# Patient Record
Sex: MALE | Race: WHITE | NOT HISPANIC OR LATINO | Employment: OTHER | URBAN - METROPOLITAN AREA
[De-identification: names, ages, dates, MRNs, and addresses within clinical notes are randomized per-mention and may not be internally consistent; named-entity substitution may affect disease eponyms.]

---

## 2017-01-24 ENCOUNTER — AMBULATORY - HEALTHEAST (OUTPATIENT)
Dept: CARDIOLOGY | Facility: CLINIC | Age: 77
End: 2017-01-24

## 2017-01-26 ENCOUNTER — OFFICE VISIT - HEALTHEAST (OUTPATIENT)
Dept: CARDIOLOGY | Facility: CLINIC | Age: 77
End: 2017-01-26

## 2017-01-26 ENCOUNTER — AMBULATORY - HEALTHEAST (OUTPATIENT)
Dept: CARDIOLOGY | Facility: CLINIC | Age: 77
End: 2017-01-26

## 2017-01-26 ENCOUNTER — COMMUNICATION - HEALTHEAST (OUTPATIENT)
Dept: CARDIOLOGY | Facility: CLINIC | Age: 77
End: 2017-01-26

## 2017-01-26 DIAGNOSIS — I48.19 PERSISTENT ATRIAL FIBRILLATION (H): ICD-10-CM

## 2017-01-26 DIAGNOSIS — G47.33 OSA ON CPAP: ICD-10-CM

## 2017-01-26 DIAGNOSIS — I25.10 CORONARY ARTERY DISEASE INVOLVING NATIVE CORONARY ARTERY OF NATIVE HEART WITHOUT ANGINA PECTORIS: ICD-10-CM

## 2017-01-26 DIAGNOSIS — I50.42 CHRONIC COMBINED SYSTOLIC AND DIASTOLIC CONGESTIVE HEART FAILURE (H): ICD-10-CM

## 2017-01-26 DIAGNOSIS — I50.32 CHRONIC DIASTOLIC CHF (CONGESTIVE HEART FAILURE), NYHA CLASS 2 (H): ICD-10-CM

## 2017-01-26 DIAGNOSIS — J43.8 OTHER EMPHYSEMA (H): ICD-10-CM

## 2017-01-26 DIAGNOSIS — I10 ESSENTIAL HYPERTENSION WITH GOAL BLOOD PRESSURE LESS THAN 130/85: ICD-10-CM

## 2017-01-26 RX ORDER — PEN NEEDLE, DIABETIC 32GX 5/32"
NEEDLE, DISPOSABLE MISCELLANEOUS
Refills: 4 | Status: SHIPPED | COMMUNITY
Start: 2017-01-11

## 2017-01-26 RX ORDER — BRIMONIDINE TARTRATE 2 MG/ML
1 SOLUTION/ DROPS OPHTHALMIC 2 TIMES DAILY
Refills: 11 | Status: SHIPPED | COMMUNITY
Start: 2017-01-06 | End: 2021-08-22

## 2017-01-26 ASSESSMENT — MIFFLIN-ST. JEOR
SCORE: 1982.94
SCORE: 1982.94

## 2017-01-27 ENCOUNTER — AMBULATORY - HEALTHEAST (OUTPATIENT)
Dept: CARDIOLOGY | Facility: CLINIC | Age: 77
End: 2017-01-27

## 2017-01-27 ASSESSMENT — MIFFLIN-ST. JEOR: SCORE: 1988.38

## 2017-01-30 ENCOUNTER — COMMUNICATION - HEALTHEAST (OUTPATIENT)
Dept: CARDIOLOGY | Facility: CLINIC | Age: 77
End: 2017-01-30

## 2017-01-30 DIAGNOSIS — I50.32 CHRONIC DIASTOLIC CHF (CONGESTIVE HEART FAILURE), NYHA CLASS 2 (H): ICD-10-CM

## 2017-01-31 ENCOUNTER — COMMUNICATION - HEALTHEAST (OUTPATIENT)
Dept: CARDIOLOGY | Facility: CLINIC | Age: 77
End: 2017-01-31

## 2017-01-31 DIAGNOSIS — I10 ESSENTIAL HYPERTENSION: ICD-10-CM

## 2017-02-02 ENCOUNTER — AMBULATORY - HEALTHEAST (OUTPATIENT)
Dept: CARDIOLOGY | Facility: CLINIC | Age: 77
End: 2017-02-02

## 2017-02-02 DIAGNOSIS — Z00.6 RESEARCH EXAM: ICD-10-CM

## 2017-02-08 ENCOUNTER — AMBULATORY - HEALTHEAST (OUTPATIENT)
Dept: CARDIOLOGY | Facility: CLINIC | Age: 77
End: 2017-02-08

## 2017-02-24 ENCOUNTER — RECORDS - HEALTHEAST (OUTPATIENT)
Dept: ADMINISTRATIVE | Facility: OTHER | Age: 77
End: 2017-02-24

## 2017-02-24 LAB
LAB AP CHARGES (HE HISTORICAL CONVERSION): NORMAL
PATH REPORT.COMMENTS IMP SPEC: NORMAL
PATH REPORT.COMMENTS IMP SPEC: NORMAL
PATH REPORT.FINAL DX SPEC: NORMAL
PATH REPORT.GROSS SPEC: NORMAL
PATH REPORT.MICROSCOPIC SPEC OTHER STN: NORMAL
PATH REPORT.MICROSCOPIC SPEC OTHER STN: NORMAL
PATH REPORT.RELEVANT HX SPEC: NORMAL
RESULT FLAG (HE HISTORICAL CONVERSION): NORMAL

## 2017-02-27 ENCOUNTER — AMBULATORY - HEALTHEAST (OUTPATIENT)
Dept: CARDIOLOGY | Facility: CLINIC | Age: 77
End: 2017-02-27

## 2017-02-27 ENCOUNTER — COMMUNICATION - HEALTHEAST (OUTPATIENT)
Dept: CARDIOLOGY | Facility: CLINIC | Age: 77
End: 2017-02-27

## 2017-02-27 DIAGNOSIS — I10 ESSENTIAL HYPERTENSION: ICD-10-CM

## 2017-03-01 ENCOUNTER — COMMUNICATION - HEALTHEAST (OUTPATIENT)
Dept: CARDIOLOGY | Facility: CLINIC | Age: 77
End: 2017-03-01

## 2017-03-01 DIAGNOSIS — I50.32 CHRONIC DIASTOLIC CHF (CONGESTIVE HEART FAILURE), NYHA CLASS 2 (H): ICD-10-CM

## 2017-03-02 ENCOUNTER — AMBULATORY - HEALTHEAST (OUTPATIENT)
Dept: CARDIOLOGY | Facility: CLINIC | Age: 77
End: 2017-03-02

## 2017-03-09 ENCOUNTER — AMBULATORY - HEALTHEAST (OUTPATIENT)
Dept: CARDIOLOGY | Facility: CLINIC | Age: 77
End: 2017-03-09

## 2017-03-13 ENCOUNTER — AMBULATORY - HEALTHEAST (OUTPATIENT)
Dept: CARE COORDINATION | Facility: CLINIC | Age: 77
End: 2017-03-13

## 2017-03-14 ENCOUNTER — AMBULATORY - HEALTHEAST (OUTPATIENT)
Dept: CARE COORDINATION | Facility: CLINIC | Age: 77
End: 2017-03-14

## 2017-03-28 ENCOUNTER — COMMUNICATION - HEALTHEAST (OUTPATIENT)
Dept: CARDIOLOGY | Facility: CLINIC | Age: 77
End: 2017-03-28

## 2017-04-01 ENCOUNTER — COMMUNICATION - HEALTHEAST (OUTPATIENT)
Dept: CARDIOLOGY | Facility: CLINIC | Age: 77
End: 2017-04-01

## 2017-04-01 DIAGNOSIS — I50.32 CHRONIC DIASTOLIC CHF (CONGESTIVE HEART FAILURE), NYHA CLASS 2 (H): ICD-10-CM

## 2017-04-06 ENCOUNTER — AMBULATORY - HEALTHEAST (OUTPATIENT)
Dept: CARDIOLOGY | Facility: CLINIC | Age: 77
End: 2017-04-06

## 2017-04-06 ASSESSMENT — MIFFLIN-ST. JEOR: SCORE: 1988.38

## 2017-04-18 ENCOUNTER — AMBULATORY - HEALTHEAST (OUTPATIENT)
Dept: CARDIOLOGY | Facility: CLINIC | Age: 77
End: 2017-04-18

## 2017-04-20 ENCOUNTER — AMBULATORY - HEALTHEAST (OUTPATIENT)
Dept: CARDIOLOGY | Facility: CLINIC | Age: 77
End: 2017-04-20

## 2017-04-20 ENCOUNTER — OFFICE VISIT - HEALTHEAST (OUTPATIENT)
Dept: CARDIOLOGY | Facility: CLINIC | Age: 77
End: 2017-04-20

## 2017-04-20 DIAGNOSIS — I50.42 CHRONIC COMBINED SYSTOLIC AND DIASTOLIC CONGESTIVE HEART FAILURE (H): ICD-10-CM

## 2017-04-20 DIAGNOSIS — R06.89 DIFFICULTY BREATHING: ICD-10-CM

## 2017-04-20 DIAGNOSIS — E66.9 OBESITY: ICD-10-CM

## 2017-04-20 DIAGNOSIS — I10 ESSENTIAL HYPERTENSION WITH GOAL BLOOD PRESSURE LESS THAN 130/85: ICD-10-CM

## 2017-04-20 DIAGNOSIS — I25.10 CORONARY ARTERY DISEASE INVOLVING NATIVE CORONARY ARTERY OF NATIVE HEART WITHOUT ANGINA PECTORIS: ICD-10-CM

## 2017-04-20 DIAGNOSIS — E78.00 HYPERCHOLESTEREMIA: ICD-10-CM

## 2017-04-20 DIAGNOSIS — I48.19 PERSISTENT ATRIAL FIBRILLATION (H): ICD-10-CM

## 2017-04-20 DIAGNOSIS — R42 DIZZY SPELLS: ICD-10-CM

## 2017-04-20 DIAGNOSIS — J43.8 OTHER EMPHYSEMA (H): ICD-10-CM

## 2017-04-20 DIAGNOSIS — G47.33 OSA ON CPAP: ICD-10-CM

## 2017-04-20 ASSESSMENT — MIFFLIN-ST. JEOR: SCORE: 1974.77

## 2017-04-24 ENCOUNTER — HOSPITAL ENCOUNTER (OUTPATIENT)
Dept: ULTRASOUND IMAGING | Facility: CLINIC | Age: 77
Discharge: HOME | End: 2017-04-24
Attending: INTERNAL MEDICINE

## 2017-04-24 DIAGNOSIS — R42 DIZZY SPELLS: ICD-10-CM

## 2017-04-24 DIAGNOSIS — I25.10 CORONARY ARTERY DISEASE INVOLVING NATIVE CORONARY ARTERY OF NATIVE HEART WITHOUT ANGINA PECTORIS: ICD-10-CM

## 2017-05-11 ENCOUNTER — AMBULATORY - HEALTHEAST (OUTPATIENT)
Dept: CARDIOLOGY | Facility: CLINIC | Age: 77
End: 2017-05-11

## 2017-05-16 ENCOUNTER — COMMUNICATION - HEALTHEAST (OUTPATIENT)
Dept: CARDIOLOGY | Facility: CLINIC | Age: 77
End: 2017-05-16

## 2017-05-16 DIAGNOSIS — I50.32 CHRONIC DIASTOLIC CHF (CONGESTIVE HEART FAILURE), NYHA CLASS 2 (H): ICD-10-CM

## 2017-06-08 ENCOUNTER — RECORDS - HEALTHEAST (OUTPATIENT)
Dept: LAB | Facility: CLINIC | Age: 77
End: 2017-06-08

## 2017-06-09 LAB
CHOLEST SERPL-MCNC: 126 MG/DL
FASTING STATUS PATIENT QL REPORTED: ABNORMAL
HDLC SERPL-MCNC: 29 MG/DL
LDLC SERPL CALC-MCNC: 58 MG/DL
PSA SERPL-MCNC: 1.7 NG/ML (ref 0–6.5)
TRIGL SERPL-MCNC: 194 MG/DL

## 2017-06-12 ENCOUNTER — COMMUNICATION - HEALTHEAST (OUTPATIENT)
Dept: CARDIOLOGY | Facility: CLINIC | Age: 77
End: 2017-06-12

## 2017-06-12 DIAGNOSIS — E87.6 HYPOKALEMIA: ICD-10-CM

## 2017-07-06 ENCOUNTER — AMBULATORY - HEALTHEAST (OUTPATIENT)
Dept: CARDIOLOGY | Facility: CLINIC | Age: 77
End: 2017-07-06

## 2017-07-06 ASSESSMENT — MIFFLIN-ST. JEOR: SCORE: 1979.31

## 2017-07-24 ENCOUNTER — COMMUNICATION - HEALTHEAST (OUTPATIENT)
Dept: CARDIOLOGY | Facility: CLINIC | Age: 77
End: 2017-07-24

## 2017-07-24 DIAGNOSIS — I50.32 CHRONIC DIASTOLIC CHF (CONGESTIVE HEART FAILURE), NYHA CLASS 2 (H): ICD-10-CM

## 2017-08-24 ENCOUNTER — RECORDS - HEALTHEAST (OUTPATIENT)
Dept: ADMINISTRATIVE | Facility: OTHER | Age: 77
End: 2017-08-24

## 2017-09-13 ENCOUNTER — HOSPITAL ENCOUNTER (OUTPATIENT)
Dept: RESPIRATORY THERAPY | Facility: HOSPITAL | Age: 77
Discharge: HOME | End: 2017-09-13

## 2017-09-13 DIAGNOSIS — R06.09 DOE (DYSPNEA ON EXERTION): ICD-10-CM

## 2017-09-29 ENCOUNTER — COMMUNICATION - HEALTHEAST (OUTPATIENT)
Dept: CARDIOLOGY | Facility: CLINIC | Age: 77
End: 2017-09-29

## 2017-09-29 DIAGNOSIS — I50.32 CHRONIC DIASTOLIC CHF (CONGESTIVE HEART FAILURE), NYHA CLASS 2 (H): ICD-10-CM

## 2017-10-03 ENCOUNTER — COMMUNICATION - HEALTHEAST (OUTPATIENT)
Dept: CARDIOLOGY | Facility: CLINIC | Age: 77
End: 2017-10-03

## 2017-10-03 DIAGNOSIS — I10 ESSENTIAL HYPERTENSION: ICD-10-CM

## 2017-10-03 DIAGNOSIS — I50.32 CHRONIC DIASTOLIC CHF (CONGESTIVE HEART FAILURE), NYHA CLASS 2 (H): ICD-10-CM

## 2017-10-10 ENCOUNTER — AMBULATORY - HEALTHEAST (OUTPATIENT)
Dept: CARDIOLOGY | Facility: CLINIC | Age: 77
End: 2017-10-10

## 2017-10-10 ASSESSMENT — MIFFLIN-ST. JEOR: SCORE: 1979.31

## 2017-12-02 ENCOUNTER — COMMUNICATION - HEALTHEAST (OUTPATIENT)
Dept: CARDIOLOGY | Facility: CLINIC | Age: 77
End: 2017-12-02

## 2017-12-02 DIAGNOSIS — E87.6 HYPOKALEMIA: ICD-10-CM

## 2017-12-19 ENCOUNTER — AMBULATORY - HEALTHEAST (OUTPATIENT)
Dept: CARDIOLOGY | Facility: CLINIC | Age: 77
End: 2017-12-19

## 2017-12-26 ENCOUNTER — COMMUNICATION - HEALTHEAST (OUTPATIENT)
Dept: CARDIOLOGY | Facility: CLINIC | Age: 77
End: 2017-12-26

## 2017-12-26 DIAGNOSIS — I48.19 PERSISTENT ATRIAL FIBRILLATION (H): ICD-10-CM

## 2017-12-26 DIAGNOSIS — I25.10 CORONARY ARTERY DISEASE INVOLVING NATIVE CORONARY ARTERY OF NATIVE HEART WITHOUT ANGINA PECTORIS: ICD-10-CM

## 2018-01-12 ENCOUNTER — COMMUNICATION - HEALTHEAST (OUTPATIENT)
Dept: CARDIOLOGY | Facility: CLINIC | Age: 78
End: 2018-01-12

## 2018-01-12 DIAGNOSIS — I48.19 PERSISTENT ATRIAL FIBRILLATION (H): ICD-10-CM

## 2018-01-19 ENCOUNTER — RECORDS - HEALTHEAST (OUTPATIENT)
Dept: LAB | Facility: CLINIC | Age: 78
End: 2018-01-19

## 2018-01-19 LAB
ANION GAP SERPL CALCULATED.3IONS-SCNC: 16 MMOL/L (ref 5–18)
BUN SERPL-MCNC: 34 MG/DL (ref 8–28)
CALCIUM SERPL-MCNC: 9.5 MG/DL (ref 8.5–10.5)
CHLORIDE BLD-SCNC: 98 MMOL/L (ref 98–107)
CO2 SERPL-SCNC: 26 MMOL/L (ref 22–31)
CREAT SERPL-MCNC: 1.53 MG/DL (ref 0.7–1.3)
GFR SERPL CREATININE-BSD FRML MDRD: 45 ML/MIN/1.73M2
GLUCOSE BLD-MCNC: 157 MG/DL (ref 70–125)
POTASSIUM BLD-SCNC: 3.7 MMOL/L (ref 3.5–5)
SODIUM SERPL-SCNC: 140 MMOL/L (ref 136–145)
URATE SERPL-MCNC: 12.1 MG/DL (ref 3–8)

## 2018-01-26 ENCOUNTER — RECORDS - HEALTHEAST (OUTPATIENT)
Dept: LAB | Facility: CLINIC | Age: 78
End: 2018-01-26

## 2018-01-26 LAB
ANION GAP SERPL CALCULATED.3IONS-SCNC: 17 MMOL/L (ref 5–18)
BUN SERPL-MCNC: 39 MG/DL (ref 8–28)
CALCIUM SERPL-MCNC: 9.6 MG/DL (ref 8.5–10.5)
CHLORIDE BLD-SCNC: 96 MMOL/L (ref 98–107)
CO2 SERPL-SCNC: 23 MMOL/L (ref 22–31)
CREAT SERPL-MCNC: 1.79 MG/DL (ref 0.7–1.3)
GFR SERPL CREATININE-BSD FRML MDRD: 37 ML/MIN/1.73M2
GLUCOSE BLD-MCNC: 214 MG/DL (ref 70–125)
POTASSIUM BLD-SCNC: 4 MMOL/L (ref 3.5–5)
SODIUM SERPL-SCNC: 136 MMOL/L (ref 136–145)

## 2018-03-20 ENCOUNTER — AMBULATORY - HEALTHEAST (OUTPATIENT)
Dept: CARDIOLOGY | Facility: CLINIC | Age: 78
End: 2018-03-20

## 2018-04-03 ENCOUNTER — COMMUNICATION - HEALTHEAST (OUTPATIENT)
Dept: CARDIOLOGY | Facility: CLINIC | Age: 78
End: 2018-04-03

## 2018-04-03 DIAGNOSIS — I10 ESSENTIAL HYPERTENSION: ICD-10-CM

## 2018-04-25 ENCOUNTER — RECORDS - HEALTHEAST (OUTPATIENT)
Dept: LAB | Facility: CLINIC | Age: 78
End: 2018-04-25

## 2018-04-25 LAB
ALBUMIN SERPL-MCNC: 3.8 G/DL (ref 3.5–5)
ALP SERPL-CCNC: 72 U/L (ref 45–120)
ALT SERPL W P-5'-P-CCNC: 60 U/L (ref 0–45)
ANION GAP SERPL CALCULATED.3IONS-SCNC: 18 MMOL/L (ref 5–18)
AST SERPL W P-5'-P-CCNC: 56 U/L (ref 0–40)
BILIRUB SERPL-MCNC: 2.2 MG/DL (ref 0–1)
BUN SERPL-MCNC: 41 MG/DL (ref 8–28)
CALCIUM SERPL-MCNC: 9.6 MG/DL (ref 8.5–10.5)
CHLORIDE BLD-SCNC: 95 MMOL/L (ref 98–107)
CO2 SERPL-SCNC: 24 MMOL/L (ref 22–31)
CREAT SERPL-MCNC: 1.78 MG/DL (ref 0.7–1.3)
GFR SERPL CREATININE-BSD FRML MDRD: 37 ML/MIN/1.73M2
GLUCOSE BLD-MCNC: 205 MG/DL (ref 70–125)
PHOSPHATE SERPL-MCNC: 3.3 MG/DL (ref 2.5–4.5)
POTASSIUM BLD-SCNC: 3.5 MMOL/L (ref 3.5–5)
PROT SERPL-MCNC: 6.8 G/DL (ref 6–8)
SODIUM SERPL-SCNC: 137 MMOL/L (ref 136–145)

## 2018-05-02 ENCOUNTER — AMBULATORY - HEALTHEAST (OUTPATIENT)
Dept: CARDIOLOGY | Facility: CLINIC | Age: 78
End: 2018-05-02

## 2018-05-02 ENCOUNTER — RECORDS - HEALTHEAST (OUTPATIENT)
Dept: ADMINISTRATIVE | Facility: OTHER | Age: 78
End: 2018-05-02

## 2018-05-07 ENCOUNTER — OFFICE VISIT - HEALTHEAST (OUTPATIENT)
Dept: CARDIOLOGY | Facility: CLINIC | Age: 78
End: 2018-05-07

## 2018-05-07 DIAGNOSIS — R06.89 DIFFICULTY BREATHING: ICD-10-CM

## 2018-05-07 DIAGNOSIS — I50.42 CHRONIC COMBINED SYSTOLIC AND DIASTOLIC CONGESTIVE HEART FAILURE (H): ICD-10-CM

## 2018-05-07 DIAGNOSIS — I25.10 CORONARY ARTERY DISEASE INVOLVING NATIVE CORONARY ARTERY OF NATIVE HEART WITHOUT ANGINA PECTORIS: ICD-10-CM

## 2018-05-07 DIAGNOSIS — E66.9 OBESITY: ICD-10-CM

## 2018-05-07 DIAGNOSIS — G47.33 OSA ON CPAP: ICD-10-CM

## 2018-05-07 DIAGNOSIS — I48.19 PERSISTENT ATRIAL FIBRILLATION (H): ICD-10-CM

## 2018-05-07 DIAGNOSIS — I10 ESSENTIAL HYPERTENSION: ICD-10-CM

## 2018-05-07 DIAGNOSIS — E78.00 HYPERCHOLESTEREMIA: ICD-10-CM

## 2018-05-07 ASSESSMENT — MIFFLIN-ST. JEOR: SCORE: 1939.63

## 2018-05-08 ENCOUNTER — RECORDS - HEALTHEAST (OUTPATIENT)
Dept: ADMINISTRATIVE | Facility: OTHER | Age: 78
End: 2018-05-08

## 2018-05-10 ENCOUNTER — RECORDS - HEALTHEAST (OUTPATIENT)
Dept: ADMINISTRATIVE | Facility: OTHER | Age: 78
End: 2018-05-10

## 2018-05-15 ENCOUNTER — COMMUNICATION - HEALTHEAST (OUTPATIENT)
Dept: CARDIOLOGY | Facility: CLINIC | Age: 78
End: 2018-05-15

## 2018-05-15 DIAGNOSIS — R06.02 SOB (SHORTNESS OF BREATH): ICD-10-CM

## 2018-05-15 DIAGNOSIS — I27.20 PULMONARY HYPERTENSION (H): ICD-10-CM

## 2018-05-16 ENCOUNTER — RECORDS - HEALTHEAST (OUTPATIENT)
Dept: LAB | Facility: CLINIC | Age: 78
End: 2018-05-16

## 2018-05-18 LAB — BACTERIA SPEC CULT: NORMAL

## 2018-05-30 ENCOUNTER — RECORDS - HEALTHEAST (OUTPATIENT)
Dept: LAB | Facility: CLINIC | Age: 78
End: 2018-05-30

## 2018-05-30 LAB
ALBUMIN SERPL-MCNC: 4.1 G/DL (ref 3.5–5)
ALP SERPL-CCNC: 72 U/L (ref 45–120)
ALT SERPL W P-5'-P-CCNC: 87 U/L (ref 0–45)
ANION GAP SERPL CALCULATED.3IONS-SCNC: 17 MMOL/L (ref 5–18)
AST SERPL W P-5'-P-CCNC: 71 U/L (ref 0–40)
BILIRUB SERPL-MCNC: 1.6 MG/DL (ref 0–1)
BUN SERPL-MCNC: 43 MG/DL (ref 8–28)
CALCIUM SERPL-MCNC: 9.8 MG/DL (ref 8.5–10.5)
CHLORIDE BLD-SCNC: 101 MMOL/L (ref 98–107)
CHOLEST SERPL-MCNC: 146 MG/DL
CO2 SERPL-SCNC: 24 MMOL/L (ref 22–31)
CREAT SERPL-MCNC: 1.83 MG/DL (ref 0.7–1.3)
FASTING STATUS PATIENT QL REPORTED: ABNORMAL
GFR SERPL CREATININE-BSD FRML MDRD: 36 ML/MIN/1.73M2
GLUCOSE BLD-MCNC: 251 MG/DL (ref 70–125)
HDLC SERPL-MCNC: 30 MG/DL
LDLC SERPL CALC-MCNC: 65 MG/DL
LDLC SERPL CALC-MCNC: ABNORMAL MG/DL
POTASSIUM BLD-SCNC: 3.8 MMOL/L (ref 3.5–5)
PROT SERPL-MCNC: 6.9 G/DL (ref 6–8)
SODIUM SERPL-SCNC: 142 MMOL/L (ref 136–145)
TRIGL SERPL-MCNC: 502 MG/DL

## 2018-06-05 ENCOUNTER — HOSPITAL ENCOUNTER (OUTPATIENT)
Dept: RESPIRATORY THERAPY | Facility: HOSPITAL | Age: 78
Discharge: HOME | End: 2018-06-05
Attending: INTERNAL MEDICINE

## 2018-06-05 DIAGNOSIS — R06.02 SOB (SHORTNESS OF BREATH): ICD-10-CM

## 2018-06-05 DIAGNOSIS — I27.20 PULMONARY HYPERTENSION (H): ICD-10-CM

## 2018-06-05 LAB — HGB BLD-MCNC: 14.7 G/DL (ref 14–18)

## 2018-06-07 ENCOUNTER — AMBULATORY - HEALTHEAST (OUTPATIENT)
Dept: CARDIOLOGY | Facility: CLINIC | Age: 78
End: 2018-06-07

## 2018-06-07 ENCOUNTER — RECORDS - HEALTHEAST (OUTPATIENT)
Dept: ADMINISTRATIVE | Facility: OTHER | Age: 78
End: 2018-06-07

## 2018-06-11 ENCOUNTER — OFFICE VISIT - HEALTHEAST (OUTPATIENT)
Dept: CARDIOLOGY | Facility: CLINIC | Age: 78
End: 2018-06-11

## 2018-06-11 DIAGNOSIS — I27.22 PULMONARY HYPERTENSION DUE TO LEFT VENTRICULAR DIASTOLIC DYSFUNCTION (H): ICD-10-CM

## 2018-06-11 DIAGNOSIS — G47.33 OSA ON CPAP: ICD-10-CM

## 2018-06-11 ASSESSMENT — MIFFLIN-ST. JEOR: SCORE: 1930.55

## 2018-06-19 ENCOUNTER — AMBULATORY - HEALTHEAST (OUTPATIENT)
Dept: CARDIOLOGY | Facility: CLINIC | Age: 78
End: 2018-06-19

## 2018-08-10 ENCOUNTER — RECORDS - HEALTHEAST (OUTPATIENT)
Dept: LAB | Facility: CLINIC | Age: 78
End: 2018-08-10

## 2018-08-10 LAB
ALBUMIN SERPL-MCNC: 3.7 G/DL (ref 3.5–5)
ALP SERPL-CCNC: 73 U/L (ref 45–120)
ALT SERPL W P-5'-P-CCNC: 44 U/L (ref 0–45)
ANION GAP SERPL CALCULATED.3IONS-SCNC: 13 MMOL/L (ref 5–18)
AST SERPL W P-5'-P-CCNC: 46 U/L (ref 0–40)
BILIRUB SERPL-MCNC: 2.6 MG/DL (ref 0–1)
BUN SERPL-MCNC: 24 MG/DL (ref 8–28)
CALCIUM SERPL-MCNC: 9.1 MG/DL (ref 8.5–10.5)
CHLORIDE BLD-SCNC: 108 MMOL/L (ref 98–107)
CO2 SERPL-SCNC: 21 MMOL/L (ref 22–31)
CREAT SERPL-MCNC: 1.46 MG/DL (ref 0.7–1.3)
GFR SERPL CREATININE-BSD FRML MDRD: 47 ML/MIN/1.73M2
GLUCOSE BLD-MCNC: 99 MG/DL (ref 70–125)
POTASSIUM BLD-SCNC: 4.1 MMOL/L (ref 3.5–5)
PROT SERPL-MCNC: 6.1 G/DL (ref 6–8)
SODIUM SERPL-SCNC: 142 MMOL/L (ref 136–145)

## 2018-08-16 ENCOUNTER — RECORDS - HEALTHEAST (OUTPATIENT)
Dept: ADMINISTRATIVE | Facility: OTHER | Age: 78
End: 2018-08-16

## 2018-08-16 ENCOUNTER — AMBULATORY - HEALTHEAST (OUTPATIENT)
Dept: CARDIOLOGY | Facility: CLINIC | Age: 78
End: 2018-08-16

## 2018-09-05 ENCOUNTER — COMMUNICATION - HEALTHEAST (OUTPATIENT)
Dept: CARDIOLOGY | Facility: CLINIC | Age: 78
End: 2018-09-05

## 2018-09-05 DIAGNOSIS — E87.6 HYPOKALEMIA: ICD-10-CM

## 2018-09-07 ENCOUNTER — OFFICE VISIT - HEALTHEAST (OUTPATIENT)
Dept: CARDIOLOGY | Facility: CLINIC | Age: 78
End: 2018-09-07

## 2018-09-07 ENCOUNTER — COMMUNICATION - HEALTHEAST (OUTPATIENT)
Dept: TELEHEALTH | Facility: CLINIC | Age: 78
End: 2018-09-07

## 2018-09-07 DIAGNOSIS — I48.19 PERSISTENT ATRIAL FIBRILLATION (H): ICD-10-CM

## 2018-09-07 DIAGNOSIS — I25.10 CORONARY ARTERY DISEASE INVOLVING NATIVE CORONARY ARTERY OF NATIVE HEART WITHOUT ANGINA PECTORIS: ICD-10-CM

## 2018-09-07 DIAGNOSIS — G47.33 OSA ON CPAP: ICD-10-CM

## 2018-09-07 DIAGNOSIS — E66.9 OBESITY: ICD-10-CM

## 2018-09-07 DIAGNOSIS — I27.22 PULMONARY HYPERTENSION DUE TO LEFT VENTRICULAR DIASTOLIC DYSFUNCTION (H): ICD-10-CM

## 2018-09-07 DIAGNOSIS — E78.00 HYPERCHOLESTEREMIA: ICD-10-CM

## 2018-09-07 DIAGNOSIS — I50.30 (HFPEF) HEART FAILURE WITH PRESERVED EJECTION FRACTION (H): ICD-10-CM

## 2018-09-07 DIAGNOSIS — I10 ESSENTIAL HYPERTENSION: ICD-10-CM

## 2018-09-25 ENCOUNTER — COMMUNICATION - HEALTHEAST (OUTPATIENT)
Dept: CARDIOLOGY | Facility: CLINIC | Age: 78
End: 2018-09-25

## 2018-09-25 ENCOUNTER — RECORDS - HEALTHEAST (OUTPATIENT)
Dept: LAB | Facility: CLINIC | Age: 78
End: 2018-09-25

## 2018-09-25 LAB
CREAT UR-MCNC: 138.2 MG/DL
MICROALBUMIN UR-MCNC: 7.64 MG/DL (ref 0–1.99)
MICROALBUMIN/CREAT UR: 55.3 MG/G

## 2018-10-24 ENCOUNTER — COMMUNICATION - HEALTHEAST (OUTPATIENT)
Dept: CARDIOLOGY | Facility: CLINIC | Age: 78
End: 2018-10-24

## 2019-01-10 ENCOUNTER — AMBULATORY - HEALTHEAST (OUTPATIENT)
Dept: CARDIOLOGY | Facility: CLINIC | Age: 79
End: 2019-01-10

## 2019-01-10 DIAGNOSIS — Z00.6 PATIENT IN CLINICAL RESEARCH STUDY: ICD-10-CM

## 2019-01-10 ASSESSMENT — MIFFLIN-ST. JEOR: SCORE: 1926.47

## 2019-01-24 ENCOUNTER — COMMUNICATION - HEALTHEAST (OUTPATIENT)
Dept: CARDIOLOGY | Facility: CLINIC | Age: 79
End: 2019-01-24

## 2019-01-24 DIAGNOSIS — E87.6 HYPOKALEMIA: ICD-10-CM

## 2019-02-18 ENCOUNTER — RECORDS - HEALTHEAST (OUTPATIENT)
Dept: LAB | Facility: CLINIC | Age: 79
End: 2019-02-18

## 2019-02-18 LAB
ANION GAP SERPL CALCULATED.3IONS-SCNC: 13 MMOL/L (ref 5–18)
BUN SERPL-MCNC: 26 MG/DL (ref 8–28)
CALCIUM SERPL-MCNC: 9.4 MG/DL (ref 8.5–10.5)
CHLORIDE BLD-SCNC: 108 MMOL/L (ref 98–107)
CO2 SERPL-SCNC: 23 MMOL/L (ref 22–31)
CREAT SERPL-MCNC: 1.53 MG/DL (ref 0.7–1.3)
GFR SERPL CREATININE-BSD FRML MDRD: 44 ML/MIN/1.73M2
GLUCOSE BLD-MCNC: 88 MG/DL (ref 70–125)
POTASSIUM BLD-SCNC: 4.4 MMOL/L (ref 3.5–5)
SODIUM SERPL-SCNC: 144 MMOL/L (ref 136–145)

## 2019-03-29 ENCOUNTER — COMMUNICATION - HEALTHEAST (OUTPATIENT)
Dept: CARDIOLOGY | Facility: CLINIC | Age: 79
End: 2019-03-29

## 2019-03-29 DIAGNOSIS — E87.6 HYPOKALEMIA: ICD-10-CM

## 2019-04-17 ENCOUNTER — RECORDS - HEALTHEAST (OUTPATIENT)
Dept: LAB | Facility: CLINIC | Age: 79
End: 2019-04-17

## 2019-04-17 LAB
ANION GAP SERPL CALCULATED.3IONS-SCNC: 12 MMOL/L (ref 5–18)
BUN SERPL-MCNC: 27 MG/DL (ref 8–28)
CALCIUM SERPL-MCNC: 9.4 MG/DL (ref 8.5–10.5)
CHLORIDE BLD-SCNC: 103 MMOL/L (ref 98–107)
CO2 SERPL-SCNC: 24 MMOL/L (ref 22–31)
CREAT SERPL-MCNC: 1.49 MG/DL (ref 0.7–1.3)
GFR SERPL CREATININE-BSD FRML MDRD: 46 ML/MIN/1.73M2
GLUCOSE BLD-MCNC: 299 MG/DL (ref 70–125)
POTASSIUM BLD-SCNC: 4.2 MMOL/L (ref 3.5–5)
SODIUM SERPL-SCNC: 139 MMOL/L (ref 136–145)

## 2019-04-19 ENCOUNTER — COMMUNICATION - HEALTHEAST (OUTPATIENT)
Dept: CARDIOLOGY | Facility: CLINIC | Age: 79
End: 2019-04-19

## 2019-05-01 ENCOUNTER — AMBULATORY - HEALTHEAST (OUTPATIENT)
Dept: CARDIOLOGY | Facility: CLINIC | Age: 79
End: 2019-05-01

## 2019-05-01 ENCOUNTER — RECORDS - HEALTHEAST (OUTPATIENT)
Dept: ADMINISTRATIVE | Facility: OTHER | Age: 79
End: 2019-05-01

## 2019-05-06 ENCOUNTER — OFFICE VISIT - HEALTHEAST (OUTPATIENT)
Dept: CARDIOLOGY | Facility: CLINIC | Age: 79
End: 2019-05-06

## 2019-05-06 DIAGNOSIS — I48.19 PERSISTENT ATRIAL FIBRILLATION (H): ICD-10-CM

## 2019-05-06 DIAGNOSIS — J43.8 OTHER EMPHYSEMA (H): ICD-10-CM

## 2019-05-06 DIAGNOSIS — I50.30 (HFPEF) HEART FAILURE WITH PRESERVED EJECTION FRACTION (H): ICD-10-CM

## 2019-05-06 DIAGNOSIS — G47.33 OSA ON CPAP: ICD-10-CM

## 2019-05-06 DIAGNOSIS — E66.9 OBESITY: ICD-10-CM

## 2019-05-06 DIAGNOSIS — I10 ESSENTIAL HYPERTENSION: ICD-10-CM

## 2019-05-06 DIAGNOSIS — I25.10 CORONARY ARTERY DISEASE INVOLVING NATIVE CORONARY ARTERY OF NATIVE HEART WITHOUT ANGINA PECTORIS: ICD-10-CM

## 2019-05-06 DIAGNOSIS — I27.22 PULMONARY HYPERTENSION DUE TO LEFT VENTRICULAR DIASTOLIC DYSFUNCTION (H): ICD-10-CM

## 2019-05-06 ASSESSMENT — MIFFLIN-ST. JEOR: SCORE: 1885.88

## 2019-05-09 ENCOUNTER — RECORDS - HEALTHEAST (OUTPATIENT)
Dept: LAB | Facility: CLINIC | Age: 79
End: 2019-05-09

## 2019-05-09 LAB
ANION GAP SERPL CALCULATED.3IONS-SCNC: 13 MMOL/L (ref 5–18)
BUN SERPL-MCNC: 29 MG/DL (ref 8–28)
CALCIUM SERPL-MCNC: 9.4 MG/DL (ref 8.5–10.5)
CHLORIDE BLD-SCNC: 107 MMOL/L (ref 98–107)
CO2 SERPL-SCNC: 23 MMOL/L (ref 22–31)
CREAT SERPL-MCNC: 1.42 MG/DL (ref 0.7–1.3)
GFR SERPL CREATININE-BSD FRML MDRD: 48 ML/MIN/1.73M2
GLUCOSE BLD-MCNC: 138 MG/DL (ref 70–125)
POTASSIUM BLD-SCNC: 3.9 MMOL/L (ref 3.5–5)
SODIUM SERPL-SCNC: 143 MMOL/L (ref 136–145)

## 2019-05-14 ENCOUNTER — COMMUNICATION - HEALTHEAST (OUTPATIENT)
Dept: CARDIOLOGY | Facility: CLINIC | Age: 79
End: 2019-05-14

## 2019-05-14 DIAGNOSIS — E87.6 HYPOKALEMIA: ICD-10-CM

## 2019-05-16 ENCOUNTER — COMMUNICATION - HEALTHEAST (OUTPATIENT)
Dept: CARDIOLOGY | Facility: CLINIC | Age: 79
End: 2019-05-16

## 2019-05-16 ENCOUNTER — HOSPITAL ENCOUNTER (OUTPATIENT)
Dept: CARDIOLOGY | Facility: HOSPITAL | Age: 79
Discharge: HOME | End: 2019-05-16
Attending: INTERNAL MEDICINE

## 2019-05-16 ENCOUNTER — HOSPITAL ENCOUNTER (OUTPATIENT)
Dept: NUCLEAR MEDICINE | Facility: HOSPITAL | Age: 79
Discharge: HOME | End: 2019-05-16
Attending: INTERNAL MEDICINE

## 2019-05-16 DIAGNOSIS — I25.10 CORONARY ARTERY DISEASE INVOLVING NATIVE CORONARY ARTERY OF NATIVE HEART WITHOUT ANGINA PECTORIS: ICD-10-CM

## 2019-05-16 DIAGNOSIS — I48.19 PERSISTENT ATRIAL FIBRILLATION (H): ICD-10-CM

## 2019-05-16 LAB
CV STRESS CURRENT BP HE: NORMAL
CV STRESS CURRENT HR HE: 59
CV STRESS CURRENT HR HE: 60
CV STRESS CURRENT HR HE: 62
CV STRESS CURRENT HR HE: 62
CV STRESS CURRENT HR HE: 63
CV STRESS CURRENT HR HE: 63
CV STRESS CURRENT HR HE: 64
CV STRESS CURRENT HR HE: 65
CV STRESS CURRENT HR HE: 65
CV STRESS CURRENT HR HE: 69
CV STRESS CURRENT HR HE: 70
CV STRESS DEVIATION TIME HE: NORMAL
CV STRESS ECHO PERCENT HR HE: NORMAL
CV STRESS EXERCISE STAGE HE: NORMAL
CV STRESS FINAL RESTING BP HE: NORMAL
CV STRESS FINAL RESTING HR HE: 64
CV STRESS MAX HR HE: 69
CV STRESS MAX TREADMILL GRADE HE: 0
CV STRESS MAX TREADMILL SPEED HE: 0
CV STRESS PEAK DIA BP HE: NORMAL
CV STRESS PEAK SYS BP HE: NORMAL
CV STRESS PHASE HE: NORMAL
CV STRESS PROTOCOL HE: NORMAL
CV STRESS RESTING PT POSITION HE: NORMAL
CV STRESS ST DEVIATION AMOUNT HE: NORMAL
CV STRESS ST DEVIATION ELEVATION HE: NORMAL
CV STRESS ST EVELATION AMOUNT HE: NORMAL
CV STRESS TEST TYPE HE: NORMAL
CV STRESS TOTAL STAGE TIME MIN 1 HE: NORMAL
NUC STRESS EJECTION FRACTION: 75 %
STRESS ECHO BASELINE BP: NORMAL
STRESS ECHO BASELINE HR: 64
STRESS ECHO CALCULATED PERCENT HR: 48 %
STRESS ECHO LAST STRESS BP: NORMAL
STRESS ECHO LAST STRESS HR: 64

## 2019-05-31 ENCOUNTER — RECORDS - HEALTHEAST (OUTPATIENT)
Dept: LAB | Facility: CLINIC | Age: 79
End: 2019-05-31

## 2019-05-31 LAB
ALBUMIN SERPL-MCNC: 3.7 G/DL (ref 3.5–5)
ALP SERPL-CCNC: 130 U/L (ref 45–120)
ALT SERPL W P-5'-P-CCNC: 17 U/L (ref 0–45)
ANION GAP SERPL CALCULATED.3IONS-SCNC: 11 MMOL/L (ref 5–18)
AST SERPL W P-5'-P-CCNC: 20 U/L (ref 0–40)
BILIRUB SERPL-MCNC: 2.2 MG/DL (ref 0–1)
BUN SERPL-MCNC: 25 MG/DL (ref 8–28)
CALCIUM SERPL-MCNC: 9.4 MG/DL (ref 8.5–10.5)
CHLORIDE BLD-SCNC: 103 MMOL/L (ref 98–107)
CO2 SERPL-SCNC: 26 MMOL/L (ref 22–31)
CREAT SERPL-MCNC: 1.52 MG/DL (ref 0.7–1.3)
GFR SERPL CREATININE-BSD FRML MDRD: 45 ML/MIN/1.73M2
GLUCOSE BLD-MCNC: 135 MG/DL (ref 70–125)
POTASSIUM BLD-SCNC: 3.9 MMOL/L (ref 3.5–5)
PROT SERPL-MCNC: 6.6 G/DL (ref 6–8)
SODIUM SERPL-SCNC: 140 MMOL/L (ref 136–145)
TSH SERPL DL<=0.005 MIU/L-ACNC: 2.55 UIU/ML (ref 0.3–5)
VIT B12 SERPL-MCNC: 552 PG/ML (ref 213–816)

## 2019-07-04 ENCOUNTER — COMMUNICATION - HEALTHEAST (OUTPATIENT)
Dept: CARDIOLOGY | Facility: CLINIC | Age: 79
End: 2019-07-04

## 2019-07-04 DIAGNOSIS — E87.6 HYPOKALEMIA: ICD-10-CM

## 2019-07-09 ENCOUNTER — RECORDS - HEALTHEAST (OUTPATIENT)
Dept: LAB | Facility: CLINIC | Age: 79
End: 2019-07-09

## 2019-07-09 LAB
CHOLEST SERPL-MCNC: 99 MG/DL
FASTING STATUS PATIENT QL REPORTED: ABNORMAL
HDLC SERPL-MCNC: 25 MG/DL
LDLC SERPL CALC-MCNC: 14 MG/DL
TRIGL SERPL-MCNC: 301 MG/DL

## 2020-01-24 ENCOUNTER — COMMUNICATION - HEALTHEAST (OUTPATIENT)
Dept: CARDIOLOGY | Facility: CLINIC | Age: 80
End: 2020-01-24

## 2020-01-24 DIAGNOSIS — I48.19 PERSISTENT ATRIAL FIBRILLATION (H): ICD-10-CM

## 2020-01-28 ENCOUNTER — AMBULATORY - HEALTHEAST (OUTPATIENT)
Dept: CARDIOLOGY | Facility: CLINIC | Age: 80
End: 2020-01-28

## 2020-01-28 ENCOUNTER — RECORDS - HEALTHEAST (OUTPATIENT)
Dept: ADMINISTRATIVE | Facility: OTHER | Age: 80
End: 2020-01-28

## 2020-01-30 ENCOUNTER — OFFICE VISIT - HEALTHEAST (OUTPATIENT)
Dept: CARDIOLOGY | Facility: CLINIC | Age: 80
End: 2020-01-30

## 2020-01-30 DIAGNOSIS — I50.30 (HFPEF) HEART FAILURE WITH PRESERVED EJECTION FRACTION (H): ICD-10-CM

## 2020-01-30 DIAGNOSIS — J43.8 OTHER EMPHYSEMA (H): ICD-10-CM

## 2020-01-30 DIAGNOSIS — I10 ESSENTIAL HYPERTENSION: ICD-10-CM

## 2020-01-30 DIAGNOSIS — I25.10 CORONARY ARTERY DISEASE INVOLVING NATIVE CORONARY ARTERY OF NATIVE HEART WITHOUT ANGINA PECTORIS: ICD-10-CM

## 2020-01-30 DIAGNOSIS — I27.22 PULMONARY HYPERTENSION DUE TO LEFT VENTRICULAR DIASTOLIC DYSFUNCTION (H): ICD-10-CM

## 2020-01-30 DIAGNOSIS — E78.00 HYPERCHOLESTEREMIA: ICD-10-CM

## 2020-01-30 DIAGNOSIS — G47.33 OSA ON CPAP: ICD-10-CM

## 2020-01-30 DIAGNOSIS — I48.91 ATRIAL FIBRILLATION (H): ICD-10-CM

## 2020-01-30 LAB
ANION GAP SERPL CALCULATED.3IONS-SCNC: 13 MMOL/L (ref 5–18)
BUN SERPL-MCNC: 28 MG/DL (ref 8–28)
CALCIUM SERPL-MCNC: 9.6 MG/DL (ref 8.5–10.5)
CHLORIDE BLD-SCNC: 101 MMOL/L (ref 98–107)
CO2 SERPL-SCNC: 22 MMOL/L (ref 22–31)
CREAT SERPL-MCNC: 1.73 MG/DL (ref 0.7–1.3)
GFR SERPL CREATININE-BSD FRML MDRD: 38 ML/MIN/1.73M2
GLUCOSE BLD-MCNC: 378 MG/DL (ref 70–125)
POTASSIUM BLD-SCNC: 4.5 MMOL/L (ref 3.5–5)
SODIUM SERPL-SCNC: 136 MMOL/L (ref 136–145)

## 2020-02-24 ENCOUNTER — COMMUNICATION - HEALTHEAST (OUTPATIENT)
Dept: CARDIOLOGY | Facility: CLINIC | Age: 80
End: 2020-02-24

## 2020-02-24 DIAGNOSIS — I50.30 (HFPEF) HEART FAILURE WITH PRESERVED EJECTION FRACTION (H): ICD-10-CM

## 2020-02-24 DIAGNOSIS — I10 ESSENTIAL HYPERTENSION: ICD-10-CM

## 2020-02-25 ENCOUNTER — RECORDS - HEALTHEAST (OUTPATIENT)
Dept: ADMINISTRATIVE | Facility: OTHER | Age: 80
End: 2020-02-25

## 2020-02-25 ENCOUNTER — AMBULATORY - HEALTHEAST (OUTPATIENT)
Dept: CARDIOLOGY | Facility: CLINIC | Age: 80
End: 2020-02-25

## 2020-02-27 ENCOUNTER — OFFICE VISIT - HEALTHEAST (OUTPATIENT)
Dept: CARDIOLOGY | Facility: CLINIC | Age: 80
End: 2020-02-27

## 2020-02-27 DIAGNOSIS — I48.20 CHRONIC ATRIAL FIBRILLATION (H): ICD-10-CM

## 2020-02-27 DIAGNOSIS — G47.33 OSA ON CPAP: ICD-10-CM

## 2020-02-27 DIAGNOSIS — I27.22 PULMONARY HYPERTENSION DUE TO LEFT VENTRICULAR DIASTOLIC DYSFUNCTION (H): ICD-10-CM

## 2020-02-27 DIAGNOSIS — I10 ESSENTIAL HYPERTENSION: ICD-10-CM

## 2020-02-27 DIAGNOSIS — I50.30 (HFPEF) HEART FAILURE WITH PRESERVED EJECTION FRACTION (H): ICD-10-CM

## 2020-02-27 ASSESSMENT — MIFFLIN-ST. JEOR: SCORE: 1863.65

## 2020-05-23 ENCOUNTER — RECORDS - HEALTHEAST (OUTPATIENT)
Dept: ADMINISTRATIVE | Facility: OTHER | Age: 80
End: 2020-05-23

## 2020-05-28 ENCOUNTER — RECORDS - HEALTHEAST (OUTPATIENT)
Dept: LAB | Facility: CLINIC | Age: 80
End: 2020-05-28

## 2020-05-28 ENCOUNTER — RECORDS - HEALTHEAST (OUTPATIENT)
Dept: ADMINISTRATIVE | Facility: OTHER | Age: 80
End: 2020-05-28

## 2020-05-28 LAB
ANION GAP SERPL CALCULATED.3IONS-SCNC: 10 MMOL/L (ref 5–18)
BUN SERPL-MCNC: 30 MG/DL (ref 8–28)
CALCIUM SERPL-MCNC: 9.3 MG/DL (ref 8.5–10.5)
CHLORIDE BLD-SCNC: 107 MMOL/L (ref 98–107)
CO2 SERPL-SCNC: 26 MMOL/L (ref 22–31)
CREAT SERPL-MCNC: 1.49 MG/DL (ref 0.7–1.3)
GFR SERPL CREATININE-BSD FRML MDRD: 46 ML/MIN/1.73M2
GLUCOSE BLD-MCNC: 160 MG/DL (ref 70–125)
POTASSIUM BLD-SCNC: 4.5 MMOL/L (ref 3.5–5)
SODIUM SERPL-SCNC: 143 MMOL/L (ref 136–145)

## 2020-06-01 ENCOUNTER — AMBULATORY - HEALTHEAST (OUTPATIENT)
Dept: CARDIOLOGY | Facility: CLINIC | Age: 80
End: 2020-06-01

## 2020-06-01 ENCOUNTER — RECORDS - HEALTHEAST (OUTPATIENT)
Dept: ADMINISTRATIVE | Facility: OTHER | Age: 80
End: 2020-06-01

## 2020-06-01 ENCOUNTER — AMBULATORY - HEALTHEAST (OUTPATIENT)
Dept: PHARMACY | Facility: HOSPITAL | Age: 80
End: 2020-06-01

## 2020-06-01 DIAGNOSIS — D50.9 IRON DEFICIENCY ANEMIA, UNSPECIFIED IRON DEFICIENCY ANEMIA TYPE: ICD-10-CM

## 2020-06-03 ENCOUNTER — RECORDS - HEALTHEAST (OUTPATIENT)
Dept: ADMINISTRATIVE | Facility: OTHER | Age: 80
End: 2020-06-03

## 2020-06-04 ENCOUNTER — OFFICE VISIT - HEALTHEAST (OUTPATIENT)
Dept: CARDIOLOGY | Facility: CLINIC | Age: 80
End: 2020-06-04

## 2020-06-04 DIAGNOSIS — E66.9 OBESITY: ICD-10-CM

## 2020-06-04 DIAGNOSIS — J43.8 OTHER EMPHYSEMA (H): ICD-10-CM

## 2020-06-04 DIAGNOSIS — I25.10 CORONARY ARTERY DISEASE INVOLVING NATIVE CORONARY ARTERY OF NATIVE HEART WITHOUT ANGINA PECTORIS: ICD-10-CM

## 2020-06-04 DIAGNOSIS — I50.31 ACUTE DIASTOLIC CHF (CONGESTIVE HEART FAILURE) (H): ICD-10-CM

## 2020-06-04 DIAGNOSIS — E78.00 HYPERCHOLESTEREMIA: ICD-10-CM

## 2020-06-04 DIAGNOSIS — I10 ESSENTIAL HYPERTENSION: ICD-10-CM

## 2020-06-04 DIAGNOSIS — G47.33 OSA ON CPAP: ICD-10-CM

## 2020-06-04 DIAGNOSIS — I27.22 PULMONARY HYPERTENSION DUE TO LEFT VENTRICULAR DIASTOLIC DYSFUNCTION (H): ICD-10-CM

## 2020-06-04 DIAGNOSIS — I48.19 PERSISTENT ATRIAL FIBRILLATION (H): ICD-10-CM

## 2020-06-04 RX ORDER — METOLAZONE 2.5 MG/1
2.5 TABLET ORAL DAILY
Status: SHIPPED | COMMUNITY
Start: 2020-05-23 | End: 2021-07-11 | Stop reason: SINTOL

## 2020-06-04 RX ORDER — IPRATROPIUM BROMIDE AND ALBUTEROL SULFATE 2.5; .5 MG/3ML; MG/3ML
3 SOLUTION RESPIRATORY (INHALATION) PRN
Status: SHIPPED | COMMUNITY
Start: 2020-06-04 | End: 2021-07-11

## 2020-06-04 RX ORDER — EPLERENONE 25 MG/1
25 TABLET, FILM COATED ORAL DAILY
Status: SHIPPED | COMMUNITY
Start: 2020-05-24 | End: 2021-10-10

## 2020-06-22 ENCOUNTER — INFUSION - HEALTHEAST (OUTPATIENT)
Dept: INFUSION THERAPY | Facility: HOSPITAL | Age: 80
End: 2020-06-22

## 2020-06-22 DIAGNOSIS — D50.9 IRON DEFICIENCY ANEMIA, UNSPECIFIED IRON DEFICIENCY ANEMIA TYPE: ICD-10-CM

## 2020-07-29 ENCOUNTER — COMMUNICATION - HEALTHEAST (OUTPATIENT)
Dept: CARDIOLOGY | Facility: CLINIC | Age: 80
End: 2020-07-29

## 2020-07-29 DIAGNOSIS — I48.19 PERSISTENT ATRIAL FIBRILLATION (H): ICD-10-CM

## 2020-09-02 ENCOUNTER — RECORDS - HEALTHEAST (OUTPATIENT)
Dept: LAB | Facility: CLINIC | Age: 80
End: 2020-09-02

## 2020-09-02 LAB
ALBUMIN SERPL-MCNC: 3.6 G/DL (ref 3.5–5)
ALP SERPL-CCNC: 114 U/L (ref 45–120)
ALT SERPL W P-5'-P-CCNC: 16 U/L (ref 0–45)
ANION GAP SERPL CALCULATED.3IONS-SCNC: 14 MMOL/L (ref 5–18)
AST SERPL W P-5'-P-CCNC: 21 U/L (ref 0–40)
BILIRUB SERPL-MCNC: 1.3 MG/DL (ref 0–1)
BUN SERPL-MCNC: 27 MG/DL (ref 8–28)
CALCIUM SERPL-MCNC: 9.2 MG/DL (ref 8.5–10.5)
CHLORIDE BLD-SCNC: 103 MMOL/L (ref 98–107)
CHOLEST SERPL-MCNC: 136 MG/DL
CO2 SERPL-SCNC: 25 MMOL/L (ref 22–31)
CREAT SERPL-MCNC: 1.67 MG/DL (ref 0.7–1.3)
FASTING STATUS PATIENT QL REPORTED: ABNORMAL
GFR SERPL CREATININE-BSD FRML MDRD: 40 ML/MIN/1.73M2
GLUCOSE BLD-MCNC: 172 MG/DL (ref 70–125)
HDLC SERPL-MCNC: 28 MG/DL
LDLC SERPL CALC-MCNC: 50 MG/DL
LDLC SERPL CALC-MCNC: ABNORMAL MG/DL
POTASSIUM BLD-SCNC: 4.1 MMOL/L (ref 3.5–5)
PROT SERPL-MCNC: 6.6 G/DL (ref 6–8)
SODIUM SERPL-SCNC: 142 MMOL/L (ref 136–145)
TRIGL SERPL-MCNC: 563 MG/DL

## 2020-09-29 ENCOUNTER — RECORDS - HEALTHEAST (OUTPATIENT)
Dept: LAB | Facility: CLINIC | Age: 80
End: 2020-09-29

## 2020-09-29 LAB
ANION GAP SERPL CALCULATED.3IONS-SCNC: 15 MMOL/L (ref 5–18)
BUN SERPL-MCNC: 45 MG/DL (ref 8–28)
CALCIUM SERPL-MCNC: 9.6 MG/DL (ref 8.5–10.5)
CHLORIDE BLD-SCNC: 103 MMOL/L (ref 98–107)
CO2 SERPL-SCNC: 22 MMOL/L (ref 22–31)
CREAT SERPL-MCNC: 1.69 MG/DL (ref 0.7–1.3)
GFR SERPL CREATININE-BSD FRML MDRD: 39 ML/MIN/1.73M2
GLUCOSE BLD-MCNC: 164 MG/DL (ref 70–125)
POTASSIUM BLD-SCNC: 4.1 MMOL/L (ref 3.5–5)
SODIUM SERPL-SCNC: 140 MMOL/L (ref 136–145)

## 2020-11-20 ENCOUNTER — AMBULATORY - HEALTHEAST (OUTPATIENT)
Dept: SURGERY | Facility: CLINIC | Age: 80
End: 2020-11-20

## 2020-11-20 DIAGNOSIS — Z11.59 ENCOUNTER FOR SCREENING FOR OTHER VIRAL DISEASES: ICD-10-CM

## 2020-12-21 ENCOUNTER — RECORDS - HEALTHEAST (OUTPATIENT)
Dept: LAB | Facility: CLINIC | Age: 80
End: 2020-12-21

## 2020-12-21 LAB
ALBUMIN SERPL-MCNC: 3.9 G/DL (ref 3.5–5)
ALP SERPL-CCNC: 103 U/L (ref 45–120)
ALT SERPL W P-5'-P-CCNC: 22 U/L (ref 0–45)
ANION GAP SERPL CALCULATED.3IONS-SCNC: 10 MMOL/L (ref 5–18)
AST SERPL W P-5'-P-CCNC: 17 U/L (ref 0–40)
BILIRUB SERPL-MCNC: 0.5 MG/DL (ref 0–1)
BUN SERPL-MCNC: 13 MG/DL (ref 8–28)
CALCIUM SERPL-MCNC: 8.9 MG/DL (ref 8.5–10.5)
CHLORIDE BLD-SCNC: 103 MMOL/L (ref 98–107)
CO2 SERPL-SCNC: 28 MMOL/L (ref 22–31)
CREAT SERPL-MCNC: 0.75 MG/DL (ref 0.7–1.3)
GFR SERPL CREATININE-BSD FRML MDRD: >60 ML/MIN/1.73M2
GLUCOSE BLD-MCNC: 144 MG/DL (ref 70–125)
POTASSIUM BLD-SCNC: 4.1 MMOL/L (ref 3.5–5)
PROT SERPL-MCNC: 7 G/DL (ref 6–8)
SODIUM SERPL-SCNC: 141 MMOL/L (ref 136–145)

## 2021-01-14 ENCOUNTER — ANESTHESIA - HEALTHEAST (OUTPATIENT)
Dept: SURGERY | Facility: CLINIC | Age: 81
End: 2021-01-14

## 2021-01-14 ENCOUNTER — SURGERY - HEALTHEAST (OUTPATIENT)
Dept: SURGERY | Facility: CLINIC | Age: 81
End: 2021-01-14

## 2021-02-04 ENCOUNTER — COMMUNICATION - HEALTHEAST (OUTPATIENT)
Dept: ADMINISTRATIVE | Facility: CLINIC | Age: 81
End: 2021-02-04

## 2021-02-05 ENCOUNTER — RECORDS - HEALTHEAST (OUTPATIENT)
Dept: ADMINISTRATIVE | Facility: OTHER | Age: 81
End: 2021-02-05

## 2021-02-05 ENCOUNTER — AMBULATORY - HEALTHEAST (OUTPATIENT)
Dept: CARDIOLOGY | Facility: CLINIC | Age: 81
End: 2021-02-05

## 2021-02-05 ENCOUNTER — OFFICE VISIT - HEALTHEAST (OUTPATIENT)
Dept: CARDIOLOGY | Facility: CLINIC | Age: 81
End: 2021-02-05

## 2021-02-05 DIAGNOSIS — E66.9 OBESITY: ICD-10-CM

## 2021-02-05 DIAGNOSIS — I73.9 PERIPHERAL VASCULAR DISEASE (H): ICD-10-CM

## 2021-02-05 DIAGNOSIS — I48.91 ATRIAL FIBRILLATION (H): ICD-10-CM

## 2021-02-05 DIAGNOSIS — I50.33 ACUTE ON CHRONIC DIASTOLIC CONGESTIVE HEART FAILURE (H): ICD-10-CM

## 2021-02-05 DIAGNOSIS — I10 ESSENTIAL HYPERTENSION: ICD-10-CM

## 2021-02-05 DIAGNOSIS — I27.22 PULMONARY HYPERTENSION DUE TO LEFT VENTRICULAR DIASTOLIC DYSFUNCTION (H): ICD-10-CM

## 2021-02-05 DIAGNOSIS — G47.33 OSA ON CPAP: ICD-10-CM

## 2021-02-05 DIAGNOSIS — I25.10 CORONARY ARTERY DISEASE INVOLVING NATIVE CORONARY ARTERY OF NATIVE HEART WITHOUT ANGINA PECTORIS: ICD-10-CM

## 2021-02-05 DIAGNOSIS — E78.00 HYPERCHOLESTEREMIA: ICD-10-CM

## 2021-02-05 RX ORDER — SILDENAFIL CITRATE 20 MG/1
20 TABLET ORAL 3 TIMES DAILY
Status: SHIPPED | COMMUNITY
Start: 2021-02-04 | End: 2021-07-11 | Stop reason: ALTCHOICE

## 2021-02-05 RX ORDER — CEFDINIR 300 MG/1
1 CAPSULE ORAL 2 TIMES DAILY
Status: SHIPPED | COMMUNITY
Start: 2021-02-04 | End: 2021-07-11

## 2021-02-05 RX ORDER — ACETAMINOPHEN 500 MG
1000 TABLET ORAL EVERY 6 HOURS PRN
Status: ON HOLD | COMMUNITY
Start: 2021-02-04 | End: 2022-01-15

## 2021-02-05 RX ORDER — METOPROLOL TARTRATE 50 MG
1 TABLET ORAL 2 TIMES DAILY
Status: SHIPPED | COMMUNITY
Start: 2021-02-04 | End: 2021-07-11 | Stop reason: DRUGHIGH

## 2021-02-05 RX ORDER — BLOOD SUGAR DIAGNOSTIC
STRIP MISCELLANEOUS SEE ADMIN INSTRUCTIONS
Status: SHIPPED | COMMUNITY
Start: 2021-01-20

## 2021-02-05 RX ORDER — PANTOPRAZOLE SODIUM 40 MG/1
40 TABLET, DELAYED RELEASE ORAL
Status: SHIPPED | COMMUNITY
Start: 2021-02-05 | End: 2021-07-11 | Stop reason: ALTCHOICE

## 2021-02-05 RX ORDER — CLOPIDOGREL BISULFATE 75 MG/1
1 TABLET ORAL DAILY
Status: ON HOLD | COMMUNITY
Start: 2021-02-04 | End: 2021-10-26

## 2021-02-05 ASSESSMENT — MIFFLIN-ST. JEOR: SCORE: 1828.49

## 2021-02-06 ENCOUNTER — COMMUNICATION - HEALTHEAST (OUTPATIENT)
Dept: CARDIOLOGY | Facility: CLINIC | Age: 81
End: 2021-02-06

## 2021-02-06 DIAGNOSIS — I48.19 PERSISTENT ATRIAL FIBRILLATION (H): ICD-10-CM

## 2021-02-08 ENCOUNTER — COMMUNICATION - HEALTHEAST (OUTPATIENT)
Dept: CARDIOLOGY | Facility: CLINIC | Age: 81
End: 2021-02-08

## 2021-02-08 ENCOUNTER — AMBULATORY - HEALTHEAST (OUTPATIENT)
Dept: CARDIOLOGY | Facility: CLINIC | Age: 81
End: 2021-02-08

## 2021-02-08 ENCOUNTER — AMBULATORY - HEALTHEAST (OUTPATIENT)
Dept: ADMINISTRATIVE | Facility: REHABILITATION | Age: 81
End: 2021-02-08

## 2021-02-08 DIAGNOSIS — Z98.61 HISTORY OF PERCUTANEOUS CORONARY INTERVENTION: ICD-10-CM

## 2021-02-08 DIAGNOSIS — I21.4 NSTEMI (NON-ST ELEVATED MYOCARDIAL INFARCTION) (H): ICD-10-CM

## 2021-02-10 ENCOUNTER — RECORDS - HEALTHEAST (OUTPATIENT)
Dept: LAB | Facility: CLINIC | Age: 81
End: 2021-02-10

## 2021-02-10 LAB
ALBUMIN SERPL-MCNC: 3.7 G/DL (ref 3.5–5)
ALP SERPL-CCNC: 127 U/L (ref 45–120)
ALT SERPL W P-5'-P-CCNC: 21 U/L (ref 0–45)
ANION GAP SERPL CALCULATED.3IONS-SCNC: 12 MMOL/L (ref 5–18)
AST SERPL W P-5'-P-CCNC: 26 U/L (ref 0–40)
BILIRUB SERPL-MCNC: 1.4 MG/DL (ref 0–1)
BUN SERPL-MCNC: 43 MG/DL (ref 8–28)
CALCIUM SERPL-MCNC: 9.2 MG/DL (ref 8.5–10.5)
CHLORIDE BLD-SCNC: 103 MMOL/L (ref 98–107)
CO2 SERPL-SCNC: 25 MMOL/L (ref 22–31)
CREAT SERPL-MCNC: 1.79 MG/DL (ref 0.7–1.3)
DIGOXIN LEVEL LHE- HISTORICAL: 0.7 NG/ML (ref 0.5–2)
GFR SERPL CREATININE-BSD FRML MDRD: 37 ML/MIN/1.73M2
GLUCOSE BLD-MCNC: 232 MG/DL (ref 70–125)
POTASSIUM BLD-SCNC: 5.1 MMOL/L (ref 3.5–5)
PROT SERPL-MCNC: 6.4 G/DL (ref 6–8)
SODIUM SERPL-SCNC: 140 MMOL/L (ref 136–145)

## 2021-02-19 ENCOUNTER — RECORDS - HEALTHEAST (OUTPATIENT)
Dept: LAB | Facility: CLINIC | Age: 81
End: 2021-02-19

## 2021-02-19 LAB
ALBUMIN SERPL-MCNC: 3.6 G/DL (ref 3.5–5)
ALBUMIN UR-MCNC: ABNORMAL MG/DL
ALP SERPL-CCNC: 113 U/L (ref 45–120)
ALT SERPL W P-5'-P-CCNC: 20 U/L (ref 0–45)
ANION GAP SERPL CALCULATED.3IONS-SCNC: 11 MMOL/L (ref 5–18)
APPEARANCE UR: ABNORMAL
AST SERPL W P-5'-P-CCNC: 20 U/L (ref 0–40)
BACTERIA #/AREA URNS HPF: ABNORMAL HPF
BILIRUB SERPL-MCNC: 1.5 MG/DL (ref 0–1)
BILIRUB UR QL STRIP: NEGATIVE
BUN SERPL-MCNC: 36 MG/DL (ref 8–28)
CALCIUM SERPL-MCNC: 9.1 MG/DL (ref 8.5–10.5)
CHLORIDE BLD-SCNC: 104 MMOL/L (ref 98–107)
CO2 SERPL-SCNC: 25 MMOL/L (ref 22–31)
COLOR UR AUTO: ABNORMAL
CREAT SERPL-MCNC: 1.59 MG/DL (ref 0.7–1.3)
GFR SERPL CREATININE-BSD FRML MDRD: 42 ML/MIN/1.73M2
GLUCOSE BLD-MCNC: 352 MG/DL (ref 70–125)
GLUCOSE UR STRIP-MCNC: ABNORMAL MG/DL
HGB UR QL STRIP: ABNORMAL
KETONES UR STRIP-MCNC: ABNORMAL MG/DL
LEUKOCYTE ESTERASE UR QL STRIP: ABNORMAL
NITRATE UR QL: NEGATIVE
PH UR STRIP: 6.5 [PH] (ref 4.5–8)
POTASSIUM BLD-SCNC: 4.9 MMOL/L (ref 3.5–5)
PROT SERPL-MCNC: 6.3 G/DL (ref 6–8)
RBC #/AREA URNS AUTO: >100 HPF
SODIUM SERPL-SCNC: 140 MMOL/L (ref 136–145)
SP GR UR STRIP: 1.01 (ref 1–1.03)
SQUAMOUS #/AREA URNS AUTO: ABNORMAL LPF
UROBILINOGEN UR STRIP-ACNC: ABNORMAL
WBC #/AREA URNS AUTO: ABNORMAL HPF
WBC CLUMPS #/AREA URNS HPF: PRESENT /[HPF]

## 2021-02-21 LAB — BACTERIA SPEC CULT: ABNORMAL

## 2021-02-23 ENCOUNTER — RECORDS - HEALTHEAST (OUTPATIENT)
Dept: LAB | Facility: CLINIC | Age: 81
End: 2021-02-23

## 2021-02-23 LAB
ALBUMIN SERPL-MCNC: 3.6 G/DL (ref 3.5–5)
ALP SERPL-CCNC: 114 U/L (ref 45–120)
ALT SERPL W P-5'-P-CCNC: 23 U/L (ref 0–45)
ANION GAP SERPL CALCULATED.3IONS-SCNC: 13 MMOL/L (ref 5–18)
AST SERPL W P-5'-P-CCNC: 24 U/L (ref 0–40)
BILIRUB SERPL-MCNC: 1.6 MG/DL (ref 0–1)
BUN SERPL-MCNC: 40 MG/DL (ref 8–28)
CALCIUM SERPL-MCNC: 9 MG/DL (ref 8.5–10.5)
CHLORIDE BLD-SCNC: 101 MMOL/L (ref 98–107)
CO2 SERPL-SCNC: 24 MMOL/L (ref 22–31)
CREAT SERPL-MCNC: 1.92 MG/DL (ref 0.7–1.3)
GFR SERPL CREATININE-BSD FRML MDRD: 34 ML/MIN/1.73M2
GLUCOSE BLD-MCNC: 522 MG/DL (ref 70–125)
POTASSIUM BLD-SCNC: 5 MMOL/L (ref 3.5–5)
PROT SERPL-MCNC: 6.4 G/DL (ref 6–8)
SODIUM SERPL-SCNC: 138 MMOL/L (ref 136–145)

## 2021-03-04 ENCOUNTER — RECORDS - HEALTHEAST (OUTPATIENT)
Dept: LAB | Facility: CLINIC | Age: 81
End: 2021-03-04

## 2021-03-04 LAB
ANION GAP SERPL CALCULATED.3IONS-SCNC: 13 MMOL/L (ref 5–18)
BUN SERPL-MCNC: 39 MG/DL (ref 8–28)
CALCIUM SERPL-MCNC: 8.9 MG/DL (ref 8.5–10.5)
CHLORIDE BLD-SCNC: 100 MMOL/L (ref 98–107)
CO2 SERPL-SCNC: 27 MMOL/L (ref 22–31)
CREAT SERPL-MCNC: 1.67 MG/DL (ref 0.7–1.3)
GFR SERPL CREATININE-BSD FRML MDRD: 40 ML/MIN/1.73M2
GLUCOSE BLD-MCNC: 216 MG/DL (ref 70–125)
POTASSIUM BLD-SCNC: 4 MMOL/L (ref 3.5–5)
SODIUM SERPL-SCNC: 140 MMOL/L (ref 136–145)

## 2021-03-17 ENCOUNTER — AMBULATORY - HEALTHEAST (OUTPATIENT)
Dept: CARDIAC REHAB | Facility: CLINIC | Age: 81
End: 2021-03-17

## 2021-03-17 DIAGNOSIS — I21.4 NSTEMI (NON-ST ELEVATED MYOCARDIAL INFARCTION) (H): ICD-10-CM

## 2021-03-17 DIAGNOSIS — Z98.61 STATUS POST PERCUTANEOUS TRANSLUMINAL CORONARY ANGIOPLASTY: ICD-10-CM

## 2021-03-17 LAB
GLUCOSE BLDC GLUCOMTR-MCNC: 351 MG/DL (ref 70–139)
GLUCOSE BLDC GLUCOMTR-MCNC: 353 MG/DL (ref 70–139)

## 2021-03-19 ENCOUNTER — AMBULATORY - HEALTHEAST (OUTPATIENT)
Dept: CARDIAC REHAB | Facility: CLINIC | Age: 81
End: 2021-03-19

## 2021-03-19 DIAGNOSIS — Z98.61 STATUS POST PERCUTANEOUS TRANSLUMINAL CORONARY ANGIOPLASTY: ICD-10-CM

## 2021-03-19 DIAGNOSIS — I21.4 NSTEMI (NON-ST ELEVATED MYOCARDIAL INFARCTION) (H): ICD-10-CM

## 2021-03-19 LAB
GLUCOSE BLDC GLUCOMTR-MCNC: 225 MG/DL (ref 70–139)
GLUCOSE BLDC GLUCOMTR-MCNC: 331 MG/DL (ref 70–139)

## 2021-03-22 ENCOUNTER — AMBULATORY - HEALTHEAST (OUTPATIENT)
Dept: CARDIAC REHAB | Facility: CLINIC | Age: 81
End: 2021-03-22

## 2021-03-22 DIAGNOSIS — Z98.61 STATUS POST PERCUTANEOUS TRANSLUMINAL CORONARY ANGIOPLASTY: ICD-10-CM

## 2021-03-22 DIAGNOSIS — I21.4 NSTEMI (NON-ST ELEVATED MYOCARDIAL INFARCTION) (H): ICD-10-CM

## 2021-03-22 LAB
GLUCOSE BLDC GLUCOMTR-MCNC: 209 MG/DL (ref 70–139)
GLUCOSE BLDC GLUCOMTR-MCNC: 229 MG/DL (ref 70–139)

## 2021-03-26 ENCOUNTER — AMBULATORY - HEALTHEAST (OUTPATIENT)
Dept: CARDIAC REHAB | Facility: CLINIC | Age: 81
End: 2021-03-26

## 2021-03-26 DIAGNOSIS — I21.4 NSTEMI (NON-ST ELEVATED MYOCARDIAL INFARCTION) (H): ICD-10-CM

## 2021-03-26 DIAGNOSIS — Z98.61 STATUS POST PERCUTANEOUS TRANSLUMINAL CORONARY ANGIOPLASTY: ICD-10-CM

## 2021-03-26 LAB
GLUCOSE BLDC GLUCOMTR-MCNC: 222 MG/DL (ref 70–139)
GLUCOSE BLDC GLUCOMTR-MCNC: 248 MG/DL (ref 70–139)

## 2021-03-29 ENCOUNTER — AMBULATORY - HEALTHEAST (OUTPATIENT)
Dept: CARDIAC REHAB | Facility: CLINIC | Age: 81
End: 2021-03-29

## 2021-03-29 DIAGNOSIS — I21.4 NSTEMI (NON-ST ELEVATED MYOCARDIAL INFARCTION) (H): ICD-10-CM

## 2021-03-29 DIAGNOSIS — Z98.61 STATUS POST PERCUTANEOUS TRANSLUMINAL CORONARY ANGIOPLASTY: ICD-10-CM

## 2021-03-29 LAB
GLUCOSE BLDC GLUCOMTR-MCNC: 189 MG/DL (ref 70–139)
GLUCOSE BLDC GLUCOMTR-MCNC: 197 MG/DL (ref 70–139)

## 2021-04-05 ENCOUNTER — AMBULATORY - HEALTHEAST (OUTPATIENT)
Dept: CARDIAC REHAB | Facility: CLINIC | Age: 81
End: 2021-04-05

## 2021-04-05 DIAGNOSIS — I21.4 NSTEMI (NON-ST ELEVATED MYOCARDIAL INFARCTION) (H): ICD-10-CM

## 2021-04-05 DIAGNOSIS — Z98.61 STATUS POST PERCUTANEOUS TRANSLUMINAL CORONARY ANGIOPLASTY: ICD-10-CM

## 2021-04-05 LAB — GLUCOSE BLDC GLUCOMTR-MCNC: 163 MG/DL (ref 70–139)

## 2021-04-26 ENCOUNTER — AMBULATORY - HEALTHEAST (OUTPATIENT)
Dept: CARDIAC REHAB | Facility: CLINIC | Age: 81
End: 2021-04-26

## 2021-04-26 DIAGNOSIS — I21.4 NSTEMI (NON-ST ELEVATED MYOCARDIAL INFARCTION) (H): ICD-10-CM

## 2021-04-26 DIAGNOSIS — Z98.61 STATUS POST PERCUTANEOUS TRANSLUMINAL CORONARY ANGIOPLASTY: ICD-10-CM

## 2021-04-26 LAB
GLUCOSE BLDC GLUCOMTR-MCNC: 237 MG/DL (ref 70–139)
GLUCOSE BLDC GLUCOMTR-MCNC: 267 MG/DL (ref 70–139)

## 2021-04-30 ENCOUNTER — RECORDS - HEALTHEAST (OUTPATIENT)
Dept: CARDIAC REHAB | Facility: CLINIC | Age: 81
End: 2021-04-30

## 2021-04-30 LAB — GLUCOSE BLDC GLUCOMTR-MCNC: 186 MG/DL (ref 70–139)

## 2021-05-03 ENCOUNTER — RECORDS - HEALTHEAST (OUTPATIENT)
Dept: CARDIAC REHAB | Facility: CLINIC | Age: 81
End: 2021-05-03

## 2021-05-03 LAB — GLUCOSE BLDC GLUCOMTR-MCNC: 294 MG/DL (ref 70–139)

## 2021-05-17 ENCOUNTER — RECORDS - HEALTHEAST (OUTPATIENT)
Dept: CARDIAC REHAB | Facility: CLINIC | Age: 81
End: 2021-05-17

## 2021-05-17 LAB — GLUCOSE BLDC GLUCOMTR-MCNC: 307 MG/DL (ref 70–139)

## 2021-05-21 ENCOUNTER — AMBULATORY - HEALTHEAST (OUTPATIENT)
Dept: CARDIAC REHAB | Facility: CLINIC | Age: 81
End: 2021-05-21

## 2021-05-21 DIAGNOSIS — I21.4 NSTEMI (NON-ST ELEVATED MYOCARDIAL INFARCTION) (H): ICD-10-CM

## 2021-05-21 DIAGNOSIS — Z98.61 STATUS POST PERCUTANEOUS TRANSLUMINAL CORONARY ANGIOPLASTY: ICD-10-CM

## 2021-05-21 LAB
GLUCOSE BLDC GLUCOMTR-MCNC: 167 MG/DL (ref 70–139)
GLUCOSE BLDC GLUCOMTR-MCNC: 173 MG/DL (ref 70–139)

## 2021-05-25 ENCOUNTER — RECORDS - HEALTHEAST (OUTPATIENT)
Dept: ADMINISTRATIVE | Facility: CLINIC | Age: 81
End: 2021-05-25

## 2021-05-25 ENCOUNTER — RECORDS - HEALTHEAST (OUTPATIENT)
Dept: LAB | Facility: CLINIC | Age: 81
End: 2021-05-25

## 2021-05-25 LAB
ANION GAP SERPL CALCULATED.3IONS-SCNC: 15 MMOL/L (ref 5–18)
BUN SERPL-MCNC: 35 MG/DL (ref 8–28)
CALCIUM SERPL-MCNC: 8.7 MG/DL (ref 8.5–10.5)
CHLORIDE BLD-SCNC: 102 MMOL/L (ref 98–107)
CHOLEST SERPL-MCNC: 94 MG/DL
CO2 SERPL-SCNC: 21 MMOL/L (ref 22–31)
CREAT SERPL-MCNC: 1.92 MG/DL (ref 0.7–1.3)
FASTING STATUS PATIENT QL REPORTED: ABNORMAL
GFR SERPL CREATININE-BSD FRML MDRD: 34 ML/MIN/1.73M2
GLUCOSE BLD-MCNC: 335 MG/DL (ref 70–125)
HDLC SERPL-MCNC: 25 MG/DL
LDLC SERPL CALC-MCNC: <5 MG/DL
POTASSIUM BLD-SCNC: 4.5 MMOL/L (ref 3.5–5)
SODIUM SERPL-SCNC: 138 MMOL/L (ref 136–145)
TRIGL SERPL-MCNC: 353 MG/DL

## 2021-05-26 ENCOUNTER — RECORDS - HEALTHEAST (OUTPATIENT)
Dept: ADMINISTRATIVE | Facility: CLINIC | Age: 81
End: 2021-05-26

## 2021-05-27 ENCOUNTER — RECORDS - HEALTHEAST (OUTPATIENT)
Dept: ADMINISTRATIVE | Facility: CLINIC | Age: 81
End: 2021-05-27

## 2021-05-28 ENCOUNTER — RECORDS - HEALTHEAST (OUTPATIENT)
Dept: ADMINISTRATIVE | Facility: CLINIC | Age: 81
End: 2021-05-28

## 2021-05-28 NOTE — TELEPHONE ENCOUNTER
----- Message from Barbie Velasco sent at 4/19/2019 11:47 AM CDT -----  Contact: Patient   General phone call:    Caller: patient   Primary cardiologist: Manolo   Detailed reason for call: Patient is scheduled for Arm surgery on 4/26/19, per patient: surgeon needs to know if patient needs to stop Eloquis prior to his surgery.   Please call pt to help advise.  New or active symptoms? No   Best phone number: [phone]  Best time to contact: anytime  Ok to leave a detailed message? Yes  Device? No     Additional Info:

## 2021-05-28 NOTE — TELEPHONE ENCOUNTER
From: Stephania French MD  Sent: 4/19/2019  12:27 PM  To: Serenity Doty RN    Hold for 3 days, no bridging, restart day after unless issues from orthopedics.  LF      Called and spoke with patient regarding Dr. French's recommendations. Pt verbalized understanding no further questions at this time. -kcl

## 2021-05-28 NOTE — PATIENT INSTRUCTIONS - HE
Mr Red Sutherland,  I enjoyed visiting with you again today.  I am sorry to hear of the shoulder issues.  Per our conversation let us get the stress test.  I will plan on seeing you 6 months or sooner if needed.  Terry French

## 2021-05-28 NOTE — PROGRESS NOTES
[facility] Follow-up Note    Assessment & Plan        1. Coronary artery disease involving native coronary artery of native heart without angina pectoris   -angiography in 2011 showed distal left main 10% lesion, left anterior descending 30% mid lesion, proximal circumflex 50% lesion and the right coronary artery had a proximal 10% lesion. January 2015 stress test showed no major ischemia. Doubt ischemia is causing his shortness of breath since June 2016 stress test showed no ischemia with preserved ejection fraction .  He went down to the [facility] and had an extensive cardiovascular evaluation, I do not have those records but I can see through care everywhere he had a coronary angiogram December 2017 and per the records does not appear to be obstructive coronary artery disease causing his symptoms.  Given that he is going to have significant shoulder surgery will arrange for repeat pharmacological nuclear stress test.   2. Persistent atrial fibrillation (H) -asymptomatic, chronic and not valvular and on chronic Eliquis therapy.   3. Essential hypertension -under good control.   4. NOVA on CPAP -oxygen with his CPAP and stable.   5. (HFpEF) heart failure with preserved ejection fraction (H) -no symptoms or signs on exam currently.   6. Other emphysema (H) -numerous inhalers and defer to pulmonologist.   7. Pulmonary hypertension -due to left ventricular diastolic dysfunction; WHO Group 2. He has been seen by [facility] and has pulmonary systolics in the 80s and is currently on sildenafil.    8. Obesity -continue to work on weight loss.     Plan  1.  Pharmacological nuclear.  Unless significant ischemia proceed with right shoulder surgery.  2.  Follow-up with me in 6 months or sooner if needed.    Subjective  CC: 78-year-old white gentleman being seen in follow-up today.  He is complaining of discomfort involving his right shoulder as well as numbness involving the fourth and fifth digits of  "left hand.  He has his chronic shortness of breath and minimal activity and is using oxygen at nighttime.  There is no chest pain, palpitations, PND, orthopnea or peripheral edema.    Medications  Current Outpatient Medications   Medication Sig Note     albuterol (PROVENTIL HFA;VENTOLIN HFA) 90 mcg/actuation inhaler Inhale 1 puff every 4 (four) hours as needed. 1/26/2017: Taking bid     albuterol (PROVENTIL) 2.5 mg /3 mL (0.083 %) nebulizer solution Take 2.5 mg by nebulization every 4 (four) hours as needed (as needed).             amLODIPine (NORVASC) 10 MG tablet Take 1 tablet (10 mg total) by mouth daily. (Patient taking differently: Take 5 mg by mouth daily.       )      apixaban (ELIQUIS) 5 mg Tab Take 1 tablet by mouth every 12 (twelve) hours.      aspirin 81 MG EC tablet Take 81 mg by mouth daily.      atorvastatin (LIPITOR) 20 MG tablet Take 1 tablet by mouth daily.      azithromycin (ZITHROMAX) 250 MG tablet 4 tablets 1 hour prior to dental work      BD ULTRA-FINE MANISHA PEN NEEDLES 32 gauge x 5/32\" Ndle       brimonidine (ALPHAGAN) 0.2 % ophthalmic solution Administer 1 drop into the left eye 2 (two) times a day.      bumetanide (BUMEX) 1 MG tablet Take 1 tablet (1 mg total) by mouth 2 (two) times a day at 9am and 6pm. (Patient taking differently: Take 3 mg by mouth 2 (two) times a day at 9am and 6pm.       )      calcium-magnesium 300-300 mg Tab Take by mouth daily. CALCIUM MAGNESIUM ZINC 1000-400-15. TAKE TWICE DAILY.       cetirizine (ZYRTEC) 10 MG tablet Take 10 mg by mouth daily as needed.       cloNIDine (CATAPRES-TTS) 0.2 mg/24 hr Place 1 patch on the skin once a week.      cycloSPORINE (RESTASIS) 0.05 % ophthalmic emulsion Administer 1 drop into the left eye every 12 (twelve) hours.       digoxin (LANOXIN) 125 mcg tablet Take 1 tablet (125 mcg total) by mouth daily.      diltiazem (CARDIZEM CD) 240 MG 24 hr capsule Take 240 mg by mouth daily.      esomeprazole (NEXIUM) 40 MG capsule Take 80 mg by " mouth daily.             fluticasone-vilanterol (BREO ELLIPTA) 200-25 mcg/dose DsDv inhaler Inhale 1 puff daily.      HYDROcodone-acetaminophen 5-325 mg per tablet Take 1 tablet by mouth daily.      insulin glargine (LANTUS SOLOSTAR) 100 unit/mL (3 mL) pen Inject 36 Units under the skin daily.       irbesartan (AVAPRO) 300 MG tablet Take 300 mg by mouth daily.      KLOR-CON M20 20 mEq tablet TAKE ONE TABLET BY MOUTH ONE TIME DAILY      lidocaine (LIDODERM) 5 % Place 1 patch on the skin as needed. Remove & Discard patch within 12 hours or as directed by MD      metFORMIN (GLUCOPHAGE) 500 MG tablet Take 500 mg by mouth 2 (two) times a day with meals.       metoprolol succinate (TOPROL-XL) 200 MG 24 hr tablet Take 100 mg by mouth daily. 9/7/2018: Received from: [facility] Received Sig: Take 100 mg by mouth daily.     mometasone-formoterol (DULERA) 100-5 mcg/actuation HFAA inhaler Inhale 2 puffs 2 (two) times a day.      nitroglycerin (NITROSTAT) 0.4 MG SL tablet Place 0.4 mg under the tongue every 5 (five) minutes as needed for chest pain.      OXYGEN-AIR DELIVERY SYSTEMS [facility] Use As Directed.      prednisoLONE acetate (PRED-FORTE) 1 % ophthalmic suspension Administer 1 drop to both eyes 4 (four) times a day.       rOPINIRole (REQUIP) 2 MG tablet Take 2 mg by mouth at bedtime.       sildenafil, antihypertensive, (REVATIO) 20 mg tablet Take 20 mg by mouth 3 (three) times a day.       TRESIBA FLEXTOUCH U-200 200 unit/mL (3 mL) InPn       ZINC ORAL Take 500 mg by mouth daily.      zolpidem (AMBIEN) 10 mg tablet Take 10 mg by mouth bedtime.      ketorolac (ACULAR) 0.5 % ophthalmic solution Administer 1 drop into the left eye 4 (four) times a day.       MINERAL OIL, LIGHT/MINERAL OIL (SOOTHE XP OPHT) Apply to eye 2 (two) times a day as needed.      oxymetazoline (AFRIN) 0.05 % nasal spray 2 sprays into each nostril as needed for congestion.       potassium 99 mg Tab Take 1 tablet by mouth 2 (two) times a day.   "      Objective  /66 (Patient Site: Right Arm, Patient Position: Sitting, Cuff Size: Adult Regular)   Pulse 72   Resp 16   Ht 5' 10.47\" (1.79 m)   Wt (!) 254 lb (115.2 kg)   BMI 35.96 kg/m      General Appearance:    Alert, cooperative, no distress, appears stated age, mildly obese   Head:    Normocephalic, without obvious abnormality, atraumatic   Throat:   Lips, mucosa, and tongue normal; teeth and gums normal   Neck:   Supple, symmetrical, trachea midline, no adenopathy;        thyroid:  No enlargement/tenderness/nodules; no carotid    bruit or JVD   Back:     Symmetric, no curvature, ROM normal, no CVA tenderness   Lungs:     Clear to auscultation bilaterally, respirations unlabored   Chest wall:    No tenderness or deformity   Heart:    Regular rate and rhythm, S1 and S2 normal, no murmur, rub   or gallop   Abdomen:     Soft, non-tender, bowel sounds active all four quadrants,     no masses, no organomegaly   Extremities:   Normal, atraumatic, no cyanosis or edema   Pulses:   2+ and symmetric all extremities   Skin:   Skin color, texture, turgor normal, no rashes or lesions     Results    Lab Results personally reviewed   Lab Results   Component Value Date    CHOL 146 05/30/2018    CHOL 126 06/08/2017     Lab Results   Component Value Date    HDL 30 (L) 05/30/2018    HDL 29 (L) 06/08/2017     Lab Results   Component Value Date    LDLCALC  05/30/2018      Comment:      Invalid, Triglycerides >400    LDLCALC 58 06/08/2017     Lab Results   Component Value Date    TRIG 502 (H) 05/30/2018    TRIG 194 (H) 06/08/2017     Lab Results   Component Value Date    WBC 10.4 04/01/2013    HGB 14.7 06/05/2018    HCT 45.8 04/01/2013     04/01/2013     Lab Results   Component Value Date    CREATININE 1.49 (H) 04/17/2019    BUN 27 04/17/2019     04/17/2019    K 4.2 04/17/2019    CO2 24 04/17/2019     Review of Systems:   General: WNL  Eyes: WNL  Ears/Nose/Throat: Nosebleeds  Lungs: Shortness of " Breath  Heart: Shortness of Breath with activity, Irregular Heartbeat  Stomach: WNL  Bladder: Frequent Urination at Night  Muscle/Joints: Joint Pain  Skin: WNL  Nervous System: WNL  Mental Health: WNL     Blood: Easy Bleeding, Easy Bruising

## 2021-05-28 NOTE — TELEPHONE ENCOUNTER
Dr. French, pt scheduled for arm surgery 4/26 patient requesting your recommendations for stopping Eliquis.

## 2021-05-30 VITALS — HEIGHT: 72 IN | WEIGHT: 273 LBS | BODY MASS INDEX: 36.98 KG/M2

## 2021-05-30 VITALS — WEIGHT: 271.8 LBS | BODY MASS INDEX: 36.82 KG/M2 | HEIGHT: 72 IN

## 2021-05-30 VITALS — WEIGHT: 271.8 LBS | HEIGHT: 72 IN | BODY MASS INDEX: 36.82 KG/M2

## 2021-05-30 VITALS — HEIGHT: 72 IN | BODY MASS INDEX: 36.57 KG/M2 | WEIGHT: 270 LBS

## 2021-05-31 ENCOUNTER — RECORDS - HEALTHEAST (OUTPATIENT)
Dept: ADMINISTRATIVE | Facility: CLINIC | Age: 81
End: 2021-05-31

## 2021-05-31 VITALS — HEIGHT: 72 IN | WEIGHT: 271 LBS | BODY MASS INDEX: 36.7 KG/M2

## 2021-05-31 VITALS — WEIGHT: 271 LBS | BODY MASS INDEX: 36.7 KG/M2 | HEIGHT: 72 IN

## 2021-06-01 ENCOUNTER — RECORDS - HEALTHEAST (OUTPATIENT)
Dept: ADMINISTRATIVE | Facility: CLINIC | Age: 81
End: 2021-06-01

## 2021-06-01 VITALS — WEIGHT: 264 LBS | BODY MASS INDEX: 36.96 KG/M2 | HEIGHT: 71 IN

## 2021-06-01 VITALS — HEIGHT: 71 IN | WEIGHT: 262 LBS | BODY MASS INDEX: 36.68 KG/M2

## 2021-06-01 VITALS — BODY MASS INDEX: 37.38 KG/M2 | WEIGHT: 268 LBS

## 2021-06-02 ENCOUNTER — RECORDS - HEALTHEAST (OUTPATIENT)
Dept: ADMINISTRATIVE | Facility: CLINIC | Age: 81
End: 2021-06-02

## 2021-06-02 VITALS — BODY MASS INDEX: 36.36 KG/M2 | WEIGHT: 254 LBS | HEIGHT: 70 IN

## 2021-06-02 VITALS — WEIGHT: 257.6 LBS | BODY MASS INDEX: 34.89 KG/M2 | HEIGHT: 72 IN

## 2021-06-04 ENCOUNTER — RECORDS - HEALTHEAST (OUTPATIENT)
Dept: CARDIAC REHAB | Facility: CLINIC | Age: 81
End: 2021-06-04

## 2021-06-04 VITALS
DIASTOLIC BLOOD PRESSURE: 64 MMHG | RESPIRATION RATE: 18 BRPM | OXYGEN SATURATION: 91 % | HEART RATE: 66 BPM | WEIGHT: 250.13 LBS | SYSTOLIC BLOOD PRESSURE: 130 MMHG | BODY MASS INDEX: 35.41 KG/M2

## 2021-06-04 VITALS
HEART RATE: 53 BPM | OXYGEN SATURATION: 96 % | TEMPERATURE: 98.1 F | BODY MASS INDEX: 30.65 KG/M2 | DIASTOLIC BLOOD PRESSURE: 59 MMHG | SYSTOLIC BLOOD PRESSURE: 112 MMHG | WEIGHT: 226 LBS | RESPIRATION RATE: 18 BRPM

## 2021-06-04 VITALS
HEART RATE: 72 BPM | BODY MASS INDEX: 30.24 KG/M2 | SYSTOLIC BLOOD PRESSURE: 114 MMHG | DIASTOLIC BLOOD PRESSURE: 76 MMHG | WEIGHT: 223 LBS

## 2021-06-04 VITALS
WEIGHT: 249 LBS | HEIGHT: 71 IN | HEART RATE: 76 BPM | SYSTOLIC BLOOD PRESSURE: 134 MMHG | DIASTOLIC BLOOD PRESSURE: 60 MMHG | BODY MASS INDEX: 34.86 KG/M2 | RESPIRATION RATE: 20 BRPM

## 2021-06-04 LAB — GLUCOSE BLDC GLUCOMTR-MCNC: 320 MG/DL (ref 70–139)

## 2021-06-05 VITALS
HEIGHT: 72 IN | RESPIRATION RATE: 16 BRPM | DIASTOLIC BLOOD PRESSURE: 52 MMHG | OXYGEN SATURATION: 95 % | BODY MASS INDEX: 31.97 KG/M2 | WEIGHT: 236 LBS | HEART RATE: 59 BPM | SYSTOLIC BLOOD PRESSURE: 112 MMHG

## 2021-06-05 VITALS — BODY MASS INDEX: 31.19 KG/M2 | WEIGHT: 230 LBS

## 2021-06-05 NOTE — PATIENT INSTRUCTIONS - HE
Mr Red Sutherland,  I enjoyed visiting with you again today.  I am sorry to hear of the breathing.  Per our conversation call me at [phone] in about 2 weeks and if no improvement or worsening let me know and might change the diuretics at that time.  We will check the kidney function.  I will plan on seeing you 3 months or so.  Terry French

## 2021-06-05 NOTE — TELEPHONE ENCOUNTER
From: Stephania French MD  Sent: 1/24/2020  12:44 PM CST  To: Serenity Doty, RN  I reviewed [facility]'s note on this 79-year-old gentleman with coronary artery disease, chronic shortness of breath and pulmonary hypertension.  Apparently, he continued to do poorly despite numerous efforts of there is.  I am not sure why they will not address it but we can back off on diltiazem CD, from 240 to 120 mg tablets.  If shortness of breath no better after 2 weeks of this, I would then change his Bumex from 1 mg p.o. twice daily to 2 mg p.o. twice daily, will then need renal profile and BNP a week later.  LF      Attempted to call Lefty VENTURA asking him to call Dr. French's office back to discuss new recommendations. -kcl

## 2021-06-05 NOTE — TELEPHONE ENCOUNTER
Noted that Arron saw Dr. French for FU 1/30. Pt to continue 120 mg of Cardizem and was instructed to call back in several weeks for status update. Will close encounter and await return call from pt for status update.

## 2021-06-05 NOTE — TELEPHONE ENCOUNTER
Called and spoke with Arron. He is agreeable to decreasing Cardizem to 120 mg daily. Caller will send in 90-day supply to pt's pharmacy. Pt also wants to update Dr. French that he has been spitting up about a tablespoon amount old blood every day now for the past couple months. He denies smoking or tobacco use. He was advised to update Dr. French on this.         Dr. French, pt agreeable to decreasing Cardizem to 120 mg daily. I will FU with him in 2 weeks for an update on his symptoms. He also wanted you to be aware that he has been coughing up about a tablespoon amount of henok colored blood every morning for about 2 months now. Any new recs?

## 2021-06-05 NOTE — TELEPHONE ENCOUNTER
Received fax from [facility] today. Dr. Miles Zhang states that he recently saw pt and is suggesting some medication changes due to dyspnea and increased JVP. Will have HIS task letter to Dr. French to review. Pt is overdo to be seen. Will send message to schedulers to call pt to arrange next available FU.    Dr. French, I received a letter from Dr. Miles Zhang at [facility] suggesting some medication changes for Bill. I will have this letter scanned in and tasked to you for review. Pt is overdo to see you. I will have our schedulers call and arrange a follow-up.

## 2021-06-06 NOTE — TELEPHONE ENCOUNTER
===View-only below this line===  ----- Message -----  From: Stephania French MD  Sent: 2/24/2020   2:17 PM CST  To: India Cantu RN    Can this gentleman get a renal profile today considering his 79 years old, chronic kidney disease, coronary artery disease, significant lung issues and now significant urination with hematuria and dysuria?  If possible, could he see his primary today or tomorrow to evaluate for urinary issues and may be urinalysis?  If these are not possible then maybe we should get rapid access clinic this week.  LF      Called patient and updated on response. He verbalized understanding and wanted to reiterate that the dysuria did subside with lowering the Bumex dose. He doubted that he could see Dr. Blum this week and is agreeable to [facility] appt. Pt transferred to scheduling to have this arranged. -[facility] [place]

## 2021-06-07 ENCOUNTER — AMBULATORY - HEALTHEAST (OUTPATIENT)
Dept: CARDIAC REHAB | Facility: CLINIC | Age: 81
End: 2021-06-07

## 2021-06-07 DIAGNOSIS — I21.4 NSTEMI (NON-ST ELEVATED MYOCARDIAL INFARCTION) (H): ICD-10-CM

## 2021-06-07 DIAGNOSIS — Z98.61 STATUS POST PERCUTANEOUS TRANSLUMINAL CORONARY ANGIOPLASTY: ICD-10-CM

## 2021-06-07 LAB
GLUCOSE BLDC GLUCOMTR-MCNC: 264 MG/DL (ref 70–139)
GLUCOSE BLDC GLUCOMTR-MCNC: 271 MG/DL (ref 70–139)
GLUCOSE BLDC GLUCOMTR-MCNC: 325 MG/DL (ref 70–139)

## 2021-06-08 NOTE — PROGRESS NOTES
Health system HEART [facility]   Arrhythmia Clinic    Assessment/Plan:  Diagnoses and all orders for this visit:    Persistent atrial fibrillation and on rate control.  With COPD I think zahida heart rate may be too tightly controlled.  With walking 450 feet in the hallway Zahida highest heart rate seen with this was 75.  I decreased Bystolic by 50% at the last visit when I saw him but dyspnea on exertion got no better.  Due to pulmonary status I think he needs a faster heart rate with atrial fib and this may help with PARIKH.  Due to coronary artery disease I want to keep him on a low-dose of Bystolic.  To decrease diltiazem from 240 mg to 180 mg orally every day.  To switch Bystolic to 10 mg a half a tab by mouth daily instead of every other day.  In 2 weeks to decrease Bystolic to 2.5 mg 1 tab by mouth daily.  In 3 weeks to take his blood pressure 2-3 times during the week and call me with 2 weeks of blood pressures.  I anticipate I will likely need to increase amlodipine from 5 to 10 mg for hypertension control.  If he sees Dr. Blum in the interim he could make adjustment in antihypertensive if needed instead.  After visit summary given as Arron tells me that he won't remember med changes unless I have it all written out.  I reviewed those instructions again at the end of the visit in detail.  [id number] score of 4 and will be 5 within the year.-On chronic Elquis.  -     diltiazem (CARDIZEM CD) 180 MG 24 hr capsule; Take 1 capsule (180 mg total) by mouth daily.  Dispense: 90 capsule; Refill: 3  -     nebivolol (BYSTOLIC) 2.5 MG tablet; Take 1 tablet (2.5 mg total) by mouth daily.  Dispense: 90 tablet; Refill: 3    Essential hypertension with goal blood pressure less than 130/85 well-controlled.    NOVA on CPAP and using CPAP plus oxygen at night.    Coronary artery disease involving native coronary artery of native heart without angina pectoris and no angina..  -     nebivolol (BYSTOLIC) 2.5 MG tablet; Take 1 tablet (2.5  mg total) by mouth daily.  Dispense: 90 tablet; Refill: 3    Chronic combined systolic and diastolic congestive heart failure and denies peripheral edema but lower extremities tender and complains of dry mouth.  Unclear to me if dry mouth is a side effect of one of his medications or over diuresed.  Arron has been losing weight.  I would ask for input from Dr. Blum on this.  Arron tells me he's been on a lower dose of Bumex now for more than 3-4 months.  He is taking Bumex 1 mg by mouth twice a day.    Other emphysema and on oxygen at night but they delivered only metal tank so Arron cannot use oxygen with activity.  He walked 450 feet today with me in clinic and oxygen level dropped from 94% at rest to 89%.  He is to call Dr. Muñoz's office to see if he can get an Oximizer so he could use oxygen with activity.    _____________________________________________________________________    Subjective:    I had the opportunity to see Red Sutherland at the [facility]. Red Sutherland is a 76 y.o. male and here for mona study.  Red Sutherland has a known history of persistent atrial fib and failed cardioversion on September 19, 2016 which was unsuccessful on sotalol 120 mg by mouth twice a day. Arron had previously failed cardioversion in April 2016 on low dose sotalol.  Arron also has a history of coronary artery disease, acute on chronic heart failure in the past, type II diabetes and COPD.  I then switched him from rhythm to rate control and put him on diltiazem 180 mg and despite good rate control he went into acute on chronic heart failure. Arron follows with Dr. French in our clinic. Arron tells me his most overwhelming symptom is  shortness of breath with minimal activity.  He is limited to walking about a block.  He denies peripheral edema, PND or weight gain.  He has been trying to lose weight and now has dropped some weight over the last several weeks.  He denies other cardiac symptoms  other than fatigue which has been an ongoing issue.  He tells me his heart rate doesn't go up much with walking and has not seen at above 100 and this concurs with Holter.  He is taking Bystolic 5 mg every other day and not sure why this was switched to every other day.  Discussed this is not a good dosing for beta blocker as should be daily.  See above for details.  ______________________________________________________________________    Problem List:  Patient Active Problem List   Diagnosis     Chronic bronchitis     NOVA on CPAP     Essential hypertension     Persistent Atrial Fibrillation     Edema     Hypercholesteremia     Coronary artery disease involving native coronary artery without angina pectoris     Difficulty breathing during exertion     Chronic combined systolic and diastolic congestive heart failure     Obesity     Patient in clinical research study     Chronic obstructive pulmonary disease     Medical History:  Past Medical History   Diagnosis Date     Arrhythmia      Atrial fibrillation      COPD (chronic obstructive pulmonary disease)      Coronary artery disease      Coronary artery disease involving native coronary artery without angina pectoris            Diabetes mellitus      Hyperlipidemia      Hypertension      RLS (restless legs syndrome)      Sleep apnea      Surgical History:  Past Surgical History   Procedure Laterality Date     Pr cardioversion elective arrhythmia external  09/19/2016     Description: Elective Cardioversion External;  Recorded: 05/13/2013;  Comments: 3/15/13 for afib     Cardiac catheterization       Coronary stent placement       Knee surgery       bilat knee replacement     Hip surgery       left replacement     Melissa       Shoulder surgery       reapir on right shoulder     Wrist surgery       bilat     Cataract extraction       bilat     Back surgery       lower back     Social History:  Social History   Substance Use Topics     Smoking status: Former Smoker      "Packs/day: 1.50     Years: 44.00     Quit date: 4/29/1998     Smokeless tobacco: Not on file     Alcohol use 0.6 oz/week     1 Cans of beer per week      Comment: 1 per month        Review of Systems: Review of Systems:                                              Family History:  Family History   Problem Relation Age of Onset     Hyperlipidemia Mother      Hypertension Mother      Heart disease Mother      Hyperlipidemia Father      Hypertension Father      Coronary artery disease Father      Stroke Brother      Depression Brother          Allergies:  Allergies   Allergen Reactions     Hydrochlorothiazide      Losartan-Hydrochlorothiazide      Metaxalone      Penicillins      Rabeprazole      Ramipril      Shellfish Containing Products      Medications:  Current Outpatient Prescriptions   Medication Sig Dispense Refill     albuterol (PROVENTIL HFA;VENTOLIN HFA) 90 mcg/actuation inhaler Inhale 1 puff every 4 (four) hours as needed.       albuterol (PROVENTIL) 2.5 mg /3 mL (0.083 %) nebulizer solution Take 2.5 mg by nebulization every 4 (four) hours as needed.       amLODIPine (NORVASC) 10 MG tablet Take 10 mg by mouth daily.       apixaban (ELIQUIS) 5 mg Tab Take 1 tablet by mouth every 12 (twelve) hours.       aspirin 81 MG EC tablet Take 81 mg by mouth daily.       atorvastatin (LIPITOR) 20 MG tablet Take 1 tablet by mouth daily.  4     azithromycin (ZITHROMAX) 250 MG tablet 4 tablets 1 hour prior to dental work       BD ULTRA-FINE MANISHA PEN NEEDLES 32 gauge x 5/32\" Ndle   4     brimonidine (ALPHAGAN) 0.2 % ophthalmic solution Administer 1 drop into the left eye 2 (two) times a day.  11     bumetanide (BUMEX) 1 MG tablet Increase bumex 1 mg 2 tab in am and 1 mg in afternoon for 1 week and then call with weight for ongoing med instructions. 60 tablet 6     calcium-magnesium 300-300 mg Tab Take by mouth daily. CALCIUM MAGNESIUM ZINC 1000-400-15. TAKE TWICE DAILY.        cetirizine (ZYRTEC) 10 MG tablet Take 10 mg by " mouth daily as needed.        cloNIDine (CATAPRES-TTS) 0.3 mg/24 hr Place 1 patch on the skin once a week. 6 patch 0     cyanocobalamin 1000 MCG tablet Take 1,000 mcg by mouth daily.       cycloSPORINE (RESTASIS) 0.05 % ophthalmic emulsion Administer 1 drop into the left eye every 12 (twelve) hours.        digoxin (LANOXIN) 125 mcg tablet Take 1 tablet (125 mcg total) by mouth daily. 90 tablet 3     diltiazem (CARDIZEM CD) 180 MG 24 hr capsule Take 1 capsule (180 mg total) by mouth daily. 90 capsule 3     esomeprazole (NEXIUM) 40 MG capsule Take 40 mg by mouth daily.       fluticasone-salmeterol (ADVAIR DISKUS) 500-50 mcg/dose DISKUS Inhale 1 puff 2 (two) times a day.        garlic 1,000 mg cap Take 1,000 mg by mouth daily as needed.        HYDROcodone-acetaminophen 5-325 mg per tablet Take 1 tablet by mouth daily.  0     irbesartan (AVAPRO) 300 MG tablet Take 300 mg by mouth daily.       ketorolac (ACULAR) 0.5 % ophthalmic solution Administer 1 drop into the left eye 4 (four) times a day.   12     lidocaine (LIDODERM) 5 % Place 1 patch on the skin as needed. Remove & Discard patch within 12 hours or as directed by MD       metFORMIN (GLUCOPHAGE) 500 MG tablet Take 1,000 mg by mouth 2 (two) times a day with meals.       MINERAL OIL, LIGHT/MINERAL OIL (SOOTHE XP OPHT) Apply to eye 2 (two) times a day as needed.       mometasone (NASONEX) 50 mcg/actuation nasal spray into each nostril as needed.        mometasone-formoterol (DULERA) 200-5 mcg/actuation HFAA inhaler Inhale 2 puffs 2 (two) times a day. prn       nitroglycerin (NITROSTAT) 0.4 MG SL tablet Place 0.4 mg under the tongue every 5 (five) minutes as needed for chest pain.       OMEGA-3/DHA/EPA/FISH OIL (FISH OIL-OMEGA-3 FATTY ACIDS) 300-1,000 mg capsule Take 2 g by mouth daily.       OXYGEN-AIR DELIVERY SYSTEMS [facility] Use As Directed.       oxymetazoline (AFRIN) 0.05 % nasal spray 2 sprays into each nostril every evening.        potassium 99 mg Tab Take 1 tablet  "by mouth 2 (two) times a day.       potassium chloride SA (K-DUR,KLOR-CON) 20 MEQ tablet Take 1 tablet (20 mEq total) by mouth daily. 90 tablet 1     prednisoLONE acetate (PRED-FORTE) 1 % ophthalmic suspension Administer 1 drop to both eyes 4 (four) times a day.        rOPINIRole (REQUIP) 2 MG tablet Take 4 mg by mouth bedtime.        ZINC ORAL Take 500 mg by mouth daily.       zolpidem (AMBIEN) 10 mg tablet Take 10 mg by mouth bedtime.       nebivolol (BYSTOLIC) 2.5 MG tablet Take 1 tablet (2.5 mg total) by mouth daily. 90 tablet 3     Current Facility-Administered Medications   Medication Dose Route Frequency Provider Last Rate Last Dose     Study Drug Canakinumab 50/150/300 mg/Placebo injection 2 Syringe (POLLY STUDY)  2 Syringe Subcutaneous Q3 Months Stephania French MD   2 Syringe at 11/03/16 1000       Objective:   Vital signs:  Visit Vitals     /58 (Patient Site: Right Arm, Patient Position: Sitting, Cuff Size: Adult Large)     Pulse 64     Ht 5' 11.5\" (1.816 m)     Wt (!) 271 lb 12.8 oz (123.3 kg)     BMI 37.38 kg/m2         Physical Exam:    GENERAL APPEARANCE: Alert, cooperative and in no acute distress.  HEENT: No scleral icterus. No Xanthelasma. Oral mucuos membranes pink and moist.  NECK: JVP flat.   CHEST: clear to auscultation but diminished throughout.  No wheezing heard.  CARDIOVASCULAR: S1, S2 without murmur ,clicks or rubs. Irregular, irregular.  Radial and posterior tibial pulses are intact and symetric. EXTREMITIES: No cyanosis, clubbing .  Mid calf to ankles red and tender.  No edema bilaterally.    Results personally reviewed:  April 2015 pulmonary function tests show:  Spirometry, post bronchodilator FEV1 ratio is 66. FEV1 2.46 L, 77% of predicted.   FVC 3.72 L, 85% of predicted. Total lung capacity 6.49 L, 92% of predicted.   DLCO 17.87 units, 55% of predicted.   CONCLUSION  Mild air flow obstruction with moderately severe reduction of diffusion.   January 2015 nuclear stress is " negative for inducible ischemia or infarct. EF 69%.    Feb 2016 echo shows: EF 55%. Mild aortic stenosis. Left atrium mild to moderately enlarged. Right atrium mildly enlarged. RVSP mildly elevated at 40+ RA pressure.  February 2016 24-hour Holter shows persistent A. fib with average ventricular response of 74. Ventricular response range 46-1 02. One 9 beat run of nonsustained VT. This is on diltiazem 180 mg by mouth daily.  October 2016 24-hour Holter shows A. fib throughout with average ventricular response of 69. Ventricular response range . Lightheaded at times with A. fib with ventricular response 63-75. One 7 beat run of nonsustained VT. Done on Dilt 240 mg plus Bystolic 10 mg daily.        TSH:   Lab Results   Component Value Date    TSH 1.6 03/11/2013     BNP:   Lab Results   Component Value Date     (H) 10/17/2016     BMP:  Lab Results   Component Value Date    CREATININE 1.10 01/03/2017    BUN 19 01/03/2017     01/03/2017    K 4.2 01/03/2017     01/03/2017    CO2 21 (L) 01/03/2017       This note has been dictated using voice recognition software. Any grammatical or context distortions are unintentional and inherent to the software.    LESTER CALLEJAS RN, [facility]  [phone]

## 2021-06-08 NOTE — PATIENT INSTRUCTIONS - HE
Mr. Sutherland,  Thank you for taking my call today.  I did review all the [facility] records, I see where you lost about 14 kg due to diuretics.  I see your pressures in the lungs are higher than they were in the past.  Keep up the good work.  I will plan on seeing you in 6 months or sooner if needed.  Stay safe.  Terry French

## 2021-06-08 NOTE — PROGRESS NOTES
Review of Systems - History obtained from the patient  General ROS: negative  Psychological ROS: negative  Ophthalmic ROS: negative  ENT ROS: negative  Hematological and Lymphatic ROS: positive for - easy bleeding and easy bruising  Respiratory ROS: negative  Cardiovascular ROS: negative  Gastrointestinal ROS: negative  Genito-Urinary ROS: positive for - frequent urination at night   Musculoskeletal ROS: positive for - joint pain, muscle pain and muscular weakness  Neurological ROS: negative  Dermatological ROS: negative

## 2021-06-09 NOTE — PATIENT INSTRUCTIONS - HE
1st floor OP Infusion at [facility] -- [phone], option 2 for a nurse.    Dr. Alin Blum ([facility] [place]) -- [phone]    You received your 2nd dose of feraheme (ferric carboxymaltose) today.     Follow up with Dr. Blum as instructed.

## 2021-06-09 NOTE — PROGRESS NOTES
"Patient arrived ambulatory at 09:45, seated in chair 3. Reviewed plan of care. \"When I was at [facility] I think I received a dose of iron.\" Placed IV and infused injectafer 750 mg over 20 minutes. Patient was monitored post infusion - no ill effects noted. VSS. Post infusion AVS was given and explained to the patient. Left the unit ambulatory in stable condition at 11:15, and was instructed to follow up with Dr. Blum.    Jeimy Cunningham RN  "

## 2021-06-09 NOTE — PROGRESS NOTES
Documentation only encounter to record results of depression screening.  Depression screening completed via [employer] (formerly, [employer]).  See flow sheet for screening.

## 2021-06-09 NOTE — PROGRESS NOTES
Subject seen for consent and quarterly visit POLLY OLE study (trial of quarterly canakinumab in prevention of recurrent CV events.)      Discussed the consent form addendum and answered subject's questions. He agrees to proceed to study extension phase. Consent  Version 01 Nov 2016 [HE [id number]: Approved 11.21.2016] signed and copy to subject/chart.  Medications reviewed (including prohibited medications, anti-diabetics and CV meds) and changes noted below:    Amlodipine increased from 5 mg daily to 10 mg daily on 27 Feb 2017  Diltiazem decreased from 240 mg daily to 180 mg daily on 27 Jan 2017  Bystolic increased from from 5 mg QOD to 5 mg daily on 27 Jan 2017  Bystolic decreased from 5 mg daily to 2.5 mg daily on 9 Feb 2017  Lantus insulin started on 23 Mar 2017. He is adjusting dosages based on blood sugars.    AEs assessed [prompting for infections, malignancies, and CV events] and none noted.    Study drug dispensed as follows: # [id number] in left arm and # [id number] in left arm at 1040.     Will see him again in 3 months.

## 2021-06-10 NOTE — PROGRESS NOTES
[facility] Follow-up Note    Assessment & Plan        1. Coronary artery disease involving native coronary artery of native heart without angina pectoris -angiography in 2011 showed distal left main 10% lesion, left anterior descending 30% mid lesion, proximal circumflex 50% lesion and the right coronary artery had a proximal 10% lesion. January 2015 stress test showed no major ischemia. Doubt ischemia is causing his shortness of breath since June 2016 stress test showed no ischemia with preserved ejection fraction .   2. Essential hypertension with goal blood pressure less than 130/85 blood pressure significantly elevated despite 5 agents, amlodipine Bumex, clonidine, diltiazem, Bystolic, and ear irbesartan.  I will take the liberty of increasing the Bystolic to 5 mg a day.  If he has no side effects I will prescribe 5 mg tablets.     3. Persistent atrial fibrillation -permanent chronic on eliquis therapy with rate control with diltiazem and digoxin. Did not convert with cardioversion and we're leaving him in atrial fibrillation. Defer to electrophysiology but they're leaving him in atrial fibrillation.    4. Other emphysema -he has generalized shortness of breath.  Uncertain etiology.  Normal hemoglobin, being seen by pulmonary for sleep apnea and COPD.     5. Dizzy spells -since his hip replacement he states that he feels like he is off balance.  Strongly recommend a neurological evaluation as per primary.  Given his right carotid bruit we will check carotid ultrasound.     6. Obesity -I applauded him for his 20 pound weight loss.     7. Chronic combined systolic and diastolic congestive heart failure -no signs or symptoms on exam currently.     8. Difficulty breathing during exertion -probably multifactorial but doubt due to cardiomyopathy, heart failure or coronary disease. Could be due to atrial fibrillation which were unable to convert. Could also be due to medications since he is on  "bysystolic which is a beta blocker as well as clonidine. I am going to increase the Bystolic and will see if shortness of breath worsens. The other possibility is could be due to the mona study drug and we held this in his breathing did not get any better.    9. Hypercholesteremia -cholesterol 132 with an LDL of 62 which is excellent from May 2016.  Will need to recheck.     10. NOVA on CPAP -as above.     11.  Medications-patient concern of numerous medications he is on.  Suggested he discontinue zinc and fish oil.    Plan  1.  Carotid ultrasound and address of significant abnormality.  2.  Patient will speak to primary concerning his lightheadedness for possible neurological evaluation.   3.  Follow-up me in 6 months given his numerous comorbidities.    Subjective  CC: 76-year-old white gentleman here for a six-month follow-up today.  He comes in tell me he has noticed since his hip surgery 2 years ago he has been offkilter, feels like he is fuzzyheaded and staggering while walking.  He also tells me he still has his baseline shortness of breath.  He tells me that his legs go purple on him occasionally with nighttime leg cramps.  There is no chest discomfort, palpitations, PND, orthopnea or actual syncope.    Medications  Current Outpatient Prescriptions   Medication Sig Note     amLODIPine (NORVASC) 10 MG tablet Take 1 tablet (10 mg total) by mouth daily.      apixaban (ELIQUIS) 5 mg Tab Take 1 tablet by mouth every 12 (twelve) hours.      aspirin 81 MG EC tablet Take 81 mg by mouth daily.      atorvastatin (LIPITOR) 20 MG tablet Take 1 tablet by mouth daily.      azithromycin (ZITHROMAX) 250 MG tablet 4 tablets 1 hour prior to dental work 1/26/2017: Dental prophylaxis. Last taken this week.     BD ULTRA-FINE MANISHA PEN NEEDLES 32 gauge x 5/32\" Ndle  1/26/2017: Received from: External Pharmacy Received Sig:      brimonidine (ALPHAGAN) 0.2 % ophthalmic solution Administer 1 drop into the left eye 2 (two) times a " day. 1/26/2017: Received from: External Pharmacy Received Sig:      bumetanide (BUMEX) 1 MG tablet INCREASE TO 2 TABLETS BY MOUTH IN THE MORNING AND 1 IN THE AFTERNOON FOR 1 WEEK, THEN CALL WITH WEIGHT FOR ONGOING MED INSTRUCTION      calcium-magnesium 300-300 mg Tab Take by mouth daily. CALCIUM MAGNESIUM ZINC 1000-400-15. TAKE TWICE DAILY.       cetirizine (ZYRTEC) 10 MG tablet Take 10 mg by mouth daily as needed.       cloNIDine (CATAPRES-TTS) 0.3 mg/24 hr Place 1 patch on the skin once a week.      cyanocobalamin 1000 MCG tablet Take 1,000 mcg by mouth daily.      cycloSPORINE (RESTASIS) 0.05 % ophthalmic emulsion Administer 1 drop into the left eye every 12 (twelve) hours.       digoxin (LANOXIN) 125 mcg tablet Take 1 tablet (125 mcg total) by mouth daily.      diltiazem (CARDIZEM CD) 180 MG 24 hr capsule Take 1 capsule (180 mg total) by mouth daily.      esomeprazole (NEXIUM) 40 MG capsule Take 40 mg by mouth daily.      fluticasone-salmeterol (ADVAIR DISKUS) 500-50 mcg/dose DISKUS Inhale 1 puff 2 (two) times a day.       garlic 1,000 mg cap Take 1,000 mg by mouth daily as needed.       HYDROcodone-acetaminophen 5-325 mg per tablet Take 1 tablet by mouth daily.      insulin glargine (LANTUS SOLOSTAR) 100 unit/mL (3 mL) pen Inject 36 Units under the skin daily.       irbesartan (AVAPRO) 300 MG tablet Take 300 mg by mouth daily.      ketorolac (ACULAR) 0.5 % ophthalmic solution Administer 1 drop into the left eye 4 (four) times a day.       lidocaine (LIDODERM) 5 % Place 1 patch on the skin as needed. Remove & Discard patch within 12 hours or as directed by MD      metFORMIN (GLUCOPHAGE) 500 MG tablet Take 1,000 mg by mouth 2 (two) times a day with meals.      MINERAL OIL, LIGHT/MINERAL OIL (SOOTHE XP OPHT) Apply to eye 2 (two) times a day as needed.      mometasone (NASONEX) 50 mcg/actuation nasal spray into each nostril as needed.       mometasone-formoterol (DULERA) 200-5 mcg/actuation HFAA inhaler Inhale 2  "puffs 2 (two) times a day. prn      nebivolol (BYSTOLIC) 2.5 MG tablet Take 1 tablet (2.5 mg total) by mouth daily.      nitroglycerin (NITROSTAT) 0.4 MG SL tablet Place 0.4 mg under the tongue every 5 (five) minutes as needed for chest pain.      OMEGA-3/DHA/EPA/FISH OIL (FISH OIL-OMEGA-3 FATTY ACIDS) 300-1,000 mg capsule Take 2 g by mouth daily.      OXYGEN-AIR DELIVERY SYSTEMS [facility] Use As Directed.      oxymetazoline (AFRIN) 0.05 % nasal spray 2 sprays into each nostril every evening.       potassium 99 mg Tab Take 1 tablet by mouth 2 (two) times a day.      potassium chloride SA (K-DUR,KLOR-CON) 20 MEQ tablet Take 1 tablet (20 mEq total) by mouth daily.      prednisoLONE acetate (PRED-FORTE) 1 % ophthalmic suspension Administer 1 drop to both eyes 4 (four) times a day.       rOPINIRole (REQUIP) 2 MG tablet Take 4 mg by mouth bedtime.       ZINC ORAL Take 500 mg by mouth daily.      zolpidem (AMBIEN) 10 mg tablet Take 10 mg by mouth bedtime.      albuterol (PROVENTIL HFA;VENTOLIN HFA) 90 mcg/actuation inhaler Inhale 1 puff every 4 (four) hours as needed. 1/26/2017: Taking bid     albuterol (PROVENTIL) 2.5 mg /3 mL (0.083 %) nebulizer solution Take 2.5 mg by nebulization every 4 (four) hours as needed.        Objective  /84 (Patient Site: Left Arm, Patient Position: Sitting, Cuff Size: Adult Large)  Pulse 70  Resp 18  Ht 5' 11.5\" (1.816 m)  Wt (!) 270 lb (122.5 kg)  SpO2 95%  BMI 37.13 kg/m2    General Appearance:    Alert, cooperative, no distress, appears stated age, moderately obese    Head:    Normocephalic, without obvious abnormality, atraumatic   Throat:   Lips, mucosa, and tongue normal; teeth and gums normal   Neck:   Supple, symmetrical, trachea midline, no adenopathy;        thyroid:  No enlargement/tenderness/nodules; right carotid    Bruit, no JVD   Back:     Symmetric, no curvature, ROM normal, no CVA tenderness   Lungs:     Clear to auscultation bilaterally, respirations unlabored "   Chest wall:    No tenderness or deformity   Heart:   irregularly irregular, S1 and S2 normal, no murmur, rub   or gallop   Abdomen:     Soft, non-tender, bowel sounds active all four quadrants,     no masses, no organomegaly   Extremities:   Normal, atraumatic, no cyanosis or edema   Pulses:   2+ and symmetric all upper extremities, 1+ lower    Skin:   Skin color, texture, turgor normal, no rashes or lesions     Results    Lab Results personally reviewed   Lab Results   Component Value Date    CHOL 132 05/03/2016    CHOL 171 05/19/2015     Lab Results   Component Value Date    HDL 34 (L) 05/03/2016    HDL 36 (L) 05/19/2015     Lab Results   Component Value Date    LDLCALC 62 05/03/2016    LDLCALC 89 05/19/2015     Lab Results   Component Value Date    TRIG 179 (H) 05/03/2016    TRIG 228 (H) 05/19/2015     Lab Results   Component Value Date    WBC 10.4 04/01/2013    HGB 17.0 04/13/2016    HCT 45.8 04/01/2013     04/01/2013     Lab Results   Component Value Date    CREATININE 1.10 01/03/2017    BUN 19 01/03/2017     01/03/2017    K 4.2 01/03/2017    CO2 21 (L) 01/03/2017     Review of Systems:   General: WNL  Eyes: Visual Distubance  Ears/Nose/Throat: WNL  Lungs: WNL  Heart: Chest Pain, Shortness of Breath with activity, Irregular Heartbeat  Stomach: WNL  Bladder: Frequent Urination at Night  Muscle/Joints: Muscle Weakness  Skin: WNL  Nervous System: Loss of Balance  Mental Health: WNL     Blood: Easy Bruising

## 2021-06-11 ENCOUNTER — AMBULATORY - HEALTHEAST (OUTPATIENT)
Dept: CARDIAC REHAB | Facility: CLINIC | Age: 81
End: 2021-06-11

## 2021-06-11 DIAGNOSIS — I21.4 NSTEMI (NON-ST ELEVATED MYOCARDIAL INFARCTION) (H): ICD-10-CM

## 2021-06-11 DIAGNOSIS — Z98.61 STATUS POST PERCUTANEOUS TRANSLUMINAL CORONARY ANGIOPLASTY: ICD-10-CM

## 2021-06-11 LAB — GLUCOSE BLDC GLUCOMTR-MCNC: 449 MG/DL (ref 70–139)

## 2021-06-11 NOTE — PROGRESS NOTES
Subject seen for quarterly visit POLLY OLE study (trial of quarterly canakinumab in prevention of recurrent CV events.)     Medications reviewed (including prohibited medications, anti-diabetics and CV meds) and changes noted below.    AEs assessed [prompting for infections, malignancies, and CV events] and none noted.    Study drug dispensed as follows: # [id number] in left arm and # [id number] in left arm.     Will see him again in 3 months.      Medication changes:  Bystolic increased to 5 mg daily on 6 Apr 2017

## 2021-06-12 NOTE — PROGRESS NOTES
Pt here for an OP CPFT.  Pt states he had a CPFT a week ago at [facility].  I called [facility] and they confirmed that a CPFT was done.  This was also ordered by Dr Lilly Mantilla.  Test was not done today.  Pt concerned if it would be covered by insurance.

## 2021-06-13 NOTE — PROGRESS NOTES
Subject seen for quarterly visit POLLY OLE study (trial of quarterly canakinumab in prevention of recurrent CV events.)     Medications reviewed (including prohibited medications, anti-diabetics and CV meds) and no changes noted.    AEs assessed [prompting for infections, malignancies, and CV events] and none noted.    Open label canakinumab [150 mg] dispensed subcutaneously as follows: # [id number] in right arm.    Will see him again in 3 months.

## 2021-06-14 NOTE — ANESTHESIA POSTPROCEDURE EVALUATION
Patient: Red Sutherland  Procedure(s):  COLONOSCOPY  Anesthesia type: MAC    Patient location: Phase II Recovery  Last vitals:   Vitals Value Taken Time   /71 01/14/21 0938   Temp 37.1  C (98.8  F) 01/14/21 0914   Pulse 67 01/14/21 0938   Resp 20 01/14/21 0938   SpO2 95 % 01/14/21 0938     Post vital signs: stable  Level of consciousness: awake and responds to simple questions  Post-anesthesia pain: pain controlled  Post-anesthesia nausea and vomiting: no  Pulmonary: unassisted, return to baseline  Cardiovascular: stable and blood pressure at baseline  Hydration: adequate  Anesthetic events: no    QCDR Measures:  ASA# 11 - Gypsy-op Cardiac Arrest: ASA11B - Patient did NOT experience unanticipated cardiac arrest  ASA# 12 - Gypsy-op Mortality Rate: ASA12B - Patient did NOT die  ASA# 13 - PACU Re-Intubation Rate: ASA13B - Patient did NOT require a new airway mgmt  ASA# 10 - Composite Anes Safety: ASA10A - No serious adverse event    Additional Notes:

## 2021-06-14 NOTE — ANESTHESIA CARE TRANSFER NOTE
Last vitals:   Vitals:    01/14/21 0914   BP: 132/58   Pulse: (!) 59   Resp: 24   Temp: 37.1  C (98.8  F)   SpO2: 98%     Patient's level of consciousness is drowsy  Spontaneous respirations: yes  Maintains airway independently: yes  Dentition unchanged: yes  Oropharynx: oropharynx clear of all foreign objects    QCDR Measures:  ASA# 20 - Surgical Safety Checklist: WHO surgical safety checklist completed prior to induction    PQRS# 430 - Adult PONV Prevention: 4558F - Pt received => 2 anti-emetic agents (different classes) preop & intraop  ASA# 8 - Peds PONV Prevention: NA - Not pediatric patient, not GA or 2 or more risk factors NOT present  PQRS# 424 - Gypsy-op Temp Management: 4559F - At least one body temp DOCUMENTED => 35.5C or 95.9F within required timeframe  PQRS# 426 - PACU Transfer Protocol: - Transfer of care checklist used  ASA# 14 - Acute Post-op Pain: ASA14B - Patient did NOT experience pain >= 7 out of 10

## 2021-06-14 NOTE — PROGRESS NOTES
"Subject seen for quarterly visit POLLY OLE study (trial of quarterly canakinumab in prevention of recurrent CV events.)     Medications reviewed (including prohibited medications, anti-diabetics and CV meds) and any changes noted below.    AEs assessed [prompting for infections, malignancies, and CV events] and, if present, noted below.    [Month 12 and 24 only] Vital signs assessed including waist circumference ( 127 cm).   [Month 12 and 24 only]  Questionnaires EQ-5D completed at this visit.    Open label canakinumab [150 mg] dispensed subcutaneously as follows: # [id number] in right arm.    Will see him again in 3 months.    Metolazone ? Mg PO 2x weekly started 15 Dec 2017 - will confirm dosage [2.5 mg 2x weekly confirmed with subject on 20 Dec 2017]    Weight is 268.1 lbs and height is 71\".     "

## 2021-06-14 NOTE — ANESTHESIA PREPROCEDURE EVALUATION
Anesthesia Evaluation      Patient summary reviewed     Airway   Mallampati: II  Neck ROM: full   Pulmonary - normal exam   (+) COPD, sleep apnea,                          Cardiovascular - normal exam  (+) hypertension, CAD, dysrhythmias (afib on AC), CHF (O2 at night), , hypercholesterolemia,        ROS comment: TTE 9/2020  Final Impressions  1. Estimated right ventricular systolic pressure 108 mmHg (systolic blood pressure 133 mmHg),  assuming a right atrial pressure of 20 mmHg. This is in the setting of severe tricuspid valve  regurgitation.  2. Severely enlarged right ventricular chamber size with moderately reduced systolic function.  Increased right ventricular wall thickness.  3. Averaged right ventricular free wall longitudinal peak systolic strain is -24 %.  4. Severe tricuspid valve regurgitation (annular dilatation). Systolic reversals into the  hepatic veins.  5. Mild calcific aortic valve stenosis, mean gradient 17 mmHg. Trivial aortic valve  regurgitation.  6. D-shaped left ventricle, calculated ejection fraction 67%.  7. No regional wall motion abnormalities.  8. Enlarged inferior vena cava size with reduced inspiratory collapse (<50%).  9. No pericardial effusion.     Neuro/Psych - negative ROS     Endo/Other    (+) diabetes mellitus type 2 using insulin,      GI/Hepatic/Renal    (+) GERD,             Dental    (+) poor dentition and lower dentures                       Anesthesia Plan  Planned anesthetic: MAC    COVID neg    Propofol infusion - very low dose, slow titration - avoid hypoxia/hypercarbia and hypotension  ASA 4     Anesthetic plan and risks discussed with: patient    Post-op plan: routine recovery

## 2021-06-15 NOTE — TELEPHONE ENCOUNTER

## 2021-06-15 NOTE — TELEPHONE ENCOUNTER
----- Message from Stephania French MD sent at 2/8/2021  2:39 PM CST -----  Regarding: RE: Dilt short acting  Pleaase defer to patient as he got this from [facility] for chf and pulm HTN. He was just seen and discharged from them 2/3/21 and they might have adjusted meds. I am taking a back seat to his care as t hey are adrdressing. LF  ----- Message -----  From: Elis Thomas RN  Sent: 2/8/2021   1:26 PM CST  To: Stephania French MD  Subject: Dilt short acting                                Refill request for dilt 120 mg long acting. I see that long acting has been stopped but no short acting ordered and when I spoke to pt he is not taking a short acting.   Concerns???

## 2021-06-15 NOTE — PATIENT INSTRUCTIONS - HE
Mr Red Sutherland,  I enjoyed visiting with you again today.  I am glad to hear you are doing well.  Per our conversation go back to [facility] to see about openning the arteries.  I will plan on seeing you thereafter.  Terry French

## 2021-06-16 PROBLEM — I50.33 ACUTE ON CHRONIC DIASTOLIC CONGESTIVE HEART FAILURE (H): Status: ACTIVE | Noted: 2020-04-09

## 2021-06-16 PROBLEM — I73.9 PERIPHERAL VASCULAR DISEASE (H): Status: ACTIVE | Noted: 2021-02-05

## 2021-06-16 PROBLEM — Z20.822 SUSPECTED COVID-19 VIRUS INFECTION: Status: ACTIVE | Noted: 2020-04-09

## 2021-06-16 PROBLEM — I27.22 PULMONARY HYPERTENSION DUE TO LEFT VENTRICULAR DIASTOLIC DYSFUNCTION (H): Chronic | Status: ACTIVE | Noted: 2017-11-28

## 2021-06-16 PROBLEM — D50.9 ANEMIA, IRON DEFICIENCY: Status: ACTIVE | Noted: 2020-06-01

## 2021-06-17 NOTE — PROGRESS NOTES
[facility] Follow-up Note    Assessment & Plan        1. Coronary artery disease involving native coronary artery of native heart without angina pectoris  -angiography in 2011 showed distal left main 10% lesion, left anterior descending 30% mid lesion, proximal circumflex 50% lesion and the right coronary artery had a proximal 10% lesion. January 2015 stress test showed no major ischemia. Doubt ischemia is causing his shortness of breath since June 2016 stress test showed no ischemia with preserved ejection fraction .  He went down to the [facility] and had an extensive cardiovascular evaluation, I do not have those records but I can see through care everywhere he had a coronary angiogram December 2017.  I will have him fax his records to us for further analysis.   2. Chronic combined systolic and diastolic congestive heart failure -records from [place] do show increased BNP and he had his Bumex increased and it did not help his breathing.   3. Difficulty breathing during exertion -chronic problem with patient.  Multifactorial I suspect due to cardiomyopathy, heart failure, fluid retention, coronary disease, COPD, and restrictive lung disease as well as even atrial fibrillation.  He has no improvement in this whatsoever on numerous inhalers and blood pressure pills.  Await [place] records.   4. Essential hypertension -under good control currently.   5. Hypercholesteremia -cholesterol 126 with an LDL of 58 which is excellent.   6. Obesity -he has lost another 20 pounds which is excellent.   7. NOVA on CPAP -he has oxygen with his CPAP at nighttime.   8. Persistent atrial fibrillation -is actually permanent, chronic, on Eliquis therapy with creatinine of 1.78.  Continue to monitor.     Plan  1.  Await [place] records to sort out what is causing his shortness of breath.  2.  Follow-up with me in 3 months or sooner if needed.    Subjective  CC: 77-year-old white gentleman here for six-month follow-up although  "it is more like a year since I seen him.  He states in the interim he has been down to [facility] for thorough evaluation and tells me just as he suspected it is heart causing the shortness of breath not lungs.  He cannot tell me what male was done but yet he still short of breath and minimal activity.  There is no chest discomfort, PND, orthopnea, peripheral edema, syncope or dizziness.    Medications  Current Outpatient Prescriptions   Medication Sig Note     albuterol (PROVENTIL HFA;VENTOLIN HFA) 90 mcg/actuation inhaler Inhale 1 puff every 4 (four) hours as needed. 1/26/2017: Taking bid     albuterol (PROVENTIL) 2.5 mg /3 mL (0.083 %) nebulizer solution Take 2.5 mg by nebulization every 4 (four) hours as needed.      amLODIPine (NORVASC) 10 MG tablet Take 1 tablet (10 mg total) by mouth daily.      apixaban (ELIQUIS) 5 mg Tab Take 1 tablet by mouth every 12 (twelve) hours.      aspirin 81 MG EC tablet Take 81 mg by mouth daily.      atorvastatin (LIPITOR) 20 MG tablet Take 1 tablet by mouth daily.      azithromycin (ZITHROMAX) 250 MG tablet 4 tablets 1 hour prior to dental work      BD ULTRA-FINE MANISHA PEN NEEDLES 32 gauge x 5/32\" Ndle       brimonidine (ALPHAGAN) 0.2 % ophthalmic solution Administer 1 drop into the left eye 2 (two) times a day.      bumetanide (BUMEX) 1 MG tablet Take 1 tablet (1 mg total) by mouth 2 (two) times a day at 9am and 6pm.      calcium-magnesium 300-300 mg Tab Take by mouth daily. CALCIUM MAGNESIUM ZINC 1000-400-15. TAKE TWICE DAILY.       cetirizine (ZYRTEC) 10 MG tablet Take 10 mg by mouth daily as needed.       cloNIDine (CATAPRES-TTS) 0.2 mg/24 hr Place 1 patch on the skin once a week.      cycloSPORINE (RESTASIS) 0.05 % ophthalmic emulsion Administer 1 drop into the left eye every 12 (twelve) hours.       digoxin (LANOXIN) 125 mcg tablet Take 1 tablet (125 mcg total) by mouth daily.      esomeprazole (NEXIUM) 40 MG capsule Take 40 mg by mouth daily.      fluticasone-salmeterol " (ADVAIR DISKUS) 500-50 mcg/dose DISKUS Inhale 1 puff 2 (two) times a day.       fluticasone-vilanterol (BREO ELLIPTA) 200-25 mcg/dose DsDv inhaler Inhale 1 puff daily.      HYDROcodone-acetaminophen 5-325 mg per tablet Take 1 tablet by mouth daily.      insulin glargine (LANTUS SOLOSTAR) 100 unit/mL (3 mL) pen Inject 36 Units under the skin daily.       irbesartan (AVAPRO) 300 MG tablet Take 300 mg by mouth daily.      ketorolac (ACULAR) 0.5 % ophthalmic solution Administer 1 drop into the left eye 4 (four) times a day.       KLOR-CON M20 20 mEq tablet TAKE ONE TABLET BY MOUTH ONE TIME DAILY       lidocaine (LIDODERM) 5 % Place 1 patch on the skin as needed. Remove & Discard patch within 12 hours or as directed by MD      metFORMIN (GLUCOPHAGE) 500 MG tablet Take 500 mg by mouth 2 (two) times a day with meals.       MINERAL OIL, LIGHT/MINERAL OIL (SOOTHE XP OPHT) Apply to eye 2 (two) times a day as needed.      mometasone-formoterol (DULERA) 200-5 mcg/actuation HFAA inhaler Inhale 2 puffs 2 (two) times a day. prn      nebivolol (BYSTOLIC) 10 MG tablet Take 5 mg by mouth daily.      nitroglycerin (NITROSTAT) 0.4 MG SL tablet Place 0.4 mg under the tongue every 5 (five) minutes as needed for chest pain.      OXYGEN-AIR DELIVERY SYSTEMS [facility] Use As Directed.      oxymetazoline (AFRIN) 0.05 % nasal spray 2 sprays into each nostril as needed for congestion.       potassium 99 mg Tab Take 1 tablet by mouth 2 (two) times a day.      prednisoLONE acetate (PRED-FORTE) 1 % ophthalmic suspension Administer 1 drop to both eyes 4 (four) times a day.       rOPINIRole (REQUIP) 2 MG tablet Take 2 mg by mouth at bedtime.       sildenafil, antihypertensive, (REVATIO) 20 mg tablet Take 20 mg by mouth daily.      TRESIBA FLEXTOUCH U-200 200 unit/mL (3 mL) InPn       ZINC ORAL Take 500 mg by mouth daily.      zolpidem (AMBIEN) 10 mg tablet Take 10 mg by mouth bedtime.        Objective  /50 (Patient Site: Right Arm, Patient  "Position: Sitting, Cuff Size: Adult Large)  Pulse 64  Resp 18  Ht 5' 11\" (1.803 m)  Wt (!) 264 lb (119.7 kg)  BMI 36.82 kg/m2    General Appearance:    Alert, cooperative, no distress, appears stated age, moderately obese   Head:    Normocephalic, without obvious abnormality, atraumatic   Throat:   Lips, mucosa, and tongue normal; teeth and gums normal   Neck:   Supple, symmetrical, trachea midline, no adenopathy;        thyroid:  No enlargement/tenderness/nodules; no carotid    bruit or JVD   Back:     Symmetric, no curvature, ROM normal, no CVA tenderness   Lungs:     Clear to auscultation bilaterally, respirations unlabored   Chest wall:    No tenderness or deformity   Heart:    Regular rate and rhythm, S1 and S2 normal, no murmur, rub   or gallop   Abdomen:     Soft, non-tender, bowel sounds active all four quadrants,     no masses, no organomegaly   Extremities:   Normal, atraumatic, no cyanosis or edema   Pulses:   2+ and symmetric all extremities   Skin:   Skin color, texture, turgor normal, no rashes or lesions     Results    Lab Results personally reviewed   Lab Results   Component Value Date    CHOL 126 06/08/2017    CHOL 132 05/03/2016     Lab Results   Component Value Date    HDL 29 (L) 06/08/2017    HDL 34 (L) 05/03/2016     Lab Results   Component Value Date    LDLCALC 58 06/08/2017    LDLCALC 62 05/03/2016     Lab Results   Component Value Date    TRIG 194 (H) 06/08/2017    TRIG 179 (H) 05/03/2016     Lab Results   Component Value Date    WBC 10.4 04/01/2013    HGB 17.0 04/13/2016    HCT 45.8 04/01/2013     04/01/2013     Lab Results   Component Value Date    CREATININE 1.78 (H) 04/25/2018    BUN 41 (H) 04/25/2018     04/25/2018    K 3.5 04/25/2018    CO2 24 04/25/2018     Review of Systems:   General: WNL  Eyes: Visual Distubance  Ears/Nose/Throat: Nosebleeds  Lungs: Shortness of Breath  Heart: Shortness of Breath with activity, Irregular Heartbeat  Stomach: WNL  Bladder: " WNL  Muscle/Joints: WNL  Skin: Rash  Nervous System: WNL  Mental Health: WNL     Blood: Easy Bruising

## 2021-06-18 ENCOUNTER — AMBULATORY - HEALTHEAST (OUTPATIENT)
Dept: CARDIAC REHAB | Facility: CLINIC | Age: 81
End: 2021-06-18

## 2021-06-18 DIAGNOSIS — I21.4 NSTEMI (NON-ST ELEVATED MYOCARDIAL INFARCTION) (H): ICD-10-CM

## 2021-06-18 DIAGNOSIS — Z98.61 STATUS POST PERCUTANEOUS TRANSLUMINAL CORONARY ANGIOPLASTY: ICD-10-CM

## 2021-06-18 LAB — GLUCOSE BLDC GLUCOMTR-MCNC: 175 MG/DL (ref 70–139)

## 2021-06-18 NOTE — PROGRESS NOTES
RESPIRATORY CARE NOTE     Patient Name: Red Sutherland  Today's Date: 6/5/2018     Complete PFT done. Pt performed tests with good effort. Test results meet ATS criteria. Results scanned into epic. Pt left in no distress.       SANDRA HermanT

## 2021-06-18 NOTE — PROGRESS NOTES
Subject seen for quarterly visit POLLY OLE study (trial of quarterly canakinumab in prevention of recurrent CV events.)     Medications reviewed (including prohibited medications, anti-diabetics and CV meds) and any changes noted below.    Open label canakinumab [150 mg] dispensed.    Medication changes:  Sildenafil 20 mg three times a day started 20 Apr 2018    Polly Adverse Event Report:   Diagnosis/event description:  Pulmonary hypertension - diagnosed at [facility]  Start date:  18 Apr 2018  Resolved date:    Severity:   (mild / moderate / severe) moderate  Study medication adjustment: none  Treatment given: sildenafil added  JOY?  no  If yes, seriousness criteria: n/a  Endpoint: no  Major adverse cardiac event?  no    Dr. French: Reasonable possibility that AE is related to study treatment     [] Yes   [x] No    Will see him again in 3 months.

## 2021-06-19 ENCOUNTER — HOSPITAL ENCOUNTER (EMERGENCY)
Dept: EMERGENCY MEDICINE | Facility: CLINIC | Age: 81
Discharge: HOME | End: 2021-06-19
Attending: EMERGENCY MEDICINE
Payer: COMMERCIAL

## 2021-06-19 DIAGNOSIS — K14.8 HEMORRHAGE OF TONGUE: ICD-10-CM

## 2021-06-19 ASSESSMENT — MIFFLIN-ST. JEOR: SCORE: 1794.48

## 2021-06-19 NOTE — LETTER
Letter by Stephania French MD at      Author: Stephania French MD Service: -- Author Type: --    Filed:  Encounter Date: 5/16/2019 Status: (Other)         Red LEACH Koko  [address] [place] [address]     May 16, 2019     Dear Mr. Sutherland,    Below are the results from your recent visit:    Resulted Orders   NM Pharmacologic Stress Test   Result Value Ref Range    Pharmacologic Protocol  Lexiscan     Test Type Pharmacological     Baseline HR 64     Calculated Percent HR 48 %    Left Ventricular EF 75 %    Narrative      The pharmacologic nuclear stress test is negative for inducible   myocardial ischemia or infarction.    The left ventricular ejection fraction is 75%.    When compared to the images of 6/23/2016, there has been no significant   change.        The test results show that your stress test is normal, there are probably no significant blockages causing your shortness of breath.  This is good news.   Please call with questions or contact us using [facility].    Sincerely,        Electronically signed by Stephania French MD

## 2021-06-20 ENCOUNTER — HEALTH MAINTENANCE LETTER (OUTPATIENT)
Age: 81
End: 2021-06-20

## 2021-06-20 NOTE — PROGRESS NOTES
[facility] Follow-up Note    Assessment & Plan        1. Coronary artery disease involving native coronary artery of native heart without angina pectoris  -angiography in 2011 showed distal left main 10% lesion, left anterior descending 30% mid lesion, proximal circumflex 50% lesion and the right coronary artery had a proximal 10% lesion. January 2015 stress test showed no major ischemia. Doubt ischemia is causing his shortness of breath since June 2016 stress test showed no ischemia with preserved ejection fraction .  He went down to the [facility] and had an extensive cardiovascular evaluation, I do not have those records but I can see through care everywhere he had a coronary angiogram December 2017 and per the records does not appear to be obstructive coronary artery disease causing his symptoms.   2. Essential hypertension -under good control currently.   3. Hypercholesteremia -cholesterol 146 with an LDL of 58 from June 2017 which is acceptable.   4. Obesity -work on weight loss.  This could certainly be contributing to shortness of breath.   5. NOVA on CPAP -CPAP with oxygen at nighttime.   6. Persistent atrial fibrillation (H) -asymptomatic chronic and not valvular and on chronic Eliquis therapy.   7. 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   8. (HFpEF) heart failure with preserved ejection fraction (H) -no signs or symptoms currently and his Bumex dose lowered due to chronic kidney disease.   9.  Increased creatinine-felt to be due to diuretic and his diuretic dose was lowered.  10.  Shortness of breath-multifactorial.  It is getting worse.  Oxygen saturation 95% on room air.  I will go ahead and stop study drug and thought that this might be contributing to this.    Plan  1.  Stop study drug.  2.  Continue follow-up with other numerous  "physicians.  3.  Follow-up with me in 6 months or sooner if needed.    Subjective  CC: 77-year-old white gentleman here for 3-month follow-up today.  Since he was seen me he was seen by Dr. Javier Salter who concurs that he has pulmonary hypertension but made no changes.  He has been seen by [facility] which now tells me he has chronic kidney disease.  He is still short of breath at rest and on minimal activity.  There is no PND, orthopnea, chest discomfort, palpitations, syncope, dizziness or peripheral edema.    Medications  Current Outpatient Prescriptions   Medication Sig Note     albuterol (PROVENTIL HFA;VENTOLIN HFA) 90 mcg/actuation inhaler Inhale 1 puff every 4 (four) hours as needed. 1/26/2017: Taking bid     albuterol (PROVENTIL) 2.5 mg /3 mL (0.083 %) nebulizer solution Take 2.5 mg by nebulization every 4 (four) hours as needed.      amLODIPine (NORVASC) 10 MG tablet Take 1 tablet (10 mg total) by mouth daily.      apixaban (ELIQUIS) 5 mg Tab Take 1 tablet by mouth every 12 (twelve) hours.      aspirin 81 MG EC tablet Take 81 mg by mouth daily.      atorvastatin (LIPITOR) 20 MG tablet Take 1 tablet by mouth daily.      azithromycin (ZITHROMAX) 250 MG tablet 4 tablets 1 hour prior to dental work      BD ULTRA-FINE MANISHA PEN NEEDLES 32 gauge x 5/32\" Ndle       brimonidine (ALPHAGAN) 0.2 % ophthalmic solution Administer 1 drop into the left eye 2 (two) times a day.      bumetanide (BUMEX) 1 MG tablet Take 1 tablet (1 mg total) by mouth 2 (two) times a day at 9am and 6pm.      calcium-magnesium 300-300 mg Tab Take by mouth daily. CALCIUM MAGNESIUM ZINC 1000-400-15. TAKE TWICE DAILY.       cetirizine (ZYRTEC) 10 MG tablet Take 10 mg by mouth daily as needed.       cloNIDine (CATAPRES-TTS) 0.2 mg/24 hr Place 1 patch on the skin once a week.      cycloSPORINE (RESTASIS) 0.05 % ophthalmic emulsion Administer 1 drop into the left eye every 12 (twelve) hours.       digoxin (LANOXIN) 125 mcg tablet Take 1 tablet " (125 mcg total) by mouth daily.      diltiazem (CARDIZEM CD) 240 MG 24 hr capsule Take 240 mg by mouth daily.      esomeprazole (NEXIUM) 40 MG capsule Take 40 mg by mouth daily.      fluticasone-vilanterol (BREO ELLIPTA) 200-25 mcg/dose DsDv inhaler Inhale 1 puff daily.      HYDROcodone-acetaminophen 5-325 mg per tablet Take 1 tablet by mouth daily.      irbesartan (AVAPRO) 300 MG tablet Take 300 mg by mouth daily.      ketorolac (ACULAR) 0.5 % ophthalmic solution Administer 1 drop into the left eye 4 (four) times a day.       KLOR-CON M20 20 mEq tablet TAKE ONE TABLET BY MOUTH ONE TIME DAILY      lidocaine (LIDODERM) 5 % Place 1 patch on the skin as needed. Remove & Discard patch within 12 hours or as directed by MD      metFORMIN (GLUCOPHAGE) 500 MG tablet Take 500 mg by mouth 2 (two) times a day with meals.       metoprolol succinate (TOPROL-XL) 200 MG 24 hr tablet Take 100 mg by mouth daily. 9/7/2018: Received from: [facility] Received Sig: Take 100 mg by mouth daily.     nitroglycerin (NITROSTAT) 0.4 MG SL tablet Place 0.4 mg under the tongue every 5 (five) minutes as needed for chest pain.      OXYGEN-AIR DELIVERY SYSTEMS [facility] Use As Directed.      oxymetazoline (AFRIN) 0.05 % nasal spray 2 sprays into each nostril as needed for congestion.       potassium 99 mg Tab Take 1 tablet by mouth 2 (two) times a day.      prednisoLONE acetate (PRED-FORTE) 1 % ophthalmic suspension Administer 1 drop to both eyes 4 (four) times a day.       rOPINIRole (REQUIP) 2 MG tablet Take 2 mg by mouth at bedtime.       sildenafil, antihypertensive, (REVATIO) 20 mg tablet Take 20 mg by mouth 3 (three) times a day.       Study Drug Canakinumab 150 mg/mL (POLLY STUDY) injection Inject 1 Syringe under the skin every 3 (three) months.      TRESIBA FLEXTOUCH U-200 200 unit/mL (3 mL) InPn       zolpidem (AMBIEN) 10 mg tablet Take 10 mg by mouth bedtime.      insulin glargine (LANTUS SOLOSTAR) 100 unit/mL (3 mL) pen Inject 36 Units  under the skin daily.       MINERAL OIL, LIGHT/MINERAL OIL (SOOTHE XP OPHT) Apply to eye 2 (two) times a day as needed.      mometasone-formoterol (DULERA) 100-5 mcg/actuation HFAA inhaler Inhale 2 puffs 2 (two) times a day.      ZINC ORAL Take 500 mg by mouth daily.        Objective  /62 (Patient Site: Left Arm, Patient Position: Sitting, Cuff Size: Adult Large)  Pulse 68  Resp 18  Wt (!) 268 lb (121.6 kg)  BMI 37.38 kg/m2    General Appearance:    Alert, cooperative, no distress, appears stated age, moderately obese   Head:    Normocephalic, without obvious abnormality, atraumatic   Throat:   Lips, mucosa, and tongue normal; teeth and gums normal   Neck:   Supple, symmetrical, trachea midline, no adenopathy;        thyroid:  No enlargement/tenderness/nodules; no carotid    bruit or JVD   Back:     Symmetric, no curvature, ROM normal, no CVA tenderness   Lungs:     Clear to auscultation bilaterally, respirations unlabored   Chest wall:    No tenderness or deformity   Heart:    Regular rate and rhythm, S1 and S2 normal, no murmur, rub   or gallop   Abdomen:     Soft, non-tender, bowel sounds active all four quadrants,     no masses, no organomegaly   Extremities:   Normal, atraumatic, no cyanosis or edema   Pulses:   2+ and symmetric all extremities   Skin:   Skin color, texture, turgor normal, no rashes or lesions     Results    Lab Results personally reviewed   Lab Results   Component Value Date    CHOL 146 05/30/2018    CHOL 126 06/08/2017     Lab Results   Component Value Date    HDL 30 (L) 05/30/2018    HDL 29 (L) 06/08/2017     Lab Results   Component Value Date    LDLCALC  05/30/2018      Comment:      Invalid, Triglycerides >400    LDLCALC 58 06/08/2017     Lab Results   Component Value Date    TRIG 502 (H) 05/30/2018    TRIG 194 (H) 06/08/2017     Lab Results   Component Value Date    WBC 10.4 04/01/2013    HGB 14.7 06/05/2018    HCT 45.8 04/01/2013     04/01/2013     Lab Results    Component Value Date    CREATININE 1.46 (H) 08/10/2018    BUN 24 08/10/2018     08/10/2018    K 4.1 08/10/2018    CO2 21 (L) 08/10/2018     Review of Systems:   General: WNL  Eyes: Visual Distubance  Ears/Nose/Throat: WNL  Lungs: Shortness of Breath  Heart: Shortness of Breath with activity, Chest Pain  Stomach: WNL  Bladder: Frequent Urination at Night  Muscle/Joints: Joint Pain, Muscle Weakness, Muscle Pain  Skin: WNL  Nervous System: WNL  Mental Health: WNL     Blood: Easy Bleeding, Easy Bruising

## 2021-06-21 ENCOUNTER — AMBULATORY - HEALTHEAST (OUTPATIENT)
Dept: CARDIAC REHAB | Facility: CLINIC | Age: 81
End: 2021-06-21

## 2021-06-21 DIAGNOSIS — Z98.61 STATUS POST PERCUTANEOUS TRANSLUMINAL CORONARY ANGIOPLASTY: ICD-10-CM

## 2021-06-21 DIAGNOSIS — I21.4 NSTEMI (NON-ST ELEVATED MYOCARDIAL INFARCTION) (H): ICD-10-CM

## 2021-06-21 LAB
GLUCOSE BLDC GLUCOMTR-MCNC: 137 MG/DL (ref 70–139)
GLUCOSE BLDC GLUCOMTR-MCNC: 147 MG/DL (ref 70–139)

## 2021-06-23 NOTE — PROGRESS NOTES
Subject seen for EOS visit POLLY OLE study (trial of quarterly canakinumab in prevention of recurrent CV events.)     Medications reviewed (including prohibited medications, anti-diabetics and CV meds) and any changes noted below.    AEs assessed [prompting for infections, malignancies, and CV events] and, if present, noted below.    Vital signs assessed including waist circumference ( 125.5 cm).   Questionnaires EQ-5D completed at this visit.    AE increased dyspnea of increased dyspnea resolved 6 Sep 2018. Exacerbated increased dyspnea continues. Cough, bilateral lung opacities, pulmonary hypertension and increasing HgBA1c not resolved.    Cora Parham RN

## 2021-06-25 NOTE — ED TRIAGE NOTES
Patient is here with a tongue bleed after biting it 24 hours ago eating ribs. He stated the bleeding is intermittent and just stopped prior to coming into the ER.

## 2021-06-25 NOTE — PROGRESS NOTES
Progress Notes by Lucy Walters RN at 1/26/2017 10:00 AM     Author: Lucy Walters RN Service: -- Author Type: Registered Nurse    Filed: 2/14/2017  4:10 PM Encounter Date: 1/26/2017 Status: Signed    : Lucy Walters RN (Registered Nurse)           Red LEACH Nancireji seen for POLLY End of Study (trial of quarterly canakinumab in prevention of recurrent CV events.)      waist circumference 128.5 cm    Endpoints reviewed-denies all    Death      MI    Stroke    TIA    Coronary angiography    Coronary revascularization    Stent thrombosis    Unstable angina requiring unplanned revascularization    Hospitalization for heart failure    Critical limb ischemia    Non coronary revascularization    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 Denies any new events.   Contact information confirmed.   Patient education and counseling completed.   Labs not drawn per protocol today-not fasting. Will reschedule ASAP.  EQ-5D Questionnaires completed at this visit.  12 lead EKG done and reviewed by Cruz Heller NP.   Physical exam done by Cruz Heller NP.     Per Dr. French, will re-evalutae condition and consider OLE in next 3 months or so.    Lucy Walters RN, BSN  Clinical Trials Nurse

## 2021-06-26 NOTE — ED PROVIDER NOTES
EMERGENCY DEPARTMENT ENCOUNTER     NAME: Red Sutherland   AGE: 80 y.o. male   YOB: 1940   MRN: [id number]   EVALUATION DATE & TIME: 6/19/2021  6:23 PM   PCP: Shun Zhou MD     Chief Complaint   Patient presents with     Tongue Bleed   :    FINAL IMPRESSION       1. Hemorrhage of tongue           ED COURSE & MEDICAL DECISION MAKING    PPE: N95, surgical mask, eye protection  6:29 PM I met with the patient, obtained history, performed an initial exam, and discussed options and plan for diagnostics and treatment here in the ED.  Pertinent Labs & Imaging studies reviewed. (See chart for details)   80 y.o. male  presents to the Emergency Department for evaluation of tongue bleeding after he bit his tongue yesterday.  He is on Xarelto and states that it would not stop bleeding.  However, by the time he got into an exam room the tongue bleeding had stopped. Initial Vitals Reviewed. Initial exam notable for generally well-appearing male who had a small abrasion on the tip of his tongue consistent with where he bit it, but fortunately there was no bleeding or oozing by the time I evaluated.  Because he is at high risk for recurrence due to his Xarelto, we did observe in the emergency department and after greater than an hour and oral intake he did not have any rebleeding and is comfortable with discharge at this time.  Bleeding precautions given and he was discharged in good condition.           At the conclusion of the encounter I discussed the results of all of the tests and the disposition. The questions were answered. The patient or family acknowledged understanding and was agreeable with the care plan.         MEDICATIONS GIVEN IN THE EMERGENCY:   Medications - No data to display   NEW PRESCRIPTIONS STARTED AT TODAY'S ER VISIT   Current Discharge Medication List      CONTINUE these medications which have NOT CHANGED    Details   ACCU-CHEK JOSE PLUS TEST STRP strips see administration  "instructions.      acetaminophen (TYLENOL) 500 MG tablet Take 1,000 mg by mouth every 6 (six) hours as needed. First line of pain management. Do Not take more than 4,000 mg in 24 hours.      albuterol (PROVENTIL HFA;VENTOLIN HFA) 90 mcg/actuation inhaler Inhale 2 puffs every 4 (four) hours as needed.       albuterol (PROVENTIL) 2.5 mg /3 mL (0.083 %) nebulizer solution Take 2.5 mg by nebulization every 4 (four) hours as needed (as needed).             apixaban (ELIQUIS) 5 mg Tab Take 1 tablet by mouth every 12 (twelve) hours.      aspirin 81 MG EC tablet Take 81 mg by mouth every morning.       atorvastatin (LIPITOR) 40 MG tablet Take 40 mg by mouth daily.   Refills: 4      azithromycin (ZITHROMAX) 250 MG tablet Take 250 mg by mouth see administration instructions. Take 4 tablets (1,000 mg) 1 hour prior to dental work      BD ULTRA-FINE MANISHA PEN NEEDLES 32 gauge x 5/32\" Ndle Refills: 4      brimonidine (ALPHAGAN) 0.2 % ophthalmic solution Administer 1 drop into the left eye 2 (two) times a day.  Refills: 11      bumetanide (BUMEX) 1 MG tablet Take 1 tablet (1 mg total) by mouth 3 (three) times a day. Take 1 tablets three times a day as originally recommended by your cardiologist/PCP.  Qty:  , Refills: 0    Associated Diagnoses: Acute on chronic congestive heart failure, unspecified heart failure type (H)      calcium-magnesium 300-300 mg Tab Take 1 tablet by mouth daily. CALCIUM MAGNESIUM ZINC 1000-400-15. TAKE TWICE DAILY.       canagliflozin (INVOKANA) 100 mg Tab Take 100 mg by mouth.      cefdinir (OMNICEF) 300 MG capsule Take 1 capsule by mouth 2 (two) times a day. Take one capsule every 12 hours.      cetirizine (ZYRTEC) 10 MG tablet Take 10 mg by mouth daily.       cloNIDine (CATAPRES-TTS) 0.2 mg/24 hr Place 1 patch on the skin once a week. Place patch on Monday.      clopidogreL (PLAVIX) 75 mg tablet Take 1 tablet by mouth daily.      digoxin (LANOXIN) 125 mcg tablet Take 1 tablet (125 mcg total) by mouth " daily.  Qty: 90 tablet, Refills: 3    Associated Diagnoses: Persistent atrial fibrillation (H)      eplerenone (INSPRA) 25 MG tablet Take 25 mg by mouth.      esomeprazole (NEXIUM) 40 MG capsule Take 40 mg by mouth daily.       fluticasone-vilanterol (BREO ELLIPTA) 200-25 mcg/dose DsDv inhaler Inhale 1 puff daily.      HYDROcodone-acetaminophen 5-325 mg per tablet Take 1 tablet by mouth every 4 (four) hours as needed.   Refills: 0      insulin detemir U-100 (LEVEMIR) 100 unit/mL injection Inject 43 Units under the skin 2 (two) times a day.      ipratropium-albuteroL (DUO-NEB) 0.5-2.5 mg/3 mL nebulizer Inhale 3 mL as needed.      ketorolac (ACULAR) 0.5 % ophthalmic solution Administer 1 drop into the left eye 2 (two) times a day.   Refills: 12      lidocaine (LIDODERM) 5 % Place 1 patch on the skin as needed. Remove & Discard patch within 12 hours or as directed by MD      metFORMIN (GLUCOPHAGE) 500 MG tablet Take 1,000 mg by mouth 2 (two) times a day with meals.       metOLazone (ZAROXOLYN) 2.5 MG tablet 2.5 mg daily.       metoprolol tartrate (LOPRESSOR) 50 MG tablet Take 1 tablet by mouth 2 (two) times a day.      mometasone (ELOCON) 0.1 % cream Apply 1 application topically daily as needed.      nitroglycerin (NITROSTAT) 0.4 MG SL tablet Place 0.4 mg under the tongue every 5 (five) minutes as needed for chest pain.      omeprazole (PRILOSEC) 20 MG capsule Take 20 mg by mouth daily as needed.      OXYGEN-AIR DELIVERY SYSTEMS [facility] Use As Directed.      pantoprazole (PROTONIX) 40 MG tablet Take 40 mg by mouth daily before breakfast.      potassium chloride (K-DUR,KLOR-CON) 20 MEQ tablet Take 20 mEq by mouth 3 (three) times a day.       prednisoLONE acetate (PRED-FORTE) 1 % ophthalmic suspension Administer 2 drops into the left eye 2 (two) times a day.       rOPINIRole (REQUIP) 2 MG tablet Take 2 mg by mouth at bedtime.       sildenafil (REVATIO) 20 mg tablet Take 20 mg by mouth 3 (three) times a day.      zolpidem  (AMBIEN) 10 mg tablet Take 10 mg by mouth bedtime.            ================================================================   HISTORY OF PRESENT ILLNESS       Patient information was obtained from: patient    Use of : N/A    Red Sutherland is a 80 y.o. male with history of atrial fibrillation, CHF, HTN, COPD, diabetes who presents with tongue injury.    The patient reports that he bite the tip of his tongue 24 hrs ago and it has been bleeding intermittently since. It usually starts back up when he eats or drinks. Bleeding stopped when he arrived. The patient is on Eliquis.   ================================================================    REVIEW OF SYSTEMS       Review of Systems   HENT:        Positive for bleeding tongue injury.    All other systems reviewed and are negative.      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 They put him on sildenafil as nitroprusside lowered systemic BP and with that the mean PA dropped from 54 to 49. Negative VQ at [place] Dec 1, 2017.     RLS (restless legs syndrome)      Sleep apnea       PAST SURGICAL HISTORY:   Past Surgical History:   Procedure Laterality Date     BACK SURGERY      lower back     CARDIAC CATHETERIZATION  12/13/2017    Right and left at [place], mean PA 58, PCW 24 with V wave of 35, LVEDP of 18, with Nipride systemic BP, PVR and mean PA all declined     CARDIOVERSION  03/15/2013    for afib     CATARACT EXTRACTION Bilateral      [place]       CORONARY STENT PLACEMENT       MD  "COLONOSCOPY FLX DX W/COLLJ SPEC WHEN PFRMD N/A 1/14/2021    Procedure: COLONOSCOPY;  Surgeon: Mihai Harris MD;  Location: [facility];  Service: Gastroenterology     SHOULDER SURGERY      reapir on right shoulder     TOTAL HIP ARTHROPLASTY Left      TOTAL KNEE ARTHROPLASTY Bilateral      WRIST SURGERY Bilateral       CURRENT MEDICATIONS:   No current facility-administered medications on file prior to encounter.      Current Outpatient Medications on File Prior to Encounter   Medication Sig     ACCU-CHEK JOSE PLUS TEST STRP strips see administration instructions.     acetaminophen (TYLENOL) 500 MG tablet Take 1,000 mg by mouth every 6 (six) hours as needed. First line of pain management. Do Not take more than 4,000 mg in 24 hours.     albuterol (PROVENTIL HFA;VENTOLIN HFA) 90 mcg/actuation inhaler Inhale 2 puffs every 4 (four) hours as needed.      albuterol (PROVENTIL) 2.5 mg /3 mL (0.083 %) nebulizer solution Take 2.5 mg by nebulization every 4 (four) hours as needed (as needed).            apixaban (ELIQUIS) 5 mg Tab Take 1 tablet by mouth every 12 (twelve) hours.     aspirin 81 MG EC tablet Take 81 mg by mouth every morning.      atorvastatin (LIPITOR) 40 MG tablet Take 40 mg by mouth daily.      azithromycin (ZITHROMAX) 250 MG tablet Take 250 mg by mouth see administration instructions. Take 4 tablets (1,000 mg) 1 hour prior to dental work     BD ULTRA-FINE MANISHA PEN NEEDLES 32 gauge x 5/32\" Ndle      brimonidine (ALPHAGAN) 0.2 % ophthalmic solution Administer 1 drop into the left eye 2 (two) times a day.     bumetanide (BUMEX) 1 MG tablet Take 1 tablet (1 mg total) by mouth 3 (three) times a day. Take 1 tablets three times a day as originally recommended by your cardiologist/PCP. (Patient taking differently: Take 1 mg by mouth 2 (two) times a day at 9am and 6pm. Take 4 tabs)     calcium-magnesium 300-300 mg Tab Take 1 tablet by mouth daily. CALCIUM MAGNESIUM ZINC 1000-400-15. TAKE TWICE DAILY.      " canagliflozin (INVOKANA) 100 mg Tab Take 100 mg by mouth.     cefdinir (OMNICEF) 300 MG capsule Take 1 capsule by mouth 2 (two) times a day. Take one capsule every 12 hours.     cetirizine (ZYRTEC) 10 MG tablet Take 10 mg by mouth daily.      cloNIDine (CATAPRES-TTS) 0.2 mg/24 hr Place 1 patch on the skin once a week. Place patch on Monday.     clopidogreL (PLAVIX) 75 mg tablet Take 1 tablet by mouth daily.     digoxin (LANOXIN) 125 mcg tablet Take 1 tablet (125 mcg total) by mouth daily.     eplerenone (INSPRA) 25 MG tablet Take 25 mg by mouth.     esomeprazole (NEXIUM) 40 MG capsule Take 40 mg by mouth daily.      fluticasone-vilanterol (BREO ELLIPTA) 200-25 mcg/dose DsDv inhaler Inhale 1 puff daily.     HYDROcodone-acetaminophen 5-325 mg per tablet Take 1 tablet by mouth every 4 (four) hours as needed.      insulin detemir U-100 (LEVEMIR) 100 unit/mL injection Inject 43 Units under the skin 2 (two) times a day.     ipratropium-albuteroL (DUO-NEB) 0.5-2.5 mg/3 mL nebulizer Inhale 3 mL as needed.     ketorolac (ACULAR) 0.5 % ophthalmic solution Administer 1 drop into the left eye 2 (two) times a day.      lidocaine (LIDODERM) 5 % Place 1 patch on the skin as needed. Remove & Discard patch within 12 hours or as directed by MD     metFORMIN (GLUCOPHAGE) 500 MG tablet Take 1,000 mg by mouth 2 (two) times a day with meals.      metOLazone (ZAROXOLYN) 2.5 MG tablet 2.5 mg daily.      metoprolol tartrate (LOPRESSOR) 50 MG tablet Take 1 tablet by mouth 2 (two) times a day.     mometasone (ELOCON) 0.1 % cream Apply 1 application topically daily as needed.     nitroglycerin (NITROSTAT) 0.4 MG SL tablet Place 0.4 mg under the tongue every 5 (five) minutes as needed for chest pain.     omeprazole (PRILOSEC) 20 MG capsule Take 20 mg by mouth daily as needed.     OXYGEN-AIR DELIVERY SYSTEMS [facility] [place] [facility] Use As Directed.     pantoprazole (PROTONIX) 40 MG tablet Take 40 mg by mouth daily before breakfast.     potassium chloride  (K-DUR,KLOR-CON) 20 MEQ tablet Take 20 mEq by mouth 3 (three) times a day.      prednisoLONE acetate (PRED-FORTE) 1 % ophthalmic suspension Administer 2 drops into the left eye 2 (two) times a day.      rOPINIRole (REQUIP) 2 MG tablet Take 2 mg by mouth at bedtime.      sildenafil (REVATIO) 20 mg tablet Take 20 mg by mouth 3 (three) times a day.     zolpidem (AMBIEN) 10 mg tablet Take 10 mg by mouth bedtime.      ALLERGIES:   Allergies   Allergen Reactions     Furosemide      Previously tolerated.     Hydrochlorothiazide      Iodinated Contrast Media Nausea Only     Losartan Other (See Comments)     Other reaction(s): Stomatitis  Bloody nose dry mouth and lips     Losartan-Hydrochlorothiazide      Metaxalone      Mometasone Other (See Comments)     Bloody nose     Penicillins      Rabeprazole      Ramipril      Shellfish Containing Products      Other reaction(s): mouth sores  Other reaction(s): mouth sores      FAMILY HISTORY:   Family History   Problem Relation Age of Onset     Hyperlipidemia Mother      Hypertension Mother      Heart disease Mother      Hyperlipidemia Father      Hypertension Father      Coronary artery disease Father      Stroke Brother      Depression Brother      No Medical Problems Sister      Pulmonary Hypertension Neg Hx      Congenital heart disease Neg Hx       SOCIAL HISTORY:   Social History     Socioeconomic History     Marital status:      Spouse name: Kaley     Number of children: 4     Years of education: Not on file     Highest education level: Not on file   Occupational History     Occupation: High voltage electrian     Employer: RETIRED   Social Needs     Financial resource strain: Not on file     Food insecurity     Worry: Not on file     Inability: Not on file     Transportation needs     Medical: Not on file     Non-medical: Not on file   Tobacco Use     Smoking status: Former Smoker     Packs/day: 1.50     Years: 44.00     Pack years: 66.00     Types: Cigarettes      "Quit date: 1996     Years since quittin.1     Smokeless tobacco: Never Used   Substance and Sexual Activity     Alcohol use: Yes     Alcohol/week: 1.0 standard drinks     Types: 1 Cans of beer per week     Comment: 1 per month     Drug use: No     Sexual activity: Not Currently     Partners: Female   Lifestyle     Physical activity     Days per week: Not on file     Minutes per session: Not on file     Stress: Not on file   Relationships     Social connections     Talks on phone: Not on file     Gets together: Not on file     Attends [employer] service: Not on file     Active member of club or organization: Not on file     Attends meetings of clubs or organizations: Not on file     Relationship status: Not on file     Intimate partner violence     Fear of current or ex partner: Not on file     Emotionally abused: Not on file     Physically abused: Not on file     Forced sexual activity: Not on file   Other Topics Concern     Not on file   Social History Narrative     Not on file        VITALS  Patient Vitals for the past 24 hrs:   BP Temp src Pulse Resp SpO2 Height Weight   21 1821 127/69 Oral 86 16 95 % 5' 11\" (1.803 m) (!) 232 lb (105.2 kg)        ================================================================    PHYSICAL EXAM     VITAL SIGNS: /69 (Patient Position: Sitting)   Pulse 86   Resp 16   Ht 5' 11\" (1.803 m)   Wt (!) 232 lb (105.2 kg)   SpO2 95%   BMI 32.36 kg/m     Constitutional:  Awake, no acute distress   HENT:  Atraumatic, oropharynx without exudate or erythema, membranes moist. 0.5 cm nonbleeding abrasion to the tip of the tongue.   Lymph:  No adenopathy  Eyes: EOM intact, PERRL, no injection  Neck: Supple  Respiratory:  Clear to auscultation bilaterally, no wheezes or crackles   Cardiovascular:  Regular rate and rhythm, single S1 and S2   GI:  Soft, nontender, nondistended, no rebound or guarding   Musculoskeletal:  Moves all extremities, no lower extremity edema, no " deformities    Skin:  Warm, dry  Neurologic:  Alert and oriented x3, no focal deficits noted       ================================================================  LAB       All pertinent labs reviewed and interpreted.   No results found for this visit on 06/19/21.     ===============================================================  RADIOLOGY       Reviewed all pertinent imaging. Please see official radiology report.   No results found.       ================================================================  EKG         I have independently reviewed and interpreted the EKG(s) documented above.     ================================================================  PROCEDURES         I, Arabella Olivares, am serving as a scribe to document services personally performed by Dr. Coleman based on my observation and the provider's statements to me. I, Luisa Coleman MD attest that Arabella Olivares is acting in a scribe capacity, has observed my performance of the services and has documented them in accordance with my direction.   Luisa Coleman M.D.   Emergency Medicine   [facility] EMERGENCY ROOM  [address] [address]  Dept: [phone]  Loc: [phone]      Luisa Coleman MD  06/19/21 1932

## 2021-06-26 NOTE — PROGRESS NOTES
Progress Notes by Javier Salter MD at 6/11/2018 10:30 AM     Author: Javier Salter MD Service: -- Author Type: Physician    Filed: 6/11/2018 11:29 AM Encounter Date: 6/11/2018 Status: Signed    : Javier Salter MD (Physician)           Click to link to [facility] Pulmonary Hypertension [facility] Consultation Note    Thank you, Dr. French, for asking Red HANY Sutherland to meet with me in consultation today to evaluate his history of pulmonary hypertension.     Assessment:    1. NOVA on CPAP     2. 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  I recommended to Arron that he discuss his pulmonary hypertension and low oxygen levels with exertion with Dr. Lilly Mantilla, as she is an expert in the management of patients with pulmonary hypertension, sleep apnea and hypoxemia.  Arron is already seeing Dr. Mantilla for his sleep apnea management.  2.  He will follow-up with Dr. Salter only if he wishes to transfer his pulmonary hypertension management from either the [facility] or Dr. Lilly Mantilla.    An After Visit Summary was printed and given to the patient.    Current History:    Arron has had shortness of breath with exertion for some time.  He states after walking 1 or 2 blocks he has to stop and rest.  He does not describe angina pectoris, orthopnea, palpitations, lightheadedness or Raynaud's phenomena.      He does not have a history of the use of prescription weight loss medications.      He underwent an extensive evaluation at the [facility] in both their cardiovascular divisions and nephrology divisions late last year.  I reviewed those records in detail this morning.  To summarize, he was already known to have obstructive sleep apnea and was under the care of Dr. Lilly Mantilla at [facility].  He is wearing oxygen at night as recommended by Dr. Mantilla.     At the [facility] he had a ventilation perfusion study that was  negative for evidence of chronic thromboembolic disease.  He also had left and right heart catheterization performed and the opinion of the [facility] cardiologist was that his pulmonary hypertension was due to WHO group 2.  In essence, he had severely elevated mean pulmonary artery pressure but also had a pulmonary capillary wedge pressure of 24 with a V wave of 35.  His systolic blood pressure was elevated so the use nitroprusside (please note that he did not have a vasodilator study with adenosine or nitric oxide) and his systemic blood pressure dropped as did his wedge pressure.  At that point his left ventricular end-diastolic pressure was elevated at 20 mmHg.  I do not know if his left ventricular end-diastolic pressure was checked prior to nitroprusside.  He had been treated with antihypertensives for many years and they initially tried to diurese him with limited success.  A month later he was started on sildenafil 20 mg 3 times daily and Bill states this is made no difference in his breathlessness and effort intolerance.    He did not have pulmonary function testing or 6 minute walk testing.  I have attached those test performed recently at [facility] below.  He has significant reduction in exercise capacity and had mild hypoxemia with exertion to 87%.  His pulmonary function testing did not show significant obstruction or restriction and he had a mild reduction in diffusing capacity.    Past Medical History:  Past Medical History:   Diagnosis Date   ? Atrial fibrillation    ? COPD (chronic obstructive pulmonary disease)    ? Coronary artery disease    ? Diabetes mellitus    ? Essential hypertension    ? Hyperlipidemia    ? 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 They put him on sildenafil as nitroprusside lowered systemic BP and with that the mean PA dropped from 54 to 49. Negative VQ  at [place] Dec 1, 2017.   ? RLS (restless legs syndrome)    ? Sleep apnea      Past Medical History Pertinent Negatives:   Diagnosis Date Noted   ? Asthma 01/22/2015   ? Cirrhosis 06/11/2018   ? Lupus 06/11/2018   ? Pulmonary embolism 06/11/2018   ? Raynaud phenomenon 06/11/2018   ? Scleroderma 06/11/2018       Past Surgical History:  Past Surgical History:   Procedure Laterality Date   ? BACK SURGERY      lower back   ? CARDIAC CATHETERIZATION  12/13/2017    Right and left at [place], mean PA 58, PCW 24 with V wave of 35, LVEDP of 18, with Nipride systemic BP, PVR and mean PA all declined   ? CARDIOVERSION  03/15/2013    for afib   ? CATARACT EXTRACTION Bilateral    ? mynor     ? CORONARY STENT PLACEMENT     ? SHOULDER SURGERY      reapir on right shoulder   ? TOTAL HIP ARTHROPLASTY Left    ? TOTAL KNEE ARTHROPLASTY Bilateral    ? WRIST SURGERY Bilateral      Past Surgical History Pertinent Negatives:   Procedure Date Noted   ? SPLENECTOMY 06/11/2018       Family History:  Family History   Problem Relation Age of Onset   ? Hyperlipidemia Mother    ? Hypertension Mother    ? Heart disease Mother    ? Hyperlipidemia Father    ? Hypertension Father    ? Coronary artery disease Father    ? Stroke Brother    ? Depression Brother    ? No Medical Problems Sister    ? Pulmonary Hypertension Neg Hx    ? Congenital heart disease Neg Hx        Social History:   reports that he quit smoking about 22 years ago. His smoking use included Cigarettes. He has a 66.00 pack-year smoking history. He has never used smokeless tobacco. He reports that he drinks about 0.6 oz of alcohol per week  He reports that he does not use illicit drugs.    Medications:  Outpatient Encounter Prescriptions as of 6/11/2018   Medication Sig Dispense Refill   ? albuterol (PROVENTIL HFA;VENTOLIN HFA) 90 mcg/actuation inhaler Inhale 1 puff every 4 (four) hours as needed.     ? albuterol (PROVENTIL) 2.5 mg /3 mL (0.083 %) nebulizer solution Take 2.5 mg by  "nebulization every 4 (four) hours as needed.     ? amLODIPine (NORVASC) 10 MG tablet Take 1 tablet (10 mg total) by mouth daily. 90 tablet 3   ? apixaban (ELIQUIS) 5 mg Tab Take 1 tablet by mouth every 12 (twelve) hours.     ? aspirin 81 MG EC tablet Take 81 mg by mouth daily.     ? atorvastatin (LIPITOR) 20 MG tablet Take 1 tablet by mouth daily.  4   ? azithromycin (ZITHROMAX) 250 MG tablet 4 tablets 1 hour prior to dental work     ? BD ULTRA-FINE MANISHA PEN NEEDLES 32 gauge x 5/32\" Ndle   4   ? brimonidine (ALPHAGAN) 0.2 % ophthalmic solution Administer 1 drop into the left eye 2 (two) times a day.  11   ? bumetanide (BUMEX) 1 MG tablet Take 1 tablet (1 mg total) by mouth 2 (two) times a day at 9am and 6pm. 180 tablet 1   ? calcium-magnesium 300-300 mg Tab Take by mouth daily. CALCIUM MAGNESIUM ZINC 1000-400-15. TAKE TWICE DAILY.      ? cetirizine (ZYRTEC) 10 MG tablet Take 10 mg by mouth daily as needed.      ? cloNIDine (CATAPRES-TTS) 0.2 mg/24 hr Place 1 patch on the skin once a week.     ? cycloSPORINE (RESTASIS) 0.05 % ophthalmic emulsion Administer 1 drop into the left eye every 12 (twelve) hours.      ? digoxin (LANOXIN) 125 mcg tablet Take 1 tablet (125 mcg total) by mouth daily. 90 tablet 3   ? diltiazem (CARDIZEM CD) 240 MG 24 hr capsule Take 240 mg by mouth daily.     ? esomeprazole (NEXIUM) 40 MG capsule Take 40 mg by mouth daily.     ? fluticasone-vilanterol (BREO ELLIPTA) 200-25 mcg/dose DsDv inhaler Inhale 1 puff daily.     ? HYDROcodone-acetaminophen 5-325 mg per tablet Take 1 tablet by mouth daily.  0   ? insulin glargine (LANTUS SOLOSTAR) 100 unit/mL (3 mL) pen Inject 36 Units under the skin daily.      ? irbesartan (AVAPRO) 300 MG tablet Take 300 mg by mouth daily.     ? ketorolac (ACULAR) 0.5 % ophthalmic solution Administer 1 drop into the left eye 4 (four) times a day.   12   ? lidocaine (LIDODERM) 5 % Place 1 patch on the skin as needed. Remove & Discard patch within 12 hours or as directed " by MD     ? metFORMIN (GLUCOPHAGE) 500 MG tablet Take 500 mg by mouth 2 (two) times a day with meals.      ? MINERAL OIL, LIGHT/MINERAL OIL (SOOTHE XP OPHT) Apply to eye 2 (two) times a day as needed.     ? mometasone-formoterol (DULERA) 100-5 mcg/actuation HFAA inhaler Inhale 2 puffs 2 (two) times a day.     ? nebivolol (BYSTOLIC) 10 MG tablet Take 10 mg by mouth daily.      ? nitroglycerin (NITROSTAT) 0.4 MG SL tablet Place 0.4 mg under the tongue every 5 (five) minutes as needed for chest pain.     ? OXYGEN-AIR DELIVERY SYSTEMS [facility] Use As Directed.     ? prednisoLONE acetate (PRED-FORTE) 1 % ophthalmic suspension Administer 1 drop to both eyes 4 (four) times a day.      ? rOPINIRole (REQUIP) 2 MG tablet Take 2 mg by mouth at bedtime.      ? TRESIBA FLEXTOUCH U-200 200 unit/mL (3 mL) InPn   11   ? ZINC ORAL Take 500 mg by mouth daily.     ? zolpidem (AMBIEN) 10 mg tablet Take 10 mg by mouth bedtime.     ? KLOR-CON M20 20 mEq tablet TAKE ONE TABLET BY MOUTH ONE TIME DAILY  90 tablet 1   ? oxymetazoline (AFRIN) 0.05 % nasal spray 2 sprays into each nostril as needed for congestion.      ? potassium 99 mg Tab Take 1 tablet by mouth 2 (two) times a day.     ? sildenafil, antihypertensive, (REVATIO) 20 mg tablet Take 20 mg by mouth 3 (three) times a day.   3     Facility-Administered Encounter Medications as of 6/11/2018   Medication Dose Route Frequency Provider Last Rate Last Dose   ? Study Drug Canakinumab 150 mg/mL injection 1 Syringe (POLLY STUDY)  1 Syringe Subcutaneous Q3 Months Stephania French MD   1 Syringe at 03/20/18 1055       Allergies:  Hydrochlorothiazide; Losartan-hydrochlorothiazide; Metaxalone; Penicillins; Rabeprazole; Ramipril; and Shellfish containing products    Review of Systems:     General: WNL  Eyes: Visual Distubance  Ears/Nose/Throat: WNL  Lungs: WNL  Heart: Shortness of Breath with activity  Stomach: WNL  Bladder: WNL  Muscle/Joints: WNL  Skin: WNL  Nervous System: Dizziness  Mental  "Health: WNL     Blood: WNL    Objective:    Wt Readings from Last 5 Encounters:   18 (!) 262 lb (118.8 kg)   18 (!) 264 lb (119.7 kg)   10/10/17 (!) 271 lb (122.9 kg)   17 (!) 271 lb (122.9 kg)   17 (!) 270 lb (122.5 kg)      5' 11\" (1.803 m)  Body mass index is 36.54 kg/(m^2).  /66 (Patient Site: Left Arm, Patient Position: Sitting, Cuff Size: Adult Large)  Pulse 68  Resp 16  Ht 5' 11\" (1.803 m)  Wt (!) 262 lb (118.8 kg)  BMI 36.54 kg/m2     Physical Exam:      General Appearance: Alert and not in distress   HEENT: No scleral icterus; the mucous membranes are pink and moist   Neck: No cervical bruits, adenopathy, or thyromegaly; jugular venous pressure difficult to evaluate due to obesity   Chest: The spine is straight and the chest is symmetric   Lungs: Respirations are unlabored; the lungs are clear to auscultation   Cardiovasular: Auscultation reveals normal first and normal second heart sound with no murmurs, rubs, or gallops; the carotid, radial, and posterior tibial pulses are intact   Abdomen: No organomegaly, masses, bruits, or tenderness; bowel sounds are present   Extremities: No cyanosis, clubbing; there is trace ankle edema   Skin: No xanthelasma but there are chronic venous stasis changes in the legs   Neurologic: Mood and affect are appropriate     Cardiac testin minute walk test distance was 195 m on 2018.  There was desaturation to 87% per Dr. Carmelo Lim.    Echocardiogram:   Results for orders placed during the hospital encounter of 16   Echo Complete [ECH10] 2016    Narrative Transthoracic Echocardiography Report (TTE)     Demographics      Patient Name    MARCY RODRÍGUEZ  Date of Study         2016                   D      MRN             [id number]         Room Number      Account Number  [id number]      Accession       [id number]   Number      Date of Birth   1940        Referring Physician   JOSSUE MCCLAIN MD      Age      "        75 year(s)        Sonographer           [id number]      Gender          Male              Interpreting          MUNIRA LEPE MD                                     Physician             [facility] OUTPATIENT     Procedure    Type of Study      TTE procedure:ECHO COMPLETE.     Procedure Date  Date: 02/26/2016 Start: 08:42 AM    Study Location: [facility]  Technical Quality: Limited visualization due to body habitus.    Patient Status: Routine    Contrast Medium: Definity. Used - 2.5 mland Wasted - 7.5 ml    Height: 71 inches Weight: 275.01 pounds BSA: 2.41 m^2 BMI: 38.35 kg/m^2    BP: 120/80 mmHg    Allergies    - Penicillin.      - Other:(Drug Allergies - Furosimide, Skelaxin, HCTZ, Altace, Aciphex,      Hyzaar).      - Shellfish.    Indications  Congestive heart failure.     Conclusions      Summary   1. Normal left ventricular size and systolic performance. The ejection   fraction is estimated to be 55%.   2. There is mild aortic stenosis.   3. The left atrium is mild-moderately enlarged. The right atrium is mildly   enlarged.   4. Right ventricular systolic pressure relative to right atrial pressure   is mildly elevated. The pulmonary artery pressure is estimated to be 40   mmHg plus right atrial pressure.      When compared to the prior real-time echocardiogram dated 12 March 2013,   there has been little appreciable interval change.      Findings      Left Ventricle   Normal left ventricular size and systolic performance. The ejection   fraction is estimated to be 55%. The regional wall motion appears normal.   Left ventricular wall thickness is normal.      Right Ventricle   Normal right ventricular size and systolic performance.      Left Atrium   The left atrium is mild-moderately enlarged.      Right Atrium   The right atrium is mildly enlarged.      Great Vessels   The aortic root is normal in size.      Aortic Valve   The aortic valve is not well visualized, but suspected to be comprised of   three cusps.  There is mild aortic stenosis. No aortic insufficiency is   detected on this study.      Mitral Valve   The mitral valve is morphologically normal in appearance. There is   probable mild mitral regurgitation.      Tricuspid Valve   The tricuspid valve is grossly normal.     M-Mode/2D Measurements & Calculations      LV Diastolic          LV Systolic          LA Dimension: 5 cmAO Root   Dimension: 5.24 cm    Dimension: 2.96 cm   Dimension: 3.2 cm   LV FS:43.5 %   LV PW Diastolic: 1.12   cm   Septum Diastolic:   1.17 cm                         LVOT: 1.8 cm         LA/Aorta: 1.56                                                 LA volume/Index: 53 ml /22m^2     Doppler Measurements & Calculations       AV Peak Velocity: 233 cm/s  LVOT Peak Velocity: 86.5 cm/s    AV Peak Gradient: 21.72     LVOT Mean Velocity: 60.6 cm/s    mmHg                        LVOT Peak Gradient: 3 mmHgLVOT Mean Gradient:    AV Mean Velocity: 171 cm/s  2 mmHg    AV Mean Gradient: 13 mmHg    AV VTI: 48.1 cm    AV Area (Continuity):0.97    cm^2                        TR Velocity:323 cm/s                                TR Gradient:41.73 mmHg    LVOT VTI: 18.3 cm      Signature      ----------------------------------------------------------------   Electronically signed by MUNIRA LEPE MD(Interpreting   physician) on 02/29/2016 07:59 AM   ----------------------------------------------------------------          Pulmonary:    PFT Complete (Order [id number])   PFT   Date: 6/5/2018 Department: [facility] Respiratory Released By: Gillian Menon Authorizing: Javier Salter MD   Study Result   FEV1/FVC is 67 and is reduced.  FEV1 is 88% predicted and is normal.  FVC is 98% predicted and normal.  There was no improvement in spirometry after a single inhaled dose of bronchodilator.  TLC is 88% predicted and is normal.  RV is 83% predicted and is normal.  DLCO is 75% predicted and is reduced when it   is corrected for hemoglobin.     Impression:  Full  Pulmonary Function Test is abnormal.  PFTs are consistent with minimal obstructive disease.  Spirometry is not consistent with reversibility.  There is no hyperinflation.  There is no air-trapping.  Diffusion capacity when corrected for hemoglobin is mildly reduced.     Anton Lim MD PhD  [facility] Pulmonary Critical Care       Imaging:    Negative VQ scan at the [facility], December, 2017.    Lab Review:    Lab Results   Component Value Date     05/30/2018    K 3.8 05/30/2018     05/30/2018    CO2 24 05/30/2018    BUN 43 (H) 05/30/2018    CREATININE 1.83 (H) 05/30/2018    CALCIUM 9.8 05/30/2018     Lab Results   Component Value Date    WBC 10.4 04/01/2013    HGB 14.7 06/05/2018    HCT 45.8 04/01/2013    MCV 91 04/01/2013     04/01/2013     TSH (uIU/mL)   Date Value   03/11/2013 1.6       Lab Results   Component Value Date    ALT 87 (H) 05/30/2018    AST 71 (H) 05/30/2018    ALKPHOS 72 05/30/2018    BILITOT 1.6 (H) 05/30/2018     BNP (pg/mL)   Date Value   10/17/2016 119 (H)   06/09/2016 155 (H)   04/08/2016 112 (H)           Much or all of the text in this note was generated through the use of the Dragon Dictate voice-to-text software. Errors in spelling or words which seem out of context are unintentional. Sound alike errors, in particular, may have escaped editing.

## 2021-06-28 NOTE — PROGRESS NOTES
Progress Notes by Stephania French MD at 1/30/2020  3:50 PM     Author: Stephania French MD Service: -- Author Type: Physician    Filed: 1/30/2020  4:54 PM Encounter Date: 1/30/2020 Status: Signed    : Stephania French MD (Physician)           [facility] Follow-up Note    Assessment & Plan        1. Coronary artery disease involving native coronary artery of native heart without angina pectoris   -angiography in 2011 showed distal left main 10% lesion, left anterior descending 30% mid lesion, proximal circumflex 50% lesion and the right coronary artery had a proximal 10% lesion. January 2015 stress test showed no major ischemia. Doubt ischemia is causing his shortness of breath since June 2016 stress test showed no ischemia with preserved ejection fraction .  He went down to the [facility] and had an extensive cardiovascular evaluation, I do not have those records but I can see through care everywhere he had a coronary angiogram December 2017 and per the records does not appear to be obstructive coronary artery disease causing his symptoms.     2. Essential hypertension -under good control currently.   3. (HFpEF) heart failure with preserved ejection fraction (H) -no significant signs or symptoms currently but apparently wedge was 32 mmHg done at [facility] and on Bumex 2 mg 3 times a day.  If symptoms do not improve and renal function permits will increase up to a total of 12 mg of Bumex daily.   4. Other emphysema (H) -significant COPD and defer to primary.   5. Hypercholesteremia -LDL 14 which is excellent.   6. NOVA on CPAP -nightly CPAP.   7. Atrial fibrillation (H) -asymptomatic, chronic, not valvular and on chronic anticoagulant therapy.   8. Pulmonary hypertension due to left ventricular diastolic dysfunction; WHO Group 2-mean 50, on levosemenden per research study at [facility] via a right-sided port.  They feel he has right heart dysfunction and we backed off on  "diltiazem from 180-120 mg.     Plan  1.  Continue trial of diltiazem 120 mg tablets.  2.  Call me in about 2 to 3 weeks to let me know how he is doing.  3.  If no better change Bumex to 3 mg 3 times a day.  4.  Follow-up in 3 months given all these issues.  5.  Check renal function today.    Subjective  CC: 79-year-old white gentleman being seen in follow-up today.  He is now in a research study at the [facility].  He still has significant shortness of breath, does have restless legs at night, no PND, orthopnea, chest discomfort, palpitations or any worsening peripheral edema.    Medications  Current Outpatient Medications   Medication Sig Note   ? albuterol (PROVENTIL HFA;VENTOLIN HFA) 90 mcg/actuation inhaler Inhale 1 puff every 4 (four) hours as needed. 1/26/2017: Taking bid   ? albuterol (PROVENTIL) 2.5 mg /3 mL (0.083 %) nebulizer solution Take 2.5 mg by nebulization every 4 (four) hours as needed (as needed).           ? amLODIPine (NORVASC) 10 MG tablet Take 1 tablet (10 mg total) by mouth daily. (Patient taking differently: Take 5 mg by mouth daily.       )    ? apixaban (ELIQUIS) 5 mg Tab Take 1 tablet by mouth every 12 (twelve) hours.    ? aspirin 81 MG EC tablet Take 81 mg by mouth daily.    ? atorvastatin (LIPITOR) 20 MG tablet Take 1 tablet by mouth daily.    ? azithromycin (ZITHROMAX) 250 MG tablet 4 tablets 1 hour prior to dental work    ? BD ULTRA-FINE MANISHA PEN NEEDLES 32 gauge x 5/32\" Ndle     ? brimonidine (ALPHAGAN) 0.2 % ophthalmic solution Administer 1 drop into the left eye 2 (two) times a day.    ? bumetanide (BUMEX) 1 MG tablet Take 1 tablet (1 mg total) by mouth 2 (two) times a day at 9am and 6pm. (Patient taking differently: Take 3 mg by mouth 2 (two) times a day at 9am and 6pm.       )    ? calcium-magnesium 300-300 mg Tab Take by mouth daily. CALCIUM MAGNESIUM ZINC 1000-400-15. TAKE TWICE DAILY.     ? cetirizine (ZYRTEC) 10 MG tablet Take 10 mg by mouth daily as needed.     ? cloNIDine " (CATAPRES-TTS) 0.2 mg/24 hr Place 1 patch on the skin once a week.    ? cycloSPORINE (RESTASIS) 0.05 % ophthalmic emulsion Administer 1 drop into the left eye every 12 (twelve) hours.     ? digoxin (LANOXIN) 125 mcg tablet Take 1 tablet (125 mcg total) by mouth daily.    ? diltiazem (CARDIZEM CD) 120 MG 24 hr capsule Take 1 capsule (120 mg total) by mouth daily.    ? esomeprazole (NEXIUM) 40 MG capsule Take 40 mg by mouth daily.     ? fluticasone-vilanterol (BREO ELLIPTA) 200-25 mcg/dose DsDv inhaler Inhale 1 puff daily.    ? HYDROcodone-acetaminophen 5-325 mg per tablet Take 1 tablet by mouth every 6 (six) hours as needed.     ? insulin glargine (LANTUS SOLOSTAR) 100 unit/mL (3 mL) pen Inject 36 Units under the skin daily.     ? irbesartan (AVAPRO) 300 MG tablet Take 300 mg by mouth daily.    ? ketorolac (ACULAR) 0.5 % ophthalmic solution Administer 1 drop into the left eye 4 (four) times a day.     ? KLOR-CON M20 20 mEq tablet TAKE ONE TABLET BY MOUTH ONE TIME DAILY     ? lidocaine (LIDODERM) 5 % Place 1 patch on the skin as needed. Remove & Discard patch within 12 hours or as directed by MD    ? metFORMIN (GLUCOPHAGE) 500 MG tablet Take 500 mg by mouth 2 (two) times a day with meals.     ? metoprolol succinate (TOPROL-XL) 200 MG 24 hr tablet Take 100 mg by mouth daily. 9/7/2018: Received from: [facility] Received Sig: Take 100 mg by mouth daily.   ? MINERAL OIL, LIGHT/MINERAL OIL (SOOTHE XP OPHT) Apply to eye 2 (two) times a day as needed.    ? mometasone-formoterol (DULERA) 100-5 mcg/actuation HFAA inhaler Inhale 2 puffs 2 (two) times a day.    ? nitroglycerin (NITROSTAT) 0.4 MG SL tablet Place 0.4 mg under the tongue every 5 (five) minutes as needed for chest pain.    ? OXYGEN-AIR DELIVERY SYSTEMS [facility] [place] Directed.    ? oxymetazoline (AFRIN) 0.05 % nasal spray 2 sprays into each nostril as needed for congestion.     ? potassium 99 mg Tab Take 1 tablet by mouth 2 (two) times a day.    ? prednisoLONE acetate  (PRED-FORTE) 1 % ophthalmic suspension Administer 1 drop to both eyes 4 (four) times a day.     ? rOPINIRole (REQUIP) 2 MG tablet Take 2 mg by mouth at bedtime.     ? sildenafil, antihypertensive, (REVATIO) 20 mg tablet Take 20 mg by mouth 3 (three) times a day.     ? TRESIBA FLEXTOUCH U-200 200 unit/mL (3 mL) InPn     ? ZINC ORAL Take 500 mg by mouth daily.    ? zolpidem (AMBIEN) 10 mg tablet Take 10 mg by mouth bedtime.        Objective  /64 (Patient Site: Right Arm, Patient Position: Sitting, Cuff Size: Adult Regular)   Pulse 66   Resp 18   Wt (!) 250 lb 2 oz (113.5 kg)   SpO2 91%   BMI 35.41 kg/m      General Appearance:    Alert, cooperative, no distress, appears stated age, moderately obese   Head:    Normocephalic, without obvious abnormality, atraumatic   Throat:   Lips, mucosa, and tongue normal; teeth and gums normal   Neck:   Supple, symmetrical, trachea midline, no adenopathy;        thyroid:  No enlargement/tenderness/nodules; no carotid    bruit or JVD   Back:     Symmetric, no curvature, ROM normal, no CVA tenderness   Lungs:     Clear to auscultation bilaterally, respirations unlabored   Chest wall:    No tenderness or deformity   Heart:    Regular rate and rhythm, S1 and S2 normal, no murmur, rub   or gallop   Abdomen:     Soft, non-tender, bowel sounds active all four quadrants,     no masses, no organomegaly   Extremities:   Normal, atraumatic, no cyanosis or edema   Pulses:   2+ and symmetric all extremities   Skin:   Skin color, texture, turgor normal, no rashes or lesions     Results    Lab Results personally reviewed   Lab Results   Component Value Date    CHOL 99 07/09/2019    CHOL 146 05/30/2018     Lab Results   Component Value Date    HDL 25 (L) 07/09/2019    HDL 30 (L) 05/30/2018     Lab Results   Component Value Date    LDLCALC 14 07/09/2019    LDLCALC  05/30/2018      Comment:      Invalid, Triglycerides >400     Lab Results   Component Value Date    TRIG 301 (H) 07/09/2019     TRIG 502 (H) 05/30/2018     Lab Results   Component Value Date    WBC 10.4 04/01/2013    HGB 14.7 06/05/2018    HCT 45.8 04/01/2013     04/01/2013     Lab Results   Component Value Date    CREATININE 1.52 (H) 05/31/2019    BUN 25 05/31/2019     05/31/2019    K 3.9 05/31/2019    CO2 26 05/31/2019     Review of Systems:   General: WNL  Eyes: WNL  Ears/Nose/Throat: WNL  Lungs: WNL  Heart: WNL  Stomach: WNL  Bladder: WNL  Muscle/Joints: WNL  Skin: WNL  Nervous System: WNL  Mental Health: WNL     Blood: WNL

## 2021-06-28 NOTE — PROGRESS NOTES
Progress Notes by Jamal Birmingham MD at 2/27/2020  2:50 PM     Author: Jamal Birmingham MD Service: -- Author Type: Physician    Filed: 3/3/2020  1:03 PM Encounter Date: 2/27/2020 Status: Signed    : Jamal Birmingham MD (Physician)       CARDIOLOGY Rapid Access CLINIC CONSULT NOTE     Assessment/Plan:   1. Heart failure with preserved ejection fraction, chronic exertional dyspnea.  Stable on extensive medical regimen.  2. Polyuria, nocturia, suggestive of prostate dysfunction.  This could be contributing to heart failure symptoms.  Advised follow-up with primary care provider for prostate exam  3. Severe pulmonary hypertension, on nocturnal oxygen only with CPAP  4. Essential hypertension on 2 drug regimen with reasonable control today  5. Chronic atrial fibrillation with controlled ventricular response on examination today    Follow up with Dr. French in 3 months     History of Present Illness:     It is my pleasure to see Red Sutherland at the [facility] clinic for evaluation of heart failure with preserved ejection fraction.    Red Sutherland is a 79 y.o. male with a past medical history of chronic atrial fibrillation, obstructive sleep apnea on CPAP, heart failure with preserved ejection fraction, and severe pulmonary hypertension.  He is on nocturnal oxygen only, with his CPAP device.  His diltiazem was recently discontinued and Bumex increased after right heart catheterization at [facility] showed severe pulmonary hypertension with decreased right ventricular function.  He follows with Dr. Mantilla from a pulmonary standpoint, who he states has not recommended continuous oxygen at this point.    With the increase in Bumex, he developed lower abdominal discomfort and painful urination mixed with blood.  This has improved as he move the dose of Bumex back to 2 mg twice daily.  He does report urinating about 10 times a day, and has not had any prostate  examination done for years by his recollection.  He now feels that he is back to baseline.    Continues on a TNX-levosimendan trial through [place], with weekly injections to report from his wife.    Past Medical History:     Patient Active Problem List   Diagnosis   ? Chronic bronchitis (H)   ? NOVA on CPAP   ? Essential hypertension   ? Persistent Atrial Fibrillation   ? Hypercholesteremia   ? Coronary artery disease involving native coronary artery without angina pectoris   ? (HFpEF) heart failure with preserved ejection fraction (H)   ? Obesity   ? Patient in clinical research study   ? Chronic obstructive pulmonary disease (H)   ? 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 BACK SURGERY      lower back   ? CARDIAC CATHETERIZATION  12/13/2017    Right and left at [place], mean PA 58, PCW 24 with V wave of 35, LVEDP of 18, with Nipride systemic BP, PVR and mean PA all declined   ? CARDIOVERSION  03/15/2013    for afib   ? CATARACT EXTRACTION Bilateral    ? mynor     ? CORONARY STENT PLACEMENT     ? SHOULDER SURGERY      reapir on right shoulder   ? TOTAL HIP ARTHROPLASTY Left    ? TOTAL KNEE ARTHROPLASTY Bilateral    ? WRIST SURGERY Bilateral        Family History:     Family History   Problem Relation Age of Onset   ? Hyperlipidemia Mother    ? Hypertension Mother    ? Heart disease Mother    ? Hyperlipidemia Father    ? Hypertension Father    ? Coronary artery disease Father    ? Stroke Brother    ? Depression Brother    ? No Medical Problems Sister    ? Pulmonary Hypertension Neg Hx    ? Congenital heart disease Neg Hx      Family history reviewed and is not pertinent to the chief complaint or presenting problem    Social History:    reports that he quit smoking about 23 years ago. His smoking use included cigarettes. He has a 66.00 pack-year smoking history. He has never used smokeless  "tobacco. He reports current alcohol use of about 1.0 standard drinks of alcohol per week. He reports that he does not use drugs.    Exercise: Dyspneic walking half a block    Sleep: Restorative on CPAP, though nocturia 4 times a night is typical    Meds:     Current Outpatient Medications on File Prior to Visit   Medication Sig Dispense Refill   ? albuterol (PROVENTIL HFA;VENTOLIN HFA) 90 mcg/actuation inhaler Inhale 1 puff every 4 (four) hours as needed.     ? albuterol (PROVENTIL) 2.5 mg /3 mL (0.083 %) nebulizer solution Take 2.5 mg by nebulization every 4 (four) hours as needed (as needed).            ? amLODIPine (NORVASC) 10 MG tablet Take 1 tablet (10 mg total) by mouth daily. (Patient taking differently: Take 5 mg by mouth daily.       ) 90 tablet 3   ? apixaban (ELIQUIS) 5 mg Tab Take 1 tablet by mouth every 12 (twelve) hours.     ? aspirin 81 MG EC tablet Take 81 mg by mouth daily.     ? atorvastatin (LIPITOR) 20 MG tablet Take 1 tablet by mouth daily.  4   ? azithromycin (ZITHROMAX) 250 MG tablet 4 tablets 1 hour prior to dental work     ? BD ULTRA-FINE MANISHA PEN NEEDLES 32 gauge x 5/32\" Ndle   4   ? brimonidine (ALPHAGAN) 0.2 % ophthalmic solution Administer 1 drop into the left eye 2 (two) times a day.  11   ? bumetanide (BUMEX) 1 MG tablet Take 1 tablet (1 mg total) by mouth 2 (two) times a day at 9am and 6pm. (Patient taking differently: Take 3 mg by mouth 2 (two) times a day at 9am and 6pm.       ) 180 tablet 1   ? calcium-magnesium 300-300 mg Tab Take by mouth daily. CALCIUM MAGNESIUM ZINC 1000-400-15. TAKE TWICE DAILY.      ? cetirizine (ZYRTEC) 10 MG tablet Take 10 mg by mouth daily as needed.      ? cloNIDine (CATAPRES-TTS) 0.2 mg/24 hr Place 1 patch on the skin once a week.     ? cycloSPORINE (RESTASIS) 0.05 % ophthalmic emulsion Administer 1 drop into the left eye every 12 (twelve) hours.      ? digoxin (LANOXIN) 125 mcg tablet Take 1 tablet (125 mcg total) by mouth daily. 90 tablet 3   ? " diltiazem (CARDIZEM CD) 120 MG 24 hr capsule Take 1 capsule (120 mg total) by mouth daily. 90 capsule 1   ? esomeprazole (NEXIUM) 40 MG capsule Take 40 mg by mouth daily.      ? fluticasone-vilanterol (BREO ELLIPTA) 200-25 mcg/dose DsDv inhaler Inhale 1 puff daily.     ? HYDROcodone-acetaminophen 5-325 mg per tablet Take 1 tablet by mouth every 6 (six) hours as needed.   0   ? insulin glargine (LANTUS SOLOSTAR) 100 unit/mL (3 mL) pen Inject 36 Units under the skin daily.      ? irbesartan (AVAPRO) 300 MG tablet Take 300 mg by mouth daily.     ? ketorolac (ACULAR) 0.5 % ophthalmic solution Administer 1 drop into the left eye 4 (four) times a day.   12   ? KLOR-CON M20 20 mEq tablet TAKE ONE TABLET BY MOUTH ONE TIME DAILY  90 tablet 1   ? lidocaine (LIDODERM) 5 % Place 1 patch on the skin as needed. Remove & Discard patch within 12 hours or as directed by MD     ? metFORMIN (GLUCOPHAGE) 500 MG tablet Take 500 mg by mouth 2 (two) times a day with meals.      ? metoprolol succinate (TOPROL-XL) 200 MG 24 hr tablet Take 100 mg by mouth daily.     ? MINERAL OIL, LIGHT/MINERAL OIL (SOOTHE XP OPHT) Apply to eye 2 (two) times a day as needed.     ? mometasone-formoterol (DULERA) 100-5 mcg/actuation HFAA inhaler Inhale 2 puffs 2 (two) times a day.     ? nitroglycerin (NITROSTAT) 0.4 MG SL tablet Place 0.4 mg under the tongue every 5 (five) minutes as needed for chest pain.     ? OXYGEN-AIR DELIVERY SYSTEMS [facility] Use As Directed.     ? oxymetazoline (AFRIN) 0.05 % nasal spray 2 sprays into each nostril as needed for congestion.      ? potassium 99 mg Tab Take 1 tablet by mouth 2 (two) times a day.     ? prednisoLONE acetate (PRED-FORTE) 1 % ophthalmic suspension Administer 1 drop to both eyes 4 (four) times a day.      ? rOPINIRole (REQUIP) 2 MG tablet Take 2 mg by mouth at bedtime.      ? sildenafil, antihypertensive, (REVATIO) 20 mg tablet Take 20 mg by mouth 3 (three) times a day.   3   ? TRESIBA FLEXTOUCH U-200 200 unit/mL  "(3 mL) InPn   11   ? ZINC ORAL Take 500 mg by mouth daily.     ? zolpidem (AMBIEN) 10 mg tablet Take 10 mg by mouth bedtime.       No current facility-administered medications on file prior to visit.        Allergies:   Furosemide; Hydrochlorothiazide; Iodinated contrast media; Losartan-hydrochlorothiazide; Metaxalone; Mometasone; Penicillins; Rabeprazole; Ramipril; and Shellfish containing products    Review of Systems:     General: WNL  Eyes: WNL  Ears/Nose/Throat: Nosebleeds  Lungs: Cough, Shortness of Breath  Heart: Shortness of Breath with activity, Leg Swelling  Stomach: WNL  Bladder: Frequent Urination at Night  Muscle/Joints: Joint Pain, Muscle Weakness  Skin: Rash  Nervous System: WNL  Mental Health: WNL     Blood: Easy Bruising        Objective:      Physical Exam  (!) 249 lb (112.9 kg)  5' 10.5\" (1.791 m)  Body mass index is 35.22 kg/m .  /60 (Patient Site: Right Arm, Patient Position: Sitting, Cuff Size: Adult Large)   Pulse 76   Resp 20   Ht 5' 10.5\" (1.791 m)   Wt (!) 249 lb (112.9 kg)   BMI 35.22 kg/m      General Appearance : Awake, Alert, No acute distress.  Dyspneic climbing up on exam table  HEENT: No Scleral icterus; the mucous membranes were pink and moist.  Conjunctivae not injected  Neck:  No cervical bruits, jugular venous distention, or thyromegaly appreciated  Chest: The spine was straight. Chest wall symmetric  Lungs: Respirations unlabored; few crackles at right base, otherwise  the lungs are clear to auscultation.  No wheezing   Cardiovascular: Irregularly irregular.  Right ventricular heave.  Auscultation reveals  first and second heart sounds with no rubs, or gallops.  2/6 systolic murmur right sternal border . carotid, radial, and dorsalis pedal pulses are intact and symmetric.    Abdomen: No organomegaly, masses, bruits, or tenderness. Bowels sounds are present  Extremities: Trace pretibial and thigh edema.  No presacral edema.  Skin: No xanthelasma. Warm, Dry.  " Lichenified  Musculoskeletal: No tenderness.  Neurologic: Alert and oriented ×3. Speech is fluent.          Cardiac Imaging Studies:  Right heart catheterization [facility] 11/2019:  1.  Severe pulmonary hypertension  2.  Heart Failure with preserved Ejection Fraction  HEMODYNAMICS SUMMARY  At baseline, moderately elevated right heart filling pressures (mRAP 15 mmHg) and mildly elevated left heart filling pressures (PCWP 17 mmHg) with severely elevated pulmonary arterial pressures (mPAP 50 mmHg).       With Feet up, right heart filling  pressures were severely elevated (mRAP 22 mmHg) and left heart filling pressures were severely elevated (PCWP 22 mmHg) with severely elevated pulmonary arterial pressures (mPAP 66 mmHg).        At peak exercise (25 W), right heart filling pressures  became severely elevated (mRAP 32 mmHg) and left heart filling pressures became severely elevated (PCWP 32 mmHg). Pulmonary arterial pressures became severely elevated (mPAP 79 mmHg). Cardiac output reserve was markedly abnormal and decreased by 1 L/min  with exercise instead of the predicted 1.4 L/min increase based on increases in metabolic demands with exercise.        There is evidence of both pre and post capillary pulmonary hypertension, which is severe and due to underlying HFpEF.    Pharmacologic nuclear stress test 5/2019:    The pharmacologic nuclear stress test is negative for inducible myocardial ischemia or infarction.    The left ventricular ejection fraction is 75%.    When compared to the images of 6/23/2016, there has been no significant change.    Echocardiogram 2/2016:  1. Normal left ventricular size and systolic performance. The ejection   fraction is estimated to be 55%.   2. There is mild aortic stenosis.   3. The left atrium is mild-moderately enlarged. The right atrium is mildly   enlarged.   4. Right ventricular systolic pressure relative to right atrial pressure   is mildly elevated. The pulmonary artery  pressure is estimated to be 40   mmHg plus right atrial pressure.    Lab Review   Lab Results   Component Value Date     01/30/2020    K 4.5 01/30/2020     01/30/2020    CO2 22 01/30/2020    BUN 28 01/30/2020    CREATININE 1.73 (H) 01/30/2020    CALCIUM 9.6 01/30/2020     Lab Results   Component Value Date    WBC 10.4 04/01/2013    HGB 14.7 06/05/2018    HCT 45.8 04/01/2013    MCV 91 04/01/2013     04/01/2013     Lab Results   Component Value Date    CHOL 99 07/09/2019    TRIG 301 (H) 07/09/2019    HDL 25 (L) 07/09/2019    LDLCALC 14 07/09/2019     Lab Results   Component Value Date    TROPONINI 0.01 04/05/2011     Lab Results   Component Value Date     (H) 10/17/2016     Lab Results   Component Value Date    TSH 2.55 05/31/2019       Jamal Birmingham MD [facility]      His note created using Dragon voice recognition software. Sound alike errors may have escaped editing.

## 2021-06-29 NOTE — PROGRESS NOTES
"Progress Notes by Stephania French MD at 6/4/2020  1:10 PM     Author: Stephania French MD Service: -- Author Type: Physician    Filed: 6/4/2020  2:42 PM Encounter Date: 6/4/2020 Status: Signed    : Stephania French MD (Physician)           The patient has been notified of following:     \"This telephone visit will be conducted via a call between you and your physician/provider. We have found that certain health care needs can be provided without the need for a physical exam.  This service lets us provide the care you need with a phone conversation.  If a prescription is necessary we can send it directly to your pharmacy.  If lab work is needed we can place an order for that and you can then stop by our lab to have the test done at a later time. If during the course of the call the physician/provider feels a telephone visit is not appropriate, you will not be charged for this service.\" Verbal consent has been obtained for this service by care team member:         HEART CARE PHONE ENCOUNTER        The patient has chosen to have the visit conducted as a telephone visit, to reduce risk of exposure given the current status of Coronavirus in our community. This telephone visit is being conducted via a call between the patient and physician/provider. Health care needs are being provided without a physical exam.     Assessment/Recommendations   Assessment:    1. Acute diastolic CHF (congestive heart failure) (H)-no significant signs or symptoms currently but apparently wedge was 9 mmHg done at [facility] following aggressive diuresis losing 12 to 14 kg.  On Bumex 1 mg 3 times a day. If symptoms do not improve and renal function permits will increase up as tolerated.    2. Coronary artery disease involving native coronary artery of native heart without angina pectoris  -angiography in 2011 showed distal left main 10% lesion, left anterior descending 30% mid lesion, proximal circumflex 50% lesion and the right " coronary artery had a proximal 10% lesion. January 2015 stress test showed no major ischemia. Doubt ischemia is causing his shortness of breath since June 2016 stress test showed no ischemia with preserved ejection fraction .  He went down to the [facility] and had an extensive cardiovascular evaluation, I do not have those records but I can see through care everywhere he had a coronary angiogram December 2017 and per the records does not appear to be obstructive coronary artery disease causing his symptoms.     3. Pulmonary hypertension due to left ventricular diastolic dysfunction; WHO Group 2-pressures 82/27 with a mean of 46, and PA saturation of only 57%.  No change of cardiac index with nitric oxide of 1.97, however pulmonic pressures decreased to 61/23 and was placed on tadalafil.  He is on levosemenden per research study at [facility] via a right-sided port.  They feel he has right heart dysfunction and still on diltiazem 240 mg.   4. Persistent atrial fibrillation  -asymptomatic, chronic, not valvular and on chronic anticoagulant therapy   5. NOVA on CPAP -nightly CPAP.   6. Obesity -he lost a fair amount of weight while at [facility] mostly fluid weight.   7. Hypercholesteremia -LDL excellent at 14 with a cholesterol of 99.   8. Essential hypertension -best I can tell under good control.   9. Other emphysema (H) -probable COPD and doing well.       Plan:  1.  Continue current medications as prescribed.    Follow Up Plan: Follow up in 6 months  I have reviewed the note as documented.  This accurately captures the substance of my conversation with the patient.    Total time of call between patient and provider was 11 minutes   Start Time: 1348  Stop Time: 1359       History of Present Illness/Subjective    Red Sutherland is a 79 y.o. male who is being evaluated via a billable telephone visit.    79-year-old gentleman with coronary disease, atrial fibrillation, COPD and pulmonary hypertension for  6-month follow-up.  Since I had seen him he was hospitalized for COPD acute exacerbation at [facility], and subsequently as part of research study went down to [facility].  Pulmonic and pressures were relatively high at 82/27 and he is supposed to start tadalafil.  He was diuresed approximately 14 kg.  He tells me his breathing is maybe 10% better.  He uses oxygen at nighttime.  There is no PND, orthopnea, syncope, dizziness or significant peripheral edema.    I have reviewed and updated the patient's Past Medical History, Social History, Family History and Medication List.     Physical Examination not performed given phone encounter Review of Systems      Review of Systems - History obtained from the patient  General ROS: negative  Psychological ROS: negative  Ophthalmic ROS: negative  ENT ROS: negative  Hematological and Lymphatic ROS: positive for - easy bleeding and easy bruising  Respiratory ROS: negative  Cardiovascular ROS: negative  Gastrointestinal ROS: negative  Genito-Urinary ROS: positive for - frequent urination at night   Musculoskeletal ROS: positive for - joint pain, muscle pain and muscular weakness  Neurological ROS: negative  Dermatological ROS: negative                                         Medical History  Surgical History Family History Social History   Past Medical History:   Diagnosis Date   ? Atrial fibrillation (H)    ? COPD (chronic obstructive pulmonary disease) (H)    ? Coronary artery disease    ? Diabetes mellitus (H)    ? Essential hypertension    ? Hyperlipidemia    ? Pulmonary hypertension due to left ventricular diastolic dysfunction; WHO Group 2 11/28/2017    Multifactorial per [facility] with elevated LVEDP and PCW, COPD and NOVA. They put him on sildenafil as nitroprusside lowered systemic BP and with that the mean PA dropped from 54 to 49. Negative VQ at [facility] Dec 1, 2017.   ? RLS (restless legs syndrome)    ? Sleep apnea     Past Surgical History:   Procedure Laterality Date   ?  BACK SURGERY      lower back   ? CARDIAC CATHETERIZATION  2017    Right and left at [facility], mean PA 58, PCW 24 with V wave of 35, LVEDP of 18, with Nipride systemic BP, PVR and mean PA all declined   ? CARDIOVERSION  03/15/2013    for afib   ? CATARACT EXTRACTION Bilateral    ? mynor     ? CORONARY STENT PLACEMENT     ? SHOULDER SURGERY      reapir on right shoulder   ? TOTAL HIP ARTHROPLASTY Left    ? TOTAL KNEE ARTHROPLASTY Bilateral    ? WRIST SURGERY Bilateral     Family History   Problem Relation Age of Onset   ? Hyperlipidemia Mother    ? Hypertension Mother    ? Heart disease Mother    ? Hyperlipidemia Father    ? Hypertension Father    ? Coronary artery disease Father    ? Stroke Brother    ? Depression Brother    ? No Medical Problems Sister    ? Pulmonary Hypertension Neg Hx    ? Congenital heart disease Neg Hx     Social History     Socioeconomic History   ? Marital status:      Spouse name: Kaley   ? Number of children: 4   ? Years of education: Not on file   ? Highest education level: Not on file   Occupational History   ? Occupation: [employer]     Employer: RETIRED   Social Needs   ? Financial resource strain: Not on file   ? Food insecurity     Worry: Not on file     Inability: Not on file   ? Transportation needs     Medical: Not on file     Non-medical: Not on file   Tobacco Use   ? Smoking status: Former Smoker     Packs/day: 1.50     Years: 44.00     Pack years: 66.00     Types: Cigarettes     Last attempt to quit: 1996     Years since quittin.1   ? Smokeless tobacco: Never Used   Substance and Sexual Activity   ? Alcohol use: Yes     Alcohol/week: 1.0 standard drinks     Types: 1 Cans of beer per week     Comment: 1 per month   ? Drug use: No   ? Sexual activity: Not Currently     Partners: Female   Lifestyle   ? Physical activity     Days per week: Not on file     Minutes per session: Not on file   ? Stress: Not on file   Relationships   ? Social connections      "Talks on phone: Not on file     Gets together: Not on file     Attends Scientologist service: Not on file     Active member of club or organization: Not on file     Attends meetings of clubs or organizations: Not on file     Relationship status: Not on file   ? Intimate partner violence     Fear of current or ex partner: Not on file     Emotionally abused: Not on file     Physically abused: Not on file     Forced sexual activity: Not on file   Other Topics Concern   ? Not on file   Social History Narrative   ? Not on file          Medications  Allergies   Current Outpatient Medications   Medication Sig Dispense Refill   ? albuterol (PROVENTIL HFA;VENTOLIN HFA) 90 mcg/actuation inhaler Inhale 2 puffs every 4 (four) hours as needed.      ? albuterol (PROVENTIL) 2.5 mg /3 mL (0.083 %) nebulizer solution Take 2.5 mg by nebulization every 4 (four) hours as needed (as needed).            ? apixaban (ELIQUIS) 5 mg Tab Take 1 tablet by mouth every 12 (twelve) hours.     ? aspirin 81 MG EC tablet Take 81 mg by mouth every morning.      ? atorvastatin (LIPITOR) 20 MG tablet Take 1 tablet by mouth daily.  4   ? azithromycin (ZITHROMAX) 250 MG tablet Take 250 mg by mouth see administration instructions. Take 4 tablets (1,000 mg) 1 hour prior to dental work     ? BD ULTRA-FINE MANISHA PEN NEEDLES 32 gauge x 5/32\" Ndle   4   ? brimonidine (ALPHAGAN) 0.2 % ophthalmic solution Administer 1 drop into the left eye 2 (two) times a day.  11   ? bumetanide (BUMEX) 1 MG tablet Take 1 tablet (1 mg total) by mouth 3 (three) times a day. Take 1 tablets three times a day as originally recommended by your cardiologist/PCP.  0   ? calcium-magnesium 300-300 mg Tab Take 1 tablet by mouth daily. CALCIUM MAGNESIUM ZINC 1000-400-15. TAKE TWICE DAILY.      ? canagliflozin (INVOKANA) 100 mg Tab Take 100 mg by mouth.     ? cetirizine (ZYRTEC) 10 MG tablet Take 10 mg by mouth daily.      ? cloNIDine (CATAPRES-TTS) 0.2 mg/24 hr Place 1 patch on the skin once a " week. Place patch on Monday.     ? digoxin (LANOXIN) 125 mcg tablet Take 1 tablet (125 mcg total) by mouth daily. 90 tablet 3   ? diltiazem (CARDIZEM CD) 240 MG 24 hr capsule Take 240 mg by mouth daily.     ? eplerenone (INSPRA) 25 MG tablet Take 25 mg by mouth.     ? esomeprazole (NEXIUM) 40 MG capsule Take 40 mg by mouth daily.      ? fluticasone-vilanterol (BREO ELLIPTA) 200-25 mcg/dose DsDv inhaler Inhale 1 puff daily.     ? HYDROcodone-acetaminophen 5-325 mg per tablet Take 1 tablet by mouth every 4 (four) hours as needed.   0   ? insulin detemir U-100 (LEVEMIR) 100 unit/mL injection Inject 43 Units under the skin 2 (two) times a day.     ? ipratropium-albuteroL (DUO-NEB) 0.5-2.5 mg/3 mL nebulizer Inhale 3 mL as needed.     ? irbesartan (AVAPRO) 300 MG tablet Take 300 mg by mouth daily.     ? ketorolac (ACULAR) 0.5 % ophthalmic solution Administer 1 drop into the left eye 2 (two) times a day.   12   ? lidocaine (LIDODERM) 5 % Place 1 patch on the skin as needed. Remove & Discard patch within 12 hours or as directed by MD     ? metFORMIN (GLUCOPHAGE) 500 MG tablet Take 500 mg by mouth 2 (two) times a day with meals.      ? metOLazone (ZAROXOLYN) 2.5 MG tablet as needed.     ? metoprolol succinate (TOPROL-XL) 200 MG 24 hr tablet Take 200 mg by mouth daily.      ? mometasone (ELOCON) 0.1 % cream Apply 1 application topically daily as needed.     ? nitroglycerin (NITROSTAT) 0.4 MG SL tablet Place 0.4 mg under the tongue every 5 (five) minutes as needed for chest pain.     ? omeprazole (PRILOSEC) 20 MG capsule Take 20 mg by mouth daily as needed.     ? OXYGEN-AIR DELIVERY SYSTEMS [facility] Use As Directed.     ? potassium chloride (K-DUR,KLOR-CON) 20 MEQ tablet Take 20 mEq by mouth 2 (two) times a day.     ? prednisoLONE acetate (PRED-FORTE) 1 % ophthalmic suspension Administer 2 drops into the left eye 2 (two) times a day.      ? rOPINIRole (REQUIP) 2 MG tablet Take 2 mg by mouth at bedtime.      ? STUDY DRUG FROM  OUTSIDE FACILITY Infuse into a venous catheter once a week. Research IRB [id number] levosimendan or placebo (Simdax) infusion (indicated for pulmonary HTN, CHF).  24 hour continuous infusion once weekly on Wednesdays.     ? tadalafiL (CIALIS) 10 MG tablet Take 10 mg by mouth.     ? turmeric root extract 500 mg cap Take 1 capsule by mouth daily.     ? zolpidem (AMBIEN) 10 mg tablet Take 10 mg by mouth bedtime.       No current facility-administered medications for this visit.     Allergies   Allergen Reactions   ? Furosemide      Previously tolerated.   ? Hydrochlorothiazide    ? Iodinated Contrast Media Nausea Only   ? Losartan-Hydrochlorothiazide    ? Metaxalone    ? Mometasone Other (See Comments)     Bloody nose   ? Penicillins    ? Rabeprazole    ? Ramipril    ? Shellfish Containing Products          Lab Results    Chemistry/lipid CBC Cardiac Enzymes/BNP/TSH/INR   Lab Results   Component Value Date    CHOL 99 07/09/2019    HDL 25 (L) 07/09/2019    LDLCALC 14 07/09/2019    TRIG 301 (H) 07/09/2019    CREATININE 1.49 (H) 05/28/2020    BUN 30 (H) 05/28/2020    K 4.5 05/28/2020     05/28/2020     05/28/2020    CO2 26 05/28/2020    Lab Results   Component Value Date    WBC 8.6 04/09/2020    HGB 11.3 (L) 04/09/2020    HCT 37.3 (L) 04/09/2020    MCV 75 (L) 04/09/2020     04/09/2020    Lab Results   Component Value Date    TROPONINI <0.01 04/09/2020     (H) 04/09/2020    TSH 2.55 05/31/2019    INR 1.16 (H) 04/01/2020        Stephania French

## 2021-06-30 NOTE — PROGRESS NOTES
Progress Notes by Stephania French MD at 2/5/2021  2:50 PM     Author: Stephania French MD Service: -- Author Type: Physician    Filed: 2/5/2021  4:03 PM Encounter Date: 2/5/2021 Status: Signed    : Stephania French MD (Physician)           [facility] Follow-up Note    Assessment & Plan        1. Coronary artery disease involving native coronary artery of native heart without angina pectoris -recent angiography at [facility] February 2, 2021 secondary non-ST segment elevation MI postoperative femoral endarterectomy showed left main 20% lesion distal, proximal LAD 20% lesion with a mid 40% lesion.  Circumflex with a proximal 30% lesion with a distal 70% lesion that was treated with atherectomy and has a residual 50% lesion.  Right coronary is a mid 50% lesion.  This was felt to main in-stent restenosis and he is planning on going back down there for possibly further revascularization.   2. Acute on chronic diastolic congestive heart failure (H)-no significant signs or symptoms currently, on numerous diuretics to help with this.   3. Essential hypertension-under good control currently.   4. Hypercholesteremia-cholesterol June 2020 was 143 with an LDL of 61 which is excellent.   5. Obesity-work on weight loss.   6. NOVA on CPAP-so noted with oxygen.   7. Atrial fibrillation (H)  -asymptomatic, chronic, not valvular and on chronic anticoagulant therapy with Eliquis 5 mg p.o. twice daily.   8. Pulmonary hypertension due to left ventricular diastolic dysfunction; WHO Group 2-pressures 82/27 with a mean of 46, and PA saturation of only 57%.  No change of cardiac index with nitric oxide of 1.97, however pulmonic pressures decreased to 61/23 and was placed on tadalafil which has been switched over to revatio.  He was on levosemenden per research study at [facility] via a right-sided port which has been discontinued.  They feel he has right heart dysfunction and still on diltiazem but  now short acting as well as the eplerenone.    9. Peripheral vascular disease (H)-due to significant plaque involving the left common femoral artery he underwent femoral endarterectomy, claudication is somewhat improved although he now hassome right lower extremity symptoms and this may be addressed as an outpatient.   10.  Shortness of breath-despite all of the above still extremely winded, really no improvement in his underlying symptoms.    Plan  1 defer medical care to cardiology service for revascularization at [facility].  2.  Defer pulmonary hypertension therapy to pulmonary hypertension service at [facility], WHO class II.  3.. Defer shortness of breath work-up to [facility], numerous interventions by myself did not improve this.  4.  Follow-up with me thereafter.    Subjective  CC: 80-year-old white gentleman being seen in person.  Since have seen him he was down at [facility] for endarterectomy of the left artery.  He apparently had postop non-ST segment elevation MI with troponin elevation.  This is felt to be due to prior restenosis of stent in the circumflex which was intervened upon but not completely.  Due to this apparently he is being sent back down to [place] for further evaluation.  He comes in tell me his shortness of breath is still chronic and really not improved.  He is using oxygen at nighttime.  He tells me that he does have generalized fatigue.  There is no chest discomfort, palpitations, PND, orthopnea or significant peripheral edema.  Tells me his claudication is somewhat improved although he is really not walking much due to all of the above.    Medications  Current Outpatient Medications   Medication Sig   ? acetaminophen (TYLENOL) 500 MG tablet Take 1,000 mg by mouth every 6 (six) hours as needed. First line of pain management. Do Not take more than 4,000 mg in 24 hours.   ? albuterol (PROVENTIL HFA;VENTOLIN HFA) 90 mcg/actuation inhaler Inhale 2 puffs every 4 (four) hours as needed.   "  ? albuterol (PROVENTIL) 2.5 mg /3 mL (0.083 %) nebulizer solution Take 2.5 mg by nebulization every 4 (four) hours as needed (as needed).          ? apixaban (ELIQUIS) 5 mg Tab Take 1 tablet by mouth every 12 (twelve) hours.   ? atorvastatin (LIPITOR) 40 MG tablet Take 40 mg by mouth daily.    ? azithromycin (ZITHROMAX) 250 MG tablet Take 250 mg by mouth see administration instructions. Take 4 tablets (1,000 mg) 1 hour prior to dental work   ? BD ULTRA-FINE MANISHA PEN NEEDLES 32 gauge x 5/32\" Ndle    ? brimonidine (ALPHAGAN) 0.2 % ophthalmic solution Administer 1 drop into the left eye 2 (two) times a day.   ? bumetanide (BUMEX) 1 MG tablet Take 1 tablet (1 mg total) by mouth 3 (three) times a day. Take 1 tablets three times a day as originally recommended by your cardiologist/PCP. (Patient taking differently: Take 1 mg by mouth 2 (two) times a day at 9am and 6pm. Take 4 tabs)   ? calcium-magnesium 300-300 mg Tab Take 1 tablet by mouth daily. CALCIUM MAGNESIUM ZINC 1000-400-15. TAKE TWICE DAILY.    ? canagliflozin (INVOKANA) 100 mg Tab Take 100 mg by mouth.   ? cefdinir (OMNICEF) 300 MG capsule Take 1 capsule by mouth 2 (two) times a day. Take one capsule every 12 hours.   ? cetirizine (ZYRTEC) 10 MG tablet Take 10 mg by mouth daily.    ? cloNIDine (CATAPRES-TTS) 0.2 mg/24 hr Place 1 patch on the skin once a week. Place patch on Monday.   ? digoxin (LANOXIN) 125 mcg tablet Take 1 tablet (125 mcg total) by mouth daily.   ? eplerenone (INSPRA) 25 MG tablet Take 25 mg by mouth.   ? fluticasone-vilanterol (BREO ELLIPTA) 200-25 mcg/dose DsDv inhaler Inhale 1 puff daily.   ? HYDROcodone-acetaminophen 5-325 mg per tablet Take 1 tablet by mouth every 4 (four) hours as needed.    ? ipratropium-albuteroL (DUO-NEB) 0.5-2.5 mg/3 mL nebulizer Inhale 3 mL as needed.   ? ketorolac (ACULAR) 0.5 % ophthalmic solution Administer 1 drop into the left eye 2 (two) times a day.    ? lidocaine (LIDODERM) 5 % Place 1 patch on the skin as " needed. Remove & Discard patch within 12 hours or as directed by MD   ? metOLazone (ZAROXOLYN) 2.5 MG tablet 2.5 mg daily.    ? metoprolol tartrate (LOPRESSOR) 50 MG tablet Take 1 tablet by mouth 2 (two) times a day.   ? mometasone (ELOCON) 0.1 % cream Apply 1 application topically daily as needed.   ? nitroglycerin (NITROSTAT) 0.4 MG SL tablet Place 0.4 mg under the tongue every 5 (five) minutes as needed for chest pain.   ? omeprazole (PRILOSEC) 20 MG capsule Take 20 mg by mouth daily as needed.   ? OXYGEN-AIR DELIVERY SYSTEMS [facility] [place] [place] Use As Directed.   ? pantoprazole (PROTONIX) 40 MG tablet Take 40 mg by mouth daily before breakfast.   ? prednisoLONE acetate (PRED-FORTE) 1 % ophthalmic suspension Administer 2 drops into the left eye 2 (two) times a day.    ? rOPINIRole (REQUIP) 2 MG tablet Take 2 mg by mouth at bedtime.    ? sildenafil (REVATIO) 20 mg tablet Take 20 mg by mouth 3 (three) times a day.   ? zolpidem (AMBIEN) 10 mg tablet Take 10 mg by mouth bedtime.   ? ACCU-CHEK JOSE PLUS TEST STRP strips see administration instructions.   ? aspirin 81 MG EC tablet Take 81 mg by mouth every morning.    ? clopidogreL (PLAVIX) 75 mg tablet Take 1 tablet by mouth daily.   ? esomeprazole (NEXIUM) 40 MG capsule Take 40 mg by mouth daily.    ? insulin detemir U-100 (LEVEMIR) 100 unit/mL injection Inject 43 Units under the skin 2 (two) times a day.   ? metFORMIN (GLUCOPHAGE) 500 MG tablet Take 1,000 mg by mouth 2 (two) times a day with meals.    ? potassium chloride (K-DUR,KLOR-CON) 20 MEQ tablet Take 20 mEq by mouth 3 (three) times a day.        Objective  /52 (Patient Site: Left Arm, Patient Position: Sitting, Cuff Size: Adult Regular)   Pulse (!) 59   Resp 16   Ht 6' (1.829 m)   Wt (!) 236 lb (107 kg) Comment: With shoes.  SpO2 95%   BMI 32.01 kg/m      General Appearance:    Alert, cooperative, no distress, appears stated age, mildly obese   Head:    Normocephalic, without obvious abnormality, atraumatic    Throat:   Lips, mucosa, and tongue normal; teeth and gums normal   Neck:   Supple, symmetrical, trachea midline, no adenopathy;        thyroid:  No enlargement/tenderness/nodules; no carotid    bruit or JVD   Back:     Symmetric, no curvature, ROM normal, no CVA tenderness   Lungs:     Clear to auscultation bilaterally, respirations unlabored   Chest wall:    No tenderness or deformity   Heart:    Regular rate and rhythm, S1 and S2 normal, no murmur, rub   or gallop   Abdomen:     Soft, non-tender, bowel sounds active all four quadrants,     no masses, no organomegaly   Extremities:   Normal, atraumatic, no cyanosis or edema   Pulses:   2+ and symmetric all upper extremities, 1+ left and 0 lower extremities   Skin:   Skin color, texture, turgor normal, no rashes or lesions     Results    Lab Results personally reviewed   Lab Results   Component Value Date    CHOL 136 09/02/2020    CHOL 99 07/09/2019     Lab Results   Component Value Date    HDL 28 (L) 09/02/2020    HDL 25 (L) 07/09/2019     Lab Results   Component Value Date    LDLCALC  09/02/2020      Comment:      Invalid, Triglycerides >400    LDLCALC 14 07/09/2019     Lab Results   Component Value Date    TRIG 563 (H) 09/02/2020    TRIG 301 (H) 07/09/2019     Lab Results   Component Value Date    WBC 8.6 04/09/2020    HGB 11.3 (L) 04/09/2020    HCT 37.3 (L) 04/09/2020     04/09/2020     Lab Results   Component Value Date    CREATININE 0.75 12/21/2020    BUN 13 12/21/2020     12/21/2020    K 4.1 12/21/2020    CO2 28 12/21/2020     Review of Systems:   General: WNL  Eyes: Visual Distubance  Ears/Nose/Throat: WNL  Lungs: WNL  Heart: Irregular Heartbeat  Stomach: WNL  Bladder: WNL  Muscle/Joints: WNL  Skin: WNL  Nervous System: WNL  Mental Health: WNL     Blood: WNL

## 2021-07-03 ENCOUNTER — HOSPITAL ENCOUNTER (EMERGENCY)
Dept: EMERGENCY MEDICINE | Facility: CLINIC | Age: 81
Discharge: HOME | End: 2021-07-04
Attending: EMERGENCY MEDICINE
Payer: COMMERCIAL

## 2021-07-03 DIAGNOSIS — I83.899 BLEEDING FROM VARICOSE VEIN: ICD-10-CM

## 2021-07-04 NOTE — ED TRIAGE NOTES
"ED Triage Notes by Rhina Freire RN at 7/3/2021 10:01 PM     Author: Rhina Freire RN Service: -- Author Type: Registered Nurse    Filed: 7/3/2021 10:02 PM Date of Service: 7/3/2021 10:01 PM Status: Signed    : Rhina Freire RN (Registered Nurse)       Patient was getting out of the shower this evening and a \"blister\" opened up on his right shin and reports it will not stop bleeding. Patient is on eliquis. Leg wrapped with towel and tape in triage. Bleeding controlled at this time.        "

## 2021-07-04 NOTE — ED PROVIDER NOTES
"ED Provider Notes by Sebastián Eastman MD at 7/4/2021 12:12 AM     Author: Sebastián Eastman MD Service: Emergency Medicine Author Type: Physician    Filed: 7/4/2021  3:43 AM Date of Service: 7/4/2021 12:12 AM Status: Signed    : Sebastián Eastman MD (Physician)       ED CONSULTATION  Date/Time:7/4/2021 12:13 AM    I am seeing this patient along with SETH Anthony.  Sebastián LAGUNAS have reviewed the documentation, personally taken the patient's history, performed an exam and agree with the physical finds, diagnosis and management plan.    HPI: Red Sutherland is a 80 y.o. male, history of HTN, HLD, obesity, DM II, NSTEMI, who presents to the Emergency Department for evaluation of right lower extremity bleeding. Patient reports a \"blood vessel popped\" in his right lower leg at 9:00 PM (2 hours ago), but feels no associated pain. He wrapped the area in a towel to apply pressure, however the bleeding has continued. Patient is on Eliquis. Patient reports no new numbness or tingling in his right leg. Patient denies dizziness, lightheadedness, chest pain, shortness of breath.    I, Mel Corral, am serving as a scribe to document services personally performed by Sebastián Eastman MD, based on my observation and the provider's statements to me. ISebastián MD attest that Mel Corral is acting in a scribe capacity, has observed my performance of the services and has documented them in accordance with my direction.      Physical Exam:/60 (Patient Position: Sitting)   Pulse 85   Temp 97.9  F (36.6  C) (Oral)   Resp 22   Wt (!) 237 lb (107.5 kg)   SpO2 97%   BMI 33.05 kg/m    Constitutional:  Well developed, Well nourished  HENT:  Normocephalic, Atraumatic, Bilateral external ears normal, Oropharynx moist, No oral exudates, Nose normal. Neck- Normal range of motion   Eyes: Conjunctiva normal, No discharge.   Respiratory:  Normal breath sounds, No respiratory distress, No " wheezing  Cardiovascular:  Normal heart rate, Normal rhythm.   GI:  Soft, No tenderness, No masses, No flank tenderness.   Musculoskeletal: Multiple varicosities on both legs.  No obvious bleeding noted on my exam.  Full ROM  Integument:  Warm, Dry, No erythema, No rash.    Neurologic:  Alert & oriented.  No focal deficits appreciated  Psychiatric:  Affect normal, Judgment normal, Mood normal.     ED Course:   12:46 AM I introduced myself to patient. We discussed findings and discharge. Patient is agreeable to plan.    MDM:80 y.o. presents with bleeding from what appears to be a varicosity.  Bleeding stopped here.  On Eliquis.  Had some leg pain.  Ultrasound is negative.  Discharged home.         1. Bleeding from varicose vein        No results found for this visit on 07/03/21.  No results found.     New Prescriptions    No medications on file       Final disposition will be per the depending diagnostic studies and patient's clinical trajectory.    This is an accurate record of my words and actions during this visit as documented by the scribe.          Sebastián Eastman MD  07/04/21 0341

## 2021-07-04 NOTE — ED PROVIDER NOTES
ED Provider Notes by Madison Gaming PA-C at 7/3/2021 11:01 PM     Author: Madison Gaming PA-C Service: Emergency Medicine Author Type: Physician Assistant    Filed: 7/4/2021 12:11 AM Date of Service: 7/3/2021 11:01 PM Status: Signed    : Madison Gaming PA-C (Physician Assistant)       EMERGENCY DEPARTMENT ENCOUNTER      NAME: Red Sutherland  AGE: 80 y.o. male  YOB: 1940  MRN: [id number]  EVALUATION DATE & TIME: 7/3/2021 10:48 PM    PCP: Shun Zhou MD    ED PROVIDER: Madison Gaming PA-C      No chief complaint on file.        FINAL IMPRESSION:  1. Bleeding from varicose vein          ED COURSE & MEDICAL DECISION MAKING:    Pertinent Labs & Imaging studies reviewed. (See chart for details)    80 y.o. male presents to the Emergency Department for evaluation of bleeding.    Physical exam is remarkable for a generally well-appearing male who is in no acute distress.  He has multiple varicose veins on his right lower extremity, there is 1 punctate area on the right lateral lower leg that appears to have been the source of the bleeding.  It is not currently bleeding at the time of evaluation.  There is no significant calf swelling, tenderness to palpation, erythema, or ecchymosis.  He has good distal sensation and a strong posterior tibialis pulse.  Heart and lung sounds clear diffusely throughout.  Vital signs are stable and he is afebrile.    Patient did endorse some pain in his right leg so an ultrasound of the leg was obtained.  This was pending at the time of signout.     ED Course   10:57 PM Performed my initial history and physical exam, discussed ED course and treatment. PPE worn including surgical mask and gloves.  11:16 PM I rechecked and updated the patient who states that he is now experiencing right leg pain. Plan for ultrasound.  12:05 AM Care signed out to Dr. Eastman for final disposition pending US of the right leg.       At the conclusion of  "the encounter I discussed the results of all of the tests and the disposition. The questions were answered. The patient or family acknowledged understanding and was agreeable with the care plan.     Voice recognition software was used in the creation of this note. Any grammatical or nonsensical errors are due to inherent errors with the software and are not the intention of the writer.     MEDICATIONS GIVEN IN THE EMERGENCY:  Medications - No data to display    NEW PRESCRIPTIONS STARTED AT TODAY'S ER VISIT  Current Discharge Medication List      CONTINUE these medications which have NOT CHANGED    Details   ACCU-CHEK JOSE PLUS TEST STRP strips see administration instructions.      acetaminophen (TYLENOL) 500 MG tablet Take 1,000 mg by mouth every 6 (six) hours as needed. First line of pain management. Do Not take more than 4,000 mg in 24 hours.      albuterol (PROVENTIL HFA;VENTOLIN HFA) 90 mcg/actuation inhaler Inhale 2 puffs every 4 (four) hours as needed.       albuterol (PROVENTIL) 2.5 mg /3 mL (0.083 %) nebulizer solution Take 2.5 mg by nebulization every 4 (four) hours as needed (as needed).             apixaban (ELIQUIS) 5 mg Tab Take 1 tablet by mouth every 12 (twelve) hours.      aspirin 81 MG EC tablet Take 81 mg by mouth every morning.       atorvastatin (LIPITOR) 40 MG tablet Take 40 mg by mouth daily.   Refills: 4      azithromycin (ZITHROMAX) 250 MG tablet Take 250 mg by mouth see administration instructions. Take 4 tablets (1,000 mg) 1 hour prior to dental work      BD ULTRA-FINE MANISHA PEN NEEDLES 32 gauge x 5/32\" Ndle Refills: 4      brimonidine (ALPHAGAN) 0.2 % ophthalmic solution Administer 1 drop into the left eye 2 (two) times a day.  Refills: 11      bumetanide (BUMEX) 1 MG tablet Take 1 tablet (1 mg total) by mouth 3 (three) times a day. Take 1 tablets three times a day as originally recommended by your cardiologist/PCP.  Qty:  , Refills: 0    Associated Diagnoses: Acute on chronic congestive " heart failure, unspecified heart failure type (H)      calcium-magnesium 300-300 mg Tab Take 1 tablet by mouth daily. CALCIUM MAGNESIUM ZINC 1000-400-15. TAKE TWICE DAILY.       canagliflozin (INVOKANA) 100 mg Tab Take 100 mg by mouth.      cefdinir (OMNICEF) 300 MG capsule Take 1 capsule by mouth 2 (two) times a day. Take one capsule every 12 hours.      cetirizine (ZYRTEC) 10 MG tablet Take 10 mg by mouth daily.       cloNIDine (CATAPRES-TTS) 0.2 mg/24 hr Place 1 patch on the skin once a week. Place patch on Monday.      clopidogreL (PLAVIX) 75 mg tablet Take 1 tablet by mouth daily.      digoxin (LANOXIN) 125 mcg tablet Take 1 tablet (125 mcg total) by mouth daily.  Qty: 90 tablet, Refills: 3    Associated Diagnoses: Persistent atrial fibrillation (H)      eplerenone (INSPRA) 25 MG tablet Take 25 mg by mouth.      esomeprazole (NEXIUM) 40 MG capsule Take 40 mg by mouth daily.       fluticasone-vilanterol (BREO ELLIPTA) 200-25 mcg/dose DsDv inhaler Inhale 1 puff daily.      HYDROcodone-acetaminophen 5-325 mg per tablet Take 1 tablet by mouth every 4 (four) hours as needed.   Refills: 0      insulin detemir U-100 (LEVEMIR) 100 unit/mL injection Inject 43 Units under the skin 2 (two) times a day.      ipratropium-albuteroL (DUO-NEB) 0.5-2.5 mg/3 mL nebulizer Inhale 3 mL as needed.      ketorolac (ACULAR) 0.5 % ophthalmic solution Administer 1 drop into the left eye 2 (two) times a day.   Refills: 12      lidocaine (LIDODERM) 5 % Place 1 patch on the skin as needed. Remove & Discard patch within 12 hours or as directed by MD      metFORMIN (GLUCOPHAGE) 500 MG tablet Take 1,000 mg by mouth 2 (two) times a day with meals.       metOLazone (ZAROXOLYN) 2.5 MG tablet 2.5 mg daily.       metoprolol tartrate (LOPRESSOR) 50 MG tablet Take 1 tablet by mouth 2 (two) times a day.      mometasone (ELOCON) 0.1 % cream Apply 1 application topically daily as needed.      nitroglycerin (NITROSTAT) 0.4 MG SL tablet Place 0.4 mg under  "the tongue every 5 (five) minutes as needed for chest pain.      omeprazole (PRILOSEC) 20 MG capsule Take 20 mg by mouth daily as needed.      OXYGEN[place]IV[place] [facility] [place].      pantoprazole (PROTONIX) 40 MG tablet Take 40 mg by mouth daily before breakfast.      potassium chloride (K-DUR,KLOR-CON) 20 MEQ tablet Take 20 mEq by mouth 3 (three) times a day.       prednisoLONE acetate (PRED-FORTE) 1 % ophthalmic suspension Administer 2 drops into the left eye 2 (two) times a day.       rOPINIRole (REQUIP) 2 MG tablet Take 2 mg by mouth at bedtime.       sildenafil (REVATIO) 20 mg tablet Take 20 mg by mouth 3 (three) times a day.      zolpidem (AMBIEN) 10 mg tablet Take 10 mg by mouth bedtime.                =================================================================    HPI    Patient information was obtained from: patient    Use of Intrepreter: N/A        Red Sutherland is a 80 y.o. male with a pertinent history of hypertension, hypercholesteremia, obesity, anticoagulant therapy, diabetes mellitus type 2, and NSTEMI who presents to this ED by private vehicle with his wife for evaluation of right lower leg bleeding.    Patient reports a \"blood vessel popped\" in his right lower leg at 9:00 PM (2 hours ago), but feels no associated pain. He wrapped the area in a towel to apply pressure, however the bleeding has continued. Patient is on Eliquis. Patient reports no new numbness or tingling in his right leg. Patient denies dizziness, lightheadedness, chest pain, shortness of breath.        REVIEW OF SYSTEMS   Respiratory: No reported SOB  Cardiovascular:  No reported CP  Musculoskeletal:  No reported new muscle/joint pain   Skin: Positive of wound (lower right leg)  Neurologic: Negative for lightheadedness, dizziness    All other systems reviewed and are negative unless noted in HPI.    PAST MEDICAL HISTORY:  Past Medical History:   Diagnosis Date   ? Atrial fibrillation (H)    ? CHF (congestive " heart failure) (H)    ? COPD (chronic obstructive pulmonary disease) (H)    ? Coronary artery disease    ? Diabetes mellitus (H)    ? Essential hypertension    ? Hyperlipidemia    ? Pulmonary hypertension (H)     O2 at night   ? Pulmonary hypertension due to left ventricular diastolic dysfunction; WHO Group 2 11/28/2017    Multifactorial per [facility] with elevated LVEDP and PCW, COPD and NOVA. They put him on sildenafil as nitroprusside lowered systemic BP and with that the mean PA dropped from 54 to 49. Negative VQ at [facility] Dec 1, 2017.   ? RLS (restless legs syndrome)    ? Sleep apnea        PAST SURGICAL HISTORY:  Past Surgical History:   Procedure Laterality Date   ? BACK SURGERY      lower back   ? CARDIAC CATHETERIZATION  12/13/2017    Right and left at [facility], mean PA 58, PCW 24 with V wave of 35, LVEDP of 18, with Nipride systemic BP, PVR and mean PA all declined   ? CARDIOVERSION  03/15/2013    for afib   ? CATARACT EXTRACTION Bilateral    ? mynor     ? CORONARY STENT PLACEMENT     ? GA COLONOSCOPY FLX DX W/COLLJ SPEC WHEN PFRMD N/A 1/14/2021    Procedure: COLONOSCOPY;  Surgeon: Mihai Harris MD;  Location: [facility];  Service: Gastroenterology   ? SHOULDER SURGERY      reapir on right shoulder   ? TOTAL HIP ARTHROPLASTY Left    ? TOTAL KNEE ARTHROPLASTY Bilateral    ? WRIST SURGERY Bilateral        CURRENT MEDICATIONS:    No current facility-administered medications on file prior to encounter.      Current Outpatient Medications on File Prior to Encounter   Medication Sig   ? ACCU-CHEK JOSE PLUS TEST STRP strips see administration instructions.   ? acetaminophen (TYLENOL) 500 MG tablet Take 1,000 mg by mouth every 6 (six) hours as needed. First line of pain management. Do Not take more than 4,000 mg in 24 hours.   ? albuterol (PROVENTIL HFA;VENTOLIN HFA) 90 mcg/actuation inhaler Inhale 2 puffs every 4 (four) hours as needed.    ? albuterol (PROVENTIL) 2.5 mg /3 mL (0.083 %) nebulizer solution Take 2.5 mg  "by nebulization every 4 (four) hours as needed (as needed).          ? apixaban (ELIQUIS) 5 mg Tab Take 1 tablet by mouth every 12 (twelve) hours.   ? aspirin 81 MG EC tablet Take 81 mg by mouth every morning.    ? atorvastatin (LIPITOR) 40 MG tablet Take 40 mg by mouth daily.    ? azithromycin (ZITHROMAX) 250 MG tablet Take 250 mg by mouth see administration instructions. Take 4 tablets (1,000 mg) 1 hour prior to dental work   ? BD ULTRA-FINE MANISHA PEN NEEDLES 32 gauge x 5/32\" Ndle    ? brimonidine (ALPHAGAN) 0.2 % ophthalmic solution Administer 1 drop into the left eye 2 (two) times a day.   ? bumetanide (BUMEX) 1 MG tablet Take 1 tablet (1 mg total) by mouth 3 (three) times a day. Take 1 tablets three times a day as originally recommended by your cardiologist/PCP. (Patient taking differently: Take 1 mg by mouth 2 (two) times a day at 9am and 6pm. Take 4 tabs)   ? calcium-magnesium 300-300 mg Tab Take 1 tablet by mouth daily. CALCIUM MAGNESIUM ZINC 1000-400-15. TAKE TWICE DAILY.    ? canagliflozin (INVOKANA) 100 mg Tab Take 100 mg by mouth.   ? cefdinir (OMNICEF) 300 MG capsule Take 1 capsule by mouth 2 (two) times a day. Take one capsule every 12 hours.   ? cetirizine (ZYRTEC) 10 MG tablet Take 10 mg by mouth daily.    ? cloNIDine (CATAPRES-TTS) 0.2 mg/24 hr Place 1 patch on the skin once a week. Place patch on Monday.   ? clopidogreL (PLAVIX) 75 mg tablet Take 1 tablet by mouth daily.   ? digoxin (LANOXIN) 125 mcg tablet Take 1 tablet (125 mcg total) by mouth daily.   ? eplerenone (INSPRA) 25 MG tablet Take 25 mg by mouth.   ? esomeprazole (NEXIUM) 40 MG capsule Take 40 mg by mouth daily.    ? fluticasone-vilanterol (BREO ELLIPTA) 200-25 mcg/dose DsDv inhaler Inhale 1 puff daily.   ? HYDROcodone-acetaminophen 5-325 mg per tablet Take 1 tablet by mouth every 4 (four) hours as needed.    ? insulin detemir U-100 (LEVEMIR) 100 unit/mL injection Inject 43 Units under the skin 2 (two) times a day.   ? " ipratropium-albuteroL (DUO-NEB) 0.5-2.5 mg/3 mL nebulizer Inhale 3 mL as needed.   ? ketorolac (ACULAR) 0.5 % ophthalmic solution Administer 1 drop into the left eye 2 (two) times a day.    ? lidocaine (LIDODERM) 5 % Place 1 patch on the skin as needed. Remove & Discard patch within 12 hours or as directed by MD   ? metFORMIN (GLUCOPHAGE) 500 MG tablet Take 1,000 mg by mouth 2 (two) times a day with meals.    ? metOLazone (ZAROXOLYN) 2.5 MG tablet 2.5 mg daily.    ? metoprolol tartrate (LOPRESSOR) 50 MG tablet Take 1 tablet by mouth 2 (two) times a day.   ? mometasone (ELOCON) 0.1 % cream Apply 1 application topically daily as needed.   ? nitroglycerin (NITROSTAT) 0.4 MG SL tablet Place 0.4 mg under the tongue every 5 (five) minutes as needed for chest pain.   ? omeprazole (PRILOSEC) 20 MG capsule Take 20 mg by mouth daily as needed.   ? OXYGEN-[place]IVERY [place]S [facility] [place]irected.   ? pantoprazole (PROTONIX) 40 MG tablet Take 40 mg by mouth daily before breakfast.   ? potassium chloride (K-DUR,KLOR-CON) 20 MEQ tablet Take 20 mEq by mouth 3 (three) times a day.    ? prednisoLONE acetate (PRED-FORTE) 1 % ophthalmic suspension Administer 2 drops into the left eye 2 (two) times a day.    ? rOPINIRole (REQUIP) 2 MG tablet Take 2 mg by mouth at bedtime.    ? sildenafil (REVATIO) 20 mg tablet Take 20 mg by mouth 3 (three) times a day.   ? zolpidem (AMBIEN) 10 mg tablet Take 10 mg by mouth bedtime.       ALLERGIES:  Allergies   Allergen Reactions   ? Furosemide      Previously tolerated.   ? Hydrochlorothiazide    ? Iodinated Contrast Media Nausea Only   ? Losartan Other (See Comments)     Other reaction(s): Stomatitis  Bloody nose dry mouth and lips   ? Losartan-Hydrochlorothiazide    ? Metaxalone    ? Mometasone Other (See Comments)     Bloody nose   ? Penicillins    ? Rabeprazole    ? Ramipril    ? Shellfish Containing Products      Other reaction(s): mouth sores  Other reaction(s): mouth sores       FAMILY  HISTORY:  Family History   Problem Relation Age of Onset   ? Hyperlipidemia Mother    ? Hypertension Mother    ? Heart disease Mother    ? Hyperlipidemia Father    ? Hypertension Father    ? Coronary artery disease Father    ? Stroke Brother    ? Depression Brother    ? No Medical Problems Sister    ? Pulmonary Hypertension Neg Hx    ? Congenital heart disease Neg Hx        SOCIAL HISTORY:   Social History     Socioeconomic History   ? Marital status:      Spouse name: Kaley   ? Number of children: 4   ? Years of education: None   ? Highest education level: None   Occupational History   ? Occupation: High voltage electrian     Employer: RETIRED   Social Needs   ? Financial resource strain: None   ? Food insecurity     Worry: None     Inability: None   ? Transportation needs     Medical: None     Non-medical: None   Tobacco Use   ? Smoking status: Former Smoker     Packs/day: 1.50     Years: 44.00     Pack years: 66.00     Types: Cigarettes     Quit date: 1996     Years since quittin.1   ? Smokeless tobacco: Never Used   Substance and Sexual Activity   ? Alcohol use: Yes     Alcohol/week: 1.0 standard drinks     Types: 1 Cans of beer per week     Comment: 1 per month   ? Drug use: No   ? Sexual activity: Not Currently     Partners: Female   Lifestyle   ? Physical activity     Days per week: None     Minutes per session: None   ? Stress: None   Relationships   ? Social connections     Talks on phone: None     Gets together: None     Attends Congregation service: None     Active member of club or organization: None     Attends meetings of clubs or organizations: None     Relationship status: None   ? Intimate partner violence     Fear of current or ex partner: None     Emotionally abused: None     Physically abused: None     Forced sexual activity: None   Other Topics Concern   ? None   Social History Narrative   ? None       VITALS:  Patient Vitals for the past 24 hrs:   BP Temp Temp src Pulse Resp SpO2  Weight   07/03/21 2158 132/60 97.9  F (36.6  C) Oral 85 22 97 % (!) 237 lb (107.5 kg)       PHYSICAL EXAM    VITAL SIGNS: /60 (Patient Position: Sitting)   Pulse 85   Temp 97.9  F (36.6  C) (Oral)   Resp 22   Wt (!) 237 lb (107.5 kg)   SpO2 97%   BMI 33.05 kg/m    General Appearance: Alert, cooperative, normal speech and facial symmetry, appears stated age, the patient does not appear in distress  Head:  Normocephalic, without obvious abnormality, atraumatic  Cardio:  Regular rate and rhythm, S1 and S2 normal, no murmur, rub    or gallop, 2+ pulses symmetric in all extremities  Pulm:  Clear to auscultation bilaterally, respirations unlabored with no accessory muscle use  Extremities: Varicosities noted on right lower extremity, no current bleeding. There is no tenderness to palpation, no cyanosis or edema, full function and range of motion, pulses equal in all extremities, normal cap refill, no joint swelling  Neuro: Patient is awake, alert, and responsive to voice. No gross motor weaknesses or sensory loss; moves all extremities.     LAB:  All pertinent labs reviewed and interpreted.  No results found for this visit on 07/03/21.    RADIOLOGY:  Reviewed all pertinent imaging. Please see official radiology report.  No results found.    I, Miller Diane, am serving as a scribe to document services personally performed by Madison Gaming PA-C based on my observation and the provider's statements to me. IMadison PA-C attest that Miller Diane is acting in a scribe capacity, has observed my performance of the services and has documented them in accordance with my direction.     Madison Gaming PA-C  Emergency Medicine  [facility] EMERGENCY ROOM  [address] [address] [facility] [address]  Dept: [phone]  Loc: [phone]     Madison Gaming PA-C  07/04/21 0011

## 2021-07-06 VITALS — WEIGHT: 232 LBS | BODY MASS INDEX: 32.48 KG/M2 | HEIGHT: 71 IN

## 2021-07-06 VITALS — WEIGHT: 237 LBS | BODY MASS INDEX: 33.05 KG/M2

## 2021-07-11 ENCOUNTER — HOSPITAL ENCOUNTER (EMERGENCY)
Dept: CT IMAGING | Facility: CLINIC | Age: 81
DRG: 194 | End: 2021-07-11
Attending: EMERGENCY MEDICINE
Payer: COMMERCIAL

## 2021-07-11 ENCOUNTER — HOSPITAL ENCOUNTER (INPATIENT)
Facility: CLINIC | Age: 81
LOS: 2 days | Discharge: HOME | DRG: 194 | End: 2021-07-13
Attending: INTERNAL MEDICINE | Admitting: FAMILY MEDICINE
Payer: COMMERCIAL

## 2021-07-11 ENCOUNTER — TRANSFERRED RECORDS (OUTPATIENT)
Dept: HEALTH INFORMATION MANAGEMENT | Facility: CLINIC | Age: 81
End: 2021-07-11

## 2021-07-11 DIAGNOSIS — J18.9 COMMUNITY ACQUIRED PNEUMONIA OF LEFT UPPER LOBE OF LUNG: Primary | ICD-10-CM

## 2021-07-11 DIAGNOSIS — R07.9 CHEST PAIN, UNSPECIFIED TYPE: ICD-10-CM

## 2021-07-11 DIAGNOSIS — I50.9 ACUTE ON CHRONIC CONGESTIVE HEART FAILURE, UNSPECIFIED HEART FAILURE TYPE (H): ICD-10-CM

## 2021-07-11 LAB
ALBUMIN SERPL-MCNC: 4 G/DL (ref 3.5–5)
ALP SERPL-CCNC: 104 U/L (ref 45–120)
ALT SERPL W P-5'-P-CCNC: 19 U/L (ref 0–45)
ANION GAP SERPL CALCULATED.3IONS-SCNC: 13 MMOL/L (ref 5–18)
ANION GAP SERPL CALCULATED.3IONS-SCNC: 15 MMOL/L (ref 5–18)
AST SERPL W P-5'-P-CCNC: 31 U/L (ref 0–40)
BASOPHILS # BLD AUTO: 0 10E3/UL (ref 0–0.2)
BASOPHILS NFR BLD AUTO: 1 %
BILIRUB SERPL-MCNC: 1.7 MG/DL (ref 0–1)
BNP SERPL-MCNC: 187 PG/ML (ref 0–82)
BUN SERPL-MCNC: 45 MG/DL (ref 8–28)
BUN SERPL-MCNC: 47 MG/DL (ref 8–28)
CALCIUM SERPL-MCNC: 9.2 MG/DL (ref 8.5–10.5)
CALCIUM SERPL-MCNC: 9.3 MG/DL (ref 8.5–10.5)
CHLORIDE BLD-SCNC: 100 MMOL/L (ref 98–107)
CHLORIDE BLD-SCNC: 102 MMOL/L (ref 98–107)
CO2 SERPL-SCNC: 24 MMOL/L (ref 22–31)
CO2 SERPL-SCNC: 25 MMOL/L (ref 22–31)
CREAT SERPL-MCNC: 1.91 MG/DL (ref 0.7–1.3)
CREAT SERPL-MCNC: 2.09 MG/DL (ref 0.7–1.3)
EOSINOPHIL # BLD AUTO: 0.4 10E3/UL (ref 0–0.7)
EOSINOPHIL NFR BLD AUTO: 5 %
ERYTHROCYTE [DISTWIDTH] IN BLOOD BY AUTOMATED COUNT: 17.7 % (ref 10–15)
ERYTHROCYTE [DISTWIDTH] IN BLOOD BY AUTOMATED COUNT: 18 % (ref 10–15)
GFR SERPL CREATININE-BSD FRML MDRD: 29 ML/MIN/1.73M2
GFR SERPL CREATININE-BSD FRML MDRD: 33 ML/MIN/1.73M2
GLUCOSE BLD-MCNC: 233 MG/DL (ref 70–125)
GLUCOSE BLD-MCNC: 254 MG/DL (ref 70–125)
HBA1C MFR BLD: 9.3 %
HCT VFR BLD AUTO: 40.9 % (ref 40–53)
HCT VFR BLD AUTO: 41.6 % (ref 40–53)
HGB BLD-MCNC: 12 G/DL (ref 13.3–17.7)
HGB BLD-MCNC: 12.2 G/DL (ref 13.3–17.7)
IMM GRANULOCYTES # BLD: 0.1 10E3/UL
IMM GRANULOCYTES NFR BLD: 1 %
INR PPP: 1.29 (ref 0.85–1.15)
LYMPHOCYTES # BLD AUTO: 1.2 10E3/UL (ref 0.8–5.3)
LYMPHOCYTES NFR BLD AUTO: 14 %
MCH RBC QN AUTO: 21.7 PG (ref 26.5–33)
MCH RBC QN AUTO: 22.3 PG (ref 26.5–33)
MCHC RBC AUTO-ENTMCNC: 28.8 G/DL (ref 31.5–36.5)
MCHC RBC AUTO-ENTMCNC: 29.8 G/DL (ref 31.5–36.5)
MCV RBC AUTO: 75 FL (ref 78–100)
MCV RBC AUTO: 75 FL (ref 78–100)
MONOCYTES # BLD AUTO: 0.7 10E3/UL (ref 0–1.3)
MONOCYTES NFR BLD AUTO: 8 %
NEUTROPHILS # BLD AUTO: 6.4 10E3/UL (ref 1.6–8.3)
NEUTROPHILS NFR BLD AUTO: 72 %
PLATELET # BLD AUTO: 167 10E3/UL (ref 150–450)
PLATELET # BLD AUTO: 188 10E3/UL (ref 150–450)
POTASSIUM BLD-SCNC: 4.2 MMOL/L (ref 3.5–5)
POTASSIUM BLD-SCNC: 4.3 MMOL/L (ref 3.5–5)
PROT SERPL-MCNC: 7.2 G/DL (ref 6–8)
RBC # BLD AUTO: 5.47 10E6/UL (ref 4.4–5.9)
RBC # BLD AUTO: 5.54 10E6/UL (ref 4.4–5.9)
SARS-COV-2 RNA RESP QL NAA+PROBE: NEGATIVE
SODIUM SERPL-SCNC: 139 MMOL/L (ref 136–145)
SODIUM SERPL-SCNC: 140 MMOL/L (ref 136–145)
TROPONIN I SERPL-MCNC: 0.03 NG/ML (ref 0–0.29)
TROPONIN I SERPL-MCNC: 0.04 NG/ML (ref 0–0.29)
TROPONIN I SERPL-MCNC: 0.04 NG/ML (ref 0–0.29)
WBC # BLD AUTO: 10.1 10E3/UL (ref 4–11)
WBC # BLD AUTO: 8.9 10E3/UL (ref 4–11)

## 2021-07-11 PROCEDURE — 999N000157 HC STATISTIC RCP TIME EA 10 MIN

## 2021-07-11 PROCEDURE — 93010 ELECTROCARDIOGRAM REPORT: CPT | Performed by: INTERNAL MEDICINE

## 2021-07-11 PROCEDURE — 250N000009 HC RX 250: Performed by: FAMILY MEDICINE

## 2021-07-11 PROCEDURE — G0378 HOSPITAL OBSERVATION PER HR: HCPCS

## 2021-07-11 PROCEDURE — 99285 EMERGENCY DEPT VISIT HI MDM: CPT | Mod: 25

## 2021-07-11 PROCEDURE — 84484 ASSAY OF TROPONIN QUANT: CPT | Performed by: EMERGENCY MEDICINE

## 2021-07-11 PROCEDURE — 82962 GLUCOSE BLOOD TEST: CPT

## 2021-07-11 PROCEDURE — 93005 ELECTROCARDIOGRAM TRACING: CPT

## 2021-07-11 PROCEDURE — 83880 ASSAY OF NATRIURETIC PEPTIDE: CPT | Performed by: FAMILY MEDICINE

## 2021-07-11 PROCEDURE — 200N000001 HC R&B ICU

## 2021-07-11 PROCEDURE — 84484 ASSAY OF TROPONIN QUANT: CPT | Performed by: FAMILY MEDICINE

## 2021-07-11 PROCEDURE — 85610 PROTHROMBIN TIME: CPT | Performed by: EMERGENCY MEDICINE

## 2021-07-11 PROCEDURE — 83036 HEMOGLOBIN GLYCOSYLATED A1C: CPT | Performed by: FAMILY MEDICINE

## 2021-07-11 PROCEDURE — 80053 COMPREHEN METABOLIC PANEL: CPT | Performed by: EMERGENCY MEDICINE

## 2021-07-11 PROCEDURE — 99223 1ST HOSP IP/OBS HIGH 75: CPT | Performed by: FAMILY MEDICINE

## 2021-07-11 PROCEDURE — 85025 COMPLETE CBC W/AUTO DIFF WBC: CPT | Performed by: EMERGENCY MEDICINE

## 2021-07-11 PROCEDURE — 250N000012 HC RX MED GY IP 250 OP 636 PS 637: Performed by: FAMILY MEDICINE

## 2021-07-11 PROCEDURE — 36415 COLL VENOUS BLD VENIPUNCTURE: CPT | Performed by: FAMILY MEDICINE

## 2021-07-11 PROCEDURE — 250N000011 HC RX IP 250 OP 636: Performed by: FAMILY MEDICINE

## 2021-07-11 PROCEDURE — 93005 ELECTROCARDIOGRAM TRACING: CPT | Performed by: EMERGENCY MEDICINE

## 2021-07-11 PROCEDURE — 85027 COMPLETE CBC AUTOMATED: CPT | Performed by: FAMILY MEDICINE

## 2021-07-11 PROCEDURE — 250N000013 HC RX MED GY IP 250 OP 250 PS 637: Performed by: FAMILY MEDICINE

## 2021-07-11 PROCEDURE — C9803 HOPD COVID-19 SPEC COLLECT: HCPCS

## 2021-07-11 PROCEDURE — 71250 CT THORAX DX C-: CPT

## 2021-07-11 PROCEDURE — 96365 THER/PROPH/DIAG IV INF INIT: CPT

## 2021-07-11 PROCEDURE — 250N000009 HC RX 250: Performed by: STUDENT IN AN ORGANIZED HEALTH CARE EDUCATION/TRAINING PROGRAM

## 2021-07-11 PROCEDURE — 87635 SARS-COV-2 COVID-19 AMP PRB: CPT | Performed by: EMERGENCY MEDICINE

## 2021-07-11 RX ORDER — POTASSIUM CHLORIDE 1500 MG/1
40 TABLET, EXTENDED RELEASE ORAL DAILY
Status: DISCONTINUED | OUTPATIENT
Start: 2021-07-11 | End: 2021-07-13 | Stop reason: HOSPADM

## 2021-07-11 RX ORDER — BUMETANIDE 2 MG/1
2 TABLET ORAL
Status: DISCONTINUED | OUTPATIENT
Start: 2021-07-11 | End: 2021-07-12

## 2021-07-11 RX ORDER — MAGNESIUM HYDROXIDE/ALUMINUM HYDROXICE/SIMETHICONE 120; 1200; 1200 MG/30ML; MG/30ML; MG/30ML
30 SUSPENSION ORAL EVERY 4 HOURS PRN
Status: DISCONTINUED | OUTPATIENT
Start: 2021-07-11 | End: 2021-07-13 | Stop reason: HOSPADM

## 2021-07-11 RX ORDER — ALBUTEROL SULFATE 90 UG/1
2 AEROSOL, METERED RESPIRATORY (INHALATION) EVERY 6 HOURS PRN
Status: DISCONTINUED | OUTPATIENT
Start: 2021-07-11 | End: 2021-07-13 | Stop reason: HOSPADM

## 2021-07-11 RX ORDER — IRBESARTAN 150 MG/1
300 TABLET ORAL AT BEDTIME
Status: DISCONTINUED | OUTPATIENT
Start: 2021-07-11 | End: 2021-07-13 | Stop reason: HOSPADM

## 2021-07-11 RX ORDER — BRIMONIDINE TARTRATE 2 MG/ML
1 SOLUTION/ DROPS OPHTHALMIC 2 TIMES DAILY PRN
Status: DISCONTINUED | OUTPATIENT
Start: 2021-07-11 | End: 2021-07-13 | Stop reason: HOSPADM

## 2021-07-11 RX ORDER — METOPROLOL SUCCINATE 100 MG/1
200 TABLET, EXTENDED RELEASE ORAL DAILY
Status: DISCONTINUED | OUTPATIENT
Start: 2021-07-11 | End: 2021-07-12

## 2021-07-11 RX ORDER — HYDROCODONE BITARTRATE AND ACETAMINOPHEN 5; 325 MG/1; MG/1
1 TABLET ORAL EVERY 6 HOURS PRN
Status: DISCONTINUED | OUTPATIENT
Start: 2021-07-11 | End: 2021-07-13 | Stop reason: HOSPADM

## 2021-07-11 RX ORDER — CLOPIDOGREL BISULFATE 75 MG/1
75 TABLET ORAL DAILY
Status: DISCONTINUED | OUTPATIENT
Start: 2021-07-11 | End: 2021-07-13 | Stop reason: HOSPADM

## 2021-07-11 RX ORDER — METFORMIN HCL 500 MG
1000 TABLET, EXTENDED RELEASE 24 HR ORAL
Status: ON HOLD | COMMUNITY
End: 2021-10-26

## 2021-07-11 RX ORDER — ALBUTEROL SULFATE 0.83 MG/ML
2.5 SOLUTION RESPIRATORY (INHALATION) EVERY 4 HOURS PRN
Status: DISCONTINUED | OUTPATIENT
Start: 2021-07-11 | End: 2021-07-13 | Stop reason: HOSPADM

## 2021-07-11 RX ORDER — TRIAMCINOLONE ACETONIDE 1 MG/G
1 CREAM TOPICAL 2 TIMES DAILY PRN
COMMUNITY
End: 2021-08-22

## 2021-07-11 RX ORDER — METOPROLOL SUCCINATE 200 MG/1
200 TABLET, EXTENDED RELEASE ORAL DAILY
COMMUNITY
End: 2021-08-22

## 2021-07-11 RX ORDER — CEFTRIAXONE 2 G/1
2 INJECTION, POWDER, FOR SOLUTION INTRAMUSCULAR; INTRAVENOUS EVERY 24 HOURS
Status: DISCONTINUED | OUTPATIENT
Start: 2021-07-11 | End: 2021-07-13 | Stop reason: HOSPADM

## 2021-07-11 RX ORDER — NITROGLYCERIN 0.4 MG/1
0.4 TABLET SUBLINGUAL EVERY 5 MIN PRN
Status: DISCONTINUED | OUTPATIENT
Start: 2021-07-11 | End: 2021-07-11

## 2021-07-11 RX ORDER — NALOXONE HYDROCHLORIDE 0.4 MG/ML
0.2 INJECTION, SOLUTION INTRAMUSCULAR; INTRAVENOUS; SUBCUTANEOUS
Status: DISCONTINUED | OUTPATIENT
Start: 2021-07-11 | End: 2021-07-13 | Stop reason: HOSPADM

## 2021-07-11 RX ORDER — PANTOPRAZOLE SODIUM 20 MG/1
40 TABLET, DELAYED RELEASE ORAL EVERY MORNING
Status: DISCONTINUED | OUTPATIENT
Start: 2021-07-11 | End: 2021-07-13 | Stop reason: HOSPADM

## 2021-07-11 RX ORDER — ASPIRIN 81 MG/1
81 TABLET ORAL DAILY
Status: DISCONTINUED | OUTPATIENT
Start: 2021-07-12 | End: 2021-07-13 | Stop reason: HOSPADM

## 2021-07-11 RX ORDER — CETIRIZINE HYDROCHLORIDE 10 MG/1
10 TABLET ORAL WEEKLY
Status: DISCONTINUED | OUTPATIENT
Start: 2021-07-12 | End: 2021-07-13 | Stop reason: HOSPADM

## 2021-07-11 RX ORDER — NITROGLYCERIN 0.4 MG/1
0.4 TABLET SUBLINGUAL EVERY 5 MIN PRN
Status: DISCONTINUED | OUTPATIENT
Start: 2021-07-11 | End: 2021-07-13 | Stop reason: HOSPADM

## 2021-07-11 RX ORDER — ZOLPIDEM TARTRATE 5 MG/1
10 TABLET ORAL AT BEDTIME
Status: DISCONTINUED | OUTPATIENT
Start: 2021-07-11 | End: 2021-07-13 | Stop reason: HOSPADM

## 2021-07-11 RX ORDER — NALOXONE HYDROCHLORIDE 0.4 MG/ML
0.4 INJECTION, SOLUTION INTRAMUSCULAR; INTRAVENOUS; SUBCUTANEOUS
Status: DISCONTINUED | OUTPATIENT
Start: 2021-07-11 | End: 2021-07-13 | Stop reason: HOSPADM

## 2021-07-11 RX ORDER — EPLERENONE 25 MG/1
25 TABLET, FILM COATED ORAL DAILY
Status: DISCONTINUED | OUTPATIENT
Start: 2021-07-11 | End: 2021-07-12

## 2021-07-11 RX ORDER — DOXYCYCLINE 100 MG/10ML
100 INJECTION, POWDER, LYOPHILIZED, FOR SOLUTION INTRAVENOUS EVERY 12 HOURS
Status: DISCONTINUED | OUTPATIENT
Start: 2021-07-11 | End: 2021-07-13 | Stop reason: HOSPADM

## 2021-07-11 RX ORDER — ASPIRIN 81 MG/1
162 TABLET, CHEWABLE ORAL ONCE
Status: COMPLETED | OUTPATIENT
Start: 2021-07-11 | End: 2021-07-11

## 2021-07-11 RX ORDER — IRBESARTAN 300 MG/1
300 TABLET ORAL DAILY
COMMUNITY
End: 2021-12-17

## 2021-07-11 RX ORDER — ASPIRIN 81 MG/1
81 TABLET ORAL EVERY MORNING
Status: DISCONTINUED | OUTPATIENT
Start: 2021-07-11 | End: 2021-07-11

## 2021-07-11 RX ORDER — DEXTROSE MONOHYDRATE 25 G/50ML
25-50 INJECTION, SOLUTION INTRAVENOUS
Status: DISCONTINUED | OUTPATIENT
Start: 2021-07-11 | End: 2021-07-13 | Stop reason: HOSPADM

## 2021-07-11 RX ORDER — DOXYCYCLINE 100 MG/10ML
100 INJECTION, POWDER, LYOPHILIZED, FOR SOLUTION INTRAVENOUS ONCE
Status: COMPLETED | OUTPATIENT
Start: 2021-07-11 | End: 2021-07-11

## 2021-07-11 RX ORDER — ATORVASTATIN CALCIUM 40 MG/1
40 TABLET, FILM COATED ORAL AT BEDTIME
Status: DISCONTINUED | OUTPATIENT
Start: 2021-07-11 | End: 2021-07-13 | Stop reason: HOSPADM

## 2021-07-11 RX ORDER — DIGOXIN 125 MCG
125 TABLET ORAL DAILY
Status: DISCONTINUED | OUTPATIENT
Start: 2021-07-11 | End: 2021-07-13 | Stop reason: HOSPADM

## 2021-07-11 RX ORDER — NICOTINE POLACRILEX 4 MG
15-30 LOZENGE BUCCAL
Status: DISCONTINUED | OUTPATIENT
Start: 2021-07-11 | End: 2021-07-13 | Stop reason: HOSPADM

## 2021-07-11 RX ORDER — ROPINIROLE 1 MG/1
2 TABLET, FILM COATED ORAL AT BEDTIME
Status: DISCONTINUED | OUTPATIENT
Start: 2021-07-11 | End: 2021-07-13 | Stop reason: HOSPADM

## 2021-07-11 RX ORDER — ACETAMINOPHEN 500 MG
1000 TABLET ORAL EVERY 6 HOURS PRN
Status: DISCONTINUED | OUTPATIENT
Start: 2021-07-11 | End: 2021-07-13 | Stop reason: HOSPADM

## 2021-07-11 RX ORDER — TADALAFIL 20 MG/1
20 TABLET ORAL DAILY
Status: ON HOLD | COMMUNITY
End: 2021-10-26

## 2021-07-11 RX ADMIN — INSULIN DETEMIR 50 UNITS: 100 INJECTION, SOLUTION SUBCUTANEOUS at 20:45

## 2021-07-11 RX ADMIN — DOXYCYCLINE 100 MG: 100 INJECTION, POWDER, LYOPHILIZED, FOR SOLUTION INTRAVENOUS at 20:45

## 2021-07-11 RX ADMIN — INSULIN DETEMIR 50 UNITS: 100 INJECTION, SOLUTION SUBCUTANEOUS at 13:31

## 2021-07-11 RX ADMIN — CEFTRIAXONE SODIUM 2 G: 2 INJECTION, POWDER, FOR SOLUTION INTRAMUSCULAR; INTRAVENOUS at 11:21

## 2021-07-11 RX ADMIN — INSULIN ASPART 2 UNITS: 100 INJECTION, SOLUTION INTRAVENOUS; SUBCUTANEOUS at 11:54

## 2021-07-11 RX ADMIN — BUMETANIDE 2 MG: 2 TABLET ORAL at 11:23

## 2021-07-11 RX ADMIN — DIGOXIN 125 MCG: 125 TABLET ORAL at 11:23

## 2021-07-11 RX ADMIN — PANTOPRAZOLE SODIUM 40 MG: 20 TABLET, DELAYED RELEASE ORAL at 11:24

## 2021-07-11 RX ADMIN — FLUTICASONE FUROATE AND VILANTEROL TRIFENATATE 1 PUFF: 200; 25 POWDER RESPIRATORY (INHALATION) at 11:24

## 2021-07-11 RX ADMIN — APIXABAN 5 MG: 5 TABLET, FILM COATED ORAL at 20:44

## 2021-07-11 RX ADMIN — ATORVASTATIN CALCIUM 40 MG: 40 TABLET, FILM COATED ORAL at 20:44

## 2021-07-11 RX ADMIN — INSULIN ASPART 1 UNITS: 100 INJECTION, SOLUTION INTRAVENOUS; SUBCUTANEOUS at 18:49

## 2021-07-11 RX ADMIN — ROPINIROLE HYDROCHLORIDE 2 MG: 1 TABLET, FILM COATED ORAL at 20:44

## 2021-07-11 RX ADMIN — POTASSIUM CHLORIDE 40 MEQ: 1500 TABLET, EXTENDED RELEASE ORAL at 11:21

## 2021-07-11 RX ADMIN — ZOLPIDEM TARTRATE 10 MG: 5 TABLET ORAL at 22:00

## 2021-07-11 RX ADMIN — EPLERENONE 25 MG: 25 TABLET, FILM COATED ORAL at 11:28

## 2021-07-11 RX ADMIN — IRBESARTAN 300 MG: 150 TABLET ORAL at 20:45

## 2021-07-11 RX ADMIN — METOPROLOL SUCCINATE 200 MG: 100 TABLET, FILM COATED, EXTENDED RELEASE ORAL at 11:25

## 2021-07-11 RX ADMIN — DOXYCYCLINE 100 MG: 100 INJECTION, POWDER, LYOPHILIZED, FOR SOLUTION INTRAVENOUS at 08:11

## 2021-07-11 RX ADMIN — CLOPIDOGREL BISULFATE 75 MG: 75 TABLET, FILM COATED ORAL at 11:24

## 2021-07-11 RX ADMIN — APIXABAN 5 MG: 5 TABLET, FILM COATED ORAL at 11:21

## 2021-07-11 RX ADMIN — ASPIRIN 81 MG CHEWABLE TABLET 162 MG: 81 TABLET CHEWABLE at 11:20

## 2021-07-11 RX ADMIN — BUMETANIDE 2 MG: 2 TABLET ORAL at 17:20

## 2021-07-11 ASSESSMENT — ACTIVITIES OF DAILY LIVING (ADL)
PATIENT_/_FAMILY_COMMUNICATION_STYLE: SPOKEN LANGUAGE (ENGLISH OR BILINGUAL)
CONCENTRATING,_REMEMBERING_OR_MAKING_DECISIONS_DIFFICULTY: NO
DIFFICULTY_EATING/SWALLOWING: NO
WEAR_GLASSES_OR_BLIND: NO
HEARING_DIFFICULTY_OR_DEAF: NO
FALL_HISTORY_WITHIN_LAST_SIX_MONTHS: NO
DOING_ERRANDS_INDEPENDENTLY_DIFFICULTY: YES
DIFFICULTY_COMMUNICATING: NO
WALKING_OR_CLIMBING_STAIRS_DIFFICULTY: NO
DRESSING/BATHING_DIFFICULTY: NO
TOILETING_ISSUES: NO

## 2021-07-11 NOTE — CONSULTS
Care Management Initial Consult    General Information  Assessment completed with: Patient,    Type of CM/SW Visit: Initial Assessment    Primary Care Provider verified and updated as needed: Yes   Readmission within the last 30 days: no previous admission in last 30 days      Reason for Consult: discharge planning  Advance Care Planning: Advance Care Planning Reviewed: other (comment) (has HCD at home)          Communication Assessment  Patient's communication style: spoken language (English or Bilingual)    Hearing Difficulty or Deaf: yes- wears hearing aides   Wear Glasses or Blind: yes- wears Rx glasses    Cognitive  Cognitive/Neuro/Behavioral: WDL                      Living Environment:   People in home: spouse-  wife Kaley  Current living Arrangements: other (see comments) ([facility])      Able to return to prior arrangements: yes       Family/Social Support:  Care provided by: self  Provides care for: no one  Marital Status:   Wife, Children  wife Kaley       Description of Support System:  Wife, 6 children total between him and his wife        Current Resources:   Patient receiving home care services: No     Community Resources: None  Equipment currently used at home: glucometer, other (see comments) (uses walker for long trips only)  Supplies currently used at home: Diabetic Supplies, Hearing Aid Batteries    Employment/Financial:  Employment Status: retired     Employment/ Comments:  (was in  but does not get any benefits)  Financial Concerns:   None mentioned          Lifestyle & Psychosocial Needs:  Social Determinants of Health     Tobacco Use: Medium Risk     Smoking Tobacco Use: Former Smoker     Smokeless Tobacco Use: Never Used   Alcohol Use:      Frequency of Alcohol Consumption:      Average Number of Drinks:      Frequency of Binge Drinking:    Financial Resource Strain:      Difficulty of Paying Living Expenses:    Food Insecurity:      Worried About Running Out of Food in  "the Last Year:      Ran Out of Food in the Last Year:    Transportation Needs:      Lack of Transportation (Medical):      Lack of Transportation (Non-Medical):    Physical Activity:      Days of Exercise per Week:      Minutes of Exercise per Session:    Stress:      Feeling of Stress :    Social Connections:      Frequency of Communication with Friends and Family:      Frequency of Social Gatherings with Friends and Family:      Attends [employer] Services:      Active Member of Clubs or Organizations:      Attends Club or Organization Meetings:      Marital Status:    Intimate Partner Violence:      Fear of Current or Ex-Partner:      Emotionally Abused:      Physically Abused:      Sexually Abused:    Depression:      PHQ-2 Score:    Housing Stability:      Unable to Pay for Housing in the Last Year:      Number of Places Lived in the Last Year:      Unstable Housing in the Last Year:        Functional Status:  Prior to admission patient needed assistance: none, he was independent with all cares, including driving              Mental Health Status:      Patient denies history or current concerns.     Chemical Dependency Status:      Patient denies history or current concerns.           Values/Beliefs:  Spiritual, Cultural Beliefs, [employer] Practices, Values that affect care: yes  Description of Beliefs that Will Affect Care: \"I am Confucianist\"            Additional Information:  CM met with patient and completed assessment. Lives with wife in [facility]. Independent with all cares, including driving. Wears Rx glasses, hearing aides and uses diabetic supplies.   No HC services. PCP confirmed.    Does go to [facility] at [facility].    He denies discharge needs. His wife will provide the transportation home.     Amelie Floyd RN        "

## 2021-07-11 NOTE — PLAN OF CARE
Patient c/o mid chest and back pain with cough only, rating it 10/1-. States productive cough, have not seen. LS with expiratory wheezes. Accelerated junctional rhythm on monitor but questionable for Afib.  Trop negative X 2. Continuous pulse ox, no oxygen needed. Rocephin given IV for pna. Up independently. Tired today, but starting to feel better.

## 2021-07-11 NOTE — PROGRESS NOTES
"Pharmacy Note - Admission Medication History    Pertinent Provider Information: Spoke with the patient who reported that he is only taking the metoprolol succ 200 mg, despite recent fill history for metoprolol tartrate on 6/16/21.     The patient also reported that the Levemir dose is 50 units 2 times daily.      The patient stated that he currently does NOT have a clonidine patch on, and his last dose was 2 weeks ago.      ______________________________________________________________________    Prior To Admission (PTA) med list completed and updated in EMR.       Prior to Admission Medications   Prescriptions Last Dose Informant Patient Reported? Taking?   ACCU-CHEK JOSE PLUS TEST STRP strips   Yes No   Sig: [ACCU-CHEK JOSE PLUS TEST STRP STRIPS] see administration instructions.   BD ULTRA-FINE MANISHA PEN NEEDLES 32 gauge x 5/32\" Ndle   Yes No   Sig: [BD ULTRA-FINE MANISHA PEN NEEDLES 32 GAUGE X 5/32\" NDLE]    HYDROcodone-acetaminophen 5-325 mg per tablet 7/7/2021  Yes Yes   Sig: [HYDROCODONE-ACETAMINOPHEN 5-325 MG PER TABLET] Take 1 tablet by mouth every 4 (four) hours as needed.    OXYGEN-AIR DELIVERY SYSTEMS MISC   Yes No   Sig: [OXYGEN-AIR DELIVERY SYSTEMS MISC] Use As Directed.   acetaminophen (TYLENOL) 500 MG tablet Past Month  Yes Yes   Sig: [ACETAMINOPHEN (TYLENOL) 500 MG TABLET] Take 1,000 mg by mouth every 6 (six) hours as needed. First line of pain management. Do Not take more than 4,000 mg in 24 hours.   albuterol (PROVENTIL HFA;VENTOLIN HFA) 90 mcg/actuation inhaler Past Month  Yes Yes   Sig: Inhale 2 puffs into the lungs every 6 hours as needed    albuterol (PROVENTIL) 2.5 mg /3 mL (0.083 %) nebulizer solution   Yes Yes   Sig: [ALBUTEROL (PROVENTIL) 2.5 MG /3 ML (0.083 %) NEBULIZER SOLUTION] Take 2.5 mg by nebulization every 4 (four) hours as needed (as needed).          apixaban (ELIQUIS) 5 mg Tab 7/10/2021  Yes Yes   Sig: [APIXABAN (ELIQUIS) 5 MG TAB] Take 1 tablet by mouth every 12 (twelve) hours. "   aspirin 81 MG EC tablet 7/10/2021  Yes Yes   Sig: [ASPIRIN 81 MG EC TABLET] Take 81 mg by mouth every morning.    atorvastatin (LIPITOR) 40 MG tablet 7/10/2021  Yes Yes   Sig: [ATORVASTATIN (LIPITOR) 40 MG TABLET] Take 40 mg by mouth daily.    azithromycin (ZITHROMAX) 250 MG tablet PRN  Yes Yes   Sig: Take 1,000 mg by mouth daily as needed (Take 1000 mg 1 hour prior to dental work)    brimonidine (ALPHAGAN) 0.2 % ophthalmic solution   Yes Yes   Sig: Place 1 drop Into the left eye 2 times daily as needed    bumetanide (BUMEX) 1 MG tablet 7/10/2021  No Yes   Sig: [BUMETANIDE (BUMEX) 1 MG TABLET] Take 1 tablet (1 mg total) by mouth 3 (three) times a day. Take 1 tablets three times a day as originally recommended by your cardiologist/PCP.   Patient taking differently: Take 4 mg by mouth daily    calcium-magnesium 300-300 mg Tab 7/10/2021  Yes Yes   Sig: Take 1 tablet by mouth 2 times daily    canagliflozin (INVOKANA) 100 mg Tab 7/10/2021  Yes Yes   Sig: Take 100 mg by mouth daily    cetirizine (ZYRTEC) 10 MG tablet 7/5/2021  Yes Yes   Sig: Take 10 mg by mouth once a week    cloNIDine (CATAPRES-TTS) 0.2 mg/24 hr Past Month  Yes Yes   Sig: Place 1 patch onto the skin as needed (Weekly)    clopidogreL (PLAVIX) 75 mg tablet 7/10/2021  Yes Yes   Sig: [CLOPIDOGREL (PLAVIX) 75 MG TABLET] Take 1 tablet by mouth daily.   digoxin (LANOXIN) 125 mcg tablet 7/10/2021  No Yes   Sig: [DIGOXIN (LANOXIN) 125 MCG TABLET] Take 1 tablet (125 mcg total) by mouth daily.   eplerenone (INSPRA) 25 MG tablet 7/10/2021  Yes Yes   Sig: Take 25 mg by mouth daily    esomeprazole (NEXIUM) 40 MG capsule 7/10/2021  Yes Yes   Sig: Take 40 mg by mouth every morning    fluticasone-vilanterol (BREO ELLIPTA) 200-25 mcg/dose DsDv inhaler 7/10/2021  Yes Yes   Sig: Inhale 1 puff into the lungs every morning    irbesartan (AVAPRO) 300 MG tablet 7/10/2021  Yes Yes   Sig: Take 300 mg by mouth At Bedtime   lidocaine (LIDODERM) 5 % Past Month  Yes Yes   Sig:  [LIDOCAINE (LIDODERM) 5 %] Place 1 patch on the skin as needed. Remove & Discard patch within 12 hours or as directed by MD   metFORMIN (GLUCOPHAGE-XR) 500 MG 24 hr tablet 7/10/2021  Yes Yes   Sig: Take 1,000 mg by mouth 2 times daily (with meals)   metoprolol succinate ER (TOPROL-XL) 200 MG 24 hr tablet 7/10/2021  Yes Yes   Sig: Take 200 mg by mouth daily   nitroglycerin (NITROSTAT) 0.4 MG SL tablet PRN  Yes Yes   Sig: [NITROGLYCERIN (NITROSTAT) 0.4 MG SL TABLET] Place 0.4 mg under the tongue every 5 (five) minutes as needed for chest pain.   potassium chloride (K-DUR,KLOR-CON) 20 MEQ tablet 7/10/2021  Yes Yes   Sig: Take 40 mEq by mouth daily    rOPINIRole (REQUIP) 2 MG tablet 7/10/2021  Yes Yes   Sig: [ROPINIROLE (REQUIP) 2 MG TABLET] Take 2 mg by mouth at bedtime.    tadalafil (CIALIS) 20 MG tablet 7/10/2021  Yes Yes   Sig: Take 20 mg by mouth daily   triamcinolone (KENALOG) 0.1 % external cream 7/10/2021  Yes Yes   Sig: Apply 1 g topically 2 times daily as needed   zolpidem (AMBIEN) 10 mg tablet 7/10/2021  Yes Yes   Sig: [ZOLPIDEM (AMBIEN) 10 MG TABLET] Take 10 mg by mouth bedtime.      Facility-Administered Medications: None       Information source(s): [facility]Everywhere[facility]  Method of interview communication: in-person    Summary of Changes to PTA Med List  New: Irbesartan, metoprolol suc. triamcinolone cream, and Tadalafil   Discontinued: Cefdinir, Ipratropium soln., ketorolac eye drops, metoprolol tartrate, mometasone cream, omeprazole, pantoprazole, and prednisolone eye drops.   Changed: Bumex dose to 4 tabs daily, and potassium dose    Patient was asked about OTC/herbal products specifically.  PTA med list reflects this.    In the past week, patient estimated taking medication this percent of the time:  greater than 90%.    Allergies were reviewed, assessed, and updated with the patient.      Patient did not bring any medications to the hospital and can't retrieve from home. No multi-dose  medications are available for use during hospital stay.     The information provided in this note is only as accurate as the sources available at the time of the update(s).    Thank you for the opportunity to participate in the care of this patient.    Jatinder Cha  7/11/2021 11:15 AM

## 2021-07-11 NOTE — ED PROVIDER NOTES
Received patient on sign out.    HPI  Patient presents with back pain that radiates to his chest. Initial troponin and EKG were unremarkable. Follow up on CT chest.      LABORATORY  Labs Ordered and Resulted from Time of ED Arrival Up to the Time of Departure from the ED   SARS-COV2 (COVID-19) VIRUS RT-PCR - Normal    Narrative:     Testing was performed using the kaylan  SARS-CoV-2 & Influenza A/B Assay on the kaylan  [employer]  System.  This test should be ordered for the detection of SARS-COV-2 in individuals who meet SARS-CoV-2 clinical and/or epidemiological criteria. Test performance is unknown in asymptomatic patients.  This test is for in vitro diagnostic use under the FDA EUA for laboratories certified under CLIA to perform moderate and/or high complexity testing. This test has not been FDA cleared or approved.  A negative test does not rule out the presence of PCR inhibitors in the specimen or target RNA in concentration below the limit of detection for the assay. The possibility of a false negative should be considered if the patient's recent exposure or clinical presentation suggests COVID-19.  [facility] are certified under the Clinical Laboratory Improvement Amendments of 1988 (CLIA-88) as qualified to perform moderate and/or high complexity laboratory testing.   COVID-19 VIRUS (CORONAVIRUS) BY PCR    Narrative:     The following orders were created for panel order Asymptomatic COVID-19 Virus (Coronavirus) by PCR Nasopharyngeal.  Procedure                               Abnormality         Status                     ---------                               -----------         ------                     SARS-COV2 (COVID-19) Vir...[[id number]]  Normal              Final result                 Please view results for these tests on the individual orders.         RADIOLOGY  CT Chest Abdomen Pelvis w/o Contrast   Final Result   IMPRESSION:   1.  Acute airspace infiltrate in the anterior basal  segment of the left lower lobe consistent with acute pneumonia or pneumonitis.      2.  Other areas of chronic nodularity in both lungs are stable.      3.  Severe atherosclerotic changes and coronary artery disease.      4.  Evidence of chronic calcific pancreatitis.      5.  Colonic diverticulosis.            ED COURSE AND MEDICAL DECISION MAKING  6:45 AM Patient signed out to me by Efrain Eastman MD. Awaiting CT Chest results.   7:25 AM CT Chest results reviewed.     CT was signed out to me to follow-up.    CT showing possible pneumonia.  Patient given doxycycline.  Updated the hospitalist.            FINAL DIAGNOSIS    Chest pain, pneumonia      IDilia, am serving as a scribe to document services personally performed by Maxwell Leyva DO, based on myobservations and the provider's statements to me.  IMaxwell DO, attest that Dilia Paniagua is acting in a scribe capacity, has observed my performance of the services and has documented them in accordance with my direction.

## 2021-07-11 NOTE — H&P
[facility] MEDICINE ADMISSION HISTORY AND PHYSICAL     Assessment & Plan      Red Sutherland is a 80 year old old male with history of coronary artery disease, history of coronary artery stent, chronic diastolic heart failure, pulmonary hypertension, essential hypertension, dyslipidemia, obesity, obstructive sleep apnea, chronic atrial fibrillation, on chronic anticoagulation treatment, ckd 3, DM 2, came with upper back pain in between shoulders found to have community-acquired pneumonia on chest CT scan.  Started on IV ceftriaxone and doxycycline.  Remains hemodynamically stable.  Has multiple cardiac risk factors.  ACS will be ruled out.    Community-acquired pneumonia  IV ceftriaxone and doxycycline  Blood and sputum culture are pending  Remains hemodynamically stable    Atypical chest pain likely from pneumonia  Coronary artery disease with history of coronary artery stent  Serial cardiac enzyme, EKG, telemetry monitoring  Resume Plavix, statin, beta-blocker    Chronic diastolic heart failure  Bumex 2 mg twice a day  No acute pulmonary congestion    Essential hypertension  Clonidine 0.2 mg per 24-hour patch weekly  Eplerenone 25 mg daily  Metoprolol 200 mg daily    Dyslipidemia  Lipitor 40 mg daily    COPD  Stable and asymptomatic  Albuterol inhaler/nebulizer as needed  Breo inhaler daily    Obesity  Modification of lifestyle for weight loss  Obstructive sleep apnea  Compliant with CPAP use  Nocturnal hypoxia, using o2 at night with CPAP    Chronic atrial fibrillation  Heart rate is stable  On chronic anticoagulation treatment with Eliquis    CKD 3  Creatinine is stable    DM2  Invokana 100 mg daily, on hold  Metformin 1000 mg twice a day, on hold  Diabetic diet and insulin sliding scale  Levemir 50 units twice a day    Code.Full  DVT prophylaxis  On chronic anticoagulation treatment  Inpatient admission  Needs to stay in the hospital at least for 2 days for further evaluation and  treatment    Chief Complaint  substernal and upper back pain in between shoulders     HISTORY     Mr.William HANY Sutherland is a 80 year old old male with  history of  coronary artery disease, coronary artery stent, chronic diastolic heart failure, pulmonary hypertension, essential hypertension, dyslipidemia, obesity, obstructive sleep apnea, chronic atrial fibrillation, on chronic anticoagulation treatment, DM 2, ckd3, came with upper back pain in between shoulders last to 3 days.  He also has substernal chest discomfort raditaing to upper back.  Nonexertional symptoms.  Denies nausea, vomiting, short of breath, potation, heartburn. Has nonproductive cough.  Denies fever.  No Covid exposure.  Found to have community-acquired pneumonia on chest CT scan.  Started on IV ceftriaxone and doxycycline.  Remains hemodynamically stable.  Has multiple cardiac risk factors. ACS will be ruled out.  Denies headache, sided chest pain, palpitation, nausea, vomiting, heartburn, abdominal pain, diarrhea, constipation or urinary symptoms.    Past Medical History     @[facility]@  [facility]  Patient Active Problem List    Diagnosis Date Noted     Chest pain, unspecified type 07/11/2021     Priority: Medium     Peripheral vascular disease (H) 02/05/2021     Priority: Medium     Anemia, iron deficiency 06/01/2020     Priority: Medium     Suspected COVID-19 virus infection 04/09/2020     Priority: Medium     Acute on chronic diastolic congestive heart failure (H) 04/09/2020     Priority: Medium     Essential hypertension      Priority: Medium     Created by Conversion         Persistent Atrial Fibrillation      Priority: Medium     Feb/March 2013 dx  Mar 2013 new sotalol Rx  [id number] score of 4 (age/ HTN/ CHF/ CAD)  Rx Eliquis  9/19/16 failed CV (sotalol 120 BID)         Hypercholesteremia      Priority: Medium     Created by Conversion         Coronary artery disease involving native coronary artery without angina pectoris       Priority: Medium     Chronic bronchitis (H)      Priority: Medium     Created by Conversion         NOVA on CPAP      Priority: Medium     Using nasal CPAP with oxygen at night; he sees Dr. Lilly Mantilla at [facility].         Pulmonary hypertension due to left ventricular diastolic dysfunction; WHO Group 2 11/28/2017     Priority: Medium     Multifactorial per [place] with elevated LVEDP and PCW, COPD and NOVA. They   put him on sildenafil as nitroprusside lowered systemic BP and with that   the mean PA dropped from 54 to 49. Negative VQ at [place] Dec 1, 2017.         Chronic obstructive pulmonary disease (H) 04/27/2016     Priority: Medium     Obesity 04/08/2016     Priority: Medium        Surgical History     He  has a past surgical history that includes IR Miscellaneous Procedure (7/20/2001); IR Abdominal Aortogram (11/16/2012); IR Miscellaneous Procedure (11/16/2012); Cardiac catheterization (12/13/2017); Coronary Stent Placement; other surgical history; shoulder surgery; Wrist surgery (Bilateral); Cataract Extraction (Bilateral); back surgery; Cardioversion (03/15/2013); Total Hip Arthroplasty (Left); Total Knee Arthroplasty (Bilateral); and Colonoscopy w/wo Brush **Performed** (N/A, 1/14/2021).     Past Surgical History:   Procedure Laterality Date     BACK SURGERY      lower back     CARDIAC CATHETERIZATION  12/13/2017    Right and left at [place], mean PA 58, PCW 24 with V wave of 35, LVEDP of 18, with Nipride systemic BP, PVR and mean PA all declined     CARDIOVERSION  03/15/2013    for afib     CATARACT EXTRACTION Bilateral      CORONARY STENT PLACEMENT       IR ABDOMINAL AORTOGRAM  11/16/2012     IR MISCELLANEOUS PROCEDURE  7/20/2001     IR MISCELLANEOUS PROCEDURE  11/16/2012     OTHER SURGICAL HISTORY      [facility]     SHOULDER SURGERY      reapir on right shoulder     TOTAL HIP ARTHROPLASTY Left      TOTAL KNEE ARTHROPLASTY Bilateral      WRIST SURGERY Bilateral      ZZHC COLONOSCOPY W/WO BRUSH/WASH N/A  2021    Procedure: COLONOSCOPY;  Surgeon: Mihai Harris MD;  Location: [facility];  Service: Gastroenterology     Family History      Reviewed.  Positive family history of heart disease to both parents    Social History      Reviewed, and he  reports that he quit smoking about 25 years ago. His smoking use included cigarettes. He has a 66.00 pack-year smoking history. He has never used smokeless tobacco. He reports current alcohol use of about 1.0 standard drinks of alcohol per week. He reports that he does not use drugs.  Social History     Tobacco Use     Smoking status: Former Smoker     Packs/day: 1.50     Years: 44.00     Pack years: 66.00     Types: Cigarettes     Quit date: 1996     Years since quittin.2     Smokeless tobacco: Never Used   Substance Use Topics     Alcohol use: Yes     Alcohol/week: 1.0 standard drinks     Comment: Alcoholic Drinks/day: 1 per month         Allergies     Allergies   Allergen Reactions     Furosemide Unknown     Previously tolerated.     Hydrochlorothiazide Unknown     Iodinated Contrast Media [Diagnostic X-Ray Materials] Nausea     Losartan Other (See Comments)     Other reaction(s): Stomatitis, Bloody nose dry mouth and lips     Losartan-Hydrochlorothiazide [Hyzaar] Unknown     Metaxalone Unknown     Mometasone Other (See Comments)     Bloody nose     Penicillins Unknown     Rabeprazole Unknown     Ramipril Unknown     Shellfish Containing Products [Shellfish-Derived Products] Unknown     Other reaction(s): mouth sores, Other reaction(s): mouth sores       Prior to Admission Medications      Medications Prior to Admission   Medication Sig Dispense Refill Last Dose     acetaminophen (TYLENOL) 500 MG tablet [ACETAMINOPHEN (TYLENOL) 500 MG TABLET] Take 1,000 mg by mouth every 6 (six) hours as needed. First line of pain management. Do Not take more than 4,000 mg in 24 hours.   Past Month     albuterol (PROVENTIL HFA;VENTOLIN HFA) 90 mcg/actuation inhaler  Inhale 2 puffs into the lungs every 6 hours as needed    Past Month     albuterol (PROVENTIL) 2.5 mg /3 mL (0.083 %) nebulizer solution [ALBUTEROL (PROVENTIL) 2.5 MG /3 ML (0.083 %) NEBULIZER SOLUTION] Take 2.5 mg by nebulization every 4 (four) hours as needed (as needed).               apixaban (ELIQUIS) 5 mg Tab [APIXABAN (ELIQUIS) 5 MG TAB] Take 1 tablet by mouth every 12 (twelve) hours.   7/10/2021     aspirin 81 MG EC tablet [ASPIRIN 81 MG EC TABLET] Take 81 mg by mouth every morning.    7/10/2021     atorvastatin (LIPITOR) 40 MG tablet [ATORVASTATIN (LIPITOR) 40 MG TABLET] Take 40 mg by mouth daily.   4 7/10/2021     azithromycin (ZITHROMAX) 250 MG tablet Take 1,000 mg by mouth daily as needed (Take 1000 mg 1 hour prior to dental work)    PRN     brimonidine (ALPHAGAN) 0.2 % ophthalmic solution [BRIMONIDINE (ALPHAGAN) 0.2 % OPHTHALMIC SOLUTION] Administer 1 drop into the left eye 2 (two) times a day.  11      bumetanide (BUMEX) 1 MG tablet [BUMETANIDE (BUMEX) 1 MG TABLET] Take 1 tablet (1 mg total) by mouth 3 (three) times a day. Take 1 tablets three times a day as originally recommended by your cardiologist/PCP. (Patient taking differently: Take 4 mg by mouth daily )  0 7/10/2021     calcium-magnesium 300-300 mg Tab Take 1 tablet by mouth 2 times daily    7/10/2021     canagliflozin (INVOKANA) 100 mg Tab Take 100 mg by mouth daily    7/10/2021     cetirizine (ZYRTEC) 10 MG tablet Take 10 mg by mouth once a week    7/5/2021     cloNIDine (CATAPRES-TTS) 0.2 mg/24 hr Place 1 patch onto the skin as needed (Weekly)    Past Month     clopidogreL (PLAVIX) 75 mg tablet [CLOPIDOGREL (PLAVIX) 75 MG TABLET] Take 1 tablet by mouth daily.   7/10/2021     digoxin (LANOXIN) 125 mcg tablet [DIGOXIN (LANOXIN) 125 MCG TABLET] Take 1 tablet (125 mcg total) by mouth daily. 90 tablet 3 7/10/2021     eplerenone (INSPRA) 25 MG tablet Take 25 mg by mouth daily    7/10/2021     esomeprazole (NEXIUM) 40 MG capsule Take 40 mg by mouth  "every morning    7/10/2021     fluticasone-vilanterol (BREO ELLIPTA) 200-25 mcg/dose DsDv inhaler Inhale 1 puff into the lungs every morning    7/10/2021     HYDROcodone-acetaminophen 5-325 mg per tablet [HYDROCODONE-ACETAMINOPHEN 5-325 MG PER TABLET] Take 1 tablet by mouth every 4 (four) hours as needed.   0 7/7/2021     insulin detemir (LEVEMIR VIAL) 100 UNIT/ML vial Inject 50 Units Subcutaneous 2 times daily   7/10/2021 at Unknown time     irbesartan (AVAPRO) 300 MG tablet Take 300 mg by mouth At Bedtime   7/10/2021     lidocaine (LIDODERM) 5 % [LIDOCAINE (LIDODERM) 5 %] Place 1 patch on the skin as needed. Remove & Discard patch within 12 hours or as directed by MD   Past Month     metFORMIN (GLUCOPHAGE-XR) 500 MG 24 hr tablet Take 1,000 mg by mouth 2 times daily (with meals)   7/10/2021     metoprolol succinate ER (TOPROL-XL) 200 MG 24 hr tablet Take 200 mg by mouth daily   7/10/2021     nitroglycerin (NITROSTAT) 0.4 MG SL tablet [NITROGLYCERIN (NITROSTAT) 0.4 MG SL TABLET] Place 0.4 mg under the tongue every 5 (five) minutes as needed for chest pain.   PRN     potassium chloride (K-DUR,KLOR-CON) 20 MEQ tablet Take 40 mEq by mouth daily    7/10/2021     rOPINIRole (REQUIP) 2 MG tablet [ROPINIROLE (REQUIP) 2 MG TABLET] Take 2 mg by mouth at bedtime.    7/10/2021     tadalafil (CIALIS) 20 MG tablet Take 20 mg by mouth daily   7/10/2021     triamcinolone (KENALOG) 0.1 % external cream Apply 1 g topically 2 times daily as needed   7/10/2021     zolpidem (AMBIEN) 10 mg tablet [ZOLPIDEM (AMBIEN) 10 MG TABLET] Take 10 mg by mouth bedtime.   7/10/2021     ACCU-CHEK JOSE PLUS TEST STRP strips [ACCU-CHEK JOSE PLUS TEST STRP STRIPS] see administration instructions.        BD ULTRA-FINE MANISHA PEN NEEDLES 32 gauge x 5/32\" Ndle [BD ULTRA-FINE MANISHA PEN NEEDLES 32 GAUGE X 5/32\" NDLE]   4      OXYGEN-AIR DELIVERY SYSTEMS MISC [OXYGEN-AIR DELIVERY SYSTEMS MISC] Use As Directed.          Review of Systems     A 12 point " comprehensive review of systems was negative except as noted above in HPI.    PHYSICAL EXAMINATION     Vitals      Temp:  [97.8  F (36.6  C)-98  F (36.7  C)] 97.8  F (36.6  C)  Pulse:  [73-76] 75  Resp:  [16-34] 20  BP: (121-154)/(59-81) 135/81  SpO2:  [94 %-96 %] 95 %    Examination     GENERAL:  Alert, appears comfortable, in no acute distress, appears stated age, obese   HEAD:  Normocephalic, without obvious abnormality, atraumatic   EYES:  PERRL, conjunctiva clear, EOM's intact   NOSE: Nares normal,  mucosa normal, no drainage   THROAT: Lips, mucosa,   gums normal, mouth moist   NECK: Supple, symmetrical, trachea midline   BACK:   Symmetric, no curvature, ROM normal   LUNGS:   Clear to auscultation bilaterally, no rales, rhonchi, or wheezing, symmetric chest rise on inhalation, respirations unlabored   CHEST WALL:  No tenderness or deformity   HEART:  Regular rate and rhythm, S1 and S2 normal, no murmur, rub, or gallop    ABDOMEN:   Soft, non-tender, bowel sounds active , no masses, no organomegaly, no rebound or guarding   EXTREMITIES: no   edema    SKIN: No rashes   NEURO: Alert, oriented x 4, moves all four extremities freely, non-focal   PSYCH: Cooperative, behavior is appropriate        Pertinent Lab     Personally reviewed.  Results for orders placed or performed during the hospital encounter of 07/11/21   CT Chest Abdomen Pelvis w/o Contrast     Status: None    Narrative    EXAM: CT CHEST ABDOMEN PELVIS W/O CONTRAST  LOCATION: [facility]  DATE/TIME: 7/11/2021 6:40 AM    INDICATION: Chest pain.  COMPARISON: Chest radiograph from 04/09/2020. Chest CT from 08/30/2017.  TECHNIQUE: CT scan of the chest, abdomen, and pelvis was performed without IV contrast. Multiplanar reformats were obtained. Dose reduction techniques were used.   CONTRAST: None.    FINDINGS:   LUNGS AND PLEURA: Mild to moderate centrilobular emphysematous changes. Similar nodular thickening/scarring in the right middle lobe,  measuring up to 1.6 cm on image 211 of series 4. Additional nodularity along the minor fissure as seen on images 179-189   is also stable. Nodular opacities in the lingula and left base posteriorly are also stable, though there is acute airspace infiltrate in the anterior basal segment of the left lower lobe. No pleural effusion.    MEDIASTINUM/AXILLAE: Heart size within normal limits. No pericardial effusion. Multivessel coronary artery disease.    CORONARY ARTERY CALCIFICATION: Severe.    HEPATOBILIARY: Gallbladder is absent. Central liver cyst noted previously is more vaguely demonstrated on today's study.    PANCREAS: Diffuse pancreatic parenchymal calcifications.    SPLEEN: Normal.    ADRENAL GLANDS: Normal.    KIDNEYS/BLADDER: Simple left midpole renal cyst measuring 3.9 cm requires no follow-up. No hydronephrosis. Nondistended bladder.    BOWEL: No mechanical bowel obstruction. Normal appendix. Colonic diverticulosis.    LYMPH NODES: Normal.    VASCULATURE: Severe aortoiliac atherosclerotic change. No abdominal aortic aneurysm.    PELVIC ORGANS: Detail obscured by beam hardening artifact from left hip arthroplasty. No free fluid.    MUSCULOSKELETAL: Left hip arthroplasty. Multilevel thoracolumbar spine degenerative disc change. Moderate arthritic change of the right hip. Severe arthritic change of the right shoulder. Changes of presumed prior intervention in the left groin.      Impression    IMPRESSION:  1.  Acute airspace infiltrate in the anterior basal segment of the left lower lobe consistent with acute pneumonia or pneumonitis.    2.  Other areas of chronic nodularity in both lungs are stable.    3.  Severe atherosclerotic changes and coronary artery disease.    4.  Evidence of chronic calcific pancreatitis.    5.  Colonic diverticulosis.   SARS-COV2 (COVID-19) Virus RT-PCR     Status: Normal    Specimen: Nasopharyngeal; Swab   Result Value Ref Range    SARS CoV2 PCR Negative Negative    Narrative     Testing was performed using the kaylan  SARS-CoV-2 & Influenza A/B Assay on the kaylan  [employer]  System.  This test should be ordered for the detection of SARS-COV-2 in individuals who meet SARS-CoV-2 clinical and/or epidemiological criteria. Test performance is unknown in asymptomatic patients.  This test is for in vitro diagnostic use under the FDA EUA for laboratories certified under CLIA to perform moderate and/or high complexity testing. This test has not been FDA cleared or approved.  A negative test does not rule out the presence of PCR inhibitors in the specimen or target RNA in concentration below the limit of detection for the assay. The possibility of a false negative should be considered if the patient's recent exposure or clinical presentation suggests COVID-19.  [facility] are certified under the Clinical Laboratory Improvement Amendments of 1988 (CLIA-88) as qualified to perform moderate and/or high complexity laboratory testing.   Comprehensive metabolic panel     Status: Abnormal   Result Value Ref Range    Sodium 140 136 - 145 mmol/L    Potassium 4.2 3.5 - 5.0 mmol/L    Chloride 100 98 - 107 mmol/L    Carbon Dioxide (CO2) 25 22 - 31 mmol/L    Anion Gap 15 5 - 18 mmol/L    Urea Nitrogen 47 (H) 8 - 28 mg/dL    Creatinine 2.09 (H) 0.70 - 1.30 mg/dL    Calcium 9.3 8.5 - 10.5 mg/dL    Glucose 233 (H) 70 - 125 mg/dL    Alkaline Phosphatase 104 45 - 120 U/L    AST 31 0 - 40 U/L    ALT 19 0 - 45 U/L    Protein Total 7.2 6.0 - 8.0 g/dL    Albumin 4.0 3.5 - 5.0 g/dL    Bilirubin Total 1.7 (H) 0.0 - 1.0 mg/dL    GFR Estimate 29 (L) >60 mL/min/1.73m2   Troponin I     Status: Normal   Result Value Ref Range    Troponin I 0.04 0.00 - 0.29 ng/mL   INR     Status: Abnormal   Result Value Ref Range    INR 1.29 (H) 0.85 - 1.15   CBC with platelets and differential     Status: Abnormal   Result Value Ref Range    WBC Count 8.9 4.0 - 11.0 10e3/uL    RBC Count 5.54 4.40 - 5.90 10e6/uL    Hemoglobin 12.0  (L) 13.3 - 17.7 g/dL    Hematocrit 41.6 40.0 - 53.0 %    MCV 75 (L) 78 - 100 fL    MCH 21.7 (L) 26.5 - 33.0 pg    MCHC 28.8 (L) 31.5 - 36.5 g/dL    RDW 18.0 (H) 10.0 - 15.0 %    Platelet Count 188 150 - 450 10e3/uL    % Neutrophils 72 %    % Lymphocytes 14 %    % Monocytes 8 %    % Eosinophils 5 %    % Basophils 1 %    % Immature Granulocytes 1 %    Absolute Neutrophils 6.4 1.6 - 8.3 10e3/uL    Absolute Lymphocytes 1.2 0.8 - 5.3 10e3/uL    Absolute Monocytes 0.7 0.0 - 1.3 10e3/uL    Absolute Eosinophils 0.4 0.0 - 0.7 10e3/uL    Absolute Basophils 0.0 0.0 - 0.2 10e3/uL    Absolute Immature Granulocytes 0.1 (H) <=0.0 10e3/uL   CBC with platelets     Status: Abnormal   Result Value Ref Range    WBC Count 10.1 4.0 - 11.0 10e3/uL    RBC Count 5.47 4.40 - 5.90 10e6/uL    Hemoglobin 12.2 (L) 13.3 - 17.7 g/dL    Hematocrit 40.9 40.0 - 53.0 %    MCV 75 (L) 78 - 100 fL    MCH 22.3 (L) 26.5 - 33.0 pg    MCHC 29.8 (L) 31.5 - 36.5 g/dL    RDW 17.7 (H) 10.0 - 15.0 %    Platelet Count 167 150 - 450 10e3/uL   Basic metabolic panel     Status: Abnormal   Result Value Ref Range    Sodium 139 136 - 145 mmol/L    Potassium 4.3 3.5 - 5.0 mmol/L    Chloride 102 98 - 107 mmol/L    Carbon Dioxide (CO2) 24 22 - 31 mmol/L    Anion Gap 13 5 - 18 mmol/L    Urea Nitrogen 45 (H) 8 - 28 mg/dL    Creatinine 1.91 (H) 0.70 - 1.30 mg/dL    Calcium 9.2 8.5 - 10.5 mg/dL    Glucose 254 (H) 70 - 125 mg/dL    GFR Estimate 33 (L) >60 mL/min/1.73m2   Troponin I     Status: Normal   Result Value Ref Range    Troponin I 0.03 0.00 - 0.29 ng/mL   Asymptomatic COVID-19 Virus (Coronavirus) by PCR Nasopharyngeal     Status: Normal    Specimen: Nasopharyngeal; Swab    Narrative    The following orders were created for panel order Asymptomatic COVID-19 Virus (Coronavirus) by PCR Nasopharyngeal.  Procedure                               Abnormality         Status                     ---------                               -----------         ------                      SARS-COV2 (COVID-19) Vir...[[id number]]  Normal              Final result                 Please view results for these tests on the individual orders.   CBC with Platelets & Differential     Status: Abnormal    Narrative    The following orders were created for panel order CBC with Platelets & Differential.  Procedure                               Abnormality         Status                     ---------                               -----------         ------                     CBC with platelets and d...[[id number]]  Abnormal            Final result                 Please view results for these tests on the individual orders.         Pertinent Radiology     Radiology Results: CT Chest Abdomen Pelvis w/o Contrast 7/11/2021  1.  Acute airspace infiltrate in the anterior basal segment of the left lower lobe consistent with acute pneumonia or pneumonitis. 2.  Other areas of chronic nodularity in both lungs are stable. 3.  Severe atherosclerotic changes and coronary artery disease. 4.  Evidence of chronic calcific pancreatitis. 5.  Colonic diverticulosis.    US Lower Extremity Venous Duplex Right 7/4/2021  No deep venous thrombosis in the right lower extremity.    Total time spent 70 min    Georgette Tilley MD  [facility]   Phone: #[phone]

## 2021-07-12 LAB — INTERPRETATION ECG - MUSE: NORMAL

## 2021-07-12 PROCEDURE — 250N000012 HC RX MED GY IP 250 OP 636 PS 637: Performed by: FAMILY MEDICINE

## 2021-07-12 PROCEDURE — 250N000011 HC RX IP 250 OP 636: Performed by: FAMILY MEDICINE

## 2021-07-12 PROCEDURE — 200N000001 HC R&B ICU

## 2021-07-12 PROCEDURE — 250N000013 HC RX MED GY IP 250 OP 250 PS 637: Performed by: FAMILY MEDICINE

## 2021-07-12 PROCEDURE — 99233 SBSQ HOSP IP/OBS HIGH 50: CPT | Performed by: FAMILY MEDICINE

## 2021-07-12 PROCEDURE — 82962 GLUCOSE BLOOD TEST: CPT

## 2021-07-12 PROCEDURE — 250N000009 HC RX 250: Performed by: FAMILY MEDICINE

## 2021-07-12 RX ORDER — HYDRALAZINE HYDROCHLORIDE 20 MG/ML
10 INJECTION INTRAMUSCULAR; INTRAVENOUS EVERY 6 HOURS PRN
Status: DISCONTINUED | OUTPATIENT
Start: 2021-07-12 | End: 2021-07-13 | Stop reason: HOSPADM

## 2021-07-12 RX ORDER — BUMETANIDE 2 MG/1
2 TABLET ORAL
Status: DISCONTINUED | OUTPATIENT
Start: 2021-07-12 | End: 2021-07-13 | Stop reason: HOSPADM

## 2021-07-12 RX ORDER — BUMETANIDE 2 MG/1
2 TABLET ORAL
Status: DISCONTINUED | OUTPATIENT
Start: 2021-07-12 | End: 2021-07-12

## 2021-07-12 RX ORDER — ASPIRIN 81 MG/1
81 TABLET ORAL EVERY MORNING
Status: DISCONTINUED | OUTPATIENT
Start: 2021-07-12 | End: 2021-07-12

## 2021-07-12 RX ORDER — METOPROLOL SUCCINATE 100 MG/1
200 TABLET, EXTENDED RELEASE ORAL DAILY
Status: DISCONTINUED | OUTPATIENT
Start: 2021-07-12 | End: 2021-07-12

## 2021-07-12 RX ORDER — METOPROLOL SUCCINATE 100 MG/1
200 TABLET, EXTENDED RELEASE ORAL DAILY
Status: DISCONTINUED | OUTPATIENT
Start: 2021-07-13 | End: 2021-07-13 | Stop reason: HOSPADM

## 2021-07-12 RX ADMIN — ZOLPIDEM TARTRATE 10 MG: 5 TABLET ORAL at 20:55

## 2021-07-12 RX ADMIN — CETIRIZINE HYDROCHLORIDE 10 MG: 10 TABLET, FILM COATED ORAL at 08:25

## 2021-07-12 RX ADMIN — ROPINIROLE HYDROCHLORIDE 2 MG: 1 TABLET, FILM COATED ORAL at 20:55

## 2021-07-12 RX ADMIN — INSULIN ASPART 1 UNITS: 100 INJECTION, SOLUTION INTRAVENOUS; SUBCUTANEOUS at 11:33

## 2021-07-12 RX ADMIN — INSULIN DETEMIR 50 UNITS: 100 INJECTION, SOLUTION SUBCUTANEOUS at 11:17

## 2021-07-12 RX ADMIN — BUMETANIDE 2 MG: 2 TABLET ORAL at 17:38

## 2021-07-12 RX ADMIN — APIXABAN 5 MG: 5 TABLET, FILM COATED ORAL at 20:55

## 2021-07-12 RX ADMIN — IRBESARTAN 300 MG: 150 TABLET ORAL at 20:53

## 2021-07-12 RX ADMIN — CEFTRIAXONE SODIUM 2 G: 2 INJECTION, POWDER, FOR SOLUTION INTRAMUSCULAR; INTRAVENOUS at 11:17

## 2021-07-12 RX ADMIN — BUMETANIDE 2 MG: 2 TABLET ORAL at 08:25

## 2021-07-12 RX ADMIN — DOXYCYCLINE 100 MG: 100 INJECTION, POWDER, LYOPHILIZED, FOR SOLUTION INTRAVENOUS at 21:02

## 2021-07-12 RX ADMIN — INSULIN DETEMIR 50 UNITS: 100 INJECTION, SOLUTION SUBCUTANEOUS at 21:03

## 2021-07-12 RX ADMIN — METOPROLOL SUCCINATE 200 MG: 100 TABLET, FILM COATED, EXTENDED RELEASE ORAL at 08:26

## 2021-07-12 RX ADMIN — DOXYCYCLINE 100 MG: 100 INJECTION, POWDER, LYOPHILIZED, FOR SOLUTION INTRAVENOUS at 08:34

## 2021-07-12 RX ADMIN — APIXABAN 5 MG: 5 TABLET, FILM COATED ORAL at 08:24

## 2021-07-12 RX ADMIN — DIGOXIN 125 MCG: 125 TABLET ORAL at 08:25

## 2021-07-12 RX ADMIN — INSULIN ASPART 2 UNITS: 100 INJECTION, SOLUTION INTRAVENOUS; SUBCUTANEOUS at 06:15

## 2021-07-12 RX ADMIN — ASPIRIN 81 MG: 81 TABLET ORAL at 08:24

## 2021-07-12 RX ADMIN — EPLERENONE 25 MG: 25 TABLET, FILM COATED ORAL at 08:25

## 2021-07-12 RX ADMIN — ATORVASTATIN CALCIUM 40 MG: 40 TABLET, FILM COATED ORAL at 20:55

## 2021-07-12 RX ADMIN — CLOPIDOGREL BISULFATE 75 MG: 75 TABLET, FILM COATED ORAL at 08:25

## 2021-07-12 RX ADMIN — FLUTICASONE FUROATE AND VILANTEROL TRIFENATATE 1 PUFF: 200; 25 POWDER RESPIRATORY (INHALATION) at 06:40

## 2021-07-12 RX ADMIN — POTASSIUM CHLORIDE 40 MEQ: 1500 TABLET, EXTENDED RELEASE ORAL at 08:26

## 2021-07-12 RX ADMIN — PANTOPRAZOLE SODIUM 40 MG: 20 TABLET, DELAYED RELEASE ORAL at 06:11

## 2021-07-12 NOTE — PROGRESS NOTES
[facility] MEDICINE PROGRESS NOTE      Identification/Summary: Red Sutherland is a 80 year old male with a past medical history of coronary artery disease, history of coronary artery stent, chronic diastolic heart failure, pulmonary hypertension, essential hypertension, dyslipidemia, obesity, obstructive sleep apnea, chronic atrial fibrillation, on chronic anticoagulation treatment, ckd 3, DM 2, came with upper back pain in between shoulders found to have community-acquired pneumonia on chest CT scan.  Started on IV ceftriaxone and doxycycline.  Remains hemodynamically stable.  Has multiple cardiac risk factors.  ACS ruled out.    Assessment and Plan:  Community-acquired pneumonia  IV ceftriaxone and doxycycline  Blood and sputum culture are pending  Remains hemodynamically stable    Atypical chest pain and upper back pain likely from pneumonia  Coronary artery disease with previous coronary artery stent  Serial cardiac enzymes, EKG, telemetry monitoring unstable  Resume aspirin, Plavix, statin, beta-blocker    Chronic diastolic heart failure  Resume Bumex 2 mg twice a day  No acute pulmonary congestion    Essential hypertension  Metoprolol 200 mg daily  Eplerenone 25 mg daily  Avapro 300 mg at bedtime  Clonidine 0.2 mg per 24-hour patch weekly    Dyslipidemia  Lipitor 40 mg daily    COPD  Stable and asymptomatic  Albuterol inhaler/nebulizer as needed  Breo inhaler daily    Obesity  Modification of lifestyle for weight loss  Obstructive sleep apnea  Compliant with CPAP use  Nocturnal hypoxia  Using 2 L of O2 at night with CPAP    Chronic atrial fibrillation  Heart rate is stable with metoprolol 200 mg daily, digoxin 125 mcg daily  On chronic anticoagulation treatment with Eliquis    CKD 3  Creatinine is stable at 1.9    DM2  Invokana 100 mg daily, on hold  Metformin 1000 mg twice a day, on hold  Diabetic diet and insulin sliding scale  Levemir 50 unit twice a day    Code full  DVT  prophylaxis  On chronic anticoagulation treatment  Barrier to discharge  Awaiting for more clinical improvement.  Anticipated discharge in 1 day    Interval History/Subjective:  Resting comfortably.  Afebrile.  Still has back pain in between shoulders with coughing.  Denies chest pain.  No other concern.    Review of systems  Rest of the review of system are negative except HPI.  Denies headache, chest pain, breathing difficulty, palpitation, nausea, vomiting, abdominal pain or urinary symptoms.    Physical Exam/Objective:  Temp:  [97.2  F (36.2  C)-98.2  F (36.8  C)] 97.8  F (36.6  C)  Pulse:  [69-86] 72  Resp:  [18-22] 18  BP: (139-175)/(67-82) 157/79  SpO2:  [93 %-95 %] 93 %    Body mass index is 32.61 kg/m .     GENERAL:  Alert, appears comfortable, in no acute distress, appears stated age   HEAD:  Normocephalic, without obvious abnormality, atraumatic   EYES:  PERRL, conjunctiva  clear,  EOM's intact   NOSE: Nares normal,  mucosa normal, no drainage   THROAT: Lips, mucosa,  gums normal, mouth moist   NECK: Supple, symmetrical, trachea midline   BACK:   Symmetric, no curvature, ROM normal   LUNGS:   Clear to auscultation bilaterally, no rales, rhonchi, or wheezing, symmetric chest rise on inhalation, respirations unlabored   CHEST WALL:  No tenderness or deformity   HEART:  Regular rate and rhythm, S1 and S2 normal, no murmur, rub, or gallop    ABDOMEN:   Soft, non-tender, bowel sounds active , no masses, no organomegaly, no rebound or guarding   EXTREMITIES: no  edema    SKIN: No rashes   NEURO: Alert, oriented x3, moves all four extremities freely   PSYCH: Cooperative, behavior is appropriate      Medications:   Personally Reviewed.    apixaban ANTICOAGULANT  5 mg Oral BID     aspirin  81 mg Oral Daily     atorvastatin  40 mg Oral At Bedtime     bumetanide  2 mg Oral BID     cefTRIAXone  2 g Intravenous Q24H     cetirizine  10 mg Oral Weekly     clopidogrel  75 mg Oral Daily     digoxin  125 mcg Oral Daily      doxycycline (VIBRAMYCIN) IV  100 mg Intravenous Q12H     eplerenone  25 mg Oral Daily     fluticasone-vilanterol  1 puff Inhalation QAM     insulin aspart  1-7 Units Subcutaneous TID AC     insulin detemir  50 Units Subcutaneous BID     irbesartan  300 mg Oral At Bedtime     metoprolol succinate ER  200 mg Oral Daily     pantoprazole  40 mg Oral QAM     potassium chloride ER  40 mEq Oral Daily     rOPINIRole  2 mg Oral At Bedtime     zolpidem  10 mg Oral At Bedtime       Data reviewed today: I personally reviewed all new medications, labs, imaging/diagnostics reports over the past 24 hours.      Labs:  Ref Range & Units 1 d ago      Sodium 136 - 145 mmol/L 139     Potassium 3.5 - 5.0 mmol/L 4.3     Chloride 98 - 107 mmol/L 102     Carbon Dioxide (CO2) 22 - 31 mmol/L 24     Anion Gap 5 - 18 mmol/L 13     Urea Nitrogen 8 - 28 mg/dL 45High      Creatinine 0.70 - 1.30 mg/dL 1.91High      Calcium 8.5 - 10.5 mg/dL 9.2     Glucose 70 - 125 mg/dL 254High      GFR Estimate >60 mL/min/1.73m2 33Low            Imaging:  Chest ct  1.  Acute airspace infiltrate in the anterior basal segment of the left lower lobe consistent with acute pneumonia or pneumonitis.  2.  Other areas of chronic nodularity in both lungs are stable.  3.  Severe atherosclerotic changes and coronary artery disease.  4.  Evidence of chronic calcific pancreatitis.  5.  Colonic diverticulosis.    EKG: Personally reviewed.      Georgette Tilley MD  [facility]  Phone: #[phone]

## 2021-07-12 NOTE — PLAN OF CARE
Problem: Adult Inpatient Plan of Care  Goal: Absence of Hospital-Acquired Illness or Injury  Outcome: No Change  Pt remains free of falls. Nonskid socks utilized when out of bed. Utilizing call light appropriately. Will continue to monitor and follow plan of care.    Problem: Risk for Delirium  Goal: Improved Sleep  Outcome: No Change   Pt stated he has had difficulty sleeping since admission. Scheduled ambien administered per MAR. Pt asleep afterwards. Pt also rested between cares throughout the evening.

## 2021-07-12 NOTE — PLAN OF CARE
Problem: Adult Inpatient Plan of Care  Goal: Optimal Comfort and Wellbeing  Outcome: Improving   Pt. Will have pain < or equal to 3.

## 2021-07-12 NOTE — PLAN OF CARE
Problem: Adult Inpatient Plan of Care  Goal: Plan of Care Review  Outcome: Improving  Flowsheets (Taken 7/12/2021 3016)  Plan of Care Reviewed With: patient  Progress: improving     Problem: Risk for Delirium  Goal: Improved Attention and Thought Clarity  Outcome: Improving     Pt A&Ox 4, calm and cooperative with cares.  Denies pain.  Reports feeling much better with less shortness of breath.  VSS on RA, Afib CVR.  Continue with current plan of care.

## 2021-07-13 VITALS
SYSTOLIC BLOOD PRESSURE: 133 MMHG | RESPIRATION RATE: 18 BRPM | TEMPERATURE: 97.1 F | BODY MASS INDEX: 32.5 KG/M2 | HEART RATE: 63 BPM | WEIGHT: 233 LBS | DIASTOLIC BLOOD PRESSURE: 97 MMHG | OXYGEN SATURATION: 98 %

## 2021-07-13 LAB
ANION GAP SERPL CALCULATED.3IONS-SCNC: 10 MMOL/L (ref 5–18)
BUN SERPL-MCNC: 39 MG/DL (ref 8–28)
CALCIUM SERPL-MCNC: 9 MG/DL (ref 8.5–10.5)
CHLORIDE BLD-SCNC: 106 MMOL/L (ref 98–107)
CO2 SERPL-SCNC: 24 MMOL/L (ref 22–31)
CREAT SERPL-MCNC: 1.56 MG/DL (ref 0.7–1.3)
GFR SERPL CREATININE-BSD FRML MDRD: 42 ML/MIN/1.73M2
GLUCOSE BLD-MCNC: 158 MG/DL (ref 70–125)
HOLD SPECIMEN: NORMAL
POTASSIUM BLD-SCNC: 3.6 MMOL/L (ref 3.5–5)
SODIUM SERPL-SCNC: 140 MMOL/L (ref 136–145)

## 2021-07-13 PROCEDURE — 5A09357 ASSISTANCE WITH RESPIRATORY VENTILATION, LESS THAN 24 CONSECUTIVE HOURS, CONTINUOUS POSITIVE AIRWAY PRESSURE: ICD-10-PCS | Performed by: FAMILY MEDICINE

## 2021-07-13 PROCEDURE — 250N000012 HC RX MED GY IP 250 OP 636 PS 637: Performed by: FAMILY MEDICINE

## 2021-07-13 PROCEDURE — 80048 BASIC METABOLIC PNL TOTAL CA: CPT | Performed by: FAMILY MEDICINE

## 2021-07-13 PROCEDURE — 36415 COLL VENOUS BLD VENIPUNCTURE: CPT | Performed by: FAMILY MEDICINE

## 2021-07-13 PROCEDURE — 250N000009 HC RX 250: Performed by: FAMILY MEDICINE

## 2021-07-13 PROCEDURE — 250N000013 HC RX MED GY IP 250 OP 250 PS 637: Performed by: FAMILY MEDICINE

## 2021-07-13 PROCEDURE — 99239 HOSP IP/OBS DSCHRG MGMT >30: CPT | Mod: GC | Performed by: FAMILY MEDICINE

## 2021-07-13 RX ORDER — ACETAMINOPHEN 160 MG
1 TABLET,DISINTEGRATING ORAL 2 TIMES DAILY
Qty: 20 CAPSULE | Refills: 0 | Status: SHIPPED | OUTPATIENT
Start: 2021-07-13 | End: 2021-07-13

## 2021-07-13 RX ORDER — DOXYCYCLINE HYCLATE 100 MG
100 TABLET ORAL 2 TIMES DAILY
Qty: 14 TABLET | Refills: 0 | Status: SHIPPED | OUTPATIENT
Start: 2021-07-13 | End: 2021-07-13

## 2021-07-13 RX ORDER — BUMETANIDE 1 MG/1
3-4 TABLET ORAL 2 TIMES DAILY
Refills: 0 | Status: ON HOLD | COMMUNITY
Start: 2021-07-13 | End: 2021-10-26

## 2021-07-13 RX ORDER — DOXYCYCLINE HYCLATE 100 MG
100 TABLET ORAL 2 TIMES DAILY
Qty: 14 TABLET | Refills: 0 | Status: SHIPPED | OUTPATIENT
Start: 2021-07-13 | End: 2021-08-22

## 2021-07-13 RX ORDER — ACETAMINOPHEN 160 MG
1 TABLET,DISINTEGRATING ORAL 2 TIMES DAILY
Qty: 20 CAPSULE | Refills: 0 | Status: SHIPPED | OUTPATIENT
Start: 2021-07-13 | End: 2021-08-22

## 2021-07-13 RX ADMIN — DIGOXIN 125 MCG: 125 TABLET ORAL at 08:59

## 2021-07-13 RX ADMIN — BUMETANIDE 2 MG: 2 TABLET ORAL at 08:59

## 2021-07-13 RX ADMIN — CLOPIDOGREL BISULFATE 75 MG: 75 TABLET, FILM COATED ORAL at 08:59

## 2021-07-13 RX ADMIN — PANTOPRAZOLE SODIUM 40 MG: 20 TABLET, DELAYED RELEASE ORAL at 06:27

## 2021-07-13 RX ADMIN — METOPROLOL SUCCINATE 200 MG: 100 TABLET, EXTENDED RELEASE ORAL at 08:59

## 2021-07-13 RX ADMIN — ASPIRIN 81 MG: 81 TABLET ORAL at 08:59

## 2021-07-13 RX ADMIN — INSULIN DETEMIR 50 UNITS: 100 INJECTION, SOLUTION SUBCUTANEOUS at 08:59

## 2021-07-13 RX ADMIN — DOXYCYCLINE 100 MG: 100 INJECTION, POWDER, LYOPHILIZED, FOR SOLUTION INTRAVENOUS at 08:58

## 2021-07-13 RX ADMIN — INSULIN ASPART 1 UNITS: 100 INJECTION, SOLUTION INTRAVENOUS; SUBCUTANEOUS at 06:28

## 2021-07-13 RX ADMIN — APIXABAN 5 MG: 5 TABLET, FILM COATED ORAL at 08:59

## 2021-07-13 RX ADMIN — FLUTICASONE FUROATE AND VILANTEROL TRIFENATATE 1 PUFF: 200; 25 POWDER RESPIRATORY (INHALATION) at 06:26

## 2021-07-13 RX ADMIN — POTASSIUM CHLORIDE 40 MEQ: 1500 TABLET, EXTENDED RELEASE ORAL at 08:59

## 2021-07-13 NOTE — PLAN OF CARE
Problem: Adult Inpatient Plan of Care  Goal: Plan of Care Review  Outcome: Adequate for Discharge   Patient is up with a standby assist. Tolerating activity well at this time. Voiding. Reports improvement in his breathing - reports dyspnea with exertion at baseline. Patient is discharging to home with spouse. Going home with new medications - written information given. Follow-up appointment made with patient's pcp as recommended. Also instructed to follow-up with cardiology as recommended. Patient reported a mild bloody nose. Reported to writer that he gets these at home. Instructed to monitor and utilize a saline gel per hospitalist - patient and spouse agreeable with this plan. Discharge teaching performed with patient and spouse. Discharge information given and no further questions at this time. Kinza Sykes RN

## 2021-07-13 NOTE — DISCHARGE INSTRUCTIONS
Meg Roth MD    General - Family Medicine        Phone: [phone]; Fax: [phone]      Notifications: Admissions      Address: [address]  [address] [place] [address]        An appointment has been made for you on Tuesday, July 20th @ 9AM.        Jesús Corona MD  Phone: [phone]; Fax: [phone]      Email: [email or web address]      Address: [address] [place] [address]

## 2021-07-13 NOTE — PLAN OF CARE
VSS.On RA.  Denies any chest pain. CPAP with 4L @nyt.  Voiding, ambulating. Difficulty sleeping this shift.

## 2021-07-13 NOTE — PROGRESS NOTES
Care Management Discharge Note    Discharge Date: 07/13/2021       Discharge Disposition:Home           Additional Information:  Reviewed at discharge as needed. No additional needs at this time for discharge. Pt to return home with family.        Hiram Ortiz

## 2021-07-13 NOTE — DISCHARGE SUMMARY
[facility] MEDICINE  DISCHARGE SUMMARY     Primary Care Physician: Meg Roth  Admission Date: 7/11/2021   Discharge Provider: Georgette Tilley MD Discharge Date: 7/13/2021   Diet: Diabetic, cardiac   Code Status: Full Code   Activity: DCACTIVITY: Activity as tolerated        Condition at Discharge: Stable     REASON FOR PRESENTATION(See Admission Note for Details)   Back pain in between shoulder blades    PRINCIPAL & ACTIVE DISCHARGE DIAGNOSES     Community-acquired pneumonia  Atypical chest pain and upper back pain  Chronic diastolic heart failure  Essential hypertension  Dyslipidemia  COPD  Moderate obesity  Obstructive sleep apnea, compliant with CPAP use  Nocturnal hypoxia, using 4 L of oxygen at night  Chronic atrial fibrillation  CKD 3  DM2    PENDING LABS     Unresulted Labs Ordered in the Past 30 Days of this Admission     No orders found from 6/11/2021 to 7/12/2021.          PROCEDURES ( this hospitalization only)      None    RECOMMENDATIONS TO OUTPATIENT PROVIDER FOR F/U VISIT     Follow-up Appointments     Follow-up and recommended labs and tests       Follow up with pmd in 1 week  Follow up with cardiology in 2 week             DISPOSITION     Home    SUMMARY OF HOSPITAL COURSE:      Mr.William HANY Sutherland is a 80 year old male with  past medical history of coronary artery disease, history of coronary artery stent, chronic diastolic heart failure, pulmonary hypertension, essential hypertension, dyslipidemia, obesity, obstructive sleep apnea, chronic atrial fibrillation, on chronic anticoagulation treatment, ckd 3, DM 2, came with upper back pain in between shoulders found to have community-acquired pneumonia on chest CT scan.  Started on IV ceftriaxone and doxycycline. Remains hemodynamically stable. Has significant improvement.  Discharging home with doxycycline to complete 10-day course.  He has obstructive sleep apnea, compliant with CPAP use and using 4 L  of oxygen at night.  Has multiple cardiac risk factors.  ACS is ruled out.  On aspirin, Plavix, statin, beta-blocker.  Has chronic diastolic heart failure.  Bumex 2 mg twice a day.  No acute pulmonary congestion.  Chronic atrial fibrillation.  Heart rate is stable.  On chronic anticoagulation treatment.  Has CKD 3.  Creatinine stable at 1.9.  Blood pressure is stable with metoprolol 200 mg daily, eplerenone 25 mg daily, Avapro 300 mg daily, clonidine patch 0.2 mg per 24 hours.  Seeing cardiology at [facility].  Will follow up with primary care physician and cardiology closely as outpatient.    Patient has some nosebleeding in the setting of anticoagulation treatment.  Use nasal saline.  Discouraged to blow nose.       Discharge Medications with Med changes:     Current Discharge Medication List      START taking these medications    Details   doxycycline hyclate (VIBRA-TABS) 100 MG tablet Take 1 tablet (100 mg) by mouth 2 times daily for 7 days  Qty: 14 tablet, Refills: 0    Associated Diagnoses: Community acquired pneumonia of left upper lobe of lung      Lactobacillus Extra Strength CAPS Take 1 capful by mouth 2 times daily  Qty: 20 capsule, Refills: 0    Associated Diagnoses: Community acquired pneumonia of left upper lobe of lung         CONTINUE these medications which have CHANGED    Details   bumetanide (BUMEX) 1 MG tablet Take 2 tablets (2 mg) by mouth 2 times daily  Refills: 0    Associated Diagnoses: Acute on chronic congestive heart failure, unspecified heart failure type (H)         CONTINUE these medications which have NOT CHANGED    Details   acetaminophen (TYLENOL) 500 MG tablet [ACETAMINOPHEN (TYLENOL) 500 MG TABLET] Take 1,000 mg by mouth every 6 (six) hours as needed. First line of pain management. Do Not take more than 4,000 mg in 24 hours.      albuterol (PROVENTIL HFA;VENTOLIN HFA) 90 mcg/actuation inhaler Inhale 2 puffs into the lungs every 6 hours as needed       albuterol (PROVENTIL) 2.5 mg  /3 mL (0.083 %) nebulizer solution [ALBUTEROL (PROVENTIL) 2.5 MG /3 ML (0.083 %) NEBULIZER SOLUTION] Take 2.5 mg by nebulization every 4 (four) hours as needed (as needed).             apixaban (ELIQUIS) 5 mg Tab [APIXABAN (ELIQUIS) 5 MG TAB] Take 1 tablet by mouth every 12 (twelve) hours.      aspirin 81 MG EC tablet [ASPIRIN 81 MG EC TABLET] Take 81 mg by mouth every morning.       atorvastatin (LIPITOR) 40 MG tablet [ATORVASTATIN (LIPITOR) 40 MG TABLET] Take 40 mg by mouth daily.   Refills: 4      azithromycin (ZITHROMAX) 250 MG tablet Take 1,000 mg by mouth daily as needed (Take 1000 mg 1 hour prior to dental work)       brimonidine (ALPHAGAN) 0.2 % ophthalmic solution [BRIMONIDINE (ALPHAGAN) 0.2 % OPHTHALMIC SOLUTION] Administer 1 drop into the left eye 2 (two) times a day.  Refills: 11      calcium-magnesium 300-300 mg Tab Take 1 tablet by mouth 2 times daily       canagliflozin (INVOKANA) 100 mg Tab [CANAGLIFLOZIN (INVOKANA) 100 MG TAB] Take 100 mg by mouth.      cetirizine (ZYRTEC) 10 MG tablet Take 10 mg by mouth once a week       cloNIDine (CATAPRES-TTS) 0.2 mg/24 hr [CLONIDINE (CATAPRES-TTS) 0.2 MG/24 HR] Place 1 patch on the skin once a week. Place patch on Monday.      clopidogreL (PLAVIX) 75 mg tablet [CLOPIDOGREL (PLAVIX) 75 MG TABLET] Take 1 tablet by mouth daily.      digoxin (LANOXIN) 125 mcg tablet [DIGOXIN (LANOXIN) 125 MCG TABLET] Take 1 tablet (125 mcg total) by mouth daily.  Qty: 90 tablet, Refills: 3    Associated Diagnoses: Persistent atrial fibrillation (H)      eplerenone (INSPRA) 25 MG tablet [EPLERENONE (INSPRA) 25 MG TABLET] Take 25 mg by mouth.      esomeprazole (NEXIUM) 40 MG capsule Take 40 mg by mouth every morning       fluticasone-vilanterol (BREO ELLIPTA) 200-25 mcg/dose DsDv inhaler [FLUTICASONE-VILANTEROL (BREO ELLIPTA) 200-25 MCG/DOSE DSDV INHALER] Inhale 1 puff daily.      HYDROcodone-acetaminophen 5-325 mg per tablet [HYDROCODONE-ACETAMINOPHEN 5-325 MG PER TABLET] Take 1  "tablet by mouth every 4 (four) hours as needed.   Refills: 0      insulin detemir (LEVEMIR VIAL) 100 UNIT/ML vial Inject 50 Units Subcutaneous 2 times daily      irbesartan (AVAPRO) 300 MG tablet Take 300 mg by mouth At Bedtime      lidocaine (LIDODERM) 5 % [LIDOCAINE (LIDODERM) 5 %] Place 1 patch on the skin as needed. Remove & Discard patch within 12 hours or as directed by MD      metFORMIN (GLUCOPHAGE-XR) 500 MG 24 hr tablet Take 1,000 mg by mouth 2 times daily (with meals)      metoprolol succinate ER (TOPROL-XL) 200 MG 24 hr tablet Take 200 mg by mouth daily      nitroglycerin (NITROSTAT) 0.4 MG SL tablet [NITROGLYCERIN (NITROSTAT) 0.4 MG SL TABLET] Place 0.4 mg under the tongue every 5 (five) minutes as needed for chest pain.      potassium chloride (K-DUR,KLOR-CON) 20 MEQ tablet [POTASSIUM CHLORIDE (K-DUR,KLOR-CON) 20 MEQ TABLET] Take 20 mEq by mouth 3 (three) times a day.       rOPINIRole (REQUIP) 2 MG tablet [ROPINIROLE (REQUIP) 2 MG TABLET] Take 2 mg by mouth at bedtime.       tadalafil (CIALIS) 20 MG tablet Take 20 mg by mouth daily      triamcinolone (KENALOG) 0.1 % external cream Apply 1 g topically 2 times daily as needed      zolpidem (AMBIEN) 10 mg tablet [ZOLPIDEM (AMBIEN) 10 MG TABLET] Take 10 mg by mouth bedtime.      ACCU-CHEK JOSE PLUS TEST STRP strips [ACCU-CHEK JOSE PLUS TEST STRP STRIPS] see administration instructions.      BD ULTRA-FINE MANISHA PEN NEEDLES 32 gauge x 5/32\" Ndle [BD ULTRA-FINE MANISHA PEN NEEDLES 32 GAUGE X 5/32\" NDLE]   Refills: 4      OXYGEN-AIR DELIVERY SYSTEMS MISC [OXYGEN-AIR DELIVERY SYSTEMS MISC] Use As Directed.                   Rationale for medication changes:      Doxycycline         Consults   SOCIAL WORK IP CONSULT    Immunizations given this encounter     Most Recent Immunizations   Administered Date(s) Administered     COVID-19,PF,Bipin 03/06/2021           Anticoagulation Information    Eliquis 5 mg twice a day      SIGNIFICANT IMAGING FINDINGS     Results " for orders placed or performed during the hospital encounter of 07/11/21   CT Chest Abdomen Pelvis w/o Contrast    Impression    IMPRESSION:  1.  Acute airspace infiltrate in the anterior basal segment of the left lower lobe consistent with acute pneumonia or pneumonitis.    2.  Other areas of chronic nodularity in both lungs are stable.    3.  Severe atherosclerotic changes and coronary artery disease.    4.  Evidence of chronic calcific pancreatitis.    5.  Colonic diverticulosis.       SIGNIFICANT LABORATORY FINDINGS     Cr 1.56    Discharge Orders        Reason for your hospital stay    Back pain in between shoulders     Follow-up and recommended labs and tests     Follow up with pmd in 1 week  Follow up with cardiology in 2 week     Activity    Your activity upon discharge: activity as tolerated     Diet    Follow this diet upon discharge: cardiac and diabetic       Examination   Physical Exam   Temp:  [97.1  F (36.2  C)-98.1  F (36.7  C)] 97.1  F (36.2  C)  Pulse:  [60-75] 63  Resp:  [16-20] 18  BP: (131-175)/(63-97) 133/97  SpO2:  [90 %-98 %] 98 %  Wt Readings from Last 1 Encounters:   07/13/21 105.7 kg (233 lb)     GENERAL:  Alert, appears comfortable, in no acute distress, appears stated age   HEAD:  Normocephalic, without obvious abnormality, atraumatic   EYES:  PERRL, conjunctiva  clear,  EOM's intact   NOSE: Nares normal,  mucosa normal, no drainage   THROAT: Lips, mucosa,  gums normal, mouth moist   NECK: Supple, symmetrical, trachea midline   BACK:   Symmetric, no curvature, ROM normal   LUNGS:   Clear to auscultation bilaterally, no rales, rhonchi, or wheezing, symmetric chest rise on inhalation, respirations unlabored   CHEST WALL:  No tenderness or deformity   HEART:  Regular rate and rhythm, S1 and S2 normal, no murmur, rub, or gallop    ABDOMEN:   Soft, non-tender, bowel sounds active , no masses, no organomegaly, no rebound or guarding   EXTREMITIES: trace  edema    SKIN: No rashes   NEURO:  Alert, oriented x3, moves all four extremities freely   PSYCH: Cooperative, behavior is appropriate          Please see EMR for more detailed significant labs, imaging, consultant notes etc.    IGeorgette MD, personally saw the patient today and spent greater than 30 minutes discharging this patient.    Georgette Tilley MD  [facility]    CC:Meg Roth

## 2021-07-14 ENCOUNTER — PATIENT OUTREACH (OUTPATIENT)
Dept: CARE COORDINATION | Facility: CLINIC | Age: 81
End: 2021-07-14

## 2021-07-14 DIAGNOSIS — Z71.89 OTHER SPECIFIED COUNSELING: ICD-10-CM

## 2021-07-14 PROBLEM — I50.31 ACUTE DIASTOLIC CHF (CONGESTIVE HEART FAILURE) (H): Status: RESOLVED | Noted: 2020-04-09 | Resolved: 2021-02-05

## 2021-07-14 NOTE — PROGRESS NOTES
Clinic Care Coordination Contact    Background: Care Coordination referral placed from [facility] discharge report for reason of patient meeting criteria for a TCM outreach call by [facility] team.    Assessment: Upon chart review, [facility] Team member will cancel/close the referral for TCM outreach due to reason below:     Patient has a follow up appointment with an appropriate provider tomorrow for hospital discharge.         Plan: Care Coordination referral for TCM outreach canceled.    Fabby Kenney MA  [facility] [place], [facility]

## 2021-07-16 ENCOUNTER — HOSPITAL ENCOUNTER (OUTPATIENT)
Dept: CARDIAC REHAB | Facility: CLINIC | Age: 81
End: 2021-07-16
Attending: INTERNAL MEDICINE
Payer: COMMERCIAL

## 2021-07-16 PROCEDURE — 93798 PHYS/QHP OP CAR RHAB W/ECG: CPT

## 2021-07-21 ENCOUNTER — HOSPITAL ENCOUNTER (OUTPATIENT)
Dept: CARDIAC REHAB | Facility: CLINIC | Age: 81
End: 2021-07-21
Attending: INTERNAL MEDICINE
Payer: COMMERCIAL

## 2021-07-21 PROCEDURE — 93798 PHYS/QHP OP CAR RHAB W/ECG: CPT

## 2021-07-23 ENCOUNTER — LAB REQUISITION (OUTPATIENT)
Dept: LAB | Facility: CLINIC | Age: 81
End: 2021-07-23
Payer: COMMERCIAL

## 2021-07-23 DIAGNOSIS — I13.0 HYPERTENSIVE HEART AND CHRONIC KIDNEY DISEASE WITH HEART FAILURE AND STAGE 1 THROUGH STAGE 4 CHRONIC KIDNEY DISEASE, OR UNSPECIFIED CHRONIC KIDNEY DISEASE (H): ICD-10-CM

## 2021-07-23 LAB
ANION GAP SERPL CALCULATED.3IONS-SCNC: 15 MMOL/L (ref 5–18)
BUN SERPL-MCNC: 41 MG/DL (ref 8–28)
CALCIUM SERPL-MCNC: 9.3 MG/DL (ref 8.5–10.5)
CHLORIDE BLD-SCNC: 103 MMOL/L (ref 98–107)
CO2 SERPL-SCNC: 24 MMOL/L (ref 22–31)
CREAT SERPL-MCNC: 2.01 MG/DL (ref 0.7–1.3)
GFR SERPL CREATININE-BSD FRML MDRD: 31 ML/MIN/1.73M2
GLUCOSE BLD-MCNC: 208 MG/DL (ref 70–125)
POTASSIUM BLD-SCNC: 4.8 MMOL/L (ref 3.5–5)
SODIUM SERPL-SCNC: 142 MMOL/L (ref 136–145)

## 2021-07-23 PROCEDURE — 80048 BASIC METABOLIC PNL TOTAL CA: CPT | Performed by: FAMILY MEDICINE

## 2021-08-13 ENCOUNTER — TRANSFERRED RECORDS (OUTPATIENT)
Dept: HEALTH INFORMATION MANAGEMENT | Facility: CLINIC | Age: 81
End: 2021-08-13

## 2021-08-13 ENCOUNTER — LAB REQUISITION (OUTPATIENT)
Dept: LAB | Facility: CLINIC | Age: 81
End: 2021-08-13

## 2021-08-13 DIAGNOSIS — I13.0 HYPERTENSIVE HEART AND CHRONIC KIDNEY DISEASE WITH HEART FAILURE AND STAGE 1 THROUGH STAGE 4 CHRONIC KIDNEY DISEASE, OR UNSPECIFIED CHRONIC KIDNEY DISEASE (H): ICD-10-CM

## 2021-08-13 LAB
ANION GAP SERPL CALCULATED.3IONS-SCNC: 13 MMOL/L (ref 5–18)
BUN SERPL-MCNC: 38 MG/DL (ref 8–28)
CALCIUM SERPL-MCNC: 9.2 MG/DL (ref 8.5–10.5)
CHLORIDE BLD-SCNC: 102 MMOL/L (ref 98–107)
CO2 SERPL-SCNC: 26 MMOL/L (ref 22–31)
CREAT SERPL-MCNC: 1.91 MG/DL (ref 0.7–1.3)
GFR SERPL CREATININE-BSD FRML MDRD: 33 ML/MIN/1.73M2
GLUCOSE BLD-MCNC: 279 MG/DL (ref 70–125)
HBA1C MFR BLD: 8.1 % (ref 4.2–6.1)
POTASSIUM BLD-SCNC: 4.5 MMOL/L (ref 3.5–5)
SODIUM SERPL-SCNC: 141 MMOL/L (ref 136–145)

## 2021-08-13 PROCEDURE — 80048 BASIC METABOLIC PNL TOTAL CA: CPT | Performed by: FAMILY MEDICINE

## 2021-08-13 PROCEDURE — 36415 COLL VENOUS BLD VENIPUNCTURE: CPT | Performed by: FAMILY MEDICINE

## 2021-08-18 ENCOUNTER — TRANSFERRED RECORDS (OUTPATIENT)
Dept: HEALTH INFORMATION MANAGEMENT | Facility: CLINIC | Age: 81
End: 2021-08-18

## 2021-08-22 ENCOUNTER — HOSPITAL ENCOUNTER (EMERGENCY)
Facility: CLINIC | Age: 81
Discharge: HOME | End: 2021-08-23
Attending: EMERGENCY MEDICINE | Admitting: INTERNAL MEDICINE
Payer: COMMERCIAL

## 2021-08-22 ENCOUNTER — APPOINTMENT (OUTPATIENT)
Dept: ULTRASOUND IMAGING | Facility: CLINIC | Age: 81
End: 2021-08-22
Attending: EMERGENCY MEDICINE
Payer: COMMERCIAL

## 2021-08-22 DIAGNOSIS — N28.9 RENAL INSUFFICIENCY: ICD-10-CM

## 2021-08-22 DIAGNOSIS — D62 ANEMIA DUE TO BLOOD LOSS, ACUTE: ICD-10-CM

## 2021-08-22 DIAGNOSIS — M79.661 PAIN OF RIGHT LOWER LEG: ICD-10-CM

## 2021-08-22 DIAGNOSIS — E87.20 LACTIC ACID ACIDOSIS: ICD-10-CM

## 2021-08-22 LAB
ABO/RH(D): NORMAL
ANION GAP SERPL CALCULATED.3IONS-SCNC: 15 MMOL/L (ref 5–18)
ANION GAP SERPL CALCULATED.3IONS-SCNC: 16 MMOL/L (ref 5–18)
ANTIBODY SCREEN: NEGATIVE
BLD PROD TYP BPU: NORMAL
BLD PROD TYP BPU: NORMAL
BLOOD COMPONENT TYPE: NORMAL
BLOOD COMPONENT TYPE: NORMAL
BUN SERPL-MCNC: 58 MG/DL (ref 8–28)
BUN SERPL-MCNC: 62 MG/DL (ref 8–28)
C REACTIVE PROTEIN LHE: 1.3 MG/DL (ref 0–0.8)
CALCIUM SERPL-MCNC: 8.4 MG/DL (ref 8.5–10.5)
CALCIUM SERPL-MCNC: 8.5 MG/DL (ref 8.5–10.5)
CHLORIDE BLD-SCNC: 100 MMOL/L (ref 98–107)
CHLORIDE BLD-SCNC: 102 MMOL/L (ref 98–107)
CK SERPL-CCNC: 184 U/L (ref 30–190)
CO2 SERPL-SCNC: 18 MMOL/L (ref 22–31)
CO2 SERPL-SCNC: 18 MMOL/L (ref 22–31)
CODING SYSTEM: NORMAL
CODING SYSTEM: NORMAL
CREAT SERPL-MCNC: 2.56 MG/DL (ref 0.7–1.3)
CREAT SERPL-MCNC: 2.63 MG/DL (ref 0.7–1.3)
CROSSMATCH: NORMAL
CROSSMATCH: NORMAL
ERYTHROCYTE [DISTWIDTH] IN BLOOD BY AUTOMATED COUNT: 18 % (ref 10–15)
GFR SERPL CREATININE-BSD FRML MDRD: 22 ML/MIN/1.73M2
GFR SERPL CREATININE-BSD FRML MDRD: 23 ML/MIN/1.73M2
GLUCOSE BLD-MCNC: 157 MG/DL (ref 70–125)
GLUCOSE BLD-MCNC: 218 MG/DL (ref 70–125)
HCT VFR BLD AUTO: 24.9 % (ref 40–53)
HGB BLD-MCNC: 7 G/DL (ref 13.3–17.7)
HGB BLD-MCNC: 8.4 G/DL (ref 13.3–17.7)
INR PPP: 1.57 (ref 0.85–1.15)
ISSUE DATE AND TIME: NORMAL
ISSUE DATE AND TIME: NORMAL
LACTATE SERPL-SCNC: 3.5 MMOL/L (ref 0.7–2)
LACTATE SERPL-SCNC: 5.7 MMOL/L (ref 0.7–2)
MCH RBC QN AUTO: 20.4 PG (ref 26.5–33)
MCHC RBC AUTO-ENTMCNC: 28.1 G/DL (ref 31.5–36.5)
MCV RBC AUTO: 73 FL (ref 78–100)
PLATELET # BLD AUTO: 248 10E3/UL (ref 150–450)
POTASSIUM BLD-SCNC: 5.3 MMOL/L (ref 3.5–5)
POTASSIUM BLD-SCNC: 5.3 MMOL/L (ref 3.5–5)
RBC # BLD AUTO: 3.43 10E6/UL (ref 4.4–5.9)
SODIUM SERPL-SCNC: 134 MMOL/L (ref 136–145)
SODIUM SERPL-SCNC: 135 MMOL/L (ref 136–145)
SPECIMEN EXPIRATION DATE: NORMAL
UNIT ABO/RH: NORMAL
UNIT ABO/RH: NORMAL
UNIT NUMBER: NORMAL
UNIT NUMBER: NORMAL
UNIT STATUS: NORMAL
UNIT STATUS: NORMAL
UNIT TYPE ISBT: 6200
UNIT TYPE ISBT: 6200
WBC # BLD AUTO: 12.3 10E3/UL (ref 4–11)

## 2021-08-22 PROCEDURE — 86141 C-REACTIVE PROTEIN HS: CPT | Performed by: EMERGENCY MEDICINE

## 2021-08-22 PROCEDURE — 96375 TX/PRO/DX INJ NEW DRUG ADDON: CPT

## 2021-08-22 PROCEDURE — 36415 COLL VENOUS BLD VENIPUNCTURE: CPT | Performed by: EMERGENCY MEDICINE

## 2021-08-22 PROCEDURE — 87040 BLOOD CULTURE FOR BACTERIA: CPT | Performed by: EMERGENCY MEDICINE

## 2021-08-22 PROCEDURE — 99285 EMERGENCY DEPT VISIT HI MDM: CPT | Mod: 25

## 2021-08-22 PROCEDURE — 250N000011 HC RX IP 250 OP 636: Performed by: EMERGENCY MEDICINE

## 2021-08-22 PROCEDURE — P9016 RBC LEUKOCYTES REDUCED: HCPCS | Performed by: EMERGENCY MEDICINE

## 2021-08-22 PROCEDURE — 82550 ASSAY OF CK (CPK): CPT | Performed by: EMERGENCY MEDICINE

## 2021-08-22 PROCEDURE — 96374 THER/PROPH/DIAG INJ IV PUSH: CPT | Mod: 59

## 2021-08-22 PROCEDURE — 86900 BLOOD TYPING SEROLOGIC ABO: CPT | Performed by: EMERGENCY MEDICINE

## 2021-08-22 PROCEDURE — 83605 ASSAY OF LACTIC ACID: CPT | Performed by: EMERGENCY MEDICINE

## 2021-08-22 PROCEDURE — 36415 COLL VENOUS BLD VENIPUNCTURE: CPT | Mod: 59 | Performed by: EMERGENCY MEDICINE

## 2021-08-22 PROCEDURE — 85027 COMPLETE CBC AUTOMATED: CPT | Performed by: EMERGENCY MEDICINE

## 2021-08-22 PROCEDURE — 85018 HEMOGLOBIN: CPT | Performed by: EMERGENCY MEDICINE

## 2021-08-22 PROCEDURE — 93971 EXTREMITY STUDY: CPT | Mod: RT

## 2021-08-22 PROCEDURE — 86923 COMPATIBILITY TEST ELECTRIC: CPT | Performed by: EMERGENCY MEDICINE

## 2021-08-22 PROCEDURE — 85610 PROTHROMBIN TIME: CPT | Performed by: EMERGENCY MEDICINE

## 2021-08-22 PROCEDURE — 36430 TRANSFUSION BLD/BLD COMPNT: CPT

## 2021-08-22 PROCEDURE — 250N000013 HC RX MED GY IP 250 OP 250 PS 637: Performed by: EMERGENCY MEDICINE

## 2021-08-22 PROCEDURE — C9803 HOPD COVID-19 SPEC COLLECT: HCPCS

## 2021-08-22 PROCEDURE — 80048 BASIC METABOLIC PNL TOTAL CA: CPT | Mod: 91 | Performed by: EMERGENCY MEDICINE

## 2021-08-22 RX ORDER — MORPHINE SULFATE 4 MG/ML
4 INJECTION, SOLUTION INTRAMUSCULAR; INTRAVENOUS ONCE
Status: COMPLETED | OUTPATIENT
Start: 2021-08-22 | End: 2021-08-22

## 2021-08-22 RX ORDER — FENTANYL CITRATE 50 UG/ML
25 INJECTION, SOLUTION INTRAMUSCULAR; INTRAVENOUS ONCE
Status: COMPLETED | OUTPATIENT
Start: 2021-08-23 | End: 2021-08-22

## 2021-08-22 RX ORDER — SODIUM CHLORIDE 9 MG/ML
INJECTION, SOLUTION INTRAVENOUS CONTINUOUS
Status: DISCONTINUED | OUTPATIENT
Start: 2021-08-22 | End: 2021-08-23

## 2021-08-22 RX ORDER — METOPROLOL TARTRATE 50 MG
50 TABLET ORAL 2 TIMES DAILY WITH MEALS
Status: ON HOLD | COMMUNITY
End: 2021-11-01

## 2021-08-22 RX ORDER — ZINC GLUCONATE 50 MG
50 TABLET ORAL
COMMUNITY

## 2021-08-22 RX ORDER — MULTIVIT WITH MINERALS/LUTEIN
1000 TABLET ORAL DAILY
Status: ON HOLD | COMMUNITY
End: 2022-01-15

## 2021-08-22 RX ORDER — MULTIPLE VITAMINS W/ MINERALS TAB 9MG-400MCG
1 TAB ORAL DAILY
COMMUNITY

## 2021-08-22 RX ORDER — ACETAMINOPHEN 325 MG/1
650 TABLET ORAL ONCE
Status: COMPLETED | OUTPATIENT
Start: 2021-08-22 | End: 2021-08-22

## 2021-08-22 RX ADMIN — FENTANYL CITRATE 25 MCG: 50 INJECTION, SOLUTION INTRAMUSCULAR; INTRAVENOUS at 23:40

## 2021-08-22 RX ADMIN — MORPHINE SULFATE 4 MG: 4 INJECTION, SOLUTION INTRAMUSCULAR; INTRAVENOUS at 15:34

## 2021-08-22 RX ADMIN — ACETAMINOPHEN 650 MG: 325 TABLET ORAL at 21:51

## 2021-08-22 ASSESSMENT — MIFFLIN-ST. JEOR: SCORE: 1830.76

## 2021-08-22 NOTE — PHARMACY-ADMISSION MEDICATION HISTORY
Pharmacy Note - Admission Medication History    Pertinent Provider Information: Pt's wife had medication list. He reports only taking AM doses today.      ______________________________________________________________________    Prior To Admission (PTA) med list completed and updated in EMR.       PTA Med List   Medication Sig Last Dose     acetaminophen (TYLENOL) 500 MG tablet [ACETAMINOPHEN (TYLENOL) 500 MG TABLET] Take 1,000 mg by mouth every 6 (six) hours as needed. First line of pain management. Do Not take more than 4,000 mg in 24 hours. 8/22/2021 at Unknown time     albuterol (PROVENTIL HFA;VENTOLIN HFA) 90 mcg/actuation inhaler Inhale 2 puffs into the lungs every 6 hours as needed   at PRN     apixaban (ELIQUIS) 5 mg Tab [APIXABAN (ELIQUIS) 5 MG TAB] Take 1 tablet by mouth every 12 (twelve) hours. 8/22/2021 at x1     aspirin 81 MG EC tablet [ASPIRIN 81 MG EC TABLET] Take 81 mg by mouth every morning.  8/22/2021 at Unknown time     atorvastatin (LIPITOR) 40 MG tablet Take 40 mg by mouth At Bedtime  8/21/2021 at Unknown time     bumetanide (BUMEX) 1 MG tablet Take 3 mg by mouth 2 times daily  8/22/2021 at x1     canagliflozin (INVOKANA) 100 mg Tab Take 100 mg by mouth every morning  8/22/2021 at Unknown time     cloNIDine (CATAPRES-TTS) 0.2 mg/24 hr [CLONIDINE (CATAPRES-TTS) 0.2 MG/24 HR] Place 1 patch on the skin once a week. Place patch on Monday. 8/16/2021 at Patch is currently on     clopidogreL (PLAVIX) 75 mg tablet [CLOPIDOGREL (PLAVIX) 75 MG TABLET] Take 1 tablet by mouth daily. 8/22/2021 at Unknown time     digoxin (LANOXIN) 125 mcg tablet [DIGOXIN (LANOXIN) 125 MCG TABLET] Take 1 tablet (125 mcg total) by mouth daily. 8/22/2021 at Unknown time     eplerenone (INSPRA) 25 MG tablet Take 25 mg by mouth daily  8/22/2021 at Unknown time     fluticasone-vilanterol (BREO ELLIPTA) 200-25 mcg/dose DsDv inhaler [FLUTICASONE-VILANTEROL (BREO ELLIPTA) 200-25 MCG/DOSE DSDV INHALER] Inhale 1 puff daily. 8/22/2021  at Can bring in.     Garlic 1000 MG CAPS Take 1 capsule by mouth daily 8/22/2021 at Unknown time     insulin glargine (LANTUS PEN) 100 UNIT/ML pen Inject 54 Units Subcutaneous 2 times daily 8/22/2021 at x1     irbesartan (AVAPRO) 300 MG tablet Take 150 mg by mouth daily  8/22/2021 at Unknown time     lidocaine (LIDODERM) 5 % [LIDOCAINE (LIDODERM) 5 %] Place 1 patch on the skin as needed. Remove & Discard patch within 12 hours or as directed by MD 8/22/2021 at Currently has one on.     metFORMIN (GLUCOPHAGE-XR) 500 MG 24 hr tablet Take 1,000 mg by mouth 2 times daily (with meals) 8/22/2021 at x1     metoprolol tartrate (LOPRESSOR) 50 MG tablet Take 50 mg by mouth 2 times daily (with meals) 8/22/2021 at x1     multivitamin w/minerals (THERA-VIT-M) tablet Take 1 tablet by mouth daily 8/22/2021 at Unknown time     omeprazole (PRILOSEC) 20 MG DR capsule Take 20 mg by mouth daily 8/22/2021 at Unknown time     potassium chloride (K-DUR,KLOR-CON) 20 MEQ tablet Take 40 mEq by mouth daily  8/22/2021 at Unknown time     rOPINIRole (REQUIP) 2 MG tablet [ROPINIROLE (REQUIP) 2 MG TABLET] Take 2 mg by mouth at bedtime.  8/21/2021 at Unknown time     tadalafil (CIALIS) 20 MG tablet Take 20 mg by mouth daily 8/22/2021 at Unknown time     vitamin C (ASCORBIC ACID) 1000 MG TABS Take 1,000 mg by mouth daily 8/22/2021 at Unknown time     zinc gluconate 50 MG tablet Take 50 mg by mouth daily 8/22/2021 at Unknown time     zolpidem (AMBIEN) 10 mg tablet [ZOLPIDEM (AMBIEN) 10 MG TABLET] Take 10 mg by mouth bedtime. 8/21/2021 at Unknown time       Information source(s): Patient, Family member and CareEverywhere/SureScripts  Method of interview communication: in-person    Summary of Changes to PTA Med List  New: Insulin glargine, metoprolol tartrate, omeprazole, MVI, garlic, vitamin C, zinc  Discontinued: albuterol nebulizer, brimonidine eye drops, calcium-magnesium, zyrtec, doxycycline, esomeprazole, norco, insulin detemir, probiotic,  triamcinolone cream  Changed: metoprolol succinate to tartrate, bumex, irbesartan    Patient was asked about OTC/herbal products specifically.  PTA med list reflects this.    In the past week, patient estimated taking medication this percent of the time:  greater than 90%.    Allergies were reviewed, assessed, and updated with the patient.      Medications currently not available for use during hospital stay. Family/Patient representative states they will bring Breo ellipta to [facility].    The information provided in this note is only as accurate as the sources available at the time of the update(s).    Thank you for the opportunity to participate in the care of this patient.    Ignacia Plaza [facility]  8/22/2021 6:16 PM

## 2021-08-22 NOTE — ED PROVIDER NOTES
"EMERGENCY DEPARTMENT ENCOUNTER      NAME: Red Sutherland  AGE: 80 year old male  YOB: 1940  MRN: [id number]  EVALUATION DATE & TIME: No admission date for patient encounter.    PCP: Haresh Corona    ED PROVIDER: Zackery Rose M.D.      Chief Complaint   Patient presents with     Leg Pain         FINAL IMPRESSION:  Right leg pain  Anemia  Hypertension  Acute kidney injury    ED COURSE & MEDICAL DECISION MAKING:    Pertinent Labs & Imaging studies reviewed. (See chart for details)  80 year old male presents to the Emergency Department for evaluation of severe right calf pain/leg pain.  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  This is difficult to control secondary to Eliquis use.  Patient reports a tourniquet had been placed to his calf for \"a long period of time.\"  Records were reviewed.  Arterial ultrasounds had been obtained.  Only minimal arterial disease noted.  No DVTs.  Patient reports onset of discomfort today.  He tried walking it off and it did not seem to make any difference neither making it better nor worse.  He describes it as severe crampy achiness.  Localized to the proximal calf on the right.  States her some slight discomfort in the remainder of the lower extremity.  Patient was examined in triage over concerns of potential arterial occlusion and ischemic limb.  However the right and left feet are warm.  Capillary refill is actually slightly more brisk on the right than the left.  He has marked varicosities of the right leg but no obvious cords or overlying erythema suggest thrombophlebitis.  There is no obvious significant firmness and or fullness to the calf to suggest compartment syndrome.  Could be direct trauma to the muscular belly secondary to the tourniquet effect.  IV access has been established.  Patient is getting ultrasound out of caution for potential deep venous thrombosis given the prolonged tourniquet " effect.  Also getting a CPK to assess for potential injury to muscular bellies.  Patient given intravenous morphine for discomfort.  Baseline laboratory work being performed to assess hemoglobin, INR and metabolic panel.. Patient appears non toxic with stable vitals signs. Overall exam is benign.    2:23 PM I met with the patient  In triage for the initial interview and physical examination. Discussed plan for treatment and workup in the ED.  3:45 PM I rechecked and updated the patient.  Patient resting comfortably after the intravenous morphine x4 mg.  Lactate markedly elevated 5.7.  White cell count normal at 12.3.  Hemoglobin markedly low at 7.0.  INR 1.57.  Awaiting total CK and basic metabolic panel.  However given patient's anemia and prior issues with some vascular insufficiency this could be contributory to his symptoms.  Blood pressure is low normal.  We will proceed with intravenous fluids but on a judicious basis given the need for hospitalization and transfusion.  4:31 PM.  Patient informed of need for hospitalization.  Blood pressure has declined to approximately 80 systolic.  However patient was sleeping and had received morphine for his discomfort.  On awakening blood pressure increased to almost 90 systolic.  Intravenous fluids have been ordered both for his low blood pressure and his lactate.  Lactic acid likely related to low flow state rather than infectious process.  Will obtain blood culture out of caution.  Patient also discussed need for hospitalization and transfusion.  Also give consideration of potential causalgia given the compression of the calf and potential injury to the peroneal nerve.  Patient consented for transfusion.  4:58 PM.  Patient discussed with Dr. Harrison.  He is reluctant to admit the patient here at this time given absence of ICU beds and markedly elevated lactic acid and borderline blood pressure.  Will provide judicious bolus and repeat some of the laboratory evaluation.   Patient remained in the ER until blood pressure improves.   5:54 PM I rechecked and updated the patient. Patient feeling better, blood pressure is still low. Will give additional fluids.  6:39 PM I rechecked and updated the patient.  Patient feels much improved he is asking for a meal.  This will be provided.  Blood pressure is improved approximately 90 systolic.  Blood will be hung shortly.  After transfusion we will proceed with repeat basic metabolic and lactate.  8:39 PM I rechecked the patient, his blood pressure is 89 systolic.  9:12 PM.  Patient is receiving 2 units of blood.  He feels well.  Blood pressure is 110 systolic.  Heart rate is 72.  Will repeat his hemoglobin, lactate and BMP in 30 minutes prior to making decision on disposition.  10:43 PM.  After fluids and blood repeat laboratory evaluation obtained.  The gases improved declining from 5.7-3.5.  Hemoglobin has increased to 8.4 from seven.  Potassium is slightly elevated likely reflective from his transfusion and potassium load from transfusion.  BUN and creatinine essentially unchanged at sixty-two and 2.63.  Patient with acute kidney injury.  Typical creatinine is around two.  Reactive protein only minimally elevated at 1.7.  Will discuss potential admission with the hospitalist..  11 PM.  Patient discussed with the hospitalist.  He will examined the patient prior to making decision on admission.             At the conclusion of the encounter I discussed the results of all of the tests and the disposition. The questions were answered and return precautions provided. The patient or family acknowledged understanding and was agreeable with the care plan.         PPE: Provider wore N95 mask, eye protection.     MEDICATIONS GIVEN IN THE EMERGENCY:  Medications   0.9% sodium chloride BOLUS (1,000 mLs Intravenous Not Given 8/22/21 1953)     Followed by   sodium chloride 0.9% infusion ( Intravenous Not Given 8/22/21 1954)   0.9% sodium chloride BOLUS  (1,000 mLs Intravenous Not Given 8/22/21 1953)     Followed by   sodium chloride 0.9% infusion (0 mLs Intravenous Paused 8/22/21 1930)   morphine (PF) injection 4 mg (4 mg Intravenous Given 8/22/21 1534)       NEW PRESCRIPTIONS STARTED AT TODAY'S ER VISIT  New Prescriptions    No medications on file          =================================================================    HPI    Patient information was obtained from: Patient    Use of Intrepreter: N/A         Red Sutherland is a 80 year old male with a pertient medical history of HTN, hyperlipidemia, CAD, diabetes, CHF, COPD, afib, and s/p total knee arthroplasty, who presents to the ED for evaluation of leg pain.    Patient reports that his right leg pain started 2 days ago, specifying that it is from his right knee down to his toes. Walking does not help. He reports that he had been wearing a tourniquet for a long time, and the pain has been worse since removing it. Patient denies any other current complaints.    REVIEW OF SYSTEMS   Constitutional:  Denies fever, chills  Respiratory:  Denies productive cough or increased work of breathing  Cardiovascular:  Denies chest pain, palpitations  GI:  Denies abdominal pain, nausea, vomiting, or change in bowel or bladder habits   Musculoskeletal:  Denies any new muscle/joint swelling. Positive for leg pain.  Skin:  Denies rash   Neurologic:  Denies focal weakness  All systems negative except as marked.     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 They put him on  sildenafil as nitroprusside lowered systemic BP and with that the mean PA dropped from 54 to 49. Negative VQ at [facility] Dec 1, 2017.     RLS (restless legs syndrome)      Sleep apnea        PAST SURGICAL HISTORY:  Past Surgical History:   Procedure Laterality Date     BACK SURGERY      lower back     CARDIAC CATHETERIZATION  12/13/2017    Right and left at [facility], mean PA 58, PCW 24 with V wave of 35, LVEDP of 18, with Nipride systemic BP, PVR and mean PA all declined     CARDIOVERSION  03/15/2013    for afib     CATARACT EXTRACTION Bilateral      CORONARY STENT PLACEMENT       IR ABDOMINAL AORTOGRAM  11/16/2012     IR MISCELLANEOUS PROCEDURE  7/20/2001     IR MISCELLANEOUS PROCEDURE  11/16/2012     OTHER SURGICAL HISTORY      m[facility]     SHOULDER SURGERY      reapir on right shoulder     TOTAL HIP ARTHROPLASTY Left      TOTAL KNEE ARTHROPLASTY Bilateral      WRIST SURGERY Bilateral      ZZHC COLONOSCOPY W/WO BRUSH/WASH N/A 1/14/2021    Procedure: COLONOSCOPY;  Surgeon: Mihai Harris MD;  Location: [facility];  Service: Gastroenterology         CURRENT MEDICATIONS:      Current Facility-Administered Medications:      0.9% sodium chloride BOLUS, 1,000 mL, Intravenous, Once **FOLLOWED BY** sodium chloride 0.9% infusion, , Intravenous, Continuous, Zackery Rose MD     0.9% sodium chloride BOLUS, 1,000 mL, Intravenous, Once **FOLLOWED BY** sodium chloride 0.9% infusion, , Intravenous, Continuous, Zackery Rose MD, Paused at 08/22/21 1930    Current Outpatient Medications:      acetaminophen (TYLENOL) 500 MG tablet, [ACETAMINOPHEN (TYLENOL) 500 MG TABLET] Take 1,000 mg by mouth every 6 (six) hours as needed. First line of pain management. Do Not take more than 4,000 mg in 24 hours., Disp: , Rfl:      albuterol (PROVENTIL HFA;VENTOLIN HFA) 90 mcg/actuation inhaler, Inhale 2 puffs into the lungs every 6 hours as needed , Disp: , Rfl:      apixaban (ELIQUIS) 5 mg Tab, [APIXABAN (ELIQUIS) 5 MG TAB] Take 1 tablet by  mouth every 12 (twelve) hours., Disp: , Rfl:      aspirin 81 MG EC tablet, [ASPIRIN 81 MG EC TABLET] Take 81 mg by mouth every morning. , Disp: , Rfl:      atorvastatin (LIPITOR) 40 MG tablet, Take 40 mg by mouth At Bedtime , Disp: , Rfl: 4     bumetanide (BUMEX) 1 MG tablet, Take 3 mg by mouth 2 times daily , Disp: , Rfl: 0     canagliflozin (INVOKANA) 100 mg Tab, Take 100 mg by mouth every morning , Disp: , Rfl:      cloNIDine (CATAPRES-TTS) 0.2 mg/24 hr, [CLONIDINE (CATAPRES-TTS) 0.2 MG/24 HR] Place 1 patch on the skin once a week. Place patch on Monday., Disp: , Rfl:      clopidogreL (PLAVIX) 75 mg tablet, [CLOPIDOGREL (PLAVIX) 75 MG TABLET] Take 1 tablet by mouth daily., Disp: , Rfl:      digoxin (LANOXIN) 125 mcg tablet, [DIGOXIN (LANOXIN) 125 MCG TABLET] Take 1 tablet (125 mcg total) by mouth daily., Disp: 90 tablet, Rfl: 3     eplerenone (INSPRA) 25 MG tablet, Take 25 mg by mouth daily , Disp: , Rfl:      fluticasone-vilanterol (BREO ELLIPTA) 200-25 mcg/dose DsDv inhaler, [FLUTICASONE-VILANTEROL (BREO ELLIPTA) 200-25 MCG/DOSE DSDV INHALER] Inhale 1 puff daily., Disp: , Rfl:      Garlic 1000 MG CAPS, Take 1 capsule by mouth daily, Disp: , Rfl:      insulin glargine (LANTUS PEN) 100 UNIT/ML pen, Inject 54 Units Subcutaneous 2 times daily, Disp: , Rfl:      irbesartan (AVAPRO) 300 MG tablet, Take 150 mg by mouth daily , Disp: , Rfl:      lidocaine (LIDODERM) 5 %, [LIDOCAINE (LIDODERM) 5 %] Place 1 patch on the skin as needed. Remove & Discard patch within 12 hours or as directed by MD, Disp: , Rfl:      metFORMIN (GLUCOPHAGE-XR) 500 MG 24 hr tablet, Take 1,000 mg by mouth 2 times daily (with meals), Disp: , Rfl:      metoprolol tartrate (LOPRESSOR) 50 MG tablet, Take 50 mg by mouth 2 times daily (with meals), Disp: , Rfl:      multivitamin w/minerals (THERA-VIT-M) tablet, Take 1 tablet by mouth daily, Disp: , Rfl:      omeprazole (PRILOSEC) 20 MG DR capsule, Take 20 mg by mouth daily, Disp: , Rfl:       "potassium chloride (K-DUR,KLOR-CON) 20 MEQ tablet, Take 40 mEq by mouth daily , Disp: , Rfl:      rOPINIRole (REQUIP) 2 MG tablet, [ROPINIROLE (REQUIP) 2 MG TABLET] Take 2 mg by mouth at bedtime. , Disp: , Rfl:      tadalafil (CIALIS) 20 MG tablet, Take 20 mg by mouth daily, Disp: , Rfl:      vitamin C (ASCORBIC ACID) 1000 MG TABS, Take 1,000 mg by mouth daily, Disp: , Rfl:      zinc gluconate 50 MG tablet, Take 50 mg by mouth daily, Disp: , Rfl:      zolpidem (AMBIEN) 10 mg tablet, [ZOLPIDEM (AMBIEN) 10 MG TABLET] Take 10 mg by mouth bedtime., Disp: , Rfl:      ACCU-CHEK JOSE PLUS TEST STRP strips, [ACCU-CHEK JOSE PLUS TEST STRP STRIPS] see administration instructions., Disp: , Rfl:      azithromycin (ZITHROMAX) 250 MG tablet, Take 1,000 mg by mouth daily as needed (Take 1000 mg 1 hour prior to dental work) , Disp: , Rfl:      BD ULTRA-FINE MANISHA PEN NEEDLES 32 gauge x 5/32\" Ndle, [BD ULTRA-FINE MANISHA PEN NEEDLES 32 GAUGE X 5/32\" NDLE] , Disp: , Rfl: 4     nitroglycerin (NITROSTAT) 0.4 MG SL tablet, [NITROGLYCERIN (NITROSTAT) 0.4 MG SL TABLET] Place 0.4 mg under the tongue every 5 (five) minutes as needed for chest pain., Disp: , Rfl:      OXYGEN-AIR DELIVERY SYSTEMS MISC, [OXYGEN-AIR DELIVERY SYSTEMS MISC] Use As Directed., Disp: , Rfl:     ALLERGIES:  Allergies   Allergen Reactions     Furosemide Unknown     Previously tolerated.     Hydrochlorothiazide Unknown     Iodinated Contrast Media [Diagnostic X-Ray Materials] Nausea     Losartan Other (See Comments)     Other reaction(s): Stomatitis, Bloody nose dry mouth and lips     Losartan-Hydrochlorothiazide [Hyzaar] Unknown     Metaxalone Unknown     Metolazone Other (See Comments)     Muscle cramps     Mometasone Other (See Comments)     Bloody nose     Penicillins Unknown     Rabeprazole Unknown     Ramipril Unknown     Shellfish Containing Products [Shellfish-Derived Products] Unknown     Other reaction(s): mouth sores, Other reaction(s): mouth sores     " Methocarbamol Rash       FAMILY HISTORY:  Family History   Problem Relation Age of Onset     Hyperlipidemia Mother      Hypertension Mother      Heart Disease Mother      Hyperlipidemia Father      Hypertension Father      Coronary Artery Disease Father      Cerebrovascular Disease Brother      Depression Brother      No Known Problems Sister      Pulmonary Hypertension No family hx of      Congenital heart disease No family hx of        SOCIAL HISTORY:   Social History     Socioeconomic History     Marital status:      Spouse name: Not on file     Number of children: 4     Years of education: Not on file     Highest education level: Not on file   Occupational History     Not on file   Tobacco Use     Smoking status: Former Smoker     Packs/day: 1.50     Years: 44.00     Pack years: 66.00     Types: Cigarettes     Quit date: 1996     Years since quittin.3     Smokeless tobacco: Never Used   Substance and Sexual Activity     Alcohol use: Yes     Alcohol/week: 1.0 standard drinks     Comment: Alcoholic Drinks/day: 1 per month     Drug use: No     Sexual activity: Not Currently     Partners: Female   Other Topics Concern     Not on file   Social History Narrative     Not on file     Social Determinants of Health     Financial Resource Strain:      Difficulty of Paying Living Expenses:    Food Insecurity:      Worried About Running Out of Food in the Last Year:      Ran Out of Food in the Last Year:    Transportation Needs:      Lack of Transportation (Medical):      Lack of Transportation (Non-Medical):    Physical Activity:      Days of Exercise per Week:      Minutes of Exercise per Session:    Stress:      Feeling of Stress :    Social Connections:      Frequency of Communication with Friends and Family:      Frequency of Social Gatherings with Friends and Family:      Attends Orthodox Services:      Active Member of Clubs or Organizations:      Attends Club or Organization Meetings:      Marital  "Status:    Intimate Partner Violence:      Fear of Current or Ex-Partner:      Emotionally Abused:      Physically Abused:      Sexually Abused:        VITALS:  Patient Vitals for the past 24 hrs:   BP Temp Temp src Pulse Resp SpO2 Height Weight   08/22/21 2033 -- -- -- 74 18 100 % -- --   08/22/21 2032 104/52 97.5  F (36.4  C) -- 74 18 -- -- --   08/22/21 2028 104/52 -- -- 74 25 91 % -- --   08/22/21 2020 (!) 67/43 -- -- 71 17 94 % -- --   08/22/21 1940 (!) 82/50 98.4  F (36.9  C) Oral 72 23 97 % -- --   08/22/21 1930 (!) 83/50 -- -- 68 -- 98 % -- --   08/22/21 1925 (!) 87/48 -- -- 69 -- 99 % -- --   08/22/21 1920 (!) 89/51 -- -- 69 -- 98 % -- --   08/22/21 1915 (!) 89/50 -- -- 69 -- 98 % -- --   08/22/21 1910 (!) 84/48 -- -- 67 -- 96 % -- --   08/22/21 1905 (!) 79/47 -- -- 65 -- 99 % -- --   08/22/21 1902 (!) 85/53 97.5  F (36.4  C) Oral 64 18 98 % -- --   08/22/21 1900 (!) 85/53 -- -- 63 -- 98 % -- --   08/22/21 1855 -- -- -- 65 -- 96 % -- --   08/22/21 1850 -- -- -- 66 -- 98 % -- --   08/22/21 1845 (!) 79/49 -- -- 67 -- 97 % -- --   08/22/21 1840 -- -- -- 68 -- 97 % -- --   08/22/21 1835 -- -- -- 70 -- 98 % -- --   08/22/21 1830 (!) 88/53 -- -- 71 -- 96 % -- --   08/22/21 1825 -- -- -- 68 -- 96 % -- --   08/22/21 1820 -- -- -- 71 -- 98 % -- --   08/22/21 1815 (!) 79/41 -- -- 65 -- 97 % -- --   08/22/21 1810 -- -- -- 67 -- 95 % -- --   08/22/21 1805 -- -- -- 65 -- 95 % -- --   08/22/21 1800 (!) 75/37 -- -- 67 -- 97 % -- --   08/22/21 1710 -- -- -- 66 -- 94 % -- --   08/22/21 1700 (!) 80/49 -- -- 65 -- 94 % -- --   08/22/21 1650 (!) 76/51 -- -- 68 -- 95 % -- --   08/22/21 1640 (!) 77/51 -- -- 68 -- 94 % -- --   08/22/21 1630 (!) 87/50 -- -- 63 -- 95 % -- --   08/22/21 1413 108/58 97.6  F (36.4  C) Temporal 72 16 98 % 1.803 m (5' 11\") 108.9 kg (240 lb)        PHYSICAL EXAM    Limited  Constitutional:  Awake, alert, moderate to marked distress  HENT:  Normocephalic, Atraumatic. Bilateral external ears normal. " Oropharynx moist. Nose normal. Neck- Normal range of motion with no guarding, No midline cervical tenderness, Supple, No stridor.   Eyes:  PERRL, EOMI with no signs of entrapment, Conjunctiva normal, No discharge.   Respiratory:  Normal breath sounds, No respiratory distress, No wheezing.    Cardiovascular:  Normal heart rate, Normal rhythm, No appreciable rubs or gallops.   GI:  Soft, No tenderness, No distension, No palpable masses  Musculoskeletal:  Intact distal pulses, No edema. Good range of motion in all major joints. Proximal calf tenderness on right. Marked varicosity without cords, warmth or erythema. Capillary refill brisk on right, minimally sluggish on left. Both feet warm. Dressing to outer right foot.  Integument:  Warm, Dry, No erythema, No rash.   Neurologic:  Alert & oriented, Normal motor function, Normal sensory function, No focal deficits noted.   Psychiatric:  Affect normal, Judgment normal, Mood normal.       LAB:  All pertinent labs reviewed and interpreted.  Results for orders placed or performed during the hospital encounter of 08/22/21   US Lower Extremity Venous Duplex Right    Impression    IMPRESSION:  1.  No deep venous thrombosis in the right lower extremity.   Basic metabolic panel   Result Value Ref Range    Sodium 134 (L) 136 - 145 mmol/L    Potassium 5.3 (H) 3.5 - 5.0 mmol/L    Chloride 100 98 - 107 mmol/L    Carbon Dioxide (CO2) 18 (L) 22 - 31 mmol/L    Anion Gap 16 5 - 18 mmol/L    Urea Nitrogen 58 (H) 8 - 28 mg/dL    Creatinine 2.56 (H) 0.70 - 1.30 mg/dL    Calcium 8.5 8.5 - 10.5 mg/dL    Glucose 218 (H) 70 - 125 mg/dL    GFR Estimate 23 (L) >60 mL/min/1.73m2   Lactic acid whole blood   Result Value Ref Range    Lactic Acid 5.7 (HH) 0.7 - 2.0 mmol/L   CRP inflammation   Result Value Ref Range    CRP 1.3 (H) 0.0-<0.8 mg/dL   Result Value Ref Range     30 - 190 U/L   CBC (+ platelets, no diff)   Result Value Ref Range    WBC Count 12.3 (H) 4.0 - 11.0 10e3/uL    RBC Count  3.43 (L) 4.40 - 5.90 10e6/uL    Hemoglobin 7.0 (L) 13.3 - 17.7 g/dL    Hematocrit 24.9 (L) 40.0 - 53.0 %    MCV 73 (L) 78 - 100 fL    MCH 20.4 (L) 26.5 - 33.0 pg    MCHC 28.1 (L) 31.5 - 36.5 g/dL    RDW 18.0 (H) 10.0 - 15.0 %    Platelet Count 248 150 - 450 10e3/uL   Result Value Ref Range    INR 1.57 (H) 0.85 - 1.15   Adult Type and Screen   Result Value Ref Range    ABO/RH(D) A POS     Antibody Screen Negative Negative    SPECIMEN EXPIRATION DATE [id number]    Prepare red blood cells (unit)   Result Value Ref Range    CROSSMATCH Compatible     UNIT ABO/RH A Pos     Unit Number [id number]     UNIT STATUS Issued     Blood Component Type Red Blood Cells     Product Code Q7328O02     CODING SYSTEM [id number]     UNIT TYPE ISBT 6200     ISSUE DATE AND TIME [id number]    Prepare red blood cells (unit)   Result Value Ref Range    CROSSMATCH Compatible     UNIT ABO/RH A Pos     Unit Number [id number]     UNIT STATUS Issued     Blood Component Type Red Blood Cells     Product Code [id number]     CODING SYSTEM [id number]     UNIT TYPE ISBT 6200     ISSUE DATE AND TIME [id number]        RADIOLOGY:  Reviewed all pertinent imaging. Please see official radiology report.  US Lower Extremity Venous Duplex Right   Final Result   IMPRESSION:   1.  No deep venous thrombosis in the right lower extremity.            I, Johanna Sanchez, am serving as a scribe to document services personally performed by Zackery Rose MD, based on my observation and the provider's statements to me. I, Zackery Rose MD attest that Johanna Sanchez is acting in a scribe capacity, has observed my performance of the services and has documented them in accordance with my direction.    Zackery Rose M.D.  Emergency Medicine  [facility] EMERGENCY ROOM     Zackery Rose MD  08/22/21 5252

## 2021-08-22 NOTE — ED TRIAGE NOTES
Patient is here with left lower leg pain. He was at [facility] for two days after a blood blister ruptured. He lost blood at this time. He stated that since he got out the pain is so intense.

## 2021-08-23 ENCOUNTER — APPOINTMENT (OUTPATIENT)
Dept: ULTRASOUND IMAGING | Facility: CLINIC | Age: 81
End: 2021-08-23
Attending: EMERGENCY MEDICINE
Payer: COMMERCIAL

## 2021-08-23 VITALS
TEMPERATURE: 97.7 F | SYSTOLIC BLOOD PRESSURE: 111 MMHG | OXYGEN SATURATION: 96 % | RESPIRATION RATE: 26 BRPM | HEIGHT: 71 IN | BODY MASS INDEX: 33.6 KG/M2 | WEIGHT: 240 LBS | DIASTOLIC BLOOD PRESSURE: 59 MMHG | HEART RATE: 68 BPM

## 2021-08-23 PROBLEM — E87.20 LACTIC ACID ACIDOSIS: Status: ACTIVE | Noted: 2021-08-23

## 2021-08-23 PROBLEM — M79.661 PAIN OF RIGHT LOWER LEG: Status: ACTIVE | Noted: 2021-08-23

## 2021-08-23 PROBLEM — N28.9 RENAL INSUFFICIENCY: Status: ACTIVE | Noted: 2021-08-23

## 2021-08-23 PROBLEM — D62 ANEMIA DUE TO BLOOD LOSS, ACUTE: Status: ACTIVE | Noted: 2021-08-23

## 2021-08-23 LAB
ALBUMIN SERPL-MCNC: 3.3 G/DL (ref 3.5–5)
ALP SERPL-CCNC: 84 U/L (ref 45–120)
ALT SERPL W P-5'-P-CCNC: 25 U/L (ref 0–45)
ANION GAP SERPL CALCULATED.3IONS-SCNC: 10 MMOL/L (ref 5–18)
ANION GAP SERPL CALCULATED.3IONS-SCNC: 14 MMOL/L (ref 5–18)
AST SERPL W P-5'-P-CCNC: 34 U/L (ref 0–40)
BASE EXCESS BLDV CALC-SCNC: -2 MMOL/L
BASOPHILS # BLD MANUAL: 0 10E3/UL (ref 0–0.2)
BASOPHILS NFR BLD MANUAL: 0 %
BILIRUB SERPL-MCNC: 2.6 MG/DL (ref 0–1)
BUN SERPL-MCNC: 62 MG/DL (ref 8–28)
BUN SERPL-MCNC: 62 MG/DL (ref 8–28)
CALCIUM SERPL-MCNC: 8.1 MG/DL (ref 8.5–10.5)
CALCIUM SERPL-MCNC: 8.1 MG/DL (ref 8.5–10.5)
CHLORIDE BLD-SCNC: 102 MMOL/L (ref 98–107)
CHLORIDE BLD-SCNC: 103 MMOL/L (ref 98–107)
CK SERPL-CCNC: 130 U/L (ref 30–190)
CO2 SERPL-SCNC: 18 MMOL/L (ref 22–31)
CO2 SERPL-SCNC: 22 MMOL/L (ref 22–31)
CREAT SERPL-MCNC: 2.55 MG/DL (ref 0.7–1.3)
CREAT SERPL-MCNC: 2.61 MG/DL (ref 0.7–1.3)
DIGOXIN SERPL-MCNC: 0.9 UG/L
ELLIPTOCYTES BLD QL SMEAR: SLIGHT
EOSINOPHIL # BLD MANUAL: 0.2 10E3/UL (ref 0–0.7)
EOSINOPHIL NFR BLD MANUAL: 2 %
ERYTHROCYTE [DISTWIDTH] IN BLOOD BY AUTOMATED COUNT: 19.1 % (ref 10–15)
GFR SERPL CREATININE-BSD FRML MDRD: 23 ML/MIN/1.73M2
GFR SERPL CREATININE-BSD FRML MDRD: 23 ML/MIN/1.73M2
GLUCOSE BLD-MCNC: 118 MG/DL (ref 70–125)
GLUCOSE BLD-MCNC: 119 MG/DL (ref 70–125)
GLUCOSE BLDC GLUCOMTR-MCNC: 100 MG/DL (ref 70–125)
GLUCOSE BLDC GLUCOMTR-MCNC: 105 MG/DL (ref 70–125)
GLUCOSE BLDC GLUCOMTR-MCNC: 194 MG/DL (ref 70–125)
HCO3 BLDV-SCNC: 22 MMOL/L (ref 24–30)
HCT VFR BLD AUTO: 27.6 % (ref 40–53)
HGB BLD-MCNC: 8.2 G/DL (ref 13.3–17.7)
LACTATE SERPL-SCNC: 1.7 MMOL/L (ref 0.7–2)
LYMPHOCYTES # BLD MANUAL: 3.4 10E3/UL (ref 0.8–5.3)
LYMPHOCYTES NFR BLD MANUAL: 35 %
MCH RBC QN AUTO: 22.1 PG (ref 26.5–33)
MCHC RBC AUTO-ENTMCNC: 29.7 G/DL (ref 31.5–36.5)
MCV RBC AUTO: 74 FL (ref 78–100)
MONOCYTES # BLD MANUAL: 0.3 10E3/UL (ref 0–1.3)
MONOCYTES NFR BLD MANUAL: 3 %
NEUTROPHILS # BLD MANUAL: 5.8 10E3/UL (ref 1.6–8.3)
NEUTROPHILS NFR BLD MANUAL: 60 %
NRBC # BLD AUTO: 0.7 10E3/UL
NRBC # BLD AUTO: 1.1 10E3/UL
NRBC BLD AUTO-RTO: 7 /100
NRBC BLD MANUAL-RTO: 11 %
OXYHGB MFR BLDV: 53.3 % (ref 70–75)
PCO2 BLDV: 35 MM HG (ref 35–50)
PH BLDV: 7.42 [PH] (ref 7.35–7.45)
PLAT MORPH BLD: ABNORMAL
PLATELET # BLD AUTO: 196 10E3/UL (ref 150–450)
PO2 BLDV: 30 MM HG (ref 25–47)
POLYCHROMASIA BLD QL SMEAR: SLIGHT
POTASSIUM BLD-SCNC: 4.8 MMOL/L (ref 3.5–5)
POTASSIUM BLD-SCNC: 4.9 MMOL/L (ref 3.5–5)
PROCALCITONIN SERPL-MCNC: 0.29 NG/ML (ref 0–0.49)
PROT SERPL-MCNC: 5.9 G/DL (ref 6–8)
RBC # BLD AUTO: 3.71 10E6/UL (ref 4.4–5.9)
RBC MORPH BLD: ABNORMAL
SAO2 % BLDV: 54.8 % (ref 70–75)
SARS-COV-2 RNA RESP QL NAA+PROBE: NEGATIVE
SODIUM SERPL-SCNC: 134 MMOL/L (ref 136–145)
SODIUM SERPL-SCNC: 135 MMOL/L (ref 136–145)
WBC # BLD AUTO: 9.6 10E3/UL (ref 4–11)

## 2021-08-23 PROCEDURE — 36415 COLL VENOUS BLD VENIPUNCTURE: CPT | Performed by: EMERGENCY MEDICINE

## 2021-08-23 PROCEDURE — 93926 LOWER EXTREMITY STUDY: CPT | Mod: RT

## 2021-08-23 PROCEDURE — 36415 COLL VENOUS BLD VENIPUNCTURE: CPT | Performed by: INTERNAL MEDICINE

## 2021-08-23 PROCEDURE — 250N000013 HC RX MED GY IP 250 OP 250 PS 637: Performed by: INTERNAL MEDICINE

## 2021-08-23 PROCEDURE — 82805 BLOOD GASES W/O2 SATURATION: CPT | Performed by: EMERGENCY MEDICINE

## 2021-08-23 PROCEDURE — 99221 1ST HOSP IP/OBS SF/LOW 40: CPT | Performed by: NURSE PRACTITIONER

## 2021-08-23 PROCEDURE — 258N000003 HC RX IP 258 OP 636: Performed by: INTERNAL MEDICINE

## 2021-08-23 PROCEDURE — 85027 COMPLETE CBC AUTOMATED: CPT | Performed by: INTERNAL MEDICINE

## 2021-08-23 PROCEDURE — 83605 ASSAY OF LACTIC ACID: CPT | Performed by: INTERNAL MEDICINE

## 2021-08-23 PROCEDURE — 87635 SARS-COV-2 COVID-19 AMP PRB: CPT | Performed by: INTERNAL MEDICINE

## 2021-08-23 PROCEDURE — C9803 HOPD COVID-19 SPEC COLLECT: HCPCS

## 2021-08-23 PROCEDURE — 258N000003 HC RX IP 258 OP 636: Performed by: EMERGENCY MEDICINE

## 2021-08-23 PROCEDURE — 80048 BASIC METABOLIC PNL TOTAL CA: CPT | Performed by: EMERGENCY MEDICINE

## 2021-08-23 PROCEDURE — 84145 PROCALCITONIN (PCT): CPT | Performed by: INTERNAL MEDICINE

## 2021-08-23 PROCEDURE — 82550 ASSAY OF CK (CPK): CPT | Performed by: INTERNAL MEDICINE

## 2021-08-23 PROCEDURE — 80162 ASSAY OF DIGOXIN TOTAL: CPT | Performed by: INTERNAL MEDICINE

## 2021-08-23 RX ORDER — ATORVASTATIN CALCIUM 40 MG/1
40 TABLET, FILM COATED ORAL AT BEDTIME
Status: DISCONTINUED | OUTPATIENT
Start: 2021-08-23 | End: 2021-08-23 | Stop reason: HOSPADM

## 2021-08-23 RX ORDER — DEXTROSE MONOHYDRATE 25 G/50ML
25-50 INJECTION, SOLUTION INTRAVENOUS
Status: DISCONTINUED | OUTPATIENT
Start: 2021-08-23 | End: 2021-08-23 | Stop reason: HOSPADM

## 2021-08-23 RX ORDER — ZOLPIDEM TARTRATE 10 MG/1
10 TABLET ORAL AT BEDTIME
Status: CANCELLED | OUTPATIENT
Start: 2021-08-23

## 2021-08-23 RX ORDER — ROPINIROLE 1 MG/1
2 TABLET, FILM COATED ORAL AT BEDTIME
Status: DISCONTINUED | OUTPATIENT
Start: 2021-08-23 | End: 2021-08-23 | Stop reason: HOSPADM

## 2021-08-23 RX ORDER — METFORMIN HCL 500 MG
1000 TABLET, EXTENDED RELEASE 24 HR ORAL 2 TIMES DAILY WITH MEALS
Status: CANCELLED | OUTPATIENT
Start: 2021-08-23

## 2021-08-23 RX ORDER — LIDOCAINE 40 MG/G
CREAM TOPICAL
Status: DISCONTINUED | OUTPATIENT
Start: 2021-08-23 | End: 2021-08-23 | Stop reason: HOSPADM

## 2021-08-23 RX ORDER — HYDROMORPHONE HCL IN WATER/PF 6 MG/30 ML
0.2 PATIENT CONTROLLED ANALGESIA SYRINGE INTRAVENOUS EVERY 4 HOURS PRN
Status: DISCONTINUED | OUTPATIENT
Start: 2021-08-23 | End: 2021-08-23 | Stop reason: HOSPADM

## 2021-08-23 RX ORDER — METOPROLOL TARTRATE 25 MG/1
50 TABLET, FILM COATED ORAL 2 TIMES DAILY WITH MEALS
Status: DISCONTINUED | OUTPATIENT
Start: 2021-08-23 | End: 2021-08-23 | Stop reason: HOSPADM

## 2021-08-23 RX ORDER — IRBESARTAN 150 MG/1
150 TABLET ORAL DAILY
Status: CANCELLED | OUTPATIENT
Start: 2021-08-23

## 2021-08-23 RX ORDER — ALBUTEROL SULFATE 90 UG/1
2 AEROSOL, METERED RESPIRATORY (INHALATION) EVERY 6 HOURS PRN
Status: DISCONTINUED | OUTPATIENT
Start: 2021-08-23 | End: 2021-08-23 | Stop reason: HOSPADM

## 2021-08-23 RX ORDER — EPLERENONE 25 MG/1
25 TABLET, FILM COATED ORAL DAILY
Status: CANCELLED | OUTPATIENT
Start: 2021-08-23

## 2021-08-23 RX ORDER — NICOTINE POLACRILEX 4 MG
15-30 LOZENGE BUCCAL
Status: DISCONTINUED | OUTPATIENT
Start: 2021-08-23 | End: 2021-08-23 | Stop reason: HOSPADM

## 2021-08-23 RX ORDER — ACETAMINOPHEN 500 MG
1000 TABLET ORAL EVERY 6 HOURS PRN
Status: DISCONTINUED | OUTPATIENT
Start: 2021-08-23 | End: 2021-08-23 | Stop reason: HOSPADM

## 2021-08-23 RX ORDER — CLOPIDOGREL BISULFATE 75 MG/1
75 TABLET ORAL DAILY
Status: DISCONTINUED | OUTPATIENT
Start: 2021-08-23 | End: 2021-08-23 | Stop reason: HOSPADM

## 2021-08-23 RX ORDER — DIGOXIN 125 MCG
125 TABLET ORAL DAILY
Status: CANCELLED | OUTPATIENT
Start: 2021-08-23

## 2021-08-23 RX ORDER — SODIUM CHLORIDE 9 MG/ML
INJECTION, SOLUTION INTRAVENOUS CONTINUOUS
Status: DISCONTINUED | OUTPATIENT
Start: 2021-08-23 | End: 2021-08-23

## 2021-08-23 RX ORDER — SODIUM CHLORIDE 9 MG/ML
INJECTION, SOLUTION INTRAVENOUS CONTINUOUS
Status: DISCONTINUED | OUTPATIENT
Start: 2021-08-23 | End: 2021-08-23 | Stop reason: HOSPADM

## 2021-08-23 RX ORDER — OMEPRAZOLE 10 MG/1
20 CAPSULE, DELAYED RELEASE ORAL
Status: DISCONTINUED | OUTPATIENT
Start: 2021-08-23 | End: 2021-08-23 | Stop reason: HOSPADM

## 2021-08-23 RX ORDER — ASPIRIN 81 MG/1
81 TABLET ORAL EVERY MORNING
Status: DISCONTINUED | OUTPATIENT
Start: 2021-08-23 | End: 2021-08-23 | Stop reason: HOSPADM

## 2021-08-23 RX ADMIN — METOPROLOL TARTRATE 50 MG: 25 TABLET, FILM COATED ORAL at 08:10

## 2021-08-23 RX ADMIN — SODIUM CHLORIDE: 9 INJECTION, SOLUTION INTRAVENOUS at 03:36

## 2021-08-23 RX ADMIN — ASPIRIN 81 MG: 81 TABLET, COATED ORAL at 08:24

## 2021-08-23 RX ADMIN — ROPINIROLE HYDROCHLORIDE 2 MG: 1 TABLET, FILM COATED ORAL at 03:35

## 2021-08-23 RX ADMIN — SODIUM CHLORIDE: 9 INJECTION, SOLUTION INTRAVENOUS at 01:29

## 2021-08-23 RX ADMIN — ATORVASTATIN CALCIUM 40 MG: 40 TABLET, FILM COATED ORAL at 03:42

## 2021-08-23 RX ADMIN — OMEPRAZOLE 20 MG: 10 CAPSULE, DELAYED RELEASE ORAL at 08:29

## 2021-08-23 RX ADMIN — Medication 1 MG: at 03:43

## 2021-08-23 RX ADMIN — APIXABAN 5 MG: 5 TABLET, FILM COATED ORAL at 03:35

## 2021-08-23 RX ADMIN — ACETAMINOPHEN 1000 MG: 500 TABLET, FILM COATED ORAL at 08:11

## 2021-08-23 NOTE — ED NOTES
Writer called and spoke with Melba in operations regarding placement of pt at [facility] or [facility] per Vascular request. Melba stated neither hospital has beds writer asked to have pt placed on list as we do not have vascular services here at this hospital.

## 2021-08-23 NOTE — H&P
[facility] MEDICINE ADMISSION HISTORY AND PHYSICAL       Assessment & Plan      1. Right leg pain - Has history of arteriosclerosis obliterans. Sees [facility], Last visit April 2021. Had left endarterectomy last Jan 2021, and complicated by NSTEMI. Last note from [facility], he will need right femoral endarterectomy, but needs cardiac optimization given NSTEMI - on plavix/ASA    - He has lactic acidosis with lactic acid of 5, improved to 3. Given pain and concerns for PVD -- I recommended to do arterial doppler of the LE. And consider transfer to hospital with vascular surgery service. Most recent situation we have no bed available.   - CMS checks  - vascular consult       2. Recent bleeding right foot varicose vein  - admitted at [facility] and required transfusion. No bleeding episode but Hgb was 7. He was given PRBC in ED. He is on DAPT and eliquis.  - repeat CBC    3. Acute (2.6 crea) on chronic renal failure (1.8) with mild hyperkalemia  - unclear etiology - check CK, check UA. He is on diuretics. Might have to back off tonight and re-eval in ED. On bumex 3 mgs BID based on most recent notes  - need to hold diuretics for now and inspra incl K supp - given renal failure and elev K  - check BMP     4. History of CAD, chronic diastolic CHF, chronic atrial fibrillation on apixaban --    5. History of DM2 on metformin  6. History of COPD, and NOVA    330A - Seen patient on follow up while in ED, he denies right leg pain. He can move his leg. He was sitting on side of bed when I saw himHe said, his pain was off and on since last admission. He is on metformin could elevate lactic acid. No fevers. No infections we can see.     Did talk to Vascular service including Duplex reports, and indicate same findings from April US report. He said, he needed angiogram on E at some point, however, concern was his kidney function. We did discuss the lactic acid elev, and he said, could be due  to recent torniquet application and could hav cause some muscle damage. Patient dangles both LE while seated in bed and denies pain. He can move his legs OK. Foot/Leg warm to touch.     He wants to transfer the patient at [facility] or [facility] if [facility] not available. Discussed with ED staff    He is on clonidine patch. Pls take note of next patch change. Hold lantus as sugar is low 100s       VTE prophylaxis: On eliquis  IV fluids: Per order set   Diet:  DM diet   GI prophylaxis: Not indicated at this point, re-assess if needed.   Pain, sleep, and vomiting medications: Per order set  Code Status: Full   COVID test result:  negative   COVID vaccination: unknown  Barriers to discharge: admitting clinical condition  Discharge Disposition and goals:  Unable to determine at this point, pending clinical progress and response to treatment. Patient may need transfer to SNF or [facility] if unsafe to go home and needed treatment inappropriate at home setting OR may need home health care evaluation if care can be delivered at home settings. Consider referral to care manager/      Care plan was created based on available information provided, including patient's condition at the time of encounter.   This plan was discussed with patient and/or family members using layman's terms and have agreed to proceed.   At the end of night shift (9PM - 730A), this case will be presented to the AM Hospitalist.    It is recommended to revise care plan if there is change in condition and/or new clinical information that are not available during my encounter.     All or some of home medication/s were not resumed on admission due to safety reasons or contraindications. Dosing and frequency may also have been modified. Please resume/review them during your visit.     70 minutes of total visit duration and greater than 50% was spent in direct evaluation of patient and coordination of care including discussion of diagnostic test  results and recommended treatment. .      Nilson Catherine MD, MPH, FACP, [facility]  Internal Medicine - Hospitalist        Chief Complaint Right leg pain       HISTORY     - Met him in the ED. He was awake and alert. I also met her wife.   - He came in because of worsening right leg pain. He did try pain meds and did not work. Difficult for him to walk.   - He was just discharged from [facility] 8/18-20/2021. He was admitted there for bleeding varicose veins on right foot. This was complicated by hypotension and required hemostasis with compression bandage. He required blood transfusion that time.  - While he was there at [facility], he already was having right leg pain. He just felt its getting worse until today.   - He sees Vascular surgery service at [place] - arteriosclerosis obliterans  - His last arterial was done 4/20/201 and showed     Right: Doppler Waveforms: Abnormal signals starting at or above the superficial femoral level. Resting Index: BASIA (PT)- 0.26 BASIA (DP)- 0.38 TBI- 0.12 TcPO2: Values as noted. Left: Doppler Waveforms: Abnormal signals starting at or above the popliteal level. Resting Index: BASIA (PT)- 0.57 BASIA (DP)- 0.47  TBI- 0.21 TcPO2: Values as noted.  Conclusions: Right lower extremity; severe peripheral arterial disease starting at the SFA level. Transcutaneous oximetry at the below knee level is normal, proximal foot is normal, distal foot is moderate to severely reduced. Left lower extremity; moderate peripheral arterial disease at the popliteal level. Transcutaneous oximetry at the below knee level is normal, proximal and distal foot is moderately reduced. Compared to the previous study October 5, 2020 right lower extremity show slight worsening in ankle brachial index but some improvement in TC PO2. Left lower extremity is unchanged      - He doesn't have CP or SOB. He still takes eliquis, and antiplatelet agents. He has afib and history of CAD/CHF  - In the ED, lactic acid was 5+ and repeat  down to 7. He has acute (2.6 crea) on chronic renal failure (1.8 crea). Hgb was 7. He was given PRBC    - Given concerns for PVD - on RLE. I recommended transfer to higher level of care including need for vascular surgery. So far, per ED, no bed available.    -  ROS --- No headache. No dizziness. No weakness. No CP or SOB. No palpitations. No abdominal pain. No nausea or vomiting. No urinary symptoms. No bleeding symptoms. No weight loss. Rest of 12 point ROS was reviewed and negative.       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 They put him on sildenafil as nitroprusside lowered systemic BP and with that the mean PA dropped from 54 to 49. Negative VQ at [place] Dec 1, 2017.     RLS (restless legs syndrome)      Sleep apnea          Surgical History     Past Surgical History:   Procedure Laterality Date     BACK SURGERY      lower back     CARDIAC CATHETERIZATION  12/13/2017    Right and left at [facility], mean PA 58, PCW 24 with V wave of 35, LVEDP of 18, with Nipride systemic BP, PVR and mean PA all declined     CARDIOVERSION  03/15/2013    for afib     CATARACT EXTRACTION Bilateral      CORONARY STENT PLACEMENT       IR ABDOMINAL AORTOGRAM  11/16/2012     IR MISCELLANEOUS PROCEDURE  7/20/2001     IR MISCELLANEOUS PROCEDURE  11/16/2012     OTHER SURGICAL HISTORY      [place]     SHOULDER SURGERY      reapir on right shoulder     TOTAL HIP ARTHROPLASTY Left      TOTAL KNEE ARTHROPLASTY Bilateral      WRIST SURGERY Bilateral      ZZHC COLONOSCOPY W/WO BRUSH/WASH N/A 1/14/2021     Procedure: COLONOSCOPY;  Surgeon: Mihai Harris MD;  Location: [facility] Main OR;  Service: Gastroenterology        Family History      Family History   Problem Relation Age of Onset     Hyperlipidemia Mother      Hypertension Mother      Heart Disease Mother      Hyperlipidemia Father      Hypertension Father      Coronary Artery Disease Father      Cerebrovascular Disease Brother      Depression Brother      No Known Problems Sister      Pulmonary Hypertension No family hx of      Congenital heart disease No family hx of          Social History      .  Social History     Socioeconomic History     Marital status:      Spouse name: Not on file     Number of children: 4     Years of education: Not on file     Highest education level: Not on file   Occupational History     Not on file   Tobacco Use     Smoking status: Former Smoker     Packs/day: 1.50     Years: 44.00     Pack years: 66.00     Types: Cigarettes     Quit date: 1996     Years since quittin.3     Smokeless tobacco: Never Used   Substance and Sexual Activity     Alcohol use: Yes     Alcohol/week: 1.0 standard drinks     Comment: Alcoholic Drinks/day: 1 per month     Drug use: No     Sexual activity: Not Currently     Partners: Female   Other Topics Concern     Not on file   Social History Narrative     Not on file     Social Determinants of Health     Financial Resource Strain:      Difficulty of Paying Living Expenses:    Food Insecurity:      Worried About Running Out of Food in the Last Year:      Ran Out of Food in the Last Year:    Transportation Needs:      Lack of Transportation (Medical):      Lack of Transportation (Non-Medical):    Physical Activity:      Days of Exercise per Week:      Minutes of Exercise per Session:    Stress:      Feeling of Stress :    Social Connections:      Frequency of Communication with Friends and Family:      Frequency of Social Gatherings with Friends and Family:      Attends [employer] Services:       Active Member of Clubs or Organizations:      Attends Club or Organization Meetings:      Marital Status:    Intimate Partner Violence:      Fear of Current or Ex-Partner:      Emotionally Abused:      Physically Abused:      Sexually Abused:           Allergies        Allergies   Allergen Reactions     Furosemide Unknown     Previously tolerated.     Hydrochlorothiazide Unknown     Iodinated Contrast Media [Diagnostic X-Ray Materials] Nausea     Losartan Other (See Comments)     Other reaction(s): Stomatitis, Bloody nose dry mouth and lips     Losartan-Hydrochlorothiazide [Hyzaar] Unknown     Metaxalone Unknown     Metolazone Other (See Comments)     Muscle cramps     Mometasone Other (See Comments)     Bloody nose     Penicillins Unknown     Rabeprazole Unknown     Ramipril Unknown     Shellfish Containing Products [Shellfish-Derived Products] Unknown     Other reaction(s): mouth sores, Other reaction(s): mouth sores     Methocarbamol Rash         Prior to Admission Medications      No current facility-administered medications on file prior to encounter.  acetaminophen (TYLENOL) 500 MG tablet, [ACETAMINOPHEN (TYLENOL) 500 MG TABLET] Take 1,000 mg by mouth every 6 (six) hours as needed. First line of pain management. Do Not take more than 4,000 mg in 24 hours.  albuterol (PROVENTIL HFA;VENTOLIN HFA) 90 mcg/actuation inhaler, Inhale 2 puffs into the lungs every 6 hours as needed   apixaban (ELIQUIS) 5 mg Tab, [APIXABAN (ELIQUIS) 5 MG TAB] Take 1 tablet by mouth every 12 (twelve) hours.  aspirin 81 MG EC tablet, [ASPIRIN 81 MG EC TABLET] Take 81 mg by mouth every morning.   atorvastatin (LIPITOR) 40 MG tablet, Take 40 mg by mouth At Bedtime   bumetanide (BUMEX) 1 MG tablet, Take 3 mg by mouth 2 times daily   canagliflozin (INVOKANA) 100 mg Tab, Take 100 mg by mouth every morning   cloNIDine (CATAPRES-TTS) 0.2 mg/24 hr, [CLONIDINE (CATAPRES-TTS) 0.2 MG/24 HR] Place 1 patch on the skin once a week. Place patch on  "Monday.  clopidogreL (PLAVIX) 75 mg tablet, [CLOPIDOGREL (PLAVIX) 75 MG TABLET] Take 1 tablet by mouth daily.  digoxin (LANOXIN) 125 mcg tablet, [DIGOXIN (LANOXIN) 125 MCG TABLET] Take 1 tablet (125 mcg total) by mouth daily.  eplerenone (INSPRA) 25 MG tablet, Take 25 mg by mouth daily   fluticasone-vilanterol (BREO ELLIPTA) 200-25 mcg/dose DsDv inhaler, [FLUTICASONE-VILANTEROL (BREO ELLIPTA) 200-25 MCG/DOSE DSDV INHALER] Inhale 1 puff daily.  Garlic 1000 MG CAPS, Take 1 capsule by mouth daily  insulin glargine (LANTUS PEN) 100 UNIT/ML pen, Inject 54 Units Subcutaneous 2 times daily  irbesartan (AVAPRO) 300 MG tablet, Take 150 mg by mouth daily   lidocaine (LIDODERM) 5 %, [LIDOCAINE (LIDODERM) 5 %] Place 1 patch on the skin as needed. Remove & Discard patch within 12 hours or as directed by MD  metFORMIN (GLUCOPHAGE-XR) 500 MG 24 hr tablet, Take 1,000 mg by mouth 2 times daily (with meals)  metoprolol tartrate (LOPRESSOR) 50 MG tablet, Take 50 mg by mouth 2 times daily (with meals)  multivitamin w/minerals (THERA-VIT-M) tablet, Take 1 tablet by mouth daily  omeprazole (PRILOSEC) 20 MG DR capsule, Take 20 mg by mouth daily  potassium chloride (K-DUR,KLOR-CON) 20 MEQ tablet, Take 40 mEq by mouth daily   rOPINIRole (REQUIP) 2 MG tablet, [ROPINIROLE (REQUIP) 2 MG TABLET] Take 2 mg by mouth at bedtime.   tadalafil (CIALIS) 20 MG tablet, Take 20 mg by mouth daily  vitamin C (ASCORBIC ACID) 1000 MG TABS, Take 1,000 mg by mouth daily  zinc gluconate 50 MG tablet, Take 50 mg by mouth daily  zolpidem (AMBIEN) 10 mg tablet, [ZOLPIDEM (AMBIEN) 10 MG TABLET] Take 10 mg by mouth bedtime.  ACCU-CHEK JOSE PLUS TEST STRP strips, [ACCU-CHEK JOSE PLUS TEST STRP STRIPS] see administration instructions.  azithromycin (ZITHROMAX) 250 MG tablet, Take 1,000 mg by mouth daily as needed (Take 1000 mg 1 hour prior to dental work)   BD ULTRA-FINE MANISHA PEN NEEDLES 32 gauge x 5/32\" Ndle, [BD ULTRA-FINE MANISHA PEN NEEDLES 32 GAUGE X 5/32\" NDLE] " "  nitroglycerin (NITROSTAT) 0.4 MG SL tablet, [NITROGLYCERIN (NITROSTAT) 0.4 MG SL TABLET] Place 0.4 mg under the tongue every 5 (five) minutes as needed for chest pain.  OXYGEN-AIR DELIVERY SYSTEMS MISC, [OXYGEN-AIR DELIVERY SYSTEMS MISC] Use As Directed.  [DISCONTINUED] metOLazone (ZAROXOLYN) 2.5 MG tablet, [METOLAZONE (ZAROXOLYN) 2.5 MG TABLET] 2.5 mg daily.             Review of Systems     A 12 point comprehensive review of systems was negative except as noted above in HPI.    PHYSICAL EXAMINATION       Vitals      Vitals: BP 95/50   Pulse 74   Temp 97.7  F (36.5  C)   Resp 20   Ht 1.803 m (5' 11\")   Wt 108.9 kg (240 lb)   SpO2 95%   BMI 33.47 kg/m    BMI= Body mass index is 33.47 kg/m .      Examination     General Appearance:  Alert, cooperative, no distress  Head:    Normocephalic, without obvious abnormality, atraumatic  EENT:  PERRL, conjunctiva/corneas clear, EOM's intact.   Neck:   Supple, symmetrical, trachea midline, no adenopathy; no NVE  Back:  Symmetric, no curvature, no CVA tenderness  Chest/Lungs: Clear to auscultation bilaterally, respirations unlabored, No tenderness or deformity. No abdominal breathing or use of accessory muscles.   Heart:    Regular rate and rhythm, S1 and S2 normal, no murmur, rub   or gallop  Abdomen: Soft, non-tender, bowel sounds active all four quadrants, not peritoneal on palpation. Not distended  Extremities:  Extremities normal, atraumatic, no swelling   Skin:  Right LE - has varicose veins, difficult to palpate DP/PT  Neurologic:  Awake and alert, No lateralizing or localizing signs      Pertinent Lab     Results for orders placed or performed during the hospital encounter of 08/22/21   US Lower Extremity Venous Duplex Right    Impression    IMPRESSION:  1.  No deep venous thrombosis in the right lower extremity.   Basic metabolic panel   Result Value Ref Range    Sodium 134 (L) 136 - 145 mmol/L    Potassium 5.3 (H) 3.5 - 5.0 mmol/L    Chloride 100 98 - 107 " mmol/L    Carbon Dioxide (CO2) 18 (L) 22 - 31 mmol/L    Anion Gap 16 5 - 18 mmol/L    Urea Nitrogen 58 (H) 8 - 28 mg/dL    Creatinine 2.56 (H) 0.70 - 1.30 mg/dL    Calcium 8.5 8.5 - 10.5 mg/dL    Glucose 218 (H) 70 - 125 mg/dL    GFR Estimate 23 (L) >60 mL/min/1.73m2   Lactic acid whole blood   Result Value Ref Range    Lactic Acid 5.7 (HH) 0.7 - 2.0 mmol/L   CRP inflammation   Result Value Ref Range    CRP 1.3 (H) 0.0-<0.8 mg/dL   Result Value Ref Range     30 - 190 U/L   CBC (+ platelets, no diff)   Result Value Ref Range    WBC Count 12.3 (H) 4.0 - 11.0 10e3/uL    RBC Count 3.43 (L) 4.40 - 5.90 10e6/uL    Hemoglobin 7.0 (L) 13.3 - 17.7 g/dL    Hematocrit 24.9 (L) 40.0 - 53.0 %    MCV 73 (L) 78 - 100 fL    MCH 20.4 (L) 26.5 - 33.0 pg    MCHC 28.1 (L) 31.5 - 36.5 g/dL    RDW 18.0 (H) 10.0 - 15.0 %    Platelet Count 248 150 - 450 10e3/uL   Result Value Ref Range    INR 1.57 (H) 0.85 - 1.15   Result Value Ref Range    Hemoglobin 8.4 (L) 13.3 - 17.7 g/dL   Basic metabolic panel   Result Value Ref Range    Sodium 135 (L) 136 - 145 mmol/L    Potassium 5.3 (H) 3.5 - 5.0 mmol/L    Chloride 102 98 - 107 mmol/L    Carbon Dioxide (CO2) 18 (L) 22 - 31 mmol/L    Anion Gap 15 5 - 18 mmol/L    Urea Nitrogen 62 (H) 8 - 28 mg/dL    Creatinine 2.63 (H) 0.70 - 1.30 mg/dL    Calcium 8.4 (L) 8.5 - 10.5 mg/dL    Glucose 157 (H) 70 - 125 mg/dL    GFR Estimate 22 (L) >60 mL/min/1.73m2   Lactic acid whole blood   Result Value Ref Range    Lactic Acid 3.5 (H) 0.7 - 2.0 mmol/L   Adult Type and Screen   Result Value Ref Range    ABO/RH(D) A POS     Antibody Screen Negative Negative    SPECIMEN EXPIRATION DATE [id number]    Prepare red blood cells (unit)   Result Value Ref Range    CROSSMATCH Compatible     UNIT ABO/RH A Pos     Unit Number [id number]     UNIT STATUS Issued     Blood Component Type Red Blood Cells     Product Code A7223T85     CODING SYSTEM [id number]     UNIT TYPE ISBT 6200     ISSUE DATE AND TIME [id number]     Prepare red blood cells (unit)   Result Value Ref Range    CROSSMATCH Compatible     UNIT ABO/RH A Pos     Unit Number [id number]     UNIT STATUS Issued     Blood Component Type Red Blood Cells     Product Code [id number]     CODING SYSTEM [id number]     UNIT TYPE ISBT 6200     ISSUE DATE AND TIME [id number]            Pertinent Radiology

## 2021-08-23 NOTE — CONSULTS
[facility] PATIENT CONSULTATION NOTE  Consulted by: Dr. Catherine  Reason for consultation: PAD with     HPI:  Red Sutherland is a 80 year old year old male who has a PMH significant for HTN, hyperlipidemia, CAD, PVD s/p left fem endart - subsequent post op MI,  Diabetes recent A1c 7.1, CHF, COPD, afib, and s/p total knee arthroplasty, who presents to the ED for evaluation of leg pain.  Patient is s/p left fem endart for severe lifestyle limiting claudication, this was done by Dr. Khan at the [facility] on 1/29/2021.  Post op course was complicated by NSTEMI and underwent atherectomy and PTCA.  There were plans for right femoral endarterectomy however given the complications noted above this is on hold as he recovers from a cardiac standpoint.  The patient presented to the ED for varicose vein bleeding from the right lateral ankle, this is the 4th time the patient has had varicosity bleeding from the right leg since January. He initially went to [facility] where they placed a tourniquet around his calf and applied direct pressure and the bleeding varicose vein did stop, he was transfused 1 unit PRBC and he was discharged home.  At home he developed severe intermittent calf cramping so presented again to the ER for further evaluation.  Arterial duplex of the right leg reveals chronic SFA occlusion with distal reconstitution, similar to previous imaging.  On exam the patient denies rest pain, motor and sensory function remain intact.  Has mild intermittent calf cramping.  At baseline he can only ambulate 1/2 block before he develops bilateral calf cramping, mobility also limited by his cardiac status and dyspnea with exertion.      He is currently on aspirin, plavix, and Eliquis for afibb.     History obtained from patient as well as chart review.     Review Of Systems:  General: Denies F/C  Respiratory: dyspnea with exertion  Cardio: Denies CP  Gastrointestinal: Denies N/V  Genitourinary: Denies  recent change in urination  Musculoskeletal: See HPI  Neurologic: Denies HA  Psychiatric: Denies confusion  Hematology/immunology: no unexpected bruising    PAST MEDICAL HISTORY:  Past Medical History:   Diagnosis Date     Atrial fibrillation (H)      CHF (congestive heart failure) (H)      COPD (chronic obstructive pulmonary disease) (H)      Coronary artery disease      Diabetes mellitus (H)      Essential hypertension      Hyperlipidemia      Pulmonary hypertension (H)     O2 at night     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 They put him on sildenafil as nitroprusside lowered systemic BP and with that the mean PA dropped from 54 to 49. Negative VQ at [facility] Dec 1, 2017.     RLS (restless legs syndrome)      Sleep apnea        PAST SURGICAL HISTORY:  Past Surgical History:   Procedure Laterality Date     BACK SURGERY      lower back     CARDIAC CATHETERIZATION  12/13/2017    Right and left at [facility], mean PA 58, PCW 24 with V wave of 35, LVEDP of 18, with Nipride systemic BP, PVR and mean PA all declined     CARDIOVERSION  03/15/2013    for afib     CATARACT EXTRACTION Bilateral      CORONARY STENT PLACEMENT       IR ABDOMINAL AORTOGRAM  11/16/2012     IR MISCELLANEOUS PROCEDURE  7/20/2001     IR MISCELLANEOUS PROCEDURE  11/16/2012     OTHER SURGICAL HISTORY      [place]     SHOULDER SURGERY      reapir on right shoulder     TOTAL HIP ARTHROPLASTY Left      TOTAL KNEE ARTHROPLASTY Bilateral      WRIST SURGERY Bilateral      ZZHC COLONOSCOPY W/WO BRUSH/WASH N/A 1/14/2021    Procedure: COLONOSCOPY;  Surgeon: Mihai Harris MD;  Location: [facility] OR;  Service: Gastroenterology       FAMILY HISTORY:  Family History   Problem Relation Age of Onset     Hyperlipidemia Mother      Hypertension Mother      Heart Disease Mother      Hyperlipidemia Father      Hypertension Father      Coronary Artery Disease  Father      Cerebrovascular Disease Brother      Depression Brother      No Known Problems Sister      Pulmonary Hypertension No family hx of      Congenital heart disease No family hx of        SOCIAL HISTORY:   Social History     Tobacco Use     Smoking status: Former Smoker     Packs/day: 1.50     Years: 44.00     Pack years: 66.00     Types: Cigarettes     Quit date: 1996     Years since quittin.3     Smokeless tobacco: Never Used   Substance Use Topics     Alcohol use: Yes     Alcohol/week: 1.0 standard drinks     Comment: Alcoholic Drinks/day: 1 per month         HOME MEDICATIONS:  Prior to Admission medications    Medication Sig Start Date End Date Taking? Authorizing Provider   acetaminophen (TYLENOL) 500 MG tablet [ACETAMINOPHEN (TYLENOL) 500 MG TABLET] Take 1,000 mg by mouth every 6 (six) hours as needed. First line of pain management. Do Not take more than 4,000 mg in 24 hours. 21  Yes Provider, Historical   albuterol (PROVENTIL HFA;VENTOLIN HFA) 90 mcg/actuation inhaler Inhale 2 puffs into the lungs every 6 hours as needed  1/11/15  Yes Provider, Historical   apixaban (ELIQUIS) 5 mg Tab [APIXABAN (ELIQUIS) 5 MG TAB] Take 1 tablet by mouth every 12 (twelve) hours. 1/11/15  Yes Buffy Heller APRN CNP   aspirin 81 MG EC tablet [ASPIRIN 81 MG EC TABLET] Take 81 mg by mouth every morning.  16  Yes Provider, Historical   atorvastatin (LIPITOR) 40 MG tablet Take 40 mg by mouth At Bedtime  12/25/15  Yes Provider, Historical   bumetanide (BUMEX) 1 MG tablet Take 3 mg by mouth 2 times daily  21  Yes Georgette Tilley MD   canagliflozin (INVOKANA) 100 mg Tab Take 100 mg by mouth every morning  20  Yes Provider, Historical   cloNIDine (CATAPRES-TTS) 0.2 mg/24 hr [CLONIDINE (CATAPRES-TTS) 0.2 MG/24 HR] Place 1 patch on the skin once a week. Place patch on Monday. 1/23/15  Yes Provider, Historical   clopidogreL (PLAVIX) 75 mg tablet [CLOPIDOGREL (PLAVIX) 75 MG TABLET] Take 1 tablet by  mouth daily. 2/4/21  Yes Provider, Historical   digoxin (LANOXIN) 125 mcg tablet [DIGOXIN (LANOXIN) 125 MCG TABLET] Take 1 tablet (125 mcg total) by mouth daily. 11/7/16  Yes Rayray Franz MD   eplerenone (INSPRA) 25 MG tablet Take 25 mg by mouth daily  5/24/20  Yes Provider, Historical   fluticasone-vilanterol (BREO ELLIPTA) 200-25 mcg/dose DsDv inhaler [FLUTICASONE-VILANTEROL (BREO ELLIPTA) 200-25 MCG/DOSE DSDV INHALER] Inhale 1 puff daily. 2/20/18  Yes Provider, Historical   Garlic 1000 MG CAPS Take 1 capsule by mouth daily   Yes Unknown, Entered By History   insulin glargine (LANTUS PEN) 100 UNIT/ML pen Inject 54 Units Subcutaneous 2 times daily   Yes Unknown, Entered By History   irbesartan (AVAPRO) 300 MG tablet Take 150 mg by mouth daily    Yes Unknown, Entered By History   lidocaine (LIDODERM) 5 % [LIDOCAINE (LIDODERM) 5 %] Place 1 patch on the skin as needed. Remove & Discard patch within 12 hours or as directed by MD 2/9/16  Yes Provider, Historical   metFORMIN (GLUCOPHAGE-XR) 500 MG 24 hr tablet Take 1,000 mg by mouth 2 times daily (with meals)   Yes Unknown, Entered By History   metoprolol tartrate (LOPRESSOR) 50 MG tablet Take 50 mg by mouth 2 times daily (with meals)   Yes Unknown, Entered By History   multivitamin w/minerals (THERA-VIT-M) tablet Take 1 tablet by mouth daily   Yes Unknown, Entered By History   omeprazole (PRILOSEC) 20 MG DR capsule Take 20 mg by mouth daily   Yes Unknown, Entered By History   potassium chloride (K-DUR,KLOR-CON) 20 MEQ tablet Take 40 mEq by mouth daily  4/9/20  Yes Provider, Historical   rOPINIRole (REQUIP) 2 MG tablet [ROPINIROLE (REQUIP) 2 MG TABLET] Take 2 mg by mouth at bedtime.  1/11/15  Yes Provider, Historical   tadalafil (CIALIS) 20 MG tablet Take 20 mg by mouth daily   Yes Unknown, Entered By History   vitamin C (ASCORBIC ACID) 1000 MG TABS Take 1,000 mg by mouth daily   Yes Unknown, Entered By History   zinc gluconate 50 MG tablet Take 50 mg by mouth daily    "Yes Unknown, Entered By History   zolpidem (AMBIEN) 10 mg tablet [ZOLPIDEM (AMBIEN) 10 MG TABLET] Take 10 mg by mouth bedtime. 1/11/15  Yes Provider, Historical   ACCU-CHEK JOSE PLUS TEST STRP strips [ACCU-CHEK JOSE PLUS TEST STRP STRIPS] see administration instructions. 1/20/21   Provider, Historical   azithromycin (ZITHROMAX) 250 MG tablet Take 1,000 mg by mouth daily as needed (Take 1000 mg 1 hour prior to dental work)  1/11/15   Provider, Historical   BD ULTRA-FINE MANISHA PEN NEEDLES 32 gauge x 5/32\" Ndle [BD ULTRA-FINE MANISHA PEN NEEDLES 32 GAUGE X 5/32\" NDLE]  1/11/17   Provider, Historical   nitroglycerin (NITROSTAT) 0.4 MG SL tablet [NITROGLYCERIN (NITROSTAT) 0.4 MG SL TABLET] Place 0.4 mg under the tongue every 5 (five) minutes as needed for chest pain. 1/11/15   Provider, Historical   OXYGEN-AIR DELIVERY SYSTEMS MISC [OXYGEN-AIR DELIVERY SYSTEMS MISC] Use As Directed. 11/17/16   Provider, Historical   metOLazone (ZAROXOLYN) 2.5 MG tablet [METOLAZONE (ZAROXOLYN) 2.5 MG TABLET] 2.5 mg daily.  5/23/20 7/11/21  Provider, Historical       VITAL SIGNS:  BP (!) 77/54   Pulse 68   Temp 97.7  F (36.5  C)   Resp 23   Ht 1.803 m (5' 11\")   Wt 108.9 kg (240 lb)   SpO2 96%   BMI 33.47 kg/m      Intake/Output Summary (Last 24 hours) at 8/23/2021 1203  Last data filed at 8/22/2021 2055  Gross per 24 hour   Intake 700 ml   Output --   Net 700 ml       Labs:  ROUTINE IP LABS (Last four results)  BMP  Recent Labs   Lab 08/23/21  0738 08/23/21  0345 08/23/21  0339 08/23/21  0144 08/22/21  2154 08/22/21  1533   NA  --   --  134* 135* 135* 134*   POTASSIUM  --   --  4.8 4.9 5.3* 5.3*   CHLORIDE  --   --  102 103 102 100   ANNMARIE  --   --  8.1* 8.1* 8.4* 8.5   CO2  --   --  22 18* 18* 18*   BUN  --   --  62* 62* 62* 58*   CR  --   --  2.55* 2.61* 2.63* 2.56*    105 118 119 157* 218*     CBC  Recent Labs   Lab 08/23/21  0339 08/22/21  2154 08/22/21  1533   WBC 9.6  --  12.3*   RBC 3.71*  --  3.43*   HGB 8.2* 8.4* 7.0* "   HCT 27.6*  --  24.9*   MCV 74*  --  73*   MCH 22.1*  --  20.4*   MCHC 29.7*  --  28.1*   RDW 19.1*  --  18.0*     --  248     INR  Recent Labs   Lab 08/22/21  1532   INR 1.57*       PHYSICAL EXAM:  Constitutional: healthy, alert, no acute distress and cooperative   Cardiovascular: RRR  Respiratory: CTAB anteriorly, breathing unlabored without secondary muscle use  Psychiatric: mentation appears normal and affect normal/bright  Neck: no asymmetry  GI/Abdomen: +BS, abdomen soft, non-tender. No masses, no CVAT  MSK: able to move all extremities without weakness or ataxia  Extremities: no open lesions, venous stasis changes noted BLE, superficial varicosities, bandaid to right lateral ankle where bleeding was previously - no active bleeding noted.    Pulses: palpable bilateral femoral pulses, well healed scar on the left groin, unable to palpate DP/PT pulses, doppler signals audible bilateral DP/PT, motor and sensory function intact  Hematology: no bruising on visible skin    IMAGING:  EXAM: US LOWER EXTREMITY ARTERIAL DUPLEX RIGHT  LOCATION: [facility]  DATE/TIME: 8/23/2021 1:02 AM     INDICATION: Leg pain; concern for arterial occlusion.  COMPARISON: None.  TECHNIQUE: Arterial Duplex ultrasound of the right lower extremity with grayscale imaging, spectral wave analysis and color-flow imaging.  FINDINGS:     LOWER EXTREMITY ARTERIAL DUPLEX EXAM WITH WAVEFORMS  RIGHT (cm/s)  EIA: 123/13 M  CFA: 80/16 M  PFA: 21/9 M  SFA-Proximal: 5/3 M  SFA-Mid: Occluded  SFA-Distal: Occluded  Popliteal: 20/11 M  PTA: 7/5 M  SHANTEL: 12/7 M  DPA: 13/9 M  (M=monophasic, B=biphasic, T=triphasic)                                                                      IMPRESSION:  1.  Poststenotic or postobstructive waveform within profunda femoris artery.  2.  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 Reconstituted popliteal artery with  patent dorsalis pedis and posterior tibial arteries at the ankle.    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  Pain developed after a tourniquet was used on the RLE to control a bleeding varicosity which is now stabilized. On exam patient does not have acute limb ischemia or threatened limb.  Motor and sensory function intact.  Arterial duplex unchanged from prior with known right SFA occlusion with distal reconstitution.  Increased pain likely secondary to tourniquet use and hypovolemia/acute blood loss.  Patient with significant hypotension last evening 60's/40's.  Hemoglobin stabilized at 8.2.    PLAN:  -No acute or emergent intervention from vascular standpoint.  -Continue aspirin and Eliquis for afibb, would hold plavix and recommend close follow up with vascular surgery team at [place] as well as PCP to discuss risks/beneftis of ongoing anticoagualtion  -Wound not recommend any venous ablation for venous insufficiency  -Avoid hypotension    Plan of care discussed with vascular team, attending vascular surgeon Dr. Hayden who is in agreement with the above.      Torsten Hand CNP    [phone] cell  Vascular Surgery  Please call with any questions/concerns.

## 2021-08-23 NOTE — PROGRESS NOTES
Patient was evaluated by vascular surgery. Spoke with ED physician/ Dr. Dawn and Dr. Harrell.  Patient is not coming as inpatient.  Patient is discharging from ED to home. Will Follow up at vascular at [facility]. I do not need to see the patient as patient is discharging by ED physician.    Georgette Tilley MD  [facility]

## 2021-08-23 NOTE — ED PROVIDER NOTES
"EMERGENCY DEPARTMENT SIGN OUT NOTE        ED COURSE AND MEDICAL DECISION MAKING  6:27AM Patient was signed out to me by Dr. Margot Bobo.  11:02AM PA from vascular surgery came to evaluate the patient. Does not believe that patient requires an angiogram within the next 24 hours, but she will speak with the attending and update me.   11:22AM I discussed the case with Dr. Staples, Hospitalist at [facility].   11:32 AM I reassessed the patient and updated him on the plan.   12:08 PM I discussed the case with Cruz, nurse of Dr. Khan, who is patient's vascular surgeon at [facility]. He is in a case but should be out by 2PM or so. They will run it by him and call us back when he is out of the OR.    In brief, Red Sutherland is a 80 year old male who initially presented for severe crampy right calf pain onset yesterday morning/early afternoon. 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     At time of sign out, disposition was pending inpatient bed placement and discussion with vascular surgery here    Patient remains stable during my care, little to no medical needs.  PA from our vascular surgery team came down to see patient, was going to discuss with vascular attending Dr. Hayden get back to us, no time of signout have not heard a final recommendation.  They agree that his claudication appears chronic and stable.  Did also reach out to patient's operating vascular surgeon at [facility] Dr. Khan, did not speak with him directly though did talk with his nurse, she informed me he was in a case and would try to call back after his case was completed.  We did push the ultrasound images to them.  Discussed the elevated lactic " acid level that has now normalized.  Possible discharge home as it does not appear any intervention will be done immediately and his symptoms have largely resolved.  Would await final vascular recommendations before making a definitive decision.  No beds immediately available still.      FINAL IMPRESSION    1. Pain of right lower leg    2. Anemia due to blood loss, acute    3. Renal insufficiency    4. Lactic acid acidosis        ED MEDS  Medications   acetaminophen (TYLENOL) tablet 1,000 mg (1,000 mg Oral Given 8/23/21 0811)   apixaban ANTICOAGULANT (ELIQUIS) tablet 5 mg (5 mg Oral Given 8/23/21 0335)   aspirin EC tablet 81 mg (81 mg Oral Given 8/23/21 0824)   atorvastatin (LIPITOR) tablet 40 mg (40 mg Oral Given 8/23/21 0342)   clopidogrel (PLAVIX) tablet 75 mg (75 mg Oral Not Given 8/23/21 0815)   fluticasone-vilanterol (BREO ELLIPTA) 200-25 MCG/INH inhaler 1 puff (1 puff Inhalation Not Given 8/23/21 0833)   insulin glargine (LANTUS PEN) injection 54 Units (54 Units Subcutaneous Not Given 8/23/21 0814)   metoprolol tartrate (LOPRESSOR) tablet 50 mg (50 mg Oral Given 8/23/21 0810)   rOPINIRole (REQUIP) tablet 2 mg (2 mg Oral Given 8/23/21 0335)   HYDROmorphone (DILAUDID) injection 0.2 mg (has no administration in time range)   albuterol (PROAIR HFA/PROVENTIL HFA/VENTOLIN HFA) 108 (90 Base) MCG/ACT inhaler 2 puff (has no administration in time range)   lidocaine 1 % 0.1-1 mL (has no administration in time range)   lidocaine (LMX4) cream (has no administration in time range)   sodium chloride (PF) 0.9% PF flush 3 mL (has no administration in time range)   sodium chloride (PF) 0.9% PF flush 3 mL (has no administration in time range)   melatonin tablet 1 mg (1 mg Oral Given 8/23/21 0343)   Patient is already receiving anticoagulation with heparin, enoxaparin (LOVENOX), warfarin (COUMADIN)  or other anticoagulant medication (has no administration in time range)   sodium chloride 0.9% infusion ( Intravenous Restarted  8/23/21 1109)   glucose gel 15-30 g (has no administration in time range)     Or   dextrose 50 % injection 25-50 mL (has no administration in time range)     Or   glucagon injection 1 mg (has no administration in time range)   insulin aspart (NovoLOG) injection (RAPID ACTING) (1 Units Subcutaneous Not Given 8/23/21 0746)   insulin aspart (NovoLOG) injection (RAPID ACTING) (1 Units Subcutaneous Not Given 8/23/21 0349)   omeprazole (priLOSEC) CR capsule 20 mg (20 mg Oral Given 8/23/21 0829)   morphine (PF) injection 4 mg (4 mg Intravenous Given 8/22/21 1534)   acetaminophen (TYLENOL) tablet 650 mg (650 mg Oral Given 8/22/21 2151)   fentaNYL (PF) (SUBLIMAZE) injection 25 mcg (25 mcg Intravenous Given 8/22/21 2340)       LAB  Labs Ordered and Resulted from Time of ED Arrival Up to the Time of Departure from the ED   BASIC METABOLIC PANEL - Abnormal; Notable for the following components:       Result Value    Sodium 134 (*)     Potassium 5.3 (*)     Carbon Dioxide (CO2) 18 (*)     Urea Nitrogen 58 (*)     Creatinine 2.56 (*)     Glucose 218 (*)     GFR Estimate 23 (*)     All other components within normal limits   LACTIC ACID WHOLE BLOOD - Abnormal; Notable for the following components:    Lactic Acid 5.7 (*)     All other components within normal limits   CRP INFLAMMATION - Abnormal; Notable for the following components:    CRP 1.3 (*)     All other components within normal limits   CBC WITH PLATELETS - Abnormal; Notable for the following components:    WBC Count 12.3 (*)     RBC Count 3.43 (*)     Hemoglobin 7.0 (*)     Hematocrit 24.9 (*)     MCV 73 (*)     MCH 20.4 (*)     MCHC 28.1 (*)     RDW 18.0 (*)     All other components within normal limits   INR - Abnormal; Notable for the following components:    INR 1.57 (*)     All other components within normal limits   HEMOGLOBIN - Abnormal; Notable for the following components:    Hemoglobin 8.4 (*)     All other components within normal limits   BASIC METABOLIC PANEL  - Abnormal; Notable for the following components:    Sodium 135 (*)     Potassium 5.3 (*)     Carbon Dioxide (CO2) 18 (*)     Urea Nitrogen 62 (*)     Creatinine 2.63 (*)     Calcium 8.4 (*)     Glucose 157 (*)     GFR Estimate 22 (*)     All other components within normal limits   LACTIC ACID WHOLE BLOOD - Abnormal; Notable for the following components:    Lactic Acid 3.5 (*)     All other components within normal limits   BLOOD GAS VENOUS - Abnormal; Notable for the following components:    Bicarbonate Venous 22 (*)     Oxyhemoglobin Venous 53.3 (*)     O2 Sat, Venous 54.8 (*)     All other components within normal limits   BASIC METABOLIC PANEL - Abnormal; Notable for the following components:    Sodium 135 (*)     Carbon Dioxide (CO2) 18 (*)     Urea Nitrogen 62 (*)     Creatinine 2.61 (*)     Calcium 8.1 (*)     GFR Estimate 23 (*)     All other components within normal limits   COMPREHENSIVE METABOLIC PANEL - Abnormal; Notable for the following components:    Sodium 134 (*)     Urea Nitrogen 62 (*)     Creatinine 2.55 (*)     Calcium 8.1 (*)     Protein Total 5.9 (*)     Albumin 3.3 (*)     Bilirubin Total 2.6 (*)     GFR Estimate 23 (*)     All other components within normal limits   CBC WITH PLATELETS AND DIFFERENTIAL - Abnormal; Notable for the following components:    RBC Count 3.71 (*)     Hemoglobin 8.2 (*)     Hematocrit 27.6 (*)     MCV 74 (*)     MCH 22.1 (*)     MCHC 29.7 (*)     RDW 19.1 (*)     NRBCs per 100 WBC 7 (*)     All other components within normal limits   DIFFERENTIAL - Abnormal; Notable for the following components:    NRBCs per 100 WBC 11 (*)     Absolute NRBCs 1.1 (*)     Elliptocytes Slight (*)     Polychromasia Slight (*)     All other components within normal limits   CK TOTAL - Normal   DIGOXIN LEVEL - Normal   PROCALCITONIN - Normal   LACTIC ACID WHOLE BLOOD - Normal   COVID-19 VIRUS (CORONAVIRUS) BY PCR - Normal    Narrative:     Testing was performed using the kaylan  SARS-CoV-2  & Influenza A/B Assay on the kaylan  [employer]  System.  This test should be ordered for the detection of SARS-COV-2 in individuals who meet SARS-CoV-2 clinical and/or epidemiological criteria. Test performance is unknown in asymptomatic patients.  This test is for in vitro diagnostic use under the FDA EUA for laboratories certified under CLIA to perform moderate and/or high complexity testing. This test has not been FDA cleared or approved.  A negative test does not rule out the presence of PCR inhibitors in the specimen or target RNA in concentration below the limit of detection for the assay. The possibility of a false negative should be considered if the patient's recent exposure or clinical presentation suggests COVID-19.  [facility] are certified under the Clinical Laboratory Improvement Amendments of 1988 (CLIA-88) as qualified to perform moderate and/or high complexity laboratory testing.   CK TOTAL - Normal   GLUCOSE BY METER - Normal   GLUCOSE BY METER - Normal   BLOOD CULTURE - Normal   CBC WITH PLATELETS & DIFFERENTIAL    Narrative:     The following orders were created for panel order CBC with platelets differential.  Procedure                               Abnormality         Status                     ---------                               -----------         ------                     CBC with platelets and d...[[id number]]  Abnormal            Final result               Manual Differential[[id number]]          Abnormal            Final result                 Please view results for these tests on the individual orders.   GLUCOSE MONITOR NURSING POCT   GLUCOSE MONITOR NURSING POCT   GLUCOSE MONITOR NURSING POCT   GLUCOSE MONITOR NURSING POCT   MAY SALINE LOCK IV   ASSIGN ATTENDING PROVIDER   CMS   CALL   CALL   CALL   DISTAL PULSES, CMS, NEURO CHECKS   PERIPHERAL IV CATHETER   VITAL SIGNS   PULSE OXIMETRY NURSING   NO VTE PROPHYLAXIS   IP CARBOHYDRATE COUNTING BY NURSING   PATIENT  EDUCATION   ASSESS FOR HYPOGLYCEMIA SYMPTOMS   NOTIFY PHYSICIAN   TYPE AND SCREEN, ADULT   PREPARE RED BLOOD CELLS (UNIT)   PREPARE RED BLOOD CELLS (UNIT)   PREPARE RED BLOOD CELLS (UNIT)   TRANSFUSE RED BLOOD CELLS (UNIT)   TRANSFUSE RED BLOOD CELLS (UNIT)   ABO/RH TYPE AND SCREEN    Narrative:     The following orders were created for panel order ABO/Rh type and screen.  Procedure                               Abnormality         Status                     ---------                               -----------         ------                     Adult Type and Screen[[id number]]                            Final result                 Please view results for these tests on the individual orders.     RADIOLOGY    US Lower Extremity Arterial Duplex Right   Final Result   IMPRESSION:   1.  Poststenotic or postobstructive waveform within profunda femoris artery.   2.  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 Reconstituted popliteal artery with patent dorsalis pedis and posterior tibial arteries at the ankle.      Results called to Dr. Bobo at 1:35 AM      US Lower Extremity Venous Duplex Right   Final Result   IMPRESSION:   1.  No deep venous thrombosis in the right lower extremity.          DISCHARGE MEDS  New Prescriptions    No medications on file       The creation of this record is based on the scribe s observations of the work being performed by Rufino Strong MD and the provider s statements to them. It was created on their behalf by Bhakti Meredith, a trained medical scribe. This document has been checked and approved by the attending provider.       Rufino Strong MD  08/23/21 7764

## 2021-08-23 NOTE — ED NOTES
Operations called for med/surg tele bed was told there are no beds in the system. Writer called [facility] and [facility] who both stated they do not have any beds. MD updated

## 2021-08-23 NOTE — ED PROVIDER NOTES
"eMERGENCY dEPARTMENT PROGRESS NOTE        ED COURSE AND MEDICAL DECISION MAKING  Red Sutherland is a 80 year old male who presents for severe crampy right calf and leg pain which began earlier today. 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 States his pain is localized to the proximal right calf with discomfort extending down his right lower extremity. Reports he had a tourniquet on his leg for a \"long time\" and states his pain has worsened since removing it today.    Patient was signed out to me by Dr. Rose at 10:00  PM.  11:24 PM I rechecked the patient. Fentanyl 25 mcg IV was administered for pain  12:13 AM I discussed the patient with Dr. Catherine from the hospitalist service who agrees to admit the patient as a boarder in the ED since there are no hospital beds in the [place] region.  He would like to obtain an arterial ultrasound of his right leg, which has been ordered. Pain is improved after fentanyl  12:38 AM I rechecked the patient and updated him with the plan for admission.  0135-arterial ultrasound reveals partial arterial occlusion.  Dr. Julio Fall discussed with vascular surgery with [facility].  They recommend transfer to an institution with arteriogram capabilities.  Patient will continue to board in the emergency department until a bed is available at an institution with this capability.  0630-signed out at change of shift with transfer to another institution with angiogram capabilities pending.          LAB  Pertinent labs results reviewed   Results for orders placed or performed during the hospital encounter of 08/22/21   US Lower Extremity Venous Duplex Right    Impression    IMPRESSION:  1.  No deep venous thrombosis in the right lower extremity.   US Lower Extremity Arterial Duplex Right    Impression    IMPRESSION:  1.  Poststenotic or postobstructive waveform within profunda femoris " artery.  2.  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 Reconstituted popliteal artery with patent dorsalis pedis and posterior tibial arteries at the ankle.    Results called to Dr. Bobo at 1:35 AM   Basic metabolic panel   Result Value Ref Range    Sodium 134 (L) 136 - 145 mmol/L    Potassium 5.3 (H) 3.5 - 5.0 mmol/L    Chloride 100 98 - 107 mmol/L    Carbon Dioxide (CO2) 18 (L) 22 - 31 mmol/L    Anion Gap 16 5 - 18 mmol/L    Urea Nitrogen 58 (H) 8 - 28 mg/dL    Creatinine 2.56 (H) 0.70 - 1.30 mg/dL    Calcium 8.5 8.5 - 10.5 mg/dL    Glucose 218 (H) 70 - 125 mg/dL    GFR Estimate 23 (L) >60 mL/min/1.73m2   Lactic acid whole blood   Result Value Ref Range    Lactic Acid 5.7 (HH) 0.7 - 2.0 mmol/L   CRP inflammation   Result Value Ref Range    CRP 1.3 (H) 0.0-<0.8 mg/dL   Result Value Ref Range     30 - 190 U/L   CBC (+ platelets, no diff)   Result Value Ref Range    WBC Count 12.3 (H) 4.0 - 11.0 10e3/uL    RBC Count 3.43 (L) 4.40 - 5.90 10e6/uL    Hemoglobin 7.0 (L) 13.3 - 17.7 g/dL    Hematocrit 24.9 (L) 40.0 - 53.0 %    MCV 73 (L) 78 - 100 fL    MCH 20.4 (L) 26.5 - 33.0 pg    MCHC 28.1 (L) 31.5 - 36.5 g/dL    RDW 18.0 (H) 10.0 - 15.0 %    Platelet Count 248 150 - 450 10e3/uL   Result Value Ref Range    INR 1.57 (H) 0.85 - 1.15   Result Value Ref Range    Hemoglobin 8.4 (L) 13.3 - 17.7 g/dL   Basic metabolic panel   Result Value Ref Range    Sodium 135 (L) 136 - 145 mmol/L    Potassium 5.3 (H) 3.5 - 5.0 mmol/L    Chloride 102 98 - 107 mmol/L    Carbon Dioxide (CO2) 18 (L) 22 - 31 mmol/L    Anion Gap 15 5 - 18 mmol/L    Urea Nitrogen 62 (H) 8 - 28 mg/dL    Creatinine 2.63 (H) 0.70 - 1.30 mg/dL    Calcium 8.4 (L) 8.5 - 10.5 mg/dL    Glucose 157 (H) 70 - 125 mg/dL    GFR Estimate 22 (L) >60 mL/min/1.73m2   Lactic acid whole blood   Result Value Ref Range    Lactic Acid 3.5 (H) 0.7 - 2.0 mmol/L   Blood gas venous   Result Value Ref  Range    pH Venous 7.42 7.35 - 7.45    pCO2 Venous 35 35 - 50 mm Hg    pO2 Venous 30 25 - 47 mm Hg    Bicarbonate Venous 22 (L) 24 - 30 mmol/L    Base Excess/Deficit (+/-) -2.0   mmol/L    Oxyhemoglobin Venous 53.3 (L) 70.0 - 75.0 %    O2 Sat, Venous 54.8 (L) 70.0 - 75.0 %   Basic metabolic panel   Result Value Ref Range    Sodium 135 (L) 136 - 145 mmol/L    Potassium 4.9 3.5 - 5.0 mmol/L    Chloride 103 98 - 107 mmol/L    Carbon Dioxide (CO2) 18 (L) 22 - 31 mmol/L    Anion Gap 14 5 - 18 mmol/L    Urea Nitrogen 62 (H) 8 - 28 mg/dL    Creatinine 2.61 (H) 0.70 - 1.30 mg/dL    Calcium 8.1 (L) 8.5 - 10.5 mg/dL    Glucose 119 70 - 125 mg/dL    GFR Estimate 23 (L) >60 mL/min/1.73m2   Result Value Ref Range    Digoxin 0.9   ug/L   Comprehensive metabolic panel   Result Value Ref Range    Sodium 134 (L) 136 - 145 mmol/L    Potassium 4.8 3.5 - 5.0 mmol/L    Chloride 102 98 - 107 mmol/L    Carbon Dioxide (CO2) 22 22 - 31 mmol/L    Anion Gap 10 5 - 18 mmol/L    Urea Nitrogen 62 (H) 8 - 28 mg/dL    Creatinine 2.55 (H) 0.70 - 1.30 mg/dL    Calcium 8.1 (L) 8.5 - 10.5 mg/dL    Glucose 118 70 - 125 mg/dL    Alkaline Phosphatase 84 45 - 120 U/L    AST 34 0 - 40 U/L    ALT 25 0 - 45 U/L    Protein Total 5.9 (L) 6.0 - 8.0 g/dL    Albumin 3.3 (L) 3.5 - 5.0 g/dL    Bilirubin Total 2.6 (H) 0.0 - 1.0 mg/dL    GFR Estimate 23 (L) >60 mL/min/1.73m2   Result Value Ref Range    Procalcitonin 0.29 0.00 - 0.49 ng/mL   CBC with platelets and differential   Result Value Ref Range    WBC Count 9.6 4.0 - 11.0 10e3/uL    RBC Count 3.71 (L) 4.40 - 5.90 10e6/uL    Hemoglobin 8.2 (L) 13.3 - 17.7 g/dL    Hematocrit 27.6 (L) 40.0 - 53.0 %    MCV 74 (L) 78 - 100 fL    MCH 22.1 (L) 26.5 - 33.0 pg    MCHC 29.7 (L) 31.5 - 36.5 g/dL    RDW 19.1 (H) 10.0 - 15.0 %    Platelet Count 196 150 - 450 10e3/uL    NRBCs per 100 WBC 7 (H) <1 /100    Absolute NRBCs 0.7 10e3/uL   Lactic acid whole blood   Result Value Ref Range    Lactic Acid 1.7 0.7 - 2.0 mmol/L    Asymptomatic COVID-19 Virus (Coronavirus) by PCR Nasopharyngeal    Specimen: Nasopharyngeal; Swab   Result Value Ref Range    SARS CoV2 PCR Negative Negative   Result Value Ref Range     30 - 190 U/L   Glucose by meter   Result Value Ref Range    GLUCOSE BY METER POCT 105 70 - 125 mg/dL   Manual Differential   Result Value Ref Range    % Neutrophils 60 %    % Lymphocytes 35 %    % Monocytes 3 %    % Eosinophils 2 %    % Basophils 0 %    NRBCs per 100 WBC 11 (H) <=0 %    Absolute Neutrophils 5.8 1.6 - 8.3 10e3/uL    Absolute Lymphocytes 3.4 0.8 - 5.3 10e3/uL    Absolute Monocytes 0.3 0.0 - 1.3 10e3/uL    Absolute Eosinophils 0.2 0.0 - 0.7 10e3/uL    Absolute Basophils 0.0 0.0 - 0.2 10e3/uL    Absolute NRBCs 1.1 (H) <=0.0 10e3/uL    RBC Morphology Confirmed RBC Indices     Platelet Assessment  Automated Count Confirmed. Platelet morphology is normal.     Automated Count Confirmed. Platelet morphology is normal.    Elliptocytes Slight (A) None Seen    Polychromasia Slight (A) None Seen   Adult Type and Screen   Result Value Ref Range    ABO/RH(D) A POS     Antibody Screen Negative Negative    SPECIMEN EXPIRATION DATE [id number]    Prepare red blood cells (unit)   Result Value Ref Range    CROSSMATCH Compatible     UNIT ABO/RH A Pos     Unit Number [id number]     UNIT STATUS Issued     Blood Component Type Red Blood Cells     Product Code P3011V06     CODING SYSTEM [id number]     UNIT TYPE ISBT 6200     ISSUE DATE AND TIME [id number]    Prepare red blood cells (unit)   Result Value Ref Range    CROSSMATCH Compatible     UNIT ABO/RH A Pos     Unit Number [id number]     UNIT STATUS Issued     Blood Component Type Red Blood Cells     Product Code P0777V85     CODING SYSTEM [id number]     UNIT TYPE ISBT 6200     ISSUE DATE AND TIME [id number]          RADIOLOGY    Pertinent imaging reviewed   Please see official radiology report.  US Lower Extremity Arterial Duplex Right   Final Result   IMPRESSION:   1.   Poststenotic or postobstructive waveform within profunda femoris artery.   2.  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 Reconstituted popliteal artery with patent dorsalis pedis and posterior tibial arteries at the ankle.      Results called to Dr. Bobo at 1:35 AM      US Lower Extremity Venous Duplex Right   Final Result   IMPRESSION:   1.  No deep venous thrombosis in the right lower extremity.            FINAL IMPRESSION    1. Pain of right lower leg    2. Anemia due to blood loss, acute    3. Renal insufficiency    4. Lactic acid acidosis          I, Tc Kingsley, am serving as a scribe to document services personally performed by Dr. Bobo based on my observation and the provider's statements to me. I, Margot Bobo MD attest that Tc Kingsley is acting in a scribe capacity, has observed my performance of the services and has documented them in accordance with my direction.        Margot Bobo MD  08/23/21 0716

## 2021-08-23 NOTE — ED NOTES
Pts blood pressure is low at this time, MD notified. MD wants blood products to be pressured bag in and both units hung

## 2021-08-23 NOTE — DISCHARGE INSTRUCTIONS
1.  Call Dr. Grimaldo clinic tomorrow to arrange follow-up in 1 week    2.  Call your primary care office tomorrow to arrange a recheck of your kidney function and hemoglobin this week    3.  If you experience chest pain, dizziness or lightheadedness, or any new or concerning symptoms, return to the emergency department

## 2021-08-23 NOTE — ED NOTES
Operations called back and stated there are no ICU beds in the system and will need to look outside of system for bed placement.

## 2021-08-23 NOTE — ED PROVIDER NOTES
"TRANSFER OF CARE NOTE ED SIGNOUT      HPI    Patient seen by Dr. Rufino Pendleton for right leg pain and signed out care at 4:05 PM.           PHYSICAL EXAM      VITAL SIGNS: /65   Pulse 70   Temp 97.7  F (36.5  C)   Resp 16   Ht 1.803 m (5' 11\")   Wt 108.9 kg (240 lb)   SpO2 97%   BMI 33.47 kg/m    Repeat exam reveals intact DP and PT pulses      LABS  Pertinent lab results reviewed in chart.  Results for orders placed or performed during the hospital encounter of 08/22/21   US Lower Extremity Venous Duplex Right    Impression    IMPRESSION:  1.  No deep venous thrombosis in the right lower extremity.   US Lower Extremity Arterial Duplex Right    Impression    IMPRESSION:  1.  Poststenotic or postobstructive waveform within profunda femoris artery.  2.  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 Reconstituted popliteal artery with patent dorsalis pedis and posterior tibial arteries at the ankle.    Results called to Dr. Bobo at 1:35 AM   Basic metabolic panel   Result Value Ref Range    Sodium 134 (L) 136 - 145 mmol/L    Potassium 5.3 (H) 3.5 - 5.0 mmol/L    Chloride 100 98 - 107 mmol/L    Carbon Dioxide (CO2) 18 (L) 22 - 31 mmol/L    Anion Gap 16 5 - 18 mmol/L    Urea Nitrogen 58 (H) 8 - 28 mg/dL    Creatinine 2.56 (H) 0.70 - 1.30 mg/dL    Calcium 8.5 8.5 - 10.5 mg/dL    Glucose 218 (H) 70 - 125 mg/dL    GFR Estimate 23 (L) >60 mL/min/1.73m2   Lactic acid whole blood   Result Value Ref Range    Lactic Acid 5.7 (HH) 0.7 - 2.0 mmol/L   CRP inflammation   Result Value Ref Range    CRP 1.3 (H) 0.0-<0.8 mg/dL   Result Value Ref Range     30 - 190 U/L   CBC (+ platelets, no diff)   Result Value Ref Range    WBC Count 12.3 (H) 4.0 - 11.0 10e3/uL    RBC Count 3.43 (L) 4.40 - 5.90 10e6/uL    Hemoglobin 7.0 (L) 13.3 - 17.7 g/dL    Hematocrit 24.9 (L) 40.0 - 53.0 %    MCV 73 (L) 78 - 100 fL    MCH 20.4 (L) 26.5 - 33.0 pg    " MCHC 28.1 (L) 31.5 - 36.5 g/dL    RDW 18.0 (H) 10.0 - 15.0 %    Platelet Count 248 150 - 450 10e3/uL   Result Value Ref Range    INR 1.57 (H) 0.85 - 1.15   Result Value Ref Range    Hemoglobin 8.4 (L) 13.3 - 17.7 g/dL   Basic metabolic panel   Result Value Ref Range    Sodium 135 (L) 136 - 145 mmol/L    Potassium 5.3 (H) 3.5 - 5.0 mmol/L    Chloride 102 98 - 107 mmol/L    Carbon Dioxide (CO2) 18 (L) 22 - 31 mmol/L    Anion Gap 15 5 - 18 mmol/L    Urea Nitrogen 62 (H) 8 - 28 mg/dL    Creatinine 2.63 (H) 0.70 - 1.30 mg/dL    Calcium 8.4 (L) 8.5 - 10.5 mg/dL    Glucose 157 (H) 70 - 125 mg/dL    GFR Estimate 22 (L) >60 mL/min/1.73m2   Lactic acid whole blood   Result Value Ref Range    Lactic Acid 3.5 (H) 0.7 - 2.0 mmol/L   Blood gas venous   Result Value Ref Range    pH Venous 7.42 7.35 - 7.45    pCO2 Venous 35 35 - 50 mm Hg    pO2 Venous 30 25 - 47 mm Hg    Bicarbonate Venous 22 (L) 24 - 30 mmol/L    Base Excess/Deficit (+/-) -2.0   mmol/L    Oxyhemoglobin Venous 53.3 (L) 70.0 - 75.0 %    O2 Sat, Venous 54.8 (L) 70.0 - 75.0 %   Basic metabolic panel   Result Value Ref Range    Sodium 135 (L) 136 - 145 mmol/L    Potassium 4.9 3.5 - 5.0 mmol/L    Chloride 103 98 - 107 mmol/L    Carbon Dioxide (CO2) 18 (L) 22 - 31 mmol/L    Anion Gap 14 5 - 18 mmol/L    Urea Nitrogen 62 (H) 8 - 28 mg/dL    Creatinine 2.61 (H) 0.70 - 1.30 mg/dL    Calcium 8.1 (L) 8.5 - 10.5 mg/dL    Glucose 119 70 - 125 mg/dL    GFR Estimate 23 (L) >60 mL/min/1.73m2   Result Value Ref Range    Digoxin 0.9   ug/L   Comprehensive metabolic panel   Result Value Ref Range    Sodium 134 (L) 136 - 145 mmol/L    Potassium 4.8 3.5 - 5.0 mmol/L    Chloride 102 98 - 107 mmol/L    Carbon Dioxide (CO2) 22 22 - 31 mmol/L    Anion Gap 10 5 - 18 mmol/L    Urea Nitrogen 62 (H) 8 - 28 mg/dL    Creatinine 2.55 (H) 0.70 - 1.30 mg/dL    Calcium 8.1 (L) 8.5 - 10.5 mg/dL    Glucose 118 70 - 125 mg/dL    Alkaline Phosphatase 84 45 - 120 U/L    AST 34 0 - 40 U/L    ALT 25 0 - 45  U/L    Protein Total 5.9 (L) 6.0 - 8.0 g/dL    Albumin 3.3 (L) 3.5 - 5.0 g/dL    Bilirubin Total 2.6 (H) 0.0 - 1.0 mg/dL    GFR Estimate 23 (L) >60 mL/min/1.73m2   Result Value Ref Range    Procalcitonin 0.29 0.00 - 0.49 ng/mL   CBC with platelets and differential   Result Value Ref Range    WBC Count 9.6 4.0 - 11.0 10e3/uL    RBC Count 3.71 (L) 4.40 - 5.90 10e6/uL    Hemoglobin 8.2 (L) 13.3 - 17.7 g/dL    Hematocrit 27.6 (L) 40.0 - 53.0 %    MCV 74 (L) 78 - 100 fL    MCH 22.1 (L) 26.5 - 33.0 pg    MCHC 29.7 (L) 31.5 - 36.5 g/dL    RDW 19.1 (H) 10.0 - 15.0 %    Platelet Count 196 150 - 450 10e3/uL    NRBCs per 100 WBC 7 (H) <1 /100    Absolute NRBCs 0.7 10e3/uL   Lactic acid whole blood   Result Value Ref Range    Lactic Acid 1.7 0.7 - 2.0 mmol/L   Asymptomatic COVID-19 Virus (Coronavirus) by PCR Nasopharyngeal    Specimen: Nasopharyngeal; Swab   Result Value Ref Range    SARS CoV2 PCR Negative Negative   Result Value Ref Range     30 - 190 U/L   Glucose by meter   Result Value Ref Range    GLUCOSE BY METER POCT 105 70 - 125 mg/dL   Manual Differential   Result Value Ref Range    % Neutrophils 60 %    % Lymphocytes 35 %    % Monocytes 3 %    % Eosinophils 2 %    % Basophils 0 %    NRBCs per 100 WBC 11 (H) <=0 %    Absolute Neutrophils 5.8 1.6 - 8.3 10e3/uL    Absolute Lymphocytes 3.4 0.8 - 5.3 10e3/uL    Absolute Monocytes 0.3 0.0 - 1.3 10e3/uL    Absolute Eosinophils 0.2 0.0 - 0.7 10e3/uL    Absolute Basophils 0.0 0.0 - 0.2 10e3/uL    Absolute NRBCs 1.1 (H) <=0.0 10e3/uL    RBC Morphology Confirmed RBC Indices     Platelet Assessment  Automated Count Confirmed. Platelet morphology is normal.     Automated Count Confirmed. Platelet morphology is normal.    Elliptocytes Slight (A) None Seen    Polychromasia Slight (A) None Seen   Glucose by meter   Result Value Ref Range    GLUCOSE BY METER POCT 100 70 - 125 mg/dL   Glucose by meter   Result Value Ref Range    GLUCOSE BY METER POCT 194 (H) 70 - 125 mg/dL    Adult Type and Screen   Result Value Ref Range    ABO/RH(D) A POS     Antibody Screen Negative Negative    SPECIMEN EXPIRATION DATE [id number]    Prepare red blood cells (unit)   Result Value Ref Range    CROSSMATCH Compatible     UNIT ABO/RH A Pos     Unit Number [id number]     UNIT STATUS Issued     Blood Component Type Red Blood Cells     Product Code T3517Y64     CODING SYSTEM [id number]     UNIT TYPE ISBT 6200     ISSUE DATE AND TIME [id number]    Prepare red blood cells (unit)   Result Value Ref Range    CROSSMATCH Compatible     UNIT ABO/RH A Pos     Unit Number [id number]     UNIT STATUS Issued     Blood Component Type Red Blood Cells     Product Code A9768O13     CODING SYSTEM [id number]     UNIT TYPE ISBT 6200     ISSUE DATE AND TIME [id number]    Blood Culture Peripheral Blood    Specimen: Peripheral Blood   Result Value Ref Range    Culture No growth after 12 hours          RADIOLOGY  [unfilled]      ED COURSE & MEDICAL DECISION MAKING    4:02 PM Spoke to Vascular Surgery.    4:31 PM I introduced myself to the patient and updated with results.     4:46 PM Spoke to Dr. Dwyer.    ED Course as of Aug 23 1646   Mon Aug 23, 2021   1511 I discussed the patient with vascular surgery on-call at this facility and at [facility].  The patient's lactic acidosis has resolved.  His hemoglobin has improved with transfusion and stayed stable.  I counseled the patient on the importance of calling Dr. Grimaldo's clinic tomorrow to arrange follow-up within 1 week.  Additionally, I counseled him on the signs and symptoms of angina or claudication secondary to his low hemoglobin and to follow-up if he experiences anything like angina or its equivalent or if his claudication worsens.  I gave in shared decision-making with the patient.  Given his prolonged stay in the emergency department with resolution of his acidosis and treatment of his symptomatic anemia, he feels comfortable discharging I believe this is  reasonable.      1551 Discussed with vascular surgery on-call for [place].  Patient does not have any indication for acute intervention, he does have an indication for convenience intervention given his complaint of claudication.  Lab assay trend has been reassuring regarding lactic acidosis and hemoglobin/hematocrit.          At the conclusion of the encounter I discussed  the results of all of the tests and the disposition.   All questions were answered.  The patient acknowledged understanding and was agreeable with the care plan.      IMPRESSION          FINAL DIAGNOSIS:   1. Pain of right lower leg    2. Anemia due to blood loss, acute    3. Renal insufficiency    4. Lactic acid acidosis          I, Dr. Morales Sessions, personally performed the services described in this documentation.     Sessions, MD Andrew  09/03/21 1004

## 2021-08-24 ENCOUNTER — ANCILLARY PROCEDURE (OUTPATIENT)
Dept: ULTRASOUND IMAGING | Facility: CLINIC | Age: 81
End: 2021-08-24
Attending: EMERGENCY MEDICINE
Payer: COMMERCIAL

## 2021-08-24 ENCOUNTER — PATIENT OUTREACH (OUTPATIENT)
Dept: CARE COORDINATION | Facility: CLINIC | Age: 81
End: 2021-08-24

## 2021-08-24 ENCOUNTER — APPOINTMENT (OUTPATIENT)
Dept: CT IMAGING | Facility: CLINIC | Age: 81
End: 2021-08-24
Attending: EMERGENCY MEDICINE
Payer: COMMERCIAL

## 2021-08-24 ENCOUNTER — HOSPITAL ENCOUNTER (EMERGENCY)
Facility: CLINIC | Age: 81
Discharge: ANOTHER HOSPITAL | End: 2021-08-25
Attending: EMERGENCY MEDICINE | Admitting: EMERGENCY MEDICINE
Payer: COMMERCIAL

## 2021-08-24 DIAGNOSIS — I50.9 ACUTE ON CHRONIC CONGESTIVE HEART FAILURE, UNSPECIFIED HEART FAILURE TYPE (H): ICD-10-CM

## 2021-08-24 DIAGNOSIS — N18.9 CHRONIC KIDNEY DISEASE, UNSPECIFIED CKD STAGE: ICD-10-CM

## 2021-08-24 DIAGNOSIS — I21.4 NSTEMI (NON-ST ELEVATED MYOCARDIAL INFARCTION) (H): ICD-10-CM

## 2021-08-24 DIAGNOSIS — D64.9 ANEMIA, UNSPECIFIED TYPE: ICD-10-CM

## 2021-08-24 DIAGNOSIS — I27.20 PULMONARY HYPERTENSION (H): ICD-10-CM

## 2021-08-24 DIAGNOSIS — Z71.89 OTHER SPECIFIED COUNSELING: ICD-10-CM

## 2021-08-24 LAB
ABO/RH(D): NORMAL
ANION GAP SERPL CALCULATED.3IONS-SCNC: 12 MMOL/L (ref 5–18)
ANTIBODY SCREEN: NEGATIVE
APTT PPP: 38 SECONDS (ref 22–38)
APTT PPP: 89 SECONDS (ref 22–38)
BNP SERPL-MCNC: 393 PG/ML (ref 0–82)
BUN SERPL-MCNC: 58 MG/DL (ref 8–28)
CALCIUM SERPL-MCNC: 8.6 MG/DL (ref 8.5–10.5)
CHLORIDE BLD-SCNC: 103 MMOL/L (ref 98–107)
CO2 SERPL-SCNC: 19 MMOL/L (ref 22–31)
CREAT SERPL-MCNC: 2.3 MG/DL (ref 0.7–1.3)
D DIMER PPP FEU-MCNC: 3.83 UG/ML FEU (ref 0–0.5)
DIGOXIN SERPL-MCNC: 0.6 UG/L
ERYTHROCYTE [DISTWIDTH] IN BLOOD BY AUTOMATED COUNT: 19.6 % (ref 10–15)
ERYTHROCYTE [DISTWIDTH] IN BLOOD BY AUTOMATED COUNT: 19.8 % (ref 10–15)
GFR SERPL CREATININE-BSD FRML MDRD: 26 ML/MIN/1.73M2
GLUCOSE BLD-MCNC: 197 MG/DL (ref 70–125)
GLUCOSE BLDC GLUCOMTR-MCNC: 108 MG/DL (ref 70–125)
GLUCOSE BLDC GLUCOMTR-MCNC: 152 MG/DL (ref 70–125)
HCT VFR BLD AUTO: 28.1 % (ref 40–53)
HCT VFR BLD AUTO: 28.4 % (ref 40–53)
HGB BLD-MCNC: 8.2 G/DL (ref 13.3–17.7)
HGB BLD-MCNC: 8.3 G/DL (ref 13.3–17.7)
HOLD SPECIMEN: NORMAL
INR PPP: 1.51 (ref 0.85–1.15)
MCH RBC QN AUTO: 21.6 PG (ref 26.5–33)
MCH RBC QN AUTO: 22.1 PG (ref 26.5–33)
MCHC RBC AUTO-ENTMCNC: 29.2 G/DL (ref 31.5–36.5)
MCHC RBC AUTO-ENTMCNC: 29.2 G/DL (ref 31.5–36.5)
MCV RBC AUTO: 74 FL (ref 78–100)
MCV RBC AUTO: 76 FL (ref 78–100)
PLATELET # BLD AUTO: 243 10E3/UL (ref 150–450)
PLATELET # BLD AUTO: 262 10E3/UL (ref 150–450)
POTASSIUM BLD-SCNC: 4.7 MMOL/L (ref 3.5–5)
RBC # BLD AUTO: 3.71 10E6/UL (ref 4.4–5.9)
RBC # BLD AUTO: 3.85 10E6/UL (ref 4.4–5.9)
SARS-COV-2 RNA RESP QL NAA+PROBE: NEGATIVE
SODIUM SERPL-SCNC: 134 MMOL/L (ref 136–145)
SPECIMEN EXPIRATION DATE: NORMAL
TROPONIN I SERPL-MCNC: 0.57 NG/ML (ref 0–0.29)
TROPONIN I SERPL-MCNC: 0.63 NG/ML (ref 0–0.29)
WBC # BLD AUTO: 10.5 10E3/UL (ref 4–11)
WBC # BLD AUTO: 12.2 10E3/UL (ref 4–11)

## 2021-08-24 PROCEDURE — 85027 COMPLETE CBC AUTOMATED: CPT | Performed by: EMERGENCY MEDICINE

## 2021-08-24 PROCEDURE — 93005 ELECTROCARDIOGRAM TRACING: CPT | Performed by: EMERGENCY MEDICINE

## 2021-08-24 PROCEDURE — 85730 THROMBOPLASTIN TIME PARTIAL: CPT | Performed by: PHYSICIAN ASSISTANT

## 2021-08-24 PROCEDURE — 93308 TTE F-UP OR LMTD: CPT

## 2021-08-24 PROCEDURE — 96376 TX/PRO/DX INJ SAME DRUG ADON: CPT

## 2021-08-24 PROCEDURE — 120N000001 HC R&B MED SURG/OB

## 2021-08-24 PROCEDURE — 85379 FIBRIN DEGRADATION QUANT: CPT | Performed by: PHYSICIAN ASSISTANT

## 2021-08-24 PROCEDURE — 250N000013 HC RX MED GY IP 250 OP 250 PS 637: Performed by: EMERGENCY MEDICINE

## 2021-08-24 PROCEDURE — C9803 HOPD COVID-19 SPEC COLLECT: HCPCS

## 2021-08-24 PROCEDURE — 71275 CT ANGIOGRAPHY CHEST: CPT

## 2021-08-24 PROCEDURE — 86901 BLOOD TYPING SEROLOGIC RH(D): CPT | Performed by: PHYSICIAN ASSISTANT

## 2021-08-24 PROCEDURE — 87635 SARS-COV-2 COVID-19 AMP PRB: CPT | Performed by: PHYSICIAN ASSISTANT

## 2021-08-24 PROCEDURE — 85027 COMPLETE CBC AUTOMATED: CPT | Performed by: PHYSICIAN ASSISTANT

## 2021-08-24 PROCEDURE — 258N000003 HC RX IP 258 OP 636: Performed by: EMERGENCY MEDICINE

## 2021-08-24 PROCEDURE — 96366 THER/PROPH/DIAG IV INF ADDON: CPT

## 2021-08-24 PROCEDURE — 84484 ASSAY OF TROPONIN QUANT: CPT | Performed by: EMERGENCY MEDICINE

## 2021-08-24 PROCEDURE — 99222 1ST HOSP IP/OBS MODERATE 55: CPT | Performed by: INTERNAL MEDICINE

## 2021-08-24 PROCEDURE — 250N000011 HC RX IP 250 OP 636: Performed by: EMERGENCY MEDICINE

## 2021-08-24 PROCEDURE — 96372 THER/PROPH/DIAG INJ SC/IM: CPT | Mod: 59 | Performed by: EMERGENCY MEDICINE

## 2021-08-24 PROCEDURE — 36415 COLL VENOUS BLD VENIPUNCTURE: CPT | Mod: 59 | Performed by: PHYSICIAN ASSISTANT

## 2021-08-24 PROCEDURE — 85610 PROTHROMBIN TIME: CPT | Performed by: PHYSICIAN ASSISTANT

## 2021-08-24 PROCEDURE — 250N000011 HC RX IP 250 OP 636: Performed by: PHYSICIAN ASSISTANT

## 2021-08-24 PROCEDURE — 99285 EMERGENCY DEPT VISIT HI MDM: CPT | Mod: 25

## 2021-08-24 PROCEDURE — 83880 ASSAY OF NATRIURETIC PEPTIDE: CPT | Performed by: EMERGENCY MEDICINE

## 2021-08-24 PROCEDURE — 96375 TX/PRO/DX INJ NEW DRUG ADDON: CPT | Mod: 59

## 2021-08-24 PROCEDURE — 80162 ASSAY OF DIGOXIN TOTAL: CPT | Performed by: INTERNAL MEDICINE

## 2021-08-24 PROCEDURE — 36415 COLL VENOUS BLD VENIPUNCTURE: CPT | Performed by: EMERGENCY MEDICINE

## 2021-08-24 PROCEDURE — 80048 BASIC METABOLIC PNL TOTAL CA: CPT | Performed by: EMERGENCY MEDICINE

## 2021-08-24 PROCEDURE — 96365 THER/PROPH/DIAG IV INF INIT: CPT | Mod: 59

## 2021-08-24 PROCEDURE — 250N000012 HC RX MED GY IP 250 OP 636 PS 637: Performed by: EMERGENCY MEDICINE

## 2021-08-24 RX ORDER — ROPINIROLE 1 MG/1
2 TABLET, FILM COATED ORAL ONCE
Status: COMPLETED | OUTPATIENT
Start: 2021-08-24 | End: 2021-08-24

## 2021-08-24 RX ORDER — CLOPIDOGREL BISULFATE 75 MG/1
75 TABLET ORAL DAILY
Status: DISCONTINUED | OUTPATIENT
Start: 2021-08-25 | End: 2021-08-25 | Stop reason: HOSPADM

## 2021-08-24 RX ORDER — IOPAMIDOL 755 MG/ML
50 INJECTION, SOLUTION INTRAVASCULAR ONCE
Status: COMPLETED | OUTPATIENT
Start: 2021-08-24 | End: 2021-08-24

## 2021-08-24 RX ORDER — HEPARIN SODIUM 10000 [USP'U]/100ML
0-5000 INJECTION, SOLUTION INTRAVENOUS CONTINUOUS
Status: DISCONTINUED | OUTPATIENT
Start: 2021-08-24 | End: 2021-08-24

## 2021-08-24 RX ORDER — DOXYCYCLINE 100 MG/1
100 CAPSULE ORAL SEE ADMIN INSTRUCTIONS
COMMUNITY
End: 2021-10-05

## 2021-08-24 RX ORDER — HYDROCODONE BITARTRATE AND ACETAMINOPHEN 5; 325 MG/1; MG/1
1 TABLET ORAL EVERY 6 HOURS PRN
Status: ON HOLD | COMMUNITY
End: 2021-10-26

## 2021-08-24 RX ORDER — METOPROLOL TARTRATE 25 MG/1
50 TABLET, FILM COATED ORAL ONCE
Status: COMPLETED | OUTPATIENT
Start: 2021-08-24 | End: 2021-08-24

## 2021-08-24 RX ORDER — BUMETANIDE 0.25 MG/ML
2 INJECTION INTRAMUSCULAR; INTRAVENOUS ONCE
Status: COMPLETED | OUTPATIENT
Start: 2021-08-24 | End: 2021-08-24

## 2021-08-24 RX ORDER — IPRATROPIUM BROMIDE AND ALBUTEROL SULFATE 2.5; .5 MG/3ML; MG/3ML
1 SOLUTION RESPIRATORY (INHALATION) 4 TIMES DAILY PRN
Status: ON HOLD | COMMUNITY
End: 2022-01-15

## 2021-08-24 RX ORDER — ONDANSETRON 2 MG/ML
4 INJECTION INTRAMUSCULAR; INTRAVENOUS ONCE
Status: COMPLETED | OUTPATIENT
Start: 2021-08-24 | End: 2021-08-24

## 2021-08-24 RX ORDER — CLONIDINE 0.2 MG/24H
1 PATCH, EXTENDED RELEASE TRANSDERMAL WEEKLY
Status: DISCONTINUED | OUTPATIENT
Start: 2021-08-24 | End: 2021-08-25 | Stop reason: HOSPADM

## 2021-08-24 RX ORDER — MORPHINE SULFATE 2 MG/ML
2 INJECTION, SOLUTION INTRAMUSCULAR; INTRAVENOUS ONCE
Status: COMPLETED | OUTPATIENT
Start: 2021-08-24 | End: 2021-08-24

## 2021-08-24 RX ORDER — HEPARIN SODIUM 10000 [USP'U]/100ML
12 INJECTION, SOLUTION INTRAVENOUS CONTINUOUS
Status: DISCONTINUED | OUTPATIENT
Start: 2021-08-24 | End: 2021-08-25

## 2021-08-24 RX ORDER — METFORMIN HCL 500 MG
1000 TABLET, EXTENDED RELEASE 24 HR ORAL ONCE
Status: DISCONTINUED | OUTPATIENT
Start: 2021-08-24 | End: 2021-08-24 | Stop reason: ALTCHOICE

## 2021-08-24 RX ORDER — DIPHENHYDRAMINE HYDROCHLORIDE 50 MG/ML
50 INJECTION INTRAMUSCULAR; INTRAVENOUS ONCE
Status: COMPLETED | OUTPATIENT
Start: 2021-08-24 | End: 2021-08-24

## 2021-08-24 RX ADMIN — BUMETANIDE 2 MG: 0.25 INJECTION, SOLUTION INTRAMUSCULAR; INTRAVENOUS at 17:47

## 2021-08-24 RX ADMIN — MORPHINE SULFATE 2 MG: 2 INJECTION, SOLUTION INTRAMUSCULAR; INTRAVENOUS at 19:28

## 2021-08-24 RX ADMIN — DIPHENHYDRAMINE HYDROCHLORIDE 50 MG: 50 INJECTION INTRAMUSCULAR; INTRAVENOUS at 16:00

## 2021-08-24 RX ADMIN — INSULIN GLARGINE 52 UNITS: 100 INJECTION, SOLUTION SUBCUTANEOUS at 20:13

## 2021-08-24 RX ADMIN — ROPINIROLE HYDROCHLORIDE 2 MG: 1 TABLET, FILM COATED ORAL at 19:34

## 2021-08-24 RX ADMIN — METOPROLOL TARTRATE 50 MG: 25 TABLET, FILM COATED ORAL at 19:34

## 2021-08-24 RX ADMIN — IOPAMIDOL 50 ML: 755 INJECTION, SOLUTION INTRAVENOUS at 16:40

## 2021-08-24 RX ADMIN — HEPARIN SODIUM 12 UNITS/KG/HR: 10000 INJECTION, SOLUTION INTRAVENOUS at 17:03

## 2021-08-24 RX ADMIN — ONDANSETRON 4 MG: 2 INJECTION INTRAMUSCULAR; INTRAVENOUS at 15:59

## 2021-08-24 RX ADMIN — SODIUM CHLORIDE 500 ML: 9 INJECTION, SOLUTION INTRAVENOUS at 21:19

## 2021-08-24 RX ADMIN — CLONIDINE 1 PATCH: 0.2 PATCH, EXTENDED RELEASE TRANSDERMAL at 19:32

## 2021-08-24 ASSESSMENT — ACTIVITIES OF DAILY LIVING (ADL): DEPENDENT_IADLS:: CLEANING;LAUNDRY;COOKING;SHOPPING;TRANSPORTATION

## 2021-08-24 ASSESSMENT — ENCOUNTER SYMPTOMS
COUGH: 0
SHORTNESS OF BREATH: 1
CHILLS: 0
FEVER: 0
UNEXPECTED WEIGHT CHANGE: 1

## 2021-08-24 NOTE — PHARMACY-ADMISSION MEDICATION HISTORY
Pharmacy Note - Admission Medication History    Pertinent Provider Information:  ______________________________________________________________________    Prior To Admission (PTA) med list completed and updated in EMR.       PTA Med List   Medication Sig Last Dose     acetaminophen (TYLENOL) 500 MG tablet [ACETAMINOPHEN (TYLENOL) 500 MG TABLET] Take 1,000 mg by mouth every 6 (six) hours as needed. First line of pain management. Do Not take more than 4,000 mg in 24 hours. prn     albuterol (PROVENTIL HFA;VENTOLIN HFA) 90 mcg/actuation inhaler Inhale 2 puffs into the lungs every 6 hours as needed  prn     apixaban (ELIQUIS) 5 mg Tab [APIXABAN (ELIQUIS) 5 MG TAB] Take 1 tablet by mouth every 12 (twelve) hours. 8/24/2021 at x1     aspirin 81 MG EC tablet [ASPIRIN 81 MG EC TABLET] Take 81 mg by mouth every morning.  8/24/2021     atorvastatin (LIPITOR) 40 MG tablet Take 40 mg by mouth every morning  8/24/2021     bumetanide (BUMEX) 1 MG tablet Take 3 mg by mouth 2 times daily  8/24/2021 at x1     canagliflozin (INVOKANA) 100 mg Tab Take 100 mg by mouth every morning  8/24/2021     cloNIDine (CATAPRES-TTS) 0.2 mg/24 hr [CLONIDINE (CATAPRES-TTS) 0.2 MG/24 HR] Place 1 patch on the skin once a week. Place patch on Monday. 8/16/2021     clopidogreL (PLAVIX) 75 mg tablet [CLOPIDOGREL (PLAVIX) 75 MG TABLET] Take 1 tablet by mouth daily. 8/24/2021     digoxin (LANOXIN) 125 mcg tablet [DIGOXIN (LANOXIN) 125 MCG TABLET] Take 1 tablet (125 mcg total) by mouth daily. 8/24/2021     doxycycline monohydrate (MONODOX) 100 MG capsule Take 100 mg by mouth See Admin Instructions Before dental appointments      eplerenone (INSPRA) 25 MG tablet Take 25 mg by mouth daily  8/24/2021     fluticasone-vilanterol (BREO ELLIPTA) 200-25 mcg/dose DsDv inhaler [FLUTICASONE-VILANTEROL (BREO ELLIPTA) 200-25 MCG/DOSE DSDV INHALER] Inhale 1 puff daily. 8/24/2021     Garlic 1000 MG CAPS Take 1 capsule by mouth daily 8/24/2021     HYDROcodone-acetaminophen  (NORCO) 5-325 MG tablet Take 1 tablet by mouth every 6 hours as needed for severe pain prn     insulin glargine (LANTUS PEN) 100 UNIT/ML pen Inject 52 Units Subcutaneous 2 times daily  8/24/2021 at x1     ipratropium - albuterol 0.5 mg/2.5 mg/3 mL (DUONEB) 0.5-2.5 (3) MG/3ML neb solution Take 1 vial by nebulization 4 times daily 8/24/2021 at Unknown time     irbesartan (AVAPRO) 300 MG tablet Take 150 mg by mouth daily  8/24/2021     lidocaine (LIDODERM) 5 % [LIDOCAINE (LIDODERM) 5 %] Place 1 patch on the skin as needed. Remove & Discard patch within 12 hours or as directed by MD prn     metFORMIN (GLUCOPHAGE-XR) 500 MG 24 hr tablet Take 1,000 mg by mouth 2 times daily (with meals) 8/24/2021 at x1     metoprolol tartrate (LOPRESSOR) 50 MG tablet Take 50 mg by mouth 2 times daily (with meals) 8/24/2021 at x1     multivitamin w/minerals (THERA-VIT-M) tablet Take 1 tablet by mouth daily 8/24/2021     nitroglycerin (NITROSTAT) 0.4 MG SL tablet [NITROGLYCERIN (NITROSTAT) 0.4 MG SL TABLET] Place 0.4 mg under the tongue every 5 (five) minutes as needed for chest pain. prn     omeprazole (PRILOSEC) 20 MG DR capsule Take 20 mg by mouth daily 8/24/2021     potassium chloride (K-DUR,KLOR-CON) 20 MEQ tablet Take 40 mEq by mouth daily  8/24/2021     rOPINIRole (REQUIP) 2 MG tablet [ROPINIROLE (REQUIP) 2 MG TABLET] Take 2 mg by mouth at bedtime.  8/23/2021     tadalafil (CIALIS) 20 MG tablet Take 20 mg by mouth daily 8/24/2021     vitamin C (ASCORBIC ACID) 1000 MG TABS Take 1,000 mg by mouth daily 8/24/2021     zinc gluconate 50 MG tablet Take 50 mg by mouth daily 8/24/2021     zolpidem (AMBIEN) 10 mg tablet [ZOLPIDEM (AMBIEN) 10 MG TABLET] Take 10 mg by mouth bedtime. 8/23/2021       Information source(s): Patient    Method of interview communication: in-person    Patient was asked about OTC/herbal products specifically.  PTA med list reflects this.    Based on the pharmacist's assessment, the PTA med list information appears  reliable    Allergies were reviewed, assessed, and updated with the patient.      Patient did not bring any medications to the hospital and can't retrieve from home. No multi-dose medications are available for use during hospital stay.      Thank you for the opportunity to participate in the care of this patient.      Jeimy Tracy [facility]     8/24/2021     4:43 PM

## 2021-08-24 NOTE — ED PROVIDER NOTES
ED SIGNOUT  Date/Time:8/24/2021 6:26 PM    Patient signed out to me by my colleague, Dr. Teague.  Please see their note for complete history and physical. Plan to follow up on Troponin and monitor blood pressures     The creation of this record is based on the scribe s observations of the work being performed by Dr. Horton and the provider s statements to them. It was created on their behalf by Mirna pan trained medical scribe. This document has been checked and approved by the attending provider.      REMAINING ED WORKUP:    Vitals:  /77   Pulse 72   Temp 97.8  F (36.6  C)   Resp 19   Wt 108.9 kg (240 lb 1.3 oz)   SpO2 97%   BMI 33.48 kg/m        Pertinent labs results reviewed   Results for orders placed or performed during the hospital encounter of 08/24/21   CT Chest Pulmonary Embolism w Contrast    Impression    IMPRESSION:  1.  Negative for pulmonary emboli.    2.  Severe coronary artery calcifications.    3.  Stable lung nodules should be considered benign.    4.   Moderate emphysema.    5.  Slight increase in a small right and tiny left pleural effusion.    6.  Scant abdominal ascites.    7.  Reflux of contrast material into the hepatic veins often seen with right heart failure.   Extra Blue Top Tube   Result Value Ref Range    Hold Specimen JIC    Extra Green Top (Lithium Heparin) Tube   Result Value Ref Range    Hold Specimen JIC    Extra Purple Top Tube   Result Value Ref Range    Hold Specimen JIC    CBC (+ platelets, no diff)   Result Value Ref Range    WBC Count 10.5 4.0 - 11.0 10e3/uL    RBC Count 3.71 (L) 4.40 - 5.90 10e6/uL    Hemoglobin 8.2 (L) 13.3 - 17.7 g/dL    Hematocrit 28.1 (L) 40.0 - 53.0 %    MCV 76 (L) 78 - 100 fL    MCH 22.1 (L) 26.5 - 33.0 pg    MCHC 29.2 (L) 31.5 - 36.5 g/dL    RDW 19.6 (H) 10.0 - 15.0 %    Platelet Count 243 150 - 450 10e3/uL   Basic metabolic panel   Result Value Ref Range    Sodium 134 (L) 136 - 145 mmol/L    Potassium 4.7 3.5 - 5.0 mmol/L    Chloride  103 98 - 107 mmol/L    Carbon Dioxide (CO2) 19 (L) 22 - 31 mmol/L    Anion Gap 12 5 - 18 mmol/L    Urea Nitrogen 58 (H) 8 - 28 mg/dL    Creatinine 2.30 (H) 0.70 - 1.30 mg/dL    Calcium 8.6 8.5 - 10.5 mg/dL    Glucose 197 (H) 70 - 125 mg/dL    GFR Estimate 26 (L) >60 mL/min/1.73m2   Troponin I (now)   Result Value Ref Range    Troponin I 0.57 (HH) 0.00 - 0.29 ng/mL   B-Type Natriuretic Peptide ([facility] Only)   Result Value Ref Range     (H) 0 - 82 pg/mL   Result Value Ref Range    INR 1.51 (H) 0.85 - 1.15   Result Value Ref Range    aPTT 38 22 - 38 Seconds   D dimer quantitative   Result Value Ref Range    D-Dimer Quantitative 3.83 (H) 0.00 - 0.50 ug/mL FEU   Asymptomatic COVID-19 Virus (Coronavirus) by PCR Nasopharyngeal    Specimen: Nasopharyngeal; Swab   Result Value Ref Range    SARS CoV2 PCR Negative Negative   CBC with platelets   Result Value Ref Range    WBC Count 12.2 (H) 4.0 - 11.0 10e3/uL    RBC Count 3.85 (L) 4.40 - 5.90 10e6/uL    Hemoglobin 8.3 (L) 13.3 - 17.7 g/dL    Hematocrit 28.4 (L) 40.0 - 53.0 %    MCV 74 (L) 78 - 100 fL    MCH 21.6 (L) 26.5 - 33.0 pg    MCHC 29.2 (L) 31.5 - 36.5 g/dL    RDW 19.8 (H) 10.0 - 15.0 %    Platelet Count 262 150 - 450 10e3/uL   Partial thromboplastin time   Result Value Ref Range    aPTT 89 (H) 22 - 38 Seconds   Result Value Ref Range    Digoxin 0.6   ug/L   Troponin I (now)   Result Value Ref Range    Troponin I 0.63 (HH) 0.00 - 0.29 ng/mL   Troponin I (now)   Result Value Ref Range    Troponin I 0.51 (HH) 0.00 - 0.29 ng/mL   Glucose by meter   Result Value Ref Range    GLUCOSE BY METER POCT 108 70 - 125 mg/dL   Glucose by meter   Result Value Ref Range    GLUCOSE BY METER POCT 152 (H) 70 - 125 mg/dL   Adult Type and Screen   Result Value Ref Range    ABO/RH(D) A POS     Antibody Screen Negative Negative    SPECIMEN EXPIRATION DATE [id number]        Pertinent imaging reviewed   Please see official radiology report.  CT Chest Pulmonary Embolism w Contrast    Final Result   IMPRESSION:   1.  Negative for pulmonary emboli.      2.  Severe coronary artery calcifications.      3.  Stable lung nodules should be considered benign.      4.   Moderate emphysema.      5.  Slight increase in a small right and tiny left pleural effusion.      6.  Scant abdominal ascites.      7.  Reflux of contrast material into the hepatic veins often seen with right heart failure.      POC US ECHO LIMITED    (Results Pending)        Interventions  Medications   heparin 25,000 units in 0.45% NaCl 250 mL ANTICOAGULANT infusion (10 Units/kg/hr × 108.9 kg Intravenous Restarted 8/24/21 2327)   cloNIDine (CATAPRES-TTS) Patch in Place ( Transdermal Automatically Held 8/30/21 1830)   cloNIDine (CATAPRES-TTS2) 0.2 MG/24HR WK patch 1 patch ( Transdermal Automatically Held 9/7/21 1900)   clopidogrel (PLAVIX) tablet 75 mg (has no administration in time range)   diphenhydrAMINE (BENADRYL) injection 50 mg (50 mg Intravenous Given 8/24/21 1600)   ondansetron (ZOFRAN) injection 4 mg (4 mg Intravenous Given 8/24/21 1559)   iopamidol (ISOVUE-370) solution 50 mL (50 mLs Intravenous Given 8/24/21 1640)   bumetanide (BUMEX) injection 2 mg (2 mg Intravenous Given 8/24/21 1747)   rOPINIRole (REQUIP) tablet 2 mg (2 mg Oral Given 8/24/21 1934)   insulin glargine (LANTUS PEN) injection 52 Units (52 Units Subcutaneous Given 8/24/21 2013)   metoprolol tartrate (LOPRESSOR) tablet 50 mg (50 mg Oral Given 8/24/21 1934)   morphine (PF) injection 2 mg (2 mg Intravenous Given 8/24/21 1928)   0.9% sodium chloride BOLUS (0 mLs Intravenous Stopped 8/24/21 2204)        ED Course/MDM:  6:26 PM Signout accepted from Dr. Teague.  Prior records were reviewed.  Diagnostics from this visit are reviewed.  8:16 PM I spoke with Dr. Corona, Cardiology. Dr. Corona is not concerned that the patient's troponin levels have gone from 0.5 to 0.63, and recommended to continue as previously planned.   9:16 PM The patient's nurse reported that  the patient's blood pressure dropped right after they put a clonidinepatch on. I decided we should take the clonidine patch was taken off.   9:33 PM I checked and updated the patient.   10:17 PM I spoke with Dr. Wills, who was just checking in.   12:39 AM I spoke with Radiology who reported that the patient is down turning now.     80-year-old male boarding in the [facility] emergency department awaiting cardiac bed in hospital with Cath Lab availability.  Patient's troponin x3 did not show any evidence of a significant change.  The patient did have a significant drop in his blood pressure, however it was discovered this is after placing his clonidine patch on, and unfortunately patient had not changed his previous clonidine patch and over a day later than it should have intake.  I do believe this to be potentially related to the patient's hypotension, and after removal of the patch his blood pressure slowly came up.  He did receive gentle fluids as well.  I did discuss the patient with Dr. Wills and Dr. Corona, and they believe correct actions would be to continue with attempt to transfer.  Patient is agreeable with this plan.  ED Course as of Aug 25 0152   Tue Aug 24, 2021   1520 Baseline anemia   Hemoglobin(!): 8.2   1553 +trop   Troponin I(!!): 0.57           1. NSTEMI (non-ST elevated myocardial infarction) (H)    2. Acute on chronic congestive heart failure, unspecified heart failure type (H)    3. Pulmonary hypertension (H)    4. Anemia, unspecified type    5. Chronic kidney disease, unspecified CKD stage          Parish Horton MD  [facility] Emergency Department          Parish Horton,   08/25/21 0156

## 2021-08-24 NOTE — CONSULTS
HEART CARE CONSULTATON NOTE            Reason for consult: 80-year-old male with a history of hypertension, coronary artery disease, hyperlipidemia who follows with Dr. French resented to the emergency room with chest discomfort.   Assessment:   80-year-old male with a complex cardiovascular history including coronary artery disease and significant pulmonary hypertension followed in the pulmonary hypertension clinic at [place].  He had a non-ST segment myocardial infarction post vascular surgery January 2021 and underwent atherectomy and PTCA of a distal circumflex vessel.  He now is endorsing some chest discomfort with mild elevation in initial troponin.  He has some chronic EKG changes that do not appear significantly different from previous a.  He is chronically on digoxin which is likely contributing to the ST-T segment changes.  Cannot exclude acute coronary syndrome.  Suspect significant volume overload.  Patient reports that he has gained approximately 10 or 11 pounds in the last few weeks.  He is uncertain as to whether he received his dose of Bumex when he was at an outside hospital for a few days.  He endorses breathlessness with very minimal activity.  He endorses some shortness of breath while lying in bed last evening.  He is sitting upright in the chair today and appears mildly breathless.  Reports that currently the chest discomfort has resolved.  Did undergo CT pulmonary angiogram per the ER physician's recommendation.  The report indicates no pulmonary embolism, moderate emphysema, slight increasein small pleural effusions, scant abdominal ascites review reflux of contrast material into the hepatic veins.    1.Heart failure with preserved ejection fraction in the setting of significant pulmonary hypertension and coronary artery disease.  During the hospitalization at [facility] his diuretics were transiently held.  It appears that the admission was Aug 18th through the 20th.  Reported baseline weight  of 230 to 235 pounds.  Reports class IV symptoms and is homebound based on most recent notes from his physician at the [facility].  He has a history of salt indiscretion.  His physician at the [facility] thought his baseline weight was closer to 2 20-2 25 based on the note from a few months ago.    2.Chronic atrial fibrillation.  On Eliquis.  He was admitted to the hospital approximately 1 month ago for bleeding varicose vein on the right foot with associated lower blood pressure.  They resumed at that time apixaban and dual antiplatelet agents. Patient utilizing a compression bandage at that time and has had no additional bleeding.    3.  Coronary artery disease.  Consider holding aspirin and continuing Eliquis and Plavix.  Recent bleeding from a varicose vein.  Monitor serial enzymes.  Further decisions regarding need for intervention pending additional observation.  His cardiologist note indicates Plavix and Eliquis for 1 year and then aspirin and Eliquis but triple therapy was not commented upon and needs to be clarified.    4.  Anemia.  Hemoglobin 8.2 this appears stable over the past few days.  Over his hemoglobin was 12.2 July 11, 2021.  Noted recent bleed from a varicose vein site.     Plan:   1.  Serial cardiac enzymes  2.  Strict I's and O's.  3.  Diurese as able with monitoring blood pressure trying to avoid hypotension in the setting of pulmonary hypertension.  Need to monitor renal function closely given the administration of contrast.  4.  Would suggest that he be admitted to [facility] and would even consider ICU admission given his tenuous history and state..  If cardiac enzymes are climbing will need to consider coronary angiography or if recurrent chest discomfort.  5.  We talked about cautious diuresis in the ER physician will administer 2 mg of IV Bumex as tolerated.Monitor creat with contrast recently administered  6.  Check digoxin level  7  Consider discontinuation of aspirin given  that he is currently on triple therapy aced on his home medication list coronary stent is now approximately 6 to 7 months old.  8.  Recommend contacting his pulmonary hypertension physicians at the [facility] Dr. Bishop.  May benefit from further right heart catheterization it is noted that after diuresis in 2020 of 12 to 14 kg his pulmonary capillary wedge pressure decreased to 9.  PA pressure went from 82/27 with a mean of 46 with nitric oxide fell to 61/23 with a mean of 36.  Secondary to this response to tadalafil was initiated.      #1 Pulmonary hypertension thought to be largely group 2 driven with possible group 3 and possible precapillary component  #2 History of coronary artery disease status post PCI February 2021  #3 History of COPD  #4 History of sleep apnea    As noted by Dr. Alcala, we will go ahead and augment his diuresis today. I will plan on seeing him back in around 3 months, and we will review the situation at that time. I have asked him to try and monitor his fluid restriction. The question of whether additive therapy in addition to the tadalafil is something to be considered. If he is largely group 2, this is unlikely to help and may, in fact, worsen the situation, but his responsiveness to nitric oxide during the previous study suggests that there may be some improvement with the tadalafil. Remainder is as noted.    Kamran Grullon M.D.  DD: 03/31/2021 15:49:43 CT  DT: 03/31/2021 16:20:02 CT     History:      HPI: 80-year-old male who presented with chest pain and shortness of breath since approximately 8 AM this morning.  He was recently admitted to this hospital to be evaluated for leg pain.  He is status post left femoral endarterectomy for claudication January 29, 2021 at the [facility].  Postop course reportedly was complicated by non-STEMI and subsequently underwent atherectomy and PTCA per chart review.  Cath report from February 2021 to [facility] reported severe coronary  atherosclerosis, severe pulmonary hypertension, coronary rotational atherectomy of the distal left circumflex and partial successful PTCA of the distal left circumflex.  Elevated right heart filling pressures and pulmonary capillary wedge pressure of 18.  PA pressures were said to be 93/31 with a mean PA of 52.    Most recent note in the chart record from [facility] pulmonary hypertension clinic is from March 2021.  He was admitted to the hospital today and in January and underwent thromboendarterectomy and left femoral endarterectomy.  Developed chest discomfort and subsequent coronary angiogram with results described below.  Right heart catheterization also described below.  He had previously been admitted to the hospital in 2020 and was diuresed 12 to 14 kg.  Capillary wedge pressure reportedly decreased to 9 right atrial pressure of 8.  He was noted to still be fairly limited based on the recent examination in the [facility].  6-minute walk test from May of last year only 600 feet.  The note indicates that the echocardiogram showed severe enlargement of the right ventricle with moderate reduction in function.  This is been felt to be largely group 2 and possibly group 3 pulmonary hypertension.   .  The note also indicates that tadalafil was felt to be unlikely to be clearly helpful.  It appears however that he is currently on tadalafil.    Patient endorses increasing shortness of breath which has more prominent.  Last week he was walking distances to the mailbox but now can walk only a few steps without feeling breathless and chest heaviness.  He does endorse some orthopnea.  He also endorses mid substernal chest discomfort which is since improved but recurs with minimal activity.  Endorses right lower extremity discomfort.    Current combination of medications are reviewed that include aspirin, clopidogrel, Eliquis.  Invokana, irbesartan, clonidine patch, Bumex 3 mg twice daily.  Atorvastatin 40 mg daily.   Eplerenone 25 mg daily.  Metoprolol 50 mg need to clarify dose, as needed nitroglycerin, tadalafil 2 mg daily.  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 They put him on sildenafil as nitroprusside lowered systemic BP and with that the mean PA dropped from 54 to 49. Negative VQ at [facility] Dec 1, 2017.     RLS (restless legs syndrome)      Sleep apnea       Past Surgical History:   Procedure Laterality Date     BACK SURGERY      lower back     CARDIAC CATHETERIZATION  12/13/2017    Right and left at [facility], mean PA 58, PCW 24 with V wave of 35, LVEDP of 18, with Nipride systemic BP, PVR and mean PA all declined     CARDIOVERSION  03/15/2013    for afib     CATARACT EXTRACTION Bilateral      CORONARY STENT PLACEMENT       IR ABDOMINAL AORTOGRAM  11/16/2012     IR MISCELLANEOUS PROCEDURE  7/20/2001     IR MISCELLANEOUS PROCEDURE  11/16/2012     OTHER SURGICAL HISTORY      mynor     SHOULDER SURGERY      reapir on right shoulder     TOTAL HIP ARTHROPLASTY Left      TOTAL KNEE ARTHROPLASTY Bilateral      WRIST SURGERY Bilateral      ZZHC COLONOSCOPY W/WO BRUSH/WASH N/A 1/14/2021    Procedure: COLONOSCOPY;  Surgeon: Mihai Harris MD;  Location: [facility];  Service: Gastroenterology      Social History     Socioeconomic History     Marital status:      Spouse name: Not on file     Number of children: 4     Years of education: Not on file     Highest education level: Not on file   Occupational History     Not on file   Tobacco Use     Smoking status: Former Smoker     Packs/day: 1.50      Years: 44.00     Pack years: 66.00     Types: Cigarettes     Quit date: 1996     Years since quittin.3     Smokeless tobacco: Never Used   Substance and Sexual Activity     Alcohol use: Yes     Alcohol/week: 1.0 standard drinks     Comment: Alcoholic Drinks/day: 1 per month     Drug use: No     Sexual activity: Not Currently     Partners: Female   Other Topics Concern     Not on file   Social History Narrative     Not on file     Social Determinants of Health     Financial Resource Strain:      Difficulty of Paying Living Expenses:    Food Insecurity:      Worried About Running Out of Food in the Last Year:      Ran Out of Food in the Last Year:    Transportation Needs:      Lack of Transportation (Medical):      Lack of Transportation (Non-Medical):    Physical Activity:      Days of Exercise per Week:      Minutes of Exercise per Session:    Stress:      Feeling of Stress :    Social Connections:      Frequency of Communication with Friends and Family:      Frequency of Social Gatherings with Friends and Family:      Attends [employer] Services:      Active Member of Clubs or Organizations:      Attends Club or Organization Meetings:      Marital Status:    Intimate Partner Violence:      Fear of Current or Ex-Partner:      Emotionally Abused:      Physically Abused:      Sexually Abused:      Family History   Problem Relation Age of Onset     Hyperlipidemia Mother      Hypertension Mother      Heart Disease Mother      Hyperlipidemia Father      Hypertension Father      Coronary Artery Disease Father      Cerebrovascular Disease Brother      Depression Brother      No Known Problems Sister      Pulmonary Hypertension No family hx of      Congenital heart disease No family hx of      Allergies   Allergen Reactions     Furosemide Unknown     Previously tolerated.     Hydrochlorothiazide Unknown     Iodinated Contrast Media [Diagnostic X-Ray Materials] Nausea     Losartan Other (See Comments)     Other  "reaction(s): Stomatitis, Bloody nose dry mouth and lips     Losartan-Hydrochlorothiazide [Hyzaar] Unknown     Metaxalone Unknown     Metolazone Other (See Comments)     Muscle cramps     Mometasone Other (See Comments)     Bloody nose     Penicillins Unknown     Rabeprazole Unknown     Ramipril Unknown     Shellfish Containing Products [Shellfish-Derived Products] Unknown     Other reaction(s): mouth sores, Other reaction(s): mouth sores     Methocarbamol Rash     Current Outpatient Medications   Medication Sig Dispense Refill     ACCU-CHEK JOSE PLUS TEST STRP strips [ACCU-CHEK JOSE PLUS TEST STRP STRIPS] see administration instructions.       acetaminophen (TYLENOL) 500 MG tablet [ACETAMINOPHEN (TYLENOL) 500 MG TABLET] Take 1,000 mg by mouth every 6 (six) hours as needed. First line of pain management. Do Not take more than 4,000 mg in 24 hours.       albuterol (PROVENTIL HFA;VENTOLIN HFA) 90 mcg/actuation inhaler Inhale 2 puffs into the lungs every 6 hours as needed        apixaban (ELIQUIS) 5 mg Tab [APIXABAN (ELIQUIS) 5 MG TAB] Take 1 tablet by mouth every 12 (twelve) hours.       aspirin 81 MG EC tablet [ASPIRIN 81 MG EC TABLET] Take 81 mg by mouth every morning.        atorvastatin (LIPITOR) 40 MG tablet Take 40 mg by mouth At Bedtime   4     BD ULTRA-FINE MANISHA PEN NEEDLES 32 gauge x 5/32\" Ndle [BD ULTRA-FINE MANISHA PEN NEEDLES 32 GAUGE X 5/32\" NDLE]   4     bumetanide (BUMEX) 1 MG tablet Take 3 mg by mouth 2 times daily   0     canagliflozin (INVOKANA) 100 mg Tab Take 100 mg by mouth every morning        cloNIDine (CATAPRES-TTS) 0.2 mg/24 hr [CLONIDINE (CATAPRES-TTS) 0.2 MG/24 HR] Place 1 patch on the skin once a week. Place patch on Monday.       clopidogreL (PLAVIX) 75 mg tablet [CLOPIDOGREL (PLAVIX) 75 MG TABLET] Take 1 tablet by mouth daily.       digoxin (LANOXIN) 125 mcg tablet [DIGOXIN (LANOXIN) 125 MCG TABLET] Take 1 tablet (125 mcg total) by mouth daily. 90 tablet 3     eplerenone (INSPRA) 25 MG " tablet Take 25 mg by mouth daily        fluticasone-vilanterol (BREO ELLIPTA) 200-25 mcg/dose DsDv inhaler [FLUTICASONE-VILANTEROL (BREO ELLIPTA) 200-25 MCG/DOSE DSDV INHALER] Inhale 1 puff daily.       Garlic 1000 MG CAPS Take 1 capsule by mouth daily       insulin glargine (LANTUS PEN) 100 UNIT/ML pen Inject 54 Units Subcutaneous 2 times daily       irbesartan (AVAPRO) 300 MG tablet Take 150 mg by mouth daily        lidocaine (LIDODERM) 5 % [LIDOCAINE (LIDODERM) 5 %] Place 1 patch on the skin as needed. Remove & Discard patch within 12 hours or as directed by MD       metFORMIN (GLUCOPHAGE-XR) 500 MG 24 hr tablet Take 1,000 mg by mouth 2 times daily (with meals)       metoprolol tartrate (LOPRESSOR) 50 MG tablet Take 50 mg by mouth 2 times daily (with meals)       multivitamin w/minerals (THERA-VIT-M) tablet Take 1 tablet by mouth daily       nitroglycerin (NITROSTAT) 0.4 MG SL tablet [NITROGLYCERIN (NITROSTAT) 0.4 MG SL TABLET] Place 0.4 mg under the tongue every 5 (five) minutes as needed for chest pain.       omeprazole (PRILOSEC) 20 MG DR capsule Take 20 mg by mouth daily       OXYGEN-AIR DELIVERY SYSTEMS MISC [OXYGEN-AIR DELIVERY SYSTEMS MISC] Use As Directed.       potassium chloride (K-DUR,KLOR-CON) 20 MEQ tablet Take 40 mEq by mouth daily        rOPINIRole (REQUIP) 2 MG tablet [ROPINIROLE (REQUIP) 2 MG TABLET] Take 2 mg by mouth at bedtime.        tadalafil (CIALIS) 20 MG tablet Take 20 mg by mouth daily       vitamin C (ASCORBIC ACID) 1000 MG TABS Take 1,000 mg by mouth daily       zinc gluconate 50 MG tablet Take 50 mg by mouth daily       zolpidem (AMBIEN) 10 mg tablet [ZOLPIDEM (AMBIEN) 10 MG TABLET] Take 10 mg by mouth bedtime.           Review of Systems  All pertinent positives and negatives reviewed in History.  All other 10 point review of systems reviewed and negative.      Objective:    Physical Exam  VITALS: /59   Pulse 77   Temp 97.8  F (36.6  C)   Resp 24   Wt 108.9 kg (240 lb 1.3  oz)   SpO2 95%   BMI 33.48 kg/m    BMI: Body mass index is 33.48 kg/m .  Wt Readings from Last 3 Encounters:   08/24/21 108.9 kg (240 lb 1.3 oz)   08/22/21 108.9 kg (240 lb)   07/13/21 105.7 kg (233 lb)     No intake or output data in the 24 hours ending 08/24/21 1636  General Appearance:   He is alert, he appears mildly breathless while speaking in full sentences.   HEENT:  Normocephalic, without obvious abnormality   Neck: Supple, symmetrical, trachea midline, no adenopathy, thyroid: not enlarged, symmetric, jugular venous pressure appears elevated while sitting in the chair.  I subsequently came back in the room when he was lying down and his jugular venous pressure significantly elevated to the angle of the jaw.   Back:   Symmetric, no curvature, ROM normal, no CVA tenderness   Lungs:    Decreased breath sounds bilaterally.   Chest Wall:  No tenderness or deformity   Heart:   Irregular, distant heart tones, soft systolic murmur   Abdomen:   Soft, non-tender, bowel sounds active, mildly distended   Extremities: Extremities normal, atraumatic, no cyanosis 1-2+ edema of the lower extremities.  Tender to palpation of his right lower extremity.   Skin:  Chronic venous stasis changes of the lower extremities.   Neurologic: Alert and oriented X 3, Moves all 4 extremities     Cardiographics  ECG: Atrial  fibrillation, nonspecific ST-T changes inferior lateral leads.  Similar to prior EKG in the chart record from 5/2021.  Echocardiogram:  Echocardiogram performed per follow-up pulmonary hypertension protocol.  Last full   echocardiogram performed 05/18/2020.  LEFT VENTRICLE:  Normal left ventricular chamber size.   Flattening of the ventricular septum.  Calculated 2-D linear left ventricular ejection fraction   65 %.  Left ventricular cardiac index 2.77 l/min/m^2.  No regional wall motion abnormalities.   Indeterminate left ventricular diastolic function.  RIGHT VENTRICLE:  Severely enlarged right   ventricular chamber  size.  Moderately reduced right ventricular systolic function.  Increased   right ventricular wall thickness.  Estimated right ventricular systolic pressure 112 mmHg   (systolic blood pressure 133 mmHg).  Averaged right ventricular free wall longitudinal peak   systolic strain is -19 %.  ATRIA:  Severe bi-atrial enlargement.  CARDIAC VALVES:  Mild   calcific aortic valve stenosis (limited assessment).  Aortic valve systolic mean Doppler   gradient 17 mmHg.  Aortic valve area by Doppler 1.36 cm^2.  Trivial aortic valve regurgitation.    Mildly thickened mitral valve.  Mild mitral valve regurgitation.  Normal pulmonary valve.   Increased pulmonary valve systolic velocities.  Mild pulmonary valve regurgitation.  Tricuspid   annulus dilatation.  Moderate-severe tricuspid valve regurgitation.  OTHER ECHO FINDINGS:   Enlarged inferior vena cava size with no inspiratory collapse.  Doppler evidence of systolic   reversal in the hepatic veins.  No intracardiac mass or thrombus, but the left atrial appendage   cannot be visualized adequately with transthoracic echo to exclude thrombus in this location.   No pericardial effusion.     Imaging  Chest x-ray:     PROCEDURE TYPES   1.  HEART CATHETERIZATION - RIGHT   2.  CORONARY ANGIOGRAPHY   3.  PERCUTANEOUS CORONARY ANGIOPLASTY   4.  CORONARY ATHERECTOMY   FINAL DIAGNOSIS   1.  Severe pulmonary hypertension   2.  Severe coronary artery atherosclerosis   3.  Coronary calcification   4.  Coronary rotational atherectomy (1.5 bur) of the distal left circumflex   5.  Partially successful PTCA of distal left circumflex   PRE-PROCEDURE DIAGNOSIS   1.  Non-ST Elevation Myocardial Infarction (HCC)   HEMODYNAMICS SUMMARY   Right heart filling pressures (mRAP 17 mmHg) were severely elevated and left heart filling pressures (PCWP 18 mmHg) were mildly elevated with severely elevated pulmonary arterial pressures (PA 93/31, mean PA 52 mmHg) and elevated pulmonary vascular   resistance (PVR 4.9  MILAN). Cardiac output/index was normal (Gabrielle's CO/CI 6.9/3.1). Overall, these findings are suggestive of mostly pre-capillary but some component of post capillary pulmonary hypertension as well.   CORONARY DIAGNOSTIC SUMMARY   Coronary artery dominance is right.   The left main coronary artery is 20% obstructed by a tubular lesion. The distal segment is normal size, diffuse diseased.   The proximal left anterior descending artery is 20% obstructed by a discrete lesion.   The middle left anterior descending artery is 40% obstructed by a discrete lesion. The distal segment is normal size, diseased.   The proximal circumflex artery is 30% obstructed by a discrete lesion.   The distal circumflex artery is 70% obstructed by a tubular lesion and 60% obstructed by a discrete lesion. The distal segment is normal size, diseased.   The first obtuse marginal distal segment is small size, diffuse diseased.   The middle right coronary artery is 50% obstructed by a discrete lesion. The distal segment is normal size, diseased.   Mild in-stent restenosis of proximal left anterior descending stent   CORONARY INTERVENTION SUMMARY   Successful intervention of the Distal Circumflex Artery. The preintervention stenosis was 70%. The post intervention stenosis was 50%. Devices used include Atherectomy and PTCA. Perforation present      Study Result    Narrative & Impression   EXAM: CT CHEST PULMONARY EMBOLISM W CONTRAST  LOCATION: [facility]  DATE/TIME: 8/24/2021 4:40 PM     INDICATION: Dyspnea. Chest pain. PE suspected, low/intermediate prob, positive D-dimer  COMPARISON: None.  TECHNIQUE: CT chest pulmonary angiogram during arterial phase injection of IV contrast. Multiplanar reformats and MIP reconstructions were performed. Dose reduction techniques were used.   CONTRAST: ISOVUE 370 50mL     FINDINGS:  ANGIOGRAM CHEST: Negative for pulmonary emboli. Thoracic aorta is negative for dissection. Generalized cardiac  enlargement. Some reflux of contrast material into the hepatic veins often seen with right heart failure.     LUNGS AND PLEURA: Moderate emphysema. Stable 1.6 cm nodule right middle lobe and tiny stable nodule right lower lobe and inferior lingula of no significance.     Slight increase in small right and tiny left pleural effusions.     MEDIASTINUM/AXILLAE: Normal.     CORONARY ARTERY CALCIFICATION: Severe.     UPPER ABDOMEN: Tiny amount of ascites.     MUSCULOSKELETAL: Normal.                                                                      IMPRESSION:  1.  Negative for pulmonary emboli.     2.  Severe coronary artery calcifications.     3.  Stable lung nodules should be considered benign.     4.   Moderate emphysema.     5.  Slight increase in a small right and tiny left pleural effusion.     6.  Scant abdominal ascites.     7.  Reflux of contrast material into the hepatic veins often seen with right heart failure.          Lab Results    Chemistry/lipid CBC Cardiac Enzymes/BNP/TSH/INR   Recent Labs   Lab Test 05/25/21  1359   CHOL 94   HDL 25*   LDL <5   TRIG 353*     Recent Labs   Lab Test 05/25/21  1359 09/02/20  1307 07/09/19  0900   LDL <5 50 14     Recent Labs   Lab Test 08/24/21  1503   *   POTASSIUM 4.7   CHLORIDE 103   CO2 19*   *   BUN 58*   CR 2.30*   GFRESTIMATED 26*   ANNMARIE 8.6     Recent Labs   Lab Test 08/24/21  1503 08/23/21  0339 08/23/21  0144   CR 2.30* 2.55* 2.61*     Recent Labs   Lab Test 07/11/21  1031 04/10/20  0534   A1C 9.3* 10.8*          Recent Labs   Lab Test 08/24/21  1503   WBC 10.5   HGB 8.2*   HCT 28.1*   MCV 76*        Recent Labs   Lab Test 08/24/21  1503 08/23/21  0339 08/22/21  2154   HGB 8.2* 8.2* 8.4*    Recent Labs   Lab Test 08/24/21  1503 07/11/21  1747 07/11/21  1031   TROPONINI 0.57* 0.04 0.03     Recent Labs   Lab Test 08/24/21  1503 07/11/21  1747 04/09/20  1645   * 187* 117*     Recent Labs   Lab Test 05/31/19  1507   TSH 2.55     Recent  Labs   Lab Test 08/24/21  1455 08/22/21  1532 07/11/21  0400   INR 1.51* 1.57* 1.29*

## 2021-08-24 NOTE — ED TRIAGE NOTES
Pt here with chest pressure, tachypnea, leg swelling. Symptoms started at 0800 today. In a fib. States it chronic. EKG done in triage.

## 2021-08-24 NOTE — ED PROVIDER NOTES
Emergency Department Encounter   NAME: Red Sutherland ; AGE: 80 year old male ; YOB: 1940 ; MRN: [id number] ; PCP: Haresh Corona   ED PROVIDER: Amanda Pineda PA-C    Evaluation Date & Time:   No admission date for patient encounter.    CHIEF COMPLAINT:  Chest Pain and Shortness of Breath      Impression and Plan   MDM: Red Sutherland is a 80 year old male with a pertinent history of hypertension, hyperlipidemia, CAD, CHF, atrial fibrillation, COPD, and T2DM who presents to the ED by walk in accompanied by his wife for evaluation of shortness of breath and chest pressure.  The patient has a complicated cardiac past medical history, and was seen in our emergency department 2 days ago and found to be anemic with a hemoglobin of 7 and was transfused at that time.  Was found to be hypotensive and admission was considered, however patient ultimately discharged home yesterday after blood pressure stabilized.  Had recently been admitted on 8/18 for a bleeding varicose vein while on Eliquis.  This was held while in the hospital and restarted at discharge.  Patient reports that he woke up this morning with shortness of breath, exacerbated with exertion, and chest pressure, prompting his visit here to the ED.  On my initial exam, he is afebrile and vitally stable.  He is tachypneic though speaking in full sentences and is not in respiratory distress.  Lungs are clear to auscultation and there is no tightness or wheezing concerning for reactive airway disease or COPD.  No audible crackles and no significant lower extremity pitting edema concerning for CHF, although patient does report a recent 11 pound weight gain within the last week.  Discussed plan at this time to place him on the cardiac monitor, obtain EKG, and labs.    EKG shows rate controlled atrial fibrillation with HR of 78 BPM and  incomplete right bundle branch block.  He does have ST depressions in the lateral leads, V4 through V6  as well as lead I and lead II, however when compared to his ECG of July 11, 2021, these depressions are less prominent. Troponin came back elevated at 0.57. Given his chest pressure and dyspnea, concerning for NSTEMI. Heparin ordered (without bolus given his chronic anticoagulation on apixaban).  Patient has chronic anemia and had a recent hemoglobin less than 7 after a bleeding varicose vein while on Eliquis.  Underwent blood transfusion 2 days ago.  Hemoglobin stable today at 8.2.  No active bleeding, no recent rectal bleeding or melena, or any signs of active bleeding today.  He has had issues with chronic hypotension and was initially in the 80s, however blood pressure improved to the 120s without intervention.  D-dimer obtained and elevated.  CT PE study ordered and pending.  BNP is also mildly elevated today in the 300s.  Will assess pulmonary congestion on CT.  He did miss yesterday's doses of his Bumex as he was here in the ED and this was not ordered for him and has had an increased weight gain of 11 pounds over the past 2 weeks.  BMP shows a stable creatinine consistent with his chronic kidney disease at 2.30.  Patient was evaluated by vascular while in the emergency department yesterday for right lower extremity pain and has a known chronic arterial calcification.  Acute limb ischemia was ruled out and he is not having any change in symptoms though continues to have this mild discomfort.    CT PE study pending at this time. BP's initially hypotensive in the 80's, however have now been in the 120's. Will continue to monitor. Will need transfer to cardiac tele for his NSTEMI to a facility that has cath lab and vascular. Cardiology is following. [facility] is currently looking for bed placement. I have staffed the patient with Dr. Teague, ED MD, who has evaluated the patient and agrees with all aspects of today's care.     ED COURSE:  3:01 PM I met and introduced myself to the patient. I gathered initial history  and performed my physical exam. We discussed plan for initial workup. PPE: Provider wore surgical mask and gloves.  3:15 PM I staffed the patient with Dr. Teague, who will evaluate the patient. Dr. Teague was updated on the patient throughout ED course.     3:55 PM Dr. Teague performed a bedside cardiac ultrasound.   4:10 PM Heparin infusion ordered. Paged cardiology.   4:12 PM Dr. Teague is speaking with hospitalist that discharged patient yesterday.   4:35 PM Dr. Teague spoke with Dr. Wills, cardiology.   4:49 PM Spoke with charge RN - looking for bed at [facility].   5:17 PM Patient's care signed out to Dr. Teague at the end of my shift.     At the conclusion of the encounter I discussed the results of all the tests and the disposition. The questions were answered. The patient or family acknowledged understanding and was agreeable with the care plan.    FINAL IMPRESSION:    ICD-10-CM    1. NSTEMI (non-ST elevated myocardial infarction) (H)  I21.4          MEDICATIONS GIVEN IN THE EMERGENCY DEPARTMENT:  Medications   diphenhydrAMINE (BENADRYL) injection 50 mg (has no administration in time range)   ondansetron (ZOFRAN) injection 4 mg (has no administration in time range)         NEW PRESCRIPTIONS STARTED AT TODAY'S ED VISIT:  New Prescriptions    No medications on file         HPI   Patient information was obtained from: Patient    Use of Intrepreter: N/A     Red Sutherland is a 80 year old male with a pertinent history of hypertension, hyperlipidemia, CAD, CHF, atrial fibrillation, COPD, and T2DM who presents to the ED by walk in accompanied by his wife for evaluation of shortness of breath and chest pressure.     Per chart review, patient was seen at [facility] on 8/18/2021 with bleeding from right lower extremity varicose vein. Tourniquet placed by EMS. After bleeding stopped, small amount of Dermabond applied over the top. Labs revealed a hemoglobin of 9.0, a 3 point drop since last checked. Blood pressures were  low and patient was tachycardic; patient was admitted for continued monitoring and management. Meds/diuretics, aside from metoprolol, were held given hypotension. Discharged on 8/20/2021. Patient was then seen in this ED on 8/22/2021 for right calf pain. Hemoglobin low at 7.0. Recieved 2 units of blood. Repeat hemoglobin 8.4. Ultrasound with no DVT in the right lower extremity. 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 Case discussed with vascular surgery. With resolution of lactic acidosis and treatment of his symptomatic anemia during a prolonged stay in the emergency department, patient was comfortable discharging to home.     Patient reports that he developed increased shortness of breath with associated central chest pressure around 8 AM today. He states that he has chronic breathing problems, but his breathing worsened today and he is now unable to walk half a block without feeling winded. His shortness of breath is worsened with exertion, but he feels somewhat improved at rest. Patient adds that he has gained approximately 11 lbs in the past couple of days. He attributes this weight gain to not having his Bumex when he was in the hospital recently. He also reports increased right leg pain. He denies fever, chills, cough, increased leg swelling, or additional symptoms at this time.     Patient has a history of atrial fibrillation with prior ablation. He is currently on blood thinners. He had prior coronary stent placement several years ago.       REVIEW OF SYSTEMS:  Review of Systems   Constitutional: Positive for unexpected weight change. Negative for chills and fever.   Respiratory: Positive for shortness of breath. Negative for cough.    Cardiovascular: Positive for chest pain (chest pressure). Negative for leg  swelling.   Musculoskeletal:        +Right leg pain   All other systems reviewed and are negative.      Medical History     Past Medical History:   Diagnosis Date     Atrial fibrillation (H)      CHF (congestive heart failure) (H)      COPD (chronic obstructive pulmonary disease) (H)      Coronary artery disease      Diabetes mellitus (H)      Essential hypertension      Hyperlipidemia      Pulmonary hypertension (H)      Pulmonary hypertension due to left ventricular diastolic dysfunction (H) 11/28/2017     RLS (restless legs syndrome)      Sleep apnea        Past Surgical History:   Procedure Laterality Date     BACK SURGERY      lower back     CARDIAC CATHETERIZATION  12/13/2017    Right and left at [place], mean PA 58, PCW 24 with V wave of 35, LVEDP of 18, with Nipride systemic BP, PVR and mean PA all declined     CARDIOVERSION  03/15/2013    for afib     CATARACT EXTRACTION Bilateral      CORONARY STENT PLACEMENT       IR ABDOMINAL AORTOGRAM  11/16/2012     IR MISCELLANEOUS PROCEDURE  7/20/2001     IR MISCELLANEOUS PROCEDURE  11/16/2012     OTHER SURGICAL HISTORY      mynor     SHOULDER SURGERY      reapir on right shoulder     TOTAL HIP ARTHROPLASTY Left      TOTAL KNEE ARTHROPLASTY Bilateral      WRIST SURGERY Bilateral      ZZHC COLONOSCOPY W/WO BRUSH/WASH N/A 1/14/2021    Procedure: COLONOSCOPY;  Surgeon: Mihai Harris MD;  Location: [facility];  Service: Gastroenterology       Family History   Problem Relation Age of Onset     Hyperlipidemia Mother      Hypertension Mother      Heart Disease Mother      Hyperlipidemia Father      Hypertension Father      Coronary Artery Disease Father      Cerebrovascular Disease Brother      Depression Brother      No Known Problems Sister      Pulmonary Hypertension No family hx of      Congenital heart disease No family hx of        Social History     Tobacco Use     Smoking status: Former Smoker     Packs/day: 1.50     Years: 44.00     Pack years: 66.00      "Types: Cigarettes     Quit date: 1996     Years since quittin.3     Smokeless tobacco: Never Used   Substance Use Topics     Alcohol use: Yes     Alcohol/week: 1.0 standard drinks     Comment: Alcoholic Drinks/day: 1 per month     Drug use: No       ACCU-CHEK JOSE PLUS TEST STRP strips  acetaminophen (TYLENOL) 500 MG tablet  albuterol (PROVENTIL HFA;VENTOLIN HFA) 90 mcg/actuation inhaler  apixaban (ELIQUIS) 5 mg Tab  aspirin 81 MG EC tablet  atorvastatin (LIPITOR) 40 MG tablet  azithromycin (ZITHROMAX) 250 MG tablet  BD ULTRA-FINE MANISHA PEN NEEDLES 32 gauge x \" Ndle  bumetanide (BUMEX) 1 MG tablet  canagliflozin (INVOKANA) 100 mg Tab  cloNIDine (CATAPRES-TTS) 0.2 mg/24 hr  clopidogreL (PLAVIX) 75 mg tablet  digoxin (LANOXIN) 125 mcg tablet  eplerenone (INSPRA) 25 MG tablet  fluticasone-vilanterol (BREO ELLIPTA) 200-25 mcg/dose DsDv inhaler  Garlic 1000 MG CAPS  insulin glargine (LANTUS PEN) 100 UNIT/ML pen  irbesartan (AVAPRO) 300 MG tablet  lidocaine (LIDODERM) 5 %  metFORMIN (GLUCOPHAGE-XR) 500 MG 24 hr tablet  metoprolol tartrate (LOPRESSOR) 50 MG tablet  multivitamin w/minerals (THERA-VIT-M) tablet  nitroglycerin (NITROSTAT) 0.4 MG SL tablet  omeprazole (PRILOSEC) 20 MG DR capsule  OXYGEN-AIR DELIVERY SYSTEMS MISC  potassium chloride (K-DUR,KLOR-CON) 20 MEQ tablet  rOPINIRole (REQUIP) 2 MG tablet  tadalafil (CIALIS) 20 MG tablet  vitamin C (ASCORBIC ACID) 1000 MG TABS  zinc gluconate 50 MG tablet  zolpidem (AMBIEN) 10 mg tablet          Physical Exam     First Vitals:  Patient Vitals for the past 24 hrs:   BP Temp Pulse Resp SpO2 Weight   21 1501 -- -- -- -- 95 % --   21 1451 108/59 97.8  F (36.6  C) 77 -- -- 108.9 kg (240 lb 1.3 oz)   21 1448 -- -- 73 24 -- --         PHYSICAL EXAM:   Physical Exam  Vitals and nursing note reviewed.   Constitutional:       General: He is not in acute distress.     Appearance: He is not toxic-appearing.   Eyes:      Pupils: Pupils are equal, " round, and reactive to light.   Cardiovascular:      Rate and Rhythm: Normal rate. Rhythm irregular.      Pulses:           Radial pulses are 2+ on the right side and 2+ on the left side.      Heart sounds: Normal heart sounds. Heart sounds not distant. No murmur heard.   No systolic murmur is present.   No friction rub. No gallop.    Pulmonary:      Effort: Tachypnea present. No accessory muscle usage or respiratory distress.      Breath sounds: No stridor. No decreased breath sounds, wheezing, rhonchi or rales.      Comments: Speaking in full sentences.  Chest:      Chest wall: No tenderness.   Abdominal:      General: Bowel sounds are normal.      Palpations: Abdomen is soft.      Tenderness: There is no abdominal tenderness.   Musculoskeletal:      Cervical back: Normal range of motion and neck supple.      Comments: Mild edema bilateral lower extremities.  Chronic appearing venous stasis changes.   Skin:     General: Skin is warm and dry.      Capillary Refill: Capillary refill takes less than 2 seconds.   Neurological:      Mental Status: He is alert.         Results     LAB:  All pertinent labs reviewed and interpreted  Labs Ordered and Resulted from Time of ED Arrival Up to the Time of Departure from the ED   CBC WITH PLATELETS - Abnormal; Notable for the following components:       Result Value    RBC Count 3.71 (*)     Hemoglobin 8.2 (*)     Hematocrit 28.1 (*)     MCV 76 (*)     MCH 22.1 (*)     MCHC 29.2 (*)     RDW 19.6 (*)     All other components within normal limits   BASIC METABOLIC PANEL - Abnormal; Notable for the following components:    Sodium 134 (*)     Carbon Dioxide (CO2) 19 (*)     Urea Nitrogen 58 (*)     Creatinine 2.30 (*)     Glucose 197 (*)     GFR Estimate 26 (*)     All other components within normal limits   TROPONIN I - Abnormal; Notable for the following components:    Troponin I 0.57 (*)     All other components within normal limits   B-TYPE NATRIURETIC PEPTIDE ([facility] ONLY) -  Abnormal; Notable for the following components:     (*)     All other components within normal limits   INR - Abnormal; Notable for the following components:    INR 1.51 (*)     All other components within normal limits   D DIMER QUANTITATIVE - Abnormal; Notable for the following components:    D-Dimer Quantitative 3.83 (*)     All other components within normal limits    Narrative:     This D-dimer assay is intended for use in conjunction with a clinical pretest probability assessment model to exclude pulmonary embolism (PE) and deep venous thrombosis (DVT) in outpatients suspected of PE or DVT. The cut-off value is 0.50 ug/mL FEU.   PARTIAL THROMBOPLASTIN TIME - Normal   EXTRA BLUE TOP TUBE   EXTRA GREEN TOP (LITHIUM HEPARIN) TUBE   EXTRA PURPLE TOP TUBE   PERIPHERAL IV CATHETER   CARDIAC CONTINUOUS MONITORING   PULSE OXIMETRY NURSING   CALL   TYPE AND SCREEN, ADULT   EXTRA TUBE    Narrative:     The following orders were created for panel order [facility] Draw.  Procedure                               Abnormality         Status                     ---------                               -----------         ------                     Extra Blue Top Tube[[id number]]                              In process                 Extra Green Top (Lithium...[[id number]]                      In process                 Extra Purple Top Tube[[id number]]                            In process                   Please view results for these tests on the individual orders.   ABO/RH TYPE AND SCREEN    Narrative:     The following orders were created for panel order ABO/Rh type and screen.  Procedure                               Abnormality         Status                     ---------                               -----------         ------                     Adult Type and Screen[[id number]]                            In process                   Please view results for these tests on the individual orders.       RADIOLOGY:  CT  Chest Pulmonary Embolism w Contrast    (Results Pending)   POC US ECHO LIMITED    (Results Pending)         ECG:    Performed at: 14:49    Impression: Atrial fibrillation. Incomplete right bundle branch block. ST depression laterally, though improved from previous ECG.      Rate: 78  Rhythm: atrial fibrillation  Axis: 102  AK Interval: *  QRS Interval: 92  QTc Interval: 442  ST Changes: ST depression laterally, though improved from previous ECG   Comparison: When compared with ECG of 11-JUL-2021 13:08 - premature ventricular complexes are no longer present, criteria for septal infarct are no longer present    EKG results reviewed and interpreted by Dr. Teague, ED MD.     IAnnamaria, am serving as a scribe to document services personally performed by Amanda Pineda PA-C, based on my observation and the provider's statements to me. IAmanda PA-C attest that Annamaria Amos is acting in a scribe capacity, has observed my performance of the services and has documented them in accordance with my direction.       Amanda Pineda PA-C   Emergency Medicine   [facility] EMERGENCY ROOM     Amanda Pineda PA-C  08/24/21 4411

## 2021-08-24 NOTE — PROGRESS NOTES
Clinic Care Coordination Contact  [facility]/Voicemail       Clinical Data: Care Coordinator Outreach  Outreach attempted x 1.  Left message on patient's voicemail with call back information and requested return call.  Plan: Care Coordinator will try to reach patient again in 1-2 business days.    JOANNA Cristina  [phone]  [facility]

## 2021-08-24 NOTE — ED PROVIDER NOTES
I, Snehal Teague have reviewed the documentation, personally taken the patient's history, performed an exam and agree with the physical finds, diagnosis and management plan.    HPI:  Hx of afib on eliquis, chf, cad w previous pci.   In ED 2d ago for increased rlq pain. Old arterial occlusion seen.  Vascular surgery didn't think anything needed to be done.  This am work 0800 with cp, sob worse with ambulation/movement.  While in ED he missed bumex dose. Has had 11lb wt gain in last week.     Physical Exam: Chronically ill-appearing, moderate dyspnea at baseline, decreased breath sounds throughout, regular rate, irregularly irregular rhythm, obese abdomen, one plus pitting edema to bilateral LE    MDM: CHF exacerbation, ACS, atypical chest pain, pulmonary embolism as patient has been off of his Eliquis for several days over the course the last month    PROCEDURE:    Emergency Department Limited Bedside Screening Cardiac Ultrasound   INDICATIONS: chest pain   PROCEDURE PROVIDER: Snehal Teague    WINDOW AND FINDINGS: Chest   PARASTERNAL    :  Cardiac activity: Normal  Pericardial effusion: No clinically significant pericardial effusion  Signs of Tamponade physiology: Absent     IMAGES SAVED AND STORED FOR ARCHIVE AND REVIEW: Yes          ED Course/workup:   3:15 PM Patient staffed with me.  4:37 PM I spoke with Dr. Wills from [facility].    Patient placed on monitor, IV established and blood obtained.  Twelve-lead EKG shows some chronic appearing changes but may be related to his digoxin.  Today's EKG looks improved from previous.  Laboratory work-up notable for elevated BNP, elevated D-dimer, elevated troponin.  He has chronic renal insufficiency but did proceed with CTA PE study nonetheless.  This thankfully does not show any evidence of PE.  Patient was placed on heparin for non-STEMI, given dose of Bumex IV for CHF exacerbation.  Hemoglobin is stable and recent bleeding was from a varicose vein.  Some of his dyspnea  might be from his pulmonary hypertension as well.  Case discussed with cardiology, Dr. Wills who came to the ED to evaluate patient as well.  Patient will need transfer to cardiac telemetry bed.  Ideally would transfer to location with Cath Lab if his troponin continues to increase may need angiogram.    At time of this dictation looking for cardiac telemetry bed placement.  Patient signed out to Dr. Horton awaiting same.      EKG:  EKG individually read and interpreted by myself:  14:49 on 8/24/21  78 BPM   A-fib  Incomplete RBBB  T wave abnormality laterally  QRS: 92 ms  QTc: 388/442 ms  When compared with previous from 11-Jul-2021, T wave abnormality laterally has improved.     Final Diagnosis:     ICD-10-CM    1. NSTEMI (non-ST elevated myocardial infarction) (H)  I21.4    2. Acute on chronic congestive heart failure, unspecified heart failure type (H)  I50.9    3. Pulmonary hypertension (H)  I27.20    4. Anemia, unspecified type  D64.9    5. Chronic kidney disease, unspecified CKD stage  N18.9            Snehal Teague MD    Critical Care  Performed by: Snehal Teague MD  Authorized by: Snehal Teague MD     Total critical care time: 68 minutes  Criticalcare time was exclusive of separately billable procedures and treating other patients.  Critical care was necessary to treat or prevent imminent or life-threatening deterioration of the following conditions: Cardiopulmonary decompensation, death, disability  Critical care was time spent personally by me on the following activities: development of treatment plan with patient or surrogate, discussions with consultants, examination of patient, evaluation of patient's response totreatment, obtaining history from patient or surrogate, ordering and performing treatments and interventions, ordering and review of laboratory studies, ordering and review of radiographic studies and re-evaluation ofpatient's condition, this excludes any separately billable procedures.          Snehal Teague MD  08/24/21 1809

## 2021-08-24 NOTE — ED TRIAGE NOTES
Pt here with chest pressure and short of breath since 0800 today. Pt states he was sleeping when it started. Reports recent discharge from hospital. Rates it 4/10.

## 2021-08-25 VITALS
TEMPERATURE: 97.8 F | RESPIRATION RATE: 18 BRPM | SYSTOLIC BLOOD PRESSURE: 136 MMHG | BODY MASS INDEX: 35.43 KG/M2 | DIASTOLIC BLOOD PRESSURE: 78 MMHG | HEART RATE: 74 BPM | OXYGEN SATURATION: 99 % | WEIGHT: 254 LBS

## 2021-08-25 LAB
ANION GAP SERPL CALCULATED.3IONS-SCNC: 9 MMOL/L (ref 5–18)
APTT PPP: 51 SECONDS (ref 22–38)
APTT PPP: 59 SECONDS (ref 22–38)
ATRIAL RATE - MUSE: 83 BPM
BUN SERPL-MCNC: 58 MG/DL (ref 8–28)
CALCIUM SERPL-MCNC: 8.8 MG/DL (ref 8.5–10.5)
CHLORIDE BLD-SCNC: 103 MMOL/L (ref 98–107)
CO2 SERPL-SCNC: 23 MMOL/L (ref 22–31)
CREAT SERPL-MCNC: 2.49 MG/DL (ref 0.7–1.3)
DIASTOLIC BLOOD PRESSURE - MUSE: NORMAL MMHG
ERYTHROCYTE [DISTWIDTH] IN BLOOD BY AUTOMATED COUNT: 20.1 % (ref 10–15)
GFR SERPL CREATININE-BSD FRML MDRD: 24 ML/MIN/1.73M2
GLUCOSE BLD-MCNC: 105 MG/DL (ref 70–125)
GLUCOSE BLDC GLUCOMTR-MCNC: 97 MG/DL (ref 70–125)
HCT VFR BLD AUTO: 29.6 % (ref 40–53)
HGB BLD-MCNC: 8.6 G/DL (ref 13.3–17.7)
INTERPRETATION ECG - MUSE: NORMAL
MCH RBC QN AUTO: 21.6 PG (ref 26.5–33)
MCHC RBC AUTO-ENTMCNC: 29.1 G/DL (ref 31.5–36.5)
MCV RBC AUTO: 74 FL (ref 78–100)
P AXIS - MUSE: NORMAL DEGREES
PLATELET # BLD AUTO: 239 10E3/UL (ref 150–450)
POTASSIUM BLD-SCNC: 4.7 MMOL/L (ref 3.5–5)
PR INTERVAL - MUSE: NORMAL MS
QRS DURATION - MUSE: 92 MS
QT - MUSE: 388 MS
QTC - MUSE: 442 MS
R AXIS - MUSE: 102 DEGREES
RBC # BLD AUTO: 3.99 10E6/UL (ref 4.4–5.9)
SODIUM SERPL-SCNC: 135 MMOL/L (ref 136–145)
SYSTOLIC BLOOD PRESSURE - MUSE: NORMAL MMHG
T AXIS - MUSE: -67 DEGREES
TROPONIN I SERPL-MCNC: 0.51 NG/ML (ref 0–0.29)
VENTRICULAR RATE- MUSE: 78 BPM
WBC # BLD AUTO: 10.4 10E3/UL (ref 4–11)

## 2021-08-25 PROCEDURE — 250N000013 HC RX MED GY IP 250 OP 250 PS 637: Performed by: EMERGENCY MEDICINE

## 2021-08-25 PROCEDURE — 85730 THROMBOPLASTIN TIME PARTIAL: CPT | Mod: 91 | Performed by: EMERGENCY MEDICINE

## 2021-08-25 PROCEDURE — 84484 ASSAY OF TROPONIN QUANT: CPT | Performed by: EMERGENCY MEDICINE

## 2021-08-25 PROCEDURE — 99239 HOSP IP/OBS DSCHRG MGMT >30: CPT | Performed by: FAMILY MEDICINE

## 2021-08-25 PROCEDURE — 36415 COLL VENOUS BLD VENIPUNCTURE: CPT | Performed by: EMERGENCY MEDICINE

## 2021-08-25 PROCEDURE — 36415 COLL VENOUS BLD VENIPUNCTURE: CPT | Performed by: INTERNAL MEDICINE

## 2021-08-25 PROCEDURE — 99233 SBSQ HOSP IP/OBS HIGH 50: CPT | Performed by: INTERNAL MEDICINE

## 2021-08-25 PROCEDURE — 85730 THROMBOPLASTIN TIME PARTIAL: CPT | Performed by: EMERGENCY MEDICINE

## 2021-08-25 PROCEDURE — 85027 COMPLETE CBC AUTOMATED: CPT | Performed by: INTERNAL MEDICINE

## 2021-08-25 PROCEDURE — 250N000013 HC RX MED GY IP 250 OP 250 PS 637: Performed by: FAMILY MEDICINE

## 2021-08-25 PROCEDURE — 80048 BASIC METABOLIC PNL TOTAL CA: CPT | Performed by: INTERNAL MEDICINE

## 2021-08-25 RX ORDER — HEPARIN SODIUM 10000 [USP'U]/100ML
0-5000 INJECTION, SOLUTION INTRAVENOUS CONTINUOUS
Status: DISCONTINUED | OUTPATIENT
Start: 2021-08-25 | End: 2021-08-25 | Stop reason: HOSPADM

## 2021-08-25 RX ORDER — LIDOCAINE HYDROCHLORIDE 20 MG/ML
1 JELLY TOPICAL ONCE
Status: DISCONTINUED | OUTPATIENT
Start: 2021-08-25 | End: 2021-08-25

## 2021-08-25 RX ORDER — METOPROLOL TARTRATE 25 MG/1
25 TABLET, FILM COATED ORAL 2 TIMES DAILY
Status: DISCONTINUED | OUTPATIENT
Start: 2021-08-25 | End: 2021-08-25 | Stop reason: HOSPADM

## 2021-08-25 RX ADMIN — CLOPIDOGREL BISULFATE 75 MG: 75 TABLET, FILM COATED ORAL at 08:16

## 2021-08-25 RX ADMIN — METOPROLOL TARTRATE 25 MG: 25 TABLET, FILM COATED ORAL at 12:16

## 2021-08-25 RX ADMIN — BUMETANIDE 3 MG: 2 TABLET ORAL at 12:16

## 2021-08-25 NOTE — ED NOTES
BP dropped to 73/49. MD notified and ordered to remove clonidine patch to be removed. Patch removed at 2105

## 2021-08-25 NOTE — PROGRESS NOTES
Clinic Care Coordination Contact    Background: Care Coordination referral placed from [facility] discharge report for reason of patient meeting criteria for a TCM outreach call by [facility] team.    Assessment: Upon chart review, [facility] Team member will cancel/close the referral for TCM outreach due to reason below:     Patient has presented to Emergency Department or has been readmitted to hospital.    Plan: Care Coordination referral for TCM outreach canceled.    Jonnie Luis MA  [facility] [place], [facility]

## 2021-08-25 NOTE — DISCHARGE SUMMARY
[facility] MEDICINE  DISCHARGE SUMMARY     Primary Care Physician: Haresh Corona  Admission Date: 8/24/2021   Discharge Provider: Ethan Dwyer MD Discharge Date: 8/25/2021   Diet:   Active Diet and Nourishment Order   Procedures     Low Saturated Fat Na <2400 mg       Code Status: Prior   Activity: Light activity with assistance in his room        Condition at Discharge: Stable     REASON FOR PRESENTATION(See Admission Note for Details)     Non-STEMI    PRINCIPAL & ACTIVE DISCHARGE DIAGNOSES     Non-STEMI, patient admitted with acute chest pain and dyspnea on 8/24 at 0 800.  He had mild persistent troponin bump.  Was followed by cardiology.  Given complex cardiology history and severe pulmonary hypertension cardiology was recommending higher level of care.  Patient has required supplemental oxygen at 2 L and currently is stable.  He has no chest pain.  He feels comfortable at rest.  Is on IV heparin.  Plan: Transfer to [facility].    History of coronary artery disease with previous PCI.  Metoprolol held this morning for low blood pressure but given a lower dose now at 25 mg per cardiology recommendation    Diabetes mellitus type 2 on insulin and oral agents.  Glucoses here have been reasonable with basal insulin and sliding scale insulin    Severe pulmonary hypertension followed at [facility]    History of recent right leg pain with history of arterial sclerosis obliterans.  At [facility] they had discussed right femoral endarterectomy.  Has been on aspirin and clopidogrel.  No current leg pain.    History of endarterectomy at [facility] in January 2021    Recent hospitalization at [facility] for bleeding from right foot varicose vein.  He was admitted to [facility] and required transfusions with hemoglobin as low as 7.    Acute on chronic renal failure with baseline creatinine 1.7 and creatinine here stable at 2.5.      History of COPD and  obstructive sleep apnea    History of heart failure with preserved ejection fraction.  Did receive some IV bumetanide but switch back to his home dose today per cardiology.    Chronic atrial fibrillation for which she had previously been on apixaban.  Plan: Apixaban on hold now with potential procedure and on IV heparin    CODE Covid STATUS: He is immunized.  Negative Covid test 8/24    Disposition: Transferred to [facility] today    PENDING LABS     Unresulted Labs Ordered in the Past 30 Days of this Admission     Date and Time Order Name Status Description    8/22/2021  4:33 PM Blood Culture Peripheral Blood Preliminary             PROCEDURES ( this hospitalization only)      None    [facility]    SUMMARY OF HOSPITAL COURSE:      Patient was admitted to the hospital 3 days ago for vascular issues but was determined after seeing vascular surgery that this could be managed as an outpatient.  He was then readmitted on 8/24 at 0 800 with a non-STEMI.  He has been on IV heparin.  His troponins have been mildly elevated x3.  Followed by cardiology.  He currently is feeling much better.  Requiring 2 L of nasal cannula oxygen.  He denies any current chest pain or shortness of breath.  Denies nausea.  States he is hungry.  He is agreeable to transfer to [facility].    Discharge Medications with Med changes:     Current Discharge Medication List      CONTINUE these medications which have NOT CHANGED    Details   acetaminophen (TYLENOL) 500 MG tablet [ACETAMINOPHEN (TYLENOL) 500 MG TABLET] Take 1,000 mg by mouth every 6 (six) hours as needed. First line of pain management. Do Not take more than 4,000 mg in 24 hours.      albuterol (PROVENTIL HFA;VENTOLIN HFA) 90 mcg/actuation inhaler Inhale 2 puffs into the lungs every 6 hours as needed       apixaban (ELIQUIS) 5 mg Tab [APIXABAN (ELIQUIS) 5 MG TAB] Take 1 tablet by mouth every 12 (twelve) hours.      aspirin 81 MG EC  tablet [ASPIRIN 81 MG EC TABLET] Take 81 mg by mouth every morning.       atorvastatin (LIPITOR) 40 MG tablet Take 40 mg by mouth every morning   Refills: 4      bumetanide (BUMEX) 1 MG tablet Take 3 mg by mouth 2 times daily   Refills: 0    Associated Diagnoses: Acute on chronic congestive heart failure, unspecified heart failure type (H)      canagliflozin (INVOKANA) 100 mg Tab Take 100 mg by mouth every morning       cloNIDine (CATAPRES-TTS) 0.2 mg/24 hr [CLONIDINE (CATAPRES-TTS) 0.2 MG/24 HR] Place 1 patch on the skin once a week. Place patch on Monday.      clopidogreL (PLAVIX) 75 mg tablet [CLOPIDOGREL (PLAVIX) 75 MG TABLET] Take 1 tablet by mouth daily.      digoxin (LANOXIN) 125 mcg tablet [DIGOXIN (LANOXIN) 125 MCG TABLET] Take 1 tablet (125 mcg total) by mouth daily.  Qty: 90 tablet, Refills: 3    Associated Diagnoses: Persistent atrial fibrillation (H)      doxycycline monohydrate (MONODOX) 100 MG capsule Take 100 mg by mouth See Admin Instructions Before dental appointments      eplerenone (INSPRA) 25 MG tablet Take 25 mg by mouth daily       fluticasone-vilanterol (BREO ELLIPTA) 200-25 mcg/dose DsDv inhaler [FLUTICASONE-VILANTEROL (BREO ELLIPTA) 200-25 MCG/DOSE DSDV INHALER] Inhale 1 puff daily.      Garlic 1000 MG CAPS Take 1 capsule by mouth daily      HYDROcodone-acetaminophen (NORCO) 5-325 MG tablet Take 1 tablet by mouth every 6 hours as needed for severe pain      !! insulin glargine (LANTUS PEN) 100 UNIT/ML pen Inject 52 Units Subcutaneous At Bedtime      !! insulin glargine (LANTUS PEN) 100 UNIT/ML pen Inject 54 Units Subcutaneous every morning       ipratropium - albuterol 0.5 mg/2.5 mg/3 mL (DUONEB) 0.5-2.5 (3) MG/3ML neb solution Take 1 vial by nebulization 4 times daily      irbesartan (AVAPRO) 300 MG tablet Take 150 mg by mouth daily       lidocaine (LIDODERM) 5 % [LIDOCAINE (LIDODERM) 5 %] Place 1 patch on the skin as needed. Remove & Discard patch within 12 hours or as directed by MD     "  metFORMIN (GLUCOPHAGE-XR) 500 MG 24 hr tablet Take 1,000 mg by mouth 2 times daily (with meals)      metoprolol tartrate (LOPRESSOR) 50 MG tablet Take 50 mg by mouth 2 times daily (with meals)      multivitamin w/minerals (THERA-VIT-M) tablet Take 1 tablet by mouth daily      nitroglycerin (NITROSTAT) 0.4 MG SL tablet [NITROGLYCERIN (NITROSTAT) 0.4 MG SL TABLET] Place 0.4 mg under the tongue every 5 (five) minutes as needed for chest pain.      omeprazole (PRILOSEC) 20 MG DR capsule Take 20 mg by mouth daily      potassium chloride (K-DUR,KLOR-CON) 20 MEQ tablet Take 40 mEq by mouth daily       rOPINIRole (REQUIP) 2 MG tablet [ROPINIROLE (REQUIP) 2 MG TABLET] Take 2 mg by mouth at bedtime.       tadalafil (CIALIS) 20 MG tablet Take 20 mg by mouth daily      vitamin C (ASCORBIC ACID) 1000 MG TABS Take 1,000 mg by mouth daily      zinc gluconate 50 MG tablet Take 50 mg by mouth daily      zolpidem (AMBIEN) 10 mg tablet [ZOLPIDEM (AMBIEN) 10 MG TABLET] Take 10 mg by mouth bedtime.      ACCU-CHEK JOSE PLUS TEST STRP strips [ACCU-CHEK JOSE PLUS TEST STRP STRIPS] see administration instructions.      BD ULTRA-FINE MANISHA PEN NEEDLES 32 gauge x 5/32\" Ndle [BD ULTRA-FINE MANISHA PEN NEEDLES 32 GAUGE X 5/32\" NDLE]   Refills: 4      OXYGEN-AIR DELIVERY SYSTEMS MISC [OXYGEN-AIR DELIVERY SYSTEMS MISC] Use As Directed.       !! - Potential duplicate medications found. Please discuss with provider.                Rationale for medication changes:      Current medication list noted above.        Consults       PHARMACY IP CONSULT  PHARMACY IP CONSULT  SOCIAL WORK IP CONSULT  SOCIAL WORK IP CONSULT    Immunizations given this encounter     Most Recent Immunizations   Administered Date(s) Administered     COVID-19,PF,Bipin 03/06/2021               SIGNIFICANT IMAGING FINDINGS     Results for orders placed or performed during the hospital encounter of 08/24/21   CT Chest Pulmonary Embolism w Contrast    Impression    IMPRESSION:  1. "  Negative for pulmonary emboli.    2.  Severe coronary artery calcifications.    3.  Stable lung nodules should be considered benign.    4.   Moderate emphysema.    5.  Slight increase in a small right and tiny left pleural effusion.    6.  Scant abdominal ascites.    7.  Reflux of contrast material into the hepatic veins often seen with right heart failure.       SIGNIFICANT LABORATORY FINDINGS     Most Recent 3 CBC's:Recent Labs   Lab Test 08/25/21  0801 08/24/21  1803 08/24/21  1503   WBC 10.4 12.2* 10.5   HGB 8.6* 8.3* 8.2*   MCV 74* 74* 76*    262 243     Most Recent 3 BMP's:Recent Labs   Lab Test 08/25/21  0801 08/25/21  0736 08/24/21  2228 08/24/21  1503 08/23/21  0339   *  --   --  134* 134*   POTASSIUM 4.7  --   --  4.7 4.8   CHLORIDE 103  --   --  103 102   CO2 23  --   --  19* 22   BUN 58*  --   --  58* 62*   CR 2.49*  --   --  2.30* 2.55*   ANIONGAP 9  --   --  12 10   ANNMARIE 8.8  --   --  8.6 8.1*    97 152* 197* 118     Most Recent 3 Troponin's:No lab results found.      Discharge Orders     No discharge procedures on file.    Examination   Physical Exam   Temp:  [97.8  F (36.6  C)] 97.8  F (36.6  C)  Pulse:  [64-92] 83  Resp:  [10-34] 18  BP: ()/(47-95) 134/65  SpO2:  [81 %-99 %] 98 %  Wt Readings from Last 1 Encounters:   08/25/21 115.2 kg (254 lb)       General Appearance: Appears to be in no apparent distress sitting upright in bed with 2 L of nasal cannula oxygen  Respiratory: Mild to moderately decreased breath sounds throughout with mild rales throughout and no increased respiratory effort  Cardiovascular: Regular rate and rhythm with grade 2/6 systolic murmur heard loudest at the left upper sternal margin  GI: Abdomen is soft nontender  Skin: No lesions in skin exposed areas  Other: Lower extremities show trace edema to the knees  Neurologic: Alert, appears cognitively intact, cranial nerves II through XII intact but has some mild ptosis of the left eye which he states is  related to prior eye surgery.  Moves all 4 extremities      Please see EMR for more detailed significant labs, imaging, consultant notes etc.    I, Ethan Dwyer MD, personally saw the patient today and spent greater than 30 minutes discharging this patient.    Ethan Dwyer MD  [facility]    CC:Haresh Corona

## 2021-08-25 NOTE — ED NOTES
PTT at 59. Per the heparin low intensity adjustment protocol there will be no bolus and no rate change. Redraw PTT in 6 hours.

## 2021-08-25 NOTE — PROGRESS NOTES
Cardiology Progress Note    Assessment:  80-year-old male with a complex cardiovascular history including coronary artery disease and significant pulmonary hypertension followed in the pulmonary hypertension clinic at [place].  He had a non-ST segment myocardial infarction post vascular surgery January 2021 and underwent atherectomy and PTCA of a distal circumflex vessel.  He now is endorsing some chest discomfort with mild elevation in initial troponin.  He has some chronic EKG changes that do not appear significantly different from previous .  He is chronically on digoxin which is likely contributing to the ST-T segment changes.  Cannot exclude acute coronary syndrome.  Suspect significant volume overload.  Patient reports that he has gained approximately 10 or 11 pounds in the last few weeks.  He is uncertain as to whether he received his dose of Bumex when he was at an outside hospital for a few days.  He endorses breathlessness with very minimal activity.  He endorses some shortness of breath while lying in bed last evening.  Did undergo CT pulmonary angiogram per the ER physician's recommendation yesterday.  The report indicates no pulmonary embolism, moderate emphysema, slight increase in small pleural effusions, scant abdominal ascites review reflux of contrast material into the hepatic veins.    1.  Elevated troponin.  Troponin rise is flat.  This may be related to stress from increased heart failure symptoms.  Patient with a history of coronary artery disease with coronary stenting as outlined at the [facility] January 2021.  May benefit from repeat coronary angiography and right heart catheterization.  Awaiting lab results today.  Patient received contrast for CT scan per the ER physician yesterday in the setting of creatinine of 2.3.  Would not proceed to acute coronary angiography today unless change in symptoms.  May benefit from repeat right and left heart cath.    2.  Increased shortness of breath.   Patient reports a 10 to 11 pound weight gain in the recent past.  He had been evaluated at an outside hospital for a bleeding varicose vein and the diuretics were held in the setting of lower blood pressure.  I have reviewed notes from his [facility] pulmonary hypertension physician who felt that his baseline weight was closer to 2 20-2 25.  Limited diuresis last evening secondary to low blood pressure and administration of contrast.  He however is feeling significantly improved this morning.    3.  Severe pulmonary hypertension.  He is followed at the [facility].  Per review of their notes he has class IV symptoms with some history of salt indiscretion.    4.  Chronic atrial fibrillation on Eliquis.  He was admitted to the hospital in the recent past bleeding varicose vein.  Currently he is on heparin.    5.  Coronary artery disease with coronary intervention as outlined.  Patient appears to have been on Eliquis, Plavix and aspirin.  Plavix reinitiated and on heparin.     6.  Anemia.  Hemoglobin has been stable at 8.2 over the past few days.  Noted that his hemoglobin was 12.2 July 11, 2021.  Likely contributing to the decompensated heart failure state.  Active Problems:    NSTEMI (non-ST elevated myocardial infarction) (H)      Plan:  1.  Await laboratory studies from this morning.  2.  Await disposition.  I think he would be better served in a hospital that has availability of heart catheterization and expertise in pulmonary hypertension.  As noted patient is a long-term patient of the [facility] where he has followed for severe pulmonary hypertension felt to be likely who class II and III based on review of the chart record.  Will review with the hospital team.  3.  Resume home medications with blood pressure parameters.  Clonidine patch had to be held yesterday.  4.  Continue with heparin, hold Eliquis pending additional decisions regarding need for coronary angiography.  5.  For now would resume his home  dose of Bumex.    Subjective:   Red Sutherland is seen in follow-up.  He reports no additional chest discomfort.  He reports that symptoms of shortness of breath are improved overnight.  He looks more comfortable.  He reports that his right leg is less tender today.    Objective:   Vital signs in last 24 hours:  VITALS: /64   Pulse 76   Temp 97.8  F (36.6  C)   Resp 30   Wt 108.9 kg (240 lb 1.3 oz)   SpO2 99%   BMI 33.48 kg/m    BMI: Body mass index is 33.48 kg/m .  Wt Readings from Last 3 Encounters:   08/24/21 108.9 kg (240 lb 1.3 oz)   08/22/21 108.9 kg (240 lb)   07/13/21 105.7 kg (233 lb)       Intake/Output Summary (Last 24 hours) at 8/25/2021 0630  Last data filed at 8/25/2021 0543  Gross per 24 hour   Intake 1000 ml   Output 975 ml   Net 25 ml       Physical Exam: Sitting comfortably in bed in no acute distress.   Neck: Elevated jugular venous distention to the angle of the jaw.   Lungs: Creased breath sounds at the bases but no significant rales detected on today's examination.   COR: Irregular rhythm, 2/6 systolic murmur   Abd: nondistended, BS present, nontender   Extrem: 1+ edema bilaterally.  Neuro: Alert and oriented no focal deficits.    Cardiographics:    ECG: Atrial  fibrillation, nonspecific ST-T changes inferior lateral leads.  Similar to prior EKG in the chart record from 5/2021.  Echocardiogram:  Echocardiogram performed per follow-up pulmonary hypertension protocol.  Last full   echocardiogram performed 05/18/2020.  LEFT VENTRICLE:  Normal left ventricular chamber size.   Flattening of the ventricular septum.  Calculated 2-D linear left ventricular ejection fraction   65 %.  Left ventricular cardiac index 2.77 l/min/m^2.  No regional wall motion abnormalities.   Indeterminate left ventricular diastolic function.  RIGHT VENTRICLE:  Severely enlarged right   ventricular chamber size.  Moderately reduced right ventricular systolic function.  Increased   right ventricular wall  thickness.  Estimated right ventricular systolic pressure 112 mmHg   (systolic blood pressure 133 mmHg).  Averaged right ventricular free wall longitudinal peak   systolic strain is -19 %.  ATRIA:  Severe bi-atrial enlargement.  CARDIAC VALVES:  Mild   calcific aortic valve stenosis (limited assessment).  Aortic valve systolic mean Doppler   gradient 17 mmHg.  Aortic valve area by Doppler 1.36 cm^2.  Trivial aortic valve regurgitation.    Mildly thickened mitral valve.  Mild mitral valve regurgitation.  Normal pulmonary valve.   Increased pulmonary valve systolic velocities.  Mild pulmonary valve regurgitation.  Tricuspid   annulus dilatation.  Moderate-severe tricuspid valve regurgitation.  OTHER ECHO FINDINGS:   Enlarged inferior vena cava size with no inspiratory collapse.  Doppler evidence of systolic   reversal in the hepatic veins.  No intracardiac mass or thrombus, but the left atrial appendage   cannot be visualized adequately with transthoracic echo to exclude thrombus in this location.   No pericardial effusion.      Imaging  Chest x-ray:      PROCEDURE TYPES   1.  HEART CATHETERIZATION - RIGHT   2.  CORONARY ANGIOGRAPHY   3.  PERCUTANEOUS CORONARY ANGIOPLASTY   4.  CORONARY ATHERECTOMY   FINAL DIAGNOSIS   1.  Severe pulmonary hypertension   2.  Severe coronary artery atherosclerosis   3.  Coronary calcification   4.  Coronary rotational atherectomy (1.5 bur) of the distal left circumflex   5.  Partially successful PTCA of distal left circumflex   PRE-PROCEDURE DIAGNOSIS   1.  Non-ST Elevation Myocardial Infarction (HCC)   HEMODYNAMICS SUMMARY   Right heart filling pressures (mRAP 17 mmHg) were severely elevated and left heart filling pressures (PCWP 18 mmHg) were mildly elevated with severely elevated pulmonary arterial pressures (PA 93/31, mean PA 52 mmHg) and elevated pulmonary vascular   resistance (PVR 4.9 MILAN). Cardiac output/index was normal (Gabrielle's CO/CI 6.9/3.1). Overall, these findings are  suggestive of mostly pre-capillary but some component of post capillary pulmonary hypertension as well.   CORONARY DIAGNOSTIC SUMMARY   Coronary artery dominance is right.   The left main coronary artery is 20% obstructed by a tubular lesion. The distal segment is normal size, diffuse diseased.   The proximal left anterior descending artery is 20% obstructed by a discrete lesion.   The middle left anterior descending artery is 40% obstructed by a discrete lesion. The distal segment is normal size, diseased.   The proximal circumflex artery is 30% obstructed by a discrete lesion.   The distal circumflex artery is 70% obstructed by a tubular lesion and 60% obstructed by a discrete lesion. The distal segment is normal size, diseased.   The first obtuse marginal distal segment is small size, diffuse diseased.   The middle right coronary artery is 50% obstructed by a discrete lesion. The distal segment is normal size, diseased.   Mild in-stent restenosis of proximal left anterior descending stent   CORONARY INTERVENTION SUMMARY   Successful intervention of the Distal Circumflex Artery. The preintervention stenosis was 70%. The post intervention stenosis was 50%. Devices used include Atherectomy and PTCA. Perforation present  Telemetry: Atrial fibrillation.    Imaging:   INDICATION: Dyspnea. Chest pain. PE suspected, low/intermediate prob, positive D-dimer  COMPARISON: None.  TECHNIQUE: CT chest pulmonary angiogram during arterial phase injection of IV contrast. Multiplanar reformats and MIP reconstructions were performed. Dose reduction techniques were used.   CONTRAST: ISOVUE 370 50mL     FINDINGS:  ANGIOGRAM CHEST: Negative for pulmonary emboli. Thoracic aorta is negative for dissection. Generalized cardiac enlargement. Some reflux of contrast material into the hepatic veins often seen with right heart failure.     LUNGS AND PLEURA: Moderate emphysema. Stable 1.6 cm nodule right middle lobe and tiny stable nodule right  lower lobe and inferior lingula of no significance.     Slight increase in small right and tiny left pleural effusions.     MEDIASTINUM/AXILLAE: Normal.     CORONARY ARTERY CALCIFICATION: Severe.     UPPER ABDOMEN: Tiny amount of ascites.     MUSCULOSKELETAL: Normal.                                                                      IMPRESSION:  1.  Negative for pulmonary emboli.     2.  Severe coronary artery calcifications.     3.  Stable lung nodules should be considered benign.     4.   Moderate emphysema.     5.  Slight increase in a small right and tiny left pleural effusion.     6.  Scant abdominal ascites.     7.  Reflux of contrast material into the hepatic veins often seen with right heart failure.     Lab Results    Chemistry/lipid CBC Cardiac Enzymes/BNP/TSH/INR   Recent Labs   Lab Test 05/25/21  1359   CHOL 94   HDL 25*   LDL <5   TRIG 353*     Recent Labs   Lab Test 05/25/21  1359 09/02/20  1307 07/09/19  0900   LDL <5 50 14     Recent Labs   Lab Test 08/24/21  2228 08/24/21  1503   NA  --  134*   POTASSIUM  --  4.7   CHLORIDE  --  103   CO2  --  19*   * 197*   BUN  --  58*   CR  --  2.30*   GFRESTIMATED  --  26*   ANNMARIE  --  8.6     Recent Labs   Lab Test 08/24/21  1503 08/23/21  0339 08/23/21  0144   CR 2.30* 2.55* 2.61*     Recent Labs   Lab Test 07/11/21  1031 04/10/20  0534   A1C 9.3* 10.8*          Recent Labs   Lab Test 08/24/21  1803   WBC 12.2*   HGB 8.3*   HCT 28.4*   MCV 74*        Recent Labs   Lab Test 08/24/21  1803 08/24/21  1503 08/23/21  0339   HGB 8.3* 8.2* 8.2*    Recent Labs   Lab Test 08/25/21  0003 08/24/21  1931 08/24/21  1503   TROPONINI 0.51* 0.63* 0.57*     Recent Labs   Lab Test 08/24/21  1503 07/11/21  1747 04/09/20  1645   * 187* 117*     Recent Labs   Lab Test 05/31/19  1507   TSH 2.55     Recent Labs   Lab Test 08/24/21  1455 08/22/21  1532 07/11/21  0400   INR 1.51* 1.57* 1.29*

## 2021-08-25 NOTE — CONSULTS
Care Management Initial Consult    General Information  Assessment completed with: Patient, Spouse or significant other, Patient and wife Kaley  Type of CM/SW Visit: Initial Assessment    Primary Care Provider verified and updated as needed:     Readmission within the last 30 days:        Reason for Consult: discharge planning  Advance Care Planning: Advance Care Planning Reviewed: other (comment) (Declined Honoring Choices information)          Communication Assessment  Patient's communication style: spoken language (English or Bilingual)             Cognitive  Cognitive/Neuro/Behavioral: WDL                      Living Environment:   People in home: spouse     Current living Arrangements: condominium      Able to return to prior arrangements: other (see comments)  Living Arrangement Comments: Pending clinical progress    Family/Social Support:  Care provided by: self, spouse/significant other  Provides care for: no one  Marital Status:   Wife  Wife Kaley       Description of Support System: Supportive, Involved    Support Assessment: Adequate family and caregiver support, Inadequate interpersonal communication skills    Current Resources:   Patient receiving home care services: No     Community Resources: Acute Rehab  Equipment currently used at home: walker, standard; CPAP and Oxygen  Supplies currently used at home:      Employment/Financial:  Employment Status: retired        Financial Concerns: No concerns identified           Lifestyle & Psychosocial Needs:  Social Determinants of Health     Tobacco Use: Medium Risk     Smoking Tobacco Use: Former Smoker     Smokeless Tobacco Use: Never Used   Alcohol Use:      Frequency of Alcohol Consumption:      Average Number of Drinks:      Frequency of Binge Drinking:    Financial Resource Strain:      Difficulty of Paying Living Expenses:    Food Insecurity:      Worried About Running Out of Food in the Last Year:      Ran Out of Food in the Last Year:     Transportation Needs:      Lack of Transportation (Medical):      Lack of Transportation (Non-Medical):    Physical Activity:      Days of Exercise per Week:      Minutes of Exercise per Session:    Stress:      Feeling of Stress :    Social Connections:      Frequency of Communication with Friends and Family:      Frequency of Social Gatherings with Friends and Family:      Attends Orthodox Services:      Active Member of Clubs or Organizations:      Attends Club or Organization Meetings:      Marital Status:    Intimate Partner Violence:      Fear of Current or Ex-Partner:      Emotionally Abused:      Physically Abused:      Sexually Abused:    Depression:      PHQ-2 Score:    Housing Stability:      Unable to Pay for Housing in the Last Year:      Number of Places Lived in the Last Year:      Unstable Housing in the Last Year:        Functional Status:  Prior to admission patient needed assistance:   Dependent ADLs:: Ambulation-walker, Bathing  Dependent IADLs:: Cleaning, Laundry, Cooking, Shopping, Transportation  Assesssment of Functional Status: Not at baseline with ADL Functioning    Mental Health Status:          Chemical Dependency Status:                Values/Beliefs:  Spiritual, Cultural Beliefs, Orthodox Practices, Values that affect care:                 Additional Information:  Alert and oriented patient lives with wife Kaley in a town home. Uses a walker; No home care services; does drivel. Uses a CPAP with O2 at 4 liters for sleep. Also uses oxygen as needed. Per wife when patient s breathing is under control, he is independent with all ADLs. Declined Honoring Choices information. Plans to return home with wife Kaley transporting patient.    LIDYA ISLAS RN

## 2021-08-25 NOTE — ED PROVIDER NOTES
EMERGENCY DEPARTMENT PROGRESS NOTE         ED COURSE AND MEDICAL DECISION MAKING  Patient was signed out to me by Dr. Serrano at 2:35 PM pending transfer.    Red Sutherland is a 80 year old male who presents for evaluation of shortness of breath and chest pressure. Patient has been seen by cardiology. Patient is in stable condition and awaiting transfer to [facility].    Medications   cloNIDine (CATAPRES-TTS) Patch in Place ( Transdermal Automatically Held 8/30/21 1830)   cloNIDine (CATAPRES-TTS2) 0.2 MG/24HR WK patch 1 patch ( Transdermal Automatically Held 9/7/21 1900)   clopidogrel (PLAVIX) tablet 75 mg (75 mg Oral Given 8/25/21 0816)   heparin 25,000 units in 0.45% NaCl 250 mL ANTICOAGULANT infusion (1,150 Units/hr Intravenous Rate/Dose Change 8/25/21 1328)   metoprolol tartrate (LOPRESSOR) tablet 25 mg (25 mg Oral Given 8/25/21 1216)   bumetanide (BUMEX) tablet 3 mg (3 mg Oral Given 8/25/21 1216)   diphenhydrAMINE (BENADRYL) injection 50 mg (50 mg Intravenous Given 8/24/21 1600)   ondansetron (ZOFRAN) injection 4 mg (4 mg Intravenous Given 8/24/21 1559)   iopamidol (ISOVUE-370) solution 50 mL (50 mLs Intravenous Given 8/24/21 1640)   bumetanide (BUMEX) injection 2 mg (2 mg Intravenous Given 8/24/21 1747)   rOPINIRole (REQUIP) tablet 2 mg (2 mg Oral Given 8/24/21 1934)   insulin glargine (LANTUS PEN) injection 52 Units (52 Units Subcutaneous Given 8/24/21 2013)   metoprolol tartrate (LOPRESSOR) tablet 50 mg (50 mg Oral Given 8/24/21 1934)   morphine (PF) injection 2 mg (2 mg Intravenous Given 8/24/21 1928)   0.9% sodium chloride BOLUS (0 mLs Intravenous Stopped 8/24/21 2204)   heparin - BOLUS DOSE from infusion (3,000 Units Intravenous Given 8/25/21 1326)     Discharge Medication List as of 8/25/2021  3:14 PM          LAB  Pertinent labs results reviewed   [unfilled]      RADIOLOGY    Pertinent imaging reviewed   Please see official radiology report.  CT Chest Pulmonary Embolism w Contrast   Final  Result   IMPRESSION:   1.  Negative for pulmonary emboli.      2.  Severe coronary artery calcifications.      3.  Stable lung nodules should be considered benign.      4.   Moderate emphysema.      5.  Slight increase in a small right and tiny left pleural effusion.      6.  Scant abdominal ascites.      7.  Reflux of contrast material into the hepatic veins often seen with right heart failure.      POC US ECHO LIMITED    (Results Pending)       FINAL IMPRESSION    1. NSTEMI (non-ST elevated myocardial infarction) (H)    2. Acute on chronic congestive heart failure, unspecified heart failure type (H)    3. Pulmonary hypertension (H)    4. Anemia, unspecified type    5. Chronic kidney disease, unspecified CKD stage

## 2021-08-25 NOTE — ED NOTES
"BP still continuing to stay low. Pt is feeling \"crummy\". MD updated and 500 ml NS bolus ordered and started.  "

## 2021-08-25 NOTE — ED NOTES
Heparin dose verified with pharmacist Violette, no change in order at this time. Needed to change order to make it more flexible with coag results. Will check PTT at 1200.

## 2021-08-25 NOTE — ED PROVIDER NOTES
TRANSFER OF CARE NOTE ED SIGNOUT      HPI    Patient seen by Dr. Bobo for shortness of breath and chest pressure and signed out care at 6:15 AM.     Pending transfer.        Red Sutherland is a 80 year old male with a pertinent history of hypertension, hyperlipidemia, CAD, CHF, atrial fibrillation, COPD, and T2DM who presents to the ED by walk in accompanied by his wife for evaluation of shortness of breath and chest pressure. Patient reports that he developed increased shortness of breath with associated central chest pressure around 8 AM yesterday (8/24). He states that he has chronic breathing problems, but his breathing worsened today and he is now unable to walk half a block without feeling winded. His shortness of breath is worsened with exertion, but he feels somewhat improved at rest. Patient adds that he has gained approximately 11 lbs in the past couple of days. He attributes this weight gain to not having his Bumex when he was in the hospital recently. He also reports increased right leg pain. He denies fever, chills, cough, increased leg swelling, or additional symptoms at this time.      Patient has a history of atrial fibrillation with prior ablation. He is currently on blood thinners. He had prior coronary stent placement several years ago.         PHYSICAL EXAM      VITAL SIGNS: /62   Pulse 82   Temp 97.8  F (36.6  C)   Resp 18   Wt 115.2 kg (254 lb)   SpO2 97%   BMI 35.43 kg/m    Repeat exam reveals   elderly male laying in bed cooperative pleasant with examination denies any complaints.      LABS  Pertinent lab results reviewed in chart.  Results for orders placed or performed during the hospital encounter of 08/24/21   CT Chest Pulmonary Embolism w Contrast    Impression    IMPRESSION:  1.  Negative for pulmonary emboli.    2.  Severe coronary artery calcifications.    3.  Stable lung nodules should be considered benign.    4.   Moderate emphysema.    5.  Slight increase in  a small right and tiny left pleural effusion.    6.  Scant abdominal ascites.    7.  Reflux of contrast material into the hepatic veins often seen with right heart failure.   Extra Blue Top Tube   Result Value Ref Range    Hold Specimen JIC    Extra Green Top (Lithium Heparin) Tube   Result Value Ref Range    Hold Specimen JIC    Extra Purple Top Tube   Result Value Ref Range    Hold Specimen JIC    CBC (+ platelets, no diff)   Result Value Ref Range    WBC Count 10.5 4.0 - 11.0 10e3/uL    RBC Count 3.71 (L) 4.40 - 5.90 10e6/uL    Hemoglobin 8.2 (L) 13.3 - 17.7 g/dL    Hematocrit 28.1 (L) 40.0 - 53.0 %    MCV 76 (L) 78 - 100 fL    MCH 22.1 (L) 26.5 - 33.0 pg    MCHC 29.2 (L) 31.5 - 36.5 g/dL    RDW 19.6 (H) 10.0 - 15.0 %    Platelet Count 243 150 - 450 10e3/uL   Basic metabolic panel   Result Value Ref Range    Sodium 134 (L) 136 - 145 mmol/L    Potassium 4.7 3.5 - 5.0 mmol/L    Chloride 103 98 - 107 mmol/L    Carbon Dioxide (CO2) 19 (L) 22 - 31 mmol/L    Anion Gap 12 5 - 18 mmol/L    Urea Nitrogen 58 (H) 8 - 28 mg/dL    Creatinine 2.30 (H) 0.70 - 1.30 mg/dL    Calcium 8.6 8.5 - 10.5 mg/dL    Glucose 197 (H) 70 - 125 mg/dL    GFR Estimate 26 (L) >60 mL/min/1.73m2   Troponin I (now)   Result Value Ref Range    Troponin I 0.57 (HH) 0.00 - 0.29 ng/mL   B-Type Natriuretic Peptide ([facility] Only)   Result Value Ref Range     (H) 0 - 82 pg/mL   Result Value Ref Range    INR 1.51 (H) 0.85 - 1.15   Result Value Ref Range    aPTT 38 22 - 38 Seconds   D dimer quantitative   Result Value Ref Range    D-Dimer Quantitative 3.83 (H) 0.00 - 0.50 ug/mL FEU   Asymptomatic COVID-19 Virus (Coronavirus) by PCR Nasopharyngeal    Specimen: Nasopharyngeal; Swab   Result Value Ref Range    SARS CoV2 PCR Negative Negative   CBC with platelets   Result Value Ref Range    WBC Count 12.2 (H) 4.0 - 11.0 10e3/uL    RBC Count 3.85 (L) 4.40 - 5.90 10e6/uL    Hemoglobin 8.3 (L) 13.3 - 17.7 g/dL    Hematocrit 28.4 (L) 40.0 - 53.0 %    MCV 74  (L) 78 - 100 fL    MCH 21.6 (L) 26.5 - 33.0 pg    MCHC 29.2 (L) 31.5 - 36.5 g/dL    RDW 19.8 (H) 10.0 - 15.0 %    Platelet Count 262 150 - 450 10e3/uL   Partial thromboplastin time   Result Value Ref Range    aPTT 89 (H) 22 - 38 Seconds   Result Value Ref Range    Digoxin 0.6   ug/L   Troponin I (now)   Result Value Ref Range    Troponin I 0.63 (HH) 0.00 - 0.29 ng/mL   Troponin I (now)   Result Value Ref Range    Troponin I 0.51 (HH) 0.00 - 0.29 ng/mL   Glucose by meter   Result Value Ref Range    GLUCOSE BY METER POCT 108 70 - 125 mg/dL   Glucose by meter   Result Value Ref Range    GLUCOSE BY METER POCT 152 (H) 70 - 125 mg/dL   Result Value Ref Range    aPTT 59 (H) 22 - 38 Seconds   Result Value Ref Range    aPTT 51 (H) 22 - 38 Seconds   Basic metabolic panel   Result Value Ref Range    Sodium 135 (L) 136 - 145 mmol/L    Potassium 4.7 3.5 - 5.0 mmol/L    Chloride 103 98 - 107 mmol/L    Carbon Dioxide (CO2) 23 22 - 31 mmol/L    Anion Gap 9 5 - 18 mmol/L    Urea Nitrogen 58 (H) 8 - 28 mg/dL    Creatinine 2.49 (H) 0.70 - 1.30 mg/dL    Calcium 8.8 8.5 - 10.5 mg/dL    Glucose 105 70 - 125 mg/dL    GFR Estimate 24 (L) >60 mL/min/1.73m2   CBC with platelets   Result Value Ref Range    WBC Count 10.4 4.0 - 11.0 10e3/uL    RBC Count 3.99 (L) 4.40 - 5.90 10e6/uL    Hemoglobin 8.6 (L) 13.3 - 17.7 g/dL    Hematocrit 29.6 (L) 40.0 - 53.0 %    MCV 74 (L) 78 - 100 fL    MCH 21.6 (L) 26.5 - 33.0 pg    MCHC 29.1 (L) 31.5 - 36.5 g/dL    RDW 20.1 (H) 10.0 - 15.0 %    Platelet Count 239 150 - 450 10e3/uL   Glucose by meter   Result Value Ref Range    GLUCOSE BY METER POCT 97 70 - 125 mg/dL   ECG 12-LEAD WITH MUSE (LHE)   Result Value Ref Range    Systolic Blood Pressure  mmHg    Diastolic Blood Pressure  mmHg    Ventricular Rate 78 BPM    Atrial Rate 83 BPM    KY Interval  ms    QRS Duration 92 ms     ms    QTc 442 ms    P Axis  degrees    R AXIS 102 degrees    T Axis -67 degrees    Interpretation ECG       Atrial  fibrillation  Incomplete right bundle branch block  Possible Right ventricular hypertrophy  Nonspecific ST and T wave abnormality  Abnormal ECG  When compared with ECG of 11-JUL-2021 13:08,  Premature ventricular complexes are no longer Present  Criteria for Septal infarct are no longer Present  T wave inversion more evident in Lateral leads  Confirmed by SEE ED PROVIDER NOTE FOR, ECG INTERPRETATION ([id number]),  ANAFERNIE MARTINEZ ([id number]) on 8/25/2021 7:45:54 AM     Adult Type and Screen   Result Value Ref Range    ABO/RH(D) A POS     Antibody Screen Negative Negative    SPECIMEN EXPIRATION DATE [id number]          RADIOLOGY  CT Chest Pulmonary Embolism w Contrast   Final Result   IMPRESSION:   1.  Negative for pulmonary emboli.      2.  Severe coronary artery calcifications.      3.  Stable lung nodules should be considered benign.      4.   Moderate emphysema.      5.  Slight increase in a small right and tiny left pleural effusion.      6.  Scant abdominal ascites.      7.  Reflux of contrast material into the hepatic veins often seen with right heart failure.      POC US ECHO LIMITED    (Results Pending)        ED COURSE & MEDICAL DECISION MAKING    6:15 AM Sign out accepted from my colleague, Dr. Bobo.   6:29 AM I introduced myself to the patient. No chest pain. He is in agreement with transfer to [facility].   6:35 AM I discussed the case with [facility] hospitalist, Dr. Bowens, who accepts the patient for admission.   7:38 AM Dr. Wills, cardiology, here in the ED to see patient.   8:22 AM Dr. Wills evaluated patient. He requests that a hospitalist here sees the patient. Paged out to hospitalist.  8:32 AM Spoke with hospitalist, Dr. Tilley.   8:51 AM Spoke with hospitalist, Dr. Dwyer. He will see the patient.   1:08 PM patient sitting on the edge of the bed.  He is aware that ambulance he is on his way.  Denies any complaints.    At the conclusion of the encounter I discussed  the results of all of the  tests and the disposition.   All questions were answered.  The patient and wife acknowledged understanding and was agreeable with the care plan.      IMPRESSION    80-year-old male presents complaining of shortness of breath or chest pressure.  Patient seen by cardiologist Dr. Jasso requested Dr. Dwyer evaluate patient.  Both at bedside.  Patient being transferred to [facility] in stable condition.      FINAL DIAGNOSIS:   1. NSTEMI (non-ST elevated myocardial infarction) (H)    2. Acute on chronic congestive heart failure, unspecified heart failure type (H)    3. Pulmonary hypertension (H)    4. Anemia, unspecified type    5. Chronic kidney disease, unspecified CKD stage          I, Annamaria Amos, am serving as a scribe to document services personally performed by Berna Serrano MD based on my observations and the provider's statements to me.  I, Berna Serrano MD, attest that Annamaria Amos is acting in a scribe capacity, has observed my performance of the services and has documented them in accordance with my direction.             eBrna Serrano MD  08/25/21 1281

## 2021-08-25 NOTE — ED PROVIDER NOTES
ED SIGNOUT  Date/Time:8/25/2021 2:12 AM    Patient signed out to me by my colleague, Dr. Horton.  Please see their note for complete history and physical. Plan to follow up on transfer to hospital with coronary angiogram capabilities.    The creation of this record is based on the scribe s observations of the work being performed by Dr. Bobo and the provider s statements to them. It was created on their behalf by Ty Erazo a trained medical scribe. This document has been checked and approved by the attending provider.          Vitals:  /77   Pulse 72   Temp 97.8  F (36.6  C)   Resp 19   Wt 108.9 kg (240 lb 1.3 oz)   SpO2 97%   BMI 33.48 kg/m        Pertinent labs results reviewed   Results for orders placed or performed during the hospital encounter of 08/24/21   CT Chest Pulmonary Embolism w Contrast    Impression    IMPRESSION:  1.  Negative for pulmonary emboli.    2.  Severe coronary artery calcifications.    3.  Stable lung nodules should be considered benign.    4.   Moderate emphysema.    5.  Slight increase in a small right and tiny left pleural effusion.    6.  Scant abdominal ascites.    7.  Reflux of contrast material into the hepatic veins often seen with right heart failure.   Extra Blue Top Tube   Result Value Ref Range    Hold Specimen JIC    Extra Green Top (Lithium Heparin) Tube   Result Value Ref Range    Hold Specimen JIC    Extra Purple Top Tube   Result Value Ref Range    Hold Specimen JIC    CBC (+ platelets, no diff)   Result Value Ref Range    WBC Count 10.5 4.0 - 11.0 10e3/uL    RBC Count 3.71 (L) 4.40 - 5.90 10e6/uL    Hemoglobin 8.2 (L) 13.3 - 17.7 g/dL    Hematocrit 28.1 (L) 40.0 - 53.0 %    MCV 76 (L) 78 - 100 fL    MCH 22.1 (L) 26.5 - 33.0 pg    MCHC 29.2 (L) 31.5 - 36.5 g/dL    RDW 19.6 (H) 10.0 - 15.0 %    Platelet Count 243 150 - 450 10e3/uL   Basic metabolic panel   Result Value Ref Range    Sodium 134 (L) 136 - 145 mmol/L    Potassium 4.7 3.5 - 5.0 mmol/L     Chloride 103 98 - 107 mmol/L    Carbon Dioxide (CO2) 19 (L) 22 - 31 mmol/L    Anion Gap 12 5 - 18 mmol/L    Urea Nitrogen 58 (H) 8 - 28 mg/dL    Creatinine 2.30 (H) 0.70 - 1.30 mg/dL    Calcium 8.6 8.5 - 10.5 mg/dL    Glucose 197 (H) 70 - 125 mg/dL    GFR Estimate 26 (L) >60 mL/min/1.73m2   Troponin I (now)   Result Value Ref Range    Troponin I 0.57 (HH) 0.00 - 0.29 ng/mL   B-Type Natriuretic Peptide ([facility] Only)   Result Value Ref Range     (H) 0 - 82 pg/mL   Result Value Ref Range    INR 1.51 (H) 0.85 - 1.15   Result Value Ref Range    aPTT 38 22 - 38 Seconds   D dimer quantitative   Result Value Ref Range    D-Dimer Quantitative 3.83 (H) 0.00 - 0.50 ug/mL FEU   Asymptomatic COVID-19 Virus (Coronavirus) by PCR Nasopharyngeal    Specimen: Nasopharyngeal; Swab   Result Value Ref Range    SARS CoV2 PCR Negative Negative   CBC with platelets   Result Value Ref Range    WBC Count 12.2 (H) 4.0 - 11.0 10e3/uL    RBC Count 3.85 (L) 4.40 - 5.90 10e6/uL    Hemoglobin 8.3 (L) 13.3 - 17.7 g/dL    Hematocrit 28.4 (L) 40.0 - 53.0 %    MCV 74 (L) 78 - 100 fL    MCH 21.6 (L) 26.5 - 33.0 pg    MCHC 29.2 (L) 31.5 - 36.5 g/dL    RDW 19.8 (H) 10.0 - 15.0 %    Platelet Count 262 150 - 450 10e3/uL   Partial thromboplastin time   Result Value Ref Range    aPTT 89 (H) 22 - 38 Seconds   Result Value Ref Range    Digoxin 0.6   ug/L   Troponin I (now)   Result Value Ref Range    Troponin I 0.63 (HH) 0.00 - 0.29 ng/mL   Troponin I (now)   Result Value Ref Range    Troponin I 0.51 (HH) 0.00 - 0.29 ng/mL   Glucose by meter   Result Value Ref Range    GLUCOSE BY METER POCT 108 70 - 125 mg/dL   Glucose by meter   Result Value Ref Range    GLUCOSE BY METER POCT 152 (H) 70 - 125 mg/dL   Adult Type and Screen   Result Value Ref Range    ABO/RH(D) A POS     Antibody Screen Negative Negative    SPECIMEN EXPIRATION DATE [id number]        Pertinent imaging reviewed   Please see official radiology report.  CT Chest Pulmonary Embolism w  Contrast   Final Result   IMPRESSION:   1.  Negative for pulmonary emboli.      2.  Severe coronary artery calcifications.      3.  Stable lung nodules should be considered benign.      4.   Moderate emphysema.      5.  Slight increase in a small right and tiny left pleural effusion.      6.  Scant abdominal ascites.      7.  Reflux of contrast material into the hepatic veins often seen with right heart failure.      POC US ECHO LIMITED    (Results Pending)        Interventions  Medications   heparin 25,000 units in 0.45% NaCl 250 mL ANTICOAGULANT infusion (10 Units/kg/hr × 108.9 kg Intravenous Restarted 8/24/21 2327)   cloNIDine (CATAPRES-TTS) Patch in Place ( Transdermal Automatically Held 8/30/21 1830)   cloNIDine (CATAPRES-TTS2) 0.2 MG/24HR WK patch 1 patch ( Transdermal Automatically Held 9/7/21 1900)   clopidogrel (PLAVIX) tablet 75 mg (has no administration in time range)   diphenhydrAMINE (BENADRYL) injection 50 mg (50 mg Intravenous Given 8/24/21 1600)   ondansetron (ZOFRAN) injection 4 mg (4 mg Intravenous Given 8/24/21 1559)   iopamidol (ISOVUE-370) solution 50 mL (50 mLs Intravenous Given 8/24/21 1640)   bumetanide (BUMEX) injection 2 mg (2 mg Intravenous Given 8/24/21 1747)   rOPINIRole (REQUIP) tablet 2 mg (2 mg Oral Given 8/24/21 1934)   insulin glargine (LANTUS PEN) injection 52 Units (52 Units Subcutaneous Given 8/24/21 2013)   metoprolol tartrate (LOPRESSOR) tablet 50 mg (50 mg Oral Given 8/24/21 1934)   morphine (PF) injection 2 mg (2 mg Intravenous Given 8/24/21 1928)   0.9% sodium chloride BOLUS (0 mLs Intravenous Stopped 8/24/21 2204)        ED Course/MDM:  2:13 AM Signout accepted from Dr. Horton.  Prior records were reviewed.  Diagnostics from this visit are reviewed.  0630-signed out at change of shift with transfer to another institution with coronary angiogram capabilities pending  ED Course as of Aug 25 0212   Tue Aug 24, 2021   1520 Baseline anemia   Hemoglobin(!): 8.2   1553 +trop    Troponin I(!!): 0.57       DX    1. NSTEMI (non-ST elevated myocardial infarction) (H)    2. Acute on chronic congestive heart failure, unspecified heart failure type (H)    3. Pulmonary hypertension (H)    4. Anemia, unspecified type    5. Chronic kidney disease, unspecified CKD stage          Dr. Bobo  [facility] Emergency Department   08/25/2021       Margot Bobo MD  08/25/21 0600

## 2021-08-27 LAB — BACTERIA BLD CULT: NO GROWTH

## 2021-08-30 ENCOUNTER — HOSPITAL ENCOUNTER (EMERGENCY)
Facility: CLINIC | Age: 81
Discharge: HOME | End: 2021-08-30
Attending: EMERGENCY MEDICINE | Admitting: EMERGENCY MEDICINE
Payer: COMMERCIAL

## 2021-08-30 VITALS
RESPIRATION RATE: 18 BRPM | HEART RATE: 87 BPM | BODY MASS INDEX: 35.43 KG/M2 | OXYGEN SATURATION: 96 % | TEMPERATURE: 97.8 F | SYSTOLIC BLOOD PRESSURE: 147 MMHG | DIASTOLIC BLOOD PRESSURE: 66 MMHG | WEIGHT: 254 LBS

## 2021-08-30 DIAGNOSIS — I83.899 BLEEDING FROM VARICOSE VEIN: ICD-10-CM

## 2021-08-30 PROCEDURE — 99283 EMERGENCY DEPT VISIT LOW MDM: CPT

## 2021-08-30 PROCEDURE — 12001 RPR S/N/AX/GEN/TRNK 2.5CM/<: CPT

## 2021-08-31 NOTE — ED PROVIDER NOTES
EMERGENCY DEPARTMENT ENCOUNTER      NAME: Red Sutherland  AGE: 80 year old male  YOB: 1940  MRN: [id number]  EVALUATION DATE & TIME: 8/30/2021  8:15 PM    PCP: Haresh Corona    ED PROVIDER: Snehal Teague MD    Chief Complaint   Patient presents with     Leg Injury         FINAL IMPRESSION:  1. Bleeding from varicose vein          ED COURSE & MEDICAL DECISION MAKING:    Pertinent Labs & Imaging studies reviewed. (See chart for details)  80 year old male with history of CAD just discharged from [facility] today after MI on Eliquis who presents to the Emergency Department for evaluation of reading from varicose vein on the right leg after he rubbed it on something at home while attempting to take a shower.  On exam this is actively bleeding.  Using a combination of direct pressure with pursestring sutures, horizontal mattress and simple interrupted sutures overlying Surgicel patient is now hemostatic and will be discharged home.         8:18 PM  I evaluated the patient to gather history and perform initial exam. ED course and treatment plan was discussed.  PPE worn: eye protection, surgical mask.      At the conclusion of the encounter I discussed the results of all of the tests and the disposition. The questions were answered. The patient or family acknowledged understanding and was agreeable with the care plan.      MEDICATIONS GIVEN IN THE EMERGENCY:  Medications   oxidized cellulose (SURGICEL) pad (has no administration in time range)       NEW PRESCRIPTIONS STARTED AT TODAY'S ER VISIT  New Prescriptions    No medications on file          =================================================================    HPI    Patient information was obtained from: patient     Use of Intrepreter: N/A        Red Sutherland is a 80 year old male with pertinent medical history of hypertension, atrial fibrillation, CAD, obesity, NSTEMI who presents right leg varicose vein bleeding.     Patient reports  "around \"2 hours ago\", he had a patch on it previously which they took off and the right foot varicose vein started bleeding again. He says this has occurred multiple times in the same varicose vein. Bleeding was controlled with gauze and bandaged prior to arrival. Has not had any stiches placed previously. He is taking Eliquis regularly. Last Tdap was in 2014. Has been fully vaccinated against Covid. No other concerns or complaints at this time.       REVIEW OF SYSTEMS  Constitutional:  Denies fever, chills, weight loss or weakness  Musculoskeletal:  Denies any new muscle/joint pain, swelling or loss of function.  Skin:  Denies rash, pallor. Positive for right foot varicose vein bleeding.   Neurologic:  Denies headache, focal weakness or sensory changes  All other systems negative unless noted in HPI.      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 They put him on sildenafil as nitroprusside lowered systemic BP and with that the mean PA dropped from 54 to 49. Negative VQ at [facility] Dec 1, 2017.     RLS (restless legs syndrome)      Sleep apnea        PAST SURGICAL HISTORY:  Past Surgical History:   Procedure Laterality Date     BACK SURGERY      lower back     CARDIAC CATHETERIZATION  12/13/2017    Right and left at [facility], mean PA 58, PCW 24 with V wave of 35, LVEDP of 18, with Nipride systemic BP, PVR and mean PA all declined     CARDIOVERSION  03/15/2013    for afib     CATARACT EXTRACTION Bilateral      CORONARY STENT PLACEMENT       IR ABDOMINAL AORTOGRAM  11/16/2012     IR " "MISCELLANEOUS PROCEDURE  7/20/2001     IR MISCELLANEOUS PROCEDURE  11/16/2012     OTHER SURGICAL HISTORY      mynor     SHOULDER SURGERY      reapir on right shoulder     TOTAL HIP ARTHROPLASTY Left      TOTAL KNEE ARTHROPLASTY Bilateral      WRIST SURGERY Bilateral      ZZHC COLONOSCOPY W/WO BRUSH/WASH N/A 1/14/2021    Procedure: COLONOSCOPY;  Surgeon: Mihai Harris MD;  Location: [facility] OR;  Service: Gastroenterology       CURRENT MEDICATIONS:    Prior to Admission Medications   Prescriptions Last Dose Informant Patient Reported? Taking?   ACCU-CHEK JOSE PLUS TEST STRP strips   Yes No   Sig: [ACCU-CHEK JOSE PLUS TEST STRP STRIPS] see administration instructions.   BD ULTRA-FINE MANISHA PEN NEEDLES 32 gauge x 5/32\" Ndle   Yes No   Sig: [BD ULTRA-FINE MANIHSA PEN NEEDLES 32 GAUGE X 5/32\" NDLE]    Garlic 1000 MG CAPS   Yes No   Sig: Take 1 capsule by mouth daily   HYDROcodone-acetaminophen (NORCO) 5-325 MG tablet   Yes No   Sig: Take 1 tablet by mouth every 6 hours as needed for severe pain   OXYGEN-AIR DELIVERY SYSTEMS MISC   Yes No   Sig: [OXYGEN-AIR DELIVERY SYSTEMS MISC] Use As Directed.   acetaminophen (TYLENOL) 500 MG tablet   Yes No   Sig: [ACETAMINOPHEN (TYLENOL) 500 MG TABLET] Take 1,000 mg by mouth every 6 (six) hours as needed. First line of pain management. Do Not take more than 4,000 mg in 24 hours.   albuterol (PROVENTIL HFA;VENTOLIN HFA) 90 mcg/actuation inhaler   Yes No   Sig: Inhale 2 puffs into the lungs every 6 hours as needed    apixaban (ELIQUIS) 5 mg Tab   Yes No   Sig: [APIXABAN (ELIQUIS) 5 MG TAB] Take 1 tablet by mouth every 12 (twelve) hours.   aspirin 81 MG EC tablet   Yes No   Sig: [ASPIRIN 81 MG EC TABLET] Take 81 mg by mouth every morning.    atorvastatin (LIPITOR) 40 MG tablet   Yes No   Sig: Take 40 mg by mouth every morning    bumetanide (BUMEX) 1 MG tablet   Yes No   Sig: Take 3 mg by mouth 2 times daily    canagliflozin (INVOKANA) 100 mg Tab   Yes No   Sig: Take 100 mg by mouth " every morning    cloNIDine (CATAPRES-TTS) 0.2 mg/24 hr   Yes No   Sig: [CLONIDINE (CATAPRES-TTS) 0.2 MG/24 HR] Place 1 patch on the skin once a week. Place patch on Monday.   clopidogreL (PLAVIX) 75 mg tablet   Yes No   Sig: [CLOPIDOGREL (PLAVIX) 75 MG TABLET] Take 1 tablet by mouth daily.   digoxin (LANOXIN) 125 mcg tablet   No No   Sig: [DIGOXIN (LANOXIN) 125 MCG TABLET] Take 1 tablet (125 mcg total) by mouth daily.   doxycycline monohydrate (MONODOX) 100 MG capsule   Yes No   Sig: Take 100 mg by mouth See Admin Instructions Before dental appointments   eplerenone (INSPRA) 25 MG tablet   Yes No   Sig: Take 25 mg by mouth daily    fluticasone-vilanterol (BREO ELLIPTA) 200-25 mcg/dose DsDv inhaler   Yes No   Sig: [FLUTICASONE-VILANTEROL (BREO ELLIPTA) 200-25 MCG/DOSE DSDV INHALER] Inhale 1 puff daily.   insulin glargine (LANTUS PEN) 100 UNIT/ML pen   Yes No   Sig: Inject 54 Units Subcutaneous every morning    insulin glargine (LANTUS PEN) 100 UNIT/ML pen   Yes No   Sig: Inject 52 Units Subcutaneous At Bedtime   ipratropium - albuterol 0.5 mg/2.5 mg/3 mL (DUONEB) 0.5-2.5 (3) MG/3ML neb solution   Yes No   Sig: Take 1 vial by nebulization 4 times daily   irbesartan (AVAPRO) 300 MG tablet   Yes No   Sig: Take 150 mg by mouth daily    lidocaine (LIDODERM) 5 %   Yes No   Sig: [LIDOCAINE (LIDODERM) 5 %] Place 1 patch on the skin as needed. Remove & Discard patch within 12 hours or as directed by MD   metFORMIN (GLUCOPHAGE-XR) 500 MG 24 hr tablet   Yes No   Sig: Take 1,000 mg by mouth 2 times daily (with meals)   metoprolol tartrate (LOPRESSOR) 50 MG tablet   Yes No   Sig: Take 50 mg by mouth 2 times daily (with meals)   multivitamin w/minerals (THERA-VIT-M) tablet   Yes No   Sig: Take 1 tablet by mouth daily   nitroglycerin (NITROSTAT) 0.4 MG SL tablet   Yes No   Sig: [NITROGLYCERIN (NITROSTAT) 0.4 MG SL TABLET] Place 0.4 mg under the tongue every 5 (five) minutes as needed for chest pain.   omeprazole (PRILOSEC) 20 MG DR  capsule   Yes No   Sig: Take 20 mg by mouth daily   potassium chloride (K-DUR,KLOR-CON) 20 MEQ tablet   Yes No   Sig: Take 40 mEq by mouth daily    rOPINIRole (REQUIP) 2 MG tablet   Yes No   Sig: [ROPINIROLE (REQUIP) 2 MG TABLET] Take 2 mg by mouth at bedtime.    tadalafil (CIALIS) 20 MG tablet   Yes No   Sig: Take 20 mg by mouth daily   vitamin C (ASCORBIC ACID) 1000 MG TABS   Yes No   Sig: Take 1,000 mg by mouth daily   zinc gluconate 50 MG tablet   Yes No   Sig: Take 50 mg by mouth daily   zolpidem (AMBIEN) 10 mg tablet   Yes No   Sig: [ZOLPIDEM (AMBIEN) 10 MG TABLET] Take 10 mg by mouth bedtime.      Facility-Administered Medications: None       ALLERGIES:  Allergies   Allergen Reactions     Furosemide Muscle Pain (Myalgia)     Previously tolerated.  Muscle cramps     Hydrochlorothiazide Unknown     Iodinated Contrast Media [Diagnostic X-Ray Materials] Nausea     Losartan Other (See Comments)     Other reaction(s): Stomatitis, Bloody nose dry mouth and lips     Losartan-Hydrochlorothiazide [Hyzaar]      Mouth sores     Metaxalone Nausea     Metolazone Other (See Comments)     Muscle cramps     Mometasone Other (See Comments)     Bloody nose     Penicillins Other (See Comments)     Immune-does not work for him     Rabeprazole Other (See Comments)     Mouth sores     Ramipril Other (See Comments)     Mouth sores     Shellfish Containing Products [Shellfish-Derived Products] Unknown     Other reaction(s): mouth sores, Other reaction(s): mouth sores     Methocarbamol Rash       FAMILY HISTORY:  Family History   Problem Relation Age of Onset     Hyperlipidemia Mother      Hypertension Mother      Heart Disease Mother      Hyperlipidemia Father      Hypertension Father      Coronary Artery Disease Father      Cerebrovascular Disease Brother      Depression Brother      No Known Problems Sister      Pulmonary Hypertension No family hx of      Congenital heart disease No family hx of        SOCIAL HISTORY:  Social  History     Tobacco Use     Smoking status: Former Smoker     Packs/day: 1.50     Years: 44.00     Pack years: 66.00     Types: Cigarettes     Quit date: 1996     Years since quittin.3     Smokeless tobacco: Never Used   Substance Use Topics     Alcohol use: Yes     Alcohol/week: 1.0 standard drinks     Comment: Alcoholic Drinks/day: 1 per month     Drug use: No        VITALS:  Patient Vitals for the past 24 hrs:   BP Temp Temp src Pulse Resp SpO2 Weight   21 (!) 167/81 -- -- 87 -- 96 % --   21 137/65 -- -- 79 -- 96 % --   21 (!) 162/71 -- -- 85 -- 97 % --   21 (!) 180/82 -- -- 89 -- 96 % --   21 (!) 177/95 97.8  F (36.6  C) Oral 88 18 98 % 115.2 kg (254 lb)       PHYSICAL EXAM    General Appearance: Elderly chronically ill-appearing male  Head:  Normocephalic  Eyes:   conjunctiva/corneas clear  ENT:  membranes are moist without pallor  Cardio:  Regular rate and rhythm  Pulm:  No respiratory distress  Abdomen: Obese  Extremities: Moves all extremities normally, normal gait.  Restless leg  Skin:  Skin warm, dry, no rashes. Right lateral ankle with small varicose vein with active bleeding.   Neuro:  Alert and oriented ×3, moving all extremities, no gross sensory defects       PROCEDURES:  PROCEDURE:  Varicose vein hemostasis   INDICATIONS:  Bleeding   PROCEDURE PROVIDER: Snehal Teague    SITE: Right ankle   TYPE/SIZE:  Bleeding varicose vein of the right lateral ankle   FUNCTIONAL ASSESSMENT: Distal sensation, circulation and motor intact   MEDICATION: 10 mLs of 1% Lidocaine with epinephrine   PREPARATION:  Cleaned with with ChloraPrep   DEBRIDEMENT:  None   CLOSURE:  2 per string sutures, 1 horizontal mattress suture, 1 simple and interrupted suture with 3-0 Ethilon.  Placed around the bleeding varicose vein to obtain hemostasis.    Total number of sutures/staples placed: 4          The creation of this record is based on the scribe s observations of  the work being performed by Snehal Teague MD and the provider s statements to them. It was created on his behalf by Love Licea, a trained medical scribe. This document has been checked and approved by the attending provider.    Snehal Teague MD  Emergency Medicine  [facility] EMERGENCY ROOM  [address] [address]  [phone]  Dept: [phone]     Snehal Teague MD  08/30/21 2430

## 2021-08-31 NOTE — ED TRIAGE NOTES
"Pt presents to the ED with c/o varicose vein bleeding in right foot. Pt on eliquis. Pt leg wrapped with coban in triage. Pt states its been \"bleeding for 2 hours\".   "

## 2021-09-10 ENCOUNTER — TRANSCRIBE ORDERS (OUTPATIENT)
Dept: OTHER | Age: 81
End: 2021-09-10

## 2021-09-10 DIAGNOSIS — I21.4 NSTEMI (NON-ST ELEVATED MYOCARDIAL INFARCTION) (H): Primary | ICD-10-CM

## 2021-09-13 ENCOUNTER — LAB REQUISITION (OUTPATIENT)
Dept: LAB | Facility: CLINIC | Age: 81
End: 2021-09-13
Payer: COMMERCIAL

## 2021-09-13 DIAGNOSIS — D64.9 ANEMIA, UNSPECIFIED: ICD-10-CM

## 2021-09-13 DIAGNOSIS — I50.33 ACUTE ON CHRONIC DIASTOLIC (CONGESTIVE) HEART FAILURE (H): ICD-10-CM

## 2021-09-13 LAB
ANION GAP SERPL CALCULATED.3IONS-SCNC: 15 MMOL/L (ref 5–18)
BASOPHILS # BLD AUTO: 0 10E3/UL (ref 0–0.2)
BASOPHILS NFR BLD AUTO: 0 %
BUN SERPL-MCNC: 44 MG/DL (ref 8–28)
CALCIUM SERPL-MCNC: 8.2 MG/DL (ref 8.5–10.5)
CHLORIDE BLD-SCNC: 103 MMOL/L (ref 98–107)
CO2 SERPL-SCNC: 22 MMOL/L (ref 22–31)
CREAT SERPL-MCNC: 1.91 MG/DL (ref 0.7–1.3)
EOSINOPHIL # BLD AUTO: 0 10E3/UL (ref 0–0.7)
EOSINOPHIL NFR BLD AUTO: 1 %
ERYTHROCYTE [DISTWIDTH] IN BLOOD BY AUTOMATED COUNT: 23.5 % (ref 10–15)
GFR SERPL CREATININE-BSD FRML MDRD: 33 ML/MIN/1.73M2
GLUCOSE BLD-MCNC: 114 MG/DL (ref 70–125)
HCT VFR BLD AUTO: 36.4 % (ref 40–53)
HGB BLD-MCNC: 10.7 G/DL (ref 13.3–17.7)
IMM GRANULOCYTES # BLD: 0 10E3/UL
IMM GRANULOCYTES NFR BLD: 1 %
LYMPHOCYTES # BLD AUTO: 0.6 10E3/UL (ref 0.8–5.3)
LYMPHOCYTES NFR BLD AUTO: 10 %
MCH RBC QN AUTO: 23.1 PG (ref 26.5–33)
MCHC RBC AUTO-ENTMCNC: 29.4 G/DL (ref 31.5–36.5)
MCV RBC AUTO: 78 FL (ref 78–100)
MONOCYTES # BLD AUTO: 0.4 10E3/UL (ref 0–1.3)
MONOCYTES NFR BLD AUTO: 7 %
NEUTROPHILS # BLD AUTO: 4.7 10E3/UL (ref 1.6–8.3)
NEUTROPHILS NFR BLD AUTO: 81 %
NRBC # BLD AUTO: 0 10E3/UL
NRBC BLD AUTO-RTO: 0 /100
PLATELET # BLD AUTO: 134 10E3/UL (ref 150–450)
POTASSIUM BLD-SCNC: 4 MMOL/L (ref 3.5–5)
RBC # BLD AUTO: 4.64 10E6/UL (ref 4.4–5.9)
SODIUM SERPL-SCNC: 140 MMOL/L (ref 136–145)
WBC # BLD AUTO: 5.7 10E3/UL (ref 4–11)

## 2021-09-13 PROCEDURE — 85025 COMPLETE CBC W/AUTO DIFF WBC: CPT | Mod: ORL | Performed by: FAMILY MEDICINE

## 2021-09-13 PROCEDURE — 80048 BASIC METABOLIC PNL TOTAL CA: CPT | Mod: ORL | Performed by: FAMILY MEDICINE

## 2021-09-13 PROCEDURE — 36415 COLL VENOUS BLD VENIPUNCTURE: CPT | Mod: ORL | Performed by: FAMILY MEDICINE

## 2021-09-26 ENCOUNTER — NURSE TRIAGE (OUTPATIENT)
Dept: NURSING | Facility: CLINIC | Age: 81
End: 2021-09-26

## 2021-09-27 NOTE — TELEPHONE ENCOUNTER
Kaley calling reporting patient was recently diagnosed with COVID19. States patient has a fever and frequent cough. Reports patient is currently sleeping. States she attempted to wake him up several times and he was not happy about it. States he refused to go to the emergency department. Informed Kaley unable to triage patient if he is sleeping. Advised to call back if patient wakes up and will like to speak with a nurse. Informed we have 24 hour Nurse Advisor availability.     Rd Arzate RN  [facility] Nurse Advisors

## 2021-09-30 ENCOUNTER — MEDICAL CORRESPONDENCE (OUTPATIENT)
Dept: HEALTH INFORMATION MANAGEMENT | Facility: CLINIC | Age: 81
End: 2021-09-30

## 2021-09-30 ENCOUNTER — TRANSFERRED RECORDS (OUTPATIENT)
Dept: HEALTH INFORMATION MANAGEMENT | Facility: CLINIC | Age: 81
End: 2021-09-30

## 2021-10-05 ENCOUNTER — VIRTUAL VISIT (OUTPATIENT)
Dept: PHARMACY | Facility: PHYSICIAN GROUP | Age: 81
End: 2021-10-05

## 2021-10-05 DIAGNOSIS — I48.91 ATRIAL FIBRILLATION, UNSPECIFIED TYPE (H): ICD-10-CM

## 2021-10-05 DIAGNOSIS — H40.50X0 GLAUCOMA ASSOCIATED WITH ANOMALIES OF IRIS: ICD-10-CM

## 2021-10-05 DIAGNOSIS — Q13.2 GLAUCOMA ASSOCIATED WITH ANOMALIES OF IRIS: ICD-10-CM

## 2021-10-05 DIAGNOSIS — G25.81 RESTLESS LEGS SYNDROME (RLS): ICD-10-CM

## 2021-10-05 DIAGNOSIS — E11.29 TYPE 2 DIABETES MELLITUS WITH OTHER DIABETIC KIDNEY COMPLICATION, WITH LONG-TERM CURRENT USE OF INSULIN (H): ICD-10-CM

## 2021-10-05 DIAGNOSIS — Z78.9 TAKES DIETARY SUPPLEMENTS: ICD-10-CM

## 2021-10-05 DIAGNOSIS — E78.00 HYPERCHOLESTEREMIA: ICD-10-CM

## 2021-10-05 DIAGNOSIS — Z79.4 TYPE 2 DIABETES MELLITUS WITH OTHER DIABETIC KIDNEY COMPLICATION, WITH LONG-TERM CURRENT USE OF INSULIN (H): ICD-10-CM

## 2021-10-05 DIAGNOSIS — J30.2 SEASONAL ALLERGIC RHINITIS, UNSPECIFIED TRIGGER: ICD-10-CM

## 2021-10-05 DIAGNOSIS — R52 PAIN: ICD-10-CM

## 2021-10-05 DIAGNOSIS — Z20.822 SUSPECTED COVID-19 VIRUS INFECTION: Primary | ICD-10-CM

## 2021-10-05 DIAGNOSIS — R06.02 SHORTNESS OF BREATH: ICD-10-CM

## 2021-10-05 DIAGNOSIS — R60.9 EDEMA, UNSPECIFIED TYPE: ICD-10-CM

## 2021-10-05 DIAGNOSIS — I10 ESSENTIAL HYPERTENSION: ICD-10-CM

## 2021-10-05 DIAGNOSIS — K21.00 GASTROESOPHAGEAL REFLUX DISEASE WITH ESOPHAGITIS, UNSPECIFIED WHETHER HEMORRHAGE: ICD-10-CM

## 2021-10-05 DIAGNOSIS — G47.00 INSOMNIA, UNSPECIFIED TYPE: ICD-10-CM

## 2021-10-05 DIAGNOSIS — I27.22 PULMONARY HYPERTENSION DUE TO LEFT VENTRICULAR DIASTOLIC DYSFUNCTION (H): ICD-10-CM

## 2021-10-05 PROCEDURE — 99605 MTMS BY PHARM NP 15 MIN: CPT | Performed by: PHARMACIST

## 2021-10-05 PROCEDURE — 99607 MTMS BY PHARM ADDL 15 MIN: CPT | Performed by: PHARMACIST

## 2021-10-05 NOTE — PROGRESS NOTES
Medication Therapy Management (MTM) Encounter    ASSESSMENT:                            Medication Adherence/Access: See below for considerations    Shortness of Breath/COVID-19:  Patient would benefit from starting pulmonary rehab or COVID-19 rehab we will refill his duo nebsclass.      Afib/Hypertension/Edema: Is on appropriate anticoagulation.  Is having some issues with nosebleeds.  Recommend he discuss this with his cardiologist.  Is on PPI therapy is beneficial to reduce risk of GI bleeds.  Patient is patient had an MI meeting blood pressure goal of < 140/90mmHg.      Pulmonary Hypertension:  Stable, follows with specialist.    Type 2 Diabetes: Patient is not meeting A1c goal of < 8%. Self monitoring of blood glucose is at goal of fasting  mg/dL.  Would be beneficial for him to check 2 hours postprandial blood sugars to ensure below 180.  Patient may benefit from restarting Ozempic.  Given patient's renal function has been close to 30 the last several months and likely would be beneficial for patient to discontinue or reduce dose of Metformin to at least 1000 mg daily.  He has been taking this although this was DC'd from his medication list previously he was supposed to discontinue this medication.  If additional medication needed for blood sugar goal, could consider increasing the Invokana dose to 300 mg.  And lower renal function it may be less effective for reducing A1c.  May be a better alternative to discuss restarting Ozempic if needed and A1c is still elevated at next appointment    Hyperlipidemia: Stable.  Patient is on high intensity statin which is indicated based on 2019 ACC/AHA guidelines for lipid management.      GERD: Current treatment is effective. Chronic PPI use places patient at an increased risk of C. Diff, hypomagnesemia and lower bone mineral density, these risks were reviewed with the patient.  Omeprazole decreases conversion of clopidogrel to the active metabolite and increases  risk of heart attack.  Would be beneficial to switch to an alternative PPI that is less likely to interact such as pantoprazole or omeprazole.    RLS: Stable.    Allergic Rhinitis: Stable.    Pain:  Stable.    Insomnia: Stable.    Glaucoma:  Stable.    Supplements/OTCs: Stable.    PLAN:                            1.  Decrease Metformin to 1000 mg daily  2.  I will see if there are pulmonary rehab classes available for after Covid infection    Follow-up: as needed      SUBJECTIVE/OBJECTIVE:                          Red Sutherland is a 81 year old male called for a transitions of care visit. He was discharged from [facility] on 9/16-9/19 for acute respiratory failure with hypoxia due to COVID 19. Patient was accompanied by his wife.     Reason for visit: Initial medication review after hospitalization for COVID-19.    Allergies/ADRs: Reviewed in chart  Past Medical History: Reviewed in chart  Tobacco: He reports that he quit smoking about 25 years ago. His smoking use included cigarettes. He has a 66.00 pack-year smoking history. He has never used smokeless tobacco.  Alcohol: not currently using      Medication Adherence/Access: Patient uses pill box(es).  His wife Kaley helps set them up.  Had appointment with cardiologist on 10/25, and pulmonologist 10/28    HPI:  Patient went to urgent care with shortness of breath and was diagnosed with COVID19 pneumonia.  He was treated with remsesivir and dexamethasone.  He had mild fluid overload.       Shortness of Breath/COVID-19: Current medications: ICS/LABA- Breo 200-25 mcg  puff(s) once daily  Short-Acting Bronchodilator: Albuterol MDI and pt reports using a few  times per day since being home with COVID., and Ipratropium/Albuterol Nebs (out of this). Patient rinses their mouth after using steroid inhaler. Triggers include: physical activity.  Patient reports the following symptoms: increased need of albuterol and increased shortness of breath at rest.  Recent  COVID infection.  He is wondering if there is a pulmonary rehab that he can go to.  Has apt with pulmonologist later this month.   Is getting a little bit better each day.  Still having a lot of shortness of breath and coughing.     Afib/Hypertension/Edema: Patient is currently taking Eliquis 5mg twice daily, Clopidogrel 75 mg daily, and aspirin 81 mg for anticoagulation. Patient reports issues with nose bleeds, had one yesterday that was short lived.  Reports having more frequent nosebleeds.  Patient does not have a history of GI bleed.  In 2021  Patient is also taking bumetanide 3 mg twice daily, digoxin 125 MCG daily, irbesartan 300 mg daily, metoprolol tartrate 50 mg twice daily.  Also taking Klor-Con 40 M EQ twice daily. has a prescription for nitroglycerin but has not used this for several years.  Discussed the interaction between tadalafil and nitroglycerin. Patient reports no current medication side effects.   Patient does not self-monitor blood pressure.    Pulmonary Hypertension:  Taking Tadalafil 20 mg daily.  Denies issues.  Follows with Dr. Grullon at [facility].  Last saw this provider on 21    Type 2 Diabetes:  Currently taking Lantus 30 units twice daily, Metformin ER 1000 mg twice daily, Invokana 100 mg daily. Patient is not experiencing side effects.  Patient is taking Metformin Invokana, these medications were not on the medication list with [facility].  Discussed Ozempic as this was on his medication list, he does not remember ever taking a once weekly injection.  Unsure if he ever took this.    Blood sugar monitorin time(s) daily. Ranges (patient reported): Fasting- 99, 103, 86, 121, 139, 78,  Bedtime- didn't recall recent values  Symptoms of low blood sugar? shaky, dizzy, sweaty, Frequency of lows- none  Symptoms of high blood sugar? none  Eye exam: Due  Foot exam: up to date  Diet/Exercise: Not discussed in detail due to time  Aspirin: Taking 81mg daily  Statin: Yes   ACEi/ARB:  Yes.   Urine Albumin: Due for recheck  Last A1c 8.1% on 8/13/2021  Lab Results   Component Value Date    A1C 9.3 07/11/2021    A1C 10.8 04/10/2020    A1C 6.8 04/01/2013    A1C 7.2 04/04/2011     Hyperlipidemia: Current therapy includes atorvastatin 40 mg daily.  Patient reports no significant myalgias or other side effects.  Recent Labs   Lab Test 05/25/21  1359 09/02/20  1307   CHOL 94 136   HDL 25* 28*   LDL <5 50   TRIG 353* 563*      GERD: Current medications include: Prilosec (omeprazole) 20 mg once daily. Pt reports no current symptoms.   The patient does not have a history of GI bleed.  The patient does notice symptoms if they miss a dose.  Patient has not tried a trial off of therapy and is not interested in doing so. Patient feels that current regimen is effective.    RLS: Taking Ropinirole 2 mg daily.  Denies nausea lightheadedness.  Is working well for restless legs and not having issues with sleeping.    Allergic Rhinitis: Current medications include cetirizine 10 mg once daily as needed. Primary symptoms are nasal congestion, post-nasal drip and sneezing. Patient feels that current therapy is effective.     Pain:  Has hydrocodone for severe pain, usually takes only a couple times per year.  Patient also takes acetaminophen 1000 mg 3 times daily as needed for pain.    Insomnia: Current medications include: zolpidem 10 mg daily, reports this works very well.  Patient denies feeling dizzy lightheaded or groggy the next day, denies balance issues or recent falls.     Glaucoma:  Takes brimonidine eyedrops twice daily.    Supplements/OTCs: Taking ferrous gluconate every other day, garlic 1000 mg daily, multivitamin once daily, vitamin C 500 mg daily, zinc 50 mg daily.  Denies issues      Today's Vitals: There were no vitals taken for this visit.  ----------------  Post Discharge Medication Reconciliation Status: discharge medications reconciled and changed, per note/orders.    I spent 60 minutes with this  patient today. All changes were made via collaborative practice agreement with Dr. Roth. A copy of the visit note was provided to the patient's primary care provider.    The patient was mailed a summary of these recommendations.     Ricki GarciaD  Medication Therapy Management Pharmacist  Pager: [phone]      Telemedicine Visit Details  Type of service:  Telephone visit  Start Time: 1:00 PM  End Time: 2:00 PM  Originating Location (patient location): [place]  Distant Location (provider location):  [facility]     Medication Therapy Recommendations  Type 2 diabetes mellitus with other diabetic kidney complication, with long-term current use of insulin (H)    Current Medication: metFORMIN (GLUCOPHAGE-XR) 500 MG 24 hr tablet (Discontinued)   Rationale: Dose too high - Dosage too high - Safety   Recommendation: Decrease Dose   Status: Accepted per CPA              Addendum:  Updated type and refreshed so the MTP would show up in note

## 2021-10-07 ENCOUNTER — HOSPITAL ENCOUNTER (EMERGENCY)
Facility: CLINIC | Age: 81
Discharge: HOME | End: 2021-10-08
Attending: EMERGENCY MEDICINE | Admitting: EMERGENCY MEDICINE
Payer: COMMERCIAL

## 2021-10-07 VITALS
BODY MASS INDEX: 47.99 KG/M2 | RESPIRATION RATE: 20 BRPM | HEIGHT: 61 IN | DIASTOLIC BLOOD PRESSURE: 64 MMHG | OXYGEN SATURATION: 90 % | HEART RATE: 79 BPM | SYSTOLIC BLOOD PRESSURE: 113 MMHG | TEMPERATURE: 97.8 F

## 2021-10-07 DIAGNOSIS — R04.0 ANTERIOR EPISTAXIS: ICD-10-CM

## 2021-10-07 PROCEDURE — 99282 EMERGENCY DEPT VISIT SF MDM: CPT

## 2021-10-08 DIAGNOSIS — I27.20 PULMONARY HYPERTENSION (H): Primary | ICD-10-CM

## 2021-10-08 NOTE — ED PROVIDER NOTES
EMERGENCY DEPARTMENT ENCOUNTER      NAME: Red Sutherland  AGE: 81 year old male  YOB: 1940  MRN: [id number]  EVALUATION DATE & TIME: 10/7/2021 11:45 PM    PCP: Haresh Corona    ED PROVIDER: Snehal Teague MD    Chief Complaint   Patient presents with     Epistaxis         FINAL IMPRESSION:  1. Anterior epistaxis          ED COURSE & MEDICAL DECISION MAKING:    Pertinent Labs & Imaging studies reviewed. (See chart for details)  81 year old male with history of HTN, HLD, CAD, CHF, COPD on home O2, A. fib on Eliquis who presents to the Emergency Department for evaluation of spontaneous left-sided epistaxis that started 45 minutes prior to arrival and has spontaneously stopped upon his triage presentation.  On exam he has clot in the left nare.  He currently hemostatic.  Symptoms are consistent with anterior epistaxis.  No doubt made worse by his anticoagulant use and nasal cannula oxygen is not humidified during the day.  Blood pressure well controlled.  There is no evidence of arterial bleeding, posterior bleeding.    Patient was observed in the emergency department, no recurrent epistaxis.  Discharged home.            11:48 PM I evaluated the patient to gather history and perform initial exam. ED course and treatment plan was discussed. PPE worn: eye protection, surgical mask, gloves.   12:21 AM I re-evaluated the patient. His epistaxis seems to be improved.  I discussed plans for discharge with extensive anticipatory guidance and given return precautions. Patient was agreeable with the plan.      At the conclusion of the encounter I discussed the results of all of the tests and the disposition. The questions were answered. The patient or family acknowledged understanding and was agreeable with the care plan.    MEDICATIONS GIVEN IN THE EMERGENCY:  Medications - No data to display    NEW PRESCRIPTIONS STARTED AT TODAY'S ER VISIT  New Prescriptions    No medications on file           =================================================================    HPI    Patient information was obtained from: patient     Use of Intrepreter: N/A        Red Sutherland is a 81 year old male with pertinent medical history of hypertension, atrial fibrillation, GERD, DM2 controlled with insulin, CKD, CHF, COPD with home oxygen who presents left epistaxis.    Patient reports developing sudden onset of left nostril epistaxis approximately around 10:15 PM. Ever since then, it has been constant but at present it has resolved with nasal packing in place.    He does have a nasal canula on at the moment and has gone through 4L of oxygen. He had placed it in the right nostril instead of the left. He does have humidifier along with the cpap during night but not during the day. He denies any other associated symptoms at this time.      REVIEW OF SYSTEMS  Constitutional:  Denies fever, chills, weight loss or weakness  HENT:  Denies sore throat, ear pain, congestion. Positive for left nostril epistaxis.  Respiratory: No SOB, wheeze or cough  Cardiovascular:  No CP, palpitations  All other systems negative unless noted in HPI.      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 They put him on sildenafil as nitroprusside lowered systemic BP and with that the mean PA dropped from 54 to 49. Negative VQ at [facility] Dec 1, 2017.     RLS (restless legs syndrome)      Sleep apnea        PAST SURGICAL HISTORY:  Past Surgical History:   Procedure Laterality Date     BACK SURGERY      lower  "back     CARDIAC CATHETERIZATION  12/13/2017    Right and left at [facility], mean PA 58, PCW 24 with V wave of 35, LVEDP of 18, with Nipride systemic BP, PVR and mean PA all declined     CARDIOVERSION  03/15/2013    for afib     CATARACT EXTRACTION Bilateral      CORONARY STENT PLACEMENT       IR ABDOMINAL AORTOGRAM  11/16/2012     IR MISCELLANEOUS PROCEDURE  7/20/2001     IR MISCELLANEOUS PROCEDURE  11/16/2012     OTHER SURGICAL HISTORY      mynor     SHOULDER SURGERY      reapir on right shoulder     TOTAL HIP ARTHROPLASTY Left      TOTAL KNEE ARTHROPLASTY Bilateral      WRIST SURGERY Bilateral      ZZHC COLONOSCOPY W/WO BRUSH/WASH N/A 1/14/2021    Procedure: COLONOSCOPY;  Surgeon: Mihai Harris MD;  Location: [facility];  Service: Gastroenterology       CURRENT MEDICATIONS:    Prior to Admission Medications   Prescriptions Last Dose Informant Patient Reported? Taking?   ACCU-CHEK JOSE PLUS TEST STRP strips   Yes No   Sig: [ACCU-CHEK JOSE PLUS TEST STRP STRIPS] see administration instructions.   BD ULTRA-FINE MANISHA PEN NEEDLES 32 gauge x 5/32\" Ndle   Yes No   Sig: [BD ULTRA-FINE MANISHA PEN NEEDLES 32 GAUGE X 5/32\" NDLE]    Garlic 1000 MG CAPS   Yes No   Sig: Take 1 capsule by mouth daily   HYDROcodone-acetaminophen (NORCO) 5-325 MG tablet   Yes No   Sig: Take 1 tablet by mouth every 6 hours as needed for severe pain   OXYGEN-AIR DELIVERY SYSTEMS MISC   Yes No   Sig: [OXYGEN-AIR DELIVERY SYSTEMS MISC] Use As Directed.   acetaminophen (TYLENOL) 500 MG tablet   Yes No   Sig: [ACETAMINOPHEN (TYLENOL) 500 MG TABLET] Take 1,000 mg by mouth every 6 (six) hours as needed. First line of pain management. Do Not take more than 4,000 mg in 24 hours.   albuterol (PROVENTIL HFA;VENTOLIN HFA) 90 mcg/actuation inhaler   Yes No   Sig: Inhale 2 puffs into the lungs every 6 hours as needed    apixaban (ELIQUIS) 5 mg Tab   Yes No   Sig: [APIXABAN (ELIQUIS) 5 MG TAB] Take 1 tablet by mouth every 12 (twelve) hours.   aspirin 81 MG EC " tablet   Yes No   Sig: [ASPIRIN 81 MG EC TABLET] Take 81 mg by mouth every morning.    atorvastatin (LIPITOR) 40 MG tablet   Yes No   Sig: Take 40 mg by mouth every morning    bumetanide (BUMEX) 1 MG tablet   Yes No   Sig: Take 3 mg by mouth 2 times daily    canagliflozin (INVOKANA) 100 mg Tab   Yes No   Sig: Take 100 mg by mouth every morning    clopidogreL (PLAVIX) 75 mg tablet   Yes No   Sig: [CLOPIDOGREL (PLAVIX) 75 MG TABLET] Take 1 tablet by mouth daily.   digoxin (LANOXIN) 125 mcg tablet   No No   Sig: [DIGOXIN (LANOXIN) 125 MCG TABLET] Take 1 tablet (125 mcg total) by mouth daily.   eplerenone (INSPRA) 25 MG tablet   Yes No   Sig: Take 25 mg by mouth daily    fluticasone-vilanterol (BREO ELLIPTA) 200-25 mcg/dose DsDv inhaler   Yes No   Sig: [FLUTICASONE-VILANTEROL (BREO ELLIPTA) 200-25 MCG/DOSE DSDV INHALER] Inhale 1 puff daily.   insulin glargine (LANTUS PEN) 100 UNIT/ML pen   Yes No   Sig: Inject 30 Units Subcutaneous 2 times daily    ipratropium - albuterol 0.5 mg/2.5 mg/3 mL (DUONEB) 0.5-2.5 (3) MG/3ML neb solution   Yes No   Sig: Take 1 vial by nebulization 4 times daily   irbesartan (AVAPRO) 300 MG tablet   Yes No   Sig: Take 300 mg by mouth daily    lidocaine (LIDODERM) 5 %   Yes No   Sig: [LIDOCAINE (LIDODERM) 5 %] Place 1 patch on the skin as needed. Remove & Discard patch within 12 hours or as directed by MD   metFORMIN (GLUCOPHAGE-XR) 500 MG 24 hr tablet   Yes No   Sig: Take 1,000 mg by mouth 2 times daily (with meals)   metoprolol tartrate (LOPRESSOR) 50 MG tablet   Yes No   Sig: Take 50 mg by mouth 2 times daily (with meals)   multivitamin w/minerals (THERA-VIT-M) tablet   Yes No   Sig: Take 1 tablet by mouth daily   nitroglycerin (NITROSTAT) 0.4 MG SL tablet   Yes No   Sig: [NITROGLYCERIN (NITROSTAT) 0.4 MG SL TABLET] Place 0.4 mg under the tongue every 5 (five) minutes as needed for chest pain.   omeprazole (PRILOSEC) 20 MG DR capsule   Yes No   Sig: Take 20 mg by mouth daily   potassium  chloride (K-DUR,KLOR-CON) 20 MEQ tablet   Yes No   Sig: Take 40 mEq by mouth daily    rOPINIRole (REQUIP) 2 MG tablet   Yes No   Sig: [ROPINIROLE (REQUIP) 2 MG TABLET] Take 2 mg by mouth at bedtime.    tadalafil (CIALIS) 20 MG tablet   Yes No   Sig: Take 20 mg by mouth daily   vitamin C (ASCORBIC ACID) 1000 MG TABS   Yes No   Sig: Take 1,000 mg by mouth daily   zinc gluconate 50 MG tablet   Yes No   Sig: Take 50 mg by mouth daily   zolpidem (AMBIEN) 10 mg tablet   Yes No   Sig: [ZOLPIDEM (AMBIEN) 10 MG TABLET] Take 10 mg by mouth bedtime.      Facility-Administered Medications: None       ALLERGIES:  Allergies   Allergen Reactions     Furosemide Muscle Pain (Myalgia)     Previously tolerated.  Muscle cramps     Hydrochlorothiazide Unknown     Iodinated Contrast Media [Diagnostic X-Ray Materials] Nausea     Losartan Other (See Comments)     Other reaction(s): Stomatitis, Bloody nose dry mouth and lips     Losartan-Hydrochlorothiazide [Hyzaar]      Mouth sores     Metaxalone Nausea     Metolazone Other (See Comments)     Muscle cramps     Mometasone Other (See Comments)     Bloody nose     Penicillins Other (See Comments)     Immune-does not work for him     Rabeprazole Other (See Comments)     Mouth sores     Ramipril Other (See Comments)     Mouth sores     Shellfish Containing Products [Shellfish-Derived Products] Unknown     Other reaction(s): mouth sores, Other reaction(s): mouth sores     Methocarbamol Rash       FAMILY HISTORY:  Family History   Problem Relation Age of Onset     Hyperlipidemia Mother      Hypertension Mother      Heart Disease Mother      Hyperlipidemia Father      Hypertension Father      Coronary Artery Disease Father      Cerebrovascular Disease Brother      Depression Brother      No Known Problems Sister      Pulmonary Hypertension No family hx of      Congenital heart disease No family hx of        SOCIAL HISTORY:  Social History     Tobacco Use     Smoking status: Former Smoker      "Packs/day: 1.50     Years: 44.00     Pack years: 66.00     Types: Cigarettes     Quit date: 1996     Years since quittin.4     Smokeless tobacco: Never Used   Substance Use Topics     Alcohol use: Yes     Alcohol/week: 1.0 standard drinks     Comment: Alcoholic Drinks/day: 1 per month     Drug use: No        VITALS:  Patient Vitals for the past 24 hrs:   BP Temp Temp src Pulse Resp SpO2 Height   10/07/21 2339 113/64 97.8  F (36.6  C) Oral 79 20 90 % 1.549 m (5' 1\")       PHYSICAL EXAM    General Appearance: Well-appearing, well-nourished, no acute distress   Head:  Normocephalic  Eyes:   conjunctiva/corneas clear  ENT:  Lips, mucosa, and tongue normal; membranes are moist without pallor. No active bleeding. There is a clot obscuring his left nare. He has nasal canula in place.  Neck:  Supple  Cardio:  Regular rate   Pulm:  No respiratory distress  Abdomen: Obese  Extremities: Moves all extremities normally, normal gait  Skin:  Skin warm, dry, no rashes  Neuro:  Alert and oriented ×3, moving all extremities, no gross sensory defects     RADIOLOGY/LABS:  Reviewed all pertinent imaging. Please see official radiology report. All pertinent labs reviewed and interpreted.           PROCEDURES:  None    The creation of this record is based on the scribe s observations of the work being performed by Snehal Teague MD and the provider s statements to them. It was created on his behalf by Love Licea, a trained medical scribe. This document has been checked and approved by the attending provider.    Snehal Teague MD  Emergency Medicine  [facility] EMERGENCY ROOM  [address] [id number] [phone]  Dept: [phone]     Snehal Teague MD  10/08/21 0023    "

## 2021-10-08 NOTE — ED TRIAGE NOTES
Left nare bleeding for 45 minutes at home, stopped when he arrived, pt is on elequis for his heart.

## 2021-10-10 NOTE — PATIENT INSTRUCTIONS
Recommendations from today's MTM visit:                                                    MTM (medication therapy management) is a service provided by a clinical pharmacist designed to help you get the most of out of your medicines.   Today we reviewed what your medicines are for, how to know if they are working, that your medicines are safe and how to make your medicine regimen as easy as possible.      1.  Decrease Metformin ER to 1000 mg once daily (2 tablets of 500 mg).  The dose of Metformin that you are currently taking is too high based on your kidney function.  Our kidneys breakdown Metformin and clear it out of our system.  When your kidneys are not working well we need lower doses this medication.  2.  I spoke to Dr. Roth about pulmonary rehab classes for after COVID, and this is something you will want to discuss with     Follow-up: as needed    It was great to speak with you today.  I value your experience and would be very thankful for your time with providing feedback on our clinic survey. You may receive a survey via email or text message in the next few days.     To schedule another MTM appointment, please call the clinic directly or you may call the MTM scheduling line at [phone] or toll-free at [phone].     My Clinical Pharmacist's contact information:                                                      Please feel free to contact me with any questions or concerns you have.      Jackie Ewing PharmD  Medication Therapy Management Pharmacist  Pager: [phone]

## 2021-10-11 ENCOUNTER — HEALTH MAINTENANCE LETTER (OUTPATIENT)
Age: 81
End: 2021-10-11

## 2021-10-12 ENCOUNTER — HOSPITAL ENCOUNTER (EMERGENCY)
Facility: HOSPITAL | Age: 81
Discharge: HOME | End: 2021-10-12
Admitting: EMERGENCY MEDICINE
Payer: COMMERCIAL

## 2021-10-12 ENCOUNTER — APPOINTMENT (OUTPATIENT)
Dept: CT IMAGING | Facility: HOSPITAL | Age: 81
End: 2021-10-12
Payer: COMMERCIAL

## 2021-10-12 VITALS
SYSTOLIC BLOOD PRESSURE: 112 MMHG | WEIGHT: 230 LBS | TEMPERATURE: 98.4 F | BODY MASS INDEX: 43.46 KG/M2 | RESPIRATION RATE: 24 BRPM | OXYGEN SATURATION: 97 % | DIASTOLIC BLOOD PRESSURE: 68 MMHG | HEART RATE: 70 BPM

## 2021-10-12 DIAGNOSIS — J44.1 COPD EXACERBATION (H): ICD-10-CM

## 2021-10-12 LAB
ALBUMIN SERPL-MCNC: 2.5 G/DL (ref 3.5–5)
ALP SERPL-CCNC: 163 U/L (ref 45–120)
ALT SERPL W P-5'-P-CCNC: 11 U/L (ref 0–45)
ANION GAP SERPL CALCULATED.3IONS-SCNC: 9 MMOL/L (ref 5–18)
AST SERPL W P-5'-P-CCNC: 23 U/L (ref 0–40)
ATRIAL RATE - MUSE: 73 BPM
BASOPHILS # BLD AUTO: 0 10E3/UL (ref 0–0.2)
BASOPHILS NFR BLD AUTO: 0 %
BILIRUB SERPL-MCNC: 1.2 MG/DL (ref 0–1)
BNP SERPL-MCNC: 381 PG/ML (ref 0–84)
BUN SERPL-MCNC: 27 MG/DL (ref 8–28)
CALCIUM SERPL-MCNC: 9 MG/DL (ref 8.5–10.5)
CHLORIDE BLD-SCNC: 106 MMOL/L (ref 98–107)
CO2 SERPL-SCNC: 23 MMOL/L (ref 22–31)
CREAT SERPL-MCNC: 1.39 MG/DL (ref 0.7–1.3)
DIASTOLIC BLOOD PRESSURE - MUSE: NORMAL MMHG
EOSINOPHIL # BLD AUTO: 0.2 10E3/UL (ref 0–0.7)
EOSINOPHIL NFR BLD AUTO: 2 %
ERYTHROCYTE [DISTWIDTH] IN BLOOD BY AUTOMATED COUNT: 24.2 % (ref 10–15)
GFR SERPL CREATININE-BSD FRML MDRD: 48 ML/MIN/1.73M2
GLUCOSE BLD-MCNC: 149 MG/DL (ref 70–125)
HCT VFR BLD AUTO: 29.5 % (ref 40–53)
HGB BLD-MCNC: 8.8 G/DL (ref 13.3–17.7)
IMM GRANULOCYTES # BLD: 0.1 10E3/UL
IMM GRANULOCYTES NFR BLD: 1 %
INR PPP: 1.63 (ref 0.9–1.15)
INTERPRETATION ECG - MUSE: NORMAL
LYMPHOCYTES # BLD AUTO: 0.9 10E3/UL (ref 0.8–5.3)
LYMPHOCYTES NFR BLD AUTO: 10 %
MCH RBC QN AUTO: 22.9 PG (ref 26.5–33)
MCHC RBC AUTO-ENTMCNC: 29.8 G/DL (ref 31.5–36.5)
MCV RBC AUTO: 77 FL (ref 78–100)
MONOCYTES # BLD AUTO: 0.7 10E3/UL (ref 0–1.3)
MONOCYTES NFR BLD AUTO: 8 %
NEUTROPHILS # BLD AUTO: 6.9 10E3/UL (ref 1.6–8.3)
NEUTROPHILS NFR BLD AUTO: 79 %
NRBC # BLD AUTO: 0 10E3/UL
NRBC BLD AUTO-RTO: 0 /100
P AXIS - MUSE: NORMAL DEGREES
PLATELET # BLD AUTO: 340 10E3/UL (ref 150–450)
POTASSIUM BLD-SCNC: 4.2 MMOL/L (ref 3.5–5)
PR INTERVAL - MUSE: NORMAL MS
PROT SERPL-MCNC: 6.2 G/DL (ref 6–8)
QRS DURATION - MUSE: 88 MS
QT - MUSE: 388 MS
QTC - MUSE: 442 MS
R AXIS - MUSE: 103 DEGREES
RBC # BLD AUTO: 3.84 10E6/UL (ref 4.4–5.9)
SODIUM SERPL-SCNC: 138 MMOL/L (ref 136–145)
SYSTOLIC BLOOD PRESSURE - MUSE: NORMAL MMHG
T AXIS - MUSE: 57 DEGREES
TROPONIN I SERPL-MCNC: 0.02 NG/ML (ref 0–0.29)
TROPONIN I SERPL-MCNC: 0.02 NG/ML (ref 0–0.29)
VENTRICULAR RATE- MUSE: 78 BPM
WBC # BLD AUTO: 8.6 10E3/UL (ref 4–11)

## 2021-10-12 PROCEDURE — 36415 COLL VENOUS BLD VENIPUNCTURE: CPT | Performed by: EMERGENCY MEDICINE

## 2021-10-12 PROCEDURE — 250N000011 HC RX IP 250 OP 636: Performed by: EMERGENCY MEDICINE

## 2021-10-12 PROCEDURE — 80053 COMPREHEN METABOLIC PANEL: CPT | Performed by: EMERGENCY MEDICINE

## 2021-10-12 PROCEDURE — 84484 ASSAY OF TROPONIN QUANT: CPT | Mod: 91 | Performed by: EMERGENCY MEDICINE

## 2021-10-12 PROCEDURE — 83880 ASSAY OF NATRIURETIC PEPTIDE: CPT | Performed by: EMERGENCY MEDICINE

## 2021-10-12 PROCEDURE — 85610 PROTHROMBIN TIME: CPT | Performed by: EMERGENCY MEDICINE

## 2021-10-12 PROCEDURE — 96374 THER/PROPH/DIAG INJ IV PUSH: CPT | Mod: 59

## 2021-10-12 PROCEDURE — 84484 ASSAY OF TROPONIN QUANT: CPT | Performed by: EMERGENCY MEDICINE

## 2021-10-12 PROCEDURE — 99285 EMERGENCY DEPT VISIT HI MDM: CPT | Mod: 25

## 2021-10-12 PROCEDURE — 85025 COMPLETE CBC W/AUTO DIFF WBC: CPT | Performed by: EMERGENCY MEDICINE

## 2021-10-12 PROCEDURE — 93005 ELECTROCARDIOGRAM TRACING: CPT | Performed by: EMERGENCY MEDICINE

## 2021-10-12 PROCEDURE — 71275 CT ANGIOGRAPHY CHEST: CPT

## 2021-10-12 RX ORDER — PREDNISONE 20 MG/1
TABLET ORAL
Qty: 10 TABLET | Refills: 0 | Status: SHIPPED | OUTPATIENT
Start: 2021-10-12 | End: 2021-10-23

## 2021-10-12 RX ORDER — CIPROFLOXACIN 750 MG/1
750 TABLET, FILM COATED ORAL 2 TIMES DAILY
Qty: 14 TABLET | Refills: 0 | Status: SHIPPED | OUTPATIENT
Start: 2021-10-12 | End: 2021-10-19

## 2021-10-12 RX ORDER — ONDANSETRON 2 MG/ML
4 INJECTION INTRAMUSCULAR; INTRAVENOUS ONCE
Status: COMPLETED | OUTPATIENT
Start: 2021-10-12 | End: 2021-10-12

## 2021-10-12 RX ORDER — IOPAMIDOL 755 MG/ML
75 INJECTION, SOLUTION INTRAVASCULAR ONCE
Status: COMPLETED | OUTPATIENT
Start: 2021-10-12 | End: 2021-10-12

## 2021-10-12 RX ADMIN — IOPAMIDOL 75 ML: 755 INJECTION, SOLUTION INTRAVENOUS at 18:45

## 2021-10-12 RX ADMIN — ONDANSETRON 4 MG: 2 INJECTION INTRAMUSCULAR; INTRAVENOUS at 17:29

## 2021-10-12 ASSESSMENT — HEART SCORE
TROPONIN: LESS THAN OR EQUAL TO NORMAL LIMIT
RISK FACTORS: >2 RISK FACTORS OR HX OF ATHEROSCLEROTIC DISEASE
AGE: 65+
ECG: NON-SPECIFIC REPOLARIZATION DISTURBANCE
HEART SCORE: 5
AGE: 65+
HISTORY: SLIGHTLY SUSPICIOUS

## 2021-10-12 ASSESSMENT — ENCOUNTER SYMPTOMS
FEVER: 0
COUGH: 1
DIARRHEA: 0
NAUSEA: 0
SHORTNESS OF BREATH: 1
ABDOMINAL PAIN: 0
VOMITING: 0
CHILLS: 0

## 2021-10-12 NOTE — ED PROVIDER NOTES
EMERGENCY DEPARTMENT ENCOUNTER      NAME: Red Sutherland  AGE: 81 year old male  YOB: 1940  MRN: [id number]  EVALUATION DATE & TIME: 10/12/2021  5:02 PM    PCP: Haresh Corona    ED PROVIDER: Madison Gaming PA-C      Chief Complaint   Patient presents with     Shortness of Breath         FINAL IMPRESSION:  1. COPD exacerbation (H)          ED COURSE & MEDICAL DECISION MAKING:    Pertinent Labs & Imaging studies reviewed. (See chart for details)    81 year old male presents to the Emergency Department for evaluation of shortness of breath.    Physical exam is remarkable for a generally well-appearing male who is in no acute distress.  He is on oxygen by nasal cannula.  He has coarse breath sounds diffusely throughout, no respiratory distress is noted.  He has a systolic murmur which is most prominent on the left sternal border.  Abdomen is soft and nontender.  Vital signs are stable and he is afebrile.    CBC is remarkable for anemia which appears chronic and stable, no leukocytosis.  CMP is remarkable for poor but stable kidney function with creatinine of 1.39 and GFR of 48.  INR is 1.63.  Troponin is negative, EKG without acute ischemic changes. Three hour delta troponin negative.  BNP is stable at 381.  CT PE study was obtained without evidence of pulmonary embolism, there are significant infiltrates in the bilateral lungs consistent with recent COVID-19 pneumonia but there are no new effusions or infiltrates concerning for superimposed bacterial pneumonia.    The patient's oxygen status was at baseline while here in the emergency department, he was satting in the 90s on 2 L by nasal cannula which is actually improved from what he is normally on at home.  I do not think any further emergent labs or imaging are indicated at this time.  There is no significant respiratory distress and his lab work is overall reassuring.  Patient does have a heart score of 5 but I have very low suspicion  for cardiac etiology of his symptoms, patient does not currently have chest pain in the emergency department and had 2 troponins that were -3 hours apart.  Suspect possible COPD exacerbation, patient will be prescribed steroids and ciprofloxacin for treatment since he has been recently hospitalized.  Advised him to follow-up with his primary care provider within 48 hours.  Recommend return to the ER if he develops any new or worsening symptoms like severe pain, fever, persistent vomiting, difficulty breathing, hemoptysis, syncope, or any other concerning symptoms.  The patient is amenable with this treatment plan and verbalized his understanding.    ED Course   5:18 PM Performed my initial history and physical exam. Discussed workup in the emergency department, management of symptoms, and likely disposition.   7:43 PM Updated patient with test results. I discussed the plan for discharge with the patient, and patient is agreeable. We discussed supportive cares at home and reasons for return to the ER including new or worsening symptoms - all questions and concerns addressed. Patient to be discharged by RN.    At the conclusion of the encounter I discussed the results of all of the tests and the disposition. The questions were answered. The patient or family acknowledged understanding and was agreeable with the care plan.     Voice recognition software was used in the creation of this note. Any grammatical or nonsensical errors are due to inherent errors with the software and are not the intention of the writer.     MEDICATIONS GIVEN IN THE EMERGENCY:  Medications   ondansetron (ZOFRAN) injection 4 mg (4 mg Intravenous Given 10/12/21 1729)   iopamidol (ISOVUE-370) solution 75 mL (75 mLs Intravenous Given 10/12/21 1845)       NEW PRESCRIPTIONS STARTED AT TODAY'S ER VISIT  Discharge Medication List as of 10/12/2021  7:54 PM      START taking these medications    Details   ciprofloxacin (CIPRO) 750 MG tablet Take 1  tablet (750 mg) by mouth 2 times daily for 7 days, Disp-14 tablet, R-0, E-Prescribe      predniSONE (DELTASONE) 20 MG tablet Take two tablets (= 40mg) each day for 5 (five) days, Disp-10 tablet, R-0, E-Prescribe                  =================================================================    HPI    Patient information was obtained from: Patient    Use of Intrepreter: N/A        Red Sutherland is a 81 year old male with PMH of chronic bronchitis, HTN, A fib on Eliquis, CAD, CHF, COPD who presents to the ED via walk in for evaluation of shortness of breath.    The patient reports that for the last 3 days, he has been short of breath.  He normally wears 5 L of oxygen by nasal cannula, he had to increase to 6 L yesterday due to oxygen saturations in the low 90s.  He has noted a slight increase in cough which is productive of white sputum.  He also has noted intermittent left-sided chest pain, he is unable to identify any aggravating or alleviating factors.  He denies any fevers, chills, nausea, vomiting, hemoptysis, or purulent sputum.  He denies any history of DVT or PE but does note that he was hospitalized in September 2021 for COVID-19 pneumonia.      REVIEW OF SYSTEMS   Review of Systems   Constitutional: Negative for chills and fever.   HENT: Negative for congestion.    Respiratory: Positive for cough and shortness of breath.    Cardiovascular: Positive for chest pain.   Gastrointestinal: Negative for abdominal pain, diarrhea, nausea and vomiting.       All other systems reviewed and are negative unless noted in HPI.    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 They put him on sildenafil as nitroprusside lowered systemic BP and with that the mean PA dropped from 54 to 49. Negative VQ at [facility] Dec 1, 2017.     RLS (restless legs syndrome)      Sleep apnea        PAST SURGICAL HISTORY:  Past Surgical History:   Procedure Laterality Date     BACK SURGERY      lower back     CARDIAC CATHETERIZATION  12/13/2017    Right and left at [facility], mean PA 58, PCW 24 with V wave of 35, LVEDP of 18, with Nipride systemic BP, PVR and mean PA all declined     CARDIOVERSION  03/15/2013    for afib     CATARACT EXTRACTION Bilateral      CORONARY STENT PLACEMENT       IR ABDOMINAL AORTOGRAM  11/16/2012     IR MISCELLANEOUS PROCEDURE  7/20/2001     IR MISCELLANEOUS PROCEDURE  11/16/2012     OTHER SURGICAL HISTORY      mynor     SHOULDER SURGERY      reapir on right shoulder     TOTAL HIP ARTHROPLASTY Left      TOTAL KNEE ARTHROPLASTY Bilateral      WRIST SURGERY Bilateral      ZZHC COLONOSCOPY W/WO BRUSH/WASH N/A 1/14/2021    Procedure: COLONOSCOPY;  Surgeon: Mihai Harris MD;  Location: [facility];  Service: Gastroenterology       CURRENT MEDICATIONS:    ciprofloxacin (CIPRO) 750 MG tablet  predniSONE (DELTASONE) 20 MG tablet  ACCU-CHEK JOSE PLUS TEST STRP strips  acetaminophen (TYLENOL) 500 MG tablet  albuterol (PROVENTIL HFA;VENTOLIN HFA) 90 mcg/actuation inhaler  apixaban (ELIQUIS) 5 mg Tab  aspirin 81 MG EC tablet  atorvastatin (LIPITOR) 40 MG tablet  BD ULTRA-FINE MANISHA PEN NEEDLES 32 gauge x 5/32\" Ndle  bumetanide (BUMEX) 1 MG tablet  canagliflozin (INVOKANA) 100 mg Tab  clopidogreL (PLAVIX) 75 mg tablet  digoxin (LANOXIN) 125 mcg tablet  fluticasone-vilanterol (BREO ELLIPTA) 200-25 mcg/dose DsDv inhaler  Garlic 1000 MG CAPS  HYDROcodone-acetaminophen (NORCO) 5-325 MG tablet  insulin glargine (LANTUS PEN) 100 UNIT/ML pen  ipratropium - albuterol 0.5 mg/2.5 mg/3 mL (DUONEB) " 0.5-2.5 (3) MG/3ML neb solution  irbesartan (AVAPRO) 300 MG tablet  lidocaine (LIDODERM) 5 %  metFORMIN (GLUCOPHAGE-XR) 500 MG 24 hr tablet  metoprolol tartrate (LOPRESSOR) 50 MG tablet  multivitamin w/minerals (THERA-VIT-M) tablet  nitroglycerin (NITROSTAT) 0.4 MG SL tablet  omeprazole (PRILOSEC) 20 MG DR capsule  OXYGEN-AIR DELIVERY SYSTEMS MISC  potassium chloride (K-DUR,KLOR-CON) 20 MEQ tablet  rOPINIRole (REQUIP) 2 MG tablet  tadalafil (CIALIS) 20 MG tablet  vitamin C (ASCORBIC ACID) 1000 MG TABS  zinc gluconate 50 MG tablet  zolpidem (AMBIEN) 10 mg tablet        ALLERGIES:  Allergies   Allergen Reactions     Furosemide Muscle Pain (Myalgia)     Previously tolerated.  Muscle cramps     Hydrochlorothiazide Unknown     Iodinated Contrast Media [Diagnostic X-Ray Materials] Nausea     Losartan Other (See Comments)     Other reaction(s): Stomatitis, Bloody nose dry mouth and lips     Losartan-Hydrochlorothiazide [Hyzaar]      Mouth sores     Metaxalone Nausea     Metolazone Other (See Comments)     Muscle cramps     Mometasone Other (See Comments)     Bloody nose     Penicillins Other (See Comments)     Immune-does not work for him     Rabeprazole Other (See Comments)     Mouth sores     Ramipril Other (See Comments)     Mouth sores     Shellfish Containing Products [Shellfish-Derived Products] Unknown     Other reaction(s): mouth sores, Other reaction(s): mouth sores     Methocarbamol Rash       FAMILY HISTORY:  Family History   Problem Relation Age of Onset     Hyperlipidemia Mother      Hypertension Mother      Heart Disease Mother      Hyperlipidemia Father      Hypertension Father      Coronary Artery Disease Father      Cerebrovascular Disease Brother      Depression Brother      No Known Problems Sister      Pulmonary Hypertension No family hx of      Congenital heart disease No family hx of        SOCIAL HISTORY:   Social History     Socioeconomic History     Marital status:      Spouse name: Not on  file     Number of children: 4     Years of education: Not on file     Highest education level: Not on file   Occupational History     Not on file   Tobacco Use     Smoking status: Former Smoker     Packs/day: 1.50     Years: 44.00     Pack years: 66.00     Types: Cigarettes     Quit date: 1996     Years since quittin.4     Smokeless tobacco: Never Used   Substance and Sexual Activity     Alcohol use: Yes     Alcohol/week: 1.0 standard drinks     Comment: Alcoholic Drinks/day: 1 per month     Drug use: No     Sexual activity: Not Currently     Partners: Female   Other Topics Concern     Not on file   Social History Narrative     Not on file     Social Determinants of Health     Financial Resource Strain:      Difficulty of Paying Living Expenses:    Food Insecurity:      Worried About Running Out of Food in the Last Year:      Ran Out of Food in the Last Year:    Transportation Needs:      Lack of Transportation (Medical):      Lack of Transportation (Non-Medical):    Physical Activity:      Days of Exercise per Week:      Minutes of Exercise per Session:    Stress:      Feeling of Stress :    Social Connections:      Frequency of Communication with Friends and Family:      Frequency of Social Gatherings with Friends and Family:      Attends Alevism Services:      Active Member of Clubs or Organizations:      Attends Club or Organization Meetings:      Marital Status:    Intimate Partner Violence:      Fear of Current or Ex-Partner:      Emotionally Abused:      Physically Abused:      Sexually Abused:        VITALS:  Patient Vitals for the past 24 hrs:   BP Temp Temp src Pulse Resp SpO2 Weight   10/12/21 1830 112/68 -- -- 70 -- 97 % --   10/12/21 1815 108/67 -- -- 73 -- 98 % --   10/12/21 1800 124/60 -- -- 76 -- 95 % --   10/12/21 1745 122/71 -- -- 74 -- 99 % --   10/12/21 1730 128/68 -- -- 74 -- 100 % --   10/12/21 1715 136/68 -- -- 73 -- 98 % --   10/12/21 1406 136/62 98.4  F (36.9  C) Oral 74 24 94 %  104.3 kg (230 lb)       PHYSICAL EXAM    VITAL SIGNS: /68   Pulse 70   Temp 98.4  F (36.9  C) (Oral)   Resp 24   Wt 104.3 kg (230 lb)   SpO2 97%   BMI 43.46 kg/m    General Appearance: Alert, cooperative, normal speech and facial symmetry, appears stated age, the patient does not appear in distress  Head:  Normocephalic, without obvious abnormality, atraumatic  Eyes: Conjunctiva/corneas clear, EOM's intact, no nystagmus, PERRL  ENT:  Lips, mucosa, and tongue normal; teeth and gums normal, no pharyngeal inflammation, no dysphonia or difficulty swallowing, membranes are moist without pallor  Cardio: Systolic murmur; Regular rate and rhythm, S1 and S2 normal,no rub or gallop, 2+ pulses symmetric in all extremities  Pulm: Coarse breath sounds throughout; respirations unlabored with no accessory muscle use  Abdomen:  Abdomen is soft, non-distended with no tenderness to palpation, rebound tenderness, or guarding.   Extremities:  Extremities normal, there is no tenderness to palpation , atraumatic, no cyanosis or edema, full function and range of motion, pulses equal in all extremities, normal cap refill, no joint swelling  Neuro: Patient is awake, alert, and responsive to voice. No gross motor weaknesses or sensory loss; moves all extremities.     LAB:  All pertinent labs reviewed and interpreted.  Labs Ordered and Resulted from Time of ED Arrival Up to the Time of Departure from the ED   COMPREHENSIVE METABOLIC PANEL - Abnormal; Notable for the following components:       Result Value    Creatinine 1.39 (*)     Glucose 149 (*)     Alkaline Phosphatase 163 (*)     Albumin 2.5 (*)     Bilirubin Total 1.2 (*)     GFR Estimate 48 (*)     All other components within normal limits   INR - Abnormal; Notable for the following components:    INR 1.63 (*)     All other components within normal limits   B-TYPE NATRIURETIC PEPTIDE ( EAST ONLY) - Abnormal; Notable for the following components:     (*)     All  other components within normal limits   CBC WITH PLATELETS AND DIFFERENTIAL - Abnormal; Notable for the following components:    RBC Count 3.84 (*)     Hemoglobin 8.8 (*)     Hematocrit 29.5 (*)     MCV 77 (*)     MCH 22.9 (*)     MCHC 29.8 (*)     RDW 24.2 (*)     Absolute Immature Granulocytes 0.1 (*)     All other components within normal limits   TROPONIN I - Normal   TROPONIN I - Normal   CBC WITH PLATELETS & DIFFERENTIAL    Narrative:     The following orders were created for panel order CBC with platelets differential.  Procedure                               Abnormality         Status                     ---------                               -----------         ------                     CBC with platelets and d...[[id number]]  Abnormal            Final result                 Please view results for these tests on the individual orders.       RADIOLOGY:  Reviewed all pertinent imaging. Please see official radiology report.  CT Chest Pulmonary Embolism w Contrast   Final Result   IMPRESSION:   1.  No PE.   2.  Increased pulmonary opacities are consistent with history of COVID 19 pneumonia. Other processes could have a similar appearance. Pleural effusions and lymphadenopathy are likely related.   3.  Mildly enlarged heart. Mild enlargement of the main pulmonary artery can be seen with pulmonary hypertension. Severe coronary artery disease.   4.  CT appearance of hepatic cirrhosis and portal hypertension.    5.  Findings consistent with chronic pancreatitis.           EKG:    Performed at: 14:33    I have independently reviewed and interpreted the EKG, along with the final read. EKG also reviewed by Dr. Rose.    Ventricular rate 78 bpm  NH interval * ms  QRS duration 88 ms  QT/QTc 388/442  P-R-T axes * 103 57    Impression: A fib; unchanged from previous      Madison Gaming PA-C  Emergency Medicine  [facility] EMERGENCY DEPARTMENT  [address]  [place] [address]  [phone]  Dept: [phone]       Madison Gaming PA-C  10/12/21 2104

## 2021-10-12 NOTE — ED TRIAGE NOTES
Pt states he has been SOB for the past 3 days.  Pt has been treated for COVID 19 on Sept. 16 and was hospitalilzed.  Pt has history of COPD and wears home 02 6L at home.  Pt states he has been having 02 sats low 90% while on his 02 at home.  Pt also endorses some intermittent chest pain but denies any current chest pain.

## 2021-10-13 NOTE — DISCHARGE INSTRUCTIONS
You were seen in the emergency department today for evaluation of shortness of breath.  Your lab work today is baseline without evidence of worsening heart failure, heart attack, or electrolyte problems.  Your CT scan shows ongoing bilateral infiltrates consistent with known COVID-19 pneumonia, no new changes or evidence of blood clot.    I will prescribe you antibiotics and prednisone to treat possible COPD exacerbation.    Please follow-up with your primary care provider within 48 hours.  Return to the ER if you develop any new or worsening symptoms like worsening difficulty breathing, persistent vomiting, coughing up blood, passing out, worsening chest pain or shortness of breath, or any other symptoms that concern you.

## 2021-10-21 ENCOUNTER — OFFICE VISIT (OUTPATIENT)
Dept: PALLIATIVE CARE | Facility: CLINIC | Age: 81
End: 2021-10-21
Attending: INTERNAL MEDICINE
Payer: COMMERCIAL

## 2021-10-21 VITALS
HEART RATE: 61 BPM | OXYGEN SATURATION: 99 % | RESPIRATION RATE: 18 BRPM | DIASTOLIC BLOOD PRESSURE: 61 MMHG | WEIGHT: 237.1 LBS | TEMPERATURE: 97.8 F | SYSTOLIC BLOOD PRESSURE: 105 MMHG | BODY MASS INDEX: 44.8 KG/M2

## 2021-10-21 DIAGNOSIS — J42 CHRONIC BRONCHITIS, UNSPECIFIED CHRONIC BRONCHITIS TYPE (H): Primary | ICD-10-CM

## 2021-10-21 PROCEDURE — G0463 HOSPITAL OUTPT CLINIC VISIT: HCPCS

## 2021-10-21 PROCEDURE — 99205 OFFICE O/P NEW HI 60 MIN: CPT | Mod: GC | Performed by: STUDENT IN AN ORGANIZED HEALTH CARE EDUCATION/TRAINING PROGRAM

## 2021-10-21 ASSESSMENT — PAIN SCALES - GENERAL: PAINLEVEL: NO PAIN (0)

## 2021-10-21 NOTE — LETTER
10/21/2021       RE: Red Sutherland  [address]  [facility] [address]     Dear Colleague,    Thank you for referring your patient, Red Sutherland, [facility]. Please see a copy of my visit note below.    Palliative Care Outpatient Clinic Consultation Note    Patient:  Red Sutherland    Chief Complaint:   Red Sutherland 81 year old male who is presenting to the palliative medicine clinic today as a self-referral for a palliative care consultation secondary to recent worsening of chronic lung disease in the context of covid 19 illness.   The patient's primary care provider is: Meg Roth of [facility].    History of Present Illness:  Red Sutherland is a 81 year old with history of IDDM, COPD, CHF, and recent covid 19 infection.  The patient has a history of COPD x 10 years, using oxygen at night for the last 3 or 4 with his CPAP.  He has a history of CHF with most recent EF in our chart 57% (4/2020).  He contracted Covid 19 in mid-September and was treated with what sounds like a monoclonal antibody treatments in hospital, though he is not absolutely clear what this treatment was.  Since discharge he has required oxygen by nasal cannula 24/7 and has noted significant dyspnea on exertion.  The patient states that prior to his most recent illness he was able to walk around the grocery using a shopping cart as a walker, but that since this most recent illness he has only been able to walk 20 to 30 feet before becoming very short of breath.  He has bought a 3 wheeled scooter and has been using that.  He remains mostly independent of his ADLs, and has been able to continue IADLs with use of scooter and other assistive devices.  The patient also reports some chest pressure, which he he mostly notices this when he becomes short of breath with exertion.  He notes that this resolves when he  "rests.  He does have a history of atrial fibrillation but does not feel palpitations or racing heart related to this issue.  The patient is also had multiple nosebleeds and multiple episodes of bleeding varicosities in his legs, which have required several trips to the emergency department.    Patient's Disease Understanding: The patient understands that he has chronic lung disease and that Covid 19 may have significantly worsened his pulmonary function. He states \"my doctor has seen one patient who got better after this and I'm hoping to be the second one.\"    Coping: The patient denies any significant anxiety or depression related to his illness.  He does note some episodes of frustration but neither he nor his wife thinks these are unusual or excessive.    Social History  Living Situation: Recently downsized to a [facility] where he lives with his wife  Children: Has a total of 6 children, all of whom live \"within about 35 miles\"  Actual/Potential Caregiver(s): Wife and children  Support System: Family.  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  Enjoys watching television series and history documentaries.  Substance Use/History of misuse: None  Financial Concerns: Not discussed  Spiritual Background: [facility]  Spiritual Concerns/Needs: None currently  Social History     Tobacco Use     Smoking status: Former Smoker     Packs/day: 1.50     Years: 44.00     Pack years: 66.00     Types: Cigarettes     Quit date: 1996     Years since quittin.4     Smokeless tobacco: Never Used   Substance Use Topics     Alcohol use: Yes     Alcohol/week: 1.0 standard drinks     Comment: Alcoholic Drinks/day: 1 per month     Drug use: No       Family History  Family History   Problem Relation Age of Onset     Hyperlipidemia Mother      Hypertension Mother      Heart Disease Mother      " Hyperlipidemia Father      Hypertension Father      Coronary Artery Disease Father      Cerebrovascular Disease Brother      Depression Brother      No Known Problems Sister      Pulmonary Hypertension No family hx of      Congenital heart disease No family hx of        Advance Care Planning:  Advance Directive:    Currently completed and on file at [facility]  Health Care Agent Contact Information: Wife Kaley is primary, with 2 of his daughters named as alternates.  POLST:   None    Allergies   Allergen Reactions     Furosemide Muscle Pain (Myalgia)     Previously tolerated.  Muscle cramps     Hydrochlorothiazide Unknown     Iodinated Contrast Media [Diagnostic X-Ray Materials] Nausea     Losartan Other (See Comments)     Other reaction(s): Stomatitis, Bloody nose dry mouth and lips     Losartan-Hydrochlorothiazide [Hyzaar]      Mouth sores     Metaxalone Nausea     Metolazone Other (See Comments)     Muscle cramps     Mometasone Other (See Comments)     Bloody nose     Penicillins Other (See Comments)     Immune-does not work for him     Rabeprazole Other (See Comments)     Mouth sores     Ramipril Other (See Comments)     Mouth sores     Shellfish Containing Products [Shellfish-Derived Products] Unknown     Other reaction(s): mouth sores, Other reaction(s): mouth sores     Methocarbamol Rash     Current Outpatient Medications   Medication Sig Dispense Refill     ACCU-CHEK JOSE PLUS TEST STRP strips [ACCU-CHEK JOSE PLUS TEST STRP STRIPS] see administration instructions.       acetaminophen (TYLENOL) 500 MG tablet [ACETAMINOPHEN (TYLENOL) 500 MG TABLET] Take 1,000 mg by mouth every 6 (six) hours as needed. First line of pain management. Do Not take more than 4,000 mg in 24 hours.       albuterol (PROVENTIL HFA;VENTOLIN HFA) 90 mcg/actuation inhaler Inhale 2 puffs into the lungs every 6 hours as needed        apixaban (ELIQUIS) 5 mg Tab [APIXABAN (ELIQUIS) 5 MG TAB] Take 1 tablet by mouth every 12  "(twelve) hours.       aspirin 81 MG EC tablet [ASPIRIN 81 MG EC TABLET] Take 81 mg by mouth every morning.        atorvastatin (LIPITOR) 40 MG tablet Take 40 mg by mouth every morning   4     BD ULTRA-FINE MANISHA PEN NEEDLES 32 gauge x 5/32\" Ndle [BD ULTRA-FINE MANISHA PEN NEEDLES 32 GAUGE X 5/32\" NDLE]   4     bumetanide (BUMEX) 1 MG tablet Take 3 mg by mouth 2 times daily   0     canagliflozin (INVOKANA) 100 mg Tab Take 100 mg by mouth every morning        clopidogreL (PLAVIX) 75 mg tablet [CLOPIDOGREL (PLAVIX) 75 MG TABLET] Take 1 tablet by mouth daily.       digoxin (LANOXIN) 125 mcg tablet [DIGOXIN (LANOXIN) 125 MCG TABLET] Take 1 tablet (125 mcg total) by mouth daily. 90 tablet 3     fluticasone-vilanterol (BREO ELLIPTA) 200-25 mcg/dose DsDv inhaler [FLUTICASONE-VILANTEROL (BREO ELLIPTA) 200-25 MCG/DOSE DSDV INHALER] Inhale 1 puff daily.       Garlic 1000 MG CAPS Take 1 capsule by mouth daily       HYDROcodone-acetaminophen (NORCO) 5-325 MG tablet Take 1 tablet by mouth every 6 hours as needed for severe pain       insulin glargine (LANTUS PEN) 100 UNIT/ML pen Inject 30 Units Subcutaneous 2 times daily        ipratropium - albuterol 0.5 mg/2.5 mg/3 mL (DUONEB) 0.5-2.5 (3) MG/3ML neb solution Take 1 vial by nebulization 4 times daily       irbesartan (AVAPRO) 300 MG tablet Take 300 mg by mouth daily        lidocaine (LIDODERM) 5 % [LIDOCAINE (LIDODERM) 5 %] Place 1 patch on the skin as needed. Remove & Discard patch within 12 hours or as directed by MD       metFORMIN (GLUCOPHAGE-XR) 500 MG 24 hr tablet Take 1,000 mg by mouth daily (with dinner)        metoprolol tartrate (LOPRESSOR) 50 MG tablet Take 50 mg by mouth 2 times daily (with meals)       multivitamin w/minerals (THERA-VIT-M) tablet Take 1 tablet by mouth daily       nitroglycerin (NITROSTAT) 0.4 MG SL tablet [NITROGLYCERIN (NITROSTAT) 0.4 MG SL TABLET] Place 0.4 mg under the tongue every 5 (five) minutes as needed for chest pain.       omeprazole " (PRILOSEC) 20 MG DR capsule Take 20 mg by mouth daily       OXYGEN-AIR DELIVERY SYSTEMS MISC [OXYGEN-AIR DELIVERY SYSTEMS MISC] Use As Directed.       potassium chloride (K-DUR,KLOR-CON) 20 MEQ tablet Take 40 mEq by mouth daily        predniSONE (DELTASONE) 20 MG tablet Take two tablets (= 40mg) each day for 5 (five) days 10 tablet 0     rOPINIRole (REQUIP) 2 MG tablet [ROPINIROLE (REQUIP) 2 MG TABLET] Take 2 mg by mouth at bedtime.        tadalafil (CIALIS) 20 MG tablet Take 20 mg by mouth daily       vitamin C (ASCORBIC ACID) 1000 MG TABS Take 1,000 mg by mouth daily       zinc gluconate 50 MG tablet Take 50 mg by mouth daily       zolpidem (AMBIEN) 10 mg tablet [ZOLPIDEM (AMBIEN) 10 MG TABLET] Take 10 mg by mouth bedtime.       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 They put him on sildenafil as nitroprusside lowered systemic BP and with that the mean PA dropped from 54 to 49. Negative VQ at [place] Dec 1, 2017.     RLS (restless legs syndrome)      Sleep apnea      Past Surgical History:   Procedure Laterality Date     BACK SURGERY      lower back     CARDIAC CATHETERIZATION  12/13/2017    Right and left at [place], mean PA 58, PCW 24 with V wave of 35, LVEDP of 18, with Nipride systemic BP, PVR and mean PA all declined     CARDIOVERSION  03/15/2013    for afib     CATARACT EXTRACTION Bilateral      CORONARY STENT PLACEMENT       IR ABDOMINAL AORTOGRAM  11/16/2012     IR MISCELLANEOUS PROCEDURE  7/20/2001     IR MISCELLANEOUS PROCEDURE  11/16/2012     OTHER SURGICAL HISTORY      mynor      SHOULDER SURGERY      reapir on right shoulder     TOTAL HIP ARTHROPLASTY Left      TOTAL KNEE ARTHROPLASTY Bilateral      WRIST SURGERY Bilateral      ZZHC COLONOSCOPY W/WO BRUSH/WASH N/A 1/14/2021    Procedure: COLONOSCOPY;  Surgeon: Mihai Harris MD;  Location: [facility];  Service: Gastroenterology       Palliative Symptom Review (0=no symptom/no concern, 1=mild, 2=moderate, 3=severe):      Pain: Chest pressure with exertion      Fatigue: Yes, related to dyspnea with exertion      Shortness of Breath: Dyspnea on exertion, only able to walk 20 to 30 feet without dyspnea.  Requires oxygen 24/7.     Denies any other current symptoms    /61   Pulse 61   Temp 97.8  F (36.6  C)   Resp 18   Wt 107.5 kg (237 lb 1.6 oz)   SpO2 99%   BMI 44.80 kg/m      GENERAL: Comfortable-appearing, alert and no distress  EYES: Eyes grossly normal to inspection, conjunctivae and sclerae normal  Hearing intact.   RESP: Currently on 3 L of oxygen by nasal cannula.  Able to speak in full sentences without difficulty.  No evidence of respiratory distress.  SKIN: no suspicious lesions or rashes on exposed skin  NEURO: Cranial nerves grossly intact, mentation intact and speech normal.  No tremor.   PSYCH: mentation appears normal, affect normal/bright and mood-congruent, judgement and insight intact, normal speech and appearance     Wt Readings from Last 10 Encounters:   10/12/21 104.3 kg (230 lb)   08/30/21 115.2 kg (254 lb)   08/25/21 115.2 kg (254 lb)   08/22/21 108.9 kg (240 lb)   07/13/21 105.7 kg (233 lb)   02/05/21 107 kg (236 lb)   01/14/21 104.3 kg (230 lb)   06/22/20 102.5 kg (226 lb)   06/04/20 101.2 kg (223 lb)   02/27/20 112.9 kg (249 lb)       Data Reviewed:  LABS:   Recent Labs   Lab Test 10/12/21  1429 09/13/21  1627    140   POTASSIUM 4.2 4.0   CHLORIDE 106 103   CO2 23 22   ANIONGAP 9 15   * 114   BUN 27 44*   CR 1.39* 1.91*   ANNMARIE 9.0 8.2*       Impressions, Recommendations &  Counseling:  Palliative Performance Score: 60 to 70%    Decision Making Capacity: Full    Red Sutherland is a 81 year old with history of COPD, CHF, and recent COVID-19 infection which has significantly worsened his respiratory status and resulted in significant dyspnea on exertion.  The patient is quite clear at this time that his goals of care are restorative.  He would want resuscitative attempts as well as intubation.    He is having symptoms of dyspnea on exertion and easy bleeding from varicosities and from his naris.  I discussed the patient's dyspnea on exertion with him and explained that the appropriate treatment for this was of the oxygen which she is already using.  I explained that if he had significant progression of his disease and began having dyspnea at rest or significantly reduced function, we could consider use of opioids for air hunger. At this time he would likely not benefit considering the intermittent nature and moderate severity of his symptoms.  I discussed the bleeding episodes the patient is having and acknowledge that they are quite frustrating, but also explained to him that due to his current use of a blood thinner there was no good alternative to the measures that he is taking at home and going to the emergency department if these are ineffective.    The patient, his wife and I had a discussion about advanced care planning.  The patient has completed healthcare directive and named his wife as primary decision maker, with 2 of his daughters as alternates.  I encouraged them to have discussions as a family about what he would or would not want for medical treatment, and explained that this may help family members who have to make decisions for him if he is not able to make them for himself.    At this time this patient does not appear to have any unmet palliative care needs.  I discussed instances where we could be of assistance to him, and asked him to either call our clinic or to  follow-up with the [facility] palliative care service if any of these came up.  Otherwise, I think that he can continue to see his primary care provider, pulmonologist, and cardiologist for further management of his health conditions.    1.  Recommend patient and family discuss advance care planning and desires for treatment in the event of serious illness.    2.  Patient may follow-up with our clinic if he has any unmet palliative care needs.  He may also follow-up with [facility] palliative care service if that is more convenient for him.    Jacinto Peterson DO   Hospice and Palliative Medicine Fellow      Patient seen and evaluated with Dr. Peterson.    Agree with assessment and recommendations.     Kori Mora MD  Palliative Medicine  Pager [phone]            Again, thank you for allowing me to participate in the care of your patient.      Sincerely,    Jacinto Peterson DO

## 2021-10-21 NOTE — PROGRESS NOTES
Palliative Care Outpatient Clinic Consultation Note    Patient:  Red Sutherland    Chief Complaint:   Red Sutherland 81 year old male who is presenting to the palliative medicine clinic today as a self-referral for a palliative care consultation secondary to recent worsening of chronic lung disease in the context of covid 19 illness.   The patient's primary care provider is: Meg Roth of [facility].    History of Present Illness:  Red Sutherland is a 81 year old with history of IDDM, COPD, CHF, and recent covid 19 infection.  The patient has a history of COPD x 10 years, using oxygen at night for the last 3 or 4 with his CPAP.  He has a history of CHF with most recent EF in our chart 57% (4/2020).  He contracted Covid 19 in mid-September and was treated with what sounds like a monoclonal antibody treatments in hospital, though he is not absolutely clear what this treatment was.  Since discharge he has required oxygen by nasal cannula 24/7 and has noted significant dyspnea on exertion.  The patient states that prior to his most recent illness he was able to walk around the grocery using a shopping cart as a walker, but that since this most recent illness he has only been able to walk 20 to 30 feet before becoming very short of breath.  He has bought a 3 wheeled scooter and has been using that.  He remains mostly independent of his ADLs, and has been able to continue IADLs with use of scooter and other assistive devices.  The patient also reports some chest pressure, which he he mostly notices this when he becomes short of breath with exertion.  He notes that this resolves when he rests.  He does have a history of atrial fibrillation but does not feel palpitations or racing heart related to this issue.  The patient is also had multiple nosebleeds and multiple episodes of bleeding varicosities in his legs, which have required several trips to the emergency department.    Patient's Disease  "Understanding: The patient understands that he has chronic lung disease and that Covid 19 may have significantly worsened his pulmonary function. He states \"my doctor has seen one patient who got better after this and I'm hoping to be the second one.\"    Coping: The patient denies any significant anxiety or depression related to his illness.  He does note some episodes of frustration but neither he nor his wife thinks these are unusual or excessive.    Social History  Living Situation: Recently downsized to a [facility] where he lives with his wife  Children: Has a total of 6 children, all of whom live \"within about 35 miles\"  Actual/Potential Caregiver(s): Wife and children  Support System: Family.  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  Enjoys watching television series and history documentaries.  Substance Use/History of misuse: None  Financial Concerns: Not discussed  Spiritual Background: Holiness  Spiritual Concerns/Needs: None currently  Social History     Tobacco Use     Smoking status: Former Smoker     Packs/day: 1.50     Years: 44.00     Pack years: 66.00     Types: Cigarettes     Quit date: 1996     Years since quittin.4     Smokeless tobacco: Never Used   Substance Use Topics     Alcohol use: Yes     Alcohol/week: 1.0 standard drinks     Comment: Alcoholic Drinks/day: 1 per month     Drug use: No       Family History  Family History   Problem Relation Age of Onset     Hyperlipidemia Mother      Hypertension Mother      Heart Disease Mother      Hyperlipidemia Father      Hypertension Father      Coronary Artery Disease Father      Cerebrovascular Disease Brother      Depression Brother      No Known Problems Sister      Pulmonary Hypertension No family hx of      Congenital heart disease No family hx of        Advance Care Planning:  Advance Directive:    " "Currently completed and on file at [facility]  Health Care Agent Contact Information: Wife Kaley is primary, with 2 of his daughters named as alternates.  POLST:   None    Allergies   Allergen Reactions     Furosemide Muscle Pain (Myalgia)     Previously tolerated.  Muscle cramps     Hydrochlorothiazide Unknown     Iodinated Contrast Media [Diagnostic X-Ray Materials] Nausea     Losartan Other (See Comments)     Other reaction(s): Stomatitis, Bloody nose dry mouth and lips     Losartan-Hydrochlorothiazide [Hyzaar]      Mouth sores     Metaxalone Nausea     Metolazone Other (See Comments)     Muscle cramps     Mometasone Other (See Comments)     Bloody nose     Penicillins Other (See Comments)     Immune-does not work for him     Rabeprazole Other (See Comments)     Mouth sores     Ramipril Other (See Comments)     Mouth sores     Shellfish Containing Products [Shellfish-Derived Products] Unknown     Other reaction(s): mouth sores, Other reaction(s): mouth sores     Methocarbamol Rash     Current Outpatient Medications   Medication Sig Dispense Refill     ACCU-CHEK JOSE PLUS TEST STRP strips [ACCU-CHEK JOSE PLUS TEST STRP STRIPS] see administration instructions.       acetaminophen (TYLENOL) 500 MG tablet [ACETAMINOPHEN (TYLENOL) 500 MG TABLET] Take 1,000 mg by mouth every 6 (six) hours as needed. First line of pain management. Do Not take more than 4,000 mg in 24 hours.       albuterol (PROVENTIL HFA;VENTOLIN HFA) 90 mcg/actuation inhaler Inhale 2 puffs into the lungs every 6 hours as needed        apixaban (ELIQUIS) 5 mg Tab [APIXABAN (ELIQUIS) 5 MG TAB] Take 1 tablet by mouth every 12 (twelve) hours.       aspirin 81 MG EC tablet [ASPIRIN 81 MG EC TABLET] Take 81 mg by mouth every morning.        atorvastatin (LIPITOR) 40 MG tablet Take 40 mg by mouth every morning   4     BD ULTRA-FINE MANISHA PEN NEEDLES 32 gauge x 5/32\" Ndle [BD ULTRA-FINE MANISHA PEN NEEDLES 32 GAUGE X 5/32\" NDLE]   4     bumetanide " (BUMEX) 1 MG tablet Take 3 mg by mouth 2 times daily   0     canagliflozin (INVOKANA) 100 mg Tab Take 100 mg by mouth every morning        clopidogreL (PLAVIX) 75 mg tablet [CLOPIDOGREL (PLAVIX) 75 MG TABLET] Take 1 tablet by mouth daily.       digoxin (LANOXIN) 125 mcg tablet [DIGOXIN (LANOXIN) 125 MCG TABLET] Take 1 tablet (125 mcg total) by mouth daily. 90 tablet 3     fluticasone-vilanterol (BREO ELLIPTA) 200-25 mcg/dose DsDv inhaler [FLUTICASONE-VILANTEROL (BREO ELLIPTA) 200-25 MCG/DOSE DSDV INHALER] Inhale 1 puff daily.       Garlic 1000 MG CAPS Take 1 capsule by mouth daily       HYDROcodone-acetaminophen (NORCO) 5-325 MG tablet Take 1 tablet by mouth every 6 hours as needed for severe pain       insulin glargine (LANTUS PEN) 100 UNIT/ML pen Inject 30 Units Subcutaneous 2 times daily        ipratropium - albuterol 0.5 mg/2.5 mg/3 mL (DUONEB) 0.5-2.5 (3) MG/3ML neb solution Take 1 vial by nebulization 4 times daily       irbesartan (AVAPRO) 300 MG tablet Take 300 mg by mouth daily        lidocaine (LIDODERM) 5 % [LIDOCAINE (LIDODERM) 5 %] Place 1 patch on the skin as needed. Remove & Discard patch within 12 hours or as directed by MD       metFORMIN (GLUCOPHAGE-XR) 500 MG 24 hr tablet Take 1,000 mg by mouth daily (with dinner)        metoprolol tartrate (LOPRESSOR) 50 MG tablet Take 50 mg by mouth 2 times daily (with meals)       multivitamin w/minerals (THERA-VIT-M) tablet Take 1 tablet by mouth daily       nitroglycerin (NITROSTAT) 0.4 MG SL tablet [NITROGLYCERIN (NITROSTAT) 0.4 MG SL TABLET] Place 0.4 mg under the tongue every 5 (five) minutes as needed for chest pain.       omeprazole (PRILOSEC) 20 MG DR capsule Take 20 mg by mouth daily       OXYGEN-AIR DELIVERY SYSTEMS MISC [OXYGEN-AIR DELIVERY SYSTEMS MISC] Use As Directed.       potassium chloride (K-DUR,KLOR-CON) 20 MEQ tablet Take 40 mEq by mouth daily        predniSONE (DELTASONE) 20 MG tablet Take two tablets (= 40mg) each day for 5 (five) days 10  tablet 0     rOPINIRole (REQUIP) 2 MG tablet [ROPINIROLE (REQUIP) 2 MG TABLET] Take 2 mg by mouth at bedtime.        tadalafil (CIALIS) 20 MG tablet Take 20 mg by mouth daily       vitamin C (ASCORBIC ACID) 1000 MG TABS Take 1,000 mg by mouth daily       zinc gluconate 50 MG tablet Take 50 mg by mouth daily       zolpidem (AMBIEN) 10 mg tablet [ZOLPIDEM (AMBIEN) 10 MG TABLET] Take 10 mg by mouth bedtime.       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 They put him on sildenafil as nitroprusside lowered systemic BP and with that the mean PA dropped from 54 to 49. Negative VQ at [facility] Dec 1, 2017.     RLS (restless legs syndrome)      Sleep apnea      Past Surgical History:   Procedure Laterality Date     BACK SURGERY      lower back     CARDIAC CATHETERIZATION  12/13/2017    Right and left at [facility], mean PA 58, PCW 24 with V wave of 35, LVEDP of 18, with Nipride systemic BP, PVR and mean PA all declined     CARDIOVERSION  03/15/2013    for afib     CATARACT EXTRACTION Bilateral      CORONARY STENT PLACEMENT       IR ABDOMINAL AORTOGRAM  11/16/2012     IR MISCELLANEOUS PROCEDURE  7/20/2001     IR MISCELLANEOUS PROCEDURE  11/16/2012     OTHER SURGICAL HISTORY      mynor     SHOULDER SURGERY      reapir on right shoulder     TOTAL HIP ARTHROPLASTY Left      TOTAL KNEE ARTHROPLASTY Bilateral      WRIST SURGERY Bilateral      ZZHC COLONOSCOPY W/WO BRUSH/WASH N/A 1/14/2021    Procedure: COLONOSCOPY;  Surgeon: Mihai Harris MD;  Location: [facility];  Service: Gastroenterology       Palliative  Symptom Review (0=no symptom/no concern, 1=mild, 2=moderate, 3=severe):      Pain: Chest pressure with exertion      Fatigue: Yes, related to dyspnea with exertion      Shortness of Breath: Dyspnea on exertion, only able to walk 20 to 30 feet without dyspnea.  Requires oxygen 24/7.     Denies any other current symptoms    /61   Pulse 61   Temp 97.8  F (36.6  C)   Resp 18   Wt 107.5 kg (237 lb 1.6 oz)   SpO2 99%   BMI 44.80 kg/m      GENERAL: Comfortable-appearing, alert and no distress  EYES: Eyes grossly normal to inspection, conjunctivae and sclerae normal  Hearing intact.   RESP: Currently on 3 L of oxygen by nasal cannula.  Able to speak in full sentences without difficulty.  No evidence of respiratory distress.  SKIN: no suspicious lesions or rashes on exposed skin  NEURO: Cranial nerves grossly intact, mentation intact and speech normal.  No tremor.   PSYCH: mentation appears normal, affect normal/bright and mood-congruent, judgement and insight intact, normal speech and appearance     Wt Readings from Last 10 Encounters:   10/12/21 104.3 kg (230 lb)   08/30/21 115.2 kg (254 lb)   08/25/21 115.2 kg (254 lb)   08/22/21 108.9 kg (240 lb)   07/13/21 105.7 kg (233 lb)   02/05/21 107 kg (236 lb)   01/14/21 104.3 kg (230 lb)   06/22/20 102.5 kg (226 lb)   06/04/20 101.2 kg (223 lb)   02/27/20 112.9 kg (249 lb)       Data Reviewed:  LABS:   Recent Labs   Lab Test 10/12/21  1429 09/13/21  1627    140   POTASSIUM 4.2 4.0   CHLORIDE 106 103   CO2 23 22   ANIONGAP 9 15   * 114   BUN 27 44*   CR 1.39* 1.91*   ANNMARIE 9.0 8.2*       Impressions, Recommendations & Counseling:  Palliative Performance Score: 60 to 70%    Decision Making Capacity: Full    Red Sutherland is a 81 year old with history of COPD, CHF, and recent COVID-19 infection which has significantly worsened his respiratory status and resulted in significant dyspnea on exertion.  The patient is quite clear at this time that his goals  of care are restorative.  He would want resuscitative attempts as well as intubation.    He is having symptoms of dyspnea on exertion and easy bleeding from varicosities and from his naris.  I discussed the patient's dyspnea on exertion with him and explained that the appropriate treatment for this was of the oxygen which she is already using.  I explained that if he had significant progression of his disease and began having dyspnea at rest or significantly reduced function, we could consider use of opioids for air hunger. At this time he would likely not benefit considering the intermittent nature and moderate severity of his symptoms.  I discussed the bleeding episodes the patient is having and acknowledge that they are quite frustrating, but also explained to him that due to his current use of a blood thinner there was no good alternative to the measures that he is taking at home and going to the emergency department if these are ineffective.    The patient, his wife and I had a discussion about advanced care planning.  The patient has completed healthcare directive and named his wife as primary decision maker, with 2 of his daughters as alternates.  I encouraged them to have discussions as a family about what he would or would not want for medical treatment, and explained that this may help family members who have to make decisions for him if he is not able to make them for himself.    At this time this patient does not appear to have any unmet palliative care needs.  I discussed instances where we could be of assistance to him, and asked him to either call our clinic or to follow-up with the [facility] palliative care service if any of these came up.  Otherwise, I think that he can continue to see his primary care provider, pulmonologist, and cardiologist for further management of his health conditions.    1.  Recommend patient and family discuss advance care planning and desires for treatment in the  event of serious illness.    2.  Patient may follow-up with our clinic if he has any unmet palliative care needs.  He may also follow-up with [facility] palliative care service if that is more convenient for him.    Jacinto Peterson,    Hospice and Palliative Medicine Fellow      Patient seen and evaluated with Dr. Peterson.    Agree with assessment and recommendations.     Kori Mora MD  Palliative Medicine  Pager [phone]

## 2021-10-21 NOTE — PATIENT INSTRUCTIONS
Thank you for seeing the Palliative Care Clinic today.      1.  As we discussed, we recommend having a conversation with your wife and the rest of your family about your wishes for life-sustaining treatment if you are unable to speak for yourself.  You can use the worksheet that we gave you as a starting point for that discussion.  2.  You can follow-up with our clinic as needed for any uncontrolled symptoms or if you are having difficulty making medical decisions.  You can also follow-up with the [facility] palliative care service if that is more convenient for you.    Return to clinic as needed for a follow-up.      You can reach the Palliative Care Team during business hours at the following numbers:   -For the [facility], call [phone]  -To reach the palliative RN for questions or refills, call [phone]       To reach the Palliative Care Provider on-call After-hours or on holidays and weekends, call: [phone].  Please note that we are not able to provide pain medication refills on evenings or weekends.

## 2021-10-21 NOTE — NURSING NOTE
"Oncology Rooming Note    October 21, 2021 12:54 PM   Red Sutherland is a 81 year old male who presents for:    Chief Complaint   Patient presents with     Oncology Clinic Visit     [facility] NEW - SEVERE PULMONARY HYPERTENSION , DYSPNEA ON EXERTION     Initial Vitals: /61   Pulse 61   Temp 97.8  F (36.6  C)   Resp 18   Wt 107.5 kg (237 lb 1.6 oz)   SpO2 99%   BMI 44.80 kg/m   Estimated body mass index is 44.8 kg/m  as calculated from the following:    Height as of 10/7/21: 1.549 m (5' 1\").    Weight as of this encounter: 107.5 kg (237 lb 1.6 oz). Body surface area is 2.15 meters squared.  No Pain (0) Comment: Data Unavailable   No LMP for male patient.  Allergies reviewed: Yes  Medications reviewed: Yes    Medications: Medication refills not needed today.  Pharmacy name entered into [facility]:    [id number] [place], [place] - [address] [id number] [place], [place] [facility] [address] [place] [address]    Clinical concerns: No new concerns. Nicholas was notified.      Sajan Thomas, TY            "

## 2021-10-23 ENCOUNTER — APPOINTMENT (OUTPATIENT)
Dept: RADIOLOGY | Facility: HOSPITAL | Age: 81
DRG: 247 | End: 2021-10-23
Attending: EMERGENCY MEDICINE
Payer: COMMERCIAL

## 2021-10-23 ENCOUNTER — HOSPITAL ENCOUNTER (INPATIENT)
Facility: HOSPITAL | Age: 81
LOS: 2 days | Discharge: HOME | DRG: 247 | End: 2021-10-26
Attending: EMERGENCY MEDICINE | Admitting: FAMILY MEDICINE
Payer: COMMERCIAL

## 2021-10-23 DIAGNOSIS — I50.33 ACUTE ON CHRONIC DIASTOLIC CONGESTIVE HEART FAILURE (H): ICD-10-CM

## 2021-10-23 DIAGNOSIS — R07.9 CHEST PAIN, UNSPECIFIED TYPE: ICD-10-CM

## 2021-10-23 DIAGNOSIS — Z79.4 TYPE 2 DIABETES MELLITUS WITH OTHER SPECIFIED COMPLICATION, WITH LONG-TERM CURRENT USE OF INSULIN (H): ICD-10-CM

## 2021-10-23 DIAGNOSIS — I21.4 NSTEMI (NON-ST ELEVATED MYOCARDIAL INFARCTION) (H): Primary | ICD-10-CM

## 2021-10-23 DIAGNOSIS — E11.69 TYPE 2 DIABETES MELLITUS WITH OTHER SPECIFIED COMPLICATION, WITH LONG-TERM CURRENT USE OF INSULIN (H): ICD-10-CM

## 2021-10-23 LAB
ALBUMIN SERPL-MCNC: 3.1 G/DL (ref 3.5–5)
ALP SERPL-CCNC: 116 U/L (ref 45–120)
ALT SERPL W P-5'-P-CCNC: 15 U/L (ref 0–45)
ANION GAP SERPL CALCULATED.3IONS-SCNC: 8 MMOL/L (ref 5–18)
AST SERPL W P-5'-P-CCNC: 26 U/L (ref 0–40)
BASOPHILS # BLD AUTO: 0 10E3/UL (ref 0–0.2)
BASOPHILS NFR BLD AUTO: 0 %
BILIRUB SERPL-MCNC: 1.9 MG/DL (ref 0–1)
BNP SERPL-MCNC: 445 PG/ML (ref 0–84)
BUN SERPL-MCNC: 32 MG/DL (ref 8–28)
CALCIUM SERPL-MCNC: 8.7 MG/DL (ref 8.5–10.5)
CHLORIDE BLD-SCNC: 107 MMOL/L (ref 98–107)
CO2 SERPL-SCNC: 26 MMOL/L (ref 22–31)
CREAT SERPL-MCNC: 1.35 MG/DL (ref 0.7–1.3)
EOSINOPHIL # BLD AUTO: 0.2 10E3/UL (ref 0–0.7)
EOSINOPHIL NFR BLD AUTO: 2 %
ERYTHROCYTE [DISTWIDTH] IN BLOOD BY AUTOMATED COUNT: 22.7 % (ref 10–15)
GFR SERPL CREATININE-BSD FRML MDRD: 50 ML/MIN/1.73M2
GLUCOSE BLD-MCNC: 94 MG/DL (ref 70–125)
HBA1C MFR BLD: 6.3 %
HCT VFR BLD AUTO: 31.5 % (ref 40–53)
HGB BLD-MCNC: 9.2 G/DL (ref 13.3–17.7)
IMM GRANULOCYTES # BLD: 0.1 10E3/UL
IMM GRANULOCYTES NFR BLD: 1 %
LYMPHOCYTES # BLD AUTO: 1 10E3/UL (ref 0.8–5.3)
LYMPHOCYTES NFR BLD AUTO: 10 %
MAGNESIUM SERPL-MCNC: 2.2 MG/DL (ref 1.8–2.6)
MCH RBC QN AUTO: 22.7 PG (ref 26.5–33)
MCHC RBC AUTO-ENTMCNC: 29.2 G/DL (ref 31.5–36.5)
MCV RBC AUTO: 78 FL (ref 78–100)
MONOCYTES # BLD AUTO: 0.7 10E3/UL (ref 0–1.3)
MONOCYTES NFR BLD AUTO: 7 %
NEUTROPHILS # BLD AUTO: 7.6 10E3/UL (ref 1.6–8.3)
NEUTROPHILS NFR BLD AUTO: 80 %
NRBC # BLD AUTO: 0 10E3/UL
NRBC BLD AUTO-RTO: 0 /100
PLATELET # BLD AUTO: 246 10E3/UL (ref 150–450)
POTASSIUM BLD-SCNC: 3.8 MMOL/L (ref 3.5–5)
PROT SERPL-MCNC: 5.9 G/DL (ref 6–8)
RBC # BLD AUTO: 4.06 10E6/UL (ref 4.4–5.9)
SARS-COV-2 RNA RESP QL NAA+PROBE: NEGATIVE
SODIUM SERPL-SCNC: 141 MMOL/L (ref 136–145)
TROPONIN I SERPL-MCNC: 0.03 NG/ML (ref 0–0.29)
WBC # BLD AUTO: 9.5 10E3/UL (ref 4–11)

## 2021-10-23 PROCEDURE — 93005 ELECTROCARDIOGRAM TRACING: CPT | Performed by: EMERGENCY MEDICINE

## 2021-10-23 PROCEDURE — 99220 PR INITIAL OBSERVATION CARE,LEVEL III: CPT | Performed by: FAMILY MEDICINE

## 2021-10-23 PROCEDURE — G0378 HOSPITAL OBSERVATION PER HR: HCPCS

## 2021-10-23 PROCEDURE — 36415 COLL VENOUS BLD VENIPUNCTURE: CPT | Performed by: EMERGENCY MEDICINE

## 2021-10-23 PROCEDURE — 83735 ASSAY OF MAGNESIUM: CPT | Performed by: EMERGENCY MEDICINE

## 2021-10-23 PROCEDURE — 83036 HEMOGLOBIN GLYCOSYLATED A1C: CPT | Performed by: FAMILY MEDICINE

## 2021-10-23 PROCEDURE — 80053 COMPREHEN METABOLIC PANEL: CPT | Performed by: EMERGENCY MEDICINE

## 2021-10-23 PROCEDURE — 84484 ASSAY OF TROPONIN QUANT: CPT | Performed by: EMERGENCY MEDICINE

## 2021-10-23 PROCEDURE — 96375 TX/PRO/DX INJ NEW DRUG ADDON: CPT

## 2021-10-23 PROCEDURE — 96365 THER/PROPH/DIAG IV INF INIT: CPT

## 2021-10-23 PROCEDURE — C9803 HOPD COVID-19 SPEC COLLECT: HCPCS

## 2021-10-23 PROCEDURE — 71045 X-RAY EXAM CHEST 1 VIEW: CPT

## 2021-10-23 PROCEDURE — 87635 SARS-COV-2 COVID-19 AMP PRB: CPT | Performed by: EMERGENCY MEDICINE

## 2021-10-23 PROCEDURE — 85025 COMPLETE CBC W/AUTO DIFF WBC: CPT | Performed by: EMERGENCY MEDICINE

## 2021-10-23 PROCEDURE — 96366 THER/PROPH/DIAG IV INF ADDON: CPT

## 2021-10-23 PROCEDURE — 250N000011 HC RX IP 250 OP 636: Performed by: EMERGENCY MEDICINE

## 2021-10-23 PROCEDURE — 99285 EMERGENCY DEPT VISIT HI MDM: CPT | Mod: 25

## 2021-10-23 PROCEDURE — 83880 ASSAY OF NATRIURETIC PEPTIDE: CPT | Performed by: EMERGENCY MEDICINE

## 2021-10-23 RX ORDER — BUMETANIDE 0.25 MG/ML
0.5 INJECTION INTRAMUSCULAR; INTRAVENOUS ONCE
Status: COMPLETED | OUTPATIENT
Start: 2021-10-23 | End: 2021-10-23

## 2021-10-23 RX ORDER — MULTIVIT WITH MINERALS/LUTEIN
1000 TABLET ORAL DAILY
Status: DISCONTINUED | OUTPATIENT
Start: 2021-10-24 | End: 2021-10-26 | Stop reason: HOSPADM

## 2021-10-23 RX ORDER — POTASSIUM CHLORIDE 1500 MG/1
40 TABLET, EXTENDED RELEASE ORAL DAILY
Status: DISCONTINUED | OUTPATIENT
Start: 2021-10-24 | End: 2021-10-26 | Stop reason: HOSPADM

## 2021-10-23 RX ORDER — BUMETANIDE 0.25 MG/ML
0.5 INJECTION INTRAMUSCULAR; INTRAVENOUS ONCE
Status: DISCONTINUED | OUTPATIENT
Start: 2021-10-23 | End: 2021-10-23

## 2021-10-23 RX ORDER — BUMETANIDE 1 MG/1
3-4 TABLET ORAL 2 TIMES DAILY
Status: DISCONTINUED | OUTPATIENT
Start: 2021-10-23 | End: 2021-10-23

## 2021-10-23 RX ORDER — PROCHLORPERAZINE 25 MG
12.5 SUPPOSITORY, RECTAL RECTAL EVERY 12 HOURS PRN
Status: DISCONTINUED | OUTPATIENT
Start: 2021-10-23 | End: 2021-10-26 | Stop reason: HOSPADM

## 2021-10-23 RX ORDER — AMLODIPINE BESYLATE 5 MG/1
10 TABLET ORAL DAILY
Status: DISCONTINUED | OUTPATIENT
Start: 2021-10-24 | End: 2021-10-26 | Stop reason: HOSPADM

## 2021-10-23 RX ORDER — ALBUTEROL SULFATE 90 UG/1
2 AEROSOL, METERED RESPIRATORY (INHALATION) EVERY 6 HOURS PRN
Status: DISCONTINUED | OUTPATIENT
Start: 2021-10-23 | End: 2021-10-26 | Stop reason: HOSPADM

## 2021-10-23 RX ORDER — IRBESARTAN 300 MG/1
300 TABLET ORAL DAILY
Status: DISCONTINUED | OUTPATIENT
Start: 2021-10-24 | End: 2021-10-26 | Stop reason: HOSPADM

## 2021-10-23 RX ORDER — FUROSEMIDE 10 MG/ML
40 INJECTION INTRAMUSCULAR; INTRAVENOUS ONCE
Status: DISCONTINUED | OUTPATIENT
Start: 2021-10-23 | End: 2021-10-23

## 2021-10-23 RX ORDER — ROPINIROLE 1 MG/1
2 TABLET, FILM COATED ORAL AT BEDTIME
Status: DISCONTINUED | OUTPATIENT
Start: 2021-10-23 | End: 2021-10-26 | Stop reason: HOSPADM

## 2021-10-23 RX ORDER — ZOLPIDEM TARTRATE 5 MG/1
10 TABLET ORAL AT BEDTIME
Status: DISCONTINUED | OUTPATIENT
Start: 2021-10-23 | End: 2021-10-26 | Stop reason: HOSPADM

## 2021-10-23 RX ORDER — NICOTINE POLACRILEX 4 MG
15-30 LOZENGE BUCCAL
Status: DISCONTINUED | OUTPATIENT
Start: 2021-10-23 | End: 2021-10-26 | Stop reason: HOSPADM

## 2021-10-23 RX ORDER — BUMETANIDE 1 MG/1
3 TABLET ORAL DAILY
Status: DISCONTINUED | OUTPATIENT
Start: 2021-10-24 | End: 2021-10-26 | Stop reason: HOSPADM

## 2021-10-23 RX ORDER — DIGOXIN 125 MCG
125 TABLET ORAL DAILY
Status: DISCONTINUED | OUTPATIENT
Start: 2021-10-24 | End: 2021-10-26 | Stop reason: HOSPADM

## 2021-10-23 RX ORDER — PROCHLORPERAZINE MALEATE 5 MG
5 TABLET ORAL EVERY 6 HOURS PRN
Status: DISCONTINUED | OUTPATIENT
Start: 2021-10-23 | End: 2021-10-26 | Stop reason: HOSPADM

## 2021-10-23 RX ORDER — ACETAMINOPHEN 325 MG/1
975 TABLET ORAL EVERY 8 HOURS PRN
Status: DISCONTINUED | OUTPATIENT
Start: 2021-10-23 | End: 2021-10-25

## 2021-10-23 RX ORDER — METFORMIN HCL 500 MG
1000 TABLET, EXTENDED RELEASE 24 HR ORAL
Status: DISCONTINUED | OUTPATIENT
Start: 2021-10-24 | End: 2021-10-26 | Stop reason: HOSPADM

## 2021-10-23 RX ORDER — BUMETANIDE 1 MG/1
4 TABLET ORAL DAILY
Status: DISCONTINUED | OUTPATIENT
Start: 2021-10-24 | End: 2021-10-26 | Stop reason: HOSPADM

## 2021-10-23 RX ORDER — METOPROLOL TARTRATE 25 MG/1
50 TABLET, FILM COATED ORAL 2 TIMES DAILY WITH MEALS
Status: DISCONTINUED | OUTPATIENT
Start: 2021-10-24 | End: 2021-10-26 | Stop reason: HOSPADM

## 2021-10-23 RX ORDER — ATORVASTATIN CALCIUM 40 MG/1
40 TABLET, FILM COATED ORAL EVERY MORNING
Status: DISCONTINUED | OUTPATIENT
Start: 2021-10-24 | End: 2021-10-25

## 2021-10-23 RX ORDER — MULTIPLE VITAMINS W/ MINERALS TAB 9MG-400MCG
1 TAB ORAL DAILY
Status: DISCONTINUED | OUTPATIENT
Start: 2021-10-24 | End: 2021-10-26 | Stop reason: HOSPADM

## 2021-10-23 RX ORDER — IPRATROPIUM BROMIDE AND ALBUTEROL SULFATE 2.5; .5 MG/3ML; MG/3ML
1 SOLUTION RESPIRATORY (INHALATION) 4 TIMES DAILY
Status: DISCONTINUED | OUTPATIENT
Start: 2021-10-24 | End: 2021-10-24

## 2021-10-23 RX ORDER — HYDROCODONE BITARTRATE AND ACETAMINOPHEN 5; 325 MG/1; MG/1
1 TABLET ORAL EVERY 6 HOURS PRN
Status: DISCONTINUED | OUTPATIENT
Start: 2021-10-23 | End: 2021-10-25 | Stop reason: ALTCHOICE

## 2021-10-23 RX ORDER — DEXTROSE MONOHYDRATE 25 G/50ML
25-50 INJECTION, SOLUTION INTRAVENOUS
Status: DISCONTINUED | OUTPATIENT
Start: 2021-10-23 | End: 2021-10-26 | Stop reason: HOSPADM

## 2021-10-23 RX ORDER — ZINC GLUCONATE 50 MG
50 TABLET ORAL DAILY
Status: DISCONTINUED | OUTPATIENT
Start: 2021-10-24 | End: 2021-10-26 | Stop reason: HOSPADM

## 2021-10-23 RX ORDER — AMLODIPINE BESYLATE 10 MG/1
10 TABLET ORAL DAILY
COMMUNITY
End: 2021-11-30

## 2021-10-23 RX ADMIN — BUMETANIDE 0.5 MG: 0.25 INJECTION INTRAMUSCULAR; INTRAVENOUS at 20:46

## 2021-10-23 ASSESSMENT — ENCOUNTER SYMPTOMS
ABDOMINAL PAIN: 0
CHEST TIGHTNESS: 0
BACK PAIN: 0
DYSURIA: 0
EYE PAIN: 0
WHEEZING: 0
NAUSEA: 0
COUGH: 0
SEIZURES: 0
LIGHT-HEADEDNESS: 0
COLOR CHANGE: 0
PALPITATIONS: 0
VOMITING: 0
ARTHRALGIAS: 0
FEVER: 0
SORE THROAT: 0
HEMATURIA: 0
ANAL BLEEDING: 0
DIARRHEA: 0
BLOOD IN STOOL: 0
CONSTIPATION: 0
CHILLS: 0
SHORTNESS OF BREATH: 0

## 2021-10-23 ASSESSMENT — MIFFLIN-ST. JEOR: SCORE: 1771.33

## 2021-10-23 NOTE — ED TRIAGE NOTES
Patient presents via EMS with chest pain that started earlier today. Patient went outside to get mail and after coming inside started to have chest pain 9/10 that radiated to back and into hips.Patient self administered one nitroglycerin; pain lowered to 3/10. Currently patient denies chest pain. Patient has a heart history; previous MI. Patient denies N/V and shortness of breath. EMS did administer fluids and 324 mg ASA.

## 2021-10-23 NOTE — ED NOTES
Bed: JNEDH-02  Expected date: 10/23/21  Expected time: 4:55 PM  Means of arrival: Ambulance  Comments:  Yen MADSEN CP, self administered  nitroglycerin pain resolved, EKG WNL.

## 2021-10-23 NOTE — Clinical Note
Single balloon inflation. The first balloon was inserted into the circumflex and proximal circumflex.Max pressure = 12 salazar. Total duration = 24 seconds.     Max pressure = 20 salazar. Total duration = 30 seconds.    Balloon reinflated a second time: Max pressure = 20 salazar. Total duration = 30 seconds.  Balloon reinflated a third time: Max pressure = 20 salazar. Total duration = 22 seconds.

## 2021-10-23 NOTE — ED PROVIDER NOTES
EMERGENCY DEPARTMENT ENCOUNTER      NAME: Red Sutherland  AGE: 81 year old male  YOB: 1940  MRN: [id number]  EVALUATION DATE & TIME: 10/23/2021  5:28 PM    PCP: Haresh Corona    ED PROVIDER: Zackery Rose M.D.      Chief Complaint   Patient presents with     Chest Pain         FINAL IMPRESSION:  Acute chest pain  Pulmonary edema    ED COURSE & MEDICAL DECISION MAKING:    Pertinent Labs & Imaging studies reviewed. (See chart for details)  81 year old male presents to the Emergency Department for evaluation of chest pain.  Patient with underlying COPD and coronary artery disease with previous MI and stenting.  Patient had discontinued his oxygen to go out and get the mail.  He has returned to the home patient had severe chest discomfort.  Reports it started between his shoulder blades and radiated down his back.  Patient took nitroglycerin at home with some improvement called paramedics.  Paramedics gave him 4 baby aspirin but no additional nitroglycerin.  On arrival he reports his symptoms have subsided.  Total episode lasted more than 30 minutes.  Patient reports having a similar episode earlier in the week under similar circumstances.  On exam he is a elderly male in mild distress.  Patient on supplemental oxygen.  Vital signs normal.  Lungs slightly diminished with mild coarse cough.  Cardiac exam with 2/6 murmur.  Trace lower extremity edema which patient reports is unremarkable.  Primary concern is of ACS given his symptomatology and improvement with nitro.  Initial EKG is reassuring.  Patient with accelerated junctional rhythm.  However this essentially unchanged from October 12, 2021.  Will obtain baseline laboratory evaluation.  Patient will require hospitalization for serial cardiac enzymes.  5:44PM I met with the patient for the initial interview and physical examination. Discussed plan for treatment and workup in the ED.    7:22 PM.  Laboratory evaluation remarkable for moderate  "elevation of BNP at 445.  Patient does have a history of congestive heart failure.  Diuresis initiated with intravenous Bumex x0.5 mg.  Troponin is normal.  Magnesium is normal.  Patient with mild anemia with hemoglobin of 9.2.  This is actually improved compared to results of 11 days ago.  Patient remains pain-free but given symptoms we will proceed with admission.  Call placed to admitting physician.  7:38 PM I spoke with Dr. Chanel, hospitalist who agrees to admit the patient.   At the conclusion of the encounter I discussed the results of all of the tests and the disposition. The questions were answered and return precautions provided. The patient or family acknowledged understanding and was agreeable with the care plan.       PPE: Provider wore gloves, N95 mask, eye protection, surgical cap.     MEDICATIONS GIVEN IN THE EMERGENCY:  Medications - No data to display    NEW PRESCRIPTIONS STARTED AT TODAY'S ER VISIT  New Prescriptions    No medications on file          =================================================================    HPI    Patient information was obtained from: patient, wife, triage note    Use of Intrepreter: N/A         Red Sutherland is a 81 year old male with a pertient medical history of CAD s/p stenting, previous MI, CHF, hypertension, atrial fibrillation, COPD, chronic bronchitis, NOVA, pulmonary hypertension, hyperlipidemia, DM2, and CKD3 who presents to the ED via EMS accompanied by wife for evaluation of chest pain.     Patient took his O2 off temporarily, rode his ATV to get the mail, and developed chest pain around 1630. Per triage note, pain was rated 9/10, patient took 1 NTG, and pain improved to 3/10. Pain was mostly located in the middle of his chest, but radiated through to his \"spine\" and down to bilateral hips. Unable to describe the quality of pain, responding with \"it hurt.\" Estimates that pain lasted a total of 20-30 minutes. Denies associated nausea, vomiting, " "shortness of breath, or diaphoresis. Has a history of MI with most recent one reportedly in September, but unsure if pain today is similar because \"I was asleep when I had that one.\" Prior to today, he has not used his NTG since 1998. No stenting with most recent MI, although believes he had stents after MI in ~1998. Also adds that he had a similar episode of chest pain 2-3 days ago after he took off his supplemental O2, but it resolved after he laid down. Has been on O2 since mid September 2021 for COPD. Medics gave 324mg ASA en route. Has chronic leg swelling (L>R) but no recent changes. Patient does not have any other associated complaints or concerns at this time.       REVIEW OF SYSTEMS   Constitutional:  Denies fever, chills, diaphoresis  Respiratory:  Denies productive cough or increased work of breathing  Cardiovascular:  Denies palpitations. Reports chest pain (radiating to back and hips; since resolved) and leg swelling (chronic; unchanged).  GI:  Denies abdominal pain, nausea, vomiting, or change in bowel or bladder habits   Musculoskeletal:  Denies any new muscle/joint swelling  Skin:  Denies rash   Neurologic:  Denies focal weakness  All systems negative except as marked.     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 They put him on sildenafil as nitroprusside lowered systemic BP and with that the mean PA dropped from 54 to 49. Negative VQ at [place] Dec 1, 2017.     RLS (restless legs syndrome)      Sleep apnea        PAST SURGICAL HISTORY:  Past Surgical " History:   Procedure Laterality Date     BACK SURGERY      lower back     CARDIAC CATHETERIZATION  12/13/2017    Right and left at [place], mean PA 58, PCW 24 with V wave of 35, LVEDP of 18, with Nipride systemic BP, PVR and mean PA all declined     CARDIOVERSION  03/15/2013    for afib     CATARACT EXTRACTION Bilateral      CORONARY STENT PLACEMENT       IR ABDOMINAL AORTOGRAM  11/16/2012     IR MISCELLANEOUS PROCEDURE  7/20/2001     IR MISCELLANEOUS PROCEDURE  11/16/2012     OTHER SURGICAL HISTORY      [place]     SHOULDER SURGERY      reapir on right shoulder     TOTAL HIP ARTHROPLASTY Left      TOTAL KNEE ARTHROPLASTY Bilateral      WRIST SURGERY Bilateral      ZZHC COLONOSCOPY W/WO BRUSH/WASH N/A 1/14/2021    Procedure: COLONOSCOPY;  Surgeon: Mihai Harris MD;  Location: [facility];  Service: Gastroenterology         CURRENT MEDICATIONS:    No current facility-administered medications for this encounter.    Current Outpatient Medications:      acetaminophen (TYLENOL) 500 MG tablet, [ACETAMINOPHEN (TYLENOL) 500 MG TABLET] Take 1,000 mg by mouth every 6 (six) hours as needed. First line of pain management. Do Not take more than 4,000 mg in 24 hours., Disp: , Rfl:      albuterol (PROVENTIL HFA;VENTOLIN HFA) 90 mcg/actuation inhaler, Inhale 2 puffs into the lungs every 6 hours as needed , Disp: , Rfl:      amLODIPine (NORVASC) 10 MG tablet, Take 10 mg by mouth daily, Disp: , Rfl:      apixaban (ELIQUIS) 5 mg Tab, [APIXABAN (ELIQUIS) 5 MG TAB] Take 1 tablet by mouth every 12 (twelve) hours., Disp: , Rfl:      atorvastatin (LIPITOR) 40 MG tablet, Take 40 mg by mouth every morning , Disp: , Rfl: 4     bumetanide (BUMEX) 1 MG tablet, Take 3-4 mg by mouth 2 times daily 4 mg in am and 3 mg in afternoon per pt, Disp: , Rfl: 0     canagliflozin (INVOKANA) 100 mg Tab, Take 100 mg by mouth every morning , Disp: , Rfl:      clopidogreL (PLAVIX) 75 mg tablet, [CLOPIDOGREL (PLAVIX) 75 MG TABLET] Take 1 tablet by mouth  daily., Disp: , Rfl:      digoxin (LANOXIN) 125 mcg tablet, [DIGOXIN (LANOXIN) 125 MCG TABLET] Take 1 tablet (125 mcg total) by mouth daily., Disp: 90 tablet, Rfl: 3     fluticasone-vilanterol (BREO ELLIPTA) 200-25 mcg/dose DsDv inhaler, Inhale 1 puff into the lungs daily as needed , Disp: , Rfl:      Garlic 1000 MG CAPS, Take 1 capsule by mouth daily, Disp: , Rfl:      HYDROcodone-acetaminophen (NORCO) 5-325 MG tablet, Take 1 tablet by mouth every 6 hours as needed for severe pain, Disp: , Rfl:      insulin glargine (LANTUS PEN) 100 UNIT/ML pen, Inject 30 Units Subcutaneous 2 times daily , Disp: , Rfl:      ipratropium - albuterol 0.5 mg/2.5 mg/3 mL (DUONEB) 0.5-2.5 (3) MG/3ML neb solution, Take 1 vial by nebulization 4 times daily, Disp: , Rfl:      irbesartan (AVAPRO) 300 MG tablet, Take 300 mg by mouth daily , Disp: , Rfl:      lidocaine (LIDODERM) 5 %, [LIDOCAINE (LIDODERM) 5 %] Place 1 patch on the skin as needed. Remove & Discard patch within 12 hours or as directed by MD, Disp: , Rfl:      metFORMIN (GLUCOPHAGE-XR) 500 MG 24 hr tablet, Take 1,000 mg by mouth daily (with dinner) , Disp: , Rfl:      metoprolol tartrate (LOPRESSOR) 50 MG tablet, Take 50 mg by mouth 2 times daily (with meals), Disp: , Rfl:      multivitamin w/minerals (THERA-VIT-M) tablet, Take 1 tablet by mouth daily, Disp: , Rfl:      nitroglycerin (NITROSTAT) 0.4 MG SL tablet, [NITROGLYCERIN (NITROSTAT) 0.4 MG SL TABLET] Place 0.4 mg under the tongue every 5 (five) minutes as needed for chest pain., Disp: , Rfl:      omeprazole (PRILOSEC) 20 MG DR capsule, Take 20 mg by mouth daily, Disp: , Rfl:      potassium chloride (K-DUR,KLOR-CON) 20 MEQ tablet, Take 40 mEq by mouth daily , Disp: , Rfl:      rOPINIRole (REQUIP) 2 MG tablet, [ROPINIROLE (REQUIP) 2 MG TABLET] Take 2 mg by mouth at bedtime. , Disp: , Rfl:      tadalafil (CIALIS) 20 MG tablet, Take 20 mg by mouth daily, Disp: , Rfl:      vitamin C (ASCORBIC ACID) 1000 MG TABS, Take 1,000 mg  "by mouth daily, Disp: , Rfl:      zinc gluconate 50 MG tablet, Take 50 mg by mouth daily, Disp: , Rfl:      zolpidem (AMBIEN) 10 mg tablet, [ZOLPIDEM (AMBIEN) 10 MG TABLET] Take 10 mg by mouth bedtime., Disp: , Rfl:      ACCU-CHEK JOSE PLUS TEST STRP strips, [ACCU-CHEK JOSE PLUS TEST STRP STRIPS] see administration instructions., Disp: , Rfl:      BD ULTRA-FINE MANISHA PEN NEEDLES 32 gauge x 5/32\" Ndle, [BD ULTRA-FINE MANISHA PEN NEEDLES 32 GAUGE X 5/32\" NDLE] , Disp: , Rfl: 4     OXYGEN-AIR DELIVERY SYSTEMS MISC, [OXYGEN-AIR DELIVERY SYSTEMS MISC] Use As Directed., Disp: , Rfl:     ALLERGIES:  Allergies   Allergen Reactions     Furosemide Muscle Pain (Myalgia)     Previously tolerated.  Muscle cramps     Hydrochlorothiazide Unknown     Iodinated Contrast Media [Diagnostic X-Ray Materials] Nausea     Losartan Other (See Comments)     Other reaction(s): Stomatitis, Bloody nose dry mouth and lips     Losartan-Hydrochlorothiazide [Hyzaar]      Mouth sores     Metaxalone Nausea     Metolazone Other (See Comments)     Muscle cramps     Mometasone Other (See Comments)     Bloody nose     Penicillins Other (See Comments)     Immune-does not work for him     Rabeprazole Other (See Comments)     Mouth sores     Ramipril Other (See Comments)     Mouth sores     Shellfish Containing Products [Shellfish-Derived Products] Unknown     Other reaction(s): mouth sores, Other reaction(s): mouth sores     Methocarbamol Rash       FAMILY HISTORY:  Family History   Problem Relation Age of Onset     Hyperlipidemia Mother      Hypertension Mother      Heart Disease Mother      Hyperlipidemia Father      Hypertension Father      Coronary Artery Disease Father      Cerebrovascular Disease Brother      Depression Brother      No Known Problems Sister      Pulmonary Hypertension No family hx of      Congenital heart disease No family hx of        SOCIAL HISTORY:   Social History     Socioeconomic History     Marital status:      Spouse " "name: None     Number of children: 4     Years of education: None     Highest education level: None   Occupational History     None   Tobacco Use     Smoking status: Former Smoker     Packs/day: 1.50     Years: 44.00     Pack years: 66.00     Types: Cigarettes     Quit date: 1996     Years since quittin.5     Smokeless tobacco: Never Used   Substance and Sexual Activity     Alcohol use: Yes     Alcohol/week: 1.0 standard drinks     Comment: Alcoholic Drinks/day: 1 per month     Drug use: No     Sexual activity: Not Currently     Partners: Female   Other Topics Concern     None   Social History Narrative     None     Social Determinants of Health     Financial Resource Strain:      Difficulty of Paying Living Expenses:    Food Insecurity:      Worried About Running Out of Food in the Last Year:      Ran Out of Food in the Last Year:    Transportation Needs:      Lack of Transportation (Medical):      Lack of Transportation (Non-Medical):    Physical Activity:      Days of Exercise per Week:      Minutes of Exercise per Session:    Stress:      Feeling of Stress :    Social Connections:      Frequency of Communication with Friends and Family:      Frequency of Social Gatherings with Friends and Family:      Attends [employer] Services:      Active Member of Clubs or Organizations:      Attends Club or Organization Meetings:      Marital Status:    Intimate Partner Violence:      Fear of Current or Ex-Partner:      Emotionally Abused:      Physically Abused:      Sexually Abused:        VITALS:  Patient Vitals for the past 24 hrs:   BP Temp Temp src Pulse Resp SpO2 Height Weight   10/23/21 1927 -- -- -- 72 19 98 % -- --   10/23/21 1915 (!) 144/68 -- -- 69 16 99 % -- --   10/23/21 1900 (!) 147/72 -- -- 71 19 99 % -- --   10/23/21 1845 138/69 -- -- 68 27 99 % -- --   10/23/21 1742 120/62 98.2  F (36.8  C) Oral 70 18 95 % 1.803 m (5' 11\") 103.4 kg (228 lb)        PHYSICAL EXAM    Constitutional:  Awake, alert, in " mild distress  HENT:  Normocephalic, Atraumatic. Bilateral external ears normal. Oropharynx moist. Nose normal. Neck- Normal range of motion with no guarding, No midline cervical tenderness, Supple, No stridor.   Eyes:  PERRL, EOMI with no signs of entrapment, Conjunctiva normal, No discharge.   Respiratory:  Normal breath sounds, No respiratory distress, No wheezing.    Cardiovascular:  Normal heart rate, Normal rhythm. 2/6 systolic ejection murmur.  GI:  Soft, No tenderness, No distension, No palpable masses  Musculoskeletal:  Intact distal pulses, mild lower extremity edema. Good range of motion in all major joints. No tenderness to palpation or major deformities noted.  Integument:  Warm, Dry, No erythema, No rash.   Neurologic:  Alert & oriented, Normal motor function, Normal sensory function, No focal deficits noted.   Psychiatric:  Affect normal, Judgment normal, Mood normal.     LAB:  All pertinent labs reviewed and interpreted.  Results for orders placed or performed during the hospital encounter of 10/23/21   XR Chest Port 1 View     Status: None    Narrative    EXAM: XR CHEST PORT 1 VIEW  LOCATION: [facility]  DATE/TIME: 10/23/2021 6:50 PM    INDICATION: chest pain  COMPARISON: CT pulmonary angiogram 10/12/2021      Impression    IMPRESSION:     Unchanged enlargement of the cardiac silhouette. No new mediastinal border abnormality.    Extensive patchy bilateral airspace opacities are present which have angular, geographic borders. The opacities do not have clear apical or basal zonal predominance.    Probable small residual right pleural effusion. No visible left pleural fluid. No pneumothorax.    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 Degenerative thoracic spine osteophytes. No displaced rib fractures are detected.   Troponin I     Status: Normal   Result Value Ref Range    Troponin I 0.03 0.00 - 0.29 ng/mL   Magnesium     Status: Normal    Result Value Ref Range    Magnesium 2.2 1.8 - 2.6 mg/dL   Comprehensive metabolic panel     Status: Abnormal   Result Value Ref Range    Sodium 141 136 - 145 mmol/L    Potassium 3.8 3.5 - 5.0 mmol/L    Chloride 107 98 - 107 mmol/L    Carbon Dioxide (CO2) 26 22 - 31 mmol/L    Anion Gap 8 5 - 18 mmol/L    Urea Nitrogen 32 (H) 8 - 28 mg/dL    Creatinine 1.35 (H) 0.70 - 1.30 mg/dL    Calcium 8.7 8.5 - 10.5 mg/dL    Glucose 94 70 - 125 mg/dL    Alkaline Phosphatase 116 45 - 120 U/L    AST 26 0 - 40 U/L    ALT 15 0 - 45 U/L    Protein Total 5.9 (L) 6.0 - 8.0 g/dL    Albumin 3.1 (L) 3.5 - 5.0 g/dL    Bilirubin Total 1.9 (H) 0.0 - 1.0 mg/dL    GFR Estimate 50 (L) >60 mL/min/1.73m2   B-Type Natriuretic Peptide ([facility] Only)     Status: Abnormal   Result Value Ref Range     (H) 0 - 84 pg/mL   CBC with platelets and differential     Status: Abnormal   Result Value Ref Range    WBC Count 9.5 4.0 - 11.0 10e3/uL    RBC Count 4.06 (L) 4.40 - 5.90 10e6/uL    Hemoglobin 9.2 (L) 13.3 - 17.7 g/dL    Hematocrit 31.5 (L) 40.0 - 53.0 %    MCV 78 78 - 100 fL    MCH 22.7 (L) 26.5 - 33.0 pg    MCHC 29.2 (L) 31.5 - 36.5 g/dL    RDW 22.7 (H) 10.0 - 15.0 %    Platelet Count 246 150 - 450 10e3/uL    % Neutrophils 80 %    % Lymphocytes 10 %    % Monocytes 7 %    % Eosinophils 2 %    % Basophils 0 %    % Immature Granulocytes 1 %    NRBCs per 100 WBC 0 <1 /100    Absolute Neutrophils 7.6 1.6 - 8.3 10e3/uL    Absolute Lymphocytes 1.0 0.8 - 5.3 10e3/uL    Absolute Monocytes 0.7 0.0 - 1.3 10e3/uL    Absolute Eosinophils 0.2 0.0 - 0.7 10e3/uL    Absolute Basophils 0.0 0.0 - 0.2 10e3/uL    Absolute Immature Granulocytes 0.1 (H) <=0.0 10e3/uL    Absolute NRBCs 0.0 10e3/uL   CBC with Platelets & Differential     Status: Abnormal    Narrative    The following orders were created for panel order CBC with Platelets & Differential.  Procedure                               Abnormality         Status                     ---------                                -----------         ------                     CBC with platelets and d...[[id number]]  Abnormal            Final result                 Please view results for these tests on the individual orders.     RADIOLOGY:  Reviewed all pertinent imaging. Please see official radiology report.  XR Chest Port 1 View   Final Result   IMPRESSION:       Unchanged enlargement of the cardiac silhouette. No new mediastinal border abnormality.      Extensive patchy bilateral airspace opacities are present which have angular, geographic borders. The opacities do not have clear apical or basal zonal predominance.      Probable small residual right pleural effusion. No visible left pleural fluid. No pneumothorax.      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 Degenerative thoracic spine osteophytes. No displaced rib fractures are detected.          EKG: Accelerated junctional rhythm at 66.  Incomplete right bundle branch block morphology.  ST segment scooping laterally.  Unchanged compared to October 12, 2021  I have independently reviewed and interpreted the EKG(s) documented above.           I, Bhakti Meredith, am serving as a scribe to document services personally performed by Zackery Rose MD, based on my observation and the provider's statements to me. I, Zackery Rose MD attest that Bhakti Meredith is acting in a scribe capacity, has observed my performance of the services and has documented them in accordance with my direction.    Zackery Rose M.D.  Emergency Medicine  [facility] EMERGENCY DEPARTMENT     Zackery Rose MD  10/23/21 [id number]

## 2021-10-23 NOTE — Clinical Note
Single balloon inflation. The first balloon was inserted into the circumflex and proximal circumflex.Max pressure = 12 salazar. Total duration = 15 seconds.     Max pressure = 12 salazar. Total duration = 12 seconds.    Balloon reinflated a second time: Max pressure = 12 salazar. Total duration = 12 seconds.

## 2021-10-23 NOTE — Clinical Note
Single balloon inflation. The first balloon was inserted into the circumflex and proximal circumflex.Max pressure = 12 salazar. Total duration = 10 seconds.     Max pressure = 12 salazar. Total duration = 17 seconds.    Balloon reinflated a second time: Max pressure = 12 salazar. Total duration = 17 seconds.

## 2021-10-23 NOTE — Clinical Note
Single balloon inflation. The first balloon was inserted into the circumflex and proximal circumflex.Max pressure = 8 salazar. Total duration = 7 seconds.     Max pressure = 12 salazar. Total duration = 15 seconds.    Balloon reinflated a second time: Max pressure = 12 salazar. Total duration = 15 seconds.  Balloon reinflated a third time: Max pressure = 12 salazar. Total duration = 17 seconds.

## 2021-10-23 NOTE — PHARMACY-ADMISSION MEDICATION HISTORY
Pharmacy Note - Admission Medication History    Pertinent Provider Information:   -Patient unsure if he should be taking amlodipine. Please review with him. No fill history for it, but mentioned in a MTM visit note briefly. States he is taking this. ______________________________________________________________________    Prior To Admission (PTA) med list completed and updated in EMR.       PTA Med List   Medication Sig Last Dose     acetaminophen (TYLENOL) 500 MG tablet [ACETAMINOPHEN (TYLENOL) 500 MG TABLET] Take 1,000 mg by mouth every 6 (six) hours as needed. First line of pain management. Do Not take more than 4,000 mg in 24 hours.      albuterol (PROVENTIL HFA;VENTOLIN HFA) 90 mcg/actuation inhaler Inhale 2 puffs into the lungs every 6 hours as needed       amLODIPine (NORVASC) 10 MG tablet Take 10 mg by mouth daily 10/23/2021     apixaban (ELIQUIS) 5 mg Tab [APIXABAN (ELIQUIS) 5 MG TAB] Take 1 tablet by mouth every 12 (twelve) hours. 10/23/2021 at am     atorvastatin (LIPITOR) 40 MG tablet Take 40 mg by mouth every morning  10/23/2021     bumetanide (BUMEX) 1 MG tablet Take 3-4 mg by mouth 2 times daily 4 mg in am and 3 mg in afternoon per pt 10/23/2021 at x2     canagliflozin (INVOKANA) 100 mg Tab Take 100 mg by mouth every morning  10/23/2021     clopidogreL (PLAVIX) 75 mg tablet [CLOPIDOGREL (PLAVIX) 75 MG TABLET] Take 1 tablet by mouth daily. 10/23/2021     digoxin (LANOXIN) 125 mcg tablet [DIGOXIN (LANOXIN) 125 MCG TABLET] Take 1 tablet (125 mcg total) by mouth daily. 10/23/2021     fluticasone-vilanterol (BREO ELLIPTA) 200-25 mcg/dose DsDv inhaler Inhale 1 puff into the lungs daily as needed  Past Week     Garlic 1000 MG CAPS Take 1 capsule by mouth daily 10/23/2021     HYDROcodone-acetaminophen (NORCO) 5-325 MG tablet Take 1 tablet by mouth every 6 hours as needed for severe pain 10/22/2021     insulin glargine (LANTUS PEN) 100 UNIT/ML pen Inject 30 Units Subcutaneous 2 times daily  10/23/2021 at x1      ipratropium - albuterol 0.5 mg/2.5 mg/3 mL (DUONEB) 0.5-2.5 (3) MG/3ML neb solution Take 1 vial by nebulization 4 times daily Past Week     irbesartan (AVAPRO) 300 MG tablet Take 300 mg by mouth daily  10/23/2021     lidocaine (LIDODERM) 5 % [LIDOCAINE (LIDODERM) 5 %] Place 1 patch on the skin as needed. Remove & Discard patch within 12 hours or as directed by MD      metFORMIN (GLUCOPHAGE-XR) 500 MG 24 hr tablet Take 1,000 mg by mouth daily (with dinner)  10/23/2021     metoprolol tartrate (LOPRESSOR) 50 MG tablet Take 50 mg by mouth 2 times daily (with meals) 10/23/2021 at am     multivitamin w/minerals (THERA-VIT-M) tablet Take 1 tablet by mouth daily 10/23/2021     nitroglycerin (NITROSTAT) 0.4 MG SL tablet [NITROGLYCERIN (NITROSTAT) 0.4 MG SL TABLET] Place 0.4 mg under the tongue every 5 (five) minutes as needed for chest pain.      omeprazole (PRILOSEC) 20 MG DR capsule Take 20 mg by mouth daily 10/23/2021     potassium chloride (K-DUR,KLOR-CON) 20 MEQ tablet Take 40 mEq by mouth daily  10/23/2021     rOPINIRole (REQUIP) 2 MG tablet [ROPINIROLE (REQUIP) 2 MG TABLET] Take 2 mg by mouth at bedtime.  10/22/2021     tadalafil (CIALIS) 20 MG tablet Take 20 mg by mouth daily 10/23/2021     vitamin C (ASCORBIC ACID) 1000 MG TABS Take 1,000 mg by mouth daily 10/23/2021     zinc gluconate 50 MG tablet Take 50 mg by mouth daily 10/23/2021     zolpidem (AMBIEN) 10 mg tablet [ZOLPIDEM (AMBIEN) 10 MG TABLET] Take 10 mg by mouth bedtime. 10/22/2021       Information source(s): Patient, Family member, Clinic records and CareEverywhere/[facility]  Method of interview communication: in-person    Summary of Changes to PTA Med List  New: -  Discontinued: asa  Changed: -    Patient was asked about OTC/herbal products specifically.  PTA med list reflects this.    In the past week, patient estimated taking medication this percent of the time:  50-90% due to other.    Allergies were reviewed, assessed, and updated with the  patient.      Patient does not anticipate needing any multi-use medications during admission.    The information provided in this note is only as accurate as the sources available at the time of the update(s).    Thank you for the opportunity to participate in the care of this patient.    Dilia Burgess, PharmD, BCPS 10/23/21 6:27 PM

## 2021-10-24 ENCOUNTER — APPOINTMENT (OUTPATIENT)
Dept: CARDIOLOGY | Facility: HOSPITAL | Age: 81
DRG: 247 | End: 2021-10-24
Attending: FAMILY MEDICINE
Payer: COMMERCIAL

## 2021-10-24 ENCOUNTER — APPOINTMENT (OUTPATIENT)
Dept: ULTRASOUND IMAGING | Facility: HOSPITAL | Age: 81
DRG: 247 | End: 2021-10-24
Attending: FAMILY MEDICINE
Payer: COMMERCIAL

## 2021-10-24 PROBLEM — E11.9 DIABETES MELLITUS (H): Status: ACTIVE | Noted: 2021-10-24

## 2021-10-24 LAB
ALBUMIN SERPL-MCNC: 3.3 G/DL (ref 3.5–5)
ALP SERPL-CCNC: 121 U/L (ref 45–120)
ALT SERPL W P-5'-P-CCNC: 16 U/L (ref 0–45)
ANION GAP SERPL CALCULATED.3IONS-SCNC: 9 MMOL/L (ref 5–18)
APTT PPP: 46 SECONDS (ref 22–38)
AST SERPL W P-5'-P-CCNC: 27 U/L (ref 0–40)
BILIRUB SERPL-MCNC: 2.1 MG/DL (ref 0–1)
BUN SERPL-MCNC: 29 MG/DL (ref 8–28)
CALCIUM SERPL-MCNC: 8.9 MG/DL (ref 8.5–10.5)
CHLORIDE BLD-SCNC: 106 MMOL/L (ref 98–107)
CO2 SERPL-SCNC: 26 MMOL/L (ref 22–31)
CREAT SERPL-MCNC: 1.34 MG/DL (ref 0.7–1.3)
ERYTHROCYTE [DISTWIDTH] IN BLOOD BY AUTOMATED COUNT: 22.7 % (ref 10–15)
ERYTHROCYTE [DISTWIDTH] IN BLOOD BY AUTOMATED COUNT: 22.8 % (ref 10–15)
GFR SERPL CREATININE-BSD FRML MDRD: 50 ML/MIN/1.73M2
GLUCOSE BLD-MCNC: 129 MG/DL (ref 70–125)
GLUCOSE BLDC GLUCOMTR-MCNC: 105 MG/DL (ref 70–99)
GLUCOSE BLDC GLUCOMTR-MCNC: 121 MG/DL (ref 70–99)
GLUCOSE BLDC GLUCOMTR-MCNC: 130 MG/DL (ref 70–99)
GLUCOSE BLDC GLUCOMTR-MCNC: 144 MG/DL (ref 70–99)
GLUCOSE BLDC GLUCOMTR-MCNC: 173 MG/DL (ref 70–99)
GLUCOSE BLDC GLUCOMTR-MCNC: 97 MG/DL (ref 70–99)
HCT VFR BLD AUTO: 31.6 % (ref 40–53)
HCT VFR BLD AUTO: 32.8 % (ref 40–53)
HGB BLD-MCNC: 9.2 G/DL (ref 13.3–17.7)
HGB BLD-MCNC: 9.7 G/DL (ref 13.3–17.7)
LVEF ECHO: NORMAL
MCH RBC QN AUTO: 22.6 PG (ref 26.5–33)
MCH RBC QN AUTO: 22.8 PG (ref 26.5–33)
MCHC RBC AUTO-ENTMCNC: 29.1 G/DL (ref 31.5–36.5)
MCHC RBC AUTO-ENTMCNC: 29.6 G/DL (ref 31.5–36.5)
MCV RBC AUTO: 77 FL (ref 78–100)
MCV RBC AUTO: 78 FL (ref 78–100)
PLATELET # BLD AUTO: 227 10E3/UL (ref 150–450)
PLATELET # BLD AUTO: 239 10E3/UL (ref 150–450)
POTASSIUM BLD-SCNC: 3.8 MMOL/L (ref 3.5–5)
PROT SERPL-MCNC: 6.2 G/DL (ref 6–8)
RBC # BLD AUTO: 4.07 10E6/UL (ref 4.4–5.9)
RBC # BLD AUTO: 4.26 10E6/UL (ref 4.4–5.9)
SODIUM SERPL-SCNC: 141 MMOL/L (ref 136–145)
TROPONIN I SERPL-MCNC: 0.03 NG/ML (ref 0–0.29)
TROPONIN I SERPL-MCNC: 0.03 NG/ML (ref 0–0.29)
WBC # BLD AUTO: 7.8 10E3/UL (ref 4–11)
WBC # BLD AUTO: 8.5 10E3/UL (ref 4–11)

## 2021-10-24 PROCEDURE — 250N000013 HC RX MED GY IP 250 OP 250 PS 637: Performed by: FAMILY MEDICINE

## 2021-10-24 PROCEDURE — 94640 AIRWAY INHALATION TREATMENT: CPT

## 2021-10-24 PROCEDURE — 36415 COLL VENOUS BLD VENIPUNCTURE: CPT | Performed by: FAMILY MEDICINE

## 2021-10-24 PROCEDURE — G0378 HOSPITAL OBSERVATION PER HR: HCPCS

## 2021-10-24 PROCEDURE — 96372 THER/PROPH/DIAG INJ SC/IM: CPT | Performed by: FAMILY MEDICINE

## 2021-10-24 PROCEDURE — 99233 SBSQ HOSP IP/OBS HIGH 50: CPT | Performed by: FAMILY MEDICINE

## 2021-10-24 PROCEDURE — 93306 TTE W/DOPPLER COMPLETE: CPT

## 2021-10-24 PROCEDURE — 84484 ASSAY OF TROPONIN QUANT: CPT | Performed by: FAMILY MEDICINE

## 2021-10-24 PROCEDURE — 85027 COMPLETE CBC AUTOMATED: CPT | Performed by: INTERNAL MEDICINE

## 2021-10-24 PROCEDURE — 250N000012 HC RX MED GY IP 250 OP 636 PS 637: Performed by: FAMILY MEDICINE

## 2021-10-24 PROCEDURE — 76705 ECHO EXAM OF ABDOMEN: CPT

## 2021-10-24 PROCEDURE — 85027 COMPLETE CBC AUTOMATED: CPT | Performed by: FAMILY MEDICINE

## 2021-10-24 PROCEDURE — 82962 GLUCOSE BLOOD TEST: CPT

## 2021-10-24 PROCEDURE — 210N000001 HC R&B IMCU HEART CARE

## 2021-10-24 PROCEDURE — 99222 1ST HOSP IP/OBS MODERATE 55: CPT | Performed by: INTERNAL MEDICINE

## 2021-10-24 PROCEDURE — 85730 THROMBOPLASTIN TIME PARTIAL: CPT | Performed by: INTERNAL MEDICINE

## 2021-10-24 PROCEDURE — 999N000157 HC STATISTIC RCP TIME EA 10 MIN

## 2021-10-24 PROCEDURE — 250N000012 HC RX MED GY IP 250 OP 636 PS 637: Performed by: INTERNAL MEDICINE

## 2021-10-24 PROCEDURE — 250N000011 HC RX IP 250 OP 636: Performed by: INTERNAL MEDICINE

## 2021-10-24 PROCEDURE — 250N000009 HC RX 250: Performed by: FAMILY MEDICINE

## 2021-10-24 PROCEDURE — 36415 COLL VENOUS BLD VENIPUNCTURE: CPT | Performed by: INTERNAL MEDICINE

## 2021-10-24 PROCEDURE — 93306 TTE W/DOPPLER COMPLETE: CPT | Mod: 26 | Performed by: INTERNAL MEDICINE

## 2021-10-24 PROCEDURE — 80053 COMPREHEN METABOLIC PANEL: CPT | Performed by: FAMILY MEDICINE

## 2021-10-24 RX ORDER — PANTOPRAZOLE SODIUM 20 MG/1
40 TABLET, DELAYED RELEASE ORAL
Status: DISCONTINUED | OUTPATIENT
Start: 2021-10-25 | End: 2021-10-26 | Stop reason: HOSPADM

## 2021-10-24 RX ORDER — HEPARIN SODIUM 10000 [USP'U]/100ML
0-5000 INJECTION, SOLUTION INTRAVENOUS CONTINUOUS
Status: DISCONTINUED | OUTPATIENT
Start: 2021-10-24 | End: 2021-10-24 | Stop reason: ALTCHOICE

## 2021-10-24 RX ORDER — ALBUTEROL SULFATE 0.83 MG/ML
2.5 SOLUTION RESPIRATORY (INHALATION) EVERY 6 HOURS PRN
Status: DISCONTINUED | OUTPATIENT
Start: 2021-10-24 | End: 2021-10-26 | Stop reason: HOSPADM

## 2021-10-24 RX ORDER — ASPIRIN 81 MG/1
81 TABLET, CHEWABLE ORAL DAILY
Status: DISCONTINUED | OUTPATIENT
Start: 2021-10-24 | End: 2021-10-25

## 2021-10-24 RX ORDER — HEPARIN SODIUM 10000 [USP'U]/100ML
0-5000 INJECTION, SOLUTION INTRAVENOUS CONTINUOUS
Status: DISCONTINUED | OUTPATIENT
Start: 2021-10-24 | End: 2021-10-25

## 2021-10-24 RX ORDER — DIPHENHYDRAMINE HYDROCHLORIDE 50 MG/ML
25 INJECTION INTRAMUSCULAR; INTRAVENOUS
Status: COMPLETED | OUTPATIENT
Start: 2021-10-25 | End: 2021-10-25

## 2021-10-24 RX ORDER — CLOPIDOGREL BISULFATE 75 MG/1
75 TABLET ORAL DAILY
Status: DISCONTINUED | OUTPATIENT
Start: 2021-10-24 | End: 2021-10-25

## 2021-10-24 RX ADMIN — ZOLPIDEM TARTRATE 10 MG: 5 TABLET ORAL at 21:04

## 2021-10-24 RX ADMIN — BUMETANIDE 3 MG: 2 TABLET ORAL at 14:24

## 2021-10-24 RX ADMIN — INSULIN GLARGINE 20 UNITS: 100 INJECTION, SOLUTION SUBCUTANEOUS at 00:37

## 2021-10-24 RX ADMIN — INSULIN GLARGINE 20 UNITS: 100 INJECTION, SOLUTION SUBCUTANEOUS at 07:42

## 2021-10-24 RX ADMIN — OMEPRAZOLE 20 MG: 20 CAPSULE, DELAYED RELEASE ORAL at 07:48

## 2021-10-24 RX ADMIN — ATORVASTATIN CALCIUM 40 MG: 40 TABLET, FILM COATED ORAL at 07:47

## 2021-10-24 RX ADMIN — ROPINIROLE HYDROCHLORIDE 2 MG: 1 TABLET, FILM COATED ORAL at 00:42

## 2021-10-24 RX ADMIN — CLOPIDOGREL BISULFATE 75 MG: 75 TABLET ORAL at 09:47

## 2021-10-24 RX ADMIN — ROPINIROLE HYDROCHLORIDE 2 MG: 1 TABLET, FILM COATED ORAL at 21:00

## 2021-10-24 RX ADMIN — METFORMIN HYDROCHLORIDE 1000 MG: 500 TABLET, EXTENDED RELEASE ORAL at 18:54

## 2021-10-24 RX ADMIN — POTASSIUM CHLORIDE 40 MEQ: 20 TABLET, EXTENDED RELEASE ORAL at 07:47

## 2021-10-24 RX ADMIN — MULTIPLE VITAMINS W/ MINERALS TAB 1 TABLET: TAB at 07:47

## 2021-10-24 RX ADMIN — METOPROLOL TARTRATE 50 MG: 25 TABLET, FILM COATED ORAL at 18:03

## 2021-10-24 RX ADMIN — BUMETANIDE 4 MG: 2 TABLET ORAL at 07:47

## 2021-10-24 RX ADMIN — INSULIN GLARGINE 20 UNITS: 100 INJECTION, SOLUTION SUBCUTANEOUS at 21:00

## 2021-10-24 RX ADMIN — ASPIRIN 81 MG CHEWABLE TABLET 81 MG: 81 TABLET CHEWABLE at 09:47

## 2021-10-24 RX ADMIN — IRBESARTAN 300 MG: 300 TABLET ORAL at 07:47

## 2021-10-24 RX ADMIN — Medication 1 MG: at 21:00

## 2021-10-24 RX ADMIN — METOPROLOL TARTRATE 50 MG: 25 TABLET, FILM COATED ORAL at 07:47

## 2021-10-24 RX ADMIN — ZOLPIDEM TARTRATE 10 MG: 5 TABLET ORAL at 00:40

## 2021-10-24 RX ADMIN — DIGOXIN 125 MCG: 0.12 TABLET ORAL at 07:47

## 2021-10-24 RX ADMIN — Medication 50 MG: at 07:47

## 2021-10-24 RX ADMIN — HEPARIN SODIUM 1200 UNITS/HR: 10000 INJECTION, SOLUTION INTRAVENOUS at 14:29

## 2021-10-24 RX ADMIN — PREDNISONE 25 MG: 5 TABLET ORAL at 21:49

## 2021-10-24 RX ADMIN — IPRATROPIUM BROMIDE AND ALBUTEROL SULFATE 3 ML: 2.5; .5 SOLUTION RESPIRATORY (INHALATION) at 07:43

## 2021-10-24 RX ADMIN — AMLODIPINE BESYLATE 10 MG: 5 TABLET ORAL at 07:47

## 2021-10-24 RX ADMIN — Medication 1000 MG: at 07:46

## 2021-10-24 ASSESSMENT — ACTIVITIES OF DAILY LIVING (ADL)
WEAR_GLASSES_OR_BLIND: NO
DIFFICULTY_COMMUNICATING: NO
ADLS_ACUITY_SCORE: 5
ADLS_ACUITY_SCORE: 5
FALL_HISTORY_WITHIN_LAST_SIX_MONTHS: NO
ADLS_ACUITY_SCORE: 5
ADLS_ACUITY_SCORE: 5
CONCENTRATING,_REMEMBERING_OR_MAKING_DECISIONS_DIFFICULTY: NO
TOILETING_ISSUES: NO
DIFFICULTY_EATING/SWALLOWING: NO
ADLS_ACUITY_SCORE: 5
DRESSING/BATHING_DIFFICULTY: NO
DOING_ERRANDS_INDEPENDENTLY_DIFFICULTY: NO
ADLS_ACUITY_SCORE: 5
ADLS_ACUITY_SCORE: 5
ADLS_ACUITY_SCORE: 3
WALKING_OR_CLIMBING_STAIRS_DIFFICULTY: NO

## 2021-10-24 NOTE — PROGRESS NOTES
[facility]    Medicine Progress Note - Hospitalist Service       Date of Admission:  10/23/2021    Assessment & Plan           Patient is a 81-year-old male with a history of atrial fibrillation, congestive heart failure, COPD, CAD, CKD 3, chronic pancreatitis, hepatic cirrhosis with portal hypertension, COVID-19 infection 9/24/2021, DM on insulin, hypertension, hyperlipidemia, pulmonary hypertension, restless leg, sleep apnea that presents with chest pain.    Chest pain  -Rule out acute coronary syndrome, less likely PE given patient on Eliquis.  Does have known right heart dysfunction with preserved LVEF.  -History of MI and HFpEF 65%  -Troponin negative x 2.  BNP elevated to 445.  At his baseline of 4 L of supplemental oxygen.  -Chest x-ray without pulmonary edema  -Received 1 dose of IV Bumex 0.5 mg in ED  -Continue home metoprolol  -Continue home Bumex  -Start aspirin and restart Plavix  -hold eliquis   -TTE completed 10/24 LVEF 60 to 65% with severely dilated right atrium with elevated pulmonary pressure and dilated right ventricle.  -Discussed case with cardiology Dr. Moon. Will proceed with coronary angiogram.    Elevated Bilirubin  -T bili 2.1. AST ALT normal with a mild elevation of alk phos  -in the setting of chest pain with radiation to the back  -will check RUQ US for gallbladder disease    Recent COVID-19 infection  Chronic respiratory failure on oxygen  -Receives J&J vaccine 3 to 4 months PTA  -Diagnosed with Covid on 9/24/2021 was admitted to [facility].  Received remdesivir and dexamethasone.  Was able to wean back to his home oxygen level.  -CT chest on 10/12/2021 showing increased pulmonary opacities consistent with history of COVID-19 pneumonia    CKD   -Baseline serum creatinine 1.3, on admission 1.35    Anemia of chronic disease  -Baseline hemoglobin 9-10, hemoglobin on admission 9.2. MCV 78    COPD  -Not in acute exacerbation  -Continue home  albuterol and Breo    Hypertension  -Normotensive  -Continue Norvasc  -Continue irbesartan    Atrial fibrillation  -Rate controlled  -Continue home digoxin  -Continue home metoprolol  -Eliquis on hold until cardiology evaluation    T2DM  -A1c 9.3 on 7/11/2021.   -A1c 6.3 10/23/21  -Hold home Invokana  -Continue home Metformin for now unless needs angio  -Lantus 20 units twice daily  -Medium sliding scale insulin    Restless leg syndrome-Continue home Requip  Insomnia-continue as needed home Ambien     Diet: Combination Diet Consistent Carb 60 Grams CHO per Meal Diet; 2 gm NA Diet; Low Fat Diet  NPO per Anesthesia Guidelines for Procedure/Surgery Except for: Meds    DVT Prophylaxis: Pneumatic Compression Devices  Dixon Catheter: Not present  Central Lines: None  Code Status: Full Code      Disposition Plan   Expected discharge: 10/26/2021 changing to inpatient due to medical complexity with high risk for morbidity mortality if coronary angiogram is not performed here inpatient. After tonight he will be greater than 2 midnight.     The patient's care was discussed with the Patient and Cardiology Consultant.    Sid Keller MD  Hospitalist Service  [facility]  Securely message with the Vocera Web Console (learn more here)  Text page via [employer] Paging/Directory    This note was written with the Dragon audio recording device, any spelling or grammatical errors are unintentional.      ______________________________________________________________________    Interval History   Patient seen today in the ED boarding room. Denies any current chest pain but says he had a sharp pain that lasted 20 to 30 minutes that resolved prior to arrival to the hospital. Says he was minimally active physically prior to the pain. Took 2 doses of nitroglycerin that seemed to have helped it. He reports some lower extremity edema in the evenings. Denies any shortness of breath. reports compliance with  medications. Reports his chest pain started in the middle of the chest with radiation to the back and some radiation down the back to the buttocks.    The 10 point Review of Systems is negative other than noted in the HPI or here.     Data reviewed today: I reviewed all medications, new labs and imaging results over the last 24 hours.     Physical Exam   Vital Signs: Temp: 98.6  F (37  C) Temp src: Oral BP: (!) 140/65 Pulse: 74   Resp: 28 SpO2: 99 % O2 Device: Nasal cannula Oxygen Delivery: 3 LPM  Weight: 228 lbs 0 oz    Physical Examination:   General appearance - alert, well appearing, and in no distress on 3 L nasal cannula  Mental status - alert, oriented to person, place, and time, normal mood, behavior, speech, dress, motor activity, and thought processes  HEENT - sclera anicteric, normocephalic atraumatic  Respiratory - clear to auscultation, no wheezes, rales or rhonchi,   Cardiac - normal rate, irregularly irregular rhythm, normal S1, S2, no murmurs,  Abdomen - soft, nontender, nondistended,  Neurological - alert, oriented, normal speech, no focal findings or movement disorder noted  Musculoskeletal - no joint tenderness, deformity or swelling, full range of motion without pain  Extremities - peripheral pulses normal, no pedal edema,  Skin - normal coloration and turgor, no rashes, no suspicious skin lesions noted      Data   Recent Labs   Lab 10/24/21  1148 10/24/21  0659 10/24/21  0619 10/24/21  0002 10/23/21  1824   WBC  --   --  7.8  --  9.5   HGB  --   --  9.2*  --  9.2*   MCV  --   --  78  --  78   PLT  --   --  227  --  246   NA  --   --  141  --  141   POTASSIUM  --   --  3.8  --  3.8   CHLORIDE  --   --  106  --  107   CO2  --   --  26  --  26   BUN  --   --  29*  --  32*   CR  --   --  1.34*  --  1.35*   ANIONGAP  --   --  9  --  8   ANNMARIE  --   --  8.9  --  8.7   * 105* 129*   < > 94   ALBUMIN  --   --  3.3*  --  3.1*   PROTTOTAL  --   --  6.2  --  5.9*   BILITOTAL  --   --  2.1*  --  1.9*    ALKPHOS  --   --  121*  --  116   ALT  --   --  16  --  15   AST  --   --  27  --  26    < > = values in this interval not displayed.         Recent Results (from the past 24 hour(s))   XR Chest Port 1 View    Narrative    EXAM: XR CHEST PORT 1 VIEW  LOCATION: [facility]  DATE/TIME: 10/23/2021 6:50 PM    INDICATION: chest pain  COMPARISON: CT pulmonary angiogram 10/12/2021      Impression    IMPRESSION:     Unchanged enlargement of the cardiac silhouette. No new mediastinal border abnormality.    Extensive patchy bilateral airspace opacities are present which have angular, geographic borders. The opacities do not have clear apical or basal zonal predominance.    Probable small residual right pleural effusion. No visible left pleural fluid. No pneumothorax.    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 Degenerative thoracic spine osteophytes. No displaced rib fractures are detected.   Echocardiogram Complete   Result Value    LVEF  60-65%    Narrative    [id number]  [id number]  [id number]  [id number]^ALEX^ANALY^[place] [address]     Name: MARCOS VIDAL  MRN: [id number]  : 1940  Study Date: 10/24/2021 10:56 AM  Age: 81 yrs  Gender: Male  Patient [facility]  Reason For Study: Chest Pain  Ordering Physician: ANALY JOHNSON  Referring Physician: ANALY JOHNSON  Performed By:      BSA: 2.2 m2  Height: 71 in  Weight: 228 lb  HR: 65  ______________________________________________________________________________  ______________________________________________________________________________  Interpretation Summary     Left ventricular size, wall motion and function are normal. The ejection  fraction is 60-65%.  Diastolic Doppler findings (E/E' ratio and/or other parameters) suggest left  ventricular filling pressures are increased.  Flattened septum is consistent with RV pressure/volume  overload.  The right ventricle is moderate to severely dilated.  Moderately decreased right ventricular systolic function  The left atrium is moderately dilated.  The right atrium is moderate to severely dilated.  Mild valvular aortic stenosis.  The estimated pulmonary artery systolic pressure is 75 mmHg.     No previous study for comparison.     ______________________________________________________________________________  I      WMSI = 1.00     % Normal = 100     X - Cannot   0 -                      (2) - Mildly 2 -          Segments  Size  Interpret    Hyperkinetic 1 - Normal  Hypokinetic  Hypokinetic  1-2     small                                                     7 -          3-5      moderate  3 - Akinetic 4 -          5 -         6 - Akinetic Dyskinetic   6-14    large               Dyskinetic   Aneurysmal  w/scar       w/scar       15-16   diffuse     Left Ventricle  Left ventricular size, wall motion and function are normal. The ejection  fraction is 60-65%. There is normal left ventricular wall thickness. Diastolic  Doppler findings (E/E' ratio and/or other parameters) suggest left ventricular  filling pressures are increased. Flattened septum is consistent with RV  pressure/volume overload.     Right Ventricle  The right ventricle is moderate to severely dilated. Moderately decreased  right ventricular systolic function.     Atria  The left atrium is moderately dilated. The right atrium is moderate to  severely dilated. There is no atrial shunt seen.     Mitral Valve  The mitral valve leaflets appear thickened, but open well. There is mild (1+)  mitral regurgitation. There is no mitral valve stenosis.     Tricuspid Valve  Tricuspid leaflets are thickened. There is moderate (2+) tricuspid  regurgitation. The right ventricular systolic pressure is approximated at  75mmHg plus the right atrial pressure. There is no tricuspid stenosis.     Aortic Valve  Mild aortic valve calcification is present. No  aortic regurgitation is  present. Mild valvular aortic stenosis.     Pulmonic Valve  The pulmonic valve is not well seen, but is grossly normal.     Vessels  The aorta root is normal. IVC diameter and respiratory changes fall into an  intermediate range suggesting an RA pressure of 8 mmHg.     Pericardium  There is no pericardial effusion.     ______________________________________________________________________________  MMode/2D Measurements & Calculations  IVSd: 1.0 cm  LVIDd: 5.2 cm  LVIDs: 2.7 cm  LVPWd: 1.2 cm  FS: 47.4 %  LV mass(C)d: 219.5 grams  LV mass(C)dI: 98.5 grams/m2  Ao root diam: 3.3 cm  LA dimension: 5.4 cm  asc Aorta Diam: 3.5 cm     LA/Ao: 1.6  LVOT diam: 2.0 cm  LVOT area: 3.1 cm2  LA Volume Indexed (AL/bp): 43.8 ml/m2  RWT: 0.45     Time Measurements  MM HR: 69.0 BPM     Doppler Measurements & Calculations  MV E max kian: 127.0 cm/sec  MV max P.5 mmHg  MV mean P.7 mmHg  MV V2 VTI: 37.2 cm  MVA(VTI): 2.2 cm2     MV dec slope: 582.6 cm/sec2  MV dec time: 0.22 sec  Ao V2 max: 278.9 cm/sec  Ao max P.0 mmHg  Ao V2 mean: 195.8 cm/sec  Ao mean P.1 mmHg  Ao V2 VTI: 64.8 cm  ARTHUR(I,D): 1.2 cm2  ARTHUR(V,D): 1.3 cm2  LV V1 max P.6 mmHg  LV V1 max: 118.4 cm/sec  LV V1 VTI: 25.7 cm  SV(LVOT): 80.7 ml  SI(LVOT): 36.2 ml/m2  PA acc time: 0.13 sec  TR max kian: 414.3 cm/sec  TR max P.7 mmHg  AV Kian Ratio (DI): 0.42  ARTHUR Index (cm2/m2): 0.56  E/E' av.6  Lateral E/e': 9.9  Medial E/e': 13.3     ______________________________________________________________________________  Report approved by: Dean Borrego 10/24/2021 01:10 PM             Medications     heparin 1,200 Units/hr (10/24/21 6027)     - MEDICATION INSTRUCTIONS -       - MEDICATION INSTRUCTIONS -         amLODIPine  10 mg Oral Daily     aspirin  81 mg Oral Daily     atorvastatin  40 mg Oral QAM     bumetanide  3 mg Oral Daily     bumetanide  4 mg Oral Daily     clopidogrel  75 mg Oral Daily     digoxin  125 mcg Oral Daily      insulin aspart  1-7 Units Subcutaneous TID AC     insulin aspart  1-5 Units Subcutaneous At Bedtime     insulin glargine  20 Units Subcutaneous BID     ipratropium - albuterol 0.5 mg/2.5 mg/3 mL  1 vial Nebulization 4x Daily     irbesartan  300 mg Oral Daily     metFORMIN  1,000 mg Oral Daily with supper     metoprolol tartrate  50 mg Oral BID w/meals     multivitamin w/minerals  1 tablet Oral Daily     [START ON 10/25/2021] pantoprazole  40 mg Oral QAM AC     potassium chloride ER  40 mEq Oral Daily     predniSONE  25 mg Oral BID     rOPINIRole  2 mg Oral At Bedtime     vitamin C  1,000 mg Oral Daily     zinc gluconate  50 mg Oral Daily     zolpidem  10 mg Oral At Bedtime

## 2021-10-24 NOTE — ED NOTES
Pt a bit restless having difficulty falling asleep and writer tucked back into bed with multiple blankets and dimmed lights more. Turned off tv.  Pt comfortable should be able to sleep now. Yawning a lot  Side rails up x2 and call light at bedside.

## 2021-10-24 NOTE — ED NOTES
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 They put him on sildenafil as nitroprusside lowered systemic BP and with that the mean PA dropped from 54 to 49. Negative VQ at [place] [facility] Dec 1, 2017.     RLS (restless legs syndrome)      Sleep apnea         Code Status:  Full Code     Falls Risk: No Band: Not applicable up ad sheree. Pt did state he could roll out of bed so while sleeping put up side rails    Current Living Situation/Residence: lives with a significant other     Elimination Status: Continent: Yes     Activity Level: Independent    Patients Preferred Language:  English     Needed: No    Vital Signs:  /64   Pulse 76   Temp 98.2  F (36.8  C) (Oral)   Resp 15   Ht 1.803 m (5' 11\")   Wt 103.4 kg (228 lb)   SpO2 98%   BMI 31.80 kg/m       Cardiac Rhythm: NSR    Pain Score: 0/10    Is the Patient Confused:  No some fogginess noted in middle of night but justifiable due to circumstances (could not sleep in hard bed here, long day, also has hx of restless legs)    Last Food or Drink: 10/24/21 at 0630    Focused Assessment:  Pt comes in with chest pain but was relieved with pt's dose of home nitroglycerin.Pt has COPD. Pt is on 4L of oxygen at home. Pt endorsed provider ordered this and titration was offered and performed as pt consistently stayed at % oxygen sats. At this time, (0647), pt satting at 95-96% on 2L of oxygen " while standing and remains comfortable. Sitting was at 99% This is appropriate while pt is sitting/standing around in room  But if pt was to go for a walk he can not breathe well so writer assumes need to up the oxygen delivery to 4L to be sufficient. Pt has been on 4L of oxygen for over 1 month now. He is very pleasant and alert and oriented. wife was here yesterday evening.   Tests Performed: Done: Labs and Imaging    Treatments Provided: See MAR    Family Dynamics/Concerns: No    Family Updated On Visitor Policy: Yes    Plan of Care Communicated to Family: Yes    Who Was Updated about Plan of Care: wife    Belongings Checklist Done and Signed by Patient: Yes    Covid: asymptomatic , negative    Additional Information:     RN: Stephany MADSEN 10/24/2021 6:42 AM

## 2021-10-24 NOTE — ED NOTES
Pt given warm blankets and water. Was offered eggcrate bed pad but denied it at this time. Stated they had no other needs and ensured call light within reach.

## 2021-10-24 NOTE — CONSULTS
[facility]  Cardiac Electrophysiology  [address]  [place] [address]   Office: [phone]  Fax: [phone]     Cardiology Consultation    Patient: Red Sutherland   : 1940     Referring Provider: No ref. provider found    CHIEF COMPLAINT/REASON FOR CONSULTATION  Chest pain    Assessment/Recommendations   Red Sutherland is a 81 year old male with CAD with prior MI, pulmonary hypertension with right heart failure, atrial fibrillation, HTN, T2DM, COPD on home oxygen, CKD, anemia, NOVA, COVID infection (2021) presenting with chest pain.    Chest pain - pain is somewhat atypical, presentation is consistent with though not definitive for unstable angina/ACS.  Given the recent clustering of chest pain episodes, relief with NTG, history of CAD and MI, coronary angiography is reasonable.  Low suspicion for dissection  - follow-up TTE  - coronary angiography and possible PCI - NPO after midnight, COVID negative 10/23/2021, contrast allergy pre-medication with prednisone and benadryl ordered  - continue aspirin  - continue clopidogrel  - start heparin ACS nomogram  - continue atorvastatin 40mg daily  - continue metoprolol 50mg twice daily    Atrial fibrillation  [id number]  - hold apixaban 5mg twice daily for now pending coronary angiography  - continue digoxin 125mcg daily  - continue metoprolol    Pulmonary hypertension with right heart failure   - continue bumetanide    Hypertension  - continue amlodipine  - continue irbesartan       History of Present Illness   Red Sutherland is a 81 year old male with CAD with prior MI, pulmonary hypertension with right heart failure, atrial fibrillation, HTN, T2DM, COPD on home oxygen, CKD, anemia, NOVA, COVID infection (2021) presenting with chest pain.    Mr. Sutherland notes an episode of sharp chest pain radiating to the back and upper abdomen on 10/21/2021 lasting for a few minutes and resolving after taking SL  "NTG.  He had recurrent similar symptoms on 10/23/2021 which again improved with SL NTG and he presented to the ER for further evaluation.  ECG showed SR vs JR 66bpm without ST elevation.  Serial TnI have been negative.    He reports that he was admitted to [facility] in 9/2021 (?related to COVID infection), though he is not sure why.  He reports he was abruptly woken up one night and told that he had a heart attack.  He reports undergoing a catheterization via his jugular vein.  He denies any radial artery or groin access procedures.    He denies syncope, dyspnea, nausea, diaphoresis.    He is maintained on apixaban for atrial fibrillation.       Physical Examination  Review of Systems   VITALS: /64   Pulse 77   Temp 98.2  F (36.8  C) (Oral)   Resp 23   Ht 1.803 m (5' 11\")   Wt 103.4 kg (228 lb)   SpO2 97%   BMI 31.80 kg/m    Wt Readings from Last 3 Encounters:   10/23/21 103.4 kg (228 lb)   10/21/21 107.5 kg (237 lb 1.6 oz)   10/12/21 104.3 kg (230 lb)       Intake/Output Summary (Last 24 hours) at 10/24/2021 0834  Last data filed at 10/24/2021 0625  Gross per 24 hour   Intake 850 ml   Output 1300 ml   Net -450 ml     CONSTITUTIONAL: well nourished, comfortable, no distress  EYES:  Conjunctivae pink, sclerae clear.    E/N/T:  Oral mucosa pink, moist mucous membranes, dentition normal.   RESPIRATORY:  Respiratory effort is normal  CARDIOVASCULAR:  normal S1 and S2.  Equal pulses bilateral upper extremities.  1-2+ bilateral lower extremity.   GASTROINTESTINAL:  Abdomen without masses or tenderness  EXTREMITIES:  No clubbing or cyanosis.    MUSCULOSKELETAL:  Overall grossly normal muscle strength  SKIN:  Overall, skin warm and dry, no lesions.  NEURO/PSYCH:  Oriented x 3 with normal affect.   Constitutional:  No weight loss or loss of appetite    Eyes:  No difficulty with vision, no double vision, no dry eyes  ENT:  No sore throat, difficulty swallowing; changes in hearing or " tinnitus  Cardiovascular: As detailed above  Respiratory:  No cough  Musculoskeletal  No joint pain, muscle aches  Neurologic:  No syncope, lightheadedness, fainting spells   Hematologic: No easy bruising, excessive bleeding tendency   Gastrointestinal:  No jaundice, abdominal pain or abdominal bloating  Genitourinary: No changes in urinary habits, no trouble urinating    Psychiatric: No anxiety or depression      Medical History  Surgical History   Past Medical History:   Diagnosis Date     Atrial fibrillation (H)      CHF (congestive heart failure) (H)      COPD (chronic obstructive pulmonary disease) (H)      Coronary artery disease      Diabetes mellitus (H)      Essential hypertension      Hyperlipidemia      Pulmonary hypertension (H)     O2 at night     Pulmonary hypertension due to left ventricular diastolic dysfunction (H) 11/28/2017    Multifactorial per [facility] with elevated LVEDP and PCW, COPD and NOVA. They put him on sildenafil as nitroprusside lowered systemic BP and with that the mean PA dropped from 54 to 49. Negative VQ at [facility] Dec 1, 2017.     RLS (restless legs syndrome)      Sleep apnea     Past Surgical History:   Procedure Laterality Date     BACK SURGERY      lower back     CARDIAC CATHETERIZATION  12/13/2017    Right and left at [facility], mean PA 58, PCW 24 with V wave of 35, LVEDP of 18, with Nipride systemic BP, PVR and mean PA all declined     CARDIOVERSION  03/15/2013    for afib     CATARACT EXTRACTION Bilateral      CORONARY STENT PLACEMENT       IR ABDOMINAL AORTOGRAM  11/16/2012     IR MISCELLANEOUS PROCEDURE  7/20/2001     IR MISCELLANEOUS PROCEDURE  11/16/2012     OTHER SURGICAL HISTORY      mynor     SHOULDER SURGERY      reapir on right shoulder     TOTAL HIP ARTHROPLASTY Left      TOTAL KNEE ARTHROPLASTY Bilateral      WRIST SURGERY Bilateral      ZZHC COLONOSCOPY W/WO BRUSH/WASH N/A 1/14/2021    Procedure: COLONOSCOPY;  Surgeon: Mihai Harris MD;  Location: [facility];   Service: Gastroenterology         Family History Social History   Family History   Problem Relation Age of Onset     Hyperlipidemia Mother      Hypertension Mother      Heart Disease Mother      Hyperlipidemia Father      Hypertension Father      Coronary Artery Disease Father      Cerebrovascular Disease Brother      Depression Brother      No Known Problems Sister      Pulmonary Hypertension No family hx of      Congenital heart disease No family hx of         Social History     Tobacco Use     Smoking status: Former Smoker     Packs/day: 1.50     Years: 44.00     Pack years: 66.00     Types: Cigarettes     Quit date: 1996     Years since quittin.5     Smokeless tobacco: Never Used   Substance Use Topics     Alcohol use: Yes     Alcohol/week: 1.0 standard drinks     Comment: Alcoholic Drinks/day: 1 per month     Drug use: No         Medications  Allergies     Current Facility-Administered Medications:      acetaminophen (TYLENOL) tablet 975 mg, 975 mg, Oral, Q8H PRN, Venita Chanel MD     albuterol (PROAIR HFA/PROVENTIL HFA/VENTOLIN HFA) 108 (90 Base) MCG/ACT inhaler 2 puff, 2 puff, Inhalation, Q6H PRN, Venita Chanel MD     amLODIPine (NORVASC) tablet 10 mg, 10 mg, Oral, Daily, Venita Chanel MD, 10 mg at 10/24/21 0747     atorvastatin (LIPITOR) tablet 40 mg, 40 mg, Oral, QA, Venita Chanel MD, 40 mg at 10/24/21 47     bumetanide (BUMEX) tablet 3 mg, 3 mg, Oral, Daily, Venita Chanel MD     bumetanide (BUMEX) tablet 4 mg, 4 mg, Oral, Daily, Venita Chanel MD, 4 mg at 10/24/21 0747     glucose gel 15-30 g, 15-30 g, Oral, Q15 Min PRN **OR** dextrose 50 % injection 25-50 mL, 25-50 mL, Intravenous, Q15 Min PRN **OR** glucagon injection 1 mg, 1 mg, Subcutaneous, Q15 Min PRN, Venita Chanel MD     digoxin (LANOXIN) tablet 125 mcg, 125 mcg, Oral, Daily, Venita Chanel MD, 125 mcg at 10/24/21 0747     fluticasone-vilanterol (BREO ELLIPTA) 200-25 MCG/INH inhaler 1 puff,  1 puff, Inhalation, Daily PRN, Venita Chanel MD     HYDROcodone-acetaminophen (NORCO) 5-325 MG per tablet 1 tablet, 1 tablet, Oral, Q6H PRN, Venita Chanel MD     insulin aspart (NovoLOG) injection (RAPID ACTING), 1-7 Units, Subcutaneous, TID AC, Venita Chanel MD     insulin aspart (NovoLOG) injection (RAPID ACTING), 1-5 Units, Subcutaneous, At Bedtime, Venita Chanel MD     insulin glargine (LANTUS PEN) injection 20 Units, 20 Units, Subcutaneous, BID, Venita Chanel MD, 20 Units at 10/24/21 0742     ipratropium - albuterol 0.5 mg/2.5 mg/3 mL (DUONEB) neb solution 3 mL, 1 vial, Nebulization, 4x Daily, Venita Chanel MD, 3 mL at 10/24/21 0743     irbesartan (AVAPRO) tablet 300 mg, 300 mg, Oral, Daily, Venita Chanel MD, 300 mg at 10/24/21 0747     melatonin tablet 1 mg, 1 mg, Oral, At Bedtime PRN, Venita Chanel MD     metFORMIN (GLUCOPHAGE-XR) 24 hr tablet 1,000 mg, 1,000 mg, Oral, Daily with supper, Venita Chanel MD     metoprolol tartrate (LOPRESSOR) tablet 50 mg, 50 mg, Oral, BID w/meals, Venita Chanel MD, 50 mg at 10/24/21 0747     multivitamin w/minerals (THERA-VIT-M) tablet 1 tablet, 1 tablet, Oral, Daily, Venita Chanel MD, 1 tablet at 10/24/21 0747     omeprazole (priLOSEC) CR capsule 20 mg, 20 mg, Oral, Daily, Venita Chanel MD, 20 mg at 10/24/21 0748     potassium chloride ER (KLOR-CON M) CR tablet 40 mEq, 40 mEq, Oral, Daily, Venita Chanel MD, 40 mEq at 10/24/21 0747     prochlorperazine (COMPAZINE) injection 5 mg, 5 mg, Intravenous, Q6H PRN **OR** prochlorperazine (COMPAZINE) tablet 5 mg, 5 mg, Oral, Q6H PRN **OR** prochlorperazine (COMPAZINE) suppository 12.5 mg, 12.5 mg, Rectal, Q12H PRN, Venita Chanel MD     rOPINIRole (REQUIP) tablet 2 mg, 2 mg, Oral, At Bedtime, Venita Chanel MD, 2 mg at 10/24/21 0042     vitamin C (ASCORBIC ACID) tablet 1,000 mg, 1,000 mg, Oral, Daily, Venita Chanel MD, 1,000 mg at 10/24/21  0746     zinc gluconate tablet 50 mg, 50 mg, Oral, Daily, Venita Chanel MD, 50 mg at 10/24/21 0747     zolpidem (AMBIEN) tablet 10 mg, 10 mg, Oral, At Bedtime, Venita Chanel MD, 10 mg at 10/24/21 0040    Current Outpatient Medications:      acetaminophen (TYLENOL) 500 MG tablet, [ACETAMINOPHEN (TYLENOL) 500 MG TABLET] Take 1,000 mg by mouth every 6 (six) hours as needed. First line of pain management. Do Not take more than 4,000 mg in 24 hours., Disp: , Rfl:      albuterol (PROVENTIL HFA;VENTOLIN HFA) 90 mcg/actuation inhaler, Inhale 2 puffs into the lungs every 6 hours as needed , Disp: , Rfl:      amLODIPine (NORVASC) 10 MG tablet, Take 10 mg by mouth daily, Disp: , Rfl:      apixaban (ELIQUIS) 5 mg Tab, [APIXABAN (ELIQUIS) 5 MG TAB] Take 1 tablet by mouth every 12 (twelve) hours., Disp: , Rfl:      atorvastatin (LIPITOR) 40 MG tablet, Take 40 mg by mouth every morning , Disp: , Rfl: 4     bumetanide (BUMEX) 1 MG tablet, Take 3-4 mg by mouth 2 times daily 4 mg in am and 3 mg in afternoon per pt, Disp: , Rfl: 0     canagliflozin (INVOKANA) 100 mg Tab, Take 100 mg by mouth every morning , Disp: , Rfl:      clopidogreL (PLAVIX) 75 mg tablet, [CLOPIDOGREL (PLAVIX) 75 MG TABLET] Take 1 tablet by mouth daily., Disp: , Rfl:      digoxin (LANOXIN) 125 mcg tablet, [DIGOXIN (LANOXIN) 125 MCG TABLET] Take 1 tablet (125 mcg total) by mouth daily., Disp: 90 tablet, Rfl: 3     fluticasone-vilanterol (BREO ELLIPTA) 200-25 mcg/dose DsDv inhaler, Inhale 1 puff into the lungs daily as needed , Disp: , Rfl:      Garlic 1000 MG CAPS, Take 1 capsule by mouth daily, Disp: , Rfl:      HYDROcodone-acetaminophen (NORCO) 5-325 MG tablet, Take 1 tablet by mouth every 6 hours as needed for severe pain, Disp: , Rfl:      insulin glargine (LANTUS PEN) 100 UNIT/ML pen, Inject 30 Units Subcutaneous 2 times daily , Disp: , Rfl:      ipratropium - albuterol 0.5 mg/2.5 mg/3 mL (DUONEB) 0.5-2.5 (3) MG/3ML neb solution, Take 1 vial by  "nebulization 4 times daily, Disp: , Rfl:      irbesartan (AVAPRO) 300 MG tablet, Take 300 mg by mouth daily , Disp: , Rfl:      lidocaine (LIDODERM) 5 %, [LIDOCAINE (LIDODERM) 5 %] Place 1 patch on the skin as needed. Remove & Discard patch within 12 hours or as directed by MD, Disp: , Rfl:      metFORMIN (GLUCOPHAGE-XR) 500 MG 24 hr tablet, Take 1,000 mg by mouth daily (with dinner) , Disp: , Rfl:      metoprolol tartrate (LOPRESSOR) 50 MG tablet, Take 50 mg by mouth 2 times daily (with meals), Disp: , Rfl:      multivitamin w/minerals (THERA-VIT-M) tablet, Take 1 tablet by mouth daily, Disp: , Rfl:      nitroglycerin (NITROSTAT) 0.4 MG SL tablet, [NITROGLYCERIN (NITROSTAT) 0.4 MG SL TABLET] Place 0.4 mg under the tongue every 5 (five) minutes as needed for chest pain., Disp: , Rfl:      omeprazole (PRILOSEC) 20 MG DR capsule, Take 20 mg by mouth daily, Disp: , Rfl:      potassium chloride (K-DUR,KLOR-CON) 20 MEQ tablet, Take 40 mEq by mouth daily , Disp: , Rfl:      rOPINIRole (REQUIP) 2 MG tablet, [ROPINIROLE (REQUIP) 2 MG TABLET] Take 2 mg by mouth at bedtime. , Disp: , Rfl:      tadalafil (CIALIS) 20 MG tablet, Take 20 mg by mouth daily, Disp: , Rfl:      vitamin C (ASCORBIC ACID) 1000 MG TABS, Take 1,000 mg by mouth daily, Disp: , Rfl:      zinc gluconate 50 MG tablet, Take 50 mg by mouth daily, Disp: , Rfl:      zolpidem (AMBIEN) 10 mg tablet, [ZOLPIDEM (AMBIEN) 10 MG TABLET] Take 10 mg by mouth bedtime., Disp: , Rfl:      ACCU-CHEK JOSE PLUS TEST STRP strips, [ACCU-CHEK JOSE PLUS TEST STRP STRIPS] see administration instructions., Disp: , Rfl:      BD ULTRA-FINE MANISHA PEN NEEDLES 32 gauge x 5/32\" Ndle, [BD ULTRA-FINE MANISHA PEN NEEDLES 32 GAUGE X 5/32\" NDLE] , Disp: , Rfl: 4     OXYGEN-AIR DELIVERY SYSTEMS MISC, [OXYGEN-AIR DELIVERY SYSTEMS MISC] Use As Directed., Disp: , Rfl:      Allergies   Allergen Reactions     Furosemide Muscle Pain (Myalgia)     Previously tolerated.  Muscle cramps     " Hydrochlorothiazide Unknown     Iodinated Contrast Media [Diagnostic X-Ray Materials] Nausea     Losartan Other (See Comments)     Other reaction(s): Stomatitis, Bloody nose dry mouth and lips     Losartan-Hydrochlorothiazide [Hyzaar]      Mouth sores     Metaxalone Nausea     Metolazone Other (See Comments)     Muscle cramps     Mometasone Other (See Comments)     Bloody nose     Penicillins Other (See Comments)     Immune-does not work for him     Rabeprazole Other (See Comments)     Mouth sores     Ramipril Other (See Comments)     Mouth sores     Shellfish Containing Products [Shellfish-Derived Products] Unknown     Other reaction(s): mouth sores, Other reaction(s): mouth sores     Methocarbamol Rash          Lab Results    Chemistry CBC Cardiac Enzymes/BNP/TSH/INR   Recent Labs   Lab Test 10/24/21  0659 10/24/21  0619 10/24/21  0033   NA  --  141  --    POTASSIUM  --  3.8  --    CHLORIDE  --  106  --    CO2  --  26  --    * 129*   < >   BUN  --  29*  --    CR  --  1.34*  --    GFRESTIMATED  --  50*  --    ANNMARIE  --  8.9  --     < > = values in this interval not displayed.     Recent Labs   Lab Test 10/24/21  0619 10/23/21  1824 10/12/21  1429   CR 1.34* 1.35* 1.39*          Recent Labs   Lab Test 10/24/21  0619   WBC 7.8   HGB 9.2*   HCT 31.6*   MCV 78        Recent Labs   Lab Test 10/24/21  0619 10/23/21  1824 10/12/21  1429   HGB 9.2* 9.2* 8.8*    Recent Labs   Lab Test 10/24/21  0619 10/24/21  0200 10/23/21  1824   TROPONINI 0.03 0.03 0.03     Recent Labs   Lab Test 10/23/21  1824 10/12/21  1429 08/24/21  1503   * 381* 393*     Recent Labs   Lab Test 05/31/19  1507   TSH 2.55     Recent Labs   Lab Test 10/12/21  1429 08/24/21  1455 08/22/21  1532   INR 1.63* 1.51* 1.57*         Data Review    ECGs (all tracings independently reviewed)  10/23/2021 - SR vs JR 66bpm, right axis deviation, incomplete RBBB, mild ST depression and coved ST segment - no significant change cf  10/12/2021    10/24/2021 TTE  Pending    4/9/2021 TTE    Left ventricle ejection fraction is normal. The estimated left ventricular ejection fraction is 65%.    The right ventricle is severely dilated. The systolic function is moderately reduced.    Severe biatrial enlargement    Mild aortic stenosis.    Mild tricuspid valve regurgitation. No pulmonary hypertension present.    Severely elevated central venous pressure    When compared to the previous study dated 2/26/2016, right ventricular enlargement and dysfunction have progressed.         Irlanda Moon MD  10/24/2021  8:34 AM

## 2021-10-24 NOTE — ED NOTES
Pt states that his home  told him to be on 4L of oxygen at home after further investigating pt open to titration down oxygen. At this time pt on 2L of oxygen and sats maintain at 99%

## 2021-10-24 NOTE — ED NOTES
Nursing assessment--    Introduced self to pt this am. He was up standing at the bedside.  Said he had a restless night, could not get comfortable.  Brought in pt a recliner to try.  Gave pt his morning insulin and neb.  Then ate his breakfast and had his medications.  Now in the recliner with is feet elevated.  Light dimmed to allow for rest. Pt says his breathing feels okay.  Has COPD and is on oxygen chronically. Currently at 3 liters (wears 4 at home).  Lungs diminished.  Has bilateral pedal and ankle swelling.  Has not had any chest pain during the night nor this am.

## 2021-10-24 NOTE — TREATMENT PLAN
RCAT Treatment Plan    Patient Score: 5    Patient Acuity: 5    Clinical Indication for Therapy: COPD, Home neb, MDI's and oxygen.    Therapy Ordered: Q6 prn alb neb, Q6 prn Alb MDI    Assessment Summary: Pt on 3 lpm NC. SPO2 99%.  BS clear.  Pt wears home oxygen at 4 lpm NC.  RCAT  re-evaluation if needed.
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
## 2021-10-25 ENCOUNTER — SURGERY (OUTPATIENT)
Age: 81
End: 2021-10-25
Payer: COMMERCIAL

## 2021-10-25 LAB
ACT BLD: 259 SECONDS (ref 74–150)
ACT BLD: 312 SECONDS (ref 74–150)
ALBUMIN SERPL-MCNC: 3.1 G/DL (ref 3.5–5)
ALP SERPL-CCNC: 117 U/L (ref 45–120)
ALT SERPL W P-5'-P-CCNC: 16 U/L (ref 0–45)
ANION GAP SERPL CALCULATED.3IONS-SCNC: 9 MMOL/L (ref 5–18)
APTT PPP: 54 SECONDS (ref 22–38)
APTT PPP: 64 SECONDS (ref 22–38)
AST SERPL W P-5'-P-CCNC: 24 U/L (ref 0–40)
ATRIAL RATE - MUSE: 51 BPM
BILIRUB SERPL-MCNC: 2.4 MG/DL (ref 0–1)
BUN SERPL-MCNC: 25 MG/DL (ref 8–28)
CALCIUM SERPL-MCNC: 9 MG/DL (ref 8.5–10.5)
CHLORIDE BLD-SCNC: 104 MMOL/L (ref 98–107)
CHOLEST SERPL-MCNC: 98 MG/DL
CO2 SERPL-SCNC: 29 MMOL/L (ref 22–31)
CREAT SERPL-MCNC: 1.18 MG/DL (ref 0.7–1.3)
DIASTOLIC BLOOD PRESSURE - MUSE: NORMAL MMHG
ERYTHROCYTE [DISTWIDTH] IN BLOOD BY AUTOMATED COUNT: 22.6 % (ref 10–15)
GFR SERPL CREATININE-BSD FRML MDRD: 58 ML/MIN/1.73M2
GLUCOSE BLD-MCNC: 77 MG/DL (ref 70–125)
GLUCOSE BLDC GLUCOMTR-MCNC: 100 MG/DL (ref 70–99)
GLUCOSE BLDC GLUCOMTR-MCNC: 124 MG/DL (ref 70–99)
GLUCOSE BLDC GLUCOMTR-MCNC: 223 MG/DL (ref 70–99)
GLUCOSE BLDC GLUCOMTR-MCNC: 76 MG/DL (ref 70–99)
GLUCOSE BLDC GLUCOMTR-MCNC: 90 MG/DL (ref 70–99)
HCT VFR BLD AUTO: 30.5 % (ref 40–53)
HDLC SERPL-MCNC: 35 MG/DL
HGB BLD-MCNC: 8.8 G/DL (ref 13.3–17.7)
INTERPRETATION ECG - MUSE: NORMAL
LDLC SERPL CALC-MCNC: 53 MG/DL
MCH RBC QN AUTO: 22.4 PG (ref 26.5–33)
MCHC RBC AUTO-ENTMCNC: 28.9 G/DL (ref 31.5–36.5)
MCV RBC AUTO: 78 FL (ref 78–100)
P AXIS - MUSE: NORMAL DEGREES
PLATELET # BLD AUTO: 211 10E3/UL (ref 150–450)
POTASSIUM BLD-SCNC: 3.6 MMOL/L (ref 3.5–5)
PR INTERVAL - MUSE: NORMAL MS
PROT SERPL-MCNC: 5.9 G/DL (ref 6–8)
QRS DURATION - MUSE: 96 MS
QT - MUSE: 422 MS
QTC - MUSE: 442 MS
R AXIS - MUSE: 99 DEGREES
RBC # BLD AUTO: 3.93 10E6/UL (ref 4.4–5.9)
SODIUM SERPL-SCNC: 142 MMOL/L (ref 136–145)
SYSTOLIC BLOOD PRESSURE - MUSE: NORMAL MMHG
T AXIS - MUSE: 27 DEGREES
TRIGL SERPL-MCNC: 49 MG/DL
VENTRICULAR RATE- MUSE: 66 BPM
WBC # BLD AUTO: 7.2 10E3/UL (ref 4–11)

## 2021-10-25 PROCEDURE — 272N000001 HC OR GENERAL SUPPLY STERILE: Performed by: INTERNAL MEDICINE

## 2021-10-25 PROCEDURE — 258N000003 HC RX IP 258 OP 636: Performed by: INTERNAL MEDICINE

## 2021-10-25 PROCEDURE — 02F03ZZ FRAGMENTATION IN CORONARY ARTERY, ONE ARTERY, PERCUTANEOUS APPROACH: ICD-10-PCS | Performed by: INTERNAL MEDICINE

## 2021-10-25 PROCEDURE — 250N000013 HC RX MED GY IP 250 OP 250 PS 637: Performed by: NURSE PRACTITIONER

## 2021-10-25 PROCEDURE — 36415 COLL VENOUS BLD VENIPUNCTURE: CPT | Performed by: FAMILY MEDICINE

## 2021-10-25 PROCEDURE — C1887 CATHETER, GUIDING: HCPCS | Performed by: INTERNAL MEDICINE

## 2021-10-25 PROCEDURE — 99152 MOD SED SAME PHYS/QHP 5/>YRS: CPT | Performed by: INTERNAL MEDICINE

## 2021-10-25 PROCEDURE — 93005 ELECTROCARDIOGRAM TRACING: CPT

## 2021-10-25 PROCEDURE — C9600 PERC DRUG-EL COR STENT SING: HCPCS | Performed by: INTERNAL MEDICINE

## 2021-10-25 PROCEDURE — 99153 MOD SED SAME PHYS/QHP EA: CPT | Performed by: INTERNAL MEDICINE

## 2021-10-25 PROCEDURE — 250N000009 HC RX 250: Performed by: INTERNAL MEDICINE

## 2021-10-25 PROCEDURE — 250N000013 HC RX MED GY IP 250 OP 250 PS 637

## 2021-10-25 PROCEDURE — 250N000013 HC RX MED GY IP 250 OP 250 PS 637: Performed by: STUDENT IN AN ORGANIZED HEALTH CARE EDUCATION/TRAINING PROGRAM

## 2021-10-25 PROCEDURE — 255N000002 HC RX 255 OP 636: Performed by: INTERNAL MEDICINE

## 2021-10-25 PROCEDURE — C1725 CATH, TRANSLUMIN NON-LASER: HCPCS | Performed by: INTERNAL MEDICINE

## 2021-10-25 PROCEDURE — B2111ZZ FLUOROSCOPY OF MULTIPLE CORONARY ARTERIES USING LOW OSMOLAR CONTRAST: ICD-10-PCS | Performed by: INTERNAL MEDICINE

## 2021-10-25 PROCEDURE — 027034Z DILATION OF CORONARY ARTERY, ONE ARTERY WITH DRUG-ELUTING INTRALUMINAL DEVICE, PERCUTANEOUS APPROACH: ICD-10-PCS | Performed by: INTERNAL MEDICINE

## 2021-10-25 PROCEDURE — 85730 THROMBOPLASTIN TIME PARTIAL: CPT | Performed by: FAMILY MEDICINE

## 2021-10-25 PROCEDURE — 250N000011 HC RX IP 250 OP 636: Performed by: INTERNAL MEDICINE

## 2021-10-25 PROCEDURE — 4A023N7 MEASUREMENT OF CARDIAC SAMPLING AND PRESSURE, LEFT HEART, PERCUTANEOUS APPROACH: ICD-10-PCS | Performed by: INTERNAL MEDICINE

## 2021-10-25 PROCEDURE — 250N000012 HC RX MED GY IP 250 OP 636 PS 637: Performed by: INTERNAL MEDICINE

## 2021-10-25 PROCEDURE — 99233 SBSQ HOSP IP/OBS HIGH 50: CPT | Performed by: FAMILY MEDICINE

## 2021-10-25 PROCEDURE — C1894 INTRO/SHEATH, NON-LASER: HCPCS | Performed by: INTERNAL MEDICINE

## 2021-10-25 PROCEDURE — 250N000013 HC RX MED GY IP 250 OP 250 PS 637: Performed by: INTERNAL MEDICINE

## 2021-10-25 PROCEDURE — C1874 STENT, COATED/COV W/DEL SYS: HCPCS | Performed by: INTERNAL MEDICINE

## 2021-10-25 PROCEDURE — 250N000012 HC RX MED GY IP 250 OP 636 PS 637: Performed by: FAMILY MEDICINE

## 2021-10-25 PROCEDURE — C9602 PERC D-E COR STENT ATHER S: HCPCS | Performed by: INTERNAL MEDICINE

## 2021-10-25 PROCEDURE — C1761 HC OR CATH, TRANS INTRA LITHO/CORONARY: HCPCS | Performed by: INTERNAL MEDICINE

## 2021-10-25 PROCEDURE — 92928 PRQ TCAT PLMT NTRAC ST 1 LES: CPT | Mod: LC | Performed by: INTERNAL MEDICINE

## 2021-10-25 PROCEDURE — 250N000013 HC RX MED GY IP 250 OP 250 PS 637: Performed by: FAMILY MEDICINE

## 2021-10-25 PROCEDURE — 93458 L HRT ARTERY/VENTRICLE ANGIO: CPT | Mod: 26 | Performed by: INTERNAL MEDICINE

## 2021-10-25 PROCEDURE — 93010 ELECTROCARDIOGRAM REPORT: CPT | Performed by: INTERNAL MEDICINE

## 2021-10-25 PROCEDURE — 82040 ASSAY OF SERUM ALBUMIN: CPT | Performed by: FAMILY MEDICINE

## 2021-10-25 PROCEDURE — 85347 COAGULATION TIME ACTIVATED: CPT

## 2021-10-25 PROCEDURE — 210N000001 HC R&B IMCU HEART CARE

## 2021-10-25 PROCEDURE — C1769 GUIDE WIRE: HCPCS | Performed by: INTERNAL MEDICINE

## 2021-10-25 PROCEDURE — 36415 COLL VENOUS BLD VENIPUNCTURE: CPT | Performed by: INTERNAL MEDICINE

## 2021-10-25 PROCEDURE — 82465 ASSAY BLD/SERUM CHOLESTEROL: CPT | Performed by: INTERNAL MEDICINE

## 2021-10-25 PROCEDURE — 85027 COMPLETE CBC AUTOMATED: CPT | Performed by: FAMILY MEDICINE

## 2021-10-25 PROCEDURE — 93458 L HRT ARTERY/VENTRICLE ANGIO: CPT | Performed by: INTERNAL MEDICINE

## 2021-10-25 DEVICE — STENT CORONARY DES SYNERGY XD MR US 3.50X24MM H7493941824350: Type: IMPLANTABLE DEVICE | Site: CORONARY | Status: FUNCTIONAL

## 2021-10-25 RX ORDER — SODIUM CHLORIDE 9 MG/ML
INJECTION, SOLUTION INTRAVENOUS CONTINUOUS
Status: DISCONTINUED | OUTPATIENT
Start: 2021-10-25 | End: 2021-10-25 | Stop reason: HOSPADM

## 2021-10-25 RX ORDER — ACETAMINOPHEN 325 MG/1
650 TABLET ORAL EVERY 4 HOURS PRN
Status: DISCONTINUED | OUTPATIENT
Start: 2021-10-25 | End: 2021-10-26 | Stop reason: HOSPADM

## 2021-10-25 RX ORDER — ASPIRIN 81 MG/1
81 TABLET, CHEWABLE ORAL ONCE
Status: DISCONTINUED | OUTPATIENT
Start: 2021-10-25 | End: 2021-10-25

## 2021-10-25 RX ORDER — OXYCODONE HYDROCHLORIDE 5 MG/1
5 TABLET ORAL EVERY 4 HOURS PRN
Status: DISCONTINUED | OUTPATIENT
Start: 2021-10-25 | End: 2021-10-26 | Stop reason: HOSPADM

## 2021-10-25 RX ORDER — ATROPINE SULFATE 0.1 MG/ML
0.5 INJECTION INTRAVENOUS
Status: DISCONTINUED | OUTPATIENT
Start: 2021-10-25 | End: 2021-10-25

## 2021-10-25 RX ORDER — NALOXONE HYDROCHLORIDE 0.4 MG/ML
0.2 INJECTION, SOLUTION INTRAMUSCULAR; INTRAVENOUS; SUBCUTANEOUS
Status: ACTIVE | OUTPATIENT
Start: 2021-10-25 | End: 2021-10-25

## 2021-10-25 RX ORDER — ASPIRIN 81 MG/1
243 TABLET, CHEWABLE ORAL ONCE
Status: COMPLETED | OUTPATIENT
Start: 2021-10-25 | End: 2021-10-25

## 2021-10-25 RX ORDER — ATORVASTATIN CALCIUM 40 MG/1
80 TABLET, FILM COATED ORAL DAILY
Status: DISCONTINUED | OUTPATIENT
Start: 2021-10-25 | End: 2021-10-25

## 2021-10-25 RX ORDER — LIDOCAINE 40 MG/G
CREAM TOPICAL
Status: DISCONTINUED | OUTPATIENT
Start: 2021-10-25 | End: 2021-10-25 | Stop reason: HOSPADM

## 2021-10-25 RX ORDER — IODIXANOL 320 MG/ML
INJECTION, SOLUTION INTRAVASCULAR
Status: DISCONTINUED | OUTPATIENT
Start: 2021-10-25 | End: 2021-10-25 | Stop reason: HOSPADM

## 2021-10-25 RX ORDER — FLUMAZENIL 0.1 MG/ML
0.2 INJECTION, SOLUTION INTRAVENOUS
Status: ACTIVE | OUTPATIENT
Start: 2021-10-25 | End: 2021-10-25

## 2021-10-25 RX ORDER — NITROGLYCERIN 0.4 MG/1
0.4 TABLET SUBLINGUAL EVERY 5 MIN PRN
Status: DISCONTINUED | OUTPATIENT
Start: 2021-10-25 | End: 2021-10-26 | Stop reason: HOSPADM

## 2021-10-25 RX ORDER — DIAZEPAM 5 MG
5 TABLET ORAL ONCE
Status: COMPLETED | OUTPATIENT
Start: 2021-10-25 | End: 2021-10-25

## 2021-10-25 RX ORDER — NALOXONE HYDROCHLORIDE 0.4 MG/ML
0.4 INJECTION, SOLUTION INTRAMUSCULAR; INTRAVENOUS; SUBCUTANEOUS
Status: ACTIVE | OUTPATIENT
Start: 2021-10-25 | End: 2021-10-25

## 2021-10-25 RX ORDER — PRASUGREL 10 MG/1
10 TABLET, FILM COATED ORAL DAILY
Status: DISCONTINUED | OUTPATIENT
Start: 2021-10-26 | End: 2021-10-26 | Stop reason: HOSPADM

## 2021-10-25 RX ORDER — FENTANYL CITRATE 50 UG/ML
25 INJECTION, SOLUTION INTRAMUSCULAR; INTRAVENOUS
Status: DISCONTINUED | OUTPATIENT
Start: 2021-10-25 | End: 2021-10-25

## 2021-10-25 RX ORDER — FENTANYL CITRATE 50 UG/ML
INJECTION, SOLUTION INTRAMUSCULAR; INTRAVENOUS
Status: DISCONTINUED | OUTPATIENT
Start: 2021-10-25 | End: 2021-10-25 | Stop reason: HOSPADM

## 2021-10-25 RX ORDER — OXYCODONE HYDROCHLORIDE 5 MG/1
10 TABLET ORAL EVERY 4 HOURS PRN
Status: DISCONTINUED | OUTPATIENT
Start: 2021-10-25 | End: 2021-10-26 | Stop reason: HOSPADM

## 2021-10-25 RX ORDER — ATORVASTATIN CALCIUM 40 MG/1
80 TABLET, FILM COATED ORAL EVERY MORNING
Status: DISCONTINUED | OUTPATIENT
Start: 2021-10-26 | End: 2021-10-26 | Stop reason: HOSPADM

## 2021-10-25 RX ORDER — HEPARIN SODIUM 1000 [USP'U]/ML
INJECTION, SOLUTION INTRAVENOUS; SUBCUTANEOUS
Status: DISCONTINUED | OUTPATIENT
Start: 2021-10-25 | End: 2021-10-25 | Stop reason: HOSPADM

## 2021-10-25 RX ORDER — ONDANSETRON 4 MG/1
4 TABLET, ORALLY DISINTEGRATING ORAL EVERY 6 HOURS PRN
Status: DISCONTINUED | OUTPATIENT
Start: 2021-10-25 | End: 2021-10-26 | Stop reason: HOSPADM

## 2021-10-25 RX ORDER — SODIUM CHLORIDE 9 MG/ML
INJECTION, SOLUTION INTRAVENOUS CONTINUOUS
Status: ACTIVE | OUTPATIENT
Start: 2021-10-25 | End: 2021-10-25

## 2021-10-25 RX ORDER — PRASUGREL 5 MG/1
TABLET, FILM COATED ORAL
Status: DISCONTINUED | OUTPATIENT
Start: 2021-10-25 | End: 2021-10-25 | Stop reason: HOSPADM

## 2021-10-25 RX ORDER — ONDANSETRON 2 MG/ML
4 INJECTION INTRAMUSCULAR; INTRAVENOUS EVERY 6 HOURS PRN
Status: DISCONTINUED | OUTPATIENT
Start: 2021-10-25 | End: 2021-10-26 | Stop reason: HOSPADM

## 2021-10-25 RX ORDER — METOPROLOL TARTRATE 1 MG/ML
5 INJECTION, SOLUTION INTRAVENOUS
Status: DISCONTINUED | OUTPATIENT
Start: 2021-10-25 | End: 2021-10-26 | Stop reason: HOSPADM

## 2021-10-25 RX ORDER — HYDRALAZINE HYDROCHLORIDE 20 MG/ML
10 INJECTION INTRAMUSCULAR; INTRAVENOUS EVERY 4 HOURS PRN
Status: DISCONTINUED | OUTPATIENT
Start: 2021-10-25 | End: 2021-10-26 | Stop reason: HOSPADM

## 2021-10-25 RX ORDER — ASPIRIN 81 MG/1
81 TABLET ORAL DAILY
Status: DISCONTINUED | OUTPATIENT
Start: 2021-10-26 | End: 2021-10-26 | Stop reason: HOSPADM

## 2021-10-25 RX ADMIN — METOPROLOL TARTRATE 50 MG: 25 TABLET, FILM COATED ORAL at 08:48

## 2021-10-25 RX ADMIN — HEPARIN SODIUM 5 ML: 1000 INJECTION INTRAVENOUS; SUBCUTANEOUS at 13:14

## 2021-10-25 RX ADMIN — ASPIRIN 81 MG CHEWABLE TABLET 81 MG: 81 TABLET CHEWABLE at 08:48

## 2021-10-25 RX ADMIN — HEPARIN SODIUM 9000 UNITS: 1000 INJECTION INTRAVENOUS; SUBCUTANEOUS at 13:36

## 2021-10-25 RX ADMIN — INSULIN ASPART 1 UNITS: 100 INJECTION, SOLUTION INTRAVENOUS; SUBCUTANEOUS at 21:41

## 2021-10-25 RX ADMIN — DIGOXIN 125 MCG: 0.12 TABLET ORAL at 08:47

## 2021-10-25 RX ADMIN — ATORVASTATIN CALCIUM 40 MG: 40 TABLET, FILM COATED ORAL at 08:46

## 2021-10-25 RX ADMIN — DIAZEPAM 5 MG: 5 TABLET ORAL at 12:34

## 2021-10-25 RX ADMIN — IRBESARTAN 300 MG: 300 TABLET ORAL at 10:19

## 2021-10-25 RX ADMIN — ZOLPIDEM TARTRATE 10 MG: 5 TABLET ORAL at 21:35

## 2021-10-25 RX ADMIN — MIDAZOLAM HYDROCHLORIDE 1 MG: 1 INJECTION, SOLUTION INTRAMUSCULAR; INTRAVENOUS at 13:13

## 2021-10-25 RX ADMIN — HEPARIN SODIUM 1500 UNITS/HR: 10000 INJECTION, SOLUTION INTRAVENOUS at 09:08

## 2021-10-25 RX ADMIN — Medication 1000 MG: at 08:46

## 2021-10-25 RX ADMIN — MULTIPLE VITAMINS W/ MINERALS TAB 1 TABLET: TAB at 10:21

## 2021-10-25 RX ADMIN — INSULIN GLARGINE 20 UNITS: 100 INJECTION, SOLUTION SUBCUTANEOUS at 21:35

## 2021-10-25 RX ADMIN — MIDAZOLAM HYDROCHLORIDE 0.5 MG: 1 INJECTION, SOLUTION INTRAMUSCULAR; INTRAVENOUS at 13:03

## 2021-10-25 RX ADMIN — SODIUM CHLORIDE: 9 INJECTION, SOLUTION INTRAVENOUS at 16:08

## 2021-10-25 RX ADMIN — POTASSIUM CHLORIDE 40 MEQ: 20 TABLET, EXTENDED RELEASE ORAL at 08:47

## 2021-10-25 RX ADMIN — METOPROLOL TARTRATE 50 MG: 25 TABLET, FILM COATED ORAL at 17:18

## 2021-10-25 RX ADMIN — IODIXANOL 229 ML: 320 INJECTION, SOLUTION INTRAVASCULAR at 14:17

## 2021-10-25 RX ADMIN — AMLODIPINE BESYLATE 10 MG: 5 TABLET ORAL at 08:49

## 2021-10-25 RX ADMIN — ROPINIROLE HYDROCHLORIDE 2 MG: 1 TABLET, FILM COATED ORAL at 21:35

## 2021-10-25 RX ADMIN — PANTOPRAZOLE SODIUM 40 MG: 20 TABLET, DELAYED RELEASE ORAL at 08:47

## 2021-10-25 RX ADMIN — LIDOCAINE HYDROCHLORIDE 3 ML: 10 INJECTION, SOLUTION EPIDURAL; INFILTRATION; INTRACAUDAL; PERINEURAL at 13:13

## 2021-10-25 RX ADMIN — CLOPIDOGREL BISULFATE 75 MG: 75 TABLET ORAL at 08:48

## 2021-10-25 RX ADMIN — PRASUGREL 60 MG: 5 TABLET, FILM COATED ORAL at 14:19

## 2021-10-25 RX ADMIN — DIPHENHYDRAMINE HYDROCHLORIDE 25 MG: 50 INJECTION, SOLUTION INTRAMUSCULAR; INTRAVENOUS at 12:35

## 2021-10-25 RX ADMIN — FENTANYL CITRATE 50 MCG: 50 INJECTION, SOLUTION INTRAMUSCULAR; INTRAVENOUS at 13:13

## 2021-10-25 RX ADMIN — FENTANYL CITRATE 25 MCG: 50 INJECTION, SOLUTION INTRAMUSCULAR; INTRAVENOUS at 13:02

## 2021-10-25 RX ADMIN — ASPIRIN 81 MG CHEWABLE TABLET 243 MG: 81 TABLET CHEWABLE at 12:33

## 2021-10-25 RX ADMIN — PREDNISONE 25 MG: 5 TABLET ORAL at 08:50

## 2021-10-25 RX ADMIN — INSULIN GLARGINE 20 UNITS: 100 INJECTION, SOLUTION SUBCUTANEOUS at 10:20

## 2021-10-25 RX ADMIN — BUMETANIDE 3 MG: 2 TABLET ORAL at 16:04

## 2021-10-25 ASSESSMENT — ACTIVITIES OF DAILY LIVING (ADL)
ADLS_ACUITY_SCORE: 5
DEPENDENT_IADLS:: INDEPENDENT
ADLS_ACUITY_SCORE: 5

## 2021-10-25 ASSESSMENT — MIFFLIN-ST. JEOR: SCORE: 1745.02

## 2021-10-25 NOTE — PROGRESS NOTES
"[facility] PROGRESS NOTE      ADMIT DATE: 10/23/2021     FACILITY: [facility]    PCP: Haresh Corona, [phone]    ASSESSMENT AND PLAN:     Patient is a 81-year-old male with a history of atrial fibrillation, congestive heart failure, COPD, CAD, CKD 3, chronic pancreatitis, hepatic cirrhosis with portal hypertension, COVID-19 infection 9/24/2021, DM on insulin, hypertension, hyperlipidemia, pulmonary hypertension, restless leg, sleep apnea that presents with chest pain.     Active Problems:    Chest pain, unspecified type    Diabetes mellitus (H)          Chest pain  -Rule out acute coronary syndrome, less likely PE given patient on Eliquis.  Does have known right heart dysfunction with preserved LVEF.  -History of MI and HFpEF 65%  -Troponin negative x 2.  BNP elevated to 445.  At his baseline of 4 L of supplemental oxygen.  -Chest x-ray without pulmonary edema  -Received 1 dose of IV Bumex 0.5 mg in ED  -TTE completed 10/24 LVEF 60 to 65% with severely dilated right atrium with elevated pulmonary pressure and dilated right ventricle.  -cardio consulted and cardiac cath performed on 10/25. Cardiac cath showed: \"severe narrowing in Lcx stent, s/p intracoronary lithotripsy and DESx1; high-normal L-sided filling pressures.\"   -c/w aspirin 81 mg indefinitely. Stop plavix and start prasugrel with 60 mg loading dose and then 10mg daily ideally indefinitely but at least for 12 mos. Increase lipitor to 80 mg PO QD  -Continue home metoprolol  -Continue home Bumex  -hold eliquis for now-->restart tomorrow 10/26       Elevated Bilirubin  -T bili 2.1. AST ALT normal with a mild elevation of alk phos  -in the setting of chest pain with radiation to the back  -checked RUQ US for gallbladder disease-->US unremarkable       Recent COVID-19 infection  -Receives J&J vaccine 3 to 4 months PTA  -Diagnosed with Covid on 9/24/2021 was admitted to [facility].  Received remdesivir and " dexamethasone.  Was able to wean back to his home oxygen level.  -CT chest on 10/12/2021 showing increased pulmonary opacities consistent with history of COVID-19 pneumonia     CKD   -Baseline serum creatinine 1.3, on admission 1.35. creatinine stable      Anemia of chronic disease  -Baseline hemoglobin 9-10, hemoglobin on admission 9.2. MCV 78  -Hgb trending down  -monitor Hgb daily for now       COPD  -Not in acute exacerbation  -on chronic home oxygen of 3 LPM  -Continue home albuterol and Breo     Hypertension  -Normotensive  -Continue Norvasc  -Continue irbesartan     Atrial fibrillation  -Rate controlled  -Continue home digoxin  -Continue home metoprolol  -hold eliquis for now-->restart tomorrow 10/26       T2DM  -A1c 9.3 on 7/11/2021.   -A1c 6.3 10/23/21  -Hold home Invokana  -hold home Metformin for now after coronary angio  -Lantus 20 units twice daily  -Medium sliding scale insulin     Restless leg syndrome-Continue home Requip    Insomnia-continue as needed home Ambien      FEN/GI:  Combination Diet Consistent Carb 60 Grams CHO per Meal Diet; 2 gm NA Diet; Low Fat Diet  DVT proph: SCDs  Code status: Full Code      Discharge barriers:  -coronary angiogram today, cardio following  -ADOD: 1-2 days       SUBJECTIVE:    Patient denies any complaints right now including any chest pain and shortness of breath.     ROS:  12 Points review of systems reviewed and is negative except for what has already been mentioned above    OBJECTIVE:  Patient Vitals for the past 24 hrs:   BP Temp Temp src Pulse Resp SpO2 Weight   10/25/21 0709 (!) 154/74 97.9  F (36.6  C) Oral 73 20 98 % --   10/25/21 0615 (!) 141/73 -- -- -- -- -- --   10/25/21 0614 139/73 -- -- -- -- -- --   10/25/21 0353 102/55 98.5  F (36.9  C) Oral 64 18 97 % 100.8 kg (222 lb 3.2 oz)   10/24/21 2349 116/64 97.4  F (36.3  C) Oral 64 18 92 % --   10/24/21 1958 (!) 155/68 98.4  F (36.9  C) Oral 63 18 98 % --   10/24/21 1643 (!) 155/68 -- Oral 68 17 100  % --   10/24/21 1500 -- -- -- 74 28 99 % --   10/24/21 1445 -- -- -- 74 (!) 36 98 % --   10/24/21 1430 -- -- -- 73 29 99 % --   10/24/21 1415 -- -- -- 71 22 98 % --   10/24/21 1400 -- -- -- 71 20 99 % --   10/24/21 1345 -- -- -- 82 29 (!) 86 % --   10/24/21 1330 -- -- -- 73 (!) 32 100 % --   10/24/21 1315 -- -- -- 80 28 97 % --   10/24/21 1300 -- -- -- 72 (!) 31 97 % --   10/24/21 1245 -- -- -- 71 29 98 % --   10/24/21 1230 -- -- -- 73 23 94 % --   10/24/21 1215 (!) 140/65 -- -- 67 (!) 37 98 % --   10/24/21 1200 -- -- -- 70 (!) 34 96 % --   10/24/21 1145 -- -- -- 70 26 97 % --   10/24/21 1130 -- -- -- 66 27 98 % --   10/24/21 1115 -- -- -- 67 20 98 % --   10/24/21 1100 -- -- -- 64 (!) 34 97 % --   10/24/21 1030 -- -- -- 68 (!) 32 97 % --   10/24/21 1000 -- -- -- 70 24 98 % --   10/24/21 0945 -- -- -- 73 25 97 % --   10/24/21 0930 -- -- -- 74 26 98 % --   10/24/21 0900 -- -- -- 76 24 95 % --   10/24/21 0845 -- -- -- 77 28 95 % --   10/24/21 0830 -- -- -- 82 19 95 % --   10/24/21 0800 -- -- -- 76 23 94 % --          Intake/Output Summary (Last 24 hours) at 10/25/2021 0753  Last data filed at 10/25/2021 0700  Gross per 24 hour   Intake 960 ml   Output 3650 ml   Net -2690 ml       GENRL: Not in acute distress. Satting at 97% on 3 LPM.   HEENT: NC/AT                 Neck- supple                 Sclera- anicteric                 Mucous membrane- moist and pink  CHEST: very mild crackles at bases   HEART: S1S2 regular. No murmurs, rubs or gallops  ABDMN: Soft. Non-tender. No guarding or rigidity. Bowel sounds- active  EXTRM: mild 1+ pitting edema of BLEs.   NEURO:  No involuntary movements  INTGM: please see nursing assessment for full skin assessment  PSYCH: normal affect, normal speech      DIAGNOSTIC DATA:          Recent Results (from the past 24 hour(s))   Echocardiogram Complete    Collection Time: 10/24/21 11:13 AM   Result Value Ref Range    LVEF  60-65%    Glucose by meter    Collection Time: 10/24/21 11:48 AM    Result Value Ref Range    GLUCOSE BY METER POCT 121 (H) 70 - 99 mg/dL   Glucose by meter    Collection Time: 10/24/21  5:04 PM   Result Value Ref Range    GLUCOSE BY METER POCT 97 70 - 99 mg/dL   CBC with platelets    Collection Time: 10/24/21  5:26 PM   Result Value Ref Range    WBC Count 8.5 4.0 - 11.0 10e3/uL    RBC Count 4.26 (L) 4.40 - 5.90 10e6/uL    Hemoglobin 9.7 (L) 13.3 - 17.7 g/dL    Hematocrit 32.8 (L) 40.0 - 53.0 %    MCV 77 (L) 78 - 100 fL    MCH 22.8 (L) 26.5 - 33.0 pg    MCHC 29.6 (L) 31.5 - 36.5 g/dL    RDW 22.8 (H) 10.0 - 15.0 %    Platelet Count 239 150 - 450 10e3/uL   Partial thromboplastin time    Collection Time: 10/24/21  7:20 PM   Result Value Ref Range    aPTT 46 (H) 22 - 38 Seconds   Glucose by meter    Collection Time: 10/24/21  8:34 PM   Result Value Ref Range    GLUCOSE BY METER POCT 130 (H) 70 - 99 mg/dL   Partial thromboplastin time    Collection Time: 10/25/21  4:55 AM   Result Value Ref Range    aPTT 54 (H) 22 - 38 Seconds   Comprehensive metabolic panel    Collection Time: 10/25/21  4:55 AM   Result Value Ref Range    Sodium 142 136 - 145 mmol/L    Potassium 3.6 3.5 - 5.0 mmol/L    Chloride 104 98 - 107 mmol/L    Carbon Dioxide (CO2) 29 22 - 31 mmol/L    Anion Gap 9 5 - 18 mmol/L    Urea Nitrogen 25 8 - 28 mg/dL    Creatinine 1.18 0.70 - 1.30 mg/dL    Calcium 9.0 8.5 - 10.5 mg/dL    Glucose 77 70 - 125 mg/dL    Alkaline Phosphatase 117 45 - 120 U/L    AST 24 0 - 40 U/L    ALT 16 0 - 45 U/L    Protein Total 5.9 (L) 6.0 - 8.0 g/dL    Albumin 3.1 (L) 3.5 - 5.0 g/dL    Bilirubin Total 2.4 (H) 0.0 - 1.0 mg/dL    GFR Estimate 58 (L) >60 mL/min/1.73m2   CBC with platelets    Collection Time: 10/25/21  4:55 AM   Result Value Ref Range    WBC Count 7.2 4.0 - 11.0 10e3/uL    RBC Count 3.93 (L) 4.40 - 5.90 10e6/uL    Hemoglobin 8.8 (L) 13.3 - 17.7 g/dL    Hematocrit 30.5 (L) 40.0 - 53.0 %    MCV 78 78 - 100 fL    MCH 22.4 (L) 26.5 - 33.0 pg    MCHC 28.9 (L) 31.5 - 36.5 g/dL    RDW 22.6 (H)  10.0 - 15.0 %    Platelet Count 211 150 - 450 10e3/uL   Glucose by meter    Collection Time: 10/25/21  7:18 AM   Result Value Ref Range    GLUCOSE BY METER POCT 76 70 - 99 mg/dL        Results for orders placed or performed during the hospital encounter of 10/23/21   XR Chest Port 1 View    Impression    IMPRESSION:     Unchanged enlargement of the cardiac silhouette. No new mediastinal border abnormality.    Extensive patchy bilateral airspace opacities are present which have angular, geographic borders. The opacities do not have clear apical or basal zonal predominance.    Probable small residual right pleural effusion. No visible left pleural fluid. No pneumothorax.    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 Degenerative thoracic spine osteophytes. No displaced rib fractures are detected.   US Abdomen Limited    Impression    IMPRESSION:  1.  No significant findings on the ultrasound to explain the patient's pain.    2.  Pancreas is poorly seen overall. Prior CT showed changes of diffuse chronic pancreatitis.    3.  Trace ascites.    4.  Small right pleural effusion.       Echocardiogram Complete   Result Value Ref Range    LVEF  60-65%           All recent labs reviewed personally  Radiology report reviewed.   Radiology Results: imaging impressions reviewed      The total time spent in preparing this progress note is about 35 minutes, >50% time spent in care co-ordination that includes reviewing labs, images, discussing the plan of care with patient/family, consultants, and .      Venita Chanel MD.   [facility]   [phone]   Pager [phone]

## 2021-10-25 NOTE — PLAN OF CARE
Problem: Adult Inpatient Plan of Care  Goal: Plan of Care Review  Outcome: Improving  Goal: Patient-Specific Goal (Individualized)  Outcome: Improving  Goal: Absence of Hospital-Acquired Illness or Injury  Outcome: Improving  Intervention: Identify and Manage Fall Risk  Recent Flowsheet Documentation  Taken 10/25/2021 1452 by Karo Moses RN  Safety Promotion/Fall Prevention:   activity supervised   assistive device/personal items within reach  Taken 10/25/2021 1200 by Karo Moses RN  Safety Promotion/Fall Prevention:   activity supervised   assistive device/personal items within reach  Taken 10/25/2021 0800 by Karo Moses, RN  Safety Promotion/Fall Prevention:   activity supervised   assistive device/personal items within reach  Goal: Optimal Comfort and Wellbeing  Outcome: Improving  Goal: Readiness for Transition of Care  Outcome: Improving     Problem: Risk for Delirium  Goal: Optimal Coping  Outcome: Improving  Goal: Improved Behavioral Control  Outcome: Improving  Goal: Improved Attention and Thought Clarity  Outcome: Improving  Goal: Improved Sleep  TR band was completely off at 1715  no bleeding or hematoma noted so far V/S taken and recorded wife at the bedside and was updated by cardiology as claimed  encouraged to increase fluid intake  .  Reverse Barbeau  test Normal .

## 2021-10-25 NOTE — UTILIZATION REVIEW
Admission Status; Secondary Review Determination   Under the authority of the Utilization Management Committee, the utilization review process indicated a secondary review on [place]. The review outcome is based on review of the medical records, discussions with staff, and applying clinical experience noted on the date of the review.   (x) Inpatient Status Appropriate - This patient's medical care is consistent with medical management for inpatient care and reasonable inpatient medical practice.     RATIONALE FOR DETERMINATION   81 yr old male with CHF, afib, HTN, DM2, COPD oxygen dependent and recent COVID19 infection presented with chest pain on 10/23/21.  Initially admitted observation however cardiology concerns with ACS and recommended initiating heparin infusion and planned angio with PCI.  Has required pre-medication due to contrast allergy.  Did receive 1 IV bumex treatment for volume status/pulmonary HTN with CHF  Crossed 2 MN (one as obs).  At this time is in cath lab.      At the time of admission with the information available to the attending physician more than 2 nights Hospital complex care was anticipated, based on patient risk of adverse outcome if treated as outpatient and complex care required. Inpatient admission is appropriate based on the Medicare guidelines.   The information on this document is developed by the utilization review team in order for the business office to ensure compliance. This only denotes the appropriateness of proper admission status and does not reflect the quality of care rendered.   The definitions of Inpatient Status and Observation Status used in making the determination above are those provided in the CMS Coverage Manual, Chapter 1 and Chapter 6, section 70.4.   Sincerely,   Erica Tucker MD  Utilization Review  Physician Advisor  [facility]

## 2021-10-25 NOTE — PRE-PROCEDURE
GENERAL PRE-PROCEDURE:   Procedure:  Coronary angiogram with possible PCI  Date/Time:  10/25/2021 12:03 PM    Written consent obtained?: Yes    Risks and benefits: Risks, benefits and alternatives were discussed    Consent given by:  Patient  Patient states understanding of procedure being performed: Yes    Patient's understanding of procedure matches consent: Yes    Procedure consent matches procedure scheduled: Yes    Expected level of sedation:  Moderate  Appropriately NPO:  Yes  ASA Class:  3 (chest pain, HTN, Atrial Fibrillation, Class I obesity; BMI 30.99kg/m2, COPD, pHTN, CKD, Anemia, DM Type II, Hx of COVID-19)  Mallampati  :  Grade 1- soft palate, uvula, tonsillar pillars, and posterior pharyngeal wall visible  Lungs:  Lungs clear with good breath sounds bilaterally  Heart:  Normal heart sounds and rate  History & Physical reviewed:  History and physical reviewed and no updates needed  Statement of review:  I have reviewed the lab findings, diagnostic data, medications, and the plan for sedation

## 2021-10-25 NOTE — PLAN OF CARE
Problem: Adult Inpatient Plan of Care  Goal: Plan of Care Review  Outcome: Improving     Problem: Risk for Delirium  Goal: Optimal Coping  Outcome: Improving     Problem: Adult Inpatient Plan of Care  Goal: Absence of Hospital-Acquired Illness or Injury  Intervention: Identify and Manage Fall Risk  Recent Flowsheet Documentation  Taken 10/24/2021 2100 by Tala Ron RN  Safety Promotion/Fall Prevention:    activity supervised    fall prevention program maintained    nonskid shoes/slippers when out of bed    safety round/check completed    toileting scheduled  Intervention: Prevent Skin Injury  Recent Flowsheet Documentation  Taken 10/24/2021 2100 by Tala Ron RN  Body Position: position changed independently   Pt is alert and oriented x 4. No complain of chest pain. standby assist.  Pt has been NPO for A possible Angiogram today. Pt clipped, showered. Pt is on 3 litters of oxygen. VSS. NSR on tele. Barbeau test completed and it is ok. Continue to monitor pt.

## 2021-10-26 ENCOUNTER — APPOINTMENT (OUTPATIENT)
Dept: OCCUPATIONAL THERAPY | Facility: HOSPITAL | Age: 81
DRG: 247 | End: 2021-10-26
Attending: INTERNAL MEDICINE
Payer: COMMERCIAL

## 2021-10-26 ENCOUNTER — TELEPHONE (OUTPATIENT)
Dept: CARDIOLOGY | Facility: CLINIC | Age: 81
End: 2021-10-26

## 2021-10-26 VITALS
OXYGEN SATURATION: 95 % | HEART RATE: 72 BPM | BODY MASS INDEX: 31.86 KG/M2 | HEIGHT: 71 IN | TEMPERATURE: 97.9 F | DIASTOLIC BLOOD PRESSURE: 76 MMHG | SYSTOLIC BLOOD PRESSURE: 149 MMHG | RESPIRATION RATE: 18 BRPM | WEIGHT: 227.6 LBS

## 2021-10-26 DIAGNOSIS — I25.10 CORONARY ARTERY DISEASE INVOLVING NATIVE CORONARY ARTERY WITHOUT ANGINA PECTORIS: Primary | ICD-10-CM

## 2021-10-26 DIAGNOSIS — Z95.5 S/P CORONARY ARTERY STENT PLACEMENT: ICD-10-CM

## 2021-10-26 LAB
ALBUMIN SERPL-MCNC: 3.3 G/DL (ref 3.5–5)
ALP SERPL-CCNC: 126 U/L (ref 45–120)
ALT SERPL W P-5'-P-CCNC: 14 U/L (ref 0–45)
ANION GAP SERPL CALCULATED.3IONS-SCNC: 7 MMOL/L (ref 5–18)
AST SERPL W P-5'-P-CCNC: 27 U/L (ref 0–40)
BILIRUB SERPL-MCNC: 2.6 MG/DL (ref 0–1)
BUN SERPL-MCNC: 33 MG/DL (ref 8–28)
CALCIUM SERPL-MCNC: 9.5 MG/DL (ref 8.5–10.5)
CHLORIDE BLD-SCNC: 105 MMOL/L (ref 98–107)
CO2 SERPL-SCNC: 29 MMOL/L (ref 22–31)
CREAT SERPL-MCNC: 1.37 MG/DL (ref 0.7–1.3)
ERYTHROCYTE [DISTWIDTH] IN BLOOD BY AUTOMATED COUNT: 22.6 % (ref 10–15)
GFR SERPL CREATININE-BSD FRML MDRD: 49 ML/MIN/1.73M2
GLUCOSE BLD-MCNC: 110 MG/DL (ref 70–125)
GLUCOSE BLDC GLUCOMTR-MCNC: 126 MG/DL (ref 70–99)
HCT VFR BLD AUTO: 32.4 % (ref 40–53)
HGB BLD-MCNC: 9.2 G/DL (ref 13.3–17.7)
MCH RBC QN AUTO: 22.1 PG (ref 26.5–33)
MCHC RBC AUTO-ENTMCNC: 28.4 G/DL (ref 31.5–36.5)
MCV RBC AUTO: 78 FL (ref 78–100)
PLATELET # BLD AUTO: 217 10E3/UL (ref 150–450)
POTASSIUM BLD-SCNC: 4.1 MMOL/L (ref 3.5–5)
PROT SERPL-MCNC: 6.3 G/DL (ref 6–8)
RBC # BLD AUTO: 4.16 10E6/UL (ref 4.4–5.9)
SODIUM SERPL-SCNC: 141 MMOL/L (ref 136–145)
WBC # BLD AUTO: 7.6 10E3/UL (ref 4–11)

## 2021-10-26 PROCEDURE — 250N000013 HC RX MED GY IP 250 OP 250 PS 637: Performed by: INTERNAL MEDICINE

## 2021-10-26 PROCEDURE — 97166 OT EVAL MOD COMPLEX 45 MIN: CPT | Mod: GO

## 2021-10-26 PROCEDURE — 82040 ASSAY OF SERUM ALBUMIN: CPT | Performed by: INTERNAL MEDICINE

## 2021-10-26 PROCEDURE — 97535 SELF CARE MNGMENT TRAINING: CPT | Mod: GO

## 2021-10-26 PROCEDURE — 93005 ELECTROCARDIOGRAM TRACING: CPT

## 2021-10-26 PROCEDURE — 36415 COLL VENOUS BLD VENIPUNCTURE: CPT | Performed by: INTERNAL MEDICINE

## 2021-10-26 PROCEDURE — 85027 COMPLETE CBC AUTOMATED: CPT | Performed by: INTERNAL MEDICINE

## 2021-10-26 PROCEDURE — 93010 ELECTROCARDIOGRAM REPORT: CPT | Performed by: INTERNAL MEDICINE

## 2021-10-26 PROCEDURE — 99238 HOSP IP/OBS DSCHRG MGMT 30/<: CPT | Performed by: INTERNAL MEDICINE

## 2021-10-26 PROCEDURE — 250N000012 HC RX MED GY IP 250 OP 636 PS 637: Performed by: INTERNAL MEDICINE

## 2021-10-26 RX ORDER — ATORVASTATIN CALCIUM 80 MG/1
80 TABLET, FILM COATED ORAL EVERY MORNING
Qty: 30 TABLET | Refills: 0 | Status: SHIPPED | OUTPATIENT
Start: 2021-10-27 | End: 2021-11-30

## 2021-10-26 RX ORDER — BUMETANIDE 2 MG/1
4 TABLET ORAL DAILY
Qty: 60 TABLET | Refills: 0 | Status: SHIPPED | OUTPATIENT
Start: 2021-10-27 | End: 2021-10-26

## 2021-10-26 RX ORDER — ASPIRIN 81 MG/1
81 TABLET, CHEWABLE ORAL DAILY
Qty: 30 TABLET | Refills: 3 | Status: ON HOLD | OUTPATIENT
Start: 2021-10-26 | End: 2021-12-01

## 2021-10-26 RX ORDER — METFORMIN HCL 500 MG
1000 TABLET, EXTENDED RELEASE 24 HR ORAL
Start: 2021-10-27 | End: 2021-12-10

## 2021-10-26 RX ORDER — BUMETANIDE 2 MG/1
TABLET ORAL
Qty: 60 TABLET | Refills: 0 | Status: ON HOLD | OUTPATIENT
Start: 2021-10-26 | End: 2021-12-06

## 2021-10-26 RX ORDER — PRASUGREL 10 MG/1
10 TABLET, FILM COATED ORAL DAILY
Qty: 90 TABLET | Refills: 3 | Status: SHIPPED | OUTPATIENT
Start: 2021-10-26 | End: 2021-11-30

## 2021-10-26 RX ADMIN — IRBESARTAN 300 MG: 300 TABLET ORAL at 08:12

## 2021-10-26 RX ADMIN — Medication 50 MG: at 08:12

## 2021-10-26 RX ADMIN — POTASSIUM CHLORIDE 40 MEQ: 20 TABLET, EXTENDED RELEASE ORAL at 08:13

## 2021-10-26 RX ADMIN — INSULIN GLARGINE 20 UNITS: 100 INJECTION, SOLUTION SUBCUTANEOUS at 08:10

## 2021-10-26 RX ADMIN — AMLODIPINE BESYLATE 10 MG: 5 TABLET ORAL at 08:13

## 2021-10-26 RX ADMIN — PANTOPRAZOLE SODIUM 40 MG: 20 TABLET, DELAYED RELEASE ORAL at 08:12

## 2021-10-26 RX ADMIN — DIGOXIN 125 MCG: 0.12 TABLET ORAL at 08:12

## 2021-10-26 RX ADMIN — METOPROLOL TARTRATE 50 MG: 25 TABLET, FILM COATED ORAL at 08:11

## 2021-10-26 RX ADMIN — ASPIRIN 81 MG: 81 TABLET, COATED ORAL at 08:12

## 2021-10-26 RX ADMIN — BUMETANIDE 4 MG: 2 TABLET ORAL at 08:11

## 2021-10-26 RX ADMIN — PRASUGREL 10 MG: 10 TABLET, FILM COATED ORAL at 08:12

## 2021-10-26 RX ADMIN — MULTIPLE VITAMINS W/ MINERALS TAB 1 TABLET: TAB at 08:12

## 2021-10-26 RX ADMIN — ATORVASTATIN CALCIUM 80 MG: 40 TABLET, FILM COATED ORAL at 08:12

## 2021-10-26 ASSESSMENT — ACTIVITIES OF DAILY LIVING (ADL)
ADLS_ACUITY_SCORE: 5

## 2021-10-26 ASSESSMENT — MIFFLIN-ST. JEOR: SCORE: 1769.52

## 2021-10-26 NOTE — DISCHARGE INSTRUCTIONS
..Interventional Cardiology  Coronary Angiogram/Angioplasty/Stent/Atherectomy Discharge  Instructions -   Radial (wrist) Approach     The instructions below are to help you understand how to take care of yourself. There is also information about when to call the doctor or emergency services.    **Do not stop your aspirin or platelet inhibitor unless directed by your Cardiologist.  These medications help to prevent platelets in your blood from sticking together and forming a clot.   Examples of these medications are: Ticagrelor (Brilinta), Clopidogrel (Plavix), Prasugrel (Effient)    For 24 hours after procedure:    Do not subject hand/arm to any forceful movements for 24 hours, such as supporting weight when rising from a chair or bed.    Do not drive a car for 24 hours.    The dressing on the puncture site may be removed after 24 hours and left open to air. If minor oozing, you may apply a Band-Aid and remove after 12 hours.     You may shower on the day after your procedure. Do not take a tub bath or wash dishes (no soaking wrist) with the puncture site in water for 3 days after the procedure.    For 48 hours following the procedure:    Do not operate a lawnmower, motorcycle, chainsaw or all-terrain vehicle.    Do not lift anything heavier than 5-10 pounds with affected arm for 5 days.    Avoid excessive bending (flexion/extension) wrist movement.    Do not engage in vigorous exercise (i.e. tennis, golf) using the affected arm for 5 days after discharge.    You may return to work in 72 hours if no complications and no heavy lifting.    If bleeding should occur following discharge:    Sit down and apply firm pressure with your thumb against the puncture site and fingers against back of wrist for 10 minutes.    If the bleeding stops, continue to rest, keeping your wrist still for 2 hours. Notify your doctor as soon as possible.    If bleeding does not stop after 10 minutes or if there is a large amount of bleeding  or spurting, call 911 immediately. Do not drive yourself to the hospital.           Contact the Heart Clinic at [phone] if you develop:    Fever over 100.4, that lasts more than one day    Redness, heat, or pus at the puncture site    Change in color or temperature of the hand or arm    Expect mild tingling of hand and tenderness at the puncture site for up to 3 days. You may take Tylenol or a pain medicine recommended by your doctor.                       Our Cardiac Rehab staff may visit briefly with you while your in the hospital.   If they miss you, someone will contact you after you are home.  You are encouraged to enroll in an Outpatient Cardiac Rehab Program     No elective dental work for 6 weeks after having a stent    Your Procedural Physician was: Dr. Otf Knowles  the phone number is: [phone]    [facility]:  [phone]  If you are calling after hours, please listen to the entire voicemail, a live  will answer at the end of the message

## 2021-10-26 NOTE — PROGRESS NOTES
10/26/21 0915   Quick Adds   Type of Visit Initial Occupational Therapy Evaluation   Living Environment   People in home spouse   Current Living Arrangements house   Self-Care   Usual Activity Tolerance fair   Current Activity Tolerance poor   Regular Exercise No  (due to health careprovider telling him to take it easy)   Equipment Currently Used at Home   (power scouter)   Disability/Function   Hearing Difficulty or Deaf no   Describe hearing loss bilateral hearing loss   Wear Glasses or Blind no   Walking or Climbing Stairs Difficulty no   Dressing/Bathing Difficulty no   Toileting issues no   Fall history within last six months no   General Information   Onset of Illness/Injury or Date of Surgery 10/23/21   Patient/Family Therapy Goal Statement (OT) Go home and do cardiac rehab but insurance does not cover   Heart Disease Risk Factors Lack of physical activity   Cognitive Status Examination   Orientation Status   (further assessment recommended unsafe with O2 needs/nsg. not)   Affect/Mental Status (Cognitive) WNL   Transfers   Transfers No deficits identified   Balance   Balance Assessment no deficits were identified   Clinical Impression   Criteria for Skilled Therapeutic Interventions Met (OT) yes;meets criteria;skilled treatment is necessary   OT Diagnosis decreased activity/ADL tolerance due to PCI   OT Problem List-Impairments impacting ADL activity tolerance impaired;mobility;strength;post-surgical precautions;cognition   Assessment of Occupational Performance 3-5 Performance Deficits   Identified Performance Deficits dressing, amb. tolerance, stairs   Planned Therapy Interventions (OT) ADL retraining;cognition;progressive activity/exercise;home program guidelines;strengthening   Clinical Decision Making Complexity (OT) moderate complexity   Therapy Frequency (OT) 2x/day   Predicted Duration of Therapy 3 days   Risk & Benefits of therapy have been explained evaluation/treatment results reviewed;patient    OT Discharge Planning    OT Discharge Recommendation (DC Rec) home with outpatient cardiac rehab   Total Evaluation Time (Minutes)   Total Evaluation Time (Minutes) 15

## 2021-10-26 NOTE — DISCHARGE SUMMARY
[facility] MEDICINE  DISCHARGE SUMMARY     Primary Care Physician: Haresh Corona  Admission Date: 10/23/2021   Discharge Provider: Fabiola Baez MD Discharge Date: 10/26/2021   Diet:   Active Diet and Nourishment Order   Procedures     Low Saturated Fat Na <2400 mg     Diet       Code Status: Full Code   Activity: DCACTIVITY: Activity as tolerated        Condition at Discharge: Stable     REASON FOR PRESENTATION(See Admission Note for Details)   Patient is a 81-year-old male with a history of atrial fibrillation, congestive heart failure, COPD, CAD, CKD 3, chronic pancreatitis, hepatic cirrhosis with portal hypertension, COVID-19 infection 9/24/2021, DM on insulin, hypertension, hyperlipidemia, pulmonary hypertension, restless leg, sleep apnea that presents with chest pain    PRINCIPAL & ACTIVE DISCHARGE DIAGNOSES     Active Problems:    Chest pain, unspecified type    Diabetes mellitus (H)      PENDING LABS     Unresulted Labs Ordered in the Past 30 Days of this Admission     No orders found from 9/23/2021 to 10/24/2021.            PROCEDURES ( this hospitalization only)      Procedure(s):  Coronary Angiogram  Percutaneous Coronary Intervention  CV Coronary Lithotripsy PCI  Left Heart Cath    RECOMMENDATIONS TO OUTPATIENT PROVIDER FOR F/U VISIT     Follow-up Appointments     Follow-up and recommended labs and tests       Follow up with primary care provider, Haresh Corona, within   3-5days, to evaluate medication change and for hospital follow- up.   Cardiology to arrange further follow-up                 DISPOSITION     Home    SUMMARY OF HOSPITAL COURSE:      Patient is a 81-year-old male with a history of atrial fibrillation, congestive heart failure, COPD, CAD, CKD 3, chronic pancreatitis, hepatic cirrhosis with portal hypertension, COVID-19 infection 9/24/2021, DM on insulin, hypertension, hyperlipidemia, pulmonary hypertension, restless leg, sleep  apnea that presents with chest pain.     Chest pain  -Rule out acute coronary syndrome, less likely PE given patient on Eliquis.  Does have known right heart dysfunction with preserved LVEF.  -History of MI and HFpEF 65%  -Troponin negative x 2.  BNP elevated to 445.  At his baseline of 4 L of supplemental oxygen.  -Chest x-ray without pulmonary edema  -Received 1 dose of IV Bumex 0.5 mg in ED  -Continue home metoprolol, bumex  - resume eliquis   S/p Angiogram severe narrowing in Lcx stent, s/p intracoronary lithotripsy and DESx1; high-normal L-sided filling pressures  - ASA 81mg daily indefinitely, switch P2Y12 inhibitor to prasugrel given relatively early stent re-narrowing (within first year) after 60mg re-load, then 10mg daily ideally indefinitely but at least for 12 mos   - 60-70% bifurcation disease beyond the stent - medical management as the first step, PCI if has recalcitrant symptoms  - increase atorva to 80  - continue aggressive risk factor modification     Elevated Bilirubin  -T bili 2.1. AST ALT normal with a mild elevation of alk phos  -in the setting of chest pain with radiation to the back  - US no significant findings      Recent COVID-19 infection  Chronic respiratory failure on oxygen  -Receives J&J vaccine 3 to 4 months PTA  -Diagnosed with Covid on 9/24/2021 was admitted to [facility].  Received remdesivir and dexamethasone.  Was able to wean back to his home oxygen level.  -CT chest on 10/12/2021 showing increased pulmonary opacities consistent with history of COVID-19 pneumonia  - no change in O 2     CKD   -Baseline serum creatinine 1.3, stable      Anemia of chronic disease  -Baseline hemoglobin 9-10, hemoglobin on admission 9.2. MCV 78     COPD  -Not in acute exacerbation  -Continue home albuterol and Breo     Hypertension  -Normotensive  -Continue Norvasc  -Continue irbesartan     Atrial fibrillation  -Rate controlled  -Continue home digoxin  -Continue home  metoprolol  -Eliquis on hold until cardiology evaluation     T2DM  -A1c 9.3 on 7/11/2021.   -A1c 6.3 10/23/21  - resume home regiment other then holding metformin for 48 hrs from angiogram      Restless leg syndrome-Continue home Requip  Insomnia-continue as needed home Ambien    Discharge Medications with Med changes:     Current Discharge Medication List      START taking these medications    Details   aspirin (ASA) 81 MG chewable tablet Take 1 tablet (81 mg) by mouth daily Starting tomorrow.  Qty: 30 tablet, Refills: 3    Associated Diagnoses: NSTEMI (non-ST elevated myocardial infarction) (H)      prasugrel (EFFIENT) 10 MG TABS tablet Take 1 tablet (10 mg) by mouth daily Do not crush or break tablet. Dose to start tomorrow.  Qty: 90 tablet, Refills: 3    Associated Diagnoses: NSTEMI (non-ST elevated myocardial infarction) (H)         CONTINUE these medications which have CHANGED    Details   atorvastatin (LIPITOR) 80 MG tablet Take 1 tablet (80 mg) by mouth every morning  Qty: 30 tablet, Refills: 0    Associated Diagnoses: NSTEMI (non-ST elevated myocardial infarction) (H)      bumetanide (BUMEX) 2 MG tablet 4 mg in am, 3 mg in afternoon  Qty: 60 tablet, Refills: 0    Associated Diagnoses: NSTEMI (non-ST elevated myocardial infarction) (H); Acute on chronic diastolic congestive heart failure (H)      insulin glargine (LANTUS PEN) 100 UNIT/ML pen Inject 20 Units Subcutaneous 2 times daily  Qty: 3 mL, Refills: 3    Comments: If Lantus is not covered by insurance, may substitute Basaglar at same dose and frequency.    Associated Diagnoses: Type 2 diabetes mellitus with other specified complication, with long-term current use of insulin (H)      metFORMIN (GLUCOPHAGE-XR) 500 MG 24 hr tablet Take 2 tablets (1,000 mg) by mouth daily (with dinner)    Associated Diagnoses: Type 2 diabetes mellitus with other specified complication, with long-term current use of insulin (H)         CONTINUE these medications which  have NOT CHANGED    Details   acetaminophen (TYLENOL) 500 MG tablet [ACETAMINOPHEN (TYLENOL) 500 MG TABLET] Take 1,000 mg by mouth every 6 (six) hours as needed. First line of pain management. Do Not take more than 4,000 mg in 24 hours.      albuterol (PROVENTIL HFA;VENTOLIN HFA) 90 mcg/actuation inhaler Inhale 2 puffs into the lungs every 6 hours as needed       amLODIPine (NORVASC) 10 MG tablet Take 10 mg by mouth daily      apixaban (ELIQUIS) 5 mg Tab [APIXABAN (ELIQUIS) 5 MG TAB] Take 1 tablet by mouth every 12 (twelve) hours.      canagliflozin (INVOKANA) 100 mg Tab Take 100 mg by mouth every morning       digoxin (LANOXIN) 125 mcg tablet [DIGOXIN (LANOXIN) 125 MCG TABLET] Take 1 tablet (125 mcg total) by mouth daily.  Qty: 90 tablet, Refills: 3    Associated Diagnoses: Persistent atrial fibrillation (H)      fluticasone-vilanterol (BREO ELLIPTA) 200-25 mcg/dose DsDv inhaler Inhale 1 puff into the lungs daily as needed       Garlic 1000 MG CAPS Take 1 capsule by mouth daily      ipratropium - albuterol 0.5 mg/2.5 mg/3 mL (DUONEB) 0.5-2.5 (3) MG/3ML neb solution Take 1 vial by nebulization 4 times daily      irbesartan (AVAPRO) 300 MG tablet Take 300 mg by mouth daily       lidocaine (LIDODERM) 5 % [LIDOCAINE (LIDODERM) 5 %] Place 1 patch on the skin as needed. Remove & Discard patch within 12 hours or as directed by MD      metoprolol tartrate (LOPRESSOR) 50 MG tablet Take 50 mg by mouth 2 times daily (with meals)      multivitamin w/minerals (THERA-VIT-M) tablet Take 1 tablet by mouth daily      nitroglycerin (NITROSTAT) 0.4 MG SL tablet [NITROGLYCERIN (NITROSTAT) 0.4 MG SL TABLET] Place 0.4 mg under the tongue every 5 (five) minutes as needed for chest pain.      omeprazole (PRILOSEC) 20 MG DR capsule Take 20 mg by mouth daily      potassium chloride (K-DUR,KLOR-CON) 20 MEQ tablet Take 40 mEq by mouth daily       rOPINIRole (REQUIP) 2 MG tablet [ROPINIROLE (REQUIP) 2 MG TABLET] Take 2 mg by mouth at bedtime.   "     vitamin C (ASCORBIC ACID) 1000 MG TABS Take 1,000 mg by mouth daily      zinc gluconate 50 MG tablet Take 50 mg by mouth daily      zolpidem (AMBIEN) 10 mg tablet [ZOLPIDEM (AMBIEN) 10 MG TABLET] Take 10 mg by mouth bedtime.      ACCU-CHEK JOSE PLUS TEST STRP strips [ACCU-CHEK JOSE PLUS TEST STRP STRIPS] see administration instructions.      BD ULTRA-FINE MANISHA PEN NEEDLES 32 gauge x 5/32\" Ndle [BD ULTRA-FINE MANISHA PEN NEEDLES 32 GAUGE X 5/32\" NDLE]   Refills: 4      OXYGEN-AIR DELIVERY SYSTEMS MISC [OXYGEN-AIR DELIVERY SYSTEMS MISC] Use As Directed.         STOP taking these medications       clopidogreL (PLAVIX) 75 mg tablet Comments:   Reason for Stopping:         HYDROcodone-acetaminophen (NORCO) 5-325 MG tablet Comments:   Reason for Stopping:         tadalafil (CIALIS) 20 MG tablet Comments:   Reason for Stopping:                     Rationale for medication changes:      See above        Consults       CARDIOLOGY IP CONSULT  PHARMACY IP CONSULT  PHARMACY IP CONSULT  PHARMACY IP CONSULT  PHARMACY IP CONSULT  NUTRITION SERVICES ADULT IP CONSULT  CARDIAC REHAB IP CONSULT  PHARMACY IP CONSULT  PHARMACY IP CONSULT  SOCIAL WORK IP CONSULT  SMOKING CESSATION PROGRAM IP CONSULT    Immunizations given this encounter     Most Recent Immunizations   Administered Date(s) Administered     COVID-19,PF,Bipin 03/06/2021     FLU 6-35 months 10/26/2012     Flu 65+ Years 09/29/2020     Flu, Unspecified 10/12/2017     HepA-Adult 06/20/2014     HepA-ped 2 Dose 06/20/2014     Influenza (H1N1) 12/11/2009     Influenza (High Dose) 3 valent vaccine 10/12/2017     Influenza (IIV3) PF 10/07/2014     Influenza Vaccine IM > 6 months Valent IIV4 (Alfuria,Fluzone) 10/26/2012     Influenza, Quad, High Dose, Pf, 65yr+ (Fluzone HD) 10/22/2021     Pneumo Conj 13-V (2010&after) 09/18/2015     Pneumococcal 23 valent 09/16/2009     TD (ADULT, 7+) 06/20/2014     Tdap (Adacel,Boostrix) 06/06/2007           Anticoagulation Information  "     Recent INR results: No results for input(s): INR in the last 168 hours.  Warfarin doses (if applicable) or name of other anticoagulant: eliquis can resume at discharge per cardiology       SIGNIFICANT IMAGING FINDINGS     Results for orders placed or performed during the hospital encounter of 10/23/21   XR Chest Port 1 View    Impression    IMPRESSION:     Unchanged enlargement of the cardiac silhouette. No new mediastinal border abnormality.    Extensive patchy bilateral airspace opacities are present which have angular, geographic borders. The opacities do not have clear apical or basal zonal predominance.    Probable small residual right pleural effusion. No visible left pleural fluid. No pneumothorax.    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 Degenerative thoracic spine osteophytes. No displaced rib fractures are detected.   US Abdomen Limited    Impression    IMPRESSION:  1.  No significant findings on the ultrasound to explain the patient's pain.    2.  Pancreas is poorly seen overall. Prior CT showed changes of diffuse chronic pancreatitis.    3.  Trace ascites.    4.  Small right pleural effusion.       Echocardiogram Complete   Result Value Ref Range    LVEF  60-65%        SIGNIFICANT LABORATORY FINDINGS     Most Recent 3 CBC's:Recent Labs   Lab Test 10/26/21  0426 10/25/21  0455 10/24/21  1726   WBC 7.6 7.2 8.5   HGB 9.2* 8.8* 9.7*   MCV 78 78 77*    211 239     Most Recent 3 BMP's:Recent Labs   Lab Test 10/26/21  0735 10/26/21  0426 10/25/21  2139 10/25/21  0718 10/25/21  0455 10/24/21  0659 10/24/21  0619   NA  --  141  --   --  142  --  141   POTASSIUM  --  4.1  --   --  3.6  --  3.8   CHLORIDE  --  105  --   --  104  --  106   CO2  --  29  --   --  29  --  26   BUN  --  33*  --   --  25  --  29*   CR  --  1.37*  --   --  1.18  --  1.34*   ANIONGAP  --  7  --   --  9  --  9   ANNMARIE  --  9.5  --   --  9.0  --  8.9   * 110 223*   < > 77   < > 129*    <  > = values in this interval not displayed.     Most Recent 2 LFT's:Recent Labs   Lab Test 10/26/21  0426 10/25/21  0455   AST 27 24   ALT 14 16   ALKPHOS 126* 117   BILITOTAL 2.6* 2.4*           Discharge Orders        Reason for your hospital stay    CAD with chest pain     Follow-up and recommended labs and tests     Follow up with primary care provider, Haresh Corona, within 3-5days, to evaluate medication change and for hospital follow- up.   Cardiology to arrange further follow-up     Activity    Your activity upon discharge: activity as tolerated     Discharge Instructions    Hold metformin as directed by cardiology     Diet    Follow this diet upon discharge: Orders Placed This Encounter      Low Saturated Fat Na <2400 mg       Examination   Physical Exam   Temp:  [97.6  F (36.4  C)-98.2  F (36.8  C)] 97.9  F (36.6  C)  Pulse:  [67-81] 72  Resp:  [16-20] 18  BP: (106-159)/(55-83) 149/76  SpO2:  [93 %-99 %] 95 %  Wt Readings from Last 1 Encounters:   10/26/21 103.2 kg (227 lb 9.6 oz)     Physical Exam  Constitutional:       Appearance: Normal appearance.   HENT:      Head: Normocephalic and atraumatic.   Cardiovascular:      Rate and Rhythm: Normal rate. Rhythm irregular.      Pulses: Normal pulses.      Heart sounds: Normal heart sounds.   Pulmonary:      Effort: Pulmonary effort is normal.      Breath sounds: Normal breath sounds.   Abdominal:      General: Bowel sounds are normal.      Palpations: Abdomen is soft.   Musculoskeletal:         General: Normal range of motion.      Right lower leg: No edema.      Left lower leg: No edema.   Skin:     General: Skin is warm.   Neurological:      General: No focal deficit present.      Mental Status: He is alert and oriented to person, place, and time. Mental status is at baseline.             Please see EMR for more detailed significant labs, imaging, consultant notes etc.    IFabiola MD, personally saw the patient today and spent less than  or equal to 30 minutes discharging this patient.    Fabiola Baez MD  [facility]    CC:Haresh Corona

## 2021-10-26 NOTE — PLAN OF CARE
Problem: Adult Inpatient Plan of Care  Goal: Plan of Care Review  Outcome: Adequate for Discharge  Goal: Patient-Specific Goal (Individualized)  Outcome: Adequate for Discharge  Goal: Absence of Hospital-Acquired Illness or Injury  Outcome: Adequate for Discharge  Intervention: Identify and Manage Fall Risk  Recent Flowsheet Documentation  Taken 10/26/2021 3330 by Karo Moses RN  Safety Promotion/Fall Prevention:   activity supervised   nonskid shoes/slippers when out of bed  Goal: Optimal Comfort and Wellbeing  Outcome: Adequate for Discharge  Goal: Readiness for Transition of Care  Outcome: Adequate for Discharge     Problem: Risk for Delirium  Goal: Optimal Coping  Outcome: Adequate for Discharge  Goal: Improved Behavioral Control  Outcome: Adequate for Discharge  Goal: Improved Attention and Thought Clarity  Outcome: Adequate for Discharge  Goal: Improved Sleep  Outcome: Adequate for Discharge   To home and wife here to   IV and telemetry removed   Angiogram discharge guidelines discussed  medications changes and purposes and schedules discussed and verbalized understanding  Patient and wife says they will make their own PMD appointment and they will go  medications  [facility]  Angiogram site C/D/I  .

## 2021-10-26 NOTE — TELEPHONE ENCOUNTER
----- Message from Mandi Jhaveri MD sent at 10/26/2021  1:22 PM CDT -----  This patient underwent PCI of circumflex.  Needs follow-up with Dr. French in 6 to 8 weeks.  Follow-up in our post intervention clinic in 1 to 2 weeks.

## 2021-10-26 NOTE — PLAN OF CARE
Cardiac Rehab Discharge Summary    Reason for therapy discharge:    Discharged to home with outpatient therapy.    Progress towards therapy goal(s). See goals on Care Plan in Epic electronic health record for goal details.  Goals partially met.  Barriers to achieving goals:   discharge from facility.    Therapy recommendation(s):    Continued therapy is recommended.  Rationale/Recommendations:  Outpt. cardiac rehab.

## 2021-10-26 NOTE — PLAN OF CARE
Pt. Alert and oriented. Using call light for all needs. Up in room ambulating with steady gait. Denies any chest pain. On telemetry, A-Fib. Tele monitor reported 7 beats of V-Tach at 0123. Pt. Asymptomatic. Vitals stable. MD updated. R wrist angio site with a bandaid. Bandaid was changed X1. It had some serosanginous drainage. Has been clean, dry intact since then. Slightly bruised but no hematoma. Anticipating pt. Will go home today sometime. Continue to monitor.    Stevo De Luna, RN    Problem: Adult Inpatient Plan of Care  Goal: Absence of Hospital-Acquired Illness or Injury  Outcome: No Change  Intervention: Identify and Manage Fall Risk  Recent Flowsheet Documentation  Taken 10/26/2021 0030 by Stevo De Luna, RN  Safety Promotion/Fall Prevention: activity supervised  Goal: Optimal Comfort and Wellbeing  Outcome: No Change     Problem: Risk for Delirium  Goal: Optimal Coping  Outcome: No Change

## 2021-10-26 NOTE — CONSULTS
Care Management Initial Consult    General Information  Assessment completed with: Patient, patient  Type of CM/SW Visit: Initial Assessment    Primary Care Provider verified and updated as needed:     Readmission within the last 30 days:           Advance Care Planning:     Pt reports he has a healthcare directive       Communication Assessment  Patient's communication style: spoken language (English or Bilingual)    Hearing Difficulty or Deaf: no   Wear Glasses or Blind: no    Cognitive  Cognitive/Neuro/Behavioral: WDL                      Living Environment:   People in home: spouse     Current living Arrangements: house      Able to return to prior arrangements: yes       Family/Social Support:  Care provided by:  No one   Provides care for: no one  Marital Status:   Wife          Description of Support System:     Involved and supportive        Current Resources:   Patient receiving home care services: No     Community Resources: None  Equipment currently used at home: walker, standard  Supplies currently used at home: None    Employment/Financial:  Employment Status: retired        Financial Concerns:   No financial concerns identified          Lifestyle & Psychosocial Needs:  Social Determinants of Health     Tobacco Use: Medium Risk     Smoking Tobacco Use: Former Smoker     Smokeless Tobacco Use: Never Used   Alcohol Use:      Frequency of Alcohol Consumption:      Average Number of Drinks:      Frequency of Binge Drinking:    Financial Resource Strain:      Difficulty of Paying Living Expenses:    Food Insecurity:      Worried About Running Out of Food in the Last Year:      Ran Out of Food in the Last Year:    Transportation Needs:      Lack of Transportation (Medical):      Lack of Transportation (Non-Medical):    Physical Activity:      Days of Exercise per Week:      Minutes of Exercise per Session:    Stress:      Feeling of Stress :    Social Connections:      Frequency of Communication with  Friends and Family:      Frequency of Social Gatherings with Friends and Family:      Attends Jainism Services:      Active Member of Clubs or Organizations:      Attends Club or Organization Meetings:      Marital Status:    Intimate Partner Violence:      Fear of Current or Ex-Partner:      Emotionally Abused:      Physically Abused:      Sexually Abused:    Depression:      PHQ-2 Score:    Housing Stability:      Unable to Pay for Housing in the Last Year:      Number of Places Lived in the Last Year:      Unstable Housing in the Last Year:        Functional Status:  Prior to admission patient needed assistance:   Dependent ADLs:: Independent  Dependent IADLs:: Independent       Mental Health Status:no concerns           Chemical Dependency Status: no current concerns                Values/Beliefs:  Spiritual, Cultural Beliefs, Jainism Practices, Values that affect care:                 Additional Information:  SW completed chart review, met with patient in his room. Patient reports he is independent at baseline, lives with wife and drives. Patient reports he has adult children who are involved and supportive. Pt denies care management needs at this time, will remain available as needed.     Natacha Heaton, GINOSW

## 2021-10-26 NOTE — CONSULTS
NUTRITION EDUCATION      REASON FOR ASSESSMENT:  Consult to educate pt on heart healthy eating    NUTRITION HISTORY:  Information obtained from pt    Pt states he follows a low sodium low sugar diet at home. He has not had education on the diabetic diet.    CURRENT DIET:  Low saturated fat < 2400 mg Na    NUTRITION DIAGNOSIS:  Food- and nutrition-related knowledge deficit R/t CAD/ CHF as evidenced by need for therapeutic diet    INTERVENTIONS:    Nutrition Prescription:  Heart healthy, Diabetic    Implementation:      *  Nutrition Education (Content):   A)  Provided handout placemat (food groups and serving sizes), heart healthy eating nutrition therapy, food choice guidelines for heart healthy eating with diabetes   B)  Discussed low sodium, CHO containing foods and portion sizes, different types of fats to avoid/include in diet, high fiber      *  Nutrition Education (Application):   A)  Discussed current eating habits and recommended alternative food choices      *  Anticipate good compliance      *  Diet Education - refer to Education Flowsheet    Goals:      *  Patient will verbalize understanding of diet by stating 3 high sodium foods to avoid, give an example of a food high in omega 3 fatty acids      *  All of the above goals met during the education session    Follow Up/Monitoring:      *  Provided RD contact information for future questions      *  Recommended Out-Patient Nutrition Referral, if further diet instructions are needed

## 2021-10-27 ENCOUNTER — NURSE TRIAGE (OUTPATIENT)
Dept: NURSING | Facility: CLINIC | Age: 81
End: 2021-10-27

## 2021-10-27 ENCOUNTER — PATIENT OUTREACH (OUTPATIENT)
Dept: CARE COORDINATION | Facility: CLINIC | Age: 81
End: 2021-10-27

## 2021-10-27 DIAGNOSIS — Z71.89 OTHER SPECIFIED COUNSELING: ICD-10-CM

## 2021-10-27 LAB
ATRIAL RATE - MUSE: 73 BPM
ATRIAL RATE - MUSE: 78 BPM
DIASTOLIC BLOOD PRESSURE - MUSE: NORMAL MMHG
DIASTOLIC BLOOD PRESSURE - MUSE: NORMAL MMHG
INTERPRETATION ECG - MUSE: NORMAL
INTERPRETATION ECG - MUSE: NORMAL
P AXIS - MUSE: NORMAL DEGREES
P AXIS - MUSE: NORMAL DEGREES
PR INTERVAL - MUSE: NORMAL MS
PR INTERVAL - MUSE: NORMAL MS
QRS DURATION - MUSE: 82 MS
QRS DURATION - MUSE: 82 MS
QT - MUSE: 386 MS
QT - MUSE: 404 MS
QTC - MUSE: 436 MS
QTC - MUSE: 439 MS
R AXIS - MUSE: 100 DEGREES
R AXIS - MUSE: 96 DEGREES
SYSTOLIC BLOOD PRESSURE - MUSE: NORMAL MMHG
SYSTOLIC BLOOD PRESSURE - MUSE: NORMAL MMHG
T AXIS - MUSE: -8 DEGREES
T AXIS - MUSE: 59 DEGREES
VENTRICULAR RATE- MUSE: 71 BPM
VENTRICULAR RATE- MUSE: 77 BPM

## 2021-10-27 NOTE — TELEPHONE ENCOUNTER
Called back patient to address concerns. He was discharged home with Effient 10 mg daily and then baby aspirin DAPT after recent HUNTER and intervention. He is also on Eliquis 5 mg BID. He was not given instructions on how long he should be on triple therapy. Will route to [facility] for review. -[facility] [place]      Dr. French,  See above- pt that you last saw in Feb last year. Recent intervention and discharge on effient, asa, and eliquis. Should he remain on all 3 blood of these blood thinners at this point?   Thanks,  Mal

## 2021-10-27 NOTE — TELEPHONE ENCOUNTER
RN triage   Call from pt   Pt states he recently discharged from hospital after stent replacement and now is on new med = prasugrel  -- is also still on eliquis and baby ASA  Pt wants to know why prasugrel was added ?    Please advise     Bing Uriarte RN  BAN  Triage Nurse Advisor    COVID 19 Nurse Triage Plan/Patient Instructions    Please be aware that novel coronavirus (COVID-19) may be circulating in the community. If you develop symptoms such as fever, cough, or SOB or if you have concerns about the presence of another infection including coronavirus (COVID-19), please contact your health care provider or visit [email or web address].     Disposition/Instructions    Home care recommended. Follow home care protocol based instructions.    Thank you for taking steps to prevent the spread of this virus.  o Limit your contact with others.  o Wear a simple mask to cover your cough.  o Wash your hands well and often.    Resources    [facility]: About COVID-19: [email or web address]/    CDC: What to Do If You're Sick: www.cdc.gov/coronavirus/2019-ncov/about/steps-when-sick.html    CDC: Ending Home Isolation: www.cdc.gov/coronavirus/2019-ncov/hcp/disposition-in-home-patients.html     CDC: Caring for Someone: www.cdc.gov/coronavirus/2019-ncov/if-you-are-sick/care-for-someone.html     [facility]: Interim Guidance for Hospital Discharge to Home: www.health.[facility].[place]/diseases/coronavirus/hcp/hospdischarge.pdf    [facility] clinical trials (COVID-19 research studies): [email or web address].[facility]/n-clinical-trials     Below are the COVID-19 hotlines at the [place] Department of Health ([facility]). Interpreters are available.   o For health questions: Call 409-113-9239 or 1-906.497.8749 (7 a.m. to 7 p.m.)  o For questions about schools and childcare: Call 297-538-9766 or 1-332.198.1814 (7 a.m. to 7 p.m.)                     Additional Information    Nursing judgment    Protocols used: INFORMATION ONLY  CALL - NO TRIAGE-A-OH

## 2021-10-27 NOTE — PROGRESS NOTES
Clinic Care Coordination Contact  Community Health Worker Initial Outreach    CHW Initial Information Gathering:  Referral Source: IP Report  Preferred Hospital: [facility]  [phone]  Current living arrangement:: I live in a private home with spouse  Type of residence:: Town home  Community Resources: None  Supplies used at home:: Oxygen Tubing/Supplies  Equipment Currently Used at Home: other (see comments) (CPAP)  Informal Support system:: Significant other  No PCP office visit in Past Year: Yes  Transportation means:: Regular car, Family  CHW Additional Questions  If ED/Hospital discharge, follow-up appointment scheduled as recommended?: Yes  Medication changes made following ED/Hospital discharge?: Yes, patient to be scheduled with CC RN/SW within approx 1 business day  MyChart active?: Yes    Patient accepts CC: Yes. Patient stated he is an [facility] patient ([facility]). Brigettera to manage care coordination.    Patient noted desire to discuss cardiac rehab services. CHW let him know that his referral for cardiology is pending. Patient is still interested in CCC, may benefit from support for ongoing medical needs.    [facility]: Post-Discharge Note  SITUATION                                                      Admission:    Admission Date: 10/23/21   Reason for Admission: Chest pain, unspecified type; diabetes mellitus  Discharge:   Discharge Date: 10/26/21  Discharge Diagnosis: NSTEMI, acute on chronic diastolic congestive heart failure, chest pain    BACKGROUND                                                      Red Sutherland is a 81-year-old male with a history of atrial fibrillation, congestive heart failure, COPD, CAD, CKD 3, chronic pancreatitis, hepatic cirrhosis with portal hypertension, COVID-19 infection 9/24/2021, DM on insulin, hypertension, hyperlipidemia, pulmonary hypertension, restless leg, sleep apnea that presents with chest pain.    ASSESSMENT       Enrollment  Primary Care Care Coordination Status: Not a Candidate (Patient is interested in CCC but he is an [facility] patient. Virgie to manage care coordination.)    Discharge Assessment  How are you doing now that you are home?: I'm feeling pretty good  How are your symptoms? (Red Flag symptoms escalate to triage hotline per guidelines): Improved  Do you feel your condition is stable enough to be safe at home until your provider visit?: Yes  Does the patient have their discharge instructions? : Yes  Does the patient have questions regarding their discharge instructions? : No  Were you started on any new medications or were there changes to any of your previous medications? : Yes  Does the patient have all of their medications?: Yes  Do you have questions regarding any of your medications? : Yes (see comment) (Connected patient to nurse triage)  Do you have all of your needed medical supplies or equipment (DME)?  (i.e. oxygen tank, CPAP, cane, etc.): Yes  Discharge follow-up appointment scheduled within 14 calendar days? : No  Is patient agreeable to assistance with scheduling? : No    Post-op (CHW CTA Only)  If the patient had a surgery or procedure, do they have any questions for a nurse?: Yes (see comment) (Patient has questions about medication, CHW connected with nurse triage)      PLAN                                                      Outpatient Plan:      Follow up with primary care provider, Haresh Corona, within 3-5days, to evaluate medication change and for hospital follow- up.     Cardiology to arrange further follow-up.    [facility] phone number: [phone].    Future Appointments   Date Time Provider Department Center   10/29/2021  2:30 PM Jackie Ewing RPH [facility]         For any urgent concerns, please contact our 24 hour nurse triage line: [phone] ([phone])       Violette Mitchell  Community Health Worker  [facility]  [facility], [facility]  Ph: [phone]

## 2021-10-28 ENCOUNTER — TELEPHONE (OUTPATIENT)
Dept: CARDIOLOGY | Facility: CLINIC | Age: 81
End: 2021-10-28

## 2021-10-28 NOTE — TELEPHONE ENCOUNTER
----- Message from Terry French MD sent at 10/28/2021 11:59 AM CDT -----  Regarding: RE: nurse triage message  Did not get the message yesterday.Looks like this just was recently intervened upon a few days ago.Given that, aspirin for 30 days, then would continue Eliquis and Effient.LF  ----- Message -----  From: India Cantu RN  Sent: 10/28/2021  11:23 AM CDT  To: Terry French MD  Subject: nurse triage message                             Hi,  Did you get that nurse triage message I routed to you yesterday? I don't see it in your basket anymore but maybe it didn't get to you as it came from care connection. I will include the message below- this patient recently had a stent placed and is on triple anticoag- eliquis, effient and asa. Should he remain on all 3 and if so- how long?  Thanks,  Mal         You routed this conversation to Terry French MD       [facility]    10/27/21 5:05 PM  Note       Called back patient to address concerns. He was discharged home with Effient 10 mg daily and then baby aspirin DAPT after recent HUNTER and intervention. He is also on Eliquis 5 mg BID. He was not given instructions on how long he should be on triple therapy. Will route to [facility] for review. -[facility] [place] [facility]        Dr. French,  See above- pt that you last saw in Feb last year. Recent intervention and discharge on effient, asa, and eliquis. Should he remain on all 3 blood of these blood thinners at this point?   Thanks,  Cisco                  10/27/21 5:01 PM  You contacted Red Sutherland JD    10/27/21 3:37 PM  Bing Uriarte, RN routed this conversation to  [facility] Support Pool - Bing Blue RN JD    10/27/21 3:37 PM  Note       RN triage   Call from pt   Pt states he recently discharged from hospital after stent replacement and now is on new med = prasugrel  -- is also still on eliquis and baby ASA  Pt wants to know why prasugrel was added ?     Please advise      Bing Uriarte RN  BAN   Triage Nurse Advisor

## 2021-10-28 NOTE — TELEPHONE ENCOUNTER
Noted resent to [facility]- nurse triage note did not get to him. Red updated and verbalized understanding. He will continue all 3 medications x30 days and then go to just Eliquis and Effient. Of note, it looks like he has an appointment with [facility] on 11/8 as he has [employer] insurance. Defer further recommendations to them due to coverage. -[facility]

## 2021-10-29 ENCOUNTER — VIRTUAL VISIT (OUTPATIENT)
Dept: PHARMACY | Facility: PHYSICIAN GROUP | Age: 81
End: 2021-10-29
Payer: COMMERCIAL

## 2021-10-29 ENCOUNTER — PATIENT OUTREACH (OUTPATIENT)
Dept: CARE COORDINATION | Facility: CLINIC | Age: 81
End: 2021-10-29

## 2021-10-29 ENCOUNTER — HOSPITAL ENCOUNTER (INPATIENT)
Facility: CLINIC | Age: 81
LOS: 3 days | Discharge: HOME | DRG: 378 | End: 2021-11-01
Attending: EMERGENCY MEDICINE | Admitting: INTERNAL MEDICINE
Payer: COMMERCIAL

## 2021-10-29 DIAGNOSIS — I10 ESSENTIAL HYPERTENSION: ICD-10-CM

## 2021-10-29 DIAGNOSIS — I48.21 PERMANENT ATRIAL FIBRILLATION (H): ICD-10-CM

## 2021-10-29 DIAGNOSIS — K92.1 GASTROINTESTINAL HEMORRHAGE WITH MELENA: Primary | ICD-10-CM

## 2021-10-29 DIAGNOSIS — I27.22 PULMONARY HYPERTENSION DUE TO LEFT VENTRICULAR DIASTOLIC DYSFUNCTION (H): ICD-10-CM

## 2021-10-29 DIAGNOSIS — K92.2 GI BLEED: ICD-10-CM

## 2021-10-29 DIAGNOSIS — E11.29 TYPE 2 DIABETES MELLITUS WITH OTHER DIABETIC KIDNEY COMPLICATION, WITH LONG-TERM CURRENT USE OF INSULIN (H): ICD-10-CM

## 2021-10-29 DIAGNOSIS — I21.4 NSTEMI (NON-ST ELEVATED MYOCARDIAL INFARCTION) (H): Primary | ICD-10-CM

## 2021-10-29 DIAGNOSIS — R60.9 EDEMA, UNSPECIFIED TYPE: ICD-10-CM

## 2021-10-29 DIAGNOSIS — Z79.4 TYPE 2 DIABETES MELLITUS WITH OTHER DIABETIC KIDNEY COMPLICATION, WITH LONG-TERM CURRENT USE OF INSULIN (H): ICD-10-CM

## 2021-10-29 DIAGNOSIS — E78.00 HYPERCHOLESTEREMIA: ICD-10-CM

## 2021-10-29 DIAGNOSIS — I48.91 ATRIAL FIBRILLATION, UNSPECIFIED TYPE (H): ICD-10-CM

## 2021-10-29 PROBLEM — E87.6 HYPOKALEMIA: Status: ACTIVE | Noted: 2021-09-08

## 2021-10-29 PROBLEM — Z53.09 CONTRAINDICATION TO ANTIPLATELET THERAPY: Status: ACTIVE | Noted: 2021-10-29

## 2021-10-29 PROBLEM — Z98.890 S/P CORONARY ANGIOGRAM: Status: ACTIVE | Noted: 2021-10-29

## 2021-10-29 PROBLEM — Z79.02 LONG TERM CURRENT USE OF ANTITHROMBOTICS/ANTIPLATELETS: Chronic | Status: ACTIVE | Noted: 2021-10-29

## 2021-10-29 PROBLEM — Z98.890 S/P CORONARY ANGIOGRAM: Chronic | Status: ACTIVE | Noted: 2021-10-29

## 2021-10-29 PROBLEM — N18.31 STAGE 3A CHRONIC KIDNEY DISEASE (H): Status: ACTIVE | Noted: 2021-09-08

## 2021-10-29 PROBLEM — Z79.02 LONG TERM CURRENT USE OF ANTITHROMBOTICS/ANTIPLATELETS: Status: ACTIVE | Noted: 2021-10-29

## 2021-10-29 PROBLEM — F17.200 NICOTINE DEPENDENCE: Status: ACTIVE | Noted: 2021-09-08

## 2021-10-29 LAB
ABO/RH(D): NORMAL
ALBUMIN SERPL-MCNC: 3.5 G/DL (ref 3.5–5)
ALP SERPL-CCNC: 135 U/L (ref 45–120)
ALT SERPL W P-5'-P-CCNC: 14 U/L (ref 0–45)
ANION GAP SERPL CALCULATED.3IONS-SCNC: 13 MMOL/L (ref 5–18)
ANTIBODY SCREEN: NEGATIVE
APTT PPP: 40 SECONDS (ref 22–38)
AST SERPL W P-5'-P-CCNC: 24 U/L (ref 0–40)
ATRIAL RATE - MUSE: 87 BPM
BASOPHILS # BLD AUTO: 0 10E3/UL (ref 0–0.2)
BASOPHILS NFR BLD AUTO: 0 %
BILIRUB SERPL-MCNC: 2.2 MG/DL (ref 0–1)
BUN SERPL-MCNC: 34 MG/DL (ref 8–28)
CALCIUM SERPL-MCNC: 9.4 MG/DL (ref 8.5–10.5)
CHLORIDE BLD-SCNC: 102 MMOL/L (ref 98–107)
CO2 SERPL-SCNC: 25 MMOL/L (ref 22–31)
CREAT SERPL-MCNC: 1.41 MG/DL (ref 0.7–1.3)
DIASTOLIC BLOOD PRESSURE - MUSE: NORMAL MMHG
EOSINOPHIL # BLD AUTO: 0.3 10E3/UL (ref 0–0.7)
EOSINOPHIL NFR BLD AUTO: 3 %
ERYTHROCYTE [DISTWIDTH] IN BLOOD BY AUTOMATED COUNT: 22.1 % (ref 10–15)
GFR SERPL CREATININE-BSD FRML MDRD: 47 ML/MIN/1.73M2
GLUCOSE BLD-MCNC: 99 MG/DL (ref 70–125)
GLUCOSE BLDC GLUCOMTR-MCNC: 96 MG/DL (ref 70–99)
HCT VFR BLD AUTO: 30.2 % (ref 40–53)
HGB BLD-MCNC: 8.6 G/DL (ref 13.3–17.7)
HGB BLD-MCNC: 8.8 G/DL (ref 13.3–17.7)
HOLD SPECIMEN: NORMAL
IMM GRANULOCYTES # BLD: 0 10E3/UL
IMM GRANULOCYTES NFR BLD: 1 %
INR PPP: 1.39 (ref 0.85–1.15)
INTERPRETATION ECG - MUSE: NORMAL
LYMPHOCYTES # BLD AUTO: 1 10E3/UL (ref 0.8–5.3)
LYMPHOCYTES NFR BLD AUTO: 11 %
MCH RBC QN AUTO: 22.6 PG (ref 26.5–33)
MCHC RBC AUTO-ENTMCNC: 29.1 G/DL (ref 31.5–36.5)
MCV RBC AUTO: 77 FL (ref 78–100)
MONOCYTES # BLD AUTO: 0.6 10E3/UL (ref 0–1.3)
MONOCYTES NFR BLD AUTO: 7 %
NEUTROPHILS # BLD AUTO: 6.5 10E3/UL (ref 1.6–8.3)
NEUTROPHILS NFR BLD AUTO: 78 %
NRBC # BLD AUTO: 0 10E3/UL
NRBC BLD AUTO-RTO: 0 /100
P AXIS - MUSE: NORMAL DEGREES
PLATELET # BLD AUTO: 152 10E3/UL (ref 150–450)
POTASSIUM BLD-SCNC: 4 MMOL/L (ref 3.5–5)
PR INTERVAL - MUSE: NORMAL MS
PROT SERPL-MCNC: 6.7 G/DL (ref 6–8)
QRS DURATION - MUSE: 84 MS
QT - MUSE: 392 MS
QTC - MUSE: 429 MS
R AXIS - MUSE: 92 DEGREES
RBC # BLD AUTO: 3.9 10E6/UL (ref 4.4–5.9)
SARS-COV-2 RNA RESP QL NAA+PROBE: NEGATIVE
SODIUM SERPL-SCNC: 140 MMOL/L (ref 136–145)
SPECIMEN EXPIRATION DATE: NORMAL
SYSTOLIC BLOOD PRESSURE - MUSE: NORMAL MMHG
T AXIS - MUSE: 40 DEGREES
VENTRICULAR RATE- MUSE: 72 BPM
WBC # BLD AUTO: 8.3 10E3/UL (ref 4–11)

## 2021-10-29 PROCEDURE — C9113 INJ PANTOPRAZOLE SODIUM, VIA: HCPCS | Performed by: NURSE PRACTITIONER

## 2021-10-29 PROCEDURE — 85730 THROMBOPLASTIN TIME PARTIAL: CPT | Performed by: NURSE PRACTITIONER

## 2021-10-29 PROCEDURE — 87635 SARS-COV-2 COVID-19 AMP PRB: CPT | Performed by: NURSE PRACTITIONER

## 2021-10-29 PROCEDURE — 250N000013 HC RX MED GY IP 250 OP 250 PS 637: Performed by: INTERNAL MEDICINE

## 2021-10-29 PROCEDURE — 85610 PROTHROMBIN TIME: CPT | Performed by: NURSE PRACTITIONER

## 2021-10-29 PROCEDURE — 99222 1ST HOSP IP/OBS MODERATE 55: CPT | Performed by: INTERNAL MEDICINE

## 2021-10-29 PROCEDURE — 99285 EMERGENCY DEPT VISIT HI MDM: CPT | Mod: 25

## 2021-10-29 PROCEDURE — C9803 HOPD COVID-19 SPEC COLLECT: HCPCS

## 2021-10-29 PROCEDURE — 86901 BLOOD TYPING SEROLOGIC RH(D): CPT | Performed by: NURSE PRACTITIONER

## 2021-10-29 PROCEDURE — 36415 COLL VENOUS BLD VENIPUNCTURE: CPT | Performed by: NURSE PRACTITIONER

## 2021-10-29 PROCEDURE — 85004 AUTOMATED DIFF WBC COUNT: CPT | Performed by: NURSE PRACTITIONER

## 2021-10-29 PROCEDURE — 93005 ELECTROCARDIOGRAM TRACING: CPT | Performed by: EMERGENCY MEDICINE

## 2021-10-29 PROCEDURE — 36415 COLL VENOUS BLD VENIPUNCTURE: CPT | Performed by: INTERNAL MEDICINE

## 2021-10-29 PROCEDURE — 99606 MTMS BY PHARM EST 15 MIN: CPT | Performed by: PHARMACIST

## 2021-10-29 PROCEDURE — 85018 HEMOGLOBIN: CPT | Performed by: INTERNAL MEDICINE

## 2021-10-29 PROCEDURE — 250N000012 HC RX MED GY IP 250 OP 636 PS 637: Performed by: INTERNAL MEDICINE

## 2021-10-29 PROCEDURE — 258N000003 HC RX IP 258 OP 636: Performed by: INTERNAL MEDICINE

## 2021-10-29 PROCEDURE — 250N000011 HC RX IP 250 OP 636: Performed by: NURSE PRACTITIONER

## 2021-10-29 PROCEDURE — 99607 MTMS BY PHARM ADDL 15 MIN: CPT | Performed by: PHARMACIST

## 2021-10-29 PROCEDURE — 96374 THER/PROPH/DIAG INJ IV PUSH: CPT

## 2021-10-29 PROCEDURE — 80053 COMPREHEN METABOLIC PANEL: CPT | Performed by: NURSE PRACTITIONER

## 2021-10-29 PROCEDURE — 200N000001 HC R&B ICU

## 2021-10-29 RX ORDER — AMLODIPINE BESYLATE 10 MG/1
10 TABLET ORAL DAILY
Status: DISCONTINUED | OUTPATIENT
Start: 2021-10-30 | End: 2021-11-01 | Stop reason: HOSPADM

## 2021-10-29 RX ORDER — SODIUM CHLORIDE 9 MG/ML
INJECTION, SOLUTION INTRAVENOUS CONTINUOUS
Status: DISCONTINUED | OUTPATIENT
Start: 2021-10-29 | End: 2021-10-31

## 2021-10-29 RX ORDER — BUMETANIDE 2 MG/1
4 TABLET ORAL
Status: DISCONTINUED | OUTPATIENT
Start: 2021-10-30 | End: 2021-11-01 | Stop reason: HOSPADM

## 2021-10-29 RX ORDER — NITROGLYCERIN 0.4 MG/1
0.4 TABLET SUBLINGUAL EVERY 5 MIN PRN
Status: DISCONTINUED | OUTPATIENT
Start: 2021-10-29 | End: 2021-11-01 | Stop reason: HOSPADM

## 2021-10-29 RX ORDER — MULTIPLE VITAMINS W/ MINERALS TAB 9MG-400MCG
1 TAB ORAL DAILY
Status: DISCONTINUED | OUTPATIENT
Start: 2021-10-30 | End: 2021-11-01 | Stop reason: HOSPADM

## 2021-10-29 RX ORDER — ZINC GLUCONATE 50 MG
50 TABLET ORAL DAILY
Status: DISCONTINUED | OUTPATIENT
Start: 2021-10-30 | End: 2021-11-01 | Stop reason: HOSPADM

## 2021-10-29 RX ORDER — ASCORBIC ACID 500 MG
1000 TABLET ORAL DAILY
Status: DISCONTINUED | OUTPATIENT
Start: 2021-10-30 | End: 2021-11-01 | Stop reason: HOSPADM

## 2021-10-29 RX ORDER — ALBUTEROL SULFATE 90 UG/1
2 AEROSOL, METERED RESPIRATORY (INHALATION) EVERY 6 HOURS PRN
Status: DISCONTINUED | OUTPATIENT
Start: 2021-10-29 | End: 2021-11-01 | Stop reason: HOSPADM

## 2021-10-29 RX ORDER — DIGOXIN 125 MCG
125 TABLET ORAL DAILY
Status: DISCONTINUED | OUTPATIENT
Start: 2021-10-30 | End: 2021-11-01 | Stop reason: HOSPADM

## 2021-10-29 RX ORDER — LIDOCAINE 50 MG/G
1 PATCH TOPICAL DAILY PRN
Status: DISCONTINUED | OUTPATIENT
Start: 2021-10-29 | End: 2021-11-01 | Stop reason: HOSPADM

## 2021-10-29 RX ORDER — NICOTINE POLACRILEX 4 MG
15-30 LOZENGE BUCCAL
Status: DISCONTINUED | OUTPATIENT
Start: 2021-10-29 | End: 2021-11-01 | Stop reason: HOSPADM

## 2021-10-29 RX ORDER — IPRATROPIUM BROMIDE AND ALBUTEROL SULFATE 2.5; .5 MG/3ML; MG/3ML
1 SOLUTION RESPIRATORY (INHALATION) 4 TIMES DAILY PRN
Status: DISCONTINUED | OUTPATIENT
Start: 2021-10-29 | End: 2021-11-01 | Stop reason: HOSPADM

## 2021-10-29 RX ORDER — ATORVASTATIN CALCIUM 40 MG/1
80 TABLET, FILM COATED ORAL EVERY MORNING
Status: DISCONTINUED | OUTPATIENT
Start: 2021-10-30 | End: 2021-11-01 | Stop reason: HOSPADM

## 2021-10-29 RX ORDER — ROPINIROLE 1 MG/1
2 TABLET, FILM COATED ORAL AT BEDTIME
Status: DISCONTINUED | OUTPATIENT
Start: 2021-10-29 | End: 2021-11-01 | Stop reason: HOSPADM

## 2021-10-29 RX ORDER — ZOLPIDEM TARTRATE 10 MG/1
10 TABLET ORAL AT BEDTIME
Status: DISCONTINUED | OUTPATIENT
Start: 2021-10-29 | End: 2021-11-01 | Stop reason: HOSPADM

## 2021-10-29 RX ORDER — DEXTROSE MONOHYDRATE 25 G/50ML
25-50 INJECTION, SOLUTION INTRAVENOUS
Status: DISCONTINUED | OUTPATIENT
Start: 2021-10-29 | End: 2021-11-01 | Stop reason: HOSPADM

## 2021-10-29 RX ORDER — POTASSIUM CHLORIDE 1500 MG/1
40 TABLET, EXTENDED RELEASE ORAL DAILY
Status: DISCONTINUED | OUTPATIENT
Start: 2021-10-30 | End: 2021-11-01 | Stop reason: HOSPADM

## 2021-10-29 RX ORDER — ACETAMINOPHEN 500 MG
1000 TABLET ORAL EVERY 6 HOURS PRN
Status: DISCONTINUED | OUTPATIENT
Start: 2021-10-29 | End: 2021-11-01 | Stop reason: HOSPADM

## 2021-10-29 RX ORDER — FERROUS GLUCONATE 324(37.5)
1 TABLET ORAL EVERY OTHER DAY
COMMUNITY

## 2021-10-29 RX ORDER — METOPROLOL TARTRATE 50 MG
50 TABLET ORAL 2 TIMES DAILY WITH MEALS
Status: DISCONTINUED | OUTPATIENT
Start: 2021-10-30 | End: 2021-10-31

## 2021-10-29 RX ORDER — METFORMIN HCL 500 MG
1000 TABLET, EXTENDED RELEASE 24 HR ORAL
Status: DISCONTINUED | OUTPATIENT
Start: 2021-10-29 | End: 2021-10-30

## 2021-10-29 RX ORDER — IRBESARTAN 150 MG/1
300 TABLET ORAL DAILY
Status: DISCONTINUED | OUTPATIENT
Start: 2021-10-30 | End: 2021-11-01 | Stop reason: HOSPADM

## 2021-10-29 RX ADMIN — SODIUM CHLORIDE: 9 INJECTION, SOLUTION INTRAVENOUS at 22:19

## 2021-10-29 RX ADMIN — PANTOPRAZOLE SODIUM 80 MG: 40 INJECTION, POWDER, FOR SOLUTION INTRAVENOUS at 18:37

## 2021-10-29 RX ADMIN — INSULIN GLARGINE 20 UNITS: 100 INJECTION, SOLUTION SUBCUTANEOUS at 22:50

## 2021-10-29 RX ADMIN — ACETAMINOPHEN 1000 MG: 500 TABLET, FILM COATED ORAL at 23:53

## 2021-10-29 RX ADMIN — ROPINIROLE 2 MG: 1 TABLET, FILM COATED ORAL at 22:18

## 2021-10-29 RX ADMIN — ZOLPIDEM TARTRATE 10 MG: 10 TABLET, FILM COATED ORAL at 22:19

## 2021-10-29 ASSESSMENT — ENCOUNTER SYMPTOMS
DIZZINESS: 0
LIGHT-HEADEDNESS: 0
SHORTNESS OF BREATH: 0
DIARRHEA: 1
ROS GI COMMENTS: POSITIVE FOR DARK STOOL
ABDOMINAL PAIN: 0

## 2021-10-29 ASSESSMENT — ACTIVITIES OF DAILY LIVING (ADL)
ADLS_ACUITY_SCORE: 7
ADLS_ACUITY_SCORE: 7
ADLS_ACUITY_SCORE: 5

## 2021-10-29 NOTE — ED PROVIDER NOTES
Patient coming from entire clinic with 5-6 episodes of black stool.  Currently on blood thinners.     Ohl, Maxwell Cheng,   10/29/21 1525

## 2021-10-29 NOTE — PHARMACY-ADMISSION MEDICATION HISTORY
Pharmacy Note - Admission Medication History    Pertinent Provider Information: Still some question about whether patient is taking amlodipine at home. Patient and wife are fairly certain even though there is no record in fill history or [facility] records.      ______________________________________________________________________    Prior To Admission (PTA) med list completed and updated in EMR.       PTA Med List   Medication Sig Last Dose     acetaminophen (TYLENOL) 500 MG tablet [ACETAMINOPHEN (TYLENOL) 500 MG TABLET] Take 1,000 mg by mouth every 6 (six) hours as needed. First line of pain management. Do Not take more than 4,000 mg in 24 hours. Unknown at Unknown time     albuterol (PROVENTIL HFA;VENTOLIN HFA) 90 mcg/actuation inhaler Inhale 2 puffs into the lungs every 6 hours as needed  Unknown at not needed     amLODIPine (NORVASC) 10 MG tablet Take 10 mg by mouth daily 10/29/2021 at AM     apixaban (ELIQUIS) 5 mg Tab [APIXABAN (ELIQUIS) 5 MG TAB] Take 1 tablet by mouth every 12 (twelve) hours. 10/29/2021 at AM     aspirin (ASA) 81 MG chewable tablet Take 1 tablet (81 mg) by mouth daily Starting tomorrow. 10/29/2021 at AM     atorvastatin (LIPITOR) 80 MG tablet Take 1 tablet (80 mg) by mouth every morning 10/29/2021 at AM     bumetanide (BUMEX) 2 MG tablet 4 mg in am, 3 mg in afternoon 10/29/2021 at x2     canagliflozin (INVOKANA) 100 mg Tab Take 100 mg by mouth every morning  10/29/2021 at AM     digoxin (LANOXIN) 125 mcg tablet [DIGOXIN (LANOXIN) 125 MCG TABLET] Take 1 tablet (125 mcg total) by mouth daily. 10/29/2021 at AM     Ferrous Gluconate 324 (37.5 Fe) MG TABS Take 1 tablet by mouth every other day 10/29/2021 at AM     fluticasone-vilanterol (BREO ELLIPTA) 200-25 mcg/dose DsDv inhaler Inhale 1 puff into the lungs daily  10/29/2021 at AM, will bring     Garlic 1000 MG CAPS Take 1 capsule by mouth daily 10/29/2021 at AM     insulin glargine (LANTUS PEN) 100 UNIT/ML pen Inject 20 Units Subcutaneous 2 times  daily 10/29/2021 at AM     ipratropium - albuterol 0.5 mg/2.5 mg/3 mL (DUONEB) 0.5-2.5 (3) MG/3ML neb solution Take 1 vial by nebulization 4 times daily as needed for shortness of breath / dyspnea  Unknown at Unknown time     irbesartan (AVAPRO) 300 MG tablet Take 300 mg by mouth daily  10/29/2021 at AM     lidocaine (LIDODERM) 5 % Place 1 patch onto the skin daily as needed  Unknown at Unknown time     metFORMIN (GLUCOPHAGE-XR) 500 MG 24 hr tablet Take 2 tablets (1,000 mg) by mouth daily (with dinner) 10/28/2021 at PM     metoprolol tartrate (LOPRESSOR) 50 MG tablet Take 50 mg by mouth 2 times daily (with meals) 10/29/2021 at AM     multivitamin w/minerals (THERA-VIT-M) tablet Take 1 tablet by mouth daily 10/29/2021 at AM     nitroglycerin (NITROSTAT) 0.4 MG SL tablet [NITROGLYCERIN (NITROSTAT) 0.4 MG SL TABLET] Place 0.4 mg under the tongue every 5 (five) minutes as needed for chest pain. Unknown at Unknown time     omeprazole (PRILOSEC) 20 MG DR capsule Take 20 mg by mouth daily 10/29/2021 at AM     potassium chloride (K-DUR,KLOR-CON) 20 MEQ tablet Take 40 mEq by mouth daily  10/29/2021 at AM     prasugrel (EFFIENT) 10 MG TABS tablet Take 1 tablet (10 mg) by mouth daily Do not crush or break tablet. Dose to start tomorrow. 10/29/2021 at AM     rOPINIRole (REQUIP) 2 MG tablet [ROPINIROLE (REQUIP) 2 MG TABLET] Take 2 mg by mouth at bedtime.  10/28/2021 at PM     vitamin C (ASCORBIC ACID) 1000 MG TABS Take 1,000 mg by mouth daily 10/29/2021 at AM     zinc gluconate 50 MG tablet Take 50 mg by mouth daily 10/29/2021 at AM     zolpidem (AMBIEN) 10 mg tablet [ZOLPIDEM (AMBIEN) 10 MG TABLET] Take 10 mg by mouth bedtime. 10/28/2021 at PM       Information source(s): Patient, Hospital records and [facility]  Method of interview communication: in-person    Summary of Changes to PTA Med List  New: N/A  Discontinued: N/A  Changed: Breo to daily, Duoneb to prn    Patient was asked about OTC/herbal products  specifically.  PTA med list reflects this.    In the past week, patient estimated taking medication this percent of the time:  greater than 90%.    Allergies were reviewed, assessed, and updated with the patient.      Medications currently not available for use during hospital stay. Family/Patient representative states they will bring Breo Ellipta to [facility].    The information provided in this note is only as accurate as the sources available at the time of the update(s).    Thank you for the opportunity to participate in the care of this patient.    Hebert Baez formerly [facility]  10/29/2021 5:39 PM

## 2021-10-29 NOTE — ED PROVIDER NOTES
Patient:   SHANDA MEJISA            MRN: [id number]            FIN: [id number]              Emergency Department Staff Physician Note     I had a face to face encounter with this patient seen by the Advanced Practice Provider (GEORGETTE).  I have seen, examined, and discussed the patient with the GEORGETTE and agree with their assessment and plan of management.    Relevant HPI:     Red Sutherland is a 81 year old male who presents to the Emergency Department for evaluation of dark stools. Patient reports that this morning he had several episodes of diarrhea with black stool. These episodes have since stopped. The patient is on Eliquis and baby aspirin. He denies any history of a GI bleed. He denies abdominal pain, dizziness, light headedness, shortness of breath, chest pain, and any other symptoms or complaints at this time.        I, Annamaria Amos, am serving as a scribe to document services personally performed by Dr. Rose, based on my observations and the provider's statements to me.   I, Zackery Rose MD, attest that Annamaria Amos was acting in a scribe capacity, has observed my performance of the services and has documented them in accordance with my direction.    ED Course:  5:00 PM I received the patient report from the GEORGETTE, Andrew Viveros CNP. I agree with their assessment and plan of management, and I will see the patient.  6:06 PM I met with the patient to introduce myself, gather additional history, perform my initial exam, and discuss the plan. PPE: Provider wore eye protection, N95 mask.     Brief Physical Exam:  /66   Pulse 75   Temp 97.5  F (36.4  C) (Temporal)   Resp 16   Wt 100.7 kg (222 lb)   SpO2 100%   BMI 30.96 kg/m       Constitutional:  Well developed, well nourished, no acute distress  EYES: Conjunctivae clear  HENT:  Atraumatic, normocephalic  Respiratory:  No respiratory distress, normal breath sounds  Cardiovascular:  Normal rate, normal rhythm, no murmurs, capillary refill normal.   GI:  Soft, nondistended, nontender, no palpable masses, no rebound, no guarding    Musculoskeletal:  No edema.  No cyanosis.  Range of motion major extremities intact.    Integument: Warm, Dry, No erythema, No rash.   Neurologic:  Alert & oriented, no focal deficits noted, ambulatory  Psych: Affect normal, Mood normal.     Impression / ED Plan:  Red Sutherland is a 81 year old male presents to the ED for evaluation of dark stools.  Patient recently hospitalized for chest pain and ended up having an NSTEMI.  Patient started on additional anticoagulants as he had had recent stent failure.  Presents today with dark stools concerning for rectal bleeding.  Examination is reassuring however patient's hemoglobin has dropped approximately half a gram.  Patient starting at 9.2 and now to 8.8.  Given onset after starting new anticoagulation will admit for serial hemoglobins.  Abdomen is soft and nontender with minimal bleeding no indications for imaging.  Results for orders placed or performed during the hospital encounter of 10/29/21   Comprehensive metabolic panel     Status: Abnormal   Result Value Ref Range    Sodium 140 136 - 145 mmol/L    Potassium 4.0 3.5 - 5.0 mmol/L    Chloride 102 98 - 107 mmol/L    Carbon Dioxide (CO2) 25 22 - 31 mmol/L    Anion Gap 13 5 - 18 mmol/L    Urea Nitrogen 34 (H) 8 - 28 mg/dL    Creatinine 1.41 (H) 0.70 - 1.30 mg/dL    Calcium 9.4 8.5 - 10.5 mg/dL    Glucose 99 70 - 125 mg/dL    Alkaline Phosphatase 135 (H) 45 - 120 U/L    AST 24 0 - 40 U/L    ALT 14 0 - 45 U/L    Protein Total 6.7 6.0 - 8.0 g/dL    Albumin 3.5 3.5 - 5.0 g/dL    Bilirubin Total 2.2 (H) 0.0 - 1.0 mg/dL    GFR Estimate 47 (L) >60 mL/min/1.73m2   INR     Status: Abnormal   Result Value Ref Range    INR 1.39 (H) 0.85 - 1.15   Partial thromboplastin time     Status: Abnormal   Result Value Ref Range    aPTT 40 (H) 22 - 38 Seconds   CBC with platelets and differential     Status: Abnormal   Result Value Ref Range    WBC Count 8.3 4.0 - 11.0 10e3/uL    RBC Count 3.90 (L) 4.40 - 5.90 10e6/uL    Hemoglobin    8.8 (L) 13.3 - 17.7 g/dL    Hematocrit 30.2 (L) 40.0 - 53.0 %    MCV 77 (L) 78 - 100 fL    MCH 22.6 (L) 26.5 - 33.0 pg    MCHC 29.1 (L) 31.5 - 36.5 g/dL    RDW 22.1 (H) 10.0 - 15.0 %    Platelet Count 152 150 - 450 10e3/uL    % Neutrophils 78 %    % Lymphocytes 11 %    % Monocytes 7 %    % Eosinophils 3 %    % Basophils 0 %    % Immature Granulocytes 1 %    NRBCs per 100 WBC 0 <1 /100    Absolute Neutrophils 6.5 1.6 - 8.3 10e3/uL    Absolute Lymphocytes 1.0 0.8 - 5.3 10e3/uL    Absolute Monocytes 0.6 0.0 - 1.3 10e3/uL    Absolute Eosinophils 0.3 0.0 - 0.7 10e3/uL    Absolute Basophils 0.0 0.0 - 0.2 10e3/uL    Absolute Immature Granulocytes 0.0 <=0.0 10e3/uL    Absolute NRBCs 0.0 10e3/uL   Asymptomatic COVID-19 Virus (Coronavirus) by PCR Nasopharyngeal     Status: Normal    Specimen: Nasopharyngeal; Swab   Result Value Ref Range    SARS CoV2 PCR Negative Negative    Narrative    Testing was performed using the kaylan  SARS-CoV-2 & Influenza A/B Assay on the kaylan  [employer]  System.  This test should be ordered for the detection of SARS-COV-2 in individuals who meet SARS-CoV-2 clinical and/or epidemiological criteria. Test performance is unknown in asymptomatic patients.  This test is for in vitro diagnostic use under the FDA EUA for laboratories certified under CLIA to perform moderate and/or high complexity testing. This test has not been FDA cleared or approved.  A negative test does not rule out the presence of PCR inhibitors in the specimen or target RNA in concentration below the limit of detection for the assay. The possibility of a false negative should be considered if the patient's recent exposure or clinical presentation suggests COVID-19.  [facility] are certified under the Clinical Laboratory Improvement Amendments of 1988 (CLIA-88) as qualified to perform moderate and/or high complexity laboratory testing.   Adult Type and Screen     Status: None   Result Value Ref Range    ABO/RH(D) A POS  Antibody Screen Negative Negative    SPECIMEN EXPIRATION DATE [id number]    CBC with platelets differential     Status: Abnormal    Narrative    The following orders were created for panel order CBC with platelets differential.  Procedure                               Abnormality         Status                     ---------                               -----------         ------                     CBC with platelets and d...[[id number]]  Abnormal            Final result                 Please view results for these tests on the individual orders.   ABO/Rh type and screen     Status: None    Narrative    The following orders were created for panel order ABO/Rh type and screen.  Procedure                               Abnormality         Status                     ---------                               -----------         ------                     Adult Type and Screen[[id number]]                            Final result                 Please view results for these tests on the individual orders.   [place] Draw     Status: None (In process)    Narrative    The following orders were created for panel order [place] Draw.  Procedure                               Abnormality         Status                     ---------                               -----------         ------                     Extra Red Top Tube[[id number]]                               In process                   Please view results for these tests on the individual orders.   EKG: Independent review and interpretation: Atrial fibrillation.  Rate of 72.  Right axis deviation.  Essentially unchanged from October 26, 2021.  Slight exaggeration of ST scooping in the anterior leads but not concerning      Please refer to the Advanced Practice Provider's note for further details and ED course. Agree with history, plan and disposition.     MIGUEL Rose M.D.   [facility] EMERGENCY ROOM            Zackery Rose MD  10/29/21 1818       Zackery Rose MD  10/29/21 1848

## 2021-10-29 NOTE — PROGRESS NOTES
Clinic Care Coordination Contact  Care Team Conversations    Patient identified for care management outreach, however patient is not on a value based contract so cannot complete outreach. Will escalate to clinic staff if specific needs or resources are indicated.  CC SW noted the clinic CC is working with the pt and following up with the pt for post hospital.     DERIK Barber  Clinic Care Coordinator  [facility]   [phone]

## 2021-10-29 NOTE — ED TRIAGE NOTES
Patient is here with dark stools. He is on for a fib, cardiac stent last Monday. He is on Plavix and Prasugrel. He was sent here to r/o a GI bleed.

## 2021-10-29 NOTE — ED PROVIDER NOTES
EMERGENCY DEPARTMENT ENCOUNTER      NAME: Red Sutherland  AGE: 81 year old male  YOB: 1940  MRN: [id number]  EVALUATION DATE & TIME: 10/29/2021  4:06 PM    PCP: Haresh Corona    ED PROVIDER: JOSE ANTONIO Lora CNP      No chief complaint on file.        FINAL IMPRESSION:  No diagnosis found.      ED COURSE & MEDICAL DECISION MAKIN:43 PM I met with the patient, obtained history, performed an initial exam, and discussed options and plan for treatment here in the ED.  5:03 PM I staffed the patient with my colleague, Dr. Rose, who will evaluate the patient. Dr. Rose was updated on the patient throughout ED course.    6:09 PM I discussed the case with hospitalist, Dr Rojas, who accepts the patient   6:27 PM I spoke with Dr. Roberts, GI. Aware of consult.     Pertinent Labs & Imaging studies reviewed. (See chart for details)  81 year old male presents to the Emergency Department for evaluation of black stools. Recently hospitalized for NSTEMI. Had early in stent restenosis.  Switch to Effient.  Also on Eliquis for A. fib and baby aspirin.  Not had any further black stools this afternoon.  Hemoglobin 8.8 which is not far from his baseline of 9.2.  Has stable vital signs and really no other complaints.  Abdomen soft and nontender.  Held on any abdominal imaging at this time.  Was given 80 mg IV Protonix and will observe overnight for monitoring of his hemoglobin and tomorrow GI/cardiology consult.     At the conclusion of the encounter I discussed the results of all of the tests and the disposition. The questions were answered. The patient or family acknowledged understanding and was agreeable with the care plan.       PPE: Provider wore gloves, N95 mask, eye protection, surgical cap, and paper mask.     MEDICATIONS GIVEN IN THE EMERGENCY:  Medications   pantoprazole (PROTONIX) IV push injection 80 mg (80 mg Intravenous Given 10/29/21 1837)       NEW PRESCRIPTIONS STARTED AT  TODAY'S ER VISIT  New Prescriptions    No medications on file            =================================================================    HPI    Patient information was obtained from: the patient    Use of Intrepreter: N/A         Red Sutherland is a 81 year old male with a history of hyperlipidemia, CAD, hypertension, diabetes mellitus, congestive heart failure, COPD, atrial fibrillation, coronary stent placements, left heart cath (10/25/2021),COVID-19, NSTEMI, benign neoplasm of ascending colon, colic polyps, diverticular disease of colon, stage 3 kidney disease, hypokalemia and nicotine dependence in remission who presents to the ED via walk in for evaluation of dark stools.     Per Chart Review:  10/23/2021-10/26/2021 the patient was admitted to [facility] for evaluation of CAD exacerbation. The patient had stents placed and was discharged home with instructions to follow up with cardiology.     The patient reports that this morning he had several episodes of diarrhea with black stool. These episodes have since stopped. The patient is on eliquis and baby aspirin. He denies any history of a GI bleed. The patient denies abdominal pain, dizziness, light headedness, shortness of breath, chest pain, and any other symptoms or complaints at this time.       REVIEW OF SYSTEMS   Review of Systems   Respiratory: Negative for shortness of breath.    Cardiovascular: Negative for chest pain.   Gastrointestinal: Positive for diarrhea. Negative for abdominal pain.        Positive for dark stool   Neurological: Negative for dizziness and light-headedness.   All other systems reviewed and are negative.       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 They put him on sildenafil as nitroprusside lowered systemic BP and with that the mean PA dropped from 54 to 49. Negative VQ at [facility] Dec 1, 2017.     RLS (restless legs syndrome)      Sleep apnea        PAST SURGICAL HISTORY:  Past Surgical History:   Procedure Laterality Date     BACK SURGERY      lower back     CARDIAC CATHETERIZATION  12/13/2017    Right and left at [facility], mean PA 58, PCW 24 with V wave of 35, LVEDP of 18, with Nipride systemic BP, PVR and mean PA all declined     CARDIOVERSION  03/15/2013    for afib     CATARACT EXTRACTION Bilateral      CORONARY STENT PLACEMENT       CV CORONARY ANGIOGRAM N/A 10/25/2021    Procedure: Coronary Angiogram;  Surgeon: Otf Knowles MD;  Location: [facility] LAB CV     CV CORONARY LITHOTRIPSY PCI N/A 10/25/2021    Procedure: CV Coronary Lithotripsy PCI;  Surgeon: Otf Knowles MD;  Location: [facility] LAB CV     CV LEFT HEART CATH N/A 10/25/2021    Procedure: Left Heart Cath;  Surgeon: Otf Knowles MD;  Location: [facility] CATH LAB CV     CV PCI N/A 10/25/2021    Procedure: Percutaneous Coronary Intervention;  Surgeon: Otf Knowles MD;  Location: [facility] LAB CV     IR ABDOMINAL AORTOGRAM  11/16/2012     IR MISCELLANEOUS PROCEDURE  7/20/2001     IR MISCELLANEOUS PROCEDURE  11/16/2012     OTHER SURGICAL HISTORY      mynor     SHOULDER SURGERY      reapir on right shoulder     TOTAL HIP ARTHROPLASTY Left      TOTAL KNEE ARTHROPLASTY Bilateral      WRIST SURGERY Bilateral      ZZHC COLONOSCOPY W/WO BRUSH/WASH N/A 1/14/2021    Procedure: COLONOSCOPY;  Surgeon: Mihai Harris MD;  Location: [facility];  Service: Gastroenterology           CURRENT MEDICATIONS:    Prior to Admission Medications   Prescriptions Last Dose Informant Patient Reported?  "Taking?   ACCU-CHEK JOSE PLUS TEST STRP strips   Yes No   Sig: [ACCU-CHEK JOSE PLUS TEST STRP STRIPS] see administration instructions.   BD ULTRA-FINE MANISHA PEN NEEDLES 32 gauge x \" Ndle   Yes No   Sig: [BD ULTRA-FINE MANISHA PEN NEEDLES 32 GAUGE X \" NDLE]    Ferrous Gluconate 324 (37.5 Fe) MG TABS 10/29/2021 at AM  Yes Yes   Sig: Take 1 tablet by mouth every other day   Garlic 1000 MG CAPS 10/29/2021 at AM  Yes Yes   Sig: Take 1 capsule by mouth daily   OXYGEN-AIR DELIVERY SYSTEMS MISC   Yes No   Sig: [OXYGEN-AIR DELIVERY SYSTEMS MISC] Use As Directed.   acetaminophen (TYLENOL) 500 MG tablet Unknown at Unknown time  Yes Yes   Sig: [ACETAMINOPHEN (TYLENOL) 500 MG TABLET] Take 1,000 mg by mouth every 6 (six) hours as needed. First line of pain management. Do Not take more than 4,000 mg in 24 hours.   albuterol (PROVENTIL HFA;VENTOLIN HFA) 90 mcg/actuation inhaler Unknown at not needed  Yes Yes   Sig: Inhale 2 puffs into the lungs every 6 hours as needed    amLODIPine (NORVASC) 10 MG tablet 10/29/2021 at AM  Yes Yes   Sig: Take 10 mg by mouth daily   apixaban (ELIQUIS) 5 mg Tab 10/29/2021 at AM  Yes Yes   Sig: [APIXABAN (ELIQUIS) 5 MG TAB] Take 1 tablet by mouth every 12 (twelve) hours.   aspirin (ASA) 81 MG chewable tablet 10/29/2021 at AM  No Yes   Sig: Take 1 tablet (81 mg) by mouth daily Starting tomorrow.   atorvastatin (LIPITOR) 80 MG tablet 10/29/2021 at AM  No Yes   Sig: Take 1 tablet (80 mg) by mouth every morning   bumetanide (BUMEX) 2 MG tablet 10/29/2021 at x2  No Yes   Si mg in am, 3 mg in afternoon   canagliflozin (INVOKANA) 100 mg Tab 10/29/2021 at AM  Yes Yes   Sig: Take 100 mg by mouth every morning    digoxin (LANOXIN) 125 mcg tablet 10/29/2021 at AM  No Yes   Sig: [DIGOXIN (LANOXIN) 125 MCG TABLET] Take 1 tablet (125 mcg total) by mouth daily.   fluticasone-vilanterol (BREO ELLIPTA) 200-25 mcg/dose DsDv inhaler 10/29/2021 at AM, will bring  Yes Yes   Sig: Inhale 1 puff into the lungs daily "    insulin glargine (LANTUS PEN) 100 UNIT/ML pen 10/29/2021 at AM  No Yes   Sig: Inject 20 Units Subcutaneous 2 times daily   ipratropium - albuterol 0.5 mg/2.5 mg/3 mL (DUONEB) 0.5-2.5 (3) MG/3ML neb solution Unknown at Unknown time  Yes Yes   Sig: Take 1 vial by nebulization 4 times daily as needed for shortness of breath / dyspnea    irbesartan (AVAPRO) 300 MG tablet 10/29/2021 at AM  Yes Yes   Sig: Take 300 mg by mouth daily    lidocaine (LIDODERM) 5 % Unknown at Unknown time  Yes Yes   Sig: Place 1 patch onto the skin daily as needed    metFORMIN (GLUCOPHAGE-XR) 500 MG 24 hr tablet 10/28/2021 at PM  No Yes   Sig: Take 2 tablets (1,000 mg) by mouth daily (with dinner)   metoprolol tartrate (LOPRESSOR) 50 MG tablet 10/29/2021 at AM  Yes Yes   Sig: Take 50 mg by mouth 2 times daily (with meals)   multivitamin w/minerals (THERA-VIT-M) tablet 10/29/2021 at AM  Yes Yes   Sig: Take 1 tablet by mouth daily   nitroglycerin (NITROSTAT) 0.4 MG SL tablet Unknown at Unknown time  Yes Yes   Sig: [NITROGLYCERIN (NITROSTAT) 0.4 MG SL TABLET] Place 0.4 mg under the tongue every 5 (five) minutes as needed for chest pain.   omeprazole (PRILOSEC) 20 MG DR capsule 10/29/2021 at AM  Yes Yes   Sig: Take 20 mg by mouth daily   potassium chloride (K-DUR,KLOR-CON) 20 MEQ tablet 10/29/2021 at AM  Yes Yes   Sig: Take 40 mEq by mouth daily    prasugrel (EFFIENT) 10 MG TABS tablet 10/29/2021 at AM  No Yes   Sig: Take 1 tablet (10 mg) by mouth daily Do not crush or break tablet. Dose to start tomorrow.   rOPINIRole (REQUIP) 2 MG tablet 10/28/2021 at PM  Yes Yes   Sig: [ROPINIROLE (REQUIP) 2 MG TABLET] Take 2 mg by mouth at bedtime.    vitamin C (ASCORBIC ACID) 1000 MG TABS 10/29/2021 at AM  Yes Yes   Sig: Take 1,000 mg by mouth daily   zinc gluconate 50 MG tablet 10/29/2021 at AM  Yes Yes   Sig: Take 50 mg by mouth daily   zolpidem (AMBIEN) 10 mg tablet 10/28/2021 at PM  Yes Yes   Sig: [ZOLPIDEM (AMBIEN) 10 MG TABLET] Take 10 mg by mouth  bedtime.      Facility-Administered Medications: None           ALLERGIES:  Allergies   Allergen Reactions     Furosemide Muscle Pain (Myalgia)     Previously tolerated.  Muscle cramps     Hydrochlorothiazide Unknown     Iodinated Contrast Media [Diagnostic X-Ray Materials] Nausea     Losartan Other (See Comments)     Other reaction(s): Stomatitis, Bloody nose dry mouth and lips     Losartan-Hydrochlorothiazide [Hyzaar]      Mouth sores     Metaxalone Nausea     Metolazone Other (See Comments)     Muscle cramps     Mometasone Other (See Comments)     Bloody nose     Penicillins Other (See Comments)     Immune-does not work for him     Rabeprazole Other (See Comments)     Mouth sores     Ramipril Other (See Comments)     Mouth sores     Shellfish Containing Products [Shellfish-Derived Products] Unknown     Other reaction(s): mouth sores, Other reaction(s): mouth sores     Methocarbamol Rash       FAMILY HISTORY:  Family History   Problem Relation Age of Onset     Hyperlipidemia Mother      Hypertension Mother      Heart Disease Mother      Hyperlipidemia Father      Hypertension Father      Coronary Artery Disease Father      Cerebrovascular Disease Brother      Depression Brother      No Known Problems Sister      Pulmonary Hypertension No family hx of      Congenital heart disease No family hx of        SOCIAL HISTORY:   Social History     Socioeconomic History     Marital status:      Spouse name: None     Number of children: 4     Years of education: None     Highest education level: None   Occupational History     None   Tobacco Use     Smoking status: Former Smoker     Packs/day: 1.50     Years: 44.00     Pack years: 66.00     Types: Cigarettes     Quit date: 1996     Years since quittin.5     Smokeless tobacco: Never Used   Substance and Sexual Activity     Alcohol use: Yes     Alcohol/week: 1.0 standard drinks     Comment: Alcoholic Drinks/day: 1 per month     Drug use: No     Sexual  activity: Not Currently     Partners: Female   Other Topics Concern     None   Social History Narrative     None     Social Determinants of Health     Financial Resource Strain:      Difficulty of Paying Living Expenses:    Food Insecurity:      Worried About Running Out of Food in the Last Year:      Ran Out of Food in the Last Year:    Transportation Needs:      Lack of Transportation (Medical):      Lack of Transportation (Non-Medical):    Physical Activity:      Days of Exercise per Week:      Minutes of Exercise per Session:    Stress:      Feeling of Stress :    Social Connections:      Frequency of Communication with Friends and Family:      Frequency of Social Gatherings with Friends and Family:      Attends Confucianism Services:      Active Member of Clubs or Organizations:      Attends Club or Organization Meetings:      Marital Status:    Intimate Partner Violence:      Fear of Current or Ex-Partner:      Emotionally Abused:      Physically Abused:      Sexually Abused:          VITALS:  Patient Vitals for the past 24 hrs:   BP Temp Temp src Pulse Resp SpO2 Weight   10/29/21 1615 125/66 -- -- 75 -- 100 % --   10/29/21 1603 133/73 97.5  F (36.4  C) Temporal 72 16 93 % 100.7 kg (222 lb)       PHYSICAL EXAM    Constitutional:  Alert, no distress  EYES: Conjunctivae clear  HENT:  Atraumatic, normocephalic  Respiratory:  No respiratory distress, normal breath sounds  Cardiovascular:  Normal rate, irregularly irregular rhythm.  GI:  Soft, nondistended, nontender  Integument: Warm, Dry, No erythema, No rash.   Neurologic:  Alert & oriented x 3     LAB:  All pertinent labs reviewed and interpreted.  Results for orders placed or performed during the hospital encounter of 10/29/21   Comprehensive metabolic panel   Result Value Ref Range    Sodium 140 136 - 145 mmol/L    Potassium 4.0 3.5 - 5.0 mmol/L    Chloride 102 98 - 107 mmol/L    Carbon Dioxide (CO2) 25 22 - 31 mmol/L    Anion Gap 13 5 - 18 mmol/L    Urea  Nitrogen 34 (H) 8 - 28 mg/dL    Creatinine 1.41 (H) 0.70 - 1.30 mg/dL    Calcium 9.4 8.5 - 10.5 mg/dL    Glucose 99 70 - 125 mg/dL    Alkaline Phosphatase 135 (H) 45 - 120 U/L    AST 24 0 - 40 U/L    ALT 14 0 - 45 U/L    Protein Total 6.7 6.0 - 8.0 g/dL    Albumin 3.5 3.5 - 5.0 g/dL    Bilirubin Total 2.2 (H) 0.0 - 1.0 mg/dL    GFR Estimate 47 (L) >60 mL/min/1.73m2   Result Value Ref Range    INR 1.39 (H) 0.85 - 1.15   Partial thromboplastin time   Result Value Ref Range    aPTT 40 (H) 22 - 38 Seconds   CBC with platelets and differential   Result Value Ref Range    WBC Count 8.3 4.0 - 11.0 10e3/uL    RBC Count 3.90 (L) 4.40 - 5.90 10e6/uL    Hemoglobin 8.8 (L) 13.3 - 17.7 g/dL    Hematocrit 30.2 (L) 40.0 - 53.0 %    MCV 77 (L) 78 - 100 fL    MCH 22.6 (L) 26.5 - 33.0 pg    MCHC 29.1 (L) 31.5 - 36.5 g/dL    RDW 22.1 (H) 10.0 - 15.0 %    Platelet Count 152 150 - 450 10e3/uL    % Neutrophils 78 %    % Lymphocytes 11 %    % Monocytes 7 %    % Eosinophils 3 %    % Basophils 0 %    % Immature Granulocytes 1 %    NRBCs per 100 WBC 0 <1 /100    Absolute Neutrophils 6.5 1.6 - 8.3 10e3/uL    Absolute Lymphocytes 1.0 0.8 - 5.3 10e3/uL    Absolute Monocytes 0.6 0.0 - 1.3 10e3/uL    Absolute Eosinophils 0.3 0.0 - 0.7 10e3/uL    Absolute Basophils 0.0 0.0 - 0.2 10e3/uL    Absolute Immature Granulocytes 0.0 <=0.0 10e3/uL    Absolute NRBCs 0.0 10e3/uL   Extra Red Top Tube   Result Value Ref Range    Hold Specimen JI    Asymptomatic COVID-19 Virus (Coronavirus) by PCR Nasopharyngeal    Specimen: Nasopharyngeal; Swab   Result Value Ref Range    SARS CoV2 PCR Negative Negative   Adult Type and Screen   Result Value Ref Range    ABO/RH(D) A POS     Antibody Screen Negative Negative    SPECIMEN EXPIRATION DATE [id number]        RADIOLOGY:  Reviewed all pertinent imaging. Please see official radiology report.  No orders to display     EKG Interpretation  10/29/2021 at 4:12 PM    Rhythm: Atrial fibrillation  Rate: Ventricular rate of  72 bpm  Axis: normal  Ectopy: none  Conduction: normal  ST Segments: Subtle ST depression in lead II, V2, V3 and V4.  T Waves: no acute change  Q Waves: none    Clinical Impression: A. fib with controlled ventricular rate.  ST changes as noted above.  These appear similar to prior EKG which was performed on 10/26/2021.    I have independentlyreviewed and interpreted the patient's EKG with comments made as listed above.  Please see scanned image for full interpretation      PROCEDURES:   None      I, Mirna Union, am serving as a scribe to document services personally performed by Andrew Viveros CNP. based on my observation and the provider's statements to me. I, Andrew Viveros CNP attest that Mirna Chaves is acting in a scribe capacity, has observed my performance of the services and has documented them in accordance with my direction.    JOSE ANTONIO Lora, CNP  Emergency Medicine  [facility] EMERGENCY ROOM  [address] [facility] [address]  [phone]  Dept: [phone]          Andrew Viveros APRN CNP  10/29/21 1837       Andrew Viveros APRN CNP  10/29/21 1920

## 2021-10-29 NOTE — PROGRESS NOTES
Medication Therapy Management (MTM) Encounter    ASSESSMENT:                            Medication Adherence/Access: No issues identified    NSTEMI/Afib/Hypertension/Edema: Patient should be seen by MD today given new symptoms of black stools.  Spoke to Dr. Roth and she agreed.  Recommend patient go to ED, and not wait until his PCP apt this Monday.  Next week will clarify if patient has been taking amlodipine, and if it's appropriate to continue.      Type 2 Diabetes: Patient is meeting A1c goal of < 8%. Self monitoring of blood glucose is at goal of fasting  mg/dL.  Doesn't appear to be having hypoglycemia, but had a significant drop in his A1c in the hospital.  He does have chronic anemia, but likely contributing to falsley low A1c while in hospital.    Hyperlipidemia: Stable.  Patient is on high intensity statin which is indicated based on 2019 ACC/AHA guidelines for lipid management.      Pulmonary Hypertension:  Will call [facility], Dr. Kamran Grullon to clarify if the Tadalafil is being used for pulmonary hypertension or ED.  The 9/2 visit note states for ED, but patient reports he has taken it daily for pulm HTN.    PLAN:                            1.  Continue current medications.    Advised patient to go to the ED for assessment of black stools, he plans to go to [facility].  Called [facility] ED to notify them patient would be coming in.  Patient agreed and said his wife will take him in.    Follow-up: 1 week, will call patient to check in.  - Need to clarify if patient should still be taking Tadalafil 20 mg daily   - Need to clarify if he has been taking amlodipine 10 mg chronically,       SUBJECTIVE/OBJECTIVE:                          Red Sutherland is a 81 year old male called for a transitions of care visit. He was discharged from [facility] on 10/23-10/26 for NSTEMI.    Reason for visit: Follow up after hospitalization for NSTEMI.    Allergies/ADRs: Reviewed in chart  Past Medical  "History: Reviewed in chart  Tobacco: He reports that he quit smoking about 25 years ago. His smoking use included cigarettes. He has a 66.00 pack-year smoking history. He has never used smokeless tobacco.  Alcohol: not currently using    Medication Adherence/Access: no issues reported    HPI  Patient presented to the hospital on 10/28 with chest pains.  Admitted [facility] 10/23/21 for NSTEMI, cardiac cath performed on 10/25. Cardiac cath showed: \"severe narrowing in Lcx stent, s/p intracoronary lithotripsy and DESx1; high-normal L-sided filling pressures.\" Continue aspirin 81 mg indefinitely. Stop plavix and start prasugrel with 60 mg loading dose and then 10mg daily ideally indefinitely but at least for 12 mos, Eliquis resumed 10/26/21. They increased his atorvastatin to 80 mg daily, and decreased Lantus due to hypoglycemia.  They discontinued hydrocodone and Tadalafil.  Not clear why tadalafil was stopped.    Today patient is reporting new onset dark black stools starting earlier today.  Said he had 5-6 bowel movements with black coloration.  He is on ferrous sulfate every other day, but has been on this dosing for several weeks.  Denies abdominal pain, GI upset, fatigue, dizziness, weakness, nausea, emesis.  Hid hemoglobin at discharge was 9/2  Of note - during his 10/23-10/26 hospitalization for NSTEMI, his clopidogrel was stopped and changed to Effient e/ loading dose.  Aspirin 81 mg and Eliquis 5 mg twice daily were continued.      NSTEMI/Afib/Hypertension/Edema: Patient is currently taking Eliquis 5mg twice daily, Effient 10 mg daily (started within last week), and aspirin 81 mg for anticoagulation. Patient reporting new onset black stools.  See above HPI.  Is on PPI (omeprazole 20 mg), no hx of GI bleed.  Patient is also taking bumetanide 4 mg AM/ 3 mg PM, digoxin 125 MCG daily, irbesartan 300 mg daily, metoprolol tartrate 50 mg twice daily.  This [facility] med rec note from 10/23 mentioned that patient is " taking amlodipine, reviewed med history for this in [facility] ([facility]).  From our records this was discontinued 2020 ED visit.  Will review with his outpatient cardiologist if he should be taking this.  Also taking Klor-Con 40 M EQ twice daily. has a prescription for nitroglycerin but has not used this for several years.  Discussed the interaction between tadalafil and nitroglycerin. Patient reports no current medication side effects.   Patient does not self-monitor blood pressure.  BP Readings from Last 3 Encounters:   10/29/21 133/73   10/26/21 (!) 149/76   10/21/21 105/61     Type 2 Diabetes:  Currently taking Lantus 20 units twice daily, Metformin ER 1000 mg twice daily, Invokana 100 mg daily. Patient is not experiencing side effects.    Patient is taking Metformin Invokana, these medications were not on the medication list with [facility].  Discussed Ozempic as this was on his medication list, he does not remember ever taking a once weekly injection.  Unsure if he ever took this.    Blood sugar monitorin time(s) daily. Ranges (patient reported): Fasting- 120-130's  Symptoms of low blood sugar? shaky, dizzy, sweaty, Frequency of lows- none  Symptoms of high blood sugar? none  Eye exam: Due  Foot exam: up to date  Diet/Exercise: Not discussed in detail due to time  Aspirin: Taking 81mg daily  Statin: Yes   ACEi/ARB: Yes.   Urine Albumin: Due for recheck  Hemoglobin A1C   Date Value Ref Range Status   10/23/2021 6.3 (H) <=5.6 % Final     Comment:       Prediabetes: 5.7 to 6.4%        Diabetes:  >=6.5%     Patients with Hgb F >5%, total bilirubin >10.0 mg/dL, abnormal red cell turnover, severe renal or hepatic disease or malignancy should not have this A1C method used to diagnose or monitor diabetes.    2021 9.3 (H) <=5.6 % Final     Comment:     If no result is listed, Hemoglobin A1c has not bee    Prediabetes: 5.7 to 6.4%        Diabetes:  >=6.5%     Patients with Hgb F >5%, total bilirubin >10.0 mg/dL,  abnormal red cell turnover, severe renal or hepatic disease or malignancy should not have this A1C method used to diagnose or monitor diabetes.    04/10/2020 10.8 (H) <=5.6 % Final     Comment:     Prediabetes:   HBA1c       5.7 to 6.4%        Diabetes:        HBA1c        >=6.5%   Patients with Hgb F >5%, total bilirubin >10.0 mg/dL, abnormal red cell turnover, severe renal or hepatic disease or malignancy should not have this A1C method used to diagnose or monitor diabetes.           Hyperlipidemia: Current therapy includes Atorvastatin 80 mg daily. This was increased to 80 mg from 40 mg at his recent hospitalization. Patient reports no significant myalgias or other side effects.    Recent Labs   Lab Test 10/25/21  1529 05/25/21  1359   CHOL 98 94   HDL 35* 25*   LDL 53 <5   TRIG 49 353*     Pulmonary Hypertension:  Patient was taking Tadalafil 20 mg once daily but this was stopped at his most recently hospitalization.  Unclear why, not able to find reason in hospital notes.  Possibly due to contraindication between Tadalafil and nitrcglycerin.    He was on WHO group II study drug (IV levosimendan), DC'd as of 2020.  Follows with Dr. Grullon at [facility].  Last saw this provider on 9/2/21.  Is considering switching to [facility] to be closer to home.  He saw pulmonologist yesterday at [facility], but not for [place].    ----------------  Post Discharge Medication Reconciliation Status: discharge medications reconciled and changed, per note/orders.    I spent 35 minutes with this patient today. All changes were made via collaborative practice agreement with Dr. Roth. A copy of the visit note was provided to the patient's primary care provider.    The patient declined a summary of these recommendations.     Jackie Ewing, PharmD  Medication Therapy Management Pharmacist  Pager: [phone]    Telemedicine Visit Details  Type of service:  Telephone visit  Start Time: 2:40 PM  End Time: 3:15  PM  Originating Location (patient location): Home  Distant Location (provider location):  [facility] [place]     Medication Therapy Recommendations  No medication therapy recommendations to display

## 2021-10-30 ENCOUNTER — ANESTHESIA EVENT (OUTPATIENT)
Dept: SURGERY | Facility: CLINIC | Age: 81
DRG: 378 | End: 2021-10-30
Payer: COMMERCIAL

## 2021-10-30 ENCOUNTER — ANESTHESIA (OUTPATIENT)
Dept: SURGERY | Facility: CLINIC | Age: 81
DRG: 378 | End: 2021-10-30
Payer: COMMERCIAL

## 2021-10-30 LAB
ANION GAP SERPL CALCULATED.3IONS-SCNC: 10 MMOL/L (ref 5–18)
BUN SERPL-MCNC: 31 MG/DL (ref 8–28)
CALCIUM SERPL-MCNC: 8.5 MG/DL (ref 8.5–10.5)
CHLORIDE BLD-SCNC: 104 MMOL/L (ref 98–107)
CO2 SERPL-SCNC: 28 MMOL/L (ref 22–31)
CREAT SERPL-MCNC: 1.3 MG/DL (ref 0.7–1.3)
DIGOXIN SERPL-MCNC: 0.5 UG/L
ERYTHROCYTE [DISTWIDTH] IN BLOOD BY AUTOMATED COUNT: 21.7 % (ref 10–15)
GFR SERPL CREATININE-BSD FRML MDRD: 52 ML/MIN/1.73M2
GLUCOSE BLD-MCNC: 80 MG/DL (ref 70–125)
GLUCOSE BLDC GLUCOMTR-MCNC: 79 MG/DL (ref 70–99)
GLUCOSE BLDC GLUCOMTR-MCNC: 81 MG/DL (ref 70–99)
GLUCOSE BLDC GLUCOMTR-MCNC: 96 MG/DL (ref 70–99)
HCT VFR BLD AUTO: 27.4 % (ref 40–53)
HGB BLD-MCNC: 8 G/DL (ref 13.3–17.7)
HGB BLD-MCNC: 8 G/DL (ref 13.3–17.7)
HGB BLD-MCNC: 8.4 G/DL (ref 13.3–17.7)
HGB BLD-MCNC: 8.8 G/DL (ref 13.3–17.7)
HGB BLD-MCNC: 9 G/DL (ref 13.3–17.7)
MCH RBC QN AUTO: 22.9 PG (ref 26.5–33)
MCHC RBC AUTO-ENTMCNC: 29.2 G/DL (ref 31.5–36.5)
MCV RBC AUTO: 78 FL (ref 78–100)
PLATELET # BLD AUTO: 127 10E3/UL (ref 150–450)
POTASSIUM BLD-SCNC: 3.7 MMOL/L (ref 3.5–5)
RBC # BLD AUTO: 3.5 10E6/UL (ref 4.4–5.9)
SODIUM SERPL-SCNC: 142 MMOL/L (ref 136–145)
UPPER GI ENDOSCOPY: NORMAL
WBC # BLD AUTO: 6.9 10E3/UL (ref 4–11)

## 2021-10-30 PROCEDURE — 360N000075 HC SURGERY LEVEL 2, PER MIN: Performed by: INTERNAL MEDICINE

## 2021-10-30 PROCEDURE — 250N000009 HC RX 250: Performed by: NURSE ANESTHETIST, CERTIFIED REGISTERED

## 2021-10-30 PROCEDURE — 36415 COLL VENOUS BLD VENIPUNCTURE: CPT | Performed by: INTERNAL MEDICINE

## 2021-10-30 PROCEDURE — 85018 HEMOGLOBIN: CPT | Performed by: INTERNAL MEDICINE

## 2021-10-30 PROCEDURE — 0DJ08ZZ INSPECTION OF UPPER INTESTINAL TRACT, VIA NATURAL OR ARTIFICIAL OPENING ENDOSCOPIC: ICD-10-PCS | Performed by: INTERNAL MEDICINE

## 2021-10-30 PROCEDURE — 258N000003 HC RX IP 258 OP 636: Performed by: NURSE ANESTHETIST, CERTIFIED REGISTERED

## 2021-10-30 PROCEDURE — 272N000001 HC OR GENERAL SUPPLY STERILE: Performed by: INTERNAL MEDICINE

## 2021-10-30 PROCEDURE — 210N000002 HC R&B HEART CARE

## 2021-10-30 PROCEDURE — 250N000011 HC RX IP 250 OP 636: Performed by: NURSE ANESTHETIST, CERTIFIED REGISTERED

## 2021-10-30 PROCEDURE — 85027 COMPLETE CBC AUTOMATED: CPT | Performed by: INTERNAL MEDICINE

## 2021-10-30 PROCEDURE — 94660 CPAP INITIATION&MGMT: CPT

## 2021-10-30 PROCEDURE — 5A09357 ASSISTANCE WITH RESPIRATORY VENTILATION, LESS THAN 24 CONSECUTIVE HOURS, CONTINUOUS POSITIVE AIRWAY PRESSURE: ICD-10-PCS | Performed by: FAMILY MEDICINE

## 2021-10-30 PROCEDURE — 999N000157 HC STATISTIC RCP TIME EA 10 MIN

## 2021-10-30 PROCEDURE — C9113 INJ PANTOPRAZOLE SODIUM, VIA: HCPCS | Performed by: INTERNAL MEDICINE

## 2021-10-30 PROCEDURE — 80048 BASIC METABOLIC PNL TOTAL CA: CPT | Performed by: INTERNAL MEDICINE

## 2021-10-30 PROCEDURE — 250N000011 HC RX IP 250 OP 636: Performed by: INTERNAL MEDICINE

## 2021-10-30 PROCEDURE — 99233 SBSQ HOSP IP/OBS HIGH 50: CPT | Performed by: INTERNAL MEDICINE

## 2021-10-30 PROCEDURE — 80162 ASSAY OF DIGOXIN TOTAL: CPT | Performed by: INTERNAL MEDICINE

## 2021-10-30 PROCEDURE — 258N000003 HC RX IP 258 OP 636: Performed by: INTERNAL MEDICINE

## 2021-10-30 PROCEDURE — 370N000017 HC ANESTHESIA TECHNICAL FEE, PER MIN: Performed by: INTERNAL MEDICINE

## 2021-10-30 PROCEDURE — 250N000013 HC RX MED GY IP 250 OP 250 PS 637: Performed by: INTERNAL MEDICINE

## 2021-10-30 PROCEDURE — 250N000013 HC RX MED GY IP 250 OP 250 PS 637: Performed by: PHYSICIAN ASSISTANT

## 2021-10-30 PROCEDURE — 99223 1ST HOSP IP/OBS HIGH 75: CPT | Performed by: INTERNAL MEDICINE

## 2021-10-30 RX ORDER — SODIUM CHLORIDE, SODIUM LACTATE, POTASSIUM CHLORIDE, CALCIUM CHLORIDE 600; 310; 30; 20 MG/100ML; MG/100ML; MG/100ML; MG/100ML
INJECTION, SOLUTION INTRAVENOUS CONTINUOUS
Status: CANCELLED | OUTPATIENT
Start: 2021-10-30

## 2021-10-30 RX ORDER — HYDROMORPHONE HCL IN WATER/PF 6 MG/30 ML
0.2 PATIENT CONTROLLED ANALGESIA SYRINGE INTRAVENOUS EVERY 5 MIN PRN
Status: CANCELLED | OUTPATIENT
Start: 2021-10-30

## 2021-10-30 RX ORDER — FENTANYL CITRATE 50 UG/ML
25 INJECTION, SOLUTION INTRAMUSCULAR; INTRAVENOUS EVERY 5 MIN PRN
Status: CANCELLED | OUTPATIENT
Start: 2021-10-30

## 2021-10-30 RX ORDER — PRASUGREL 10 MG/1
10 TABLET, FILM COATED ORAL DAILY
Status: DISCONTINUED | OUTPATIENT
Start: 2021-10-30 | End: 2021-10-30

## 2021-10-30 RX ORDER — LIDOCAINE HYDROCHLORIDE 10 MG/ML
INJECTION, SOLUTION INFILTRATION; PERINEURAL PRN
Status: DISCONTINUED | OUTPATIENT
Start: 2021-10-30 | End: 2021-10-30

## 2021-10-30 RX ORDER — PRASUGREL 10 MG/1
10 TABLET, FILM COATED ORAL DAILY
Status: DISCONTINUED | OUTPATIENT
Start: 2021-10-30 | End: 2021-11-01 | Stop reason: HOSPADM

## 2021-10-30 RX ORDER — LIDOCAINE 40 MG/G
CREAM TOPICAL
Status: CANCELLED | OUTPATIENT
Start: 2021-10-30

## 2021-10-30 RX ORDER — ONDANSETRON 2 MG/ML
4 INJECTION INTRAMUSCULAR; INTRAVENOUS
Status: CANCELLED | OUTPATIENT
Start: 2021-10-30

## 2021-10-30 RX ORDER — MAGNESIUM CARB/ALUMINUM HYDROX 105-160MG
296 TABLET,CHEWABLE ORAL ONCE
Status: COMPLETED | OUTPATIENT
Start: 2021-10-31 | End: 2021-10-31

## 2021-10-30 RX ORDER — PROPOFOL 10 MG/ML
INJECTION, EMULSION INTRAVENOUS PRN
Status: DISCONTINUED | OUTPATIENT
Start: 2021-10-30 | End: 2021-10-30

## 2021-10-30 RX ORDER — ONDANSETRON 4 MG/1
4 TABLET, ORALLY DISINTEGRATING ORAL EVERY 30 MIN PRN
Status: CANCELLED | OUTPATIENT
Start: 2021-10-30

## 2021-10-30 RX ORDER — ONDANSETRON 2 MG/ML
4 INJECTION INTRAMUSCULAR; INTRAVENOUS EVERY 30 MIN PRN
Status: CANCELLED | OUTPATIENT
Start: 2021-10-30

## 2021-10-30 RX ORDER — OXYCODONE HYDROCHLORIDE 5 MG/1
5 TABLET ORAL EVERY 4 HOURS PRN
Status: CANCELLED | OUTPATIENT
Start: 2021-10-30

## 2021-10-30 RX ORDER — BISACODYL 5 MG
10 TABLET, DELAYED RELEASE (ENTERIC COATED) ORAL ONCE
Status: COMPLETED | OUTPATIENT
Start: 2021-10-30 | End: 2021-10-30

## 2021-10-30 RX ORDER — PROPOFOL 10 MG/ML
INJECTION, EMULSION INTRAVENOUS CONTINUOUS PRN
Status: DISCONTINUED | OUTPATIENT
Start: 2021-10-30 | End: 2021-10-30

## 2021-10-30 RX ORDER — POLYETHYLENE GLYCOL 3350 17 G/17G
238 POWDER, FOR SOLUTION ORAL ONCE
Status: COMPLETED | OUTPATIENT
Start: 2021-10-30 | End: 2021-10-30

## 2021-10-30 RX ADMIN — ROPINIROLE 2 MG: 1 TABLET, FILM COATED ORAL at 21:16

## 2021-10-30 RX ADMIN — POLYETHYLENE GLYCOL 3350 238 G: 17 POWDER, FOR SOLUTION ORAL at 18:17

## 2021-10-30 RX ADMIN — PHENYLEPHRINE HYDROCHLORIDE 150 MCG: 10 INJECTION INTRAVENOUS at 11:38

## 2021-10-30 RX ADMIN — LIDOCAINE HYDROCHLORIDE 50 MG: 10 INJECTION, SOLUTION INFILTRATION; PERINEURAL at 11:28

## 2021-10-30 RX ADMIN — PRASUGREL 10 MG: 10 TABLET, FILM COATED ORAL at 17:31

## 2021-10-30 RX ADMIN — IRBESARTAN 300 MG: 150 TABLET ORAL at 09:01

## 2021-10-30 RX ADMIN — OXYCODONE HYDROCHLORIDE AND ACETAMINOPHEN 1000 MG: 500 TABLET ORAL at 09:01

## 2021-10-30 RX ADMIN — METOPROLOL TARTRATE 50 MG: 50 TABLET, FILM COATED ORAL at 17:31

## 2021-10-30 RX ADMIN — PROPOFOL 150 MCG/KG/MIN: 10 INJECTION, EMULSION INTRAVENOUS at 11:28

## 2021-10-30 RX ADMIN — Medication 50 MG: at 09:02

## 2021-10-30 RX ADMIN — MULTIPLE VITAMINS W/ MINERALS TAB 1 TABLET: TAB at 09:01

## 2021-10-30 RX ADMIN — PANTOPRAZOLE SODIUM 40 MG: 40 INJECTION, POWDER, FOR SOLUTION INTRAVENOUS at 09:03

## 2021-10-30 RX ADMIN — AMLODIPINE BESYLATE 10 MG: 10 TABLET ORAL at 09:01

## 2021-10-30 RX ADMIN — DIGOXIN 125 MCG: 0.12 TABLET ORAL at 09:01

## 2021-10-30 RX ADMIN — METOPROLOL TARTRATE 50 MG: 50 TABLET, FILM COATED ORAL at 09:02

## 2021-10-30 RX ADMIN — FLUTICASONE FUROATE AND VILANTEROL TRIFENATATE 1 PUFF: 200; 25 POWDER RESPIRATORY (INHALATION) at 15:16

## 2021-10-30 RX ADMIN — BUMETANIDE 4 MG: 2 TABLET ORAL at 07:00

## 2021-10-30 RX ADMIN — BISACODYL 10 MG: 5 TABLET, COATED ORAL at 15:16

## 2021-10-30 RX ADMIN — PROPOFOL 30 MG: 10 INJECTION, EMULSION INTRAVENOUS at 11:32

## 2021-10-30 RX ADMIN — SODIUM CHLORIDE: 9 INJECTION, SOLUTION INTRAVENOUS at 21:16

## 2021-10-30 RX ADMIN — ATORVASTATIN CALCIUM 80 MG: 40 TABLET, FILM COATED ORAL at 07:00

## 2021-10-30 RX ADMIN — PHENYLEPHRINE HYDROCHLORIDE 100 MCG: 10 INJECTION INTRAVENOUS at 11:44

## 2021-10-30 RX ADMIN — PHENYLEPHRINE HYDROCHLORIDE 150 MCG: 10 INJECTION INTRAVENOUS at 11:35

## 2021-10-30 RX ADMIN — POTASSIUM CHLORIDE 40 MEQ: 1500 TABLET, EXTENDED RELEASE ORAL at 09:01

## 2021-10-30 RX ADMIN — BUMETANIDE 3 MG: 2 TABLET ORAL at 15:16

## 2021-10-30 ASSESSMENT — COPD QUESTIONNAIRES
COPD: 1
CAT_SEVERITY: SEVERE

## 2021-10-30 ASSESSMENT — ACTIVITIES OF DAILY LIVING (ADL)
ADLS_ACUITY_SCORE: 5

## 2021-10-30 NOTE — ANESTHESIA POSTPROCEDURE EVALUATION
Patient: Red Sutherland    Procedure: Procedure(s):  ESOPHAGOGASTRODUODENOSCOPY (EGD)       Diagnosis:Gastrointestinal hemorrhage with melena [K92.1]  Diagnosis Additional Information: No value filed.    Anesthesia Type:  MAC    Note:  Disposition: Inpatient   Postop Pain Control: Uneventful            Sign Out: Well controlled pain   PONV: No   Neuro/Psych: Uneventful            Sign Out: Acceptable/Baseline neuro status   Airway/Respiratory: Uneventful            Sign Out: Acceptable/Baseline resp. status   CV/Hemodynamics: Uneventful            Sign Out: Acceptable CV status; No obvious hypovolemia; No obvious fluid overload   Other NRE: NONE   DID A NON-ROUTINE EVENT OCCUR? No           Last vitals:  Vitals Value Taken Time   /71 10/30/21 1230   Temp 36.6  C (97.8  F) 10/30/21 1156   Pulse 64 10/30/21 1311   Resp 16 10/30/21 1215   SpO2 97 % 10/30/21 1215   Vitals shown include unvalidated device data.    Electronically Signed By: Steve Wang MD  October 30, 2021  1:11 PM

## 2021-10-30 NOTE — H&P (VIEW-ONLY)
[facility] Consultation     Red Sutherland  [address] [facility] [address]  81 year old male     Admission Date/Time: 10/29/2021  Primary Care Provider: Haresh Corona     We were asked to see the patient in consultation by Dr. Rojas for evaluation of melena.    ASSESSMENT:    Red Sutherland is a 81 year old male with PMH of CAD, left heart cath (10/25/21), drug-eluting stent placement on 10/25 - on Eliquis, aspirin, and prasugrel, recent NSTEMI in 08/2021, HTN, CHF, atrial fibrillation s/p ablation, , CKD 3, DM type 2, varicose veins, recent COVID19 pneumonia 9/2021 (COVID negative on 10/23), who was admitted on 10/29/21 for melena.    1. Melena  - Started on 10/29. He is on aspirin, eliquis, and prasugrel which puts him at a higher risk of significant bleeding. Differential includes upper GI bleed from PUD vs angioectasias. He also has a frequent epistaxis which could also cause the melena.   - No recent EGD    2. Cardiac disease  - Recent Coronary angiogram with severe narrowing in LCX stent, s/p intracoronary lithotripsy and drug-eluting stent x1. Recommending aspirin 81mg indefinitely, prasugrel for at least 12 months (ideally indefinitely), and Eliquis.       RECOMMENDATIONS:  - Plan for EGD today  - NPO  - Agree with PPI therapy  - More recommendations to follow after EGD   Case discussed with Dr. Sagastume, please review MD addendum below.    Ricardo Licea PA-C  [facility]  [phone]  ________________________________________________________________________        CC: Melena/dark stools     HPI:  Red Sutherland is a 81 year old male with PMH of CAD, left heart cath (10/25/21), drug-eluting stent placement on 10/25 - on Eliquis, aspirin, and prasugrel, recent NSTEMI in 08/2021, HTN, CHF, atrial fibrillation s/p ablation, , CKD 3, DM type 2, varicose veins, recent COVID19 pneumonia 9/2021 (COVID negative on 10/23), who was admitted on 10/29/21 for  melena.    Patient reports developing several bouts of black stools that began yesterday morning. Melena has seemed to stop. Last episode was 2pm yesterday evening. He has no associated fevers, chills, nausea, vomiting, abdominal pains, diarrhea, constipation, bloody stools, syncopal episodes, SOB, or dizziness. He has no history of GI bleeding. He has had frequent bouts of epistaxis, most recently 3-4 days ago. These are often self limited. He was previously seen in the ER on 10/7 for epistaxis as well. He is not on NSAIDs. He has not had recent EGD's. Last colonoscopy was in 01/2021 noting colon polyps and diverticulosis.     In the ED: Vitals were stable. Hgb was 8.8 (hgb has been around the 8-9 range for the past 2 months), MCV 77. WBC normal. INR 1.39. BUN 34. Cr 1.41.      Per chart review, patient has been in and out of the hospital for a variety of reasons:  - 8/18-8/20: [facility] - bleeding varicose veins  - 8/24-8/25:[facility] - acute chest pain with elevated troponin, transferred to [facility]  - 8/25-8/30: [facility]  - acute on chronic CHF, bumetanide drip  - 8/30: ED visit at  - Bleeding from varicose vein (right foot)  - 9/2-9/9: [facility] - Acute on chronic HFpEF, severe RV dysfunction, severe pulmonary HTN  - 9/16: [facility] - Tested positive for COVID 19, acute hypoxic respiratory failure due to COVID 19 pneumonia  - 10/07:  ED - anterior epistaxis thought to be due to anticoagulation and nasal cannula oxygen  - 10/12:  ED - COPD exacerbation given steroids and cipro. Troponin negative, EKG without acute ischemic changes, CT PE negative  - 10/23: [facility] - chest pains responded to nitroglycerin, EKG unchanged, troponin negative. Coronary angiogram with severe narrowing in LCX stent, s/p intracoronary lithotripsy and drug-eluting stent x1. Recommending aspirin 81mg indefinitely, prasugrel for at least 12 months (ideally indefinitely), and resume Eliquis.     ROS: A comprehensive ten point review  of systems was negative aside from those in mentioned in the HPI.      PAST MEDICAL HISTORY:  Patient Active Problem List    Diagnosis Date Noted     Long term current use of antithrombotics/antiplatelets 10/29/2021     Priority: Medium     S/P coronary angiogram 10/29/2021     Priority: Medium     Gastrointestinal hemorrhage with melena 10/29/2021     Priority: Medium     Type 2 diabetes mellitus (H) 10/24/2021     Priority: Medium     Hypokalemia 09/08/2021     Priority: Medium     Nicotine dependence 09/08/2021     Priority: Medium     Stage 3a chronic kidney disease (H) 09/08/2021     Priority: Medium     NSTEMI (non-ST elevated myocardial infarction) (H) 08/24/2021     Priority: Medium     Lactic acid acidosis 08/23/2021     Priority: Medium     Renal insufficiency 08/23/2021     Priority: Medium     Anemia due to blood loss, acute 08/23/2021     Priority: Medium     Pain of right lower leg 08/23/2021     Priority: Medium     Chest pain, unspecified type 07/11/2021     Priority: Medium     Peripheral vascular disease (H) 02/05/2021     Priority: Medium     Anemia, iron deficiency 06/01/2020     Priority: Medium     Suspected COVID-19 virus infection 04/09/2020     Priority: Medium     Acute on chronic diastolic congestive heart failure (H) 04/09/2020     Priority: Medium     Essential hypertension      Priority: Medium     Created by Conversion         Persistent Atrial Fibrillation      Priority: Medium     Feb/March 2013 dx  Mar 2013 new sotalol Rx  [id number] score of 4 (age/ HTN/ CHF/ CAD)  Rx Eliquis  9/19/16 failed CV (sotalol 120 BID)         Hypercholesteremia      Priority: Medium     Created by Conversion         Coronary artery disease involving native coronary artery without angina pectoris      Priority: Medium     Chronic bronchitis (H)      Priority: Medium     Created by Conversion         NOVA on CPAP      Priority: Medium     Using nasal CPAP with oxygen at night; he sees Dr. Lilly Mantilla at  [facility].         Pulmonary hypertension due to left ventricular diastolic dysfunction; WHO Group 2 2017     Priority: Medium     Multifactorial per [facility] with elevated LVEDP and PCW, COPD and NOVA. They   put him on sildenafil as nitroprusside lowered systemic BP and with that   the mean PA dropped from 54 to 49. Negative VQ at [facility] Dec 1, 2017.         Chronic obstructive pulmonary disease (H) 2016     Priority: Medium     Obesity 2016     Priority: Medium     Benign neoplasm of ascending colon 10/21/2015     Priority: Medium     History of colonic polyps 10/21/2015     Priority: Medium     Diverticular disease of colon 2010     Priority: Medium     SOCIAL HISTORY:  Social History     Tobacco Use     Smoking status: Former Smoker     Packs/day: 1.50     Years: 44.00     Pack years: 66.00     Types: Cigarettes     Quit date: 1996     Years since quittin.5     Smokeless tobacco: Never Used   Substance Use Topics     Alcohol use: Yes     Alcohol/week: 1.0 standard drinks     Comment: Alcoholic Drinks/day: 1 per month     Drug use: No     FAMILY HISTORY:  Family History   Problem Relation Age of Onset     Hyperlipidemia Mother      Hypertension Mother      Heart Disease Mother      Hyperlipidemia Father      Hypertension Father      Coronary Artery Disease Father      Cerebrovascular Disease Brother      Depression Brother      No Known Problems Sister      Pulmonary Hypertension No family hx of      Congenital heart disease No family hx of      ALLERGIES:   Allergies   Allergen Reactions     Furosemide Muscle Pain (Myalgia)     Previously tolerated.  Muscle cramps     Hydrochlorothiazide Unknown     Iodinated Contrast Media [Diagnostic X-Ray Materials] Nausea     Losartan Other (See Comments)     Other reaction(s): Stomatitis, Bloody nose dry mouth and lips     Losartan-Hydrochlorothiazide [Hyzaar]      Mouth sores     Metaxalone Nausea     Metolazone Other (See  Comments)     Muscle cramps     Mometasone Other (See Comments)     Bloody nose     Penicillins Other (See Comments)     Immune-does not work for him     Rabeprazole Other (See Comments)     Mouth sores     Ramipril Other (See Comments)     Mouth sores     Shellfish Containing Products [Shellfish-Derived Products] Unknown     Other reaction(s): mouth sores, Other reaction(s): mouth sores     Methocarbamol Rash     MEDICATIONS:   Current Facility-Administered Medications   Medication     acetaminophen (TYLENOL) tablet 1,000 mg     albuterol (PROAIR HFA/PROVENTIL HFA/VENTOLIN HFA) 108 (90 Base) MCG/ACT inhaler 2 puff     amLODIPine (NORVASC) tablet 10 mg     atorvastatin (LIPITOR) tablet 80 mg     bumetanide (BUMEX) tablet 3 mg     bumetanide (BUMEX) tablet 4 mg     canagliflozin (INVOKANA) tablet 100 mg     glucose gel 15-30 g    Or     dextrose 50 % injection 25-50 mL    Or     glucagon injection 1 mg     digoxin (LANOXIN) tablet 125 mcg     fluticasone-vilanterol (BREO ELLIPTA) 200-25 MCG/INH inhaler 1 puff     insulin aspart (NovoLOG) injection (RAPID ACTING)     insulin aspart (NovoLOG) injection (RAPID ACTING)     insulin glargine (LANTUS PEN) injection 20 Units     ipratropium - albuterol 0.5 mg/2.5 mg/3 mL (DUONEB) neb solution 3 mL     irbesartan (AVAPRO) tablet 300 mg     lidocaine (LIDODERM) 5 % Patch 1 patch     metFORMIN (GLUCOPHAGE-XR) 24 hr tablet 1,000 mg     metoprolol tartrate (LOPRESSOR) tablet 50 mg     multivitamin w/minerals (THERA-VIT-M) tablet 1 tablet     nitroGLYcerin (NITROSTAT) sublingual tablet 0.4 mg     pantoprazole (PROTONIX) IV push injection 40 mg     potassium chloride ER (KLOR-CON M) CR tablet 40 mEq     rOPINIRole (REQUIP) tablet 2 mg     sodium chloride 0.9% infusion     vitamin C (ASCORBIC ACID) tablet 1,000 mg     zinc gluconate tablet 50 mg     zolpidem (AMBIEN) tablet 10 mg       PHYSICAL EXAM:   BP (!) 153/70 (BP Location: Left arm)   Pulse 72   Temp 97.7  F (36.5  C)  "(Oral)   Resp 20   Ht 1.803 m (5' 11\")   Wt 100.7 kg (222 lb)   SpO2 98%   BMI 30.96 kg/m     GEN: Alert, oriented x3, communicative and in NAD.  KEVIN: AT, anicteric, OP without erythema, exudate, or ulcers.    NECK: Supple.    LYMPH: No LAD noted.  HRT: RRR, + Murmur  LUNGS: CTA  ABD:  ND, +BS, no guarding or pain to palpation, no rebound, no HSM.  SKIN: No rash, jaundice or spider angiomata  MSKL: LE free of edema, strength 5/5 all 4 extrems  NEURO: CN grossly intact, sensation intact to light touch, toes downgoing.     ADDITIONAL DATA:   I reviewed the patient's new clinical lab test results.   Recent Labs   Lab Test 10/30/21  0515 10/30/21  0147 10/29/21  2216 10/29/21  1712 10/29/21  1712 10/26/21  0426 10/26/21  0426 10/23/21  1824 10/12/21  1429 08/24/21  1503 08/24/21  1455   WBC 6.9  --   --   --  8.3  --  7.6   < > 8.6   < >  --    HGB 8.0*  8.0* 8.4* 8.6*   < > 8.8*   < > 9.2*   < > 8.8*   < >  --    MCV 78  --   --   --  77*  --  78   < > 77*   < >  --    *  --   --   --  152  --  217   < > 340   < >  --    INR  --   --   --   --  1.39*  --   --   --  1.63*  --  1.51*    < > = values in this interval not displayed.     Recent Labs   Lab Test 10/30/21  0515 10/29/21  1712 10/26/21  0426   POTASSIUM 3.7 4.0 4.1   CHLORIDE 104 102 105   CO2 28 25 29   BUN 31* 34* 33*   ANIONGAP 10 13 7     Recent Labs   Lab Test 10/29/21  1712 10/26/21  0426 10/25/21  0455 02/23/21  1115 02/19/21  1202   ALBUMIN 3.5 3.3* 3.1*   < > 3.6   BILITOTAL 2.2* 2.6* 2.4*   < > 1.5*   ALT 14 14 16   < > 20   AST 24 27 24   < > 20   PROTEIN  --   --   --   --  100 mg/dL*    < > = values in this interval not displayed.        IMAGING:  I reviewed the patient's new imaging results.           Agree with above note and examination by Ricardo Licea PA-C  81 M with CAD s/p stent placemetn on 10/25 on effient, afib on eliqius, CKD stage III, DM, recent COVID 09/21 admitted with melena    Patient notes black stools since recent " discharge.   No NSAID use, takes asa 81 mg,   Recent colonoscopy with polypectomy 01/21.  Physical exam reveals 81 M A and Ox3, NAD, chest- irregular, Pulm/ABD exam normal.   A/P   Melena- suspect upper GI source, especially given recent colonoscopy. At risk for bleeding given dual anticoagulation   Will plan EGD today  Keep NPO  Continue IV PPI     Sheng Sagastume M.D.  [facility]  [phone]- business phone number (for scheduling)

## 2021-10-30 NOTE — PROGRESS NOTES
[facility] PROGRESS NOTE      Identification/Summary:   81 year old male with a history of CAD, stent placements 10/25/2021 on aspirin, prasugrel, apixaban hyperlipidemia, , hypertension, diabetes mellitus, congestive heart failure, COPD, atrial fibrillation NSTEMI, benign neoplasm of ascending colon, colic polyps, diverticular disease of colon, stage 3 kidney disease, presented to the emergency room with complaints of dark stools, blood in stools found to have anemia with hemoglobin of 8 dropped from 10 on 9/21. Seen by gastroenterology and had normal EGD today. Plan for colonoscopy tomorrow. D/w GI, ok to start prasugrel today. Continue to hold aspirin, eliquis.     Assessment, plan :  Melena  Coagulopathy Eliquis aspirin, prasugrel with recent coronary stents  Hemoglobin stable at 8  No further stools since he is in the hospital  S/p EGD 10/30 appeared normal  Plan for colonoscopy tomorrow  Appreciate GI, cardiology input  Anticoagulation on hold  History of colonoscopy, polypectomy on 1/21    Acute blood loss anemia on chronic anemia  Monitor hemoglobin q8h     CAD status post recent stents on 10/25/2021  Asymptomatic.  Cardiology following    Persistent atrial fibrillation  Eliquis on hold  Continue metoprolol, digoxin    Diabetes mellitus type 2  Holding off on the basal insulin as his blood sugars are on the lower side  Insulin sliding scale as needed  Hold oral hypoglycemic agents    Hypertension  Continue home medications    History of COPD  Pulmonary hypertension  No sign of exacerbation  Continue home inhalers    History of diastolic CHF  Well compensated.      Obstructive sleep apnea  On CPAP      Diet: Clear Liquid Diet  DVT Prophylaxis: SCDs given GI bleeding  Code Status: Full Code    Anticipated possible discharge in 1-2 days once planned colonoscopy milestones are met.    Interval History/Subjective:  Denies any further stools since yesterday afternoon.  Denies any nausea vomiting  "abdominal pain, shortness of breath, chest pain.  He had colonoscopy in January 2021.    Physical Exam/Objective:  Vitals I/O   /68 (BP Location: Left arm)   Pulse 63   Temp 97.8  F (36.6  C) (Oral)   Resp 16   Ht 1.803 m (5' 11\")   Wt 100.7 kg (222 lb)   SpO2 97%   BMI 30.96 kg/m     I/O last 3 completed shifts:  In: 450 [I.V.:450]  Out: 400 [Urine:400]     Body mass index is 30.96 kg/m .    General Appearance:  Alert, cooperative, no distress   Head:  Normocephalic, atraumatic   Eyes:  PERRL    Throat:  mucosa; moist   Neck: No JVD, thyromegaly   Lungs:   Clear to auscultation bilaterally, respirations unlabored   Chest Wall:  No tenderness or deformity   Heart:  irregular rate and rhythm, S1, S2 normal, systolic murmur   Abdomen:   Soft, non tender, non distended, bowel sounds present, no guarding or rigidity   Extremities: No edema, no joint swelling   Skin: Skin color, texture, turgor normal, no rashes or lesions   Neurologic: Alert and oriented X 3, Moves all 4 extremities       Medications:   Personally Reviewed.    amLODIPine  10 mg Oral Daily     atorvastatin  80 mg Oral QAM     bisacodyl  10 mg Oral Once    Followed by     polyethylene glycol  238 g Oral Once    Followed by     [START ON 10/31/2021] magnesium citrate  296 mL Oral Once     bumetanide  3 mg Oral Daily at 4 pm     bumetanide  4 mg Oral QAM AC     canagliflozin  100 mg Oral QAM     digoxin  125 mcg Oral Daily     fluticasone-vilanterol  1 puff Inhalation Daily     insulin aspart  1-7 Units Subcutaneous TID AC     insulin aspart  1-5 Units Subcutaneous At Bedtime     insulin glargine  20 Units Subcutaneous BID     irbesartan  300 mg Oral Daily     metFORMIN  1,000 mg Oral Daily with supper     metoprolol tartrate  50 mg Oral BID w/meals     multivitamin w/minerals  1 tablet Oral Daily     pantoprazole (PROTONIX) IV  40 mg Intravenous Daily with breakfast     potassium chloride ER  40 mEq Oral Daily     rOPINIRole  2 mg Oral At " Bedtime     vitamin C  1,000 mg Oral Daily     zinc gluconate  50 mg Oral Daily     zolpidem  10 mg Oral At Bedtime       Data reviewed today: I personally reviewed all new medications, labs, imaging/diagnostics reports over the past 24 hours. Pertinent findings include    Labs:  Most Recent 3 CBC's:Recent Labs   Lab Test 10/30/21  1009 10/30/21  0515 10/30/21  0147 10/29/21  2216 10/29/21  1712 10/26/21  0426 10/26/21  0426   WBC  --  6.9  --   --  8.3  --  7.6   HGB 8.8* 8.0*  8.0* 8.4*   < > 8.8*   < > 9.2*   MCV  --  78  --   --  77*  --  78   PLT  --  127*  --   --  152  --  217    < > = values in this interval not displayed.     Most Recent 3 BMP's:Recent Labs   Lab Test 10/30/21  1212 10/30/21  0515 10/29/21  2213 10/29/21  1712 10/26/21  0735 10/26/21  0426   NA  --  142  --  140  --  141   POTASSIUM  --  3.7  --  4.0  --  4.1   CHLORIDE  --  104  --  102  --  105   CO2  --  28  --  25  --  29   BUN  --  31*  --  34*  --  33*   CR  --  1.30  --  1.41*  --  1.37*   ANIONGAP  --  10  --  13  --  7   ANNMARIE  --  8.5  --  9.4  --  9.5   GLC 81 80 96 99   < > 110    < > = values in this interval not displayed.     Most Recent 2 LFT's:Recent Labs   Lab Test 10/29/21  1712 10/26/21  0426   AST 24 27   ALT 14 14   ALKPHOS 135* 126*   BILITOTAL 2.2* 2.6*     Most Recent 3 INR's:Recent Labs   Lab Test 10/29/21  1712 10/12/21  1429 08/24/21  1455   INR 1.39* 1.63* 1.51*     Most Recent 3 Troponin's:No lab results found.    Imaging:   No results found for this or any previous visit (from the past 24 hour(s)).      Paty Rosales MD  Hospitalist  [facility]

## 2021-10-30 NOTE — CONSULTS
HEART CARE CONSULTATON NOTE            Reason for consult: 81-year-old male with a history of hypertension, coronary artery disease, congestive heart failure, atrial fibrillation with recent coronary angiogram and stent placement October 25, 2021 presents with dark stools.    Primary cardiologist Dr. French     Assessment:   1.  Coronary artery disease.  Recent coronary intervention with in-stent restenosis.  Recommendations by the interventionalist was for indefinite aspirin and prasugrel.  He was discharged on this combination in addition to Eliquis and now presents with lower GI bleed.  Given recent coronary stenting will be important to reinitiate therapy for recent stent placement as soon as possible.  Await input from GI this morning, hospitalist and discussion with my interventional colleague.   is on call.  Patient underwent prior [facility] for every [facility] to [facility] with the angiogram results outlined below.  He underwent coronary rotational atherectomy at that time.  He returned recently for additional intervention to the circumflex vessel.    2.  Persistent atrial fibrillation.  Chads vascular score of 5 on Eliquis in addition to the other anticoagulation agents as described.  Again plan to discuss further with rounding colleagues in interventional's.  Controlled with metoprolol digoxin.Dig level 0.5    3.  History of pulmonary hypertension.  Follows at the [facility].  Limited notes available.  Recent saw his pulmonologist with outline of prior evaluation for pulmonary hypertension.  WHO group 2 status post study drug infusion levosimendan.  Currently being treated medically.  His recent echocardiogram from October 24 reported normal left ventricular systolic function, flattening of the left ventricular septum, mild aortic stenosis, estimate of PA pressure of 75 mmHg plus the right atrial pressure.  Right heart filling pressures based on the note from [facility]  February 2021 demonstrated severe elevation in right heart filling pressures and mild elevation in left heart filling pressures.  The AF 93/31 with a mean PA of 52.  Review of prior notes indicates that the patient was to be discharged on sildenafil 2 mg 3 times daily.  With most recent note from February 2021 recommending Tadalafil.    4.  History of COPD, obstructive sleep apnea, elevated RV systolic pressure reported greater than 100.  He is on oxygen and CPAP.  Reportedly he needs oxygen only at night based on the most recent pulmonary notes.  Resting oxygen saturations 96% dropping to 92% with ambulation.    Home cardiovascular medications include amlodipine 10 mg daily, Eliquis 5 mg twice daily, aspirin 81 mg daily, atorvastatin 80 mg daily, Bumex 4 mg in the a.m. and 3 mg in the afternoon, Invokana 100 mg daily, digoxin 0.125 mg daily, prasugrel     Plan:   1.  Await GI input and further discussion regarding anticoagulation will contact my interventional colleague.  Given recent stent it would be important to resume antiplatelet agents.  Will await additional discussion this morning.  2.  Chronic atrial fibrillation.  On Eliquis with a chads vascular score of 5.  Currently off Eliquis.  3.  Discussed with my interventional colleague  he would like to resume effient as soon as possible without aspirin given recent coronary stenting.  In addition would like to resume apixaban as soon as possible.  Most pressing would be to resume Effient and Eliquis in the near future perhaps in the next 1 to 2 days monitoring closely.  He is at elevated risk of stroke.  Await further input from GI and primary care regarding question of anticoagulation.  4.  Further clarification of recommendations for pulmonary hypertensive medications.  Reviewed notes from pulmonary hypertension clinic at [place] March 2021 felt to be largely group 2 possible group 3.  At that time he was listed to be on tadalafil.  Reportedly prior  responsiveness to nitric oxide.  Need to clarify if he is taking tadalafil.    Addendum: Discussed with Dr. Sagastume to resume Effient.  Will hold Eliquis pending additional follow-up tomorrow.   History:      HPI: 81-year-old male who presents to the emergency room yesterday with complaints of dark stools.  The morning of admission he reported several episodes of diarrhea with black stools.  Patient has been on medication of anticoagulation agents.  Underwent recent cardiac catheterization with Dr. Garcia completed October 25, 2021.  He had severe narrowing of the left circumflex stent with intracoronary lithotripsy and drug-eluting stent recommendations based on the chart review included aspirin indefinitely, prasugrel and also is on Eliquis for atrial fibrillation prophylaxis.  Angiographic report also reported 60 to 70% bifurcation disease beyond the stent with plans medical management.  The report is outlined below.  He did have a drop in hemoglobin with hemoglobin this morning of 8.0, hemoglobin yesterday 8.8 hemoglobin 10/26/2021 of 9.2.    He reports no additional stools since 2 PM yesterday.  He denies chest pain, shortness of breath, dizziness or lightheadedness.  He has some limited recall of the details of past admissions.  I was able to review a follow-up note from fibber 2021 at the [facility].    Patient admitted with chest discomfort October 24, 2021 with a subsequent coronary angiogram as outlined.  He has a history of atrial fibrillation with a chads vascular score of 5 on apixaban.  At discharge as noted he was discharged on apixaban, aspirin and prasugrel.  His other cardiovascular medications are reviewed.  He has a history of anemia baseline hemoglobin of 9-10.  Last admission he describes sharp chest pain radiating to his back and upper abdomen.    He was recently treated at acute hospital September 2021 with possible Covid related infection.  He follows with pulmonary at [facility].  He follows  for chronic dyspnea on exertion, COPD and obstructive sleep apnea.  He also has been seen at the [facility] with reported extensive work-up for pulmonary hypertension that apparently was felt to be related to heart failure with preserved ejection fraction with possible precapillary component.  Per report progressive elevation in RV systolic pressure.  He is reportedly on oxygen therapy at night with CPAP.  Reported WHO group 2 with study drug.  Prior recent hospitalizations outlined below change from a recent discharge summary September 2021.    He was admitted to hospital in 2020 with the [facility].  History of coronary artery disease with prior PCI to a mid left anterior descending in 2004 with non-STEMI and rotational atherectomy to the circumflex for every 1st 2021.  History of left femoral endarterectomy January 2021, atrial fibrillation, 2 pulmonary hypertension.    Reports no current complaints of chest pain, shortness of breath, dizziness or lightheadedness.        Recent hospitalizations include:  ? 8/18 - 8/20 at [facility]: hemorrhage from bleeding varicose vein in right lower extremity, required transfusion therapy  ? 8/24 - 8/25 at [facility]: Acute chest pain and dyspnea, troponin elevation; transferred to [facility]  ? 8/25 - 8/30 at [facility]: Hospitalized for acute on chronic CHF, treated with bumetanide drip  ? 8/30 ED visit at [facility]: More bleeding from varicosity on right foot; staples placed  ? 9/2 - 9/9 at [place]: Acute on chronic HFpEF, severe RV dysfunction, severe pulmonary hypertension    Past Medical History:   Diagnosis Date     Atrial fibrillation (H)      CHF (congestive heart failure) (H)      COPD (chronic obstructive pulmonary disease) (H)      Coronary artery disease      Diabetes mellitus (H)      Essential hypertension      Hyperlipidemia      Pulmonary hypertension (H)     O2 at night     Pulmonary hypertension due to left ventricular diastolic dysfunction (H)  11/28/2017    Multifactorial per [facility] with elevated LVEDP and PCW, COPD and NOVA. They put him on sildenafil as nitroprusside lowered systemic BP and with that the mean PA dropped from 54 to 49. Negative VQ at [facility] Dec 1, 2017.     RLS (restless legs syndrome)      Sleep apnea    Left femoral endarterectomy January 2021  Chronic kidney disease  Past Surgical History:   Procedure Laterality Date     BACK SURGERY      lower back     CARDIAC CATHETERIZATION  12/13/2017    Right and left at [facility], mean PA 58, PCW 24 with V wave of 35, LVEDP of 18, with Nipride systemic BP, PVR and mean PA all declined     CARDIOVERSION  03/15/2013    for afib     CATARACT EXTRACTION Bilateral      CORONARY STENT PLACEMENT       CV CORONARY ANGIOGRAM N/A 10/25/2021    Procedure: Coronary Angiogram;  Surgeon: Otf Knowles MD;  Location: [facility] LAB CV     CV CORONARY LITHOTRIPSY PCI N/A 10/25/2021    Procedure: CV Coronary Lithotripsy PCI;  Surgeon: Otf Knowles MD;  Location: [facility] LAB CV     CV LEFT HEART CATH N/A 10/25/2021    Procedure: Left Heart Cath;  Surgeon: Otf Knowles MD;  Location: [facility] CATH LAB CV     CV PCI N/A 10/25/2021    Procedure: Percutaneous Coronary Intervention;  Surgeon: Otf Knowles MD;  Location: [facility] CATH LAB CV     IR ABDOMINAL AORTOGRAM  11/16/2012     IR MISCELLANEOUS PROCEDURE  7/20/2001     IR MISCELLANEOUS PROCEDURE  11/16/2012     OTHER SURGICAL HISTORY      m[place]     SHOULDER SURGERY      reapir on right shoulder     TOTAL HIP ARTHROPLASTY Left      TOTAL KNEE ARTHROPLASTY Bilateral      WRIST SURGERY Bilateral      ZZHC COLONOSCOPY W/WO BRUSH/WASH N/A 1/14/2021    Procedure: COLONOSCOPY;  Surgeon: Mihai Harris MD;  Location: [facility] OR;  Service: Gastroenterology      Social History     Socioeconomic History     Marital status:      Spouse name: Not on file     Number of children: 4     Years of education: Not on file     Highest education  level: Not on file   Occupational History     Not on file   Tobacco Use     Smoking status: Former Smoker     Packs/day: 1.50     Years: 44.00     Pack years: 66.00     Types: Cigarettes     Quit date: 1996     Years since quittin.5     Smokeless tobacco: Never Used   Substance and Sexual Activity     Alcohol use: Yes     Alcohol/week: 1.0 standard drinks     Comment: Alcoholic Drinks/day: 1 per month     Drug use: No     Sexual activity: Not Currently     Partners: Female   Other Topics Concern     Not on file   Social History Narrative     Not on file     Social Determinants of Health     Financial Resource Strain:      Difficulty of Paying Living Expenses:    Food Insecurity:      Worried About Running Out of Food in the Last Year:      Ran Out of Food in the Last Year:    Transportation Needs:      Lack of Transportation (Medical):      Lack of Transportation (Non-Medical):    Physical Activity:      Days of Exercise per Week:      Minutes of Exercise per Session:    Stress:      Feeling of Stress :    Social Connections:      Frequency of Communication with Friends and Family:      Frequency of Social Gatherings with Friends and Family:      Attends [employer] Services:      Active Member of Clubs or Organizations:      Attends Club or Organization Meetings:      Marital Status:    Intimate Partner Violence:      Fear of Current or Ex-Partner:      Emotionally Abused:      Physically Abused:      Sexually Abused:      Family History   Problem Relation Age of Onset     Hyperlipidemia Mother      Hypertension Mother      Heart Disease Mother      Hyperlipidemia Father      Hypertension Father      Coronary Artery Disease Father      Cerebrovascular Disease Brother      Depression Brother      No Known Problems Sister      Pulmonary Hypertension No family hx of      Congenital heart disease No family hx of      Allergies   Allergen Reactions     Furosemide Muscle Pain (Myalgia)     Previously  "tolerated.  Muscle cramps     Hydrochlorothiazide Unknown     Iodinated Contrast Media [Diagnostic X-Ray Materials] Nausea     Losartan Other (See Comments)     Other reaction(s): Stomatitis, Bloody nose dry mouth and lips     Losartan-Hydrochlorothiazide [Hyzaar]      Mouth sores     Metaxalone Nausea     Metolazone Other (See Comments)     Muscle cramps     Mometasone Other (See Comments)     Bloody nose     Penicillins Other (See Comments)     Immune-does not work for him     Rabeprazole Other (See Comments)     Mouth sores     Ramipril Other (See Comments)     Mouth sores     Shellfish Containing Products [Shellfish-Derived Products] Unknown     Other reaction(s): mouth sores, Other reaction(s): mouth sores     Methocarbamol Rash     No current outpatient medications on file.         Review of Systems  All pertinent positives and negatives reviewed in History.  All other 10 point review of systems reviewed and negative.      Objective:    Physical Exam  VITALS: BP (!) 153/70 (BP Location: Left arm)   Pulse 72   Temp 97.7  F (36.5  C) (Oral)   Resp 20   Ht 1.803 m (5' 11\")   Wt 100.7 kg (222 lb)   SpO2 98%   BMI 30.96 kg/m    BMI: Body mass index is 30.96 kg/m .  Wt Readings from Last 3 Encounters:   10/29/21 100.7 kg (222 lb)   10/26/21 103.2 kg (227 lb 9.6 oz)   10/21/21 107.5 kg (237 lb 1.6 oz)       Intake/Output Summary (Last 24 hours) at 10/30/2021 0603  Last data filed at 10/30/2021 0452  Gross per 24 hour   Intake 450 ml   Output 400 ml   Net 50 ml     General Appearance:  Alert, cooperative, no distress, appears stated age   HEENT:  Normocephalic, without obvious abnormality   Neck: Supple, symmetrical, trachea midline, no adenopathy, thyroid: not enlarged, symmetric, no carotid bruit, mild increase in jugular venous pressure.   Back:   Symmetric, no curvature, ROM normal, no CVA tenderness   Lungs:    Mildly diminished at the bases, respirations unlabored   Chest Wall:  No tenderness or deformity "   Heart:   Irregular, 2/6 to 3/6 systolic murmur   Abdomen:   Soft, non-tender, bowel sounds active all four quadrants,  no masses, no organomegaly   Extremities: Extremities normal, atraumatic, no cyanosis, 1+ edema with chronic venous stasis changes.   Skin: Skin color, texture, turgor normal, no rashes or lesions   Neurologic: Alert and oriented X 3, Moves all 4 extremities     Cardiographics  ECG: 10/29/2021 atrial fibrillation, ST-T changes in the inferior leads V2 through V6 possibly related to digoxin.  Similar findings on EKG from 10/26/2021 10/25/2021  Echocardiogram:    MRN: [id number]  : 1940  Study Date: 10/24/2021 10:56 AM  Age: 81 yrs  Gender: Male  Patient Location: [facility]  Reason For Study: Chest Pain  Ordering Physician: ANALY JOHNSON  Referring Physician: ANALY JOHNSON  Performed By:      BSA: 2.2 m2  Height: 71 in  Weight: 228 lb  HR: 65  ______________________________________________________________________________  ______________________________________________________________________________  Interpretation Summary     Left ventricular size, wall motion and function are normal. The ejection  fraction is 60-65%.  Diastolic Doppler findings (E/E' ratio and/or other parameters) suggest left  ventricular filling pressures are increased.  Flattened septum is consistent with RV pressure/volume overload.  The right ventricle is moderate to severely dilated.  Moderately decreased right ventricular systolic function  The left atrium is moderately dilated.  The right atrium is moderate to severely dilated.  Mild valvular aortic stenosis.  The estimated pulmonary artery systolic pressure is 75 mmHg.     No previous study for comparison.  Red D Charlsen  Cardiac Catheterization  Order# [id number]  Reading physician: Otf Knowles MD Ordering physician: Irlanda Moon MD Study date: 10/25/21       Patient Information    Name MRN Description   Red Sutherland  [id number] 81 year old male     Physicians    Panel Physicians Referring Physician Case Authorizing Physician   Otf Knowles MD (Primary) Irlanda Moon MD William, Amila Dilusha, MD     Procedures    Coronary Angiogram   Percutaneous Coronary Intervention   CV Coronary Lithotripsy PCI   Left Heart Cath     Indications    Chest pain, unspecified type [R07.9 (ICD-10-CM)]         Conclusion    Red Sutherland is a 81 year old old male with CAD s/p prirp PCI's, most recent to mLcx w/ roto/HUNTER 2/21, severe pHTN w/ PA pressures in  range, AF, PAD  who is here for work up of crescendo angina.     - severe narrowing in Lcx stent, s/p intracoronary lithotripsy and DESx1; high-normal L-sided filling pressures  - ASA 81mg daily indefinitely, switch P2Y12 inhibitor to prasugrel given relatively early stent re-narrowing (within first year) after 60mg re-load, then 10mg daily ideally indefinitely but at least for 12 mos   - 60-70% bifurcation disease beyond the stent - medical management as the first step, PCI if has recalcitrant symptoms  - increase atorva to 80  - continue aggressive risk factor modification              Findings:  LM:no obstruction  LAD:possible mid-vessel stent vs. Ca2+, no obstruction - similar to '11 angio  Lcx:95-99% ISR in prox stent, 50% downstream disease at bifurcation  RCA:dominant, anterior, no obstruction     LVEDP:18     Access:  R Radial artery     PCI:  LMT was engaged w/ a 6F EBU 3.5 Guide catheter and the lesion in Lcx was wired w/ a Forte wire, ballooned w/ a 2.5x12mm NC Emerge at 12-20 salazar but w/ severe residual narrowing, concerning for under-expansion. In view of his labile respiratory status/inability to lay flat, we decided to forego IVUS and proceeded w/ coronary lithotripsy w/ a 3.5x12mm Shockwave balloon for 10 cycles at 4 salazar, this time achieving good expansion. Due to disease distal to the stent/haziness, we placed a 3.5x24mm Synergy EES at 11 salazar,  post-dilating proximally w/ a 4.0x12mm NC Emerge at 12 salazar inflations. Final angiography showed no dissection or perforation and a BASIL 3 flow.     Closure:   Vasc Band     This is a complex modifier 22 procedure due to severe Ca2+, old stent under-expansion, need for coronary lithotripsy           Narrative      For the complete report, see the Order-Level Documents.     PROCEDURE TYPES   1.  HEART CATHETERIZATION - RIGHT   2.  CORONARY ANGIOGRAPHY   3.  PERCUTANEOUS CORONARY ANGIOPLASTY   4.  CORONARY ATHERECTOMY   FINAL DIAGNOSIS   1.  Severe pulmonary hypertension   2.  Severe coronary artery atherosclerosis   3.  Coronary calcification   4.  Coronary rotational atherectomy (1.5 bur) of the distal left circumflex   5.  Partially successful PTCA of distal left circumflex   PRE-PROCEDURE DIAGNOSIS   1.  Non-ST Elevation Myocardial Infarction (HCC)   HEMODYNAMICS SUMMARY   Right heart filling pressures (mRAP 17 mmHg) were severely elevated and left heart filling pressures (PCWP 18 mmHg) were mildly elevated with severely elevated pulmonary arterial pressures (PA 93/31, mean PA 52 mmHg) and elevated pulmonary vascular   resistance (PVR 4.9 MILAN). Cardiac output/index was normal (Gabrielle's CO/CI 6.9/3.1). Overall, these findings are suggestive of mostly pre-capillary but some component of post capillary pulmonary hypertension as well.   CORONARY DIAGNOSTIC SUMMARY   Coronary artery dominance is right.   The left main coronary artery is 20% obstructed by a tubular lesion. The distal segment is normal size, diffuse diseased.   The proximal left anterior descending artery is 20% obstructed by a discrete lesion.   The middle left anterior descending artery is 40% obstructed by a discrete lesion. The distal segment is normal size, diseased.   The proximal circumflex artery is 30% obstructed by a discrete lesion.   The distal circumflex artery is 70% obstructed by a tubular lesion and 60% obstructed by a discrete lesion. The  distal segment is normal size, diseased.   The first obtuse marginal distal segment is small size, diffuse diseased.   The middle right coronary artery is 50% obstructed by a discrete lesion. The distal segment is normal size, diseased.   Mild in-stent restenosis of proximal left anterior descending stent   CORONARY INTERVENTION SUMMARY   Successful intervention of the Distal Circumflex Artery. The preintervention stenosis was 70%. The post intervention stenosis was 50%. Devices used include Atherectomy and PTCA. Perforation present.   RADIATION DOSE DATA   Procedure cumulative skin dose (mGy): 1225.14   Procedure cumulative dose area product (Gy-cm**2): 87.05   Fluoro Time (Min): 40.31   CONTRAST DOSE DATA   IOHEXOL 350 MG IODINE/ML INTRAVENOUS SOLUTION: 220mL            Coronary Findings      Diagnostic  Dominance: Right    Left Main   The vessel was visualized by selective angiography. There was 5% vessel disease.   Left Anterior Descending   The vessel was visualized by selective angiography. There was 10% vessel disease.   Left Circumflex   Prox Cx lesion is 95% stenosed.   Right Coronary Artery   The vessel was visualized by selective angiography. There was 10% vessel disease.         Imaging  Chest x-ray:        Lab Results    Chemistry/lipid CBC Cardiac Enzymes/BNP/TSH/INR   Recent Labs   Lab Test 10/25/21  1529   CHOL 98   HDL 35*   LDL 53   TRIG 49     Recent Labs   Lab Test 10/25/21  1529 05/25/21  1359 09/02/20  1307   LDL 53 <5 50     Recent Labs   Lab Test 10/30/21  0515      POTASSIUM 3.7   CHLORIDE 104   CO2 28   GLC 80   BUN 31*   CR 1.30   GFRESTIMATED 52*   ANNMARIE 8.5     Recent Labs   Lab Test 10/30/21  0515 10/29/21  1712 10/26/21  0426   CR 1.30 1.41* 1.37*     Recent Labs   Lab Test 10/23/21  1824 07/11/21  1031 04/10/20  0534   A1C 6.3* 9.3* 10.8*          Recent Labs   Lab Test 10/30/21  0515   WBC 6.9   HGB 8.0*  8.0*   HCT 27.4*   MCV 78   *     Recent Labs   Lab Test  10/30/21  0515 10/30/21  0147 10/29/21  2216   HGB 8.0*  8.0* 8.4* 8.6*    Recent Labs   Lab Test 10/24/21  0619 10/24/21  0200 10/23/21  1824   TROPONINI 0.03 0.03 0.03     Recent Labs   Lab Test 10/23/21  1824 10/12/21  1429 08/24/21  1503   * 381* 393*     Recent Labs   Lab Test 05/31/19  1507   TSH 2.55     Recent Labs   Lab Test 10/29/21  1712 10/12/21  1429 08/24/21  1455   INR 1.39* 1.63* 1.51*

## 2021-10-30 NOTE — ANESTHESIA CARE TRANSFER NOTE
Patient: Red Sutherland    Procedure: Procedure(s):  ESOPHAGOGASTRODUODENOSCOPY (EGD)       Diagnosis: Gastrointestinal hemorrhage with melena [K92.1]  Diagnosis Additional Information: No value filed.    Anesthesia Type:   No value filed.     Note:    Oropharynx: oropharynx clear of all foreign objects  Level of Consciousness: awake  Oxygen Supplementation: nasal cannula  Level of Supplemental Oxygen (L/min / FiO2): 4  Independent Airway: airway patency satisfactory and stable    Vital Signs Stable: post-procedure vital signs reviewed and stable  Report to RN Given: handoff report given  Patient transferred to: Telemetry/Step Down Unit    Handoff Report: Identifed the Patient, Identified the Reponsible Provider, Reviewed the pertinent medical history, Discussed the surgical course, Reviewed Intra-OP anesthesia mangement and issues during anesthesia, Set expectations for post-procedure period and Allowed opportunity for questions and acknowledgement of understanding      Vitals:  Vitals Value Taken Time   BP 90/56 10/30/21 1156   Temp 36.6  C (97.8  F) 10/30/21 1156   Pulse 68    Resp 16 10/30/21 1156   SpO2 98 % 10/30/21 1156       Electronically Signed By: JOSE ANTONIO TATUM CRNA  October 30, 2021  12:00 PM

## 2021-10-30 NOTE — UTILIZATION REVIEW
Inpatient appropriate    Admission Status; Secondary Review Determination       Under the authority of the Utilization Management Committee, the utilization review process indicated a secondary review on the above patient. The review outcome is based on review of the medical records, discussions with staff, and applying clinical experience noted on the date of the review.     (x) Inpatient Status Appropriate - This patient's medical care is consistent with medical management for inpatient care and reasonable inpatient medical practice.     RATIONALE FOR DETERMINATION   81-year-old male with a history of hypertension, coronary artery disease, congestive heart failure, atrial fibrillation with recent coronary angiogram and stent placement October 25, 2021 presents with dark stools.  Patient is on aspirin and prasugrel for stent restenosis.  In addition he was also on Eliquis.  Now patient presented with GI bleed.  Patient will undergo EGD today.  He will continue with IV PPI.  Patient has complicated coronary artery disease history with stent restenosis and atrial fibrillation which required him to be on multiple anticoagulation that make him at high risk for bleeding.  Anticipate patient will remain hospitalized beyond 48-hours.  He will need to resume antiplatelet as soon as possible but also need to be close monitor for bleeding.     At the time of admission with the information available to the attending physician more than 2 nights Hospital complex care was anticipated, based on patient risk of adverse outcome if treated as outpatient and complex care required. Inpatient admission is appropriate based on the Medicare guidelines.     The information on this document is developed by the utilization review team in order for the business office to ensure compliance. This only denotes the appropriateness of proper admission status and does not reflect the quality of care rendered.   The definitions of Inpatient Status  and Observation Status used in making the determination above are those provided in the CMS Coverage Manual, Chapter 1 and Chapter 6, section 70.4.   Sincerely,   Eliezer Salomon MD  Utilization Review  Physician Advisor  [facility].

## 2021-10-30 NOTE — CONSULTS
[facility] Consultation     Red Sutherland  [address] [facility] [address]  81 year old male     Admission Date/Time: 10/29/2021  Primary Care Provider: Haresh Corona     We were asked to see the patient in consultation by Dr. Rojas for evaluation of melena.    ASSESSMENT:    Red Sutherland is a 81 year old male with PMH of CAD, left heart cath (10/25/21), drug-eluting stent placement on 10/25 - on Eliquis, aspirin, and prasugrel, recent NSTEMI in 08/2021, HTN, CHF, atrial fibrillation s/p ablation, , CKD 3, DM type 2, varicose veins, recent COVID19 pneumonia 9/2021 (COVID negative on 10/23), who was admitted on 10/29/21 for melena.    1. Melena  - Started on 10/29. He is on aspirin, eliquis, and prasugrel which puts him at a higher risk of significant bleeding. Differential includes upper GI bleed from PUD vs angioectasias. He also has a frequent epistaxis which could also cause the melena.   - No recent EGD    2. Cardiac disease  - Recent Coronary angiogram with severe narrowing in LCX stent, s/p intracoronary lithotripsy and drug-eluting stent x1. Recommending aspirin 81mg indefinitely, prasugrel for at least 12 months (ideally indefinitely), and Eliquis.       RECOMMENDATIONS:  - Plan for EGD today  - NPO  - Agree with PPI therapy  - More recommendations to follow after EGD   Case discussed with Dr. Sagastume, please review MD addendum below.    Ricardo Licea PA-C  [facility]  [phone]  ________________________________________________________________________        CC: Melena/dark stools     HPI:  Red Sutherland is a 81 year old male with PMH of CAD, left heart cath (10/25/21), drug-eluting stent placement on 10/25 - on Eliquis, aspirin, and prasugrel, recent NSTEMI in 08/2021, HTN, CHF, atrial fibrillation s/p ablation, , CKD 3, DM type 2, varicose veins, recent COVID19 pneumonia 9/2021 (COVID negative on 10/23), who was admitted on 10/29/21 for  melena.    Patient reports developing several bouts of black stools that began yesterday morning. Melena has seemed to stop. Last episode was 2pm yesterday evening. He has no associated fevers, chills, nausea, vomiting, abdominal pains, diarrhea, constipation, bloody stools, syncopal episodes, SOB, or dizziness. He has no history of GI bleeding. He has had frequent bouts of epistaxis, most recently 3-4 days ago. These are often self limited. He was previously seen in the ER on 10/7 for epistaxis as well. He is not on NSAIDs. He has not had recent EGD's. Last colonoscopy was in 01/2021 noting colon polyps and diverticulosis.     In the ED: Vitals were stable. Hgb was 8.8 (hgb has been around the 8-9 range for the past 2 months), MCV 77. WBC normal. INR 1.39. BUN 34. Cr 1.41.      Per chart review, patient has been in and out of the hospital for a variety of reasons:  - 8/18-8/20: [facility] - bleeding varicose veins  - 8/24-8/25:[facility] - acute chest pain with elevated troponin, transferred to [facility]  - 8/25-8/30: [facility]  - acute on chronic CHF, bumetanide drip  - 8/30: ED visit at  - Bleeding from varicose vein (right foot)  - 9/2-9/9: [place] - Acute on chronic HFpEF, severe RV dysfunction, severe pulmonary HTN  - 9/16: [facility] - Tested positive for COVID 19, acute hypoxic respiratory failure due to COVID 19 pneumonia  - 10/07:  ED - anterior epistaxis thought to be due to anticoagulation and nasal cannula oxygen  - 10/12:  ED - COPD exacerbation given steroids and cipro. Troponin negative, EKG without acute ischemic changes, CT PE negative  - 10/23: [facility] - chest pains responded to nitroglycerin, EKG unchanged, troponin negative. Coronary angiogram with severe narrowing in LCX stent, s/p intracoronary lithotripsy and drug-eluting stent x1. Recommending aspirin 81mg indefinitely, prasugrel for at least 12 months (ideally indefinitely), and resume Eliquis.     ROS: A comprehensive ten point review  of systems was negative aside from those in mentioned in the HPI.      PAST MEDICAL HISTORY:  Patient Active Problem List    Diagnosis Date Noted     Long term current use of antithrombotics/antiplatelets 10/29/2021     Priority: Medium     S/P coronary angiogram 10/29/2021     Priority: Medium     Gastrointestinal hemorrhage with melena 10/29/2021     Priority: Medium     Type 2 diabetes mellitus (H) 10/24/2021     Priority: Medium     Hypokalemia 09/08/2021     Priority: Medium     Nicotine dependence 09/08/2021     Priority: Medium     Stage 3a chronic kidney disease (H) 09/08/2021     Priority: Medium     NSTEMI (non-ST elevated myocardial infarction) (H) 08/24/2021     Priority: Medium     Lactic acid acidosis 08/23/2021     Priority: Medium     Renal insufficiency 08/23/2021     Priority: Medium     Anemia due to blood loss, acute 08/23/2021     Priority: Medium     Pain of right lower leg 08/23/2021     Priority: Medium     Chest pain, unspecified type 07/11/2021     Priority: Medium     Peripheral vascular disease (H) 02/05/2021     Priority: Medium     Anemia, iron deficiency 06/01/2020     Priority: Medium     Suspected COVID-19 virus infection 04/09/2020     Priority: Medium     Acute on chronic diastolic congestive heart failure (H) 04/09/2020     Priority: Medium     Essential hypertension      Priority: Medium     Created by Conversion         Persistent Atrial Fibrillation      Priority: Medium     Feb/March 2013 dx  Mar 2013 new sotalol Rx  [id number] score of 4 (age/ HTN/ CHF/ CAD)  Rx Eliquis  9/19/16 failed CV (sotalol 120 BID)         Hypercholesteremia      Priority: Medium     Created by Conversion         Coronary artery disease involving native coronary artery without angina pectoris      Priority: Medium     Chronic bronchitis (H)      Priority: Medium     Created by Conversion         NOVA on CPAP      Priority: Medium     Using nasal CPAP with oxygen at night; he sees Dr. Lilly Mantilla at  [facility].         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 They   put him on sildenafil as nitroprusside lowered systemic BP and with that   the mean PA dropped from 54 to 49. Negative VQ at [place] Dec 1, 2017.         Chronic obstructive pulmonary disease (H) 2016     Priority: Medium     Obesity 2016     Priority: Medium     Benign neoplasm of ascending colon 10/21/2015     Priority: Medium     History of colonic polyps 10/21/2015     Priority: Medium     Diverticular disease of colon 2010     Priority: Medium     SOCIAL HISTORY:  Social History     Tobacco Use     Smoking status: Former Smoker     Packs/day: 1.50     Years: 44.00     Pack years: 66.00     Types: Cigarettes     Quit date: 1996     Years since quittin.5     Smokeless tobacco: Never Used   Substance Use Topics     Alcohol use: Yes     Alcohol/week: 1.0 standard drinks     Comment: Alcoholic Drinks/day: 1 per month     Drug use: No     FAMILY HISTORY:  Family History   Problem Relation Age of Onset     Hyperlipidemia Mother      Hypertension Mother      Heart Disease Mother      Hyperlipidemia Father      Hypertension Father      Coronary Artery Disease Father      Cerebrovascular Disease Brother      Depression Brother      No Known Problems Sister      Pulmonary Hypertension No family hx of      Congenital heart disease No family hx of      ALLERGIES:   Allergies   Allergen Reactions     Furosemide Muscle Pain (Myalgia)     Previously tolerated.  Muscle cramps     Hydrochlorothiazide Unknown     Iodinated Contrast Media [Diagnostic X-Ray Materials] Nausea     Losartan Other (See Comments)     Other reaction(s): Stomatitis, Bloody nose dry mouth and lips     Losartan-Hydrochlorothiazide [Hyzaar]      Mouth sores     Metaxalone Nausea     Metolazone Other (See  Comments)     Muscle cramps     Mometasone Other (See Comments)     Bloody nose     Penicillins Other (See Comments)     Immune-does not work for him     Rabeprazole Other (See Comments)     Mouth sores     Ramipril Other (See Comments)     Mouth sores     Shellfish Containing Products [Shellfish-Derived Products] Unknown     Other reaction(s): mouth sores, Other reaction(s): mouth sores     Methocarbamol Rash     MEDICATIONS:   Current Facility-Administered Medications   Medication     acetaminophen (TYLENOL) tablet 1,000 mg     albuterol (PROAIR HFA/PROVENTIL HFA/VENTOLIN HFA) 108 (90 Base) MCG/ACT inhaler 2 puff     amLODIPine (NORVASC) tablet 10 mg     atorvastatin (LIPITOR) tablet 80 mg     bumetanide (BUMEX) tablet 3 mg     bumetanide (BUMEX) tablet 4 mg     canagliflozin (INVOKANA) tablet 100 mg     glucose gel 15-30 g    Or     dextrose 50 % injection 25-50 mL    Or     glucagon injection 1 mg     digoxin (LANOXIN) tablet 125 mcg     fluticasone-vilanterol (BREO ELLIPTA) 200-25 MCG/INH inhaler 1 puff     insulin aspart (NovoLOG) injection (RAPID ACTING)     insulin aspart (NovoLOG) injection (RAPID ACTING)     insulin glargine (LANTUS PEN) injection 20 Units     ipratropium - albuterol 0.5 mg/2.5 mg/3 mL (DUONEB) neb solution 3 mL     irbesartan (AVAPRO) tablet 300 mg     lidocaine (LIDODERM) 5 % Patch 1 patch     metFORMIN (GLUCOPHAGE-XR) 24 hr tablet 1,000 mg     metoprolol tartrate (LOPRESSOR) tablet 50 mg     multivitamin w/minerals (THERA-VIT-M) tablet 1 tablet     nitroGLYcerin (NITROSTAT) sublingual tablet 0.4 mg     pantoprazole (PROTONIX) IV push injection 40 mg     potassium chloride ER (KLOR-CON M) CR tablet 40 mEq     rOPINIRole (REQUIP) tablet 2 mg     sodium chloride 0.9% infusion     vitamin C (ASCORBIC ACID) tablet 1,000 mg     zinc gluconate tablet 50 mg     zolpidem (AMBIEN) tablet 10 mg       PHYSICAL EXAM:   BP (!) 153/70 (BP Location: Left arm)   Pulse 72   Temp 97.7  F (36.5  C)  "(Oral)   Resp 20   Ht 1.803 m (5' 11\")   Wt 100.7 kg (222 lb)   SpO2 98%   BMI 30.96 kg/m     GEN: Alert, oriented x3, communicative and in NAD.  KEVIN: AT, anicteric, OP without erythema, exudate, or ulcers.    NECK: Supple.    LYMPH: No LAD noted.  HRT: RRR, + Murmur  LUNGS: CTA  ABD:  ND, +BS, no guarding or pain to palpation, no rebound, no HSM.  SKIN: No rash, jaundice or spider angiomata  MSKL: LE free of edema, strength 5/5 all 4 extrems  NEURO: CN grossly intact, sensation intact to light touch, toes downgoing.     ADDITIONAL DATA:   I reviewed the patient's new clinical lab test results.   Recent Labs   Lab Test 10/30/21  0515 10/30/21  0147 10/29/21  2216 10/29/21  1712 10/29/21  1712 10/26/21  0426 10/26/21  0426 10/23/21  1824 10/12/21  1429 08/24/21  1503 08/24/21  1455   WBC 6.9  --   --   --  8.3  --  7.6   < > 8.6   < >  --    HGB 8.0*  8.0* 8.4* 8.6*   < > 8.8*   < > 9.2*   < > 8.8*   < >  --    MCV 78  --   --   --  77*  --  78   < > 77*   < >  --    *  --   --   --  152  --  217   < > 340   < >  --    INR  --   --   --   --  1.39*  --   --   --  1.63*  --  1.51*    < > = values in this interval not displayed.     Recent Labs   Lab Test 10/30/21  0515 10/29/21  1712 10/26/21  0426   POTASSIUM 3.7 4.0 4.1   CHLORIDE 104 102 105   CO2 28 25 29   BUN 31* 34* 33*   ANIONGAP 10 13 7     Recent Labs   Lab Test 10/29/21  1712 10/26/21  0426 10/25/21  0455 02/23/21  1115 02/19/21  1202   ALBUMIN 3.5 3.3* 3.1*   < > 3.6   BILITOTAL 2.2* 2.6* 2.4*   < > 1.5*   ALT 14 14 16   < > 20   AST 24 27 24   < > 20   PROTEIN  --   --   --   --  100 mg/dL*    < > = values in this interval not displayed.        IMAGING:  I reviewed the patient's new imaging results.           Agree with above note and examination by Ricardo Licea PA-C  81 M with CAD s/p stent placemetn on 10/25 on effient, afib on eliqius, CKD stage III, DM, recent COVID 09/21 admitted with melena    Patient notes black stools since recent " discharge.   No NSAID use, takes asa 81 mg,   Recent colonoscopy with polypectomy 01/21.  Physical exam reveals 81 M A and Ox3, NAD, chest- irregular, Pulm/ABD exam normal.   A/P   Melena- suspect upper GI source, especially given recent colonoscopy. At risk for bleeding given dual anticoagulation   Will plan EGD today  Keep NPO  Continue IV PPI     Sheng Sagastume M.D.  [facility]  [phone]- business phone number (for scheduling)

## 2021-10-30 NOTE — PRE-PROCEDURE
GENERAL PRE-PROCEDURE:   Procedure:  Esophagogastroduodenoscopy   Date/Time:  10/30/2021 11:17 AM    Verbal consent obtained?: Yes    Written consent obtained?: Yes    Risks and benefits: Risks, benefits and alternatives were discussed    Consent given by:  Patient  Patient states understanding of procedure being performed: Yes    Patient's understanding of procedure matches consent: Yes    Procedure consent matches procedure scheduled: Yes    Expected level of sedation:  Moderate  Appropriately NPO:  Yes  Mallampati  :  Grade 3- soft palate visible, posterior pharyngeal wall not visible  Lungs:  Lungs clear with good breath sounds bilaterally  Heart:  Normal heart sounds and rate and a-fib  History & Physical reviewed:  History and physical reviewed and no updates needed  Statement of review:  I have reviewed the lab findings, diagnostic data, medications, and the plan for sedation

## 2021-10-30 NOTE — H&P
[facility]    History and Physical - Hospitalist Service       Date of Admission:  10/29/2021    Assessment & Plan        Red Sutherland is a 81 year old male with a history of hyperlipidemia, CAD, hypertension, diabetes mellitus, congestive heart failure, COPD, atrial fibrillation, coronary stent placements, left heart cath (10/25/2021),COVID-19, NSTEMI, benign neoplasm of ascending colon, colic polyps, diverticular disease of colon, stage 3 kidney disease, hypokalemia and nicotine dependence in remission who presents to the ED via walk in for evaluation of dark stools  Current anticoagulant antiplatelet regimen according to the patient's wife's list include baby aspirin Plavix daily Eliquis daily prasugrel daily and this was started earlier this week after his coronary angiograms see reports  He has never had a history of gastrointestinal hemorrhage no bleeding ulcers etc. the blackish stools and sometimes reddish stool started rather abruptly yesterday known nausea no orthostasis blood pressure and vital signs have been stable    Plan at this time is to hold the anticoagulants and antiplatelet regimen; need to review with cardiology  Gastroenterology has been consulted will monitor hemoglobin whether he needs upper and/or lower endoscopy remains to be seen  Other medications will be continued cardiovascular status currently seems to be stable         Diet: Clear Liquid Diet    DVT Prophylaxis: Low Risk/Ambulatory with no VTE prophylaxis indicated  Dixon Catheter: Not present  Central Lines: None  Code Status: Full Code      Clinically Significant Risk Factors Present on Admission             # Coagulation Defect: home medication list includes an anticoagulant medication  # Platelet Defect: home medication list includes an antiplatelet medication      Disposition Plan   Expected discharge: 3 days recommended to  once hemoglobin stable.     The patient's care was discussed with the  Bedside Nurse, Patient, Patient's Family and Emergency physician Consultant.    Tao Rojas MD  [facility]  Securely message with the [employer] Web Console (learn more here)  Text page via [employer] Paging/Directory        ______________________________________________________________________    Chief Complaint   Blood in stool darkish blackish    History is obtained from the patient    History of Present Illness   Red Sutherland is a 81 year old male with a history of hyperlipidemia, CAD, hypertension, diabetes mellitus, congestive heart failure, COPD, atrial fibrillation, coronary stent placements, left heart cath (10/25/2021),COVID-19, NSTEMI, benign neoplasm of ascending colon, colic polyps, diverticular disease of colon, stage 3 kidney disease, hypokalemia and nicotine dependence in remission who presents to the ED via walk in for evaluation of dark stools  Current anticoagulant antiplatelet regimen according to the patient's wife's list include baby aspirin Plavix daily Eliquis daily prasugrel daily and this was started earlier this week after his coronary angiograms see reports  He has never had a history of gastrointestinal hemorrhage no bleeding ulcers etc. the blackish stools and sometimes reddish stool started rather abruptly yesterday known nausea no orthostasis blood pressure and vital signs have been stable    Review of Systems    The 10 point Review of Systems is negative other than noted in the HPI or here.     Past Medical History    I have reviewed this patient's medical history and updated it with pertinent information if needed.   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 They put him on sildenafil as nitroprusside lowered systemic BP and with that the mean PA dropped from 54 to 49. Negative VQ at [place] Dec 1, 2017.     RLS (restless legs syndrome)      Sleep apnea        Past Surgical History   I have reviewed this patient's surgical history and updated it with pertinent information if needed.  Past Surgical History:   Procedure Laterality Date     BACK SURGERY      lower back     CARDIAC CATHETERIZATION  12/13/2017    Right and left at [place], mean PA 58, PCW 24 with V wave of 35, LVEDP of 18, with Nipride systemic BP, PVR and mean PA all declined     CARDIOVERSION  03/15/2013    for afib     CATARACT EXTRACTION Bilateral      CORONARY STENT PLACEMENT       CV CORONARY ANGIOGRAM N/A 10/25/2021    Procedure: Coronary Angiogram;  Surgeon: Otf Knowles MD;  Location: [facility] CATH LAB CV     CV CORONARY LITHOTRIPSY PCI N/A 10/25/2021    Procedure: CV Coronary Lithotripsy PCI;  Surgeon: Otf Knowles MD;  Location: [facility] CATH LAB CV     CV LEFT HEART CATH N/A 10/25/2021    Procedure: Left Heart Cath;  Surgeon: Otf Knowles MD;  Location: [facility] CATH LAB CV     CV PCI N/A 10/25/2021    Procedure: Percutaneous Coronary Intervention;  Surgeon: Otf Knowles MD;  Location: [facility] CATH LAB CV     IR ABDOMINAL AORTOGRAM  11/16/2012     IR MISCELLANEOUS PROCEDURE  7/20/2001     IR MISCELLANEOUS PROCEDURE  11/16/2012     OTHER SURGICAL HISTORY      [place]     SHOULDER SURGERY      reapir on right shoulder     TOTAL HIP ARTHROPLASTY Left      TOTAL KNEE ARTHROPLASTY Bilateral      WRIST SURGERY Bilateral      ZZHC COLONOSCOPY W/WO BRUSH/WASH N/A 1/14/2021    Procedure: COLONOSCOPY;  Surgeon: Mihai Harris MD;  Location: [facility];  Service: Gastroenterology       Social History   I have reviewed this patient's social history and updated  "it with pertinent information if needed.  Social History     Tobacco Use     Smoking status: Former Smoker     Packs/day: 1.50     Years: 44.00     Pack years: 66.00     Types: Cigarettes     Quit date: 1996     Years since quittin.5     Smokeless tobacco: Never Used   Substance Use Topics     Alcohol use: Yes     Alcohol/week: 1.0 standard drinks     Comment: Alcoholic Drinks/day: 1 per month     Drug use: No       Family History   I have reviewed this patient's family history and updated it with pertinent information if needed.  Family History   Problem Relation Age of Onset     Hyperlipidemia Mother      Hypertension Mother      Heart Disease Mother      Hyperlipidemia Father      Hypertension Father      Coronary Artery Disease Father      Cerebrovascular Disease Brother      Depression Brother      No Known Problems Sister      Pulmonary Hypertension No family hx of      Congenital heart disease No family hx of        Prior to Admission Medications   Prior to Admission Medications   Prescriptions Last Dose Informant Patient Reported? Taking?   ACCU-CHEK JOSE PLUS TEST STRP strips   Yes No   Sig: [ACCU-CHEK JOSE PLUS TEST STRP STRIPS] see administration instructions.   BD ULTRA-FINE MANISHA PEN NEEDLES 32 gauge x 5/32\" Ndle   Yes No   Sig: [BD ULTRA-FINE MANISHA PEN NEEDLES 32 GAUGE X 5/32\" NDLE]    Ferrous Gluconate 324 (37.5 Fe) MG TABS 10/29/2021 at AM  Yes Yes   Sig: Take 1 tablet by mouth every other day   Garlic 1000 MG CAPS 10/29/2021 at AM  Yes Yes   Sig: Take 1 capsule by mouth daily   OXYGEN-AIR DELIVERY SYSTEMS MISC   Yes No   Sig: [OXYGEN-AIR DELIVERY SYSTEMS MISC] Use As Directed.   acetaminophen (TYLENOL) 500 MG tablet Unknown at Unknown time  Yes Yes   Sig: [ACETAMINOPHEN (TYLENOL) 500 MG TABLET] Take 1,000 mg by mouth every 6 (six) hours as needed. First line of pain management. Do Not take more than 4,000 mg in 24 hours.   albuterol (PROVENTIL HFA;VENTOLIN HFA) 90 mcg/actuation inhaler " Unknown at not needed  Yes Yes   Sig: Inhale 2 puffs into the lungs every 6 hours as needed    amLODIPine (NORVASC) 10 MG tablet 10/29/2021 at AM  Yes Yes   Sig: Take 10 mg by mouth daily   apixaban (ELIQUIS) 5 mg Tab 10/29/2021 at AM  Yes Yes   Sig: [APIXABAN (ELIQUIS) 5 MG TAB] Take 1 tablet by mouth every 12 (twelve) hours.   aspirin (ASA) 81 MG chewable tablet 10/29/2021 at AM  No Yes   Sig: Take 1 tablet (81 mg) by mouth daily Starting tomorrow.   atorvastatin (LIPITOR) 80 MG tablet 10/29/2021 at AM  No Yes   Sig: Take 1 tablet (80 mg) by mouth every morning   bumetanide (BUMEX) 2 MG tablet 10/29/2021 at x2  No Yes   Si mg in am, 3 mg in afternoon   canagliflozin (INVOKANA) 100 mg Tab 10/29/2021 at AM  Yes Yes   Sig: Take 100 mg by mouth every morning    digoxin (LANOXIN) 125 mcg tablet 10/29/2021 at AM  No Yes   Sig: [DIGOXIN (LANOXIN) 125 MCG TABLET] Take 1 tablet (125 mcg total) by mouth daily.   fluticasone-vilanterol (BREO ELLIPTA) 200-25 mcg/dose DsDv inhaler 10/29/2021 at AM, will bring  Yes Yes   Sig: Inhale 1 puff into the lungs daily    insulin glargine (LANTUS PEN) 100 UNIT/ML pen 10/29/2021 at AM  No Yes   Sig: Inject 20 Units Subcutaneous 2 times daily   ipratropium - albuterol 0.5 mg/2.5 mg/3 mL (DUONEB) 0.5-2.5 (3) MG/3ML neb solution Unknown at Unknown time  Yes Yes   Sig: Take 1 vial by nebulization 4 times daily as needed for shortness of breath / dyspnea    irbesartan (AVAPRO) 300 MG tablet 10/29/2021 at AM  Yes Yes   Sig: Take 300 mg by mouth daily    lidocaine (LIDODERM) 5 % Unknown at Unknown time  Yes Yes   Sig: Place 1 patch onto the skin daily as needed    metFORMIN (GLUCOPHAGE-XR) 500 MG 24 hr tablet 10/28/2021 at PM  No Yes   Sig: Take 2 tablets (1,000 mg) by mouth daily (with dinner)   metoprolol tartrate (LOPRESSOR) 50 MG tablet 10/29/2021 at AM  Yes Yes   Sig: Take 50 mg by mouth 2 times daily (with meals)   multivitamin w/minerals (THERA-VIT-M) tablet 10/29/2021 at AM  Yes Yes    Sig: Take 1 tablet by mouth daily   nitroglycerin (NITROSTAT) 0.4 MG SL tablet Unknown at Unknown time  Yes Yes   Sig: [NITROGLYCERIN (NITROSTAT) 0.4 MG SL TABLET] Place 0.4 mg under the tongue every 5 (five) minutes as needed for chest pain.   omeprazole (PRILOSEC) 20 MG DR capsule 10/29/2021 at AM  Yes Yes   Sig: Take 20 mg by mouth daily   potassium chloride (K-DUR,KLOR-CON) 20 MEQ tablet 10/29/2021 at AM  Yes Yes   Sig: Take 40 mEq by mouth daily    prasugrel (EFFIENT) 10 MG TABS tablet 10/29/2021 at AM  No Yes   Sig: Take 1 tablet (10 mg) by mouth daily Do not crush or break tablet. Dose to start tomorrow.   rOPINIRole (REQUIP) 2 MG tablet 10/28/2021 at PM  Yes Yes   Sig: [ROPINIROLE (REQUIP) 2 MG TABLET] Take 2 mg by mouth at bedtime.    vitamin C (ASCORBIC ACID) 1000 MG TABS 10/29/2021 at AM  Yes Yes   Sig: Take 1,000 mg by mouth daily   zinc gluconate 50 MG tablet 10/29/2021 at AM  Yes Yes   Sig: Take 50 mg by mouth daily   zolpidem (AMBIEN) 10 mg tablet 10/28/2021 at PM  Yes Yes   Sig: [ZOLPIDEM (AMBIEN) 10 MG TABLET] Take 10 mg by mouth bedtime.      Facility-Administered Medications: None     Allergies   Allergies   Allergen Reactions     Furosemide Muscle Pain (Myalgia)     Previously tolerated.  Muscle cramps     Hydrochlorothiazide Unknown     Iodinated Contrast Media [Diagnostic X-Ray Materials] Nausea     Losartan Other (See Comments)     Other reaction(s): Stomatitis, Bloody nose dry mouth and lips     Losartan-Hydrochlorothiazide [Hyzaar]      Mouth sores     Metaxalone Nausea     Metolazone Other (See Comments)     Muscle cramps     Mometasone Other (See Comments)     Bloody nose     Penicillins Other (See Comments)     Immune-does not work for him     Rabeprazole Other (See Comments)     Mouth sores     Ramipril Other (See Comments)     Mouth sores     Shellfish Containing Products [Shellfish-Derived Products] Unknown     Other reaction(s): mouth sores, Other reaction(s): mouth sores      Methocarbamol Rash       Physical Exam   Vital Signs: Temp: 98.6  F (37  C) Temp src: Oral BP: 136/69 Pulse: 78   Resp: 26 SpO2: 100 %      Weight: 222 lbs 0 oz  Very pleasant man seen in the company of his wife he looks somewhat chronically ill  Lungs are clear  Heart regular rhythm  Abdomen obese somewhat distended chronically so nontender  Extremities negative  Neurologic negative    Data   Data reviewed today: I reviewed all medications, new labs and imaging results over the last 24 hours. I personally reviewed  Recent Labs   Lab 10/29/21  1712 10/26/21  0735 10/26/21  0426 10/25/21  0718 10/25/21  0455   WBC 8.3  --  7.6  --  7.2   HGB 8.8*  --  9.2*  --  8.8*   MCV 77*  --  78  --  78     --  217  --  211   INR 1.39*  --   --   --   --      --  141  --  142   POTASSIUM 4.0  --  4.1  --  3.6   CHLORIDE 102  --  105  --  104   CO2 25  --  29  --  29   BUN 34*  --  33*  --  25   CR 1.41*  --  1.37*  --  1.18   ANIONGAP 13  --  7  --  9   ANNMARIE 9.4  --  9.5  --  9.0   GLC 99 126* 110   < > 77   ALBUMIN 3.5  --  3.3*   < > 3.1*   PROTTOTAL 6.7  --  6.3   < > 5.9*   BILITOTAL 2.2*  --  2.6*   < > 2.4*   ALKPHOS 135*  --  126*   < > 117   ALT 14  --  14   < > 16   AST 24  --  27   < > 24    < > = values in this interval not displayed.     No results found for this or any previous visit (from the past 24 hour(s)).

## 2021-10-30 NOTE — ANESTHESIA PREPROCEDURE EVALUATION
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 They put him on sildenafil as nitroprusside lowered systemic BP and with that the mean PA dropped from 54 to 49. Negative VQ at [place] Dec 1, 2017.     RLS (restless legs syndrome)      Sleep apnea       Past Surgical History:   Procedure Laterality Date     BACK SURGERY      lower back     CARDIAC CATHETERIZATION  2017    Right and left at [place], mean PA 58, PCW 24 with V wave of 35, LVEDP of 18, with Nipride systemic BP, PVR and mean PA all declined     CARDIOVERSION  03/15/2013    for afib     CATARACT EXTRACTION Bilateral      CORONARY STENT PLACEMENT       CV CORONARY ANGIOGRAM N/A 10/25/2021    Procedure: Coronary Angiogram;  Surgeon: Otf Knowles MD;  Location: [facility] CV     CV CORONARY LITHOTRIPSY PCI N/A 10/25/2021    Procedure: CV Coronary Lithotripsy PCI;  Surgeon: Otf Knowles MD;  Location: [facility] LAB CV     CV LEFT HEART CATH N/A 10/25/2021    Procedure: Left Heart Cath;  Surgeon: Otf Knowles MD;  Location: [facility] LAB CV     CV PCI N/A 10/25/2021    Procedure: Percutaneous Coronary Intervention;  Surgeon: Otf Knowles MD;  Location: [facility] LAB CV     IR ABDOMINAL AORTOGRAM  2012     IR MISCELLANEOUS PROCEDURE  2001     IR  MISCELLANEOUS PROCEDURE  2012     OTHER SURGICAL HISTORY      [facility]     SHOULDER SURGERY      reapir on right shoulder     TOTAL HIP ARTHROPLASTY Left      TOTAL KNEE ARTHROPLASTY Bilateral      WRIST SURGERY Bilateral      ZZHC COLONOSCOPY W/WO BRUSH/WASH N/A 2021    Procedure: COLONOSCOPY;  Surgeon: Mihai Harris MD;  Location: [facility];  Service: Gastroenterology      Allergies   Allergen Reactions     Furosemide Muscle Pain (Myalgia)     Previously tolerated.  Muscle cramps     Hydrochlorothiazide Unknown     Iodinated Contrast Media [Diagnostic X-Ray Materials] Nausea     Losartan Other (See Comments)     Other reaction(s): Stomatitis, Bloody nose dry mouth and lips     Losartan-Hydrochlorothiazide [Hyzaar]      Mouth sores     Metaxalone Nausea     Metolazone Other (See Comments)     Muscle cramps     Mometasone Other (See Comments)     Bloody nose     Penicillins Other (See Comments)     Immune-does not work for him     Rabeprazole Other (See Comments)     Mouth sores     Ramipril Other (See Comments)     Mouth sores     Shellfish Containing Products [Shellfish-Derived Products] Unknown     Other reaction(s): mouth sores, Other reaction(s): mouth sores     Methocarbamol Rash      Social History     Tobacco Use     Smoking status: Former Smoker     Packs/day: 1.50     Years: 44.00     Pack years: 66.00     Types: Cigarettes     Quit date: 1996     Years since quittin.5     Smokeless tobacco: Never Used   Substance Use Topics     Alcohol use: Yes     Alcohol/week: 1.0 standard drinks     Comment: Alcoholic Drinks/day: 1 per month      Wt Readings from Last 1 Encounters:   10/29/21 100.7 kg (222 lb)        Anesthesia Evaluation   Pt has had prior anesthetic.         ROS/MED HX  ENT/Pulmonary:     (+) sleep apnea, uses CPAP, severe,  COPD, O2 dependent, during Both,     Neurologic:  - neg neurologic ROS     Cardiovascular:     (+) hypertension--CAD ---CHF pulmonary hypertension,      METS/Exercise Tolerance:     Hematologic:  - neg hematologic  ROS     Musculoskeletal:  - neg musculoskeletal ROS     GI/Hepatic:  - neg GI/hepatic ROS     Renal/Genitourinary:     (+) renal disease,     Endo:     (+) type II DM, Obesity,     Psychiatric/Substance Use:  - neg psychiatric ROS     Infectious Disease:  - neg infectious disease ROS     Malignancy:  - neg malignancy ROS     Other:            Physical Exam    Airway        Mallampati: II   TM distance: > 3 FB   Neck ROM: full   Mouth opening: > 3 cm    Respiratory Devices and Support         Dental  no notable dental history       B=Bridge, C=Chipped, L=Loose, M=Missing    Cardiovascular   cardiovascular exam normal          Pulmonary           (+) wheezes           OUTSIDE LABS:  CBC:   Lab Results   Component Value Date    WBC 6.9 10/30/2021    WBC 8.3 10/29/2021    HGB 8.8 (L) 10/30/2021    HGB 8.0 (L) 10/30/2021    HGB 8.0 (L) 10/30/2021    HCT 27.4 (L) 10/30/2021    HCT 30.2 (L) 10/29/2021     (L) 10/30/2021     10/29/2021     BMP:   Lab Results   Component Value Date     10/30/2021     10/29/2021    POTASSIUM 3.7 10/30/2021    POTASSIUM 4.0 10/29/2021    CHLORIDE 104 10/30/2021    CHLORIDE 102 10/29/2021    CO2 28 10/30/2021    CO2 25 10/29/2021    BUN 31 (H) 10/30/2021    BUN 34 (H) 10/29/2021    CR 1.30 10/30/2021    CR 1.41 (H) 10/29/2021    GLC 80 10/30/2021    GLC 96 10/29/2021     COAGS:   Lab Results   Component Value Date    PTT 40 (H) 10/29/2021    INR 1.39 (H) 10/29/2021     POC: No results found for: BGM, HCG, HCGS  HEPATIC:   Lab Results   Component Value Date    ALBUMIN 3.5 10/29/2021    PROTTOTAL 6.7 10/29/2021    ALT 14 10/29/2021    AST 24 10/29/2021    ALKPHOS 135 (H) 10/29/2021    BILITOTAL 2.2 (H) 10/29/2021     OTHER:   Lab Results   Component Value Date    LACT 1.7 08/23/2021    A1C 6.3 (H) 10/23/2021    ANNMARIE 8.5 10/30/2021    PHOS 3.3 04/25/2018    MAG 2.2 10/23/2021    TSH 2.55 05/31/2019    CRP 1.3  (H) 08/22/2021       Anesthesia Plan    ASA Status:  4, emergent       Anesthesia Type: MAC.   Induction: Intravenous.   Maintenance: TIVA.        Consents            Postoperative Care       PONV prophylaxis: Ondansetron (or other 5HT-3)     Comments:                Steve Wang MD

## 2021-10-31 ENCOUNTER — ANESTHESIA (OUTPATIENT)
Dept: SURGERY | Facility: CLINIC | Age: 81
DRG: 378 | End: 2021-10-31
Payer: COMMERCIAL

## 2021-10-31 ENCOUNTER — ANESTHESIA EVENT (OUTPATIENT)
Dept: SURGERY | Facility: CLINIC | Age: 81
DRG: 378 | End: 2021-10-31
Payer: COMMERCIAL

## 2021-10-31 LAB
ANION GAP SERPL CALCULATED.3IONS-SCNC: 13 MMOL/L (ref 5–18)
BUN SERPL-MCNC: 23 MG/DL (ref 8–28)
CALCIUM SERPL-MCNC: 9.4 MG/DL (ref 8.5–10.5)
CHLORIDE BLD-SCNC: 102 MMOL/L (ref 98–107)
CO2 SERPL-SCNC: 24 MMOL/L (ref 22–31)
COLONOSCOPY: NORMAL
CREAT SERPL-MCNC: 1.2 MG/DL (ref 0.7–1.3)
GFR SERPL CREATININE-BSD FRML MDRD: 57 ML/MIN/1.73M2
GLUCOSE BLD-MCNC: 87 MG/DL (ref 70–125)
GLUCOSE BLDC GLUCOMTR-MCNC: 131 MG/DL (ref 70–99)
GLUCOSE BLDC GLUCOMTR-MCNC: 195 MG/DL (ref 70–99)
GLUCOSE BLDC GLUCOMTR-MCNC: 78 MG/DL (ref 70–99)
GLUCOSE BLDC GLUCOMTR-MCNC: 79 MG/DL (ref 70–99)
GLUCOSE BLDC GLUCOMTR-MCNC: 89 MG/DL (ref 70–99)
HGB BLD-MCNC: 8.7 G/DL (ref 13.3–17.7)
HGB BLD-MCNC: 8.8 G/DL (ref 13.3–17.7)
HGB BLD-MCNC: 9 G/DL (ref 13.3–17.7)
MAGNESIUM SERPL-MCNC: 2.6 MG/DL (ref 1.8–2.6)
POTASSIUM BLD-SCNC: 3.7 MMOL/L (ref 3.5–5)
POTASSIUM BLD-SCNC: 3.8 MMOL/L (ref 3.5–5)
SODIUM SERPL-SCNC: 139 MMOL/L (ref 136–145)

## 2021-10-31 PROCEDURE — 250N000013 HC RX MED GY IP 250 OP 250 PS 637: Performed by: INTERNAL MEDICINE

## 2021-10-31 PROCEDURE — 250N000009 HC RX 250: Performed by: NURSE ANESTHETIST, CERTIFIED REGISTERED

## 2021-10-31 PROCEDURE — 210N000002 HC R&B HEART CARE

## 2021-10-31 PROCEDURE — 250N000011 HC RX IP 250 OP 636: Performed by: INTERNAL MEDICINE

## 2021-10-31 PROCEDURE — 0DBP8ZX EXCISION OF RECTUM, VIA NATURAL OR ARTIFICIAL OPENING ENDOSCOPIC, DIAGNOSTIC: ICD-10-PCS | Performed by: INTERNAL MEDICINE

## 2021-10-31 PROCEDURE — 84132 ASSAY OF SERUM POTASSIUM: CPT | Performed by: HOSPITALIST

## 2021-10-31 PROCEDURE — 36415 COLL VENOUS BLD VENIPUNCTURE: CPT | Performed by: INTERNAL MEDICINE

## 2021-10-31 PROCEDURE — 99233 SBSQ HOSP IP/OBS HIGH 50: CPT | Performed by: INTERNAL MEDICINE

## 2021-10-31 PROCEDURE — 272N000001 HC OR GENERAL SUPPLY STERILE: Performed by: INTERNAL MEDICINE

## 2021-10-31 PROCEDURE — 85018 HEMOGLOBIN: CPT | Performed by: INTERNAL MEDICINE

## 2021-10-31 PROCEDURE — 250N000013 HC RX MED GY IP 250 OP 250 PS 637: Performed by: PHYSICIAN ASSISTANT

## 2021-10-31 PROCEDURE — 83735 ASSAY OF MAGNESIUM: CPT | Performed by: HOSPITALIST

## 2021-10-31 PROCEDURE — 0DBL8ZX EXCISION OF TRANSVERSE COLON, VIA NATURAL OR ARTIFICIAL OPENING ENDOSCOPIC, DIAGNOSTIC: ICD-10-PCS | Performed by: INTERNAL MEDICINE

## 2021-10-31 PROCEDURE — 0DBK8ZX EXCISION OF ASCENDING COLON, VIA NATURAL OR ARTIFICIAL OPENING ENDOSCOPIC, DIAGNOSTIC: ICD-10-PCS | Performed by: INTERNAL MEDICINE

## 2021-10-31 PROCEDURE — 80048 BASIC METABOLIC PNL TOTAL CA: CPT | Performed by: INTERNAL MEDICINE

## 2021-10-31 PROCEDURE — 360N000075 HC SURGERY LEVEL 2, PER MIN: Performed by: INTERNAL MEDICINE

## 2021-10-31 PROCEDURE — C9113 INJ PANTOPRAZOLE SODIUM, VIA: HCPCS | Performed by: INTERNAL MEDICINE

## 2021-10-31 PROCEDURE — 370N000017 HC ANESTHESIA TECHNICAL FEE, PER MIN: Performed by: INTERNAL MEDICINE

## 2021-10-31 PROCEDURE — 88305 TISSUE EXAM BY PATHOLOGIST: CPT | Mod: TC | Performed by: INTERNAL MEDICINE

## 2021-10-31 PROCEDURE — 250N000012 HC RX MED GY IP 250 OP 636 PS 637: Performed by: INTERNAL MEDICINE

## 2021-10-31 PROCEDURE — 250N000011 HC RX IP 250 OP 636: Performed by: NURSE ANESTHETIST, CERTIFIED REGISTERED

## 2021-10-31 PROCEDURE — 36415 COLL VENOUS BLD VENIPUNCTURE: CPT | Performed by: HOSPITALIST

## 2021-10-31 PROCEDURE — 0D5N8ZZ DESTRUCTION OF SIGMOID COLON, VIA NATURAL OR ARTIFICIAL OPENING ENDOSCOPIC: ICD-10-PCS | Performed by: INTERNAL MEDICINE

## 2021-10-31 RX ORDER — SODIUM CHLORIDE, SODIUM LACTATE, POTASSIUM CHLORIDE, CALCIUM CHLORIDE 600; 310; 30; 20 MG/100ML; MG/100ML; MG/100ML; MG/100ML
INJECTION, SOLUTION INTRAVENOUS CONTINUOUS
Status: CANCELLED | OUTPATIENT
Start: 2021-10-31

## 2021-10-31 RX ORDER — FLUMAZENIL 0.1 MG/ML
0.2 INJECTION, SOLUTION INTRAVENOUS
Status: ACTIVE | OUTPATIENT
Start: 2021-10-31 | End: 2021-10-31

## 2021-10-31 RX ORDER — NALOXONE HYDROCHLORIDE 0.4 MG/ML
0.4 INJECTION, SOLUTION INTRAMUSCULAR; INTRAVENOUS; SUBCUTANEOUS
Status: DISCONTINUED | OUTPATIENT
Start: 2021-10-31 | End: 2021-11-01 | Stop reason: HOSPADM

## 2021-10-31 RX ORDER — PROPOFOL 10 MG/ML
INJECTION, EMULSION INTRAVENOUS CONTINUOUS PRN
Status: DISCONTINUED | OUTPATIENT
Start: 2021-10-31 | End: 2021-10-31

## 2021-10-31 RX ORDER — ONDANSETRON 2 MG/ML
4 INJECTION INTRAMUSCULAR; INTRAVENOUS EVERY 30 MIN PRN
Status: CANCELLED | OUTPATIENT
Start: 2021-10-31

## 2021-10-31 RX ORDER — LIDOCAINE 40 MG/G
CREAM TOPICAL
Status: CANCELLED | OUTPATIENT
Start: 2021-10-31

## 2021-10-31 RX ORDER — METOPROLOL TARTRATE 25 MG/1
25 TABLET, FILM COATED ORAL 2 TIMES DAILY WITH MEALS
Status: DISCONTINUED | OUTPATIENT
Start: 2021-10-31 | End: 2021-11-01 | Stop reason: HOSPADM

## 2021-10-31 RX ORDER — PROPOFOL 10 MG/ML
INJECTION, EMULSION INTRAVENOUS PRN
Status: DISCONTINUED | OUTPATIENT
Start: 2021-10-31 | End: 2021-10-31

## 2021-10-31 RX ORDER — NALOXONE HYDROCHLORIDE 0.4 MG/ML
0.2 INJECTION, SOLUTION INTRAMUSCULAR; INTRAVENOUS; SUBCUTANEOUS
Status: DISCONTINUED | OUTPATIENT
Start: 2021-10-31 | End: 2021-11-01 | Stop reason: HOSPADM

## 2021-10-31 RX ORDER — GLYCOPYRROLATE 0.2 MG/ML
INJECTION, SOLUTION INTRAMUSCULAR; INTRAVENOUS PRN
Status: DISCONTINUED | OUTPATIENT
Start: 2021-10-31 | End: 2021-10-31

## 2021-10-31 RX ORDER — ONDANSETRON 4 MG/1
4 TABLET, ORALLY DISINTEGRATING ORAL EVERY 30 MIN PRN
Status: CANCELLED | OUTPATIENT
Start: 2021-10-31

## 2021-10-31 RX ADMIN — ZOLPIDEM TARTRATE 10 MG: 10 TABLET, FILM COATED ORAL at 21:42

## 2021-10-31 RX ADMIN — BUMETANIDE 3 MG: 2 TABLET ORAL at 16:04

## 2021-10-31 RX ADMIN — PROPOFOL 150 MCG/KG/MIN: 10 INJECTION, EMULSION INTRAVENOUS at 08:54

## 2021-10-31 RX ADMIN — METOPROLOL TARTRATE 25 MG: 25 TABLET, FILM COATED ORAL at 16:04

## 2021-10-31 RX ADMIN — METOPROLOL TARTRATE 50 MG: 50 TABLET, FILM COATED ORAL at 10:36

## 2021-10-31 RX ADMIN — BUMETANIDE 4 MG: 2 TABLET ORAL at 06:12

## 2021-10-31 RX ADMIN — PANTOPRAZOLE SODIUM 40 MG: 40 INJECTION, POWDER, FOR SOLUTION INTRAVENOUS at 10:36

## 2021-10-31 RX ADMIN — PROPOFOL 50 MG: 10 INJECTION, EMULSION INTRAVENOUS at 08:54

## 2021-10-31 RX ADMIN — ROPINIROLE 2 MG: 1 TABLET, FILM COATED ORAL at 19:52

## 2021-10-31 RX ADMIN — ATORVASTATIN CALCIUM 80 MG: 40 TABLET, FILM COATED ORAL at 10:36

## 2021-10-31 RX ADMIN — GLYCOPYRROLATE 0.2 MG: 0.2 INJECTION, SOLUTION INTRAMUSCULAR; INTRAVENOUS at 09:15

## 2021-10-31 RX ADMIN — INSULIN GLARGINE 20 UNITS: 100 INJECTION, SOLUTION SUBCUTANEOUS at 19:56

## 2021-10-31 RX ADMIN — MAGNESIUM CITRATE 296 ML: 1.75 LIQUID ORAL at 04:00

## 2021-10-31 ASSESSMENT — ACTIVITIES OF DAILY LIVING (ADL)
ADLS_ACUITY_SCORE: 5
DEPENDENT_IADLS:: INDEPENDENT
ADLS_ACUITY_SCORE: 5

## 2021-10-31 ASSESSMENT — COPD QUESTIONNAIRES
CAT_SEVERITY: SEVERE
COPD: 1

## 2021-10-31 ASSESSMENT — MIFFLIN-ST. JEOR: SCORE: 1689.23

## 2021-10-31 NOTE — PLAN OF CARE
Problem: Adult Inpatient Plan of Care  Goal: Optimal Comfort and Wellbeing  Outcome: Improving     Problem: Adult Inpatient Plan of Care  Goal: Readiness for Transition of Care  Outcome: Improving     Patient is alert and oriented. Pt has no complaints of pain or SOB. Pt had a colonoscopy at 9 AM. Pt had some rectal bleeding when he arrived back on the unit. Advanced to regular diet. Pt is on 4 L nasal cannula, O2 saturations from 95%-100%. Pt expressed readiness to go home, however due to the amount of bleeding during procedure and his anticoagulant medications treatment wanted to monitor him for another night. Pt is A fib on tele. Will continue to monitor.    Bhakti Skinner RN

## 2021-10-31 NOTE — PROGRESS NOTES
Care Management Initial Consult    General Information  Assessment completed with: Patient,    Type of CM/SW Visit: Initial Assessment    Primary Care Provider verified and updated as needed: Yes   Readmission within the last 30 days: current reason for admission unrelated to previous admission   Return Category: New Diagnosis  Reason for Consult: discharge planning  Advance Care Planning: Advance Care Planning Reviewed: other (comment) (Pt report that HCD has been privided with primary clinic)          Communication Assessment  Patient's communication style: spoken language (English or Bilingual)    Hearing Difficulty or Deaf: yes   Wear Glasses or Blind: yes    Cognitive  Cognitive/Neuro/Behavioral: WDL                      Living Environment:   People in home: spouse     Current living Arrangements: house      Able to return to prior arrangements: yes  Living Arrangement Comments:  (Lives in a Town house with great family support)    Family/Social Support:  Care provided by: self, spouse/significant other, child(marichuy)  Provides care for: no one  Marital Status:   Wife, Children          Description of Support System: Supportive    Support Assessment: Adequate family and caregiver support    Current Resources:   Patient receiving home care services: No     Community Resources: DME (Home o2)  Equipment currently used at home: cane, straight, shower chair, walker, rolling, wheelchair, power  Supplies currently used at home: Oxygen Tubing/Supplies      Lifestyle & Psychosocial Needs:  Social Determinants of Health     Tobacco Use: Medium Risk     Smoking Tobacco Use: Former Smoker     Smokeless Tobacco Use: Never Used   Alcohol Use:      Frequency of Alcohol Consumption:      Average Number of Drinks:      Frequency of Binge Drinking:    Financial Resource Strain:      Difficulty of Paying Living Expenses:    Food Insecurity:      Worried About Running Out of Food in the Last Year:      Ran Out of Food in the  Last Year:    Transportation Needs:      Lack of Transportation (Medical):      Lack of Transportation (Non-Medical):    Physical Activity:      Days of Exercise per Week:      Minutes of Exercise per Session:    Stress:      Feeling of Stress :    Social Connections:      Frequency of Communication with Friends and Family:      Frequency of Social Gatherings with Friends and Family:      Attends [employer] Services:      Active Member of Clubs or Organizations:      Attends Club or Organization Meetings:      Marital Status:    Intimate Partner Violence:      Fear of Current or Ex-Partner:      Emotionally Abused:      Physically Abused:      Sexually Abused:    Depression:      PHQ-2 Score:    Housing Stability:      Unable to Pay for Housing in the Last Year:      Number of Places Lived in the Last Year:      Unstable Housing in the Last Year:        Functional Status:  Prior to admission patient needed assistance:   Dependent ADLs:: Independent  Dependent IADLs:: Independent  Assesssment of Functional Status: At functional baseline    Values/Beliefs:  Spiritual, Cultural Beliefs, Moravian Practices, Values that affect care: yes       Cultural/Moravian Practices Patient Routinely Participates In: prayer       Additional Information:  Assessed. Pt report independence at baseline. Readmitted within 30days - report that the previous admission was cardiac related and a heart stent procedure was performed. Report that he has a cane, walker and scooter at home but only use as PRN. Has a shower chair in use at all times. Report great family support. No discharge need identified at this time. Family to transport.     HAN Rehman  10/31/21

## 2021-10-31 NOTE — PROGRESS NOTES
Cardiology Progress Note    Assessment:  Primary cardiologist Dr. French  1.  Coronary artery disease.  Recent coronary intervention with in-stent restenosis.  Recommendations by the interventionalist was for indefinite aspirin and prasugrel.  He was discharged on this combination in addition to Eliquis and now presents with  GI bleed.Effient resumed yesterday after discussion with Dr Sagastume.  Cardiac medications include Effient.  Atorvastatin 80 mg daily, and amlodipine 10 mg daily Bumex 3 mg in the p.m. and 4 mg in the a.m.  Canagliflozin, digoxin 0.125 mg daily Avapro 800 mg daily metoprolol 50 mg twice daily      2.  Persistent atrial fibrillation.  Chads vascular score of 5 on Eliquis.Timing of resuming Eliquis pending.  Patient underwent colonoscopy today.  On metoprolol with some mild bradycardic response will decrease 25 mg twice daily.  Digoxin level 0.5 this admission.    3.  History of pulmonary hypertension.  Follows at the [facility].    Recent saw his pulmonologist with outline of prior evaluation for pulmonary hypertension.  WHO group 2 status post study drug infusion levosimendan.  Currently being treated medically.  His recent echocardiogram from October 24 reported normal left ventricular systolic function, flattening of the left ventricular septum, mild aortic stenosis, estimate of PA pressure of 75 mmHg plus the right atrial pressure.  Right heart filling pressures based on the note from [facility] February 2021 demonstrated severe elevation in right heart filling pressures and mild elevation in left heart filling pressures  Review of prior notes indicates that the patient was to be discharged on sildenafil 2 mg 3 times daily.  Pulmonary hypertension note from September 2, 2021 lists tadalafil as one of his medications.  He was subsequently admitted for acute on chronic heart failure with severe RV dysfunction and severe tricuspid insufficiency.  Note indicates summation has tried multiple  medications for pulmonary hypertension with minimal relief or side effect.  He indicated that he currently is receiving tadalafil.    4.  History of COPD, obstructive sleep apnea, elevated RV systolic pressure reported greater than 100.  He is on oxygen and CPAP.  Reportedly he needs oxygen only at night based on the most recent pulmonary notes.  Resting oxygen saturations 96% dropping to 92% with ambulation.    5.  GI bleed.  Appreciate input from GI.  Resumed Effient yesterday.  PALAK globin today 8.7 appears stable.  Platelet count yesterday was lower of 127.       Active Problems:    NOVA on CPAP    Essential hypertension    Persistent Atrial Fibrillation    Coronary artery disease involving native coronary artery without angina pectoris    Chronic obstructive pulmonary disease (H)    NSTEMI (non-ST elevated myocardial infarction) (H)    Type 2 diabetes mellitus (H)    Long term current use of antithrombotics/antiplatelets    S/P coronary angiogram    Gastrointestinal hemorrhage with melena      Plan:  1.  Await further input from GI regarding resumption of Eliquis.  2.  It appears the tadalafil was inadvertently discontinued October 23.  On discharge from [facility] September 2 it was still listed at 20 mg daily.  Patient believes he was still taking it when he was discharged from [facility] .  However he also notes that he took a nitroglycerin before being admitted to [facility]. He does have nitroglycerin that he is wearing around his neck.  I told him that the use of nitroglycerin with tadalafil is a potential concern because of hypotension.  May be best to try to reach his pulmonary  hypertension physician at the [facility] Dr Grullon tomorrow to further discuss.  We will hold today given combination of medicines given to him for sedation and reassess.  3.  Continue with Praugrel.  Await input regarding Eliquis.  Hold aspirin.  Monitor platelet count which was mildly low yesterday.  4.  Decrease  "metoprolol to 25 mg p.o. twice daily.  Some bradycardic tendency.  Subjective:   Red Sutherland seen in follow-up.  Chart notes reviewed.  I did review pharmacy notes from yesterday where tadalafil was not active medicine until  when it was discontinued.  Need to further investigate possibly with contacting the [facility] physician tomorrow.  He just returned from colonoscopy.  No specific complaints.    Objective:   Vital signs in last 24 hours:  VITALS: /60 (BP Location: Left arm, Patient Position: Supine)   Pulse 66   Temp 97.6  F (36.4  C) (Oral)   Resp 20   Ht 1.803 m (5' 11\")   Wt 95.2 kg (209 lb 14.4 oz)   SpO2 96%   BMI 29.28 kg/m    BMI: Body mass index is 29.28 kg/m .  Wt Readings from Last 3 Encounters:   10/31/21 95.2 kg (209 lb 14.4 oz)   10/26/21 103.2 kg (227 lb 9.6 oz)   10/21/21 107.5 kg (237 lb 1.6 oz)       Intake/Output Summary (Last 24 hours) at 10/31/2021 0604  Last data filed at 10/31/2021 0218  Gross per 24 hour   Intake 1545 ml   Output 4850 ml   Net -3305 ml       Physical Exam: Sitting comfortably in the bed in no acute distress.   Neck: No JVD noted in the upright seated position.   Lungs: clear to auscultation   COR: Irregular rhythm, 2/6 to 3/6 systolic murmur   Abd: nondistended, BS present   Extrem: Chronic venous stasis changes with 1+ edema.  Neuro: Just returned from colonoscopy but alert, no focal neurologic findings.    Cardiographics:    ECG: 10/29/2021 atrial fibrillation, ST-T changes in the inferior leads V2 through V6 possibly related to digoxin.  Similar findings on EKG from 10/26/2021 10/25/2021  Echocardiogram:     MRN: [id number]  : 1940  Study Date: 10/24/2021 10:56 AM  Age: 81 yrs  Gender: Male  Patient Location: [facility]  Reason For Study: Chest Pain  Ordering Physician: ANALY JOHNSON  Referring Physician: ANALY JOHNSON  Performed By: LH     BSA: 2.2 m2  Height: 71 in  Weight: 228 lb  HR: " 65  ______________________________________________________________________________  ______________________________________________________________________________  Interpretation Summary     Left ventricular size, wall motion and function are normal. The ejection  fraction is 60-65%.  Diastolic Doppler findings (E/E' ratio and/or other parameters) suggest left  ventricular filling pressures are increased.  Flattened septum is consistent with RV pressure/volume overload.  The right ventricle is moderate to severely dilated.  Moderately decreased right ventricular systolic function  The left atrium is moderately dilated.  The right atrium is moderate to severely dilated.  Mild valvular aortic stenosis.  The estimated pulmonary artery systolic pressure is 75 mmHg.     No previous study for comparison.  Red Sutherland  Cardiac Catheterization  Order# [id number]  Reading physician: Otf Knowles MD Ordering physician: Irlanda Moon MD Study date: 10/25/21         Patient Information     Name MRN Description   Red Sutherland [id number] 81 year old male      Physicians     Panel Physicians Referring Physician Case Authorizing Physician   Otf Knowles MD (Primary) Irlanda Moon MD William, Amila Dilusha, MD      Procedures     Coronary Angiogram   Percutaneous Coronary Intervention   CV Coronary Lithotripsy PCI   Left Heart Cath      Indications          Chest pain, unspecified type [R07.9 (ICD-10-CM)]             Conclusion          Red Sutherland is a 81 year old old male with CAD s/p prirp PCI's, most recent to mLcx w/ roto/HUNTER 2/21, severe pHTN w/ PA pressures in  range, AF, PAD  who is here for work up of crescendo angina.     - severe narrowing in Lcx stent, s/p intracoronary lithotripsy and DESx1; high-normal L-sided filling pressures  - ASA 81mg daily indefinitely, switch P2Y12 inhibitor to prasugrel given relatively early stent re-narrowing (within first year) after  60mg re-load, then 10mg daily ideally indefinitely but at least for 12 mos   - 60-70% bifurcation disease beyond the stent - medical management as the first step, PCI if has recalcitrant symptoms  - increase atorva to 80  - continue aggressive risk factor modification              Findings:  LM:no obstruction  LAD:possible mid-vessel stent vs. Ca2+, no obstruction - similar to '11 angio  Lcx:95-99% ISR in prox stent, 50% downstream disease at bifurcation  RCA:dominant, anterior, no obstruction     LVEDP:18     Access:  R Radial artery     PCI:  LMT was engaged w/ a 6F EBU 3.5 Guide catheter and the lesion in Lcx was wired w/ a Forte wire, ballooned w/ a 2.5x12mm NC Emerge at 12-20 salazar but w/ severe residual narrowing, concerning for under-expansion. In view of his labile respiratory status/inability to lay flat, we decided to forego IVUS and proceeded w/ coronary lithotripsy w/ a 3.5x12mm Shockwave balloon for 10 cycles at 4 salazar, this time achieving good expansion. Due to disease distal to the stent/haziness, we placed a 3.5x24mm Synergy EES at 11 salazar, post-dilating proximally w/ a 4.0x12mm NC Emerge at 12 salazar inflations. Final angiography showed no dissection or perforation and a BASIL 3 flow.     Closure:   Vasc Band     This is a complex modifier 22 procedure due to severe Ca2+, old stent under-expansion, need for coronary lithotripsy           Narrative     Eustace 2/2021  For the complete report, see the Order-Level Documents.     PROCEDURE TYPES   1.  HEART CATHETERIZATION - RIGHT   2.  CORONARY ANGIOGRAPHY   3.  PERCUTANEOUS CORONARY ANGIOPLASTY   4.  CORONARY ATHERECTOMY   FINAL DIAGNOSIS   1.  Severe pulmonary hypertension   2.  Severe coronary artery atherosclerosis   3.  Coronary calcification   4.  Coronary rotational atherectomy (1.5 bur) of the distal left circumflex   5.  Partially successful PTCA of distal left circumflex   PRE-PROCEDURE DIAGNOSIS   1.  Non-ST Elevation Myocardial Infarction (HCC)    HEMODYNAMICS SUMMARY   Right heart filling pressures (mRAP 17 mmHg) were severely elevated and left heart filling pressures (PCWP 18 mmHg) were mildly elevated with severely elevated pulmonary arterial pressures (PA 93/31, mean PA 52 mmHg) and elevated pulmonary vascular   resistance (PVR 4.9 MILAN). Cardiac output/index was normal (Gabrielle's CO/CI 6.9/3.1). Overall, these findings are suggestive of mostly pre-capillary but some component of post capillary pulmonary hypertension as well.   CORONARY DIAGNOSTIC SUMMARY   Coronary artery dominance is right.   The left main coronary artery is 20% obstructed by a tubular lesion. The distal segment is normal size, diffuse diseased.   The proximal left anterior descending artery is 20% obstructed by a discrete lesion.   The middle left anterior descending artery is 40% obstructed by a discrete lesion. The distal segment is normal size, diseased.   The proximal circumflex artery is 30% obstructed by a discrete lesion.   The distal circumflex artery is 70% obstructed by a tubular lesion and 60% obstructed by a discrete lesion. The distal segment is normal size, diseased.   The first obtuse marginal distal segment is small size, diffuse diseased.   The middle right coronary artery is 50% obstructed by a discrete lesion. The distal segment is normal size, diseased.   Mild in-stent restenosis of proximal left anterior descending stent   CORONARY INTERVENTION SUMMARY   Successful intervention of the Distal Circumflex Artery. The preintervention stenosis was 70%. The post intervention stenosis was 50%. Devices used include Atherectomy and PTCA. Perforation present.   RADIATION DOSE DATA   Procedure cumulative skin dose (mGy): 1225.14   Procedure cumulative dose area product (Gy-cm**2): 87.05   Fluoro Time (Min): 40.31   CONTRAST DOSE DATA   IOHEXOL 350 MG IODINE/ML INTRAVENOUS SOLUTION: 220mL                 Coronary Findings        Diagnostic  Dominance: Right     Left Main   The  vessel was visualized by selective angiography. There was 5% vessel disease.   Left Anterior Descending   The vessel was visualized by selective angiography. There was 10% vessel disease.   Left Circumflex   Prox Cx lesion is 95% stenosed.   Right Coronary Artery   The vessel was visualized by selective angiography. There was 10% vessel disease.        Study Result    Narrative & Impression   EXAM: CT CHEST PULMONARY EMBOLISM W CONTRAST  LOCATION: [facility]  DATE/TIME: 10/12/2021 6:34 PM     INDICATION: Shortness of breath. Recent COVID.   COMPARISON: 08/24/2021 and 07/12/2015  TECHNIQUE: CT chest pulmonary angiogram during arterial phase injection of IV contrast. Multiplanar reformats and MIP reconstructions were performed. Dose reduction techniques were used.   CONTRAST: isovue 370 75ml     FINDINGS:  ANGIOGRAM CHEST: The main pulmonary artery is enlarged at 37 mm. No pulmonary emboli. Thoracic aorta is negative for dissection.     LUNGS AND PLEURA: There are small right greater than left pleural effusions which have not changed in size. There are increased groundglass, nodular, and more confluent pulmonary opacities throughout the lungs. Emphysema is present.      MEDIASTINUM/AXILLAE: Mediastinal and hilar lymphadenopathy. The heart is mildly enlarged.      CORONARY ARTERY CALCIFICATION: Severe.     UPPER ABDOMEN: Splenomegaly. There is an ill-defined hypoattenuating lesion in the medial left hepatic lobe. These have the appearance of a simple cyst on the study from 2015. There is a subtle nodular liver contour.  Pancreas calcifications are   consistent with history of chronic pancreatitis.     MUSCULOSKELETAL: Severe right greater than left shoulder degenerative change. Sclerosis at T4-T5 with endplate changes.                                                                       IMPRESSION:  1.  No PE.  2.  Increased pulmonary opacities are consistent with history of COVID 19  pneumonia. Other processes could have a similar appearance. Pleural effusions and lymphadenopathy are likely related.  3.  Mildly enlarged heart. Mild enlargement of the main pulmonary artery can be seen with pulmonary hypertension. Severe coronary artery disease.  4.  CT appearance of hepatic cirrhosis and portal hypertension.   5.  Findings consistent with chronic pancreatitis.          Lab Results    Chemistry/lipid CBC Cardiac Enzymes/BNP/TSH/INR   Recent Labs   Lab Test 10/25/21  1529   CHOL 98   HDL 35*   LDL 53   TRIG 49     Recent Labs   Lab Test 10/25/21  1529 05/25/21  1359 09/02/20  1307   LDL 53 <5 50     Recent Labs   Lab Test 10/31/21  0352 10/30/21  1212 10/30/21  0515   NA  --   --  142   POTASSIUM  --   --  3.7   CHLORIDE  --   --  104   CO2  --   --  28   GLC 78   < > 80   BUN  --   --  31*   CR  --   --  1.30   GFRESTIMATED  --   --  52*   ANNMARIE  --   --  8.5    < > = values in this interval not displayed.     Recent Labs   Lab Test 10/30/21  0515 10/29/21  1712 10/26/21  0426   CR 1.30 1.41* 1.37*     Recent Labs   Lab Test 10/23/21  1824 07/11/21  1031 04/10/20  0534   A1C 6.3* 9.3* 10.8*          Recent Labs   Lab Test 10/30/21  2231 10/30/21  1009 10/30/21  0515   WBC  --   --  6.9   HGB 9.0*   < > 8.0*  8.0*   HCT  --   --  27.4*   MCV  --   --  78   PLT  --   --  127*    < > = values in this interval not displayed.     Recent Labs   Lab Test 10/30/21  2231 10/30/21  1009 10/30/21  0515   HGB 9.0* 8.8* 8.0*  8.0*    Recent Labs   Lab Test 10/24/21  0619 10/24/21  0200 10/23/21  1824   TROPONINI 0.03 0.03 0.03     Recent Labs   Lab Test 10/23/21  1824 10/12/21  1429 08/24/21  1503   * 381* 393*     Recent Labs   Lab Test 05/31/19  1507   TSH 2.55     Recent Labs   Lab Test 10/29/21  1712 10/12/21  1429 08/24/21  1455   INR 1.39* 1.63* 1.51*

## 2021-10-31 NOTE — PLAN OF CARE
Problem: Adult Inpatient Plan of Care  Goal: Optimal Comfort and Wellbeing  Outcome: No Change     Problem: Risk for Delirium  Goal: Improved Behavioral Control  Outcome: No Change   Pt finished Miralax prep on evening shift. Stools clearing. Mag Citrate prep at 0400.  Continue to monitor stools.  Pt with plans to have colonoscopy at 0900.  Pt denies pain.

## 2021-10-31 NOTE — ANESTHESIA PREPROCEDURE EVALUATION
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 They put him on sildenafil as nitroprusside lowered systemic BP and with that the mean PA dropped from 54 to 49. Negative VQ at [place] Dec 1, 2017.     RLS (restless legs syndrome)      Sleep apnea       Past Surgical History:   Procedure Laterality Date     BACK SURGERY      lower back     CARDIAC CATHETERIZATION  2017    Right and left at [place], mean PA 58, PCW 24 with V wave of 35, LVEDP of 18, with Nipride systemic BP, PVR and mean PA all declined     CARDIOVERSION  03/15/2013    for afib     CATARACT EXTRACTION Bilateral      CORONARY STENT PLACEMENT       CV CORONARY ANGIOGRAM N/A 10/25/2021    Procedure: Coronary Angiogram;  Surgeon: Otf Knowles MD;  Location: [facility] CV     CV CORONARY LITHOTRIPSY PCI N/A 10/25/2021    Procedure: CV Coronary Lithotripsy PCI;  Surgeon: Otf Knowles MD;  Location: [facility] LAB CV     CV LEFT HEART CATH N/A 10/25/2021    Procedure: Left Heart Cath;  Surgeon: Otf Knowles MD;  Location: [facility] LAB CV     CV PCI N/A 10/25/2021    Procedure: Percutaneous Coronary Intervention;  Surgeon: Otf Knowles MD;  Location: [facility] LAB CV     IR ABDOMINAL AORTOGRAM  2012     IR MISCELLANEOUS PROCEDURE  2001     IR  MISCELLANEOUS PROCEDURE  2012     OTHER SURGICAL HISTORY      mynor     SHOULDER SURGERY      reapir on right shoulder     TOTAL HIP ARTHROPLASTY Left      TOTAL KNEE ARTHROPLASTY Bilateral      WRIST SURGERY Bilateral      ZZHC COLONOSCOPY W/WO BRUSH/WASH N/A 2021    Procedure: COLONOSCOPY;  Surgeon: Mihai Harris MD;  Location: [facility];  Service: Gastroenterology      Allergies   Allergen Reactions     Furosemide Muscle Pain (Myalgia)     Previously tolerated.  Muscle cramps     Hydrochlorothiazide Unknown     Iodinated Contrast Media [Diagnostic X-Ray Materials] Nausea     Losartan Other (See Comments)     Other reaction(s): Stomatitis, Bloody nose dry mouth and lips     Losartan-Hydrochlorothiazide [Hyzaar]      Mouth sores     Metaxalone Nausea     Metolazone Other (See Comments)     Muscle cramps     Mometasone Other (See Comments)     Bloody nose     Penicillins Other (See Comments)     Immune-does not work for him     Rabeprazole Other (See Comments)     Mouth sores     Ramipril Other (See Comments)     Mouth sores     Shellfish Containing Products [Shellfish-Derived Products] Unknown     Other reaction(s): mouth sores, Other reaction(s): mouth sores     Methocarbamol Rash      Social History     Tobacco Use     Smoking status: Former Smoker     Packs/day: 1.50     Years: 44.00     Pack years: 66.00     Types: Cigarettes     Quit date: 1996     Years since quittin.5     Smokeless tobacco: Never Used   Substance Use Topics     Alcohol use: Yes     Alcohol/week: 1.0 standard drinks     Comment: Alcoholic Drinks/day: 1 per month      Wt Readings from Last 1 Encounters:   10/31/21 95.2 kg (209 lb 14.4 oz)        Anesthesia Evaluation   Pt has had prior anesthetic. Type: MAC.    No history of anesthetic complications       ROS/MED HX  ENT/Pulmonary:     (+) sleep apnea, uses CPAP, severe,  COPD, O2 dependent, during Both,     Neurologic:  - neg neurologic ROS     Cardiovascular:      (+) hypertension--CAD ---CHF pulmonary hypertension,     METS/Exercise Tolerance:     Hematologic:  - neg hematologic  ROS     Musculoskeletal:  - neg musculoskeletal ROS     GI/Hepatic:  - neg GI/hepatic ROS     Renal/Genitourinary:     (+) renal disease,     Endo:     (+) type II DM, Obesity,     Psychiatric/Substance Use:  - neg psychiatric ROS     Infectious Disease:  - neg infectious disease ROS     Malignancy:  - neg malignancy ROS     Other:            Physical Exam    Airway        Mallampati: II   TM distance: > 3 FB   Neck ROM: full   Mouth opening: > 3 cm    Respiratory Devices and Support         Dental  no notable dental history       B=Bridge, C=Chipped, L=Loose, M=Missing    Cardiovascular   cardiovascular exam normal          Pulmonary           (+) wheezes           OUTSIDE LABS:  CBC:   Lab Results   Component Value Date    WBC 6.9 10/30/2021    WBC 8.3 10/29/2021    HGB 9.0 (L) 10/30/2021    HGB 8.8 (L) 10/30/2021    HCT 27.4 (L) 10/30/2021    HCT 30.2 (L) 10/29/2021     (L) 10/30/2021     10/29/2021     BMP:   Lab Results   Component Value Date     10/30/2021     10/29/2021    POTASSIUM 3.7 10/30/2021    POTASSIUM 4.0 10/29/2021    CHLORIDE 104 10/30/2021    CHLORIDE 102 10/29/2021    CO2 28 10/30/2021    CO2 25 10/29/2021    BUN 31 (H) 10/30/2021    BUN 34 (H) 10/29/2021    CR 1.30 10/30/2021    CR 1.41 (H) 10/29/2021    GLC 79 10/31/2021    GLC 78 10/31/2021     COAGS:   Lab Results   Component Value Date    PTT 40 (H) 10/29/2021    INR 1.39 (H) 10/29/2021     POC: No results found for: BGM, HCG, HCGS  HEPATIC:   Lab Results   Component Value Date    ALBUMIN 3.5 10/29/2021    PROTTOTAL 6.7 10/29/2021    ALT 14 10/29/2021    AST 24 10/29/2021    ALKPHOS 135 (H) 10/29/2021    BILITOTAL 2.2 (H) 10/29/2021     OTHER:   Lab Results   Component Value Date    LACT 1.7 08/23/2021    A1C 6.3 (H) 10/23/2021    ANNMARIE 8.5 10/30/2021    PHOS 3.3 04/25/2018    MAG 2.2 10/23/2021     TSH 2.55 05/31/2019    CRP 1.3 (H) 08/22/2021       Anesthesia Plan    ASA Status:  4, emergent       Anesthesia Type: MAC.   Induction: Intravenous.   Maintenance: TIVA.        Consents            Postoperative Care       PONV prophylaxis: Ondansetron (or other 5HT-3)     Comments:                    Steve Wang MD

## 2021-10-31 NOTE — PRE-PROCEDURE
GENERAL PRE-PROCEDURE:   Procedure:  Colonoscopy   Date/Time:  10/31/2021 7:24 AM    Verbal consent obtained?: Yes    Written consent obtained?: Yes    Risks and benefits: Risks, benefits and alternatives were discussed    Consent given by:  Patient  Patient states understanding of procedure being performed: Yes    Patient's understanding of procedure matches consent: Yes    Procedure consent matches procedure scheduled: Yes    Expected level of sedation:  Moderate  Appropriately NPO:  Yes  ASA Class:  3  Mallampati  :  Grade 3- soft palate visible, posterior pharyngeal wall not visible  Lungs:  Lungs clear with good breath sounds bilaterally  Heart:  Normal heart sounds and rate and a-fib  History & Physical reviewed:  History and physical reviewed and no updates needed  Statement of review:  I have reviewed the lab findings, diagnostic data, medications, and the plan for sedation

## 2021-10-31 NOTE — PROGRESS NOTES
Pt finished bowel prep around 2100. Pt having frequent bowel movements and they are beginning to turn clear. Pt alert and oriented and able to verbalize needs. A-fib on tele with HR 60-70's.

## 2021-10-31 NOTE — ANESTHESIA POSTPROCEDURE EVALUATION
Patient: Red Sutherland    Procedure: Procedure(s):  COLONOSCOPY WITH POLYPECTOMY       Diagnosis:Gastrointestinal hemorrhage with melena [K92.1]  Diagnosis Additional Information: No value filed.    Anesthesia Type:  MAC    Note:  Disposition: Inpatient   Postop Pain Control: Uneventful            Sign Out: Well controlled pain   PONV: No   Neuro/Psych: Uneventful            Sign Out: Acceptable/Baseline neuro status   Airway/Respiratory: Uneventful            Sign Out: Acceptable/Baseline resp. status   CV/Hemodynamics: Uneventful            Sign Out: Acceptable CV status; No obvious hypovolemia; No obvious fluid overload   Other NRE: NONE   DID A NON-ROUTINE EVENT OCCUR? No           Last vitals:  Vitals Value Taken Time   /73 10/31/21 1127   Temp 36.7  C (98  F) 10/31/21 1127   Pulse 66 10/31/21 1400   Resp 16 10/31/21 1127   SpO2 99 % 10/31/21 1127   Vitals shown include unvalidated device data.    Electronically Signed By: Steve Wang MD  October 31, 2021  2:00 PM

## 2021-10-31 NOTE — PROGRESS NOTES
Cardiology follow-up.  Discussed with GI.  Concern regarding resuming Eliquis.  Question of whether we can utilize aspirin and Effient instead.  Patient does have underlying atrial fibrillation and therefore increased risk of stroke without either warfarin or newer anticoagulant agents.  Discussed that recent bleeding with polypectomy sites Eliquis will be held over the next day and reassess.  Patient may be a good candidate for watchman device.  As noted in the GI note it was noted that he had significant bleeding with a small polypectomy sites [place] related to the triple anticoagulation therapy.  We will continue Effient for now and continue to reassess.

## 2021-10-31 NOTE — PROGRESS NOTES
[facility] Medicine PROGRESS NOTE      Identification/Summary:   81 year old male with a history of CAD, stent placements 10/25/2021 on aspirin, prasugrel, apixaban hyperlipidemia, , hypertension, diabetes mellitus, congestive heart failure, COPD, atrial fibrillation NSTEMI, benign neoplasm of ascending colon, colic polyps, diverticular disease of colon, stage 3 kidney disease, presented to the emergency room with complaints of dark stools, blood in stools found to have anemia with hemoglobin of 8 dropped from 10 on 9/21. Seen by gastroenterology and had normal EGD 10/30. resumed prasugrel 10/30, had colonoscopy, showed polyps removed with the polypectomy site bleeding, 1 AVM treated with APC, internal hemorrhoids. continue to hold aspirin, eliquis.  Cardiology to discuss more about anticoagulation tomorrow with the patient.  Discharge in 2 days.    Assessment, plan :  Melena  Coagulopathy Eliquis aspirin, prasugrel with recent coronary stents  Hemoglobin stable at 8  S/p EGD 10/30 appeared normal  Status post colonoscopy 10/31 colon polyps removed the subsequent polypectomy site bleeding, single AVM treated with APC  Resumed Effient 10/30  Hold Eliquis, aspirin  Appreciate GI, cardiology input  History of colonoscopy, polypectomy on 1/21    Acute blood loss anemia on chronic anemia  Monitor hemoglobin q8h     CAD status post recent stents on 10/25/2021  Asymptomatic.  Cardiology following    Persistent atrial fibrillation  Eliquis on hold  Continue metoprolol, digoxin    Diabetes mellitus type 2  Holding off on the basal insulin as his blood sugars are on the lower side  Insulin sliding scale as needed  Hold oral hypoglycemic agents    Hypertension  Continue home medications    History of COPD  Pulmonary hypertension  No sign of exacerbation  Continue home inhalers    History of diastolic CHF  Well compensated.      Obstructive sleep apnea  On CPAP    History of varicose vein bleeding with recent  "suturing  Follow-up with vascular surgery as outpatient    Diet: Regular Diet Adult  DVT Prophylaxis: SCDs given GI bleeding  Code Status: Full Code    Anticipated possible discharge in 2 days once planned colonoscopy milestones are met.    Interval History/Subjective:  Tolerated colonoscopy well.  No further blood in the stools.  Denies any nausea vomiting abdominal pain, shortness of breath, chest pain    Physical Exam/Objective:  Vitals I/O   BP (!) 152/73 (BP Location: Left arm)   Pulse 73   Temp 98  F (36.7  C) (Oral)   Resp 16   Ht 1.803 m (5' 11\")   Wt 95.2 kg (209 lb 14.4 oz)   SpO2 99%   BMI 29.28 kg/m     I/O last 3 completed shifts:  In: 1615 [P.O.:960; I.V.:655]  Out: 2050 [Urine:2050]     Body mass index is 29.28 kg/m .    General Appearance:  Alert, cooperative, no distress   Head:  Normocephalic, atraumatic   Eyes:  PERRL    Throat:  mucosa; moist   Neck: No JVD, thyromegaly   Lungs:   Clear to auscultation bilaterally, respirations unlabored   Chest Wall:  No tenderness or deformity   Heart:  irregular rate and rhythm, S1, S2 normal, systolic murmur   Abdomen:   Soft, non tender, non distended, bowel sounds present, no guarding or rigidity   Extremities: No edema, no joint swelling   Skin: Skin color, texture, turgor normal, no rashes or lesions   Neurologic: Alert and oriented X 3, Moves all 4 extremities       Medications:   Personally Reviewed.    amLODIPine  10 mg Oral Daily     atorvastatin  80 mg Oral QAM     bumetanide  3 mg Oral Daily at 4 pm     bumetanide  4 mg Oral QAM AC     digoxin  125 mcg Oral Daily     fluticasone-vilanterol  1 puff Inhalation Daily     insulin aspart  1-7 Units Subcutaneous TID AC     insulin aspart  1-5 Units Subcutaneous At Bedtime     insulin glargine  20 Units Subcutaneous BID     irbesartan  300 mg Oral Daily     metoprolol tartrate  25 mg Oral BID w/meals     multivitamin w/minerals  1 tablet Oral Daily     pantoprazole (PROTONIX) IV  40 mg Intravenous " Daily with breakfast     potassium chloride ER  40 mEq Oral Daily     prasugrel  10 mg Oral Daily     rOPINIRole  2 mg Oral At Bedtime     vitamin C  1,000 mg Oral Daily     zinc gluconate  50 mg Oral Daily     zolpidem  10 mg Oral At Bedtime       Data reviewed today: I personally reviewed all new medications, labs, imaging/diagnostics reports over the past 24 hours. Pertinent findings include    Labs:  Most Recent 3 CBC's:  Recent Labs   Lab Test 10/31/21  1416 10/31/21  0713 10/30/21  2231 10/30/21  1009 10/30/21  0515 10/29/21  2216 10/29/21  1712 10/26/21  0426 10/26/21  0426   WBC  --   --   --   --  6.9  --  8.3  --  7.6   HGB 9.0* 8.7* 9.0*   < > 8.0*  8.0*   < > 8.8*   < > 9.2*   MCV  --   --   --   --  78  --  77*  --  78   PLT  --   --   --   --  127*  --  152  --  217    < > = values in this interval not displayed.     Most Recent 3 BMP's:  Recent Labs   Lab Test 10/31/21  1141 10/31/21  0713 10/31/21  0601 10/30/21  1212 10/30/21  0515 10/29/21  2213 10/29/21  1712   NA  --  139  --   --  142  --  140   POTASSIUM  --  3.7  --   --  3.7  --  4.0   CHLORIDE  --  102  --   --  104  --  102   CO2  --  24  --   --  28  --  25   BUN  --  23  --   --  31*  --  34*   CR  --  1.20  --   --  1.30  --  1.41*   ANIONGAP  --  13  --   --  10  --  13   ANNMARIE  --  9.4  --   --  8.5  --  9.4   GLC 89 87 79   < > 80   < > 99    < > = values in this interval not displayed.     Most Recent 2 LFT's:  Recent Labs   Lab Test 10/29/21  1712 10/26/21  0426   AST 24 27   ALT 14 14   ALKPHOS 135* 126*   BILITOTAL 2.2* 2.6*     Most Recent 3 INR's:  Recent Labs   Lab Test 10/29/21  1712 10/12/21  1429 08/24/21  1455   INR 1.39* 1.63* 1.51*     Most Recent 3 Troponin's:No lab results found.    Imaging:   No results found for this or any previous visit (from the past 24 hour(s)).      Paty Rosales MD  Hospitalist  [facility]

## 2021-10-31 NOTE — ANESTHESIA CARE TRANSFER NOTE
Patient: Red Sutherland    Procedure: Procedure(s):  COLONOSCOPY WITH POLYPECTOMY       Diagnosis: Gastrointestinal hemorrhage with melena [K92.1]  Diagnosis Additional Information: No value filed.    Anesthesia Type:   MAC     Note:    Oropharynx: oropharynx clear of all foreign objects  Level of Consciousness: awake  Oxygen Supplementation: nasal cannula  Level of Supplemental Oxygen (L/min / FiO2): 2  Independent Airway: airway patency satisfactory and stable  Dentition: dentition unchanged  Vital Signs Stable: post-procedure vital signs reviewed and stable  Report to RN Given: handoff report given  Patient transferred to: Telemetry/Step Down Unit    Handoff Report: Identifed the Patient, Identified the Reponsible Provider, Reviewed the pertinent medical history, Discussed the surgical course, Reviewed Intra-OP anesthesia mangement and issues during anesthesia, Set expectations for post-procedure period and Allowed opportunity for questions and acknowledgement of understanding      Vitals:  Vitals Value Taken Time   /64 10/31/21 1006   Temp 36.5  C (97.7  F) 10/31/21 1006   Pulse 64 10/31/21 1006   Resp 16 10/31/21 1006   SpO2 100 % 10/31/21 1006       Electronically Signed By: JOSE ANTONIO Arriaga CRNA  October 31, 2021  10:07 AM

## 2021-10-31 NOTE — PROGRESS NOTES
Pt does not want to use his cpap at this time because he is getting up to go to the bathroom frequently

## 2021-10-31 NOTE — PROGRESS NOTES
Brief GI Note:    Patient underwent colonoscopy today, 1 AVM seen s/p APC. 3 Polyps seen and removed, it was noted that he had significant bleeding with the small polypectomy sites likely related to being on triple anticoag therapy.     I discussed briefly with Dr. Wills (cardiology). Patient needs to be on aspirin and effient for cardiac stent. Pt likely will need to resume Eliquis for his atrial fibrillation. This is somewhat complicated with concerns for GI bleed and epistaxis. Risks/benefits of anticoagulation will likely need to be an ongoing conversation.    For now, given the recent bleeding with polypectomy sites, we will hold Eliquis for at least another day and reassess how the patient does. If patient has recurrent GI bleeds, Cardiology can discuss if patient is a candidate for Watchman device.     GI will continue to follow.     Ricardo Licea PA-C  [facility]  [phone]

## 2021-10-31 NOTE — PLAN OF CARE
Problem: Adult Inpatient Plan of Care  Goal: Readiness for Transition of Care  Outcome: Improving     Patient A/O x4. Pt has no complaints of pain or SOB. Pt remains of 4L nasal cannula and O2 sats are 96-99%. Pt had an upper endoscopy this shift and will have a colonoscopy tomorrow morning. Pt remains on clear liquid diet. Pt began drinking colon cleanse prep at 1815. Pt is A fib on tele. Will continue to monitor.     Bhakti Skinner RN

## 2021-11-01 VITALS
HEART RATE: 63 BPM | OXYGEN SATURATION: 98 % | WEIGHT: 212.4 LBS | RESPIRATION RATE: 18 BRPM | SYSTOLIC BLOOD PRESSURE: 104 MMHG | TEMPERATURE: 97.1 F | DIASTOLIC BLOOD PRESSURE: 57 MMHG | HEIGHT: 71 IN | BODY MASS INDEX: 29.73 KG/M2

## 2021-11-01 LAB
GLUCOSE BLDC GLUCOMTR-MCNC: 100 MG/DL (ref 70–99)
GLUCOSE BLDC GLUCOMTR-MCNC: 202 MG/DL (ref 70–99)
HGB BLD-MCNC: 8.1 G/DL (ref 13.3–17.7)
HGB BLD-MCNC: 8.6 G/DL (ref 13.3–17.7)

## 2021-11-01 PROCEDURE — 250N000013 HC RX MED GY IP 250 OP 250 PS 637: Performed by: INTERNAL MEDICINE

## 2021-11-01 PROCEDURE — 250N000012 HC RX MED GY IP 250 OP 636 PS 637: Performed by: INTERNAL MEDICINE

## 2021-11-01 PROCEDURE — 99232 SBSQ HOSP IP/OBS MODERATE 35: CPT | Performed by: INTERNAL MEDICINE

## 2021-11-01 PROCEDURE — 85018 HEMOGLOBIN: CPT | Performed by: INTERNAL MEDICINE

## 2021-11-01 PROCEDURE — C9113 INJ PANTOPRAZOLE SODIUM, VIA: HCPCS | Performed by: INTERNAL MEDICINE

## 2021-11-01 PROCEDURE — 36415 COLL VENOUS BLD VENIPUNCTURE: CPT | Performed by: INTERNAL MEDICINE

## 2021-11-01 PROCEDURE — 99239 HOSP IP/OBS DSCHRG MGMT >30: CPT | Performed by: FAMILY MEDICINE

## 2021-11-01 PROCEDURE — 250N000011 HC RX IP 250 OP 636: Performed by: INTERNAL MEDICINE

## 2021-11-01 RX ORDER — METOPROLOL TARTRATE 25 MG/1
25 TABLET, FILM COATED ORAL 2 TIMES DAILY WITH MEALS
Qty: 60 TABLET | Refills: 3 | Status: SHIPPED | OUTPATIENT
Start: 2021-11-01

## 2021-11-01 RX ADMIN — OXYCODONE HYDROCHLORIDE AND ACETAMINOPHEN 1000 MG: 500 TABLET ORAL at 08:44

## 2021-11-01 RX ADMIN — POTASSIUM CHLORIDE 40 MEQ: 1500 TABLET, EXTENDED RELEASE ORAL at 08:43

## 2021-11-01 RX ADMIN — PANTOPRAZOLE SODIUM 40 MG: 40 INJECTION, POWDER, FOR SOLUTION INTRAVENOUS at 08:43

## 2021-11-01 RX ADMIN — DIGOXIN 125 MCG: 0.12 TABLET ORAL at 08:45

## 2021-11-01 RX ADMIN — Medication 50 MG: at 08:45

## 2021-11-01 RX ADMIN — ATORVASTATIN CALCIUM 80 MG: 40 TABLET, FILM COATED ORAL at 06:34

## 2021-11-01 RX ADMIN — IRBESARTAN 300 MG: 150 TABLET ORAL at 08:45

## 2021-11-01 RX ADMIN — BUMETANIDE 4 MG: 2 TABLET ORAL at 06:34

## 2021-11-01 RX ADMIN — MULTIPLE VITAMINS W/ MINERALS TAB 1 TABLET: TAB at 08:45

## 2021-11-01 RX ADMIN — INSULIN ASPART 2 UNITS: 100 INJECTION, SOLUTION INTRAVENOUS; SUBCUTANEOUS at 12:46

## 2021-11-01 RX ADMIN — INSULIN GLARGINE 20 UNITS: 100 INJECTION, SOLUTION SUBCUTANEOUS at 08:43

## 2021-11-01 RX ADMIN — AMLODIPINE BESYLATE 10 MG: 10 TABLET ORAL at 08:44

## 2021-11-01 RX ADMIN — PRASUGREL 10 MG: 10 TABLET, FILM COATED ORAL at 08:44

## 2021-11-01 RX ADMIN — FLUTICASONE FUROATE AND VILANTEROL TRIFENATATE 1 PUFF: 200; 25 POWDER RESPIRATORY (INHALATION) at 11:48

## 2021-11-01 RX ADMIN — METOPROLOL TARTRATE 25 MG: 25 TABLET, FILM COATED ORAL at 08:44

## 2021-11-01 ASSESSMENT — ACTIVITIES OF DAILY LIVING (ADL)
ADLS_ACUITY_SCORE: 5

## 2021-11-01 ASSESSMENT — MIFFLIN-ST. JEOR: SCORE: 1700.59

## 2021-11-01 NOTE — PLAN OF CARE
Problem: Heart Failure Comorbidity  Goal: Maintenance of Heart Failure Symptom Control  Outcome: Improving     Problem: Hypertension Comorbidity  Goal: Blood Pressure in Desired Range  Outcome: Improving     Problem: Obstructive Sleep Apnea Risk or Actual (Comorbidity Management)  Goal: Unobstructed Breathing During Sleep  Outcome: Improving   CPAP at HS

## 2021-11-01 NOTE — PROGRESS NOTES
Cardiology Progress Note    Assessment/Plan:  1. Coronary artery disease, status post recent percutaneous coronary intervention for in-stent restenosis. Patient discharged on combination of Effient and aspirin in addition to Eliquis for his atrial fibrillation. Developed lower GI bleed prompting this admission. Was initially off all antiplatelet and anticoagulant therapy and now on Effient alone with stable hemoglobin. Awaiting GI recommendations as to when we can restart baby aspirin and Eliquis.  2. Persistent atrial fibrillation, rate controlled with combination of digoxin and metoprolol. Again patient had been on Eliquis anticoagulation to minimize risks of thromboembolic events. He would be a good candidate for a Watchman device implant. He is interested in learning more about it. Recommend follow-up in our A. fib clinic post discharge.  3.  Lower GI bleed, possibly due to polyps.  He is now status post polypectomy.  4.  COPD/pulmonary hypertension, Who group 2.  Patient followed at [facility].    Primary cardiologist: Terry Frenhc MD    Subjective:  Patient reports no complaints.  Has not had a bowel movement and thus no obvious lower GI bleeding.  Anxious to go home.      amLODIPine  10 mg Oral Daily     atorvastatin  80 mg Oral QAM     bumetanide  3 mg Oral Daily at 4 pm     bumetanide  4 mg Oral QAM AC     digoxin  125 mcg Oral Daily     fluticasone-vilanterol  1 puff Inhalation Daily     insulin aspart  1-7 Units Subcutaneous TID AC     insulin aspart  1-5 Units Subcutaneous At Bedtime     insulin glargine  20 Units Subcutaneous BID     irbesartan  300 mg Oral Daily     metoprolol tartrate  25 mg Oral BID w/meals     multivitamin w/minerals  1 tablet Oral Daily     pantoprazole (PROTONIX) IV  40 mg Intravenous Daily with breakfast     potassium chloride ER  40 mEq Oral Daily     prasugrel  10 mg Oral Daily     rOPINIRole  2 mg Oral At Bedtime     vitamin C  1,000 mg Oral Daily     zinc gluconate  50  mg Oral Daily     zolpidem  10 mg Oral At Bedtime         Objective:   Vital signs in last 24 hours:  Temp:  [97.1  F (36.2  C)-98.3  F (36.8  C)] 97.1  F (36.2  C)  Pulse:  [63-80] 63  Resp:  [16-20] 18  BP: (104-167)/(57-80) 104/57  SpO2:  [97 %-99 %] 98 %  Weight:        Review of Systems:  Negative    Physical Exam:  General appearance: alert, cooperative, no distress   Head: Normocephalic, without obvious abnormality, atraumatic  Neck: no JVD   Lungs: clear to auscultation bilaterally   Heart: Irregularly irregular rhythm.  S1, S2 normal.  No murmur or gallop  Extremities: No peripheral edema bilaterally    Cardiographics (personally reviewed):   Telemetry demonstrates atrial fibrillation.  Single 9 beat run of ventricular tachycardia noted.    Imaging (personally reviewed):   No new cardiac imaging    Lab Results (personally reviewed):     Recent Labs   Lab Test 10/25/21  1529   CHOL 98   HDL 35*   LDL 53   TRIG 49     Recent Labs   Lab Test 10/25/21  1529 05/25/21  1359 09/02/20  1307   LDL 53 <5 50     Recent Labs   Lab Test 11/01/21  1146 11/01/21  0616 10/31/21  2014 10/31/21  1141 10/31/21  0713   NA  --   --   --   --  139   POTASSIUM  --   --  3.8   < > 3.7   CHLORIDE  --   --   --   --  102   CO2  --   --   --   --  24   *   < >  --    < > 87   BUN  --   --   --   --  23   CR  --   --   --   --  1.20   GFRESTIMATED  --   --   --   --  57*   ANNMARIE  --   --   --   --  9.4    < > = values in this interval not displayed.     Recent Labs   Lab Test 10/31/21  0713 10/30/21  0515 10/29/21  1712   CR 1.20 1.30 1.41*     Recent Labs   Lab Test 10/23/21  1824 07/11/21  1031 04/10/20  0534   A1C 6.3* 9.3* 10.8*          Recent Labs   Lab Test 11/01/21  0552 10/30/21  1009 10/30/21  0515   WBC  --   --  6.9   HGB 8.6*   < > 8.0*  8.0*   HCT  --   --  27.4*   MCV  --   --  78   PLT  --   --  127*    < > = values in this interval not displayed.     Recent Labs   Lab Test 11/01/21  0552 10/31/21  6784  10/31/21  1416   HGB 8.6* 8.8* 9.0*    Recent Labs   Lab Test 10/24/21  0619 10/24/21  0200 10/23/21  1824   TROPONINI 0.03 0.03 0.03     Recent Labs   Lab Test 10/23/21  1824 10/12/21  1429 08/24/21  1503   * 381* 393*     Recent Labs   Lab Test 05/31/19  1507   TSH 2.55     Recent Labs   Lab Test 10/29/21  1712 10/12/21  1429 08/24/21  1455   INR 1.39* 1.63* 1.51*          Yadi Ruano MD

## 2021-11-01 NOTE — PROGRESS NOTES
"  GASTROENTEROLOGY PROGRESS NOTE     SUBJECTIVE   No bm since colonoscopy 10/31/21. Hemoglobin is stable.     Currently on Effient. Previously on Eliquis, ASA, and Prasugrel.      OBJECTIVE     Vitals Blood pressure 104/57, pulse 63, temperature 97.1  F (36.2  C), temperature source Oral, resp. rate 18, height 1.803 m (5' 11\"), weight 96.3 kg (212 lb 6.5 oz), SpO2 98 %.          Physical Exam   General: awake, alert, responds appropriately    Cardiovascular: S1S2, no edema    Chest: lungs are clear     Abdomen: +bs, soft, not tender    Neurologic: grossly intact        LABORATORY    ELECTROLYTE PANEL   Recent Labs   Lab 11/01/21  1146 11/01/21  0616 10/31/21  2014 10/31/21  1951 10/31/21  1141 10/31/21  0713 10/30/21  1212 10/30/21  0515 10/29/21  2213 10/29/21  1712   NA  --   --   --   --   --  139  --  142  --  140   POTASSIUM  --   --  3.8  --   --  3.7  --  3.7  --  4.0   CHLORIDE  --   --   --   --   --  102  --  104  --  102   CO2  --   --   --   --   --  24  --  28  --  25   * 100*  --  195*   < > 87   < > 80   < > 99   CR  --   --   --   --   --  1.20  --  1.30  --  1.41*   BUN  --   --   --   --   --  23  --  31*  --  34*    < > = values in this interval not displayed.      HEMATOLOGY PANEL   Recent Labs   Lab 11/01/21  0552 10/31/21  2152 10/31/21  1416 10/30/21  1009 10/30/21  0515 10/29/21  2216 10/29/21  1712 10/26/21  0426 10/26/21  0426   HGB 8.6* 8.8* 9.0*   < > 8.0*  8.0*   < > 8.8*   < > 9.2*   MCV  --   --   --   --  78  --  77*  --  78   WBC  --   --   --   --  6.9  --  8.3  --  7.6   PLT  --   --   --   --  127*  --  152  --  217   INR  --   --   --   --   --   --  1.39*  --   --     < > = values in this interval not displayed.      LIVER AND PANCREAS PANEL   Recent Labs   Lab 10/29/21  1712 10/26/21  0426   AST 24 27   ALT 14 14   ALKPHOS 135* 126*   BILITOTAL 2.2* 2.6*     EGD 10/30/21  Findings:        The examined esophagus was normal.        The entire examined stomach was normal. "        The examined duodenum was normal.   Impression:          - Normal esophagus.                        - Normal stomach.                        - Normal examined duodenum.                        - No specimens collected.   Recommendation:      - Return patient to hospital [facility] for ongoing care.                        - Perform a colonoscopy tomorrow.   Sheng Sagastume MD   Colonoscopy 10/31/21  Findings:        Two sessile polyps were found in the transverse colon and ascending        colon. The polyps were 10 to 13 mm in size. These polyps were removed        with a cold snare. Resection and retrieval were complete. To prevent        bleeding after the polypectomy, three hemostatic clips were successfully        placed. There was no bleeding at the end of the procedure.        A 5 mm polyp was found in the rectum. The polyp was sessile. The polyp        was removed with a cold snare. Resection and retrieval were complete.        Despite the small polypectomy site, this polyp had significant bleeding,        two hemostatic clips were successfully placed. There was no bleeding at        the end of the procedure.        A single small angioectasia with minimal bleeding was found in the        sigmoid colon. Coagulation for hemostasis using argon beam at 0.5        liters/minute and 60 jimenez was successful.        Many small-mouthed diverticula were found in the sigmoid colon.        Internal hemorrhoids were found during retroflexion. The hemorrhoids        were moderate.   Impression:          One small AVM, treated with APC                        Patient had significant bleeding with small polypectomy                        sites. Even small lesions may bleed given triple                        anticoagulation (aspirin, effient, eliquis).                        - Two 10 to 13 mm polyps in the transverse colon and in                        the ascending colon, removed with a cold snare. Resected                         and retrieved. Clips were placed.                        - One 5 mm polyp in the rectum, removed with a cold                        snare. Resected and retrieved. Clips were placed.                        - A single non-bleeding colonic angioectasia. Treated                        with argon beam coagulation.                        - Diverticulosis in the sigmoid colon.                        - Internal hemorrhoids.   Recommendation:      - Return patient to hospital [facility] for ongoing care.                        - Resume regular diet.                                                                                       Sheng Sagastume MD       IMAGING STUDIES        I have reviewed the current diagnostic and laboratory tests.              IMPRESSION   GI Bleed with acute on chronic anemia-- This 80 yo male with history of CAD, HTN, Atrial fibrillation, CKD-3, type 2 diabetes and Covid-19 9/2021 presented with melena in the setting of taking Aspirin, Eliquis, and Prasugrel. Upper endoscopy 10/30/21 was unremarkable. Colonoscopy 10/31/21 revealed diverticulosis, one small AVM treated with APC, internal hemorrhoids, and three polyps removed. There was significant bleeding from polypectomy sites (clips placed), likely related to being on triple anticoagulation.   At this point, hemoglobin is stable and no further signs of gi bleeding.     Atrial fibrillation and CAD-- NSTEMI 8/2021 and subsequent HUNTER placement 10/25/21 (in-stent restenosis), on Aspirin, Prasugrel, and Eliquis at admission. Currently on Effient alone.  History of pulmonary htn  History of COPD         PLAN   No current signs of gi bleeding.   Given risk of in-stent restenosis, would not object to resuming Aspirin/Eliquis in addition to Effient.   If possible, would recommend first resuming Eliquis and then Aspirin in one week if Hgb remains stable. On the other hand, if deemed too great of risk for stroke/in-stent restenosis, cardiology may resume triple  drug regimen (Eliquis, Effient, and Aspirin), but recommend closely monitoring for gi bleed/follow Hgb.          Sharyn Rose PA-C  Thank you for the opportunity to participate in the care of this patient.   Please feel free to call me with any questions or concerns.  Phone number [phone].

## 2021-11-01 NOTE — DISCHARGE SUMMARY
[facility] MEDICINE  DISCHARGE SUMMARY     Primary Care Physician: Haresh Corona  Admission Date: 10/29/2021   Discharge Provider: Eliezer Salomon MD Discharge Date: 11/1/2021   Diet:   Active Diet and Nourishment Order   Procedures     Regular Diet Adult     Diet       Code Status: Full Code   Activity: DCACTIVITY: Activity as tolerated        Condition at Discharge: Stable     REASON FOR PRESENTATION(See Admission Note for Details)   Patient was admitted for blood in stool    PRINCIPAL & ACTIVE DISCHARGE DIAGNOSES        Gastrointestinal hemorrhage with melena due to Small AVM and Internal Hemorroids and current use of Antithrombotic/Antiplatelets    NOVA on CPAP    Essential hypertension    Persistent Atrial Fibrillation    Coronary artery disease involving native coronary artery without angina pectoris    Chronic obstructive pulmonary disease (H)    NSTEMI (non-ST elevated myocardial infarction) (H)    Type 2 diabetes mellitus (H)    Long term current use of antithrombotics/antiplatelets    S/P coronary angiogram         PENDING LABS     Unresulted Labs Ordered in the Past 30 Days of this Admission     Date and Time Order Name Status Description    10/31/2021  8:57 AM Surgical Pathology Exam In process             PROCEDURES ( this hospitalization only)      Procedure(s):  COLONOSCOPY WITH POLYPECTOMY    RECOMMENDATIONS TO OUTPATIENT PROVIDER FOR F/U VISIT     Follow-up Appointments     Follow-up and recommended labs and tests       Follow-up with primary provider in 3 days recheck of hemoglobin.  Follow-up with cardiology atrial fibrillation clinic             Additional follow-up instructions/to-do's for PCP: If no evidence of bleeding and hemoglobin stable, patient can resume Eliquis.    DISPOSITION     Home    SUMMARY OF HOSPITAL COURSE:    81 year old male with a history of CAD, stent placements 10/25/2021 on aspirin, prasugrel, apixaban, hyperlipidemia, ,  hypertension, diabetes mellitus, congestive heart failure, COPD, atrial fibrillation, NSTEMI, benign neoplasm of ascending colon, colic polyps, diverticular disease of colon, stage 3 kidney disease, presented to the emergency room with complaints of dark stools, blood in stools found to have anemia with hemoglobin of 8 dropped from 10 on 9/21. Seen by gastroenterology and had normal EGD 10/30. resumed prasugrel 10/30, had colonoscopy, showed polyps removed with the polypectomy site bleeding, 1 AVM treated with APC, internal hemorrhoids.   On the day of discharge I discussed with cardiology after GI recommend patient can go back on aspirin and Eliquis with close monitor for bleeding.  At this time patient has no sign of bleeding, therefore patient is to resume aspirin.  However he will need to have hemoglobin rechecked in the clinic in 3 days.  If no sign of bleeding and hemoglobin stable patient can resume Eliquis.  Patient is to follow-up with A. fib clinic to discuss about watchman device implant.      Discharge Medications with Med changes:     Current Discharge Medication List      CONTINUE these medications which have CHANGED    Details   metoprolol tartrate (LOPRESSOR) 25 MG tablet Take 1 tablet (25 mg) by mouth 2 times daily (with meals)  Qty: 60 tablet, Refills: 3    Associated Diagnoses: Permanent atrial fibrillation (H)         CONTINUE these medications which have NOT CHANGED    Details   acetaminophen (TYLENOL) 500 MG tablet [ACETAMINOPHEN (TYLENOL) 500 MG TABLET] Take 1,000 mg by mouth every 6 (six) hours as needed. First line of pain management. Do Not take more than 4,000 mg in 24 hours.      albuterol (PROVENTIL HFA;VENTOLIN HFA) 90 mcg/actuation inhaler Inhale 2 puffs into the lungs every 6 hours as needed       amLODIPine (NORVASC) 10 MG tablet Take 10 mg by mouth daily      aspirin (ASA) 81 MG chewable tablet Take 1 tablet (81 mg) by mouth daily Starting tomorrow.  Qty: 30 tablet, Refills: 3     Associated Diagnoses: NSTEMI (non-ST elevated myocardial infarction) (H)      atorvastatin (LIPITOR) 80 MG tablet Take 1 tablet (80 mg) by mouth every morning  Qty: 30 tablet, Refills: 0    Associated Diagnoses: NSTEMI (non-ST elevated myocardial infarction) (H)      bumetanide (BUMEX) 2 MG tablet 4 mg in am, 3 mg in afternoon  Qty: 60 tablet, Refills: 0    Associated Diagnoses: NSTEMI (non-ST elevated myocardial infarction) (H); Acute on chronic diastolic congestive heart failure (H)      canagliflozin (INVOKANA) 100 mg Tab Take 100 mg by mouth every morning       digoxin (LANOXIN) 125 mcg tablet [DIGOXIN (LANOXIN) 125 MCG TABLET] Take 1 tablet (125 mcg total) by mouth daily.  Qty: 90 tablet, Refills: 3    Associated Diagnoses: Persistent atrial fibrillation (H)      Ferrous Gluconate 324 (37.5 Fe) MG TABS Take 1 tablet by mouth every other day      fluticasone-vilanterol (BREO ELLIPTA) 200-25 mcg/dose DsDv inhaler Inhale 1 puff into the lungs daily       Garlic 1000 MG CAPS Take 1 capsule by mouth daily      insulin glargine (LANTUS PEN) 100 UNIT/ML pen Inject 20 Units Subcutaneous 2 times daily  Qty: 3 mL, Refills: 3    Comments: If Lantus is not covered by insurance, may substitute Basaglar at same dose and frequency.    Associated Diagnoses: Type 2 diabetes mellitus with other specified complication, with long-term current use of insulin (H)      ipratropium - albuterol 0.5 mg/2.5 mg/3 mL (DUONEB) 0.5-2.5 (3) MG/3ML neb solution Take 1 vial by nebulization 4 times daily as needed for shortness of breath / dyspnea       irbesartan (AVAPRO) 300 MG tablet Take 300 mg by mouth daily       lidocaine (LIDODERM) 5 % Place 1 patch onto the skin daily as needed       metFORMIN (GLUCOPHAGE-XR) 500 MG 24 hr tablet Take 2 tablets (1,000 mg) by mouth daily (with dinner)    Associated Diagnoses: Type 2 diabetes mellitus with other specified complication, with long-term current use of insulin (H)      multivitamin w/minerals  "(THERA-VIT-M) tablet Take 1 tablet by mouth daily      nitroglycerin (NITROSTAT) 0.4 MG SL tablet [NITROGLYCERIN (NITROSTAT) 0.4 MG SL TABLET] Place 0.4 mg under the tongue every 5 (five) minutes as needed for chest pain.      omeprazole (PRILOSEC) 20 MG DR capsule Take 20 mg by mouth daily      potassium chloride (K-DUR,KLOR-CON) 20 MEQ tablet Take 40 mEq by mouth daily       prasugrel (EFFIENT) 10 MG TABS tablet Take 1 tablet (10 mg) by mouth daily Do not crush or break tablet. Dose to start tomorrow.  Qty: 90 tablet, Refills: 3    Associated Diagnoses: NSTEMI (non-ST elevated myocardial infarction) (H)      rOPINIRole (REQUIP) 2 MG tablet [ROPINIROLE (REQUIP) 2 MG TABLET] Take 2 mg by mouth at bedtime.       vitamin C (ASCORBIC ACID) 1000 MG TABS Take 1,000 mg by mouth daily      zinc gluconate 50 MG tablet Take 50 mg by mouth daily      zolpidem (AMBIEN) 10 mg tablet [ZOLPIDEM (AMBIEN) 10 MG TABLET] Take 10 mg by mouth bedtime.      ACCU-CHEK JOSE PLUS TEST STRP strips [ACCU-CHEK JOSE PLUS TEST STRP STRIPS] see administration instructions.      BD ULTRA-FINE MANISHA PEN NEEDLES 32 gauge x 5/32\" Ndle [BD ULTRA-FINE MANISHA PEN NEEDLES 32 GAUGE X 5/32\" NDLE]   Refills: 4      OXYGEN-AIR DELIVERY SYSTEMS MISC [OXYGEN-AIR DELIVERY SYSTEMS MISC] Use As Directed.         STOP taking these medications       apixaban (ELIQUIS) 5 mg Tab Comments:   Reason for Stopping:                     Rationale for medication changes:      Holding Eliquis due to concern of GI bleed  Reduced metoprolol due to mild cardiac, per cardiology recommendation        Consults       GASTROENTEROLOGY IP CONSULT  CARDIOLOGY IP CONSULT  SOCIAL WORK IP CONSULT  PHARMACY IP CONSULT    Immunizations given this encounter     Most Recent Immunizations   Administered Date(s) Administered     COVID-19,PF,Bipin 03/06/2021     FLU 6-35 months 10/26/2012     Flu 65+ Years 09/29/2020     Flu, Unspecified 10/12/2017     HepA-Adult 06/20/2014     HepA-ped 2 " Dose 06/20/2014     Influenza (H1N1) 12/11/2009     Influenza (High Dose) 3 valent vaccine 10/12/2017     Influenza (IIV3) PF 10/07/2014     Influenza Vaccine IM > 6 months Valent IIV4 (Alfuria,Fluzone) 10/26/2012     Influenza, Quad, High Dose, Pf, 65yr+ (Fluzone HD) 10/22/2021     Pneumo Conj 13-V (2010&after) 09/18/2015     Pneumococcal 23 valent 09/16/2009     TD (ADULT, 7+) 06/20/2014     Tdap (Adacel,Boostrix) 06/06/2007           Anticoagulation Information      Recent INR results:   Recent Labs   Lab 10/29/21  1712   INR 1.39*     Warfarin doses (if applicable) or name of other anticoagulant:       SIGNIFICANT IMAGING FINDINGS     No results found for this visit on 10/29/21.    SIGNIFICANT LABORATORY FINDINGS     Most Recent 3 CBC's:Recent Labs   Lab Test 11/01/21  1356 11/01/21  0552 10/31/21  2152 10/30/21  1009 10/30/21  0515 10/29/21  2216 10/29/21  1712 10/26/21  0426 10/26/21  0426   WBC  --   --   --   --  6.9  --  8.3  --  7.6   HGB 8.1* 8.6* 8.8*   < > 8.0*  8.0*   < > 8.8*   < > 9.2*   MCV  --   --   --   --  78  --  77*  --  78   PLT  --   --   --   --  127*  --  152  --  217    < > = values in this interval not displayed.           Discharge Orders        Follow-up and recommended labs and tests     Follow-up with primary provider in 3 days recheck of hemoglobin.  Follow-up with cardiology atrial fibrillation clinic     Activity    Your activity upon discharge: activity as tolerated     Diet    Follow this diet upon discharge: Orders Placed This Encounter      Regular Diet Adult       Examination   Physical Exam   Temp:  [97.1  F (36.2  C)-98.3  F (36.8  C)] 97.1  F (36.2  C)  Pulse:  [63-80] 63  Resp:  [16-20] 18  BP: (104-167)/(57-80) 104/57  SpO2:  [97 %-99 %] 98 %  Wt Readings from Last 1 Encounters:   11/01/21 96.3 kg (212 lb 6.5 oz)     Patient sitting up in bed.  Appears comfortable.  Denies any chest pain or shortness of breath.  Wife was at bedside.      Please see EMR for more detailed  significant labs, imaging, consultant notes etc.       Prior to discharge, discussed with patient and his wife about treatments and follow-up plans.  All questions and concerns were addressed.  TT more 30 min including face to face and coordination of care    Eliezer Salomon MD  [facility]    CC:Haresh Corona

## 2021-11-01 NOTE — CONSULTS
Care Management Discharge Note    Discharge Date: 11/01/2021    Discharge Disposition:  Home    Discharge Services:  None    Discharge DME: None     Discharge Transportation: family or friend will provide    Education Provided on the Discharge Plan:  No CM needs. AVS per bedside RN.       Additional Information:  Chart reviewed. No CM needs. Family (wife) to transport.         Amelie Floyd RN

## 2021-11-02 ENCOUNTER — TELEPHONE (OUTPATIENT)
Dept: PHARMACY | Facility: PHYSICIAN GROUP | Age: 81
End: 2021-11-02
Payer: COMMERCIAL

## 2021-11-02 DIAGNOSIS — Z71.89 OTHER SPECIFIED COUNSELING: ICD-10-CM

## 2021-11-02 DIAGNOSIS — I27.22 PULMONARY HYPERTENSION DUE TO LEFT VENTRICULAR DIASTOLIC DYSFUNCTION (H): Primary | ICD-10-CM

## 2021-11-02 NOTE — TELEPHONE ENCOUNTER
Calling Dr. Kamran Grullon (pulmonary hypertension provider) at [place] (phone [phone]).  Spoke with clinic staff and they will send him a message to ask if Bill should be taking tadalafil 20 mg daily for pulmonary hypertension or if this is for ED (don't see ED in his medical chart).  Pt has been taking it daily chronically and said it's for PHTN and not ED.  The last visit note from  has tadalafil listed as a PRN for ED. This was DC'd during his 10/23 hospitalization for NSTEMI but it was unclear why.  Possibly due to contraindication between nitroglycerin and Tadalafil.  Alternative for tadalafil may be needed (a non PDE5 inhibitor)    Waiting for call back from Dr. Grullon or staff to confirm if tadalfil is for pulmonary hypertension of ED.    11/30:  Spoke with [facility], the RN there said he was seen on 9/9 and in the provider note this is for pulmonary hypertension and should still be taking it.  It does interact with the nitroglycerin, and can cause significant hypotension if taken together.  Was prescribed by Dr. Kamran Grullon at [facility].  Due to need for him to take nitroglycerin/recent NSTEMI, he may need an alternative to Tadalafil.  He has tried several meds in the past without significant benefit from the previous notes.  We could try to maximize meds to reduce chest pain (I.e. metoprolol, or consider ranexa due to little effect on BP).

## 2021-11-04 ENCOUNTER — PATIENT OUTREACH (OUTPATIENT)
Dept: CARE COORDINATION | Facility: CLINIC | Age: 81
End: 2021-11-04

## 2021-11-04 ENCOUNTER — LAB REQUISITION (OUTPATIENT)
Dept: LAB | Facility: CLINIC | Age: 81
End: 2021-11-04
Payer: COMMERCIAL

## 2021-11-04 DIAGNOSIS — K92.1 MELENA: ICD-10-CM

## 2021-11-04 DIAGNOSIS — I48.91 UNSPECIFIED ATRIAL FIBRILLATION (H): ICD-10-CM

## 2021-11-04 LAB
ANION GAP SERPL CALCULATED.3IONS-SCNC: 8 MMOL/L (ref 5–18)
BASOPHILS # BLD AUTO: 0.1 10E3/UL (ref 0–0.2)
BASOPHILS NFR BLD AUTO: 1 %
BUN SERPL-MCNC: 28 MG/DL (ref 8–28)
CALCIUM SERPL-MCNC: 9.2 MG/DL (ref 8.5–10.5)
CHLORIDE BLD-SCNC: 105 MMOL/L (ref 98–107)
CO2 SERPL-SCNC: 27 MMOL/L (ref 22–31)
CREAT SERPL-MCNC: 1.45 MG/DL (ref 0.7–1.3)
EOSINOPHIL # BLD AUTO: 0.4 10E3/UL (ref 0–0.7)
EOSINOPHIL NFR BLD AUTO: 6 %
ERYTHROCYTE [DISTWIDTH] IN BLOOD BY AUTOMATED COUNT: 20.7 % (ref 10–15)
GFR SERPL CREATININE-BSD FRML MDRD: 45 ML/MIN/1.73M2
GLUCOSE BLD-MCNC: 224 MG/DL (ref 70–125)
HCT VFR BLD AUTO: 29.6 % (ref 40–53)
HGB BLD-MCNC: 8.4 G/DL (ref 13.3–17.7)
IMM GRANULOCYTES # BLD: 0 10E3/UL
IMM GRANULOCYTES NFR BLD: 0 %
LYMPHOCYTES # BLD AUTO: 0.8 10E3/UL (ref 0.8–5.3)
LYMPHOCYTES NFR BLD AUTO: 14 %
MCH RBC QN AUTO: 22.2 PG (ref 26.5–33)
MCHC RBC AUTO-ENTMCNC: 28.4 G/DL (ref 31.5–36.5)
MCV RBC AUTO: 78 FL (ref 78–100)
MONOCYTES # BLD AUTO: 0.6 10E3/UL (ref 0–1.3)
MONOCYTES NFR BLD AUTO: 10 %
NEUTROPHILS # BLD AUTO: 4 10E3/UL (ref 1.6–8.3)
NEUTROPHILS NFR BLD AUTO: 69 %
NRBC # BLD AUTO: 0 10E3/UL
NRBC BLD AUTO-RTO: 0 /100
PLATELET # BLD AUTO: 150 10E3/UL (ref 150–450)
POTASSIUM BLD-SCNC: 4.8 MMOL/L (ref 3.5–5)
RBC # BLD AUTO: 3.78 10E6/UL (ref 4.4–5.9)
SODIUM SERPL-SCNC: 140 MMOL/L (ref 136–145)
WBC # BLD AUTO: 5.7 10E3/UL (ref 4–11)

## 2021-11-04 PROCEDURE — 85025 COMPLETE CBC W/AUTO DIFF WBC: CPT | Mod: ORL | Performed by: FAMILY MEDICINE

## 2021-11-04 PROCEDURE — 80048 BASIC METABOLIC PNL TOTAL CA: CPT | Mod: ORL | Performed by: FAMILY MEDICINE

## 2021-11-04 NOTE — PROGRESS NOTES
Clinic Care Coordination Contact  Care Team Conversations    Patient identified for care management outreach, however patient is not on a value based contract so cannot complete outreach. Will escalate to clinic staff if specific needs or resources are indicated.    Rebeca Ware [facility]  Clinic Care Coordinator  [facility]   [phone]

## 2021-11-08 LAB
PATH REPORT.ADDENDUM SPEC: NORMAL
PATH REPORT.COMMENTS IMP SPEC: NORMAL
PATH REPORT.FINAL DX SPEC: NORMAL
PATH REPORT.GROSS SPEC: NORMAL
PATH REPORT.MICROSCOPIC SPEC OTHER STN: NORMAL
PATH REPORT.RELEVANT HX SPEC: NORMAL
PHOTO IMAGE: NORMAL
SPECIMEN STATUS: NORMAL
SPECIMEN STATUS: NORMAL

## 2021-11-08 PROCEDURE — 88305 TISSUE EXAM BY PATHOLOGIST: CPT | Mod: 26 | Performed by: PATHOLOGY

## 2021-11-08 PROCEDURE — 88342 IMHCHEM/IMCYTCHM 1ST ANTB: CPT | Mod: 26 | Performed by: PATHOLOGY

## 2021-11-15 ENCOUNTER — LAB REQUISITION (OUTPATIENT)
Dept: LAB | Facility: CLINIC | Age: 81
End: 2021-11-15
Payer: COMMERCIAL

## 2021-11-15 DIAGNOSIS — D50.0 IRON DEFICIENCY ANEMIA SECONDARY TO BLOOD LOSS (CHRONIC): ICD-10-CM

## 2021-11-15 LAB
IRON SATN MFR SERPL: 7 % (ref 20–50)
IRON SERPL-MCNC: 30 UG/DL (ref 42–175)
TIBC SERPL-MCNC: 419 UG/DL (ref 313–563)
TRANSFERRIN SERPL-MCNC: 335 MG/DL (ref 212–360)

## 2021-11-15 PROCEDURE — 83540 ASSAY OF IRON: CPT | Mod: ORL | Performed by: FAMILY MEDICINE

## 2021-11-26 ENCOUNTER — LAB REQUISITION (OUTPATIENT)
Dept: LAB | Facility: CLINIC | Age: 81
End: 2021-11-26
Payer: COMMERCIAL

## 2021-11-26 DIAGNOSIS — I50.42 CHRONIC COMBINED SYSTOLIC (CONGESTIVE) AND DIASTOLIC (CONGESTIVE) HEART FAILURE (H): ICD-10-CM

## 2021-11-26 PROCEDURE — 80048 BASIC METABOLIC PNL TOTAL CA: CPT | Mod: ORL | Performed by: FAMILY MEDICINE

## 2021-11-26 PROCEDURE — 85025 COMPLETE CBC W/AUTO DIFF WBC: CPT | Mod: ORL | Performed by: FAMILY MEDICINE

## 2021-11-27 LAB
ANION GAP SERPL CALCULATED.3IONS-SCNC: 14 MMOL/L (ref 5–18)
BASOPHILS # BLD AUTO: 0.1 10E3/UL (ref 0–0.2)
BASOPHILS NFR BLD AUTO: 1 %
BUN SERPL-MCNC: 37 MG/DL (ref 8–28)
CALCIUM SERPL-MCNC: 8.4 MG/DL (ref 8.5–10.5)
CHLORIDE BLD-SCNC: 110 MMOL/L (ref 98–107)
CO2 SERPL-SCNC: 18 MMOL/L (ref 22–31)
CREAT SERPL-MCNC: 1.49 MG/DL (ref 0.7–1.3)
EOSINOPHIL # BLD AUTO: 0.2 10E3/UL (ref 0–0.7)
EOSINOPHIL NFR BLD AUTO: 3 %
ERYTHROCYTE [DISTWIDTH] IN BLOOD BY AUTOMATED COUNT: 17.5 % (ref 10–15)
GFR SERPL CREATININE-BSD FRML MDRD: 44 ML/MIN/1.73M2
GLUCOSE BLD-MCNC: 153 MG/DL (ref 70–125)
HCT VFR BLD AUTO: 28.8 % (ref 40–53)
HGB BLD-MCNC: 8.1 G/DL (ref 13.3–17.7)
IMM GRANULOCYTES # BLD: 0 10E3/UL
IMM GRANULOCYTES NFR BLD: 0 %
LYMPHOCYTES # BLD AUTO: 0.9 10E3/UL (ref 0.8–5.3)
LYMPHOCYTES NFR BLD AUTO: 12 %
MCH RBC QN AUTO: 20.9 PG (ref 26.5–33)
MCHC RBC AUTO-ENTMCNC: 28.1 G/DL (ref 31.5–36.5)
MCV RBC AUTO: 74 FL (ref 78–100)
MONOCYTES # BLD AUTO: 0.7 10E3/UL (ref 0–1.3)
MONOCYTES NFR BLD AUTO: 10 %
NEUTROPHILS # BLD AUTO: 5.3 10E3/UL (ref 1.6–8.3)
NEUTROPHILS NFR BLD AUTO: 74 %
NRBC # BLD AUTO: 0 10E3/UL
NRBC BLD AUTO-RTO: 0 /100
PLATELET # BLD AUTO: 186 10E3/UL (ref 150–450)
POTASSIUM BLD-SCNC: 4 MMOL/L (ref 3.5–5)
RBC # BLD AUTO: 3.87 10E6/UL (ref 4.4–5.9)
SODIUM SERPL-SCNC: 142 MMOL/L (ref 136–145)
WBC # BLD AUTO: 7.2 10E3/UL (ref 4–11)

## 2021-11-30 ENCOUNTER — APPOINTMENT (OUTPATIENT)
Dept: CT IMAGING | Facility: CLINIC | Age: 81
DRG: 291 | End: 2021-11-30
Attending: EMERGENCY MEDICINE
Payer: COMMERCIAL

## 2021-11-30 ENCOUNTER — HOSPITAL ENCOUNTER (INPATIENT)
Facility: CLINIC | Age: 81
LOS: 6 days | Discharge: HOME | DRG: 291 | End: 2021-12-06
Attending: EMERGENCY MEDICINE | Admitting: FAMILY MEDICINE
Payer: COMMERCIAL

## 2021-11-30 ENCOUNTER — APPOINTMENT (OUTPATIENT)
Dept: RADIOLOGY | Facility: CLINIC | Age: 81
DRG: 291 | End: 2021-11-30
Payer: COMMERCIAL

## 2021-11-30 DIAGNOSIS — E78.00 HYPERCHOLESTEREMIA: Chronic | ICD-10-CM

## 2021-11-30 DIAGNOSIS — R60.1 ANASARCA: ICD-10-CM

## 2021-11-30 DIAGNOSIS — I50.9 ACUTE ON CHRONIC CONGESTIVE HEART FAILURE, UNSPECIFIED HEART FAILURE TYPE (H): Primary | ICD-10-CM

## 2021-11-30 DIAGNOSIS — I50.33 ACUTE ON CHRONIC DIASTOLIC CONGESTIVE HEART FAILURE (H): ICD-10-CM

## 2021-11-30 LAB
ALBUMIN SERPL-MCNC: 3.3 G/DL (ref 3.5–5)
ALP SERPL-CCNC: 117 U/L (ref 45–120)
ALT SERPL W P-5'-P-CCNC: 13 U/L (ref 0–45)
ANION GAP SERPL CALCULATED.3IONS-SCNC: 9 MMOL/L (ref 5–18)
APTT PPP: 37 SECONDS (ref 22–38)
AST SERPL W P-5'-P-CCNC: 18 U/L (ref 0–40)
ATRIAL RATE - MUSE: 61 BPM
BILIRUB SERPL-MCNC: 1.9 MG/DL (ref 0–1)
BNP SERPL-MCNC: 369 PG/ML (ref 0–84)
BUN SERPL-MCNC: 39 MG/DL (ref 8–28)
CALCIUM SERPL-MCNC: 8.9 MG/DL (ref 8.5–10.5)
CHLORIDE BLD-SCNC: 108 MMOL/L (ref 98–107)
CO2 SERPL-SCNC: 23 MMOL/L (ref 22–31)
CREAT SERPL-MCNC: 1.62 MG/DL (ref 0.7–1.3)
DIASTOLIC BLOOD PRESSURE - MUSE: NORMAL MMHG
ERYTHROCYTE [DISTWIDTH] IN BLOOD BY AUTOMATED COUNT: 17.6 % (ref 10–15)
GFR SERPL CREATININE-BSD FRML MDRD: 40 ML/MIN/1.73M2
GLUCOSE BLD-MCNC: 199 MG/DL (ref 70–125)
HCT VFR BLD AUTO: 28.5 % (ref 40–53)
HGB BLD-MCNC: 7.9 G/DL (ref 13.3–17.7)
INR PPP: 1.3 (ref 0.85–1.15)
INTERPRETATION ECG - MUSE: NORMAL
MAGNESIUM SERPL-MCNC: 2.3 MG/DL (ref 1.8–2.6)
MCH RBC QN AUTO: 20.4 PG (ref 26.5–33)
MCHC RBC AUTO-ENTMCNC: 27.7 G/DL (ref 31.5–36.5)
MCV RBC AUTO: 74 FL (ref 78–100)
P AXIS - MUSE: NORMAL DEGREES
PLATELET # BLD AUTO: 211 10E3/UL (ref 150–450)
POTASSIUM BLD-SCNC: 4.4 MMOL/L (ref 3.5–5)
PR INTERVAL - MUSE: NORMAL MS
PROT SERPL-MCNC: 6.2 G/DL (ref 6–8)
QRS DURATION - MUSE: 78 MS
QT - MUSE: 370 MS
QTC - MUSE: 418 MS
R AXIS - MUSE: 107 DEGREES
RBC # BLD AUTO: 3.87 10E6/UL (ref 4.4–5.9)
SARS-COV-2 RNA RESP QL NAA+PROBE: NEGATIVE
SODIUM SERPL-SCNC: 140 MMOL/L (ref 136–145)
SYSTOLIC BLOOD PRESSURE - MUSE: NORMAL MMHG
T AXIS - MUSE: 67 DEGREES
TROPONIN I SERPL-MCNC: 0.03 NG/ML (ref 0–0.29)
VENTRICULAR RATE- MUSE: 77 BPM
WBC # BLD AUTO: 7.7 10E3/UL (ref 4–11)

## 2021-11-30 PROCEDURE — 85027 COMPLETE CBC AUTOMATED: CPT | Performed by: PHYSICIAN ASSISTANT

## 2021-11-30 PROCEDURE — 87635 SARS-COV-2 COVID-19 AMP PRB: CPT | Performed by: PHYSICIAN ASSISTANT

## 2021-11-30 PROCEDURE — 36415 COLL VENOUS BLD VENIPUNCTURE: CPT | Performed by: PHYSICIAN ASSISTANT

## 2021-11-30 PROCEDURE — 83880 ASSAY OF NATRIURETIC PEPTIDE: CPT | Performed by: PHYSICIAN ASSISTANT

## 2021-11-30 PROCEDURE — 85730 THROMBOPLASTIN TIME PARTIAL: CPT | Performed by: PHYSICIAN ASSISTANT

## 2021-11-30 PROCEDURE — 85610 PROTHROMBIN TIME: CPT | Performed by: PHYSICIAN ASSISTANT

## 2021-11-30 PROCEDURE — 120N000001 HC R&B MED SURG/OB

## 2021-11-30 PROCEDURE — 83735 ASSAY OF MAGNESIUM: CPT | Performed by: STUDENT IN AN ORGANIZED HEALTH CARE EDUCATION/TRAINING PROGRAM

## 2021-11-30 PROCEDURE — 93005 ELECTROCARDIOGRAM TRACING: CPT | Performed by: EMERGENCY MEDICINE

## 2021-11-30 PROCEDURE — 71045 X-RAY EXAM CHEST 1 VIEW: CPT

## 2021-11-30 PROCEDURE — 74176 CT ABD & PELVIS W/O CONTRAST: CPT

## 2021-11-30 PROCEDURE — 96374 THER/PROPH/DIAG INJ IV PUSH: CPT

## 2021-11-30 PROCEDURE — 82040 ASSAY OF SERUM ALBUMIN: CPT | Performed by: PHYSICIAN ASSISTANT

## 2021-11-30 PROCEDURE — 250N000011 HC RX IP 250 OP 636: Performed by: PHYSICIAN ASSISTANT

## 2021-11-30 PROCEDURE — 99285 EMERGENCY DEPT VISIT HI MDM: CPT | Mod: 25

## 2021-11-30 PROCEDURE — C9803 HOPD COVID-19 SPEC COLLECT: HCPCS

## 2021-11-30 PROCEDURE — 84484 ASSAY OF TROPONIN QUANT: CPT | Performed by: PHYSICIAN ASSISTANT

## 2021-11-30 RX ORDER — ATORVASTATIN CALCIUM 80 MG/1
80 TABLET, FILM COATED ORAL DAILY
Status: ON HOLD | COMMUNITY
End: 2021-12-06

## 2021-11-30 RX ORDER — BUMETANIDE 0.25 MG/ML
2 INJECTION INTRAMUSCULAR; INTRAVENOUS ONCE
Status: COMPLETED | OUTPATIENT
Start: 2021-11-30 | End: 2021-11-30

## 2021-11-30 RX ORDER — ONDANSETRON 2 MG/ML
4 INJECTION INTRAMUSCULAR; INTRAVENOUS EVERY 6 HOURS PRN
Status: DISCONTINUED | OUTPATIENT
Start: 2021-11-30 | End: 2021-12-06 | Stop reason: HOSPADM

## 2021-11-30 RX ORDER — ATORVASTATIN CALCIUM 40 MG/1
40 TABLET, FILM COATED ORAL DAILY
COMMUNITY
Start: 2021-11-11 | End: 2021-11-30

## 2021-11-30 RX ORDER — AMOXICILLIN 250 MG
1 CAPSULE ORAL 2 TIMES DAILY PRN
Status: DISCONTINUED | OUTPATIENT
Start: 2021-11-30 | End: 2021-12-06 | Stop reason: HOSPADM

## 2021-11-30 RX ORDER — SPIRONOLACTONE 25 MG/1
12.5 TABLET ORAL 2 TIMES DAILY
COMMUNITY
Start: 2021-11-26 | End: 2021-12-17

## 2021-11-30 RX ORDER — LIDOCAINE 40 MG/G
CREAM TOPICAL
Status: DISCONTINUED | OUTPATIENT
Start: 2021-11-30 | End: 2021-12-06 | Stop reason: HOSPADM

## 2021-11-30 RX ORDER — POLYETHYLENE GLYCOL 3350 17 G/17G
17 POWDER, FOR SOLUTION ORAL DAILY PRN
Status: DISCONTINUED | OUTPATIENT
Start: 2021-11-30 | End: 2021-12-06 | Stop reason: HOSPADM

## 2021-11-30 RX ORDER — CLOPIDOGREL BISULFATE 75 MG/1
75 TABLET ORAL DAILY
Status: ON HOLD | COMMUNITY
Start: 2021-11-19 | End: 2022-01-15

## 2021-11-30 RX ORDER — TADALAFIL 20 MG/1
20 TABLET ORAL DAILY
Status: ON HOLD | COMMUNITY
Start: 2021-11-30 | End: 2022-01-15

## 2021-11-30 RX ORDER — ONDANSETRON 4 MG/1
4 TABLET, ORALLY DISINTEGRATING ORAL EVERY 6 HOURS PRN
Status: DISCONTINUED | OUTPATIENT
Start: 2021-11-30 | End: 2021-12-06 | Stop reason: HOSPADM

## 2021-11-30 RX ORDER — AMOXICILLIN 250 MG
2 CAPSULE ORAL 2 TIMES DAILY PRN
Status: DISCONTINUED | OUTPATIENT
Start: 2021-11-30 | End: 2021-12-06 | Stop reason: HOSPADM

## 2021-11-30 RX ORDER — PROCHLORPERAZINE MALEATE 5 MG
5 TABLET ORAL EVERY 6 HOURS PRN
Status: DISCONTINUED | OUTPATIENT
Start: 2021-11-30 | End: 2021-12-06 | Stop reason: HOSPADM

## 2021-11-30 RX ORDER — PROCHLORPERAZINE 25 MG
12.5 SUPPOSITORY, RECTAL RECTAL EVERY 12 HOURS PRN
Status: DISCONTINUED | OUTPATIENT
Start: 2021-11-30 | End: 2021-12-06 | Stop reason: HOSPADM

## 2021-11-30 RX ADMIN — BUMETANIDE 2 MG: 0.25 INJECTION, SOLUTION INTRAMUSCULAR; INTRAVENOUS at 16:28

## 2021-11-30 ASSESSMENT — ACTIVITIES OF DAILY LIVING (ADL)
ADLS_ACUITY_SCORE: 9
DEPENDENT_IADLS:: INDEPENDENT

## 2021-11-30 NOTE — ED PROVIDER NOTES
ED PROVIDER NOTE    EMERGENCY DEPARTMENT ENCOUNTER      NAME: Red Sutherland  AGE: 81 year old male  YOB: 1940  MRN: [id number]  EVALUATION DATE & TIME: 11/30/2021  3:28 PM    PCP: Haresh Corona    ED PROVIDER: Madison Terry PA-C      Chief Complaint   Patient presents with     Shortness of Breath     Groin Swelling         FINAL IMPRESSION:  Shortness of breath  Weight gain  Concern for CHF exacerbation    MEDICAL DECISION MAKING:    Pertinent Labs & Imaging studies reviewed. (See chart for details)    Red Sutherland is a 81 year old male with a history of NSTEMI, type 2 diabetes, CAD, atrial fibrillation  (previously on apixaban, prasugrel, ASA) who presents with shortness of breath.  Symptoms started at least 5 days ago.  Have slowly been progressing.  4 days ago was seen at [facility] and started on a different water pill although they do not recall the name.  Symptoms just continue to worsen.  He has noted increased swelling in his legs and abdomen.  No chest pain.  No abdominal pain.  No black or bloody stools.    Here he is tachypneic appears in mild respiratory distress, sitting on the edge of the bed.  He is on his normal 4 L of nasal cannula. Crackles at lung bases.  Weight is up ~30 lbs since Nov 1.     Presentation most concerning for CHF.  Also considered pneumonia, COVID-19, ACS, metabolic imbalance, NATHALY.  Initiated work-up with labs, EKG, chest x-ray.  Belly quite distended but nontender.  Bladder scan with only 117cc so obtained CT of the abdomen pelvis.  Started on IV Bumex.  Blood pressure on the lower end so did not give nitro paste initially.    Patient care signed out to Dr. Rose pending further workup.  Anticipate admission.       At the conclusion of the encounter I discussed the results of all of the tests and the disposition. The questions were answered. The patient or family acknowledged understanding and was agreeable with the care plan.       ED  COURSE  3:50 PM  Met and evaluated patient. Discussed ED plan. PPE: Provider wore eye protection, N95 mask and gloves.   4:38 PM Patient care signed out to Dr. Rose      MEDICATIONS GIVEN IN THE EMERGENCY:  Medications   bumetanide (BUMEX) injection 2 mg (2 mg Intravenous Given 11/30/21 1628)       NEW PRESCRIPTIONS STARTED AT TODAY'S ER VISIT  New Prescriptions    No medications on file          =================================================================    HPI    Patient information was obtained from: Patient    Use of Intrepreter: N/A         Red Sutherland is a 81 year old male with a history of NSTEMI, type 2 diabetes, CAD, atrial fibrillation  (previously on apixaban, prasugrel, ASA) who presents with shortness of breath.    Shortness of breath has progressively worsened over the past several days.  He states it has been going on at least 5 days.  4 days ago was seen at [facility] and started on a different water pill although they do not recall the name.  He is still taking his Bumex, 4 mg in the morning and 3.5 mg in the afternoon. Symptoms just continue to worsen.  He has noted increased swelling in his legs and abdomen.  No chest pain.  No abdominal pain.  No black or bloody stools.    Patient was admitted from 10/29-11/1 for GI bleed with melena due to small AVM and internal hemorrhoids.  Presented with dark stools and found to have a hemoglobin of 8 -dropped from 10.  Seen by GI and had a normal EGD on 10/30.  Had colonoscopy which revealed AVM which was treated with APC.  Discussed with cardiology given his recent stent placement.  Resumed antiplatelet, ASA. Discussed restarting Eliquis after following hgb as outpatient a few days later.    Patient admitted from 10/23-10/26 with chest pain.  Angiogram revealed severe narrowing the left circumflex stent -underwent intracoronary lithotripsy and HUNTER x1      REVIEW OF SYSTEMS   Constitutional: Negative for fevers, chills.  Vision: Negative  for vision changes  HENT:  Negative for congestion, sore throat  Pulmonary: + shortness of breath  Cardiac: Negative for chest pain  GI: + nausea and abdominal distension - no pain. 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 They put him on sildenafil as nitroprusside lowered systemic BP and with that the mean PA dropped from 54 to 49. Negative VQ at [facility] Dec 1, 2017.     RLS (restless legs syndrome)      Sleep apnea        PAST SURGICAL HISTORY:  Past Surgical History:   Procedure Laterality Date     BACK SURGERY      lower back     CARDIAC CATHETERIZATION  12/13/2017    Right and left at [facility], mean PA 58, PCW 24 with V wave of 35, LVEDP of 18, with Nipride systemic BP, PVR and mean PA all declined     CARDIOVERSION  03/15/2013    for afib     CATARACT EXTRACTION Bilateral      COLONOSCOPY N/A 10/31/2021    Procedure: COLONOSCOPY WITH POLYPECTOMY;  Surgeon: Sheng Sagastume MD;  Location: [facility] OR     CORONARY STENT PLACEMENT       CV CORONARY ANGIOGRAM N/A 10/25/2021    Procedure: Coronary Angiogram;  Surgeon: Otf Knowles MD;  Location: [facility] CATH LAB CV     CV CORONARY LITHOTRIPSY PCI N/A 10/25/2021    Procedure: CV Coronary Lithotripsy PCI;  Surgeon: Otf Knowles MD;  Location: [facility] CATH LAB CV     CV LEFT HEART CATH N/A 10/25/2021  "   Procedure: Left Heart Cath;  Surgeon: Otf Knowles MD;  Location: [facility] CV     CV PCI N/A 10/25/2021    Procedure: Percutaneous Coronary Intervention;  Surgeon: Otf Knowles MD;  Location: [facility] LAB CV     ESOPHAGOSCOPY, GASTROSCOPY, DUODENOSCOPY (EGD), COMBINED N/A 10/30/2021    Procedure: ESOPHAGOGASTRODUODENOSCOPY (EGD);  Surgeon: Sheng Sagastume MD;  Location: [facility] OR     IR ABDOMINAL AORTOGRAM  11/16/2012     IR MISCELLANEOUS PROCEDURE  7/20/2001     IR MISCELLANEOUS PROCEDURE  11/16/2012     OTHER SURGICAL HISTORY      mynor     SHOULDER SURGERY      reapir on right shoulder     TOTAL HIP ARTHROPLASTY Left      TOTAL KNEE ARTHROPLASTY Bilateral      WRIST SURGERY Bilateral      ZZHC COLONOSCOPY W/WO BRUSH/WASH N/A 1/14/2021    Procedure: COLONOSCOPY;  Surgeon: Mihai Harris MD;  Location: [facility];  Service: Gastroenterology           CURRENT MEDICATIONS:    No current facility-administered medications for this encounter.    Current Outpatient Medications:      ACCU-CHEK JOSE PLUS TEST STRP strips, [ACCU-CHEK JOSE PLUS TEST STRP STRIPS] see administration instructions., Disp: , Rfl:      acetaminophen (TYLENOL) 500 MG tablet, [ACETAMINOPHEN (TYLENOL) 500 MG TABLET] Take 1,000 mg by mouth every 6 (six) hours as needed. First line of pain management. Do Not take more than 4,000 mg in 24 hours., Disp: , Rfl:      albuterol (PROVENTIL HFA;VENTOLIN HFA) 90 mcg/actuation inhaler, Inhale 2 puffs into the lungs every 6 hours as needed , Disp: , Rfl:      amLODIPine (NORVASC) 10 MG tablet, Take 10 mg by mouth daily, Disp: , Rfl:      aspirin (ASA) 81 MG chewable tablet, Take 1 tablet (81 mg) by mouth daily Starting tomorrow., Disp: 30 tablet, Rfl: 3     atorvastatin (LIPITOR) 80 MG tablet, Take 1 tablet (80 mg) by mouth every morning, Disp: 30 tablet, Rfl: 0     BD ULTRA-FINE MANISHA PEN NEEDLES 32 gauge x 5/32\" Ndle, [BD ULTRA-FINE MANISHA PEN NEEDLES 32 GAUGE X " "5/32\" NDLE] , Disp: , Rfl: 4     bumetanide (BUMEX) 2 MG tablet, 4 mg in am, 3 mg in afternoon, Disp: 60 tablet, Rfl: 0     canagliflozin (INVOKANA) 100 mg Tab, Take 100 mg by mouth every morning , Disp: , Rfl:      digoxin (LANOXIN) 125 mcg tablet, [DIGOXIN (LANOXIN) 125 MCG TABLET] Take 1 tablet (125 mcg total) by mouth daily., Disp: 90 tablet, Rfl: 3     Ferrous Gluconate 324 (37.5 Fe) MG TABS, Take 1 tablet by mouth every other day, Disp: , Rfl:      fluticasone-vilanterol (BREO ELLIPTA) 200-25 mcg/dose DsDv inhaler, Inhale 1 puff into the lungs daily , Disp: , Rfl:      Garlic 1000 MG CAPS, Take 1 capsule by mouth daily, Disp: , Rfl:      insulin glargine (LANTUS PEN) 100 UNIT/ML pen, Inject 20 Units Subcutaneous 2 times daily, Disp: 3 mL, Rfl: 3     ipratropium - albuterol 0.5 mg/2.5 mg/3 mL (DUONEB) 0.5-2.5 (3) MG/3ML neb solution, Take 1 vial by nebulization 4 times daily as needed for shortness of breath / dyspnea , Disp: , Rfl:      irbesartan (AVAPRO) 300 MG tablet, Take 300 mg by mouth daily , Disp: , Rfl:      lidocaine (LIDODERM) 5 %, Place 1 patch onto the skin daily as needed , Disp: , Rfl:      metFORMIN (GLUCOPHAGE-XR) 500 MG 24 hr tablet, Take 2 tablets (1,000 mg) by mouth daily (with dinner), Disp: , Rfl:      metoprolol tartrate (LOPRESSOR) 25 MG tablet, Take 1 tablet (25 mg) by mouth 2 times daily (with meals), Disp: 60 tablet, Rfl: 3     multivitamin w/minerals (THERA-VIT-M) tablet, Take 1 tablet by mouth daily, Disp: , Rfl:      nitroglycerin (NITROSTAT) 0.4 MG SL tablet, [NITROGLYCERIN (NITROSTAT) 0.4 MG SL TABLET] Place 0.4 mg under the tongue every 5 (five) minutes as needed for chest pain., Disp: , Rfl:      omeprazole (PRILOSEC) 20 MG DR capsule, Take 20 mg by mouth daily, Disp: , Rfl:      OXYGEN-AIR DELIVERY SYSTEMS MISC, [OXYGEN-AIR DELIVERY SYSTEMS MISC] Use As Directed., Disp: , Rfl:      potassium chloride (K-DUR,KLOR-CON) 20 MEQ tablet, Take 40 mEq by mouth daily , Disp: , Rfl: "      prasugrel (EFFIENT) 10 MG TABS tablet, Take 1 tablet (10 mg) by mouth daily Do not crush or break tablet. Dose to start tomorrow., Disp: 90 tablet, Rfl: 3     rOPINIRole (REQUIP) 2 MG tablet, [ROPINIROLE (REQUIP) 2 MG TABLET] Take 2 mg by mouth at bedtime. , Disp: , Rfl:      tadalafil (CIALIS) 20 MG tablet, Take 1 tablet (20 mg) by mouth daily, Disp: , Rfl:      vitamin C (ASCORBIC ACID) 1000 MG TABS, Take 1,000 mg by mouth daily, Disp: , Rfl:      zinc gluconate 50 MG tablet, Take 50 mg by mouth daily, Disp: , Rfl:      zolpidem (AMBIEN) 10 mg tablet, [ZOLPIDEM (AMBIEN) 10 MG TABLET] Take 10 mg by mouth bedtime., Disp: , Rfl:     ALLERGIES:  Allergies   Allergen Reactions     Furosemide Muscle Pain (Myalgia)     Previously tolerated.  Muscle cramps     Hydrochlorothiazide Unknown     Iodinated Contrast Media [Diagnostic X-Ray Materials] Nausea     Losartan Other (See Comments)     Other reaction(s): Stomatitis, Bloody nose dry mouth and lips     Losartan-Hydrochlorothiazide [Hyzaar]      Mouth sores     Metaxalone Nausea     Metolazone Other (See Comments)     Muscle cramps     Mometasone Other (See Comments)     Bloody nose     Penicillins Other (See Comments)     Immune-does not work for him     Rabeprazole Other (See Comments)     Mouth sores     Ramipril Other (See Comments)     Mouth sores     Shellfish Containing Products [Shellfish-Derived Products] Unknown     Other reaction(s): mouth sores, Other reaction(s): mouth sores     Methocarbamol Rash       FAMILY HISTORY:  Family History   Problem Relation Age of Onset     Hyperlipidemia Mother      Hypertension Mother      Heart Disease Mother      Hyperlipidemia Father      Hypertension Father      Coronary Artery Disease Father      Cerebrovascular Disease Brother      Depression Brother      No Known Problems Sister      Pulmonary Hypertension No family hx of      Congenital heart disease No family hx of        SOCIAL HISTORY:   Smoking:  Nonsmoker  Alcohol: Denies    VITALS:  Vitals:    11/30/21 1534 11/30/21 1536   BP: 115/65    Pulse: 107    Resp: 24    Temp: 97  F (36.1  C)    SpO2: 96%    Weight:  109.8 kg (242 lb)       PHYSICAL EXAM    General Appearance:  Alert, cooperative, mild distress, appears stated age  HENT: Normocephalic without obvious deformity, atraumatic. Mucous membranes moist   Eyes: Conjunctiva clear, Lids normal. No discharge.   Respiratory: On 4L NC (baseline). Crackles at bases.  Tachypneic.    Cardiovascular: Irregular rhythm. Normal cap refill. + peripheral edema  GI: Abdomen firm and distended but nontender  : No CVA tenderness  Musculoskeletal: Moving all extremities. Significant swelling of bilateral lower extremities.    Integument: Warm, dry  Neurologic: Alert and orientated x3. No focal deficits.  Psych: Normal mood and affect        LAB:  Labs Ordered and Resulted from Time of ED Arrival to Time of ED Departure   B-TYPE NATRIURETIC PEPTIDE ([facility] ONLY) - Abnormal       Result Value     (*)    CBC WITH PLATELETS - Abnormal    WBC Count 7.7      RBC Count 3.87 (*)     Hemoglobin 7.9 (*)     Hematocrit 28.5 (*)     MCV 74 (*)     MCH 20.4 (*)     MCHC 27.7 (*)     RDW 17.6 (*)     Platelet Count 211     COMPREHENSIVE METABOLIC PANEL - Abnormal    Sodium 140      Potassium 4.4      Chloride 108 (*)     Carbon Dioxide (CO2) 23      Anion Gap 9      Urea Nitrogen 39 (*)     Creatinine 1.62 (*)     Calcium 8.9      Glucose 199 (*)     Alkaline Phosphatase 117      AST 18      ALT 13      Protein Total 6.2      Albumin 3.3 (*)     Bilirubin Total 1.9 (*)     GFR Estimate 40 (*)    INR - Abnormal    INR 1.30 (*)    PARTIAL THROMBOPLASTIN TIME - Normal    aPTT 37     TROPONIN I - Normal    Troponin I 0.03     COVID-19 VIRUS (CORONAVIRUS) BY PCR       RADIOLOGY:  No orders to display       EKG:    Performed at: 1612  Impression: Accelerated junctional rhythm.  Rate of seventy-seven.  Possible right ventricular  hypertrophy.  Septal infarct, age undetermined.  Junctional rhythm has replaced atrial fibrillation from prior EKG on October 29, 2021.  Dr. Rose and I have independently reviewed and interpreted the EKG(s) documented above.        Madison Terry PA-C   Emergency Medicine       Madison Terry PA-C  11/30/21 5293

## 2021-11-30 NOTE — ED PROVIDER NOTES
Emergency Department Staff Physician Note     I had a face to face encounter with this patient seen by the Advanced Practice Provider (GEORGETTE).  I have seen, examined, and discussed the patient with the GEORGETTE and agree with their assessment and plan of management.    Relevant HPI:     Red Sutherland is a 81 year old male who presents to the Emergency Department for evaluation of shortness of breath. Shortness of breath has progressively worsened over the past several days.  He states it has been going on at least 5 days.  4 days ago was seen at [facility] and started on a different water pill although they do not recall the name.  He is still taking his Bumex, 4 mg in the morning and 3.5 mg in the afternoon. Symptoms just continue to worsen.  He has noted increased swelling in his legs and abdomen.  No chest pain.  No abdominal pain.  No black or bloody stools.    I, Chirag Watson, am serving as a scribe to document services personally performed by Dr. Zackery Rose MD, based on my observations and the provider's statements to me.   I, Dr. Zackery Rose MD, attest that Chirag Watson was acting in a scribe capacity, has observed my performance of the services and has documented them in accordance with my direction.    ED Course:  4:04 PM I received the patient report from the GEORGETTE, SETH Tanner. I agree with their assessment and plan of management, and I will see the patient.  4:16 PM I met with the patient to introduce myself, gather additional history, perform my initial exam, and discuss the plan.   5:19 PM.  BNP is moderately elevated but consistent with recent values.  Hemoglobin slightly reduced from recent values.  BUN and creatinine moderately increased as both were normal at 23 and 1.2 on October 31.  Troponin is normal.  Preliminary review of CT reveals large perihepatic fluid collection.  Awaiting for definitive read  5:50 PM.  CT imaging with evidence cirrhosis and new ascites.  However studies does  not seem to account for his significant abdominal distention.  Patient with a 30 pound weight gain since his discharge from the hospital on November 1.  Findings consistent with anasarca.  Patient has been on increased diuretics at home seemingly without improvement.  Plan will be for hospitalization and diuresis given his increasing oxygen needs and dyspnea.  6:10 PM.  Patient discussed with the resident.  Patient informed of need for hospitalization.   Brief Physical Exam:  /56   Pulse 74   Temp 97  F (36.1  C)   Resp 29   Wt 109.8 kg (242 lb)   SpO2 100%   BMI 33.75 kg/m       Constitutional:   Elderly obese male in moderate distress  EYES: Conjunctivae clear  HENT:  Atraumatic, normocephalic  Respiratory:  Mild respiratory distress, diminished breath sounds  Cardiovascular:  Normal rate, normal rhythm, no murmurs, capillary refill normal.   GI:  Soft, markedly distended, nontender, no palpable masses, no rebound, no guarding   Musculoskeletal:  Marked lower extremity edema.  No cyanosis.  Range of motion major extremities intact.  Integument: Warm, Dry, No erythema, No rash.   Neurologic:  Alert & oriented, no focal deficits noted, ambulatory  Psych: Affect normal, Mood normal.     Impression / ED Plan:  Red Sutherland is a 81 year old male presents to the ED for evaluation of abdominal distention, weight gain and decreased urination.  Patient recently hospitalized for GI bleed.  Complicated by anticoagulation.  This had been stabilized patient was dismissed.  Since then he has been taking high-dose diuretics but reports decreased urination recently and abdominal distention.  On initial exam primary concern was of obstructive nephropathy.  However bladder scan revealed only 17 cc post void.  Given the marked changes we will proceed with CT imaging without contrast.  Baseline blood work also being obtained.    Please refer to the Advanced Practice Provider's note for further details and ED course.  Agree with history, plan and disposition.   Results for orders placed or performed during the hospital encounter of 11/30/21   XR Chest Port 1 View     Status: None    Narrative    EXAM: XR CHEST PORT 1 VIEW  LOCATION: [facility]  DATE/TIME: 11/30/2021 4:28 PM    INDICATION: shortness of breath, concern for chf exacerbation  COMPARISON: 10/23/2021 and older studies.      Impression    IMPRESSION: Large body habitus. There is cardiomegaly, pulmonary vascular congestion and mild interstitial edema. Previously noted airspace opacities distributed throughout both lungs have resolved consistent with a resolved pneumonia.   B-Type Natriuretic Peptide ([facility] Only)     Status: Abnormal   Result Value Ref Range     (H) 0 - 84 pg/mL   CBC with platelets     Status: Abnormal   Result Value Ref Range    WBC Count 7.7 4.0 - 11.0 10e3/uL    RBC Count 3.87 (L) 4.40 - 5.90 10e6/uL    Hemoglobin 7.9 (L) 13.3 - 17.7 g/dL    Hematocrit 28.5 (L) 40.0 - 53.0 %    MCV 74 (L) 78 - 100 fL    MCH 20.4 (L) 26.5 - 33.0 pg    MCHC 27.7 (L) 31.5 - 36.5 g/dL    RDW 17.6 (H) 10.0 - 15.0 %    Platelet Count 211 150 - 450 10e3/uL   Comprehensive metabolic panel     Status: Abnormal   Result Value Ref Range    Sodium 140 136 - 145 mmol/L    Potassium 4.4 3.5 - 5.0 mmol/L    Chloride 108 (H) 98 - 107 mmol/L    Carbon Dioxide (CO2) 23 22 - 31 mmol/L    Anion Gap 9 5 - 18 mmol/L    Urea Nitrogen 39 (H) 8 - 28 mg/dL    Creatinine 1.62 (H) 0.70 - 1.30 mg/dL    Calcium 8.9 8.5 - 10.5 mg/dL    Glucose 199 (H) 70 - 125 mg/dL    Alkaline Phosphatase 117 45 - 120 U/L    AST 18 0 - 40 U/L    ALT 13 0 - 45 U/L    Protein Total 6.2 6.0 - 8.0 g/dL    Albumin 3.3 (L) 3.5 - 5.0 g/dL    Bilirubin Total 1.9 (H) 0.0 - 1.0 mg/dL    GFR Estimate 40 (L) >60 mL/min/1.73m2   INR     Status: Abnormal   Result Value Ref Range    INR 1.30 (H) 0.85 - 1.15   Partial thromboplastin time     Status: Normal   Result Value Ref Range    aPTT 37 22  - 38 Seconds   Troponin I     Status: Normal   Result Value Ref Range    Troponin I 0.03 0.00 - 0.29 ng/mL   Asymptomatic COVID-19 Virus (Coronavirus) by PCR Nasopharyngeal     Status: Normal    Specimen: Nasopharyngeal; Swab   Result Value Ref Range    SARS CoV2 PCR Negative Negative    Narrative    Testing was performed using the kaylan  SARS-CoV-2 & Influenza A/B Assay on the kaylan  [employer]  System.  This test should be ordered for the detection of SARS-COV-2 in individuals who meet SARS-CoV-2 clinical and/or epidemiological criteria. Test performance is unknown in asymptomatic patients.  This test is for in vitro diagnostic use under the FDA EUA for laboratories certified under CLIA to perform moderate and/or high complexity testing. This test has not been FDA cleared or approved.  A negative test does not rule out the presence of PCR inhibitors in the specimen or target RNA in concentration below the limit of detection for the assay. The possibility of a false negative should be considered if the patient's recent exposure or clinical presentation suggests COVID-19.  [facility] are certified under the Clinical Laboratory Improvement Amendments of 1988 (CLIA-88) as qualified to perform moderate and/or high complexity laboratory testing.     Nico Rose MD  Staff Physician  [facility] Emergency Department     Zackery Rose MD  11/30/21 1818       Zackery Rose MD  11/30/21 [id number]

## 2021-11-30 NOTE — ED TRIAGE NOTES
Pt here with increasing shortness of breath and new swelling in his groin area. Was seen several days ago at [facility] and was given a diuretic but he is feeling worse. Pt on 4 liters of oxygen by NC at this time. Is on the same flow rate of oxygen at home. To room 4, to do EKG.

## 2021-12-01 ENCOUNTER — APPOINTMENT (OUTPATIENT)
Dept: ULTRASOUND IMAGING | Facility: CLINIC | Age: 81
DRG: 291 | End: 2021-12-01
Payer: COMMERCIAL

## 2021-12-01 PROBLEM — K74.69 OTHER CIRRHOSIS OF LIVER (H): Status: ACTIVE | Noted: 2021-12-01

## 2021-12-01 LAB
AMYLASE FLD-CCNC: 33.7 U/L
ANION GAP SERPL CALCULATED.3IONS-SCNC: 9 MMOL/L (ref 5–18)
BUN SERPL-MCNC: 36 MG/DL (ref 8–28)
CALCIUM SERPL-MCNC: 9.2 MG/DL (ref 8.5–10.5)
CHLORIDE BLD-SCNC: 105 MMOL/L (ref 98–107)
CO2 SERPL-SCNC: 24 MMOL/L (ref 22–31)
CREAT SERPL-MCNC: 1.5 MG/DL (ref 0.7–1.3)
FERRITIN SERPL-MCNC: 52 NG/ML (ref 27–300)
GFR SERPL CREATININE-BSD FRML MDRD: 44 ML/MIN/1.73M2
GLUCOSE BLD-MCNC: 193 MG/DL (ref 70–125)
GLUCOSE BLDC GLUCOMTR-MCNC: 122 MG/DL (ref 70–99)
GLUCOSE BLDC GLUCOMTR-MCNC: 158 MG/DL (ref 70–99)
GLUCOSE BLDC GLUCOMTR-MCNC: 166 MG/DL (ref 70–99)
GLUCOSE BLDC GLUCOMTR-MCNC: 180 MG/DL (ref 70–99)
GLUCOSE BLDC GLUCOMTR-MCNC: 183 MG/DL (ref 70–99)
GLUCOSE FLD-MCNC: 174 MG/DL
GRAM STAIN RESULT: NORMAL
GRAM STAIN RESULT: NORMAL
HAV IGM SERPL QL IA: NEGATIVE
HBV CORE IGM SERPL QL IA: NEGATIVE
HBV SURFACE AG SERPL QL IA: NONREACTIVE
HCV AB SERPL QL IA: NEGATIVE
LDH FLD L TO P-CCNC: 132 U/L
LDH SERPL L TO P-CCNC: 289 U/L (ref 125–220)
POTASSIUM BLD-SCNC: 4.1 MMOL/L (ref 3.5–5)
PROT FLD-MCNC: 3.3 G/DL
SODIUM SERPL-SCNC: 138 MMOL/L (ref 136–145)

## 2021-12-01 PROCEDURE — 87075 CULTR BACTERIA EXCEPT BLOOD: CPT | Performed by: FAMILY MEDICINE

## 2021-12-01 PROCEDURE — 250N000011 HC RX IP 250 OP 636: Performed by: INTERNAL MEDICINE

## 2021-12-01 PROCEDURE — 49083 ABD PARACENTESIS W/IMAGING: CPT

## 2021-12-01 PROCEDURE — 120N000001 HC R&B MED SURG/OB

## 2021-12-01 PROCEDURE — 82945 GLUCOSE OTHER FLUID: CPT | Performed by: FAMILY MEDICINE

## 2021-12-01 PROCEDURE — 250N000011 HC RX IP 250 OP 636: Performed by: STUDENT IN AN ORGANIZED HEALTH CARE EDUCATION/TRAINING PROGRAM

## 2021-12-01 PROCEDURE — 36415 COLL VENOUS BLD VENIPUNCTURE: CPT | Performed by: STUDENT IN AN ORGANIZED HEALTH CARE EDUCATION/TRAINING PROGRAM

## 2021-12-01 PROCEDURE — 250N000013 HC RX MED GY IP 250 OP 250 PS 637: Performed by: STUDENT IN AN ORGANIZED HEALTH CARE EDUCATION/TRAINING PROGRAM

## 2021-12-01 PROCEDURE — 89050 BODY FLUID CELL COUNT: CPT | Performed by: STUDENT IN AN ORGANIZED HEALTH CARE EDUCATION/TRAINING PROGRAM

## 2021-12-01 PROCEDURE — 87205 SMEAR GRAM STAIN: CPT | Performed by: FAMILY MEDICINE

## 2021-12-01 PROCEDURE — 250N000012 HC RX MED GY IP 250 OP 636 PS 637: Performed by: STUDENT IN AN ORGANIZED HEALTH CARE EDUCATION/TRAINING PROGRAM

## 2021-12-01 PROCEDURE — 82042 OTHER SOURCE ALBUMIN QUAN EA: CPT | Performed by: STUDENT IN AN ORGANIZED HEALTH CARE EDUCATION/TRAINING PROGRAM

## 2021-12-01 PROCEDURE — 82150 ASSAY OF AMYLASE: CPT | Performed by: FAMILY MEDICINE

## 2021-12-01 PROCEDURE — 99222 1ST HOSP IP/OBS MODERATE 55: CPT | Performed by: INTERNAL MEDICINE

## 2021-12-01 PROCEDURE — 250N000013 HC RX MED GY IP 250 OP 250 PS 637: Performed by: FAMILY MEDICINE

## 2021-12-01 PROCEDURE — 83615 LACTATE (LD) (LDH) ENZYME: CPT

## 2021-12-01 PROCEDURE — 80048 BASIC METABOLIC PNL TOTAL CA: CPT | Performed by: STUDENT IN AN ORGANIZED HEALTH CARE EDUCATION/TRAINING PROGRAM

## 2021-12-01 PROCEDURE — 83615 LACTATE (LD) (LDH) ENZYME: CPT | Performed by: FAMILY MEDICINE

## 2021-12-01 PROCEDURE — 87070 CULTURE OTHR SPECIMN AEROBIC: CPT | Performed by: FAMILY MEDICINE

## 2021-12-01 PROCEDURE — 36415 COLL VENOUS BLD VENIPUNCTURE: CPT

## 2021-12-01 PROCEDURE — 89051 BODY FLUID CELL COUNT: CPT | Performed by: STUDENT IN AN ORGANIZED HEALTH CARE EDUCATION/TRAINING PROGRAM

## 2021-12-01 PROCEDURE — 84157 ASSAY OF PROTEIN OTHER: CPT | Performed by: FAMILY MEDICINE

## 2021-12-01 PROCEDURE — 93970 EXTREMITY STUDY: CPT

## 2021-12-01 PROCEDURE — 82728 ASSAY OF FERRITIN: CPT

## 2021-12-01 PROCEDURE — 86705 HEP B CORE ANTIBODY IGM: CPT | Performed by: STUDENT IN AN ORGANIZED HEALTH CARE EDUCATION/TRAINING PROGRAM

## 2021-12-01 PROCEDURE — 99223 1ST HOSP IP/OBS HIGH 75: CPT | Mod: AI | Performed by: STUDENT IN AN ORGANIZED HEALTH CARE EDUCATION/TRAINING PROGRAM

## 2021-12-01 RX ORDER — BUMETANIDE 0.25 MG/ML
2 INJECTION INTRAMUSCULAR; INTRAVENOUS EVERY 8 HOURS
Status: DISCONTINUED | OUTPATIENT
Start: 2021-12-01 | End: 2021-12-04

## 2021-12-01 RX ORDER — CLOPIDOGREL BISULFATE 75 MG/1
75 TABLET ORAL DAILY
Status: DISCONTINUED | OUTPATIENT
Start: 2021-12-01 | End: 2021-12-06 | Stop reason: HOSPADM

## 2021-12-01 RX ORDER — SPIRONOLACTONE 25 MG
12.5 TABLET ORAL 2 TIMES DAILY
Status: DISCONTINUED | OUTPATIENT
Start: 2021-12-01 | End: 2021-12-06 | Stop reason: HOSPADM

## 2021-12-01 RX ORDER — TADALAFIL 10 MG/1
20 TABLET ORAL DAILY
Status: DISCONTINUED | OUTPATIENT
Start: 2021-12-01 | End: 2021-12-06 | Stop reason: HOSPADM

## 2021-12-01 RX ORDER — FERROUS GLUCONATE 324(38)MG
324 TABLET ORAL EVERY OTHER DAY
Status: DISCONTINUED | OUTPATIENT
Start: 2021-12-01 | End: 2021-12-06 | Stop reason: HOSPADM

## 2021-12-01 RX ORDER — BUMETANIDE 0.25 MG/ML
2 INJECTION INTRAMUSCULAR; INTRAVENOUS ONCE
Status: COMPLETED | OUTPATIENT
Start: 2021-12-01 | End: 2021-12-01

## 2021-12-01 RX ORDER — METOPROLOL TARTRATE 25 MG/1
25 TABLET, FILM COATED ORAL 2 TIMES DAILY WITH MEALS
Status: DISCONTINUED | OUTPATIENT
Start: 2021-12-01 | End: 2021-12-06 | Stop reason: HOSPADM

## 2021-12-01 RX ORDER — NITROGLYCERIN 0.4 MG/1
0.4 TABLET SUBLINGUAL EVERY 5 MIN PRN
Status: DISCONTINUED | OUTPATIENT
Start: 2021-12-01 | End: 2021-12-01

## 2021-12-01 RX ORDER — DEXTROSE MONOHYDRATE 25 G/50ML
25-50 INJECTION, SOLUTION INTRAVENOUS
Status: DISCONTINUED | OUTPATIENT
Start: 2021-12-01 | End: 2021-12-06 | Stop reason: HOSPADM

## 2021-12-01 RX ORDER — ROPINIROLE 1 MG/1
2 TABLET, FILM COATED ORAL AT BEDTIME
Status: DISCONTINUED | OUTPATIENT
Start: 2021-12-01 | End: 2021-12-06 | Stop reason: HOSPADM

## 2021-12-01 RX ORDER — BUMETANIDE 0.25 MG/ML
4 INJECTION INTRAMUSCULAR; INTRAVENOUS EVERY 12 HOURS
Status: DISCONTINUED | OUTPATIENT
Start: 2021-12-01 | End: 2021-12-01

## 2021-12-01 RX ORDER — CLOTRIMAZOLE 1 %
CREAM (GRAM) TOPICAL 2 TIMES DAILY
Status: DISCONTINUED | OUTPATIENT
Start: 2021-12-01 | End: 2021-12-06 | Stop reason: HOSPADM

## 2021-12-01 RX ORDER — ALBUTEROL SULFATE 90 UG/1
2 AEROSOL, METERED RESPIRATORY (INHALATION) EVERY 6 HOURS PRN
Status: DISCONTINUED | OUTPATIENT
Start: 2021-12-01 | End: 2021-12-06 | Stop reason: HOSPADM

## 2021-12-01 RX ORDER — NICOTINE POLACRILEX 4 MG
15-30 LOZENGE BUCCAL
Status: DISCONTINUED | OUTPATIENT
Start: 2021-12-01 | End: 2021-12-06 | Stop reason: HOSPADM

## 2021-12-01 RX ORDER — ATORVASTATIN CALCIUM 40 MG/1
80 TABLET, FILM COATED ORAL DAILY
Status: DISCONTINUED | OUTPATIENT
Start: 2021-12-01 | End: 2021-12-06 | Stop reason: HOSPADM

## 2021-12-01 RX ORDER — IRBESARTAN 150 MG/1
300 TABLET ORAL DAILY
Status: DISCONTINUED | OUTPATIENT
Start: 2021-12-01 | End: 2021-12-06 | Stop reason: HOSPADM

## 2021-12-01 RX ORDER — PANTOPRAZOLE SODIUM 20 MG/1
40 TABLET, DELAYED RELEASE ORAL
Status: DISCONTINUED | OUTPATIENT
Start: 2021-12-02 | End: 2021-12-06 | Stop reason: HOSPADM

## 2021-12-01 RX ORDER — DIGOXIN 125 MCG
125 TABLET ORAL DAILY
Status: DISCONTINUED | OUTPATIENT
Start: 2021-12-01 | End: 2021-12-06 | Stop reason: HOSPADM

## 2021-12-01 RX ADMIN — SPIRONOLACTONE 12.5 MG: 25 TABLET ORAL at 21:00

## 2021-12-01 RX ADMIN — INSULIN GLARGINE 10 UNITS: 100 INJECTION, SOLUTION SUBCUTANEOUS at 21:02

## 2021-12-01 RX ADMIN — METOPROLOL TARTRATE 25 MG: 25 TABLET, FILM COATED ORAL at 18:38

## 2021-12-01 RX ADMIN — EMPAGLIFLOZIN 10 MG: 10 TABLET, FILM COATED ORAL at 10:41

## 2021-12-01 RX ADMIN — BUMETANIDE 2 MG: 0.25 INJECTION, SOLUTION INTRAMUSCULAR; INTRAVENOUS at 01:39

## 2021-12-01 RX ADMIN — FERROUS GLUCONATE 324 MG: 324 TABLET ORAL at 10:40

## 2021-12-01 RX ADMIN — BUMETANIDE 2 MG: 0.25 INJECTION, SOLUTION INTRAMUSCULAR; INTRAVENOUS at 10:40

## 2021-12-01 RX ADMIN — FLUTICASONE FUROATE AND VILANTEROL TRIFENATATE 1 PUFF: 200; 25 POWDER RESPIRATORY (INHALATION) at 10:39

## 2021-12-01 RX ADMIN — ROPINIROLE 2 MG: 1 TABLET, FILM COATED ORAL at 20:59

## 2021-12-01 RX ADMIN — CLOTRIMAZOLE: 1 CREAM TOPICAL at 01:41

## 2021-12-01 RX ADMIN — CLOTRIMAZOLE: 1 CREAM TOPICAL at 21:00

## 2021-12-01 RX ADMIN — BUMETANIDE 2 MG: 0.25 INJECTION, SOLUTION INTRAMUSCULAR; INTRAVENOUS at 18:38

## 2021-12-01 RX ADMIN — INSULIN GLARGINE 10 UNITS: 100 INJECTION, SOLUTION SUBCUTANEOUS at 10:43

## 2021-12-01 RX ADMIN — SPIRONOLACTONE 12.5 MG: 25 TABLET ORAL at 10:41

## 2021-12-01 RX ADMIN — ZOLPIDEM TARTRATE 2.5 MG: 5 TABLET ORAL at 21:00

## 2021-12-01 RX ADMIN — ATORVASTATIN CALCIUM 80 MG: 40 TABLET, FILM COATED ORAL at 10:40

## 2021-12-01 RX ADMIN — METOPROLOL TARTRATE 25 MG: 25 TABLET, FILM COATED ORAL at 10:40

## 2021-12-01 RX ADMIN — SPIRONOLACTONE 12.5 MG: 25 TABLET ORAL at 01:40

## 2021-12-01 RX ADMIN — ROPINIROLE 2 MG: 1 TABLET, FILM COATED ORAL at 01:40

## 2021-12-01 RX ADMIN — INSULIN ASPART 1 UNITS: 100 INJECTION, SOLUTION INTRAVENOUS; SUBCUTANEOUS at 13:40

## 2021-12-01 RX ADMIN — CLOTRIMAZOLE: 1 CREAM TOPICAL at 10:41

## 2021-12-01 RX ADMIN — OMEPRAZOLE 20 MG: 20 CAPSULE, DELAYED RELEASE ORAL at 10:41

## 2021-12-01 RX ADMIN — DIGOXIN 125 MCG: 0.12 TABLET ORAL at 18:38

## 2021-12-01 RX ADMIN — TADALAFIL 20 MG: 10 TABLET, FILM COATED ORAL at 10:41

## 2021-12-01 ASSESSMENT — ACTIVITIES OF DAILY LIVING (ADL)
DRESSING/BATHING_DIFFICULTY: NO
ADLS_ACUITY_SCORE: 14
ADLS_ACUITY_SCORE: 6
ADLS_ACUITY_SCORE: 6
ADLS_ACUITY_SCORE: 12
ADLS_ACUITY_SCORE: 12
WERE_AUXILIARY_AIDS_OFFERED?: NO
ADLS_ACUITY_SCORE: 8
DIFFICULTY_COMMUNICATING: NO
CONCENTRATING,_REMEMBERING_OR_MAKING_DECISIONS_DIFFICULTY: NO
WALKING_OR_CLIMBING_STAIRS_DIFFICULTY: NO
NUMBER_OF_TIMES_PATIENT_HAS_FALLEN_WITHIN_LAST_SIX_MONTHS: 6
ADLS_ACUITY_SCORE: 12
FALL_HISTORY_WITHIN_LAST_SIX_MONTHS: YES
WEAR_GLASSES_OR_BLIND: YES
ADLS_ACUITY_SCORE: 8
ADLS_ACUITY_SCORE: 8
WHICH_OF_THE_ABOVE_FUNCTIONAL_RISKS_HAD_A_RECENT_ONSET_OR_CHANGE?: FALL HISTORY
ADLS_ACUITY_SCORE: 14
ADLS_ACUITY_SCORE: 6
ADLS_ACUITY_SCORE: 8
ADLS_ACUITY_SCORE: 9
TOILETING_ISSUES: NO
ADLS_ACUITY_SCORE: 12
PATIENT'S_PREFERRED_MEANS_OF_COMMUNICATION: VERBAL
ADLS_ACUITY_SCORE: 9
ADLS_ACUITY_SCORE: 12
ADLS_ACUITY_SCORE: 12
DIFFICULTY_EATING/SWALLOWING: NO
ADLS_ACUITY_SCORE: 12
HEARING_DIFFICULTY_OR_DEAF: YES
ADLS_ACUITY_SCORE: 8
ADLS_ACUITY_SCORE: 8
DOING_ERRANDS_INDEPENDENTLY_DIFFICULTY: YES
ADLS_ACUITY_SCORE: 8
ADLS_ACUITY_SCORE: 6
ADLS_ACUITY_SCORE: 14
ADLS_ACUITY_SCORE: 12
PATIENT_/_FAMILY_COMMUNICATION_STYLE: SPOKEN LANGUAGE (ENGLISH OR BILINGUAL)

## 2021-12-01 ASSESSMENT — MIFFLIN-ST. JEOR: SCORE: 1836.65

## 2021-12-01 NOTE — PHARMACY-ADMISSION MEDICATION HISTORY
Pharmacy Note - Admission Medication History    Pertinent Provider Information:     DRUG-DRUG INTERACTION  SHOULD NOT USE THIS COMBINATION TOGETHER- please see pharmacist MTM note 21.   1. Tadalafil 20 mg once daily taken for PAH   2. Nitroglycerin SL prn chest pain    Several discrepancies:     21 Cardiology   1. STOP Plavix (clopidogrel)  2. CONTINUE Effient and Aspirin daily for anticoagulation.    21 Cardiology  1. Start Eliquis 5 mg Twice Daily tomorrow 21 (21)  2. Start Plavix 75 mg Once Daily tomorrow 21 (21)  3. Stop Aspirin and Effient    Patient currently taking/not takin. Taking aspirin  2. Not taking Eliquis  3. Not taking Effient  4. Likely not taking Plavix (per wife)    Changes made to list:   I removed Effient as both patient and more recent cardiologist are in agreement.  I removed amlodipine (discussed earlier at MTM visit) - likely discontinued in 2021  I added Plavix back to list to reflect cardiology recommendations  I added Eliquis back to list to reflect cardiology recommendations  I added Atorvastatin 80 mg back to list ( 21) - still has some of this strength at home. Needs refills!   I updated Zolpidem per recent recommendations to lower dose made by his provider      ______________________________________________________________________    Prior To Admission (PTA) med list completed and updated in EMR.       PTA Med List   Medication Sig Note Last Dose     ACCU-CHEK JOSE PLUS TEST STRP strips [ACCU-CHEK JOSE PLUS TEST STRP STRIPS] see administration instructions.  Unknown at Unknown time     acetaminophen (TYLENOL) 500 MG tablet [ACETAMINOPHEN (TYLENOL) 500 MG TABLET] Take 1,000 mg by mouth every 6 (six) hours as needed. First line of pain management. Do Not take more than 4,000 mg in 24 hours.  Unknown at Unknown time     albuterol (PROVENTIL HFA;VENTOLIN HFA) 90 mcg/actuation inhaler Inhale 2 puffs into the lungs every 6  "hours as needed   Unknown at Unknown time     apixaban ANTICOAGULANT (ELIQUIS) 5 MG tablet Take 5 mg by mouth 2 times daily 11/30/2021: Patient not taking due to dark stools, said \"why would I take something that is going to kill me?\"    Per Cardiology  1. Start Eliquis 5 mg Twice Daily tomorrow 11/17/21   2. Start Plavix 75 mg Once Daily tomorrow 11/17/21  3. Stop Aspirin and Effient Past Month at Unknown time     aspirin (ASA) 81 MG chewable tablet Take 1 tablet (81 mg) by mouth daily Starting tomorrow. 11/30/2021: Patient has continued to take aspirin.       Per Cardiology:  1. Start Eliquis 5 mg Twice Daily tomorrow 11/17/21   2. Start Plavix 75 mg Once Daily tomorrow 11/17/21  3. Stop Aspirin and Effient 11/30/2021 at Unknown time     atorvastatin (LIPITOR) 80 MG tablet Take 80 mg by mouth daily 11/30/2021: Need refill of 80 mg tabs, only prescribed 30 day supply 10/26/21. Dose increased after NSTEMI, otherwise has remaining prescription of 40 mg.   11/30/2021 at Unknown time     BD ULTRA-FINE MANISHA PEN NEEDLES 32 gauge x 5/32\" Ndle [BD ULTRA-FINE MANISHA PEN NEEDLES 32 GAUGE X 5/32\" NDLE]   Unknown at Unknown time     bumetanide (BUMEX) 2 MG tablet 4 mg in am, 3 mg in afternoon 11/30/2021: 2 tabs AM + 1.5 tabs afternoon  11/30/2021 at Unknown time     canagliflozin (INVOKANA) 100 mg Tab Take 100 mg by mouth every morning   11/30/2021 at Unknown time     clopidogrel (PLAVIX) 75 MG tablet Take 75 mg by mouth daily 11/30/2021: Patient is not taking Plavix currently.     Cardiology:   1. Start Eliquis 5 mg Twice Daily tomorrow 11/17/21   2. Start Plavix 75 mg Once Daily tomorrow 11/17/21  3. Stop Aspirin and Effient Unknown at Unknown time     digoxin (LANOXIN) 125 mcg tablet [DIGOXIN (LANOXIN) 125 MCG TABLET] Take 1 tablet (125 mcg total) by mouth daily.  11/30/2021 at Unknown time     Ferrous Gluconate 324 (37.5 Fe) MG TABS Take 1 tablet by mouth every other day  11/29/2021 at Unknown time     fluticasone-vilanterol " (BREO ELLIPTA) 200-25 mcg/dose DsDv inhaler Inhale 1 puff into the lungs daily   11/30/2021 at not with     Garlic 1000 MG CAPS Take 1 capsule by mouth daily  11/30/2021 at Unknown time     insulin glargine (LANTUS PEN) 100 UNIT/ML pen Inject 20 Units Subcutaneous 2 times daily (Patient taking differently: Inject 18 Units Subcutaneous 2 times daily )  11/30/2021 at am     ipratropium - albuterol 0.5 mg/2.5 mg/3 mL (DUONEB) 0.5-2.5 (3) MG/3ML neb solution Take 1 vial by nebulization 4 times daily as needed for shortness of breath / dyspnea   Unknown at Unknown time     irbesartan (AVAPRO) 300 MG tablet Take 300 mg by mouth daily   11/30/2021 at Unknown time     lidocaine (LIDODERM) 5 % Place 1 patch onto the skin daily as needed   Unknown at Unknown time     metFORMIN (GLUCOPHAGE-XR) 500 MG 24 hr tablet Take 2 tablets (1,000 mg) by mouth daily (with dinner)  11/29/2021 at Unknown time     metoprolol tartrate (LOPRESSOR) 25 MG tablet Take 1 tablet (25 mg) by mouth 2 times daily (with meals)  11/30/2021 at am     multivitamin w/minerals (THERA-VIT-M) tablet Take 1 tablet by mouth daily  11/30/2021 at Unknown time     nitroglycerin (NITROSTAT) 0.4 MG SL tablet [NITROGLYCERIN (NITROSTAT) 0.4 MG SL TABLET] Place 0.4 mg under the tongue every 5 (five) minutes as needed for chest pain. 11/30/2021: WARNING- patient is on tadalafil daily - this combination is contraindicated both regular and intermittent nitrate use, due to enhanced vasodilation. Please discuss with patient and find alternative to tadalafil or discontinue nitroglycerin.  Unknown at Unknown time     omeprazole (PRILOSEC) 20 MG DR capsule Take 20 mg by mouth daily  11/30/2021 at Unknown time     OXYGEN-AIR DELIVERY SYSTEMS MISC [OXYGEN-AIR DELIVERY SYSTEMS MISC] Use As Directed.  Unknown at Unknown time     potassium chloride (K-DUR,KLOR-CON) 20 MEQ tablet Take 40 mEq by mouth daily   11/30/2021 at Unknown time     rOPINIRole (REQUIP) 2 MG tablet [ROPINIROLE  (REQUIP) 2 MG TABLET] Take 2 mg by mouth at bedtime.   11/29/2021 at Unknown time     spironolactone (ALDACTONE) 25 MG tablet Take 12.5 mg by mouth 2 times daily  11/30/2021 at am     tadalafil (CIALIS) 20 MG tablet Take 20 mg by mouth daily  11/30/2021: Please see MTM pharmacist note from 11/9/21 for more information regarding interaction between tadalafil and nitroglycerin. Requires action.  11/30/2021 at Unknown time     vitamin C (ASCORBIC ACID) 1000 MG TABS Take 1,000 mg by mouth daily  11/30/2021 at Unknown time     zinc gluconate 50 MG tablet Take 50 mg by mouth daily  11/30/2021 at Unknown time     zolpidem (AMBIEN) 10 mg tablet Take 5 mg by mouth nightly as needed for sleep  11/30/2021: Instructed to decrease dose to 1/2 tab or 5 mg.  Past Week at Unknown time       Information source(s): Patient, Family member, Clinic records, Hospital records and [facility]  Method of interview communication: in-person and phone    Summary of Changes to PTA Med List  New: spironolactone 11/26, Eliquis, Plavix   Discontinued: amlodipine, Effient, confirmed eplerenone stopped   Changed: Lantus 18 units bid not 20 units bid, atorvastatin 80 mg vs 40 mg (need refills of 80 mg), zolpidem dose reduced    Patient was asked about OTC/herbal products specifically.  PTA med list reflects this.    In the past week, patient estimated taking medication this percent of the time:  50-90% due to polypharmacy, several changes, confusion..    Allergies were reviewed, assessed, and updated with the patient.      Medications currently not available for use during hospital stay. Family/Patient representative states they will bring Armand Cristina, tomorrow by his wife to [facility].    The information provided in this note is only as accurate as the sources available at the time of the update(s).    Thank you for the opportunity to participate in the care of this patient.    Rhina Mckenzie [facility]  11/30/2021 10:42 PM

## 2021-12-01 NOTE — CONSULTS
HEART CARE CONSULTATON NOTE        Assessment/Recommendations   Assessment:  1.  Acute heart failure with preserved ejection fraction and right heart failure: Weight gain with increased shortness of breath and edema as well as anasarca  2.  Ascites and cirrhosis of the liver: Primary team scheduling for paracentesis today  3.  Severe COPD on 4 L of home O2  4.  Who group 2 pulmonary hypertension  5.  NOVA on CPAP  6.  Atrial fibrillation: Chronic rate controlled on digoxin and metoprolol  7.  Lower GI bleed in the setting of being on Plavix, Effient, aspirin as well as Eliquis last month    Plan:  1.  Continue spironolactone  2.  Start Bumex 2 mg IV every 8.  Consider metolazone if need increased diuresis.  3.  Daily weights, strict I's and O's, monitor creatinine  4.  Will need close follow-up in our heart failure clinic.  Recommend following up upon discharge and her heart failure clinic within a week.  Follow-up with Dr. French his primary cardiologist in 4 to 6 weeks.  Can continue to pursue watchman device as outpatient  5.  Recommend Plavix and Eliquis for anticoagulation.  Patient is reluctant to be on Eliquis at this time despite understanding the risks.       History of Present Illness/Subjective    HPI: Red Sutherland is a 81 year old male with history of heart failure with preserved ejection fraction/right heart failure, coronary artery disease status post multiple HUNTER status post HUNTER to circumflex in February 2021 status post HUNTER to circumflex stent in-stent restenosis 10/25/2021, cirrhosis, chronic kidney disease, COPD on 4 L home O2, group 2 pulmonary hypertension, NOVA on CPAP, diabetes mellitus type 2 admitted with acute on chronic heart failure with preserved ejection fraction.  He has gained about 30 pounds in over a month and is noted worsening shortness of breath, increased edema.  No complaints of chest pain.    Echocardiogram 10/24/2021  Interpretation Summary     Left ventricular size,  wall motion and function are normal. The ejection  fraction is 60-65%.  Diastolic Doppler findings (E/E' ratio and/or other parameters) suggest left  ventricular filling pressures are increased.  Flattened septum is consistent with RV pressure/volume overload.  The right ventricle is moderate to severely dilated.  Moderately decreased right ventricular systolic function  The left atrium is moderately dilated.  The right atrium is moderate to severely dilated.  Mild valvular aortic stenosis.  The estimated pulmonary artery systolic pressure is 75 mmHg.     No previous study for comparison.    Coronary angiogram 10/25/2021  Red Sutherland is a 81 year old old male with CAD s/p prirp PCI's, most recent to mLcx w/ roto/HUNTER 2/21, severe pHTN w/ PA pressures in  range, AF, PAD  who is here for work up of crescendo angina.     - severe narrowing in Lcx stent, s/p intracoronary lithotripsy and DESx1; high-normal L-sided filling pressures  - ASA 81mg daily indefinitely, switch P2Y12 inhibitor to prasugrel given relatively early stent re-narrowing (within first year) after 60mg re-load, then 10mg daily ideally indefinitely but at least for 12 mos   - 60-70% bifurcation disease beyond the stent - medical management as the first step, PCI if has recalcitrant symptoms  - increase atorva to 80  - continue aggressive risk factor modification              Findings:  LM:no obstruction  LAD:possible mid-vessel stent vs. Ca2+, no obstruction - similar to '11 angio  Lcx:95-99% ISR in prox stent, 50% downstream disease at bifurcation  RCA:dominant, anterior, no obstruction     LVEDP:18     Access:  R Radial artery     PCI:  LMT was engaged w/ a 6F EBU 3.5 Guide catheter and the lesion in Lcx was wired w/ a Forte wire, ballooned w/ a 2.5x12mm NC Emerge at 12-20 salazar but w/ severe residual narrowing, concerning for under-expansion. In view of his labile respiratory status/inability to lay flat, we decided to forego IVUS and  "proceeded w/ coronary lithotripsy w/ a 3.5x12mm Shockwave balloon for 10 cycles at 4 salazar, this time achieving good expansion. Due to disease distal to the stent/haziness, we placed a 3.5x24mm Synergy EES at 11 salazar, post-dilating proximally w/ a 4.0x12mm NC Emerge at 12 salazar inflations. Final angiography showed no dissection or perforation and a BASIL 3 flow.     Closure:   Vasc Band     This is a complex modifier 22 procedure due to severe Ca2+, old stent under-expansion, need for coronary lithotripsy                 Physical Examination  Review of Systems   VITALS: /57 (BP Location: Left arm)   Pulse 65   Temp 98.4  F (36.9  C) (Oral)   Resp 18   Ht 1.803 m (5' 11\")   Wt 110 kg (242 lb 6.4 oz)   SpO2 100%   BMI 33.81 kg/m    BMI: Body mass index is 33.81 kg/m .  Wt Readings from Last 3 Encounters:   12/01/21 110 kg (242 lb 6.4 oz)   11/01/21 96.3 kg (212 lb 6.5 oz)   10/26/21 103.2 kg (227 lb 9.6 oz)       Intake/Output Summary (Last 24 hours) at 12/1/2021 1308  Last data filed at 12/1/2021 1251  Gross per 24 hour   Intake 1560 ml   Output 4150 ml   Net -2590 ml     General Appearance:   no distress, normal body habitus   ENT/Mouth: membranes moist, no oral lesions or bleeding gums.      EYES:  no scleral icterus, normal conjunctivae   Neck: no carotid bruits or thyromegaly   Chest/Lungs:    Decreased breath sounds at the bases   Cardiovascular:   irregular. Normal first and second heart sounds with systolic murmur Jugular venous pressure elevated to earlobe, ++edema bilaterally    Abdomen:  no organomegaly, masses, bruits, or tenderness; bowel sounds are present   Extremities: no cyanosis or clubbing   Skin: no xanthelasma, warm.    Neurologic: normal  bilateral, no tremors     Psychiatric: alert and oriented x3, calm     Review Of Systems  Skin: negative  Eyes: negative  Ears/Nose/Throat: negative  Respiratory: Shortness of breath  Cardiovascular: negative  Gastrointestinal: " negative  Genitourinary: negative  Musculoskeletal: negative  Neurologic: negative  Psychiatric: negative  Hematologic/Lymphatic/Immunologic: negative  Endocrine: negative          Lab Results    Chemistry/lipid CBC Cardiac Enzymes/BNP/TSH/INR   Recent Labs   Lab Test 10/25/21  1529   CHOL 98   HDL 35*   LDL 53   TRIG 49     Recent Labs   Lab Test 10/25/21  1529 05/25/21  1359 09/02/20  1307   LDL 53 <5 50     Recent Labs   Lab Test 12/01/21  0617 12/01/21  0556   NA  --  138   POTASSIUM  --  4.1   CHLORIDE  --  105   CO2  --  24   * 193*   BUN  --  36*   CR  --  1.50*   GFRESTIMATED  --  44*   ANNMARIE  --  9.2     Recent Labs   Lab Test 12/01/21  0556 11/30/21  1609 11/26/21  1918   CR 1.50* 1.62* 1.49*     Recent Labs   Lab Test 10/23/21  1824 07/11/21  1031 04/10/20  0534   A1C 6.3* 9.3* 10.8*          Recent Labs   Lab Test 11/30/21  1609   WBC 7.7   HGB 7.9*   HCT 28.5*   MCV 74*        Recent Labs   Lab Test 11/30/21  1609 11/26/21  1918 11/04/21  0847   HGB 7.9* 8.1* 8.4*    Recent Labs   Lab Test 11/30/21  1609 10/24/21  0619 10/24/21  0200   TROPONINI 0.03 0.03 0.03     Recent Labs   Lab Test 11/30/21  1609 10/23/21  1824 10/12/21  1429   * 445* 381*     Recent Labs   Lab Test 05/31/19  1507   TSH 2.55     Recent Labs   Lab Test 11/30/21  1609 10/29/21  1712 10/12/21  1429   INR 1.30* 1.39* 1.63*        Medical History  Surgical History Family History Social History   Past Medical History:   Diagnosis Date     Atrial fibrillation (H)      CHF (congestive heart failure) (H)      COPD (chronic obstructive pulmonary disease) (H)      Coronary artery disease      Diabetes mellitus (H)      Essential hypertension      Hyperlipidemia      Pulmonary hypertension (H)     O2 at night     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 They put him on sildenafil as nitroprusside lowered systemic  BP and with that the mean PA dropped from 54 to 49. Negative VQ at [facility] Dec 1, 2017.     RLS (restless legs syndrome)      Sleep apnea      Past Surgical History:   Procedure Laterality Date     BACK SURGERY      lower back     CARDIAC CATHETERIZATION  12/13/2017    Right and left at [facility], mean PA 58, PCW 24 with V wave of 35, LVEDP of 18, with Nipride systemic BP, PVR and mean PA all declined     CARDIOVERSION  03/15/2013    for afib     CATARACT EXTRACTION Bilateral      COLONOSCOPY N/A 10/31/2021    Procedure: COLONOSCOPY WITH POLYPECTOMY;  Surgeon: Sheng Sagastume MD;  Location: [facility] Main OR     CORONARY STENT PLACEMENT       CV CORONARY ANGIOGRAM N/A 10/25/2021    Procedure: Coronary Angiogram;  Surgeon: Otf Knolwes MD;  Location: [facility] LAB CV     CV CORONARY LITHOTRIPSY PCI N/A 10/25/2021    Procedure: CV Coronary Lithotripsy PCI;  Surgeon: Otf Knowles MD;  Location: [facility] LAB CV     CV LEFT HEART CATH N/A 10/25/2021    Procedure: Left Heart Cath;  Surgeon: Otf Knowles MD;  Location: [facility] LAB CV     CV PCI N/A 10/25/2021    Procedure: Percutaneous Coronary Intervention;  Surgeon: Otf Knowles MD;  Location: [facility] LAB CV     ESOPHAGOSCOPY, GASTROSCOPY, DUODENOSCOPY (EGD), COMBINED N/A 10/30/2021    Procedure: ESOPHAGOGASTRODUODENOSCOPY (EGD);  Surgeon: Sheng Sagastume MD;  Location: [facility] Main OR     IR ABDOMINAL AORTOGRAM  11/16/2012     IR MISCELLANEOUS PROCEDURE  7/20/2001     IR MISCELLANEOUS PROCEDURE  11/16/2012     OTHER SURGICAL HISTORY      mynor     SHOULDER SURGERY      reapir on right shoulder     TOTAL HIP ARTHROPLASTY Left      TOTAL KNEE ARTHROPLASTY Bilateral      WRIST SURGERY Bilateral      ZZHC COLONOSCOPY W/WO BRUSH/WASH N/A 1/14/2021    Procedure: COLONOSCOPY;  Surgeon: Mihai Harris MD;  Location: [facility] Main OR;  Service: Gastroenterology     Family History   Problem Relation Age of Onset     Hyperlipidemia  Mother      Hypertension Mother      Heart Disease Mother      Hyperlipidemia Father      Hypertension Father      Coronary Artery Disease Father      Cerebrovascular Disease Brother      Depression Brother      No Known Problems Sister      Pulmonary Hypertension No family hx of      Congenital heart disease No family hx of         Social History     Socioeconomic History     Marital status:      Spouse name: Not on file     Number of children: 4     Years of education: Not on file     Highest education level: Not on file   Occupational History     Not on file   Tobacco Use     Smoking status: Former Smoker     Packs/day: 1.50     Years: 44.00     Pack years: 66.00     Types: Cigarettes     Quit date: 1996     Years since quittin.6     Smokeless tobacco: Never Used   Substance and Sexual Activity     Alcohol use: Yes     Alcohol/week: 1.0 standard drink     Comment: Alcoholic Drinks/day: 1 per month     Drug use: No     Sexual activity: Not Currently     Partners: Female   Other Topics Concern     Not on file   Social History Narrative     Not on file     Social Determinants of Health     Financial Resource Strain: Not on file   Food Insecurity: Not on file   Transportation Needs: Not on file   Physical Activity: Not on file   Stress: Not on file   Social Connections: Not on file   Intimate Partner Violence: Not on file   Housing Stability: Not on file         Medications  Allergies   No current outpatient medications on file.        Allergies   Allergen Reactions     Furosemide Muscle Pain (Myalgia)     Previously tolerated.  Muscle cramps     Hydrochlorothiazide Unknown     Iodinated Contrast Media [Diagnostic X-Ray Materials] Nausea     Losartan Other (See Comments)     Other reaction(s): Stomatitis, Bloody nose dry mouth and lips     Losartan-Hydrochlorothiazide [Hyzaar]      Mouth sores     Metaxalone Nausea     Metolazone Other (See Comments)     Muscle cramps     Mometasone Other (See  Comments)     Bloody nose     Penicillins Other (See Comments)     Immune-does not work for him     Rabeprazole Other (See Comments)     Mouth sores     Ramipril Other (See Comments)     Mouth sores     Shellfish Containing Products [Shellfish-Derived Products] Unknown     Other reaction(s): mouth sores, Other reaction(s): mouth sores     Sildenafil Muscle Pain (Myalgia)     Methocarbamol Rash         Mandi Jhaveri MD

## 2021-12-01 NOTE — CONSULTS
Care Management Initial Consult    General Information  Assessment completed with: Patient,    Type of CM/SW Visit: Initial Assessment    Primary Care Provider verified and updated as needed: Yes   Readmission within the last 30 days: no previous admission in last 30 days      Reason for Consult: discharge planning  Advance Care Planning:            Communication Assessment  Patient's communication style: spoken language (English or Bilingual)    Hearing Difficulty or Deaf: yes   Wear Glasses or Blind: no    Cognitive  Cognitive/Neuro/Behavioral: WDL                      Living Environment:   People in home: spouse     Current living Arrangements: house      Able to return to prior arrangements: yes       Family/Social Support:  Care provided by: self  Provides care for: no one  Marital Status:   Wife,Children          Description of Support System: Involved,Supportive    Support Assessment: Adequate social supports    Current Resources:   Patient receiving home care services: No     Community Resources: None  Equipment currently used at home: walker, standard  Supplies currently used at home: None    Employment/Financial:  Employment Status: retired        Financial Concerns: No concerns identified   Referral to Financial Counselor: No       Lifestyle & Psychosocial Needs:  Social Determinants of Health     Tobacco Use: Medium Risk     Smoking Tobacco Use: Former Smoker     Smokeless Tobacco Use: Never Used   Alcohol Use: Not on file   Financial Resource Strain: Not on file   Food Insecurity: Not on file   Transportation Needs: Not on file   Physical Activity: Not on file   Stress: Not on file   Social Connections: Not on file   Intimate Partner Violence: Not on file   Depression: Not on file   Housing Stability: Not on file       Functional Status:  Prior to admission patient needed assistance:   Dependent ADLs:: Independent  Dependent IADLs:: Independent  Assesssment of Functional Status: At functional  baseline    Mental Health Status:  Mental Health Status: No Current Concerns       Chemical Dependency Status:  Chemical Dependency Status: No Current Concerns             Values/Beliefs:  Spiritual, Cultural Beliefs, Jainism Practices, Values that affect care: no               Additional Information:  IRENE assessed. Pt resides at home with his wife and reports independence with ADLs/IADLs at baseline. He uses a standard walker with ambulation. Discharge goal to return home with family transport. No anticipated CM needs, pending medical progression.    Teresa Chou LGSW

## 2021-12-01 NOTE — PLAN OF CARE
Problem: Adult Inpatient Plan of Care  Goal: Plan of Care Review  Outcome: Improving   Denies pain. Afebrile. Currently on 4L o2 via NC which is baseline for patient. Bumex administered for diuresis.

## 2021-12-01 NOTE — H&P
[facility]    History and Physical - BFP Service       Date of Admission:  11/30/2021    Assessment & Plan      Red Sutherland is a 81 year old male w/ PMH of HFpEF, ischemic cardiomyopathy, Afib, CAD s/p HUNTER, NSTEMI, COPD on home O2 4L, NOVA on CPAP, pulmonary HTN, CKD3, cirrhosis, T2DM on insulin admitted on 11/30/2021 for dyspnea with presentation most consistent with CHF exacerbation.      Acute CHF exacerbation  HFpEF in setting of ischemic cardiomyopathy  Anasarca  Hydrocele  Presents with dyspnea, peripheral edema, cough, fatigue weight gain, anasarca, and hydrocele. Dry weight of 212 lb; weight upon admission of 242 lb. Exam significant for hypoxia, tachypnea, work of breathing, respiratory distress, crackles on lung ausculation and peripheral edema. Cardiac history includes CAD s/p PCI, HFpEF, pulmonary HTN, NOVA. EKG upon admission revealed junctional rhythm. Troponin 0.03.  (baseline 187-445). CXR revealed pulmonary vascular congestion and mild interstitial edema. Most recent echocardiogram in 10/23/2021 revealed LVEF 60-65%, RV pressure/volume overload w/ mod-severe dilation, L and R atrium mod dilated, mild valvular aortic stenosis. Etiology of exacerbation uncler, but could be to medication or dietary non-adherence, atrial fibrillation, uncontrolled hypertension or NOVA. No fever, leukocytosis, or image findings to suggest pneumonia. COVID negative. Low risk for DVT as on anticoagulation.  - Admit to inpatient  - Cardiology consult, recs and cares appreciated  - Cardiac telemetry  - Continuous pulse oximetry  - Oxygen support as needed, SpO2 goal >90%  - Diuresis with bumetanide IV 4mg BID   - Hold PTA bumex  - BMP in AM  - Strict I/Os, daily weights  - Hold PTA irbesartan in setting of NATHALY  - Continue PTA metoprolol tartrate  - Continue PTA spironolactone   - Continue PTA Digoxin  - Hold PTA KCl  - Potassium replacement protocol  - Mg    Atrial fibrillation,  permanent  Rate controlled & on anticoagulation.  Currently in junctional rhythm. Was refered for a watchman device due to recent GI bleed.  - Hold PTA apixaban as patient declined due to concern for GI bleed; day team to discuss benefits/risk with patient    CAD s/p PCI w/ HUNTER of left circumflex  NSTEMI  HTN  HLD  PVD  Was discontinued off aspirin & Effient on 11/18/21. Patient is scheduled for Ct Cardiac Morphology on 12/1/21.  - Continue PTA atorvastatin  - Continue PTA plavix (patient might have been taking aspirin instead which should be discontinued per 11/18 cardiology note)    CPAP & home O2 of 4L  NOVA  Severe pulmonary hypertension   Secondary to underlying lung disease and left heart disease  - Continue PTA tadalafil 20mg PO daily  - Discontinue PTA nitroglycerin SL PRN due to potential severe hypotension with tadalafil  - Continue PTA albuterol PRN  - Continue PTA Breo Ellipta  - CPAP overnight  - Supplemental O2    NATHALY  CKD 3  Cr 1.62, eGFR 40, BUN 39; baseline Cr 1.2-1.4. BUN/Cr ratio of ~25, so likely prerenal in setting of CHF exacerbation. Increase also likely secondary to increased diuretic. Incidental 3.5 cm left renal cyst noted on CT.  - Avoid nephrotoxic as able    Cirrhosis  LFTs normal; albumen mildly low at 3.3; INR 1.3 (on DOAC). Abdominal CT noted Cirrhosis with interval development of ascites right and left upper quadrants and in the pelvis, and subcutaneous edema. Reports no history of alcohol use disorder, IV drug use, viral hepatitis, or family history of liver disease.  - Viral hepatitis panel  - If workup negative, recommend outpatient GI referral    T2DM  Chronic pancreatitis  Obesity, BMI 33  Admission glucose of 199.  - Continue PTA invokana  - Continue PTA insulin glaragine at half of home dose  - Medium liding scale insulin  - Hold PTA metformin    Hx GI Bleed  Anemia, microcytic - stable  Iron deficiency anemia  Patient was admitted from 10/29-11/1 for GI bleed with melena due  to small AVM and internal hemorrhoids. Presented with dark stools and found to have a hemoglobin of 8 -dropped from 10.  Seen by GI and had a normal EGD on 10/30.  Had colonoscopy which revealed AVM which was treated with APC. Hgb 7.9 on admission; was 8.1 when discharged following GI bleed.  - Continue PTA ferrous glaconate    Scrotal fungal rash  - Clotrimazole    Restless leg  - Continue PTA ropinirole    Insomnia  - Continue PTA zolpidem    GERD  - Continue PTA omeprazole     Diet: Cardiac Diet  DVT Prophylaxis: DOAC and Pneumatic Compression Devices  Dixon Catheter: Not present  Central Lines: None  Code Status:   DNR/DNI    Disposition Plan   Expected discharge:  2 days recommended to prior living arrangement once improvement of O2 to baseline.     The patient's care was discussed with the Attending Physician, Dr. Hillman; patient will be seen by Dr. Plata in the morning.    Ben Brown MD PGY2  [facility]  ______________________________________________________________________    Chief Complaint   Dyspnea    History is obtained from the patient    History of Present Illness   Red Sutherland is a 81 year old male w/ PMH of NSTEMI, type 2 diabetes, CAD, atrial fibrillation  (previously on apixaban, prasugrel, ASA) who presents with shortness of breath.    Shortness of breath has progressively worsened over the past 6 days. He was seen at [facility] clinic on 11/26 and started on a higher dose of his Bumex. He reported being increased to Bumex, 4 mg in the morning and 3.5 mg in the afternoon to the ED provider; his typical dose is reportedly 2mg morning and 1.5mg in the afternoon. Symptom continued to worsen despite medication change. He has noted increased swelling in his legs, abdomen, and scrotum (with some redness & tenderness). Also notes 3 days of mildly productive clear-yellow sputum. Oxygen needs have not increased from baseline of 4L when at rest. No fever, chills, body  aches, chest pain, palpitation (though doesn't feel anything different when he is in Afib), orthopnea, abdominal pain, nausea, vomiting, constipation, dysuria, nor ongoing melena or hemotochezia.       Review of Systems    The 10 point Review of Systems is negative other than noted in the HPI or here.    Past Medical History    I have reviewed this patient's medical history and updated it with pertinent information if needed.   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 They put him on sildenafil as nitroprusside lowered systemic BP and with that the mean PA dropped from 54 to 49. Negative VQ at [facility] Dec 1, 2017.     RLS (restless legs syndrome)      Sleep apnea        Past Surgical History   I have reviewed this patient's surgical history and updated it with pertinent information if needed.  Past Surgical History:   Procedure Laterality Date     BACK SURGERY      lower back     CARDIAC CATHETERIZATION  12/13/2017    Right and left at [facility], mean PA 58, PCW 24 with V wave of 35, LVEDP of 18, with Nipride systemic BP, PVR and mean PA all declined     CARDIOVERSION  03/15/2013    for afib     CATARACT EXTRACTION Bilateral      COLONOSCOPY N/A 10/31/2021    Procedure: COLONOSCOPY WITH POLYPECTOMY;  Surgeon: Sheng Sagastume MD;  Location: [facility] Main OR     CORONARY STENT PLACEMENT       CV CORONARY ANGIOGRAM N/A 10/25/2021    Procedure: Coronary Angiogram;  Surgeon: Otf Knowles MD;  Location: [facility] CATH LAB CV     CV CORONARY LITHOTRIPSY PCI N/A 10/25/2021    Procedure: CV Coronary  Lithotripsy PCI;  Surgeon: Otf Knowles MD;  Location: [facility] LAB CV     CV LEFT HEART CATH N/A 10/25/2021    Procedure: Left Heart Cath;  Surgeon: Otf Knowles MD;  Location: [facility] LAB CV     CV PCI N/A 10/25/2021    Procedure: Percutaneous Coronary Intervention;  Surgeon: Otf Knowles MD;  Location: [facility] CV     ESOPHAGOSCOPY, GASTROSCOPY, DUODENOSCOPY (EGD), COMBINED N/A 10/30/2021    Procedure: ESOPHAGOGASTRODUODENOSCOPY (EGD);  Surgeon: Sheng Sagastume MD;  Location: [facility] OR     IR ABDOMINAL AORTOGRAM  2012     IR MISCELLANEOUS PROCEDURE  2001     IR MISCELLANEOUS PROCEDURE  2012     OTHER SURGICAL HISTORY      mynor     SHOULDER SURGERY      [facility] on right shoulder     TOTAL HIP ARTHROPLASTY Left      TOTAL KNEE ARTHROPLASTY Bilateral      WRIST SURGERY Bilateral      ZZHC COLONOSCOPY W/WO BRUSH/WASH N/A 2021    Procedure: COLONOSCOPY;  Surgeon: Mihai Harris MD;  Location: [facility];  Service: Gastroenterology       Social History   I have reviewed this patient's social history and updated it with pertinent information if needed.  Social History     Tobacco Use     Smoking status: Former Smoker     Packs/day: 1.50     Years: 44.00     Pack years: 66.00     Types: Cigarettes     Quit date: 1996     Years since quittin.6     Smokeless tobacco: Never Used   Substance Use Topics     Alcohol use: Yes     Alcohol/week: 1.0 standard drink     Comment: Alcoholic Drinks/day: 1 per month     Drug use: No       Family History   I have reviewed this patient's family history and updated it with pertinent information if needed.  Family History   Problem Relation Age of Onset     Hyperlipidemia Mother      Hypertension Mother      Heart Disease Mother      Hyperlipidemia Father      Hypertension Father      Coronary Artery Disease Father      Cerebrovascular Disease Brother      Depression Brother      No Known Problems  "Sister      Pulmonary Hypertension No family hx of      Congenital heart disease No family hx of        Prior to Admission Medications   Prior to Admission Medications   Prescriptions Last Dose Informant Patient Reported? Taking?   ACCU-CHEK JOSE PLUS TEST STRP strips   Yes No   Sig: [ACCU-CHEK JOSE PLUS TEST STRP STRIPS] see administration instructions.   BD ULTRA-FINE MANISHA PEN NEEDLES 32 gauge x 5\" Ndle   Yes No   Sig: [BD ULTRA-FINE MANISHA PEN NEEDLES 32 GAUGE X 5/32\" NDLE]    Ferrous Gluconate 324 (37.5 Fe) MG TABS   Yes No   Sig: Take 1 tablet by mouth every other day   Garlic 1000 MG CAPS   Yes No   Sig: Take 1 capsule by mouth daily   OXYGEN-AIR DELIVERY SYSTEMS MISC   Yes No   Sig: [OXYGEN-AIR DELIVERY SYSTEMS MISC] Use As Directed.   acetaminophen (TYLENOL) 500 MG tablet   Yes No   Sig: [ACETAMINOPHEN (TYLENOL) 500 MG TABLET] Take 1,000 mg by mouth every 6 (six) hours as needed. First line of pain management. Do Not take more than 4,000 mg in 24 hours.   albuterol (PROVENTIL HFA;VENTOLIN HFA) 90 mcg/actuation inhaler   Yes No   Sig: Inhale 2 puffs into the lungs every 6 hours as needed    amLODIPine (NORVASC) 10 MG tablet   Yes No   Sig: Take 10 mg by mouth daily   aspirin (ASA) 81 MG chewable tablet   No No   Sig: Take 1 tablet (81 mg) by mouth daily Starting tomorrow.   atorvastatin (LIPITOR) 80 MG tablet   No No   Sig: Take 1 tablet (80 mg) by mouth every morning   bumetanide (BUMEX) 2 MG tablet   No No   Si mg in am, 3 mg in afternoon   canagliflozin (INVOKANA) 100 mg Tab   Yes No   Sig: Take 100 mg by mouth every morning    digoxin (LANOXIN) 125 mcg tablet   No No   Sig: [DIGOXIN (LANOXIN) 125 MCG TABLET] Take 1 tablet (125 mcg total) by mouth daily.   fluticasone-vilanterol (BREO ELLIPTA) 200-25 mcg/dose DsDv inhaler   Yes No   Sig: Inhale 1 puff into the lungs daily    insulin glargine (LANTUS PEN) 100 UNIT/ML pen   No No   Sig: Inject 20 Units Subcutaneous 2 times daily   ipratropium - " albuterol 0.5 mg/2.5 mg/3 mL (DUONEB) 0.5-2.5 (3) MG/3ML neb solution   Yes No   Sig: Take 1 vial by nebulization 4 times daily as needed for shortness of breath / dyspnea    irbesartan (AVAPRO) 300 MG tablet   Yes No   Sig: Take 300 mg by mouth daily    lidocaine (LIDODERM) 5 %   Yes No   Sig: Place 1 patch onto the skin daily as needed    metFORMIN (GLUCOPHAGE-XR) 500 MG 24 hr tablet   No No   Sig: Take 2 tablets (1,000 mg) by mouth daily (with dinner)   metoprolol tartrate (LOPRESSOR) 25 MG tablet   No No   Sig: Take 1 tablet (25 mg) by mouth 2 times daily (with meals)   multivitamin w/minerals (THERA-VIT-M) tablet   Yes No   Sig: Take 1 tablet by mouth daily   nitroglycerin (NITROSTAT) 0.4 MG SL tablet   Yes No   Sig: [NITROGLYCERIN (NITROSTAT) 0.4 MG SL TABLET] Place 0.4 mg under the tongue every 5 (five) minutes as needed for chest pain.   omeprazole (PRILOSEC) 20 MG DR capsule   Yes No   Sig: Take 20 mg by mouth daily   potassium chloride (K-DUR,KLOR-CON) 20 MEQ tablet   Yes No   Sig: Take 40 mEq by mouth daily    prasugrel (EFFIENT) 10 MG TABS tablet   No No   Sig: Take 1 tablet (10 mg) by mouth daily Do not crush or break tablet. Dose to start tomorrow.   rOPINIRole (REQUIP) 2 MG tablet   Yes No   Sig: [ROPINIROLE (REQUIP) 2 MG TABLET] Take 2 mg by mouth at bedtime.    tadalafil (CIALIS) 20 MG tablet   Yes No   Sig: Take 1 tablet (20 mg) by mouth daily   vitamin C (ASCORBIC ACID) 1000 MG TABS   Yes No   Sig: Take 1,000 mg by mouth daily   zinc gluconate 50 MG tablet   Yes No   Sig: Take 50 mg by mouth daily   zolpidem (AMBIEN) 10 mg tablet   Yes No   Sig: [ZOLPIDEM (AMBIEN) 10 MG TABLET] Take 10 mg by mouth bedtime.      Facility-Administered Medications: None     Allergies   Allergies   Allergen Reactions     Furosemide Muscle Pain (Myalgia)     Previously tolerated.  Muscle cramps     Hydrochlorothiazide Unknown     Iodinated Contrast Media [Diagnostic X-Ray Materials] Nausea     Losartan Other (See  Comments)     Other reaction(s): Stomatitis, Bloody nose dry mouth and lips     Losartan-Hydrochlorothiazide [Hyzaar]      Mouth sores     Metaxalone Nausea     Metolazone Other (See Comments)     Muscle cramps     Mometasone Other (See Comments)     Bloody nose     Penicillins Other (See Comments)     Immune-does not work for him     Rabeprazole Other (See Comments)     Mouth sores     Ramipril Other (See Comments)     Mouth sores     Shellfish Containing Products [Shellfish-Derived Products] Unknown     Other reaction(s): mouth sores, Other reaction(s): mouth sores     Sildenafil Muscle Pain (Myalgia)     Methocarbamol Rash       Physical Exam   Vital Signs: Temp: 97  F (36.1  C)   BP: 132/62 Pulse: 78   Resp: 28 SpO2: 96 % O2 Device: Nasal cannula Oxygen Delivery: 4 LPM  Weight: 242 lbs 0 oz    Constitutional: awake, alert, cooperative, and appears stated age, hearing impairment  Eyes: R pupil 2mm, L pupil 5mm (post-operative complication from cataract surgery). Lids and lashes normal, extra ocular muscles intact, sclera clear, conjunctiva normal.  ENT: Normocephalic, without obvious abnormality, atraumatic, sinuses nontender on palpation, external ears without lesions, oral pharynx with moist mucous membranes, tonsils without erythema or exudates, gums normal and good dentition.  Hematologic / Lymphatic: no cervical lymphadenopathy  Respiratory: Mild respiratory distress, mildly dimminished air exchange and crackles bibasilar crackles  Cardiovascular: Normal apical impulse, regular rate and rhythm, normal S1 and S2, no S3 or S4; systolic murmur; 3+ bilateral lower extremity edema  GI: Obese and abdominal distention. No scars, normal bowel sounds, soft, non-tender  Genitounirinary: Large hydrocele present; anterior aspect of scrotum appears beefy red, hypertrophic  Skin: Scattered ecchymoses on arms; scrotal erythema described above; bilateral lower leg hyperpigmentation  Musculoskeletal: There is no redness,  warmth, or swelling of the joints.  Full range of motion noted.  Motor strength is 5 out of 5 all extremities bilaterally.  Tone is normal.  Neurologic: Awake, alert, oriented to name, place and time.  Cranial nerves II-XII are grossly intact.  Motor is 5 out of 5 bilaterally.  Cerebellar finger to nose, heel to shin intact.  Sensory is intact.  Babinski down going, Romberg negative, and gait is normal.  Neuropsychiatric: General: normal, calm and normal eye contact  Affect: normal  Memory and insight: normal, memory for past and recent events intact and thought process normal    Data   Data reviewed today: I reviewed all medications, new labs and imaging results over the last 24 hours. I personally reviewed the EKG tracing showing accelerated junctional rhythm, HR 77, no acute ischemic changes.    Recent Labs   Lab 11/30/21  1609 11/26/21  1918   WBC 7.7 7.2   HGB 7.9* 8.1*   MCV 74* 74*    186   INR 1.30*  --     142   POTASSIUM 4.4 4.0   CHLORIDE 108* 110*   CO2 23 18*   BUN 39* 37*   CR 1.62* 1.49*   ANIONGAP 9 14   ANNMARIE 8.9 8.4*   * 153*   ALBUMIN 3.3*  --    PROTTOTAL 6.2  --    BILITOTAL 1.9*  --    ALKPHOS 117  --    ALT 13  --    AST 18  --      Recent Results (from the past 24 hour(s))   XR Chest Port 1 View    Narrative    EXAM: XR CHEST PORT 1 VIEW  LOCATION: [facility]  DATE/TIME: 11/30/2021 4:28 PM    INDICATION: shortness of breath, concern for chf exacerbation  COMPARISON: 10/23/2021 and older studies.      Impression    IMPRESSION: Large body habitus. There is cardiomegaly, pulmonary vascular congestion and mild interstitial edema. Previously noted airspace opacities distributed throughout both lungs have resolved consistent with a resolved pneumonia.   CT Abdomen Pelvis w/o Contrast    Narrative    EXAM: CT ABDOMEN PELVIS W/O CONTRAST  LOCATION: [facility]  DATE/TIME: 11/30/2021 4:54 PM    INDICATION: Abdominal  distension  COMPARISON: CT chest, abdomen and pelvis 07/11/2021 and CT chest 10/12/2021  TECHNIQUE: CT scan of the abdomen and pelvis was performed without IV contrast. Multiplanar reformats were obtained. Dose reduction techniques were used.  CONTRAST: None.    FINDINGS:   LOWER CHEST: Moderate right pleural effusion has slightly increased in size since 10/12/2021. Small left pleural effusion with minimal change. Bilateral pulmonary infiltrates and septal thickening have improved. Scattered nodules both lungs as seen   previously including a 1.3 cm nodule right middle lobe, incompletely imaged. Underlying emphysema. Cardiomegaly.    HEPATOBILIARY: Cirrhotic changes of the liver. Small amount of ascites along the liver margin has increased. Cholecystectomy.    PANCREAS: Diffuse calcifications consistent with chronic pancreatitis, unchanged.    SPLEEN: Small amount of ascites left upper quadrant adjacent to the spleen.    ADRENAL GLANDS: Normal.    KIDNEYS/BLADDER: No renal calculi or hydronephrosis. 3.5 cm left renal cyst.     BOWEL: Diverticulosis sigmoid colon, without evidence for diverticulitis or bowel obstruction. Diffuse gastric wall thickening, incompletely distended.    LYMPH NODES: Normal.    VASCULATURE: Severe atherosclerotic disease abdominal aorta and iliac arteries.    PELVIC ORGANS: Small amount of pelvic ascites.    MUSCULOSKELETAL: Total left hip arthroplasty. Advanced degenerative disc disease lower thoracic and lumbar spine. Edematous changes subcutaneous tissues is new.      Impression    IMPRESSION:   1.  No evidence for bowel obstruction.  2.  Gastric wall thickening could be related to incomplete distention. Gastritis could have a similar appearance.  3.  Cirrhosis with interval development of ascites right and left upper quadrants and in the pelvis, and subcutaneous edema.  4.  Chronic pancreatitis.  5.  Advanced atherosclerotic disease.  6.  Infiltrates both lower lung fields have improved.  Moderate size right pleural effusion is slightly larger.

## 2021-12-02 LAB
% LINING CELLS, BODY FLUID: 3 %
ALBUMIN FLD-MCNC: 2.2 G/DL
ALBUMIN SERPL-MCNC: 3.4 G/DL (ref 3.5–5)
ANION GAP SERPL CALCULATED.3IONS-SCNC: 11 MMOL/L (ref 5–18)
ANION GAP SERPL CALCULATED.3IONS-SCNC: 9 MMOL/L (ref 5–18)
APPEARANCE FLD: ABNORMAL
BUN SERPL-MCNC: 39 MG/DL (ref 8–28)
BUN SERPL-MCNC: 40 MG/DL (ref 8–28)
CALCIUM SERPL-MCNC: 9.2 MG/DL (ref 8.5–10.5)
CALCIUM SERPL-MCNC: 9.4 MG/DL (ref 8.5–10.5)
CHLORIDE BLD-SCNC: 103 MMOL/L (ref 98–107)
CHLORIDE BLD-SCNC: 104 MMOL/L (ref 98–107)
CO2 SERPL-SCNC: 25 MMOL/L (ref 22–31)
CO2 SERPL-SCNC: 26 MMOL/L (ref 22–31)
COLOR FLD: ABNORMAL
CREAT SERPL-MCNC: 1.53 MG/DL (ref 0.7–1.3)
CREAT SERPL-MCNC: 1.55 MG/DL (ref 0.7–1.3)
ERYTHROCYTE [DISTWIDTH] IN BLOOD BY AUTOMATED COUNT: 17.3 % (ref 10–15)
GFR SERPL CREATININE-BSD FRML MDRD: 42 ML/MIN/1.73M2
GFR SERPL CREATININE-BSD FRML MDRD: 43 ML/MIN/1.73M2
GLUCOSE BLD-MCNC: 120 MG/DL (ref 70–125)
GLUCOSE BLD-MCNC: 126 MG/DL (ref 70–125)
GLUCOSE BLDC GLUCOMTR-MCNC: 107 MG/DL (ref 70–99)
GLUCOSE BLDC GLUCOMTR-MCNC: 112 MG/DL (ref 70–99)
GLUCOSE BLDC GLUCOMTR-MCNC: 117 MG/DL (ref 70–99)
GLUCOSE BLDC GLUCOMTR-MCNC: 156 MG/DL (ref 70–99)
HCT VFR BLD AUTO: 27.7 % (ref 40–53)
HGB BLD-MCNC: 8.1 G/DL (ref 13.3–17.7)
LYMPHOCYTES NFR FLD MANUAL: 17 %
MCH RBC QN AUTO: 20.9 PG (ref 26.5–33)
MCHC RBC AUTO-ENTMCNC: 29.2 G/DL (ref 31.5–36.5)
MCV RBC AUTO: 72 FL (ref 78–100)
MONOS+MACROS NFR FLD MANUAL: 68 %
NEUTS BAND NFR FLD MANUAL: 12 %
PHOSPHATE SERPL-MCNC: 4.4 MG/DL (ref 2.5–4.5)
PLATELET # BLD AUTO: 207 10E3/UL (ref 150–450)
POTASSIUM BLD-SCNC: 3.9 MMOL/L (ref 3.5–5)
POTASSIUM BLD-SCNC: 3.9 MMOL/L (ref 3.5–5)
POTASSIUM BLD-SCNC: 4 MMOL/L (ref 3.5–5)
RBC # BLD AUTO: 3.87 10E6/UL (ref 4.4–5.9)
RBC # FLD: ABNORMAL /UL
SODIUM SERPL-SCNC: 138 MMOL/L (ref 136–145)
SODIUM SERPL-SCNC: 140 MMOL/L (ref 136–145)
WBC # BLD AUTO: 7.8 10E3/UL (ref 4–11)
WBC # FLD AUTO: 838 /UL

## 2021-12-02 PROCEDURE — 250N000013 HC RX MED GY IP 250 OP 250 PS 637: Performed by: FAMILY MEDICINE

## 2021-12-02 PROCEDURE — 82040 ASSAY OF SERUM ALBUMIN: CPT | Performed by: STUDENT IN AN ORGANIZED HEALTH CARE EDUCATION/TRAINING PROGRAM

## 2021-12-02 PROCEDURE — 80069 RENAL FUNCTION PANEL: CPT | Performed by: FAMILY MEDICINE

## 2021-12-02 PROCEDURE — 250N000013 HC RX MED GY IP 250 OP 250 PS 637: Performed by: STUDENT IN AN ORGANIZED HEALTH CARE EDUCATION/TRAINING PROGRAM

## 2021-12-02 PROCEDURE — 84132 ASSAY OF SERUM POTASSIUM: CPT

## 2021-12-02 PROCEDURE — 99233 SBSQ HOSP IP/OBS HIGH 50: CPT | Performed by: INTERNAL MEDICINE

## 2021-12-02 PROCEDURE — 85027 COMPLETE CBC AUTOMATED: CPT

## 2021-12-02 PROCEDURE — 250N000011 HC RX IP 250 OP 636: Performed by: INTERNAL MEDICINE

## 2021-12-02 PROCEDURE — 36415 COLL VENOUS BLD VENIPUNCTURE: CPT

## 2021-12-02 PROCEDURE — 36415 COLL VENOUS BLD VENIPUNCTURE: CPT | Performed by: FAMILY MEDICINE

## 2021-12-02 PROCEDURE — 120N000001 HC R&B MED SURG/OB

## 2021-12-02 PROCEDURE — 99233 SBSQ HOSP IP/OBS HIGH 50: CPT | Mod: GC

## 2021-12-02 RX ADMIN — CLOTRIMAZOLE: 1 CREAM TOPICAL at 09:27

## 2021-12-02 RX ADMIN — CLOTRIMAZOLE: 1 CREAM TOPICAL at 21:04

## 2021-12-02 RX ADMIN — INSULIN GLARGINE 10 UNITS: 100 INJECTION, SOLUTION SUBCUTANEOUS at 21:11

## 2021-12-02 RX ADMIN — ROPINIROLE 2 MG: 1 TABLET, FILM COATED ORAL at 21:04

## 2021-12-02 RX ADMIN — PANTOPRAZOLE SODIUM 40 MG: 20 TABLET, DELAYED RELEASE ORAL at 06:45

## 2021-12-02 RX ADMIN — Medication 1 MG: at 22:47

## 2021-12-02 RX ADMIN — METOPROLOL TARTRATE 25 MG: 25 TABLET, FILM COATED ORAL at 09:25

## 2021-12-02 RX ADMIN — INSULIN GLARGINE 10 UNITS: 100 INJECTION, SOLUTION SUBCUTANEOUS at 09:27

## 2021-12-02 RX ADMIN — DIGOXIN 125 MCG: 0.12 TABLET ORAL at 17:32

## 2021-12-02 RX ADMIN — SPIRONOLACTONE 12.5 MG: 25 TABLET ORAL at 21:04

## 2021-12-02 RX ADMIN — EMPAGLIFLOZIN 10 MG: 10 TABLET, FILM COATED ORAL at 09:26

## 2021-12-02 RX ADMIN — BUMETANIDE 2 MG: 0.25 INJECTION, SOLUTION INTRAMUSCULAR; INTRAVENOUS at 01:18

## 2021-12-02 RX ADMIN — SPIRONOLACTONE 12.5 MG: 25 TABLET ORAL at 09:27

## 2021-12-02 RX ADMIN — ATORVASTATIN CALCIUM 80 MG: 40 TABLET, FILM COATED ORAL at 09:25

## 2021-12-02 RX ADMIN — METOPROLOL TARTRATE 25 MG: 25 TABLET, FILM COATED ORAL at 17:32

## 2021-12-02 RX ADMIN — BUMETANIDE 2 MG: 0.25 INJECTION, SOLUTION INTRAMUSCULAR; INTRAVENOUS at 17:32

## 2021-12-02 RX ADMIN — ZOLPIDEM TARTRATE 2.5 MG: 5 TABLET ORAL at 22:47

## 2021-12-02 RX ADMIN — FLUTICASONE FUROATE AND VILANTEROL TRIFENATATE 1 PUFF: 200; 25 POWDER RESPIRATORY (INHALATION) at 09:27

## 2021-12-02 RX ADMIN — TADALAFIL 20 MG: 10 TABLET, FILM COATED ORAL at 09:27

## 2021-12-02 RX ADMIN — BUMETANIDE 2 MG: 0.25 INJECTION, SOLUTION INTRAMUSCULAR; INTRAVENOUS at 09:27

## 2021-12-02 RX ADMIN — CLOPIDOGREL BISULFATE 75 MG: 75 TABLET, FILM COATED ORAL at 09:26

## 2021-12-02 ASSESSMENT — MIFFLIN-ST. JEOR: SCORE: 1806.71

## 2021-12-02 ASSESSMENT — ACTIVITIES OF DAILY LIVING (ADL)
ADLS_ACUITY_SCORE: 14

## 2021-12-02 NOTE — PROGRESS NOTES
[facility]    Progress Note       Date of Admission:  11/30/2021    Assessment & Plan             Red Sutherland is a 81 year old male w/ PMH of HFpEF, ischemic cardiomyopathy, Afib, CAD s/p HUNTER, NSTEMI, COPD on home O2 4L, NOVA on CPAP, pulmonary HTN, CKD3, cirrhosis, T2DM on insulin admitted on 11/30/2021 for dyspnea with presentation most consistent with CHF exacerbation.       Acute CHF exacerbation  HFpEF in setting of ischemic cardiomyopathy  Anasarca  Hydrocele  Overall clinical improvement, weight has decreased from admission. Dry weight of 212 lb; weight upon admission of 242 lb. No significant change to BUN and Cr. Cardiology following. Bilateral LE US showing no DVT. Potassium has been normal.   - Continuous pulse oximetry  - Oxygen support as needed, SpO2 goal >90%  - Diuresis with bumetanide IV 2mg q8hrs per cardiology  - Hold PTA bumex  - BMP to trend  - Strict I/Os, daily weights  - Hold PTA irbesartan in setting of NATHALY  - Continue PTA metoprolol tartrate  - Continue PTA spironolactone   - Continue PTA Digoxin  - Hold PTA KCl  - Potassium replacement protocol  - Mg  -Cardiology consulted, appreciate recommendations (See note):   -Start Bumex 2 mg IV every 8.  Consider metolazone if need increased diuresis.   -following up upon discharge to heart failure clinic    -Recommend Plavix and Eliquis for anticoagulation.    Cirrhosis  Ascites, s/p paracentesis   LFTs normal; albumen mildly low at 3.3; INR 1.3. Viral panel is negative. Ferritin is normal. Paracentesis removing 0.5L of yellow fluid.   -Paracentesis cell count and albumin: pending   -Recommend outpatient GI referral     Atrial fibrillation, permanent  Rate controlled & on anticoagulation. Was refered for a watchman device due to recent GI bleed.  - Hold PTA apixaban as patient declined due to concern for GI bleed; recommended by both medicine team and cardiology, though patient declined     CAD s/p PCI w/ HUNTER of  left circumflex  NSTEMI  HTN  HLD  PVD  Was discontinued off aspirin & Effient on 11/18/21. Patient is scheduled for Ct Cardiac Morphology on 12/1/21.  - Continue PTA atorvastatin  - Continue PTA Plavix post procedure      CPAP & home O2 of 4L  NOVA  Severe pulmonary hypertension   Secondary to underlying lung disease and left heart disease  - Continue PTA tadalafil 20mg PO daily  - Discontinue PTA nitroglycerin SL PRN due to potential severe hypotension with tadalafil  - Continue PTA albuterol PRN  - Continue PTA Breo Ellipta  - CPAP overnight  - Supplemental O2     NATHALY  CKD 3  Cr 1.62, eGFR 40, BUN 39 on admission, has been stable; baseline Cr 1.2-1.4. BUN/Cr ratio of ~25, so likely prerenal in setting of CHF exacerbation. Increase also likely secondary to increased diuretic.  - Avoid nephrotoxic as able    T2DM on insulin   Chronic pancreatitis  Obesity, BMI 33  Glucose has been only mildly elevated.   - Continue PTA invokana  - Continue PTA insulin glaragine at half of home dose  - Medium liding scale insulin  - Hold PTA metformin     Hx GI Bleed  Anemia, microcytic - stable  Iron deficiency anemia  Hgb 7.9 on admission; was 8.1 when discharged following GI bleed. Hgb has been stable, no signs of active bleeding.   - Continue PTA ferrous glaconate     Scrotal fungal rash  - Clotrimazole     Restless leg  - Continue PTA ropinirole     Insomnia  - Continue PTA zolpidem     GERD  - Continue PTA omeprazole      Diet: Combination Diet Low Saturated Fat Na <2400mg Diet, No Caffeine Diet    DVT Prophylaxis: DOAC and Pneumatic Compression Devices  Dixon Catheter: Not present  Fluids: PO  Central Lines: None  Code Status: No CPR- Do NOT Intubate      Disposition Plan   Expected discharge: 12/04/2021   recommended to prior living arrangement once CHF exacerbation improved.     The patient's care was discussed with the Attending Physician, Dr. Plata.    Deirdre Valdovinos MD  [facility]      ______________________________________________________________________    Interval History   Afebrile, decreased to 2.5L from 4L of oxygen supplementation, and VS. Per patient: Mild improvement to SOB, mild improvement in hydrocele. No chest pain. No abdominal pain. No change to lower extremity edema. Discussed results and overall course.     Data reviewed today: I reviewed all medications, new labs and imaging results over the last 24 hours. I personally reviewed no images or EKG's today.    Physical Exam   Vital Signs: Temp: 97.5  F (36.4  C) Temp src: Oral BP: 134/60 Pulse: 79   Resp: 20 SpO2: 94 % O2 Device: Nasal cannula Oxygen Delivery: 2.5 LPM  Weight: 242 lbs 6.4 oz  Physical Exam  Constitutional:       General: He is not in acute distress.     Appearance: Normal appearance. He is not ill-appearing or diaphoretic.   Eyes:      Extraocular Movements: Extraocular movements intact.      Conjunctiva/sclera: Conjunctivae normal.   Cardiovascular:      Rate and Rhythm: Normal rate and regular rhythm.      Comments: 3/6 decrescendo systolic murmur  Pulmonary:      Effort: Pulmonary effort is normal.      Comments: Clear to ascultation bilaterally, no crackles, good air movement. No wheezes  Abdominal:      General: There is distension.      Tenderness: There is no abdominal tenderness. There is no guarding or rebound.      Comments: Distension mildly improved   Musculoskeletal:      Cervical back: Normal range of motion.      Comments: 3+ pitting edema up to knee on L side, improved. 3+ to knee on R. L>R. Tenderness to palpation bilaterally, R>L   Skin:     General: Skin is warm and dry.   Neurological:      General: No focal deficit present.      Mental Status: He is alert and oriented to person, place, and time.   Psychiatric:         Mood and Affect: Mood normal.         Behavior: Behavior normal.         Data   Recent Labs   Lab 12/02/21  0632 12/02/21  0625 12/01/21  2103 12/01/21  1833  12/01/21  0617 12/01/21  0556 12/01/21  0148 11/30/21  1609 11/26/21  1918   WBC  --   --   --   --   --   --   --  7.7 7.2   HGB  --   --   --   --   --   --   --  7.9* 8.1*   MCV  --   --   --   --   --   --   --  74* 74*   PLT  --   --   --   --   --   --   --  211 186   INR  --   --   --   --   --   --   --  1.30*  --    NA  --   --   --   --   --  138  --  140 142   POTASSIUM  --  3.9  --   --   --  4.1  --  4.4 4.0   CHLORIDE  --   --   --   --   --  105  --  108* 110*   CO2  --   --   --   --   --  24  --  23 18*   BUN  --   --   --   --   --  36*  --  39* 37*   CR  --   --   --   --   --  1.50*  --  1.62* 1.49*   ANIONGAP  --   --   --   --   --  9  --  9 14   ANNMARIE  --   --   --   --   --  9.2  --  8.9 8.4*   *  --  180* 122*   < > 193*   < > 199* 153*   ALBUMIN  --   --   --   --   --   --   --  3.3*  --    PROTTOTAL  --   --   --   --   --   --   --  6.2  --    BILITOTAL  --   --   --   --   --   --   --  1.9*  --    ALKPHOS  --   --   --   --   --   --   --  117  --    ALT  --   --   --   --   --   --   --  13  --    AST  --   --   --   --   --   --   --  18  --     < > = values in this interval not displayed.     Recent Results (from the past 24 hour(s))   US Lower Extremity Venous Duplex Bilateral    Narrative    EXAM: US LOWER EXTREMITY VENOUS DUPLEX BILATERAL  LOCATION: [facility]  DATE/TIME: 12/1/2021 2:43 PM    INDICATION: Lower extremity edema and tenderness.  COMPARISON: None.  TECHNIQUE: Venous Duplex ultrasound of bilateral lower extremities with and without compression, augmentation and duplex. Color flow and spectral Doppler with waveform analysis performed.    FINDINGS: Exam includes the common femoral, femoral, popliteal veins as well as segmentally visualized deep calf veins and greater saphenous vein.     RIGHT: No deep vein thrombosis. No superficial thrombophlebitis. No popliteal cyst.    LEFT: No deep vein thrombosis. No superficial thrombophlebitis. No  popliteal cyst.      Impression    IMPRESSION:    1.  No deep venous thrombosis in the bilateral lower extremities.    2.  Subcutaneous edema.   US Paracentesis    Narrative    EXAM:  1. PARACENTESIS  2. ULTRASOUND GUIDANCE  LOCATION: [facility]  DATE/TIME: 12/1/2021 2:42 PM    INDICATION: Ascites.    PROCEDURE: Informed consent obtained. Time out performed. The abdomen was prepped and draped in a sterile fashion. 10 mL of 1% lidocaine was infused into local soft tissues. A 5 Slovenian catheter system was introduced into the abdominal ascites under   ultrasound guidance.    0.5 liters of yellow fluid were removed and sent to lab if requested.    Patient tolerated procedure well.    Ultrasound imaging was obtained and placed in the patient's permanent medical record.      Impression    IMPRESSION:  1.  Status post ultrasound-guided paracentesis.    Reference CPT Code: 19104

## 2021-12-02 NOTE — PLAN OF CARE
VSS. No pain. Pt was weaned to 2L O2 on previous shift, RN not able to try weaning further this shift, O2 sats 90 and above. Bilateral LE edema 2+. SOB with exertion. Breath sounds clear. A&O x4. Pt receiving bumex for fluid excess.    Problem: Gas Exchange Impaired  Goal: Optimal Gas Exchange  Outcome: Improving     Problem: Fluid Volume Excess  Goal: Fluid Balance  Outcome: Improving

## 2021-12-02 NOTE — PLAN OF CARE
Problem: Fluid Volume Excess  Goal: Fluid Balance  Outcome: Improving    Admitted 11/30 with acute on chronic diastolic congestive heart failure. A/O, VSS- 2L NC (baseline). Dyspnea on exertion. Diuresis with IV bumex- voiding frequently, clear output. BLE edema 2+, reddened on shins.     Plan: Continue to Diuresis, Monitor Labs    Discharge: TBD- pending. Home with wife once medically stable.

## 2021-12-02 NOTE — PLAN OF CARE
Problem: Gas Exchange Impaired  Goal: Optimal Gas Exchange  Outcome: Improving     Pt was on 4L O2 NC, but was able to wean down to 2LO2. Some cough noted. Paracentesis done today, 0.5L removed. BLE ultrasound done (negative for dvt). Scrotum is swollen, cream placed. Continues to urinate, recording intake/output. Call light within reach.

## 2021-12-02 NOTE — PROGRESS NOTES
"  HEART CARE CONSULTATON NOTE        Assessment/Recommendations   Assessment:  1.  Acute heart failure with preserved ejection fraction and right heart failure: Weight gain with increased shortness of breath and edema as well as anasarca  2.  Ascites and cirrhosis of the liver: Primary team scheduling for paracentesis today  3.  Severe COPD on 4 L of home O2  4.  Who group 2 pulmonary hypertension  5.  NOVA on CPAP  6.  Atrial fibrillation: Chronic rate controlled on digoxin and metoprolol  7.  Lower GI bleed in the setting of being on Plavix, Effient, aspirin as well as Eliquis last month     Plan:  1.  Continue spironolactone  2.  Continue bumex 2 mg IV every 8.  Consider metolazone if need increased diuresis.  3.  Daily weights, strict I's and O's, monitor creatinine  4.  Will need close follow-up in our heart failure clinic.  Recommend following up upon discharge and her heart failure clinic within a week.  Follow-up with Dr Lowery in 4 to 6 weeks.  Can continue to pursue watchman device as outpatient  5.  Recommend Plavix and Eliquis for anticoagulation.  Patient is reluctant to be on Eliquis at this time despite understanding the risks.       History of Present Illness/Subjective    Still very volume overloaded. Breathing comfortably.  No chest pain       Physical Examination  Review of Systems   VITALS: /56 (BP Location: Right arm)   Pulse 65   Temp 97.2  F (36.2  C) (Oral)   Resp 22   Ht 1.803 m (5' 11\")   Wt 107 kg (235 lb 12.8 oz)   SpO2 95%   BMI 32.89 kg/m    BMI: Body mass index is 32.89 kg/m .  Wt Readings from Last 3 Encounters:   12/02/21 107 kg (235 lb 12.8 oz)   11/01/21 96.3 kg (212 lb 6.5 oz)   10/26/21 103.2 kg (227 lb 9.6 oz)       Intake/Output Summary (Last 24 hours) at 12/2/2021 1203  Last data filed at 12/2/2021 1130  Gross per 24 hour   Intake 1950 ml   Output 4580 ml   Net -2630 ml     General Appearance:   no distress, normal body habitus   ENT/Mouth: membranes moist, no " oral lesions or bleeding gums.      EYES:  no scleral icterus, normal conjunctivae   Neck: no carotid bruits or thyromegaly   Chest/Lungs:   lungs are clear to auscultation   Cardiovascular:   irregular. Normal first and second heart sounds with systolic murmur +++ edema bilaterally    Abdomen:  no organomegaly, masses, bruits, or tenderness; bowel sounds are present   Extremities: no cyanosis or clubbing   Skin: no xanthelasma, warm.    Neurologic: normal  bilateral, no tremors     Psychiatric: alert and oriented x3, calm     Review Of Systems  Skin: negative  Eyes: negative  Ears/Nose/Throat: negative  Respiratory: No shortness of breath, dyspnea on exertion, cough, or hemoptysis  Cardiovascular: negative  Gastrointestinal: negative  Genitourinary: negative  Musculoskeletal: negative  Neurologic: negative  Psychiatric: negative  Hematologic/Lymphatic/Immunologic: negative  Endocrine: negative          Lab Results    Chemistry/lipid CBC Cardiac Enzymes/BNP/TSH/INR   Recent Labs   Lab Test 10/25/21  1529   CHOL 98   HDL 35*   LDL 53   TRIG 49     Recent Labs   Lab Test 10/25/21  1529 05/25/21  1359 09/02/20  1307   LDL 53 <5 50     Recent Labs   Lab Test 12/02/21  1123      POTASSIUM 4.0   CHLORIDE 103   CO2 26   *   BUN 40*   CR 1.55*   GFRESTIMATED 42*   ANNMARIE 9.4     Recent Labs   Lab Test 12/02/21  1123 12/02/21  0625 12/01/21  0556   CR 1.55* 1.53* 1.50*     Recent Labs   Lab Test 10/23/21  1824 07/11/21  1031 04/10/20  0534   A1C 6.3* 9.3* 10.8*          Recent Labs   Lab Test 12/02/21  1123   WBC 7.8   HGB 8.1*   HCT 27.7*   MCV 72*        Recent Labs   Lab Test 12/02/21  1123 11/30/21  1609 11/26/21  1918   HGB 8.1* 7.9* 8.1*    Recent Labs   Lab Test 11/30/21  1609 10/24/21  0619 10/24/21  0200   TROPONINI 0.03 0.03 0.03     Recent Labs   Lab Test 11/30/21  1609 10/23/21  1824 10/12/21  1429   * 445* 381*     Recent Labs   Lab Test 05/31/19  1507   TSH 2.55     Recent Labs   Lab  Test 11/30/21  1609 10/29/21  1712 10/12/21  1429   INR 1.30* 1.39* 1.63*        Medical History  Surgical History Family History Social History   Past Medical History:   Diagnosis Date     Atrial fibrillation (H)      CHF (congestive heart failure) (H)      COPD (chronic obstructive pulmonary disease) (H)      Coronary artery disease      Diabetes mellitus (H)      Essential hypertension      Hyperlipidemia      Pulmonary hypertension (H)     O2 at night     Pulmonary hypertension due to left ventricular diastolic dysfunction (H) 11/28/2017    Multifactorial per [facility] with elevated LVEDP and PCW, COPD and NOVA. They put him on sildenafil as nitroprusside lowered systemic BP and with that the mean PA dropped from 54 to 49. Negative VQ at [facility] Dec 1, 2017.     RLS (restless legs syndrome)      Sleep apnea      Past Surgical History:   Procedure Laterality Date     BACK SURGERY      lower back     CARDIAC CATHETERIZATION  12/13/2017    Right and left at [facility], mean PA 58, PCW 24 with V wave of 35, LVEDP of 18, with Nipride systemic BP, PVR and mean PA all declined     CARDIOVERSION  03/15/2013    for afib     CATARACT EXTRACTION Bilateral      COLONOSCOPY N/A 10/31/2021    Procedure: COLONOSCOPY WITH POLYPECTOMY;  Surgeon: Sheng Sagastume MD;  Location: [facility] Main OR     CORONARY STENT PLACEMENT       CV CORONARY ANGIOGRAM N/A 10/25/2021    Procedure: Coronary Angiogram;  Surgeon: Otf Knowles MD;  Location: [facility] CV     CV CORONARY LITHOTRIPSY PCI N/A 10/25/2021    Procedure: CV Coronary Lithotripsy PCI;  Surgeon: Otf Knowles MD;  Location: [facility] LAB CV     CV LEFT HEART CATH N/A 10/25/2021    Procedure: Left Heart Cath;  Surgeon: Otf Knowles MD;  Location: [facility] LAB CV     CV PCI N/A 10/25/2021    Procedure: Percutaneous Coronary Intervention;  Surgeon: Otf Knowles MD;  Location: [facility] CV     ESOPHAGOSCOPY, GASTROSCOPY, DUODENOSCOPY (EGD),  COMBINED N/A 10/30/2021    Procedure: ESOPHAGOGASTRODUODENOSCOPY (EGD);  Surgeon: Sheng Sagastume MD;  Location: [facility] Main OR     IR ABDOMINAL AORTOGRAM  2012     IR MISCELLANEOUS PROCEDURE  2001     IR MISCELLANEOUS PROCEDURE  2012     OTHER SURGICAL HISTORY      mynor     SHOULDER SURGERY      reapir on right shoulder     TOTAL HIP ARTHROPLASTY Left      TOTAL KNEE ARTHROPLASTY Bilateral      WRIST SURGERY Bilateral      ZZHC COLONOSCOPY W/WO BRUSH/WASH N/A 2021    Procedure: COLONOSCOPY;  Surgeon: Mihai Harris MD;  Location: [facility] Main OR;  Service: Gastroenterology     Family History   Problem Relation Age of Onset     Hyperlipidemia Mother      Hypertension Mother      Heart Disease Mother      Hyperlipidemia Father      Hypertension Father      Coronary Artery Disease Father      Cerebrovascular Disease Brother      Depression Brother      No Known Problems Sister      Pulmonary Hypertension No family hx of      Congenital heart disease No family hx of         Social History     Socioeconomic History     Marital status:      Spouse name: Not on file     Number of children: 4     Years of education: Not on file     Highest education level: Not on file   Occupational History     Not on file   Tobacco Use     Smoking status: Former Smoker     Packs/day: 1.50     Years: 44.00     Pack years: 66.00     Types: Cigarettes     Quit date: 1996     Years since quittin.6     Smokeless tobacco: Never Used   Substance and Sexual Activity     Alcohol use: Yes     Alcohol/week: 1.0 standard drink     Comment: Alcoholic Drinks/day: 1 per month     Drug use: No     Sexual activity: Not Currently     Partners: Female   Other Topics Concern     Not on file   Social History Narrative     Not on file     Social Determinants of Health     Financial Resource Strain: Not on file   Food Insecurity: Not on file   Transportation Needs: Not on file   Physical Activity: Not on file    Stress: Not on file   Social Connections: Not on file   Intimate Partner Violence: Not on file   Housing Stability: Not on file         Medications  Allergies   No current outpatient medications on file.        Allergies   Allergen Reactions     Furosemide Muscle Pain (Myalgia)     Previously tolerated.  Muscle cramps     Hydrochlorothiazide Unknown     Iodinated Contrast Media [Diagnostic X-Ray Materials] Nausea     Losartan Other (See Comments)     Other reaction(s): Stomatitis, Bloody nose dry mouth and lips     Losartan-Hydrochlorothiazide [Hyzaar]      Mouth sores     Metaxalone Nausea     Metolazone Other (See Comments)     Muscle cramps     Mometasone Other (See Comments)     Bloody nose     Penicillins Other (See Comments)     Immune-does not work for him     Rabeprazole Other (See Comments)     Mouth sores     Ramipril Other (See Comments)     Mouth sores     Shellfish Containing Products [Shellfish-Derived Products] Unknown     Other reaction(s): mouth sores, Other reaction(s): mouth sores     Sildenafil Muscle Pain (Myalgia)     Methocarbamol Rash         Mandi Jhaveri MD

## 2021-12-03 LAB
ANION GAP SERPL CALCULATED.3IONS-SCNC: 13 MMOL/L (ref 5–18)
BUN SERPL-MCNC: 42 MG/DL (ref 8–28)
CALCIUM SERPL-MCNC: 8.9 MG/DL (ref 8.5–10.5)
CHLORIDE BLD-SCNC: 103 MMOL/L (ref 98–107)
CO2 SERPL-SCNC: 25 MMOL/L (ref 22–31)
CREAT SERPL-MCNC: 1.47 MG/DL (ref 0.7–1.3)
ERYTHROCYTE [DISTWIDTH] IN BLOOD BY AUTOMATED COUNT: 17.4 % (ref 10–15)
GFR SERPL CREATININE-BSD FRML MDRD: 45 ML/MIN/1.73M2
GLUCOSE BLD-MCNC: 121 MG/DL (ref 70–125)
GLUCOSE BLDC GLUCOMTR-MCNC: 104 MG/DL (ref 70–99)
GLUCOSE BLDC GLUCOMTR-MCNC: 125 MG/DL (ref 70–99)
GLUCOSE BLDC GLUCOMTR-MCNC: 134 MG/DL (ref 70–99)
GLUCOSE BLDC GLUCOMTR-MCNC: 143 MG/DL (ref 70–99)
HCT VFR BLD AUTO: 26.6 % (ref 40–53)
HGB BLD-MCNC: 7.6 G/DL (ref 13.3–17.7)
MCH RBC QN AUTO: 20.5 PG (ref 26.5–33)
MCHC RBC AUTO-ENTMCNC: 28.6 G/DL (ref 31.5–36.5)
MCV RBC AUTO: 72 FL (ref 78–100)
PLATELET # BLD AUTO: 161 10E3/UL (ref 150–450)
POTASSIUM BLD-SCNC: 3.7 MMOL/L (ref 3.5–5)
RBC # BLD AUTO: 3.71 10E6/UL (ref 4.4–5.9)
SODIUM SERPL-SCNC: 141 MMOL/L (ref 136–145)
WBC # BLD AUTO: 6.3 10E3/UL (ref 4–11)

## 2021-12-03 PROCEDURE — 82374 ASSAY BLOOD CARBON DIOXIDE: CPT

## 2021-12-03 PROCEDURE — 99233 SBSQ HOSP IP/OBS HIGH 50: CPT | Performed by: INTERNAL MEDICINE

## 2021-12-03 PROCEDURE — 99232 SBSQ HOSP IP/OBS MODERATE 35: CPT | Mod: GC

## 2021-12-03 PROCEDURE — 120N000001 HC R&B MED SURG/OB

## 2021-12-03 PROCEDURE — 36415 COLL VENOUS BLD VENIPUNCTURE: CPT

## 2021-12-03 PROCEDURE — 250N000011 HC RX IP 250 OP 636: Performed by: INTERNAL MEDICINE

## 2021-12-03 PROCEDURE — 250N000013 HC RX MED GY IP 250 OP 250 PS 637: Performed by: FAMILY MEDICINE

## 2021-12-03 PROCEDURE — 250N000013 HC RX MED GY IP 250 OP 250 PS 637: Performed by: STUDENT IN AN ORGANIZED HEALTH CARE EDUCATION/TRAINING PROGRAM

## 2021-12-03 PROCEDURE — 85027 COMPLETE CBC AUTOMATED: CPT

## 2021-12-03 RX ORDER — POTASSIUM CHLORIDE 1500 MG/1
20 TABLET, EXTENDED RELEASE ORAL ONCE
Status: COMPLETED | OUTPATIENT
Start: 2021-12-03 | End: 2021-12-03

## 2021-12-03 RX ADMIN — ALBUTEROL SULFATE 2 PUFF: 90 AEROSOL, METERED RESPIRATORY (INHALATION) at 23:56

## 2021-12-03 RX ADMIN — SPIRONOLACTONE 12.5 MG: 25 TABLET ORAL at 20:56

## 2021-12-03 RX ADMIN — ATORVASTATIN CALCIUM 80 MG: 40 TABLET, FILM COATED ORAL at 08:22

## 2021-12-03 RX ADMIN — METOPROLOL TARTRATE 25 MG: 25 TABLET, FILM COATED ORAL at 17:02

## 2021-12-03 RX ADMIN — CLOTRIMAZOLE: 1 CREAM TOPICAL at 20:55

## 2021-12-03 RX ADMIN — POTASSIUM CHLORIDE 20 MEQ: 1500 TABLET, EXTENDED RELEASE ORAL at 10:04

## 2021-12-03 RX ADMIN — BUMETANIDE 2 MG: 0.25 INJECTION, SOLUTION INTRAMUSCULAR; INTRAVENOUS at 18:05

## 2021-12-03 RX ADMIN — METOPROLOL TARTRATE 25 MG: 25 TABLET, FILM COATED ORAL at 08:23

## 2021-12-03 RX ADMIN — EMPAGLIFLOZIN 10 MG: 10 TABLET, FILM COATED ORAL at 08:23

## 2021-12-03 RX ADMIN — DIGOXIN 125 MCG: 0.12 TABLET ORAL at 17:02

## 2021-12-03 RX ADMIN — PANTOPRAZOLE SODIUM 40 MG: 20 TABLET, DELAYED RELEASE ORAL at 06:38

## 2021-12-03 RX ADMIN — INSULIN GLARGINE 10 UNITS: 100 INJECTION, SOLUTION SUBCUTANEOUS at 20:54

## 2021-12-03 RX ADMIN — FLUTICASONE FUROATE AND VILANTEROL TRIFENATATE 1 PUFF: 200; 25 POWDER RESPIRATORY (INHALATION) at 08:23

## 2021-12-03 RX ADMIN — SPIRONOLACTONE 12.5 MG: 25 TABLET ORAL at 08:23

## 2021-12-03 RX ADMIN — ZOLPIDEM TARTRATE 2.5 MG: 5 TABLET ORAL at 20:57

## 2021-12-03 RX ADMIN — BUMETANIDE 2 MG: 0.25 INJECTION, SOLUTION INTRAMUSCULAR; INTRAVENOUS at 10:04

## 2021-12-03 RX ADMIN — CLOPIDOGREL BISULFATE 75 MG: 75 TABLET, FILM COATED ORAL at 08:23

## 2021-12-03 RX ADMIN — BUMETANIDE 2 MG: 0.25 INJECTION, SOLUTION INTRAMUSCULAR; INTRAVENOUS at 02:45

## 2021-12-03 RX ADMIN — FERROUS GLUCONATE 324 MG: 324 TABLET ORAL at 08:23

## 2021-12-03 RX ADMIN — TADALAFIL 20 MG: 10 TABLET, FILM COATED ORAL at 08:23

## 2021-12-03 RX ADMIN — INSULIN GLARGINE 10 UNITS: 100 INJECTION, SOLUTION SUBCUTANEOUS at 08:23

## 2021-12-03 RX ADMIN — ROPINIROLE 2 MG: 1 TABLET, FILM COATED ORAL at 20:55

## 2021-12-03 ASSESSMENT — ACTIVITIES OF DAILY LIVING (ADL)
ADLS_ACUITY_SCORE: 14
ADLS_ACUITY_SCORE: 10
ADLS_ACUITY_SCORE: 14
ADLS_ACUITY_SCORE: 12
ADLS_ACUITY_SCORE: 14
ADLS_ACUITY_SCORE: 14
ADLS_ACUITY_SCORE: 12
ADLS_ACUITY_SCORE: 14
ADLS_ACUITY_SCORE: 10
ADLS_ACUITY_SCORE: 10
ADLS_ACUITY_SCORE: 14
ADLS_ACUITY_SCORE: 12
ADLS_ACUITY_SCORE: 14
ADLS_ACUITY_SCORE: 12
ADLS_ACUITY_SCORE: 12
ADLS_ACUITY_SCORE: 10
ADLS_ACUITY_SCORE: 14

## 2021-12-03 ASSESSMENT — MIFFLIN-ST. JEOR: SCORE: 1792.2

## 2021-12-03 NOTE — PROGRESS NOTES
"  HEART CARE CONSULTATON NOTE        Assessment/Recommendations   Assessment:  1.  Acute heart failure with preserved ejection fraction and right heart failure: Weight gain with increased shortness of breath and anasarca. Responding well to diuresis with a 10 pound weight loss. Had gained 30 pounds prior to admission  2.  Ascites and cirrhosis of the liver  3.  Severe COPD on 4 L of home O2  4.  Who group 2 pulmonary hypertension  5.  NOVA on CPAP  6.  Atrial fibrillation: Chronic rate controlled on digoxin and metoprolol  7.  Lower GI bleed in the setting of being on Plavix, Effient, aspirin and Eliquis last month     Plan:  1.  Continue spironolactone  2.  Continue bumex 2 mg IV every 8  3.  Daily weights, strict I's and O's, monitor creatinine  4.  Will need close follow-up in our heart failure clinic.  Recommend following up upon discharge and her heart failure clinic within a week.  Follow-up with Dr Lowery in 4 to 6 weeks.  Can continue to pursue watchman device as outpatient  5.  Recommend Plavix and Eliquis for anticoagulation.  Patient is reluctant to be on Eliquis at this time despite understanding the risks.     History of Present Illness/Subjective    Good diuresis and patient feeling better today although still significant lower extremity edema up to his thighs       Physical Examination  Review of Systems   VITALS: /79 (BP Location: Left arm)   Pulse 78   Temp 98.4  F (36.9  C) (Oral)   Resp 18   Ht 1.803 m (5' 11\")   Wt 105.5 kg (232 lb 9.6 oz)   SpO2 98%   BMI 32.44 kg/m    BMI: Body mass index is 32.44 kg/m .  Wt Readings from Last 3 Encounters:   12/03/21 105.5 kg (232 lb 9.6 oz)   11/01/21 96.3 kg (212 lb 6.5 oz)   10/26/21 103.2 kg (227 lb 9.6 oz)       Intake/Output Summary (Last 24 hours) at 12/3/2021 1110  Last data filed at 12/3/2021 0825  Gross per 24 hour   Intake --   Output 3600 ml   Net -3600 ml     General Appearance:   no distress, normal body habitus   ENT/Mouth: " membranes moist, no oral lesions or bleeding gums.      EYES:  no scleral icterus, normal conjunctivae   Neck: no carotid bruits or thyromegaly   Chest/Lungs:   lungs are clear to auscultation   Cardiovascular:   Regular. Normal first and second heart sounds with systolic murmur ++ edema bilaterally    Abdomen:  no organomegaly, masses, bruits, or tenderness; bowel sounds are present   Extremities: no cyanosis or clubbing   Skin: no xanthelasma, warm.    Neurologic: normal  bilateral, no tremors     Psychiatric: alert and oriented x3, calm     Review Of Systems  Skin: negative  Eyes: negative  Ears/Nose/Throat: negative  Respiratory: No shortness of breath, dyspnea on exertion, cough, or hemoptysis  Cardiovascular: negative  Gastrointestinal: negative  Genitourinary: negative  Musculoskeletal: negative  Neurologic: negative  Psychiatric: negative  Hematologic/Lymphatic/Immunologic: negative  Endocrine: negative          Lab Results    Chemistry/lipid CBC Cardiac Enzymes/BNP/TSH/INR   Recent Labs   Lab Test 10/25/21  1529   CHOL 98   HDL 35*   LDL 53   TRIG 49     Recent Labs   Lab Test 10/25/21  1529 05/25/21  1359 09/02/20  1307   LDL 53 <5 50     Recent Labs   Lab Test 12/03/21  0625 12/03/21  0615   NA  --  141   POTASSIUM  --  3.7   CHLORIDE  --  103   CO2  --  25   * 121   BUN  --  42*   CR  --  1.47*   GFRESTIMATED  --  45*   ANNMARIE  --  8.9     Recent Labs   Lab Test 12/03/21  0615 12/02/21  1123 12/02/21  0625   CR 1.47* 1.55* 1.53*     Recent Labs   Lab Test 10/23/21  1824 07/11/21  1031 04/10/20  0534   A1C 6.3* 9.3* 10.8*          Recent Labs   Lab Test 12/03/21  0615   WBC 6.3   HGB 7.6*   HCT 26.6*   MCV 72*        Recent Labs   Lab Test 12/03/21  0615 12/02/21  1123 11/30/21  1609   HGB 7.6* 8.1* 7.9*    Recent Labs   Lab Test 11/30/21  1609 10/24/21  0619 10/24/21  0200   TROPONINI 0.03 0.03 0.03     Recent Labs   Lab Test 11/30/21  1609 10/23/21  1824 10/12/21  1429   * 445*  381*     Recent Labs   Lab Test 05/31/19  1507   TSH 2.55     Recent Labs   Lab Test 11/30/21  1609 10/29/21  1712 10/12/21  1429   INR 1.30* 1.39* 1.63*        Medical History  Surgical History Family History Social History   Past Medical History:   Diagnosis Date     Atrial fibrillation (H)      CHF (congestive heart failure) (H)      COPD (chronic obstructive pulmonary disease) (H)      Coronary artery disease      Diabetes mellitus (H)      Essential hypertension      Hyperlipidemia      Pulmonary hypertension (H)     O2 at night     Pulmonary hypertension due to left ventricular diastolic dysfunction (H) 11/28/2017    Multifactorial per [facility] with elevated LVEDP and PCW, COPD and NOVA. They put him on sildenafil as nitroprusside lowered systemic BP and with that the mean PA dropped from 54 to 49. Negative VQ at [facility] Dec 1, 2017.     RLS (restless legs syndrome)      Sleep apnea      Past Surgical History:   Procedure Laterality Date     BACK SURGERY      lower back     CARDIAC CATHETERIZATION  12/13/2017    Right and left at [facility], mean PA 58, PCW 24 with V wave of 35, LVEDP of 18, with Nipride systemic BP, PVR and mean PA all declined     CARDIOVERSION  03/15/2013    for afib     CATARACT EXTRACTION Bilateral      COLONOSCOPY N/A 10/31/2021    Procedure: COLONOSCOPY WITH POLYPECTOMY;  Surgeon: Sheng Sagastume MD;  Location: [facility] OR     CORONARY STENT PLACEMENT       CV CORONARY ANGIOGRAM N/A 10/25/2021    Procedure: Coronary Angiogram;  Surgeon: Otf Knowles MD;  Location: [facility] LAB CV     CV CORONARY LITHOTRIPSY PCI N/A 10/25/2021    Procedure: CV Coronary Lithotripsy PCI;  Surgeon: Otf Knowles MD;  Location: [facility] LAB CV     CV LEFT HEART CATH N/A 10/25/2021    Procedure: Left Heart Cath;  Surgeon: Otf Knowles MD;  Location: [facility] LAB CV     CV PCI N/A 10/25/2021    Procedure: Percutaneous Coronary Intervention;  Surgeon: Otf Knowles MD;  Location:  [facility] CATH LAB CV     ESOPHAGOSCOPY, GASTROSCOPY, DUODENOSCOPY (EGD), COMBINED N/A 10/30/2021    Procedure: ESOPHAGOGASTRODUODENOSCOPY (EGD);  Surgeon: Sheng Sagastume MD;  Location: [facility] OR     IR ABDOMINAL AORTOGRAM  2012     IR MISCELLANEOUS PROCEDURE  2001     IR MISCELLANEOUS PROCEDURE  2012     OTHER SURGICAL HISTORY      mynor     SHOULDER SURGERY      reapir on right shoulder     TOTAL HIP ARTHROPLASTY Left      TOTAL KNEE ARTHROPLASTY Bilateral      WRIST SURGERY Bilateral      ZZHC COLONOSCOPY W/WO BRUSH/WASH N/A 2021    Procedure: COLONOSCOPY;  Surgeon: Mihai Harris MD;  Location: [facility] OR;  Service: Gastroenterology     Family History   Problem Relation Age of Onset     Hyperlipidemia Mother      Hypertension Mother      Heart Disease Mother      Hyperlipidemia Father      Hypertension Father      Coronary Artery Disease Father      Cerebrovascular Disease Brother      Depression Brother      No Known Problems Sister      Pulmonary Hypertension No family hx of      Congenital heart disease No family hx of         Social History     Socioeconomic History     Marital status:      Spouse name: Not on file     Number of children: 4     Years of education: Not on file     Highest education level: Not on file   Occupational History     Not on file   Tobacco Use     Smoking status: Former Smoker     Packs/day: 1.50     Years: 44.00     Pack years: 66.00     Types: Cigarettes     Quit date: 1996     Years since quittin.6     Smokeless tobacco: Never Used   Substance and Sexual Activity     Alcohol use: Yes     Alcohol/week: 1.0 standard drink     Comment: Alcoholic Drinks/day: 1 per month     Drug use: No     Sexual activity: Not Currently     Partners: Female   Other Topics Concern     Not on file   Social History Narrative     Not on file     Social Determinants of Health     Financial Resource Strain: Not on file   Food Insecurity: Not on file    Transportation Needs: Not on file   Physical Activity: Not on file   Stress: Not on file   Social Connections: Not on file   Intimate Partner Violence: Not on file   Housing Stability: Not on file         Medications  Allergies   No current outpatient medications on file.        Allergies   Allergen Reactions     Furosemide Muscle Pain (Myalgia)     Previously tolerated.  Muscle cramps     Hydrochlorothiazide Unknown     Iodinated Contrast Media [Diagnostic X-Ray Materials] Nausea     Losartan Other (See Comments)     Other reaction(s): Stomatitis, Bloody nose dry mouth and lips     Losartan-Hydrochlorothiazide [Hyzaar]      Mouth sores     Metaxalone Nausea     Metolazone Other (See Comments)     Muscle cramps     Mometasone Other (See Comments)     Bloody nose     Penicillins Other (See Comments)     Immune-does not work for him     Rabeprazole Other (See Comments)     Mouth sores     Ramipril Other (See Comments)     Mouth sores     Shellfish Containing Products [Shellfish-Derived Products] Unknown     Other reaction(s): mouth sores, Other reaction(s): mouth sores     Sildenafil Muscle Pain (Myalgia)     Methocarbamol Rash         Mandi Jhaveri MD

## 2021-12-03 NOTE — PLAN OF CARE
Problem: Fluid Volume Excess  Goal: Fluid Balance  Outcome: Improving        Admitted 11/30 with acute on chronic diastolic congestive heart failure. A/O, VSS- 2L NC (baseline). Dyspnea on exertion. Diuresis with IV bumex- voiding frequently, clear output. Scrotum edema, BLE edema, reddened on shins.      Plan: Continue to Diuresis, Monitor Labs     Discharge: TBD- pending. Home with wife once medically stable.

## 2021-12-03 NOTE — PLAN OF CARE
Note from [id number]. Pt denied pain during shift thus far. Skilled monitoring in all medical conditions. No new skin issues noted. Full sensation per pt. SBA for transferring. Saline locked. Voiding adequately. Education on medication administration and use of call-light to reduce risk for falls and injury. Alarms in place. No further issues noted. VSS. 2L O2 utilized per pt's baseline. Will continue to monitor.    Taylor R Schoenecker, RN

## 2021-12-03 NOTE — PLAN OF CARE
Problem: Adult Inpatient Plan of Care  Goal: Plan of Care Review  Outcome: Improving     Problem: Adult Inpatient Plan of Care  Goal: Optimal Comfort and Wellbeing  Outcome: Improving     Problem: Fluid Volume Excess  Goal: Fluid Balance  Outcome: Improving     AVSS on 2LPM NC. Denies pain. Tolerating reg diet. IV bumex administered. Up with SBA.     Plan: Continue to monitor and support POC. Para cultures pending.

## 2021-12-03 NOTE — PROGRESS NOTES
Care Management Follow Up    Length of Stay (days): 3        Patient plan of care discussed at interdisciplinary rounds: Yes     Expected Discharge Date:   12/4/21       Concerns to be Addressed / Barriers to Discharge: medical management of heart failkure, IV Bumex for diuresis     Anticipated Discharge Disposition: home    Anticipated Discharge DME:  Preexisting home O2      Additional Information:  Per [facility]  MD, plan to continue diuresis, possible discharge tomorrow on home oxygen.

## 2021-12-04 PROBLEM — R18.8 OTHER ASCITES: Status: ACTIVE | Noted: 2021-12-04

## 2021-12-04 LAB
ANION GAP SERPL CALCULATED.3IONS-SCNC: 15 MMOL/L (ref 5–18)
BUN SERPL-MCNC: 40 MG/DL (ref 8–28)
CALCIUM SERPL-MCNC: 9.6 MG/DL (ref 8.5–10.5)
CHLORIDE BLD-SCNC: 101 MMOL/L (ref 98–107)
CO2 SERPL-SCNC: 23 MMOL/L (ref 22–31)
CREAT SERPL-MCNC: 1.63 MG/DL (ref 0.7–1.3)
GFR SERPL CREATININE-BSD FRML MDRD: 39 ML/MIN/1.73M2
GLUCOSE BLD-MCNC: 211 MG/DL (ref 70–125)
GLUCOSE BLDC GLUCOMTR-MCNC: 107 MG/DL (ref 70–99)
GLUCOSE BLDC GLUCOMTR-MCNC: 116 MG/DL (ref 70–99)
GLUCOSE BLDC GLUCOMTR-MCNC: 137 MG/DL (ref 70–99)
GLUCOSE BLDC GLUCOMTR-MCNC: 99 MG/DL (ref 70–99)
HOLD SPECIMEN: NORMAL
POTASSIUM BLD-SCNC: 4 MMOL/L (ref 3.5–5)
SODIUM SERPL-SCNC: 139 MMOL/L (ref 136–145)

## 2021-12-04 PROCEDURE — 250N000013 HC RX MED GY IP 250 OP 250 PS 637: Performed by: FAMILY MEDICINE

## 2021-12-04 PROCEDURE — 258N000003 HC RX IP 258 OP 636: Performed by: INTERNAL MEDICINE

## 2021-12-04 PROCEDURE — 250N000011 HC RX IP 250 OP 636: Performed by: INTERNAL MEDICINE

## 2021-12-04 PROCEDURE — 250N000013 HC RX MED GY IP 250 OP 250 PS 637: Performed by: INTERNAL MEDICINE

## 2021-12-04 PROCEDURE — 250N000013 HC RX MED GY IP 250 OP 250 PS 637: Performed by: STUDENT IN AN ORGANIZED HEALTH CARE EDUCATION/TRAINING PROGRAM

## 2021-12-04 PROCEDURE — 99232 SBSQ HOSP IP/OBS MODERATE 35: CPT | Mod: GC

## 2021-12-04 PROCEDURE — 120N000001 HC R&B MED SURG/OB

## 2021-12-04 PROCEDURE — 80048 BASIC METABOLIC PNL TOTAL CA: CPT

## 2021-12-04 PROCEDURE — 36415 COLL VENOUS BLD VENIPUNCTURE: CPT

## 2021-12-04 PROCEDURE — 99233 SBSQ HOSP IP/OBS HIGH 50: CPT | Performed by: INTERNAL MEDICINE

## 2021-12-04 RX ORDER — METOLAZONE 2.5 MG/1
2.5 TABLET ORAL DAILY
Status: DISCONTINUED | OUTPATIENT
Start: 2021-12-04 | End: 2021-12-05

## 2021-12-04 RX ADMIN — METOPROLOL TARTRATE 25 MG: 25 TABLET, FILM COATED ORAL at 16:35

## 2021-12-04 RX ADMIN — INSULIN GLARGINE 10 UNITS: 100 INJECTION, SOLUTION SUBCUTANEOUS at 08:37

## 2021-12-04 RX ADMIN — METOLAZONE 2.5 MG: 2.5 TABLET ORAL at 10:59

## 2021-12-04 RX ADMIN — FLUTICASONE FUROATE AND VILANTEROL TRIFENATATE 1 PUFF: 200; 25 POWDER RESPIRATORY (INHALATION) at 08:37

## 2021-12-04 RX ADMIN — CLOTRIMAZOLE: 1 CREAM TOPICAL at 08:40

## 2021-12-04 RX ADMIN — CLOPIDOGREL BISULFATE 75 MG: 75 TABLET, FILM COATED ORAL at 08:36

## 2021-12-04 RX ADMIN — SPIRONOLACTONE 12.5 MG: 25 TABLET ORAL at 08:37

## 2021-12-04 RX ADMIN — ZOLPIDEM TARTRATE 2.5 MG: 5 TABLET ORAL at 21:21

## 2021-12-04 RX ADMIN — EMPAGLIFLOZIN 10 MG: 10 TABLET, FILM COATED ORAL at 08:36

## 2021-12-04 RX ADMIN — PANTOPRAZOLE SODIUM 40 MG: 20 TABLET, DELAYED RELEASE ORAL at 06:54

## 2021-12-04 RX ADMIN — INSULIN GLARGINE 10 UNITS: 100 INJECTION, SOLUTION SUBCUTANEOUS at 21:10

## 2021-12-04 RX ADMIN — TADALAFIL 20 MG: 10 TABLET, FILM COATED ORAL at 08:37

## 2021-12-04 RX ADMIN — CLOTRIMAZOLE: 1 CREAM TOPICAL at 21:07

## 2021-12-04 RX ADMIN — SPIRONOLACTONE 12.5 MG: 25 TABLET ORAL at 21:08

## 2021-12-04 RX ADMIN — METOPROLOL TARTRATE 25 MG: 25 TABLET, FILM COATED ORAL at 08:36

## 2021-12-04 RX ADMIN — FUROSEMIDE 10 MG/HR: 10 INJECTION INTRAMUSCULAR; INTRAVENOUS at 21:53

## 2021-12-04 RX ADMIN — DIGOXIN 125 MCG: 0.12 TABLET ORAL at 16:35

## 2021-12-04 RX ADMIN — BUMETANIDE 2 MG: 0.25 INJECTION, SOLUTION INTRAMUSCULAR; INTRAVENOUS at 01:39

## 2021-12-04 RX ADMIN — ROPINIROLE 2 MG: 1 TABLET, FILM COATED ORAL at 21:08

## 2021-12-04 RX ADMIN — FUROSEMIDE 10 MG/HR: 10 INJECTION INTRAMUSCULAR; INTRAVENOUS at 10:59

## 2021-12-04 RX ADMIN — ATORVASTATIN CALCIUM 80 MG: 40 TABLET, FILM COATED ORAL at 08:36

## 2021-12-04 ASSESSMENT — ACTIVITIES OF DAILY LIVING (ADL)
ADLS_ACUITY_SCORE: 10

## 2021-12-04 ASSESSMENT — MIFFLIN-ST. JEOR: SCORE: 1790.84

## 2021-12-04 NOTE — PLAN OF CARE
Note from [id number]. Pt denied pain during shift thus far. Skilled monitoring in all medical conditions. Pt's edema to his BLE appeared to be worse today, MD aware. Scrotal edema appears to be improving. Lasix drip and addition of oral medication initiated to help diurese pt. Full sensation per pt. SBA for transferring. IV patent. Voiding adequately with the urinal at the bedside. Education on medication administration and use of call-light to reduce risk for falls and injury. Alarms in place. VSS, weaned back to 2L O2. Pt states he still has some dyspnea with exertion. No further issues noted. Family stated they will bring portable, home O2 with when pt discharges. Will continue to monitor.    Taylor R Schoenecker, RN

## 2021-12-04 NOTE — PROGRESS NOTES
[facility]    Progress Note       Date of Admission:  11/30/2021    Assessment & Plan             Red Sutherland is a 81 year old male w/ PMH of HFpEF, ischemic cardiomyopathy, Afib, CAD s/p HUNTER, NSTEMI, COPD on home O2 4L, NOVA on CPAP, pulmonary HTN, CKD3, cirrhosis, T2DM on insulin admitted on 11/30/2021 for dyspnea with presentation most consistent with CHF exacerbation.    Patient responding appropriately to IV Bumex.  CT equivocal findings concerning for cirrhosis with ascites.  Paracentesis supportive of cardiac etiology of ascites, no evidence of SBP.     Acute CHF exacerbation, improved  HFpEF in setting of ischemic cardiomyopathy  Anasarca, improved  Hydrocele, improved  Overall clinical improvement, weight has decreased from admission though stable compared to yesterday, will change diuretic since patient is still hypervolemic. Dry weight of 212 lb; weight upon admission of 242 lb. No significant change to BUN and Cr. Cardiology following. Potassium has been normal.   - Continuous pulse oximetry  - Oxygen support as needed, SpO2 goal >90%  - Hold PTA bumex  - BMP to trend  - Strict I/Os, daily weights  - Hold PTA irbesartan in setting of NATHALY  - Continue PTA metoprolol tartrate  - Continue PTA spironolactone   - Continue PTA Digoxin  - Hold PTA KCl  - Potassium replacement protocol  - Mg  -Cardiology consulted, appreciate recommendations (See note)  -Change Bumex to Lasix drip   -Added metolazone    Cirrhosis  Ascites, s/p paracentesis   CT equivocal findings concerning for cirrhosis.  LFTs normal; albumen mildly low at 3.3; INR 1.3. Viral panel is negative. Ferritin is normal. Paracentesis removing 0.5L of yellow fluid.  SAAG score of 1.2, suggesting cardiac etiology, no evidence of SBP.  Overall unclear picture of cirrhosis.  -Recommend outpatient GI referral     Atrial fibrillation, permanent  Rate controlled & on anticoagulation. Was refered for a watchman device due to  recent GI bleed.  - Hold PTA apixaban as patient declined due to concern for GI bleed; recommended by both medicine team and cardiology, though patient declined     CAD s/p PCI w/ HUNTER of left circumflex  NSTEMI  HTN  HLD  PVD  Was discontinued off aspirin & Effient on 11/18/21. Patient is scheduled for Ct Cardiac Morphology on 12/1/21.  - Continue PTA atorvastatin  - Continue PTA Plavix post procedure      Chronic respiratory failure secondary to COPD requiring home O2 of 4 L  NOVA using CPAP  Severe pulmonary hypertension   Secondary to COPD and left heart disease  - Continue PTA tadalafil 20mg PO daily  - Discontinue PTA nitroglycerin SL PRN due to potential severe hypotension with tadalafil  - Continue PTA albuterol PRN  - Continue PTA Breo Ellipta  - CPAP overnight  - Supplemental O2     NATHALY  CKD 3  Cr 1.62, eGFR 40, BUN 39 on admission, has been stable; baseline Cr 1.2-1.4. BUN/Cr ratio of ~25, so likely prerenal in setting of CHF exacerbation. Increase also likely secondary to increased diuretic.  - Avoid nephrotoxic as able    T2DM on insulin   Chronic pancreatitis  Obesity, BMI 33  Glucose has been only mildly elevated.   - Continue PTA invokana  - Continue PTA insulin glaragine at half of home dose  - Medium liding scale insulin  - Hold PTA metformin     Hx GI Bleed  Anemia, microcytic - stable  Iron deficiency anemia  Hgb 7.9 on admission; was 8.1 when discharged following GI bleed. Hgb has been stable, no signs of active bleeding.   - Continue PTA ferrous glaconate     Scrotal fungal rash  - Clotrimazole     Restless leg  - Continue PTA ropinirole     Insomnia  - Continue PTA zolpidem     GERD  - Continue PTA omeprazole      Diet: Combination Diet Low Saturated Fat Na <2400mg Diet, No Caffeine Diet    DVT Prophylaxis: DOAC and Pneumatic Compression Devices  Dixon Catheter: Not present  Fluids: PO  Central Lines: None  Code Status: No CPR- Do NOT Intubate      Disposition Plan   Expected discharge:  12/06/2021   recommended to prior living arrangement once CHF exacerbation improved.     The patient's care was discussed with the Attending Physician, Dr. Plata.    Deirdre Valdovinos MD  [facility]      ______________________________________________________________________    Interval History   Afebrile, 2 L of oxygen supplementation, and VS. Per patient: No improvement in shortness of breath compared to yesterday, lower extremity edema initially seemed mildly worse in the morning though improved since then.  Patient reports feeling better overall since admission though was somewhat frustrated with no significant changes today compared to yesterday.  No chest pain. No abdominal pain.    Data reviewed today: I reviewed all medications, new labs and imaging results over the last 24 hours. I personally reviewed no images or EKG's today.    Physical Exam   Vital Signs: Temp: 97.7  F (36.5  C) Temp src: Oral BP: 123/57 Pulse: 77   Resp: 18 SpO2: 93 % O2 Device: Nasal cannula Oxygen Delivery: 2 LPM  Weight: 232 lbs 4.8 oz   Physical Exam  Constitutional:       General: He is not in acute distress.     Appearance: Normal appearance. He is not ill-appearing or diaphoretic.   Eyes:      Extraocular Movements: Extraocular movements intact.      Conjunctiva/sclera: Conjunctivae normal.   Cardiovascular:      Rate and Rhythm: Normal rate and regular rhythm.      Comments: 3/6 decrescendo systolic murmur  Pulmonary:      Effort: Pulmonary effort is normal.      Comments: Clear to ascultation bilaterally, no crackles, good air movement. No wheezes  Abdominal:      General: There is distension.      Tenderness: There is no abdominal tenderness. There is no guarding or rebound.   Musculoskeletal:      Cervical back: Normal range of motion.      Comments: 3+ pitting edema up to knee on L side, unchanged. 3+ to knee on R. L>R. Tenderness to palpation bilaterally, R>L   Skin:     General: Skin is warm and dry.    Neurological:      General: No focal deficit present.      Mental Status: He is alert and oriented to person, place, and time.   Psychiatric:         Mood and Affect: Mood normal.         Behavior: Behavior normal.         Data   Recent Labs   Lab 12/04/21  1150 12/04/21  0835 12/04/21  0652 12/03/21  0625 12/03/21  0615 12/02/21  1159 12/02/21  1123 12/02/21  0632 12/02/21  0625 12/01/21  0148 11/30/21  1609   WBC  --   --   --   --  6.3  --  7.8  --   --   --  7.7   HGB  --   --   --   --  7.6*  --  8.1*  --   --   --  7.9*   MCV  --   --   --   --  72*  --  72*  --   --   --  74*   PLT  --   --   --   --  161  --  207  --   --   --  211   INR  --   --   --   --   --   --   --   --   --   --  1.30*   NA  --  139  --   --  141  --  140  --  138   < > 140   POTASSIUM  --  4.0  --   --  3.7  --  4.0  --  3.9  3.9   < > 4.4   CHLORIDE  --  101  --   --  103  --  103  --  104   < > 108*   CO2  --  23  --   --  25  --  26  --  25   < > 23   BUN  --  40*  --   --  42*  --  40*  --  39*   < > 39*   CR  --  1.63*  --   --  1.47*  --  1.55*  --  1.53*   < > 1.62*   ANIONGAP  --  15  --   --  13  --  11  --  9   < > 9   ANNMARIE  --  9.6  --   --  8.9  --  9.4  --  9.2   < > 8.9   * 211* 107*   < > 121   < > 126*   < > 120   < > 199*   ALBUMIN  --   --   --   --   --   --   --   --  3.4*  --  3.3*   PROTTOTAL  --   --   --   --   --   --   --   --   --   --  6.2   BILITOTAL  --   --   --   --   --   --   --   --   --   --  1.9*   ALKPHOS  --   --   --   --   --   --   --   --   --   --  117   ALT  --   --   --   --   --   --   --   --   --   --  13   AST  --   --   --   --   --   --   --   --   --   --  18    < > = values in this interval not displayed.     No results found for this or any previous visit (from the past 24 hour(s)).

## 2021-12-04 NOTE — PROGRESS NOTES
"  HEART CARE CONSULTATON NOTE        Assessment/Recommendations   Assessment:  1.  Acute heart failure with preserved ejection fraction and right heart failure: Weight gain with increased shortness of breath and anasarca. Responding well to diuresis with a 10 pound weight loss initially however since yesterday weight has remained stable.  2.  Ascites and cirrhosis of the liver  3.  Severe COPD on 4 L of home O2  4.  Who group 2 pulmonary hypertension  5.  NOVA on CPAP  6.  Atrial fibrillation: Chronic rate controlled on digoxin and metoprolol  7.  Lower GI bleed in the setting of being on Plavix, Effient, aspirin and Eliquis last month     Plan:  1.  Continue spironolactone  2.  Change Bumex to a Lasix drip and add metolazone to help with urine output  3.  Daily weights, strict I's and O's, monitor creatinine  4.  Will need close follow-up in our heart failure clinic.  Recommend following up upon discharge and her heart failure clinic within a week.  Follow-up with Dr Lowery in 4 to 6 weeks.  Can continue to pursue watchman device as outpatient  5.  Recommend Plavix and Eliquis for anticoagulation.  Patient is reluctant to be on Eliquis at this time despite understanding the risks.  Currently being worked up as outpatient for a watchman device.       History of Present Illness/Subjective    Has improved with weight loss however since yesterday weight has stabilized and he has not had much urine output.  We will increase diuretics as he is still visibly volume overloaded     Physical Examination  Review of Systems   VITALS: /57 (BP Location: Right arm)   Pulse 77   Temp 97.7  F (36.5  C) (Oral)   Resp 18   Ht 1.803 m (5' 11\")   Wt 105.4 kg (232 lb 4.8 oz)   SpO2 93%   BMI 32.40 kg/m    BMI: Body mass index is 32.4 kg/m .  Wt Readings from Last 3 Encounters:   12/04/21 105.4 kg (232 lb 4.8 oz)   11/01/21 96.3 kg (212 lb 6.5 oz)   10/26/21 103.2 kg (227 lb 9.6 oz)       Intake/Output Summary (Last 24 " hours) at 12/4/2021 1216  Last data filed at 12/4/2021 0830  Gross per 24 hour   Intake 1920 ml   Output 3075 ml   Net -1155 ml     General Appearance:   no distress, normal body habitus   ENT/Mouth: membranes moist, no oral lesions or bleeding gums.      EYES:  no scleral icterus, normal conjunctivae   Neck: no carotid bruits or thyromegaly   Chest/Lungs:   lungs are clear to auscultation   Cardiovascular:   irregular. Normal first and second heart sounds with no murmurs , ++ edema bilaterally    Abdomen:  no organomegaly, masses, bruits, or tenderness; bowel sounds are present   Extremities: no cyanosis or clubbing   Skin: no xanthelasma, warm.    Neurologic: normal  bilateral, no tremors     Psychiatric: alert and oriented x3, calm     Review Of Systems  Skin: negative  Eyes: negative  Ears/Nose/Throat: negative  Respiratory: No shortness of breath, dyspnea on exertion, cough, or hemoptysis  Cardiovascular: negative  Gastrointestinal: negative  Genitourinary: negative  Musculoskeletal: negative  Neurologic: negative  Psychiatric: negative  Hematologic/Lymphatic/Immunologic: negative  Endocrine: negative          Lab Results    Chemistry/lipid CBC Cardiac Enzymes/BNP/TSH/INR   Recent Labs   Lab Test 10/25/21  1529   CHOL 98   HDL 35*   LDL 53   TRIG 49     Recent Labs   Lab Test 10/25/21  1529 05/25/21  1359 09/02/20  1307   LDL 53 <5 50     Recent Labs   Lab Test 12/04/21  0835      POTASSIUM 4.0   CHLORIDE 101   CO2 23   *   BUN 40*   CR 1.63*   GFRESTIMATED 39*   ANNMARIE 9.6     Recent Labs   Lab Test 12/04/21  0835 12/03/21  0615 12/02/21  1123   CR 1.63* 1.47* 1.55*     Recent Labs   Lab Test 10/23/21  1824 07/11/21  1031 04/10/20  0534   A1C 6.3* 9.3* 10.8*          Recent Labs   Lab Test 12/03/21  0615   WBC 6.3   HGB 7.6*   HCT 26.6*   MCV 72*        Recent Labs   Lab Test 12/03/21  0615 12/02/21  1123 11/30/21  1609   HGB 7.6* 8.1* 7.9*    Recent Labs   Lab Test 11/30/21  1609  10/24/21  0619 10/24/21  0200   TROPONINI 0.03 0.03 0.03     Recent Labs   Lab Test 11/30/21  1609 10/23/21  1824 10/12/21  1429   * 445* 381*     Recent Labs   Lab Test 05/31/19  1507   TSH 2.55     Recent Labs   Lab Test 11/30/21  1609 10/29/21  1712 10/12/21  1429   INR 1.30* 1.39* 1.63*        Medical History  Surgical History Family History Social History   Past Medical History:   Diagnosis Date     Atrial fibrillation (H)      CHF (congestive heart failure) (H)      COPD (chronic obstructive pulmonary disease) (H)      Coronary artery disease      Diabetes mellitus (H)      Essential hypertension      Hyperlipidemia      Pulmonary hypertension (H)     O2 at night     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 They put him on sildenafil as nitroprusside lowered systemic BP and with that the mean PA dropped from 54 to 49. Negative VQ at [facility] Dec 1, 2017.     RLS (restless legs syndrome)      Sleep apnea      Past Surgical History:   Procedure Laterality Date     BACK SURGERY      lower back     CARDIAC CATHETERIZATION  12/13/2017    Right and left at [facility], mean PA 58, PCW 24 with V wave of 35, LVEDP of 18, with Nipride systemic BP, PVR and mean PA all declined     CARDIOVERSION  03/15/2013    for afib     CATARACT EXTRACTION Bilateral      COLONOSCOPY N/A 10/31/2021    Procedure: COLONOSCOPY WITH POLYPECTOMY;  Surgeon: Sheng Sagastume MD;  Location: [facility] Main OR     CORONARY STENT PLACEMENT       CV CORONARY ANGIOGRAM N/A 10/25/2021    Procedure: Coronary Angiogram;  Surgeon: Otf Knowles MD;  Location: [facility] CATH LAB CV     CV CORONARY LITHOTRIPSY PCI N/A 10/25/2021    Procedure: CV Coronary Lithotripsy PCI;  Surgeon: Otf Knowles MD;  Location: [facility] CATH LAB CV     CV LEFT HEART CATH N/A 10/25/2021    Procedure: Left Heart Cath;  Surgeon: Otf Knowles MD;   Location: [facility] LAB CV     CV PCI N/A 10/25/2021    Procedure: Percutaneous Coronary Intervention;  Surgeon: Otf Knowles MD;  Location: [facility] CV     ESOPHAGOSCOPY, GASTROSCOPY, DUODENOSCOPY (EGD), COMBINED N/A 10/30/2021    Procedure: ESOPHAGOGASTRODUODENOSCOPY (EGD);  Surgeon: Sheng Sagastume MD;  Location: [facility] Main OR     IR ABDOMINAL AORTOGRAM  2012     IR MISCELLANEOUS PROCEDURE  2001     IR MISCELLANEOUS PROCEDURE  2012     OTHER SURGICAL HISTORY      mynor     SHOULDER SURGERY      reapir on right shoulder     TOTAL HIP ARTHROPLASTY Left      TOTAL KNEE ARTHROPLASTY Bilateral      WRIST SURGERY Bilateral      ZZHC COLONOSCOPY W/WO BRUSH/WASH N/A 2021    Procedure: COLONOSCOPY;  Surgeon: Mihai Harris MD;  Location: [facility] Main OR;  Service: Gastroenterology     Family History   Problem Relation Age of Onset     Hyperlipidemia Mother      Hypertension Mother      Heart Disease Mother      Hyperlipidemia Father      Hypertension Father      Coronary Artery Disease Father      Cerebrovascular Disease Brother      Depression Brother      No Known Problems Sister      Pulmonary Hypertension No family hx of      Congenital heart disease No family hx of         Social History     Socioeconomic History     Marital status:      Spouse name: Not on file     Number of children: 4     Years of education: Not on file     Highest education level: Not on file   Occupational History     Not on file   Tobacco Use     Smoking status: Former Smoker     Packs/day: 1.50     Years: 44.00     Pack years: 66.00     Types: Cigarettes     Quit date: 1996     Years since quittin.6     Smokeless tobacco: Never Used   Substance and Sexual Activity     Alcohol use: Yes     Alcohol/week: 1.0 standard drink     Comment: Alcoholic Drinks/day: 1 per month     Drug use: No     Sexual activity: Not Currently     Partners: Female   Other Topics Concern     Not on file    Social History Narrative     Not on file     Social Determinants of Health     Financial Resource Strain: Not on file   Food Insecurity: Not on file   Transportation Needs: Not on file   Physical Activity: Not on file   Stress: Not on file   Social Connections: Not on file   Intimate Partner Violence: Not on file   Housing Stability: Not on file         Medications  Allergies   No current outpatient medications on file.        Allergies   Allergen Reactions     Furosemide Muscle Pain (Myalgia)     Previously tolerated.  Muscle cramps     Hydrochlorothiazide Unknown     Iodinated Contrast Media [Diagnostic X-Ray Materials] Nausea     Losartan Other (See Comments)     Other reaction(s): Stomatitis, Bloody nose dry mouth and lips     Losartan-Hydrochlorothiazide [Hyzaar]      Mouth sores     Metaxalone Nausea     Metolazone Other (See Comments)     Muscle cramps     Mometasone Other (See Comments)     Bloody nose     Penicillins Other (See Comments)     Immune-does not work for him     Rabeprazole Other (See Comments)     Mouth sores     Ramipril Other (See Comments)     Mouth sores     Shellfish Containing Products [Shellfish-Derived Products] Unknown     Other reaction(s): mouth sores, Other reaction(s): mouth sores     Sildenafil Muscle Pain (Myalgia)     Methocarbamol Rash         Mandi Jhaveri MD

## 2021-12-05 LAB
ANION GAP SERPL CALCULATED.3IONS-SCNC: 14 MMOL/L (ref 5–18)
BUN SERPL-MCNC: 44 MG/DL (ref 8–28)
CALCIUM SERPL-MCNC: 9.6 MG/DL (ref 8.5–10.5)
CHLORIDE BLD-SCNC: 95 MMOL/L (ref 98–107)
CO2 SERPL-SCNC: 28 MMOL/L (ref 22–31)
CREAT SERPL-MCNC: 1.67 MG/DL (ref 0.7–1.3)
GFR SERPL CREATININE-BSD FRML MDRD: 38 ML/MIN/1.73M2
GLUCOSE BLD-MCNC: 247 MG/DL (ref 70–125)
GLUCOSE BLDC GLUCOMTR-MCNC: 120 MG/DL (ref 70–99)
GLUCOSE BLDC GLUCOMTR-MCNC: 144 MG/DL (ref 70–99)
GLUCOSE BLDC GLUCOMTR-MCNC: 166 MG/DL (ref 70–99)
GLUCOSE BLDC GLUCOMTR-MCNC: 167 MG/DL (ref 70–99)
HOLD SPECIMEN: NORMAL
MAGNESIUM SERPL-MCNC: 2.4 MG/DL (ref 1.8–2.6)
POTASSIUM BLD-SCNC: 3.1 MMOL/L (ref 3.5–5)
POTASSIUM BLD-SCNC: 3.5 MMOL/L (ref 3.5–5)
SODIUM SERPL-SCNC: 137 MMOL/L (ref 136–145)

## 2021-12-05 PROCEDURE — 250N000011 HC RX IP 250 OP 636: Performed by: INTERNAL MEDICINE

## 2021-12-05 PROCEDURE — 250N000013 HC RX MED GY IP 250 OP 250 PS 637: Performed by: STUDENT IN AN ORGANIZED HEALTH CARE EDUCATION/TRAINING PROGRAM

## 2021-12-05 PROCEDURE — 258N000003 HC RX IP 258 OP 636: Performed by: INTERNAL MEDICINE

## 2021-12-05 PROCEDURE — 99232 SBSQ HOSP IP/OBS MODERATE 35: CPT | Mod: GC | Performed by: STUDENT IN AN ORGANIZED HEALTH CARE EDUCATION/TRAINING PROGRAM

## 2021-12-05 PROCEDURE — 36415 COLL VENOUS BLD VENIPUNCTURE: CPT

## 2021-12-05 PROCEDURE — 36415 COLL VENOUS BLD VENIPUNCTURE: CPT | Performed by: FAMILY MEDICINE

## 2021-12-05 PROCEDURE — 250N000013 HC RX MED GY IP 250 OP 250 PS 637: Performed by: INTERNAL MEDICINE

## 2021-12-05 PROCEDURE — 99233 SBSQ HOSP IP/OBS HIGH 50: CPT | Performed by: INTERNAL MEDICINE

## 2021-12-05 PROCEDURE — 120N000001 HC R&B MED SURG/OB

## 2021-12-05 PROCEDURE — 80048 BASIC METABOLIC PNL TOTAL CA: CPT

## 2021-12-05 PROCEDURE — 36415 COLL VENOUS BLD VENIPUNCTURE: CPT | Performed by: INTERNAL MEDICINE

## 2021-12-05 PROCEDURE — 83735 ASSAY OF MAGNESIUM: CPT | Performed by: INTERNAL MEDICINE

## 2021-12-05 PROCEDURE — 84132 ASSAY OF SERUM POTASSIUM: CPT | Performed by: FAMILY MEDICINE

## 2021-12-05 PROCEDURE — 250N000013 HC RX MED GY IP 250 OP 250 PS 637: Performed by: FAMILY MEDICINE

## 2021-12-05 RX ORDER — BUMETANIDE 2 MG/1
4 TABLET ORAL
Status: DISCONTINUED | OUTPATIENT
Start: 2021-12-05 | End: 2021-12-06 | Stop reason: HOSPADM

## 2021-12-05 RX ORDER — POTASSIUM CHLORIDE 1500 MG/1
40 TABLET, EXTENDED RELEASE ORAL ONCE
Status: COMPLETED | OUTPATIENT
Start: 2021-12-05 | End: 2021-12-05

## 2021-12-05 RX ORDER — POTASSIUM CHLORIDE 1500 MG/1
40 TABLET, EXTENDED RELEASE ORAL ONCE
Status: DISCONTINUED | OUTPATIENT
Start: 2021-12-05 | End: 2021-12-05

## 2021-12-05 RX ORDER — METOLAZONE 2.5 MG/1
2.5 TABLET ORAL
Status: DISCONTINUED | OUTPATIENT
Start: 2021-12-06 | End: 2021-12-06 | Stop reason: HOSPADM

## 2021-12-05 RX ORDER — POTASSIUM CHLORIDE 1500 MG/1
20 TABLET, EXTENDED RELEASE ORAL ONCE
Status: COMPLETED | OUTPATIENT
Start: 2021-12-05 | End: 2021-12-05

## 2021-12-05 RX ADMIN — INSULIN GLARGINE 10 UNITS: 100 INJECTION, SOLUTION SUBCUTANEOUS at 08:26

## 2021-12-05 RX ADMIN — CLOTRIMAZOLE: 1 CREAM TOPICAL at 08:26

## 2021-12-05 RX ADMIN — FLUTICASONE FUROATE AND VILANTEROL TRIFENATATE 1 PUFF: 200; 25 POWDER RESPIRATORY (INHALATION) at 08:26

## 2021-12-05 RX ADMIN — SPIRONOLACTONE 12.5 MG: 25 TABLET ORAL at 08:26

## 2021-12-05 RX ADMIN — DIGOXIN 125 MCG: 0.12 TABLET ORAL at 16:52

## 2021-12-05 RX ADMIN — POTASSIUM CHLORIDE 20 MEQ: 1500 TABLET, EXTENDED RELEASE ORAL at 16:52

## 2021-12-05 RX ADMIN — ROPINIROLE 2 MG: 1 TABLET, FILM COATED ORAL at 21:11

## 2021-12-05 RX ADMIN — FERROUS GLUCONATE 324 MG: 324 TABLET ORAL at 08:25

## 2021-12-05 RX ADMIN — SPIRONOLACTONE 12.5 MG: 25 TABLET ORAL at 21:12

## 2021-12-05 RX ADMIN — INSULIN ASPART 1 UNITS: 100 INJECTION, SOLUTION INTRAVENOUS; SUBCUTANEOUS at 11:37

## 2021-12-05 RX ADMIN — INSULIN GLARGINE 10 UNITS: 100 INJECTION, SOLUTION SUBCUTANEOUS at 21:13

## 2021-12-05 RX ADMIN — CLOTRIMAZOLE: 1 CREAM TOPICAL at 21:12

## 2021-12-05 RX ADMIN — FUROSEMIDE 10 MG/HR: 10 INJECTION INTRAMUSCULAR; INTRAVENOUS at 08:33

## 2021-12-05 RX ADMIN — BUMETANIDE 4 MG: 2 TABLET ORAL at 16:51

## 2021-12-05 RX ADMIN — EMPAGLIFLOZIN 10 MG: 10 TABLET, FILM COATED ORAL at 08:25

## 2021-12-05 RX ADMIN — METOPROLOL TARTRATE 25 MG: 25 TABLET, FILM COATED ORAL at 16:52

## 2021-12-05 RX ADMIN — POTASSIUM CHLORIDE 40 MEQ: 1500 TABLET, EXTENDED RELEASE ORAL at 10:51

## 2021-12-05 RX ADMIN — ATORVASTATIN CALCIUM 80 MG: 40 TABLET, FILM COATED ORAL at 08:25

## 2021-12-05 RX ADMIN — CLOPIDOGREL BISULFATE 75 MG: 75 TABLET, FILM COATED ORAL at 08:25

## 2021-12-05 RX ADMIN — PANTOPRAZOLE SODIUM 40 MG: 20 TABLET, DELAYED RELEASE ORAL at 06:34

## 2021-12-05 RX ADMIN — METOPROLOL TARTRATE 25 MG: 25 TABLET, FILM COATED ORAL at 08:25

## 2021-12-05 RX ADMIN — ZOLPIDEM TARTRATE 2.5 MG: 5 TABLET ORAL at 21:11

## 2021-12-05 RX ADMIN — TADALAFIL 20 MG: 10 TABLET, FILM COATED ORAL at 08:25

## 2021-12-05 RX ADMIN — METOLAZONE 2.5 MG: 2.5 TABLET ORAL at 08:25

## 2021-12-05 ASSESSMENT — ACTIVITIES OF DAILY LIVING (ADL)
ADLS_ACUITY_SCORE: 10
ADLS_ACUITY_SCORE: 12
ADLS_ACUITY_SCORE: 10
ADLS_ACUITY_SCORE: 12
ADLS_ACUITY_SCORE: 10
ADLS_ACUITY_SCORE: 12

## 2021-12-05 ASSESSMENT — MIFFLIN-ST. JEOR: SCORE: 1739.58

## 2021-12-05 NOTE — PROGRESS NOTES
"  HEART CARE CONSULTATON NOTE        Assessment/Recommendations   Assessment:  1.  Acute heart failure with preserved ejection fraction and right heart failure: Weight gain with increased shortness of breath and anasarca. Responding well to diuresis with now a 20 pound weight loss.  Significant improvement in diuresis since he was started on Lasix drip and given a dose of metolazone yesterday  2.  Ascites and cirrhosis of the liver  3.  Severe COPD on 4 L of home O2  4.  Who group 2 pulmonary hypertension  5.  NOVA on CPAP  6.  Atrial fibrillation: Chronic rate controlled on digoxin and metoprolol  7.  Lower GI bleed in the setting of being on Plavix, Effient, aspirin and Eliquis last month  8.  Chronic kidney disease has remained stable     Plan:  1.  Continue spironolactone  2.  Start on Bumex 4 mg twice a day and will give metolazone 2.5 mg Monday Wednesday Friday  3.  Daily weights, strict I's and O's, monitor creatinine  4.  Will need close follow-up in our heart failure clinic.  Recommend following up upon discharge and her heart failure clinic within a week.  Follow-up with Dr Lowery in 4 to 6 weeks.  Can continue to pursue watchman device as outpatient  5.  Recommend Plavix and Eliquis for anticoagulation.  Patient is reluctant to be on Eliquis at this time despite understanding the risks.  Currently being worked up as outpatient for a watchman device.       History of Present Illness/Subjective    Improvement in edema.  Scrotal edema significantly improved and swelling now down to the knees.  Denies shortness of breath.  No chest pain.     Physical Examination  Review of Systems   VITALS: /62 (BP Location: Right arm)   Pulse 73   Temp 97.8  F (36.6  C) (Oral)   Resp 20   Ht 1.803 m (5' 11\")   Wt 100.2 kg (221 lb)   SpO2 96%   BMI 30.82 kg/m    BMI: Body mass index is 30.82 kg/m .  Wt Readings from Last 3 Encounters:   12/05/21 100.2 kg (221 lb)   11/01/21 96.3 kg (212 lb 6.5 oz)   10/26/21 " 103.2 kg (227 lb 9.6 oz)       Intake/Output Summary (Last 24 hours) at 12/5/2021 1139  Last data filed at 12/5/2021 1117  Gross per 24 hour   Intake 1320 ml   Output 8275 ml   Net -6955 ml     General Appearance:   no distress, normal body habitus   ENT/Mouth: membranes moist, no oral lesions or bleeding gums.      EYES:  no scleral icterus, normal conjunctivae   Neck: no carotid bruits or thyromegaly   Chest/Lungs:   lungs are clear to auscultation   Cardiovascular:   Regular. Normal first and second heart sounds with no murmurs  ++ edema bilaterally    Abdomen:  no organomegaly, masses, bruits, or tenderness; bowel sounds are present   Extremities: no cyanosis or clubbing   Skin: no xanthelasma, warm.    Neurologic: normal  bilateral, no tremors     Psychiatric: alert and oriented x3, calm     Review Of Systems  Skin: negative  Eyes: negative  Ears/Nose/Throat: negative  Respiratory: No shortness of breath, dyspnea on exertion, cough, or hemoptysis  Cardiovascular: negative  Gastrointestinal: negative  Genitourinary: negative  Musculoskeletal: negative  Neurologic: negative  Psychiatric: negative  Hematologic/Lymphatic/Immunologic: negative  Endocrine: negative          Lab Results    Chemistry/lipid CBC Cardiac Enzymes/BNP/TSH/INR   Recent Labs   Lab Test 10/25/21  1529   CHOL 98   HDL 35*   LDL 53   TRIG 49     Recent Labs   Lab Test 10/25/21  1529 05/25/21  1359 09/02/20  1307   LDL 53 <5 50     Recent Labs   Lab Test 12/05/21  1131 12/05/21  0908   NA  --  137   POTASSIUM  --  3.1*   CHLORIDE  --  95*   CO2  --  28   * 247*   BUN  --  44*   CR  --  1.67*   GFRESTIMATED  --  38*   ANNMARIE  --  9.6     Recent Labs   Lab Test 12/05/21  0908 12/04/21  0835 12/03/21  0615   CR 1.67* 1.63* 1.47*     Recent Labs   Lab Test 10/23/21  1824 07/11/21  1031 04/10/20  0534   A1C 6.3* 9.3* 10.8*          Recent Labs   Lab Test 12/03/21  0615   WBC 6.3   HGB 7.6*   HCT 26.6*   MCV 72*        Recent Labs   Lab  Test 12/03/21  0615 12/02/21  1123 11/30/21  1609   HGB 7.6* 8.1* 7.9*    Recent Labs   Lab Test 11/30/21  1609 10/24/21  0619 10/24/21  0200   TROPONINI 0.03 0.03 0.03     Recent Labs   Lab Test 11/30/21  1609 10/23/21  1824 10/12/21  1429   * 445* 381*     Recent Labs   Lab Test 05/31/19  1507   TSH 2.55     Recent Labs   Lab Test 11/30/21  1609 10/29/21  1712 10/12/21  1429   INR 1.30* 1.39* 1.63*        Medical History  Surgical History Family History Social History   Past Medical History:   Diagnosis Date     Atrial fibrillation (H)      CHF (congestive heart failure) (H)      COPD (chronic obstructive pulmonary disease) (H)      Coronary artery disease      Diabetes mellitus (H)      Essential hypertension      Hyperlipidemia      Pulmonary hypertension (H)     O2 at night     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 They put him on sildenafil as nitroprusside lowered systemic BP and with that the mean PA dropped from 54 to 49. Negative VQ at [place] Dec 1, 2017.     RLS (restless legs syndrome)      Sleep apnea      Past Surgical History:   Procedure Laterality Date     BACK SURGERY      lower back     CARDIAC CATHETERIZATION  12/13/2017    Right and left at [facility], mean PA 58, PCW 24 with V wave of 35, LVEDP of 18, with Nipride systemic BP, PVR and mean PA all declined     CARDIOVERSION  03/15/2013    for afib     CATARACT EXTRACTION Bilateral      COLONOSCOPY N/A 10/31/2021    Procedure: COLONOSCOPY WITH POLYPECTOMY;  Surgeon: Sheng Sagastume MD;  Location: [facility] Main OR     CORONARY STENT PLACEMENT       CV CORONARY ANGIOGRAM N/A 10/25/2021    Procedure: Coronary Angiogram;  Surgeon: Otf Knowles MD;  Location: [facility] CATH LAB CV     CV CORONARY LITHOTRIPSY PCI N/A 10/25/2021    Procedure: CV Coronary Lithotripsy PCI;  Surgeon: Otf Knowles MD;  Location: [facility] CATH LAB  CV     CV LEFT HEART CATH N/A 10/25/2021    Procedure: Left Heart Cath;  Surgeon: Otf Knowles MD;  Location: [facility] LAB CV     CV PCI N/A 10/25/2021    Procedure: Percutaneous Coronary Intervention;  Surgeon: Otf Knowles MD;  Location: [facility] CATH LAB CV     ESOPHAGOSCOPY, GASTROSCOPY, DUODENOSCOPY (EGD), COMBINED N/A 10/30/2021    Procedure: ESOPHAGOGASTRODUODENOSCOPY (EGD);  Surgeon: Sheng Sagastume MD;  Location: [facility] Main OR     IR ABDOMINAL AORTOGRAM  2012     IR MISCELLANEOUS PROCEDURE  2001     IR MISCELLANEOUS PROCEDURE  2012     OTHER SURGICAL HISTORY      [facility]     SHOULDER SURGERY      reapir on right shoulder     TOTAL HIP ARTHROPLASTY Left      TOTAL KNEE ARTHROPLASTY Bilateral      WRIST SURGERY Bilateral      ZZHC COLONOSCOPY W/WO BRUSH/WASH N/A 2021    Procedure: COLONOSCOPY;  Surgeon: Mihai Harris MD;  Location: [facility] Main OR;  Service: Gastroenterology     Family History   Problem Relation Age of Onset     Hyperlipidemia Mother      Hypertension Mother      Heart Disease Mother      Hyperlipidemia Father      Hypertension Father      Coronary Artery Disease Father      Cerebrovascular Disease Brother      Depression Brother      No Known Problems Sister      Pulmonary Hypertension No family hx of      Congenital heart disease No family hx of         Social History     Socioeconomic History     Marital status:      Spouse name: Not on file     Number of children: 4     Years of education: Not on file     Highest education level: Not on file   Occupational History     Not on file   Tobacco Use     Smoking status: Former Smoker     Packs/day: 1.50     Years: 44.00     Pack years: 66.00     Types: Cigarettes     Quit date: 1996     Years since quittin.6     Smokeless tobacco: Never Used   Substance and Sexual Activity     Alcohol use: Yes     Alcohol/week: 1.0 standard drink     Comment: Alcoholic Drinks/day: 1 per month      Drug use: No     Sexual activity: Not Currently     Partners: Female   Other Topics Concern     Not on file   Social History Narrative     Not on file     Social Determinants of Health     Financial Resource Strain: Not on file   Food Insecurity: Not on file   Transportation Needs: Not on file   Physical Activity: Not on file   Stress: Not on file   Social Connections: Not on file   Intimate Partner Violence: Not on file   Housing Stability: Not on file         Medications  Allergies   No current outpatient medications on file.        Allergies   Allergen Reactions     Furosemide Muscle Pain (Myalgia)     Previously tolerated.  Muscle cramps     Hydrochlorothiazide Unknown     Iodinated Contrast Media [Diagnostic X-Ray Materials] Nausea     Losartan Other (See Comments)     Other reaction(s): Stomatitis, Bloody nose dry mouth and lips     Losartan-Hydrochlorothiazide [Hyzaar]      Mouth sores     Metaxalone Nausea     Metolazone Other (See Comments)     Muscle cramps     Mometasone Other (See Comments)     Bloody nose     Penicillins Other (See Comments)     Immune-does not work for him     Rabeprazole Other (See Comments)     Mouth sores     Ramipril Other (See Comments)     Mouth sores     Shellfish Containing Products [Shellfish-Derived Products] Unknown     Other reaction(s): mouth sores, Other reaction(s): mouth sores     Sildenafil Muscle Pain (Myalgia)     Methocarbamol Rash         Mandi Jhaveri MD

## 2021-12-05 NOTE — PLAN OF CARE
Note from [id number]. Pt denied during shift thus far. Skilled monitoring in all medical conditions. No new skin issues noted. BLE edema has improved today as well as scrotal edema. Lasix drip stopped and oral bumex initiated. Full sensation per pt. SBA for transferring. Saline locked. Voiding adequately with the urinal. Education on medication administration and use of call-light to reduce risk for falls and injury. VSS. Baseline 2L O2 utilized. SOB with exertion which pt states is not unusual or worsening. No further issues noted. Will continue to monitor.    Plan: Pt to discharge home tomorrow. Family to bring home O2 for transport.    Taylor R Schoenecker, RN

## 2021-12-05 NOTE — PROGRESS NOTES
[facility]    Progress Note       Date of Admission:  11/30/2021    Assessment & Plan             Red Sutherland is a 81 year old male w/ PMH of HFpEF, ischemic cardiomyopathy, Afib, CAD s/p HUNTER, NSTEMI, COPD on home O2 4L, NOVA on CPAP, pulmonary HTN, CKD3, cirrhosis, T2DM on insulin admitted on 11/30/2021 for dyspnea with presentation most consistent with CHF exacerbation.    Patient responding appropriately to IV Bumex.  CT equivocal findings concerning for cirrhosis with ascites.  Paracentesis supportive of cardiac etiology of ascites, no evidence of SBP.     Acute CHF exacerbation, improved  HFpEF in setting of ischemic cardiomyopathy  Anasarca, improved  Hydrocele, improved  Hypokalemia  Overall clinical improvement, weight has significantly decreased from admission. Dry weight of 212 lb; weight upon admission of 242 lb. BUN and Cr elevated, which may mean that he is at his dry weight (221) or close. Cardiology following. Potassium now low, which may be reflection of lasix. 12/5 was transitioned to PO bumex and metolazone  - Continuous pulse oximetry  - Oxygen support as needed, SpO2 goal >90%  - Hold PTA bumex  - BMP to trend  - Strict I/Os, daily weights  - Hold PTA irbesartan in setting of NATHALY  - Continue PTA metoprolol tartrate  - Continue PTA spironolactone   - Continue PTA Digoxin  - Hold PTA KCl  - Potassium replacement protocol  - Mg  -Cardiology consulted, appreciate recommendations   - Bumex 4mg BID, metolazone 2.5mg MWF    Cirrhosis  Ascites, s/p paracentesis   CT equivocal findings concerning for cirrhosis.  LFTs normal; albumen mildly low at 3.3; INR 1.3. Viral panel is negative. Ferritin is normal. Paracentesis removing 0.5L of yellow fluid.  SAAG score of 1.2, suggesting cardiac etiology, no evidence of SBP.  Overall unclear picture of cirrhosis.  -Recommend outpatient GI referral     Atrial fibrillation, permanent  Rate controlled & on anticoagulation. Was  refered for a watchman device due to recent GI bleed.  - Hold PTA apixaban as patient declined due to concern for GI bleed; recommended by both medicine team and cardiology, though patient declined     CAD s/p PCI w/ HUNTER of left circumflex  NSTEMI  HTN  HLD  PVD  Was discontinued off aspirin & Effient on 11/18/21. Patient is scheduled for Ct Cardiac Morphology on 12/1/21.  - Continue PTA atorvastatin  - Continue PTA Plavix post procedure      Chronic respiratory failure secondary to COPD requiring home O2 of 4 L  NOVA using CPAP  Severe pulmonary hypertension   Secondary to COPD and left heart disease  - Continue PTA tadalafil 20mg PO daily  - Discontinued PTA nitroglycerin SL PRN due to potential severe hypotension with tadalafil  - Continue PTA albuterol PRN  - Continue PTA Breo Ellipta  - CPAP overnight  - Supplemental O2     NATHALY  CKD 3  Cr and BUN uptrending; baseline Cr 1.2-1.4. BUN/Cr ratio of ~25. Increase likely secondary to increased diuretic.  - Avoid nephrotoxic as able  - off lasix drip, on PO bumex and metolazone    T2DM on insulin   Chronic pancreatitis  Obesity, BMI 33  Glucose has been only mildly elevated.   - Continue PTA invokana  - Continue PTA insulin glaragine at half of home dose  - Medium liding scale insulin  - Hold PTA metformin     Hx GI Bleed  Anemia, microcytic - stable  Iron deficiency anemia  Hgb 7.9 on admission; was 8.1 when discharged following GI bleed. Hgb has been stable, no signs of active bleeding.   - Continue PTA ferrous glaconate     Scrotal fungal rash  - Clotrimazole     Restless leg  - Continue PTA ropinirole     Insomnia  - Continue PTA zolpidem     GERD  - Continue PTA omeprazole      Diet: Combination Diet Low Saturated Fat Na <2400mg Diet, No Caffeine Diet    DVT Prophylaxis: DOAC and Pneumatic Compression Devices  Dixon Catheter: Not present  Fluids: PO  Central Lines: None  Code Status: No CPR- Do NOT Intubate      Disposition Plan   Expected discharge: 12/06/2021    recommended to prior living arrangement once CHF exacerbation improved.     The patient's care was discussed with the Attending Physician, Dr. Plata.    Diamante Alonso MD   [facility]      ______________________________________________________________________    Interval History   Afebrile, 2 L of oxygen supplementation, and VS. Per patient: breathing is better, and lower legs appear less swollen, however, pain is the same. Has no issues with eating. Sleeping has been poor due to increased frequency of urination.      Data reviewed today: I reviewed all medications, new labs and imaging results over the last 24 hours. I personally reviewed no images or EKG's today.    Physical Exam   Vital Signs: Temp: 97.7  F (36.5  C) Temp src: Oral BP: 128/60 Pulse: 75   Resp: 20 SpO2: 90 % O2 Device: Nasal cannula Oxygen Delivery: 2 LPM  Weight: 221 lbs 0 oz   Physical Exam  Constitutional:       General: He is not in acute distress.     Appearance: Normal appearance. He is not ill-appearing or diaphoretic.   Eyes:      Extraocular Movements: Extraocular movements intact.      Conjunctiva/sclera: Conjunctivae normal.   Cardiovascular:      Rate and Rhythm: Normal rate and regular rhythm.      Comments: 3/6 decrescendo systolic murmur  Pulmonary:      Effort: Pulmonary effort is normal.      Comments: Clear to ascultation bilaterally, no crackles, good air movement. No wheezes  Abdominal:      General: There is distension.      Tenderness: There is no abdominal tenderness. There is no guarding or rebound.   Musculoskeletal:      Cervical back: Normal range of motion.      Comments: 2+ pitting edema up to knees. Bilateral TTP   Skin:     General: Skin is warm and dry.   Neurological:      General: No focal deficit present.      Mental Status: He is alert and oriented to person, place, and time.   Psychiatric:         Mood and Affect: Mood normal.         Behavior: Behavior normal.         Data   Recent  Labs   Lab 12/05/21  1640 12/05/21  1515 12/05/21  1131 12/05/21  0908 12/04/21  1150 12/04/21  0835 12/03/21  0625 12/03/21  0615 12/02/21  1159 12/02/21  1123 12/02/21  0632 12/02/21  0625 12/01/21  0148 11/30/21  1609   WBC  --   --   --   --   --   --   --  6.3  --  7.8  --   --   --  7.7   HGB  --   --   --   --   --   --   --  7.6*  --  8.1*  --   --   --  7.9*   MCV  --   --   --   --   --   --   --  72*  --  72*  --   --   --  74*   PLT  --   --   --   --   --   --   --  161  --  207  --   --   --  211   INR  --   --   --   --   --   --   --   --   --   --   --   --   --  1.30*   NA  --   --   --  137  --  139  --  141  --  140  --  138   < > 140   POTASSIUM  --  3.5  --  3.1*  --  4.0  --  3.7  --  4.0  --  3.9  3.9   < > 4.4   CHLORIDE  --   --   --  95*  --  101  --  103  --  103  --  104   < > 108*   CO2  --   --   --  28  --  23  --  25  --  26  --  25   < > 23   BUN  --   --   --  44*  --  40*  --  42*  --  40*  --  39*   < > 39*   CR  --   --   --  1.67*  --  1.63*  --  1.47*  --  1.55*  --  1.53*   < > 1.62*   ANIONGAP  --   --   --  14  --  15  --  13  --  11  --  9   < > 9   ANNMARIE  --   --   --  9.6  --  9.6  --  8.9  --  9.4  --  9.2   < > 8.9   *  --  167* 247*   < > 211*   < > 121   < > 126*   < > 120   < > 199*   ALBUMIN  --   --   --   --   --   --   --   --   --   --   --  3.4*  --  3.3*   PROTTOTAL  --   --   --   --   --   --   --   --   --   --   --   --   --  6.2   BILITOTAL  --   --   --   --   --   --   --   --   --   --   --   --   --  1.9*   ALKPHOS  --   --   --   --   --   --   --   --   --   --   --   --   --  117   ALT  --   --   --   --   --   --   --   --   --   --   --   --   --  13   AST  --   --   --   --   --   --   --   --   --   --   --   --   --  18    < > = values in this interval not displayed.     No results found for this or any previous visit (from the past 24 hour(s)).

## 2021-12-05 NOTE — PROGRESS NOTES
Patient has been on lasix drip of 10 mg/hr during entire shift. Has been voiding 200-400 ml every 30-60 minutes most of the night. Patient was not able to sleep much during the night due to voiding. VSS. Patient has no complaints of pain during the shift. Reports scrotal edema has never hurt but is just uncomfortable. Patient reports wanting to go home 12/5/21. Hourly checks performed and call light was within reach. Patient remains steady on feet while standing to use urinal.

## 2021-12-06 ENCOUNTER — TELEPHONE (OUTPATIENT)
Dept: CARDIOLOGY | Facility: CLINIC | Age: 81
End: 2021-12-06
Payer: COMMERCIAL

## 2021-12-06 VITALS
DIASTOLIC BLOOD PRESSURE: 61 MMHG | RESPIRATION RATE: 20 BRPM | WEIGHT: 212.2 LBS | HEIGHT: 71 IN | BODY MASS INDEX: 29.71 KG/M2 | SYSTOLIC BLOOD PRESSURE: 130 MMHG | HEART RATE: 76 BPM | OXYGEN SATURATION: 98 % | TEMPERATURE: 97.7 F

## 2021-12-06 DIAGNOSIS — I50.33 ACUTE ON CHRONIC DIASTOLIC CONGESTIVE HEART FAILURE (H): Primary | ICD-10-CM

## 2021-12-06 LAB
ANION GAP SERPL CALCULATED.3IONS-SCNC: 14 MMOL/L (ref 5–18)
BACTERIA FLD CULT: NO GROWTH
BUN SERPL-MCNC: 53 MG/DL (ref 8–28)
CALCIUM SERPL-MCNC: 9.9 MG/DL (ref 8.5–10.5)
CHLORIDE BLD-SCNC: 92 MMOL/L (ref 98–107)
CO2 SERPL-SCNC: 32 MMOL/L (ref 22–31)
CREAT SERPL-MCNC: 1.75 MG/DL (ref 0.7–1.3)
GFR SERPL CREATININE-BSD FRML MDRD: 36 ML/MIN/1.73M2
GLUCOSE BLD-MCNC: 128 MG/DL (ref 70–125)
GLUCOSE BLDC GLUCOMTR-MCNC: 130 MG/DL (ref 70–99)
GLUCOSE BLDC GLUCOMTR-MCNC: 156 MG/DL (ref 70–99)
POTASSIUM BLD-SCNC: 3.1 MMOL/L (ref 3.5–5)
SODIUM SERPL-SCNC: 138 MMOL/L (ref 136–145)

## 2021-12-06 PROCEDURE — 99238 HOSP IP/OBS DSCHRG MGMT 30/<: CPT | Mod: GC

## 2021-12-06 PROCEDURE — 250N000013 HC RX MED GY IP 250 OP 250 PS 637: Performed by: STUDENT IN AN ORGANIZED HEALTH CARE EDUCATION/TRAINING PROGRAM

## 2021-12-06 PROCEDURE — 250N000013 HC RX MED GY IP 250 OP 250 PS 637: Performed by: FAMILY MEDICINE

## 2021-12-06 PROCEDURE — 250N000013 HC RX MED GY IP 250 OP 250 PS 637: Performed by: INTERNAL MEDICINE

## 2021-12-06 PROCEDURE — 80048 BASIC METABOLIC PNL TOTAL CA: CPT

## 2021-12-06 PROCEDURE — 36415 COLL VENOUS BLD VENIPUNCTURE: CPT

## 2021-12-06 PROCEDURE — 99232 SBSQ HOSP IP/OBS MODERATE 35: CPT | Performed by: INTERNAL MEDICINE

## 2021-12-06 RX ORDER — METOLAZONE 2.5 MG/1
2.5 TABLET ORAL
Qty: 6 TABLET | Refills: 0 | Status: SHIPPED | OUTPATIENT
Start: 2021-12-08 | End: 2021-12-10

## 2021-12-06 RX ORDER — POTASSIUM CHLORIDE 1500 MG/1
40 TABLET, EXTENDED RELEASE ORAL ONCE
Status: COMPLETED | OUTPATIENT
Start: 2021-12-06 | End: 2021-12-06

## 2021-12-06 RX ORDER — ATORVASTATIN CALCIUM 80 MG/1
80 TABLET, FILM COATED ORAL DAILY
Qty: 30 TABLET | Refills: 0 | Status: ON HOLD | OUTPATIENT
Start: 2021-12-06 | End: 2022-01-25

## 2021-12-06 RX ORDER — BUMETANIDE 2 MG/1
4 TABLET ORAL
Qty: 14 TABLET | Refills: 0 | Status: SHIPPED | OUTPATIENT
Start: 2021-12-06 | End: 2021-12-17

## 2021-12-06 RX ADMIN — EMPAGLIFLOZIN 10 MG: 10 TABLET, FILM COATED ORAL at 08:13

## 2021-12-06 RX ADMIN — CLOPIDOGREL BISULFATE 75 MG: 75 TABLET, FILM COATED ORAL at 08:13

## 2021-12-06 RX ADMIN — ATORVASTATIN CALCIUM 80 MG: 40 TABLET, FILM COATED ORAL at 08:13

## 2021-12-06 RX ADMIN — CLOTRIMAZOLE: 1 CREAM TOPICAL at 08:14

## 2021-12-06 RX ADMIN — BUMETANIDE 4 MG: 2 TABLET ORAL at 03:24

## 2021-12-06 RX ADMIN — METOPROLOL TARTRATE 25 MG: 25 TABLET, FILM COATED ORAL at 08:13

## 2021-12-06 RX ADMIN — INSULIN ASPART 1 UNITS: 100 INJECTION, SOLUTION INTRAVENOUS; SUBCUTANEOUS at 11:39

## 2021-12-06 RX ADMIN — METOLAZONE 2.5 MG: 2.5 TABLET ORAL at 08:13

## 2021-12-06 RX ADMIN — PANTOPRAZOLE SODIUM 40 MG: 20 TABLET, DELAYED RELEASE ORAL at 06:41

## 2021-12-06 RX ADMIN — TADALAFIL 20 MG: 10 TABLET, FILM COATED ORAL at 10:15

## 2021-12-06 RX ADMIN — POTASSIUM CHLORIDE 40 MEQ: 1500 TABLET, EXTENDED RELEASE ORAL at 11:39

## 2021-12-06 RX ADMIN — INSULIN GLARGINE 10 UNITS: 100 INJECTION, SOLUTION SUBCUTANEOUS at 08:13

## 2021-12-06 RX ADMIN — FLUTICASONE FUROATE AND VILANTEROL TRIFENATATE 1 PUFF: 200; 25 POWDER RESPIRATORY (INHALATION) at 08:13

## 2021-12-06 RX ADMIN — SPIRONOLACTONE 12.5 MG: 25 TABLET ORAL at 08:13

## 2021-12-06 ASSESSMENT — ACTIVITIES OF DAILY LIVING (ADL)
ADLS_ACUITY_SCORE: 12

## 2021-12-06 ASSESSMENT — MIFFLIN-ST. JEOR: SCORE: 1699.66

## 2021-12-06 NOTE — PROGRESS NOTES
Care Management Discharge Note    Discharge Date: 12/06/2021       Discharge Disposition: Home    Discharge Services: None    Discharge DME: None    Discharge Transportation:  (Family)    Private pay costs discussed: Not applicable    PAS Confirmation Code:  (NA)  Patient/family educated on Medicare website which has current facility and service quality ratings:  (NA)    Education Provided on the Discharge Plan:  Per team.  Persons Notified of Discharge Plans: Nursing  Patient/Family in Agreement with the Plan: yes    Handoff Referral Completed: Yes    Additional Information:  Discharge to home with family support.     Mirna Ly RN

## 2021-12-06 NOTE — DISCHARGE INSTRUCTIONS
[facility] cardiac clinic: [phone] (follow up regarding heart failure in 1 week/ follow up with Dr. Lowery within 4-6 weeks) Clinic will contact you   Primary care follow up within 1 week, discuss cirrhosis and possible GI consult.

## 2021-12-06 NOTE — PROGRESS NOTES
Impression and Plan     Impression:   Acute heart failure with preserved LVEF and right heart failure - likely related to cirrhosis and liver failure. Is responding to diuresis with furosemide gtt and a dose of metolazone. More compensated today.  Ascites and cirrhosis of the liver  Severe COPD on chronic home O2 (4L) - currently on 2 lpm  WHO group 2 pulmonary hypertension  NOVA on CPAP  Longstanding persistent atrial fibrillation - currently rate controlled and on Eliquis for stroke prevention.  CKD - stable    Plan:  Agree with discharge at current cardiac medication doses.  F/u in HF clinic within 1-2 weeks.    Primary Cardiologist: will establish in our HF clinic. Otherwise Dr. Jhaveri    Subjective     Feels back to baseline. Still with PARIKH, some LE swelling.     Cardiac Diagnostics         Echocardiogram (results reviewed):   TTE 10/24/21  Interpretation Summary     Left ventricular size, wall motion and function are normal. The ejection  fraction is 60-65%.  Diastolic Doppler findings (E/E' ratio and/or other parameters) suggest left  ventricular filling pressures are increased.  Flattened septum is consistent with RV pressure/volume overload.  The right ventricle is moderate to severely dilated.  Moderately decreased right ventricular systolic function  The left atrium is moderately dilated.  The right atrium is moderate to severely dilated.  Mild valvular aortic stenosis.  The estimated pulmonary artery systolic pressure is 75 mmHg.     No previous study for comparison.    Cardiac Cath (results reviewed):   10/25/21  Findings:  LM:no obstruction  LAD:possible mid-vessel stent vs. Ca2+, no obstruction - similar to '11 angio  Lcx:95-99% ISR in prox stent, 50% downstream disease at bifurcation  RCA:dominant, anterior, no obstruction    - severe narrowing in Lcx stent, s/p intracoronary lithotripsy and DESx1; high-normal L-sided filling pressures  - ASA 81mg daily indefinitely, switch P2Y12 inhibitor to  "prasugrel given relatively early stent re-narrowing (within first year) after 60mg re-load, then 10mg daily ideally indefinitely but at least for 12 mos   - 60-70% bifurcation disease beyond the stent - medical management as the first step, PCI if has recalcitrant symptoms        Physical Examination       /61 (BP Location: Right arm, Patient Position: Sitting)   Pulse 76   Temp 97.7  F (36.5  C) (Oral)   Resp 20   Ht 1.803 m (5' 11\")   Wt 96.3 kg (212 lb 3.2 oz)   SpO2 98%   BMI 29.60 kg/m            Intake/Output Summary (Last 24 hours) at 12/6/2021 1102  Last data filed at 12/6/2021 1016  Gross per 24 hour   Intake 1200 ml   Output 5700 ml   Net -4500 ml       General: pleasant male. No acute distress.  HENT: external ears normal. Nares patent. Mucous membranes moist.  Eyes: perrla, extraocular muscles intact. No scleral icterus.   Neck: No JVD  Lungs: diminished air entry. Otherwise clear to auscultation.  COR: regular rate and rhythm, No murmurs, rubs, or gallops  Abd: nondistended, BS present  Extrem: mild, tight pitting edema in BLE below knees.         Imaging      No new imaging.    Lab Results   Lab Results   Component Value Date    CHOL 98 10/25/2021    HDL 35 (L) 10/25/2021    TRIG 49 10/25/2021    BUN 53 (H) 12/06/2021     12/06/2021    CO2 32 (H) 12/06/2021       Lab Results   Component Value Date    WBC 6.3 12/03/2021    HGB 7.6 (L) 12/03/2021    HCT 26.6 (L) 12/03/2021    MCV 72 (L) 12/03/2021     12/03/2021       Lab Results   Component Value Date    TROPONINI 0.03 11/30/2021     (H) 11/30/2021    TSH 2.55 05/31/2019    INR 1.30 (H) 11/30/2021               Current Inpatient Scheduled Medications   Scheduled Meds:    atorvastatin  80 mg Oral Daily     bumetanide  4 mg Oral BID     clopidogrel  75 mg Oral Daily     clotrimazole   Topical BID     digoxin  125 mcg Oral Daily     empagliflozin  10 mg Oral Daily     ferrous gluconate  324 mg Oral Every Other Day     " "fluticasone-vilanterol  1 puff Inhalation Daily     insulin aspart  1-7 Units Subcutaneous TID AC     insulin aspart  1-5 Units Subcutaneous At Bedtime     insulin glargine  10 Units Subcutaneous BID     [Held by provider] irbesartan  300 mg Oral Daily     metolazone  2.5 mg Oral Q Mon Wed Fri AM     metoprolol tartrate  25 mg Oral BID w/meals     pantoprazole  40 mg Oral QAM AC     potassium chloride  40 mEq Oral Once     rOPINIRole  2 mg Oral At Bedtime     sodium chloride (PF)  3 mL Intracatheter Q8H     spironolactone  12.5 mg Oral BID     tadalafil  20 mg Oral Daily     Continuous Infusions:    - MEDICATION INSTRUCTIONS -              Medications Prior to Admission   Prior to Admission medications    Medication Sig Start Date End Date Taking? Authorizing Provider   ACCU-CHEK JOSE PLUS TEST STRP strips [ACCU-CHEK JOSE PLUS TEST STRP STRIPS] see administration instructions. 1/20/21  Yes Provider, Historical   acetaminophen (TYLENOL) 500 MG tablet [ACETAMINOPHEN (TYLENOL) 500 MG TABLET] Take 1,000 mg by mouth every 6 (six) hours as needed. First line of pain management. Do Not take more than 4,000 mg in 24 hours. 2/4/21  Yes Provider, Historical   albuterol (PROVENTIL HFA;VENTOLIN HFA) 90 mcg/actuation inhaler Inhale 2 puffs into the lungs every 6 hours as needed  1/11/15  Yes Provider, Historical   apixaban ANTICOAGULANT (ELIQUIS) 5 MG tablet Take 5 mg by mouth 2 times daily 11/18/21  Yes Unknown, Entered By History   atorvastatin (LIPITOR) 80 MG tablet Take 1 tablet (80 mg) by mouth daily 12/6/21  Yes Deirdre Valdovinos MD   BD ULTRA-FINE MANISHA PEN NEEDLES 32 gauge x 5/32\" Ndle [BD ULTRA-FINE MANISHA PEN NEEDLES 32 GAUGE X 5/32\" NDLE]  1/11/17  Yes Provider, Historical   bumetanide (BUMEX) 2 MG tablet Take 2 tablets (4 mg) by mouth 2 times daily 12/6/21  Yes Deirdre Valdovinos MD   canagliflozin (INVOKANA) 100 mg Tab Take 100 mg by mouth every morning  5/21/20  Yes Provider, Historical   clopidogrel (PLAVIX) 75 MG " tablet Take 75 mg by mouth daily 11/19/21  Yes Unknown, Entered By History   digoxin (LANOXIN) 125 mcg tablet [DIGOXIN (LANOXIN) 125 MCG TABLET] Take 1 tablet (125 mcg total) by mouth daily. 11/7/16  Yes Rayray Franz MD   Ferrous Gluconate 324 (37.5 Fe) MG TABS Take 1 tablet by mouth every other day   Yes Reported, Patient   fluticasone-vilanterol (BREO ELLIPTA) 200-25 mcg/dose DsDv inhaler Inhale 1 puff into the lungs daily  2/20/18  Yes Provider, Historical   Garlic 1000 MG CAPS Take 1 capsule by mouth daily   Yes Unknown, Entered By History   insulin glargine (LANTUS PEN) 100 UNIT/ML pen Inject 20 Units Subcutaneous 2 times daily  Patient taking differently: Inject 18 Units Subcutaneous 2 times daily  10/26/21  Yes Fabiola Baez MD   ipratropium - albuterol 0.5 mg/2.5 mg/3 mL (DUONEB) 0.5-2.5 (3) MG/3ML neb solution Take 1 vial by nebulization 4 times daily as needed for shortness of breath / dyspnea    Yes Unknown, Entered By History   irbesartan (AVAPRO) 300 MG tablet Take 300 mg by mouth daily    Yes Unknown, Entered By History   lidocaine (LIDODERM) 5 % Place 1 patch onto the skin daily as needed  2/9/16  Yes Provider, Historical   metFORMIN (GLUCOPHAGE-XR) 500 MG 24 hr tablet Take 2 tablets (1,000 mg) by mouth daily (with dinner) 10/27/21  Yes Fabiola Baez MD   metolazone (ZAROXOLYN) 2.5 MG tablet Take 1 tablet (2.5 mg) by mouth Every Mon, Wed, Fri Morning 12/8/21  Yes Deirdre Valdovinos MD   metoprolol tartrate (LOPRESSOR) 25 MG tablet Take 1 tablet (25 mg) by mouth 2 times daily (with meals) 11/1/21  Yes Eliezer Salomon MD   multivitamin w/minerals (THERA-VIT-M) tablet Take 1 tablet by mouth daily   Yes Unknown, Entered By History   omeprazole (PRILOSEC) 20 MG DR capsule Take 20 mg by mouth daily   Yes Unknown, Entered By History   OXYGEN-AIR DELIVERY SYSTEMS MISC [OXYGEN-AIR DELIVERY SYSTEMS MISC] Use As Directed. 11/17/16  Yes Provider, Historical   potassium chloride (K-DUR,KLOR-CON) 20 MEQ  tablet Take 40 mEq by mouth daily  4/9/20  Yes Provider, Historical   rOPINIRole (REQUIP) 2 MG tablet [ROPINIROLE (REQUIP) 2 MG TABLET] Take 2 mg by mouth at bedtime.  1/11/15  Yes Provider, Historical   spironolactone (ALDACTONE) 25 MG tablet Take 12.5 mg by mouth 2 times daily 11/26/21  Yes Unknown, Entered By History   tadalafil (CIALIS) 20 MG tablet Take 20 mg by mouth daily  11/30/21  Yes Meg Roth MD   vitamin C (ASCORBIC ACID) 1000 MG TABS Take 1,000 mg by mouth daily   Yes Unknown, Entered By History   zinc gluconate 50 MG tablet Take 50 mg by mouth daily   Yes Unknown, Entered By History   zolpidem (AMBIEN) 10 mg tablet Take 5 mg by mouth nightly as needed for sleep  1/11/15  Yes Provider, Historical

## 2021-12-06 NOTE — PROGRESS NOTES
Care Management Follow Up    Length of Stay (days): 6    Expected Discharge Date: 12/06/2021       Concerns to be Addressed:   Pending clearance for discharge.   Patient plan of care discussed at interdisciplinary rounds: Yes    Anticipated Discharge Disposition:  Home.      Anticipated Discharge Services:  None anticipated.  Anticipated Discharge DME:  Has a walker and home oxygen.    Patient/family educated on Medicare website which has current facility and service quality ratings:  NA  Education Provided on the Discharge Plan:  Per team  Patient/Family in Agreement with the Plan:  Yes    Referrals Placed by CM/SW:  None  Private pay costs discussed: Not applicable     Additional Information:  Patient is  and lives at home with his wife. He is independent with all activities of daily living at baseline. He uses a standard walker with ambulation and has home oxygen (2-4 liters). Goal is for patient to return home with family transport.     Mirna Ly RN

## 2021-12-06 NOTE — PLAN OF CARE
Problem: Adult Inpatient Plan of Care  Goal: Readiness for Transition of Care  Outcome: Adequate for Discharge     Patient has been cleared for discharge to home. He is reporting feeling well. He is on 2L of O2 which is lower then his home use of 4L. Pt is vitally stable with medication changes. All discharge information reviewed with pt. Discussed need for various follow ups, understanding verbalized.

## 2021-12-06 NOTE — PROGRESS NOTES
Patient remains on 2 L via NC but that is baseline for patient. Edema in bilateral lower extremities have improved to 1+. Patient reports no pain throughout the shift. VSS.

## 2021-12-06 NOTE — TELEPHONE ENCOUNTER
----- Message from Mandi Jhaveri MD sent at 12/5/2021 11:43 AM CST -----  Patient being discharged tomorrow.  Significant right heart failure/heart failure with preserved ejection fraction and needs follow-up in heart failure clinic within a week after discharge and with Dr. Lowery 2 to 3 weeks

## 2021-12-06 NOTE — CONSULTS
Integrative Therapy Consult    Healing PresenceYes  Essential Oils: Topical (EO/Topical Oil)     Tea Tree - HC,Lavender Massage Oil - HC       Healing Music:       Breathwork:       Guided Imagery:       Acupressure:       Oshibori:       Energy Therapy:       Healing Touch:       Reiki:       Qi Gong:     Massage: Foot      Targeted Massage:    Sleep Promotion:       Other Therapy:       Intervention Reason: Edema,Well Being     Pre and Post Session Scores: Patient Desires Treatment: yes                           Delivery:         Referrals:      Sangita Hogue

## 2021-12-06 NOTE — DISCHARGE SUMMARY
[facility]  Discharge Summary - Medicine & Pediatrics       Date of Admission:  11/30/2021  Date of Discharge:  12/6/2021  Discharging Provider: Dr. Marques Plata    Discharge Diagnoses   Acute CHF exacerbation  Hydrocele  Cirrhosis with ascites      Follow-ups Needed After Discharge   Follow-up Appointments     Follow-up and recommended labs and tests       Per inpatient cardiology: Will need close follow-up in our heart failure   clinic.  Recommend following up upon discharge and heart failure clinic   within a week.  Follow-up with Dr Lowery in 4 to 6 weeks.  Can continue   to pursue watchman device as outpatient    Recommended to follow-up with PCP to consider referral for cirrhosis at outpatient GI clinic          Unresulted Labs Ordered in the Past 30 Days of this Admission     Date and Time Order Name Status Description    12/1/2021  3:28 PM Anaerobic Bacterial Culture Routine Preliminary       These results will be followed up by PCP    Discharge Disposition   Discharged to home  Condition at discharge: Good      Hospital Course   Red Sutherland is a 81 year old male w/ PMH of HFpEF, ischemic cardiomyopathy, Afib, CAD s/p HUNTER, NSTEMI, COPD on home O2 4L, NOVA on CPAP, pulmonary HTN, CKD3, cirrhosis, T2DM on insulin admitted on 11/30/2021 for dyspnea with presentation consistent with CHF exacerbation.       Patient presented with dyspnea, peripheral edema, anasarca, hydrocele.  Patient had increased hypoxia above baseline.  At baseline patient is on home oxygenation of 4 L due to chronic respiratory failure from COPD.  Troponin was negative.  BNP elevated, elevated at baseline.  Chest x-ray revealing pulmonary vascular congestion and mild interstitial edema.  Abdominal CT noted cirrhosis with interval development of ascites though LFTs were normal, INR of 1.3 on DOAC.    For patient's heart failure exacerbation, received IV Bumex and cardiology was consulted.  Weight down trended  to previous dry weight, peripheral edema resolved.  Hydrocele improved.  Possible scrotal fungal rash as well, patient was treated with clotrimazole with improvement.  Patient had decreased oxygen requirements, returned to baseline oxygenation.  Cardiology recommended patient to start Bumex 4 mg twice daily and metolazone 2.5 mg Monday, Wednesday, Friday.  Patient to follow-up at heart failure clinic within 1 week.  Patient to follow-up with Dr. Lowery in 4 to 6 weeks.  Can continue to pursue watchman device as outpatient.  Patient was recommended to continue Plavix and Eliquis for anticoagulation though patient did not want to start Eliquis though understands risks and benefits.    CT equivocal findings concerning cirrhosis with ascites. Paracentesis supportive of cardiac etiology of ascites, no evidence of SBP.  Patient has no significant alcohol history, hepatitis viral panel was negative, and ferritin was normal.  Patient recommended to follow-up with PCP and discuss referral to outpatient GI for further work-up.    Given overall improvement, appropriate to discharge at this time.    Consultations This Hospital Stay   SOCIAL WORK IP CONSULT  CARDIOLOGY IP CONSULT  CORE CLINIC EVALUATION IP CONSULT  CORE CLINIC EVALUATION IP CONSULT    Code Status   No CPR- Do NOT Intubate       The patient was discussed with Dr. Boni Valdovinos MD  [address] [address]  Phone: [phone]  Fax: [phone]  ______________________________________________________________________    Physical Exam   Vital Signs: Temp: 97.7  F (36.5  C) Temp src: Oral BP: 130/61 Pulse: 76   Resp: 20 SpO2: 98 % O2 Device: Nasal cannula Oxygen Delivery: 2 LPM  Weight: 212 lbs 3.2 oz  Constitutional:       General: He is not in acute distress.     Appearance: Normal appearance. He is not ill-appearing or diaphoretic.   Eyes:      Extraocular Movements: Extraocular  movements intact.      Conjunctiva/sclera: Conjunctivae normal.   Cardiovascular:      Rate and Rhythm: Normal rate and regular rhythm.      Comments: 3/6 decrescendo systolic murmur  Pulmonary:      Effort: Pulmonary effort is normal.      Comments: Clear to ascultation bilaterally, no crackles, good air movement. No wheezes  Abdominal:      General: There is distension, improved     Tenderness: There is no abdominal tenderness. There is no guarding or rebound.   Musculoskeletal:      Cervical back: Normal range of motion.      Comments: Trace pitting edema, improved.  Bilateral tenderness to palpation, improved   Skin:     General: Skin is warm and dry.   Neurological:      General: No focal deficit present.      Mental Status: He is alert and oriented to person, place, and time.   Psychiatric:         Mood and Affect: Mood normal.         Behavior: Behavior normal.       Primary Care Physician   Haresh Corona    Discharge Orders      Reason for your hospital stay    Exacerbation of heart failure     Follow-up and recommended labs and tests     Per inpatient cardiology: Will need close follow-up in our heart failure clinic.  Recommend following up upon discharge and heart failure clinic within a week.  Follow-up with Dr Lowery in 4 to 6 weeks.  Can continue to pursue watchman device as outpatient    Recommended to follow-up for cirrhosis at outpatient GI clinic     Activity    Your activity upon discharge: As normal     Diet    Follow this diet upon discharge: Diet low in saturated fat, low in sodium, no caffeine       Significant Results and Procedures   Most Recent 3 BMP's:Recent Labs   Lab Test 12/06/21  1138 12/06/21  0616 12/06/21  0609 12/05/21  1640 12/05/21  1515 12/05/21  1131 12/05/21  0908 12/04/21  1150 12/04/21  0835   NA  --   --  138  --   --   --  137  --  139   POTASSIUM  --   --  3.1*  --  3.5  --  3.1*  --  4.0   CHLORIDE  --   --  92*  --   --   --  95*  --  101   CO2  --   --  32*   --   --   --  28  --  23   BUN  --   --  53*  --   --   --  44*  --  40*   CR  --   --  1.75*  --   --   --  1.67*  --  1.63*   ANIONGAP  --   --  14  --   --   --  14  --  15   ANNMARIE  --   --  9.9  --   --   --  9.6  --  9.6   * 130* 128*   < >  --    < > 247*   < > 211*    < > = values in this interval not displayed.     Most Recent 2 LFT's:Recent Labs   Lab Test 11/30/21  1609 10/29/21  1712   AST 18 24   ALT 13 14   ALKPHOS 117 135*   BILITOTAL 1.9* 2.2*     Most Recent 3 INR's:Recent Labs   Lab Test 11/30/21  1609 10/29/21  1712 10/12/21  1429   INR 1.30* 1.39* 1.63*     Most Recent 3 BNP's:No lab results found.,   Results for orders placed or performed during the hospital encounter of 11/30/21   XR Chest Port 1 View    Narrative    EXAM: XR CHEST PORT 1 VIEW  LOCATION: [facility]  DATE/TIME: 11/30/2021 4:28 PM    INDICATION: shortness of breath, concern for chf exacerbation  COMPARISON: 10/23/2021 and older studies.      Impression    IMPRESSION: Large body habitus. There is cardiomegaly, pulmonary vascular congestion and mild interstitial edema. Previously noted airspace opacities distributed throughout both lungs have resolved consistent with a resolved pneumonia.   CT Abdomen Pelvis w/o Contrast    Narrative    EXAM: CT ABDOMEN PELVIS W/O CONTRAST  LOCATION: [facility]  DATE/TIME: 11/30/2021 4:54 PM    INDICATION: Abdominal distension  COMPARISON: CT chest, abdomen and pelvis 07/11/2021 and CT chest 10/12/2021  TECHNIQUE: CT scan of the abdomen and pelvis was performed without IV contrast. Multiplanar reformats were obtained. Dose reduction techniques were used.  CONTRAST: None.    FINDINGS:   LOWER CHEST: Moderate right pleural effusion has slightly increased in size since 10/12/2021. Small left pleural effusion with minimal change. Bilateral pulmonary infiltrates and septal thickening have improved. Scattered nodules both lungs as seen   previously  including a 1.3 cm nodule right middle lobe, incompletely imaged. Underlying emphysema. Cardiomegaly.    HEPATOBILIARY: Cirrhotic changes of the liver. Small amount of ascites along the liver margin has increased. Cholecystectomy.    PANCREAS: Diffuse calcifications consistent with chronic pancreatitis, unchanged.    SPLEEN: Small amount of ascites left upper quadrant adjacent to the spleen.    ADRENAL GLANDS: Normal.    KIDNEYS/BLADDER: No renal calculi or hydronephrosis. 3.5 cm left renal cyst.     BOWEL: Diverticulosis sigmoid colon, without evidence for diverticulitis or bowel obstruction. Diffuse gastric wall thickening, incompletely distended.    LYMPH NODES: Normal.    VASCULATURE: Severe atherosclerotic disease abdominal aorta and iliac arteries.    PELVIC ORGANS: Small amount of pelvic ascites.    MUSCULOSKELETAL: Total left hip arthroplasty. Advanced degenerative disc disease lower thoracic and lumbar spine. Edematous changes subcutaneous tissues is new.      Impression    IMPRESSION:   1.  No evidence for bowel obstruction.  2.  Gastric wall thickening could be related to incomplete distention. Gastritis could have a similar appearance.  3.  Cirrhosis with interval development of ascites right and left upper quadrants and in the pelvis, and subcutaneous edema.  4.  Chronic pancreatitis.  5.  Advanced atherosclerotic disease.  6.  Infiltrates both lower lung fields have improved. Moderate size right pleural effusion is slightly larger.     US Lower Extremity Venous Duplex Bilateral    Narrative    EXAM: US LOWER EXTREMITY VENOUS DUPLEX BILATERAL  LOCATION: [facility]  DATE/TIME: 12/1/2021 2:43 PM    INDICATION: Lower extremity edema and tenderness.  COMPARISON: None.  TECHNIQUE: Venous Duplex ultrasound of bilateral lower extremities with and without compression, augmentation and duplex. Color flow and spectral Doppler with waveform analysis performed.    FINDINGS: Exam includes  the common femoral, femoral, popliteal veins as well as segmentally visualized deep calf veins and greater saphenous vein.     RIGHT: No deep vein thrombosis. No superficial thrombophlebitis. No popliteal cyst.    LEFT: No deep vein thrombosis. No superficial thrombophlebitis. No popliteal cyst.      Impression    IMPRESSION:    1.  No deep venous thrombosis in the bilateral lower extremities.    2.  Subcutaneous edema.   US Paracentesis    Narrative    EXAM:  1. PARACENTESIS  2. ULTRASOUND GUIDANCE  LOCATION: [facility]  DATE/TIME: 12/1/2021 2:42 PM    INDICATION: Ascites.    PROCEDURE: Informed consent obtained. Time out performed. The abdomen was prepped and draped in a sterile fashion. 10 mL of 1% lidocaine was infused into local soft tissues. A 5 Trin[place] catheter system was introduced into the abdominal ascites under   ultrasound guidance.    0.5 liters of yellow fluid were removed and sent to lab if requested.    Patient tolerated procedure well.    Ultrasound imaging was obtained and placed in the patient's permanent medical record.      Impression    IMPRESSION:  1.  Status post ultrasound-guided paracentesis.    Reference CPT Code: 92516       Discharge Medications   Current Discharge Medication List      START taking these medications    Details   metolazone (ZAROXOLYN) 2.5 MG tablet Take 1 tablet (2.5 mg) by mouth Every Mon, Wed, Fri Morning  Qty: 6 tablet, Refills: 0    Associated Diagnoses: Acute on chronic congestive heart failure, unspecified heart failure type (H)         CONTINUE these medications which have CHANGED    Details   atorvastatin (LIPITOR) 80 MG tablet Take 1 tablet (80 mg) by mouth daily  Qty: 30 tablet, Refills: 0    Comments: Refill of regular prescription, no changes  Associated Diagnoses: Hypercholesteremia      bumetanide (BUMEX) 2 MG tablet Take 2 tablets (4 mg) by mouth 2 times daily  Qty: 14 tablet, Refills: 0    Associated Diagnoses: Acute on chronic  "congestive heart failure, unspecified heart failure type (H)         CONTINUE these medications which have NOT CHANGED    Details   ACCU-CHEK JOSE PLUS TEST STRP strips [ACCU-CHEK JOSE PLUS TEST STRP STRIPS] see administration instructions.      acetaminophen (TYLENOL) 500 MG tablet [ACETAMINOPHEN (TYLENOL) 500 MG TABLET] Take 1,000 mg by mouth every 6 (six) hours as needed. First line of pain management. Do Not take more than 4,000 mg in 24 hours.      albuterol (PROVENTIL HFA;VENTOLIN HFA) 90 mcg/actuation inhaler Inhale 2 puffs into the lungs every 6 hours as needed       apixaban ANTICOAGULANT (ELIQUIS) 5 MG tablet Take 5 mg by mouth 2 times daily      BD ULTRA-FINE MANISHA PEN NEEDLES 32 gauge x 5/32\" Ndle [BD ULTRA-FINE MANISHA PEN NEEDLES 32 GAUGE X 5/32\" NDLE]   Refills: 4      canagliflozin (INVOKANA) 100 mg Tab Take 100 mg by mouth every morning       clopidogrel (PLAVIX) 75 MG tablet Take 75 mg by mouth daily      digoxin (LANOXIN) 125 mcg tablet [DIGOXIN (LANOXIN) 125 MCG TABLET] Take 1 tablet (125 mcg total) by mouth daily.  Qty: 90 tablet, Refills: 3    Associated Diagnoses: Persistent atrial fibrillation (H)      Ferrous Gluconate 324 (37.5 Fe) MG TABS Take 1 tablet by mouth every other day      fluticasone-vilanterol (BREO ELLIPTA) 200-25 mcg/dose DsDv inhaler Inhale 1 puff into the lungs daily       Garlic 1000 MG CAPS Take 1 capsule by mouth daily      insulin glargine (LANTUS PEN) 100 UNIT/ML pen Inject 20 Units Subcutaneous 2 times daily  Qty: 3 mL, Refills: 3    Comments: If Lantus is not covered by insurance, may substitute Basaglar at same dose and frequency.    Associated Diagnoses: Type 2 diabetes mellitus with other specified complication, with long-term current use of insulin (H)      ipratropium - albuterol 0.5 mg/2.5 mg/3 mL (DUONEB) 0.5-2.5 (3) MG/3ML neb solution Take 1 vial by nebulization 4 times daily as needed for shortness of breath / dyspnea       irbesartan (AVAPRO) 300 MG tablet " Take 300 mg by mouth daily       lidocaine (LIDODERM) 5 % Place 1 patch onto the skin daily as needed       metFORMIN (GLUCOPHAGE-XR) 500 MG 24 hr tablet Take 2 tablets (1,000 mg) by mouth daily (with dinner)    Associated Diagnoses: Type 2 diabetes mellitus with other specified complication, with long-term current use of insulin (H)      metoprolol tartrate (LOPRESSOR) 25 MG tablet Take 1 tablet (25 mg) by mouth 2 times daily (with meals)  Qty: 60 tablet, Refills: 3    Associated Diagnoses: Permanent atrial fibrillation (H)      multivitamin w/minerals (THERA-VIT-M) tablet Take 1 tablet by mouth daily      omeprazole (PRILOSEC) 20 MG DR capsule Take 20 mg by mouth daily      OXYGEN-AIR DELIVERY SYSTEMS MISC [OXYGEN-AIR DELIVERY SYSTEMS MISC] Use As Directed.      potassium chloride (K-DUR,KLOR-CON) 20 MEQ tablet Take 40 mEq by mouth daily       rOPINIRole (REQUIP) 2 MG tablet [ROPINIROLE (REQUIP) 2 MG TABLET] Take 2 mg by mouth at bedtime.       spironolactone (ALDACTONE) 25 MG tablet Take 12.5 mg by mouth 2 times daily      tadalafil (CIALIS) 20 MG tablet Take 20 mg by mouth daily     Associated Diagnoses: Pulmonary hypertension due to left ventricular diastolic dysfunction (H)      vitamin C (ASCORBIC ACID) 1000 MG TABS Take 1,000 mg by mouth daily      zinc gluconate 50 MG tablet Take 50 mg by mouth daily      zolpidem (AMBIEN) 10 mg tablet Take 5 mg by mouth nightly as needed for sleep          STOP taking these medications       nitroglycerin (NITROSTAT) 0.4 MG SL tablet Comments:   Reason for Stopping:             Allergies   Allergies   Allergen Reactions     Furosemide Muscle Pain (Myalgia)     Previously tolerated.  Muscle cramps     Hydrochlorothiazide Unknown     Iodinated Contrast Media [Diagnostic X-Ray Materials] Nausea     Losartan Other (See Comments)     Other reaction(s): Stomatitis, Bloody nose dry mouth and lips     Losartan-Hydrochlorothiazide [Hyzaar]      Mouth sores     Metaxalone Nausea      Metolazone Other (See Comments)     Muscle cramps     Mometasone Other (See Comments)     Bloody nose     Penicillins Other (See Comments)     Immune-does not work for him     Rabeprazole Other (See Comments)     Mouth sores     Ramipril Other (See Comments)     Mouth sores     Shellfish Containing Products [Shellfish-Derived Products] Unknown     Other reaction(s): mouth sores, Other reaction(s): mouth sores     Sildenafil Muscle Pain (Myalgia)     Methocarbamol Rash

## 2021-12-06 NOTE — TELEPHONE ENCOUNTER
----- Message from Mirna Clayton sent at 12/6/2021  1:20 PM CST -----  Regarding: RE: LBF pt  Pt has [employer] so he cannot be seen. I did leave a message to see if it has changed or if it will in the new year.    Thanks,  Mirna

## 2021-12-07 ENCOUNTER — VIRTUAL VISIT (OUTPATIENT)
Dept: PHARMACY | Facility: PHYSICIAN GROUP | Age: 81
End: 2021-12-07
Payer: COMMERCIAL

## 2021-12-07 ENCOUNTER — TELEPHONE (OUTPATIENT)
Dept: PHARMACY | Facility: PHYSICIAN GROUP | Age: 81
End: 2021-12-07
Payer: COMMERCIAL

## 2021-12-07 ENCOUNTER — PATIENT OUTREACH (OUTPATIENT)
Dept: CARE COORDINATION | Facility: CLINIC | Age: 81
End: 2021-12-07
Payer: COMMERCIAL

## 2021-12-07 DIAGNOSIS — I21.4 NSTEMI (NON-ST ELEVATED MYOCARDIAL INFARCTION) (H): Primary | ICD-10-CM

## 2021-12-07 DIAGNOSIS — E11.69 TYPE 2 DIABETES MELLITUS WITH OTHER SPECIFIED COMPLICATION, WITH LONG-TERM CURRENT USE OF INSULIN (H): ICD-10-CM

## 2021-12-07 DIAGNOSIS — I50.9 ACUTE ON CHRONIC CONGESTIVE HEART FAILURE, UNSPECIFIED HEART FAILURE TYPE (H): ICD-10-CM

## 2021-12-07 DIAGNOSIS — I48.91 ATRIAL FIBRILLATION, UNSPECIFIED TYPE (H): ICD-10-CM

## 2021-12-07 DIAGNOSIS — I10 ESSENTIAL HYPERTENSION: ICD-10-CM

## 2021-12-07 DIAGNOSIS — Z79.4 TYPE 2 DIABETES MELLITUS WITH OTHER SPECIFIED COMPLICATION, WITH LONG-TERM CURRENT USE OF INSULIN (H): ICD-10-CM

## 2021-12-07 DIAGNOSIS — Z71.89 OTHER SPECIFIED COUNSELING: ICD-10-CM

## 2021-12-07 DIAGNOSIS — R60.9 EDEMA, UNSPECIFIED TYPE: ICD-10-CM

## 2021-12-07 DIAGNOSIS — I27.22 PULMONARY HYPERTENSION DUE TO LEFT VENTRICULAR DIASTOLIC DYSFUNCTION (H): ICD-10-CM

## 2021-12-07 PROCEDURE — 99607 MTMS BY PHARM ADDL 15 MIN: CPT | Performed by: PHARMACIST

## 2021-12-07 PROCEDURE — 99606 MTMS BY PHARM EST 15 MIN: CPT | Performed by: PHARMACIST

## 2021-12-07 NOTE — PROGRESS NOTES
Medication Therapy Management (MTM) Encounter    ASSESSMENT:                            Medication Adherence/Access: See below for considerations    NSTEMI/Afib/Hypertension/CHF/Edema: Working with PCP to adjust bumetanide to help with pulmonary edema/shortness of breath.  For angina, need to determine alternative plan for what he should do if he has chest pains.    Nitroglycerin shouldn't be used PRN if on Tadalafil due to risk of life-threatening hypotension.  Need to discuss alternative to tadalafil, or for nitroglycerin.  If angina continues to occur, could increase the metoprolol dose again.  His BP was running low, so may need to reduce irbesartan to allow for increased metoprolol.  Another option would be Ranexa for angina due to little impact on BP.  I will contact his cardiologist.    Pt not taking his antiplatelet/antcoagulation therapy as prescribed.  Explained why we need both the Eliquis and clopidogrel, and he agrees to restart these two (stopping aspirin and Effient).  Plan to pursue watchmann device.      Type 2 Diabetes: Patient is meeting A1c goal of < 8%. Self monitoring of blood glucose is at goal of fasting  mg/dL.  Doesn't appear to be having hypoglycemia, but had a significant drop in his A1c in the hospital.  He does have chronic anemia, but likely contributing to falsley low A1c while in hospital.  Continue current insulin dose, but if having hypoglycemia Lantus should be further reduced.  In the future could consider starting Ozempic to replace the lantus.  Would be preferred due to CVD benefits in reducing risk of MI/Stroke.     Pulmonary Hypertension:  Confirmed that he should be taking tadalafil right now from pulmonary HTN specialist from [place].  He would benefit from continuing the tadalafil, due to difficulty finding alternative (non PDE-5 inhibitor) but cannot take nitrates with this med.  See cardiology plan above.    12/10 update:  Reviewing labs from his PCP apt on 12/8  showed GFR of 17.  He should also stop the Metformin. This could be resumed later, but should wait until renal function is stable/diuretic stable. Doesn't need reduced dosing of Eliquis, plan to get watchmann.  Can later resume metformin 1000 mg per day if renal function stabilizes.    PLAN:                            1.  Stop taking aspirin and Prasugrel (Effient).  2.  Start taking Eliquis 5 mg twice daily  3.  Start taking Clopidogrel 75 mg once daily.  4.  If patient has chest pains he should go to the ER to be evaluated (discussed with Dr. Martin).  Nitroglycerin can't be taken while on Tadalafil.  Should discuss with regular cardiologist if they need to consider alternative to tadalafil, to allow for nitroglycerin use.  If needed, could increase metoprolol for reducing angina or consider Ranexa (little effect on BP).  5. Stop taking Metformin.   6.  Decrease Metolazone to 2.5 mg once weekly on Monday    Follow-up: Return in about 1 week (around 12/14/2021) for MTM Follow Up, Lab Work.        SUBJECTIVE/OBJECTIVE:                          Red Sutherland is a 81 year old male called for a transitions of care visit. He was discharged from [facility] on 11/30-12/6 for CHF exacerbation. Patient was accompanied by his wife.     Reason for visit: Follow up on recent hospitalization for CHF exacerbation.    Allergies/ADRs: Reviewed in chart  Past Medical History: Reviewed in chart  Tobacco: He reports that he quit smoking about 25 years ago. His smoking use included cigarettes. He has a 66.00 pack-year smoking history. He has never used smokeless tobacco.  Alcohol: not currently using    Medication Adherence/Access: Patient uses pill box(es).  His wife helps him set up his meds.  Patient isn't taking antiplatelet/anticoag meds as prescribed, see below.    NSTEMI/Afib/Hypertension/CHF/Edema: Patient is currently taking Clopidogrel and aspirin.  He hasn't been taking the Eliquis and Clopidogrel, as noted by his  cardiologists.  After my last apt with patient, I asked him to go to the hospital and he was diagnosed with GI bleed.  After that hospitalization, they had stopped his Prasugrel/held all blood thinners.  Then at discharge they asked him to restart the Eliquis for Afib and the clopidogrel due to the NSTEMI.   He was also started on a PPI.  Discussed with him why the blood thinners were changed, and why it's important for him to take the Eliquis and clopidogrel.  Patient is also taking bumetanide 4 mg twice daily, Metolazone 2.5 mg //, Spironolactone 12.5 mg twice daily, digoxin 125 mcg daily, irbesartan 300 mg daily, and metoprolol tartrate 25 mg twice daily.    Today they looked at the amlodipine bottle he had.  It was filled in 2020 and was empty.  The last few PCP visits he reported taking it, but now thinks he was mistaken and didn't actually have this med.  Looking back in 2020, it was stopped when he was put on diltiazem.  Also taking Klor-Con 40 M EQ twice daily.The nitroglycerin was DC'd at last hospital stay.  Discussed the interaction between tadalafil and nitroglycerin, they are contraindicated due to severe hypotension risk. Pt understands.  Denies recurrence of chest pain for last few weeks, but need to have plan for what to do if that does occur.  Patient reports no current medication side effects.   Patient does not self-monitor blood pressure.  BP Readings from Last 3 Encounters:   21 130/61   21 104/57   10/26/21 (!) 149/76     Type 2 Diabetes:  Currently taking Lantus 18 units twice daily, Metformin ER 1000 mg once daily, Invokana 100 mg daily. Patient is not experiencing side effects.    He has reduced the metformin dose as instructed, decreased due to renal function last month.  Patient never started Ozempic, was previously prescribed for him  Blood sugar monitorin time(s) daily. Ranges (patient reported): Fasting- 120-130's  Symptoms of low blood sugar? shaky, dizzy,  sweaty, Frequency of lows- none  Symptoms of high blood sugar? none  Eye exam: Due  Foot exam: up to date  Diet/Exercise: Not discussed in detail due to time  Aspirin: Taking 81mg daily  Statin: Yes   ACEi/ARB: Yes.   Urine Albumin: Due for recheck    Hemoglobin A1C   Date Value Ref Range Status   10/23/2021 6.3 (H) <=5.6 % Final     Comment:       Prediabetes: 5.7 to 6.4%        Diabetes:  >=6.5%     Patients with Hgb F >5%, total bilirubin >10.0 mg/dL, abnormal red cell turnover, severe renal or hepatic disease or malignancy should not have this A1C method used to diagnose or monitor diabetes.    07/11/2021 9.3 (H) <=5.6 % Final     Comment:     If no result is listed, Hemoglobin A1c has not bee    Prediabetes: 5.7 to 6.4%        Diabetes:  >=6.5%     Patients with Hgb F >5%, total bilirubin >10.0 mg/dL, abnormal red cell turnover, severe renal or hepatic disease or malignancy should not have this A1C method used to diagnose or monitor diabetes.    04/10/2020 10.8 (H) <=5.6 % Final     Comment:     Prediabetes:   HBA1c       5.7 to 6.4%        Diabetes:        HBA1c        >=6.5%   Patients with Hgb F >5%, total bilirubin >10.0 mg/dL, abnormal red cell turnover, severe renal or hepatic disease or malignancy should not have this A1C method used to diagnose or monitor diabetes.             Pulmonary Hypertension:  Patient is taking Tadalafil 20 mg once daily.  This was stopped after his 110/23 hospitalization for NSTEMI.  The summary didn't say why it was stopped.  The last note from his [place] provider indicated it was for ED and used PRN, but patient reported it was for pulmonary hypertension and took it once daily.  I called the [facility] to confirm that tadalafil is for his pulm HTN, and should continue taking it.  He has tried several other medications that didn't help him, and with the price of most specialty meds would be difficult to find an alternative.    He was on WHO group II study drug (IV  LILA kee'henry as of 2020.  Follows with Dr. Grullon at [facility].  Last saw this provider on 9/2/21.  Is considering switching to [facility] to be closer to home.  ----------------  Post Discharge Medication Reconciliation Status: discharge medications reconciled and changed, per note/orders.    I spent 40 minutes with this patient today. All changes were made via collaborative practice agreement with Haresh Corona MD. A copy of the visit note was provided to the patient's primary care provider.    The patient was mailed a summary of these recommendations.     Jackie Ewing PharmD  Medication Therapy Management Pharmacist  Pager: [phone]       Telemedicine Visit Details  Type of service:  Telephone visit  Start Time: 1:00 PM  End Time: 1:40 PM  Originating Location (patient location): [place]  Distant Location (provider location):  [facility]     Medication Therapy Recommendations  Edema, unspecified type    Current Medication: metolazone (ZAROXOLYN) 2.5 MG tablet   Rationale: Dose too high - Dosage too high - Safety   Recommendation: Decrease Dose   Status: Accepted per CPA   Note: GFR 17         NSTEMI (non-ST elevated myocardial infarction) (H)    Current Medication: apixaban ANTICOAGULANT (ELIQUIS) 5 MG tablet   Rationale: Patient prefers not to take - Adherence - Adherence   Recommendation: Provide Education   Status: Patient Agreed - Adherence/Education         Type 2 diabetes mellitus (H)    Current Medication: metFORMIN (GLUCOPHAGE-XR) 500 MG 24 hr tablet (Discontinued)   Rationale: Unsafe medication for the patient - Adverse medication event - Safety   Recommendation: Discontinue Medication   Status: Accepted per CPA   Note: Stopping for now due to renal function

## 2021-12-07 NOTE — Clinical Note
Hi Dr. Martin,    I'm a [place] pharmacist that works with Bill's PCP.  You consulted with this patient when he was at [facility].  His nitroglycerin was stopped due to interaction with tadalafil (for pulm HTN).  He isn't currently having chest pains, but I was wondering if he does have chest pains should he just go to the ED if it occurs again?  I could also reach out to his pulm HTN specialist from [place] to discuss alternatives for tadalafil.  He has tried several meds without benefit and most are very expensive.      Thanks,  Jackie

## 2021-12-07 NOTE — TELEPHONE ENCOUNTER
Called patient to review recent medication changes.  He spoke with our RN for post hospital med rec, and I did a follow up med review vs phone call.

## 2021-12-07 NOTE — PROGRESS NOTES
"Clinic Care Coordination Contact  [facility]: Post-Discharge Note  SITUATION                                                      Admission:    Admission Date: 11/30/21   Reason for Admission: Acute on chronic diastolic congestive heart failure  Discharge:   Discharge Date: 12/06/21  Discharge Diagnosis: Acute on chronic diastolic congestive heart failure    BACKGROUND                                                      Red Sutherland is a 81 year old male w/ PMH of NSTEMI, type 2 diabetes, CAD, atrial fibrillation  (previously on apixaban, prasugrel, ASA) who presents with shortness of breath.     Shortness of breath has progressively worsened over the past 6 days. He was seen at [facility] on 11/26 and started on a higher dose of his Bumex. He reported being increased to Bumex, 4 mg in the morning and 3.5 mg in the afternoon to the ED provider; his typical dose is reportedly 2mg morning and 1.5mg in the afternoon. Symptom continued to worsen despite medication change. He has noted increased swelling in his legs, abdomen, and scrotum (with some redness & tenderness). Also notes 3 days of mildly productive clear-yellow sputum. Oxygen needs have not increased from baseline of 4L when at rest. No fever, chills, body aches, chest pain, palpitation (though doesn't feel anything different when he is in Afib), orthopnea, abdominal pain, nausea, vomiting, constipation, dysuria, nor ongoing melena or hemotochezia    ASSESSMENT           Discharge Assessment  How are you doing now that you are home?: \" I seem to be doing okay i was sleeping\"  How are your symptoms? (Red Flag symptoms escalate to triage hotline per guidelines): Improved  Do you feel your condition is stable enough to be safe at home until your provider visit?: Yes  Does the patient have their discharge instructions? : Yes  Does the patient have questions regarding their discharge instructions? : No (Not at the moment I was sleeping)  Were you " started on any new medications or were there changes to any of your previous medications? : Yes  Does the patient have all of their medications?: Yes  Do you have questions regarding any of your medications? : No  Do you have all of your needed medical supplies or equipment (DME)?  (i.e. oxygen tank, CPAP, cane, etc.): Yes                  PLAN                                                      Outpatient Plan: Your activity upon discharge: As normal  Follow this diet upon discharge: Diet low in saturated fat, low in  sodium, no caffeine  Follow-up and recommended labs and tests  Per inpatient cardiology: Will need close follow-up in our heart failure  clinic. Recommend following up upon discharge and heart failure clinic  within a week. Follow-up with Dr Lowery in 4 to 6 weeks. Can  continue to pursue watchman device as outpatient  Recommended to follow-up for cirrhosis at outpatient GI clinic   No future appointments.      For any urgent concerns, please contact our 24 hour nurse triage line: [phone] ([phone])         Fabby Kenney MA

## 2021-12-08 ENCOUNTER — LAB REQUISITION (OUTPATIENT)
Dept: LAB | Facility: CLINIC | Age: 81
End: 2021-12-08
Payer: COMMERCIAL

## 2021-12-08 DIAGNOSIS — I50.9 HEART FAILURE, UNSPECIFIED (H): ICD-10-CM

## 2021-12-08 DIAGNOSIS — I50.33 ACUTE ON CHRONIC DIASTOLIC CONGESTIVE HEART FAILURE (H): Primary | ICD-10-CM

## 2021-12-08 LAB
ALBUMIN SERPL-MCNC: 3.6 G/DL (ref 3.5–5)
ALP SERPL-CCNC: 130 U/L (ref 45–120)
ALT SERPL W P-5'-P-CCNC: 10 U/L (ref 0–45)
ANION GAP SERPL CALCULATED.3IONS-SCNC: 18 MMOL/L (ref 5–18)
AST SERPL W P-5'-P-CCNC: 18 U/L (ref 0–40)
BACTERIA FLD CULT: NORMAL
BILIRUB SERPL-MCNC: 2.6 MG/DL (ref 0–1)
BUN SERPL-MCNC: 94 MG/DL (ref 8–28)
CALCIUM SERPL-MCNC: 9 MG/DL (ref 8.5–10.5)
CHLORIDE BLD-SCNC: 85 MMOL/L (ref 98–107)
CO2 SERPL-SCNC: 29 MMOL/L (ref 22–31)
CREAT SERPL-MCNC: 3.21 MG/DL (ref 0.7–1.3)
GFR SERPL CREATININE-BSD FRML MDRD: 17 ML/MIN/1.73M2
GLUCOSE BLD-MCNC: 291 MG/DL (ref 70–125)
POTASSIUM BLD-SCNC: 3.5 MMOL/L (ref 3.5–5)
PROT SERPL-MCNC: 6.1 G/DL (ref 6–8)
SODIUM SERPL-SCNC: 132 MMOL/L (ref 136–145)

## 2021-12-08 PROCEDURE — 80053 COMPREHEN METABOLIC PANEL: CPT | Mod: ORL | Performed by: PHYSICIAN ASSISTANT

## 2021-12-10 RX ORDER — METOLAZONE 2.5 MG/1
2.5 TABLET ORAL WEEKLY
Qty: 6 TABLET | Refills: 0 | COMMUNITY
Start: 2021-12-10 | End: 2021-12-17

## 2021-12-10 NOTE — PATIENT INSTRUCTIONS
Recommendations from today's MTM visit:                                                    MTM (medication therapy management) is a service provided by a clinical pharmacist designed to help you get the most of out of your medicines.   Today we reviewed what your medicines are for, how to know if they are working, that your medicines are safe and how to make your medicine regimen as easy as possible.      1.  Stop taking aspirin and Prasugrel (Effient).  2.  Start taking Eliquis 5 mg twice daily  3.  Start taking Clopidogrel 75 mg once daily.    Your stomach bleed occurred after you were started on Effient, which is a strong blood thinner we will use after people have a heart attack.  You were also on the aspirin and Eliquis at that time.  The cardiologists want to stop the Effient and aspirin, and only use the Clopidogrel to help lower your risk of bleeding again.  We still need to prevent your blood from clotting again after the heart attack.      The Eliquis is also very important to take because it works different than the other three blood thinners, and prevents against the type of blood clots that can happen with atrial fibrillation.  The Omeprazole medication that you are taking helps protect your stomach, and reduce the risk of stomach bleeds.    4. I spoke to one of the cardiologists who saw you when you were in the hospital about what to do if you do have chest pains again.  There isn't any medication similar to the nitroglycerin that can relieve chest pains, so if that were to happen again you would need to go to the hospital.      There is a serious interaction between Tadalafil and Nitroglycerin.  These two medications should not be taken together because it can cause very dangerous low blood pressures.  That is why the stopped the nitroglycerin at your last hospital discharge.  If you start having more chest pains, some blood pressure medications help reduce chest pains (metoprolol, amlodipine).  There  is also another medication to treat/prevent chest pains that doesn't significantly lower blood pressure (Ranexa) which may be another option.    5.  Stop taking Metformin.  Your kidney function was much lower than it usually is, and when our kidney function is lower we can't break down metformin well.      6.  Decrease Metolazone to 2.5 mg once weekly on Monday    Follow-up: Return in about 2 weeks (around 12/21/2021) for MTM Follow Up.    It was great to speak with you today.  I value your experience and would be very thankful for your time with providing feedback on our clinic survey. You may receive a survey via email or text message in the next few days.     To schedule another MTM appointment, please call the clinic directly or you may call the MTM scheduling line at [phone] or toll-free at [phone].     My Clinical Pharmacist's contact information:                                                      Please feel free to contact me with any questions or concerns you have.      Jackie Ewing, PharmD  Medication Therapy Management Pharmacist  Pager: [phone]         Medication List          Accurate as of December 7, 2021 11:59 PM. If you have any questions, ask your nurse or doctor.            CHANGE how you take these medications        Morning Afternoon Evening Bedtime    metolazone 2.5 MG tablet  Also known as: ZAROXOLYN  Take 1 tablet (2.5 mg) by mouth once a week On Monday  What changed:     when to take this    additional instructions  Changed by: Jackie Ewing [facility]        On  Mondays           CONTINUE taking these medications        Morning Afternoon Evening Bedtime    Accu-Chek Montse Plus test strip  [ACCU-CHEK MONTSE PLUS TEST STRP STRIPS] see administration instructions.  Generic drug: blood glucose             acetaminophen 500 MG tablet  Also known as: TYLENOL  [ACETAMINOPHEN (TYLENOL) 500 MG TABLET] Take 1,000 mg by mouth every 6 (six) hours as needed. First line of pain  "management. Do Not take more than 4,000 mg in 24 hours.             albuterol 108 (90 Base) MCG/ACT inhaler  Also known as: PROAIR HFA/PROVENTIL HFA/VENTOLIN HFA  Inhale 2 puffs into the lungs every 6 hours as needed             apixaban ANTICOAGULANT 5 MG tablet  Also known as: ELIQUIS  Take 5 mg by mouth 2 times daily                 atorvastatin 80 MG tablet  Also known as: LIPITOR  Take 1 tablet (80 mg) by mouth daily  Doctor's comments: Refill of regular prescription, no changes               BD MANISHA U/F 32G X 4 MM miscellaneous  [BD ULTRA-FINE MANISHA PEN NEEDLES 32 GAUGE X 5/32\" NDLE]  Generic drug: insulin pen needle             bumetanide 2 MG tablet  Also known as: BUMEX  Take 2 tablets (4 mg) by mouth 2 times daily        2 tablets     2 tablets        canagliflozin 100 MG tablet  Also known as: INVOKANA  Take 100 mg by mouth every morning               clopidogrel 75 MG tablet  Also known as: PLAVIX  Take 75 mg by mouth daily               digoxin 125 MCG tablet  Also known as: LANOXIN  [DIGOXIN (LANOXIN) 125 MCG TABLET] Take 1 tablet (125 mcg total) by mouth daily.               Ferrous Gluconate 324 (37.5 Fe) MG Tabs  Take 1 tablet by mouth every other day        Every other day         fluticasone-vilanterol 200-25 MCG/INH inhaler  Also known as: BREO ELLIPTA  Inhale 1 puff into the lungs daily               Garlic 1000 MG Caps  Take 1 capsule by mouth daily               insulin glargine 100 UNIT/ML pen  Also known as: LANTUS PEN  Inject 18 Units Subcutaneous 2 times daily  Doctor's comments: If Lantus is not covered by insurance, may substitute Basaglar at same dose and frequency.          18 units       18 units      ipratropium - albuterol 0.5 mg/2.5 mg/3 mL 0.5-2.5 (3) MG/3ML neb solution  Also known as: DUONEB  Take 1 vial by nebulization 4 times daily as needed for shortness of breath / dyspnea             irbesartan 300 MG tablet  Also known as: AVAPRO  Take 300 mg by mouth daily               " lidocaine 5 % patch  Also known as: LIDODERM  Place 1 patch onto the skin daily as needed             metoprolol tartrate 25 MG tablet  Also known as: LOPRESSOR  Take 1 tablet (25 mg) by mouth 2 times daily (with meals)        1 tablet      1 tablet       multivitamin w/minerals tablet  Take 1 tablet by mouth daily                 omeprazole 20 MG DR capsule  Also known as: priLOSEC  Take 20 mg by mouth daily               order for DME  [OXYGEN-AIR DELIVERY SYSTEMS MISC] Use As Directed.             potassium chloride ER 20 MEQ CR tablet  Also known as: KLOR-CON M  Take 40 mEq by mouth daily        2 tablets         rOPINIRole 2 MG tablet  Also known as: REQUIP  [ROPINIROLE (REQUIP) 2 MG TABLET] Take 2 mg by mouth at bedtime.               spironolactone 25 MG tablet  Also known as: ALDACTONE  Take 12.5 mg by mouth 2 times daily        1/2 tablet      1/2 tablet       tadalafil 20 MG tablet  Also known as: CIALIS  Take 20 mg by mouth daily  Reason for med: PAH               vitamin C 1000 MG Tabs  Also known as: ASCORBIC ACID  Take 1,000 mg by mouth daily               zinc gluconate 50 MG tablet  Take 50 mg by mouth daily               zolpidem 10 MG tablet  Also known as: AMBIEN  Take 5 mg by mouth nightly as needed for sleep

## 2021-12-14 LAB — GLUCOSE BLDC GLUCOMTR-MCNC: 106 MG/DL (ref 70–99)

## 2021-12-17 ENCOUNTER — OFFICE VISIT (OUTPATIENT)
Dept: PHARMACY | Facility: PHYSICIAN GROUP | Age: 81
End: 2021-12-17
Payer: COMMERCIAL

## 2021-12-17 VITALS
HEART RATE: 61 BPM | DIASTOLIC BLOOD PRESSURE: 48 MMHG | WEIGHT: 212.6 LBS | SYSTOLIC BLOOD PRESSURE: 82 MMHG | BODY MASS INDEX: 29.65 KG/M2

## 2021-12-17 DIAGNOSIS — I50.9 ACUTE ON CHRONIC CONGESTIVE HEART FAILURE, UNSPECIFIED HEART FAILURE TYPE (H): ICD-10-CM

## 2021-12-17 DIAGNOSIS — Z79.4 TYPE 2 DIABETES MELLITUS WITH OTHER SPECIFIED COMPLICATION, WITH LONG-TERM CURRENT USE OF INSULIN (H): Primary | ICD-10-CM

## 2021-12-17 DIAGNOSIS — I48.91 ATRIAL FIBRILLATION, UNSPECIFIED TYPE (H): ICD-10-CM

## 2021-12-17 DIAGNOSIS — E11.69 TYPE 2 DIABETES MELLITUS WITH OTHER SPECIFIED COMPLICATION, WITH LONG-TERM CURRENT USE OF INSULIN (H): Primary | ICD-10-CM

## 2021-12-17 DIAGNOSIS — I10 ESSENTIAL HYPERTENSION: ICD-10-CM

## 2021-12-17 DIAGNOSIS — I21.4 NSTEMI (NON-ST ELEVATED MYOCARDIAL INFARCTION) (H): ICD-10-CM

## 2021-12-17 DIAGNOSIS — I27.22 PULMONARY HYPERTENSION DUE TO LEFT VENTRICULAR DIASTOLIC DYSFUNCTION (H): ICD-10-CM

## 2021-12-17 DIAGNOSIS — R60.9 EDEMA, UNSPECIFIED TYPE: ICD-10-CM

## 2021-12-17 DIAGNOSIS — E78.00 HYPERCHOLESTEREMIA: ICD-10-CM

## 2021-12-17 PROCEDURE — 99606 MTMS BY PHARM EST 15 MIN: CPT | Performed by: PHARMACIST

## 2021-12-17 PROCEDURE — 99607 MTMS BY PHARM ADDL 15 MIN: CPT | Performed by: PHARMACIST

## 2021-12-17 RX ORDER — BUMETANIDE 2 MG/1
4 TABLET ORAL EVERY MORNING
Status: ON HOLD | COMMUNITY
Start: 2021-12-17 | End: 2022-01-15

## 2021-12-17 RX ORDER — IRBESARTAN 300 MG/1
150 TABLET ORAL DAILY
Status: ON HOLD | COMMUNITY
Start: 2021-12-17 | End: 2022-01-15

## 2021-12-17 NOTE — PATIENT INSTRUCTIONS
Recommendations from today's MTM visit:                                                    MTM (medication therapy management) is a service provided by a clinical pharmacist designed to help you get the most of out of your medicines.   Today we reviewed what your medicines are for, how to know if they are working, that your medicines are safe and how to make your medicine regimen as easy as possible.       1.  Increase atorvastatin to 80 mg once daily (1 full tablet daily).  2.  Decrease irbesartan to 150 mg once daily (1/2 tablet of 300 mg daily).  3.  Stop taking Metformin due to lower kidney function.  4.  We are rechecking your kidney function today, and will let you know of the results.  May need to change the potassium dose.  5.  Recommend getting patient set up with home healthcare nurse to help with medication management/set up.    You are also no longer taking digoxin, spironolactone, or metolazone.  Dr. Chapa stopped these due to your lower kidney function.    It was great to speak with you today.  I value your experience and would be very thankful for your time with providing feedback on our clinic survey. You may receive a survey via email or text message in the next few days.     To schedule another MTM appointment, please call the clinic directly or you may call the MTM scheduling line at [phone] or toll-free at [phone].     My Clinical Pharmacist's contact information:                                                      Please feel free to contact me with any questions or concerns you have.      Jackie Ewing, PharmD  Medication Therapy Management Pharmacist  Pager: [phone]             Medication List          Accurate as of December 17, 2021  1:57 PM. If you have any questions, ask your nurse or doctor.            CHANGE how you take these medications        Morning Afternoon Evening Bedtime    irbesartan 300 MG tablet  Also known as: AVAPRO  Take 0.5 tablets (150 mg) by mouth  "daily  What changed: how much to take        1/2 tablet           CONTINUE taking these medications        Morning Afternoon Evening Bedtime    Accu-Chek Montse Plus test strip  [ACCU-CHEK MONTSE PLUS TEST STRP STRIPS] see administration instructions.  Generic drug: blood glucose             acetaminophen 500 MG tablet  Also known as: TYLENOL  [ACETAMINOPHEN (TYLENOL) 500 MG TABLET] Take 1,000 mg by mouth every 6 (six) hours as needed. First line of pain management. Do Not take more than 4,000 mg in 24 hours.             albuterol 108 (90 Base) MCG/ACT inhaler  Also known as: PROAIR HFA/PROVENTIL HFA/VENTOLIN HFA  Inhale 2 puffs into the lungs every 6 hours as needed             apixaban ANTICOAGULANT 5 MG tablet  Also known as: ELIQUIS  Take 5 mg by mouth 2 times daily        1 tablet      1 tablet       atorvastatin 80 MG tablet  Also known as: LIPITOR  Take 1 tablet (80 mg) by mouth daily  Doctor's comments: Refill of regular prescription, no changes        1 tablet         BD MANISHA U/F 32G X 4 MM miscellaneous  [BD ULTRA-FINE MANISHA PEN NEEDLES 32 GAUGE X 5/32\" NDLE]  Generic drug: insulin pen needle             bumetanide 2 MG tablet  Also known as: BUMEX  Take 2 tablets (4 mg) by mouth every morning And 1 1/2 tablets in the evening        2 tablets      1 and 1/2 tablets       canagliflozin 100 MG tablet  Also known as: INVOKANA  Take 100 mg by mouth every morning        1 tablet         clopidogrel 75 MG tablet  Also known as: PLAVIX  Take 75 mg by mouth daily        1 tablet         Ferrous Gluconate 324 (37.5 Fe) MG Tabs  Take 1 tablet by mouth every other day             fluticasone-vilanterol 200-25 MCG/INH inhaler  Also known as: BREO ELLIPTA  Inhale 1 puff into the lungs daily               Garlic 1000 MG Caps  Take 1 capsule by mouth daily             insulin glargine 100 UNIT/ML pen  Also known as: LANTUS PEN  Inject 18 Units Subcutaneous 2 times daily  Doctor's comments: If Lantus is not covered by " insurance, may substitute Basaglar at same dose and frequency.                   ipratropium - albuterol 0.5 mg/2.5 mg/3 mL 0.5-2.5 (3) MG/3ML neb solution  Also known as: DUONEB  Take 1 vial by nebulization 4 times daily as needed for shortness of breath / dyspnea             lidocaine 5 % patch  Also known as: LIDODERM  Place 1 patch onto the skin daily as needed             metoprolol tartrate 25 MG tablet  Also known as: LOPRESSOR  Take 1 tablet (25 mg) by mouth 2 times daily (with meals)        1 tablet      1 tablet       multivitamin w/minerals tablet  Take 1 tablet by mouth daily               omeprazole 20 MG DR capsule  Also known as: priLOSEC  Take 20 mg by mouth daily             order for DME  [[facility]GEN-[facility]] Use As Directed.             potassium chloride ER 20 MEQ CR tablet  Also known as: KLOR-CON M  Take 40 mEq by mouth daily        2 tablets         rOPINIRole 2 MG tablet  Also known as: REQUIP  [ROPINIROLE (REQUIP) 2 MG TABLET] Take 2 mg by mouth at bedtime.           1 tablet      tadalafil 20 MG tablet  Also known as: CIALIS  Take 20 mg by mouth daily  Reason for med: PAH        1 tablet         vitamin C 1000 MG Tabs  Also known as: ASCORBIC ACID  Take 1,000 mg by mouth daily               zinc gluconate 50 MG tablet  Take 50 mg by mouth daily               zolpidem 10 MG tablet  Also known as: AMBIEN  Take 5 mg by mouth nightly as needed for sleep

## 2021-12-17 NOTE — PROGRESS NOTES
Medication Therapy Management (MTM) Encounter    ASSESSMENT:                            Medication Adherence/Access: See below for considerations.  Patient having difficulty remembering all of the frequent medication changes.  He would benefit from having a home healthcare RN to help with adjusting his medications until things are more stable.  Patient would like help with this.     NSTEMI/Afib/Hypertension/CHF/Edema: Rechecking BMP today with med changes from last week and this Monday from cardiologist.  He has apt next week with nephrologist to help manage diuretics.  Since several diuretics were stopped, we may also need to reduce or stop potassium dose.  Will adjust if needed based on BMP from today.  His BP was very low today, and pt is symptomatic.  Having hypotension the past week.  Discussed with Dr. Roth and we will reduce irbesartan from 300 mg daily to 150 mg daily.  He will be seen next week by nephrologist for further assessent.  Nitroglycerin shouldn't be used PRN if on Tadalafil due to risk of life-threatening hypotension.  Need to discuss alternative to tadalafil, or for nitroglycerin.  If angina continues to occur, could increase the metoprolol dose again.   Currently tolerating clopidogrel and Eliquis, but plan to pursue watchmann device when renal function is more stable.      Hyperlipidemia: Patient is on high intensity statin which is indicated based on 2019 ACC/AHA guidelines for lipid management.  He should be taking the increased dose of atorvastatin 80 mg given recent NSTEMI while on the 40 mg dose.       Type 2 Diabetes: Patient is meeting A1c goal of < 8%. Self monitoring of blood glucose is at goal of fasting  mg/dL. Can continue current dose of lantus.  Should stop metformin as discussed last week.  Removed metformin from his current meds, and put in the bag of meds he isn't taking currently. Can consider adding metformin back in the future if renal function and diuretics are  stable.  Until then would be better to manage with insulin.     Pulmonary Hypertension:  Confirmed that he should be taking tadalafil right now from pulmonary HTN specialist from [place].  He would benefit from continuing the tadalafil, due to difficulty finding alternative (non PDE-5 inhibitor) but cannot take nitrates with this med.  See cardiology plan above.      PLAN:                            1.  Increase atorvastatin to 80 mg once daily (1 full tablet daily).  2.  Decrease irbesartan to 150 mg once daily (1/2 tablet of 300 mg daily).  3.  Stop taking Metformin due to lower kidney function.  4.  We are rechecking your kidney function today, and will let you know of the results.  May need to change the potassium dose.  5.  Recommend getting patient set up with home healthcare nurse to help with medication management/set up.    Follow-up: Follow up appointments with me are not covered.  We only have 2 covered visits through [facility].  Pt seeing nephrologist next week 12/22, and will see Dr. Roth on 1/6/2022.   Will ask Edith-RN to check in with patient regularly, if she is not already doing so.    SUBJECTIVE/OBJECTIVE:                          Red Sutherland is a 81 year old male coming in for a follow-up visit. He was referred to me from Dr. Roth. Patient was accompanied by his wife. Today's visit is a follow-up MTM visit from 12/7/21.    Reason for visit: Follow up on recent hospitalization for CHF exacerbation, and to recheck BMP.  Patient saw provider on 12/13 at [facility] for hospital follow up, and due to lower renal function we scheduled follow up today for recheck.    Allergies/ADRs: Reviewed in chart  Past Medical History: Reviewed in chart  Tobacco: He reports that he quit smoking about 25 years ago. His smoking use included cigarettes. He has a 66.00 pack-year smoking history. He has never used smokeless tobacco.  Alcohol: not currently using     Medication Adherence/Access: Patient uses pill box(es).   His wife helps him set up his meds.  They said it's been very hard to keep up with all of the frequent medication changes.  It's very stressful because each time they see an new provider/go to the hospital 1-3 meds are changed.  When I last spoke to him he wasn't taking the correct anticoagulants/antiplately meds.  Today found out he has been taking a 1/2 tablet of atorvastatin 80 mg instead up the full tab.      HPI:  Patient recently has an apt with Dr. Serge Chapa at [facility].  At clinic visit his BP was 91/53.  Dr. Chapa had patient hold spironolactone, metolazone, and digoxin as well as the changes we made last week due to his last BMP/renal function.  Dr. Chapa is having him see nephrologist at [facility], patient has apt next week.  He is also being referred to [facility] for management of ascites.    Today his blood pressure was very low (82/48) and has been feeling tired, dizzy, weak.  Said he had a mild headache the last couple weeks.  Possibly from dehydration (see last BMP).      NSTEMI/Afib/Hypertension/CHF/Edema: Patient is currently taking Eliquis 5 mg twice daily, and clopidogrel 75 mg daily.  When we last spoke he was taking Effient and aspirin, but the cardiologist from the hospitalization wanted him on eliquis/clopidogrel.  His GI bleed occurred after he was started on Effient.  Since GI bleed he has been on PPI, no signs of dark tarry stools.  Denies falls.  Pt reports in the morning when he wakes up has some blood when he blows his nose.  His sinuses are dry from using oxygen.  Doesn't bleed for long periods of time, but does see some blood on the tissue right away in the morning.  Discussed humidity levels in house.  He knows to go to ED if large amounts of blood or not stopping/clotting quickly.    Patient is also taking bumetanide 4 mg AM and 3 mg PM, irbesartan 300 mg daily, and metoprolol tartrate 25 mg twice daily.    His metolazone,  spironolactone, and digoxin were held due to his lower renal function.  After his BMP from  we notified him of lower renal function and reduced metolazone.  This has been added on after his  hospitalization for CHF exacerbation with shortness of breath.  Prior to this he was on spironolactone  Also taking Klor-Con 40 M EQ twice daily. The nitroglycerin was DC'd at last hospital stay.  Discussed the interaction between tadalafil and nitroglycerin, they are contraindicated due to severe hypotension risk. Pt understands.  Denies recurrence of chest pain for last few weeks, but need to have plan for what to do if that does occur.   Patient does self-monitor blood pressure.  He didn't remember his reading or bring them in, but said they have been 'low' this week. Knows some of them have been below 100 for systolic    BP Readings from Last 3 Encounters:   21 (!) 82/48   21 130/61   21 104/57     Hyperlipidemia: Current therapy includes Atorvastatin 40mg daily. He was supposed to increase the dose to 80 mg after his hospitalization for NSTEMI.  Pt had a hand written note on the rx bottle that said '1/2' tablet, but didn't know who wrote this or when it was from.  Reviewed hospital notes and should be on atorvastatin 80 mg. Patient reports no significant myalgias or other side effects.    Recent Labs   Lab Test 10/25/21  1529 21  1359   CHOL 98 94   HDL 35* 25*   LDL 53 <5   TRIG 49 353*       Type 2 Diabetes:  Currently taking Lantus 18 units twice daily, Metformin ER 1000 mg once daily, Invokana 100 mg daily. He hasn't stopped the metformin yet as discussed last week.  Patient is not experiencing side effects.    Patient never started Ozempic, was previously prescribed for him  Blood sugar monitorin time(s) daily. Ranges (patient reported): Fasting- 120-130's  Symptoms of low blood sugar? shaky, dizzy, sweaty, Frequency of lows- none  Symptoms of high blood sugar? none  Eye  exam: Due  Foot exam: up to date  Diet/Exercise: Not discussed in detail due to time  Aspirin: Taking 81mg daily  Statin: Yes   ACEi/ARB: Yes.   Urine Albumin: Due for recheck             Hemoglobin A1C   Date Value Ref Range Status   10/23/2021 6.3 (H) <=5.6 % Final       Comment:          Prediabetes: 5.7 to 6.4%        Diabetes:  >=6.5%      Patients with Hgb F >5%, total bilirubin >10.0 mg/dL, abnormal red cell turnover, severe renal or hepatic disease or malignancy should not have this A1C method used to diagnose or monitor diabetes.    07/11/2021 9.3 (H) <=5.6 % Final       Comment:       If no result is listed, Hemoglobin A1c has not bee     Prediabetes: 5.7 to 6.4%        Diabetes:  >=6.5%      Patients with Hgb F >5%, total bilirubin >10.0 mg/dL, abnormal red cell turnover, severe renal or hepatic disease or malignancy should not have this A1C method used to diagnose or monitor diabetes.    04/10/2020 10.8 (H) <=5.6 % Final       Comment:       Prediabetes:   HBA1c       5.7 to 6.4%        Diabetes:        HBA1c        >=6.5%   Patients with Hgb F >5%, total bilirubin >10.0 mg/dL, abnormal red cell turnover, severe renal or hepatic disease or malignancy should not have this A1C method used to diagnose or monitor diabetes.                Pulmonary Hypertension:  Patient is taking Tadalafil 20 mg once daily.  This was stopped after his 11/23 hospitalization for NSTEMI.  The summary didn't say why it was stopped.  The last note from his [facility] provider indicated it was for ED and used PRN, but patient reported it was for pulmonary hypertension and took it once daily.  I called the [facility] to confirm that tadalafil is for his pulm HTN, and should continue taking it.  He has tried several other medications that didn't help him, and with the price of most specialty meds would be difficult to find an alternative.    He was on WHO group II study drug (IV [id number]), DC'd as of 2020.  Follows with Dr. Grullon  at [facility].  Last saw this provider on 9/2/21.  Is considering switching to [facility] to be closer to home.      Today's Vitals: BP (!) 82/48   Pulse 61   Wt 212 lb 9.6 oz (96.4 kg)   BMI 29.65 kg/m    ----------------  Post Discharge Medication Reconciliation Status: discharge medications reconciled and changed, per note/orders.    I spent 60 minutes with this patient today. All changes were made via collaborative practice agreement with Haresh Corona MD and All changes were made via verbal approval with Dr. Roth. A copy of the visit note was provided to the patient's primary care provider.    The patient was given and mailed a summary of these recommendations.     Jackie Ewing, PharmD  Medication Therapy Management Pharmacist  Pager: [phone]     Medication Therapy Recommendations  Edema, unspecified type    Current Medication: bumetanide (BUMEX) 2 MG tablet (Discontinued)   Rationale: Medication requires monitoring - Needs additional monitoring   Recommendation: Order Lab   Status: Accepted per CPA         Essential hypertension    Current Medication: irbesartan (AVAPRO) 300 MG tablet (Discontinued)   Rationale: Dose too high - Dosage too high - Safety   Recommendation: Decrease Dose   Status: Accepted per Provider         Hypercholesteremia    Current Medication: atorvastatin (LIPITOR) 80 MG tablet   Rationale: Does not understand instructions - Adherence - Adherence   Recommendation: Provide Adherence Intervention   Status: Accepted per CPA         Type 2 diabetes mellitus (H)    Rationale: Does not understand instructions - Adherence - Adherence   Recommendation: Provide Adherence Intervention   Status: Patient Agreed - Adherence/Education   Note: Stop metformin, hadn't stopped it after our last appointment.              Addendum:  Corrected spelling errors

## 2021-12-20 ENCOUNTER — APPOINTMENT (OUTPATIENT)
Dept: CT IMAGING | Facility: CLINIC | Age: 81
End: 2021-12-20
Attending: EMERGENCY MEDICINE
Payer: COMMERCIAL

## 2021-12-20 ENCOUNTER — HOSPITAL ENCOUNTER (EMERGENCY)
Facility: CLINIC | Age: 81
Discharge: ANOTHER HOSPITAL | End: 2021-12-20
Attending: EMERGENCY MEDICINE | Admitting: EMERGENCY MEDICINE
Payer: COMMERCIAL

## 2021-12-20 ENCOUNTER — APPOINTMENT (OUTPATIENT)
Dept: RADIOLOGY | Facility: CLINIC | Age: 81
End: 2021-12-20
Attending: EMERGENCY MEDICINE
Payer: COMMERCIAL

## 2021-12-20 ENCOUNTER — ANESTHESIA EVENT (OUTPATIENT)
Dept: SURGERY | Facility: CLINIC | Age: 81
DRG: 025 | End: 2021-12-20
Payer: COMMERCIAL

## 2021-12-20 ENCOUNTER — HOSPITAL ENCOUNTER (INPATIENT)
Facility: CLINIC | Age: 81
LOS: 28 days | Discharge: NURSING, REHAB OR LONG-TERM CARE | DRG: 025 | End: 2022-01-17
Attending: EMERGENCY MEDICINE | Admitting: NEUROLOGICAL SURGERY
Payer: COMMERCIAL

## 2021-12-20 ENCOUNTER — APPOINTMENT (OUTPATIENT)
Dept: CT IMAGING | Facility: CLINIC | Age: 81
DRG: 025 | End: 2021-12-20
Attending: STUDENT IN AN ORGANIZED HEALTH CARE EDUCATION/TRAINING PROGRAM
Payer: COMMERCIAL

## 2021-12-20 ENCOUNTER — APPOINTMENT (OUTPATIENT)
Dept: MRI IMAGING | Facility: CLINIC | Age: 81
DRG: 025 | End: 2021-12-20
Attending: STUDENT IN AN ORGANIZED HEALTH CARE EDUCATION/TRAINING PROGRAM
Payer: COMMERCIAL

## 2021-12-20 ENCOUNTER — HOSPITAL ENCOUNTER (EMERGENCY)
Facility: CLINIC | Age: 81
End: 2021-12-20
Payer: COMMERCIAL

## 2021-12-20 ENCOUNTER — ANESTHESIA (OUTPATIENT)
Dept: SURGERY | Facility: CLINIC | Age: 81
DRG: 025 | End: 2021-12-20
Payer: COMMERCIAL

## 2021-12-20 VITALS
DIASTOLIC BLOOD PRESSURE: 64 MMHG | TEMPERATURE: 98 F | WEIGHT: 229 LBS | BODY MASS INDEX: 31.94 KG/M2 | OXYGEN SATURATION: 100 % | HEART RATE: 82 BPM | RESPIRATION RATE: 32 BRPM | SYSTOLIC BLOOD PRESSURE: 138 MMHG

## 2021-12-20 DIAGNOSIS — I25.10 CORONARY ARTERY DISEASE INVOLVING NATIVE CORONARY ARTERY OF NATIVE HEART WITHOUT ANGINA PECTORIS: Chronic | ICD-10-CM

## 2021-12-20 DIAGNOSIS — G47.33 OSA ON CPAP: ICD-10-CM
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
## 2021-12-20 DIAGNOSIS — R56.9 SEIZURES (H): ICD-10-CM

## 2021-12-20 DIAGNOSIS — K57.30 DIVERTICULAR DISEASE OF COLON: ICD-10-CM

## 2021-12-20 DIAGNOSIS — Z20.822 SUSPECTED COVID-19 VIRUS INFECTION: ICD-10-CM

## 2021-12-20 DIAGNOSIS — R47.81 SLURRED SPEECH: ICD-10-CM

## 2021-12-20 DIAGNOSIS — R60.1 ANASARCA: ICD-10-CM

## 2021-12-20 DIAGNOSIS — M79.661 PAIN OF RIGHT LOWER LEG: ICD-10-CM

## 2021-12-20 DIAGNOSIS — R18.8 OTHER ASCITES: ICD-10-CM

## 2021-12-20 DIAGNOSIS — E78.00 HYPERCHOLESTEREMIA: Chronic | ICD-10-CM

## 2021-12-20 DIAGNOSIS — I10 ESSENTIAL HYPERTENSION: ICD-10-CM

## 2021-12-20 DIAGNOSIS — Z98.890 S/P CORONARY ANGIOGRAM: Chronic | ICD-10-CM

## 2021-12-20 DIAGNOSIS — Z86.0100 HISTORY OF COLONIC POLYPS: ICD-10-CM

## 2021-12-20 DIAGNOSIS — E87.6 HYPOKALEMIA: ICD-10-CM

## 2021-12-20 DIAGNOSIS — K92.1 GASTROINTESTINAL HEMORRHAGE WITH MELENA: Chronic | ICD-10-CM

## 2021-12-20 DIAGNOSIS — R07.9 CHEST PAIN, UNSPECIFIED TYPE: ICD-10-CM

## 2021-12-20 DIAGNOSIS — Z79.02 LONG TERM CURRENT USE OF ANTITHROMBOTICS/ANTIPLATELETS: Chronic | ICD-10-CM

## 2021-12-20 DIAGNOSIS — R41.0 DELIRIUM: ICD-10-CM

## 2021-12-20 DIAGNOSIS — R56.9 CONVULSIONS, UNSPECIFIED CONVULSION TYPE (H): ICD-10-CM

## 2021-12-20 DIAGNOSIS — S06.5XAA SDH (SUBDURAL HEMATOMA) (H): Primary | ICD-10-CM

## 2021-12-20 DIAGNOSIS — I21.4 NSTEMI (NON-ST ELEVATED MYOCARDIAL INFARCTION) (H): Chronic | ICD-10-CM

## 2021-12-20 DIAGNOSIS — N18.31 STAGE 3A CHRONIC KIDNEY DISEASE (H): ICD-10-CM

## 2021-12-20 DIAGNOSIS — I73.9 PERIPHERAL VASCULAR DISEASE (H): ICD-10-CM

## 2021-12-20 DIAGNOSIS — E87.20 LACTIC ACID ACIDOSIS: ICD-10-CM

## 2021-12-20 DIAGNOSIS — S06.5X0A TRAUMATIC SUBDURAL HEMORRHAGE WITHOUT LOSS OF CONSCIOUSNESS, INITIAL ENCOUNTER (H): ICD-10-CM

## 2021-12-20 DIAGNOSIS — D12.2 BENIGN NEOPLASM OF ASCENDING COLON: ICD-10-CM

## 2021-12-20 DIAGNOSIS — G25.81 RESTLESS LEG SYNDROME: ICD-10-CM
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
## 2021-12-20 DIAGNOSIS — K74.69 OTHER CIRRHOSIS OF LIVER (H): ICD-10-CM

## 2021-12-20 DIAGNOSIS — Z79.01 LONG TERM (CURRENT) USE OF ANTICOAGULANTS: ICD-10-CM

## 2021-12-20 DIAGNOSIS — I50.33 ACUTE ON CHRONIC DIASTOLIC CONGESTIVE HEART FAILURE (H): ICD-10-CM

## 2021-12-20 DIAGNOSIS — Z79.4 TYPE 2 DIABETES MELLITUS WITH OTHER SPECIFIED COMPLICATION, WITH LONG-TERM CURRENT USE OF INSULIN (H): Chronic | ICD-10-CM

## 2021-12-20 DIAGNOSIS — E11.69 TYPE 2 DIABETES MELLITUS WITH OTHER SPECIFIED COMPLICATION, WITH LONG-TERM CURRENT USE OF INSULIN (H): Chronic | ICD-10-CM

## 2021-12-20 DIAGNOSIS — I27.22 PULMONARY HYPERTENSION DUE TO LEFT VENTRICULAR DIASTOLIC DYSFUNCTION (H): Chronic | ICD-10-CM

## 2021-12-20 DIAGNOSIS — K21.9 GASTROESOPHAGEAL REFLUX DISEASE WITHOUT ESOPHAGITIS: ICD-10-CM

## 2021-12-20 DIAGNOSIS — D62 ANEMIA DUE TO BLOOD LOSS, ACUTE: ICD-10-CM

## 2021-12-20 DIAGNOSIS — N28.9 RENAL INSUFFICIENCY: ICD-10-CM

## 2021-12-20 DIAGNOSIS — S06.5XAA SUBDURAL HEMATOMA (H): ICD-10-CM

## 2021-12-20 LAB
ABO/RH(D): NORMAL
ALBUMIN SERPL-MCNC: 3.1 G/DL (ref 3.4–5)
ALBUMIN SERPL-MCNC: 3.6 G/DL (ref 3.5–5)
ALP SERPL-CCNC: 133 U/L (ref 40–150)
ALP SERPL-CCNC: 140 U/L (ref 45–120)
ALT SERPL W P-5'-P-CCNC: 13 U/L (ref 0–45)
ALT SERPL W P-5'-P-CCNC: 17 U/L (ref 0–70)
ANION GAP SERPL CALCULATED.3IONS-SCNC: 15 MMOL/L (ref 5–18)
ANION GAP SERPL CALCULATED.3IONS-SCNC: 6 MMOL/L (ref 3–14)
ANTIBODY SCREEN: NEGATIVE
APTT PPP: 35 SECONDS (ref 22–38)
APTT PPP: 37 SECONDS (ref 22–38)
AST SERPL W P-5'-P-CCNC: 19 U/L (ref 0–40)
AST SERPL W P-5'-P-CCNC: 22 U/L (ref 0–45)
ATRIAL RATE - MUSE: 91 BPM
BASOPHILS # BLD AUTO: 0 10E3/UL (ref 0–0.2)
BASOPHILS # BLD AUTO: 0 10E3/UL (ref 0–0.2)
BASOPHILS NFR BLD AUTO: 0 %
BASOPHILS NFR BLD AUTO: 0 %
BILIRUB SERPL-MCNC: 1.5 MG/DL (ref 0.2–1.3)
BILIRUB SERPL-MCNC: 1.7 MG/DL (ref 0–1)
BLD PROD TYP BPU: NORMAL
BLOOD COMPONENT TYPE: NORMAL
BUN SERPL-MCNC: 36 MG/DL (ref 7–30)
BUN SERPL-MCNC: 37 MG/DL (ref 8–28)
CALCIUM SERPL-MCNC: 8.8 MG/DL (ref 8.5–10.1)
CALCIUM SERPL-MCNC: 9.4 MG/DL (ref 8.5–10.5)
CHLORIDE BLD-SCNC: 104 MMOL/L (ref 94–109)
CHLORIDE BLD-SCNC: 99 MMOL/L (ref 98–107)
CO2 SERPL-SCNC: 24 MMOL/L (ref 22–31)
CO2 SERPL-SCNC: 29 MMOL/L (ref 20–32)
CODING SYSTEM: NORMAL
CREAT SERPL-MCNC: 1.25 MG/DL (ref 0.66–1.25)
CREAT SERPL-MCNC: 1.6 MG/DL (ref 0.7–1.3)
DIASTOLIC BLOOD PRESSURE - MUSE: 70 MMHG
EOSINOPHIL # BLD AUTO: 0.1 10E3/UL (ref 0–0.7)
EOSINOPHIL # BLD AUTO: 0.1 10E3/UL (ref 0–0.7)
EOSINOPHIL NFR BLD AUTO: 1 %
EOSINOPHIL NFR BLD AUTO: 1 %
ERYTHROCYTE [DISTWIDTH] IN BLOOD BY AUTOMATED COUNT: 17.9 % (ref 10–15)
ERYTHROCYTE [DISTWIDTH] IN BLOOD BY AUTOMATED COUNT: 18 % (ref 10–15)
GFR SERPL CREATININE-BSD FRML MDRD: 40 ML/MIN/1.73M2
GFR SERPL CREATININE-BSD FRML MDRD: 54 ML/MIN/1.73M2
GLUCOSE BLD-MCNC: 141 MG/DL (ref 70–99)
GLUCOSE BLD-MCNC: 269 MG/DL (ref 70–125)
GLUCOSE BLDC GLUCOMTR-MCNC: 155 MG/DL (ref 70–99)
HCT VFR BLD AUTO: 28.2 % (ref 40–53)
HCT VFR BLD AUTO: 30.6 % (ref 40–53)
HGB BLD-MCNC: 8 G/DL (ref 13.3–17.7)
HGB BLD-MCNC: 8.8 G/DL (ref 13.3–17.7)
IMM GRANULOCYTES # BLD: 0.1 10E3/UL
IMM GRANULOCYTES # BLD: 0.1 10E3/UL
IMM GRANULOCYTES NFR BLD: 1 %
IMM GRANULOCYTES NFR BLD: 1 %
INR PPP: 1.2 (ref 0.85–1.15)
INR PPP: 1.32 (ref 0.85–1.15)
INTERPRETATION ECG - MUSE: NORMAL
ISSUE DATE AND TIME: NORMAL
LYMPHOCYTES # BLD AUTO: 0.7 10E3/UL (ref 0.8–5.3)
LYMPHOCYTES # BLD AUTO: 0.8 10E3/UL (ref 0.8–5.3)
LYMPHOCYTES NFR BLD AUTO: 7 %
LYMPHOCYTES NFR BLD AUTO: 9 %
MCH RBC QN AUTO: 20.4 PG (ref 26.5–33)
MCH RBC QN AUTO: 20.6 PG (ref 26.5–33)
MCHC RBC AUTO-ENTMCNC: 28.4 G/DL (ref 31.5–36.5)
MCHC RBC AUTO-ENTMCNC: 28.8 G/DL (ref 31.5–36.5)
MCV RBC AUTO: 72 FL (ref 78–100)
MCV RBC AUTO: 72 FL (ref 78–100)
MONOCYTES # BLD AUTO: 0.7 10E3/UL (ref 0–1.3)
MONOCYTES # BLD AUTO: 0.8 10E3/UL (ref 0–1.3)
MONOCYTES NFR BLD AUTO: 7 %
MONOCYTES NFR BLD AUTO: 9 %
NEUTROPHILS # BLD AUTO: 7.2 10E3/UL (ref 1.6–8.3)
NEUTROPHILS # BLD AUTO: 8.7 10E3/UL (ref 1.6–8.3)
NEUTROPHILS NFR BLD AUTO: 80 %
NEUTROPHILS NFR BLD AUTO: 84 %
NRBC # BLD AUTO: 0 10E3/UL
NRBC # BLD AUTO: 0 10E3/UL
NRBC BLD AUTO-RTO: 0 /100
NRBC BLD AUTO-RTO: 0 /100
P AXIS - MUSE: NORMAL DEGREES
PLATELET # BLD AUTO: 275 10E3/UL (ref 150–450)
PLATELET # BLD AUTO: 303 10E3/UL (ref 150–450)
POTASSIUM BLD-SCNC: 3.7 MMOL/L (ref 3.5–5)
POTASSIUM BLD-SCNC: 3.9 MMOL/L (ref 3.4–5.3)
PR INTERVAL - MUSE: NORMAL MS
PROT SERPL-MCNC: 6.8 G/DL (ref 6.8–8.8)
PROT SERPL-MCNC: 7.3 G/DL (ref 6–8)
QRS DURATION - MUSE: 96 MS
QT - MUSE: 378 MS
QTC - MUSE: 457 MS
R AXIS - MUSE: 98 DEGREES
RADIOLOGIST FLAGS: ABNORMAL
RADIOLOGIST FLAGS: ABNORMAL
RBC # BLD AUTO: 3.93 10E6/UL (ref 4.4–5.9)
RBC # BLD AUTO: 4.27 10E6/UL (ref 4.4–5.9)
SARS-COV-2 RNA RESP QL NAA+PROBE: NEGATIVE
SODIUM SERPL-SCNC: 138 MMOL/L (ref 136–145)
SODIUM SERPL-SCNC: 139 MMOL/L (ref 133–144)
SPECIMEN EXPIRATION DATE: NORMAL
SYSTOLIC BLOOD PRESSURE - MUSE: 155 MMHG
T AXIS - MUSE: 5 DEGREES
UNIT ABO/RH: NORMAL
UNIT NUMBER: NORMAL
UNIT STATUS: NORMAL
UNIT TYPE ISBT: 6200
VENTRICULAR RATE- MUSE: 88 BPM
WBC # BLD AUTO: 10.3 10E3/UL (ref 4–11)
WBC # BLD AUTO: 9 10E3/UL (ref 4–11)

## 2021-12-20 PROCEDURE — 84155 ASSAY OF PROTEIN SERUM: CPT | Performed by: EMERGENCY MEDICINE

## 2021-12-20 PROCEDURE — 36430 TRANSFUSION BLD/BLD COMPNT: CPT | Performed by: EMERGENCY MEDICINE

## 2021-12-20 PROCEDURE — 71046 X-RAY EXAM CHEST 2 VIEWS: CPT

## 2021-12-20 PROCEDURE — P9037 PLATE PHERES LEUKOREDU IRRAD: HCPCS | Performed by: NURSE PRACTITIONER

## 2021-12-20 PROCEDURE — 86850 RBC ANTIBODY SCREEN: CPT | Performed by: EMERGENCY MEDICINE

## 2021-12-20 PROCEDURE — 70551 MRI BRAIN STEM W/O DYE: CPT | Mod: 26 | Performed by: RADIOLOGY

## 2021-12-20 PROCEDURE — 85025 COMPLETE CBC W/AUTO DIFF WBC: CPT | Performed by: EMERGENCY MEDICINE

## 2021-12-20 PROCEDURE — C9803 HOPD COVID-19 SPEC COLLECT: HCPCS

## 2021-12-20 PROCEDURE — 70450 CT HEAD/BRAIN W/O DYE: CPT | Mod: XE

## 2021-12-20 PROCEDURE — 85610 PROTHROMBIN TIME: CPT | Performed by: EMERGENCY MEDICINE

## 2021-12-20 PROCEDURE — 85730 THROMBOPLASTIN TIME PARTIAL: CPT | Performed by: EMERGENCY MEDICINE

## 2021-12-20 PROCEDURE — 70551 MRI BRAIN STEM W/O DYE: CPT

## 2021-12-20 PROCEDURE — 96366 THER/PROPH/DIAG IV INF ADDON: CPT | Performed by: EMERGENCY MEDICINE

## 2021-12-20 PROCEDURE — 70450 CT HEAD/BRAIN W/O DYE: CPT

## 2021-12-20 PROCEDURE — 99285 EMERGENCY DEPT VISIT HI MDM: CPT | Mod: 25

## 2021-12-20 PROCEDURE — 258N000003 HC RX IP 258 OP 636: Performed by: EMERGENCY MEDICINE

## 2021-12-20 PROCEDURE — 200N000002 HC R&B ICU UMMC

## 2021-12-20 PROCEDURE — 99285 EMERGENCY DEPT VISIT HI MDM: CPT | Mod: 25 | Performed by: EMERGENCY MEDICINE

## 2021-12-20 PROCEDURE — 250N000011 HC RX IP 250 OP 636: Performed by: EMERGENCY MEDICINE

## 2021-12-20 PROCEDURE — 36415 COLL VENOUS BLD VENIPUNCTURE: CPT | Performed by: EMERGENCY MEDICINE

## 2021-12-20 PROCEDURE — 99221 1ST HOSP IP/OBS SF/LOW 40: CPT | Mod: GC | Performed by: NEUROLOGICAL SURGERY

## 2021-12-20 PROCEDURE — 70450 CT HEAD/BRAIN W/O DYE: CPT | Mod: 26 | Performed by: RADIOLOGY

## 2021-12-20 PROCEDURE — 86923 COMPATIBILITY TEST ELECTRIC: CPT | Performed by: STUDENT IN AN ORGANIZED HEALTH CARE EDUCATION/TRAINING PROGRAM

## 2021-12-20 PROCEDURE — 96375 TX/PRO/DX INJ NEW DRUG ADDON: CPT | Performed by: EMERGENCY MEDICINE

## 2021-12-20 PROCEDURE — 250N000011 HC RX IP 250 OP 636: Performed by: STUDENT IN AN ORGANIZED HEALTH CARE EDUCATION/TRAINING PROGRAM

## 2021-12-20 PROCEDURE — 96367 TX/PROPH/DG ADDL SEQ IV INF: CPT | Performed by: EMERGENCY MEDICINE

## 2021-12-20 PROCEDURE — 96365 THER/PROPH/DIAG IV INF INIT: CPT | Performed by: EMERGENCY MEDICINE

## 2021-12-20 PROCEDURE — 250N000015 HC RX FACTOR IP MED 636 OP 636: Performed by: NURSE PRACTITIONER

## 2021-12-20 PROCEDURE — 99291 CRITICAL CARE FIRST HOUR: CPT | Performed by: EMERGENCY MEDICINE

## 2021-12-20 PROCEDURE — 93005 ELECTROCARDIOGRAM TRACING: CPT | Performed by: EMERGENCY MEDICINE

## 2021-12-20 PROCEDURE — 72125 CT NECK SPINE W/O DYE: CPT

## 2021-12-20 PROCEDURE — 80053 COMPREHEN METABOLIC PANEL: CPT | Performed by: EMERGENCY MEDICINE

## 2021-12-20 PROCEDURE — 258N000003 HC RX IP 258 OP 636: Performed by: STUDENT IN AN ORGANIZED HEALTH CARE EDUCATION/TRAINING PROGRAM

## 2021-12-20 PROCEDURE — 96365 THER/PROPH/DIAG IV INF INIT: CPT

## 2021-12-20 PROCEDURE — 87635 SARS-COV-2 COVID-19 AMP PRB: CPT | Performed by: EMERGENCY MEDICINE

## 2021-12-20 PROCEDURE — 99222 1ST HOSP IP/OBS MODERATE 55: CPT | Performed by: NURSE PRACTITIONER

## 2021-12-20 RX ORDER — IRBESARTAN 150 MG/1
150 TABLET ORAL DAILY
Status: DISCONTINUED | OUTPATIENT
Start: 2021-12-21 | End: 2021-12-21

## 2021-12-20 RX ORDER — HYDRALAZINE HYDROCHLORIDE 20 MG/ML
10 INJECTION INTRAMUSCULAR; INTRAVENOUS EVERY 10 MIN PRN
Status: DISCONTINUED | OUTPATIENT
Start: 2021-12-20 | End: 2022-01-01

## 2021-12-20 RX ORDER — CEFAZOLIN SODIUM 1 G/3ML
1 INJECTION, POWDER, FOR SOLUTION INTRAMUSCULAR; INTRAVENOUS ONCE
Status: COMPLETED | OUTPATIENT
Start: 2021-12-20 | End: 2021-12-20

## 2021-12-20 RX ORDER — ROPINIROLE 2 MG/1
2 TABLET, FILM COATED ORAL AT BEDTIME
Status: DISCONTINUED | OUTPATIENT
Start: 2021-12-21 | End: 2021-12-28

## 2021-12-20 RX ORDER — FERROUS GLUCONATE 324(38)MG
324 TABLET ORAL EVERY OTHER DAY
Status: DISCONTINUED | OUTPATIENT
Start: 2021-12-21 | End: 2021-12-21

## 2021-12-20 RX ORDER — NALOXONE HYDROCHLORIDE 0.4 MG/ML
0.4 INJECTION, SOLUTION INTRAMUSCULAR; INTRAVENOUS; SUBCUTANEOUS
Status: DISCONTINUED | OUTPATIENT
Start: 2021-12-20 | End: 2022-01-01

## 2021-12-20 RX ORDER — AMOXICILLIN 250 MG
2 CAPSULE ORAL 2 TIMES DAILY PRN
Status: DISCONTINUED | OUTPATIENT
Start: 2021-12-20 | End: 2021-12-23

## 2021-12-20 RX ORDER — CLINDAMYCIN PHOSPHATE 900 MG/50ML
900 INJECTION, SOLUTION INTRAVENOUS SEE ADMIN INSTRUCTIONS
Status: DISCONTINUED | OUTPATIENT
Start: 2021-12-20 | End: 2021-12-20

## 2021-12-20 RX ORDER — LEVETIRACETAM 10 MG/ML
1000 INJECTION INTRAVASCULAR ONCE
Status: COMPLETED | OUTPATIENT
Start: 2021-12-20 | End: 2021-12-20

## 2021-12-20 RX ORDER — HYDROMORPHONE HCL IN WATER/PF 6 MG/30 ML
0.2 PATIENT CONTROLLED ANALGESIA SYRINGE INTRAVENOUS
Status: DISCONTINUED | OUTPATIENT
Start: 2021-12-20 | End: 2021-12-23

## 2021-12-20 RX ORDER — DEXTROSE MONOHYDRATE 25 G/50ML
25-50 INJECTION, SOLUTION INTRAVENOUS
Status: DISCONTINUED | OUTPATIENT
Start: 2021-12-20 | End: 2022-01-17 | Stop reason: HOSPADM

## 2021-12-20 RX ORDER — SODIUM CHLORIDE 9 MG/ML
INJECTION, SOLUTION INTRAVENOUS ONCE
Status: COMPLETED | OUTPATIENT
Start: 2021-12-20 | End: 2021-12-20

## 2021-12-20 RX ORDER — TADALAFIL 20 MG/1
20 TABLET ORAL DAILY
Status: DISCONTINUED | OUTPATIENT
Start: 2021-12-21 | End: 2021-12-21

## 2021-12-20 RX ORDER — SODIUM CHLORIDE 9 MG/ML
INJECTION, SOLUTION INTRAVENOUS ONCE
Status: DISCONTINUED | OUTPATIENT
Start: 2021-12-20 | End: 2021-12-20

## 2021-12-20 RX ORDER — ALBUTEROL SULFATE 90 UG/1
2 AEROSOL, METERED RESPIRATORY (INHALATION) EVERY 6 HOURS PRN
Status: DISCONTINUED | OUTPATIENT
Start: 2021-12-20 | End: 2021-12-30

## 2021-12-20 RX ORDER — ACETAMINOPHEN 325 MG/1
650 TABLET ORAL EVERY 4 HOURS PRN
Status: DISCONTINUED | OUTPATIENT
Start: 2021-12-20 | End: 2021-12-28

## 2021-12-20 RX ORDER — NALOXONE HYDROCHLORIDE 0.4 MG/ML
0.2 INJECTION, SOLUTION INTRAMUSCULAR; INTRAVENOUS; SUBCUTANEOUS
Status: DISCONTINUED | OUTPATIENT
Start: 2021-12-20 | End: 2022-01-01

## 2021-12-20 RX ORDER — NICOTINE POLACRILEX 4 MG
15-30 LOZENGE BUCCAL
Status: DISCONTINUED | OUTPATIENT
Start: 2021-12-20 | End: 2022-01-17 | Stop reason: HOSPADM

## 2021-12-20 RX ORDER — LABETALOL HYDROCHLORIDE 5 MG/ML
20 INJECTION, SOLUTION INTRAVENOUS EVERY 10 MIN PRN
Status: DISCONTINUED | OUTPATIENT
Start: 2021-12-20 | End: 2022-01-01

## 2021-12-20 RX ORDER — ONDANSETRON 2 MG/ML
4 INJECTION INTRAMUSCULAR; INTRAVENOUS EVERY 6 HOURS PRN
Status: DISCONTINUED | OUTPATIENT
Start: 2021-12-20 | End: 2022-01-01

## 2021-12-20 RX ORDER — AMOXICILLIN 250 MG
1 CAPSULE ORAL 2 TIMES DAILY PRN
Status: DISCONTINUED | OUTPATIENT
Start: 2021-12-20 | End: 2021-12-23

## 2021-12-20 RX ORDER — ONDANSETRON 4 MG/1
4 TABLET, ORALLY DISINTEGRATING ORAL EVERY 6 HOURS PRN
Status: DISCONTINUED | OUTPATIENT
Start: 2021-12-20 | End: 2022-01-17 | Stop reason: HOSPADM

## 2021-12-20 RX ORDER — METOPROLOL TARTRATE 25 MG/1
25 TABLET, FILM COATED ORAL 2 TIMES DAILY WITH MEALS
Status: DISCONTINUED | OUTPATIENT
Start: 2021-12-21 | End: 2021-12-21

## 2021-12-20 RX ORDER — CLINDAMYCIN PHOSPHATE 900 MG/50ML
900 INJECTION, SOLUTION INTRAVENOUS
Status: DISCONTINUED | OUTPATIENT
Start: 2021-12-20 | End: 2021-12-20

## 2021-12-20 RX ORDER — NICOTINE POLACRILEX 4 MG
15-30 LOZENGE BUCCAL
Status: DISCONTINUED | OUTPATIENT
Start: 2021-12-20 | End: 2021-12-20

## 2021-12-20 RX ORDER — DEXTROSE MONOHYDRATE 25 G/50ML
25-50 INJECTION, SOLUTION INTRAVENOUS
Status: DISCONTINUED | OUTPATIENT
Start: 2021-12-20 | End: 2021-12-20

## 2021-12-20 RX ORDER — POTASSIUM CHLORIDE 750 MG/1
40 TABLET, EXTENDED RELEASE ORAL DAILY
Status: DISCONTINUED | OUTPATIENT
Start: 2021-12-21 | End: 2021-12-21

## 2021-12-20 RX ORDER — PANTOPRAZOLE SODIUM 40 MG/1
40 TABLET, DELAYED RELEASE ORAL
Status: DISCONTINUED | OUTPATIENT
Start: 2021-12-21 | End: 2021-12-21

## 2021-12-20 RX ORDER — ATORVASTATIN CALCIUM 80 MG/1
80 TABLET, FILM COATED ORAL DAILY
Status: DISCONTINUED | OUTPATIENT
Start: 2021-12-21 | End: 2022-01-17 | Stop reason: HOSPADM

## 2021-12-20 RX ORDER — HYDROMORPHONE HYDROCHLORIDE 1 MG/ML
0.5 INJECTION, SOLUTION INTRAMUSCULAR; INTRAVENOUS; SUBCUTANEOUS ONCE
Status: COMPLETED | OUTPATIENT
Start: 2021-12-20 | End: 2021-12-20

## 2021-12-20 RX ORDER — SODIUM CHLORIDE 9 MG/ML
INJECTION, SOLUTION INTRAVENOUS CONTINUOUS
Status: DISCONTINUED | OUTPATIENT
Start: 2021-12-20 | End: 2021-12-21

## 2021-12-20 RX ORDER — BUMETANIDE 1 MG/1
4 TABLET ORAL EVERY MORNING
Status: DISCONTINUED | OUTPATIENT
Start: 2021-12-21 | End: 2022-01-01

## 2021-12-20 RX ADMIN — LEVETIRACETAM 1000 MG: 100 INJECTION, SOLUTION INTRAVENOUS at 12:15

## 2021-12-20 RX ADMIN — LABETALOL HYDROCHLORIDE 20 MG: 5 INJECTION, SOLUTION INTRAVENOUS at 20:42

## 2021-12-20 RX ADMIN — PROTHROMBIN, COAGULATION FACTOR VII HUMAN, COAGULATION FACTOR IX HUMAN, COAGULATION FACTOR X HUMAN, PROTEIN C, PROTEIN S HUMAN, AND WATER 2697 UNITS: KIT at 16:54

## 2021-12-20 RX ADMIN — SODIUM CHLORIDE: 9 INJECTION, SOLUTION INTRAVENOUS at 16:13

## 2021-12-20 RX ADMIN — LEVETIRACETAM 1000 MG: 10 INJECTION INTRAVENOUS at 13:53

## 2021-12-20 RX ADMIN — SODIUM CHLORIDE: 9 INJECTION, SOLUTION INTRAVENOUS at 13:52

## 2021-12-20 RX ADMIN — CEFAZOLIN 1 G: 1 INJECTION, POWDER, FOR SOLUTION INTRAMUSCULAR; INTRAVENOUS at 17:31

## 2021-12-20 RX ADMIN — HYDROMORPHONE HYDROCHLORIDE 0.5 MG: 1 INJECTION, SOLUTION INTRAMUSCULAR; INTRAVENOUS; SUBCUTANEOUS at 14:47

## 2021-12-20 ASSESSMENT — VISUAL ACUITY
OU: OTHER (SEE COMMENT)
OU: NORMAL ACUITY
OU: OTHER (SEE COMMENT)
OU: OTHER (SEE COMMENT)
OU: NORMAL ACUITY
OU: NORMAL ACUITY

## 2021-12-20 ASSESSMENT — ACTIVITIES OF DAILY LIVING (ADL)
ADLS_ACUITY_SCORE: 8
ADLS_ACUITY_SCORE: 8
ADLS_ACUITY_SCORE: 14
ADLS_ACUITY_SCORE: 12

## 2021-12-20 ASSESSMENT — ENCOUNTER SYMPTOMS
SHORTNESS OF BREATH: 0
DIFFICULTY URINATING: 0
SPEECH DIFFICULTY: 1
CONFUSION: 0
NECK STIFFNESS: 0
DYSRHYTHMIAS: 1
ABDOMINAL PAIN: 0
COLOR CHANGE: 0
EYE REDNESS: 0
HEADACHES: 0
FEVER: 0
ARTHRALGIAS: 0

## 2021-12-20 ASSESSMENT — COPD QUESTIONNAIRES: COPD: 1

## 2021-12-20 NOTE — ED TRIAGE NOTES
81 year old male transferred from [facility] with head bleed and seizures.  Patient had small seizure en route that consisted of lip smacking on both right and left side of face. Patient stopped Eliquis on Wednesday for a scheduled procedure and his INR 1.2.

## 2021-12-20 NOTE — ED PROVIDER NOTES
[place] EMERGENCY DEPARTMENT ([facility])  12/20/21  History     Chief Complaint   Patient presents with     Head Injury     The history is provided by the patient, medical records and the EMS personnel.     Red Sutherland is a 81 year old male with a past medical history significant for atrial fibrillation (anticoagulated on Eliquis), pulmonary hypertension, essential hypertension, hyperlipidemia, CAD (s/p CABG), COPD, CHF (EF 60-65% per echocardiogram on 10/23/2021), stage 4 CKD with ascites, PVD, type 2 diabetes mellitus, and history of GIB who presents to the Emergency Department via transfer from [facility] after initial workup revealed subdural hematoma.  Upon initial evaluation, patient reports his last fall was approximately 2-3 weeks ago.  Patient denies any headache here in the ED.  Per EMS, patient did have some slurred speech during initial evaluation, however, has worsened since beginning of transfer.  Patient was given 1 g of Keppra while at the outside ED.    Per review of the patient's medical record, he was initially evaluated at [facility] ED earlier today (12/20) for evaluation of headache and speech abnormality.  Patient underwent imaging with CT head which revealed large hyperdense holohemispheric right-sided subdural hematoma measuring 12.9 mm in thickness over the posterior right frontal convexity, 12.6 mm of the right parietal convexity and 11.9 mm over the right temporal convexity.  Full impression noted below.  Patient also underwent chest x-ray and CT imaging of the cervical spine with impressions noted below.    CT head ([facility])-12/20/2021  IMPRESSION:  1.  Large hyperdense holohemispheric right-sided subdural hematoma which measures 12.9 mm in thickness over the posterior right frontal convexity, 12.6 mm over the right parietal convexity and 11.9 mm over the right temporal convexity.  2.  It does cause mass effect on the adjacent brain parenchyma. Partial  effacement of the right lateral ventricle and the third ventricle. 5.6 mm midline shift to the left at the level of the lateral ventricles. Partial effacement of the suprasellar   cisterns.     CERVICAL SPINE CT: ([facility] ED)-12/20/2021  Impression:  1.  Moderately motion degraded exam.  2.  No fractures or posttraumatic subluxations within the limitations of this exam.    EXAM: XR CHEST 2 VW ([facility] ED)-12/20/2021  IMPRESSION:   No pleural fluid or pneumothorax. Heterogeneous pulmonary opacities have increased from the prior study. The heart is not well assessed. There is aortic calcification.     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 They put him on sildenafil as nitroprusside lowered systemic BP and with that the mean PA dropped from 54 to 49. Negative VQ at [facility] Dec 1, 2017.     RLS (restless legs syndrome)      Sleep apnea      Past Surgical History:   Procedure Laterality Date     BACK SURGERY      lower back     CARDIAC CATHETERIZATION  12/13/2017    Right and left at [facility], mean PA 58, PCW 24 with V wave of 35, LVEDP of 18, with Nipride systemic BP, PVR and mean PA all declined     CARDIOVERSION  03/15/2013    for afib     CATARACT EXTRACTION Bilateral      COLONOSCOPY N/A 10/31/2021    Procedure: COLONOSCOPY WITH POLYPECTOMY;  Surgeon: Sheng Sagastume MD;  Location: [facility] Main OR     CORONARY STENT PLACEMENT       CV CORONARY ANGIOGRAM N/A 10/25/2021    Procedure: Coronary Angiogram;  Surgeon: Otf Knowles MD;  Location: [facility] CATH LAB CV      CV CORONARY LITHOTRIPSY PCI N/A 10/25/2021    Procedure: CV Coronary Lithotripsy PCI;  Surgeon: Otf Knowles MD;  Location: [facility] CATH LAB CV     CV LEFT HEART CATH N/A 10/25/2021    Procedure: Left Heart Cath;  Surgeon: Otf Knowles MD;  Location: [facility] CATH LAB CV     CV PCI N/A 10/25/2021    Procedure: Percutaneous Coronary Intervention;  Surgeon: Otf Knowles MD;  Location: [facility] CATH LAB CV     ESOPHAGOSCOPY, GASTROSCOPY, DUODENOSCOPY (EGD), COMBINED N/A 10/30/2021    Procedure: ESOPHAGOGASTRODUODENOSCOPY (EGD);  Surgeon: Sheng Sagastume MD;  Location: [facility] OR     IR ABDOMINAL AORTOGRAM  11/16/2012     IR MISCELLANEOUS PROCEDURE  7/20/2001     IR MISCELLANEOUS PROCEDURE  11/16/2012     OTHER SURGICAL HISTORY      mynor     SHOULDER SURGERY      reapir on right shoulder     TOTAL HIP ARTHROPLASTY Left      TOTAL KNEE ARTHROPLASTY Bilateral      WRIST SURGERY Bilateral      ZZHC COLONOSCOPY W/WO BRUSH/WASH N/A 1/14/2021    Procedure: COLONOSCOPY;  Surgeon: Mihai Harris MD;  Location: [facility];  Service: Gastroenterology     No current outpatient medications on file.    Allergies   Allergen Reactions     Contrast Dye Nausea     Other reaction(s): GI intolerance, GI Upset     Furosemide Muscle Pain (Myalgia)     Previously tolerated.  Muscle cramps     Hydrochlorothiazide Unknown     Iodinated Contrast Media [Diagnostic X-Ray Materials] Nausea     Losartan Other (See Comments)     Other reaction(s): Stomatitis, Bloody nose dry mouth and lips     Losartan-Hydrochlorothiazide [Hyzaar]      Mouth sores     Metaxalone Nausea     Metolazone Other (See Comments)     Muscle cramps     Mometasone Other (See Comments)     Bloody nose     Rabeprazole Other (See Comments)     Mouth sores     Ramipril Other (See Comments)     Mouth sores     Shellfish Containing Products [Shellfish-Derived Products] Unknown     Other reaction(s): mouth sores, Other reaction(s): mouth sores      Sildenafil Muscle Pain (Myalgia)     Methocarbamol Rash     Penicillins Other (See Comments)     Immune-does not work for him     Family History  Family History   Problem Relation Age of Onset     Hyperlipidemia Mother      Hypertension Mother      Heart Disease Mother      Hyperlipidemia Father      Hypertension Father      Coronary Artery Disease Father      Cerebrovascular Disease Brother      Depression Brother      No Known Problems Sister      Pulmonary Hypertension No family hx of      Congenital heart disease No family hx of      Social History   Social History     Tobacco Use     Smoking status: Former Smoker     Packs/day: 1.50     Years: 44.00     Pack years: 66.00     Types: Cigarettes     Quit date: 1996     Years since quittin.6     Smokeless tobacco: Never Used   Substance Use Topics     Alcohol use: Yes     Alcohol/week: 1.0 standard drink     Comment: Alcoholic Drinks/day: 1 per month     Drug use: No      Past medical history, past surgical history, medications, allergies, family history, and social history were reviewed with the patient. No additional pertinent items.     I have reviewed the Medications, Allergies, Past Medical and Surgical History, and Social History in the Epic system.    Review of Systems   Constitutional: Negative for fever.   HENT: Negative for congestion.    Eyes: Negative for redness.   Respiratory: Negative for shortness of breath.    Cardiovascular: Negative for chest pain.   Gastrointestinal: Negative for abdominal pain.   Genitourinary: Negative for difficulty urinating.   Musculoskeletal: Negative for arthralgias and neck stiffness.   Skin: Negative for color change.   Neurological: Positive for speech difficulty. Negative for headaches.   Psychiatric/Behavioral: Negative for confusion.   All other systems reviewed and are negative.      Physical Exam       Physical Exam  Vitals and nursing note reviewed.   Constitutional:       General: He is not in  acute distress.     Appearance: He is well-developed. He is not ill-appearing, toxic-appearing or diaphoretic.      Comments: Patient is awake and alert.  He is somewhat confused he is maintaining an airway without difficulty.   HENT:      Head: Normocephalic and atraumatic.      Mouth/Throat:      Lips: Pink.      Mouth: Mucous membranes are moist.      Pharynx: Oropharynx is clear. No oropharyngeal exudate.      Comments: Stigmata of tongue biting noted left anterolateral tongue.  Eyes:      General: Lids are normal. No scleral icterus.     Extraocular Movements: Extraocular movements intact.      Right eye: No nystagmus.      Left eye: No nystagmus.      Conjunctiva/sclera: Conjunctivae normal.      Pupils: Pupils are equal, round, and reactive to light.   Neck:      Thyroid: No thyromegaly.      Vascular: No JVD.      Trachea: No tracheal deviation.   Cardiovascular:      Rate and Rhythm: Normal rate and regular rhythm.      Pulses: Normal pulses.      Heart sounds: Normal heart sounds. No murmur heard.  No friction rub. No gallop.    Pulmonary:      Effort: Pulmonary effort is normal. No respiratory distress.      Breath sounds: Normal breath sounds.   Abdominal:      General: Bowel sounds are normal. There is no distension.      Palpations: Abdomen is soft. There is no mass.      Tenderness: There is no abdominal tenderness. There is no guarding or rebound.   Musculoskeletal:         General: No tenderness. Normal range of motion.      Cervical back: Normal range of motion and neck supple. No erythema or rigidity.      Right lower leg: No edema.      Left lower leg: No edema.   Lymphadenopathy:      Cervical: No cervical adenopathy.   Skin:     General: Skin is warm and dry.      Capillary Refill: Capillary refill takes less than 2 seconds.      Coloration: Skin is not pale.      Findings: No erythema or rash.   Neurological:      Mental Status: He is alert and oriented to person, place, and time.      GCS:  GCS eye subscore is 4. GCS verbal subscore is 4. GCS motor subscore is 6.      Cranial Nerves: Dysarthria present. No cranial nerve deficit.      Sensory: No sensory deficit.      Motor: Weakness present.      Comments: Mild left-sided weakness upper and lower extremities.   Psychiatric:         Mood and Affect: Mood and affect normal.         Speech: Speech normal.         Behavior: Behavior normal.         ED Course     At 1:32 PM the patient was seen and examined by Sameer Rivera MD in Room ED09.       Procedures         Critical Care Addendum    My initial assessment, based on my review of prehospital provider report, review of nursing observations, review of vital signs, focused history and physical exam, established that Red Sutherland has a subdural hematoma with neurologic changes and new onset seizures, which requires immediate intervention, and therefore he is critically ill.     After the initial assessment, the care team initiated multiple lab tests, initiated IV fluid administration, initiated medication therapy with 1 g of Keppra and consulted with Neurosurgery and trauma surgery to provide stabilization care. Due to the critical nature of this patient, I reassessed nursing observations, vital signs, physical exam, mental status, neurologic status and respiratory status multiple times prior to his disposition.     Time also spent performing documentation, reviewing test results, discussion with consultants and coordination of care.     Critical care time (excluding teaching time and procedures): 30 minutes.       Labs Ordered and Resulted from Time of ED Arrival to Time of ED Departure   INR - Abnormal       Result Value    INR 1.32 (*)    COMPREHENSIVE METABOLIC PANEL - Abnormal    Sodium 139      Potassium 3.9      Chloride 104      Carbon Dioxide (CO2) 29      Anion Gap 6      Urea Nitrogen 36 (*)     Creatinine 1.25      Calcium 8.8      Glucose 141 (*)     Alkaline Phosphatase 133       AST 22      ALT 17      Protein Total 6.8      Albumin 3.1 (*)     Bilirubin Total 1.5 (*)     GFR Estimate 54 (*)    CBC WITH PLATELETS AND DIFFERENTIAL - Abnormal    WBC Count 9.0      RBC Count 3.93 (*)     Hemoglobin 8.0 (*)     Hematocrit 28.2 (*)     MCV 72 (*)     MCH 20.4 (*)     MCHC 28.4 (*)     RDW 17.9 (*)     Platelet Count 275      % Neutrophils 80      % Lymphocytes 9      % Monocytes 9      % Eosinophils 1      % Basophils 0      % Immature Granulocytes 1      NRBCs per 100 WBC 0      Absolute Neutrophils 7.2      Absolute Lymphocytes 0.8      Absolute Monocytes 0.8      Absolute Eosinophils 0.1      Absolute Basophils 0.0      Absolute Immature Granulocytes 0.1      Absolute NRBCs 0.0     PARTIAL THROMBOPLASTIN TIME - Normal    aPTT 37     TYPE AND SCREEN, ADULT    ABO/RH(D) A POS      Antibody Screen Negative      SPECIMEN EXPIRATION DATE [id number]     PREPARE PHERESED PLATELETS (UNIT)    UNIT ABO/RH A Pos      Unit Number [id number]      Unit Status Issued      Blood Component Type Platelets      Product Code C5746N72      CODING SYSTEM [id number]      UNIT TYPE ISBT 6200      ISSUE DATE AND TIME [id number]     PREPARE PHERESED PLATELETS (UNIT)   ABO/RH TYPE AND SCREEN       Medications   HOLD ace-inhibitors and angiotension-receptor blockers on day of surgery (has no administration in time range)   sodium chloride 0.9% infusion (0 mLs Intravenous Stopped 12/21/21 0923)   labetalol (NORMODYNE/TRANDATE) injection 20 mg ( Intravenous Auto Hold 12/21/21 1136)   hydrALAZINE (APRESOLINE) injection 10 mg ( Intravenous Auto Hold 12/21/21 1136)   glucose gel 15-30 g ( Oral Auto Hold 12/21/21 1136)     Or   dextrose 50 % injection 25-50 mL ( Intravenous Auto Hold 12/21/21 1136)     Or   glucagon injection 1 mg ( Subcutaneous Auto Hold 12/21/21 1136)   acetaminophen (TYLENOL) tablet 650 mg ( Oral Auto Hold 12/21/21 1136)     Or   acetaminophen (TYLENOL) solution 650 mg ( Per NG tube Auto Hold  12/21/21 1136)   ondansetron (ZOFRAN-ODT) ODT tab 4 mg ( Oral Auto Hold 12/21/21 1136)     Or   ondansetron (ZOFRAN) injection 4 mg ( Intravenous Auto Hold 12/21/21 1136)   senna-docusate (SENOKOT-S/PERICOLACE) 8.6-50 MG per tablet 1 tablet ( Oral Auto Hold 12/21/21 1136)     Or   senna-docusate (SENOKOT-S/PERICOLACE) 8.6-50 MG per tablet 2 tablet ( Oral Auto Hold 12/21/21 1136)   albuterol (PROVENTIL HFA/VENTOLIN HFA) inhaler ( Inhalation Auto Hold 12/21/21 1136)   atorvastatin (LIPITOR) tablet 80 mg ( Oral or Feeding Tube Automatically Held 12/24/21 0800)   bumetanide (BUMEX) tablet 4 mg ( Oral or Feeding Tube Automatically Held 12/24/21 0800)   ferrous gluconate (FERGON) tablet 324 mg ( Oral or Feeding Tube Automatically Held 12/25/21 0900)   fluticasone-vilanterol (BREO ELLIPTA) 200-25 MCG/INH inhaler 1 puff ( Inhalation Automatically Held 12/24/21 0800)   insulin aspart (NovoLOG) injection (RAPID ACTING) ( Subcutaneous Automatically Held 12/24/21 1700)   insulin aspart (NovoLOG) injection (RAPID ACTING) ( Subcutaneous Automatically Held 12/26/21 2200)   irbesartan (AVAPRO) tablet 150 mg ( Oral or Feeding Tube Automatically Held 12/24/21 0800)   rOPINIRole (REQUIP) tablet 2 mg ( Oral Automatically Held 12/24/21 2200)   HYDROmorphone (DILAUDID) injection 0.2 mg ( Intravenous Auto Hold 12/21/21 1136)   naloxone (NARCAN) injection 0.2 mg ( Intravenous Auto Hold 12/21/21 1136)     Or   naloxone (NARCAN) injection 0.4 mg ( Intravenous Auto Hold 12/21/21 1136)     Or   naloxone (NARCAN) injection 0.2 mg ( Intramuscular Auto Hold 12/21/21 1136)     Or   naloxone (NARCAN) injection 0.4 mg ( Intramuscular Auto Hold 12/21/21 1136)   levETIRAcetam (KEPPRA) intermittent infusion 1,500 mg ( Intravenous Automatically Held 12/24/21 1800)   tadalafil (ADCIRCA/CIALIS) tablet ( Oral Automatically Held 12/24/21 0830)   lacosamide (VIMPAT) 100 mg in sodium chloride 0.9 % 100 mL intermittent infusion ( Intravenous Automatically Held  12/24/21 2000)   metoprolol tartrate (LOPRESSOR) tablet 25 mg ( Oral Automatically Held 12/24/21 2000)   pantoprazole (PROTONIX) 2 mg/mL suspension 40 mg ( Oral or Feeding Tube Automatically Held 12/24/21 0730)   potassium chloride (KLOR-CON) Packet 40 mEq ( Oral or Feeding Tube Automatically Held 12/24/21 0800)   levETIRAcetam (KEPPRA) intermittent infusion 1,000 mg (0 mg Intravenous Stopped 12/20/21 1550)   sodium chloride 0.9% infusion (0 mLs Intravenous Stopped 12/20/21 1353)   HYDROmorphone (PF) (DILAUDID) injection 0.5 mg (0.5 mg Intravenous Given 12/20/21 1447)   prothrombin 4 factor complex concentrate (KCENTRA) infusion 2,697 Units (2,697 Units Intravenous Given 12/20/21 1654)   ceFAZolin (ANCEF) 1 g vial to attach to  ml bag for ADULT or 50 ml bag for PEDS (0 g Intravenous Stopped 12/20/21 1810)   levETIRAcetam (KEPPRA) intermittent infusion 1,000 mg (1,000 mg Intravenous New Bag 12/21/21 0754)   lacosamide (VIMPAT) 200 mg in sodium chloride 0.9 % 100 mL intermittent infusion (200 mg Intravenous New Bag 12/21/21 1000)             Assessments & Plan (with Medical Decision Making)   This patient presented to the emergency department in transfer from [facility] emergency department.  He had been diagnosed with an acute right sided subdural hematoma which was causing some mass-effect and midline shift.  His speech is somewhat slurred here in the emergency department and he has had seizures secondary to this injury.  Seizures were described as lasting 10 seconds and seemed more consistent with partial seizures.  He does, however, on exam have some evidence of tongue biting on the left suggesting that he may have had a more generalized seizure at some point.  He had received a gram of Keppra at [facility] and after I consulted with neurosurgery we gave him another gram of Keppra.  He he was kept n.p.o. in anticipation of transfer to the operating room.  He remained hemodynamically stable here in the  emergency department without significant blood pressure elevations requiring treatment.  I spoke with trauma surgery and patient will be admitted to their service following craniotomy with decompression. He will be transferred to the OR when the OR is ready.    ---  This part of the medical record was transcribed by Sonu Hernandez, Medical Scribe, from a dictation done by Byron Rivera MD.       I have reviewed the nursing notes.    I have reviewed the findings, diagnosis, plan and need for follow up with the patient.    Current Discharge Medication List          Final diagnoses:   SDH (subdural hematoma) (H)       I, Sonu Hernandez am serving as a trained medical scribe to document services personally performed by Sameer Rivera MD, based on the provider's statements to me.      I, Sameer Rivera MD, was physically present and have reviewed and verified the accuracy of this note documented by Sonu Hernandez.     Sameer Rivera MD  12/20/2021   [facility] EMERGENCY DEPARTMENT     Sameer Rivera MD  12/21/21 1144

## 2021-12-20 NOTE — ED TRIAGE NOTES
Patient is here with a headache, slurred speech, unsteady gait and  that started 4 days ago. Wife noticed that he his speech last night at 1600. Wife also reported chewing on his tongue this morning. When asked to smile he did not follow the nurses instructions. He did fall two weeks ago face first and after that developed the headache with neck pain He was taken off his blood thinners last Wednesday.He was also taken many other medications due to his kidney function-Stage 3 kidney diseas He does have a cardiac and resp issues. He is also having emesis.

## 2021-12-20 NOTE — PROGRESS NOTES
[facility]  Consult note: Trauma Service     Date of Admission:  12/20/2021    Time of Admission/Consult Request (page/call): 12/20    Time of my evaluation: 1430  Consulting services:  Neurosurgery - Emergent consult (within 30 mins): Called by ED    Assessment & Plan     Trauma mechanism: GLF per wife on 12/08  Time/date of injury:unknown  Known Injuries:  Large hyperdense holohemispheric right-sided subdural hematoma which measures 12.9 mm   5.6 mm midline shift to the left  Other diagnoses          # Coagulation Defect: Apixaban PTA medication  # Platelet Defect: PTA Clopidogrel  Plan:  1. NPO pending surgery, SDH evacuation per Neurosurgery, Keppra given  2. Kcentra 25units/kg now  3. Platelets on hold for OR  4. MIVF ordered  5. Hourly neurochecks   Primary Care Physician   Haresh Corona    Chief Complaint   Increased confusion and slurred speech per wife    History is obtained from the patient, electronic health record, emergency department physician and patient's spouse    History of Present Illness   Red Sutherland is a 81 year old male w/ PMH of HFpEF, ischemic cardiomyopathy, Afib, CAD s/p HUNTER, NSTEMI, COPD on home O2 4L, NOVA on CPAP, pulmonary HTN, CKD3, cirrhosis, T2DM who presented as a transfer from [facility] for further evaluation of a subdural hematoma. The patient was unable to provide much history as most of the history was obtained from his wife. Per his wife he fell 12/08.   A few days later complained of a headache which did not improve with Excedrin or icing.   She noticed that he was more confused today and his speech was slurred. She denied any other falls or other signs of trauma. There was concern for seizure as well.  Wife is unsure if he is still taking Apixaban which may have been recently discontinued by one of his providers. He still takes Clopidogrel.    Imaging at [facility] includes a CT head, cervical spine and CXR  with significant finding below:   Large hyperdense holohemispheric right-sided subdural hematoma which measures 12.9 mm in thickness over the posterior right frontal convexity, 12.6 mm over the right parietal convexity and 11.9 mm over the right temporal convexity.    No other obvious injuries identified on exam  GCS 14 (-1 confusion)    Past Medical History    I have reviewed this patient's medical history and updated it with pertinent information if needed.   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 They put him on sildenafil as nitroprusside lowered systemic BP and with that the mean PA dropped from 54 to 49. Negative VQ at [place] Dec 1, 2017.     RLS (restless legs syndrome)      Sleep apnea        Past Surgical History   I have reviewed this patient's surgical history and updated it with pertinent information if needed.  Past Surgical History:   Procedure Laterality Date     BACK SURGERY      lower back     CARDIAC CATHETERIZATION  12/13/2017    Right and left at [place], mean PA 58, PCW 24 with V wave of 35, LVEDP of 18, with Nipride systemic BP, PVR and mean PA all declined     CARDIOVERSION  03/15/2013    for afib     CATARACT EXTRACTION Bilateral      COLONOSCOPY N/A 10/31/2021    Procedure: COLONOSCOPY WITH POLYPECTOMY;  Surgeon: Sheng Sagastume MD;  Location: [facility] OR     CORONARY STENT PLACEMENT       CV CORONARY ANGIOGRAM N/A 10/25/2021    Procedure: Coronary Angiogram;  Surgeon: Otf Knowles MD;  Location: [facility] CATH LAB CV     CV CORONARY LITHOTRIPSY PCI N/A  "10/25/2021    Procedure: CV Coronary Lithotripsy PCI;  Surgeon: Otf Knowles MD;  Location: [facility] CATH LAB CV     CV LEFT HEART CATH N/A 10/25/2021    Procedure: Left Heart Cath;  Surgeon: Otf Knowles MD;  Location: [facility] CATH LAB CV     CV PCI N/A 10/25/2021    Procedure: Percutaneous Coronary Intervention;  Surgeon: Otf Knowles MD;  Location: [facility] LAB CV     ESOPHAGOSCOPY, GASTROSCOPY, DUODENOSCOPY (EGD), COMBINED N/A 10/30/2021    Procedure: ESOPHAGOGASTRODUODENOSCOPY (EGD);  Surgeon: Sheng Sagastume MD;  Location: [facility] OR     IR ABDOMINAL AORTOGRAM  11/16/2012     IR MISCELLANEOUS PROCEDURE  7/20/2001     IR MISCELLANEOUS PROCEDURE  11/16/2012     OTHER SURGICAL HISTORY      mynor     SHOULDER SURGERY      reapir on right shoulder     TOTAL HIP ARTHROPLASTY Left      TOTAL KNEE ARTHROPLASTY Bilateral      WRIST SURGERY Bilateral      ZZHC COLONOSCOPY W/WO BRUSH/WASH N/A 1/14/2021    Procedure: COLONOSCOPY;  Surgeon: Mihai Harris MD;  Location: [facility] OR;  Service: Gastroenterology     Prior to Admission Medications   Prior to Admission Medications   Prescriptions Last Dose Informant Patient Reported? Taking?   ACCU-CHEK JOSE PLUS TEST STRP strips 12/19/2021 at Unknown time  Yes Yes   Sig: [ACCU-CHEK JOSE PLUS TEST STRP STRIPS] see administration instructions.   BD ULTRA-FINE MANISHA PEN NEEDLES 32 gauge x 5/32\" Ndle 12/19/2021 at Unknown time  Yes Yes   Sig: [BD ULTRA-FINE MANISHA PEN NEEDLES 32 GAUGE X 5/32\" NDLE]    Ferrous Gluconate 324 (37.5 Fe) MG TABS 12/19/2021 at Unknown time  Yes Yes   Sig: Take 1 tablet by mouth every other day   Garlic 1000 MG CAPS 12/19/2021 at Unknown time  Yes Yes   Sig: Take 1 capsule by mouth daily   OXYGEN-AIR DELIVERY SYSTEMS MISC 12/19/2021 at Unknown time  Yes Yes   Sig: [OXYGEN-AIR DELIVERY SYSTEMS MISC] Use As Directed.   acetaminophen (TYLENOL) 500 MG tablet 12/19/2021 at Unknown time  Yes Yes   Sig: [ACETAMINOPHEN " (TYLENOL) 500 MG TABLET] Take 1,000 mg by mouth every 6 (six) hours as needed. First line of pain management. Do Not take more than 4,000 mg in 24 hours.   albuterol (PROVENTIL HFA;VENTOLIN HFA) 90 mcg/actuation inhaler 12/19/2021 at Unknown time  Yes Yes   Sig: Inhale 2 puffs into the lungs every 6 hours as needed    apixaban ANTICOAGULANT (ELIQUIS) 5 MG tablet 12/19/2021 at Unknown time  Yes Yes   Sig: Take 5 mg by mouth 2 times daily    atorvastatin (LIPITOR) 80 MG tablet 12/20/2021 at Unknown time  No Yes   Sig: Take 1 tablet (80 mg) by mouth daily   bumetanide (BUMEX) 2 MG tablet 12/19/2021 at Unknown time  Yes Yes   Sig: Take 2 tablets (4 mg) by mouth every morning And 1 1/2 tablets in the evening   canagliflozin (INVOKANA) 100 mg Tab 12/19/2021 at Unknown time  Yes Yes   Sig: Take 100 mg by mouth every morning    clopidogrel (PLAVIX) 75 MG tablet 12/19/2021 at Unknown time  Yes Yes   Sig: Take 75 mg by mouth daily    fluticasone-vilanterol (BREO ELLIPTA) 200-25 mcg/dose DsDv inhaler 12/19/2021 at Unknown time  Yes Yes   Sig: Inhale 1 puff into the lungs daily    insulin glargine (LANTUS PEN) 100 UNIT/ML pen 12/19/2021 at Unknown time  Yes Yes   Sig: Inject 18 Units Subcutaneous 2 times daily   ipratropium - albuterol 0.5 mg/2.5 mg/3 mL (DUONEB) 0.5-2.5 (3) MG/3ML neb solution 12/19/2021 at Unknown time  Yes Yes   Sig: Take 1 vial by nebulization 4 times daily as needed for shortness of breath / dyspnea    irbesartan (AVAPRO) 300 MG tablet 12/19/2021 at Unknown time  Yes Yes   Sig: Take 0.5 tablets (150 mg) by mouth daily   lidocaine (LIDODERM) 5 % 12/19/2021 at Unknown time  Yes Yes   Sig: Place 1 patch onto the skin daily as needed    metoprolol tartrate (LOPRESSOR) 25 MG tablet 12/19/2021 at Unknown time  No Yes   Sig: Take 1 tablet (25 mg) by mouth 2 times daily (with meals)   multivitamin w/minerals (THERA-VIT-M) tablet 12/19/2021 at Unknown time  Yes Yes   Sig: Take 1 tablet by mouth daily   omeprazole  (PRILOSEC) 20 MG DR capsule 12/19/2021 at Unknown time  Yes Yes   Sig: Take 20 mg by mouth daily   potassium chloride (K-DUR,KLOR-CON) 20 MEQ tablet 12/19/2021 at Unknown time  Yes Yes   Sig: Take 40 mEq by mouth daily    rOPINIRole (REQUIP) 2 MG tablet 12/19/2021 at Unknown time  Yes Yes   Sig: [ROPINIROLE (REQUIP) 2 MG TABLET] Take 2 mg by mouth at bedtime.    tadalafil (CIALIS) 20 MG tablet 12/19/2021 at Unknown time  Yes Yes   Sig: Take 20 mg by mouth daily    vitamin C (ASCORBIC ACID) 1000 MG TABS 12/19/2021 at Unknown time  Yes Yes   Sig: Take 1,000 mg by mouth daily   zinc gluconate 50 MG tablet 12/19/2021 at Unknown time  Yes Yes   Sig: Take 50 mg by mouth daily   zolpidem (AMBIEN) 10 mg tablet 12/19/2021 at Unknown time  Yes Yes   Sig: Take 5 mg by mouth nightly as needed for sleep       Facility-Administered Medications: None     Allergies   Allergies   Allergen Reactions     Contrast Dye Nausea     Other reaction(s): GI intolerance, GI Upset     Furosemide Muscle Pain (Myalgia)     Previously tolerated.  Muscle cramps     Hydrochlorothiazide Unknown     Iodinated Contrast Media [Diagnostic X-Ray Materials] Nausea     Losartan Other (See Comments)     Other reaction(s): Stomatitis, Bloody nose dry mouth and lips     Losartan-Hydrochlorothiazide [Hyzaar]      Mouth sores     Metaxalone Nausea     Metolazone Other (See Comments)     Muscle cramps     Mometasone Other (See Comments)     Bloody nose     Penicillins Other (See Comments)     Immune-does not work for him     Rabeprazole Other (See Comments)     Mouth sores     Ramipril Other (See Comments)     Mouth sores     Shellfish Containing Products [Shellfish-Derived Products] Unknown     Other reaction(s): mouth sores, Other reaction(s): mouth sores     Sildenafil Muscle Pain (Myalgia)     Methocarbamol Rash       Social History   Social History     Socioeconomic History     Marital status:      Spouse name: Not on file     Number of children: 4      Years of education: Not on file     Highest education level: Not on file   Occupational History     Not on file   Tobacco Use     Smoking status: Former Smoker     Packs/day: 1.50     Years: 44.00     Pack years: 66.00     Types: Cigarettes     Quit date: 1996     Years since quittin.6     Smokeless tobacco: Never Used   Substance and Sexual Activity     Alcohol use: Yes     Alcohol/week: 1.0 standard drink     Comment: Alcoholic Drinks/day: 1 per month     Drug use: No     Sexual activity: Not Currently     Partners: Female   Other Topics Concern     Not on file   Social History Narrative     Not on file     Social Determinants of Health     Financial Resource Strain: Not on file   Food Insecurity: Not on file   Transportation Needs: Not on file   Physical Activity: Not on file   Stress: Not on file   Social Connections: Not on file   Intimate Partner Violence: Not on file   Housing Stability: Not on file       Family History   I have reviewed this patient's family history and updated it with pertinent information if needed.   Family History   Problem Relation Age of Onset     Hyperlipidemia Mother      Hypertension Mother      Heart Disease Mother      Hyperlipidemia Father      Hypertension Father      Coronary Artery Disease Father      Cerebrovascular Disease Brother      Depression Brother      No Known Problems Sister      Pulmonary Hypertension No family hx of      Congenital heart disease No family hx of        Review of Systems   CONSTITUTIONAL: No fever, chills, sweats, fatigue   EYES: no visual blurring, no double vision or visual loss  ENT: no decrease in hearing, no tinnitus, no vertigo, no hoarseness  RESPIRATORY: no shortness of breath, no cough, no sputum   CARDIOVASCULAR: no palpitations, no chest  pain, no exertional chest pain or pressure  GASTROINTESTINAL: no nausea or vomiting, or abd pain  GENITOURINARY: no dysuria, no frequency or hesitancy, no hematuria  MUSCULOSKELETAL: no  weakness, no redness, no swelling, no joint pain,   SKIN: no rashes, ecchymoses, abrasions or lacerations  NEUROLOGIC: no numbness or tingling of hands, no numbness or tingling  of feet, no syncope, no tremors or weakness  PSYCHIATRIC: no sleep disturbances, no anxiety or depression    Physical Exam       BP: 137/82 Pulse: 79   Resp: 16 SpO2: 100 % O2 Device: None (Room air)    Vital Signs with Ranges  Temp:  [98  F (36.7  C)] 98  F (36.7  C)  Pulse:  [79-86] 79  Resp:  [16-32] 16  BP: (137-155)/(56-82) 137/82  SpO2:  [97 %-100 %] 100 % 0 lbs 0 oz    Primary Survey:  Airway: patient talking  Breathing: symmetric respiratory effort bilaterally  Circulation: central pulses present and peripheral pulses present  Disability: Pupils - left 4 mm and brisk, right 4 mm and brisk   [place] Coma Scale - Total 14/15  Eye Response (E): 4  4= spontaneous,  3= to verbal/voice, 2=  to pain, 1= No response   Verbal Response (V): 4   5= Orientated, converses,  4= Confused, converses, 3= Inappropriate words,  2= Incomprehensible sounds,  1=No response   Motor Response (M): 6   6= Obeys commands, 5= Localizes to pain, 4= Withdrawal to pain, 3=Fexion to pain, 2= Extension to pain, 1= No response    Secondary Survey:  General: alert, oriented to person, place, time  Neuro: PERRLA. EOMI. CN II-XII grossly intact. Left arm 4/5 strength(weaker) with left arm drift. Sensation intact, slurred speech  Head: atraumatic, normocephalic, trachea midline  Eyes:  Left pupil is irregular (prior cataract surgery) right pupil 2mm  Nose: nares patent, no drainage, nasal septum non-tender  Mouth/Throat: no exudates or erythema,  no dental tenderness or malocclusions, no tongue lacerations  Neck:  No cervical collar present. No midline posterior tenderness, full AROM without pain.   Chest/Pulmonary: normal respiratory rate and rhythm,  bilateral clear breath sounds, no wheezes, rales or rhonchi, no chest wall tenderness or deformities,   Cardiovascular:  S1, S2,  normal and regular rate and rhythm, no murmurs  Abdomen: soft, non-tender, no guarding, no rebound tenderness and no tenderness to palpation  : normal external genitalia, pelvis stable to lateral compression,  no oh, no urine assess/urine yellow and clear  Musculoskel/Extremities: normal extremities, full AROM of major joints without tenderness, edema, erythema, ecchymosis, or abrasions. +2 PP. Mild dependent edema.   Back/Spine: no deformity, no midline tenderness, no sacral tenderness, no step-offs and no abrasions or contusions  Hands: no gross deformities of hands or fingers. Full AROM of hand and fingers in flexion and extension.  strength equal and symmetric.   Psychiatric: affect/mood normal, cooperative, normal judgement/insight and memory intact  Skin: no rashes, laceration, ecchymosis, skin warm and dry.     Results for orders placed or performed during the hospital encounter of 12/20/21 (from the past 24 hour(s))   CBC with platelets differential    Narrative    The following orders were created for panel order CBC with platelets differential.  Procedure                               Abnormality         Status                     ---------                               -----------         ------                     CBC with platelets and d...[[id number]]  Abnormal            Final result                 Please view results for these tests on the individual orders.   INR   Result Value Ref Range    INR 1.32 (H) 0.85 - 1.15   Partial thromboplastin time   Result Value Ref Range    aPTT 37 22 - 38 Seconds   Comprehensive metabolic panel   Result Value Ref Range    Sodium 139 133 - 144 mmol/L    Potassium 3.9 3.4 - 5.3 mmol/L    Chloride 104 94 - 109 mmol/L    Carbon Dioxide (CO2) 29 20 - 32 mmol/L    Anion Gap 6 3 - 14 mmol/L    Urea Nitrogen 36 (H) 7 - 30 mg/dL    Creatinine 1.25 0.66 - 1.25 mg/dL    Calcium 8.8 8.5 - 10.1 mg/dL    Glucose 141 (H) 70 - 99 mg/dL    Alkaline Phosphatase 133  40 - 150 U/L    AST 22 0 - 45 U/L    ALT 17 0 - 70 U/L    Protein Total 6.8 6.8 - 8.8 g/dL    Albumin 3.1 (L) 3.4 - 5.0 g/dL    Bilirubin Total 1.5 (H) 0.2 - 1.3 mg/dL    GFR Estimate 54 (L) >60 mL/min/1.73m2   CBC with platelets and differential   Result Value Ref Range    WBC Count 9.0 4.0 - 11.0 10e3/uL    RBC Count 3.93 (L) 4.40 - 5.90 10e6/uL    Hemoglobin 8.0 (L) 13.3 - 17.7 g/dL    Hematocrit 28.2 (L) 40.0 - 53.0 %    MCV 72 (L) 78 - 100 fL    MCH 20.4 (L) 26.5 - 33.0 pg    MCHC 28.4 (L) 31.5 - 36.5 g/dL    RDW 17.9 (H) 10.0 - 15.0 %    Platelet Count 275 150 - 450 10e3/uL    % Neutrophils 80 %    % Lymphocytes 9 %    % Monocytes 9 %    % Eosinophils 1 %    % Basophils 0 %    % Immature Granulocytes 1 %    NRBCs per 100 WBC 0 <1 /100    Absolute Neutrophils 7.2 1.6 - 8.3 10e3/uL    Absolute Lymphocytes 0.8 0.8 - 5.3 10e3/uL    Absolute Monocytes 0.8 0.0 - 1.3 10e3/uL    Absolute Eosinophils 0.1 0.0 - 0.7 10e3/uL    Absolute Basophils 0.0 0.0 - 0.2 10e3/uL    Absolute Immature Granulocytes 0.1 <=0.4 10e3/uL    Absolute NRBCs 0.0 10e3/uL   ABO/Rh type and screen    Narrative    The following orders were created for panel order ABO/Rh type and screen.  Procedure                               Abnormality         Status                     ---------                               -----------         ------                     Adult Type and Screen[[id number]]                            Final result                 Please view results for these tests on the individual orders.   Adult Type and Screen   Result Value Ref Range    ABO/RH(D) A POS     Antibody Screen Negative Negative    SPECIMEN EXPIRATION DATE [id number]        Studies:  No orders to display       Salvador Loza CNP  Job code pager [phone] (24 hours a day)

## 2021-12-20 NOTE — ED NOTES
"[facility] paged regarding platelet order: ED room 9: NORAH Sutherland: platelet transfusion order is not letting me \"release\" the platelets- anyway you can modify/ correct this? -ana dyer ##[id number]    1620: awaiting orders at this time    1623: Orders placed via [facility] team  "

## 2021-12-20 NOTE — CONSULTS
[facility]       NEUROSURGERY CONSULTATION NOTE    This consultation was requested by Dr. Rivera from the ED service.    Reason for Consultation: Acute right subdural hematoma    HPI:    Red Sutherland is a 81-year-old male with a past medical history significant for pulmonary hypertension, hypertension, atrial fibrillation: And?  Currently anticoagulated on Plavix and Eliquis, CAD, COPD, CHF, stage IV CKD, type 2 diabetes mellitus.  As per the patient's wife the patient had an unwitnessed fall around 12 days back when he hit his head against the carpeted floor.  Around 5 to 6 days back the patient's wife noted that the patient was complaining of headache which did not resolve despite Excedrin and Tylenol.  The patient had a very severe headache this morning so the patient was brought to the [facility]. On imaging it was found that the patient has an acute right-sided subdural hematoma for the management of which she was transferred to [facility].    No recent fevers, chills, nausea, vomiting, chest pain, shortness of breath, and denies LOC, numbness/weakness/paresthesias in extremities, changes in sensation, taste, smell, nor trouble speaking or other neurologic symptoms.    PAST MEDICAL HISTORY:   Past Medical History:   Diagnosis Date     Atrial fibrillation (H)      CHF (congestive heart failure) (H)      COPD (chronic obstructive pulmonary disease) (H)      Coronary artery disease      Diabetes mellitus (H)      Essential hypertension      Hyperlipidemia      Pulmonary hypertension (H)     O2 at night     Pulmonary hypertension due to left ventricular diastolic dysfunction (H) 11/28/2017    Multifactorial per [place] with elevated LVEDP and PCW, COPD and NOVA. They put him on sildenafil as nitroprusside lowered systemic BP and with that the mean PA dropped from 54 to 49. Negative VQ at [place] Dec 1, 2017.     RLS (restless legs  syndrome)      Sleep apnea        PAST SURGICAL HISTORY:   Past Surgical History:   Procedure Laterality Date     BACK SURGERY      lower back     CARDIAC CATHETERIZATION  12/13/2017    Right and left at [place], mean PA 58, PCW 24 with V wave of 35, LVEDP of 18, with Nipride systemic BP, PVR and mean PA all declined     CARDIOVERSION  03/15/2013    for afib     CATARACT EXTRACTION Bilateral      COLONOSCOPY N/A 10/31/2021    Procedure: COLONOSCOPY WITH POLYPECTOMY;  Surgeon: Sheng Sagastume MD;  [facility]     CORONARY STENT PLACEMENT       CV CORONARY ANGIOGRAM N/A 10/25/2021    Procedure: Coronary Angiogram;  Surgeon: Otf Knowles MD;  Location: [facility] CATH LAB CV     CV CORONARY LITHOTRIPSY PCI N/A 10/25/2021    Procedure: CV Coronary Lithotripsy PCI;  Surgeon: Otf Knowles MD;  Location: [facility] CATH LAB CV     CV LEFT HEART CATH N/A 10/25/2021    Procedure: Left Heart Cath;  Surgeon: Otf Knowles MD;  Location: [facility] CATH LAB CV     CV PCI N/A 10/25/2021    Procedure: Percutaneous Coronary Intervention;  Surgeon: Otf Knowels MD;  Location: [facility] CATH LAB CV     ESOPHAGOSCOPY, GASTROSCOPY, DUODENOSCOPY (EGD), COMBINED N/A 10/30/2021    Procedure: ESOPHAGOGASTRODUODENOSCOPY (EGD);  Surgeon: Sheng Sagastume MD;  Location: [facility] Main OR     IR ABDOMINAL AORTOGRAM  11/16/2012     IR MISCELLANEOUS PROCEDURE  7/20/2001     IR MISCELLANEOUS PROCEDURE  11/16/2012     OTHER SURGICAL HISTORY      [place]     SHOULDER SURGERY      reapir on right shoulder     TOTAL HIP ARTHROPLASTY Left      TOTAL KNEE ARTHROPLASTY Bilateral      WRIST SURGERY Bilateral      ZZHC COLONOSCOPY W/WO BRUSH/WASH N/A 1/14/2021    Procedure: COLONOSCOPY;  Surgeon: Mihai Harris MD;  [facility] Main [facility];  Service: Gastroenterology       FAMILY HISTORY:   Family History   Problem Relation Age of Onset     Hyperlipidemia Mother      Hypertension Mother      Heart Disease Mother       "Hyperlipidemia Father      Hypertension Father      Coronary Artery Disease Father      Cerebrovascular Disease Brother      Depression Brother      No Known Problems Sister      Pulmonary Hypertension No family hx of      Congenital heart disease No family hx of        SOCIAL HISTORY:   Social History     Tobacco Use     Smoking status: Former Smoker     Packs/day: 1.50     Years: 44.00     Pack years: 66.00     Types: Cigarettes     Quit date: 1996     Years since quittin.6     Smokeless tobacco: Never Used   Substance Use Topics     Alcohol use: Yes     Alcohol/week: 1.0 standard drink     Comment: Alcoholic Drinks/day: 1 per month       MEDICATIONS:  Current Outpatient Medications   Medication Sig Dispense Refill     ACCU-CHEK JOSE PLUS TEST STRP strips [ACCU-CHEK JOSE PLUS TEST STRP STRIPS] see administration instructions.       acetaminophen (TYLENOL) 500 MG tablet [ACETAMINOPHEN (TYLENOL) 500 MG TABLET] Take 1,000 mg by mouth every 6 (six) hours as needed. First line of pain management. Do Not take more than 4,000 mg in 24 hours.       albuterol (PROVENTIL HFA;VENTOLIN HFA) 90 mcg/actuation inhaler Inhale 2 puffs into the lungs every 6 hours as needed        apixaban ANTICOAGULANT (ELIQUIS) 5 MG tablet Take 5 mg by mouth 2 times daily        atorvastatin (LIPITOR) 80 MG tablet Take 1 tablet (80 mg) by mouth daily 30 tablet 0     BD ULTRA-FINE MANISHA PEN NEEDLES 32 gauge x 5/32\" Ndle [BD ULTRA-FINE MANISHA PEN NEEDLES 32 GAUGE X 5/32\" NDLE]   4     bumetanide (BUMEX) 2 MG tablet Take 2 tablets (4 mg) by mouth every morning And 1 1/2 tablets in the evening       canagliflozin (INVOKANA) 100 mg Tab Take 100 mg by mouth every morning        clopidogrel (PLAVIX) 75 MG tablet Take 75 mg by mouth daily        Ferrous Gluconate 324 (37.5 Fe) MG TABS Take 1 tablet by mouth every other day       fluticasone-vilanterol (BREO ELLIPTA) 200-25 mcg/dose DsDv inhaler Inhale 1 puff into the lungs daily        Garlic " 1000 MG CAPS Take 1 capsule by mouth daily       insulin glargine (LANTUS PEN) 100 UNIT/ML pen Inject 18 Units Subcutaneous 2 times daily       ipratropium - albuterol 0.5 mg/2.5 mg/3 mL (DUONEB) 0.5-2.5 (3) MG/3ML neb solution Take 1 vial by nebulization 4 times daily as needed for shortness of breath / dyspnea        irbesartan (AVAPRO) 300 MG tablet Take 0.5 tablets (150 mg) by mouth daily       lidocaine (LIDODERM) 5 % Place 1 patch onto the skin daily as needed        metoprolol tartrate (LOPRESSOR) 25 MG tablet Take 1 tablet (25 mg) by mouth 2 times daily (with meals) 60 tablet 3     multivitamin w/minerals (THERA-VIT-M) tablet Take 1 tablet by mouth daily       omeprazole (PRILOSEC) 20 MG DR capsule Take 20 mg by mouth daily       OXYGEN-AIR DELIVERY SYSTEMS MISC [OXYGEN-AIR DELIVERY SYSTEMS MISC] Use As Directed.       potassium chloride (K-DUR,KLOR-CON) 20 MEQ tablet Take 40 mEq by mouth daily        rOPINIRole (REQUIP) 2 MG tablet [ROPINIROLE (REQUIP) 2 MG TABLET] Take 2 mg by mouth at bedtime.        tadalafil (CIALIS) 20 MG tablet Take 20 mg by mouth daily        vitamin C (ASCORBIC ACID) 1000 MG TABS Take 1,000 mg by mouth daily       zinc gluconate 50 MG tablet Take 50 mg by mouth daily       zolpidem (AMBIEN) 10 mg tablet Take 5 mg by mouth nightly as needed for sleep          Allergies:  Allergies   Allergen Reactions     Contrast Dye Nausea     Other reaction(s): GI intolerance, GI Upset     Furosemide Muscle Pain (Myalgia)     Previously tolerated.  Muscle cramps     Hydrochlorothiazide Unknown     Iodinated Contrast Media [Diagnostic X-Ray Materials] Nausea     Losartan Other (See Comments)     Other reaction(s): Stomatitis, Bloody nose dry mouth and lips     Losartan-Hydrochlorothiazide [Hyzaar]      Mouth sores     Metaxalone Nausea     Metolazone Other (See Comments)     Muscle cramps     Mometasone Other (See Comments)     Bloody nose     Penicillins Other (See Comments)     Immune-does not  work for him     Rabeprazole Other (See Comments)     Mouth sores     Ramipril Other (See Comments)     Mouth sores     Shellfish Containing Products [Shellfish-Derived Products] Unknown     Other reaction(s): mouth sores, Other reaction(s): mouth sores     Sildenafil Muscle Pain (Myalgia)     Methocarbamol Rash       ROS: 10 point ROS of systems including Constitutional, Eyes, Respiratory, Cardiovascular, Gastroenterology, Genitourinary, Integumentary, Muscularskeletal, Psychiatric were all negative except for pertinent positives noted in my HPI.    Physical exam:   Blood pressure 134/83, pulse 80, resp. rate 20, SpO2 95 %.  General: awake and alert  PULM: breathing comfortably on room air  NEUROLOGIC:  -- Awake; Alert; oriented x 2-3  -- face symmetrical, tongue midline    Motor:  Normal bulk / tone; no tremor, rigidity, or bradykinesia.  No muscle wasting or fasciculations    Pronator Drift LUE     Delt Bi Tri Hand Flexion/  Extension Iliopsoas Quadriceps Hamstrings Tibialis Anterior Gastroc    C5 C6 C7 C8/T1 L2 L3 L4-S1 L4 S1   R 5 5 5 5 5 5 5 5 5   L 3 4- 4- 4- 4+ 4+ 4 4 4     Reflexes:       Bi Tri BR Maria Antonia Pat Ach Bab     C5-6 C7-8 C6 UMN L2-4 S1 UMN   R 2+ 2+ 2+ Norm 2+ 2+ Norm   L 2+ 2+ 2+ Norm 2+ 2+ Norm      Gait: deferred    IMAGING:    HEAD CT:  1.  Large hyperdense holohemispheric right-sided subdural hematoma which measures 12.9 mm in thickness over the posterior right frontal convexity, 12.6 mm over the right parietal convexity and 11.9 mm over the right temporal convexity.  2.  It does cause mass effect on the adjacent brain parenchyma. Partial effacement of the right lateral ventricle and the third ventricle. 5.6 mm midline shift to the left at the level of the lateral ventricles. Partial effacement of the suprasellar   cisterns.      CERVICAL SPINE CT:  1.  Moderately motion degraded exam.  2.  No fractures or posttraumatic subluxations within the limitations of this exam.      LABS:   Last  Comprehensive Metabolic Panel:  Sodium   Date Value Ref Range Status   12/20/2021 139 133 - 144 mmol/L Final     Potassium   Date Value Ref Range Status   12/20/2021 3.9 3.4 - 5.3 mmol/L Final     Chloride   Date Value Ref Range Status   12/20/2021 104 94 - 109 mmol/L Final     Carbon Dioxide (CO2)   Date Value Ref Range Status   12/20/2021 29 20 - 32 mmol/L Final     Anion Gap   Date Value Ref Range Status   12/20/2021 6 3 - 14 mmol/L Final     Glucose   Date Value Ref Range Status   12/20/2021 141 (H) 70 - 99 mg/dL Final     Urea Nitrogen   Date Value Ref Range Status   12/20/2021 36 (H) 7 - 30 mg/dL Final     Creatinine   Date Value Ref Range Status   12/20/2021 1.25 0.66 - 1.25 mg/dL Final     GFR Estimate   Date Value Ref Range Status   12/20/2021 54 (L) >60 mL/min/1.73m2 Final     Comment:     As of July 11, 2021, eGFR is calculated by the CKD-EPI creatinine equation, without race adjustment. eGFR can be influenced by muscle mass, exercise, and diet. The reported eGFR is an estimation only and is only applicable if the renal function is stable.   05/25/2021 34 (L) >60 mL/min/1.73m2 Final     Calcium   Date Value Ref Range Status   12/20/2021 8.8 8.5 - 10.1 mg/dL Final     Lab Results   Component Value Date    WBC 9.0 12/20/2021     Lab Results   Component Value Date    RBC 3.93 12/20/2021     Lab Results   Component Value Date    HGB 8.0 12/20/2021     Lab Results   Component Value Date    HCT 28.2 12/20/2021     Lab Results   Component Value Date    MCV 72 12/20/2021     Lab Results   Component Value Date    MCH 20.4 12/20/2021     Lab Results   Component Value Date    MCHC 28.4 12/20/2021     Lab Results   Component Value Date    RDW 17.9 12/20/2021     Lab Results   Component Value Date     12/20/2021     INR   Date Value Ref Range Status   12/20/2021 1.32 (H) 0.85 - 1.15 Final      aPTT   Date Value Ref Range Status   12/20/2021 37 22 - 38 Seconds Final      ASSESSMENT:  Red Sutherland is a  81-year-old male post-traumatic acute right subdural hematoma with weakness in left hemibody weakness with left pronator drift.    In addition to the assessment of diagnoses detailed above, this 81 year old male  patient admitted from the Emergency Department has the following conditions contributing to the complexity of their medical care:    Brain compression which was evident on the CT/MRI.,  Congestive heart failure, Coronary artery disease and Arrhythmia including atrial fibrillation,  Coagulopathy based on pre-admission Eliquis + Plavix use,  Type II diabetes,  COPD, stage IV CKD    Plan:  - OR Emergent for right craniotomy for hematoma evacuation  - NPO  - Informed consent and site marking  - Keppra 2 gm stat (already received 1 gm at outside hospital)  - SBP < 140  - Continuous cardiac monitoring while in ICU  - Continuous pulse oximetry  - Supplemental oxygen PRN  - Normonatremia   - Glucose < 180 with sub.q. insulin  - Platelets > 100,000  - INR < 1.5  - Hemoglobin > 8    Sean Tatumwan  Neurosurgery Resident    The patient was discussed with Dr. Quintana, neurosurgery chief resident, and Dr. Quinones, neurosurgery staff.

## 2021-12-20 NOTE — ED PROVIDER NOTES
EMERGENCY DEPARTMENT ENCOUNTER      NAME: Red Sutherland  AGE: 81 year old male  YOB: 1940  MRN: [id number]  EVALUATION DATE & TIME: 12/20/2021 10:10 AM    PCP: Haresh Corona    ED PROVIDER: Richard Fuller M.D.      Chief Complaint   Patient presents with     Headache     Slurred Speech     Gait Problem         FINAL IMPRESSION:  1. Subdural hematoma (H)    2. Seizures (H)    3. Fall, initial encounter          ED COURSE & MEDICAL DECISION MAKING:      10:25 AM I met with the patient for the initial interview and physical examination. Discussed plan for treatment and workup in the ED.    11:18 AM Consulted [facility].   11:32 AM Rechecked and updated the patient. Discussed imaging results.   11:51 AM Consulted Dr. Quinones with [facility] neurosurgery. I also spoke with ER doctor at [facility] ED for admission and transfer.       Pertinent Labs & Imaging studies reviewed. (See chart for details)  81 year old male presents to the Emergency Department for evaluation of headache, slurred speech. Patient appears non toxic with stable vitals signs, patient is afebrile with no tachycardia or hypoxia, no increased work of breathing.  Patient has a GCS of 15, no obvious facial droop but speech is certainly slurred and garbled, he has no word finding difficulty and is able to repeat sentences back to me, is able to name items.  Rest of the exam is relatively benign with no focal weakness.  Per report patient was recently taken off of medications for anticoagulation secondary to kidney issues.  States he is currently not on any medications for anticoagulation.  Again fall happened about 2 weeks ago headache was about 4 days ago and slurred speech was last night.  Given timeline certainly not a candidate for emergent TPA and no indication for emergent stroke code at this time.  Patient does have contrast dye allergy.  We will initiate work-up with screening labs and CT imaging of the  "head and C-spine.  Patient was offered but deferred pain medications.    Reassessment: Please see critical care note below    At the conclusion of the encounter I discussed the results of all of the tests and the disposition. The questions were answered and return precautions provided. The patient or family acknowledged understanding and was agreeable with the care plan.         MEDICATIONS GIVEN IN THE EMERGENCY:  Medications   levETIRAcetam (KEPPRA) 1,000 mg in sodium chloride 0.9 % 100 mL intermittent infusion (1,000 mg Intravenous New Bag 12/20/21 1215)       NEW PRESCRIPTIONS STARTED AT TODAY'S ER VISIT  Discharge Medication List as of 12/20/2021 12:40 PM        =================================================================    HPI    Patient information was obtained from: Patient and patient's wife    Use of Intrepreter: N/A         Red Sutherland is a 81 year old male with a pertinent medical history of CAD, CHF, hypertension, COPD, NSTEMI, PVD, atrial fibrillation, chronic respiratory failure, anticoagulation, CKD stage 4, chronic bronchitis, and type II diabetes mellitus who presents to the ED via walk-in for evaluation of difference in speech and headaches.    Per patient's wife, patient fell face first into the carpet in their bedroom about 2 weeks ago. 2 days after, patient complained of neck pain and visited his PCP who patient's wife states \"didn't say anything about it\". Patient's wife states that patient has also had a cough and began complaining of a headache on Thursday (12/16) that has been constant since. Patient's wife reports noticing around [id number] last night that the patient was speaking differently and was very sleepy. She then notes that he was acting very \"different\" this morning and began chewing on his tongue about 1 hour prior to arrival, prompting their visit to the ED. Patient was taken off of blood thinners last Wednesday (12/15).     Patient reports \"feeling crappy\" with " abdominal pain and a headache in the back of his head. He notes that when he has eaten or drank recently he has thrown up after. Patient denies chest pain, fever, shortness of breath, and any other symptoms or complaints at this time.     Per chart review,   11/30/21-12/6/21 patient was admitted to [facility] for dyspnea with presentation consistent with CHF exacerbation.  Patient presented with dyspnea, peripheral edema, anasarca, hydrocele, increased hypoxia above baseline. Chest x-ray revealing pulmonary vascular congestion and mild interstitial edema. Abdominal CT noted cirrhosis with interval development of ascites though LFTs were normal, INR of 1.3 on DOAC.  Received IV Bumex, peripheral edema resolved. Hydrocele improved. Cardiology recommended patient to start Bumex 4 mg twice daily and metolazone 2.5 mg Monday, Wednesday, Friday. Paracentesis supportive of cardiac etiology of ascites, no evidence of SBP.        REVIEW OF SYSTEMS   Constitutional:  Denies fever, chills  Positive for fatigue  Respiratory:  Denies increased work of breathing  Positive for cough  Cardiovascular:  Denies chest pain, palpitations  GI:  Denies abdominal pain, nausea, or change in bowel or bladder habits   Positive for vomiting  Musculoskeletal:  Denies any new muscle/joint swelling  Positive for neck pain  Skin:  Denies rash   Neurologic:  Denies focal weakness  Positive for headaches, speech change  All systems negative except as marked.     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 They put him on sildenafil as nitroprusside lowered systemic BP and with that the mean PA dropped from 54 to 49. Negative VQ at [place] Dec 1, 2017.     RLS (restless legs syndrome)      Sleep apnea        PAST SURGICAL HISTORY:  Past Surgical History:   Procedure Laterality Date     BACK SURGERY      lower back     CARDIAC CATHETERIZATION  12/13/2017    Right and left at [place], mean PA 58, PCW 24 with V wave of 35, LVEDP of 18, with Nipride systemic BP, PVR and mean PA all declined     CARDIOVERSION  03/15/2013    for afib     CATARACT EXTRACTION Bilateral      COLONOSCOPY N/A 10/31/2021    Procedure: COLONOSCOPY WITH POLYPECTOMY;  Surgeon: Sheng Sagastume MD;  Location[facility] OR     CORONARY STENT PLACEMENT       CV CORONARY ANGIOGRAM N/A 10/25/2021    Procedure: Coronary Angiogram;  Surgeon: Otf Knowles MD;  Location: [facility] CATH LAB CV     CV CORONARY LITHOTRIPSY PCI N/A 10/25/2021    Procedure: CV Coronary Lithotripsy PCI;  Surgeon: Otf Knowles MD;  Location: [facility] LAB CV     CV LEFT HEART CATH N/A 10/25/2021    Procedure: Left Heart Cath;  Surgeon: Otf Knowles MD;  Location: [facility] LAB CV     CV PCI N/A 10/25/2021    Procedure: Percutaneous Coronary Intervention;  Surgeon: Otf Knowles MD;  Location: [facility] LAB CV     ESOPHAGOSCOPY, GASTROSCOPY, DUODENOSCOPY (EGD), COMBINED N/A 10/30/2021    Procedure: ESOPHAGOGASTRODUODENOSCOPY (EGD);  Surgeon: Sheng Sagastume MD;  [facility] OR     IR ABDOMINAL AORTOGRAM  11/16/2012     IR MISCELLANEOUS PROCEDURE  7/20/2001     IR MISCELLANEOUS PROCEDURE  11/16/2012     OTHER SURGICAL HISTORY      m[place]     SHOULDER SURGERY      reapir on right shoulder     TOTAL HIP ARTHROPLASTY Left      TOTAL KNEE ARTHROPLASTY Bilateral      WRIST SURGERY Bilateral      ZZHC COLONOSCOPY W/WO BRUSH/WASH N/A 1/14/2021    Procedure: COLONOSCOPY;  Surgeon: Mihai Harris MD;  Location: [facility] OR;   "Service: Gastroenterology         CURRENT MEDICATIONS:    Prior to Admission medications    Medication Sig Start Date End Date Taking? Authorizing Provider   ACCU-CHEK JOSE PLUS TEST STRP strips [ACCU-CHEK JOSE PLUS TEST STRP STRIPS] see administration instructions. 1/20/21   Provider, Historical   acetaminophen (TYLENOL) 500 MG tablet [ACETAMINOPHEN (TYLENOL) 500 MG TABLET] Take 1,000 mg by mouth every 6 (six) hours as needed. First line of pain management. Do Not take more than 4,000 mg in 24 hours. 2/4/21   Provider, Historical   albuterol (PROVENTIL HFA;VENTOLIN HFA) 90 mcg/actuation inhaler Inhale 2 puffs into the lungs every 6 hours as needed  1/11/15   Provider, Historical   apixaban ANTICOAGULANT (ELIQUIS) 5 MG tablet Take 5 mg by mouth 2 times daily  11/18/21   Unknown, Entered By History   atorvastatin (LIPITOR) 80 MG tablet Take 1 tablet (80 mg) by mouth daily 12/6/21   Deirdre Valdovinos MD   BD ULTRA-FINE MANISHA PEN NEEDLES 32 gauge x 5/32\" Ndle [BD ULTRA-FINE MANISHA PEN NEEDLES 32 GAUGE X 5/32\" NDLE]  1/11/17   Provider, Historical   bumetanide (BUMEX) 2 MG tablet Take 2 tablets (4 mg) by mouth every morning And 1 1/2 tablets in the evening 12/17/21   Meg Roth MD   canagliflozin (INVOKANA) 100 mg Tab Take 100 mg by mouth every morning  5/21/20   Provider, Historical   clopidogrel (PLAVIX) 75 MG tablet Take 75 mg by mouth daily  11/19/21   Unknown, Entered By History   Ferrous Gluconate 324 (37.5 Fe) MG TABS Take 1 tablet by mouth every other day    Reported, Patient   fluticasone-vilanterol (BREO ELLIPTA) 200-25 mcg/dose DsDv inhaler Inhale 1 puff into the lungs daily  2/20/18   Provider, Historical   Garlic 1000 MG CAPS Take 1 capsule by mouth daily    Unknown, Entered By History   insulin glargine (LANTUS PEN) 100 UNIT/ML pen Inject 18 Units Subcutaneous 2 times daily 12/10/21   Meg Roth MD   ipratropium - albuterol 0.5 mg/2.5 mg/3 mL (DUONEB) 0.5-2.5 (3) MG/3ML neb solution Take 1 " vial by nebulization 4 times daily as needed for shortness of breath / dyspnea     Unknown, Entered By History   irbesartan (AVAPRO) 300 MG tablet Take 0.5 tablets (150 mg) by mouth daily 12/17/21   Meg Roth MD   lidocaine (LIDODERM) 5 % Place 1 patch onto the skin daily as needed  2/9/16   Provider, Historical   metoprolol tartrate (LOPRESSOR) 25 MG tablet Take 1 tablet (25 mg) by mouth 2 times daily (with meals) 11/1/21   Eliezer Salomon MD   multivitamin w/minerals (THERA-VIT-M) tablet Take 1 tablet by mouth daily    Unknown, Entered By History   omeprazole (PRILOSEC) 20 MG DR capsule Take 20 mg by mouth daily    Unknown, Entered By History   OXYGEN-AIR DELIVERY SYSTEMS MISC [OXYGEN-AIR DELIVERY SYSTEMS MISC] Use As Directed. 11/17/16   Provider, Historical   potassium chloride (K-DUR,KLOR-CON) 20 MEQ tablet Take 40 mEq by mouth daily  4/9/20   Provider, Historical   rOPINIRole (REQUIP) 2 MG tablet [ROPINIROLE (REQUIP) 2 MG TABLET] Take 2 mg by mouth at bedtime.  1/11/15   Provider, Historical   tadalafil (CIALIS) 20 MG tablet Take 20 mg by mouth daily  11/30/21   Meg Roth MD   vitamin C (ASCORBIC ACID) 1000 MG TABS Take 1,000 mg by mouth daily    Unknown, Entered By History   zinc gluconate 50 MG tablet Take 50 mg by mouth daily    Unknown, Entered By History   zolpidem (AMBIEN) 10 mg tablet Take 5 mg by mouth nightly as needed for sleep  1/11/15   Provider, Historical        ALLERGIES:  Allergies   Allergen Reactions     Contrast Dye Nausea     Other reaction(s): GI intolerance, GI Upset     Furosemide Muscle Pain (Myalgia)     Previously tolerated.  Muscle cramps     Hydrochlorothiazide Unknown     Iodinated Contrast Media [Diagnostic X-Ray Materials] Nausea     Losartan Other (See Comments)     Other reaction(s): Stomatitis, Bloody nose dry mouth and lips     Losartan-Hydrochlorothiazide [Hyzaar]      Mouth sores     Metaxalone Nausea     Metolazone Other (See Comments)     Muscle cramps      Mometasone Other (See Comments)     Bloody nose     Rabeprazole Other (See Comments)     Mouth sores     Ramipril Other (See Comments)     Mouth sores     Shellfish Containing Products [Shellfish-Derived Products] Unknown     Other reaction(s): mouth sores, Other reaction(s): mouth sores     Sildenafil Muscle Pain (Myalgia)     Methocarbamol Rash     Penicillins Other (See Comments)     Immune-does not work for him       FAMILY HISTORY:  Family History   Problem Relation Age of Onset     Hyperlipidemia Mother      Hypertension Mother      Heart Disease Mother      Hyperlipidemia Father      Hypertension Father      Coronary Artery Disease Father      Cerebrovascular Disease Brother      Depression Brother      No Known Problems Sister      Pulmonary Hypertension No family hx of      Congenital heart disease No family hx of        SOCIAL HISTORY:   Social History     Socioeconomic History     Marital status:      Spouse name: Not on file     Number of children: 4     Years of education: Not on file     Highest education level: Not on file   Occupational History     Not on file   Tobacco Use     Smoking status: Former Smoker     Packs/day: 1.50     Years: 44.00     Pack years: 66.00     Types: Cigarettes     Quit date: 1996     Years since quittin.6     Smokeless tobacco: Never Used   Substance and Sexual Activity     Alcohol use: Yes     Alcohol/week: 1.0 standard drink     Comment: Alcoholic Drinks/day: 1 per month     Drug use: No     Sexual activity: Not Currently     Partners: Female   Other Topics Concern     Not on file   Social History Narrative     Not on file     Social Determinants of Health     Financial Resource Strain: Not on file   Food Insecurity: Not on file   Transportation Needs: Not on file   Physical Activity: Not on file   Stress: Not on file   Social Connections: Not on file   Intimate Partner Violence: Not on file   Housing Stability: Not on file       VITALS:  Patient Vitals  for the past 24 hrs:   BP Temp Temp src Pulse Resp SpO2 Weight   12/20/21 1100 138/64 -- -- 82 (!) 32 100 % --   12/20/21 1045 (!) 141/65 -- -- 82 17 100 % --   12/20/21 1030 138/56 -- -- 83 23 100 % --   12/20/21 1019 (!) 155/70 98  F (36.7  C) Temporal 86 16 100 % 103.9 kg (229 lb)        PHYSICAL EXAM    Constitutional:  Awake, alert, in no apparent distress  HENT:  Normocephalic, Atraumatic. Bilateral external ears normal. Oropharynx moist. Nose normal. Neck- Normal range of motion with no guarding, No midline cervical tenderness, Supple, No stridor.   Eyes:  PERRL, EOMI with no signs of entrapment, Conjunctiva normal, No discharge.   Respiratory:  Normal breath sounds, No respiratory distress, No wheezing.    Cardiovascular:  Normal heart rate, Normal rhythm, No appreciable rubs or gallops.   GI:  Soft, No tenderness, No distension, No palpable masses  Musculoskeletal:  Intact distal pulses, No edema. Good range of motion in all major joints. No tenderness to palpation or major deformities noted.  Integument:  Warm, Dry, No erythema, No rash.   Neurologic: Alert and oriented x3, no focal motor weakness, no sensory deficits, cranial nerves II through XII intact, speech is slurred but patient is able to repeat sentences back to me and name items  Psychiatric:  Affect normal, Judgment normal, Mood normal.     LAB:  All pertinent labs reviewed and interpreted.  Results for orders placed or performed during the hospital encounter of 12/20/21   XR Chest 2 Views    Impression    IMPRESSION: No pleural fluid or pneumothorax. Heterogeneous pulmonary opacities have increased from the prior study. The heart is not well assessed. There is aortic calcification.    CT Head w/o Contrast    Impression    IMPRESSION:  HEAD CT:  1.  Large hyperdense holohemispheric right-sided subdural hematoma which measures 12.9 mm in thickness over the posterior right frontal convexity, 12.6 mm over the right parietal convexity and 11.9 mm  over the right temporal convexity.  2.  It does cause mass effect on the adjacent brain parenchyma. Partial effacement of the right lateral ventricle and the third ventricle. 5.6 mm midline shift to the left at the level of the lateral ventricles. Partial effacement of the suprasellar   cisterns.     Discussed the head CT findings with Dr. Fuller at 11:16 AM, 12/20/2021.    CERVICAL SPINE CT:  1.  Moderately motion degraded exam.  2.  No fractures or posttraumatic subluxations within the limitations of this exam.   CT Cervical Spine w/o Contrast    Impression    IMPRESSION:  HEAD CT:  1.  Large hyperdense holohemispheric right-sided subdural hematoma which measures 12.9 mm in thickness over the posterior right frontal convexity, 12.6 mm over the right parietal convexity and 11.9 mm over the right temporal convexity.  2.  It does cause mass effect on the adjacent brain parenchyma. Partial effacement of the right lateral ventricle and the third ventricle. 5.6 mm midline shift to the left at the level of the lateral ventricles. Partial effacement of the suprasellar   cisterns.     Discussed the head CT findings with Dr. Fuller at 11:16 AM, 12/20/2021.    CERVICAL SPINE CT:  1.  Moderately motion degraded exam.  2.  No fractures or posttraumatic subluxations within the limitations of this exam.   Result Value Ref Range    INR 1.20 (H) 0.85 - 1.15   Partial thromboplastin time   Result Value Ref Range    aPTT 35 22 - 38 Seconds   Comprehensive metabolic panel   Result Value Ref Range    Sodium 138 136 - 145 mmol/L    Potassium 3.7 3.5 - 5.0 mmol/L    Chloride 99 98 - 107 mmol/L    Carbon Dioxide (CO2) 24 22 - 31 mmol/L    Anion Gap 15 5 - 18 mmol/L    Urea Nitrogen 37 (H) 8 - 28 mg/dL    Creatinine 1.60 (H) 0.70 - 1.30 mg/dL    Calcium 9.4 8.5 - 10.5 mg/dL    Glucose 269 (H) 70 - 125 mg/dL    Alkaline Phosphatase 140 (H) 45 - 120 U/L    AST 19 0 - 40 U/L    ALT 13 0 - 45 U/L    Protein Total 7.3 6.0 - 8.0 g/dL     Albumin 3.6 3.5 - 5.0 g/dL    Bilirubin Total 1.7 (H) 0.0 - 1.0 mg/dL    GFR Estimate 40 (L) >60 mL/min/1.73m2   CBC with platelets and differential   Result Value Ref Range    WBC Count 10.3 4.0 - 11.0 10e3/uL    RBC Count 4.27 (L) 4.40 - 5.90 10e6/uL    Hemoglobin 8.8 (L) 13.3 - 17.7 g/dL    Hematocrit 30.6 (L) 40.0 - 53.0 %    MCV 72 (L) 78 - 100 fL    MCH 20.6 (L) 26.5 - 33.0 pg    MCHC 28.8 (L) 31.5 - 36.5 g/dL    RDW 18.0 (H) 10.0 - 15.0 %    Platelet Count 303 150 - 450 10e3/uL    % Neutrophils 84 %    % Lymphocytes 7 %    % Monocytes 7 %    % Eosinophils 1 %    % Basophils 0 %    % Immature Granulocytes 1 %    NRBCs per 100 WBC 0 <1 /100    Absolute Neutrophils 8.7 (H) 1.6 - 8.3 10e3/uL    Absolute Lymphocytes 0.7 (L) 0.8 - 5.3 10e3/uL    Absolute Monocytes 0.7 0.0 - 1.3 10e3/uL    Absolute Eosinophils 0.1 0.0 - 0.7 10e3/uL    Absolute Basophils 0.0 0.0 - 0.2 10e3/uL    Absolute Immature Granulocytes 0.1 <=0.4 10e3/uL    Absolute NRBCs 0.0 10e3/uL   Asymptomatic COVID-19 Virus (Coronavirus) by PCR Nasopharyngeal    Specimen: Nasopharyngeal; Swab   Result Value Ref Range    SARS CoV2 PCR Negative Negative   ECG 12-LEAD WITH MUSE (LHE)   Result Value Ref Range    Systolic Blood Pressure 155 mmHg    Diastolic Blood Pressure 70 mmHg    Ventricular Rate 88 BPM    Atrial Rate 91 BPM    WY Interval  ms    QRS Duration 96 ms     ms    QTc 457 ms    P Axis  degrees    R AXIS 98 degrees    T Axis 5 degrees    Interpretation ECG       Undetermined rhythm  Rightward axis  Incomplete right bundle branch block  Borderline ECG  When compared with ECG of 30-NOV-2021 15:37,  Current undetermined rhythm precludes rhythm comparison, needs review  Criteria for Septal infarct are no longer Present  T wave inversion less evident in Inferior leads  Confirmed by SEE ED PROVIDER NOTE FOR, ECG INTERPRETATION ([id number]),  FERNIE COLON ([id number]) on 12/20/2021 11:36:34 AM         RADIOLOGY:  CT Cervical Spine w/o Contrast    Final Result   IMPRESSION:   HEAD CT:   1.  Large hyperdense holohemispheric right-sided subdural hematoma which measures 12.9 mm in thickness over the posterior right frontal convexity, 12.6 mm over the right parietal convexity and 11.9 mm over the right temporal convexity.   2.  It does cause mass effect on the adjacent brain parenchyma. Partial effacement of the right lateral ventricle and the third ventricle. 5.6 mm midline shift to the left at the level of the lateral ventricles. Partial effacement of the suprasellar    cisterns.       Discussed the head CT findings with Dr. Fuller at 11:16 AM, 12/20/2021.      CERVICAL SPINE CT:   1.  Moderately motion degraded exam.   2.  No fractures or posttraumatic subluxations within the limitations of this exam.      CT Head w/o Contrast   Final Result   IMPRESSION:   HEAD CT:   1.  Large hyperdense holohemispheric right-sided subdural hematoma which measures 12.9 mm in thickness over the posterior right frontal convexity, 12.6 mm over the right parietal convexity and 11.9 mm over the right temporal convexity.   2.  It does cause mass effect on the adjacent brain parenchyma. Partial effacement of the right lateral ventricle and the third ventricle. 5.6 mm midline shift to the left at the level of the lateral ventricles. Partial effacement of the suprasellar    cisterns.       Discussed the head CT findings with Dr. Fuller at 11:16 AM, 12/20/2021.      CERVICAL SPINE CT:   1.  Moderately motion degraded exam.   2.  No fractures or posttraumatic subluxations within the limitations of this exam.      XR Chest 2 Views   Final Result   IMPRESSION: No pleural fluid or pneumothorax. Heterogeneous pulmonary opacities have increased from the prior study. The heart is not well assessed. There is aortic calcification.              EKG:    Sinus rhythm, incomplete right bundle branch block, no specific ST acute ischemic changes, no concerning dysrhythmias or interval  prolongation  I have independently reviewed and interpreted the EKG(s) documented above.  .  PROCEDURES    CRITICAL CARE NOTE:  Indication: Subdural hematoma, seizure activity  Interventions: Patient placed on continuous pulse oximetry and cardiac telemetry, large-bore IV access obtained.  Emergent imaging studies obtained were concerning for subdural hematoma with mass-effect.  Rest of the labs and imaging studies showed no acute concerning findings.  Patient had 2 episodes of seizure-like activity here and was loaded with Keppra.  Spoke emergently with neurosurgery at [facility] and patient was accepted for emergent transfer to the emergency department for evaluation by neurosurgery and discussion for surgical intervention.  Discussed these findings and recommendations with patient and spouse and they are in agreement with transfer.  Patient was transferred in stable condition.  Of note after each episode of seizure he would return back to her baseline, there is no concern for airway compromise while here and I suspect that he would do well in transfer.  Treatments: IV Keppra, neurosurgical consultation and emergent transfer  Critical Care time excluding procedures: 30 minutes        I, Rhina Beckwith, am serving as a scribe to document services personally performed by Richard Fuller MD, based on my observation and the provider's statements to me. I, Richard Fuller MD attest that Rhina Beckwith is acting in a scribe capacity, has observed my performance of the services and has documented them in accordance with my direction.    Richard Fuller M.D.  Emergency Medicine  [facility] EMERGENCY ROOM  [address] [address]  [phone]  Dept: [phone]      Richard Fuller MD  12/20/21 1610

## 2021-12-20 NOTE — ED NOTES
Bed: IN04  Expected date:   Expected time:   Means of arrival:   Comments:  Patient transferred from [facility] for further evaluation of subdural bleed.  Patient became off balance about 4 days ago and then had witnessed seizure.  Patient has now has left facial droop.  Patient was taken off Eliquis on Wednesday.  1000 mg of Keppra given at [facility].

## 2021-12-21 ENCOUNTER — APPOINTMENT (OUTPATIENT)
Dept: CT IMAGING | Facility: CLINIC | Age: 81
DRG: 025 | End: 2021-12-21
Attending: STUDENT IN AN ORGANIZED HEALTH CARE EDUCATION/TRAINING PROGRAM
Payer: COMMERCIAL

## 2021-12-21 ENCOUNTER — APPOINTMENT (OUTPATIENT)
Dept: GENERAL RADIOLOGY | Facility: CLINIC | Age: 81
DRG: 025 | End: 2021-12-21
Attending: NURSE PRACTITIONER
Payer: COMMERCIAL

## 2021-12-21 LAB
ANION GAP SERPL CALCULATED.3IONS-SCNC: 6 MMOL/L (ref 3–14)
BASE EXCESS BLDA CALC-SCNC: 2.5 MMOL/L (ref -9–1.8)
BLD PROD TYP BPU: NORMAL
BLOOD COMPONENT TYPE: NORMAL
BUN SERPL-MCNC: 28 MG/DL (ref 7–30)
CALCIUM SERPL-MCNC: 8.6 MG/DL (ref 8.5–10.1)
CHLORIDE BLD-SCNC: 109 MMOL/L (ref 94–109)
CO2 SERPL-SCNC: 27 MMOL/L (ref 20–32)
CODING SYSTEM: NORMAL
CREAT SERPL-MCNC: 1.28 MG/DL (ref 0.66–1.25)
CROSSMATCH: NORMAL
CROSSMATCH: NORMAL
ERYTHROCYTE [DISTWIDTH] IN BLOOD BY AUTOMATED COUNT: 18 % (ref 10–15)
GFR SERPL CREATININE-BSD FRML MDRD: 53 ML/MIN/1.73M2
GLUCOSE BLD-MCNC: 163 MG/DL (ref 70–99)
GLUCOSE BLDC GLUCOMTR-MCNC: 132 MG/DL (ref 70–99)
GLUCOSE BLDC GLUCOMTR-MCNC: 137 MG/DL (ref 70–99)
GLUCOSE BLDC GLUCOMTR-MCNC: 152 MG/DL (ref 70–99)
GLUCOSE BLDC GLUCOMTR-MCNC: 153 MG/DL (ref 70–99)
GLUCOSE BLDC GLUCOMTR-MCNC: 158 MG/DL (ref 70–99)
GLUCOSE BLDC GLUCOMTR-MCNC: 161 MG/DL (ref 70–99)
GLUCOSE BLDC GLUCOMTR-MCNC: 162 MG/DL (ref 70–99)
HBA1C MFR BLD: 7.5 % (ref 0–5.6)
HCO3 BLD-SCNC: 25 MMOL/L (ref 21–28)
HCT VFR BLD AUTO: 28.8 % (ref 40–53)
HGB BLD-MCNC: 8.2 G/DL (ref 13.3–17.7)
ISSUE DATE AND TIME: NORMAL
MAGNESIUM SERPL-MCNC: 2.5 MG/DL (ref 1.6–2.3)
MCH RBC QN AUTO: 20.7 PG (ref 26.5–33)
MCHC RBC AUTO-ENTMCNC: 28.5 G/DL (ref 31.5–36.5)
MCV RBC AUTO: 73 FL (ref 78–100)
O2/TOTAL GAS SETTING VFR VENT: 40 %
OXYHGB MFR BLD: 98 % (ref 92–100)
PCO2 BLD: 32 MM HG (ref 35–45)
PH BLD: 7.51 [PH] (ref 7.35–7.45)
PHOSPHATE SERPL-MCNC: 3.1 MG/DL (ref 2.5–4.5)
PLATELET # BLD AUTO: 271 10E3/UL (ref 150–450)
PO2 BLD: 150 MM HG (ref 80–105)
POTASSIUM BLD-SCNC: 3.9 MMOL/L (ref 3.4–5.3)
RADIOLOGIST FLAGS: ABNORMAL
RBC # BLD AUTO: 3.97 10E6/UL (ref 4.4–5.9)
SODIUM SERPL-SCNC: 142 MMOL/L (ref 133–144)
UNIT ABO/RH: NORMAL
UNIT NUMBER: NORMAL
UNIT STATUS: NORMAL
UNIT TYPE ISBT: 6200
UNIT TYPE ISBT: 8400
WBC # BLD AUTO: 10.2 10E3/UL (ref 4–11)

## 2021-12-21 PROCEDURE — 0W310ZZ CONTROL BLEEDING IN CRANIAL CAVITY, OPEN APPROACH: ICD-10-PCS | Performed by: NEUROLOGICAL SURGERY

## 2021-12-21 PROCEDURE — 250N000011 HC RX IP 250 OP 636: Performed by: STUDENT IN AN ORGANIZED HEALTH CARE EDUCATION/TRAINING PROGRAM

## 2021-12-21 PROCEDURE — 258N000003 HC RX IP 258 OP 636: Performed by: NEUROLOGICAL SURGERY

## 2021-12-21 PROCEDURE — 250N000011 HC RX IP 250 OP 636: Performed by: NEUROLOGICAL SURGERY

## 2021-12-21 PROCEDURE — 999N000141 HC STATISTIC PRE-PROCEDURE NURSING ASSESSMENT: Performed by: NEUROLOGICAL SURGERY

## 2021-12-21 PROCEDURE — 999N000185 HC STATISTIC TRANSPORT TIME EA 15 MIN

## 2021-12-21 PROCEDURE — 84100 ASSAY OF PHOSPHORUS: CPT | Performed by: STUDENT IN AN ORGANIZED HEALTH CARE EDUCATION/TRAINING PROGRAM

## 2021-12-21 PROCEDURE — 250N000011 HC RX IP 250 OP 636: Performed by: PSYCHIATRY & NEUROLOGY

## 2021-12-21 PROCEDURE — P9037 PLATE PHERES LEUKOREDU IRRAD: HCPCS | Performed by: NEUROLOGICAL SURGERY

## 2021-12-21 PROCEDURE — 999N000157 HC STATISTIC RCP TIME EA 10 MIN

## 2021-12-21 PROCEDURE — 250N000009 HC RX 250: Performed by: STUDENT IN AN ORGANIZED HEALTH CARE EDUCATION/TRAINING PROGRAM

## 2021-12-21 PROCEDURE — C1763 CONN TISS, NON-HUMAN: HCPCS | Performed by: NEUROLOGICAL SURGERY

## 2021-12-21 PROCEDURE — 74018 RADEX ABDOMEN 1 VIEW: CPT | Mod: 26 | Performed by: RADIOLOGY

## 2021-12-21 PROCEDURE — 999N000065 XR ABDOMEN PORT 1 VIEWS

## 2021-12-21 PROCEDURE — 272N000001 HC OR GENERAL SUPPLY STERILE: Performed by: NEUROLOGICAL SURGERY

## 2021-12-21 PROCEDURE — 250N000009 HC RX 250: Performed by: NEUROLOGICAL SURGERY

## 2021-12-21 PROCEDURE — 85027 COMPLETE CBC AUTOMATED: CPT | Performed by: STUDENT IN AN ORGANIZED HEALTH CARE EDUCATION/TRAINING PROGRAM

## 2021-12-21 PROCEDURE — P9059 PLASMA, FRZ BETWEEN 8-24HOUR: HCPCS | Performed by: NEUROLOGICAL SURGERY

## 2021-12-21 PROCEDURE — 710N000010 HC RECOVERY PHASE 1, LEVEL 2, PER MIN: Performed by: NEUROLOGICAL SURGERY

## 2021-12-21 PROCEDURE — 70450 CT HEAD/BRAIN W/O DYE: CPT | Mod: 77

## 2021-12-21 PROCEDURE — 258N000003 HC RX IP 258 OP 636: Performed by: NURSE ANESTHETIST, CERTIFIED REGISTERED

## 2021-12-21 PROCEDURE — 250N000009 HC RX 250: Performed by: NURSE ANESTHETIST, CERTIFIED REGISTERED

## 2021-12-21 PROCEDURE — P9016 RBC LEUKOCYTES REDUCED: HCPCS | Performed by: STUDENT IN AN ORGANIZED HEALTH CARE EDUCATION/TRAINING PROGRAM

## 2021-12-21 PROCEDURE — 36415 COLL VENOUS BLD VENIPUNCTURE: CPT | Performed by: STUDENT IN AN ORGANIZED HEALTH CARE EDUCATION/TRAINING PROGRAM

## 2021-12-21 PROCEDURE — 258N000003 HC RX IP 258 OP 636: Performed by: STUDENT IN AN ORGANIZED HEALTH CARE EDUCATION/TRAINING PROGRAM

## 2021-12-21 PROCEDURE — C9254 INJECTION, LACOSAMIDE: HCPCS | Performed by: PSYCHIATRY & NEUROLOGY

## 2021-12-21 PROCEDURE — 272N000004 HC RX 272: Performed by: NEUROLOGICAL SURGERY

## 2021-12-21 PROCEDURE — 83036 HEMOGLOBIN GLYCOSYLATED A1C: CPT | Performed by: NEUROLOGICAL SURGERY

## 2021-12-21 PROCEDURE — 360N000078 HC SURGERY LEVEL 5, PER MIN: Performed by: NEUROLOGICAL SURGERY

## 2021-12-21 PROCEDURE — 200N000002 HC R&B ICU UMMC

## 2021-12-21 PROCEDURE — 70450 CT HEAD/BRAIN W/O DYE: CPT | Mod: 26 | Performed by: RADIOLOGY

## 2021-12-21 PROCEDURE — C1713 ANCHOR/SCREW BN/BN,TIS/BN: HCPCS | Performed by: NEUROLOGICAL SURGERY

## 2021-12-21 PROCEDURE — 250N000013 HC RX MED GY IP 250 OP 250 PS 637: Performed by: NURSE PRACTITIONER

## 2021-12-21 PROCEDURE — 99291 CRITICAL CARE FIRST HOUR: CPT | Performed by: PSYCHIATRY & NEUROLOGY

## 2021-12-21 PROCEDURE — 99223 1ST HOSP IP/OBS HIGH 75: CPT | Mod: GC | Performed by: STUDENT IN AN ORGANIZED HEALTH CARE EDUCATION/TRAINING PROGRAM

## 2021-12-21 PROCEDURE — 00C40ZZ EXTIRPATION OF MATTER FROM INTRACRANIAL SUBDURAL SPACE, OPEN APPROACH: ICD-10-PCS | Performed by: NEUROLOGICAL SURGERY

## 2021-12-21 PROCEDURE — 80048 BASIC METABOLIC PNL TOTAL CA: CPT | Performed by: STUDENT IN AN ORGANIZED HEALTH CARE EDUCATION/TRAINING PROGRAM

## 2021-12-21 PROCEDURE — 999N000015 HC STATISTIC ARTERIAL MONITORING DAILY

## 2021-12-21 PROCEDURE — C9254 INJECTION, LACOSAMIDE: HCPCS | Performed by: NEUROLOGICAL SURGERY

## 2021-12-21 PROCEDURE — 250N000011 HC RX IP 250 OP 636: Performed by: EMERGENCY MEDICINE

## 2021-12-21 PROCEDURE — 250N000013 HC RX MED GY IP 250 OP 250 PS 637: Performed by: NEUROLOGICAL SURGERY

## 2021-12-21 PROCEDURE — 250N000025 HC SEVOFLURANE, PER MIN: Performed by: NEUROLOGICAL SURGERY

## 2021-12-21 PROCEDURE — 370N000017 HC ANESTHESIA TECHNICAL FEE, PER MIN: Performed by: NEUROLOGICAL SURGERY

## 2021-12-21 PROCEDURE — 82805 BLOOD GASES W/O2 SATURATION: CPT | Performed by: NEUROLOGICAL SURGERY

## 2021-12-21 PROCEDURE — 250N000013 HC RX MED GY IP 250 OP 250 PS 637: Performed by: STUDENT IN AN ORGANIZED HEALTH CARE EDUCATION/TRAINING PROGRAM

## 2021-12-21 PROCEDURE — 94002 VENT MGMT INPAT INIT DAY: CPT

## 2021-12-21 PROCEDURE — 70450 CT HEAD/BRAIN W/O DYE: CPT

## 2021-12-21 PROCEDURE — 83735 ASSAY OF MAGNESIUM: CPT | Performed by: STUDENT IN AN ORGANIZED HEALTH CARE EDUCATION/TRAINING PROGRAM

## 2021-12-21 PROCEDURE — 258N000003 HC RX IP 258 OP 636: Performed by: PSYCHIATRY & NEUROLOGY

## 2021-12-21 DEVICE — GRAFT DURAGEN 3X3" ID330: Type: IMPLANTABLE DEVICE | Site: CRANIAL | Status: FUNCTIONAL

## 2021-12-21 DEVICE — IMP SCR SYN MATRIX LOW PRO 1.5X04MM SELF DRILL 04.503.104.01: Type: IMPLANTABLE DEVICE | Site: CRANIAL | Status: FUNCTIONAL

## 2021-12-21 DEVICE — IMP PLATE SYN STR DOG BONE LOW PROFILE 2H TI 421.502: Type: IMPLANTABLE DEVICE | Site: CRANIAL | Status: FUNCTIONAL

## 2021-12-21 DEVICE — IMPLANTABLE DEVICE: Type: IMPLANTABLE DEVICE | Site: CRANIAL | Status: FUNCTIONAL

## 2021-12-21 RX ORDER — CEFAZOLIN SODIUM 2 G/100ML
2 INJECTION, SOLUTION INTRAVENOUS EVERY 8 HOURS
Status: DISCONTINUED | OUTPATIENT
Start: 2021-12-21 | End: 2021-12-26

## 2021-12-21 RX ORDER — DEXTROSE MONOHYDRATE 25 G/50ML
25-50 INJECTION, SOLUTION INTRAVENOUS
Status: DISCONTINUED | OUTPATIENT
Start: 2021-12-21 | End: 2021-12-21

## 2021-12-21 RX ORDER — SODIUM CHLORIDE 9 MG/ML
INJECTION, SOLUTION INTRAVENOUS CONTINUOUS PRN
Status: DISCONTINUED | OUTPATIENT
Start: 2021-12-21 | End: 2021-12-21

## 2021-12-21 RX ORDER — LABETALOL HYDROCHLORIDE 5 MG/ML
10 INJECTION, SOLUTION INTRAVENOUS
Status: DISCONTINUED | OUTPATIENT
Start: 2021-12-21 | End: 2021-12-21

## 2021-12-21 RX ORDER — PROPOFOL 10 MG/ML
INJECTION, EMULSION INTRAVENOUS PRN
Status: DISCONTINUED | OUTPATIENT
Start: 2021-12-21 | End: 2021-12-21

## 2021-12-21 RX ORDER — CEFAZOLIN SODIUM 2 G/100ML
INJECTION, SOLUTION INTRAVENOUS PRN
Status: DISCONTINUED | OUTPATIENT
Start: 2021-12-21 | End: 2021-12-21

## 2021-12-21 RX ORDER — FENTANYL CITRATE 50 UG/ML
25 INJECTION, SOLUTION INTRAMUSCULAR; INTRAVENOUS EVERY 5 MIN PRN
Status: DISCONTINUED | OUTPATIENT
Start: 2021-12-21 | End: 2021-12-21

## 2021-12-21 RX ORDER — NICOTINE POLACRILEX 4 MG
15-30 LOZENGE BUCCAL
Status: DISCONTINUED | OUTPATIENT
Start: 2021-12-21 | End: 2021-12-21

## 2021-12-21 RX ORDER — LEVETIRACETAM 15 MG/ML
1500 INJECTION INTRAVASCULAR EVERY 12 HOURS
Status: DISCONTINUED | OUTPATIENT
Start: 2021-12-21 | End: 2021-12-23

## 2021-12-21 RX ORDER — DEXTROSE MONOHYDRATE 100 MG/ML
INJECTION, SOLUTION INTRAVENOUS CONTINUOUS PRN
Status: DISCONTINUED | OUTPATIENT
Start: 2021-12-21 | End: 2021-12-21

## 2021-12-21 RX ORDER — DIAZEPAM 10 MG/2ML
2.5 INJECTION, SOLUTION INTRAMUSCULAR; INTRAVENOUS
Status: DISCONTINUED | OUTPATIENT
Start: 2021-12-21 | End: 2021-12-21

## 2021-12-21 RX ORDER — SODIUM CHLORIDE, SODIUM LACTATE, POTASSIUM CHLORIDE, CALCIUM CHLORIDE 600; 310; 30; 20 MG/100ML; MG/100ML; MG/100ML; MG/100ML
INJECTION, SOLUTION INTRAVENOUS CONTINUOUS
Status: DISCONTINUED | OUTPATIENT
Start: 2021-12-21 | End: 2021-12-21

## 2021-12-21 RX ORDER — HALOPERIDOL 5 MG/ML
1 INJECTION INTRAMUSCULAR
Status: DISCONTINUED | OUTPATIENT
Start: 2021-12-21 | End: 2021-12-21

## 2021-12-21 RX ORDER — HYDRALAZINE HYDROCHLORIDE 20 MG/ML
2.5-5 INJECTION INTRAMUSCULAR; INTRAVENOUS EVERY 10 MIN PRN
Status: DISCONTINUED | OUTPATIENT
Start: 2021-12-21 | End: 2021-12-21

## 2021-12-21 RX ORDER — OXYCODONE HYDROCHLORIDE 5 MG/1
5 TABLET ORAL EVERY 4 HOURS PRN
Status: DISCONTINUED | OUTPATIENT
Start: 2021-12-21 | End: 2021-12-21

## 2021-12-21 RX ORDER — FENTANYL CITRATE 50 UG/ML
INJECTION, SOLUTION INTRAMUSCULAR; INTRAVENOUS PRN
Status: DISCONTINUED | OUTPATIENT
Start: 2021-12-21 | End: 2021-12-21

## 2021-12-21 RX ORDER — TADALAFIL 20 MG/1
20 TABLET ORAL EVERY 24 HOURS
Status: DISCONTINUED | OUTPATIENT
Start: 2021-12-21 | End: 2022-01-17 | Stop reason: HOSPADM

## 2021-12-21 RX ORDER — PROPOFOL 10 MG/ML
5-50 INJECTION, EMULSION INTRAVENOUS CONTINUOUS
Status: DISCONTINUED | OUTPATIENT
Start: 2021-12-21 | End: 2021-12-21

## 2021-12-21 RX ORDER — ESMOLOL HYDROCHLORIDE 10 MG/ML
INJECTION INTRAVENOUS PRN
Status: DISCONTINUED | OUTPATIENT
Start: 2021-12-21 | End: 2021-12-21

## 2021-12-21 RX ORDER — MEPERIDINE HYDROCHLORIDE 25 MG/ML
12.5 INJECTION INTRAMUSCULAR; INTRAVENOUS; SUBCUTANEOUS EVERY 5 MIN PRN
Status: DISCONTINUED | OUTPATIENT
Start: 2021-12-21 | End: 2021-12-21

## 2021-12-21 RX ORDER — FENTANYL CITRATE 50 UG/ML
25-50 INJECTION, SOLUTION INTRAMUSCULAR; INTRAVENOUS
Status: DISCONTINUED | OUTPATIENT
Start: 2021-12-21 | End: 2021-12-22

## 2021-12-21 RX ORDER — SODIUM CHLORIDE 9 MG/ML
INJECTION, SOLUTION INTRAVENOUS CONTINUOUS
Status: DISCONTINUED | OUTPATIENT
Start: 2021-12-21 | End: 2021-12-22

## 2021-12-21 RX ORDER — LEVETIRACETAM 10 MG/ML
1000 INJECTION INTRAVASCULAR ONCE
Status: COMPLETED | OUTPATIENT
Start: 2021-12-21 | End: 2021-12-21

## 2021-12-21 RX ORDER — LIDOCAINE 40 MG/G
CREAM TOPICAL
Status: DISCONTINUED | OUTPATIENT
Start: 2021-12-21 | End: 2021-12-23

## 2021-12-21 RX ORDER — SODIUM CHLORIDE, SODIUM LACTATE, POTASSIUM CHLORIDE, CALCIUM CHLORIDE 600; 310; 30; 20 MG/100ML; MG/100ML; MG/100ML; MG/100ML
INJECTION, SOLUTION INTRAVENOUS CONTINUOUS PRN
Status: DISCONTINUED | OUTPATIENT
Start: 2021-12-21 | End: 2021-12-21

## 2021-12-21 RX ORDER — HYDROMORPHONE HYDROCHLORIDE 1 MG/ML
0.2 INJECTION, SOLUTION INTRAMUSCULAR; INTRAVENOUS; SUBCUTANEOUS EVERY 5 MIN PRN
Status: DISCONTINUED | OUTPATIENT
Start: 2021-12-21 | End: 2021-12-21

## 2021-12-21 RX ORDER — PROPOFOL 10 MG/ML
INJECTION, EMULSION INTRAVENOUS CONTINUOUS PRN
Status: DISCONTINUED | OUTPATIENT
Start: 2021-12-21 | End: 2021-12-21

## 2021-12-21 RX ORDER — METOPROLOL TARTRATE 1 MG/ML
5 INJECTION, SOLUTION INTRAVENOUS EVERY 6 HOURS
Status: DISCONTINUED | OUTPATIENT
Start: 2021-12-21 | End: 2021-12-21

## 2021-12-21 RX ORDER — METOPROLOL TARTRATE 25 MG/1
25 TABLET, FILM COATED ORAL 2 TIMES DAILY
Status: DISCONTINUED | OUTPATIENT
Start: 2021-12-21 | End: 2021-12-28

## 2021-12-21 RX ORDER — FERROUS GLUCONATE 324(38)MG
324 TABLET ORAL EVERY OTHER DAY
Status: DISCONTINUED | OUTPATIENT
Start: 2021-12-23 | End: 2022-01-17 | Stop reason: HOSPADM

## 2021-12-21 RX ORDER — ONDANSETRON 4 MG/1
4 TABLET, ORALLY DISINTEGRATING ORAL EVERY 30 MIN PRN
Status: DISCONTINUED | OUTPATIENT
Start: 2021-12-21 | End: 2021-12-21

## 2021-12-21 RX ORDER — IRBESARTAN 150 MG/1
150 TABLET ORAL DAILY
Status: DISCONTINUED | OUTPATIENT
Start: 2021-12-22 | End: 2022-01-07

## 2021-12-21 RX ORDER — PROPOFOL 10 MG/ML
5-50 INJECTION, EMULSION INTRAVENOUS CONTINUOUS
Status: DISCONTINUED | OUTPATIENT
Start: 2021-12-21 | End: 2021-12-22

## 2021-12-21 RX ORDER — LIDOCAINE HYDROCHLORIDE 20 MG/ML
INJECTION, SOLUTION INFILTRATION; PERINEURAL PRN
Status: DISCONTINUED | OUTPATIENT
Start: 2021-12-21 | End: 2021-12-21

## 2021-12-21 RX ORDER — BUPIVACAINE HYDROCHLORIDE AND EPINEPHRINE 2.5; 5 MG/ML; UG/ML
INJECTION, SOLUTION INFILTRATION; PERINEURAL PRN
Status: DISCONTINUED | OUTPATIENT
Start: 2021-12-21 | End: 2021-12-21 | Stop reason: HOSPADM

## 2021-12-21 RX ORDER — ONDANSETRON 2 MG/ML
4 INJECTION INTRAMUSCULAR; INTRAVENOUS EVERY 30 MIN PRN
Status: DISCONTINUED | OUTPATIENT
Start: 2021-12-21 | End: 2021-12-21

## 2021-12-21 RX ORDER — LEVETIRACETAM 10 MG/ML
1000 INJECTION INTRAVASCULAR EVERY 12 HOURS
Status: DISCONTINUED | OUTPATIENT
Start: 2021-12-21 | End: 2021-12-21

## 2021-12-21 RX ORDER — POTASSIUM CHLORIDE 1.5 G/1.58G
40 POWDER, FOR SOLUTION ORAL DAILY
Status: DISCONTINUED | OUTPATIENT
Start: 2021-12-21 | End: 2022-01-01

## 2021-12-21 RX ADMIN — MIDAZOLAM 2 MG: 1 INJECTION INTRAMUSCULAR; INTRAVENOUS at 13:10

## 2021-12-21 RX ADMIN — ESMOLOL HYDROCHLORIDE 10 MG: 10 INJECTION, SOLUTION INTRAVENOUS at 16:44

## 2021-12-21 RX ADMIN — HYDROMORPHONE HYDROCHLORIDE 0.2 MG: 0.2 INJECTION, SOLUTION INTRAMUSCULAR; INTRAVENOUS; SUBCUTANEOUS at 23:53

## 2021-12-21 RX ADMIN — CEFAZOLIN SODIUM 2 G: 2 INJECTION, SOLUTION INTRAVENOUS at 21:58

## 2021-12-21 RX ADMIN — HYDROMORPHONE HYDROCHLORIDE 0.2 MG: 0.2 INJECTION, SOLUTION INTRAMUSCULAR; INTRAVENOUS; SUBCUTANEOUS at 00:00

## 2021-12-21 RX ADMIN — DESMOPRESSIN ACETATE 30 MCG: 4 INJECTION, SOLUTION INTRAVENOUS; SUBCUTANEOUS at 14:06

## 2021-12-21 RX ADMIN — ESMOLOL HYDROCHLORIDE 10 MG: 10 INJECTION, SOLUTION INTRAVENOUS at 16:38

## 2021-12-21 RX ADMIN — PROPOFOL 30 MCG/KG/MIN: 10 INJECTION, EMULSION INTRAVENOUS at 19:24

## 2021-12-21 RX ADMIN — ROCURONIUM BROMIDE 20 MG: 50 INJECTION, SOLUTION INTRAVENOUS at 14:45

## 2021-12-21 RX ADMIN — ROCURONIUM BROMIDE 20 MG: 50 INJECTION, SOLUTION INTRAVENOUS at 15:46

## 2021-12-21 RX ADMIN — PROPOFOL 30 MG: 10 INJECTION, EMULSION INTRAVENOUS at 17:15

## 2021-12-21 RX ADMIN — FENTANYL CITRATE 100 MCG: 50 INJECTION, SOLUTION INTRAMUSCULAR; INTRAVENOUS at 13:34

## 2021-12-21 RX ADMIN — SODIUM CHLORIDE 100 MG: 9 INJECTION, SOLUTION INTRAVENOUS at 23:14

## 2021-12-21 RX ADMIN — SODIUM CHLORIDE: 9 INJECTION, SOLUTION INTRAVENOUS at 18:17

## 2021-12-21 RX ADMIN — ROPINIROLE HYDROCHLORIDE 2 MG: 2 TABLET, FILM COATED ORAL at 21:58

## 2021-12-21 RX ADMIN — PROPOFOL 50 MCG/KG/MIN: 10 INJECTION, EMULSION INTRAVENOUS at 17:00

## 2021-12-21 RX ADMIN — PROPOFOL 20 MCG/KG/MIN: 10 INJECTION, EMULSION INTRAVENOUS at 20:16

## 2021-12-21 RX ADMIN — Medication 40 MG: at 09:17

## 2021-12-21 RX ADMIN — FENTANYL CITRATE 50 MCG: 50 INJECTION, SOLUTION INTRAMUSCULAR; INTRAVENOUS at 16:08

## 2021-12-21 RX ADMIN — FENTANYL CITRATE 50 MCG: 50 INJECTION, SOLUTION INTRAMUSCULAR; INTRAVENOUS at 14:50

## 2021-12-21 RX ADMIN — METOPROLOL TARTRATE 25 MG: 25 TABLET, FILM COATED ORAL at 09:09

## 2021-12-21 RX ADMIN — SODIUM CHLORIDE 200 MG: 9 INJECTION, SOLUTION INTRAVENOUS at 10:00

## 2021-12-21 RX ADMIN — Medication 2 G: at 14:06

## 2021-12-21 RX ADMIN — METOPROLOL TARTRATE 25 MG: 25 TABLET, FILM COATED ORAL at 20:41

## 2021-12-21 RX ADMIN — SODIUM CHLORIDE, POTASSIUM CHLORIDE, SODIUM LACTATE AND CALCIUM CHLORIDE: 600; 310; 30; 20 INJECTION, SOLUTION INTRAVENOUS at 14:35

## 2021-12-21 RX ADMIN — SODIUM CHLORIDE, POTASSIUM CHLORIDE, SODIUM LACTATE AND CALCIUM CHLORIDE: 600; 310; 30; 20 INJECTION, SOLUTION INTRAVENOUS at 13:29

## 2021-12-21 RX ADMIN — FENTANYL CITRATE 50 MCG: 50 INJECTION, SOLUTION INTRAMUSCULAR; INTRAVENOUS at 15:49

## 2021-12-21 RX ADMIN — ESMOLOL HYDROCHLORIDE 10 MG: 10 INJECTION, SOLUTION INTRAVENOUS at 16:09

## 2021-12-21 RX ADMIN — LEVETIRACETAM 1000 MG: 10 INJECTION INTRAVENOUS at 03:06

## 2021-12-21 RX ADMIN — TADALAFIL 20 MG: 20 TABLET, FILM COATED ORAL at 09:17

## 2021-12-21 RX ADMIN — PROPOFOL 60 MG: 10 INJECTION, EMULSION INTRAVENOUS at 13:35

## 2021-12-21 RX ADMIN — SODIUM CHLORIDE: 9 INJECTION, SOLUTION INTRAVENOUS at 15:10

## 2021-12-21 RX ADMIN — POTASSIUM CHLORIDE 40 MEQ: 1.5 POWDER, FOR SOLUTION ORAL at 09:17

## 2021-12-21 RX ADMIN — LIDOCAINE HYDROCHLORIDE 40 MG: 20 INJECTION, SOLUTION INFILTRATION; PERINEURAL at 13:35

## 2021-12-21 RX ADMIN — ROCURONIUM BROMIDE 100 MG: 50 INJECTION, SOLUTION INTRAVENOUS at 13:35

## 2021-12-21 RX ADMIN — LEVETIRACETAM 1000 MG: 10 INJECTION INTRAVASCULAR at 07:54

## 2021-12-21 RX ADMIN — FLUTICASONE FUROATE AND VILANTEROL TRIFENATATE 1 PUFF: 200; 25 POWDER RESPIRATORY (INHALATION) at 08:17

## 2021-12-21 RX ADMIN — PROPOFOL 50 MG: 10 INJECTION, EMULSION INTRAVENOUS at 17:04

## 2021-12-21 RX ADMIN — ATORVASTATIN CALCIUM 80 MG: 40 TABLET, FILM COATED ORAL at 09:09

## 2021-12-21 RX ADMIN — SODIUM CHLORIDE: 9 INJECTION, SOLUTION INTRAVENOUS at 03:01

## 2021-12-21 ASSESSMENT — ACTIVITIES OF DAILY LIVING (ADL)
ADLS_ACUITY_SCORE: 11
ADLS_ACUITY_SCORE: 14
ADLS_ACUITY_SCORE: 15
ADLS_ACUITY_SCORE: 15
DRESSING/BATHING_DIFFICULTY: NO
DIFFICULTY_EATING/SWALLOWING: NO
FALL_HISTORY_WITHIN_LAST_SIX_MONTHS: YES
ADLS_ACUITY_SCORE: 15
ADLS_ACUITY_SCORE: 11
ADLS_ACUITY_SCORE: 15
WALKING_OR_CLIMBING_STAIRS_DIFFICULTY: NO
ADLS_ACUITY_SCORE: 11
ADLS_ACUITY_SCORE: 11
ADLS_ACUITY_SCORE: 15
WEAR_GLASSES_OR_BLIND: NO
DIFFICULTY_COMMUNICATING: NO
TOILETING_ISSUES: NO
ADLS_ACUITY_SCORE: 15
ADLS_ACUITY_SCORE: 11
DOING_ERRANDS_INDEPENDENTLY_DIFFICULTY: NO
ADLS_ACUITY_SCORE: 11
ADLS_ACUITY_SCORE: 11
WHICH_OF_THE_ABOVE_FUNCTIONAL_RISKS_HAD_A_RECENT_ONSET_OR_CHANGE?: FALL HISTORY
ADLS_ACUITY_SCORE: 14
ADLS_ACUITY_SCORE: 11
ADLS_ACUITY_SCORE: 15
CONCENTRATING,_REMEMBERING_OR_MAKING_DECISIONS_DIFFICULTY: NO
ADLS_ACUITY_SCORE: 15
ADLS_ACUITY_SCORE: 11
ADLS_ACUITY_SCORE: 11

## 2021-12-21 ASSESSMENT — VISUAL ACUITY
OU: OTHER (SEE COMMENT)

## 2021-12-21 NOTE — PLAN OF CARE
SLP: Swallow eval order received. Pt in OR most of the day. SLP will follow up 12/22 to determine if pt is appropriate for evaluation.

## 2021-12-21 NOTE — OR NURSING
One Sz reported on transfer to pre op. Pt has had 11 sz in pre op consisting of lip smacking and occasional twitching of rt leg first noted upon arrival and then throughout pre op stay.  Longest lasting sz 1min on average sz time of 40s.  Post sz pt  Becoming more and more lethargic. Initially alert and oriented post. Neuro checks on pre op arrival  Left pupil fixed and irregular.  Left sided neglect and weakness along with garbled speech but remains understandable.  Dr Wilkins and surgical team aware.  2 mg of versed given at 1310 per Dr Wilkins. Oxygen sats maintained 97-99 on 3 liters NC.   Brownish/red  output from NG tube total 30 ml.  I called OR desk requesting surgery time for pt to go into OR,as pt status worsening.  Dr Quinones made aware of increase of seizure activity shortly upon pt arrival to PACU.  Resident aware along with anesthesia.

## 2021-12-21 NOTE — ED NOTES
Report given to PANCHO Caban in 4A. Plan for patient to go to MRI with ICU floart RN and then up to 4A.

## 2021-12-21 NOTE — OP NOTE
PATIENT NAME:   Red Sutherland  PATIENT MRN:   [id number]  PATIENT ACCOUNT:  [id number]  PATIENT YOB: 1940    NEUROSURGERY OPERATIVE REPORT     DATE OF SURGERY: 12/21/21     PREOPERATIVE DIAGNOSIS:  Acute right subdural hematoma  Seizures     POSTOPERATIVE DIAGNOSIS: Same     PROCEDURES PERFORMED:  Right sided craniotomy for subdural hematoma evacuation     STAFF SURGEON: Parvez Quinones MD     RESIDENT SURGEONS: Suzanne Heaton MD, Maris Quintana MD     ANESTHESIA: General endotracheal anesthesia     ESTIMATED BLOOD LOSS: 250 mL     DRAINS: 10F flat KEILY drain in the subgaleal space and 10F round KEILY drain in the subdural space     FINDINGS:  Acute blood clot with arterial bleeder over the lateral right frontal lobe     COMPLICATIONS: None     INDICATIONS: Red Sutherland is a 81 year old male with a complex medical history on anticoagulation and antiplatelet therapy who had fallen almost 2 weeks prior to surgery. He started developing a headache and some left sided weakness a week prior to surgery. The symptoms persisted so he presented to the ED. He was found to have an acute right sided subdural hematoma with a small area of infarct in the right frontal lobe. Repeat scans were stable, and due to how recently he had taken his antiplatelet and anticoagulation medication, it was deemed safest to wait on operating. He began to have focal seizures and therefore surgery was performed on a more urgent basis with blood products available to assist in coagulation as well as DDAVP. Risks, benefits, and alternatives were discussed with the patient and the wife, and consent was obtained.     DESCRIPTION OF PROCEDURE:  Red Sutherland was brought to the operating room. his identity was verified. The patient was transferred in the prone position to the operating table. General endotracheal anesthesia was obtained. The bed was turned 180 degrees. The patient's head was positioned on a horseshoe with  the right side up. Preoperative antibiotics were administered.     The patient was cleaned, prepared, and draped in sterile fashion. 10 ml of local anesthetic was used. Timeout was performed.     A #10 blade scalpel was used to complete a reverse question jose incision. Monopolar cautery was used to dissect down to the bone and turn the myocutaneous flap. Monopolar and a periosteal elevator was used to dissect the temporalis off of the bone. Fish hooks were used to retract the flap. A  drill was used to create 3 montse holes. They were then connected using a B1 with a footplate to turn the craniotomy. The bone flap was stored on the back table for later use. Venous bleeding was encountered on the superior medial aspect of the craniotomy which was controlled with gel foam and pressure. The dura was opened in a stellate fashion and acute blood clot was encountered. The blood clot was cautiously evacuated and an arterial bleeder was encountered on the cortical surface. This was coagulated and surgicel was placed over it. We used copious irrigation to ensure the removal of all safely accessible clot and to confirm no active bleeding. Peripheral epidural tack up sutures using 4-0 nurolon were placed. A 10 Mongolian round KEILY drain was placed in the subdural space and tunneled away from the incision. Duragen was placed over the cranial defect and the dural flaps were loosely secured leaving long tails for central tack-ups. The bone flap was plated and small holes were drilled for the central tack ups. The tack-up sutures were threaded through the bone flap and the bone flap was secured using the [employer]ynthe[employer] plating system. The central tack up sutures were secured. A 10 Mongolian flat KEILY drain was placed in the subgaleal space and tunneled away from the incision. Copious irrigation was used and meticulous hemostasis was achieved.     Thereafter, we closed in layers with inverted, interrupted 2-0 vicryl for the galea and  staples. Both exiting drains were secured to the skin at their exit sites using 3-0 nylon suture. Juanjose Domingo bulbs were attached to both drains. Monocryl marleny stitches were placed around the drains for later use. Sterile dressings were applied. The patient remained intubated per anesthesia and returned to the post operative care unit without incident. At the end of the procedure, sponge and needle counts were correct.      Dr. Quinones was present for the critical portions of the procedure and immediately available for the remainder.     Operative note prepared by Suzanne Heaton MD, Neurosurgery Resident, PGY-3        I was present during the key portions of the operation and I was immediately available for the entire procedure. I called the patient's spouse to update her upon conclusion of the procedure.    Parvez Quinones MD PhD

## 2021-12-21 NOTE — PLAN OF CARE
"Major Shift Events:  Neuro: A+Ox4, inconsistently follows commands, slow to respond. Left sided neglect observed with attending MD at bedside. Denies pain, numbness, or tingling. Multiple seizures this morning: lip smacking, twitching of L face, lasting 30-90 seconds. NSG aware. Pt to OR today: crani with hematoma evac. Pt will return with KEILY x 2 (R/L).   CV: In and out of a-fib with occasional PVCs. SBP goal <140. Afebrile.   Pulm: 3L NC prior to OR; will return to  intubated (CMV 40%/500/10/5 per PACU report).    GI/: NGT inserted for meds this a.m. NPO except for meds. Attempted to void in urinal but unable prior to OR. Will return from OR with Dixon. No BM this shift.   Skin: Lump on abdomen (protrusion); pt states this is \"not normal\". (Prior to OR)    Hours of care: 0700-11:14    Pt in OR from 11:14 past change of shift. Report received form PACU and given to oncoming noc RN. Pt to transport to  after shift change.    Unable to chart hourly neuros or full assessments during time pt was off the unit.    Plan: Cards consult. Continue to [facility]; notify MD with changes/concerns.   For vital signs and complete assessments, please see documentation flowsheets.     "

## 2021-12-21 NOTE — PROGRESS NOTES
[facility], [place]   12/21/2021  Neurosurgery Progress Note:    Assessment:  Red Sutherland is a 81-year-old male post-traumatic acute right subdural hematoma with left hemibody weakness, on xarelto and plavix. Admitted to the ICU on 12/20/21.     Plan:  - Hold xarelto, plavix  - Cardiology consultation for AP/AC restart guidance  - Medicine consultation for pre-operative optimization  - CT AM stable  - Keppra 1g BID  - HOB > 30 degrees  - SLP eval   - NPO  - Plan for OR Wednesday   - Marked and consented  -----------------------------------  Asim Ann MD  PGY-2, Neurosurgery  -----------------------------------    Interval History: admitted to 4A. Found to have L facial twitching with periodic unresponsiveness. Given 1.5g keppra load and increased to maintenance 1g keppra BID.     Objective:   Temp:  [98  F (36.7  C)-100  F (37.8  C)] 99.5  F (37.5  C)  Pulse:  [] 87  Resp:  [9-32] 18  BP: ()/() 136/80  SpO2:  [89 %-100 %] 97 %  I/O last 3 completed shifts:  In: 2100.33 [I.V.:1378.33]  Out: 375 [Urine:375]    Gen: Appears comfortable, NAD  Wound: clean, dry, intact  Neurologic:  - Alert & Oriented to person, place, time, and situation  - Follows commands briskly  - Speech slow but fluent. Dysarthric speech.   - No gaze preference. No apparent hemineglect.  - PERRL, EOMI  - Face symmetric with sensation intact to light touch  - Palate elevates symmetrically, uvula midline, tongue protrudes midline  - Trapezii muscles 5/5 bilaterally  - Unable to assess pronator drift due to lack of following commands    - Mild L facial droop     Del Tr Bi WE WF Gr   R 5 5 5 5 5 5   L 4 4 4 4 4 4    HF KE KF DF PF EHL   R 5 5 5 5 5 5   L 5 5 5 5 5 5     Reflexes 2+ throughout    Sensation intact and symmetric to light touch throughout    LABS:  Recent Labs   Lab 12/21/21  0438 12/21/21  0405 12/21/21  0013 12/20/21  2202 12/20/21  1350 12/20/21  1046     --   --   --  139 138    POTASSIUM 3.9  --   --   --  3.9 3.7   CHLORIDE 109  --   --   --  104 99   CO2 27  --   --   --  29 24   ANIONGAP 6  --   --   --  6 15   * 162* 153*   < > 141* 269*   BUN 28  --   --   --  36* 37*   CR 1.28*  --   --   --  1.25 1.60*   ANNMARIE 8.6  --   --   --  8.8 9.4    < > = values in this interval not displayed.       Recent Labs   Lab 12/21/21  0438   WBC 10.2   RBC 3.97*   HGB 8.2*   HCT 28.8*   MCV 73*   MCH 20.7*   MCHC 28.5*   RDW 18.0*          IMAGING:  Recent Results (from the past 24 hour(s))   XR Chest 2 Views    Narrative    EXAM: XR CHEST 2 VW  LOCATION: [facility], [place]  DATE/TIME: 12/20/2021 11:02 AM    INDICATION: cough  COMPARISON: 11/30/2021       Impression    IMPRESSION: No pleural fluid or pneumothorax. Heterogeneous pulmonary opacities have increased from the prior study. The heart is not well assessed. There is aortic calcification.    CT Head w/o Contrast    Narrative    EXAM: CT HEAD W/O CONTRAST, CT CERVICAL SPINE W/O CONTRAST  LOCATION: [facility], [place]  DATE/TIME: 12/20/2021 10:50 AM    INDICATION: Fall. Trauma.  COMPARISON: None.  TECHNIQUE:   1) Routine CT Head without IV contrast. Multiplanar reformats. Dose reduction techniques were used.  2) Routine CT Cervical Spine without IV contrast. Multiplanar reformats. Dose reduction techniques were used.    FINDINGS:   HEAD CT:   INTRACRANIAL CONTENTS: Large hyperdense holohemispheric right-sided subdural hematoma which measures 12.9 mm in thickness over the posterior right frontal convexity, 12.6 mm over the right parietal convexity and 11.9 mm over the right temporal convexity.   It does cause mass effect on the adjacent brain parenchyma. Partial effacement of the right lateral ventricle and the third ventricle. 5.6 mm midline shift to the left at the level of the lateral ventricles. Partial effacement of the suprasellar   cisterns.     Mild diffuse cerebral parenchymal volume loss.  No cerebellar tonsillar ectopia. No CT evidence for an acute infarct. Mild periventricular and scattered foci of deep white matter hypodensities involving both cerebral hemispheres. These are nonspecific.   They likely represent foci of chronic hypertensive/microvascular ischemic white matter changes. Moderate atherosclerotic calcifications of the intracranial vertebral arteries and the cavernous and supraclinoid internal carotid arteries.    VISUALIZED ORBITS/SINUSES/MASTOIDS: Prior bilateral cataract surgery. Visualized portions of the orbits are otherwise unremarkable. Mild mucosal thickening of the ethmoid air cells and the maxillary sinuses. No middle ear or mastoid effusion.    BONES/SOFT TISSUES: No acute abnormality.    CERVICAL SPINE CT: Moderate motion artifact present.    VERTEBRA: 2 mm retrolisthesis of C4 on C5. 2 mm spondylolisthesis of C7 on T1. The craniocervical junction is within normal limits. Vertebral body heights are maintained. No prevertebral soft tissue edema. No fractures within the limitations of this   exam.    CANAL/FORAMINA: Severe intervertebral disc height loss from C4-C5 to C7-T1. Moderate intervertebral disc height loss at C3-C4. Small broad-based disc bulges at C3-C4 and C4-C5. Mild spinal canal narrowing at those levels. Moderate left and mild right   neuroforaminal narrowing at C2-C3. Severe bilateral neuroforaminal narrowing at C3-C4 and C4-C5. Severe bilateral neuroforaminal narrowing at C5-C6 and C6-C7.    PARASPINAL: Mild symmetric atrophy of the posterior paraspinal musculature. Moderate atherosclerotic calcification of the carotid bulbs, left greater than right.      Impression    IMPRESSION:  HEAD CT:  1.  Large hyperdense holohemispheric right-sided subdural hematoma which measures 12.9 mm in thickness over the posterior right frontal convexity, 12.6 mm over the right parietal convexity and 11.9 mm over the right temporal convexity.  2.  It does cause mass effect on the  adjacent brain parenchyma. Partial effacement of the right lateral ventricle and the third ventricle. 5.6 mm midline shift to the left at the level of the lateral ventricles. Partial effacement of the suprasellar   cisterns.     Discussed the head CT findings with Dr. Fuller at 11:16 AM, 12/20/2021.    CERVICAL SPINE CT:  1.  Moderately motion degraded exam.  2.  No fractures or posttraumatic subluxations within the limitations of this exam.   CT Cervical Spine w/o Contrast    Narrative    EXAM: CT HEAD W/O CONTRAST, CT CERVICAL SPINE W/O CONTRAST  LOCATION: [facility]  DATE/TIME: 12/20/2021 10:50 AM    INDICATION: Fall. Trauma.  COMPARISON: None.  TECHNIQUE:   1) Routine CT Head without IV contrast. Multiplanar reformats. Dose reduction techniques were used.  2) Routine CT Cervical Spine without IV contrast. Multiplanar reformats. Dose reduction techniques were used.    FINDINGS:   HEAD CT:   INTRACRANIAL CONTENTS: Large hyperdense holohemispheric right-sided subdural hematoma which measures 12.9 mm in thickness over the posterior right frontal convexity, 12.6 mm over the right parietal convexity and 11.9 mm over the right temporal convexity.   It does cause mass effect on the adjacent brain parenchyma. Partial effacement of the right lateral ventricle and the third ventricle. 5.6 mm midline shift to the left at the level of the lateral ventricles. Partial effacement of the suprasellar   cisterns.     Mild diffuse cerebral parenchymal volume loss. No cerebellar tonsillar ectopia. No CT evidence for an acute infarct. Mild periventricular and scattered foci of deep white matter hypodensities involving both cerebral hemispheres. These are nonspecific.   They likely represent foci of chronic hypertensive/microvascular ischemic white matter changes. Moderate atherosclerotic calcifications of the intracranial vertebral arteries and the cavernous and supraclinoid internal carotid  arteries.    VISUALIZED ORBITS/SINUSES/MASTOIDS: Prior bilateral cataract surgery. Visualized portions of the orbits are otherwise unremarkable. Mild mucosal thickening of the ethmoid air cells and the maxillary sinuses. No middle ear or mastoid effusion.    BONES/SOFT TISSUES: No acute abnormality.    CERVICAL SPINE CT: Moderate motion artifact present.    VERTEBRA: 2 mm retrolisthesis of C4 on C5. 2 mm spondylolisthesis of C7 on T1. The craniocervical junction is within normal limits. Vertebral body heights are maintained. No prevertebral soft tissue edema. No fractures within the limitations of this   exam.    CANAL/FORAMINA: Severe intervertebral disc height loss from C4-C5 to C7-T1. Moderate intervertebral disc height loss at C3-C4. Small broad-based disc bulges at C3-C4 and C4-C5. Mild spinal canal narrowing at those levels. Moderate left and mild right   neuroforaminal narrowing at C2-C3. Severe bilateral neuroforaminal narrowing at C3-C4 and C4-C5. Severe bilateral neuroforaminal narrowing at C5-C6 and C6-C7.    PARASPINAL: Mild symmetric atrophy of the posterior paraspinal musculature. Moderate atherosclerotic calcification of the carotid bulbs, left greater than right.      Impression    IMPRESSION:  HEAD CT:  1.  Large hyperdense holohemispheric right-sided subdural hematoma which measures 12.9 mm in thickness over the posterior right frontal convexity, 12.6 mm over the right parietal convexity and 11.9 mm over the right temporal convexity.  2.  It does cause mass effect on the adjacent brain parenchyma. Partial effacement of the right lateral ventricle and the third ventricle. 5.6 mm midline shift to the left at the level of the lateral ventricles. Partial effacement of the suprasellar   cisterns.     Discussed the head CT findings with Dr. Fuller at 11:16 AM, 12/20/2021.    CERVICAL SPINE CT:  1.  Moderately motion degraded exam.  2.  No fractures or posttraumatic subluxations within the limitations of  this exam.   CT Head w/o Contrast   Result Value    Radiologist flags Right subdural hematoma w/ midline shift, question (Urgent)    Narrative    CT HEAD W/O CONTRAST 12/20/2021 6:24 PM    History: Right subdural hematoma     Comparison: None    Technique: Using multidetector thin collimation helical acquisition  technique, axial, coronal and sagittal CT images from the skull base  to the vertex were obtained without intravenous contrast.   (topogram) image(s) also obtained and reviewed.    Findings: Images are moderately degraded secondary to patient motion.    There is a mixed density right subdural hematoma with acute component  measuring up to 19 mm with sulcal effacement of the right convexity  and the right parafalcine sulci. This causes a right to left midline  shift of 7 mm. There is a focal area of gray-white differentiation  loss at the right frontal lobe suspicious for acute infarction (series  3, image 21). Ventricles are proportionate to the cerebral sulci. The  basal cisterns are clear. Heavily calcified proximal basilar artery.    The bony calvaria and the bones of the skull base are normal. The  visualized portions of the paranasal sinuses and mastoid air cells are  clear.       Impression    Impression:    1. Mixed density right subdural hematoma with acute component causing  adjacent sulcal effacement of the right convexity and the right  parafalcine sulci. Attention is recommended on follow-up.  2. Right to left midline shift of 6 mm.  3. Focal area of  gray-white differentiation loss of the right frontal  lobe suspicious for acute infarct.      [Urgent Result: Right subdural hematoma w/ midline shift, question  right frontal lobe infarct.]    Finding was identified on 12/20/2021 6:39 PM.     Dr. Reese was contacted by Dr. Terrell at 12/20/2021 6:57 PM and  verbalized understanding of the urgent finding.      I have personally reviewed the examination and initial interpretation  and I agree  with the findings.    ARCHANA PULIDO MD         SYSTEM ID:  [id number]   MR Brain w/o Contrast   Result Value    Radiologist flags Tiny wedge-shaped infarct within the right frontal (Urgent)    Narrative    MRI brain without contrast    Provided History:  Right subdural hematoma and possible right frontal  lobe infarct.    Comparison:  Head CT 12/20/2021      Technique:   Axial and sagittal diffusion-weighted (with ADC map), axial FLAIR, and  axial susceptibility-weighted images of the brain were obtained  without the administration of intravenous contrast.    Findings:   Redemonstration of subdural hematoma along the right cerebral  convexity, measuring 1.9 cm in greatest depth. Mass effect with  right-sided effacement of sulci and 8 mm of right to left midline  shift (previously 7 mm). There is partial effacement of the right  lateral ventricle. No hydrocephalus. Patchy periventricular and  subcortical white matter T2 hyperintensities, nonspecific, but likely  related to chronic small vessel ischemic disease given the patient's  age. The flow voids are patent. The basilar cisterns are patent.  Diffusion weighted images demonstrate a small wedge-shaped area with  restricted diffusion of the lateral right frontal lobe (series 3,  image 81), likely small infarct.      Impression    Impression:  1. Small area of restricted diffusion in the lateral right frontal  lobe, likely infarct.  2. Re-demonstration of subdural hemorrhage along the right cerebral  convexity measuring 1.9 cm. There is stable 8 mm of right-to-left  midline shift.     [Urgent Result: Tiny wedge-shaped infarct within the right frontal  lobe laterally.]    Finding was identified on 12/20/2021 9:29 PM.     Neurosurgery resident, Dr. Asim Ann was contacted by Dr. Lindsay Hartley at 12/20/2021 10:34 PM and verbalized understanding of the  urgent finding.     I have personally reviewed the examination and initial interpretation  and I agree with the  findings.    ARCHANA PULIDO MD         SYSTEM ID:  [id number]   CT Head w/o Contrast    Narrative    CT HEAD W/O CONTRAST 12/21/2021 4:37 AM    Provided History: Right subdural hematoma    Comparison: 12/20/2021.    Technique: Using multidetector thin collimation helical acquisition  technique, axial, coronal and sagittal CT images from the skull base  to the vertex were obtained without intravenous contrast.     Findings:    No significant change in the mixed density right subdural hematoma  measuring up to 19 mm with sulcal effacement. This causes a right to  left midline shift of approximately 7 mm, unchanged. Focal area of  hypodensity with  Normal gray-white matter differentiation loss in the right frontal  operculum, matching the area of diffusion restriction on previous MR,  compatible with infarct. Ventricles are proportionate to the cerebral  sulci. The basal cisterns are clear. Heavily calcified proximal  basilar artery. The basilar cisterns are patent.    The bony calvaria and the bones of the skull base are normal. The  visualized portions of the paranasal sinuses and mastoid air cells are  clear.       Impression    Impression:  1. No significant change in right subdural hematoma with right  cerebral sulcal effacement and leftward midline shift of approximately  7 mm.  2. Focal hypodensity of the right frontal lobe compatible with infarct  characterized on recent MRI.    I have personally reviewed the examination and initial interpretation  and I agree with the findings.    RENEE GUY MD         SYSTEM ID:  [id number]

## 2021-12-21 NOTE — CONSULTS
"[facility]  Neurocritical Care Consult    Patient Name:  Red Sutherland  MRN:  [id number]    :  1940  Date of Service:  2021  Primary care provider:  Haresh Corona  Date of Admission:  2021  Hospital Day: 2     History of Present Illness   Red Sutherland is a 81 year old male with PMH of atrial fibrillation on Eliquis, CHF, COPD, pulmonary hypertension, T2DM, HTN who was admitted on 2021 as a transfer from [facility] after he was found to have a right subdural hematoma.     Per patients wife, he had a fall about 2 weeks ago when he was at home when he hit his head. Shortly after patient was evaluated by PCP for neck pain. However yesterday patient was noted to be more \"sleepy\" and slurring his speech. He then started having episodes where he was smacking his lips and biting his tongue with retained consciousness. Upon arrival to [facility], he had stable vitals with a GCS 15. Noted to have slurring of his speech and no focal weakness. CT head was completed that showed a large hyperdense holohemispheric right-sided subdural hematoma which measures 12.9 mm in thickness over the right posterior right frontal convexity, 12.6 mm over the right parietal convexity and 11.9 mm over the right temporal convexity.  2. There was also mass effect on adjacent brain parenchyma, partial effacement of the right lateral ventricle and the third ventricle, 5.6 mm midline shift to the left. Patient was transferred to [facility] for hematoma evacuation.     This morning, patient reports that he is here for a \"bleed in his brain.\" Reports that his speech is slightly abnormal, otherwise no other complaints. He is unable to provide any meaningful history besides that. In addition, he has several episodes of yelling out nonsensical speech.      Past Medical History:   Diagnosis Date     Atrial fibrillation (H)      CHF (congestive heart failure) (H)      COPD " (chronic obstructive pulmonary disease) (H)      Coronary artery disease      Diabetes mellitus (H)      Essential hypertension      Hyperlipidemia      Pulmonary hypertension (H)     O2 at night     Pulmonary hypertension due to left ventricular diastolic dysfunction (H) 11/28/2017    Multifactorial per [facility] with elevated LVEDP and PCW, COPD and NOVA. They put him on sildenafil as nitroprusside lowered systemic BP and with that the mean PA dropped from 54 to 49. Negative VQ at [facility] Dec 1, 2017.     RLS (restless legs syndrome)      Sleep apnea        Past Surgical History:   Procedure Laterality Date     BACK SURGERY      lower back     CARDIAC CATHETERIZATION  12/13/2017    Right and left at [facility], mean PA 58, PCW 24 with V wave of 35, LVEDP of 18, with Nipride systemic BP, PVR and mean PA all declined     CARDIOVERSION  03/15/2013    for afib     CATARACT EXTRACTION Bilateral      COLONOSCOPY N/A 10/31/2021    Procedure: COLONOSCOPY WITH POLYPECTOMY;  Surgeon: Sheng Sagastume MD;  Location: [facility] Main OR     CORONARY STENT PLACEMENT       CV CORONARY ANGIOGRAM N/A 10/25/2021    Procedure: Coronary Angiogram;  Surgeon: Otf Knowles MD;  Location: [facility] CATH LAB CV     CV CORONARY LITHOTRIPSY PCI N/A 10/25/2021    Procedure: CV Coronary Lithotripsy PCI;  Surgeon: Otf Knowles MD;  Location: [facility] LAB CV     CV LEFT HEART CATH N/A 10/25/2021    Procedure: Left Heart Cath;  Surgeon: Otf Knowles MD;  Location: [facility] LAB CV     CV PCI N/A 10/25/2021    Procedure: Percutaneous Coronary Intervention;  Surgeon: Otf Knowles MD;  Location: [facility] LAB CV     ESOPHAGOSCOPY, GASTROSCOPY, DUODENOSCOPY (EGD), COMBINED N/A 10/30/2021    Procedure: ESOPHAGOGASTRODUODENOSCOPY (EGD);  Surgeon: Sheng Sagastume MD;  Location: [facility] Main OR     IR ABDOMINAL AORTOGRAM  11/16/2012     IR MISCELLANEOUS PROCEDURE  7/20/2001     IR MISCELLANEOUS PROCEDURE  11/16/2012     OTHER  SURGICAL HISTORY      mynor     SHOULDER SURGERY      reapir on right shoulder     TOTAL HIP ARTHROPLASTY Left      TOTAL KNEE ARTHROPLASTY Bilateral      WRIST SURGERY Bilateral      ZZHC COLONOSCOPY W/WO BRUSH/WASH N/A 1/14/2021    Procedure: COLONOSCOPY;  Surgeon: Mihai Harris MD;  Location: [facility] OR;  Service: Gastroenterology       Medications:   Current Facility-Administered Medications   Medication     acetaminophen (TYLENOL) tablet 650 mg    Or     acetaminophen (TYLENOL) solution 650 mg     albuterol (PROVENTIL HFA/VENTOLIN HFA) inhaler     atorvastatin (LIPITOR) tablet 80 mg     bumetanide (BUMEX) tablet 4 mg     glucose gel 15-30 g    Or     dextrose 50 % injection 25-50 mL    Or     glucagon injection 1 mg     [Held by provider] ferrous gluconate (FERGON) tablet 324 mg     fluticasone-vilanterol (BREO ELLIPTA) 200-25 MCG/INH inhaler 1 puff     HOLD ace-inhibitors and angiotension-receptor blockers on day of surgery     hydrALAZINE (APRESOLINE) injection 10 mg     HYDROmorphone (DILAUDID) injection 0.2 mg     insulin aspart (NovoLOG) injection (RAPID ACTING)     insulin aspart (NovoLOG) injection (RAPID ACTING)     [Held by provider] irbesartan (AVAPRO) tablet 150 mg     labetalol (NORMODYNE/TRANDATE) injection 20 mg     lacosamide (VIMPAT) 100 mg in sodium chloride 0.9 % 100 mL intermittent infusion     levETIRAcetam (KEPPRA) intermittent infusion 1,500 mg     metoprolol tartrate (LOPRESSOR) tablet 25 mg     naloxone (NARCAN) injection 0.2 mg    Or     naloxone (NARCAN) injection 0.4 mg    Or     naloxone (NARCAN) injection 0.2 mg    Or     naloxone (NARCAN) injection 0.4 mg     ondansetron (ZOFRAN-ODT) ODT tab 4 mg    Or     ondansetron (ZOFRAN) injection 4 mg     pantoprazole (PROTONIX) 2 mg/mL suspension 40 mg     potassium chloride (KLOR-CON) Packet 40 mEq     rOPINIRole (REQUIP) tablet 2 mg     senna-docusate (SENOKOT-S/PERICOLACE) 8.6-50 MG per tablet 1 tablet    Or     senna-docusate  (SENOKOT-S/PERICOLACE) 8.6-50 MG per tablet 2 tablet     [Held by provider] sodium chloride 0.9% infusion     tadalafil (ADCIRCA/CIALIS) tablet   .    Allergies:   Allergies   Allergen Reactions     Contrast Dye Nausea     Other reaction(s): GI intolerance, GI Upset     Furosemide Muscle Pain (Myalgia)     Previously tolerated.  Muscle cramps     Hydrochlorothiazide Unknown     Iodinated Contrast Media [Diagnostic X-Ray Materials] Nausea     Losartan Other (See Comments)     Other reaction(s): Stomatitis, Bloody nose dry mouth and lips     Losartan-Hydrochlorothiazide [Hyzaar]      Mouth sores     Metaxalone Nausea     Metolazone Other (See Comments)     Muscle cramps     Mometasone Other (See Comments)     Bloody nose     Rabeprazole Other (See Comments)     Mouth sores     Ramipril Other (See Comments)     Mouth sores     Shellfish Containing Products [Shellfish-Derived Products] Unknown     Other reaction(s): mouth sores, Other reaction(s): mouth sores     Sildenafil Muscle Pain (Myalgia)     Methocarbamol Rash     Penicillins Other (See Comments)     Immune-does not work for him   .    Family History:   Family History   Problem Relation Age of Onset     Hyperlipidemia Mother      Hypertension Mother      Heart Disease Mother      Hyperlipidemia Father      Hypertension Father      Coronary Artery Disease Father      Cerebrovascular Disease Brother      Depression Brother      No Known Problems Sister      Pulmonary Hypertension No family hx of      Congenital heart disease No family hx of    .    Social History:   Social History     Tobacco Use     Smoking status: Former Smoker     Packs/day: 1.50     Years: 44.00     Pack years: 66.00     Types: Cigarettes     Quit date: 1996     Years since quittin.6     Smokeless tobacco: Never Used   Substance Use Topics     Alcohol use: Yes     Alcohol/week: 1.0 standard drink     Comment: Alcoholic Drinks/day: 1 per month   .    Tobacco use: Former smoker, last  smoked 1996    ROS:  Review of systems not obtained due to patient factors - confusion     Objective   Temp:  [98.2  F (36.8  C)-100  F (37.8  C)] 98.2  F (36.8  C)  Pulse:  [] 72  Resp:  [9-26] 14  BP: ()/() 130/71  SpO2:  [80 %-100 %] 98 %  Resp: 14    I/O last 3 completed shifts:  In: 2100.33 [I.V.:1378.33]  Out: 375 [Urine:375]    Physical Exam    General: Laying in hospital bed, no acute distress.  CV: Extremities appear well perfused.  Resp: On nasal canula, no increased work of breathing.  GI: Soft, non-distended.  Extremities: Pedal pulses palpable.  Skin: Warm, dry, no jaundice.    Neuro: alert and oriented x4. He follows commands appropriately. Able to provide a limited history of why he is in the hospital. Intermittent outburst of yelling. Speech is dysarthric. Pupils equal and reactive. Face is symmetric at rest and with activation. He is moving all four extremities spontaneously and anti-gravity. Purposeful movements.      Labs and Imaging   BMP  Recent Labs   Lab 12/21/21  0438 12/20/21  1350 12/20/21  1046    139 138   POTASSIUM 3.9 3.9 3.7   CHLORIDE 109 104 99   CO2 27 29 24   BUN 28 36* 37*   CR 1.28* 1.25 1.60*   ANNMARIE 8.6 8.8 9.4       CBC  Recent Labs   Lab 12/21/21  0438 12/20/21  1350 12/20/21  1046   WBC 10.2 9.0 10.3   HGB 8.2* 8.0* 8.8*    275 303       COAGS  Recent Labs   Lab 12/20/21  1350 12/20/21  1046   INR 1.32* 1.20*   PTT 37 35       ABG  No results for input(s): PH, PCO2, PO2, HCO3 in the last 168 hours.    CRP/ESR  No results for input(s): CRP in the last 168 hours.    Invalid input(s): ESR    CSF  No results for input(s): CGLU, CTP in the last 168 hours.    Invalid input(s): CCSF    MICRO  No results for input(s): CULT in the last 168 hours.    LIPIDS  Recent Labs   Lab Test 10/25/21  1529 05/25/21  1359   CHOL 98 94   HDL 35* 25*   LDL 53 <5   TRIG 49 353*       IMAGING:   Personally reviewed. The radiologist's interpretation is documented  below.    CT HEAD W/O CONTRAST, CT CERVICAL SPINE W/O CONTRAST 12/20/2021 10:50 AM  IMPRESSION:  HEAD CT:  1.  Large hyperdense holohemispheric right-sided subdural hematoma which measures 12.9 mm in thickness over the posterior right frontal convexity, 12.6 mm over the right parietal convexity and 11.9 mm over the right temporal convexity.  2.  It does cause mass effect on the adjacent brain parenchyma. Partial effacement of the right lateral ventricle and the third ventricle. 5.6 mm midline shift to the left at the level of the lateral ventricles. Partial effacement of the suprasellar   cisterns.     CT HEAD W/O CONTRAST 12/20/2021 6:24 PM  Impression:  1. Mixed density right subdural hematoma with acute component causing  adjacent sulcal effacement of the right convexity and the right  parafalcine sulci. Attention is recommended on follow-up.  2. Right to left midline shift of 6 mm.  3. Focal area of  gray-white differentiation loss of the right frontal  lobe suspicious for acute infarct.    MRI brain without contrast 12/20/2021 9:26 PM  Impression:  1. Small area of restricted diffusion in the lateral right frontal  lobe, likely infarct.  2. Re-demonstration of subdural hemorrhage along the right cerebral  convexity measuring 1.9 cm. There is stable 8 mm of right-to-left  midline shift.     CT HEAD W/O CONTRAST 12/21/2021 4:37 AM  Impression:  1. No significant change in right subdural hematoma with right  cerebral sulcal effacement and leftward midline shift of approximately  7 mm.  2. Focal hypodensity of the right frontal lobe compatible with infarct  characterized on recent MRI.     Assessment and Plan   Red Sutherland is a 81 year old male who was admitted with slurred speech and confusion, found to have a right sided subdural hematoma. He was admitted to neurosurgery team with plans for OR today for hematoma evacuation.     Neuro  #Large right subdural hematoma, traumatic  - Neurochecks Q 1 hour  - SBP  Goal:  <160 from neurocrit stand point  - HOB > 30  - Normonatremia  - OR to hematoma evacuation with NSGY  - Nutrition: NPO while awaiting OR  - PT/OT after OR when appropriate    #Concern for focal seizure  - Loaded with Keppra  - Continue Keppra maintenance 1500 mg BID  - Load with Vimpat 200 mg IV once, followed by maintenance 100 mg BID  - Hold off on vEEG for now. Can consider after OR if continued episodes or worsening mental status.    #Restless leg syndrome  - Continue pta ropinirole      Resp  #COPD  - Continue home albuterol and Breo inhalers  - Home oxygen 2-4L, continue here for O2 sat > 89%    #Hx of pulmonary hypertension  - Continue pta tadalafil     CV  #HFpEF  EF in October 2021 was 60 - 65%. Right ventricle is moderately to severely dilated, with moderately decreased systolic dysfunction.   - Continue bumex 4mg daily (does take an additional dose of 3 mg at night which will hold at this time)  - Monitor fluid and respiratory status    #CAD  #HTN  #HLD  - Holding home plavix in the setting of bleed  - Continue pta metoprolol 25 mg BID  - Continue pta irbesartan 150 mg daily  - Continue pta atorvastatin 80 mg daily    #Atrial fibrillation  - Metoprolol as above  - Holding pta Eliquis in the setting of bleed    Renal  #CKD3  Cr of 1.28 this AM. Baseline ranges in chart, in the past year between 1.2 - 3.2.  - Monitor BMP daily  - Monitor urine output  - Avoid nephrotoxic medications  - Electrolyte replacement protocol    Endo  #Type 2 diabetes mellitus  - Holding lantus 18 unit(s) BID and canagliflozin  - High intensity sliding scale  - Glucose monitoring  - Hypoglycemia protocol     Heme  #Microcytic anemia, chronic  #Iron deficiency  -Daily CBC  - Continue pta iron supplementation     GI  No known issues.    - NPO while awaiting OR  - Bowel regimen: senna prn     ID  Afebrile. No leukocytosis. Got one dose of ancef yesterday in preparation for OR however did not go.       FEN: NPO while awaiting  OR  PPX: DVT - SCDs, holding chemoprophylaxis with SDH; GI - home protonix  Lines, tubes, drains: two peripherals  Dispo: admit to ICU under NSGY service      Patient was seen and discussed with the [facility] attending, Dr. Sawyer.    Keli Ortiz MD  Neurology PGY-2  12/21/2021 8:02 AM

## 2021-12-21 NOTE — CONSULTS
Cardiology Inpatient Consultation  December 21, 2021    Reason for Consult:  A cardiology consult was requested by the NSGY service to provide clinical guidance regarding cardiac optimization prior to urgent surgery.    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 Pt had head trauma around 12 days ago and was found to have a right subdural hematoma measuring around 12.9 cm with midline shift. There was a plan to emergently take him to the OR for evacuation and hence Cardiology was consulted prior to urgent surgery to see if any optimization was required.    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   CAD: Pt is currently on Atorvastatin and Metoprolol. His Plavix has been held and appropriately so given his acute bleed. See below regarding re-initiation of antiplatelets.  RV dysfunction: RV is preload dependent and hence ideally would not want to dehydrate pt extensively. Expect that he will receive volume through fluids perioperatively which is fine. OK to continue half dose of his home Bumex as ordered (pt takes 4mg BID at home and ordered as 4mg once daily appropriately)  HFpEF: Agree with continuing Empagliflozin and half of home diuretics  PH: Agree with continuing home Tadalafil. Follows with [facility] regarding PH  HTN: Continue Metoprolol and Irbesartan. Can hold as appropriate maine-operatively  Afib: Agree with continuing home Metoprolol for rate control. Apixaban appropriately held. Regarding re-initiation of  anticoagulants/antiplatelets, pt needs these agents for 2 reasons: recent stent and h/o Afib with high CHADSVASC (5). Once pt is cleared from NSGY point of view, we would recommend initiating heparin drip as this would serve as a challenge and would cover for both Afib and stents. If pt is able to tolerate Heparin for 24 hours, recommend discontinuing heparin and giving Aspirin 325mg load on Day 2. If pt is able to tolerate Aspirin load, then recommend starting Aspirin 81mg and Prasugrel 10mg daily (no load). Pt can then be an Aspirin and Prasugrel as dual antiplatelet therapy with no anticoagulation for Afib given his subdural hematomas and GI bleeds putting him at increased bleeding risk. There is a plan for him to get a Watchman at [facility] and he can pursue that after discharge.  -Continue Atorvastatin, Metoprolol tartrate, Irbesartan, Empagliflozin and Tadalafil  -Continue half dose of home diuretics as Bumex 4mg once daily (takes twice daily daily at home)  -Plavix and Apixaban on hold per NSGY  -Once cleared for anticoagulation/antiplatelets from NSGY end, recommend:   Day 1: Heparin for 24 hours as challenge   Day 2: If tolerates heparin drip, discontinue heparin and load with Aspirin 325 mg once   Day 3: If tolerates Aspirin load, start baby Aspirin (81mg) daily and Prasugrel 10 mg daily      Plan of care discussed with Dr Vega. Attending attestation to follow.      Thank you for consulting the cardiovascular services at the [facility]. Please do not hesitate to call us with any questions.     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 Pt had head trauma around 12 days ago and was found to have a right subdural  hematoma measuring around 12.9 cm with midline shift. There was a plan to emergently take him to the OR for evacuation and hence Cardiology was consulted prior to urgent surgery to see if any optimization was required.    In terms of Cardiac history, pt PCI to distal LCx with atherectomy at [facility] in 2/2021. Then in October he presented to [facility] with crescendo angina and was found to have in-stent restenosis requiring lithotripsy and PCI to the LCx. Since the ISR developed while on Clopidogrel, he was switched to Prasugrel but it seems that the pt was slightly confused about this and ended up taking quadruple therapy (ASA, Apixaban, Clopidogrel and Prasugrel) leading to GI bleeding and hence at follow up at [facility] he was being worked up for a Watchman device to get him off anticoagulation. In terms of his PH, He had a RHC in 8/2021 which showd RA: 18; PA mean: 55; Wedge 18. Last TTE in 10/24/2021 showed normal LVEF (60-65%) with moderate-severely dilated RV and moderately decreased RV function, moderate biatrial enlargement.       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 They put him on sildenafil as nitroprusside lowered systemic BP and with that the mean PA dropped from 54 to 49. Negative VQ at [facility] Dec 1, 2017.    RLS (restless legs syndrome)     Sleep apnea      Active Problems:  Patient Active Problem List    Diagnosis Date Noted    SDH (subdural hematoma) (H) 12/20/2021     Priority: Medium    Acute exacerbation of CHF (congestive heart failure) (H)  12/06/2021     Priority: Medium    Other ascites 12/04/2021     Priority: Medium    Other cirrhosis of liver (H) 12/01/2021     Priority: Medium    Anasarca 11/30/2021     Priority: Medium    Long term current use of antithrombotics/antiplatelets 10/29/2021     Priority: Medium    S/P coronary angiogram 10/29/2021     Priority: Medium    Gastrointestinal hemorrhage with melena 10/29/2021     Priority: Medium    Type 2 diabetes mellitus (H) 10/24/2021     Priority: Medium    Hypokalemia 09/08/2021     Priority: Medium    Nicotine dependence 09/08/2021     Priority: Medium    Stage 3a chronic kidney disease (H) 09/08/2021     Priority: Medium    NSTEMI (non-ST elevated myocardial infarction) (H) 08/24/2021     Priority: Medium    Lactic acid acidosis 08/23/2021     Priority: Medium    Renal insufficiency 08/23/2021     Priority: Medium    Anemia due to blood loss, acute 08/23/2021     Priority: Medium    Pain of right lower leg 08/23/2021     Priority: Medium    Chest pain, unspecified type 07/11/2021     Priority: Medium    Peripheral vascular disease (H) 02/05/2021     Priority: Medium    Anemia, iron deficiency 06/01/2020     Priority: Medium    Suspected COVID-19 virus infection 04/09/2020     Priority: Medium    Acute on chronic diastolic congestive heart failure (H) 04/09/2020     Priority: Medium    Essential hypertension      Priority: Medium     Created by Conversion        Persistent Atrial Fibrillation      Priority: Medium     Feb/March 2013 dx  Mar 2013 new sotalol Rx  [id number] score of 4 (age/ HTN/ CHF/ CAD)  Rx Eliquis  9/19/16 failed CV (sotalol 120 BID)        Hypercholesteremia      Priority: Medium     Created by Conversion        Coronary artery disease involving native coronary artery without angina pectoris      Priority: Medium    Chronic bronchitis (H)      Priority: Medium     Created by Conversion        NOVA on CPAP      Priority: Medium     Using nasal CPAP with oxygen at night; he sees  Dr. Lilly Mantilla at [facility].        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 They   put him on sildenafil as nitroprusside lowered systemic BP and with that   the mean PA dropped from 54 to 49. Negative VQ at [facility] Dec 1, 2017.        Chronic obstructive pulmonary disease (H) 2016     Priority: Medium    Obesity 2016     Priority: Medium    Benign neoplasm of ascending colon 10/21/2015     Priority: Medium    History of colonic polyps 10/21/2015     Priority: Medium    Diverticular disease of colon 2010     Priority: Medium     Social History:  Social History     Tobacco Use    Smoking status: Former Smoker     Packs/day: 1.50     Years: 44.00     Pack years: 66.00     Types: Cigarettes     Quit date: 1996     Years since quittin.6    Smokeless tobacco: Never Used   Substance Use Topics    Alcohol use: Yes     Alcohol/week: 1.0 standard drink     Comment: Alcoholic Drinks/day: 1 per month    Drug use: No     Family History:  Family History   Problem Relation Age of Onset    Hyperlipidemia Mother     Hypertension Mother     Heart Disease Mother     Hyperlipidemia Father     Hypertension Father     Coronary Artery Disease Father     Cerebrovascular Disease Brother     Depression Brother     No Known Problems Sister     Pulmonary Hypertension No family hx of     Congenital heart disease No family hx of        Medications:   atorvastatin  80 mg Oral or Feeding Tube Daily    bumetanide  4 mg Oral or Feeding Tube QAM    [Held by provider] ferrous gluconate  324 mg Oral or Feeding Tube Every Other Day    fluticasone-vilanterol  1 puff Inhalation Daily    insulin aspart  1-10 Units Subcutaneous TID AC    insulin aspart  1-7 Units Subcutaneous At Bedtime    [Held by provider] irbesartan  150 mg Oral or Feeding Tube Daily    lacosamide (VIMPAT)  intermittent infusion  100 mg Intravenous BID    levETIRAcetam  1,500 mg Intravenous Q12H    metoprolol tartrate  25 mg Oral BID    pantoprazole  40 mg Oral or Feeding Tube QAM AC    potassium chloride  40 mEq Oral or Feeding Tube Daily    rOPINIRole  2 mg Oral At Bedtime    tadalafil  20 mg Oral Q24H        [Held by provider] sodium chloride Stopped (12/21/21 0923)       Physical Exam:  Temp:  [98.2  F (36.8  C)-100  F (37.8  C)] 98.2  F (36.8  C)  Pulse:  [] 80  Resp:  [9-32] 23  BP: ()/() 138/86  SpO2:  [80 %-100 %] 94 %    Intake/Output Summary (Last 24 hours) at 12/21/2021 1057  Last data filed at 12/21/2021 1030  Gross per 24 hour   Intake 2698.66 ml   Output 375 ml   Net 2323.66 ml     GEN: A.ltered, minimally vocal  HEENT: MMM    Eyes: no icterus  CV: RRR, normal s1/s2, no murmurs/rubs/s3/s4, no heave. JVP difficult to assess due to pt motion.   CHEST: clear to ausculation bilaterally, no rales or wheezing  ABD: soft, non-tender, normal active bowel sounds  EXTR: No clubbing, cyanosis or edema.   NEURO: Disoriented, minimally vocal, unable to follow commands  Skin: No petechiae      Diagnostics:  All labs and imaging were reviewed, of note:    CMP  Recent Labs   Lab 12/21/21  0816 12/21/21  0438 12/21/21  0405 12/21/21  0013 12/20/21  2202 12/20/21  1350 12/20/21  1046   NA  --  142  --   --   --  139 138   POTASSIUM  --  3.9  --   --   --  3.9 3.7   CHLORIDE  --  109  --   --   --  104 99   CO2  --  27  --   --   --  29 24   ANIONGAP  --  6  --   --   --  6 15   * 163* 162* 153*   < > 141* 269*   BUN  --  28  --   --   --  36* 37*   CR  --  1.28*  --   --   --  1.25 1.60*   GFRESTIMATED  --  53*  --   --   --  54* 40*   ANNMARIE  --  8.6  --   --   --  8.8 9.4   MAG  --  2.5*  --   --   --   --   --    PHOS  --  3.1  --   --   --   --   --    PROTTOTAL  --   --   --   --   --  6.8 7.3   ALBUMIN  --   --   --   --   --  3.1* 3.6   BILITOTAL  --   --   --   --   --  1.5* 1.7*   ALKPHOS  --    --   --   --   --  133 140*   AST  --   --   --   --   --  22 19   ALT  --   --   --   --   --  17 13    < > = values in this interval not displayed.     CBC  Recent Labs   Lab 12/21/21  0438 12/20/21  1350 12/20/21  1046   WBC 10.2 9.0 10.3   RBC 3.97* 3.93* 4.27*   HGB 8.2* 8.0* 8.8*   HCT 28.8* 28.2* 30.6*   MCV 73* 72* 72*   MCH 20.7* 20.4* 20.6*   MCHC 28.5* 28.4* 28.8*   RDW 18.0* 17.9* 18.0*    275 303     INR  Recent Labs   Lab 12/20/21  1350 12/20/21  1046   INR 1.32* 1.20*     Arterial Blood GasNo lab results found in last 7 days.    No results found for: TROPI, TROPONIN, TROPR, TROPN    EKG:    Transthoracic echocardiogram:     Nuclear stress test:

## 2021-12-21 NOTE — ANESTHESIA PROCEDURE NOTES
Arterial Line Procedure Note    Pre-Procedure   Staff -        Anesthesiologist:  Alexander Wilkins MD       Resident/Fellow: Hebert Rosas MD       Performed By: resident       Location: OR       Procedure Start/Stop Times: 12/21/2021 1:40 PM and 12/21/2021 1:41 PM       Pre-Anesthestic Checklist: patient identified, IV checked, risks and benefits discussed, informed consent, monitors and equipment checked, pre-op evaluation and at physician/surgeon's request  Timeout:       Correct Patient: Yes        Correct Procedure: Yes        Correct Site: Yes        Correct Position: Yes   Procedure   Procedure: arterial line       Diagnosis: intraoperative monitoring       Laterality: right       Insertion Site: radial.  Sterile Prep        Standard elements of sterile barrier followed       Skin prep: Chloraprep  Insertion/Injection        Technique: Seldinger Technique        Catheter Type/Size: 20 G, 1.75 in/4.5 cm quick cath (integral wire)  Narrative        Tegaderm dressing used.       Complications: None apparent,        Arterial waveform: Yes        IBP within 10% of NIBP: Yes   Comments:  Uncomplicated right radial arterial line

## 2021-12-21 NOTE — BRIEF OP NOTE
"[facility]    Brief Operative Note    Pre-operative diagnosis: Acute subdural hematoma (H) [S06.5X9A]  Post-operative diagnosis Same as pre-operative diagnosis    Procedure: Procedure(s):  CRANIOTOMY FOR Subdural HEMATOMA EVACUATION  Surgeon: Surgeon(s) and Role:     * Parvez Quinones MD - Primary     * Suzanne Heaton MD - Resident - Assisting     * Maris Quintana MD - Fellow - Assisting  Anesthesia: General   Estimated Blood Loss: 250 mL    Drains: Juanjose-Dom[place] x2  Specimens: * No specimens in log *  Findings:   Acute blood clot with arterial bleeder over the lateral right frontal lobe.  Complications: None.  Implants:   Implant Name Type Inv. Item Serial No.  Lot No. LRB No. Used Action   GRAFT DURAGEN 3X3\"  - [id number] Bone/Tissue/Biologic GRAFT DURAGEN 3X3\"   [employer] [id number] Right 1 Implanted   GRAFT DURAGEN 3X3\"  - [id number] Bone/Tissue/Biologic GRAFT DURAGEN 3X3\"   [employer]  Right 1 Implanted   IMP SCR SYN MATRIX LOW PRO 1.5X04MM SELF DRILL [id number] - [id number] Metallic Hardware/[place] IMP SCR SYN MATRIX LOW PRO 1.5X04MM SELF DRILL [id number]  SYNTHES[employer]STRATEC  Right 7 Implanted   IMP PLATE SYN STR DOG BONE LOW PROFILE 2H .502 - [id number] Metallic Hardware/[place] IMP PLATE SYN STR DOG BONE LOW PROFILE 2H .502  [employer]ATEC  Right 2 Implanted   IMP PLATE SYN DBL Y LOW PROFILE 18MM 6H .516 - [id number] Metallic Hardware/[place] IMP PLATE SYN DBL Y LOW PROFILE 18MM 6H .516  [employer]  Right 1 Implanted       Skin: Staples    "

## 2021-12-21 NOTE — ANESTHESIA PROCEDURE NOTES
Airway       Patient location during procedure: OR       Procedure Start/Stop Times: 12/21/2021 1:37 PM  Staff -        Anesthesiologist:  Alexander Wilkins MD       Performed By: CRNA  Consent for Airway        Urgency: emergent  Indications and Patient Condition       Indications for airway management: maine-procedural       Induction type:RSI       Mask difficulty assessment: 0 - not attempted    Final Airway Details       Final airway type: endotracheal airway       Successful airway: ETT - single  Endotracheal Airway Details        ETT size (mm): 8.0       Cuffed: yes       Successful intubation technique: direct laryngoscopy       DL Blade Type: MAC 3       Grade View of Cords: 2       Adjucts: stylet       Position: Right       Measured from: gums/teeth       Secured at (cm): 23       Bite block used: None    Post intubation assessment        Number of attempts at approach: 2       Number of other approaches attempted: 0       Secured with: pink tape       Ease of procedure: easy       Dentition: Intact and Unchanged    Additional Comments       Attempted by CRNA with MAC3 blade, failed to obtain view. View obtained and intubated in one attempt by attending.

## 2021-12-21 NOTE — ANESTHESIA POSTPROCEDURE EVALUATION
Patient: Red Sutherland    Procedure: Procedure(s):  CRANIOTOMY FOR Subdural HEMATOMA EVACUATION       Diagnosis:Acute subdural hematoma (H) [S06.5X9A]  Diagnosis Additional Information: No value filed.    Anesthesia Type:  General    Note:  Disposition: ICU            ICU Sign Out: Anesthesiologist/ICU physician sign out WAS performed   Postop Pain Control: Uneventful            Sign Out: Well controlled pain   PONV: No   Neuro/Psych: Uneventful            Sign Out: Acceptable/Baseline neuro status   Airway/Respiratory: Uneventful            Sign Out: AIRWAY IN SITU/Resp. Support               Airway in situ/Resp. Support: ETT                 Reason: Planned Pre-op   CV/Hemodynamics: Uneventful            Sign Out: Acceptable CV status; No obvious hypovolemia; No obvious fluid overload   Other NRE:    DID A NON-ROUTINE EVENT OCCUR?            Last vitals:  Vitals Value Taken Time   /69 12/21/21 1711   Temp     Pulse 77 12/21/21 1736   Resp     SpO2 98 % 12/21/21 1736   Vitals shown include unvalidated device data.    Electronically Signed By: Rossi Kahn MD  December 21, 2021  5:37 PM

## 2021-12-21 NOTE — PROGRESS NOTES
Admitted/transferred from: [facility] ER  Reason for admission/transfer: Neuro-surgical patient, potential OR today or tomorrow  2 RN skin assessment: completed by Vlad and Emmie Caban  Result of skin assessment and interventions/actions: no issues identified  Height, weight, drug calc weight: Done prior to arrival to unit  Patient belongings (see Flowsheet) - medication from ER, personal face mask    Arsh Lora RN on 12/20/2021 at 2130    ?

## 2021-12-21 NOTE — PLAN OF CARE
Major Shift Events:  Patient is alert and oriented x3-4. Intermittently does not follow commands, left pupil is irregular and fixed, rt pupil is mm and round/reactive. Pt has slurred/garbled speech. Per report, pt had a seizure in transport from another hospital which was characterized by lip smacking to left side of face. This was noted by writer and neurosurg was notified. No further actions were taken. At early morning rounds, team noted same phenomenon and thought it was seizure, but when pt was sternal rubbed and was able to talk shortly after, no immediate action was taken by team. Mild impulsivity, wanting to get out of bed and asking for water frequently. Pt redirected and explanation provided. Pt is agreeable. Alarm on for safety. On 3L NC. Pt rolls around in bed a lot and the cannula falls off at times. Making loud noise at times, states he is clearing throat, non productive. Abd has a lump between the RUQ and LUQ, not sure if hernia. Afib with PVCs. Skin has some generalized bruising. NPO d/t failure of swallow study in ER. 1x dilaudid given for headache.   Plan: PT, SLP eval. Hematoma evac?  For vital signs and complete assessments, please see documentation flowsheets.     Arsh Lora RN on 12/21/2021 at 5:42 AM

## 2021-12-21 NOTE — ANESTHESIA CARE TRANSFER NOTE
Patient: Red Sutherland    Procedure: Procedure(s):  CRANIOTOMY FOR Subdural HEMATOMA EVACUATION       Diagnosis: Acute subdural hematoma (H) [S06.5X9A]  Diagnosis Additional Information: No value filed.    Anesthesia Type:   General     Note:    Oropharynx: endotracheal tube in place  Level of Consciousness: iatrogenic sedation  Patient oxygen source: intubated.    Independent Airway: airway patency satisfactory and stable  Dentition: dentition unchanged  Vital Signs Stable: post-procedure vital signs reviewed and stable  Report to RN Given: handoff report given  Patient transferred to: PACU    Handoff Report: Identifed the Patient, Identified the Reponsible Provider, Reviewed the pertinent medical history, Discussed the surgical course, Reviewed Intra-OP anesthesia mangement and issues during anesthesia, Set expectations for post-procedure period and Allowed opportunity for questions and acknowledgement of understanding      Vitals:  Vitals Value Taken Time   /69 12/21/21 1711   Temp     Pulse 80 12/21/21 1722   Resp     SpO2 98 % 12/21/21 1722   Vitals shown include unvalidated device data.    Electronically Signed By: JOSE ANTONIO Salomon CRNA  December 21, 2021  5:23 PM

## 2021-12-21 NOTE — ANESTHESIA PREPROCEDURE EVALUATION
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 They put him on sildenafil as nitroprusside lowered systemic BP and with that the mean PA dropped from 54 to 49. Negative VQ at [place] Dec 1, 2017.     RLS (restless legs syndrome)      Sleep apnea       Past Surgical History:   Procedure Laterality Date     BACK SURGERY      lower back     CARDIAC CATHETERIZATION  2017    Right and left at [place], mean PA 58, PCW 24 with V wave of 35, LVEDP of 18, with Nipride systemic BP, PVR and mean PA all declined     CARDIOVERSION  03/15/2013    for afib     CATARACT EXTRACTION Bilateral      COLONOSCOPY N/A 10/31/2021    Procedure: COLONOSCOPY WITH POLYPECTOMY;  Surgeon: Sheng Sagastume MD;  Location: [facility] OR     CORONARY STENT PLACEMENT       CV CORONARY ANGIOGRAM N/A 10/25/2021    Procedure: Coronary Angiogram;  Surgeon: Otf Knowles MD;  Location: [facility] CATH LAB CV     CV CORONARY LITHOTRIPSY PCI N/A 10/25/2021    Procedure: CV Coronary Lithotripsy PCI;  Surgeon: Otf Knowles MD;  Location: [facility] CATH LAB CV     CV LEFT HEART CATH N/A 10/25/2021    Procedure: Left Heart Cath;  Surgeon: Otf Knowles MD;  Location: [facility] CATH LAB CV     CV PCI N/A 10/25/2021    Procedure: Percutaneous Coronary Intervention;  Surgeon: Benson  MD Otf;  Location: [facility] CV     ESOPHAGOSCOPY, GASTROSCOPY, DUODENOSCOPY (EGD), COMBINED N/A 10/30/2021    Procedure: ESOPHAGOGASTRODUODENOSCOPY (EGD);  Surgeon: Sheng Sagastume MD;  Location: [facility] OR     IR ABDOMINAL AORTOGRAM  2012     IR MISCELLANEOUS PROCEDURE  2001     IR MISCELLANEOUS PROCEDURE  2012     OTHER SURGICAL HISTORY      myn[place]     SHOULDER SURGERY      reapir on right shoulder     TOTAL HIP ARTHROPLASTY Left      TOTAL KNEE ARTHROPLASTY Bilateral      WRIST SURGERY Bilateral      ZZHC COLONOSCOPY W/WO BRUSH/WASH N/A 2021    Procedure: COLONOSCOPY;  Surgeon: Mihai Harris MD;  Location: [facility];  Service: Gastroenterology      Allergies   Allergen Reactions     Contrast Dye Nausea     Other reaction(s): GI intolerance, GI Upset     Furosemide Muscle Pain (Myalgia)     Previously tolerated.  Muscle cramps     Hydrochlorothiazide Unknown     Iodinated Contrast Media [Diagnostic X-Ray Materials] Nausea     Losartan Other (See Comments)     Other reaction(s): Stomatitis, Bloody nose dry mouth and lips     Losartan-Hydrochlorothiazide [Hyzaar]      Mouth sores     Metaxalone Nausea     Metolazone Other (See Comments)     Muscle cramps     Mometasone Other (See Comments)     Bloody nose     Rabeprazole Other (See Comments)     Mouth sores     Ramipril Other (See Comments)     Mouth sores     Shellfish Containing Products [Shellfish-Derived Products] Unknown     Other reaction(s): mouth sores, Other reaction(s): mouth sores     Sildenafil Muscle Pain (Myalgia)     Methocarbamol Rash     Penicillins Other (See Comments)     Immune-does not work for him      Social History     Tobacco Use     Smoking status: Former Smoker     Packs/day: 1.50     Years: 44.00     Pack years: 66.00     Types: Cigarettes     Quit date: 1996     Years since quittin.6     Smokeless tobacco: Never Used   Substance Use Topics     Alcohol use: Yes      Alcohol/week: 1.0 standard drink     Comment: Alcoholic Drinks/day: 1 per month      Wt Readings from Last 1 Encounters:   12/20/21 103.9 kg (229 lb)        Anesthesia Evaluation   Pt has had prior anesthetic. Type: MAC.        ROS/MED HX  ENT/Pulmonary:     (+) sleep apnea, uses CPAP, COPD,     Neurologic: Comment: Right subdural hematoma      Cardiovascular:     (+) Dyslipidemia hypertension-Peripheral Vascular Disease-CAD -past MI CABG--CHF Last EF: 60-65% date: 10/2021 dysrhythmias, a-fib, pulmonary hypertension, Previous cardiac testing   Echo: Date: 10/23/21 Results:  Interpretation Summary     Left ventricular size, wall motion and function are normal. The ejection  fraction is 60-65%.  Diastolic Doppler findings (E/E' ratio and/or other parameters) suggest left  ventricular filling pressures are increased.  Flattened septum is consistent with RV pressure/volume overload.  The right ventricle is moderate to severely dilated.  Moderately decreased right ventricular systolic function  The left atrium is moderately dilated.  The right atrium is moderate to severely dilated.  Mild valvular aortic stenosis.  The estimated pulmonary artery systolic pressure is 75 mmHg.     No previous study for comparison.  Stress Test: Date: Results:    ECG Reviewed: Date: 12/20/21 Results:  Undetermined rhythm   Rightward axis   Incomplete right bundle branch block   Borderline ECG   When compared with ECG of 30-NOV-2021 15:37,   Current undetermined rhythm precludes rhythm comparison, needs review   Criteria for Septal infarct are no longer Present   T wave inversion less evident in Inferior leads   Cath:  Date: 10/25/21 Results:  - severe narrowing in Lcx stent, s/p intracoronary lithotripsy and DESx1; high-normal L-sided filling pressures  - ASA 81mg daily indefinitely, switch P2Y12 inhibitor to prasugrel given relatively early stent re-narrowing (within first year) after 60mg re-load, then 10mg daily ideally indefinitely but  at least for 12 mos   - 60-70% bifurcation disease beyond the stent - medical management as the first step, PCI if has recalcitrant symptoms    METS/Exercise Tolerance:     Hematologic: Comments: Previous anticoagulation with eliquis, but anticoagulation discontinued several weeks ago per chart review    (+) anemia,     Musculoskeletal:  - neg musculoskeletal ROS     GI/Hepatic: Comment: Ascites, recent GI bleed    (+) liver disease,     Renal/Genitourinary:     (+) renal disease,     Endo:     (+) type II DM, Obesity,     Psychiatric/Substance Use:  - neg psychiatric ROS     Infectious Disease:  - neg infectious disease ROS     Malignancy:  - neg malignancy ROS     Other:            Physical Exam    Airway         TM distance: > 3 FB   Neck ROM: full     Respiratory Devices and Support         Dental  no notable dental history         Cardiovascular   cardiovascular exam normal          Pulmonary   pulmonary exam normal                OUTSIDE LABS:  CBC:   Lab Results   Component Value Date    WBC 9.0 12/20/2021    WBC 10.3 12/20/2021    HGB 8.0 (L) 12/20/2021    HGB 8.8 (L) 12/20/2021    HCT 28.2 (L) 12/20/2021    HCT 30.6 (L) 12/20/2021     12/20/2021     12/20/2021     BMP:   Lab Results   Component Value Date     12/20/2021     12/20/2021    POTASSIUM 3.9 12/20/2021    POTASSIUM 3.7 12/20/2021    CHLORIDE 104 12/20/2021    CHLORIDE 99 12/20/2021    CO2 29 12/20/2021    CO2 24 12/20/2021    BUN 36 (H) 12/20/2021    BUN 37 (H) 12/20/2021    CR 1.25 12/20/2021    CR 1.60 (H) 12/20/2021     (H) 12/20/2021     (H) 12/20/2021     COAGS:   Lab Results   Component Value Date    PTT 37 12/20/2021    INR 1.32 (H) 12/20/2021     POC: No results found for: BGM, HCG, HCGS  HEPATIC:   Lab Results   Component Value Date    ALBUMIN 3.1 (L) 12/20/2021    PROTTOTAL 6.8 12/20/2021    ALT 17 12/20/2021    AST 22 12/20/2021    ALKPHOS 133 12/20/2021    BILITOTAL 1.5 (H) 12/20/2021     OTHER:    Lab Results   Component Value Date    LACT 1.7 08/23/2021    A1C 6.3 (H) 10/23/2021    ANNMARIE 8.8 12/20/2021    PHOS 4.4 12/02/2021    MAG 2.4 12/05/2021    TSH 2.55 05/31/2019    CRP 1.3 (H) 08/22/2021       Anesthesia Plan    ASA Status:  3   NPO Status:  NPO Appropriate    Anesthesia Type: General.     - Airway: ETT   Induction: Intravenous.   Maintenance: Balanced.   Techniques and Equipment:     - Lines/Monitors: Arterial Line     Consents    Anesthesia Plan(s) and associated risks, benefits, and realistic alternatives discussed. Questions answered and patient/representative(s) expressed understanding.    - Discussed:     - Discussed with:  Spouse      - Extended Intubation/Ventilatory Support Discussed: No.      - Patient is DNR/DNI Status: No    Use of blood products discussed: No .     Postoperative Care    Pain management: IV analgesics.   PONV prophylaxis: Ondansetron (or other 5HT-3), Dexamethasone or Solumedrol     Comments:                Virginia Cabrera

## 2021-12-22 ENCOUNTER — APPOINTMENT (OUTPATIENT)
Dept: SPEECH THERAPY | Facility: CLINIC | Age: 81
DRG: 025 | End: 2021-12-22
Attending: STUDENT IN AN ORGANIZED HEALTH CARE EDUCATION/TRAINING PROGRAM
Payer: COMMERCIAL

## 2021-12-22 ENCOUNTER — APPOINTMENT (OUTPATIENT)
Dept: CT IMAGING | Facility: CLINIC | Age: 81
DRG: 025 | End: 2021-12-22
Attending: STUDENT IN AN ORGANIZED HEALTH CARE EDUCATION/TRAINING PROGRAM
Payer: COMMERCIAL

## 2021-12-22 LAB
ANION GAP SERPL CALCULATED.3IONS-SCNC: 9 MMOL/L (ref 3–14)
BASE EXCESS BLDA CALC-SCNC: 0.3 MMOL/L (ref -9–1.8)
BASE EXCESS BLDA CALC-SCNC: 1.9 MMOL/L (ref -9–1.8)
BUN SERPL-MCNC: 30 MG/DL (ref 7–30)
CALCIUM SERPL-MCNC: 8 MG/DL (ref 8.5–10.1)
CHLORIDE BLD-SCNC: 112 MMOL/L (ref 94–109)
CO2 SERPL-SCNC: 25 MMOL/L (ref 20–32)
CREAT SERPL-MCNC: 1.37 MG/DL (ref 0.66–1.25)
ERYTHROCYTE [DISTWIDTH] IN BLOOD BY AUTOMATED COUNT: 18.8 % (ref 10–15)
ERYTHROCYTE [DISTWIDTH] IN BLOOD BY AUTOMATED COUNT: 18.9 % (ref 10–15)
GFR SERPL CREATININE-BSD FRML MDRD: 52 ML/MIN/1.73M2
GLUCOSE BLD-MCNC: 161 MG/DL (ref 70–99)
GLUCOSE BLDC GLUCOMTR-MCNC: 135 MG/DL (ref 70–99)
GLUCOSE BLDC GLUCOMTR-MCNC: 139 MG/DL (ref 70–99)
GLUCOSE BLDC GLUCOMTR-MCNC: 148 MG/DL (ref 70–99)
GLUCOSE BLDC GLUCOMTR-MCNC: 151 MG/DL (ref 70–99)
GLUCOSE BLDC GLUCOMTR-MCNC: 153 MG/DL (ref 70–99)
GLUCOSE BLDC GLUCOMTR-MCNC: 159 MG/DL (ref 70–99)
HCO3 BLD-SCNC: 24 MMOL/L (ref 21–28)
HCO3 BLD-SCNC: 26 MMOL/L (ref 21–28)
HCT VFR BLD AUTO: 26.4 % (ref 40–53)
HCT VFR BLD AUTO: 27 % (ref 40–53)
HGB BLD-MCNC: 7.8 G/DL (ref 13.3–17.7)
HGB BLD-MCNC: 7.9 G/DL (ref 13.3–17.7)
MAGNESIUM SERPL-MCNC: 2.5 MG/DL (ref 1.6–2.3)
MCH RBC QN AUTO: 21.5 PG (ref 26.5–33)
MCH RBC QN AUTO: 21.6 PG (ref 26.5–33)
MCHC RBC AUTO-ENTMCNC: 29.3 G/DL (ref 31.5–36.5)
MCHC RBC AUTO-ENTMCNC: 29.5 G/DL (ref 31.5–36.5)
MCV RBC AUTO: 73 FL (ref 78–100)
MCV RBC AUTO: 73 FL (ref 78–100)
O2/TOTAL GAS SETTING VFR VENT: 40 %
O2/TOTAL GAS SETTING VFR VENT: 40 %
OXYHGB MFR BLD: 98 % (ref 92–100)
OXYHGB MFR BLD: 98 % (ref 92–100)
PCO2 BLD: 36 MM HG (ref 35–45)
PCO2 BLD: 36 MM HG (ref 35–45)
PH BLD: 7.44 [PH] (ref 7.35–7.45)
PH BLD: 7.46 [PH] (ref 7.35–7.45)
PHOSPHATE SERPL-MCNC: 3 MG/DL (ref 2.5–4.5)
PLATELET # BLD AUTO: 262 10E3/UL (ref 150–450)
PLATELET # BLD AUTO: 268 10E3/UL (ref 150–450)
PO2 BLD: 151 MM HG (ref 80–105)
PO2 BLD: 162 MM HG (ref 80–105)
POTASSIUM BLD-SCNC: 4.2 MMOL/L (ref 3.4–5.3)
RBC # BLD AUTO: 3.61 10E6/UL (ref 4.4–5.9)
RBC # BLD AUTO: 3.68 10E6/UL (ref 4.4–5.9)
SODIUM SERPL-SCNC: 146 MMOL/L (ref 133–144)
WBC # BLD AUTO: 12.2 10E3/UL (ref 4–11)
WBC # BLD AUTO: 13.3 10E3/UL (ref 4–11)

## 2021-12-22 PROCEDURE — 200N000002 HC R&B ICU UMMC

## 2021-12-22 PROCEDURE — 250N000011 HC RX IP 250 OP 636: Performed by: STUDENT IN AN ORGANIZED HEALTH CARE EDUCATION/TRAINING PROGRAM

## 2021-12-22 PROCEDURE — C9254 INJECTION, LACOSAMIDE: HCPCS | Performed by: NEUROLOGICAL SURGERY

## 2021-12-22 PROCEDURE — 250N000013 HC RX MED GY IP 250 OP 250 PS 637: Performed by: STUDENT IN AN ORGANIZED HEALTH CARE EDUCATION/TRAINING PROGRAM

## 2021-12-22 PROCEDURE — 258N000003 HC RX IP 258 OP 636: Performed by: PSYCHIATRY & NEUROLOGY

## 2021-12-22 PROCEDURE — 999N000157 HC STATISTIC RCP TIME EA 10 MIN

## 2021-12-22 PROCEDURE — 70450 CT HEAD/BRAIN W/O DYE: CPT

## 2021-12-22 PROCEDURE — 85027 COMPLETE CBC AUTOMATED: CPT | Performed by: STUDENT IN AN ORGANIZED HEALTH CARE EDUCATION/TRAINING PROGRAM

## 2021-12-22 PROCEDURE — 70450 CT HEAD/BRAIN W/O DYE: CPT | Mod: 26 | Performed by: STUDENT IN AN ORGANIZED HEALTH CARE EDUCATION/TRAINING PROGRAM

## 2021-12-22 PROCEDURE — 250N000013 HC RX MED GY IP 250 OP 250 PS 637: Performed by: NURSE PRACTITIONER

## 2021-12-22 PROCEDURE — 94003 VENT MGMT INPAT SUBQ DAY: CPT

## 2021-12-22 PROCEDURE — 250N000011 HC RX IP 250 OP 636: Performed by: PSYCHIATRY & NEUROLOGY

## 2021-12-22 PROCEDURE — 83735 ASSAY OF MAGNESIUM: CPT | Performed by: STUDENT IN AN ORGANIZED HEALTH CARE EDUCATION/TRAINING PROGRAM

## 2021-12-22 PROCEDURE — C9254 INJECTION, LACOSAMIDE: HCPCS | Performed by: PSYCHIATRY & NEUROLOGY

## 2021-12-22 PROCEDURE — 258N000003 HC RX IP 258 OP 636: Performed by: STUDENT IN AN ORGANIZED HEALTH CARE EDUCATION/TRAINING PROGRAM

## 2021-12-22 PROCEDURE — 250N000011 HC RX IP 250 OP 636: Performed by: NEUROLOGICAL SURGERY

## 2021-12-22 PROCEDURE — 80048 BASIC METABOLIC PNL TOTAL CA: CPT | Performed by: STUDENT IN AN ORGANIZED HEALTH CARE EDUCATION/TRAINING PROGRAM

## 2021-12-22 PROCEDURE — 82805 BLOOD GASES W/O2 SATURATION: CPT | Performed by: STUDENT IN AN ORGANIZED HEALTH CARE EDUCATION/TRAINING PROGRAM

## 2021-12-22 PROCEDURE — 84100 ASSAY OF PHOSPHORUS: CPT | Performed by: STUDENT IN AN ORGANIZED HEALTH CARE EDUCATION/TRAINING PROGRAM

## 2021-12-22 PROCEDURE — 258N000003 HC RX IP 258 OP 636: Performed by: NEUROLOGICAL SURGERY

## 2021-12-22 PROCEDURE — 99291 CRITICAL CARE FIRST HOUR: CPT | Performed by: PSYCHIATRY & NEUROLOGY

## 2021-12-22 PROCEDURE — 92610 EVALUATE SWALLOWING FUNCTION: CPT | Mod: GN

## 2021-12-22 RX ORDER — LORAZEPAM 2 MG/ML
2 INJECTION INTRAMUSCULAR
Status: COMPLETED | OUTPATIENT
Start: 2021-12-22 | End: 2021-12-22

## 2021-12-22 RX ORDER — LORAZEPAM 2 MG/ML
INJECTION INTRAMUSCULAR
Status: DISCONTINUED
Start: 2021-12-22 | End: 2021-12-22 | Stop reason: WASHOUT

## 2021-12-22 RX ADMIN — BUMETANIDE 4 MG: 2 TABLET ORAL at 08:34

## 2021-12-22 RX ADMIN — HYDROMORPHONE HYDROCHLORIDE 0.2 MG: 0.2 INJECTION, SOLUTION INTRAMUSCULAR; INTRAVENOUS; SUBCUTANEOUS at 23:33

## 2021-12-22 RX ADMIN — POTASSIUM CHLORIDE 40 MEQ: 1.5 POWDER, FOR SOLUTION ORAL at 08:34

## 2021-12-22 RX ADMIN — CEFAZOLIN SODIUM 2 G: 2 INJECTION, SOLUTION INTRAVENOUS at 15:33

## 2021-12-22 RX ADMIN — LEVETIRACETAM 1500 MG: 15 INJECTION INTRAVENOUS at 17:50

## 2021-12-22 RX ADMIN — CEFAZOLIN SODIUM 2 G: 2 INJECTION, SOLUTION INTRAVENOUS at 22:43

## 2021-12-22 RX ADMIN — SODIUM CHLORIDE 150 MG: 9 INJECTION, SOLUTION INTRAVENOUS at 19:55

## 2021-12-22 RX ADMIN — ACETAMINOPHEN 650 MG: 325 TABLET, FILM COATED ORAL at 04:04

## 2021-12-22 RX ADMIN — ATORVASTATIN CALCIUM 80 MG: 40 TABLET, FILM COATED ORAL at 08:34

## 2021-12-22 RX ADMIN — METOPROLOL TARTRATE 25 MG: 25 TABLET, FILM COATED ORAL at 19:44

## 2021-12-22 RX ADMIN — LORAZEPAM 2 MG: 2 INJECTION INTRAMUSCULAR; INTRAVENOUS at 14:37

## 2021-12-22 RX ADMIN — ROPINIROLE HYDROCHLORIDE 2 MG: 2 TABLET, FILM COATED ORAL at 22:00

## 2021-12-22 RX ADMIN — METOPROLOL TARTRATE 25 MG: 25 TABLET, FILM COATED ORAL at 08:34

## 2021-12-22 RX ADMIN — SODIUM CHLORIDE 100 MG: 9 INJECTION, SOLUTION INTRAVENOUS at 10:44

## 2021-12-22 RX ADMIN — Medication 40 MG: at 08:34

## 2021-12-22 RX ADMIN — HYDRALAZINE HYDROCHLORIDE 10 MG: 20 INJECTION INTRAMUSCULAR; INTRAVENOUS at 23:24

## 2021-12-22 RX ADMIN — SODIUM CHLORIDE 100 MG: 9 INJECTION, SOLUTION INTRAVENOUS at 13:39

## 2021-12-22 RX ADMIN — LEVETIRACETAM 1500 MG: 15 INJECTION INTRAVENOUS at 05:34

## 2021-12-22 RX ADMIN — EMPAGLIFLOZIN 10 MG: 10 TABLET, FILM COATED ORAL at 12:17

## 2021-12-22 RX ADMIN — IRBESARTAN 150 MG: 150 TABLET ORAL at 08:35

## 2021-12-22 RX ADMIN — TADALAFIL 20 MG: 20 TABLET, FILM COATED ORAL at 08:34

## 2021-12-22 RX ADMIN — SODIUM CHLORIDE: 9 INJECTION, SOLUTION INTRAVENOUS at 04:04

## 2021-12-22 RX ADMIN — CEFAZOLIN SODIUM 2 G: 2 INJECTION, SOLUTION INTRAVENOUS at 05:34

## 2021-12-22 ASSESSMENT — VISUAL ACUITY
OU: OTHER (SEE COMMENT)

## 2021-12-22 ASSESSMENT — ACTIVITIES OF DAILY LIVING (ADL)
ADLS_ACUITY_SCORE: 11
ADLS_ACUITY_SCORE: 10
ADLS_ACUITY_SCORE: 11
ADLS_ACUITY_SCORE: 10
DEPENDENT_IADLS:: TRANSPORTATION
ADLS_ACUITY_SCORE: 10
ADLS_ACUITY_SCORE: 11
ADLS_ACUITY_SCORE: 10
ADLS_ACUITY_SCORE: 11
ADLS_ACUITY_SCORE: 10
ADLS_ACUITY_SCORE: 11
ADLS_ACUITY_SCORE: 10
ADLS_ACUITY_SCORE: 11
ADLS_ACUITY_SCORE: 11
ADLS_ACUITY_SCORE: 10
ADLS_ACUITY_SCORE: 11

## 2021-12-22 NOTE — PLAN OF CARE
"0800: Patient is intubated on pressure support, tolerating. Lethargic, opens eyes to repeated stimulation, inconsistently follows commands. Left eye irregular, MDs aware.     Extubated at 1037, placed on 4L NC with SpO2 >98%.     Right lip smacking seizure at 1053 for about 30 seconds, resolved on its own, DENIZ Park MD witnessed at bedside.  Ativan PRN ordered, not given at this time. Seizure pads placed on bed rails. Vimpat given x 2.     1145: Paged Marissa ROACH:  \"Please put in code status\"    1330: Per Vivian ROACH: Continue full code at this time    1434: Another witnessed focal seizure of right face with wife at bedside for approximately 15 seconds, Ativan 2mg given.     1443: Neuro critical team notified of witnessed focal seizures. Per Alfredo BONILLA, continue to monitor seizures, no further intervention at this time.     1500: Report given to Violette DELEON.   "

## 2021-12-22 NOTE — PROGRESS NOTES
[facility], [place]   12/22/2021  Neurosurgery Progress Note:    Assessment:  Red Sutherland is a 81-year-old male post-traumatic acute right subdural hematoma with left hemibody weakness, on xarelto and plavix. Admitted to the ICU on 12/20/21. Taken to OR 12/21/2021.     Plan:  - Hold xarelto, plavix  - Cardiology consultation for AP/AC restart guidance  - Medicine consultation for pre-operative optimization  - CT AM stable  - Monitor KEILY outputs  - Keppra 1g BID, Vimpat 100 BID  - HOB > 30 degrees  - SLP eval once extubated  - Plan for possible extubation  -----------------------------------  Asim Ann MD  PGY-2, Neurosurgery  -----------------------------------    Interval History: R craniotomy for subdural. Postop CT small L subdural, otherwise excellent evacuation.     Objective:   Temp:  [97.1  F (36.2  C)-100.5  F (38.1  C)] 99.9  F (37.7  C)  Pulse:  [65-82] 80  Resp:  [12-30] 30  BP: (103-134)/(68-86) 103/69  MAP:  [59 mmHg-121 mmHg] 93 mmHg  Arterial Line BP: ()/(42-68) 143/65  FiO2 (%):  [40 %] 40 %  SpO2:  [97 %-100 %] 99 %  I/O last 3 completed shifts:  In: 4337.15 [I.V.:3348.15; NG/GT:130]  Out: 1912 [Urine:1330; Emesis/NG output:150; Drains:432]    Gen: Appears comfortable, NAD  Wound: clean, dry, intact,  Neurologic:  - Alert & Oriented to person, place, time, and situation  - Follows commands briskly x 3 antigravity, LLE purposeful but not to command  - Mild L facial droop  Moving all extremities x 4.    LABS:  Recent Labs   Lab 12/22/21  0822 12/22/21  0421 12/22/21  0410 12/21/21  0816 12/21/21  0438 12/20/21  2202 12/20/21  1350   NA  --   --  146*  --  142  --  139   POTASSIUM  --   --  4.2  --  3.9  --  3.9   CHLORIDE  --   --  112*  --  109  --  104   CO2  --   --  25  --  27  --  29   ANIONGAP  --   --  9  --  6  --  6   * 139* 161*   < > 163*   < > 141*   BUN  --   --  30  --  28  --  36*   CR  --   --  1.37*  --  1.28*  --  1.25   ANNMARIE  --   --   8.0*  --  8.6  --  8.8    < > = values in this interval not displayed.       Recent Labs   Lab 12/22/21  0004   WBC 12.2*   RBC 3.61*   HGB 7.8*   HCT 26.4*   MCV 73*   MCH 21.6*   MCHC 29.5*   RDW 18.8*          IMAGING:  Recent Results (from the past 24 hour(s))   CT Head w/o Contrast   Result Value    Radiologist flags (Urgent)     New small subdural hematoma along the left parietal    Narrative    CT HEAD W/O CONTRAST 12/21/2021 6:02 PM    History: Postop SDH     Comparison: Head CT 12/21/2021, brain MRI 12/20/2021, head CT  12/20/2021    Technique: Using multidetector thin collimation helical acquisition  technique, axial, coronal and sagittal CT images from the skull base  to the vertex were obtained without intravenous contrast.   (topogram) image(s) also obtained and reviewed.    Findings: New postsurgical of right frontotemporoparietal craniotomy  and evacuation of right subdural hematoma. Right posterior approach  subdural drain in place along the right frontal convexity. Right  subgaleal drain. Mild postoperative pneumocephalus along the  craniotomy site. Small mixed density extra-axial fluid collection  along the right cerebral hemisphere, measuring 4 mm in greatest depth,  substantially decreased compared to the preoperative examination. Mass  effect with partial effacement of sulci on the right. Decreased  effacement of the right lateral ventricle and resolution of leftward  midline shift.    New small hyperdense subdural hematoma along the left parietal  convexity measuring 3 mm in thickness (series 6, image 52).  Redemonstration of small region hypodensity in the periphery of the  right lateral frontal lobe, consistent with infarct demonstrated on  prior MRI from 12/20/2021. No evidence for new area of infarct.  Ventricles are proportionate to the cerebral sulci. The basal cisterns  are clear. Atherosclerotic calcifications of the intracranial  arteries.    Mild mucosal thickening of the  maxillary sinuses and ethmoid air  cells. The mastoid air cells are clear. Partially visualized NG tube.  Bilateral pseudophakia.      Impression    Impression:  1. New post surgical changes of right craniotomy with subdural  hematoma evacuation. Small residual mixed density extra-axial fluid  collection along the craniotomy site, substantially decreased. Also  decreased associated mass effect and resolution of midline shift.  2. New small subdural hematoma along the left parietal convexity.  3. 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      [Urgent Result: New small subdural hematoma along the left parietal  convexity]    Finding was identified on 12/21/2021 6:04 PM.     Dr. Quintana was contacted by Dr. Lindsay Hartley at 12/21/2021 6:17 PM and  verbalized understanding of the urgent finding.     I have personally reviewed the examination and initial interpretation  and I agree with the findings.    ALYSA MENDOZA MD         SYSTEM ID:  [id number]   CT Head w/o Contrast    Narrative    CT HEAD W/O CONTRAST 12/22/2021 4:44 AM    Provided History: Follow-up subdural hemorrhage  ICD-10:    Comparison: Yesterday.    Technique: Using multidetector thin collimation helical acquisition  technique, axial, coronal and sagittal CT images from the skull base  to the vertex were obtained without intravenous contrast.     Findings:    Postoperative changes of right frontotemporoparietal craniotomy and  evacuation of right subdural hematoma. Stable right posterior approach  subdural drain in place along the right frontal convexity. Stable  right subgaleal drain. Mild postoperative pneumocephalus along the  craniotomy site unchanged. Stable small mixed density extra-axial  fluid collection along the right cerebral hemisphere, measuring 4 mm  in greatest depth. Mass effect with partial effacement of sulci on the  right. No midline shift.    Evolution of small subdural hematoma along  the left parietal convexity  measuring 3 mm in thickness. No evidence for new area of infarct.  Ventricles are proportionate to the cerebral sulci. The basal cisterns  are clear. Advanced Atherosclerotic calcifications of the intracranial  arteries.    Mild mucosal thickening of the maxillary sinuses and ethmoid air  cells. The mastoid air cells are clear. Bilateral pseudophakia.       Impression    Impression:  1. Post surgical changes of right craniotomy with subdural hematoma  evacuation. No significant change in the mixed density extra-axial  collection along the craniotomy site.  2. Evolution of trace subdural hematoma along the left parietal  convexity.  3. No acute intracranial findings since most recent CT.    I have personally reviewed the examination and initial interpretation  and I agree with the findings.    FLAVIO CARDENAS MD         SYSTEM ID:  [id number]

## 2021-12-22 NOTE — PROGRESS NOTES
12/22/21 1559   General Information   Onset of Illness/Injury or Date of Surgery 12/20/21   Referring Physician Maris Quintana MD   Patient/Family Therapy Goal Statement (SLP) None stated   Pertinent History of Current Problem Pt is a 81-year-old male post-traumatic acute right subdural hematoma with left hemibody weakness, on xarelto and plavix. Admitted to the ICU on 12/20/21. Taken to OR 12/21/2021 for SDH evacuation. Pt extubated today. RN requesting SLP come to eval the pt.    General Observations Lethargic, requires max cues to rouse/open eyes, inconsistent command following   Past History of Dysphagia none per chart   Type of Evaluation   Type of Evaluation Swallow Evaluation   Oral Motor   Oral Musculature unable to assess due to poor participation/comprehension  (informally, L facial weakness/reduced eye opening)   Structural Abnormalities none present   Mucosal Quality dry   Dentition (Oral Motor)   Dentition (Oral Motor) natural dentition   Comment, Dentition (Oral Motor) SLP facilitated oral care prior to evaluation   Facial Symmetry (Oral Motor)   Facial Symmetry (Oral Motor) left side impairment   Cough/Swallow/Gag Reflex (Oral Motor)   Comment, Cough/Swallow/Gag Reflex (Oral Motor) reduced cough strength   Vocal Quality/Secretion Management (Oral Motor)   Comment, Vocal Quality/Secretion Management (Oral Motor) unable to assess   General Swallowing Observations   Current Diet/Method of Nutritional Intake (General Swallowing Observations, NIS) nasogastric tube (NG)   Respiratory Support (General Swallowing Observations) nasal cannula   Swallowing Evaluation Clinical swallow evaluation   Clinical Swallow Evaluation   Feeding Assistance dependent   Clinical Swallow Evaluation Textures Trialed thin liquids   Clinical Swallow Eval: Thin Liquid Texture Trial   Mode of Presentation, Thin Liquids spoon;fed by clinician   Volume of Liquid or Food Presented 1 ice chip   Oral Phase of Swallow   (impaired,  minimal attempt to manipulate)   Pharyngeal Phase of Swallow   (no attempt to swallow)   Diagnostic Statement Pt did not make attempt to orally manipulate ice chip or swallow. Education provided for pt's wife re: SLP role and importance of oral care in this period when pt is NPO.    Esophageal Phase of Swallow   Patient reports or presents with symptoms of esophageal dysphagia No   Swallowing Recommendations   Diet Consistency Recommendations NPO   Medication Administration Recommendations, Swallowing (SLP) via NG   Instrumental Assessment Recommendations instrumental evaluation not recommended at this time   General Therapy Interventions   Planned Therapy Interventions Dysphagia Treatment   Dysphagia treatment Oropharyngeal exercise training;Modified diet education;Instruction of safe swallow strategies;Compensatory strategies for swallowing   SLP Therapy Assessment/Plan   Criteria for Skilled Therapeutic Interventions Met (SLP Eval) yes;treatment indicated   SLP Diagnosis Severe dysphagia in setting of lethargy/AMS   Rehab Potential (SLP Eval) fair, will monitor progress closely   Therapy Frequency (SLP Eval) daily   Predicted Duration of Therapy Intervention (SLP Eval) 2 weeks   Comment, Therapy Assessment/Plan (SLP) Clinical swallow eval completed per MD order and RN request. Eval limited d/t pt's mental status. Unable to complete formal oral mech exam d/t pt's mental status, but informally pt demonstrated L sided facial weakness and reduced cough strength. Pt assessed with ice chip x1 and make no attempt to orally manipulate or swallow. Education provided for pt's spouse re: SLP role after stroke/intubation, risks of aspiration/dysphagia. SLP will follow closely for PO readiness.    Therapy Plan Review/Discharge Plan (SLP)   Therapy Plan Review (SLP) evaluation/treatment results reviewed;care plan/treatment goals reviewed;risks/benefits reviewed;current/potential barriers reviewed;participants voiced agreement  with care plan;participants included;spouse/significant other   Demonstrates Need for Referral to Another Service (SLP) clinical nutrition services/dietitian   SLP Discharge Planning    SLP Discharge Recommendation (DC Rec) Transitional Care Facility;home with home care speech therapy   SLP Rationale for DC Rec Severe dysphagia, NG tube; should pt d/c home, he would require alternative means of hydration/nutrition/med administration and intensive SLP services   SLP Brief overview of current status  Recommend pt remain NPO with frequent oral care.     Total Evaluation Time   Total Evaluation Time (Minutes) 16

## 2021-12-22 NOTE — PROGRESS NOTES
Neuroscience Intensive Care Progress Note    REASON FOR CRITICAL CARE ADMISSION:  SDH     Date of Service:  2021  Date of Admission:  2021  Hospital Day: 3    Problem List  1. SDH  2. Focal seizures  3. Restless leg syndrome  4. Right ventricular heart failure  5. CAD  6. Pulmonary hypertension  7. Atrial fibrillation  8. Hypertension  9. COPD  10. Stage 3a CKD  11. Mechanical ventilation  12. Diabetes mellitus    24 hour events:  S/p SDH evacuation with craniotomy in OR.  Extubated this morning during rounds.    24 Hour Vital Signs Summary:  Temp: (!) 100.5  F (38.1  C) (MD notified) Temp  Min: 97.1  F (36.2  C)  Max: 100.5  F (38.1  C)  Resp: 27 Resp  Min: 11  Max: 29  SpO2: 99 % SpO2  Min: 80 %  Max: 100 %  Pulse: 80 Pulse  Min: 65  Max: 86    No data recorded  BP: 103/69 Systolic (24hrs), Av , Min:100 , Max:143   Diastolic (24hrs), Av, Min:63, Max:101    Respiratory monitoring:   Ventilation Mode: (S) CPAP/PS  (Continuous positive airway pressure with Pressure Support)  FiO2 (%): 40 %  Rate Set (breaths/minute): 16 breaths/min  Tidal Volume Set (mL): 450 mL  PEEP (cm H2O): 5 cmH2O  Pressure Support (cm H2O): 7 cmH2O  Oxygen Concentration (%): 40 %  Resp: 27      I/O last 3 completed shifts:  In: 4337.15 [I.V.:3348.15; NG/GT:130]  Out: 1912 [Urine:1330; Emesis/NG output:150; Drains:432]    Current Medications:    atorvastatin  80 mg Oral or Feeding Tube Daily     bumetanide  4 mg Oral or Feeding Tube QAM     ceFAZolin  2 g Intravenous Q8H     [START ON 2021] ferrous gluconate  324 mg Oral or Feeding Tube Every Other Day     fluticasone-vilanterol  1 puff Inhalation Daily     insulin aspart  1-10 Units Subcutaneous TID AC     insulin aspart  1-7 Units Subcutaneous At Bedtime     irbesartan  150 mg Oral or Feeding Tube Daily     lacosamide (VIMPAT) intermittent infusion  100 mg Intravenous BID     levETIRAcetam  1,500 mg Intravenous Q12H     metoprolol tartrate  25 mg Oral BID      pantoprazole  40 mg Oral or Feeding Tube QAM AC     potassium chloride  40 mEq Oral or Feeding Tube Daily     rOPINIRole  2 mg Oral At Bedtime     sodium chloride (PF)  3 mL Intracatheter Q8H     tadalafil  20 mg Oral Q24H       PRN Medications:  acetaminophen **OR** acetaminophen, albuterol, glucose **OR** dextrose **OR** glucagon, fentaNYL, hydrALAZINE, HYDROmorphone, labetalol, lidocaine 4%, lidocaine (buffered or not buffered), naloxone **OR** naloxone **OR** naloxone **OR** naloxone, ondansetron **OR** ondansetron, senna-docusate **OR** senna-docusate, sodium chloride (PF)    Infusions:    propofol (DIPRIVAN) infusion Stopped (12/22/21 0502)     sodium chloride 100 mL/hr at 12/22/21 0700       Allergies   Allergen Reactions     Contrast Dye Nausea     Other reaction(s): GI intolerance, GI Upset     Furosemide Muscle Pain (Myalgia)     Previously tolerated.  Muscle cramps     Hydrochlorothiazide Unknown     Iodinated Contrast Media [Diagnostic X-Ray Materials] Nausea     Losartan Other (See Comments)     Other reaction(s): Stomatitis, Bloody nose dry mouth and lips     Losartan-Hydrochlorothiazide [Hyzaar]      Mouth sores     Metaxalone Nausea     Metolazone Other (See Comments)     Muscle cramps     Mometasone Other (See Comments)     Bloody nose     Rabeprazole Other (See Comments)     Mouth sores     Ramipril Other (See Comments)     Mouth sores     Shellfish Containing Products [Shellfish-Derived Products] Unknown     Other reaction(s): mouth sores, Other reaction(s): mouth sores     Sildenafil Muscle Pain (Myalgia)     Methocarbamol Rash     Penicillins Other (See Comments)     Immune-does not work for him       Physical Examination:  General: in no apparent distress  HEENT: normocephalic, surgical site is clean and dry  Cardiac: irregular rhythm, normal rate  Chest: lungs clear bilaterally, mechanically ventilated  Abdomen: Soft, nondistended  Extremities: Warm, no edema  Skin: No rash or lesion    Neuro:  Mental status: Awake, alert, following commands  Cranial nerves: right pupil reacts, left pupil irregular and does not react, EOMI, face appears symmetric, cough present  Motor: moving all four extremities antigravity to command  Sensory: Intact to light touch in all four extremities  Coordination: unable to assess      Labs/Studies:  BMP  Recent Labs   Lab 21  0410 21  0438 21  1350 21  1046   * 142 139 138   POTASSIUM 4.2 3.9 3.9 3.7   CHLORIDE 112* 109 104 99   CO2 25 27 29 24   BUN 30 28 36* 37*   CR 1.37* 1.28* 1.25 1.60*   ANNMARIE 8.0* 8.6 8.8 9.4       CBC  Recent Labs   Lab 21  0004 21  0438 21  1350 21  1046   WBC 12.2* 10.2 9.0 10.3   HGB 7.8* 8.2* 8.0* 8.8*    271 275 303       ABG  Recent Labs   Lab 21  0549 21  2120   PH 7.46* 7.51*   PCO2 36 32*   PO2 151* 150*   HCO3 26 25       Imagin21 CT Head without IV contrast:                                                RESIDENT PRELIMINARY INTERPRETATION  Impression:  1. Post surgical changes of right craniotomy with subdural hematoma  evacuation. No significant change in the mixed density extra-axial  collection along the craniotomy site.  2. Evolution of small subdural hematoma along the left parietal  convexity.  3. Redemonstration of small infarct in the right frontal lobe  demonstrated on prior MR.    All relevant imaging and laboratory values personally reviewed    Assessment/Plan  Red Sutherland is a 81 year old admitted on 2021 with subdural hematoma and focal seizures.  He is now s/p evacuation in the OR on 21.  He returned to the ICU intubated on mechanical ventilation.    Neuro  #Large right SDH: traumatic  -Neurochecks every 1 hrs  -HOB >30  -SBP goal <160 from NCC standpoint  - Goal normonatremia  -PT/OT/SLP    #Focal seizures:  - Continue keppra 1500 mg BID and vimpat 100 mg BID   - Post rounds patient had a clinical seizure - gave  additional 100 mg of Vimpat and increased maintenance dosing to 150 mg BID    #Restless leg syndrome:  - Continue home ropinorole    #Analgesics & sedation  - Stop propofol    CV  #HFpEF:  - Hold bumex for now as patient is receiving IVFs - will resume today  - Monitor daily weights and I/Os    #Atrial fibrillation:  -Cardiac monitoring  - Continue home metoprolol  - Holding home eliquis in setting of bleed  - Neurosurgery consulted cardiology - recommendations made for antiplatelet/AC regimen plan    #Hypertension:  -SBP goal <160 from NCC standpoint, < 140 per neurosurgery  - Continue home irbesartan and metoprolol as above  -PRN labetalol and hydralazine    #Hyperlipidemia:  - Continue home atorvastatin    Resp  #Mechanical ventilation:  - Wean ventilator as able  - Extubate today     #COPD  -Continuous pulse ox  -Maintain O2 saturations greater than 88%  - On 2-4L of oxygen at home  - Continue inhaler    #Pulmonary hypertension:  - Continue home cialis    GI  #Nutrition assessment:tube feeds once extubated  - Speech to evaluate today     Diet: NPO  Last BM: none since admission  Bowel regimen: scheduled senna-docusate and Miralax    Renal  #Stage 3a CKD:  -Daily BMP  -IV fluids: will stop as resumed bumex  -Electrolyte replacement protocol     #Hypernatremia:  #Hyperchloremia:  #Hypermagnesemia:  - Monitor BMP  - Stop IVFs    Endo  #Diabetes mellitus:  -Hgb A1c 7.5%  - Holding home invokana and glargine 18 units BID   - Start jardiance in place of invokana  - Will assess insulin needs and restart as needed  -Follow glucose levels    Heme  #Anemia:  - Holding home iron  -Daily CBC  -Hgb goal >7, plt goal >50k  -Transfuse to meet Hgb and plt goals    ID  #Post operative fever:  - Reactive leukocytosis  -Daily CBC  -Follow temperature curve    Lines/tubes/drains: ET tube will remove, arterial line, PIV, oh - remove today, NG  FEN: tube feeds to start post extubation vs speech evaluation  Ppx: DVT - SCDs; GI -  pantoprazole  Code: Full Code  Dispo: ICU    Red Sutherland remains critically ill with seizures, s/p extubation, and recent SDH s/p evacuation.  I personally examined and evaluated the patient today.  The patient s prognosis today is stable but he is at high risk of clinical deterioration from respiratory status, worsening SDH/rebleed, or worsening seizures.  I have evaluated all laboratory values and imaging studies from the past 24 hours.  The above plans and care have been discussed with spouse and all questions and concerns were addressed.     I spent a total of 37 minutes (excluding procedure time) personally providing and directing critical care services at the bedside and on the critical care unit for Red Sutherland.        Mandi Sawyer MD

## 2021-12-22 NOTE — PLAN OF CARE
Major Shift Events:  Pt is intubated and was on propofol until 0502. Pt follows commands inconsistently, moves extremities, left side does take more encouragement than right. No seizure activity noted. Rt pupil reactive, left fixed and irregular. CT completed tonight. Afib throughout shift with PVCs, frequent after 4AM. Spiked a temperature at 0400, tylenol given x1. On pressure support settings now. NG to LIS. KEILY drains in place. Dixon in place.   Plan: Extubate, neuro q1  For vital signs and complete assessments, please see documentation flowsheets.     Arsh Lora RN on 12/22/2021 at 7:40 AM

## 2021-12-22 NOTE — PROGRESS NOTES
Dr. Park at bedside, did exam with patient.   Turned propofol off- PISANO's, didn't open eyes. Responds to light touch and pain.

## 2021-12-22 NOTE — PROGRESS NOTES
Pt was extubated to 4L NC, breath sounds clear. Is doing well post-extubation. Will continue to monitor.

## 2021-12-22 NOTE — PROGRESS NOTES
Paged Dr. Florina BARRIENTOS pt has had 190 ml out of KEILY #1 in the last hour. No response at this time.

## 2021-12-22 NOTE — CONSULTS
Care Management Initial Consult    General Information  Assessment completed with: Spouse- Kaley at pt's bedside  Type of CM/SW Visit: Initial Assessment    Primary Care Provider verified and updated as needed: No   Readmission within the last 30 days: no previous admission in last 30 days         Advance Care Planning:     Kaley reports that pt does have a HCD, we do not have one on file but she will look for it and try to bring in a copy.  She is his NOK and she reports that she is listed as his Health Care Agent on his HCD.       Communication Assessment  Patient's communication style: spoken language (English or Bilingual)    Hearing Difficulty or Deaf: no   Wear Glasses or Blind: no    Cognitive  Cognitive/Neuro/Behavioral: .WDL except  Level of Consciousness: lethargic  Arousal Level: arouses to repeated stimulation  Orientation: disoriented to,time  Mood/Behavior: restless  Best Language:  (KATHY)  Speech: endotracheal tube    Living Environment:   People in home: spouse  Kaley  Current living Arrangements:  (Town Home)      Able to return to prior arrangements:         Family/Social Support:  Care provided by:  Pt is mostly independent with ADL's.  He does use a walker at times, a shower chair and although is able to drive, Kaley does most of the driving due to pt's multiple medical issues.  Provides care for:  None     Description of Support System:      Wife-Kaley  They have 6 children- they have a blended family-each have children from previous marriages, 10 grandchildren and 8 great-grandchildren    Current Resources:   Patient receiving home care services: No     Community Resources: None  Equipment currently used at home: shower chair,walker, rolling  Supplies currently used at home: Oxygen Tubing/Supplies    Employment/Financial:  Employment Status: retired      Financial Concerns: No concerns identified   Referral to Financial Counselor: No       Lifestyle & Psychosocial Needs:  Social  Determinants of Health     Tobacco Use: Medium Risk     Smoking Tobacco Use: Former Smoker     Smokeless Tobacco Use: Never Used   Alcohol Use: Not on file   Financial Resource Strain: Not on file   Food Insecurity: Not on file   Transportation Needs: Not on file   Physical Activity: Not on file   Stress: Not on file   Social Connections: Not on file   Intimate Partner Violence: Not on file   Depression: Not on file   Housing Stability: Not on file       Functional Status:  Prior to admission patient needed assistance:   Dependent ADLs:: Independent  Dependent IADLs:: Transportation  Assesssment of Functional Status: Not at baseline with ADL Functioning    Mental Health Status:      No concerns indicated    Chemical Dependency Status:     No concerns indicated- per notes pt quit smoking in 1996         Values/Beliefs:  Spiritual, Cultural Beliefs, Christianity Practices, Values that affect care:           None reported      Additional Information:  Met with Kaley at pt's bedside.  She shared story of his fall ~2 weeks ago, he didn't want to tell her about the fall or get it checked out and she tried not to push him but when he started having slurred speech she insisted that he be evaluated.  She reports that pt has not required TCU in the past.  She reports that she is nervous about his prognosis but trying to take things one step at a time.  Kaley reports excellent family support, all of their children live in [place].  They live in town home where all needs can be met on main level with only 1-2 step required to enter the home.   SW offered emotional support, explained SW role and availability and briefly reviewed possible discharge recommendations.    Social Work will remain available for supportive counseling and discharge planning as needed throughout patient s hospital course. Please contact this writer if psychosocial needs arise.     Jaimie Ryan, [facility]  Coverage ICU   [phone]

## 2021-12-23 ENCOUNTER — APPOINTMENT (OUTPATIENT)
Dept: GENERAL RADIOLOGY | Facility: CLINIC | Age: 81
DRG: 025 | End: 2021-12-23
Attending: NURSE PRACTITIONER
Payer: COMMERCIAL

## 2021-12-23 ENCOUNTER — APPOINTMENT (OUTPATIENT)
Dept: GENERAL RADIOLOGY | Facility: CLINIC | Age: 81
DRG: 025 | End: 2021-12-23
Attending: STUDENT IN AN ORGANIZED HEALTH CARE EDUCATION/TRAINING PROGRAM
Payer: COMMERCIAL

## 2021-12-23 ENCOUNTER — APPOINTMENT (OUTPATIENT)
Dept: SPEECH THERAPY | Facility: CLINIC | Age: 81
DRG: 025 | End: 2021-12-23
Payer: COMMERCIAL

## 2021-12-23 LAB
ABO/RH(D): NORMAL
ANION GAP SERPL CALCULATED.3IONS-SCNC: 8 MMOL/L (ref 3–14)
ANTIBODY SCREEN: NEGATIVE
ATRIAL RATE - MUSE: 234 BPM
BUN SERPL-MCNC: 34 MG/DL (ref 7–30)
CALCIUM SERPL-MCNC: 8.6 MG/DL (ref 8.5–10.1)
CHLORIDE BLD-SCNC: 116 MMOL/L (ref 94–109)
CO2 SERPL-SCNC: 26 MMOL/L (ref 20–32)
CREAT SERPL-MCNC: 1.37 MG/DL (ref 0.66–1.25)
DIASTOLIC BLOOD PRESSURE - MUSE: NORMAL MMHG
ERYTHROCYTE [DISTWIDTH] IN BLOOD BY AUTOMATED COUNT: 19.2 % (ref 10–15)
GFR SERPL CREATININE-BSD FRML MDRD: 52 ML/MIN/1.73M2
GLUCOSE BLD-MCNC: 158 MG/DL (ref 70–99)
GLUCOSE BLDC GLUCOMTR-MCNC: 138 MG/DL (ref 70–99)
GLUCOSE BLDC GLUCOMTR-MCNC: 143 MG/DL (ref 70–99)
GLUCOSE BLDC GLUCOMTR-MCNC: 143 MG/DL (ref 70–99)
GLUCOSE BLDC GLUCOMTR-MCNC: 144 MG/DL (ref 70–99)
HCT VFR BLD AUTO: 27.3 % (ref 40–53)
HGB BLD-MCNC: 7.6 G/DL (ref 13.3–17.7)
INTERPRETATION ECG - MUSE: NORMAL
MAGNESIUM SERPL-MCNC: 2.6 MG/DL (ref 1.6–2.3)
MCH RBC QN AUTO: 20.9 PG (ref 26.5–33)
MCHC RBC AUTO-ENTMCNC: 27.8 G/DL (ref 31.5–36.5)
MCV RBC AUTO: 75 FL (ref 78–100)
P AXIS - MUSE: NORMAL DEGREES
PHOSPHATE SERPL-MCNC: 2.8 MG/DL (ref 2.5–4.5)
PLATELET # BLD AUTO: 249 10E3/UL (ref 150–450)
POTASSIUM BLD-SCNC: 3.7 MMOL/L (ref 3.4–5.3)
PR INTERVAL - MUSE: NORMAL MS
QRS DURATION - MUSE: 82 MS
QT - MUSE: 362 MS
QTC - MUSE: 420 MS
R AXIS - MUSE: 111 DEGREES
RADIOLOGIST FLAGS: ABNORMAL
RBC # BLD AUTO: 3.63 10E6/UL (ref 4.4–5.9)
SODIUM SERPL-SCNC: 150 MMOL/L (ref 133–144)
SODIUM SERPL-SCNC: 150 MMOL/L (ref 133–144)
SODIUM SERPL-SCNC: 151 MMOL/L (ref 133–144)
SODIUM SERPL-SCNC: 153 MMOL/L (ref 133–144)
SPECIMEN EXPIRATION DATE: NORMAL
SYSTOLIC BLOOD PRESSURE - MUSE: NORMAL MMHG
T AXIS - MUSE: 16 DEGREES
TROPONIN I SERPL HS-MCNC: 25 NG/L
TROPONIN I SERPL HS-MCNC: 28 NG/L
VENTRICULAR RATE- MUSE: 81 BPM
WBC # BLD AUTO: 11.4 10E3/UL (ref 4–11)

## 2021-12-23 PROCEDURE — 84295 ASSAY OF SERUM SODIUM: CPT | Performed by: STUDENT IN AN ORGANIZED HEALTH CARE EDUCATION/TRAINING PROGRAM

## 2021-12-23 PROCEDURE — 250N000013 HC RX MED GY IP 250 OP 250 PS 637: Performed by: STUDENT IN AN ORGANIZED HEALTH CARE EDUCATION/TRAINING PROGRAM

## 2021-12-23 PROCEDURE — 85014 HEMATOCRIT: CPT | Performed by: STUDENT IN AN ORGANIZED HEALTH CARE EDUCATION/TRAINING PROGRAM

## 2021-12-23 PROCEDURE — 250N000011 HC RX IP 250 OP 636: Performed by: NEUROLOGICAL SURGERY

## 2021-12-23 PROCEDURE — 99233 SBSQ HOSP IP/OBS HIGH 50: CPT | Performed by: PSYCHIATRY & NEUROLOGY

## 2021-12-23 PROCEDURE — 258N000003 HC RX IP 258 OP 636: Performed by: PSYCHIATRY & NEUROLOGY

## 2021-12-23 PROCEDURE — 999N000065 XR ABDOMEN PORT 1 VIEWS

## 2021-12-23 PROCEDURE — 999N000127 HC STATISTIC PERIPHERAL IV START W US GUIDANCE

## 2021-12-23 PROCEDURE — C9254 INJECTION, LACOSAMIDE: HCPCS | Performed by: PSYCHIATRY & NEUROLOGY

## 2021-12-23 PROCEDURE — 84100 ASSAY OF PHOSPHORUS: CPT | Performed by: STUDENT IN AN ORGANIZED HEALTH CARE EDUCATION/TRAINING PROGRAM

## 2021-12-23 PROCEDURE — 71045 X-RAY EXAM CHEST 1 VIEW: CPT | Mod: 26 | Performed by: RADIOLOGY

## 2021-12-23 PROCEDURE — 93010 ELECTROCARDIOGRAM REPORT: CPT | Performed by: INTERNAL MEDICINE

## 2021-12-23 PROCEDURE — 86850 RBC ANTIBODY SCREEN: CPT | Performed by: NEUROLOGICAL SURGERY

## 2021-12-23 PROCEDURE — 200N000002 HC R&B ICU UMMC

## 2021-12-23 PROCEDURE — 250N000009 HC RX 250: Performed by: STUDENT IN AN ORGANIZED HEALTH CARE EDUCATION/TRAINING PROGRAM

## 2021-12-23 PROCEDURE — 93005 ELECTROCARDIOGRAM TRACING: CPT

## 2021-12-23 PROCEDURE — 250N000011 HC RX IP 250 OP 636: Performed by: STUDENT IN AN ORGANIZED HEALTH CARE EDUCATION/TRAINING PROGRAM

## 2021-12-23 PROCEDURE — 999N000157 HC STATISTIC RCP TIME EA 10 MIN

## 2021-12-23 PROCEDURE — 36415 COLL VENOUS BLD VENIPUNCTURE: CPT | Performed by: STUDENT IN AN ORGANIZED HEALTH CARE EDUCATION/TRAINING PROGRAM

## 2021-12-23 PROCEDURE — 74018 RADEX ABDOMEN 1 VIEW: CPT | Mod: 26 | Performed by: RADIOLOGY

## 2021-12-23 PROCEDURE — 250N000013 HC RX MED GY IP 250 OP 250 PS 637: Performed by: NURSE PRACTITIONER

## 2021-12-23 PROCEDURE — 83735 ASSAY OF MAGNESIUM: CPT | Performed by: STUDENT IN AN ORGANIZED HEALTH CARE EDUCATION/TRAINING PROGRAM

## 2021-12-23 PROCEDURE — 92526 ORAL FUNCTION THERAPY: CPT | Mod: GN

## 2021-12-23 PROCEDURE — 71045 X-RAY EXAM CHEST 1 VIEW: CPT

## 2021-12-23 PROCEDURE — 250N000011 HC RX IP 250 OP 636: Performed by: PSYCHIATRY & NEUROLOGY

## 2021-12-23 PROCEDURE — 74018 RADEX ABDOMEN 1 VIEW: CPT | Mod: 26 | Performed by: STUDENT IN AN ORGANIZED HEALTH CARE EDUCATION/TRAINING PROGRAM

## 2021-12-23 PROCEDURE — 84484 ASSAY OF TROPONIN QUANT: CPT | Performed by: NURSE PRACTITIONER

## 2021-12-23 RX ORDER — QUETIAPINE FUMARATE 25 MG/1
25 TABLET, FILM COATED ORAL 2 TIMES DAILY
Status: DISCONTINUED | OUTPATIENT
Start: 2021-12-23 | End: 2021-12-24

## 2021-12-23 RX ORDER — POLYETHYLENE GLYCOL 3350 17 G/17G
17 POWDER, FOR SOLUTION ORAL DAILY
Status: DISCONTINUED | OUTPATIENT
Start: 2021-12-23 | End: 2022-01-17 | Stop reason: HOSPADM

## 2021-12-23 RX ORDER — AMOXICILLIN 250 MG
2 CAPSULE ORAL 2 TIMES DAILY PRN
Status: DISCONTINUED | OUTPATIENT
Start: 2021-12-23 | End: 2021-12-23

## 2021-12-23 RX ORDER — POTASSIUM CHLORIDE 7.45 MG/ML
10 INJECTION INTRAVENOUS
Status: COMPLETED | OUTPATIENT
Start: 2021-12-23 | End: 2021-12-23

## 2021-12-23 RX ORDER — HYDROCODONE BITARTRATE AND ACETAMINOPHEN 5; 325 MG/1; MG/1
1 TABLET ORAL EVERY 6 HOURS PRN
Status: ON HOLD | COMMUNITY
End: 2022-01-15

## 2021-12-23 RX ORDER — AMOXICILLIN 250 MG
2 CAPSULE ORAL 2 TIMES DAILY
Status: DISCONTINUED | OUTPATIENT
Start: 2021-12-23 | End: 2021-12-26

## 2021-12-23 RX ORDER — AMOXICILLIN 250 MG
2 CAPSULE ORAL 2 TIMES DAILY
Status: DISCONTINUED | OUTPATIENT
Start: 2021-12-23 | End: 2022-01-17 | Stop reason: HOSPADM

## 2021-12-23 RX ORDER — SODIUM CHLORIDE, SODIUM GLUCONATE, SODIUM ACETATE, POTASSIUM CHLORIDE AND MAGNESIUM CHLORIDE 526; 502; 368; 37; 30 MG/100ML; MG/100ML; MG/100ML; MG/100ML; MG/100ML
75 INJECTION, SOLUTION INTRAVENOUS CONTINUOUS
Status: DISCONTINUED | OUTPATIENT
Start: 2021-12-23 | End: 2021-12-23

## 2021-12-23 RX ORDER — BISACODYL 10 MG
10 SUPPOSITORY, RECTAL RECTAL ONCE
Status: COMPLETED | OUTPATIENT
Start: 2021-12-23 | End: 2021-12-23

## 2021-12-23 RX ORDER — LACOSAMIDE 50 MG/1
150 TABLET ORAL 2 TIMES DAILY
Status: DISCONTINUED | OUTPATIENT
Start: 2021-12-23 | End: 2021-12-24

## 2021-12-23 RX ORDER — GUAIFENESIN 600 MG/1
15 TABLET, EXTENDED RELEASE ORAL DAILY
Status: DISCONTINUED | OUTPATIENT
Start: 2021-12-23 | End: 2022-01-17 | Stop reason: HOSPADM

## 2021-12-23 RX ORDER — QUETIAPINE FUMARATE 25 MG/1
25 TABLET, FILM COATED ORAL DAILY PRN
Status: DISCONTINUED | OUTPATIENT
Start: 2021-12-23 | End: 2021-12-24

## 2021-12-23 RX ORDER — DEXMEDETOMIDINE HYDROCHLORIDE 4 UG/ML
.1-1.2 INJECTION, SOLUTION INTRAVENOUS CONTINUOUS
Status: DISCONTINUED | OUTPATIENT
Start: 2021-12-23 | End: 2021-12-25

## 2021-12-23 RX ORDER — LEVETIRACETAM 750 MG/1
1500 TABLET ORAL 2 TIMES DAILY
Status: DISCONTINUED | OUTPATIENT
Start: 2021-12-23 | End: 2021-12-24

## 2021-12-23 RX ADMIN — SODIUM CHLORIDE, SODIUM GLUCONATE, SODIUM ACETATE, POTASSIUM CHLORIDE AND MAGNESIUM CHLORIDE 75 ML/HR: 526; 502; 368; 37; 30 INJECTION, SOLUTION INTRAVENOUS at 06:30

## 2021-12-23 RX ADMIN — LACOSAMIDE 150 MG: 50 TABLET, FILM COATED ORAL at 20:47

## 2021-12-23 RX ADMIN — BUMETANIDE 4 MG: 2 TABLET ORAL at 08:37

## 2021-12-23 RX ADMIN — MULTIVIT AND MINERALS-FERROUS GLUCONATE 9 MG IRON/15 ML ORAL LIQUID 15 ML: at 11:00

## 2021-12-23 RX ADMIN — CEFAZOLIN SODIUM 2 G: 2 INJECTION, SOLUTION INTRAVENOUS at 06:29

## 2021-12-23 RX ADMIN — POTASSIUM CHLORIDE 40 MEQ: 1.5 POWDER, FOR SOLUTION ORAL at 08:37

## 2021-12-23 RX ADMIN — DOCUSATE SODIUM 50 MG AND SENNOSIDES 8.6 MG 2 TABLET: 8.6; 5 TABLET, FILM COATED ORAL at 10:58

## 2021-12-23 RX ADMIN — POTASSIUM CHLORIDE 10 MEQ: 7.46 INJECTION, SOLUTION INTRAVENOUS at 07:00

## 2021-12-23 RX ADMIN — LEVETIRACETAM 1500 MG: 15 INJECTION INTRAVENOUS at 05:51

## 2021-12-23 RX ADMIN — BISACODYL 10 MG: 10 SUPPOSITORY RECTAL at 18:00

## 2021-12-23 RX ADMIN — POTASSIUM CHLORIDE 10 MEQ: 7.46 INJECTION, SOLUTION INTRAVENOUS at 08:37

## 2021-12-23 RX ADMIN — DEXMEDETOMIDINE HYDROCHLORIDE 0.2 MCG/KG/HR: 4 INJECTION, SOLUTION INTRAVENOUS at 00:40

## 2021-12-23 RX ADMIN — EMPAGLIFLOZIN 10 MG: 10 TABLET, FILM COATED ORAL at 08:38

## 2021-12-23 RX ADMIN — DEXMEDETOMIDINE HYDROCHLORIDE 1.2 MCG/KG/HR: 4 INJECTION, SOLUTION INTRAVENOUS at 10:58

## 2021-12-23 RX ADMIN — POLYETHYLENE GLYCOL 3350 17 G: 17 POWDER, FOR SOLUTION ORAL at 10:59

## 2021-12-23 RX ADMIN — TADALAFIL 20 MG: 20 TABLET, FILM COATED ORAL at 08:37

## 2021-12-23 RX ADMIN — Medication 40 MG: at 08:38

## 2021-12-23 RX ADMIN — QUETIAPINE FUMARATE 25 MG: 25 TABLET ORAL at 10:59

## 2021-12-23 RX ADMIN — CEFAZOLIN SODIUM 2 G: 2 INJECTION, SOLUTION INTRAVENOUS at 15:15

## 2021-12-23 RX ADMIN — METOPROLOL TARTRATE 25 MG: 25 TABLET, FILM COATED ORAL at 08:37

## 2021-12-23 RX ADMIN — LEVETIRACETAM 1500 MG: 750 TABLET ORAL at 20:47

## 2021-12-23 RX ADMIN — QUETIAPINE FUMARATE 25 MG: 25 TABLET ORAL at 20:47

## 2021-12-23 RX ADMIN — ROPINIROLE HYDROCHLORIDE 2 MG: 2 TABLET, FILM COATED ORAL at 22:56

## 2021-12-23 RX ADMIN — ATORVASTATIN CALCIUM 80 MG: 40 TABLET, FILM COATED ORAL at 08:37

## 2021-12-23 RX ADMIN — IRBESARTAN 150 MG: 150 TABLET ORAL at 08:38

## 2021-12-23 RX ADMIN — FERROUS GLUCONATE 324 MG: 324 TABLET ORAL at 08:37

## 2021-12-23 RX ADMIN — DOCUSATE SODIUM 50 MG AND SENNOSIDES 8.6 MG 2 TABLET: 8.6; 5 TABLET, FILM COATED ORAL at 20:47

## 2021-12-23 RX ADMIN — DEXMEDETOMIDINE HYDROCHLORIDE 0.6 MCG/KG/HR: 4 INJECTION, SOLUTION INTRAVENOUS at 16:03

## 2021-12-23 RX ADMIN — SODIUM CHLORIDE 150 MG: 9 INJECTION, SOLUTION INTRAVENOUS at 09:18

## 2021-12-23 RX ADMIN — MAGNESIUM HYDROXIDE 30 ML: 400 SUSPENSION ORAL at 11:00

## 2021-12-23 ASSESSMENT — ACTIVITIES OF DAILY LIVING (ADL)
ADLS_ACUITY_SCORE: 10
ADLS_ACUITY_SCORE: 16
ADLS_ACUITY_SCORE: 10
ADLS_ACUITY_SCORE: 10
ADLS_ACUITY_SCORE: 16
ADLS_ACUITY_SCORE: 10
ADLS_ACUITY_SCORE: 16
ADLS_ACUITY_SCORE: 10
ADLS_ACUITY_SCORE: 16
ADLS_ACUITY_SCORE: 10
ADLS_ACUITY_SCORE: 16
ADLS_ACUITY_SCORE: 10
ADLS_ACUITY_SCORE: 16
ADLS_ACUITY_SCORE: 10

## 2021-12-23 NOTE — PROGRESS NOTES
Neuroscience Intensive Care Progress Note    REASON FOR CRITICAL CARE ADMISSION:  SDH     Date of Service:  2021  Date of Admission:  2021  Hospital Day: 4    Problem List  1. SDH  2. Focal seizures  3. Restless leg syndrome  4. Right ventricular heart failure  5. CAD  6. Pulmonary hypertension  7. Atrial fibrillation  8. Hypertension  9. COPD  10. Stage 3a CKD  11. Diabetes mellitus    24 hour events:  Extubated yesterday.  Noted to have more focal seizures with left face twitching yesterday evening but none overnight.    24 Hour Vital Signs Summary:  Temp: 98.9  F (37.2  C) Temp  Min: 98.5  F (36.9  C)  Max: 99.9  F (37.7  C)  Resp: 29 Resp  Min: 13  Max: 32  SpO2: 96 % SpO2  Min: 94 %  Max: 100 %  Pulse: 73 Pulse  Min: 62  Max: 93    No data recorded  BP: 126/70 Systolic (24hrs), Av , Min:103 , Max:163   Diastolic (24hrs), Av, Min:56, Max:98    Respiratory monitoring:   Ventilation Mode: CPAP/PS  (Continuous positive airway pressure with Pressure Support)  FiO2 (%): 40 %  Rate Set (breaths/minute): 16 breaths/min  Tidal Volume Set (mL): 450 mL  PEEP (cm H2O): 5 cmH2O  Pressure Support (cm H2O): 7 cmH2O  Oxygen Concentration (%): 40 %  Resp: 29      I/O last 3 completed shifts:  In: 979.01 [I.V.:859.01; NG/GT:120]  Out: 2873 [Urine:2740; Drains:133]    Current Medications:    atorvastatin  80 mg Oral or Feeding Tube Daily     bumetanide  4 mg Oral or Feeding Tube QAM     ceFAZolin  2 g Intravenous Q8H     empagliflozin  10 mg Oral or Feeding Tube Daily     ferrous gluconate  324 mg Oral or Feeding Tube Every Other Day     fluticasone-vilanterol  1 puff Inhalation Daily     insulin aspart  1-10 Units Subcutaneous TID AC     insulin aspart  1-7 Units Subcutaneous At Bedtime     irbesartan  150 mg Oral or Feeding Tube Daily     lacosamide (VIMPAT) intermittent infusion  150 mg Intravenous BID     levETIRAcetam  1,500 mg Intravenous Q12H     metoprolol tartrate  25 mg Oral BID     pantoprazole  40  mg Oral or Feeding Tube QAM AC     potassium chloride  40 mEq Oral or Feeding Tube Daily     potassium chloride  10 mEq Intravenous Q1H     rOPINIRole  2 mg Oral At Bedtime     sodium chloride (PF)  3 mL Intracatheter Q8H     tadalafil  20 mg Oral Q24H       PRN Medications:  acetaminophen **OR** acetaminophen, albuterol, glucose **OR** dextrose **OR** glucagon, hydrALAZINE, HYDROmorphone, labetalol, lidocaine 4%, lidocaine (buffered or not buffered), naloxone **OR** naloxone **OR** naloxone **OR** naloxone, ondansetron **OR** ondansetron, senna-docusate **OR** senna-docusate, sodium chloride (PF)    Infusions:    dexmedetomidine 0.1 mcg/kg/hr (12/23/21 0600)     Plasma-Lyte A 75 mL/hr (12/23/21 0630)       Allergies   Allergen Reactions     Contrast Dye Nausea     Other reaction(s): GI intolerance, GI Upset     Furosemide Muscle Pain (Myalgia)     Previously tolerated.  Muscle cramps     Hydrochlorothiazide Unknown     Iodinated Contrast Media [Diagnostic X-Ray Materials] Nausea     Losartan Other (See Comments)     Other reaction(s): Stomatitis, Bloody nose dry mouth and lips     Losartan-Hydrochlorothiazide [Hyzaar]      Mouth sores     Metaxalone Nausea     Metolazone Other (See Comments)     Muscle cramps     Mometasone Other (See Comments)     Bloody nose     Rabeprazole Other (See Comments)     Mouth sores     Ramipril Other (See Comments)     Mouth sores     Shellfish Containing Products [Shellfish-Derived Products] Unknown     Other reaction(s): mouth sores, Other reaction(s): mouth sores     Sildenafil Muscle Pain (Myalgia)     Methocarbamol Rash     Penicillins Other (See Comments)     Immune-does not work for him       Physical Examination:  General: in no apparent distress  HEENT: normocephalic, surgical site is clean and dry  Cardiac: irregular rhythm, normal rate  Chest: lungs clear bilaterally  Abdomen: Soft, nondistended, tender in the epigastric area  Extremities: Warm, no edema  Skin: No rash or  "lesion   Neuro:  Mental status: Awake, alert, following commands but very confused and repeats \"help\"  Cranial nerves: right pupil reactive 2 mm, left pupil is irregular and does not react, EOMI, face appears symmetric, cough present  Motor: moving all four extremities antigravity to command  Sensory: Intact to light touch in all four extremities  Coordination: unable to assess      Labs/Studies:  BMP  Recent Labs   Lab 12/23/21  0511 12/22/21  0410 12/21/21  0438 12/20/21  1350   *  150* 146* 142 139   POTASSIUM 3.7 4.2 3.9 3.9   CHLORIDE 116* 112* 109 104   CO2 26 25 27 29   BUN 34* 30 28 36*   CR 1.37* 1.37* 1.28* 1.25   ANNMARIE 8.6 8.0* 8.6 8.8       CBC  Recent Labs   Lab 12/23/21  0511 12/22/21  1207 12/22/21  0004 12/21/21  0438   WBC 11.4* 13.3* 12.2* 10.2   HGB 7.6* 7.9* 7.8* 8.2*    268 262 271       ABG  Recent Labs   Lab 12/22/21  0824 12/22/21  0549 12/21/21  2120   PH 7.44 7.46* 7.51*   PCO2 36 36 32*   PO2 162* 151* 150*   HCO3 24 26 25       Imaging:  CXRs and abdominal XRs overnight related to NG removal and placement    All relevant imaging and laboratory values personally reviewed    Assessment/Plan  Red Sutherland is a 81 year old admitted on 12/20/2021 with subdural hematoma and focal seizures.  He is now s/p evacuation in the OR on 12/21/21.  He returned to the ICU intubated on mechanical ventilation.    Neuro  #Large right SDH: traumatic  -Neurochecks every 2 hrs - will discuss liberalization with neurosurgery  -HOB >30  -SBP goal <160 from NCC standpoint  - Goal normonatremia  -PT/OT/SLP    #Focal seizures:  - Continue keppra 1500 mg BID and vimpat 150 mg BID    #Restless leg syndrome:  - Continue home ropinorole    #Analgesics & sedation  - Precedex - wean as able    CV  #HFpEF:  - On home bumex  - Monitor daily weights and I/Os  - Cardiology consulted by neurosurgery - irbesartan, jardiance, and metoprolol     #Atrial fibrillation:  -Cardiac monitoring  - Continue home " metoprolol  - Holding home eliquis in setting of bleed  - Neurosurgery consulted cardiology - recommendations made for antiplatelet/AC regimen plan    #Hypertension:  -SBP goal <160 from NCC standpoint, < 140 per neurosurgery  - Continue home irbesartan and metoprolol as above  -PRN labetalol and hydralazine    #Hyperlipidemia:  - Continue home atorvastatin    Resp  #COPD  -Continuous pulse ox  -Maintain O2 saturations greater than 88%  - On 2-4L of oxygen at home  - Continue inhaler    #Pulmonary hypertension:  - Continue home cialis    GI  #Nutrition  - Ongoing speech evaluation  - Resume tube feeds after NG placement    Diet: tube feeds  Last BM: none since admission  Bowel regimen: scheduled senna-docusate and Miralax    #Epigatric pain:  - EKG, troponin  - Can consider a dose of pepcid  - Escalate bowel regimen    Renal  #Stage 3a CKD:  -Daily BMP  -IV fluids: stop plasmalyte  -Electrolyte replacement protocol     #Hypernatremia:  #Hyperchloremia:  #Hypermagnesemia:  - Monitor BMP  - Increase FWF to 60 mL Q4H    Endo  #Diabetes mellitus:  -Hgb A1c 7.5%  -Holding home glargine 18 units BID  -On jardiance in place of home invokana  -Will assess insulin needs and restart as needed  -Follow glucose levels    Heme  #Anemia:  - Holding home iron  -Daily CBC  -Hgb goal >7, plt goal >50k  -Transfuse to meet Hgb and plt goals    ID  - Afebrile  -Reactive leukocytosis is improving  -Daily CBC  -Follow temperature curve    Lines/tubes/drains: arterial line - will discuss removal, PIV, NG  FEN: tube feeds   Ppx: DVT - SCDs; GI - pantoprazole  Code: Full Code  Dispo: ICU    Mandi Sawyer MD

## 2021-12-23 NOTE — PHARMACY-ADMISSION MEDICATION HISTORY
Admission Medication History Completed by Pharmacy    See [facility] Admission Navigator for allergy information, preferred outpatient pharmacy, prior to admission medications and immunization status.     Medication History Sources:     Dispense History    Chart Review (including 12/13 cardiology visit and 12/17 MTM pharmacist visit where a complete medication reconciliation was performed)    Unable to interview patient due to altered mental status at this time, also unable to get a hold of spouse to confirm last doses.    Changes made to PTA medication list (reason):    Added: Hydrocodone/acetaminophen prescribed on 12/20 for an 8-day supply    Deleted: None    Changed: None    Additional Information:    Metolazone, digoxin, and spironolactone were recently discontinued on 12/13 per cardiology due to declining renal function    Metformin was discontinued on 12/17 due to declining renal function (per MTM notes patient was instructed to hold since November but had not been)    The patient had a recent fill for prasugrel 10 mg daily on 10/27/21 for a 90-day supply (but has historically been taking clopidogrel). Unable to confirm with patient, but due to recent notes documenting the patient's poor adherence to the prescribed medication regimen, it may be possible that the patient was taking both prasugrel and clopidogrel concomitantly.    Prior to Admission medications    Medication Sig Last Dose Taking? Auth Provider   ACCU-CHEK JOSE PLUS TEST STRP strips [ACCU-CHEK JOSE PLUS TEST STRP STRIPS] see administration instructions. 12/19/2021 at Unknown time Yes Provider, Historical   acetaminophen (TYLENOL) 500 MG tablet [ACETAMINOPHEN (TYLENOL) 500 MG TABLET] Take 1,000 mg by mouth every 6 (six) hours as needed. First line of pain management. Do Not take more than 4,000 mg in 24 hours. 12/19/2021 at Unknown time Yes Provider, Historical   albuterol (PROVENTIL HFA;VENTOLIN HFA) 90 mcg/actuation inhaler Inhale 2 puffs into  "the lungs every 6 hours as needed  12/19/2021 at Unknown time Yes Provider, Historical   apixaban ANTICOAGULANT (ELIQUIS) 5 MG tablet Take 5 mg by mouth 2 times daily  12/19/2021 at Unknown time Yes Unknown, Entered By History   atorvastatin (LIPITOR) 80 MG tablet Take 1 tablet (80 mg) by mouth daily 12/20/2021 at Unknown time Yes Deirdre Valdovinos MD   BD ULTRA-FINE MANISHA PEN NEEDLES 32 gauge x 5/32\" Ndle [BD ULTRA-FINE MANISHA PEN NEEDLES 32 GAUGE X 5/32\" NDLE]  12/19/2021 at Unknown time Yes Provider, Historical   bumetanide (BUMEX) 2 MG tablet Take 2 tablets (4 mg) by mouth every morning And 1 1/2 tablets in the evening 12/19/2021 at Unknown time Yes Meg Roth MD   canagliflozin (INVOKANA) 100 mg Tab Take 100 mg by mouth every morning  12/19/2021 at Unknown time Yes Provider, Historical   clopidogrel (PLAVIX) 75 MG tablet Take 75 mg by mouth daily  12/19/2021 at Unknown time Yes Unknown, Entered By History   Ferrous Gluconate 324 (37.5 Fe) MG TABS Take 1 tablet by mouth every other day 12/19/2021 at Unknown time Yes Reported, Patient   fluticasone-vilanterol (BREO ELLIPTA) 200-25 mcg/dose DsDv inhaler Inhale 1 puff into the lungs daily  12/19/2021 at Unknown time Yes Provider, Historical   Garlic 1000 MG CAPS Take 1 capsule by mouth daily 12/19/2021 at Unknown time Yes Unknown, Entered By History   HYDROcodone-acetaminophen (NORCO) 5-325 MG tablet Take 1 tablet by mouth every 6 hours as needed for severe pain Unknown at Unknown time Yes Unknown, Entered By History   insulin glargine (LANTUS PEN) 100 UNIT/ML pen Inject 18 Units Subcutaneous 2 times daily 12/19/2021 at Unknown time Yes Meg Roth MD   ipratropium - albuterol 0.5 mg/2.5 mg/3 mL (DUONEB) 0.5-2.5 (3) MG/3ML neb solution Take 1 vial by nebulization 4 times daily as needed for shortness of breath / dyspnea  12/19/2021 at Unknown time Yes Unknown, Entered By History   irbesartan (AVAPRO) 300 MG tablet Take 0.5 tablets (150 mg) by mouth daily " 12/19/2021 at Unknown time Yes Meg Roth MD   lidocaine (LIDODERM) 5 % Place 1 patch onto the skin daily as needed  12/19/2021 at Unknown time Yes Provider, Historical   metoprolol tartrate (LOPRESSOR) 25 MG tablet Take 1 tablet (25 mg) by mouth 2 times daily (with meals) 12/19/2021 at Unknown time Yes Eliezer Salomon MD   multivitamin w/minerals (THERA-VIT-M) tablet Take 1 tablet by mouth daily 12/19/2021 at Unknown time Yes Unknown, Entered By History   omeprazole (PRILOSEC) 20 MG DR capsule Take 20 mg by mouth daily 12/19/2021 at Unknown time Yes Unknown, Entered By History   OXYGEN-AIR DELIVERY SYSTEMS MISC [OXYGEN-AIR DELIVERY SYSTEMS MISC] Use As Directed. 12/19/2021 at Unknown time Yes Provider, Historical   potassium chloride (K-DUR,KLOR-CON) 20 MEQ tablet Take 40 mEq by mouth daily  12/19/2021 at Unknown time Yes Provider, Historical   rOPINIRole (REQUIP) 2 MG tablet [ROPINIROLE (REQUIP) 2 MG TABLET] Take 2 mg by mouth at bedtime.  12/19/2021 at Unknown time Yes Provider, Historical   tadalafil (CIALIS) 20 MG tablet Take 20 mg by mouth daily  12/19/2021 at Unknown time Yes Meg Roth MD   vitamin C (ASCORBIC ACID) 1000 MG TABS Take 1,000 mg by mouth daily 12/19/2021 at Unknown time Yes Unknown, Entered By History   zinc gluconate 50 MG tablet Take 50 mg by mouth daily 12/19/2021 at Unknown time Yes Unknown, Entered By History   zolpidem (AMBIEN) 10 mg tablet Take 5 mg by mouth nightly as needed for sleep  12/19/2021 at Unknown time Yes Provider, Historical       Date completed: 12/23/21    Medication history completed by: Dilia Jenkins, [facility]

## 2021-12-23 NOTE — PROGRESS NOTES
[facility], [place]   12/23/2021  Neurosurgery Progress Note:    Assessment:  Red Sutherland is a 81-year-old male post-traumatic acute right subdural hematoma with left hemibody weakness, on xarelto and plavix. Admitted to the ICU on 12/20/21. Taken to OR 12/21/2021. Extubated 12/22/2021.     Plan:  - Hold xarelto, plavix  - Cardiology consultation for AP/AC restart guidance  - Medicine consultation for pre-operative optimization  - Monitor KEILY outputs  - Keppra 1g BID, Vimpat 150 BID  - HOB > 30 degrees  - SLP     Asim Ann MD  PGY-2, Neurosurgery  -----------------------------------    Interval History:extubated. Some agitation overnight with NGT pulled out.     Objective:   Temp:  [97.6  F (36.4  C)-99.6  F (37.6  C)] 97.6  F (36.4  C)  Pulse:  [62-95] 95  Resp:  [13-32] 29  BP: (103-163)/(56-98) 141/77  MAP:  [61 mmHg-283 mmHg] 117 mmHg  Arterial Line BP: (102-283)/() 155/80  SpO2:  [94 %-100 %] 96 %  I/O last 3 completed shifts:  In: 979.01 [I.V.:859.01; NG/GT:120]  Out: 2873 [Urine:2740; Drains:133]    Gen: Appears comfortable, NAD  Wound: clean, dry, intact,  Neurologic:  - Alert & Oriented to person, place, time, and situation  - Follows commands briskly x 4, antigravity  - Mild L facial droop  Moving all extremities x 4.    LABS:  Recent Labs   Lab 12/23/21  0749 12/23/21  0511 12/22/21  2212 12/22/21  0421 12/22/21  0410 12/21/21  0816 12/21/21  0438   NA  --  150*  150*  --   --  146*  --  142   POTASSIUM  --  3.7  --   --  4.2  --  3.9   CHLORIDE  --  116*  --   --  112*  --  109   CO2  --  26  --   --  25  --  27   ANIONGAP  --  8  --   --  9  --  6   * 158* 135*   < > 161*   < > 163*   BUN  --  34*  --   --  30  --  28   CR  --  1.37*  --   --  1.37*  --  1.28*   ANNMARIE  --  8.6  --   --  8.0*  --  8.6    < > = values in this interval not displayed.       Recent Labs   Lab 12/23/21  0511   WBC 11.4*   RBC 3.63*   HGB 7.6*   HCT 27.3*   MCV 75*   MCH 20.9*    MCHC 27.8*   RDW 19.2*          IMAGING:  Recent Results (from the past 24 hour(s))   XR Abdomen Port 1 View   Result Value    Radiologist flags Bronchial placement of NG tube (Urgent)    Narrative    Exam: TEMPORARY, 12/23/2021 3:43 AM    Indication: Status post NG tube replacement    Comparison: 12/21/2021    Findings:   Orogastric tube tip and sidehole projecting over the left hemithorax.  Nonobstructive bowel gas pattern. Left basilar opacities. No acute  skeletal abnormalities.      Impression    Impression: Orogastric tube projecting over the left hemithorax,  likely bronchial placement.     [Urgent Result: Bronchial placement of NG tube]    Finding was identified on 12/23/2021 3:43 AM.     SICU personnel was contacted by Dr. Velasquez at 12/23/2021 3:45 AM  and verbalized understanding of the urgent finding.     I have personally reviewed the examination and initial interpretation  and I agree with the findings.    OSKAR MUIR MD         SYSTEM ID:  [id number]   XR Chest Port 1 View    Narrative    EXAM: XR CHEST PORT 1 VIEW  12/23/2021 4:28 AM     HISTORY:  f/u NGT into L lung s/p removal       COMPARISON: 12/23/21, 7/11/2021, 12/20/2001    FINDINGS:   The trachea is midline. The cardiomediastinal silhouette is partially  obscured. Increased left basilar opacities. New silhouetting of the  left hemidiaphragm. Unchanged bilateral  mixed opacities. No  pneumothorax.      Impression    IMPRESSION:   1. New left basilar opacities and silhouetting of the left  hemidiaphragm is suggestive of small left pleural effusion and  associated atelectasis.  2. Unchanged bilateral mixed opacities, may represent edema or  infection.  3. No pneumothorax status post removal of gastric tube.    I have personally reviewed the examination and initial interpretation  and I agree with the findings.    OSKAR MUIR MD         SYSTEM ID:  [id number]

## 2021-12-23 NOTE — PLAN OF CARE
1500 - 1900   Neuro: Lethargic. Disoriented x 4. Intermittently arouses to voice otherwise repeated stimulation. L pupil 3, irregular, fixed (team aware) and R pupil 2-3 and sluggish. Intermittently follows simple commands. Moves all extremities spontaneously. BUE mitts in place d/t pulling at lines. Groans/calls out sporadically.   Cardiac: Afib 90's. SBP <140 - no PRN needed. Tmax 99.9. + pulses. Generalized edema.   Respiratory: 2L NC. Tachypnic 20-30's. LS diminished.   GI: NGT at 72 to LIS. No N/V. Abd soft, nontender. Failed swallow eval this afternoon.   : Dixon removed at 1800 - adequate UO prior.   Skin: Scattered bruising/abrasions. L tongue abrasion.  Lines: L PIV - TKO + abx/anti seizure meds. L radial art line.  Pain: denies  Social: wife, Kaley, at bedside.  Plan: Continue q2h neuro checks. Tx to floor tomorrow?  For vital signs and complete assessments, please see documentation flowsheets.

## 2021-12-23 NOTE — PLAN OF CARE
Major Shift Events:  Patient alert, but unable to assess orientation status. Pt not following commands or answering questions.  Very restless and agitated at the start of shift. Sitter placed at bedside due to agitation. Moves all extremities purposefully. L pupil 3mm, irregular and fixed (Team aware). R pupil 3 and sluggish. Slight L facial droop. 2 L NC worn, O2 sats stable. Pt in A fib with controlled HR. SBP started to go above 140 during extreme agitation,  x1 dose of hydralize given. Pt incontinent of urine x1. Bladder scan completed periodically throughout the night, did not need to straight cath. During one episode of extreme agitation pt pulled NG tube out. Attempt was made to replace NG, but tube went into lung instead of stomach. Team notified. Due to agitation precedex drip was started. Both KEILY drains are C/D/I w/ serosanguineous output (See MAR for amounts).     Plan: Continue to monitor neuro status. Address NG issue.     For vital signs and complete assessments, please see documentation flowsheets.

## 2021-12-23 NOTE — PLAN OF CARE
0800: Patient opens eyes spontaneously, inconsistently follows simple commands. Very restless and agitated, pulling at lines and tubes, 1:1 sitter at bedside.  Precedex gtt infusing. Hemodynamically stable. 2L NC with SpO2 >96%. KEILY #1 to full bulb suction, KEILY #2 to thumbprint suction, see I/Os.     0900: NG tube replaced, patient tolerated    1000: Continues to be agitated, currently maxed on precedex gtt. Team notified, seroquel was ordered and given. Voiding via urinal.    1200: No changes in neuro status, no seizures. Weaning down precedex    1400: Patient continues to need frequent redirection and reorientation. No changes in neuro status, no seizures. Wife at bedside.     1500: Tube feedings started at 10ml/hr. Voiding via external catheter.    1800: No bowel movement yet today, suppository given. Arterial line removed. Na 153, free water increased to 150cc Q4. No seizure activity this shift.

## 2021-12-23 NOTE — PROGRESS NOTES
CLINICAL NUTRITION SERVICES - ASSESSMENT NOTE     Nutrition Prescription    RECOMMENDATIONS FOR MDs/PROVIDERS TO ORDER:  - Total daily fluids/adjustments per MD   - Electrolyte replacement per protocol as indicated    - Bowel regimen as appro --> escalating per rounds     Malnutrition Status:    - Moderate malnutrition in the context of acute on chronic illness     Recommendations already ordered by Registered Dietitian (RD):  - Once enteral access placed and verified for use: rec Pivot 1.5 Chaim @ goal of 60ml/hr  (1440ml/day)  will provide: 2160 kcals (26 kcals/kg), 135 g PRO (1.6 gm/kg), 1080 ml free H20, 248 g CHO, and 10 g fiber daily.  - Initiate @ 10 ml/hr and advance by 10 ml q8hr as tolerated  - Do not start or advance until lytes (Mg++,K+) WNL and phos>2.0  - Recommend 30-60 ml q4hr fluid flushes for tube patency. Additional fluids and/or adjustments per MD.    - Order multivitamin/mineral (15 ml/day via FT) to help ensure micronutrient needs being met with suspected hypermetabolic demands and potential interruptions to TF infusions.  - Elevated HOB with gastric feeds      Future/Additional Recommendations:  - Initiation and tolerance of EN via current access/NG  - Weight trends   - GI status      REASON FOR ASSESSMENT  Red Sutherland is a/an 81 year old male assessed by the dietitian for Provider Order - Registered Dietitian to Assess and Order TF per Medical Nutrition Therapy Protocol    Medical History:  Pt with subdural hematoma and focal seizures, s/p evacuation in the OR on 12/21/21.  He returned to the ICU intubated on mechanical ventilation. Now extubated on nasal canula.     NUTRITION HISTORY  Assess as able; not able to provide history     CURRENT NUTRITION ORDERS  Diet: NPO  Enteral Access: NG (pending)  Pt had NG placed and pulled it out. Attempted to place securement devise but unable to connect probes after multiple attempts.     LABS  Labs reviewed  Na+ 150 (H)  BUN: 34 (H)  Creatinine:  "1.37 (H)  Mg++ 2.6 (H)  Glucose trends: 135, 158    MEDICATIONS  Medications reviewed  Lipitor   Bumex  Ferrous gluconate  Insulin    ANTHROPOMETRICS  Height: 0 cm (Data Unavailable)   Ht Readings from Last 2 Encounters:   12/01/21 1.803 m (5' 11\")   10/29/21 1.803 m (5' 11\")     Most Recent Weight: 95 kg (209 lb 7 oz)    IBW: 78 kg  BMI: Overweight BMI 25-29.9  Weight History:   Wt Readings from Last 9 Encounters:   12/23/21 95 kg (209 lb 7 oz)   12/20/21 103.9 kg (229 lb)   12/17/21 96.4 kg (212 lb 9.6 oz)   12/06/21 96.3 kg (212 lb 3.2 oz)   11/01/21 96.3 kg (212 lb 6.5 oz)   10/26/21 103.2 kg (227 lb 9.6 oz)   10/21/21 107.5 kg (237 lb 1.6 oz)   10/12/21 104.3 kg (230 lb)   08/30/21 115.2 kg (254 lb)   18%,weight loss over ~ 4 months     Dosing Weight: 82 kg (adjusted weight using current weight of 95 kg and IBW of 78 kg)    ASSESSED NUTRITION NEEDS  Estimated Energy Needs: 5009-8592 kcals/day (25 - 30 kcals/kg)  Justification: Maintenance  Estimated Protein Needs: 123-164 grams protein/day (1.5 - 2 grams of pro/kg)  Justification: Increased needs  Estimated Fluid Needs: (1 mL/kcal)   Justification: Maintenance and Per provider pending fluid status    PHYSICAL FINDINGS  See malnutrition section below.  Skin: Dry; pale  Nails: unable to visualize  Hair: NA  Abd: round, soft    NGT in place, taped with clip     MALNUTRITION  % Intake: Decreased intake does not meet criteria  % Weight Loss: > 10% in 6 months (severe) --> 18% in 4 months   Subcutaneous Fat Loss: Facial region: mild buccal area  Muscle Loss: Facial & jaw region: mild, Thoracic region (clavicle, acromium bone, deltoid, trapezius, pectoral), Upper arm (bicep, tricep), Lower arm  (forearm), Patellar region and Posterior calf: all moderate  Fluid Accumulation/Edema: Unable to assess  Malnutrition Diagnosis: Moderate malnutrition in the context of acute on chronic illness     NUTRITION DIAGNOSIS  Inadequate protein-energy intake related to inability to " take oral PO/vented as evidenced by pt NPO meeting 0% kcals/PRO needs at present       INTERVENTIONS  Implementation  Nutrition Education: Will be provided if education needs arise - not appropriate   Enteral Nutrition - initiate as ordered once access verified for use     Goals  Total avg nutritional intake to meet a minimum of 25 kcal/kg and 1.5 g PRO/kg daily (per dosing wt 82 kg).     Monitoring/Evaluation  Progress toward goals will be monitored and evaluated per protocol.     Liset Aguirre RD, LD, [facility]  Neuro ICU  Pager: [phone]

## 2021-12-24 ENCOUNTER — APPOINTMENT (OUTPATIENT)
Dept: CT IMAGING | Facility: CLINIC | Age: 81
DRG: 025 | End: 2021-12-24
Attending: STUDENT IN AN ORGANIZED HEALTH CARE EDUCATION/TRAINING PROGRAM
Payer: COMMERCIAL

## 2021-12-24 ENCOUNTER — APPOINTMENT (OUTPATIENT)
Dept: GENERAL RADIOLOGY | Facility: CLINIC | Age: 81
DRG: 025 | End: 2021-12-24
Attending: STUDENT IN AN ORGANIZED HEALTH CARE EDUCATION/TRAINING PROGRAM
Payer: COMMERCIAL

## 2021-12-24 ENCOUNTER — APPOINTMENT (OUTPATIENT)
Dept: SPEECH THERAPY | Facility: CLINIC | Age: 81
DRG: 025 | End: 2021-12-24
Payer: COMMERCIAL

## 2021-12-24 LAB
ANION GAP SERPL CALCULATED.3IONS-SCNC: 8 MMOL/L (ref 3–14)
ANION GAP SERPL CALCULATED.3IONS-SCNC: 9 MMOL/L (ref 3–14)
BUN SERPL-MCNC: 47 MG/DL (ref 7–30)
BUN SERPL-MCNC: 51 MG/DL (ref 7–30)
CALCIUM SERPL-MCNC: 8.8 MG/DL (ref 8.5–10.1)
CALCIUM SERPL-MCNC: 9.1 MG/DL (ref 8.5–10.1)
CHLORIDE BLD-SCNC: 118 MMOL/L (ref 94–109)
CHLORIDE BLD-SCNC: 119 MMOL/L (ref 94–109)
CO2 SERPL-SCNC: 26 MMOL/L (ref 20–32)
CO2 SERPL-SCNC: 27 MMOL/L (ref 20–32)
CREAT SERPL-MCNC: 1.46 MG/DL (ref 0.66–1.25)
CREAT SERPL-MCNC: 1.46 MG/DL (ref 0.66–1.25)
ERYTHROCYTE [DISTWIDTH] IN BLOOD BY AUTOMATED COUNT: 20 % (ref 10–15)
GFR SERPL CREATININE-BSD FRML MDRD: 49 ML/MIN/1.73M2
GFR SERPL CREATININE-BSD FRML MDRD: 49 ML/MIN/1.73M2
GLUCOSE BLD-MCNC: 168 MG/DL (ref 70–99)
GLUCOSE BLD-MCNC: 196 MG/DL (ref 70–99)
GLUCOSE BLDC GLUCOMTR-MCNC: 148 MG/DL (ref 70–99)
GLUCOSE BLDC GLUCOMTR-MCNC: 161 MG/DL (ref 70–99)
GLUCOSE BLDC GLUCOMTR-MCNC: 178 MG/DL (ref 70–99)
GLUCOSE BLDC GLUCOMTR-MCNC: 180 MG/DL (ref 70–99)
GLUCOSE BLDC GLUCOMTR-MCNC: 188 MG/DL (ref 70–99)
HCT VFR BLD AUTO: 29 % (ref 40–53)
HGB BLD-MCNC: 8.2 G/DL (ref 13.3–17.7)
MAGNESIUM SERPL-MCNC: 2.9 MG/DL (ref 1.6–2.3)
MCH RBC QN AUTO: 20.9 PG (ref 26.5–33)
MCHC RBC AUTO-ENTMCNC: 28.3 G/DL (ref 31.5–36.5)
MCV RBC AUTO: 74 FL (ref 78–100)
PHOSPHATE SERPL-MCNC: 2.7 MG/DL (ref 2.5–4.5)
PLATELET # BLD AUTO: 274 10E3/UL (ref 150–450)
POTASSIUM BLD-SCNC: 3.5 MMOL/L (ref 3.4–5.3)
POTASSIUM BLD-SCNC: 3.6 MMOL/L (ref 3.4–5.3)
POTASSIUM BLD-SCNC: 3.8 MMOL/L (ref 3.4–5.3)
RBC # BLD AUTO: 3.92 10E6/UL (ref 4.4–5.9)
SODIUM SERPL-SCNC: 153 MMOL/L (ref 133–144)
SODIUM SERPL-SCNC: 153 MMOL/L (ref 133–144)
SODIUM SERPL-SCNC: 154 MMOL/L (ref 133–144)
SODIUM SERPL-SCNC: 156 MMOL/L (ref 133–144)
WBC # BLD AUTO: 9.8 10E3/UL (ref 4–11)

## 2021-12-24 PROCEDURE — 250N000013 HC RX MED GY IP 250 OP 250 PS 637: Performed by: STUDENT IN AN ORGANIZED HEALTH CARE EDUCATION/TRAINING PROGRAM

## 2021-12-24 PROCEDURE — 36415 COLL VENOUS BLD VENIPUNCTURE: CPT | Performed by: STUDENT IN AN ORGANIZED HEALTH CARE EDUCATION/TRAINING PROGRAM

## 2021-12-24 PROCEDURE — 200N000002 HC R&B ICU UMMC

## 2021-12-24 PROCEDURE — 92526 ORAL FUNCTION THERAPY: CPT | Mod: GN

## 2021-12-24 PROCEDURE — 85014 HEMATOCRIT: CPT | Performed by: STUDENT IN AN ORGANIZED HEALTH CARE EDUCATION/TRAINING PROGRAM

## 2021-12-24 PROCEDURE — 84132 ASSAY OF SERUM POTASSIUM: CPT | Performed by: STUDENT IN AN ORGANIZED HEALTH CARE EDUCATION/TRAINING PROGRAM

## 2021-12-24 PROCEDURE — 84295 ASSAY OF SERUM SODIUM: CPT | Performed by: STUDENT IN AN ORGANIZED HEALTH CARE EDUCATION/TRAINING PROGRAM

## 2021-12-24 PROCEDURE — 71045 X-RAY EXAM CHEST 1 VIEW: CPT

## 2021-12-24 PROCEDURE — 36415 COLL VENOUS BLD VENIPUNCTURE: CPT | Performed by: NURSE PRACTITIONER

## 2021-12-24 PROCEDURE — 83735 ASSAY OF MAGNESIUM: CPT | Performed by: NURSE PRACTITIONER

## 2021-12-24 PROCEDURE — 999N000128 HC STATISTIC PERIPHERAL IV START W/O US GUIDANCE

## 2021-12-24 PROCEDURE — 84132 ASSAY OF SERUM POTASSIUM: CPT | Performed by: NEUROLOGICAL SURGERY

## 2021-12-24 PROCEDURE — 84100 ASSAY OF PHOSPHORUS: CPT | Performed by: NURSE PRACTITIONER

## 2021-12-24 PROCEDURE — 99233 SBSQ HOSP IP/OBS HIGH 50: CPT | Mod: GC | Performed by: STUDENT IN AN ORGANIZED HEALTH CARE EDUCATION/TRAINING PROGRAM

## 2021-12-24 PROCEDURE — 250N000013 HC RX MED GY IP 250 OP 250 PS 637: Performed by: PSYCHIATRY & NEUROLOGY

## 2021-12-24 PROCEDURE — 71045 X-RAY EXAM CHEST 1 VIEW: CPT | Mod: 26 | Performed by: RADIOLOGY

## 2021-12-24 PROCEDURE — 70450 CT HEAD/BRAIN W/O DYE: CPT

## 2021-12-24 PROCEDURE — 250N000011 HC RX IP 250 OP 636: Performed by: STUDENT IN AN ORGANIZED HEALTH CARE EDUCATION/TRAINING PROGRAM

## 2021-12-24 PROCEDURE — 250N000013 HC RX MED GY IP 250 OP 250 PS 637: Performed by: NEUROLOGICAL SURGERY

## 2021-12-24 PROCEDURE — 70450 CT HEAD/BRAIN W/O DYE: CPT | Mod: 26 | Performed by: RADIOLOGY

## 2021-12-24 PROCEDURE — 93010 ELECTROCARDIOGRAM REPORT: CPT | Performed by: INTERNAL MEDICINE

## 2021-12-24 PROCEDURE — 93005 ELECTROCARDIOGRAM TRACING: CPT

## 2021-12-24 PROCEDURE — 99233 SBSQ HOSP IP/OBS HIGH 50: CPT | Performed by: PSYCHIATRY & NEUROLOGY

## 2021-12-24 PROCEDURE — 250N000009 HC RX 250: Performed by: STUDENT IN AN ORGANIZED HEALTH CARE EDUCATION/TRAINING PROGRAM

## 2021-12-24 PROCEDURE — 250N000013 HC RX MED GY IP 250 OP 250 PS 637: Performed by: NURSE PRACTITIONER

## 2021-12-24 RX ORDER — POTASSIUM CHLORIDE 1.5 G/1.58G
20 POWDER, FOR SOLUTION ORAL ONCE
Status: COMPLETED | OUTPATIENT
Start: 2021-12-24 | End: 2021-12-24

## 2021-12-24 RX ORDER — LIDOCAINE 40 MG/G
CREAM TOPICAL
Status: DISCONTINUED | OUTPATIENT
Start: 2021-12-24 | End: 2021-12-26

## 2021-12-24 RX ORDER — QUETIAPINE FUMARATE 25 MG/1
25 TABLET, FILM COATED ORAL EVERY MORNING
Status: DISCONTINUED | OUTPATIENT
Start: 2021-12-25 | End: 2021-12-26

## 2021-12-24 RX ORDER — LEVETIRACETAM 500 MG/1
1000 TABLET ORAL 2 TIMES DAILY
Status: DISCONTINUED | OUTPATIENT
Start: 2021-12-24 | End: 2021-12-25

## 2021-12-24 RX ORDER — QUETIAPINE FUMARATE 25 MG/1
25 TABLET, FILM COATED ORAL 2 TIMES DAILY PRN
Status: DISCONTINUED | OUTPATIENT
Start: 2021-12-24 | End: 2022-01-03

## 2021-12-24 RX ORDER — LACOSAMIDE 50 MG/1
200 TABLET ORAL 2 TIMES DAILY
Status: DISCONTINUED | OUTPATIENT
Start: 2021-12-24 | End: 2022-01-17 | Stop reason: HOSPADM

## 2021-12-24 RX ORDER — QUETIAPINE FUMARATE 50 MG/1
50 TABLET, FILM COATED ORAL AT BEDTIME
Status: DISCONTINUED | OUTPATIENT
Start: 2021-12-24 | End: 2021-12-26

## 2021-12-24 RX ADMIN — QUETIAPINE FUMARATE 25 MG: 25 TABLET ORAL at 09:59

## 2021-12-24 RX ADMIN — CEFAZOLIN SODIUM 2 G: 2 INJECTION, SOLUTION INTRAVENOUS at 09:50

## 2021-12-24 RX ADMIN — LEVETIRACETAM 1500 MG: 750 TABLET ORAL at 09:31

## 2021-12-24 RX ADMIN — POLYETHYLENE GLYCOL 3350 17 G: 17 POWDER, FOR SOLUTION ORAL at 09:47

## 2021-12-24 RX ADMIN — POTASSIUM CHLORIDE 40 MEQ: 1.5 POWDER, FOR SOLUTION ORAL at 09:51

## 2021-12-24 RX ADMIN — QUETIAPINE FUMARATE 50 MG: 50 TABLET ORAL at 22:34

## 2021-12-24 RX ADMIN — POTASSIUM & SODIUM PHOSPHATES POWDER PACK 280-160-250 MG 1 PACKET: 280-160-250 PACK at 09:32

## 2021-12-24 RX ADMIN — TADALAFIL 20 MG: 20 TABLET, FILM COATED ORAL at 09:49

## 2021-12-24 RX ADMIN — DOCUSATE SODIUM 50 MG AND SENNOSIDES 8.6 MG 2 TABLET: 8.6; 5 TABLET, FILM COATED ORAL at 09:50

## 2021-12-24 RX ADMIN — BUMETANIDE 4 MG: 2 TABLET ORAL at 09:49

## 2021-12-24 RX ADMIN — DEXMEDETOMIDINE HYDROCHLORIDE 0.6 MCG/KG/HR: 4 INJECTION, SOLUTION INTRAVENOUS at 15:56

## 2021-12-24 RX ADMIN — ATORVASTATIN CALCIUM 80 MG: 40 TABLET, FILM COATED ORAL at 09:49

## 2021-12-24 RX ADMIN — EMPAGLIFLOZIN 10 MG: 10 TABLET, FILM COATED ORAL at 09:51

## 2021-12-24 RX ADMIN — LACOSAMIDE 200 MG: 50 TABLET, FILM COATED ORAL at 20:31

## 2021-12-24 RX ADMIN — LACOSAMIDE 150 MG: 50 TABLET, FILM COATED ORAL at 09:31

## 2021-12-24 RX ADMIN — CEFAZOLIN SODIUM 2 G: 2 INJECTION, SOLUTION INTRAVENOUS at 15:56

## 2021-12-24 RX ADMIN — CEFAZOLIN SODIUM 2 G: 2 INJECTION, SOLUTION INTRAVENOUS at 00:34

## 2021-12-24 RX ADMIN — CEFAZOLIN SODIUM 2 G: 2 INJECTION, SOLUTION INTRAVENOUS at 22:35

## 2021-12-24 RX ADMIN — Medication 40 MG: at 09:49

## 2021-12-24 RX ADMIN — POTASSIUM CHLORIDE 20 MEQ: 1.5 POWDER, FOR SOLUTION ORAL at 09:48

## 2021-12-24 RX ADMIN — IRBESARTAN 150 MG: 150 TABLET ORAL at 09:51

## 2021-12-24 RX ADMIN — ROPINIROLE HYDROCHLORIDE 2 MG: 2 TABLET, FILM COATED ORAL at 22:35

## 2021-12-24 RX ADMIN — QUETIAPINE FUMARATE 25 MG: 25 TABLET ORAL at 05:21

## 2021-12-24 RX ADMIN — MULTIVIT AND MINERALS-FERROUS GLUCONATE 9 MG IRON/15 ML ORAL LIQUID 15 ML: at 09:30

## 2021-12-24 RX ADMIN — LEVETIRACETAM 1000 MG: 500 TABLET, FILM COATED ORAL at 20:30

## 2021-12-24 RX ADMIN — POTASSIUM & SODIUM PHOSPHATES POWDER PACK 280-160-250 MG 1 PACKET: 280-160-250 PACK at 20:31

## 2021-12-24 RX ADMIN — DEXMEDETOMIDINE HYDROCHLORIDE 0.4 MCG/KG/HR: 4 INJECTION, SOLUTION INTRAVENOUS at 09:58

## 2021-12-24 RX ADMIN — DEXMEDETOMIDINE HYDROCHLORIDE 0.5 MCG/KG/HR: 4 INJECTION, SOLUTION INTRAVENOUS at 22:33

## 2021-12-24 RX ADMIN — DOCUSATE SODIUM 50 MG AND SENNOSIDES 8.6 MG 2 TABLET: 8.6; 5 TABLET, FILM COATED ORAL at 20:31

## 2021-12-24 RX ADMIN — DEXMEDETOMIDINE HYDROCHLORIDE 0.2 MCG/KG/HR: 4 INJECTION, SOLUTION INTRAVENOUS at 02:40

## 2021-12-24 ASSESSMENT — ACTIVITIES OF DAILY LIVING (ADL)
ADLS_ACUITY_SCORE: 16
ADLS_ACUITY_SCORE: 18
ADLS_ACUITY_SCORE: 16
ADLS_ACUITY_SCORE: 18
ADLS_ACUITY_SCORE: 16
ADLS_ACUITY_SCORE: 18
ADLS_ACUITY_SCORE: 16
ADLS_ACUITY_SCORE: 18
ADLS_ACUITY_SCORE: 16
ADLS_ACUITY_SCORE: 18
ADLS_ACUITY_SCORE: 16
ADLS_ACUITY_SCORE: 18

## 2021-12-24 ASSESSMENT — VISUAL ACUITY: OU: OTHER (SEE COMMENT)

## 2021-12-24 NOTE — PROGRESS NOTES
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 Pt had head trauma around 12 days ago and was found to have a right subdural hematoma measuring around 12.9 cm with midline shift s/p evacuation of hematoma on 12/21. Cardiology service consulted for anticoagulation and antiplatelet management in the setting of subdural hematoma.     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)  #Afib (CHADSVASC 5)  #HTN  #HLD    RECOMMEND:  -Once cleared for anticoagulation/antiplatelets from NSGY end, recommend:              Day 1: Heparin for 24 hours as challenge              Day 2: If tolerates heparin drip, discontinue heparin and load with Aspirin 325 mg once              Day 3: If tolerates Aspirin load, start baby Aspirin (81mg) daily and Prasugrel 10 mg daily  -Continue metoprolol, atorvastatin, bumex daily dose, empagliflozin, irbesartan    Discussed with Dr.Kalra Sallie Leiva MD  Cardiology fellow     REASON FOR CONSULT: anticoagulation, antiplatelet management     Subjective:   Hemodynamically stable overnight   Reported mild bleeding at IV site this AM     Physical Exam   Temp: 99.3  F (37.4  C) Temp src: Axillary /52 Pulse: 72   Resp: 29 SpO2: 99 % O2 Device: Nasal cannula Oxygen Delivery: 2 LPM  Vital Signs with Ranges  Temp:  [97.3  F (36.3  C)-99.3  F (37.4  C)] 99.3  F (37.4  C)  Pulse:  [56-86] 72  Resp:  [13-34] 29  BP: ()/(30-79) 78/30  MAP:  [69 mmHg-78 mmHg] 69 mmHg  Arterial Line BP: ()/(51-61) 97/51  SpO2:  [91 %-99 %] 99 %  199 lbs 11.79 oz    GEN: Non purposeful movements   HEENT: MMM  Eyes: no icterus  CV: RRR,  normal s1/s2, no murmurs/rubs/s3/s4, no heave. No JVD  CHEST: CTAB  ABD: soft, NT/ND, NABS  : no flank/suprapubic tenderness  NEURO: Non purposeful movements, unable to follow commands (on Precedex)     Data   Data reviewed today:    Recent Labs   Lab 12/24/21  1146 12/24/21  1005 12/24/21  0754 12/24/21  0543 12/24/21  0410 12/23/21  0749 12/23/21  0511 12/22/21  1208 12/22/21  1207 12/20/21  2202 12/20/21  1350 12/20/21  1046   WBC  --   --   --  9.8  --   --  11.4*  --  13.3*   < > 9.0 10.3   HGB  --   --   --  8.2*  --   --  7.6*  --  7.9*   < > 8.0* 8.8*   MCV  --   --   --  74*  --   --  75*  --  73*   < > 72* 72*   PLT  --   --   --  274  --   --  249  --  268   < > 275 303   INR  --   --   --   --   --   --   --   --   --   --  1.32* 1.20*   NA  --  154*  --  153* 153*   < > 150*  150*  --   --    < > 139 138   POTASSIUM  --  3.8  --  3.5 3.6  --  3.7  --   --    < > 3.9 3.7   CHLORIDE  --  119*  --  118*  --   --  116*  --   --    < > 104 99   CO2  --  27  --  26  --   --  26  --   --    < > 29 24   BUN  --  51*  --  47*  --   --  34*  --   --    < > 36* 37*   CR  --  1.46*  --  1.46*  --   --  1.37*  --   --    < > 1.25 1.60*   ANIONGAP  --  8  --  9  --   --  8  --   --    < > 6 15   ANNMARIE  --  9.1  --  8.8  --   --  8.6  --   --    < > 8.8 9.4   * 196* 161* 168*  --    < > 158*   < >  --    < > 141* 269*   ALBUMIN  --   --   --   --   --   --   --   --   --   --  3.1* 3.6   PROTTOTAL  --   --   --   --   --   --   --   --   --   --  6.8 7.3   BILITOTAL  --   --   --   --   --   --   --   --   --   --  1.5* 1.7*   ALKPHOS  --   --   --   --   --   --   --   --   --   --  133 140*   ALT  --   --   --   --   --   --   --   --   --   --  17 13   AST  --   --   --   --   --   --   --   --   --   --  22 19    < > = values in this interval not displayed.       Recent Results (from the past 24 hour(s))   XR Chest Port 1 View    Narrative    EXAM: XR CHEST PORT 1 VIEW  12/24/2021 7:57 AM     HISTORY:  f/u  atelectasis       COMPARISON:  Chest radiograph 12/23/2021, 12/20/2021, 11/30/2021    TECHNIQUE: Single frontal radiograph of the chest    FINDINGS:   Portable AP view of the chest performed at 50 degrees. Enteric tube  courses outside of the field of view.    Patient is rotated to the left. No tracheal deviation. Ill-defined  cardiac silhouette. Atherosclerotic calcifications of the aorta.  Basilar and perihilar predominant airspace and interstitial opacities,  mildly improved. No definite pneumothorax..      Impression    IMPRESSION: Improved pulmonary edema and/or atelectasis.     I have personally reviewed the examination and initial interpretation  and I agree with the findings.    EPIFANIO BURRELL MD         SYSTEM ID:  [id number]   CT Head w/o Contrast    Narrative    CT HEAD W/O CONTRAST 12/24/2021 10:42 AM    History: s/p removal of subdural drain   ICD-10:    Comparison: Head CT, 12/22/2021.    Technique: Using multidetector thin collimation helical acquisition  technique, axial, coronal and sagittal CT images from the skull base  to the vertex were obtained without intravenous contrast.   (topogram) image(s) also obtained and reviewed.    Findings: Stable postsurgical changes of right frontotemporal parietal  craniotomy and evacuation of the right subdural hematoma. Interval  removal of the subdural drain. Stable position of the subgaleal drain.  Grossly unchanged mixed density subdural hematoma along the right  cerebral convexity with small foci of pneumocephalus. Small subdural  hematoma along the left parietal convexity appears similar to prior.  Unchanged local mass effect without midline shift. No acute loss of  gray-white matter differentiation. Mild cerebral volume loss.  Ventricles are proportionate to the cerebral sulci. Unchanged  periventricular white matter hypoattenuation. The basal cisterns are  clear. Atherosclerotic calcification of the intracranial arteries.    Right craniotomy.  Increased fluid collection/mucosal thickening of the  left maxillary sinus and stable mild mucosal thickening in the ethmoid  air cells. The mastoid air cells are clear. Bilateral pseudophakia.      Impression    Impression:  1. No change in subdural hematoma along the right cerebral convexity  following removal of subdural drain.  2. Unchanged minimal subdural hematoma along the left parietal  convexity.    I have personally reviewed the examination and initial interpretation  and I agree with the findings.    ALYSA MENDOZA MD         SYSTEM ID:  [id number]

## 2021-12-24 NOTE — PROGRESS NOTES
Vascular Access Services Notes:    Pt was NOT available for PICC placement (with Speech Therapist). RN to notify VAS when pt's ready.        FLORIN DozierN, RN [facility]

## 2021-12-24 NOTE — PROGRESS NOTES
0Neuroscience Intensive Care Progress Note    REASON FOR CRITICAL CARE ADMISSION:  SDH     Date of Service:  2021  Date of Admission:  2021  Hospital Day: 5    Problem List  1. SDH  2. Focal seizures  3. Restless leg syndrome  4. Right ventricular heart failure  5. CAD  6. Pulmonary hypertension  7. Atrial fibrillation  8. Hypertension  9. COPD  10. Stage 3a CKD  11. Diabetes mellitus    24 hour events:  Remains agitated.  KEILY drain pulled this morning.    24 Hour Vital Signs Summary:  Temp: 98.1  F (36.7  C) Temp  Min: 97.3  F (36.3  C)  Max: 99.5  F (37.5  C)  Resp: 13 Resp  Min: 11  Max: 34  SpO2: 91 % SpO2  Min: 91 %  Max: 99 %  Pulse: 68 Pulse  Min: 56  Max: 95    No data recorded  BP: 108/58 Systolic (24hrs), Av , Min:93 , Max:141   Diastolic (24hrs), Av, Min:43, Max:79    Respiratory monitoring:   Resp: 13      I/O last 3 completed shifts:  In: 1150.75 [I.V.:395.75; NG/GT:525]  Out: 2911 [Urine:2750; Drains:161]    Current Medications:    atorvastatin  80 mg Oral or Feeding Tube Daily     bumetanide  4 mg Oral or Feeding Tube QAM     ceFAZolin  2 g Intravenous Q8H     empagliflozin  10 mg Oral or Feeding Tube Daily     ferrous gluconate  324 mg Oral or Feeding Tube Every Other Day     fluticasone-vilanterol  1 puff Inhalation Daily     insulin aspart  1-10 Units Subcutaneous TID AC     insulin aspart  1-7 Units Subcutaneous At Bedtime     irbesartan  150 mg Oral or Feeding Tube Daily     lacosamide  150 mg Oral or Feeding Tube BID     levETIRAcetam  1,500 mg Oral or Feeding Tube BID     metoprolol tartrate  25 mg Oral BID     multivitamins w/minerals  15 mL Per Feeding Tube Daily     pantoprazole  40 mg Oral or Feeding Tube QAM AC     polyethylene glycol  17 g Oral or Feeding Tube Daily     potassium & sodium phosphates  1 packet Oral or Feeding Tube BID     potassium chloride  20 mEq Oral or Feeding Tube Once     potassium chloride  40 mEq Oral or Feeding Tube Daily     QUEtiapine  25 mg  Oral or Feeding Tube BID     rOPINIRole  2 mg Oral At Bedtime     senna-docusate  2 tablet Oral or Feeding Tube BID    Or     senna-docusate  2 tablet Oral BID     sodium chloride (PF)  3 mL Intracatheter Q8H     tadalafil  20 mg Oral Q24H       PRN Medications:  acetaminophen **OR** acetaminophen, albuterol, glucose **OR** dextrose **OR** glucagon, hydrALAZINE, labetalol, naloxone **OR** naloxone **OR** naloxone **OR** naloxone, ondansetron **OR** ondansetron, QUEtiapine, sodium chloride (PF)    Infusions:    dexmedetomidine 0.9 mcg/kg/hr (12/24/21 0600)       Allergies   Allergen Reactions     Contrast Dye Nausea     Other reaction(s): GI intolerance, GI Upset     Furosemide Muscle Pain (Myalgia)     Previously tolerated.  Muscle cramps     Hydrochlorothiazide Unknown     Iodinated Contrast Media [Diagnostic X-Ray Materials] Nausea     Losartan Other (See Comments)     Other reaction(s): Stomatitis, Bloody nose dry mouth and lips     Losartan-Hydrochlorothiazide [Hyzaar]      Mouth sores     Metaxalone Nausea     Metolazone Other (See Comments)     Muscle cramps     Mometasone Other (See Comments)     Bloody nose     Rabeprazole Other (See Comments)     Mouth sores     Ramipril Other (See Comments)     Mouth sores     Shellfish Containing Products [Shellfish-Derived Products] Unknown     Other reaction(s): mouth sores, Other reaction(s): mouth sores     Sildenafil Muscle Pain (Myalgia)     Methocarbamol Rash     Penicillins Other (See Comments)     Immune-does not work for him       Physical Examination:  General: in no apparent distress  HEENT: normocephalic, surgical site is clean and dry  Cardiac: irregular rhythm, normal rate  Chest: lungs clear bilaterally  Abdomen: Soft, nondistended, tender in the epigastric area  Extremities: Warm, no edema  Skin: No rash or lesion   Neuro:  Mental status: Drowsy, not following commands, agitated, yelling out  Cranial nerves: right pupil reactive 2 mm, left pupil is  irregular and does not react, EOMI, face appears symmetric, cough present  Motor: moving all four extremities antigravity to command  Sensory: Intact to light touch in all four extremities  Coordination: unable to assess      Labs/Studies:  BMP  Recent Labs   Lab 12/24/21  0543 12/24/21  0410 12/23/21  2322 12/23/21  1652 12/23/21  0511 12/22/21  0410 12/21/21  0438   * 153* 151* 153* 150*  150* 146* 142   POTASSIUM 3.5 3.6  --   --  3.7 4.2 3.9   CHLORIDE 118*  --   --   --  116* 112* 109   CO2 26  --   --   --  26 25 27   BUN 47*  --   --   --  34* 30 28   CR 1.46*  --   --   --  1.37* 1.37* 1.28*   ANNMARIE 8.8  --   --   --  8.6 8.0* 8.6       CBC  Recent Labs   Lab 12/24/21  0543 12/23/21  0511 12/22/21  1207 12/22/21  0004   WBC 9.8 11.4* 13.3* 12.2*   HGB 8.2* 7.6* 7.9* 7.8*    249 268 262       ABG  Recent Labs   Lab 12/22/21  0824 12/22/21  0549 12/21/21  2120   PH 7.44 7.46* 7.51*   PCO2 36 36 32*   PO2 162* 151* 150*   HCO3 24 26 25       Imaging:  CXRs and abdominal XRs overnight related to NG removal and placement    All relevant imaging and laboratory values personally reviewed    Assessment/Plan  Red Sutherland is a 81 year old admitted on 12/20/2021 with subdural hematoma and focal seizures.  He is now s/p evacuation in the OR on 12/21/21.  He returned to the ICU intubated on mechanical ventilation.    Neuro  #Large right SDH: traumatic  -Neurochecks Q4H  -HOB >30  -SBP goal <160 from NCC standpoint  - Goal normonatremia  -PT/OT/SLP    #Focal seizures:  - Continue keppra 1500 mg BID and vimpat 150 mg BID    #Restless leg syndrome:  - Continue home ropinorole    #Analgesics & sedation  - Precedex - wean as able  - Seroquel started 25 mg in morning and 50 QPM   - Will give additional 25 mg BID prn     CV  #HFpEF:  - On home bumex  - Monitor daily weights and I/Os  - Cardiology consulted by neurosurgery - irbesartan, jardiance, and metoprolol     #Atrial fibrillation:  -Cardiac  monitoring  - Continue home metoprolol  - Holding home eliquis in setting of bleed  - Neurosurgery consulted cardiology - recommendations made for antiplatelet/AC regimen plan    #Hypertension:  -SBP goal <160 from NCC standpoint, < 140 per neurosurgery  - Continue home irbesartan and metoprolol as above  -PRN labetalol and hydralazine    #Hyperlipidemia:  - Continue home atorvastatin    Resp  #COPD  -Continuous pulse ox  -Maintain O2 saturations greater than 88%  - On 2-4L of oxygen at home  - Continue inhaler    #Pulmonary hypertension:  - Continue home cialis    GI  #Nutrition  - Ongoing speech evaluation  - Resume tube feeds after NG placement    Diet: tube feeds  Last BM: had a BM yesterday  Bowel regimen: scheduled senna-docusate and Miralax    #Epigatric pain:  - EKG, troponin  - Can consider a dose of pepcid  - Escalate bowel regimen    Renal  #Stage 3a CKD:  #Elevated BUN:  -Daily BMP  -IV fluids: stop plasmalyte  -Electrolyte replacement protocol     #Hypernatremia:  #Hyperchloremia:  #Hypermagnesemia:  - Monitor BMP  - Increase FWF to 150 mL Q4H    Endo  #Diabetes mellitus:  -Hgb A1c 7.5%  -Holding home glargine 18 units BID  -On jardiance in place of home invokana  -Will assess insulin needs and restart as needed  -Follow glucose levels    Heme  #Anemia:  - Holding home iron  -Daily CBC  -Hgb goal >7, plt goal >50k  -Transfuse to meet Hgb and plt goals    ID  - Afebrile  -Reactive leukocytosis is improving  -Daily CBC  -Follow temperature curve    Lines/tubes/drains: PIV, NG, KEILY drain  FEN: tube feeds   Ppx: DVT - SCDs; GI - pantoprazole  Code: Full Code  Dispo: ICU, discuss floor transfer if off precedex    Mandi Sawyer MD

## 2021-12-24 NOTE — PROGRESS NOTES
Neurosurgery Brief Progress Note    Subdural KEILY drain removal    Drain not deemed to be necessary with low output. Drain site was prepped in usual sterile fashion with chloraprep. The drain was taken off suction. The sutures securing the drain were cut and the site was re-prepped. The drain was removed with tip intact. A single figure of 8 stitch with 3-0 monocryl that was placed intra-op was now tied. No immediate complication was observed and the patient tolerated the procedure well.   Repeat CT Head.    Sean Vivian  Neurosurgery Resident    Please contact neurosurgery resident on call with questions.    Dial * * [phone], enter [phone] when prompted.

## 2021-12-24 NOTE — PROGRESS NOTES
[facility], [place]   12/24/2021  Neurosurgery Progress Note:    Assessment:  Red Sutherland is a 81-year-old male post-traumatic acute right subdural hematoma with left hemibody weakness, on xarelto and plavix. Admitted to the ICU on 12/20/21. Taken to OR 12/21/2021. Extubated 12/22/2021.     Plan:  - Hold xarelto, plavix, plan to start heparin on day 4 (tomorrow)  - Cardiology consultation for AP/AC restart guidance  - Medicine consultation for pre-operative optimization  - Monitor KEILY outputs   - Plan to pull subdural drain today   - Will obtain CT afterwards  - Keppra 1g BID, Vimpat 150 BID  - HOB > 30 degrees  - SLP evaluation    Asim Ann MD  PGY-2, Neurosurgery  -----------------------------------    Interval History: agitated overnight when weaning precedex.    Objective:   Temp:  [97.3  F (36.3  C)-99.5  F (37.5  C)] 98.5  F (36.9  C)  Pulse:  [56-86] 62  Resp:  [11-34] 28  BP: ()/(43-79) 107/58  MAP:  [66 mmHg-81 mmHg] 69 mmHg  Arterial Line BP: ()/(46-61) 97/51  SpO2:  [91 %-99 %] 97 %  I/O last 3 completed shifts:  In: 1150.75 [I.V.:395.75; NG/GT:525]  Out: 2911 [Urine:2750; Drains:161]    Gen: Appears comfortable, NAD  Wound: clean, dry, intact,  Neurologic:  - Alert & Oriented to person, place, time, and situation  - Follows commands briskly x 4, antigravity  - Mild L facial droop  Moving all extremities x 4.    LABS:  Recent Labs   Lab 12/24/21  0754 12/24/21  0543 12/24/21  0410 12/24/21  0238 12/23/21  2322 12/23/21  0749 12/23/21  0511 12/22/21  0421 12/22/21  0410   NA  --  153* 153*  --  151*   < > 150*  150*  --  146*   POTASSIUM  --  3.5 3.6  --   --   --  3.7  --  4.2   CHLORIDE  --  118*  --   --   --   --  116*  --  112*   CO2  --  26  --   --   --   --  26  --  25   ANIONGAP  --  9  --   --   --   --  8  --  9   * 168*  --  148*  --    < > 158*   < > 161*   BUN  --  47*  --   --   --   --  34*  --  30   CR  --  1.46*  --   --   --   --   1.37*  --  1.37*   ANNMARIE  --  8.8  --   --   --   --  8.6  --  8.0*    < > = values in this interval not displayed.       Recent Labs   Lab 12/24/21  0543   WBC 9.8   RBC 3.92*   HGB 8.2*   HCT 29.0*   MCV 74*   MCH 20.9*   MCHC 28.3*   RDW 20.0*          IMAGING:  Recent Results (from the past 24 hour(s))   XR Abdomen Port 1 View    Narrative    EXAM: XR ABDOMEN PORT 1 VIEWS  12/23/2021 10:29 AM     HISTORY:  NGT placement       TECHNIQUE: Single frontal radiograph of the abdomen    COMPARISON: EXAM: X-ray abdomen portable one view 12/23/2021 at 3:36  AM    FINDINGS:     Enteric tube tip and sidehole are projecting over the stomach.  Nonobstructive bowel gas pattern. Cholecystectomy clips in the right  upper quadrant. Bibasilar pulmonary opacities, better seen on same  date chest x-ray.      Impression    IMPRESSION: Enteric tube tip and sidehole projects over the stomach.    I have personally reviewed the examination and initial interpretation  and I agree with the findings.    DAVINA BLACKWELL MD         SYSTEM ID:  [id number]   XR Chest Port 1 View    Narrative    EXAM: XR CHEST PORT 1 VIEW  12/24/2021 7:57 AM     HISTORY:  f/u atelectasis       COMPARISON:  Chest radiograph 12/23/2021, 12/20/2021, 11/30/2021    TECHNIQUE: Single frontal radiograph of the chest    FINDINGS:   Portable AP view of the chest performed at 50 degrees. Enteric tube  courses outside of the field of view.    Patient is rotated to the left. No tracheal deviation. Ill-defined  cardiac silhouette. Atherosclerotic calcifications of the aorta.  Basilar and perihilar predominant airspace and interstitial opacities,  mildly improved. No definite pneumothorax..      Impression    IMPRESSION: Improved pulmonary edema and/or atelectasis.     I have personally reviewed the examination and initial interpretation  and I agree with the findings.    EPIFANIO BURRELL MD         SYSTEM ID:  [id number]

## 2021-12-24 NOTE — PLAN OF CARE
Problem: Adult Inpatient Plan of Care  Goal: Plan of Care Review  Outcome: No Change   Major Shift Events:  Alert. Does not follow commands. Answers questions at times. Speech is garbled hard to understand. Weaned precedex down 0.2 overnight in the morning around 430 pt became very agitated and combative. Notified MD went up on precedex and administered prn Seroquel. 1 L nc. VSS. Tolerating tube feeds well. BM X1.  Adequate urine output. Primifit on.   Plan: Wean precedex as tolerated.   For vital signs and complete assessments, please see documentation flowsheets.

## 2021-12-24 NOTE — PLAN OF CARE
Patient is drowsy, opens eyes to speech. Not following commands, agitated and restless, moves all extremities, yelling out. Precedex gtt infusing, attempting to wean down but unable due to agitation. 1:1 sitter at bedside, patient needs frequent redirection and reorientation. Left eye irregular and fixed, right eye reactive but sluggish, no change. Subdural KEILY #2 pulled out by MD. Head incisions with staples clean, dry, and intact. Patient went for head CT. 2L NC. Tube feedings at 40 ml/hr, advancing per order. Had small bowel movement today.     No seizure activity or changes in neuro status this shift. Pending PICC placement. Possible transfer to floor tomorrow per neurosurgery.

## 2021-12-25 ENCOUNTER — APPOINTMENT (OUTPATIENT)
Dept: GENERAL RADIOLOGY | Facility: CLINIC | Age: 81
DRG: 025 | End: 2021-12-25
Attending: STUDENT IN AN ORGANIZED HEALTH CARE EDUCATION/TRAINING PROGRAM
Payer: COMMERCIAL

## 2021-12-25 ENCOUNTER — APPOINTMENT (OUTPATIENT)
Dept: CT IMAGING | Facility: CLINIC | Age: 81
DRG: 025 | End: 2021-12-25
Attending: STUDENT IN AN ORGANIZED HEALTH CARE EDUCATION/TRAINING PROGRAM
Payer: COMMERCIAL

## 2021-12-25 LAB
ANION GAP SERPL CALCULATED.3IONS-SCNC: 5 MMOL/L (ref 3–14)
ATRIAL RATE - MUSE: 85 BPM
BUN SERPL-MCNC: 54 MG/DL (ref 7–30)
CALCIUM SERPL-MCNC: 8.8 MG/DL (ref 8.5–10.1)
CHLORIDE BLD-SCNC: 122 MMOL/L (ref 94–109)
CO2 SERPL-SCNC: 29 MMOL/L (ref 20–32)
CREAT SERPL-MCNC: 1.5 MG/DL (ref 0.66–1.25)
DIASTOLIC BLOOD PRESSURE - MUSE: NORMAL MMHG
ERYTHROCYTE [DISTWIDTH] IN BLOOD BY AUTOMATED COUNT: 20.2 % (ref 10–15)
ERYTHROCYTE [DISTWIDTH] IN BLOOD BY AUTOMATED COUNT: 20.3 % (ref 10–15)
GFR SERPL CREATININE-BSD FRML MDRD: 47 ML/MIN/1.73M2
GLUCOSE BLD-MCNC: 204 MG/DL (ref 70–99)
GLUCOSE BLDC GLUCOMTR-MCNC: 186 MG/DL (ref 70–99)
GLUCOSE BLDC GLUCOMTR-MCNC: 192 MG/DL (ref 70–99)
GLUCOSE BLDC GLUCOMTR-MCNC: 208 MG/DL (ref 70–99)
GLUCOSE BLDC GLUCOMTR-MCNC: 217 MG/DL (ref 70–99)
HCT VFR BLD AUTO: 29.4 % (ref 40–53)
HCT VFR BLD AUTO: 30.6 % (ref 40–53)
HGB BLD-MCNC: 8.2 G/DL (ref 13.3–17.7)
HGB BLD-MCNC: 8.5 G/DL (ref 13.3–17.7)
INTERPRETATION ECG - MUSE: NORMAL
MAGNESIUM SERPL-MCNC: 3 MG/DL (ref 1.6–2.3)
MAGNESIUM SERPL-MCNC: 3 MG/DL (ref 1.6–2.3)
MCH RBC QN AUTO: 20.9 PG (ref 26.5–33)
MCH RBC QN AUTO: 21 PG (ref 26.5–33)
MCHC RBC AUTO-ENTMCNC: 27.8 G/DL (ref 31.5–36.5)
MCHC RBC AUTO-ENTMCNC: 27.9 G/DL (ref 31.5–36.5)
MCV RBC AUTO: 75 FL (ref 78–100)
MCV RBC AUTO: 75 FL (ref 78–100)
P AXIS - MUSE: NORMAL DEGREES
PHOSPHATE SERPL-MCNC: 2.9 MG/DL (ref 2.5–4.5)
PHOSPHATE SERPL-MCNC: 3 MG/DL (ref 2.5–4.5)
PLATELET # BLD AUTO: 234 10E3/UL (ref 150–450)
PLATELET # BLD AUTO: 249 10E3/UL (ref 150–450)
POTASSIUM BLD-SCNC: 3.9 MMOL/L (ref 3.4–5.3)
POTASSIUM BLD-SCNC: 3.9 MMOL/L (ref 3.4–5.3)
PR INTERVAL - MUSE: NORMAL MS
QRS DURATION - MUSE: 88 MS
QT - MUSE: 402 MS
QTC - MUSE: 418 MS
R AXIS - MUSE: 113 DEGREES
RBC # BLD AUTO: 3.9 10E6/UL (ref 4.4–5.9)
RBC # BLD AUTO: 4.06 10E6/UL (ref 4.4–5.9)
SODIUM SERPL-SCNC: 156 MMOL/L (ref 133–144)
SODIUM SERPL-SCNC: 158 MMOL/L (ref 133–144)
SODIUM SERPL-SCNC: 159 MMOL/L (ref 133–144)
SODIUM SERPL-SCNC: 160 MMOL/L (ref 133–144)
SYSTOLIC BLOOD PRESSURE - MUSE: NORMAL MMHG
T AXIS - MUSE: 41 DEGREES
VENTRICULAR RATE- MUSE: 65 BPM
WBC # BLD AUTO: 7.7 10E3/UL (ref 4–11)
WBC # BLD AUTO: 8.9 10E3/UL (ref 4–11)

## 2021-12-25 PROCEDURE — 258N000003 HC RX IP 258 OP 636: Performed by: STUDENT IN AN ORGANIZED HEALTH CARE EDUCATION/TRAINING PROGRAM

## 2021-12-25 PROCEDURE — 83735 ASSAY OF MAGNESIUM: CPT | Performed by: STUDENT IN AN ORGANIZED HEALTH CARE EDUCATION/TRAINING PROGRAM

## 2021-12-25 PROCEDURE — 272N000201 ZZ HC ADHESIVE SKIN CLOSURE, DERMABOND

## 2021-12-25 PROCEDURE — 250N000013 HC RX MED GY IP 250 OP 250 PS 637: Performed by: STUDENT IN AN ORGANIZED HEALTH CARE EDUCATION/TRAINING PROGRAM

## 2021-12-25 PROCEDURE — 99233 SBSQ HOSP IP/OBS HIGH 50: CPT | Performed by: PSYCHIATRY & NEUROLOGY

## 2021-12-25 PROCEDURE — 36415 COLL VENOUS BLD VENIPUNCTURE: CPT | Performed by: STUDENT IN AN ORGANIZED HEALTH CARE EDUCATION/TRAINING PROGRAM

## 2021-12-25 PROCEDURE — 84132 ASSAY OF SERUM POTASSIUM: CPT | Performed by: NEUROLOGICAL SURGERY

## 2021-12-25 PROCEDURE — 272N000451 HC KIT SHRLOCK 5FR POWER PICC DOUBLE LUMEN

## 2021-12-25 PROCEDURE — 84295 ASSAY OF SERUM SODIUM: CPT | Performed by: STUDENT IN AN ORGANIZED HEALTH CARE EDUCATION/TRAINING PROGRAM

## 2021-12-25 PROCEDURE — 200N000002 HC R&B ICU UMMC

## 2021-12-25 PROCEDURE — 250N000013 HC RX MED GY IP 250 OP 250 PS 637: Performed by: PSYCHIATRY & NEUROLOGY

## 2021-12-25 PROCEDURE — 85027 COMPLETE CBC AUTOMATED: CPT | Performed by: STUDENT IN AN ORGANIZED HEALTH CARE EDUCATION/TRAINING PROGRAM

## 2021-12-25 PROCEDURE — 71045 X-RAY EXAM CHEST 1 VIEW: CPT

## 2021-12-25 PROCEDURE — 36569 INSJ PICC 5 YR+ W/O IMAGING: CPT

## 2021-12-25 PROCEDURE — 70450 CT HEAD/BRAIN W/O DYE: CPT | Mod: 26 | Performed by: RADIOLOGY

## 2021-12-25 PROCEDURE — 70450 CT HEAD/BRAIN W/O DYE: CPT

## 2021-12-25 PROCEDURE — 250N000011 HC RX IP 250 OP 636: Performed by: STUDENT IN AN ORGANIZED HEALTH CARE EDUCATION/TRAINING PROGRAM

## 2021-12-25 PROCEDURE — 84100 ASSAY OF PHOSPHORUS: CPT | Performed by: STUDENT IN AN ORGANIZED HEALTH CARE EDUCATION/TRAINING PROGRAM

## 2021-12-25 PROCEDURE — 71045 X-RAY EXAM CHEST 1 VIEW: CPT | Mod: 26 | Performed by: STUDENT IN AN ORGANIZED HEALTH CARE EDUCATION/TRAINING PROGRAM

## 2021-12-25 PROCEDURE — 272N000278 HC DEVICE 5FR SECURACATH

## 2021-12-25 PROCEDURE — 84100 ASSAY OF PHOSPHORUS: CPT | Performed by: NURSE PRACTITIONER

## 2021-12-25 PROCEDURE — 84132 ASSAY OF SERUM POTASSIUM: CPT | Performed by: STUDENT IN AN ORGANIZED HEALTH CARE EDUCATION/TRAINING PROGRAM

## 2021-12-25 PROCEDURE — 250N000013 HC RX MED GY IP 250 OP 250 PS 637: Performed by: NURSE PRACTITIONER

## 2021-12-25 PROCEDURE — 83735 ASSAY OF MAGNESIUM: CPT | Performed by: NURSE PRACTITIONER

## 2021-12-25 RX ORDER — HEPARIN SODIUM 10000 [USP'U]/100ML
0-5000 INJECTION, SOLUTION INTRAVENOUS CONTINUOUS
Status: DISCONTINUED | OUTPATIENT
Start: 2021-12-25 | End: 2021-12-26

## 2021-12-25 RX ORDER — HEPARIN SODIUM,PORCINE 10 UNIT/ML
5-20 VIAL (ML) INTRAVENOUS
Status: DISCONTINUED | OUTPATIENT
Start: 2021-12-25 | End: 2022-01-17 | Stop reason: HOSPADM

## 2021-12-25 RX ORDER — HEPARIN SODIUM,PORCINE 10 UNIT/ML
5-20 VIAL (ML) INTRAVENOUS EVERY 24 HOURS
Status: DISCONTINUED | OUTPATIENT
Start: 2021-12-25 | End: 2022-01-09 | Stop reason: CLARIF

## 2021-12-25 RX ORDER — NICOTINE POLACRILEX 4 MG
15-30 LOZENGE BUCCAL
Status: DISCONTINUED | OUTPATIENT
Start: 2021-12-25 | End: 2021-12-25

## 2021-12-25 RX ORDER — DEXTROSE MONOHYDRATE 25 G/50ML
25-50 INJECTION, SOLUTION INTRAVENOUS
Status: DISCONTINUED | OUTPATIENT
Start: 2021-12-25 | End: 2021-12-25

## 2021-12-25 RX ORDER — LEVETIRACETAM 750 MG/1
750 TABLET ORAL 2 TIMES DAILY
Status: DISCONTINUED | OUTPATIENT
Start: 2021-12-25 | End: 2022-01-04

## 2021-12-25 RX ORDER — LORAZEPAM 2 MG/ML
1 INJECTION INTRAMUSCULAR ONCE
Status: COMPLETED | OUTPATIENT
Start: 2021-12-25 | End: 2021-12-25

## 2021-12-25 RX ADMIN — FLUTICASONE FUROATE AND VILANTEROL TRIFENATATE 1 PUFF: 200; 25 POWDER RESPIRATORY (INHALATION) at 08:41

## 2021-12-25 RX ADMIN — ATORVASTATIN CALCIUM 80 MG: 40 TABLET, FILM COATED ORAL at 08:40

## 2021-12-25 RX ADMIN — QUETIAPINE FUMARATE 25 MG: 25 TABLET ORAL at 11:41

## 2021-12-25 RX ADMIN — QUETIAPINE FUMARATE 25 MG: 25 TABLET ORAL at 17:36

## 2021-12-25 RX ADMIN — METOPROLOL TARTRATE 25 MG: 25 TABLET, FILM COATED ORAL at 20:04

## 2021-12-25 RX ADMIN — FERROUS GLUCONATE 324 MG: 324 TABLET ORAL at 08:40

## 2021-12-25 RX ADMIN — CEFAZOLIN SODIUM 2 G: 2 INJECTION, SOLUTION INTRAVENOUS at 22:44

## 2021-12-25 RX ADMIN — POTASSIUM CHLORIDE 40 MEQ: 1.5 POWDER, FOR SOLUTION ORAL at 08:39

## 2021-12-25 RX ADMIN — Medication 10 MG: at 20:04

## 2021-12-25 RX ADMIN — POLYETHYLENE GLYCOL 3350 17 G: 17 POWDER, FOR SOLUTION ORAL at 08:39

## 2021-12-25 RX ADMIN — MULTIVIT AND MINERALS-FERROUS GLUCONATE 9 MG IRON/15 ML ORAL LIQUID 15 ML: at 08:40

## 2021-12-25 RX ADMIN — LACOSAMIDE 200 MG: 50 TABLET, FILM COATED ORAL at 08:39

## 2021-12-25 RX ADMIN — LEVETIRACETAM 750 MG: 750 TABLET ORAL at 20:04

## 2021-12-25 RX ADMIN — QUETIAPINE FUMARATE 50 MG: 50 TABLET ORAL at 22:44

## 2021-12-25 RX ADMIN — DOCUSATE SODIUM 50 MG AND SENNOSIDES 8.6 MG 2 TABLET: 8.6; 5 TABLET, FILM COATED ORAL at 08:39

## 2021-12-25 RX ADMIN — CEFAZOLIN SODIUM 2 G: 2 INJECTION, SOLUTION INTRAVENOUS at 15:06

## 2021-12-25 RX ADMIN — LEVETIRACETAM 1000 MG: 500 TABLET, FILM COATED ORAL at 08:39

## 2021-12-25 RX ADMIN — TADALAFIL 20 MG: 20 TABLET, FILM COATED ORAL at 08:39

## 2021-12-25 RX ADMIN — QUETIAPINE FUMARATE 25 MG: 25 TABLET ORAL at 08:40

## 2021-12-25 RX ADMIN — ACETAMINOPHEN 650 MG: 325 TABLET, FILM COATED ORAL at 03:35

## 2021-12-25 RX ADMIN — LACOSAMIDE 200 MG: 50 TABLET, FILM COATED ORAL at 20:04

## 2021-12-25 RX ADMIN — HEPARIN SODIUM 1050 UNITS/HR: 1000 INJECTION INTRAVENOUS; SUBCUTANEOUS at 20:05

## 2021-12-25 RX ADMIN — METOPROLOL TARTRATE 25 MG: 25 TABLET, FILM COATED ORAL at 08:40

## 2021-12-25 RX ADMIN — IRBESARTAN 150 MG: 150 TABLET ORAL at 08:41

## 2021-12-25 RX ADMIN — ACETAMINOPHEN 650 MG: 325 TABLET, FILM COATED ORAL at 11:40

## 2021-12-25 RX ADMIN — ROPINIROLE HYDROCHLORIDE 2 MG: 2 TABLET, FILM COATED ORAL at 22:45

## 2021-12-25 RX ADMIN — INSULIN ASPART 4 UNITS: 100 INJECTION, SOLUTION INTRAVENOUS; SUBCUTANEOUS at 20:14

## 2021-12-25 RX ADMIN — DOCUSATE SODIUM 50 MG AND SENNOSIDES 8.6 MG 2 TABLET: 8.6; 5 TABLET, FILM COATED ORAL at 22:44

## 2021-12-25 RX ADMIN — Medication 40 MG: at 08:58

## 2021-12-25 RX ADMIN — INSULIN ASPART 3 UNITS: 100 INJECTION, SOLUTION INTRAVENOUS; SUBCUTANEOUS at 11:57

## 2021-12-25 RX ADMIN — CEFAZOLIN SODIUM 2 G: 2 INJECTION, SOLUTION INTRAVENOUS at 06:16

## 2021-12-25 RX ADMIN — LORAZEPAM 0.5 MG: 2 INJECTION INTRAMUSCULAR; INTRAVENOUS at 15:00

## 2021-12-25 RX ADMIN — INSULIN ASPART 2 UNITS: 100 INJECTION, SOLUTION INTRAVENOUS; SUBCUTANEOUS at 16:34

## 2021-12-25 ASSESSMENT — ACTIVITIES OF DAILY LIVING (ADL)
ADLS_ACUITY_SCORE: 19
ADLS_ACUITY_SCORE: 20
ADLS_ACUITY_SCORE: 18
ADLS_ACUITY_SCORE: 19
ADLS_ACUITY_SCORE: 20
ADLS_ACUITY_SCORE: 19
ADLS_ACUITY_SCORE: 20
ADLS_ACUITY_SCORE: 20
ADLS_ACUITY_SCORE: 19
ADLS_ACUITY_SCORE: 18
ADLS_ACUITY_SCORE: 19
ADLS_ACUITY_SCORE: 20
ADLS_ACUITY_SCORE: 18
ADLS_ACUITY_SCORE: 19
ADLS_ACUITY_SCORE: 20
ADLS_ACUITY_SCORE: 18
ADLS_ACUITY_SCORE: 19
ADLS_ACUITY_SCORE: 19
ADLS_ACUITY_SCORE: 18
ADLS_ACUITY_SCORE: 19

## 2021-12-25 NOTE — PROGRESS NOTES
Vascular Access Services Notes:    RN to call back VAS regarding pre-medication prior to PICC insertion. Pt is restless & confused.        FLORIN DozierN, RN [facility]

## 2021-12-25 NOTE — PLAN OF CARE
Major Shift Events:      Neuro: Neurologically unchanged: Disoriented and not redirectable, PISANO, does not follow commands, only speaks in a few one word sentences. No evidence of seizure activity overnight. Precedex weaned down to 0.3mcg/kg/hr for RASS -1. KEILY with 5-10mL/hr serosang output. Able to sleep most of night. Bedside attendent continued for patient safety.     Card: Rate controlled Afib overnight. Softer blood pressures when more sedated on precedex at 0.5mcg/kg/hr, sedation weaned down with improvement in pressures. Tm of 100.7, no interventions besides tylenol at this time, continue to monitor.     Resp: 2L N/C overnight.     : Adequate urine output per external catheter system. Free water flushes increased to 250mL q4hrs with sodium unchanged at 156.     GI: Tolerating TF currently at 50mL/hr, goal rate of 60mL/hr.       Plan: Follow up with PICC team about placement of PICC line. Q4hr neuros with plan to wean sedation and promote wakefulness during the daytime. Increase TF to 60mL/hr goal at 08:00. Plan to restart anticoagulation today.     For vital signs and complete assessments, please see documentation flowsheets.

## 2021-12-25 NOTE — PLAN OF CARE
Major Shift Events:    NEURO: increasingly agitated throughout the day, constant yelling and calling out. Very restless, per MD would Precedex should remain off. Continues to requires 1:1 sitter. Good strength throughout, Left eye with pupil defect- baseline. Right eye brisk, 2-3mm. Occasionally appropriately answering questions. CT done. PRN seroquel given x2, minimally effective.  RESP: 2L NC, LS clear/dim throughout, coughing up thick secretions.  CV: hemodynamically stable, A.Fib rate controlled.   GI: TF@goal 60; Sodium elevated- FWF increased to 300ml q4hr  : Adequate UO via external catheter.    Plan: Tx to floor, monitor neuro status, monitor sodium  For vital signs and complete assessments, please see documentation flowsheets.

## 2021-12-25 NOTE — PROCEDURES
[facility]    Double Lumen PICC Placement    Date/Time: 12/25/2021 10:09 AM  Performed by: Kyrie Latham RN  Authorized by: Sean Park MD   Indications: vascular access      UNIVERSAL PROTOCOL   Site Marked: Yes  Prior Images Obtained and Reviewed:  Yes  Required items: Required blood products, implants, devices and special equipment available    Patient identity confirmed:  Verbally with patient and arm band  NA - No sedation, light sedation, or local anesthesia  Confirmation Checklist:  Patient's identity using two indicators, relevant allergies, procedure was appropriate and matched the consent or emergent situation and correct equipment/implants were available  Time out: Immediately prior to the procedure a time out was called    Universal Protocol: the Joint Commission Universal Protocol was followed    Preparation: Patient was prepped and draped in usual sterile fashion       ANESTHESIA    Anesthesia: See MAR for details  Local Anesthetic:  Lidocaine 1% without epinephrine  Anesthetic Total (mL):  1      SEDATION    Patient Sedated: No        Preparation: skin prepped with ChloraPrep  Skin prep agent: skin prep agent completely dried prior to procedure  Sterile barriers: maximum sterile barriers were used: cap, mask, sterile gown, sterile gloves, and large sterile sheet  Hand hygiene: hand hygiene performed prior to central venous catheter insertion  Type of line used: Power PICC  Catheter type: double lumen  Lumen type: non-valved  Catheter size: 5 Fr  Brand: Bard  Lot number: [id number]  Placement method: venipuncture, MST, ultrasound and tip confirmation system  Number of attempts: 1  Successful placement: yes  Orientation: right  Location: basilic vein (vein diameter - 0.34 cm)  Arm circumference: adults 10 cm  Extremity circumference: 25  Visible catheter length: 2  Total catheter length: 48  Dressing and securement: chlorhexidine patch applied,  glue, alcohol impregnated caps, sterile dressing applied, site cleaned and securement device ([id number])  Post procedure assessment: blood return through all ports, free fluid flow and placement verified by x-ray  PROCEDURE   Patient Tolerance:  Patient tolerated the procedure well with no immediate complicationsDescribe Procedure: PICC is OK to use.

## 2021-12-25 NOTE — PROGRESS NOTES
0Neuroscience Intensive Care Progress Note    REASON FOR CRITICAL CARE ADMISSION:  SDH     Date of Service:  2021  Date of Admission:  2021  Hospital Day: 6    Problem List  1. SDH  2. Focal seizures  3. Restless leg syndrome  4. Right ventricular heart failure  5. CAD  6. Pulmonary hypertension  7. Atrial fibrillation  8. Hypertension  9. COPD  10. Stage 3a CKD  11. Diabetes mellitus    24 hour events:  Remains agitated.  Precedex weaned off.  PICC placed.    24 Hour Vital Signs Summary:  Temp: (!) 100.7  F (38.2  C) Temp  Min: 98.5  F (36.9  C)  Max: 100.7  F (38.2  C)  Resp: 23 Resp  Min: 17  Max: 31  SpO2: 99 % SpO2  Min: 97 %  Max: 100 %  Pulse: 80 Pulse  Min: 56  Max: 81    No data recorded  BP: 113/64 Systolic (24hrs), Av , Min:85 , Max:117   Diastolic (24hrs), Av, Min:38, Max:65    Respiratory monitoring:   Resp: 23      I/O last 3 completed shifts:  In: 3250.86 [I.V.:580.86; NG/GT:1750]  Out: 3722 [Urine:3650; Drains:72]    Current Medications:    atorvastatin  80 mg Oral or Feeding Tube Daily     bumetanide  4 mg Oral or Feeding Tube QAM     ceFAZolin  2 g Intravenous Q8H     empagliflozin  10 mg Oral or Feeding Tube Daily     ferrous gluconate  324 mg Oral or Feeding Tube Every Other Day     fluticasone-vilanterol  1 puff Inhalation Daily     insulin aspart  1-10 Units Subcutaneous TID AC     insulin aspart  1-7 Units Subcutaneous At Bedtime     irbesartan  150 mg Oral or Feeding Tube Daily     lacosamide  200 mg Oral or Feeding Tube BID     levETIRAcetam  1,000 mg Oral or Feeding Tube BID     melatonin  10 mg Oral At Bedtime     metoprolol tartrate  25 mg Oral BID     multivitamins w/minerals  15 mL Per Feeding Tube Daily     pantoprazole  40 mg Oral or Feeding Tube QAM AC     polyethylene glycol  17 g Oral or Feeding Tube Daily     potassium chloride  40 mEq Oral or Feeding Tube Daily     QUEtiapine  25 mg Oral or Feeding Tube QAM     QUEtiapine  50 mg Oral or Feeding Tube At  Bedtime     rOPINIRole  2 mg Oral At Bedtime     senna-docusate  2 tablet Oral or Feeding Tube BID    Or     senna-docusate  2 tablet Oral BID     sodium chloride (PF)  3 mL Intracatheter Q8H     tadalafil  20 mg Oral Q24H       PRN Medications:  acetaminophen **OR** acetaminophen, albuterol, glucose **OR** dextrose **OR** glucagon, hydrALAZINE, labetalol, lidocaine 4%, lidocaine (buffered or not buffered), naloxone **OR** naloxone **OR** naloxone **OR** naloxone, ondansetron **OR** ondansetron, QUEtiapine, sodium chloride (PF), sodium chloride (PF)    Infusions:    dexmedetomidine 0.3 mcg/kg/hr (12/25/21 0700)       Allergies   Allergen Reactions     Contrast Dye Nausea     Other reaction(s): GI intolerance, GI Upset     Furosemide Muscle Pain (Myalgia)     Previously tolerated.  Muscle cramps     Hydrochlorothiazide Unknown     Iodinated Contrast Media [Diagnostic X-Ray Materials] Nausea     Losartan Other (See Comments)     Other reaction(s): Stomatitis, Bloody nose dry mouth and lips     Losartan-Hydrochlorothiazide [Hyzaar]      Mouth sores     Metaxalone Nausea     Metolazone Other (See Comments)     Muscle cramps     Mometasone Other (See Comments)     Bloody nose     Rabeprazole Other (See Comments)     Mouth sores     Ramipril Other (See Comments)     Mouth sores     Shellfish Containing Products [Shellfish-Derived Products] Unknown     Other reaction(s): mouth sores, Other reaction(s): mouth sores     Sildenafil Muscle Pain (Myalgia)     Methocarbamol Rash     Penicillins Other (See Comments)     Immune-does not work for him       Physical Examination:  General: in no apparent distress  HEENT: normocephalic, surgical site is clean and dry  Cardiac: irregular rhythm, normal rate  Chest: lungs clear bilaterally  Abdomen: Soft, nondistended, tender in the epigastric area  Extremities: Warm, no edema  Skin: No rash or lesion   Neuro:  Mental status: Drowsy, not following commands, agitated, yelling  out  Cranial nerves: right pupil reactive 2 mm, left pupil is irregular and does not react, EOMI, face appears symmetric, cough present  Motor: moving all four extremities antigravity to command  Sensory: Intact to light touch in all four extremities  Coordination: unable to assess      Labs/Studies:  BMP  Recent Labs   Lab 12/25/21 0424 12/24/21 2037 12/24/21  1538 12/24/21  1217 12/24/21  1005 12/24/21  0543 12/24/21  0410 12/23/21  1652 12/23/21  0511   *  156*  156* 156* 156* 156* 154* 153* 153*   < > 150*  150*   POTASSIUM 3.9  3.9  --   --   --  3.8 3.5 3.6  --  3.7   CHLORIDE 122*  --   --   --  119* 118*  --   --  116*   CO2 29  --   --   --  27 26  --   --  26   BUN 54*  --   --   --  51* 47*  --   --  34*   CR 1.50*  --   --   --  1.46* 1.46*  --   --  1.37*   ANNMARIE 8.8  --   --   --  9.1 8.8  --   --  8.6    < > = values in this interval not displayed.       CBC  Recent Labs   Lab 12/25/21 0424 12/24/21  0543 12/23/21  0511 12/22/21  1207   WBC 7.7 9.8 11.4* 13.3*   HGB 8.5* 8.2* 7.6* 7.9*    274 249 268       ABG  Recent Labs   Lab 12/22/21  0824 12/22/21  0549 12/21/21  2120   PH 7.44 7.46* 7.51*   PCO2 36 36 32*   PO2 162* 151* 150*   HCO3 24 26 25       Imaging:  CXRs and abdominal XRs overnight related to NG removal and placement    All relevant imaging and laboratory values personally reviewed    Assessment/Plan  Red Sutherland is a 81 year old admitted on 12/20/2021 with subdural hematoma and focal seizures.  He is now s/p evacuation in the OR on 12/21/21.  He returned to the ICU intubated on mechanical ventilation.    Neuro  #Large right SDH: traumatic  -Neurochecks Q4H  -HOB >30  -SBP goal <160 from NCC standpoint  - Goal normonatremia  -PT/OT/SLP    #Focal seizures:  - Continue keppra at 750 mg BID and Vimpat 200 mg BID    #Restless leg syndrome:  - Continue home ropinorole    #Analgesics & sedation  - Precedex - off  - Seroquel per neurosurgery    CV  #HFpEF:  - Holding  home bumex  - Monitor daily weights and I/Os  - Cardiology consulted by neurosurgery - irbesartan, jardiance, and metoprolol     #Atrial fibrillation:  -Cardiac monitoring  - Continue home metoprolol  - Holding home eliquis in setting of bleed  - Neurosurgery consulted cardiology - recommendations made for antiplatelet/AC regimen plan    #Hypertension:  -SBP goal <160 from NCC standpoint, < 140 per neurosurgery  - Continue home irbesartan and metoprolol as above  -PRN labetalol and hydralazine    #Hyperlipidemia:  - Continue home atorvastatin    Resp  #COPD  -Continuous pulse ox  -Maintain O2 saturations greater than 88%  - On 2-4L of oxygen at home  - Continue inhaler    #Pulmonary hypertension:  - Continue home cialis    GI  #Nutrition  - Ongoing speech evaluation  - Resume tube feeds after NG placement    Diet: tube feeds  Last BM: had a BM yesterday  Bowel regimen: scheduled senna-docusate and Miralax    #Epigatric pain:  - EKG, troponin  - Can consider a dose of pepcid  - Escalate bowel regimen    Renal  #Stage 3a CKD:  #Elevated BUN:  #Acute kidney injury:  -Daily BMP  -Hold bumex  -IV fluids: none  -Electrolyte replacement protocol     #Hypernatremia:  #Hyperchloremia:  #Hypermagnesemia:  -Monitor BMP  -Neurosurgery increased FWF to 250 mL Q4H    Endo  #Diabetes mellitus:  -Hgb A1c 7.5%  -Holding home glargine 18 units BID  -Holding jardiance in the setting of NATHALY and rising BUN  -Will assess insulin needs and restart as needed  -Follow glucose levels    Heme  #Anemia:  - Holding home iron  -Daily CBC  -Hgb goal >7, plt goal >50k  -Transfuse to meet Hgb and plt goals    ID  - Afebrile  -Reactive leukocytosis is improving  -Daily CBC  -Follow temperature curve    Lines/tubes/drains: PIV, NG, PICC  FEN: tube feeds   Ppx: DVT - SCDs; GI - pantoprazole  Code: Full Code  Dispo: ICU, discuss floor transfer if off precedex    Mandi Sawyer MD

## 2021-12-25 NOTE — PROVIDER NOTIFICATION
12/25/21 1009   PICC Double Lumen 12/25/21 Right Basilic   Placement Date/Time: 12/25/21 (c) 1009   Catheter Brand: [employer]  Size (Fr): 5 Fr  Lot #: [id number]  Full barrier precautions done: Yes, hand hygiene, sterile gown, sterile gloves, mask, cap, full body drape, chlorhexidine scrub  Consent Signed: Yes  Time...   Site Assessment WDL   External Cath Length (cm) 2 cm   Extremity Circumference (cm) 25 cm   Dressing Intervention Chlorhexidine patch;Transparent;Securing device;New dressing   Purple - Status blood return noted;saline locked   Purple - Cap Change Due 12/29/21   Red - Status blood return noted;saline locked   Red - Cap Change Due 12/29/21   PICC Comment PICC inserted   Extravasation? No   Line Necessity Yes, meets criteria

## 2021-12-25 NOTE — PROGRESS NOTES
[facility], [place]   12/25/2021  Neurosurgery Progress Note:    Assessment:  Red Sutherland is a 81-year-old male post-traumatic acute right subdural hematoma with left hemibody weakness, on xarelto and plavix. Admitted to the ICU on 12/20/21. Taken to OR 12/21/2021. Extubated 12/22/2021.     Plan:  - Hold xarelto, plavix, plan to start heparin on day 4 (tomorrow)  - Cardiology consultation for AP/AC restart guidance  - Medicine consultation for pre-operative optimization  - Will plan to subgaleal drain today  - Head CT after drain is out  - Heparin gtt low intensity no bolus   - Will obtain CT once therapeutic  - Keppra 1g BID, Vimpat 150 BID  - HOB > 30 degrees  - Seroquel 25 BID, 50 @ night.   - SLP evaluation    Asim Ann MD  PGY-2, Neurosurgery  -----------------------------------    Interval History: improved agitated overnight; seroquel added.     Objective:   Temp:  [98.6  F (37  C)-100.7  F (38.2  C)] 99.1  F (37.3  C)  Pulse:  [56-81] 75  Resp:  [17-32] 32  BP: ()/(38-65) 108/62  SpO2:  [97 %-100 %] 99 %  I/O last 3 completed shifts:  In: 3250.86 [I.V.:580.86; NG/GT:1750]  Out: 3722 [Urine:3650; Drains:72]    Gen: Appears comfortable, NAD  Wound: clean, dry, intact,  Neurologic:  - Alert & Oriented to person, place, time, and situation  - Follows commands briskly x 4, antigravity  - Mild L facial droop  Moving all extremities x 4.    LABS:  Recent Labs   Lab 12/25/21  0845 12/25/21  0424 12/24/21  2037 12/24/21 2007 12/24/21  1538 12/24/21  1146 12/24/21  1005 12/24/21  0754 12/24/21  0543   NA  --  156*  156*  156* 156*  --  156*   < > 154*  --  153*   POTASSIUM  --  3.9  3.9  --   --   --   --  3.8  --  3.5   CHLORIDE  --  122*  --   --   --   --  119*  --  118*   CO2  --  29  --   --   --   --  27  --  26   ANIONGAP  --  5  --   --   --   --  8  --  9   * 204*  --  188* 178*   < > 196*   < > 168*   BUN  --  54*  --   --   --   --  51*  --  47*   CR  --   1.50*  --   --   --   --  1.46*  --  1.46*   ANNMARIE  --  8.8  --   --   --   --  9.1  --  8.8    < > = values in this interval not displayed.       Recent Labs   Lab 12/25/21  0424   WBC 7.7   RBC 4.06*   HGB 8.5*   HCT 30.6*   MCV 75*   MCH 20.9*   MCHC 27.8*   RDW 20.2*          IMAGING:  Recent Results (from the past 24 hour(s))   CT Head w/o Contrast    Narrative    CT HEAD W/O CONTRAST 12/24/2021 10:42 AM    History: s/p removal of subdural drain   ICD-10:    Comparison: Head CT, 12/22/2021.    Technique: Using multidetector thin collimation helical acquisition  technique, axial, coronal and sagittal CT images from the skull base  to the vertex were obtained without intravenous contrast.   (topogram) image(s) also obtained and reviewed.    Findings: Stable postsurgical changes of right frontotemporal parietal  craniotomy and evacuation of the right subdural hematoma. Interval  removal of the subdural drain. Stable position of the subgaleal drain.  Grossly unchanged mixed density subdural hematoma along the right  cerebral convexity with small foci of pneumocephalus. Small subdural  hematoma along the left parietal convexity appears similar to prior.  Unchanged local mass effect without midline shift. No acute loss of  gray-white matter differentiation. Mild cerebral volume loss.  Ventricles are proportionate to the cerebral sulci. Unchanged  periventricular white matter hypoattenuation. The basal cisterns are  clear. Atherosclerotic calcification of the intracranial arteries.    Right craniotomy. Increased fluid collection/mucosal thickening of the  left maxillary sinus and stable mild mucosal thickening in the ethmoid  air cells. The mastoid air cells are clear. Bilateral pseudophakia.      Impression    Impression:  1. No change in subdural hematoma along the right cerebral convexity  following removal of subdural drain.  2. Unchanged minimal subdural hematoma along the left parietal  convexity.    I  have personally reviewed the examination and initial interpretation  and I agree with the findings.    ALYSA MENDOZA MD         SYSTEM ID:  [id number]

## 2021-12-25 NOTE — PROGRESS NOTES
Neurosurgery Brief Progress Note    KEILY drain removal    Drain not deemed to be necessary with low output. Drain site was prepped in usual sterile fashion with chloraprep. The drain was taken off suction. The sutures securing the drain were cut and the site was re-prepped. The drain was removed with tip intact. A single figure of 8 stitch with 3-0 monocryl was placed with adequate hemostasis. No immediate complication was observed and the patient tolerated the procedure well.     Suzanne Heaton MD  Neurosurgery Resident, PGY-3    Please contact neurosurgery resident on call with questions.    Dial * * [phone], enter [phone] when prompted.

## 2021-12-26 LAB
ABO/RH(D): NORMAL
ALBUMIN UR-MCNC: 70 MG/DL
ANION GAP SERPL CALCULATED.3IONS-SCNC: 3 MMOL/L (ref 3–14)
ANTIBODY SCREEN: NEGATIVE
APPEARANCE UR: CLEAR
ATRIAL RATE - MUSE: 92 BPM
BILIRUB UR QL STRIP: NEGATIVE
BUN SERPL-MCNC: 52 MG/DL (ref 7–30)
CALCIUM SERPL-MCNC: 9 MG/DL (ref 8.5–10.1)
CHLORIDE BLD-SCNC: 128 MMOL/L (ref 94–109)
CO2 SERPL-SCNC: 29 MMOL/L (ref 20–32)
COLOR UR AUTO: ABNORMAL
CREAT SERPL-MCNC: 1.47 MG/DL (ref 0.66–1.25)
DIASTOLIC BLOOD PRESSURE - MUSE: NORMAL MMHG
ERYTHROCYTE [DISTWIDTH] IN BLOOD BY AUTOMATED COUNT: 20.5 % (ref 10–15)
GFR SERPL CREATININE-BSD FRML MDRD: 48 ML/MIN/1.73M2
GLUCOSE BLD-MCNC: 247 MG/DL (ref 70–99)
GLUCOSE BLDC GLUCOMTR-MCNC: 170 MG/DL (ref 70–99)
GLUCOSE BLDC GLUCOMTR-MCNC: 195 MG/DL (ref 70–99)
GLUCOSE BLDC GLUCOMTR-MCNC: 208 MG/DL (ref 70–99)
GLUCOSE BLDC GLUCOMTR-MCNC: 212 MG/DL (ref 70–99)
GLUCOSE BLDC GLUCOMTR-MCNC: 222 MG/DL (ref 70–99)
GLUCOSE BLDC GLUCOMTR-MCNC: 235 MG/DL (ref 70–99)
GLUCOSE BLDC GLUCOMTR-MCNC: 236 MG/DL (ref 70–99)
GLUCOSE UR STRIP-MCNC: >=1000 MG/DL
HCT VFR BLD AUTO: 29.5 % (ref 40–53)
HGB BLD-MCNC: 8 G/DL (ref 13.3–17.7)
HGB UR QL STRIP: NEGATIVE
INTERPRETATION ECG - MUSE: NORMAL
KETONES UR STRIP-MCNC: NEGATIVE MG/DL
LEUKOCYTE ESTERASE UR QL STRIP: ABNORMAL
MAGNESIUM SERPL-MCNC: 3.1 MG/DL (ref 1.6–2.3)
MCH RBC QN AUTO: 20.5 PG (ref 26.5–33)
MCHC RBC AUTO-ENTMCNC: 27.1 G/DL (ref 31.5–36.5)
MCV RBC AUTO: 76 FL (ref 78–100)
NITRATE UR QL: NEGATIVE
OSMOLALITY SERPL: 410 MMOL/KG (ref 280–301)
OSMOLALITY UR: 621 MMOL/KG (ref 100–1200)
P AXIS - MUSE: NORMAL DEGREES
PH UR STRIP: 8.5 [PH] (ref 5–7)
PHOSPHATE SERPL-MCNC: 2.4 MG/DL (ref 2.5–4.5)
PLATELET # BLD AUTO: 211 10E3/UL (ref 150–450)
POTASSIUM BLD-SCNC: 4 MMOL/L (ref 3.4–5.3)
PR INTERVAL - MUSE: NORMAL MS
QRS DURATION - MUSE: 82 MS
QT - MUSE: 352 MS
QTC - MUSE: 423 MS
R AXIS - MUSE: 123 DEGREES
RBC # BLD AUTO: 3.9 10E6/UL (ref 4.4–5.9)
RBC URINE: 1 /HPF
SODIUM SERPL-SCNC: 158 MMOL/L (ref 133–144)
SODIUM SERPL-SCNC: 158 MMOL/L (ref 133–144)
SODIUM SERPL-SCNC: 160 MMOL/L (ref 133–144)
SODIUM SERPL-SCNC: 161 MMOL/L (ref 133–144)
SODIUM SERPL-SCNC: 162 MMOL/L (ref 133–144)
SODIUM UR-SCNC: 48 MMOL/L
SP GR UR STRIP: 1.02 (ref 1–1.03)
SPECIMEN EXPIRATION DATE: NORMAL
SYSTOLIC BLOOD PRESSURE - MUSE: NORMAL MMHG
T AXIS - MUSE: 21 DEGREES
UFH PPP CHRO-ACNC: 0.11 IU/ML
UFH PPP CHRO-ACNC: 0.13 IU/ML
UFH PPP CHRO-ACNC: 0.14 IU/ML
UROBILINOGEN UR STRIP-MCNC: NORMAL MG/DL
VENTRICULAR RATE- MUSE: 87 BPM
WBC # BLD AUTO: 8.9 10E3/UL (ref 4–11)
WBC URINE: 3 /HPF

## 2021-12-26 PROCEDURE — 83935 ASSAY OF URINE OSMOLALITY: CPT | Performed by: STUDENT IN AN ORGANIZED HEALTH CARE EDUCATION/TRAINING PROGRAM

## 2021-12-26 PROCEDURE — 258N000003 HC RX IP 258 OP 636: Performed by: NEUROLOGICAL SURGERY

## 2021-12-26 PROCEDURE — 250N000013 HC RX MED GY IP 250 OP 250 PS 637: Performed by: STUDENT IN AN ORGANIZED HEALTH CARE EDUCATION/TRAINING PROGRAM

## 2021-12-26 PROCEDURE — 250N000013 HC RX MED GY IP 250 OP 250 PS 637: Performed by: PSYCHIATRY & NEUROLOGY

## 2021-12-26 PROCEDURE — 99232 SBSQ HOSP IP/OBS MODERATE 35: CPT | Mod: GC | Performed by: STUDENT IN AN ORGANIZED HEALTH CARE EDUCATION/TRAINING PROGRAM

## 2021-12-26 PROCEDURE — 93005 ELECTROCARDIOGRAM TRACING: CPT

## 2021-12-26 PROCEDURE — 85520 HEPARIN ASSAY: CPT | Performed by: NEUROLOGICAL SURGERY

## 2021-12-26 PROCEDURE — 93010 ELECTROCARDIOGRAM REPORT: CPT | Performed by: INTERNAL MEDICINE

## 2021-12-26 PROCEDURE — 83930 ASSAY OF BLOOD OSMOLALITY: CPT | Performed by: STUDENT IN AN ORGANIZED HEALTH CARE EDUCATION/TRAINING PROGRAM

## 2021-12-26 PROCEDURE — 84295 ASSAY OF SERUM SODIUM: CPT | Performed by: STUDENT IN AN ORGANIZED HEALTH CARE EDUCATION/TRAINING PROGRAM

## 2021-12-26 PROCEDURE — 85014 HEMATOCRIT: CPT | Performed by: STUDENT IN AN ORGANIZED HEALTH CARE EDUCATION/TRAINING PROGRAM

## 2021-12-26 PROCEDURE — 200N000002 HC R&B ICU UMMC

## 2021-12-26 PROCEDURE — 99233 SBSQ HOSP IP/OBS HIGH 50: CPT | Performed by: PSYCHIATRY & NEUROLOGY

## 2021-12-26 PROCEDURE — 81001 URINALYSIS AUTO W/SCOPE: CPT | Performed by: STUDENT IN AN ORGANIZED HEALTH CARE EDUCATION/TRAINING PROGRAM

## 2021-12-26 PROCEDURE — 250N000013 HC RX MED GY IP 250 OP 250 PS 637: Performed by: NURSE PRACTITIONER

## 2021-12-26 PROCEDURE — 84300 ASSAY OF URINE SODIUM: CPT | Performed by: STUDENT IN AN ORGANIZED HEALTH CARE EDUCATION/TRAINING PROGRAM

## 2021-12-26 PROCEDURE — 84100 ASSAY OF PHOSPHORUS: CPT | Performed by: NEUROLOGICAL SURGERY

## 2021-12-26 PROCEDURE — 250N000011 HC RX IP 250 OP 636: Performed by: STUDENT IN AN ORGANIZED HEALTH CARE EDUCATION/TRAINING PROGRAM

## 2021-12-26 PROCEDURE — 86850 RBC ANTIBODY SCREEN: CPT | Performed by: STUDENT IN AN ORGANIZED HEALTH CARE EDUCATION/TRAINING PROGRAM

## 2021-12-26 PROCEDURE — 250N000012 HC RX MED GY IP 250 OP 636 PS 637: Performed by: NURSE PRACTITIONER

## 2021-12-26 PROCEDURE — 250N000009 HC RX 250: Performed by: NEUROLOGICAL SURGERY

## 2021-12-26 PROCEDURE — 83735 ASSAY OF MAGNESIUM: CPT | Performed by: NEUROLOGICAL SURGERY

## 2021-12-26 PROCEDURE — 85520 HEPARIN ASSAY: CPT | Performed by: STUDENT IN AN ORGANIZED HEALTH CARE EDUCATION/TRAINING PROGRAM

## 2021-12-26 RX ORDER — QUETIAPINE FUMARATE 100 MG/1
100 TABLET, FILM COATED ORAL EVERY EVENING
Status: DISCONTINUED | OUTPATIENT
Start: 2021-12-26 | End: 2021-12-31

## 2021-12-26 RX ORDER — QUETIAPINE FUMARATE 50 MG/1
50 TABLET, FILM COATED ORAL EVERY MORNING
Status: DISCONTINUED | OUTPATIENT
Start: 2021-12-27 | End: 2021-12-26

## 2021-12-26 RX ORDER — LANOLIN ALCOHOL/MO/W.PET/CERES
3 CREAM (GRAM) TOPICAL EVERY EVENING
Status: DISCONTINUED | OUTPATIENT
Start: 2021-12-26 | End: 2021-12-27

## 2021-12-26 RX ORDER — QUETIAPINE FUMARATE 25 MG/1
25 TABLET, FILM COATED ORAL EVERY MORNING
Status: DISCONTINUED | OUTPATIENT
Start: 2021-12-27 | End: 2021-12-31

## 2021-12-26 RX ORDER — HEPARIN SODIUM 5000 [USP'U]/.5ML
5000 INJECTION, SOLUTION INTRAVENOUS; SUBCUTANEOUS EVERY 8 HOURS
Status: DISPENSED | OUTPATIENT
Start: 2021-12-27 | End: 2022-01-02

## 2021-12-26 RX ORDER — HEPARIN SODIUM 5000 [USP'U]/.5ML
5000 INJECTION, SOLUTION INTRAVENOUS; SUBCUTANEOUS EVERY 8 HOURS
Status: DISCONTINUED | OUTPATIENT
Start: 2021-12-26 | End: 2021-12-26

## 2021-12-26 RX ADMIN — INSULIN GLARGINE 10 UNITS: 100 INJECTION, SOLUTION SUBCUTANEOUS at 21:07

## 2021-12-26 RX ADMIN — IRBESARTAN 150 MG: 150 TABLET ORAL at 08:28

## 2021-12-26 RX ADMIN — INSULIN ASPART 3 UNITS: 100 INJECTION, SOLUTION INTRAVENOUS; SUBCUTANEOUS at 00:27

## 2021-12-26 RX ADMIN — Medication 3 MG: at 20:46

## 2021-12-26 RX ADMIN — METOPROLOL TARTRATE 25 MG: 25 TABLET, FILM COATED ORAL at 08:26

## 2021-12-26 RX ADMIN — LACOSAMIDE 200 MG: 50 TABLET, FILM COATED ORAL at 20:45

## 2021-12-26 RX ADMIN — CEFAZOLIN SODIUM 2 G: 2 INJECTION, SOLUTION INTRAVENOUS at 06:00

## 2021-12-26 RX ADMIN — MULTIVIT AND MINERALS-FERROUS GLUCONATE 9 MG IRON/15 ML ORAL LIQUID 15 ML: at 08:25

## 2021-12-26 RX ADMIN — INSULIN ASPART 3 UNITS: 100 INJECTION, SOLUTION INTRAVENOUS; SUBCUTANEOUS at 17:39

## 2021-12-26 RX ADMIN — ACETAMINOPHEN 650 MG: 325 TABLET, FILM COATED ORAL at 15:52

## 2021-12-26 RX ADMIN — POTASSIUM CHLORIDE 40 MEQ: 1.5 POWDER, FOR SOLUTION ORAL at 08:24

## 2021-12-26 RX ADMIN — METOPROLOL TARTRATE 25 MG: 25 TABLET, FILM COATED ORAL at 20:45

## 2021-12-26 RX ADMIN — INSULIN ASPART 4 UNITS: 100 INJECTION, SOLUTION INTRAVENOUS; SUBCUTANEOUS at 08:26

## 2021-12-26 RX ADMIN — INSULIN ASPART 3 UNITS: 100 INJECTION, SOLUTION INTRAVENOUS; SUBCUTANEOUS at 11:41

## 2021-12-26 RX ADMIN — INSULIN GLARGINE 10 UNITS: 100 INJECTION, SOLUTION SUBCUTANEOUS at 10:41

## 2021-12-26 RX ADMIN — QUETIAPINE FUMARATE 100 MG: 100 TABLET ORAL at 20:46

## 2021-12-26 RX ADMIN — LEVETIRACETAM 750 MG: 750 TABLET ORAL at 20:45

## 2021-12-26 RX ADMIN — DOCUSATE SODIUM 50 MG AND SENNOSIDES 8.6 MG 2 TABLET: 8.6; 5 TABLET, FILM COATED ORAL at 08:25

## 2021-12-26 RX ADMIN — LACOSAMIDE 200 MG: 50 TABLET, FILM COATED ORAL at 08:24

## 2021-12-26 RX ADMIN — ATORVASTATIN CALCIUM 80 MG: 40 TABLET, FILM COATED ORAL at 08:25

## 2021-12-26 RX ADMIN — TADALAFIL 20 MG: 20 TABLET, FILM COATED ORAL at 08:26

## 2021-12-26 RX ADMIN — HEPARIN SODIUM 5000 UNITS: 10000 INJECTION, SOLUTION INTRAVENOUS; SUBCUTANEOUS at 20:46

## 2021-12-26 RX ADMIN — QUETIAPINE FUMARATE 25 MG: 25 TABLET ORAL at 08:26

## 2021-12-26 RX ADMIN — LEVETIRACETAM 750 MG: 750 TABLET ORAL at 08:25

## 2021-12-26 RX ADMIN — POTASSIUM PHOSPHATE, MONOBASIC AND POTASSIUM PHOSPHATE, DIBASIC 15 MMOL: 224; 236 INJECTION, SOLUTION, CONCENTRATE INTRAVENOUS at 05:58

## 2021-12-26 RX ADMIN — FLUTICASONE FUROATE AND VILANTEROL TRIFENATATE 1 PUFF: 200; 25 POWDER RESPIRATORY (INHALATION) at 08:28

## 2021-12-26 RX ADMIN — Medication 40 MG: at 08:25

## 2021-12-26 RX ADMIN — ROPINIROLE HYDROCHLORIDE 2 MG: 2 TABLET, FILM COATED ORAL at 20:49

## 2021-12-26 RX ADMIN — INSULIN ASPART 4 UNITS: 100 INJECTION, SOLUTION INTRAVENOUS; SUBCUTANEOUS at 04:23

## 2021-12-26 RX ADMIN — INSULIN ASPART 2 UNITS: 100 INJECTION, SOLUTION INTRAVENOUS; SUBCUTANEOUS at 21:08

## 2021-12-26 ASSESSMENT — ACTIVITIES OF DAILY LIVING (ADL)
ADLS_ACUITY_SCORE: 19
ADLS_ACUITY_SCORE: 17
ADLS_ACUITY_SCORE: 19
ADLS_ACUITY_SCORE: 17
ADLS_ACUITY_SCORE: 19
ADLS_ACUITY_SCORE: 19
ADLS_ACUITY_SCORE: 17
ADLS_ACUITY_SCORE: 19
ADLS_ACUITY_SCORE: 19
ADLS_ACUITY_SCORE: 17
ADLS_ACUITY_SCORE: 19
ADLS_ACUITY_SCORE: 17
ADLS_ACUITY_SCORE: 17
ADLS_ACUITY_SCORE: 19

## 2021-12-26 NOTE — PROGRESS NOTES
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 Pt had head trauma around 12 days ago and was found to have a right subdural hematoma measuring around 12.9 cm with midline shift s/p evacuation of hematoma on 12/21. Cardiology service consulted for anticoagulation and antiplatelet management in the setting of subdural hematoma.     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 Discussed with patient's outpatient interventional cardiology group (Dr. TENA Palacios) regarding management of DAPT vs Monotherapy in the setting of large ICH. Per his recommendations, okay to proceed with ASA 81mg daily after 24 hrs of heparin challenge.  - Continue metoprolol, atorvastatin, bumex daily dose, empagliflozin, irbesartan    Discussed with Dr. Kalra Mohsan Chaudhry MD  Cardiology Fellow     REASON FOR CONSULT: anticoagulation, antiplatelet management     Subjective:   Hemodynamically stable overnight  No new complaints this morning. No bleeding after starting heparin gtt yesterday.    Physical Exam   Temp: 100.1  F (37.8  C) Temp src: Axillary /52 Pulse: 86   Resp: (!) 33 SpO2: 97 % O2 Device: Nasal cannula Oxygen Delivery: 2 LPM  Vital Signs with Ranges  Temp:  [97.1  F (36.2  C)-100.1  F (37.8  C)] 100.1  F (37.8  C)  Pulse:  [] 86  Resp:  [6-35] 33  BP: (104-140)/(45-93) 114/73  SpO2:  [91 %-99 %] 97 %  189 lbs 9.53 oz    GEN: Non purposeful movements   HEENT: MMM  Eyes: no icterus  CV: RRR, normal s1/s2, no murmurs/rubs/s3/s4,  no heave. No JVD  CHEST: CTAB  ABD: soft, NT/ND, NABS  : no flank/suprapubic tenderness  NEURO: Non purposeful movements, unable to follow commands (on Precedex)     Data   Data reviewed today:    Recent Labs   Lab 12/26/21  1138 12/26/21  1137 12/26/21  1043 12/26/21  0840 12/26/21  0816 12/26/21  0422 12/26/21  0417 12/25/21  2353 12/25/21 2019 12/25/21  0845 12/25/21  0424 12/24/21  1146 12/24/21  1005 12/20/21  2202 12/20/21  1350 12/20/21  1046   WBC  --   --   --   --   --   --  8.9  --  8.9  --  7.7  --   --    < > 9.0 10.3   HGB  --   --   --   --   --   --  8.0*  --  8.2*  --  8.5*  --   --    < > 8.0* 8.8*   MCV  --   --   --   --   --   --  76*  --  75*  --  75*  --   --    < > 72* 72*   PLT  --   --   --   --   --   --  211  --  234  --  249  --   --    < > 275 303   INR  --   --   --   --   --   --   --   --   --   --   --   --   --   --  1.32* 1.20*   NA  --  162*  --  160*  --   --  160*  160*   < > 160*   < > 156*  156*  156*   < > 154*   < > 139 138   POTASSIUM  --   --   --   --   --   --  4.0  --   --   --  3.9  3.9  --  3.8   < > 3.9 3.7   CHLORIDE  --   --   --   --   --   --  128*  --   --   --  122*  --  119*   < > 104 99   CO2  --   --   --   --   --   --  29  --   --   --  29  --  27   < > 29 24   BUN  --   --   --   --   --   --  52*  --   --   --  54*  --  51*   < > 36* 37*   CR  --   --   --   --   --   --  1.47*  --   --   --  1.50*  --  1.46*   < > 1.25 1.60*   ANIONGAP  --   --   --   --   --   --  3  --   --   --  5  --  8   < > 6 15   ANNMARIE  --   --   --   --   --   --  9.0  --   --   --  8.8  --  9.1   < > 8.8 9.4   *  --  222*  --  235*   < > 247*   < >  --    < > 204*   < > 196*   < > 141* 269*   ALBUMIN  --   --   --   --   --   --   --   --   --   --   --   --   --   --  3.1* 3.6   PROTTOTAL  --   --   --   --   --   --   --   --   --   --   --   --   --   --  6.8 7.3   BILITOTAL  --   --   --   --   --   --   --   --   --   --   --   --   --   --  1.5* 1.7*    ALKPHOS  --   --   --   --   --   --   --   --   --   --   --   --   --   --  133 140*   ALT  --   --   --   --   --   --   --   --   --   --   --   --   --   --  17 13   AST  --   --   --   --   --   --   --   --   --   --   --   --   --   --  22 19    < > = values in this interval not displayed.       Recent Results (from the past 24 hour(s))   CT Head w/o Contrast    Narrative    EXAM: CT HEAD W/O CONTRAST  LOCATION: [facility]  DATE/TIME: 12/25/2021 3:32 PM    INDICATION: Post KEILY drain removal, subdural hematoma.  COMPARISON: 12/24/2021.  TECHNIQUE: Routine CT Head without IV contrast. Multiplanar reformats. Dose reduction techniques were used.    FINDINGS:  INTRACRANIAL CONTENTS: Limited by motion. Thin right convexity subdural hematoma with scattered pneumocephalus again noted. It is unchanged in overall size compared to the prior study apart from slight possible decrease along the anterior-superior   margin. Pneumocephalus has also diminished. Trace left convexity subdural is stable. No new mass effect. No gross CT evidence of acute infarct. Age-related changes are stable.    VISUALIZED ORBITS/SINUSES/MASTOIDS: No intraorbital abnormality. Mild scattered paranasal sinus mucosal disease. No middle ear or mastoid effusion.    BONES/SOFT TISSUES: No acute abnormality. Previous right-sided craniotomy.       Impression    IMPRESSION:  1.  Limited by motion. Thin right convexity subdural hematoma is overall stable apart from perhaps subtly decrease along its anterior-superior margin. Previously seen pneumocephalus has also mildly decreased.    2.  Trace subdural hematoma along the left parietal convexity is grossly stable.    3.  No new hemorrhage or mass effect.

## 2021-12-26 NOTE — PROGRESS NOTES
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 Pt had head trauma around 12 days ago and was found to have a right subdural hematoma measuring around 12.9 cm with midline shift s/p evacuation of hematoma on 12/21. Cardiology service consulted for anticoagulation and antiplatelet management in the setting of subdural hematoma.     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 Kalra Mohsan Chaudhry MD  Cardiology Fellow     REASON FOR CONSULT: anticoagulation, antiplatelet management     Subjective:   Hemodynamically stable overnight  No new complaints this morning. No bleeding after starting heparin gtt yesterday.    Physical Exam   Temp: 100.1  F (37.8  C) Temp src: Axillary /52 Pulse: 86   Resp: (!) 33 SpO2: 97 % O2 Device: Nasal cannula Oxygen Delivery: 2 LPM  Vital Signs with Ranges  Temp:  [97.1  F (36.2  C)-100.1  F (37.8  C)] 100.1  F (37.8  C)  Pulse:  [] 86  Resp:  [6-35] 33  BP: (104-140)/(45-93) 114/73  SpO2:  [91 %-99 %] 97 %  189 lbs 9.53 oz    GEN: Non purposeful movements    HEENT: MMM  Eyes: no icterus  CV: RRR, normal s1/s2, no murmurs/rubs/s3/s4, no heave. No JVD  CHEST: CTAB  ABD: soft, NT/ND, NABS  : no flank/suprapubic tenderness  NEURO: Non purposeful movements, unable to follow commands (on Precedex)     Data   Data reviewed today:    Recent Labs   Lab 12/26/21  1138 12/26/21  1137 12/26/21  1043 12/26/21  0840 12/26/21  0816 12/26/21  0422 12/26/21  0417 12/25/21  2353 12/25/21  2019 12/25/21  0845 12/25/21  0424 12/24/21  1146 12/24/21  1005 12/20/21  2202 12/20/21  1350 12/20/21  1046   WBC  --   --   --   --   --   --  8.9  --  8.9  --  7.7  --   --    < > 9.0 10.3   HGB  --   --   --   --   --   --  8.0*  --  8.2*  --  8.5*  --   --    < > 8.0* 8.8*   MCV  --   --   --   --   --   --  76*  --  75*  --  75*  --   --    < > 72* 72*   PLT  --   --   --   --   --   --  211  --  234  --  249  --   --    < > 275 303   INR  --   --   --   --   --   --   --   --   --   --   --   --   --   --  1.32* 1.20*   NA  --  162*  --  160*  --   --  160*  160*   < > 160*   < > 156*  156*  156*   < > 154*   < > 139 138   POTASSIUM  --   --   --   --   --   --  4.0  --   --   --  3.9  3.9  --  3.8   < > 3.9 3.7   CHLORIDE  --   --   --   --   --   --  128*  --   --   --  122*  --  119*   < > 104 99   CO2  --   --   --   --   --   --  29  --   --   --  29  --  27   < > 29 24   BUN  --   --   --   --   --   --  52*  --   --   --  54*  --  51*   < > 36* 37*   CR  --   --   --   --   --   --  1.47*  --   --   --  1.50*  --  1.46*   < > 1.25 1.60*   ANIONGAP  --   --   --   --   --   --  3  --   --   --  5  --  8   < > 6 15   ANNMARIE  --   --   --   --   --   --  9.0  --   --   --  8.8  --  9.1   < > 8.8 9.4   *  --  222*  --  235*   < > 247*   < >  --    < > 204*   < > 196*   < > 141* 269*   ALBUMIN  --   --   --   --   --   --   --   --   --   --   --   --   --   --  3.1* 3.6   PROTTOTAL  --   --   --   --   --   --   --   --   --   --   --   --   --   --  6.8 7.3   BILITOTAL  --   --    --   --   --   --   --   --   --   --   --   --   --   --  1.5* 1.7*   ALKPHOS  --   --   --   --   --   --   --   --   --   --   --   --   --   --  133 140*   ALT  --   --   --   --   --   --   --   --   --   --   --   --   --   --  17 13   AST  --   --   --   --   --   --   --   --   --   --   --   --   --   --  22 19    < > = values in this interval not displayed.       Recent Results (from the past 24 hour(s))   CT Head w/o Contrast    Narrative    EXAM: CT HEAD W/O CONTRAST  LOCATION: [facility]  DATE/TIME: 12/25/2021 3:32 PM    INDICATION: Post KEILY drain removal, subdural hematoma.  COMPARISON: 12/24/2021.  TECHNIQUE: Routine CT Head without IV contrast. Multiplanar reformats. Dose reduction techniques were used.    FINDINGS:  INTRACRANIAL CONTENTS: Limited by motion. Thin right convexity subdural hematoma with scattered pneumocephalus again noted. It is unchanged in overall size compared to the prior study apart from slight possible decrease along the anterior-superior   margin. Pneumocephalus has also diminished. Trace left convexity subdural is stable. No new mass effect. No gross CT evidence of acute infarct. Age-related changes are stable.    VISUALIZED ORBITS/SINUSES/MASTOIDS: No intraorbital abnormality. Mild scattered paranasal sinus mucosal disease. No middle ear or mastoid effusion.    BONES/SOFT TISSUES: No acute abnormality. Previous right-sided craniotomy.       Impression    IMPRESSION:  1.  Limited by motion. Thin right convexity subdural hematoma is overall stable apart from perhaps subtly decrease along its anterior-superior margin. Previously seen pneumocephalus has also mildly decreased.    2.  Trace subdural hematoma along the left parietal convexity is grossly stable.    3.  No new hemorrhage or mass effect.

## 2021-12-26 NOTE — PROGRESS NOTES
[facility], [place]   12/26/2021  Neurosurgery Progress Note:    Assessment:  Red Sutherland is a 81-year-old male post-traumatic acute right subdural hematoma with left hemibody weakness, on xarelto and plavix. Admitted to the ICU on 12/20/21. Taken to OR 12/21/2021. Extubated 12/22/2021.     Plan:  - Hold xarelto, plavix, heparin started  - Cardiology consultation for AP/AC restart guidance  - Heparin gtt low intensity no bolus   - Will obtain CT once therapeutic  - Keppra 1g BID, Vimpat 150 BID  - HOB > 30 degrees  - Seroquel 25 BID, 50 @ night.   - SLP evaluation    Suzanne Heaton MD  Neurosurgery Resident, PGY-3    Please contact neurosurgery resident on call with questions.    Dial * * [phone], enter [phone] when prompted.   -----------------------------------    Interval History: improved agitated overnight; did not require precedex    Objective:   Temp:  [97.1  F (36.2  C)-99.5  F (37.5  C)] 99.4  F (37.4  C)  Pulse:  [] 101  Resp:  [6-35] 23  BP: ()/(45-93) 108/77  SpO2:  [91 %-99 %] 97 %  I/O last 3 completed shifts:  In: 4029.23 [I.V.:514.23; NG/GT:2095]  Out: 2023 [Urine:2000; Drains:23]    Gen: Appears comfortable, NAD  Wound: clean, dry, intact,  Neurologic:  - Alert & Oriented to person, place, time, and situation  - Follows commands briskly x 4, antigravity  - Mild L facial droop  Moving all extremities x 4.    LABS:  Recent Labs   Lab 12/26/21  0816 12/26/21  0422 12/26/21  0417 12/26/21  0040 12/25/21  2353 12/25/21  2019 12/25/21  0845 12/25/21  0424 12/24/21  1146 12/24/21  1005   NA  --   --  160*  160* 160*  --  160*   < > 156*  156*  156*   < > 154*   POTASSIUM  --   --  4.0  --   --   --   --  3.9  3.9  --  3.8   CHLORIDE  --   --  128*  --   --   --   --  122*  --  119*   CO2  --   --  29  --   --   --   --  29  --  27   ANIONGAP  --   --  3  --   --   --   --  5  --  8   * 236* 247*  --    < >  --    < > 204*   < > 196*   BUN  --   --   52*  --   --   --   --  54*  --  51*   CR  --   --  1.47*  --   --   --   --  1.50*  --  1.46*   ANNMARIE  --   --  9.0  --   --   --   --  8.8  --  9.1    < > = values in this interval not displayed.       Recent Labs   Lab 12/26/21  0417   WBC 8.9   RBC 3.90*   HGB 8.0*   HCT 29.5*   MCV 76*   MCH 20.5*   MCHC 27.1*   RDW 20.5*          IMAGING:  Recent Results (from the past 24 hour(s))   XR Chest Port 1 View    Narrative    Exam: XR CHEST PORT 1 VIEW, 12/25/2021 11:08 AM    Indication: post PICC    Comparison: 12/24/2021    Findings:     Portable semiupright AP view of the chest. Enteric tube courses below  the left hemidiaphragm collimated out of view. Right arm PICC tip  projects over the cavoatrial junction. Ill-defined cardiac silhouette.  Aortic arch after scar calcifications. No significant change in the  perihilar predominant mixed opacities. No appreciable pneumothorax.  Probable small left pleural effusion. Left retrocardiac consolidation  versus atelectasis.      Impression    Impression:     1. Increased left retrocardiac opacities, representing atelectasis or  consolidation, compared to chest x-ray 12/24/2021  2. Right arm PICC tip projects near the superior cavoatrial junction.     I have personally reviewed the examination and initial interpretation  and I agree with the findings.    DAVINA BLACKWELL MD         SYSTEM ID:  [id number]   CT Head w/o Contrast    Narrative    EXAM: CT HEAD W/O CONTRAST  LOCATION: [facility]  DATE/TIME: 12/25/2021 3:32 PM    INDICATION: Post KEILY drain removal, subdural hematoma.  COMPARISON: 12/24/2021.  TECHNIQUE: Routine CT Head without IV contrast. Multiplanar reformats. Dose reduction techniques were used.    FINDINGS:  INTRACRANIAL CONTENTS: Limited by motion. Thin right convexity subdural hematoma with scattered pneumocephalus again noted. It is unchanged in overall size compared to the prior study apart from slight  possible decrease along the anterior-superior   margin. Pneumocephalus has also diminished. Trace left convexity subdural is stable. No new mass effect. No gross CT evidence of acute infarct. Age-related changes are stable.    VISUALIZED ORBITS/SINUSES/MASTOIDS: No intraorbital abnormality. Mild scattered paranasal sinus mucosal disease. No middle ear or mastoid effusion.    BONES/SOFT TISSUES: No acute abnormality. Previous right-sided craniotomy.       Impression    IMPRESSION:  1.  Limited by motion. Thin right convexity subdural hematoma is overall stable apart from perhaps subtly decrease along its anterior-superior margin. Previously seen pneumocephalus has also mildly decreased.    2.  Trace subdural hematoma along the left parietal convexity is grossly stable.    3.  No new hemorrhage or mass effect.

## 2021-12-26 NOTE — PROGRESS NOTES
0Neuroscience Intensive Care Progress Note    REASON FOR CRITICAL CARE ADMISSION:  SDH     Date of Service:  2021  Date of Admission:  2021  Hospital Day: 7    Problem List  1. SDH  2. Focal seizures  3. Restless leg syndrome  4. Right ventricular heart failure  5. CAD  6. Pulmonary hypertension  7. Atrial fibrillation  8. Hypertension  9. COPD  10. Stage 3a CKD  11. Diabetes mellitus    24 hour events:  Very agitated and restless but calmed down and answered some questions.    24 Hour Vital Signs Summary:  Temp: 97.4  F (36.3  C) Temp  Min: 97.1  F (36.2  C)  Max: 99.5  F (37.5  C)  Resp: 27 Resp  Min: 6  Max: 35  SpO2: 98 % SpO2  Min: 91 %  Max: 99 %  Pulse: 90 Pulse  Min: 64  Max: 97    No data recorded  BP: 118/61 Systolic (24hrs), Av , Min:77 , Max:140   Diastolic (24hrs), Av, Min:45, Max:93    Respiratory monitoring:   Resp: 27      I/O last 3 completed shifts:  In: 4029.23 [I.V.:514.23; NG/GT:2095]  Out:  [Urine:2000; Drains:23]    Current Medications:    atorvastatin  80 mg Oral or Feeding Tube Daily     [Held by provider] bumetanide  4 mg Oral or Feeding Tube QAM     ceFAZolin  2 g Intravenous Q8H     [Held by provider] empagliflozin  10 mg Oral or Feeding Tube Daily     ferrous gluconate  324 mg Oral or Feeding Tube Every Other Day     fluticasone-vilanterol  1 puff Inhalation Daily     heparin lock flush  5-20 mL Intracatheter Q24H     insulin aspart  1-12 Units Subcutaneous Q4H     irbesartan  150 mg Oral or Feeding Tube Daily     lacosamide  200 mg Oral or Feeding Tube BID     levETIRAcetam  750 mg Oral or Feeding Tube BID     melatonin  10 mg Oral At Bedtime     metoprolol tartrate  25 mg Oral BID     multivitamins w/minerals  15 mL Per Feeding Tube Daily     pantoprazole  40 mg Oral or Feeding Tube QAM AC     polyethylene glycol  17 g Oral or Feeding Tube Daily     potassium chloride  40 mEq Oral or Feeding Tube Daily     sodium phosphate  15 mmol Intravenous Once      QUEtiapine  25 mg Oral or Feeding Tube QAM     QUEtiapine  50 mg Oral or Feeding Tube At Bedtime     rOPINIRole  2 mg Oral At Bedtime     senna-docusate  2 tablet Oral or Feeding Tube BID    Or     senna-docusate  2 tablet Oral BID     sodium chloride (PF)  3 mL Intracatheter Q8H     tadalafil  20 mg Oral Q24H       PRN Medications:  acetaminophen **OR** acetaminophen, albuterol, glucose **OR** dextrose **OR** glucagon, heparin lock flush, hydrALAZINE, labetalol, lidocaine 4%, lidocaine (buffered or not buffered), naloxone **OR** naloxone **OR** naloxone **OR** naloxone, ondansetron **OR** ondansetron, QUEtiapine, sodium chloride (PF), sodium chloride (PF)    Infusions:    heparin 1,200 Units/hr (12/26/21 0700)       Allergies   Allergen Reactions     Contrast Dye Nausea     Other reaction(s): GI intolerance, GI Upset     Furosemide Muscle Pain (Myalgia)     Previously tolerated.  Muscle cramps     Hydrochlorothiazide Unknown     Iodinated Contrast Media [Diagnostic X-Ray Materials] Nausea     Losartan Other (See Comments)     Other reaction(s): Stomatitis, Bloody nose dry mouth and lips     Losartan-Hydrochlorothiazide [Hyzaar]      Mouth sores     Metaxalone Nausea     Metolazone Other (See Comments)     Muscle cramps     Mometasone Other (See Comments)     Bloody nose     Rabeprazole Other (See Comments)     Mouth sores     Ramipril Other (See Comments)     Mouth sores     Shellfish Containing Products [Shellfish-Derived Products] Unknown     Other reaction(s): mouth sores, Other reaction(s): mouth sores     Sildenafil Muscle Pain (Myalgia)     Methocarbamol Rash     Penicillins Other (See Comments)     Immune-does not work for him       Physical Examination:  General: in no apparent distress  HEENT: normocephalic, surgical site is clean and dry  Cardiac: irregular rhythm, normal rate  Chest: lungs clear bilaterally  Abdomen: Soft, nondistended, tender in the epigastric area  Extremities: Warm, no edema  Skin: No  rash or lesion   Neuro:  Mental status: Drowsy, not following commands, agitated, yelling out  Cranial nerves: right pupil reactive 2 mm, left pupil is irregular and does not react, EOMI, face appears symmetric, cough present  Motor: moving all four extremities antigravity to command  Sensory: Intact to light touch in all four extremities  Coordination: unable to assess      Labs/Studies:  BMP  Recent Labs   Lab 12/26/21 0417 12/26/21  0040 12/25/21 2019 12/25/21  1627 12/25/21  1246 12/25/21  0424 12/24/21  1217 12/24/21  1005 12/24/21  0543   *  160* 160* 160* 158*   < > 156*  156*  156*   < > 154* 153*   POTASSIUM 4.0  --   --   --   --  3.9  3.9  --  3.8 3.5   CHLORIDE 128*  --   --   --   --  122*  --  119* 118*   CO2 29  --   --   --   --  29  --  27 26   BUN 52*  --   --   --   --  54*  --  51* 47*   CR 1.47*  --   --   --   --  1.50*  --  1.46* 1.46*   ANNMARIE 9.0  --   --   --   --  8.8  --  9.1 8.8    < > = values in this interval not displayed.       CBC  Recent Labs   Lab 12/26/21 0417 12/25/21 2019 12/25/21  0424 12/24/21  0543   WBC 8.9 8.9 7.7 9.8   HGB 8.0* 8.2* 8.5* 8.2*    234 249 274       ABG  Recent Labs   Lab 12/22/21  0824 12/22/21  0549 12/21/21 2120   PH 7.44 7.46* 7.51*   PCO2 36 36 32*   PO2 162* 151* 150*   HCO3 24 26 25       Imaging:  CXRs and abdominal XRs overnight related to NG removal and placement    All relevant imaging and laboratory values personally reviewed    Assessment/Plan  Red Sutherland is a 81 year old admitted on 12/20/2021 with subdural hematoma and focal seizures.  He is now s/p evacuation in the OR on 12/21/21.     Neuro  #Delirium:  - Delirium precautions  - Frequent re-orientation  - Seroquel 25 mg/100 QPM  - Seroquel 25 mg BID prn  - Check EKG today to monitor QTC  - Space neurochecks at night to Qshif    #Large right SDH: traumatic  -Neurochecks Q4H  -HOB >30  -SBP goal <160 from NCC standpoint  -Goal normonatremia  -PT/OT/SLP    #Focal  seizures:  - Continue keppra at 750 mg BID and Vimpat 200 mg BID  - Can consider decreasing Keppra to 500 mg BID if concerns for contributing to agitation (already weaned from 1500 to 750)    #Restless leg syndrome:  - Continue home ropinorole    #Analgesics & sedation  - None    CV  #HFpEF:  - Holding home bumex  - Monitor daily weights and I/Os  - Cardiology consulted by neurosurgery - irbesartan, jardiance, and metoprolol     #Atrial fibrillation:  -Cardiac monitoring  - Continue home metoprolol  - Holding home eliquis in setting of bleed  - Neurosurgery consulted cardiology - recommendations made for antiplatelet/AC regimen plan, on heparin gtt    #Hypertension:  -SBP goal <160 from NCC standpoint, < 140 per neurosurgery  - Continue home irbesartan and metoprolol as above  -PRN labetalol and hydralazine    #Hyperlipidemia:  - Continue home atorvastatin    Resp  #COPD  -Continuous pulse ox  -Maintain O2 saturations greater than 88%  - On 2-4L of oxygen at home  - Continue inhaler    #Pulmonary hypertension:  - Continue home cialis    GI  #Nutrition  - Ongoing speech evaluation  - On tube feeds at goal    Diet: tube feeds  Last BM: having BMs  Bowel regimen: scheduled senna-docusate and Miralax    Renal  #Stage 3a CKD:  #Elevated BUN:  #Acute kidney injury:  -Daily BMP  -Hold bumex, holding jardiance  -IV fluids: none  -Electrolyte replacement protocol     #Hypernatremia:  #Hyperchloremia:  #Hypermagnesemia:  -Monitor BMP  -Neurosurgery increased FWF to 350 mL Q4H    Endo  #Diabetes mellitus:  -Hgb A1c 7.5%  -Holding home glargine 18 units BID - start 10 units BID and continue to monitor  -Holding jardiance in the setting of NATHALY and rising BUN  -Follow glucose levels    Heme  #Anemia:  - Holding home iron  -Daily CBC  -Hgb goal >7, plt goal >50k  -Transfuse to meet Hgb and plt goals    ID  -Afebrile  -Daily CBC  -Follow temperature curve    Lines/tubes/drains: PIV, NG, PICC  FEN: tube feeds   Ppx: DVT - SCDs,  heparin drip; GI - pantoprazole  Code: Full Code  Dispo: ICU, discuss floor transfer    Mandi Sawyer MD

## 2021-12-26 NOTE — PLAN OF CARE
Major Shift Events:  Continues to be confused, vocalizes moans or garbled speech continuously. Mostly does not follow commands. Slept very little overnight. Continues to need 1:1 sitter for line pulling and attempting to get OOB. External male catheter leaked once, replaced. Tolerating TF, free H2O increased due to rising Na+. O2 at 2 LPM NC.   Plan: Continue to monitor neuro status, frequent labs.   For vital signs and complete assessments, please see documentation flowsheets.

## 2021-12-26 NOTE — CONSULTS
Nephrology Initial Consult  December 26, 2021      Red Sutherland MRN:[id number] YOB: 1940  Date of Admission:12/20/2021  Primary care provider: Haresh Corona  Requesting physician: Parvez Quinones,*    ASSESSMENT AND RECOMMENDATIONS:   # Hypernatremia  I suspect that this is due to insufficient free water intake over his course here due to his variable urine outputs and overall net positive volume status. Central DI is also a possibility given his recent subdural hematoma and surgery. The likelihood of nephrogenic DI is quite low. I will assess urine and serum osms, as well as UA and urine sodium to assess for possible diabetes insipidus. Should this be confirmed we would start DDAVP.    Free water deficit is about 7 L. For now we will increase FWF to 400 mL q2h, as his surgery team prefers to avoid IVF at this time. This would give him a little over half of his free water deficit over the next 24 hours. Should his sodium continue to increase we would consider starting D5W.    # CKD3  Baseline Cr appears to be 1.5-1.8 over the past 3 years or so. He is at baseline currently.    Recommendations were communicated to primary team via note and by phone.    Seen and discussed with Dr.Thakar Nasir Phillips MD       REASON FOR CONSULT: hypernatremia    HISTORY OF PRESENT ILLNESS:  Admitting provider and nursing notes reviewed  Red Sutherland is a 81 year old M with pmh of HFpEF, CKD3, cirrhosis who presented to [facility] as a transfer from OSH on 12/20 after a fall. The fall was sustained on 12/8. At OSH he was found to have a subdural hematoma. He was taken to the OR on 12/21 for evacuation.    Sodium on presentation was 138, this has trended upwards throughout his hospital course. He is net positive for I/O overall while here, and UOP daily has been quite variable. He was given bumex on 12/22-12/24.    When spoken to this afternoon he reports being quite  thirsty.    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 They put him on sildenafil as nitroprusside lowered systemic BP and with that the mean PA dropped from 54 to 49. Negative VQ at [facility] Dec 1, 2017.     RLS (restless legs syndrome)      Sleep apnea        Past Surgical History:   Procedure Laterality Date     BACK SURGERY      lower back     CARDIAC CATHETERIZATION  12/13/2017    Right and left at [facility], mean PA 58, PCW 24 with V wave of 35, LVEDP of 18, with Nipride systemic BP, PVR and mean PA all declined     CARDIOVERSION  03/15/2013    for afib     CATARACT EXTRACTION Bilateral      COLONOSCOPY N/A 10/31/2021    Procedure: COLONOSCOPY WITH POLYPECTOMY;  Surgeon: Sheng Sagastume MD;  Location: [facility] Main OR     CORONARY STENT PLACEMENT       CRANIOTOMY Right 12/21/2021    Procedure: CRANIOTOMY FOR Subdural HEMATOMA EVACUATION;  Surgeon: Parvez Quinones MD;  Location: [facility] OR     CV CORONARY ANGIOGRAM N/A 10/25/2021    Procedure: Coronary Angiogram;  Surgeon: Otf Knowles MD;  Location: [facility] LAB CV     CV CORONARY LITHOTRIPSY PCI N/A 10/25/2021    Procedure: CV Coronary Lithotripsy PCI;  Surgeon: Otf Knowles MD;  Location: [facility] LAB CV     CV LEFT HEART CATH N/A 10/25/2021    Procedure: Left Heart Cath;  Surgeon: Otf Knowles MD;  Location: [facility] LAB CV     CV PCI N/A 10/25/2021    Procedure: Percutaneous Coronary Intervention;  Surgeon: Otf Knowles MD;  Location:   [facility] CV     ESOPHAGOSCOPY, GASTROSCOPY, DUODENOSCOPY (EGD), COMBINED N/A 10/30/2021    Procedure: ESOPHAGOGASTRODUODENOSCOPY (EGD);  Surgeon: Sheng Sagastume MD;  Location: [facility] Main OR     IR ABDOMINAL AORTOGRAM  11/16/2012     IR MISCELLANEOUS PROCEDURE  07/20/2001     IR MISCELLANEOUS PROCEDURE  11/16/2012     OTHER SURGICAL HISTORY      [place]     PICC DOUBLE LUMEN PLACEMENT Right 12/25/2021    48cm (2cm external), Basilic vein, SVC RA junction     SHOULDER SURGERY      reapir on right shoulder     TOTAL HIP ARTHROPLASTY Left      TOTAL KNEE ARTHROPLASTY Bilateral      WRIST SURGERY Bilateral      ZZHC COLONOSCOPY W/WO BRUSH/WASH N/A 01/14/2021    Procedure: COLONOSCOPY;  Surgeon: Mihai Harris MD;  Location: [facility] Main OR;  Service: Gastroenterology        MEDICATIONS:  PTA Meds  Prior to Admission medications    Medication Sig Last Dose Taking? Auth Provider   ACCU-CHEK JOSE PLUS TEST STRP strips [ACCU-CHEK JOSE PLUS TEST STRP STRIPS] see administration instructions. 12/19/2021 at Unknown time Yes Provider, Historical   acetaminophen (TYLENOL) 500 MG tablet [ACETAMINOPHEN (TYLENOL) 500 MG TABLET] Take 1,000 mg by mouth every 6 (six) hours as needed. First line of pain management. Do Not take more than 4,000 mg in 24 hours. 12/19/2021 at Unknown time Yes Provider, Historical   albuterol (PROVENTIL HFA;VENTOLIN HFA) 90 mcg/actuation inhaler Inhale 2 puffs into the lungs every 6 hours as needed  12/19/2021 at Unknown time Yes Provider, Historical   apixaban ANTICOAGULANT (ELIQUIS) 5 MG tablet Take 5 mg by mouth 2 times daily  12/19/2021 at Unknown time Yes Unknown, Entered By History   atorvastatin (LIPITOR) 80 MG tablet Take 1 tablet (80 mg) by mouth daily 12/20/2021 at Unknown time Yes Deirdre Valdovinos MD   BD ULTRA-FINE MANISHA PEN NEEDLES 32 gauge x 5/32\" Ndle [BD ULTRA-FINE MANISHA PEN NEEDLES 32 GAUGE X 5/32\" NDLE]  12/19/2021 at Unknown time Yes Provider, Historical   bumetanide (BUMEX) 2 " MG tablet Take 2 tablets (4 mg) by mouth every morning And 1 1/2 tablets in the evening 12/19/2021 at Unknown time Yes Meg Roth MD   canagliflozin (INVOKANA) 100 mg Tab Take 100 mg by mouth every morning  12/19/2021 at Unknown time Yes Provider, Historical   clopidogrel (PLAVIX) 75 MG tablet Take 75 mg by mouth daily  12/19/2021 at Unknown time Yes Unknown, Entered By History   Ferrous Gluconate 324 (37.5 Fe) MG TABS Take 1 tablet by mouth every other day 12/19/2021 at Unknown time Yes Reported, Patient   fluticasone-vilanterol (BREO ELLIPTA) 200-25 mcg/dose DsDv inhaler Inhale 1 puff into the lungs daily  12/19/2021 at Unknown time Yes Provider, Historical   Garlic 1000 MG CAPS Take 1 capsule by mouth daily 12/19/2021 at Unknown time Yes Unknown, Entered By History   HYDROcodone-acetaminophen (NORCO) 5-325 MG tablet Take 1 tablet by mouth every 6 hours as needed for severe pain Unknown at Unknown time Yes Unknown, Entered By History   insulin glargine (LANTUS PEN) 100 UNIT/ML pen Inject 18 Units Subcutaneous 2 times daily 12/19/2021 at Unknown time Yes Meg Roth MD   ipratropium - albuterol 0.5 mg/2.5 mg/3 mL (DUONEB) 0.5-2.5 (3) MG/3ML neb solution Take 1 vial by nebulization 4 times daily as needed for shortness of breath / dyspnea  12/19/2021 at Unknown time Yes Unknown, Entered By History   irbesartan (AVAPRO) 300 MG tablet Take 0.5 tablets (150 mg) by mouth daily 12/19/2021 at Unknown time Yes Meg Roth MD   lidocaine (LIDODERM) 5 % Place 1 patch onto the skin daily as needed  12/19/2021 at Unknown time Yes Provider, Historical   metoprolol tartrate (LOPRESSOR) 25 MG tablet Take 1 tablet (25 mg) by mouth 2 times daily (with meals) 12/19/2021 at Unknown time Yes Eliezer Salomon MD   multivitamin w/minerals (THERA-VIT-M) tablet Take 1 tablet by mouth daily 12/19/2021 at Unknown time Yes Unknown, Entered By History   omeprazole (PRILOSEC) 20 MG DR capsule Take 20 mg by mouth daily  12/19/2021 at Unknown time Yes Unknown, Entered By History   OXYGEN-AIR DELIVERY SYSTEMS MISC [OXYGEN-AIR DELIVERY SYSTEMS MISC] Use As Directed. 12/19/2021 at Unknown time Yes Provider, Historical   potassium chloride (K-DUR,KLOR-CON) 20 MEQ tablet Take 40 mEq by mouth daily  12/19/2021 at Unknown time Yes Provider, Historical   rOPINIRole (REQUIP) 2 MG tablet [ROPINIROLE (REQUIP) 2 MG TABLET] Take 2 mg by mouth at bedtime.  12/19/2021 at Unknown time Yes Provider, Historical   tadalafil (CIALIS) 20 MG tablet Take 20 mg by mouth daily  12/19/2021 at Unknown time Yes Meg Roth MD   vitamin C (ASCORBIC ACID) 1000 MG TABS Take 1,000 mg by mouth daily 12/19/2021 at Unknown time Yes Unknown, Entered By History   zinc gluconate 50 MG tablet Take 50 mg by mouth daily 12/19/2021 at Unknown time Yes Unknown, Entered By History   zolpidem (AMBIEN) 10 mg tablet Take 5 mg by mouth nightly as needed for sleep  12/19/2021 at Unknown time Yes Provider, Historical      Current Meds    atorvastatin  80 mg Oral or Feeding Tube Daily     [Held by provider] bumetanide  4 mg Oral or Feeding Tube QAM     [Held by provider] empagliflozin  10 mg Oral or Feeding Tube Daily     ferrous gluconate  324 mg Oral or Feeding Tube Every Other Day     fluticasone-vilanterol  1 puff Inhalation Daily     heparin lock flush  5-20 mL Intracatheter Q24H     insulin aspart  1-12 Units Subcutaneous Q4H     insulin glargine  10 Units Subcutaneous BID     irbesartan  150 mg Oral or Feeding Tube Daily     lacosamide  200 mg Oral or Feeding Tube BID     levETIRAcetam  750 mg Oral or Feeding Tube BID     melatonin  3 mg Oral QPM     metoprolol tartrate  25 mg Oral BID     multivitamins w/minerals  15 mL Per Feeding Tube Daily     pantoprazole  40 mg Oral or Feeding Tube QAM AC     polyethylene glycol  17 g Oral or Feeding Tube Daily     potassium chloride  40 mEq Oral or Feeding Tube Daily     QUEtiapine  100 mg Oral or Feeding Tube QPM     [START ON  2021] QUEtiapine  25 mg Oral or Feeding Tube QAM     rOPINIRole  2 mg Oral At Bedtime     senna-docusate  2 tablet Oral or Feeding Tube BID     sodium chloride (PF)  3 mL Intracatheter Q8H     tadalafil  20 mg Oral Q24H     Infusion Meds    heparin 1,350 Units/hr (21 1200)       ALLERGIES:    Allergies   Allergen Reactions     Contrast Dye Nausea     Other reaction(s): GI intolerance, GI Upset     Furosemide Muscle Pain (Myalgia)     Previously tolerated.  Muscle cramps     Hydrochlorothiazide Unknown     Iodinated Contrast Media [Diagnostic X-Ray Materials] Nausea     Losartan Other (See Comments)     Other reaction(s): Stomatitis, Bloody nose dry mouth and lips     Losartan-Hydrochlorothiazide [Hyzaar]      Mouth sores     Metaxalone Nausea     Metolazone Other (See Comments)     Muscle cramps     Mometasone Other (See Comments)     Bloody nose     Rabeprazole Other (See Comments)     Mouth sores     Ramipril Other (See Comments)     Mouth sores     Shellfish Containing Products [Shellfish-Derived Products] Unknown     Other reaction(s): mouth sores, Other reaction(s): mouth sores     Sildenafil Muscle Pain (Myalgia)     Methocarbamol Rash     Penicillins Other (See Comments)     Immune-does not work for him       REVIEW OF SYSTEMS:  A comprehensive of systems was negative except as noted above.    SOCIAL HISTORY:   Social History     Socioeconomic History     Marital status:      Spouse name: Not on file     Number of children: 4     Years of education: Not on file     Highest education level: Not on file   Occupational History     Not on file   Tobacco Use     Smoking status: Former Smoker     Packs/day: 1.50     Years: 44.00     Pack years: 66.00     Types: Cigarettes     Quit date: 1996     Years since quittin.6     Smokeless tobacco: Never Used   Substance and Sexual Activity     Alcohol use: Yes     Alcohol/week: 1.0 standard drink     Comment: Alcoholic Drinks/day: 1 per month      Drug use: No     Sexual activity: Not Currently     Partners: Female   Other Topics Concern     Not on file   Social History Narrative     Not on file     Social Determinants of Health     Financial Resource Strain: Not on file   Food Insecurity: Not on file   Transportation Needs: Not on file   Physical Activity: Not on file   Stress: Not on file   Social Connections: Not on file   Intimate Partner Violence: Not on file   Housing Stability: Not on file     Reviewed with patient     FAMILY MEDICAL HISTORY:   Family History   Problem Relation Age of Onset     Hyperlipidemia Mother      Hypertension Mother      Heart Disease Mother      Hyperlipidemia Father      Hypertension Father      Coronary Artery Disease Father      Cerebrovascular Disease Brother      Depression Brother      No Known Problems Sister      Pulmonary Hypertension No family hx of      Congenital heart disease No family hx of      Reviewed with patient    PHYSICAL EXAM:   Temp  Av.9  F (37.2  C)  Min: 97.1  F (36.2  C)  Max: 100.7  F (38.2  C)  Arterial Line BP  Min: 89/45  Max: 283/283  Arterial Line MAP (mmHg)  Av.1 mmHg  Min: 59 mmHg  Max: 283 mmHg      Pulse  Av.4  Min: 56  Max: 101 Resp  Av.4  Min: 6  Max: 35  FiO2 (%)  Av %  Min: 40 %  Max: 40 %  SpO2  Av.1 %  Min: 80 %  Max: 100 %       /73 (BP Location: Left arm)   Pulse 86   Temp 100.1  F (37.8  C) (Axillary)   Resp (!) 33   Wt 86 kg (189 lb 9.5 oz)   SpO2 97%   BMI 26.44 kg/m     Date 21 0700 - 21 0659   Shift 5564-6966 3849-3165 7503-1656 24 Hour Total   INTAKE   I.V. 99.75   99.75   NG/   680   Enteral 300   300   Shift Total(mL/kg) 1079.75(12.56)   1079.75(12.56)   OUTPUT   Urine 1100   1100   Shift Total(mL/kg) 1100(12.79)   1100(12.79)   Weight (kg) 86 86 86 86      Admit Weight: 97.3 kg (214 lb 8.1 oz)     GENERAL APPEARANCE: no acute distress,awake  EYES: no scleral icterus, pupils equal  Endo: no goiter, no moon  facies  Lymphatics: no cervical or supraclavicular LAD  Pulmonary: lungs clear to auscultation with equal breath sounds bilaterally, no clubbing  CV: regular rhythm, normal rate, no rub   - JVD not appreciated   - Edema none  GI: soft, nontender, normal bowel sounds  MS: no evidence of inflammation in joints, no muscle tenderness  :  Dixon, yellow clear urine  SKIN: no rash, warm, dry, no cyanosis  NEURO: face symmetric, no asterixis     LABS:   CMP  Recent Labs   Lab 12/26/21  1138 12/26/21  1137 12/26/21  1043 12/26/21  0840 12/26/21  0816 12/26/21  0422 12/26/21  0417 12/26/21  0040 12/25/21  0845 12/25/21  0424 12/24/21  1146 12/24/21  1005 12/24/21  0754 12/24/21  0543 12/24/21  0410 12/23/21  0749 12/23/21  0511 12/20/21  2202 12/20/21  1350 12/20/21  1046   NA  --  162*  --  160*  --   --  160*  160* 160*   < > 156*  156*  156*   < > 154*  --  153* 153*   < > 150*  150*   < > 139 138   POTASSIUM  --   --   --   --   --   --  4.0  --   --  3.9  3.9  --  3.8  --  3.5 3.6  --  3.7   < > 3.9 3.7   CHLORIDE  --   --   --   --   --   --  128*  --   --  122*  --  119*  --  118*  --   --  116*   < > 104 99   CO2  --   --   --   --   --   --  29  --   --  29  --  27  --  26  --   --  26   < > 29 24   ANIONGAP  --   --   --   --   --   --  3  --   --  5  --  8  --  9  --   --  8   < > 6 15   *  --  222*  --  235* 236* 247*  --    < > 204*   < > 196*   < > 168*  --    < > 158*   < > 141* 269*   BUN  --   --   --   --   --   --  52*  --   --  54*  --  51*  --  47*  --   --  34*   < > 36* 37*   CR  --   --   --   --   --   --  1.47*  --   --  1.50*  --  1.46*  --  1.46*  --   --  1.37*   < > 1.25 1.60*   GFRESTIMATED  --   --   --   --   --   --  48*  --   --  47*  --  49*  --  49*  --   --  52*   < > 54* 40*   ANNMARIE  --   --   --   --   --   --  9.0  --   --  8.8  --  9.1  --  8.8  --   --  8.6   < > 8.8 9.4   MAG  --   --   --   --   --   --  3.1*  --   --  3.0*  3.0*  --   --   --   --  2.9*  --  2.6*   <  >  --   --    PHOS  --   --   --   --   --   --  2.4*  --   --  3.0  2.9  --   --   --   --  2.7  --  2.8   < >  --   --    PROTTOTAL  --   --   --   --   --   --   --   --   --   --   --   --   --   --   --   --   --   --  6.8 7.3   ALBUMIN  --   --   --   --   --   --   --   --   --   --   --   --   --   --   --   --   --   --  3.1* 3.6   BILITOTAL  --   --   --   --   --   --   --   --   --   --   --   --   --   --   --   --   --   --  1.5* 1.7*   ALKPHOS  --   --   --   --   --   --   --   --   --   --   --   --   --   --   --   --   --   --  133 140*   AST  --   --   --   --   --   --   --   --   --   --   --   --   --   --   --   --   --   --  22 19   ALT  --   --   --   --   --   --   --   --   --   --   --   --   --   --   --   --   --   --  17 13    < > = values in this interval not displayed.     CBC  Recent Labs   Lab 12/26/21 0417 12/25/21 2019 12/25/21  0424 12/24/21  0543   HGB 8.0* 8.2* 8.5* 8.2*   WBC 8.9 8.9 7.7 9.8   RBC 3.90* 3.90* 4.06* 3.92*   HCT 29.5* 29.4* 30.6* 29.0*   MCV 76* 75* 75* 74*   MCH 20.5* 21.0* 20.9* 20.9*   MCHC 27.1* 27.9* 27.8* 28.3*   RDW 20.5* 20.3* 20.2* 20.0*    234 249 274     INR  Recent Labs   Lab 12/20/21  1350 12/20/21  1046   INR 1.32* 1.20*   PTT 37 35     ABG  Recent Labs   Lab 12/22/21  0824 12/22/21  0549 12/21/21  2120   PH 7.44 7.46* 7.51*   PCO2 36 36 32*   PO2 162* 151* 150*   HCO3 24 26 25   O2PER 40 40 40      URINE STUDIES  Recent Labs   Lab Test 02/19/21  1202   COLOR Red*   APPEARANCE Cloudy*   URINEGLC 1000 mg/dL*   URINEBILI Negative   URINEKETONE Trace*   SG 1.015   UBLD Large*   URINEPH 6.5   PROTEIN 100 mg/dL*   UROBILINOGEN <2.0 E.U./dL   NITRITE Negative   LEUKEST Large*   RBCU >100*   WBCU 10-25*     No lab results found.  PTH  No lab results found.  IRON STUDIES  Recent Labs   Lab Test 12/01/21  0907 11/15/21  1257   IRON  --  30*   CHASE 52  --        IMAGING:  Reviewed.    Nasir Phillips MD

## 2021-12-27 ENCOUNTER — APPOINTMENT (OUTPATIENT)
Dept: SPEECH THERAPY | Facility: CLINIC | Age: 81
DRG: 025 | End: 2021-12-27
Payer: COMMERCIAL

## 2021-12-27 ENCOUNTER — APPOINTMENT (OUTPATIENT)
Dept: PHYSICAL THERAPY | Facility: CLINIC | Age: 81
DRG: 025 | End: 2021-12-27
Attending: STUDENT IN AN ORGANIZED HEALTH CARE EDUCATION/TRAINING PROGRAM
Payer: COMMERCIAL

## 2021-12-27 ENCOUNTER — APPOINTMENT (OUTPATIENT)
Dept: CT IMAGING | Facility: CLINIC | Age: 81
DRG: 025 | End: 2021-12-27
Attending: STUDENT IN AN ORGANIZED HEALTH CARE EDUCATION/TRAINING PROGRAM
Payer: COMMERCIAL

## 2021-12-27 LAB
ALBUMIN SERPL-MCNC: 2.5 G/DL (ref 3.4–5)
ALP SERPL-CCNC: 123 U/L (ref 40–150)
ALT SERPL W P-5'-P-CCNC: 7 U/L (ref 0–70)
ANION GAP SERPL CALCULATED.3IONS-SCNC: 2 MMOL/L (ref 3–14)
ANION GAP SERPL CALCULATED.3IONS-SCNC: 2 MMOL/L (ref 3–14)
AST SERPL W P-5'-P-CCNC: 28 U/L (ref 0–45)
ATRIAL RATE - MUSE: 288 BPM
BILIRUB SERPL-MCNC: 0.8 MG/DL (ref 0.2–1.3)
BUN SERPL-MCNC: 48 MG/DL (ref 7–30)
BUN SERPL-MCNC: 48 MG/DL (ref 7–30)
CALCIUM SERPL-MCNC: 8.4 MG/DL (ref 8.5–10.1)
CALCIUM SERPL-MCNC: 8.6 MG/DL (ref 8.5–10.1)
CHLORIDE BLD-SCNC: 123 MMOL/L (ref 94–109)
CHLORIDE BLD-SCNC: 126 MMOL/L (ref 94–109)
CO2 SERPL-SCNC: 28 MMOL/L (ref 20–32)
CO2 SERPL-SCNC: 28 MMOL/L (ref 20–32)
CREAT SERPL-MCNC: 1.35 MG/DL (ref 0.66–1.25)
CREAT SERPL-MCNC: 1.44 MG/DL (ref 0.66–1.25)
DIASTOLIC BLOOD PRESSURE - MUSE: NORMAL MMHG
ERYTHROCYTE [DISTWIDTH] IN BLOOD BY AUTOMATED COUNT: 20.2 % (ref 10–15)
GFR SERPL CREATININE-BSD FRML MDRD: 49 ML/MIN/1.73M2
GFR SERPL CREATININE-BSD FRML MDRD: 53 ML/MIN/1.73M2
GLUCOSE BLD-MCNC: 210 MG/DL (ref 70–99)
GLUCOSE BLD-MCNC: 234 MG/DL (ref 70–99)
GLUCOSE BLDC GLUCOMTR-MCNC: 183 MG/DL (ref 70–99)
GLUCOSE BLDC GLUCOMTR-MCNC: 189 MG/DL (ref 70–99)
GLUCOSE BLDC GLUCOMTR-MCNC: 215 MG/DL (ref 70–99)
GLUCOSE BLDC GLUCOMTR-MCNC: 221 MG/DL (ref 70–99)
GLUCOSE BLDC GLUCOMTR-MCNC: 222 MG/DL (ref 70–99)
GLUCOSE BLDC GLUCOMTR-MCNC: 230 MG/DL (ref 70–99)
HCT VFR BLD AUTO: 27.7 % (ref 40–53)
HGB BLD-MCNC: 7.3 G/DL (ref 13.3–17.7)
IGA SERPL-MCNC: 215 MG/DL (ref 84–499)
IGG SERPL-MCNC: 785 MG/DL (ref 610–1616)
IGM SERPL-MCNC: 156 MG/DL (ref 35–242)
INTERPRETATION ECG - MUSE: NORMAL
MAGNESIUM SERPL-MCNC: 3 MG/DL (ref 1.6–2.3)
MCH RBC QN AUTO: 20.4 PG (ref 26.5–33)
MCHC RBC AUTO-ENTMCNC: 26.4 G/DL (ref 31.5–36.5)
MCV RBC AUTO: 78 FL (ref 78–100)
OSMOLALITY SERPL: 343 MMOL/KG (ref 280–301)
P AXIS - MUSE: NORMAL DEGREES
PHOSPHATE SERPL-MCNC: 3.1 MG/DL (ref 2.5–4.5)
PLATELET # BLD AUTO: 172 10E3/UL (ref 150–450)
POTASSIUM BLD-SCNC: 4.4 MMOL/L (ref 3.4–5.3)
POTASSIUM BLD-SCNC: 4.9 MMOL/L (ref 3.4–5.3)
PR INTERVAL - MUSE: NORMAL MS
PROT SERPL-MCNC: 6.4 G/DL (ref 6.8–8.8)
QRS DURATION - MUSE: 86 MS
QT - MUSE: 388 MS
QTC - MUSE: 444 MS
R AXIS - MUSE: 113 DEGREES
RBC # BLD AUTO: 3.57 10E6/UL (ref 4.4–5.9)
SARS-COV-2 RNA RESP QL NAA+PROBE: NEGATIVE
SODIUM SERPL-SCNC: 150 MMOL/L (ref 133–144)
SODIUM SERPL-SCNC: 152 MMOL/L (ref 133–144)
SODIUM SERPL-SCNC: 153 MMOL/L (ref 133–144)
SODIUM SERPL-SCNC: 154 MMOL/L (ref 133–144)
SODIUM SERPL-SCNC: 156 MMOL/L (ref 133–144)
SYSTOLIC BLOOD PRESSURE - MUSE: NORMAL MMHG
T AXIS - MUSE: 16 DEGREES
TOTAL PROTEIN SERUM FOR ELP: 5.9 G/DL (ref 6.8–8.8)
VENTRICULAR RATE- MUSE: 79 BPM
WBC # BLD AUTO: 8.2 10E3/UL (ref 4–11)

## 2021-12-27 PROCEDURE — 84165 PROTEIN E-PHORESIS SERUM: CPT | Mod: TC | Performed by: PATHOLOGY

## 2021-12-27 PROCEDURE — 250N000013 HC RX MED GY IP 250 OP 250 PS 637: Performed by: STUDENT IN AN ORGANIZED HEALTH CARE EDUCATION/TRAINING PROGRAM

## 2021-12-27 PROCEDURE — 70450 CT HEAD/BRAIN W/O DYE: CPT

## 2021-12-27 PROCEDURE — 70450 CT HEAD/BRAIN W/O DYE: CPT | Mod: 26 | Performed by: RADIOLOGY

## 2021-12-27 PROCEDURE — 93010 ELECTROCARDIOGRAM REPORT: CPT | Performed by: INTERNAL MEDICINE

## 2021-12-27 PROCEDURE — 85027 COMPLETE CBC AUTOMATED: CPT | Performed by: STUDENT IN AN ORGANIZED HEALTH CARE EDUCATION/TRAINING PROGRAM

## 2021-12-27 PROCEDURE — 99222 1ST HOSP IP/OBS MODERATE 55: CPT | Mod: GC | Performed by: INTERNAL MEDICINE

## 2021-12-27 PROCEDURE — 84100 ASSAY OF PHOSPHORUS: CPT | Performed by: STUDENT IN AN ORGANIZED HEALTH CARE EDUCATION/TRAINING PROGRAM

## 2021-12-27 PROCEDURE — 97162 PT EVAL MOD COMPLEX 30 MIN: CPT | Mod: GP

## 2021-12-27 PROCEDURE — U0005 INFEC AGEN DETEC AMPLI PROBE: HCPCS | Performed by: STUDENT IN AN ORGANIZED HEALTH CARE EDUCATION/TRAINING PROGRAM

## 2021-12-27 PROCEDURE — 80053 COMPREHEN METABOLIC PANEL: CPT | Performed by: STUDENT IN AN ORGANIZED HEALTH CARE EDUCATION/TRAINING PROGRAM

## 2021-12-27 PROCEDURE — 84295 ASSAY OF SERUM SODIUM: CPT | Performed by: STUDENT IN AN ORGANIZED HEALTH CARE EDUCATION/TRAINING PROGRAM

## 2021-12-27 PROCEDURE — 200N000002 HC R&B ICU UMMC

## 2021-12-27 PROCEDURE — 84155 ASSAY OF PROTEIN SERUM: CPT | Performed by: NURSE PRACTITIONER

## 2021-12-27 PROCEDURE — 83735 ASSAY OF MAGNESIUM: CPT | Performed by: STUDENT IN AN ORGANIZED HEALTH CARE EDUCATION/TRAINING PROGRAM

## 2021-12-27 PROCEDURE — 97530 THERAPEUTIC ACTIVITIES: CPT | Mod: GP

## 2021-12-27 PROCEDURE — 250N000013 HC RX MED GY IP 250 OP 250 PS 637: Performed by: NURSE PRACTITIONER

## 2021-12-27 PROCEDURE — 92526 ORAL FUNCTION THERAPY: CPT | Mod: GN

## 2021-12-27 PROCEDURE — 82784 ASSAY IGA/IGD/IGG/IGM EACH: CPT | Performed by: NURSE PRACTITIONER

## 2021-12-27 PROCEDURE — 99233 SBSQ HOSP IP/OBS HIGH 50: CPT | Performed by: PSYCHIATRY & NEUROLOGY

## 2021-12-27 PROCEDURE — 83930 ASSAY OF BLOOD OSMOLALITY: CPT | Performed by: NURSE PRACTITIONER

## 2021-12-27 PROCEDURE — 84295 ASSAY OF SERUM SODIUM: CPT | Performed by: NURSE PRACTITIONER

## 2021-12-27 PROCEDURE — 93005 ELECTROCARDIOGRAM TRACING: CPT

## 2021-12-27 PROCEDURE — 250N000011 HC RX IP 250 OP 636: Performed by: STUDENT IN AN ORGANIZED HEALTH CARE EDUCATION/TRAINING PROGRAM

## 2021-12-27 PROCEDURE — 250N000013 HC RX MED GY IP 250 OP 250 PS 637: Performed by: PSYCHIATRY & NEUROLOGY

## 2021-12-27 RX ORDER — ASPIRIN 325 MG
325 TABLET ORAL DAILY
Status: COMPLETED | OUTPATIENT
Start: 2021-12-27 | End: 2021-12-27

## 2021-12-27 RX ORDER — LORAZEPAM 2 MG/ML
1.5 INJECTION INTRAMUSCULAR ONCE
Status: COMPLETED | OUTPATIENT
Start: 2021-12-27 | End: 2021-12-27

## 2021-12-27 RX ADMIN — LEVETIRACETAM 750 MG: 750 TABLET ORAL at 19:54

## 2021-12-27 RX ADMIN — INSULIN GLARGINE 10 UNITS: 100 INJECTION, SOLUTION SUBCUTANEOUS at 20:14

## 2021-12-27 RX ADMIN — LEVETIRACETAM 750 MG: 750 TABLET ORAL at 07:34

## 2021-12-27 RX ADMIN — HEPARIN SODIUM 5000 UNITS: 10000 INJECTION, SOLUTION INTRAVENOUS; SUBCUTANEOUS at 04:36

## 2021-12-27 RX ADMIN — ASPIRIN 325 MG ORAL TABLET 325 MG: 325 PILL ORAL at 08:36

## 2021-12-27 RX ADMIN — INSULIN ASPART 4 UNITS: 100 INJECTION, SOLUTION INTRAVENOUS; SUBCUTANEOUS at 11:18

## 2021-12-27 RX ADMIN — HEPARIN SODIUM 5000 UNITS: 10000 INJECTION, SOLUTION INTRAVENOUS; SUBCUTANEOUS at 11:18

## 2021-12-27 RX ADMIN — HEPARIN SODIUM 5000 UNITS: 10000 INJECTION, SOLUTION INTRAVENOUS; SUBCUTANEOUS at 20:15

## 2021-12-27 RX ADMIN — INSULIN ASPART 2 UNITS: 100 INJECTION, SOLUTION INTRAVENOUS; SUBCUTANEOUS at 04:32

## 2021-12-27 RX ADMIN — QUETIAPINE FUMARATE 100 MG: 100 TABLET ORAL at 19:54

## 2021-12-27 RX ADMIN — LACOSAMIDE 200 MG: 50 TABLET, FILM COATED ORAL at 19:53

## 2021-12-27 RX ADMIN — MULTIVIT AND MINERALS-FERROUS GLUCONATE 9 MG IRON/15 ML ORAL LIQUID 15 ML: at 07:35

## 2021-12-27 RX ADMIN — POTASSIUM CHLORIDE 40 MEQ: 1.5 POWDER, FOR SOLUTION ORAL at 07:34

## 2021-12-27 RX ADMIN — Medication 40 MG: at 07:36

## 2021-12-27 RX ADMIN — LACOSAMIDE 200 MG: 50 TABLET, FILM COATED ORAL at 07:34

## 2021-12-27 RX ADMIN — QUETIAPINE FUMARATE 25 MG: 25 TABLET ORAL at 21:54

## 2021-12-27 RX ADMIN — METOPROLOL TARTRATE 25 MG: 25 TABLET, FILM COATED ORAL at 07:34

## 2021-12-27 RX ADMIN — INSULIN ASPART 4 UNITS: 100 INJECTION, SOLUTION INTRAVENOUS; SUBCUTANEOUS at 16:35

## 2021-12-27 RX ADMIN — LORAZEPAM 1.5 MG: 2 INJECTION INTRAMUSCULAR; INTRAVENOUS at 05:56

## 2021-12-27 RX ADMIN — INSULIN ASPART 2 UNITS: 100 INJECTION, SOLUTION INTRAVENOUS; SUBCUTANEOUS at 07:45

## 2021-12-27 RX ADMIN — METOPROLOL TARTRATE 25 MG: 25 TABLET, FILM COATED ORAL at 19:53

## 2021-12-27 RX ADMIN — INSULIN GLARGINE 10 UNITS: 100 INJECTION, SOLUTION SUBCUTANEOUS at 07:45

## 2021-12-27 RX ADMIN — Medication 10 MG: at 18:07

## 2021-12-27 RX ADMIN — INSULIN ASPART 4 UNITS: 100 INJECTION, SOLUTION INTRAVENOUS; SUBCUTANEOUS at 00:47

## 2021-12-27 RX ADMIN — ROPINIROLE HYDROCHLORIDE 2 MG: 2 TABLET, FILM COATED ORAL at 21:54

## 2021-12-27 RX ADMIN — FERROUS GLUCONATE 324 MG: 324 TABLET ORAL at 08:37

## 2021-12-27 RX ADMIN — Medication 10 MG: at 19:53

## 2021-12-27 RX ADMIN — IRBESARTAN 150 MG: 150 TABLET ORAL at 07:36

## 2021-12-27 RX ADMIN — INSULIN ASPART 4 UNITS: 100 INJECTION, SOLUTION INTRAVENOUS; SUBCUTANEOUS at 20:12

## 2021-12-27 RX ADMIN — TADALAFIL 20 MG: 20 TABLET, FILM COATED ORAL at 07:35

## 2021-12-27 RX ADMIN — QUETIAPINE FUMARATE 25 MG: 25 TABLET ORAL at 07:34

## 2021-12-27 RX ADMIN — ACETAMINOPHEN 650 MG: 325 TABLET, FILM COATED ORAL at 01:05

## 2021-12-27 RX ADMIN — ATORVASTATIN CALCIUM 80 MG: 40 TABLET, FILM COATED ORAL at 07:34

## 2021-12-27 RX ADMIN — FLUTICASONE FUROATE AND VILANTEROL TRIFENATATE 1 PUFF: 200; 25 POWDER RESPIRATORY (INHALATION) at 08:37

## 2021-12-27 ASSESSMENT — ACTIVITIES OF DAILY LIVING (ADL)
ADLS_ACUITY_SCORE: 17

## 2021-12-27 NOTE — PROGRESS NOTES
[facility], [place]   12/27/2021  Neurosurgery Progress Note:    Assessment:  Red Sutherland is a 81-year-old male post-traumatic acute right subdural hematoma with left hemibody weakness, on xarelto and plavix. Admitted to the ICU on 12/20/21. Taken to OR 12/21/2021. Extubated 12/22/2021.     Plan:  - Hold xarelto, plavix, heparin started  - Cardiology consultation for AP/AC restart guidance  -  today  - Plan for ASA  + plavix until 1/1/2021  - CT tmr AM  - Keppra 1g BID, Vimpat 150 BID  - HOB > 30 degrees  - Seroquel 25 BID, 100 @ night.   - SLP evaluation    Asim Ann MD  Neurosurgery Resident, PGY-2    Please contact neurosurgery resident on call with questions.    Dial * * [phone], enter [phone] when prompted.   -----------------------------------    Interval History: I Agitation imporinv overnight. Not requiring precedex but is requiring a center.   Objective:   Temp:  [97.1  F (36.2  C)-100.1  F (37.8  C)] 97.1  F (36.2  C)  Pulse:  [75-93] 82  Resp:  [7-33] 29  BP: ()/(52-82) 123/68  SpO2:  [95 %-100 %] 96 %  I/O last 3 completed shifts:  In: 5956.73 [I.V.:206.73; NG/GT:4370]  Out: 2200 [Urine:2200]    Gen: Appears comfortable, NAD  Wound: clean, dry, intact,  Neurologic:  - Alert & Oriented to person, place, time, and situation  - Follows commands briskly x 4, antigravity  - Mild L facial droop  Moving all extremities x 4.    LABS:  Recent Labs   Lab 12/27/21  0738 12/27/21  0430 12/27/21  0425 12/27/21  0025 12/27/21  0018 12/26/21  2103 12/26/21  2057 12/26/21  0422 12/26/21  0417 12/25/21  0845 12/25/21  0424   NA  --   --  156*  156*  --  156*  --  158*   < > 160*  160*   < > 156*  156*  156*   POTASSIUM  --   --  4.4  --   --   --   --   --  4.0  --  3.9  3.9   CHLORIDE  --   --  126*  --   --   --   --   --  128*  --  122*   CO2  --   --  28  --   --   --   --   --  29  --  29   ANIONGAP  --   --  2*  --   --   --   --   --  3  --  5   * 189*  210*   < >  --    < >  --    < > 247*   < > 204*   BUN  --   --  48*  --   --   --   --   --  52*  --  54*   CR  --   --  1.35*  --   --   --   --   --  1.47*  --  1.50*   ANNMARIE  --   --  8.6  --   --   --   --   --  9.0  --  8.8    < > = values in this interval not displayed.       Recent Labs   Lab 12/27/21  0425   WBC 8.2   RBC 3.57*   HGB 7.3*   HCT 27.7*   MCV 78   MCH 20.4*   MCHC 26.4*   RDW 20.2*          IMAGING:  Recent Results (from the past 24 hour(s))   CT Head w/o Contrast    Narrative    CT HEAD W/O CONTRAST 12/27/2021 6:21 AM    History: anticoagulation s/p sdh evacuation     Comparison: CT of the head dated 12/25/2021    Technique: Using multidetector thin collimation helical acquisition  technique, axial, coronal and sagittal CT images from the skull base  to the vertex were obtained without intravenous contrast.   (topogram) image(s) also obtained and reviewed.    Findings: Postsurgical changes of right frontoparietal craniectomy.  Stable thin subdural hematoma underlying the right hemicraniectomy  site without evidence of interval bleeding. There is new subdural  hematoma adjacent to the right anterior falx, which may represent  redistribution versus interval bleed. No midline shift.. Gray/white  matter differentiation in both cerebral hemispheres is preserved.  Ventricles are proportionate to the cerebral sulci. The basal cisterns  are clear. Left frontoparietal scalp hematoma.    Mucosal thickening of the left maxillary sinus.. Otherwise the mastoid  air cells and paranasal sinuses are clear.      Impression    Impression:  1. Postsurgical changes of right frontoparietal craniectomy with  overlying scalp hematoma.  2. Stable thin subdural hematoma underlying the right craniectomy  site.  3. Trace subdural hematoma of the right anterior falx.    I have personally reviewed the examination and initial interpretation  and I agree with the findings.    MITESH MORALES MD         SYSTEM ID:   [id number]

## 2021-12-27 NOTE — PLAN OF CARE
Major Shift Events:  Trending sodium levels, improving with high water flushes. Able to respond to orientation questions with yes/no prompting, but remains uncooperative and impulsive. Left  slightly weaker, slight left facial droop. Slept poorly overnight. Sitter at bedside. Had 2 BM's. External male urine collection. O2 at home 2 LPM NC.   Plan: Possibly re-eval by SLP. Monitor neuro status.   For vital signs and complete assessments, please see documentation flowsheets.

## 2021-12-27 NOTE — PLAN OF CARE
Major Shift Events:      NEURO: restless throughout the day, constant yelling and calling out.  Continues to requires 1:1 sitter. Good strength throughout, Left eye with pupil defect- baseline. Right eye brisk, 2-3mm. Occasionally appropriately answering questions. Tolerating being up in chair. C/o mild headache.  RESP: 2L NC, LS clear/dim throughout, coughing up thick secretions.  CV: hemodynamically stable, A.Fib rate controlled.   GI: TF@goal 60; Sodium climbing- FWF increased to 400ml q2hr, will continue to trend. x2 loose stools  : Adequate UO via external catheter. Nephrology consulted urine samples sent.     Plan: Tx to floor, monitor neuro status, monitor sodium    For vital signs and complete assessments, please see documentation flowsheets.

## 2021-12-27 NOTE — PROGRESS NOTES
Nephrology Progress Note  12/27/2021         Assessment & Recommendations:   # Hypernatremia 2/2 poor intake:  Patient still has a free water deficit of 5.5 liter will continue with the free water 400cc every 2 hour.  -Monitor BMP q 12 hr     # CKD3  Baseline Cr appears to be 1.5-1.8 over the past 3 years or so. He is at baseline currently.     Recommendations were communicated to primary team via note and by phone.    Seen and discussed with Dr. Regine Oscar MD   [phone]    Interval History :   Nursing and provider notes from last 24 hours reviewed.  In the last 24 hours Red Sutherland hypernatremia has improved and he reports being thirsty was little drowsy as he got ativan in morning    Review of Systems:   I reviewed the following systems:  GI: decreased appetite. No nausea or vomiting or diarrhea.   Neuro:  postive confusion  Constitutional:  No fever or chills  CV: No dyspnea or edema.  No chest pain.    Physical Exam:   I/O last 3 completed shifts:  In: 6556.98 [I.V.:96.98; NG/GT:5020]  Out: 1300 [Urine:1300]   /70   Pulse 82   Temp 98.1  F (36.7  C) (Axillary)   Resp 24   Wt 88.8 kg (195 lb 12.3 oz)   SpO2 98%   BMI 27.30 kg/m       GENERAL APPEARANCE: Patient is confused  EYES:  No scleral icterus, pupils equal  HENT: mouth without ulcers or lesions  PULM: lungs clear to auscultation bilaterally, equal air movement, no clubbing  CV: Patient has regular rhythm, normal rate, no rub     -JVD -ve     -edema -ve   GI: soft, nontender, Non distended, bowel sounds are normal  INTEGUMENT: no cyanosis, No rash  NEURO:  No asterixis       Labs:   All labs reviewed by me  Electrolytes/Renal - Recent Labs   Lab Test 12/27/21  1634 12/27/21  1117 12/27/21  1115 12/27/21  0833 12/27/21  0430 12/27/21  0425 12/26/21  0422 12/26/21  0417 12/25/21  0845 12/25/21  0424   *  --  153* 154*  --  156*  156*   < > 160*  160*   < > 156*  156*  156*   POTASSIUM  --   --  4.9  --   --  4.4   --  4.0  --  3.9  3.9   CHLORIDE  --   --  123*  --   --  126*  --  128*  --  122*   CO2  --   --  28  --   --  28  --  29  --  29   BUN  --   --  48*  --   --  48*  --  52*  --  54*   CR  --   --  1.44*  --   --  1.35*  --  1.47*  --  1.50*   * 221* 234*  --    < > 210*   < > 247*   < > 204*   ANNMARIE  --   --  8.4*  --   --  8.6  --  9.0  --  8.8   MAG  --   --   --   --   --  3.0*  --  3.1*  --  3.0*  3.0*   PHOS  --   --   --   --   --  3.1  --  2.4*  --  3.0  2.9    < > = values in this interval not displayed.       CBC -   Recent Labs   Lab Test 12/27/21  0425 12/26/21  0417 12/25/21 2019   WBC 8.2 8.9 8.9   HGB 7.3* 8.0* 8.2*    211 234       LFTs -   Recent Labs   Lab Test 12/27/21  1115 12/20/21  1350 12/20/21  1046   ALKPHOS 123 133 140*   BILITOTAL 0.8 1.5* 1.7*   ALT 7 17 13   AST 28 22 19   PROTTOTAL 6.4* 6.8 7.3   ALBUMIN 2.5* 3.1* 3.6       Iron Panel -   Recent Labs   Lab Test 12/01/21  0907 11/15/21  1257   IRON  --  30*   CHASE 52  --          Imaging:  All imaging studies reviewed by me.     Current Medications:    atorvastatin  80 mg Oral or Feeding Tube Daily     [Held by provider] bumetanide  4 mg Oral or Feeding Tube QAM     [Held by provider] empagliflozin  10 mg Oral or Feeding Tube Daily     ferrous gluconate  324 mg Oral or Feeding Tube Every Other Day     fluticasone-vilanterol  1 puff Inhalation Daily     heparin ANTICOAGULANT  5,000 Units Subcutaneous Q8H     heparin lock flush  5-20 mL Intracatheter Q24H     insulin aspart  1-12 Units Subcutaneous Q4H     insulin glargine  10 Units Subcutaneous BID     irbesartan  150 mg Oral or Feeding Tube Daily     lacosamide  200 mg Oral or Feeding Tube BID     levETIRAcetam  750 mg Oral or Feeding Tube BID     melatonin  10 mg Oral or Feeding Tube QPM     metoprolol tartrate  25 mg Oral BID     multivitamins w/minerals  15 mL Per Feeding Tube Daily     pantoprazole  40 mg Oral or Feeding Tube QAM AC     polyethylene glycol  17 g Oral  or Feeding Tube Daily     [Held by provider] potassium chloride  40 mEq Oral or Feeding Tube Daily     QUEtiapine  100 mg Oral or Feeding Tube QPM     QUEtiapine  25 mg Oral or Feeding Tube QAM     rOPINIRole  2 mg Oral At Bedtime     senna-docusate  2 tablet Oral or Feeding Tube BID     sodium chloride (PF)  10 mL Intracatheter Q8H     sodium chloride (PF)  10 mL Intracatheter Q8H     sodium chloride (PF)  3 mL Intracatheter Q8H     tadalafil  20 mg Oral Q24H       Derrell Oscar MD

## 2021-12-27 NOTE — PROGRESS NOTES
"   12/27/21 1300   Quick Adds   Type of Visit Initial PT Evaluation   Living Environment   Living Environment Comments Unable to acquire subjective information as pt lethargic, limited command following and unresponsive to most questioning.   Self-Care   Current Activity Tolerance fair   Activity/Exercise/Self-Care Comment Unable to acquire subjective information as pt lethargic, limited command following and unresponsive to most questioning. Reports he lives in \"[place]   Disability/Function   Fall history within last six months yes   Number of times patient has fallen within last six months 1  (brought pt into hospital with SDH)   Change in Functional Status Since Onset of Current Illness/Injury yes   General Information   Onset of Illness/Injury or Date of Surgery 12/21/21   Patient/Family Therapy Goals Statement (PT) Unable to acquire subjective information as pt lethargic, limited command following and unresponsive to most questioning.   Pertinent History of Current Problem (include personal factors and/or comorbidities that impact the POC) Red Sutherland is a 81-year-old male post-traumatic acute right subdural hematoma with left hemibody weakness, on xarelto and plavix. Admitted to the ICU on 12/20/21. Taken to OR 12/21/2021. Extubated 12/22/2021.    Existing Precautions/Restrictions fall  (craniotomy)   General Observations Pt supine in bed on 2L o2 via nc   Cognition   Orientation Status (Cognition) disoriented to;place;situation;time   Affect/Mental Status (Cognition) low arousal/lethargic   Follows Commands (Cognition) 0-24% accuracy   Pain Assessment   Patient Currently in Pain No   Posture    Posture Forward head position;Protracted shoulders;Kyphosis   Range of Motion (ROM)   ROM Comment AROM mildly-moderately limited 2/2 cognition/weakness   Strength   Strength Comments Grossly 3/5 strength B LE   Bed Mobility   Comment (Bed Mobility) B rolling with max A    Transfers   Transfer Safety Comments " Not appropriate to assess this date due to safety   Gait/Stairs (Locomotion)   Comment (Gait/Stairs) Not assessed   Balance   Balance Comments Not formally assessed this date   Sensory Examination   Sensory Perception patient reports no sensory changes   Clinical Impression   Criteria for Skilled Therapeutic Intervention yes, treatment indicated   PT Diagnosis (PT) Impaired functional mobility   Influenced by the following impairments Cognition, fall risk, weakness   Functional limitations due to impairments Bed mobility, transfers, gait, balance, endurance   Clinical Presentation Evolving/Changing   Clinical Presentation Rationale clinical judgement   Clinical Decision Making (Complexity) moderate complexity   Therapy Frequency (PT) 3x/week   Predicted Duration of Therapy Intervention (days/wks) 4 weeks   Planned Therapy Interventions (PT) balance training;bed mobility training;gait training;home exercise program;neuromuscular re-education;postural re-education;ROM (range of motion);strengthening;stretching;transfer training;progressive activity/exercise;home program guidelines   Risk & Benefits of therapy have been explained evaluation/treatment results reviewed;care plan/treatment goals reviewed;risks/benefits reviewed;current/potential barriers reviewed   PT Discharge Planning    PT Discharge Recommendation (DC Rec) Transitional Care Facility;home with assist;home with home care physical therapy   PT Rationale for DC Rec Fall risk, weakness, cognitive/safety impairments   PT Brief overview of current status  Ceiling lift bed<>chair   Total Evaluation Time   Total Evaluation Time (Minutes) 5

## 2021-12-27 NOTE — PROGRESS NOTES
0Neuroscience Intensive Care Progress Note    REASON FOR CRITICAL CARE ADMISSION:  SDH     Date of Service:  2021  Date of Admission:  2021  Hospital Day: 8    Problem List  1. SDH  2. Focal seizures  3. Restless leg syndrome  4. Right ventricular heart failure  5. CAD  6. Pulmonary hypertension  7. Atrial fibrillation  8. Hypertension  9. COPD  10. Stage 3a CKD  11. Diabetes mellitus    24 hour events:  Continues to require 1:1 sitter due to agitation. Pt now off Heparin gtt yesterday afternoon once therapeutic with repeat CTH taken today showing stable SDH under the R craniectomy and new trace SDH a R anterior falx, though this is favored to be redistribution of blood rather than a new bleed.     24 Hour Vital Signs Summary:  Temp: 97.1  F (36.2  C) Temp  Min: 97.1  F (36.2  C)  Max: 100.1  F (37.8  C)  Resp: 29 Resp  Min: 7  Max: 33  SpO2: 96 % SpO2  Min: 95 %  Max: 100 %  Pulse: 82 Pulse  Min: 75  Max: 101    No data recorded  BP: 123/68 Systolic (24hrs), Av , Min:84 , Max:143   Diastolic (24hrs), Av, Min:52, Max:82    Respiratory monitoring:   Resp: 29    I/O last 3 completed shifts:  In: 5956.73 [I.V.:206.73; NG/GT:4370]  Out: 2200 [Urine:2200]    Current Medications:    atorvastatin  80 mg Oral or Feeding Tube Daily     [Held by provider] bumetanide  4 mg Oral or Feeding Tube QAM     [Held by provider] empagliflozin  10 mg Oral or Feeding Tube Daily     ferrous gluconate  324 mg Oral or Feeding Tube Every Other Day     fluticasone-vilanterol  1 puff Inhalation Daily     heparin ANTICOAGULANT  5,000 Units Subcutaneous Q8H     heparin lock flush  5-20 mL Intracatheter Q24H     insulin aspart  1-12 Units Subcutaneous Q4H     insulin glargine  10 Units Subcutaneous BID     irbesartan  150 mg Oral or Feeding Tube Daily     lacosamide  200 mg Oral or Feeding Tube BID     levETIRAcetam  750 mg Oral or Feeding Tube BID     melatonin  3 mg Oral QPM     metoprolol tartrate  25 mg Oral BID      multivitamins w/minerals  15 mL Per Feeding Tube Daily     pantoprazole  40 mg Oral or Feeding Tube QAM AC     polyethylene glycol  17 g Oral or Feeding Tube Daily     potassium chloride  40 mEq Oral or Feeding Tube Daily     QUEtiapine  100 mg Oral or Feeding Tube QPM     QUEtiapine  25 mg Oral or Feeding Tube QAM     rOPINIRole  2 mg Oral At Bedtime     senna-docusate  2 tablet Oral or Feeding Tube BID     sodium chloride (PF)  10 mL Intracatheter Q8H     sodium chloride (PF)  10 mL Intracatheter Q8H     sodium chloride (PF)  3 mL Intracatheter Q8H     tadalafil  20 mg Oral Q24H       PRN Medications:  acetaminophen **OR** acetaminophen, albuterol, glucose **OR** dextrose **OR** glucagon, heparin lock flush, hydrALAZINE, labetalol, naloxone **OR** naloxone **OR** naloxone **OR** naloxone, ondansetron **OR** ondansetron, QUEtiapine, sodium chloride (PF)      Physical Examination:  General: in no apparent distress  HEENT: normocephalic, surgical site is clean and dry  Cardiac: irregular rhythm, normal rate  Chest: lungs clear bilaterally  Abdomen: Soft, nondistended, tender in the epigastric area  Extremities: Warm, no edema  Skin: No rash or lesion   Neuro:  Mental status: Drowsy, following minimal commands  Cranial nerves: right pupil reactive 2 mm, left pupil is irregular and does not react, EOMI, face appears symmetric  Motor: moving all four extremities, RUE to command with thumb up, LUE with spontaneous movement, RLE more spontaneous than LLE but neither to command  Sensory: withdraws to noxious in all 4 extremities  Coordination: unable to assess  Reflexes: Babinski present bilaterally      Labs/Studies:  All relevant imaging and laboratory values personally reviewed    Assessment/Plan  Red Sutherland is a 81 year old admitted on 12/20/2021 with subdural hematoma and focal seizures.  He is now s/p evacuation in the OR on 12/21/21.     Neuro  #Delirium:  - Delirium precautions  - Frequent re-orientation  -  Seroquel 25 mg Qam + 100 QPM  - Seroquel 25 mg BID prn  - Most recent QTc (12/27): 444  - Space neurochecks at night to Qshift  - 10mg melatonin @ 5pm    #Large right SDH: traumatic  -Neurochecks Q4H  -HOB >30  -SBP goal <160 from NCC standpoint  -Goal normonatremia  -PT/OT/SLP    #Focal seizures:  - Keppra at 750 mg BID   - Vimpat 200 mg BID  - Can consider decreasing Keppra to 500 mg BID if concerns for contributing to agitation  - could discontiue with EEG in place if needed     #Restless leg syndrome:  - PTA Ropinorole 2mg Qhs    #Analgesics & sedation  - None    CV  #HFpEF:  - Holding home bumex  - Monitor daily weights and I/Os  - Cardiology consulted by neurosurgery   - Irbesartan 150mg daily  - Metoprolol 25mg BID    #Atrial fibrillation:  - Cardiac monitoring  - Continue home metoprolol  - Holding home eliquis in setting of bleed  - Neurosurgery consulted cardiology - recommendations made for antiplatelet/AC regimen    -- Heparin gtt for 24 - 48 hr followed by  once followed by ASA 81 + Prasugrel 10mg daily   -- Load with ASA 325mg once today    #Hypertension:  -SBP goal <160 from NCC standpoint, < 140 per neurosurgery  - Continue home irbesartan and metoprolol as above  - PRN labetalol and hydralazine    #Hyperlipidemia:  - Continue home atorvastatin 80mg Qhs    Resp  #COPD on home O2 (2-4L)  - Continuous pulse ox  - Maintain O2 saturations greater than 88%  - On 2-4L of oxygen at home  - Continue Breo inhaler    #Pulmonary Hypertension:  - Continue home Tadalafil 20mg daily    GI  #Nutrition  - Ongoing speech evaluation  - On tube feeds at goal    Diet: tube feeds  Last BM: 12/26  Bowel regimen: scheduled senna-docusate and Miralax    Renal  #Stage 3a CKD:  #Elevated BUN:  #Acute kidney injury:  -Daily BMP  -Hold bumex, holding Empagliflozin  -IV fluids: none  -Electrolyte replacement protocol     #Hypernatremia:  concern for DI?: Uosm 621, Beryl 48, Sosm 410, Sna 158  -Neurosurgery increased FWF to  400mL Q2h  - nephrology consulted  - secondary to withholding diuretic?    #Hyperchloremia:  #Hypermagnesemia:  #Hyperosmolality  - evaluation of gap and reason for gap  - IGG, IGA, IGM, protein electrophoresis sent    Endo  #Diabetes mellitus:  -Hgb A1c 7.5%  -Holding home glargine 18 units BID - start 10 units BID and continue to monitor  -Holding Empagliflozin in the setting of NATHALY and rising BUN  -Follow glucose levels    Heme  #Anemia:  - Holding home iron 324mg daily  - Daily CBC  - Hgb goal >7, plt goal >50k  - Transfuse to meet Hgb and plt goal    ID  -Afebrile  -Daily CBC  -Follow temperature curve    Lines/tubes/drains: PIV x3, NG, PICC double lumen  FEN: tube feeds   Ppx: DVT - SCDs, heparin drip; GI - pantoprazole  Code: Full Code  Dispo: ICU, for floor transfer    Sebastián Rai MD  Neurocritical Care  Vascular Neurology  Text Page - [phone]    Neurocritical care time:  I spent 42 minutes bedside and on the inpatient unit today managing the neurocritical care of Red Sutherland in relation to the issues listed in this note independent of procedures performed this day.

## 2021-12-28 ENCOUNTER — APPOINTMENT (OUTPATIENT)
Dept: SPEECH THERAPY | Facility: CLINIC | Age: 81
DRG: 025 | End: 2021-12-28
Payer: COMMERCIAL

## 2021-12-28 ENCOUNTER — APPOINTMENT (OUTPATIENT)
Dept: CT IMAGING | Facility: CLINIC | Age: 81
DRG: 025 | End: 2021-12-28
Attending: STUDENT IN AN ORGANIZED HEALTH CARE EDUCATION/TRAINING PROGRAM
Payer: COMMERCIAL

## 2021-12-28 LAB
ANION GAP SERPL CALCULATED.3IONS-SCNC: 3 MMOL/L (ref 3–14)
BUN SERPL-MCNC: 45 MG/DL (ref 7–30)
CALCIUM SERPL-MCNC: 8.6 MG/DL (ref 8.5–10.1)
CHLORIDE BLD-SCNC: 121 MMOL/L (ref 94–109)
CO2 SERPL-SCNC: 26 MMOL/L (ref 20–32)
CREAT SERPL-MCNC: 1.31 MG/DL (ref 0.66–1.25)
ERYTHROCYTE [DISTWIDTH] IN BLOOD BY AUTOMATED COUNT: 20 % (ref 10–15)
FERRITIN SERPL-MCNC: 26 NG/ML (ref 26–388)
GFR SERPL CREATININE-BSD FRML MDRD: 55 ML/MIN/1.73M2
GLUCOSE BLD-MCNC: 214 MG/DL (ref 70–99)
GLUCOSE BLDC GLUCOMTR-MCNC: 155 MG/DL (ref 70–99)
GLUCOSE BLDC GLUCOMTR-MCNC: 158 MG/DL (ref 70–99)
GLUCOSE BLDC GLUCOMTR-MCNC: 183 MG/DL (ref 70–99)
GLUCOSE BLDC GLUCOMTR-MCNC: 194 MG/DL (ref 70–99)
GLUCOSE BLDC GLUCOMTR-MCNC: 198 MG/DL (ref 70–99)
GLUCOSE BLDC GLUCOMTR-MCNC: 213 MG/DL (ref 70–99)
GLUCOSE BLDC GLUCOMTR-MCNC: 220 MG/DL (ref 70–99)
HCT VFR BLD AUTO: 27.1 % (ref 40–53)
HGB BLD-MCNC: 7.4 G/DL (ref 13.3–17.7)
IRON SATN MFR SERPL: 7 % (ref 15–46)
IRON SERPL-MCNC: 19 UG/DL (ref 35–180)
MAGNESIUM SERPL-MCNC: 2.9 MG/DL (ref 1.6–2.3)
MCH RBC QN AUTO: 20.4 PG (ref 26.5–33)
MCHC RBC AUTO-ENTMCNC: 27.3 G/DL (ref 31.5–36.5)
MCV RBC AUTO: 75 FL (ref 78–100)
PHOSPHATE SERPL-MCNC: 2.6 MG/DL (ref 2.5–4.5)
PLATELET # BLD AUTO: 139 10E3/UL (ref 150–450)
POTASSIUM BLD-SCNC: 4.3 MMOL/L (ref 3.4–5.3)
RBC # BLD AUTO: 3.63 10E6/UL (ref 4.4–5.9)
SODIUM SERPL-SCNC: 146 MMOL/L (ref 133–144)
SODIUM SERPL-SCNC: 148 MMOL/L (ref 133–144)
SODIUM SERPL-SCNC: 148 MMOL/L (ref 133–144)
SODIUM SERPL-SCNC: 150 MMOL/L (ref 133–144)
SODIUM SERPL-SCNC: 151 MMOL/L (ref 133–144)
TIBC SERPL-MCNC: 284 UG/DL (ref 240–430)
WBC # BLD AUTO: 8.6 10E3/UL (ref 4–11)

## 2021-12-28 PROCEDURE — 36592 COLLECT BLOOD FROM PICC: CPT | Performed by: STUDENT IN AN ORGANIZED HEALTH CARE EDUCATION/TRAINING PROGRAM

## 2021-12-28 PROCEDURE — 250N000013 HC RX MED GY IP 250 OP 250 PS 637: Performed by: NEUROLOGICAL SURGERY

## 2021-12-28 PROCEDURE — 70450 CT HEAD/BRAIN W/O DYE: CPT | Mod: 26 | Performed by: RADIOLOGY

## 2021-12-28 PROCEDURE — 83735 ASSAY OF MAGNESIUM: CPT | Performed by: NEUROLOGICAL SURGERY

## 2021-12-28 PROCEDURE — 92526 ORAL FUNCTION THERAPY: CPT | Mod: GN

## 2021-12-28 PROCEDURE — 250N000011 HC RX IP 250 OP 636: Performed by: STUDENT IN AN ORGANIZED HEALTH CARE EDUCATION/TRAINING PROGRAM

## 2021-12-28 PROCEDURE — 86850 RBC ANTIBODY SCREEN: CPT | Performed by: STUDENT IN AN ORGANIZED HEALTH CARE EDUCATION/TRAINING PROGRAM

## 2021-12-28 PROCEDURE — 250N000013 HC RX MED GY IP 250 OP 250 PS 637: Performed by: STUDENT IN AN ORGANIZED HEALTH CARE EDUCATION/TRAINING PROGRAM

## 2021-12-28 PROCEDURE — 83550 IRON BINDING TEST: CPT | Performed by: STUDENT IN AN ORGANIZED HEALTH CARE EDUCATION/TRAINING PROGRAM

## 2021-12-28 PROCEDURE — 250N000013 HC RX MED GY IP 250 OP 250 PS 637: Performed by: NURSE PRACTITIONER

## 2021-12-28 PROCEDURE — 86901 BLOOD TYPING SEROLOGIC RH(D): CPT | Performed by: STUDENT IN AN ORGANIZED HEALTH CARE EDUCATION/TRAINING PROGRAM

## 2021-12-28 PROCEDURE — 85027 COMPLETE CBC AUTOMATED: CPT | Performed by: STUDENT IN AN ORGANIZED HEALTH CARE EDUCATION/TRAINING PROGRAM

## 2021-12-28 PROCEDURE — 84100 ASSAY OF PHOSPHORUS: CPT | Performed by: NEUROLOGICAL SURGERY

## 2021-12-28 PROCEDURE — 84295 ASSAY OF SERUM SODIUM: CPT | Performed by: STUDENT IN AN ORGANIZED HEALTH CARE EDUCATION/TRAINING PROGRAM

## 2021-12-28 PROCEDURE — 93005 ELECTROCARDIOGRAM TRACING: CPT

## 2021-12-28 PROCEDURE — 70450 CT HEAD/BRAIN W/O DYE: CPT

## 2021-12-28 PROCEDURE — 120N000002 HC R&B MED SURG/OB UMMC

## 2021-12-28 PROCEDURE — 93010 ELECTROCARDIOGRAM REPORT: CPT | Performed by: INTERNAL MEDICINE

## 2021-12-28 PROCEDURE — 99232 SBSQ HOSP IP/OBS MODERATE 35: CPT | Mod: GC | Performed by: INTERNAL MEDICINE

## 2021-12-28 PROCEDURE — 82728 ASSAY OF FERRITIN: CPT | Performed by: STUDENT IN AN ORGANIZED HEALTH CARE EDUCATION/TRAINING PROGRAM

## 2021-12-28 PROCEDURE — 250N000013 HC RX MED GY IP 250 OP 250 PS 637: Performed by: PSYCHIATRY & NEUROLOGY

## 2021-12-28 RX ORDER — ASPIRIN 81 MG/1
81 TABLET ORAL DAILY
Status: DISCONTINUED | OUTPATIENT
Start: 2021-12-28 | End: 2021-12-28

## 2021-12-28 RX ORDER — PRASUGREL 10 MG/1
10 TABLET, FILM COATED ORAL DAILY
Status: DISCONTINUED | OUTPATIENT
Start: 2021-12-29 | End: 2021-12-28

## 2021-12-28 RX ORDER — ASPIRIN 81 MG/1
81 TABLET, CHEWABLE ORAL DAILY
Status: DISCONTINUED | OUTPATIENT
Start: 2021-12-28 | End: 2022-01-01

## 2021-12-28 RX ORDER — ROPINIROLE 2 MG/1
2 TABLET, FILM COATED ORAL AT BEDTIME
Status: DISCONTINUED | OUTPATIENT
Start: 2021-12-28 | End: 2022-01-01

## 2021-12-28 RX ORDER — METOPROLOL TARTRATE 25 MG/1
25 TABLET, FILM COATED ORAL 2 TIMES DAILY
Status: DISCONTINUED | OUTPATIENT
Start: 2021-12-28 | End: 2022-01-01

## 2021-12-28 RX ORDER — PRASUGREL 10 MG/1
10 TABLET, FILM COATED ORAL DAILY
Status: DISCONTINUED | OUTPATIENT
Start: 2021-12-28 | End: 2021-12-28

## 2021-12-28 RX ORDER — ACETAMINOPHEN 325 MG/1
650 TABLET ORAL EVERY 4 HOURS PRN
Status: DISCONTINUED | OUTPATIENT
Start: 2021-12-28 | End: 2022-01-01

## 2021-12-28 RX ORDER — PRASUGREL 10 MG/1
10 TABLET, FILM COATED ORAL DAILY
Status: DISCONTINUED | OUTPATIENT
Start: 2021-12-28 | End: 2022-01-01

## 2021-12-28 RX ADMIN — INSULIN ASPART 3 UNITS: 100 INJECTION, SOLUTION INTRAVENOUS; SUBCUTANEOUS at 04:23

## 2021-12-28 RX ADMIN — QUETIAPINE FUMARATE 100 MG: 100 TABLET ORAL at 19:55

## 2021-12-28 RX ADMIN — HEPARIN SODIUM 5000 UNITS: 10000 INJECTION, SOLUTION INTRAVENOUS; SUBCUTANEOUS at 04:23

## 2021-12-28 RX ADMIN — METOPROLOL TARTRATE 25 MG: 25 TABLET, FILM COATED ORAL at 19:56

## 2021-12-28 RX ADMIN — LEVETIRACETAM 750 MG: 750 TABLET ORAL at 09:29

## 2021-12-28 RX ADMIN — Medication 10 ML: at 12:05

## 2021-12-28 RX ADMIN — INSULIN ASPART 1 UNITS: 100 INJECTION, SOLUTION INTRAVENOUS; SUBCUTANEOUS at 01:20

## 2021-12-28 RX ADMIN — INSULIN ASPART 3 UNITS: 100 INJECTION, SOLUTION INTRAVENOUS; SUBCUTANEOUS at 15:52

## 2021-12-28 RX ADMIN — DOCUSATE SODIUM 50 MG AND SENNOSIDES 8.6 MG 2 TABLET: 8.6; 5 TABLET, FILM COATED ORAL at 09:30

## 2021-12-28 RX ADMIN — LACOSAMIDE 200 MG: 50 TABLET, FILM COATED ORAL at 09:29

## 2021-12-28 RX ADMIN — ATORVASTATIN CALCIUM 80 MG: 40 TABLET, FILM COATED ORAL at 09:29

## 2021-12-28 RX ADMIN — MULTIVIT AND MINERALS-FERROUS GLUCONATE 9 MG IRON/15 ML ORAL LIQUID 15 ML: at 09:29

## 2021-12-28 RX ADMIN — Medication 40 MG: at 09:32

## 2021-12-28 RX ADMIN — INSULIN ASPART 3 UNITS: 100 INJECTION, SOLUTION INTRAVENOUS; SUBCUTANEOUS at 09:37

## 2021-12-28 RX ADMIN — INSULIN ASPART 1 UNITS: 100 INJECTION, SOLUTION INTRAVENOUS; SUBCUTANEOUS at 19:54

## 2021-12-28 RX ADMIN — ACETAMINOPHEN 650 MG: 325 TABLET, FILM COATED ORAL at 21:40

## 2021-12-28 RX ADMIN — LEVETIRACETAM 750 MG: 750 TABLET ORAL at 19:55

## 2021-12-28 RX ADMIN — HEPARIN SODIUM 5000 UNITS: 10000 INJECTION, SOLUTION INTRAVENOUS; SUBCUTANEOUS at 12:30

## 2021-12-28 RX ADMIN — QUETIAPINE FUMARATE 25 MG: 25 TABLET ORAL at 23:15

## 2021-12-28 RX ADMIN — IRBESARTAN 150 MG: 150 TABLET ORAL at 09:33

## 2021-12-28 RX ADMIN — QUETIAPINE FUMARATE 25 MG: 25 TABLET ORAL at 05:03

## 2021-12-28 RX ADMIN — QUETIAPINE FUMARATE 25 MG: 25 TABLET ORAL at 09:29

## 2021-12-28 RX ADMIN — INSULIN GLARGINE 10 UNITS: 100 INJECTION, SOLUTION SUBCUTANEOUS at 09:31

## 2021-12-28 RX ADMIN — LACOSAMIDE 200 MG: 50 TABLET, FILM COATED ORAL at 19:55

## 2021-12-28 RX ADMIN — FLUTICASONE FUROATE AND VILANTEROL TRIFENATATE 1 PUFF: 200; 25 POWDER RESPIRATORY (INHALATION) at 09:31

## 2021-12-28 RX ADMIN — Medication 10 MG: at 17:35

## 2021-12-28 RX ADMIN — INSULIN ASPART 4 UNITS: 100 INJECTION, SOLUTION INTRAVENOUS; SUBCUTANEOUS at 12:11

## 2021-12-28 RX ADMIN — PRASUGREL 10 MG: 10 TABLET, FILM COATED ORAL at 15:43

## 2021-12-28 RX ADMIN — ASPIRIN 81 MG CHEWABLE TABLET 81 MG: 81 TABLET CHEWABLE at 14:53

## 2021-12-28 RX ADMIN — TADALAFIL 20 MG: 20 TABLET, FILM COATED ORAL at 09:29

## 2021-12-28 RX ADMIN — METOPROLOL TARTRATE 25 MG: 25 TABLET, FILM COATED ORAL at 09:29

## 2021-12-28 RX ADMIN — HEPARIN SODIUM 5000 UNITS: 10000 INJECTION, SOLUTION INTRAVENOUS; SUBCUTANEOUS at 19:55

## 2021-12-28 RX ADMIN — ALTEPLASE 2 MG: 2.2 INJECTION, POWDER, LYOPHILIZED, FOR SOLUTION INTRAVENOUS at 04:19

## 2021-12-28 RX ADMIN — ROPINIROLE HYDROCHLORIDE 2 MG: 2 TABLET, FILM COATED ORAL at 21:40

## 2021-12-28 ASSESSMENT — ACTIVITIES OF DAILY LIVING (ADL)
ADLS_ACUITY_SCORE: 17
ADLS_ACUITY_SCORE: 19
ADLS_ACUITY_SCORE: 17
ADLS_ACUITY_SCORE: 19
ADLS_ACUITY_SCORE: 19
ADLS_ACUITY_SCORE: 17
ADLS_ACUITY_SCORE: 19
ADLS_ACUITY_SCORE: 17
ADLS_ACUITY_SCORE: 19
ADLS_ACUITY_SCORE: 17
ADLS_ACUITY_SCORE: 19
ADLS_ACUITY_SCORE: 19
ADLS_ACUITY_SCORE: 17

## 2021-12-28 NOTE — PLAN OF CARE
Major Shift Events:   Neuro: A&Ox4, follows commands int, sitter at bedside for impulsivity, garbled speech, slight L facial droop, LUE weakness, L pupil fixed.R pupil 1-2, CMS intact, seroquel given for agitation/restlessness  Cardiac: a fib, rate controlled, SBP < 140, pulses palpable, afebrile  Resp: lung sounds clear,  sating > 92% 2L NC (baseline)   GI: passing gas, bowel sounds active, LBM 12/27, incontinent, NGT with TF at 60ml/hr   : voiding spontaneously via primofit, adequate amount of rené urine   Skin: mayuri BLE, oral membranes plaqued, R head incision CDI   Pain/Nausea: denies pain or nausea  LDA: PIV x 3 and R PICC (2) SL (red lumen with TPA for occlusion)  Labs: reviewed, Na 150, head CT this AM   Plan: continue plan of care  For vital signs and complete assessments, please see documentation flowsheets.

## 2021-12-28 NOTE — PROGRESS NOTES
[facility], [place]   12/28/2021  Neurosurgery Progress Note:    Assessment:  Red Sutherland is a 81-year-old male post-traumatic acute right subdural hematoma with left hemibody weakness, on xarelto and plavix. Admitted to the ICU on 12/20/21. Taken to OR 12/21/2021. Extubated 12/22/2021.     Plan:  - CT head this morning, then start aspirin and prasugrel per cards  - Keppra 1g BID, Vimpat 150 BID  - HOB > 30 degrees  - Seroquel 25 BID, 100 @ night.   - SLP evaluation    Jaylon Vallejo MD  Neurosurgery Resident, PGY-5    Please contact neurosurgery resident on call with questions.    Dial * * [phone], enter [phone] when prompted.   -----------------------------------    Interval History: Still confused    Objective:   Temp:  [97.9  F (36.6  C)-98.2  F (36.8  C)] 97.9  F (36.6  C)  Pulse:  [72-89] 75  Resp:  [11-32] 22  BP: ()/(54-94) 128/74  SpO2:  [93 %-99 %] 97 %  I/O last 3 completed shifts:  In: 6035 [I.V.:5; NG/GT:4710]  Out: 1500 [Urine:1500]    Gen: Appears comfortable, NAD  Wound: clean, dry, intact,  Neurologic:  - Alert & Oriented to person, place, time, and situation  - Follows commands briskly x 4, antigravity  - Mild L facial droop  Moving all extremities x 4.    LABS:  Recent Labs   Lab 12/28/21  0119 12/28/21  0118 12/27/21 2009 12/27/21 2008 12/27/21  1634 12/27/21  1117 12/27/21  1115 12/27/21  0430 12/27/21  0425 12/26/21  0422 12/26/21  0417   *  --  150*  --  152*  --  153*   < > 156*  156*   < > 160*  160*   POTASSIUM  --   --   --   --   --   --  4.9  --  4.4  --  4.0   CHLORIDE  --   --   --   --   --   --  123*  --  126*  --  128*   CO2  --   --   --   --   --   --  28  --  28  --  29   ANIONGAP  --   --   --   --   --   --  2*  --  2*  --  3   GLC  --  155*  --  230* 215*   < > 234*   < > 210*   < > 247*   BUN  --   --   --   --   --   --  48*  --  48*  --  52*   CR  --   --   --   --   --   --  1.44*  --  1.35*  --  1.47*   ANNMARIE  --   --   --   --    --   --  8.4*  --  8.6  --  9.0    < > = values in this interval not displayed.       Recent Labs   Lab 12/27/21  0425   WBC 8.2   RBC 3.57*   HGB 7.3*   HCT 27.7*   MCV 78   MCH 20.4*   MCHC 26.4*   RDW 20.2*          IMAGING:  Recent Results (from the past 24 hour(s))   CT Head w/o Contrast    Narrative    CT HEAD W/O CONTRAST 12/27/2021 6:21 AM    History: anticoagulation s/p sdh evacuation     Comparison: CT of the head dated 12/25/2021    Technique: Using multidetector thin collimation helical acquisition  technique, axial, coronal and sagittal CT images from the skull base  to the vertex were obtained without intravenous contrast.   (topogram) image(s) also obtained and reviewed.    Findings: Postsurgical changes of right frontoparietal craniectomy.  Stable thin subdural hematoma underlying the right hemicraniectomy  site without evidence of interval bleeding. There is new subdural  hematoma adjacent to the right anterior falx, which may represent  redistribution versus interval bleed. No midline shift.. Gray/white  matter differentiation in both cerebral hemispheres is preserved.  Ventricles are proportionate to the cerebral sulci. The basal cisterns  are clear. Left frontoparietal scalp hematoma.    Mucosal thickening of the left maxillary sinus.. Otherwise the mastoid  air cells and paranasal sinuses are clear.      Impression    Impression:  1. Postsurgical changes of right frontoparietal craniectomy with  overlying scalp hematoma.  2. Stable thin subdural hematoma underlying the right craniectomy  site.  3. Trace subdural hematoma of the right anterior falx.    I have personally reviewed the examination and initial interpretation  and I agree with the findings.    MITESH MORALES MD         SYSTEM ID:  [id number]

## 2021-12-28 NOTE — PLAN OF CARE
Major shift events: Trending sodium levels, improving. FW at 400 q2, tube feeds at goal. Unchanged neuro exam, agitated, uncooperative, intermittently oriented x4. Slightly weaker on L side with facial droop. Sitter at bedside. Remains on home O2, VSS.  Continue to monitor, notify MD with any concerns.

## 2021-12-29 ENCOUNTER — APPOINTMENT (OUTPATIENT)
Dept: PHYSICAL THERAPY | Facility: CLINIC | Age: 81
DRG: 025 | End: 2021-12-29
Payer: COMMERCIAL

## 2021-12-29 ENCOUNTER — APPOINTMENT (OUTPATIENT)
Dept: SPEECH THERAPY | Facility: CLINIC | Age: 81
DRG: 025 | End: 2021-12-29
Payer: COMMERCIAL

## 2021-12-29 LAB
ABO/RH(D): NORMAL
ALBUMIN SERPL ELPH-MCNC: 3 G/DL (ref 3.7–5.1)
ALPHA1 GLOB SERPL ELPH-MCNC: 0.5 G/DL (ref 0.2–0.4)
ALPHA2 GLOB SERPL ELPH-MCNC: 0.9 G/DL (ref 0.5–0.9)
ANION GAP SERPL CALCULATED.3IONS-SCNC: 5 MMOL/L (ref 3–14)
ANTIBODY SCREEN: NEGATIVE
ATRIAL RATE - MUSE: 117 BPM
ATRIAL RATE - MUSE: 288 BPM
B-GLOBULIN SERPL ELPH-MCNC: 0.6 G/DL (ref 0.6–1)
BUN SERPL-MCNC: 43 MG/DL (ref 7–30)
CALCIUM SERPL-MCNC: 8.5 MG/DL (ref 8.5–10.1)
CHLORIDE BLD-SCNC: 114 MMOL/L (ref 94–109)
CO2 SERPL-SCNC: 25 MMOL/L (ref 20–32)
CREAT SERPL-MCNC: 1.2 MG/DL (ref 0.66–1.25)
DIASTOLIC BLOOD PRESSURE - MUSE: NORMAL MMHG
DIASTOLIC BLOOD PRESSURE - MUSE: NORMAL MMHG
ERYTHROCYTE [DISTWIDTH] IN BLOOD BY AUTOMATED COUNT: 20.2 % (ref 10–15)
GAMMA GLOB SERPL ELPH-MCNC: 0.9 G/DL (ref 0.7–1.6)
GFR SERPL CREATININE-BSD FRML MDRD: 62 ML/MIN/1.73M2
GLUCOSE BLD-MCNC: 208 MG/DL (ref 70–99)
GLUCOSE BLDC GLUCOMTR-MCNC: 167 MG/DL (ref 70–99)
GLUCOSE BLDC GLUCOMTR-MCNC: 169 MG/DL (ref 70–99)
GLUCOSE BLDC GLUCOMTR-MCNC: 176 MG/DL (ref 70–99)
GLUCOSE BLDC GLUCOMTR-MCNC: 184 MG/DL (ref 70–99)
GLUCOSE BLDC GLUCOMTR-MCNC: 193 MG/DL (ref 70–99)
GLUCOSE BLDC GLUCOMTR-MCNC: 199 MG/DL (ref 70–99)
HCT VFR BLD AUTO: 26.5 % (ref 40–53)
HGB BLD-MCNC: 7.5 G/DL (ref 13.3–17.7)
HOLD SPECIMEN: NORMAL
INTERPRETATION ECG - MUSE: NORMAL
INTERPRETATION ECG - MUSE: NORMAL
M PROTEIN SERPL ELPH-MCNC: 0.1 G/DL
MAGNESIUM SERPL-MCNC: 2.7 MG/DL (ref 1.6–2.3)
MCH RBC QN AUTO: 20.8 PG (ref 26.5–33)
MCHC RBC AUTO-ENTMCNC: 28.3 G/DL (ref 31.5–36.5)
MCV RBC AUTO: 74 FL (ref 78–100)
P AXIS - MUSE: NORMAL DEGREES
P AXIS - MUSE: NORMAL DEGREES
PHOSPHATE SERPL-MCNC: 2.8 MG/DL (ref 2.5–4.5)
PLATELET # BLD AUTO: 133 10E3/UL (ref 150–450)
POTASSIUM BLD-SCNC: 3.9 MMOL/L (ref 3.4–5.3)
PR INTERVAL - MUSE: NORMAL MS
PR INTERVAL - MUSE: NORMAL MS
PROT PATTERN SERPL ELPH-IMP: ABNORMAL
QRS DURATION - MUSE: 86 MS
QRS DURATION - MUSE: 88 MS
QT - MUSE: 376 MS
QT - MUSE: 392 MS
QTC - MUSE: 444 MS
QTC - MUSE: 446 MS
R AXIS - MUSE: 110 DEGREES
R AXIS - MUSE: 117 DEGREES
RBC # BLD AUTO: 3.6 10E6/UL (ref 4.4–5.9)
SODIUM SERPL-SCNC: 140 MMOL/L (ref 133–144)
SODIUM SERPL-SCNC: 140 MMOL/L (ref 133–144)
SODIUM SERPL-SCNC: 141 MMOL/L (ref 133–144)
SODIUM SERPL-SCNC: 143 MMOL/L (ref 133–144)
SODIUM SERPL-SCNC: 144 MMOL/L (ref 133–144)
SODIUM SERPL-SCNC: 145 MMOL/L (ref 133–144)
SPECIMEN EXPIRATION DATE: NORMAL
SYSTOLIC BLOOD PRESSURE - MUSE: NORMAL MMHG
SYSTOLIC BLOOD PRESSURE - MUSE: NORMAL MMHG
T AXIS - MUSE: 23 DEGREES
T AXIS - MUSE: 62 DEGREES
VENTRICULAR RATE- MUSE: 78 BPM
VENTRICULAR RATE- MUSE: 84 BPM
WBC # BLD AUTO: 8.1 10E3/UL (ref 4–11)

## 2021-12-29 PROCEDURE — 250N000013 HC RX MED GY IP 250 OP 250 PS 637: Performed by: NURSE PRACTITIONER

## 2021-12-29 PROCEDURE — 250N000013 HC RX MED GY IP 250 OP 250 PS 637: Performed by: STUDENT IN AN ORGANIZED HEALTH CARE EDUCATION/TRAINING PROGRAM

## 2021-12-29 PROCEDURE — 84295 ASSAY OF SERUM SODIUM: CPT | Performed by: STUDENT IN AN ORGANIZED HEALTH CARE EDUCATION/TRAINING PROGRAM

## 2021-12-29 PROCEDURE — 250N000011 HC RX IP 250 OP 636: Performed by: STUDENT IN AN ORGANIZED HEALTH CARE EDUCATION/TRAINING PROGRAM

## 2021-12-29 PROCEDURE — 93005 ELECTROCARDIOGRAM TRACING: CPT

## 2021-12-29 PROCEDURE — 93010 ELECTROCARDIOGRAM REPORT: CPT | Performed by: INTERNAL MEDICINE

## 2021-12-29 PROCEDURE — 97530 THERAPEUTIC ACTIVITIES: CPT | Mod: GP

## 2021-12-29 PROCEDURE — 85027 COMPLETE CBC AUTOMATED: CPT | Performed by: STUDENT IN AN ORGANIZED HEALTH CARE EDUCATION/TRAINING PROGRAM

## 2021-12-29 PROCEDURE — 92526 ORAL FUNCTION THERAPY: CPT | Mod: GN

## 2021-12-29 PROCEDURE — 83735 ASSAY OF MAGNESIUM: CPT | Performed by: STUDENT IN AN ORGANIZED HEALTH CARE EDUCATION/TRAINING PROGRAM

## 2021-12-29 PROCEDURE — 120N000002 HC R&B MED SURG/OB UMMC

## 2021-12-29 PROCEDURE — 84100 ASSAY OF PHOSPHORUS: CPT | Performed by: STUDENT IN AN ORGANIZED HEALTH CARE EDUCATION/TRAINING PROGRAM

## 2021-12-29 PROCEDURE — 84165 PROTEIN E-PHORESIS SERUM: CPT | Mod: 26 | Performed by: PATHOLOGY

## 2021-12-29 PROCEDURE — 36592 COLLECT BLOOD FROM PICC: CPT | Performed by: STUDENT IN AN ORGANIZED HEALTH CARE EDUCATION/TRAINING PROGRAM

## 2021-12-29 PROCEDURE — 250N000013 HC RX MED GY IP 250 OP 250 PS 637: Performed by: PSYCHIATRY & NEUROLOGY

## 2021-12-29 PROCEDURE — 250N000013 HC RX MED GY IP 250 OP 250 PS 637: Performed by: NEUROLOGICAL SURGERY

## 2021-12-29 RX ADMIN — INSULIN ASPART 3 UNITS: 100 INJECTION, SOLUTION INTRAVENOUS; SUBCUTANEOUS at 03:32

## 2021-12-29 RX ADMIN — Medication 5 ML: at 11:57

## 2021-12-29 RX ADMIN — INSULIN ASPART 3 UNITS: 100 INJECTION, SOLUTION INTRAVENOUS; SUBCUTANEOUS at 00:18

## 2021-12-29 RX ADMIN — METOPROLOL TARTRATE 25 MG: 25 TABLET, FILM COATED ORAL at 07:54

## 2021-12-29 RX ADMIN — MULTIVIT AND MINERALS-FERROUS GLUCONATE 9 MG IRON/15 ML ORAL LIQUID 15 ML: at 07:53

## 2021-12-29 RX ADMIN — Medication 5 ML: at 11:53

## 2021-12-29 RX ADMIN — QUETIAPINE FUMARATE 100 MG: 100 TABLET ORAL at 20:51

## 2021-12-29 RX ADMIN — HEPARIN SODIUM 5000 UNITS: 10000 INJECTION, SOLUTION INTRAVENOUS; SUBCUTANEOUS at 03:33

## 2021-12-29 RX ADMIN — IRBESARTAN 150 MG: 150 TABLET ORAL at 07:53

## 2021-12-29 RX ADMIN — TADALAFIL 20 MG: 20 TABLET, FILM COATED ORAL at 07:54

## 2021-12-29 RX ADMIN — INSULIN ASPART 2 UNITS: 100 INJECTION, SOLUTION INTRAVENOUS; SUBCUTANEOUS at 16:21

## 2021-12-29 RX ADMIN — Medication 5 ML: at 08:31

## 2021-12-29 RX ADMIN — HEPARIN SODIUM 5000 UNITS: 10000 INJECTION, SOLUTION INTRAVENOUS; SUBCUTANEOUS at 11:57

## 2021-12-29 RX ADMIN — QUETIAPINE FUMARATE 25 MG: 25 TABLET ORAL at 07:53

## 2021-12-29 RX ADMIN — LACOSAMIDE 200 MG: 50 TABLET, FILM COATED ORAL at 09:01

## 2021-12-29 RX ADMIN — HEPARIN SODIUM 5000 UNITS: 10000 INJECTION, SOLUTION INTRAVENOUS; SUBCUTANEOUS at 20:50

## 2021-12-29 RX ADMIN — LEVETIRACETAM 750 MG: 750 TABLET ORAL at 07:54

## 2021-12-29 RX ADMIN — INSULIN ASPART 2 UNITS: 100 INJECTION, SOLUTION INTRAVENOUS; SUBCUTANEOUS at 11:57

## 2021-12-29 RX ADMIN — LACOSAMIDE 200 MG: 50 TABLET, FILM COATED ORAL at 20:50

## 2021-12-29 RX ADMIN — Medication 10 MG: at 17:36

## 2021-12-29 RX ADMIN — INSULIN ASPART 2 UNITS: 100 INJECTION, SOLUTION INTRAVENOUS; SUBCUTANEOUS at 20:58

## 2021-12-29 RX ADMIN — METOPROLOL TARTRATE 25 MG: 25 TABLET, FILM COATED ORAL at 20:50

## 2021-12-29 RX ADMIN — ASPIRIN 81 MG CHEWABLE TABLET 81 MG: 81 TABLET CHEWABLE at 07:53

## 2021-12-29 RX ADMIN — Medication 5 ML: at 20:04

## 2021-12-29 RX ADMIN — PRASUGREL 10 MG: 10 TABLET, FILM COATED ORAL at 07:53

## 2021-12-29 RX ADMIN — Medication 40 MG: at 07:53

## 2021-12-29 RX ADMIN — INSULIN ASPART 2 UNITS: 100 INJECTION, SOLUTION INTRAVENOUS; SUBCUTANEOUS at 08:06

## 2021-12-29 RX ADMIN — ATORVASTATIN CALCIUM 80 MG: 40 TABLET, FILM COATED ORAL at 07:53

## 2021-12-29 RX ADMIN — LEVETIRACETAM 750 MG: 750 TABLET ORAL at 20:51

## 2021-12-29 RX ADMIN — FERROUS GLUCONATE 324 MG: 324 TABLET ORAL at 08:05

## 2021-12-29 RX ADMIN — ROPINIROLE HYDROCHLORIDE 2 MG: 2 TABLET, FILM COATED ORAL at 21:06

## 2021-12-29 ASSESSMENT — ACTIVITIES OF DAILY LIVING (ADL)
ADLS_ACUITY_SCORE: 23
ADLS_ACUITY_SCORE: 19
ADLS_ACUITY_SCORE: 23
ADLS_ACUITY_SCORE: 21
ADLS_ACUITY_SCORE: 21
ADLS_ACUITY_SCORE: 19
ADLS_ACUITY_SCORE: 23
ADLS_ACUITY_SCORE: 19
ADLS_ACUITY_SCORE: 19
ADLS_ACUITY_SCORE: 23
ADLS_ACUITY_SCORE: 15
ADLS_ACUITY_SCORE: 15
ADLS_ACUITY_SCORE: 23
ADLS_ACUITY_SCORE: 19
ADLS_ACUITY_SCORE: 15
ADLS_ACUITY_SCORE: 19
ADLS_ACUITY_SCORE: 15
ADLS_ACUITY_SCORE: 15
ADLS_ACUITY_SCORE: 19
ADLS_ACUITY_SCORE: 15

## 2021-12-29 NOTE — PROGRESS NOTES
Status: Admitted w/ post-traumatic acute R SDH with L hemibody weakness. Admitted to ICU 12/20. OR 12/21 for evacuation. Extubated 12/22.  Vitals: HTN at times. Other VSS on RA  Neuros: A&O x 3-4. Sometimes disoriented to place and/or situation. Waxes and wanes. Garbled speech. L pupil 3 mm fixed & irregular. R pupil 2 mm brisk & round. L facial droop. LUE drift. L side 4/5, R side 5/5. Restless.   IV: DL PICC HL. PIV SL  Labs/Electrolytes: Sodium checks q4hr  Resp/trach: Infrequent, dry cough. Very dry mouth. Frequent oral cares  Diet: NPO. TF via NG @ goal of 60 mLhr.  mL q2hr.  Bowel status: 2 loose BM's this shift  : Voiding incontinently via external purewick  Skin: BLE mayuri. Abdominal bruising. R crani incision with staples LEONID.  Lesion under tongue. Scabbing throughout BLE. Redness to periarea- barrier cream applied  Pain: Denies  Activity: T&R q2h. HOB > 30. Mitts on BUE for line pulling  Social: Daughter Tianna at bedside. 1:1 sitter in place  Plan: Continue to monitor and follow POC.

## 2021-12-29 NOTE — PROGRESS NOTES
Nephrology Progress Note  12/28/2021         Assessment & Recommendations:   # Hypernatremia 2/2 poor intake:  Patient sodium is improving it is not due to DI and more likely because patient had poor free water intake   -Continue to give  free water 400cc every 2 hour.  -Monitor BMP q 12 hr  -Nephrology will sign off on the patient feel free to contact with any new question or concern.     # CKD3  Baseline Cr appears to be 1.5-1.8 over the past 3 years or so. He is at baseline currently.     Recommendations were communicated to primary team via note and by phone.    Seen and discussed with Dr. Regine Oscar MD   [phone]    Interval History :   Nursing and provider notes from last 24 hours reviewed.  In the last 24 hours Red Sutherland hypernatremia has improved and continue to improve mentation wise  Review of Systems:   I reviewed the following systems:  GI: decreased appetite. No nausea or vomiting or diarrhea.   Neuro:  postive confusion  Constitutional:  No fever or chills  CV: No dyspnea or edema.  No chest pain.    Physical Exam:   I/O last 3 completed shifts:  In: 6005 [NG/GT:4685]  Out: 1850 [Urine:1850]   /52   Pulse 77   Temp 99.9  F (37.7  C) (Oral)   Resp 19   Wt 90.4 kg (199 lb 4.7 oz)   SpO2 98%   BMI 27.80 kg/m       GENERAL APPEARANCE: Patient is confused  EYES:  No scleral icterus, pupils equal  HENT: mouth without ulcers or lesions  PULM: lungs clear to auscultation bilaterally, equal air movement, no clubbing  CV: Patient has regular rhythm, normal rate, no rub     -JVD -ve     -edema -ve   GI: soft, nontender, Non distended, bowel sounds are normal  INTEGUMENT: no cyanosis, No rash  NEURO:  No asterixis       Labs:   All labs reviewed by me  Electrolytes/Renal -   Recent Labs   Lab Test 12/28/21  1551 12/28/21  1550 12/28/21  1211 12/28/21  1210 12/28/21  0951 12/28/21  0936 12/28/21  0800 12/28/21  0418 12/27/21  1117 12/27/21  1115 12/27/21  0430 12/27/21  0425  12/26/21 0422 12/26/21 0417   *  --  148*  --  150*  --   --  150*  150*   < > 153*   < > 156*  156*   < > 160*  160*   POTASSIUM  --   --   --   --   --   --   --  4.3  --  4.9  --  4.4  --  4.0   CHLORIDE  --   --   --   --   --   --   --  121*  --  123*  --  126*  --  128*   CO2  --   --   --   --   --   --   --  26  --  28  --  28  --  29   BUN  --   --   --   --   --   --   --  45*  --  48*  --  48*  --  52*   CR  --   --   --   --   --   --   --  1.31*  --  1.44*  --  1.35*  --  1.47*   GLC  --  194*  --  220*  --  213*   < > 214*   < > 234*   < > 210*   < > 247*   ANNMARIE  --   --   --   --   --   --   --  8.6  --  8.4*  --  8.6  --  9.0   MAG  --   --   --   --   --   --   --  2.9*  --   --   --  3.0*  --  3.1*   PHOS  --   --   --   --   --   --   --  2.6  --   --   --  3.1  --  2.4*    < > = values in this interval not displayed.       CBC -   Recent Labs   Lab Test 12/28/21 0418 12/27/21 0425 12/26/21 0417   WBC 8.6 8.2 8.9   HGB 7.4* 7.3* 8.0*   * 172 211       LFTs -   Recent Labs   Lab Test 12/27/21  1115 12/20/21  1350 12/20/21  1046   ALKPHOS 123 133 140*   BILITOTAL 0.8 1.5* 1.7*   ALT 7 17 13   AST 28 22 19   PROTTOTAL 6.4* 6.8 7.3   ALBUMIN 2.5* 3.1* 3.6       Iron Panel -   Recent Labs   Lab Test 12/28/21 0418 12/01/21  0907 11/15/21  1257   IRON 19*  --  30*   IRONSAT 7*  --   --    CHASE 26   < >  --     < > = values in this interval not displayed.         Imaging:  All imaging studies reviewed by me.     Current Medications:    aspirin  81 mg Oral or Feeding Tube Daily     atorvastatin  80 mg Oral or Feeding Tube Daily     [Held by provider] bumetanide  4 mg Oral or Feeding Tube QAM     [Held by provider] empagliflozin  10 mg Oral or Feeding Tube Daily     ferrous gluconate  324 mg Oral or Feeding Tube Every Other Day     fluticasone-vilanterol  1 puff Inhalation Daily     heparin ANTICOAGULANT  5,000 Units Subcutaneous Q8H     heparin lock flush  5-20 mL Intracatheter Q24H      insulin aspart  1-12 Units Subcutaneous Q4H     insulin glargine  14 Units Subcutaneous BID     irbesartan  150 mg Oral or Feeding Tube Daily     lacosamide  200 mg Oral or Feeding Tube BID     levETIRAcetam  750 mg Oral or Feeding Tube BID     melatonin  10 mg Oral or Feeding Tube QPM     metoprolol tartrate  25 mg Oral or Feeding Tube BID     multivitamins w/minerals  15 mL Per Feeding Tube Daily     pantoprazole  40 mg Oral or Feeding Tube QAM AC     polyethylene glycol  17 g Oral or Feeding Tube Daily     [Held by provider] potassium chloride  40 mEq Oral or Feeding Tube Daily     prasugrel  10 mg Oral or Feeding Tube Daily     QUEtiapine  100 mg Oral or Feeding Tube QPM     QUEtiapine  25 mg Oral or Feeding Tube QAM     rOPINIRole  2 mg Oral or Feeding Tube At Bedtime     senna-docusate  2 tablet Oral or Feeding Tube BID     sodium chloride (PF)  10 mL Intracatheter Q8H     sodium chloride (PF)  10 mL Intracatheter Q8H     sodium chloride (PF)  3 mL Intracatheter Q8H     tadalafil  20 mg Oral Q24H       Derrell Oscar MD

## 2021-12-29 NOTE — PLAN OF CARE
Major Shift Events:    Neuro: Q4H neuro checks. Left sided weakness and drift. Garbled speech with mild expressive aphasia. Slight left facial droop. L pupil fixed and irregular (per patient and daughter, patient had previous cataract surgery), R pupil 2+, brisk. A/O x 4. Patient restless and impulsive, forgetful and requires frequent reminders to stay in bed and not pull at tubing.   Cardiac: Rate controlled a.fib 70s. Goal SBP <140, maintained without any PRNs.   Resp: 2L NC. Lung sounds clear. Occasional cough, no secretions.  GI/: NG with TF at 60ml/hr. 400ml H2O Q2H. Failed speech eval today. External catheter in place with adequate urine output. Loose BM x 2, patient wanting to get up to bathroom. Has not been out of bed/up with PT so explained that with his left sided weakness he is unable to safely transfer to the bathroom. Bedside commode ordered, used bedpan in meantime.   Musc: R crani incision stapled, open to air, no drainage. Plaques in mouth, frequent oral care provided. Up to chair for 2 hours today with sling.    Plan: Transfer to 6D. Neuro checks Q4H. Continue plan of care.     For vital signs and complete assessments, please see documentation flowsheets.

## 2021-12-29 NOTE — PLAN OF CARE
Status: Pt. admitted with post-traumatic acute right subdural hematoma with left hemibody weakness, on xarelto and plavix. Admitted to the ICU on 12/20/21. Taken to OR 12/21/2021. Extubated 12/22/2021. Started on Aspirin and Prasugrel on 12/28/21. Failed SLP evaluation.  Vitals: VSS include continuous pulse ox in mid 90's on RA, HTN >140 parameters x1 after arrival to unit-recheck WNL, tachypnic (24-28bpm)  Neuros: A&Ox4. Garbled speech. L. pupil 3mm fixed and irregular > R. pupil 2mm round brisk. L. facial droop. LUE drift. LUE/LLE 4/5. RUE/RLE 5/5. Restless  IV: R. DL PICC, SL. R. PIV, SL   Labs/Electrolytes: Na 144, checking q4hrs.   Resp/trach: LS clear/diminished in bases. Infrequent nonproductive cough. Mouth breathing, oral mucosa very dry-frequent oral cares  Diet:  TF infusing via NG at goal rate of 60mL/hr. FWF 400mL q2hrs. Failed swallow study yesterday  Bowel status: Fecal incontinence. Had a BM this shift   : Voiding incontinently via external purewick   Skin: BLE mayuri. Abdominal bruising. R. crani incision LEONID and approximated with staples. Lesion under tongue. Bilateral innner big toes with scabs. Scattered abrasions/scabbing t/o BLE calves and feet. Blanchable erythema to forehead, bottom, and bilateral wrists.  Pain: Denies  Activity: A2/lift. Turn/repo q2hrs. HOB >30 degrees. PCDs on. BUE mitts in place for pulling at lines. Sitter at bedside.   Plan: Likely PEG tube placement. Continue to monitor and follow POC   Updates this shift: Arrived from  around 0420. 2 nurse skin check completed by Bhakti HERNANDEZ RN and John REYES RN. Patient belongs include socks.

## 2021-12-29 NOTE — PROGRESS NOTES
Pt is alert and oriented to self and time. Restless and impulsive. Resting intermittently for brief periods of time. With mitts on to prevent him from pulling NGT. Speech garbled. L sided weakness.Slight L facial droop. L pupil irregular and  fixed ( hx of cataract surgery). R pupil 2+ brisk and reactive.   Afib controlled in the 70's. Maintained goal SBP<140. Lungs sound coarse, on 2 L /NC. Coughs occasionally, no secretions.   Tube feeding at goal rate with 400 ml FWF q 2 for elevated Na level. Loose BM. External cath in place.Bladder scanned 2x this shift, about to straight cath but eventually urinated.  R crani incision with staples intact, LEONID. Lesion under his tongue, oral cares done.  Sitter at bedside.  Pt moving to 6A. Report given to PANCHO Ya.

## 2021-12-29 NOTE — PROGRESS NOTES
[facility], [place]   12/29/2021  Neurosurgery Progress Note:    Assessment:  Red Sutherland is a 81-year-old male post-traumatic acute right subdural hematoma with left hemibody weakness, on xarelto and plavix. Admitted to the ICU on 12/20/21. Taken to OR 12/21/2021. Extubated 12/22/2021. Started on Aspirin and Prasugrel on 12/28/21. Failed SLP evaluation.    Plan:  - Keppra 1g BID, Vimpat 150 BID  - HOB > 30 degrees  - Seroquel 25 BID, 100 QHS  - Will likely need PEG tube    Jaylon Vallejo MD  Neurosurgery Resident, PGY-5    Please contact neurosurgery resident on call with questions.    Dial * * [phone], enter [phone] when prompted.   -----------------------------------    Interval History: Still confused, no issues overnight.    Objective:   Temp:  [97.9  F (36.6  C)-99.9  F (37.7  C)] 97.9  F (36.6  C)  Pulse:  [71-96] 73  Resp:  [10-31] 23  BP: ()/(50-98) 118/67  SpO2:  [89 %-100 %] 97 %  I/O last 3 completed shifts:  In: 6465 [NG/GT:5025]  Out: 1850 [Urine:1850]    Gen: Appears comfortable, NAD  Wound: clean, dry, intact,  Neurologic:  - Alert & Oriented to person, place, time, and situation  - Follows commands briskly x 4, antigravity  - Mild L facial droop  - Moving all extremities x 4.    LABS:  Recent Labs   Lab 12/29/21  0017 12/28/21 2011 12/28/21  1953 12/28/21  1551 12/28/21  1550 12/28/21  1211 12/28/21  0800 12/28/21  0418 12/27/21  1117 12/27/21  1115 12/27/21  0430 12/27/21  0425   NA  --  146*  --  148*  --  148*   < > 150*  150*   < > 153*   < > 156*  156*   POTASSIUM  --   --   --   --   --   --   --  4.3  --  4.9  --  4.4   CHLORIDE  --   --   --   --   --   --   --  121*  --  123*  --  126*   CO2  --   --   --   --   --   --   --  26  --  28  --  28   ANIONGAP  --   --   --   --   --   --   --  3  --  2*  --  2*   *  --  158*  --  194*  --    < > 214*   < > 234*   < > 210*   BUN  --   --   --   --   --   --   --  45*  --  48*  --  48*   CR  --   --    --   --   --   --   --  1.31*  --  1.44*  --  1.35*   ANNMARIE  --   --   --   --   --   --   --  8.6  --  8.4*  --  8.6    < > = values in this interval not displayed.       Recent Labs   Lab 12/28/21  0418   WBC 8.6   RBC 3.63*   HGB 7.4*   HCT 27.1*   MCV 75*   MCH 20.4*   MCHC 27.3*   RDW 20.0*   *       IMAGING:  Recent Results (from the past 24 hour(s))   CT Head w/o Contrast    Narrative    CT HEAD W/O CONTRAST 12/28/2021 6:56 AM    History: Follow up SDH after aspirin start     Comparison: CT of the head dated 12/27/2021    Technique: Using multidetector thin collimation helical acquisition  technique, axial, coronal and sagittal CT images from the skull base  to the vertex were obtained without intravenous contrast.   (topogram) image(s) also obtained and reviewed.    Findings: Postsurgical changes of right frontoparietal craniectomy.  Stable then mixed density subdural hematoma overlying the right  cerebral convexity. Subdural hematoma adjacent to the right anterior  falx is decreased in size. No hyperdensities to suggest interval  bleeding. No new intracranial hemorrhage. No new loss of gray-white  matter differentiation.. Ventricles are proportionate to the cerebral  sulcal.. The basal cisterns are clear. Left frontoparietal scalp  hematoma.     The visualized portions of the paranasal sinuses and mastoid air  cells are clear.      Impression    Impression:  1. Postsurgical changes of right frontoparietal craniectomy with  overlying scalp hematoma  2. Stable appearance of the residual subdural hematoma overlying the  right cerebral convexity without evidence of interval bleeding.    I have personally reviewed the examination and initial interpretation  and I agree with the findings.    MITESH MORALES MD         SYSTEM ID:  [id number]

## 2021-12-30 ENCOUNTER — APPOINTMENT (OUTPATIENT)
Dept: CT IMAGING | Facility: CLINIC | Age: 81
DRG: 025 | End: 2021-12-30
Attending: NURSE PRACTITIONER
Payer: COMMERCIAL

## 2021-12-30 ENCOUNTER — DOCUMENTATION ONLY (OUTPATIENT)
Dept: OTHER | Facility: CLINIC | Age: 81
End: 2021-12-30
Payer: COMMERCIAL

## 2021-12-30 ENCOUNTER — APPOINTMENT (OUTPATIENT)
Dept: GENERAL RADIOLOGY | Facility: CLINIC | Age: 81
DRG: 025 | End: 2021-12-30
Attending: STUDENT IN AN ORGANIZED HEALTH CARE EDUCATION/TRAINING PROGRAM
Payer: COMMERCIAL

## 2021-12-30 ENCOUNTER — APPOINTMENT (OUTPATIENT)
Dept: SPEECH THERAPY | Facility: CLINIC | Age: 81
DRG: 025 | End: 2021-12-30
Payer: COMMERCIAL

## 2021-12-30 LAB
ANION GAP SERPL CALCULATED.3IONS-SCNC: 6 MMOL/L (ref 3–14)
ATRIAL RATE - MUSE: 78 BPM
BUN SERPL-MCNC: 42 MG/DL (ref 7–30)
CALCIUM SERPL-MCNC: 8.3 MG/DL (ref 8.5–10.1)
CHLORIDE BLD-SCNC: 111 MMOL/L (ref 94–109)
CO2 SERPL-SCNC: 23 MMOL/L (ref 20–32)
CREAT SERPL-MCNC: 1.21 MG/DL (ref 0.66–1.25)
DIASTOLIC BLOOD PRESSURE - MUSE: NORMAL MMHG
ERYTHROCYTE [DISTWIDTH] IN BLOOD BY AUTOMATED COUNT: 20.8 % (ref 10–15)
GFR SERPL CREATININE-BSD FRML MDRD: 61 ML/MIN/1.73M2
GLUCOSE BLD-MCNC: 177 MG/DL (ref 70–99)
GLUCOSE BLDC GLUCOMTR-MCNC: 135 MG/DL (ref 70–99)
GLUCOSE BLDC GLUCOMTR-MCNC: 155 MG/DL (ref 70–99)
GLUCOSE BLDC GLUCOMTR-MCNC: 156 MG/DL (ref 70–99)
GLUCOSE BLDC GLUCOMTR-MCNC: 157 MG/DL (ref 70–99)
GLUCOSE BLDC GLUCOMTR-MCNC: 195 MG/DL (ref 70–99)
GLUCOSE BLDC GLUCOMTR-MCNC: 207 MG/DL (ref 70–99)
GLUCOSE BLDC GLUCOMTR-MCNC: 230 MG/DL (ref 70–99)
HCT VFR BLD AUTO: 28.2 % (ref 40–53)
HGB BLD-MCNC: 7.8 G/DL (ref 13.3–17.7)
INTERPRETATION ECG - MUSE: NORMAL
MAGNESIUM SERPL-MCNC: 2.7 MG/DL (ref 1.6–2.3)
MCH RBC QN AUTO: 20.5 PG (ref 26.5–33)
MCHC RBC AUTO-ENTMCNC: 27.7 G/DL (ref 31.5–36.5)
MCV RBC AUTO: 74 FL (ref 78–100)
P AXIS - MUSE: NORMAL DEGREES
PHOSPHATE SERPL-MCNC: 3.4 MG/DL (ref 2.5–4.5)
PLATELET # BLD AUTO: 123 10E3/UL (ref 150–450)
POTASSIUM BLD-SCNC: 4.2 MMOL/L (ref 3.4–5.3)
PR INTERVAL - MUSE: NORMAL MS
QRS DURATION - MUSE: 98 MS
QT - MUSE: 396 MS
QTC - MUSE: 451 MS
R AXIS - MUSE: 110 DEGREES
RBC # BLD AUTO: 3.8 10E6/UL (ref 4.4–5.9)
SODIUM SERPL-SCNC: 138 MMOL/L (ref 133–144)
SODIUM SERPL-SCNC: 138 MMOL/L (ref 133–144)
SODIUM SERPL-SCNC: 139 MMOL/L (ref 133–144)
SODIUM SERPL-SCNC: 140 MMOL/L (ref 133–144)
SODIUM SERPL-SCNC: 140 MMOL/L (ref 133–144)
SYSTOLIC BLOOD PRESSURE - MUSE: NORMAL MMHG
T AXIS - MUSE: 32 DEGREES
VENTRICULAR RATE- MUSE: 78 BPM
WBC # BLD AUTO: 8.8 10E3/UL (ref 4–11)

## 2021-12-30 PROCEDURE — 93005 ELECTROCARDIOGRAM TRACING: CPT

## 2021-12-30 PROCEDURE — 250N000013 HC RX MED GY IP 250 OP 250 PS 637: Performed by: STUDENT IN AN ORGANIZED HEALTH CARE EDUCATION/TRAINING PROGRAM

## 2021-12-30 PROCEDURE — 250N000011 HC RX IP 250 OP 636: Performed by: STUDENT IN AN ORGANIZED HEALTH CARE EDUCATION/TRAINING PROGRAM

## 2021-12-30 PROCEDURE — 120N000002 HC R&B MED SURG/OB UMMC

## 2021-12-30 PROCEDURE — 84295 ASSAY OF SERUM SODIUM: CPT | Performed by: NURSE PRACTITIONER

## 2021-12-30 PROCEDURE — 71045 X-RAY EXAM CHEST 1 VIEW: CPT | Mod: 26 | Performed by: RADIOLOGY

## 2021-12-30 PROCEDURE — 250N000013 HC RX MED GY IP 250 OP 250 PS 637

## 2021-12-30 PROCEDURE — 71045 X-RAY EXAM CHEST 1 VIEW: CPT

## 2021-12-30 PROCEDURE — 250N000013 HC RX MED GY IP 250 OP 250 PS 637: Performed by: NURSE PRACTITIONER

## 2021-12-30 PROCEDURE — 70450 CT HEAD/BRAIN W/O DYE: CPT | Mod: 26 | Performed by: RADIOLOGY

## 2021-12-30 PROCEDURE — 92526 ORAL FUNCTION THERAPY: CPT | Mod: GN

## 2021-12-30 PROCEDURE — 70450 CT HEAD/BRAIN W/O DYE: CPT

## 2021-12-30 PROCEDURE — 84295 ASSAY OF SERUM SODIUM: CPT | Performed by: STUDENT IN AN ORGANIZED HEALTH CARE EDUCATION/TRAINING PROGRAM

## 2021-12-30 PROCEDURE — 250N000013 HC RX MED GY IP 250 OP 250 PS 637: Performed by: PSYCHIATRY & NEUROLOGY

## 2021-12-30 PROCEDURE — 250N000013 HC RX MED GY IP 250 OP 250 PS 637: Performed by: NEUROLOGICAL SURGERY

## 2021-12-30 PROCEDURE — 93010 ELECTROCARDIOGRAM REPORT: CPT | Performed by: INTERNAL MEDICINE

## 2021-12-30 PROCEDURE — 36592 COLLECT BLOOD FROM PICC: CPT | Performed by: STUDENT IN AN ORGANIZED HEALTH CARE EDUCATION/TRAINING PROGRAM

## 2021-12-30 PROCEDURE — 83735 ASSAY OF MAGNESIUM: CPT | Performed by: STUDENT IN AN ORGANIZED HEALTH CARE EDUCATION/TRAINING PROGRAM

## 2021-12-30 PROCEDURE — 36592 COLLECT BLOOD FROM PICC: CPT | Performed by: NURSE PRACTITIONER

## 2021-12-30 PROCEDURE — 85014 HEMATOCRIT: CPT | Performed by: STUDENT IN AN ORGANIZED HEALTH CARE EDUCATION/TRAINING PROGRAM

## 2021-12-30 PROCEDURE — 84100 ASSAY OF PHOSPHORUS: CPT | Performed by: STUDENT IN AN ORGANIZED HEALTH CARE EDUCATION/TRAINING PROGRAM

## 2021-12-30 RX ORDER — FUROSEMIDE 20 MG/1
10 TABLET ORAL DAILY
Status: DISCONTINUED | OUTPATIENT
Start: 2021-12-30 | End: 2021-12-30

## 2021-12-30 RX ORDER — ALBUTEROL SULFATE 90 UG/1
2 AEROSOL, METERED RESPIRATORY (INHALATION) EVERY 4 HOURS PRN
Status: DISCONTINUED | OUTPATIENT
Start: 2021-12-30 | End: 2022-01-01

## 2021-12-30 RX ORDER — SODIUM CHLORIDE FOR INHALATION 3 %
3 VIAL, NEBULIZER (ML) INHALATION
Status: DISCONTINUED | OUTPATIENT
Start: 2021-12-30 | End: 2022-01-01

## 2021-12-30 RX ORDER — HALOPERIDOL 5 MG/ML
2 INJECTION INTRAMUSCULAR ONCE
Status: DISCONTINUED | OUTPATIENT
Start: 2021-12-30 | End: 2021-12-31

## 2021-12-30 RX ADMIN — QUETIAPINE FUMARATE 25 MG: 25 TABLET ORAL at 21:53

## 2021-12-30 RX ADMIN — LACOSAMIDE 200 MG: 50 TABLET, FILM COATED ORAL at 19:56

## 2021-12-30 RX ADMIN — PRASUGREL 10 MG: 10 TABLET, FILM COATED ORAL at 07:56

## 2021-12-30 RX ADMIN — Medication 10 MG: at 10:32

## 2021-12-30 RX ADMIN — INSULIN ASPART 3 UNITS: 100 INJECTION, SOLUTION INTRAVENOUS; SUBCUTANEOUS at 04:13

## 2021-12-30 RX ADMIN — Medication 5 ML: at 10:32

## 2021-12-30 RX ADMIN — MULTIVIT AND MINERALS-FERROUS GLUCONATE 9 MG IRON/15 ML ORAL LIQUID 15 ML: at 07:55

## 2021-12-30 RX ADMIN — LEVETIRACETAM 750 MG: 750 TABLET ORAL at 19:54

## 2021-12-30 RX ADMIN — INSULIN ASPART 4 UNITS: 100 INJECTION, SOLUTION INTRAVENOUS; SUBCUTANEOUS at 00:55

## 2021-12-30 RX ADMIN — ATORVASTATIN CALCIUM 80 MG: 40 TABLET, FILM COATED ORAL at 07:56

## 2021-12-30 RX ADMIN — ACETAMINOPHEN 650 MG: 325 TABLET, FILM COATED ORAL at 09:46

## 2021-12-30 RX ADMIN — ROPINIROLE HYDROCHLORIDE 2 MG: 2 TABLET, FILM COATED ORAL at 23:28

## 2021-12-30 RX ADMIN — QUETIAPINE FUMARATE 100 MG: 100 TABLET ORAL at 19:54

## 2021-12-30 RX ADMIN — Medication 40 MG: at 07:55

## 2021-12-30 RX ADMIN — INSULIN ASPART 1 UNITS: 100 INJECTION, SOLUTION INTRAVENOUS; SUBCUTANEOUS at 07:57

## 2021-12-30 RX ADMIN — LEVETIRACETAM 750 MG: 750 TABLET ORAL at 07:56

## 2021-12-30 RX ADMIN — INSULIN ASPART 1 UNITS: 100 INJECTION, SOLUTION INTRAVENOUS; SUBCUTANEOUS at 23:32

## 2021-12-30 RX ADMIN — QUETIAPINE FUMARATE 25 MG: 25 TABLET ORAL at 07:56

## 2021-12-30 RX ADMIN — Medication 5 ML: at 08:16

## 2021-12-30 RX ADMIN — IRBESARTAN 150 MG: 150 TABLET ORAL at 07:55

## 2021-12-30 RX ADMIN — Medication 10 MG: at 17:10

## 2021-12-30 RX ADMIN — ASPIRIN 81 MG CHEWABLE TABLET 81 MG: 81 TABLET CHEWABLE at 07:55

## 2021-12-30 RX ADMIN — INSULIN ASPART 1 UNITS: 100 INJECTION, SOLUTION INTRAVENOUS; SUBCUTANEOUS at 11:35

## 2021-12-30 RX ADMIN — METOPROLOL TARTRATE 25 MG: 25 TABLET, FILM COATED ORAL at 07:55

## 2021-12-30 RX ADMIN — INSULIN ASPART 3 UNITS: 100 INJECTION, SOLUTION INTRAVENOUS; SUBCUTANEOUS at 20:06

## 2021-12-30 RX ADMIN — HEPARIN SODIUM 5000 UNITS: 10000 INJECTION, SOLUTION INTRAVENOUS; SUBCUTANEOUS at 19:55

## 2021-12-30 RX ADMIN — QUETIAPINE FUMARATE 25 MG: 25 TABLET ORAL at 00:16

## 2021-12-30 RX ADMIN — TADALAFIL 20 MG: 20 TABLET, FILM COATED ORAL at 07:56

## 2021-12-30 RX ADMIN — METOPROLOL TARTRATE 25 MG: 25 TABLET, FILM COATED ORAL at 19:55

## 2021-12-30 RX ADMIN — LACOSAMIDE 200 MG: 50 TABLET, FILM COATED ORAL at 07:55

## 2021-12-30 RX ADMIN — HEPARIN SODIUM 5000 UNITS: 10000 INJECTION, SOLUTION INTRAVENOUS; SUBCUTANEOUS at 11:35

## 2021-12-30 ASSESSMENT — ACTIVITIES OF DAILY LIVING (ADL)
ADLS_ACUITY_SCORE: 17
ADLS_ACUITY_SCORE: 17
ADLS_ACUITY_SCORE: 19
ADLS_ACUITY_SCORE: 17
ADLS_ACUITY_SCORE: 23
ADLS_ACUITY_SCORE: 17
ADLS_ACUITY_SCORE: 19
ADLS_ACUITY_SCORE: 17
ADLS_ACUITY_SCORE: 23
ADLS_ACUITY_SCORE: 19
ADLS_ACUITY_SCORE: 23
ADLS_ACUITY_SCORE: 19
ADLS_ACUITY_SCORE: 17
ADLS_ACUITY_SCORE: 17
ADLS_ACUITY_SCORE: 23
ADLS_ACUITY_SCORE: 19
ADLS_ACUITY_SCORE: 17
ADLS_ACUITY_SCORE: 23
ADLS_ACUITY_SCORE: 17
ADLS_ACUITY_SCORE: 23

## 2021-12-30 NOTE — PLAN OF CARE
Status: admitted with post-traumatic acute R SDH with L hemibody weakness. OR 12/21 for evacuation, extubated 12/22.  Vitals: VSS on 2L oxymask   Neuros: AO x 3-4, waxes/wanes. Garbled speech. L pupil 3 mm fixed and irregular, R pupil 2mm and brisk. L droop and L drift. Drowsy overnight. Given PRN seroquel at 0016 for agitation and restlessness at the beginning of the shift. Effective throughout most of the night.   IV: DL PICC SL. PIV SL   Labs/Electrolytes: Sodium checks q4hrs. Last check at 0400 was 139.   Resp/trach: infrequent, dry cough. Dry mouth, frequent oral cares.  Diet: NPO. TF via NG at goal of 60ml/hr. FWF 400ml/2hrs.  Bowel status: BS+, LBM 12/29  : Voiding w/ incontinence, male purewick in place.   Skin: R crani incision stapled, LEONID. Lesion under tongue. abd bruising. BLE mayuri with scabbing. Perineum red  Pain: Denies pain   Activity: T&R q2hrs. HOB >30. Mitts on BUE for line pulling. 1:1 sitter at bedside for line pulling.  Plan: continue to monitor per orders and follow POC. CT called this AM, pt has been restless since 0500 so CT requested that AM shift call after he gets his 0800 medications to see if would be able to get CT.

## 2021-12-30 NOTE — PROGRESS NOTES
"SPIRITUAL HEALTH SERVICES  SPIRITUAL ASSESSMENT Progress Note  [facility] ([place]) 6A     Referral Source: Consult Order \"Wife really having a hard time \"sorting her emotions\" in light of all that's happened with her . She has requested to talk to someone for emotional support.\"    Oriented pt and his wife Kaley to spiritual health service.     Pt's wife requested to talk outside of the room. Discussed Kaley's feelings, her support network and her strong mimi. Practiced breath work with Kaley.     Kaley noted that she brought Bill a \"small glass anabelle tree\" when he was in the ICU but that it didn't get transferred to  with him. Communicated this with  staff who will follow up.     Kaley also said she would appreciate information about support groups for caregivers. Communicated this with Care Coordinator Gillian on  who will follow up.       Plan: Will follow as able. Spiritual health services remains available for any follow-up or requests.     [facility] remains available 24/7 for emergent requests/referrals, either by having the switchboard page the on-call  or by entering an ASAP/STAT consult in Epic (this will also page the on-call ).  __    Rabbi Jonnie Hassan  Chaplain Resident  Pager [phone]    "

## 2021-12-30 NOTE — PROGRESS NOTES
[facility], [place]   12/30/2021  Neurosurgery Progress Note:    Assessment:  Red Sutherland is a 81-year-old male post-traumatic acute right subdural hematoma with left hemibody weakness, on xarelto and plavix. Admitted to the ICU on 12/20/21. Taken to OR 12/21/2021. Extubated 12/22/2021. Started on Aspirin and Prasugrel on 12/28/21. Failed SLP evaluation.    Plan:  - Keppra 1g BID, Vimpat 150 BID  - HOB > 30 degrees  - Seroquel 25 BID, 100 QHS  - Held Aspirin and Prasugrel for G tube on 1/3    Jaylon Vallejo MD  Neurosurgery Resident, PGY-5    Please contact neurosurgery resident on call with questions.    Dial * * *[phone], enter [phone] when prompted.   -----------------------------------    Interval History: Given 1 dose of haldol for agitation last night.    Objective:   Temp:  [95.1  F (35.1  C)-98.3  F (36.8  C)] 95.1  F (35.1  C)  Pulse:  [69-80] 78  Resp:  [20-26] 20  BP: (109-153)/(58-81) 109/58  SpO2:  [91 %-99 %] 92 %  I/O last 3 completed shifts:  In: 3780 [I.V.:40; NG/GT:2360]  Out: 1150 [Urine:1150]    Gen: Appears comfortable, NAD  Wound: clean, dry, intact,  Neurologic:  - Alert & Oriented to person, place, time, and situation  - Follows commands briskly x 4, antigravity  - Mild L facial droop  - Moving all extremities x 4.    LABS:  Recent Labs   Lab 12/30/21  0053 12/30/21  0048 12/29/21 2016 12/29/21 2012 12/29/21  1558 12/29/21  1543 12/29/21  0330 12/29/21  0328 12/28/21  0800 12/28/21  0418 12/27/21  1117 12/27/21  1115   NA  --  139  --  140  --  141   < > 144   < > 150*  150*   < > 153*   POTASSIUM  --   --   --   --   --   --   --  3.9  --  4.3  --  4.9   CHLORIDE  --   --   --   --   --   --   --  114*  --  121*  --  123*   CO2  --   --   --   --   --   --   --  25  --  26  --  28   ANIONGAP  --   --   --   --   --   --   --  5  --  3  --  2*   *  --  169*  --  167*  --    < > 208*   < > 214*   < > 234*   BUN  --   --   --   --   --   --   --  43*  --   45*  --  48*   CR  --   --   --   --   --   --   --  1.20  --  1.31*  --  1.44*   ANNMARIE  --   --   --   --   --   --   --  8.5  --  8.6  --  8.4*    < > = values in this interval not displayed.       Recent Labs   Lab 12/29/21  0328   WBC 8.1   RBC 3.60*   HGB 7.5*   HCT 26.5*   MCV 74*   MCH 20.8*   MCHC 28.3*   RDW 20.2*   *       IMAGING:  No results found for this or any previous visit (from the past 24 hour(s)).

## 2021-12-30 NOTE — PLAN OF CARE
Status: admitted with post-traumatic acute R SDH with L hemibody weakness. OR 12/21 for evacuation, extubated 12/22.  Vitals: VSS on RA.  Neuros: AO x 3-4, waxes/wanes. Garbled speech. L pupil 3mm fixed and irregular, R pupil 2mm and brisk. L droop and L drift. Restless.   IV: DL PICC HL. PIV SL.  Labs/Electrolytes: sodium checks q4hrs, last check 2012 was 141.  Resp/trach: infrequent, dry cough. Dry mouth, frequent oral cares.  Diet: NPO. TF via NG at goal of 60ml/hr. FWF 400ml/2hrs.  Bowel status: loose BMs this morning  : voiding with incontinence, male purewick in place.  Skin: R crani incision stapled, LEONID. Lesion under tongue. abd bruising. BLE mayuri with scabbing. Perineum red, barrier cream applied.   Pain: denies  Activity: T&R q2hrs. HOB >30. Mitts on BUE for line pulling. 1:1 sitter at bedside for line pulling.  Plan: continue to monitor per orders and follow POC.

## 2021-12-30 NOTE — PROGRESS NOTES
CLINICAL NUTRITION SERVICES - REASSESSMENT NOTE     Nutrition Prescription    RECOMMENDATIONS FOR MDs/PROVIDERS TO ORDER:  -Total daily fluids/adjustments per MD     Malnutrition Status:    - Moderate malnutrition in the context of acute on chronic illness    Recommendations already ordered by Registered Dietitian (RD):  - Continue TF as ordered    Future/Additional Recommendations:  - Monitor TF tolerance (with high H20 flush vol) and adequacy of intakes  - Monitor wt trends  - Monitor for po diet adv pending SLP recommendations     EVALUATION OF THE PROGRESS TOWARD GOALS   Diet: Remains NPO for oral intakes per SLP recommendations yesterday.    Nutrition Support: TF initiated on 12/23 with Pivot 1.5 Chaim @ 10 l/hr with goal rate of 60 ml/hr (reached on 12/25).   - H20 flushes of 400 ml every 2 hrs (per Provider order on 12/26)    Intake: Ave TF intakes received x 7 days = 1096 ml which provides 1644 kcals (20 kcals/kg) and 103 g PRO (1.3 g/kg)     NEW FINDINGS   Weight: 90.4 kg (12/28), 95 kg (12/23); Wt loss of 4.6 kg (4.8% loss) x past 5 days; question d/t volume depletion given rapid loss in a short periods of time. Pt with h/o 18%,weight loss over ~ 4 months     Labs:  BUN 42 (high); but trending downward from value of 54 on 12/25  K+ and PO4 WNL  Mg++ 2.7 (high), but trending downward from value of 3.1 on 12/26  BS ranging betw 157 to 230 today - on Novolog (high resistance) every 4 hrs and Lantus (14 units) BID    Stools: multiple stools reported yesterday, but none recorded ye today as of noon,    MALNUTRITION  % Intake: Decreased intake does not meet criteria, but falls short of meeting RD's goal for adequate TF intakes  % Weight Loss: > 10% in 6 months (severe), based on h/o > 18% in 4 months   Subcutaneous Fat Loss: Facial region: mild buccal area (per RD note on 12/23 --> pt unavailable at time of visit today)  Muscle Loss: Facial & jaw region: mild, Thoracic region (clavicle, acromium bone, deltoid,  trapezius, pectoral), Upper arm (bicep, tricep), Lower arm  (forearm), Patellar region and Posterior calf: all moderate (per RD note on 12/23 --> pt unavailable at time of visit today)  Fluid Accumulation/Edema: Unable to assess  Malnutrition Diagnosis: Moderate malnutrition in the context of acute on chronic illness    Previous Goals   Total avg nutritional intake to meet a minimum of 25 kcal/kg and 1.5 g PRO/kg daily (per dosing wt 82 kg).    Evaluation: Not met    Previous Nutrition Diagnosis  Inadequate protein-energy intake related to inability to take oral PO/vented as evidenced by pt NPO meeting 0% kcals/PRO needs at present       Evaluation: Improving    CURRENT NUTRITION DIAGNOSIS  Inadequate protein-energy intake related to remains dependent on TF d/t inability to consume oral intakes, but goal infusion vol not met as evidenced by pt remains NPO for oral intakes, Ave TF intakes received x 7 days meeting only 20 kcals/kg  and 1.3 g PRO/kg and pt with wt loss h/o 18%,weight loss over ~ 4 months with additional loss of 4.6 kg (4.8% loss) x past 5 days.     INTERVENTIONS  Implementation  Collaboration with RN regarding pt's tolerance of high vol of H20 flush order.    Goals  Total avg nutritional intake to meet a minimum of 25 kcal/kg and 1.5 g PRO/kg daily (per dosing wt 82 kg).    Monitoring/Evaluation  Progress toward goals will be monitored and evaluated per protocol.      Zahida Peña RD,LD  6A pager [phone]

## 2021-12-30 NOTE — CONSULTS
"    Interventional Radiology Consult Service Note    Patient is on IR schedule 1/3/22 for a G tube placement.   Labs WNL for procedure. COVID neg.    Orders for NPO, contrast and antibiotics have been entered.   Medications to be held include: ASA, effient, subcutaneous heparin x1 dose  Consent will be done prior to procedure with wife.     Please contact the IR charge RN at [phone] for estimated time of procedure.     Case discussed with Dr. Wolfe from IR and Mirna Millan NP. This is a 81-year-old male post-traumatic acute right subdural hematoma with left hemibody weakness, on xarelto and plavix. Admitted to the ICU on 12/20/21. Taken to OR 12/21/2021. Extubated 12/22/2021. Started on Aspirin and Prasugrel on 12/28/21. Failed SLP evaluation. Tolerating NG tube feedings. IR is consulted for G tube placement for outpatient enteral feeding. Team will hold ASA and effient. Of note, pt does have a listed contrast \"allergy\" of nausea. This is not considered an allergy and we will proceed with administration of iodinated contrast.    Any percutaneous G tube placed by IR must remain in x6 weeks before it can be safely removed even if tube is no longer needed.    Expected date of discharge: TBD    Vitals:   BP (!) 143/82   Pulse 78   Temp (!) 95.8  F (35.4  C) (Axillary)   Resp 20   Wt 90.4 kg (199 lb 4.7 oz)   SpO2 98%   BMI 27.80 kg/m      Pertinent Labs:     Lab Results   Component Value Date    WBC 8.8 12/30/2021    WBC 8.1 12/29/2021    WBC 8.6 12/28/2021       Lab Results   Component Value Date    HGB 7.8 12/30/2021    HGB 7.5 12/29/2021    HGB 7.4 12/28/2021       Lab Results   Component Value Date     12/30/2021     12/29/2021     12/28/2021       Lab Results   Component Value Date    INR 1.32 (H) 12/20/2021    PTT 37 12/20/2021       Lab Results   Component Value Date    POTASSIUM 4.2 12/30/2021        JOSE ANTONIO Carvalho CNP  Interventional Radiology  Pager: [phone]    "

## 2021-12-30 NOTE — PROGRESS NOTES
Care from [id number]    Status: Admitted w/ post-traumatic acute R SDH with L hemibody weakness. Admitted to ICU 12/20. OR 12/21 for evacuation. Extubated 12/22.  Vitals: VSS on RA  Neuros: A&O x 3-4. Sometimes disoriented to place and/or situation. Waxes and wanes. Garbled speech. L pupil 3 mm fixed & irregular. R pupil 2 mm brisk & round. Slight L facial droop. L side 4/5, R side 5/5. Restless.   IV: DL PICC HL. PIV SL.  Resp/trach: Very dry mouth. Frequent oral cares  Diet: NPO. TF via NG @ goal of 60 mLhr. FWF 200mL q4hr.  Bowel status: Large BM this shift  : Voiding incontinently via external purewick  Skin: BLE mayuri. Abdominal bruising. R crani incision with staples LEONID.  Lesion under tongue. Scabbing throughout BLE. Redness to periarea- barrier cream applied  Pain: Denies  Activity: T&R q2h. HOB > 30. Mitts on BUE for line pulling- removed periodically throughout the shift. Up in recliner x1  Social: Wife Kaley at bedside. 1:1 sitter in place  Plan: Continue to monitor and follow POC. G-tube placement in IR Monday, 01/03. Contrast in med bin.

## 2021-12-31 ENCOUNTER — APPOINTMENT (OUTPATIENT)
Dept: GENERAL RADIOLOGY | Facility: CLINIC | Age: 81
DRG: 025 | End: 2021-12-31
Attending: STUDENT IN AN ORGANIZED HEALTH CARE EDUCATION/TRAINING PROGRAM
Payer: COMMERCIAL

## 2021-12-31 ENCOUNTER — APPOINTMENT (OUTPATIENT)
Dept: SPEECH THERAPY | Facility: CLINIC | Age: 81
DRG: 025 | End: 2021-12-31
Payer: COMMERCIAL

## 2021-12-31 LAB
ANION GAP SERPL CALCULATED.3IONS-SCNC: 9 MMOL/L (ref 3–14)
BUN SERPL-MCNC: 37 MG/DL (ref 7–30)
CALCIUM SERPL-MCNC: 8.5 MG/DL (ref 8.5–10.1)
CHLORIDE BLD-SCNC: 109 MMOL/L (ref 94–109)
CO2 SERPL-SCNC: 22 MMOL/L (ref 20–32)
CREAT SERPL-MCNC: 1.16 MG/DL (ref 0.66–1.25)
ERYTHROCYTE [DISTWIDTH] IN BLOOD BY AUTOMATED COUNT: 20.6 % (ref 10–15)
GFR SERPL CREATININE-BSD FRML MDRD: 64 ML/MIN/1.73M2
GLUCOSE BLD-MCNC: 204 MG/DL (ref 70–99)
GLUCOSE BLDC GLUCOMTR-MCNC: 145 MG/DL (ref 70–99)
GLUCOSE BLDC GLUCOMTR-MCNC: 181 MG/DL (ref 70–99)
GLUCOSE BLDC GLUCOMTR-MCNC: 187 MG/DL (ref 70–99)
GLUCOSE BLDC GLUCOMTR-MCNC: 196 MG/DL (ref 70–99)
HCT VFR BLD AUTO: 27.6 % (ref 40–53)
HGB BLD-MCNC: 7.7 G/DL (ref 13.3–17.7)
MAGNESIUM SERPL-MCNC: 2.6 MG/DL (ref 1.6–2.3)
MCH RBC QN AUTO: 21 PG (ref 26.5–33)
MCHC RBC AUTO-ENTMCNC: 27.9 G/DL (ref 31.5–36.5)
MCV RBC AUTO: 75 FL (ref 78–100)
PHOSPHATE SERPL-MCNC: 3.1 MG/DL (ref 2.5–4.5)
PLATELET # BLD AUTO: 116 10E3/UL (ref 150–450)
POTASSIUM BLD-SCNC: 3.9 MMOL/L (ref 3.4–5.3)
RBC # BLD AUTO: 3.67 10E6/UL (ref 4.4–5.9)
SODIUM SERPL-SCNC: 140 MMOL/L (ref 133–144)
SODIUM SERPL-SCNC: 140 MMOL/L (ref 133–144)
SODIUM SERPL-SCNC: 143 MMOL/L (ref 133–144)
WBC # BLD AUTO: 9.4 10E3/UL (ref 4–11)

## 2021-12-31 PROCEDURE — 250N000013 HC RX MED GY IP 250 OP 250 PS 637: Performed by: NEUROLOGICAL SURGERY

## 2021-12-31 PROCEDURE — 250N000013 HC RX MED GY IP 250 OP 250 PS 637: Performed by: STUDENT IN AN ORGANIZED HEALTH CARE EDUCATION/TRAINING PROGRAM

## 2021-12-31 PROCEDURE — 84100 ASSAY OF PHOSPHORUS: CPT | Performed by: STUDENT IN AN ORGANIZED HEALTH CARE EDUCATION/TRAINING PROGRAM

## 2021-12-31 PROCEDURE — 250N000013 HC RX MED GY IP 250 OP 250 PS 637: Performed by: PSYCHIATRY & NEUROLOGY

## 2021-12-31 PROCEDURE — 74018 RADEX ABDOMEN 1 VIEW: CPT | Mod: 26 | Performed by: STUDENT IN AN ORGANIZED HEALTH CARE EDUCATION/TRAINING PROGRAM

## 2021-12-31 PROCEDURE — 250N000013 HC RX MED GY IP 250 OP 250 PS 637: Performed by: NURSE PRACTITIONER

## 2021-12-31 PROCEDURE — 85027 COMPLETE CBC AUTOMATED: CPT | Performed by: STUDENT IN AN ORGANIZED HEALTH CARE EDUCATION/TRAINING PROGRAM

## 2021-12-31 PROCEDURE — 36592 COLLECT BLOOD FROM PICC: CPT | Performed by: NURSE PRACTITIONER

## 2021-12-31 PROCEDURE — 84295 ASSAY OF SERUM SODIUM: CPT | Performed by: NURSE PRACTITIONER

## 2021-12-31 PROCEDURE — 250N000011 HC RX IP 250 OP 636: Performed by: STUDENT IN AN ORGANIZED HEALTH CARE EDUCATION/TRAINING PROGRAM

## 2021-12-31 PROCEDURE — 84295 ASSAY OF SERUM SODIUM: CPT | Performed by: STUDENT IN AN ORGANIZED HEALTH CARE EDUCATION/TRAINING PROGRAM

## 2021-12-31 PROCEDURE — 92526 ORAL FUNCTION THERAPY: CPT | Mod: GN

## 2021-12-31 PROCEDURE — 71045 X-RAY EXAM CHEST 1 VIEW: CPT | Mod: 26 | Performed by: RADIOLOGY

## 2021-12-31 PROCEDURE — 83735 ASSAY OF MAGNESIUM: CPT | Performed by: STUDENT IN AN ORGANIZED HEALTH CARE EDUCATION/TRAINING PROGRAM

## 2021-12-31 PROCEDURE — 74018 RADEX ABDOMEN 1 VIEW: CPT

## 2021-12-31 PROCEDURE — 250N000013 HC RX MED GY IP 250 OP 250 PS 637

## 2021-12-31 PROCEDURE — 120N000002 HC R&B MED SURG/OB UMMC

## 2021-12-31 PROCEDURE — 71045 X-RAY EXAM CHEST 1 VIEW: CPT

## 2021-12-31 PROCEDURE — 36592 COLLECT BLOOD FROM PICC: CPT | Performed by: STUDENT IN AN ORGANIZED HEALTH CARE EDUCATION/TRAINING PROGRAM

## 2021-12-31 RX ORDER — FUROSEMIDE 10 MG/ML
40 INJECTION INTRAMUSCULAR; INTRAVENOUS ONCE
Status: COMPLETED | OUTPATIENT
Start: 2021-12-31 | End: 2021-12-31

## 2021-12-31 RX ORDER — HALOPERIDOL 5 MG/ML
2 INJECTION INTRAMUSCULAR ONCE
Status: DISCONTINUED | OUTPATIENT
Start: 2021-12-31 | End: 2022-01-01

## 2021-12-31 RX ORDER — QUETIAPINE FUMARATE 100 MG/1
100 TABLET, FILM COATED ORAL EVERY EVENING
Status: DISCONTINUED | OUTPATIENT
Start: 2021-12-31 | End: 2022-01-01

## 2021-12-31 RX ORDER — HALOPERIDOL 5 MG/ML
2 INJECTION INTRAMUSCULAR ONCE
Status: COMPLETED | OUTPATIENT
Start: 2021-12-31 | End: 2021-12-31

## 2021-12-31 RX ORDER — QUETIAPINE FUMARATE 50 MG/1
50 TABLET, FILM COATED ORAL EVERY MORNING
Status: DISCONTINUED | OUTPATIENT
Start: 2021-12-31 | End: 2022-01-01

## 2021-12-31 RX ADMIN — Medication 5 ML: at 12:01

## 2021-12-31 RX ADMIN — INSULIN ASPART 2 UNITS: 100 INJECTION, SOLUTION INTRAVENOUS; SUBCUTANEOUS at 20:55

## 2021-12-31 RX ADMIN — INSULIN ASPART 2 UNITS: 100 INJECTION, SOLUTION INTRAVENOUS; SUBCUTANEOUS at 17:36

## 2021-12-31 RX ADMIN — Medication 10 MG: at 17:36

## 2021-12-31 RX ADMIN — ATORVASTATIN CALCIUM 80 MG: 40 TABLET, FILM COATED ORAL at 11:54

## 2021-12-31 RX ADMIN — Medication 5 ML: at 11:58

## 2021-12-31 RX ADMIN — QUETIAPINE FUMARATE 25 MG: 25 TABLET ORAL at 22:26

## 2021-12-31 RX ADMIN — Medication 5 ML: at 17:54

## 2021-12-31 RX ADMIN — FERROUS GLUCONATE 324 MG: 324 TABLET ORAL at 11:51

## 2021-12-31 RX ADMIN — QUETIAPINE 50 MG: 50 TABLET, FILM COATED ORAL at 12:00

## 2021-12-31 RX ADMIN — METOPROLOL TARTRATE 25 MG: 25 TABLET, FILM COATED ORAL at 20:49

## 2021-12-31 RX ADMIN — HEPARIN SODIUM 5000 UNITS: 10000 INJECTION, SOLUTION INTRAVENOUS; SUBCUTANEOUS at 04:26

## 2021-12-31 RX ADMIN — LEVETIRACETAM 750 MG: 750 TABLET ORAL at 20:49

## 2021-12-31 RX ADMIN — HEPARIN SODIUM 5000 UNITS: 10000 INJECTION, SOLUTION INTRAVENOUS; SUBCUTANEOUS at 20:56

## 2021-12-31 RX ADMIN — INSULIN ASPART 1 UNITS: 100 INJECTION, SOLUTION INTRAVENOUS; SUBCUTANEOUS at 12:31

## 2021-12-31 RX ADMIN — LACOSAMIDE 200 MG: 50 TABLET, FILM COATED ORAL at 20:47

## 2021-12-31 RX ADMIN — HEPARIN SODIUM 5000 UNITS: 10000 INJECTION, SOLUTION INTRAVENOUS; SUBCUTANEOUS at 12:00

## 2021-12-31 RX ADMIN — BUMETANIDE 4 MG: 2 TABLET ORAL at 11:52

## 2021-12-31 RX ADMIN — ROPINIROLE HYDROCHLORIDE 2 MG: 2 TABLET, FILM COATED ORAL at 20:47

## 2021-12-31 RX ADMIN — INSULIN ASPART 2 UNITS: 100 INJECTION, SOLUTION INTRAVENOUS; SUBCUTANEOUS at 23:55

## 2021-12-31 RX ADMIN — ALBUTEROL SULFATE 2 PUFF: 90 AEROSOL, METERED RESPIRATORY (INHALATION) at 23:00

## 2021-12-31 RX ADMIN — ACETAMINOPHEN 650 MG: 325 TABLET, FILM COATED ORAL at 22:26

## 2021-12-31 RX ADMIN — LEVETIRACETAM 750 MG: 750 TABLET ORAL at 11:50

## 2021-12-31 RX ADMIN — MULTIVIT AND MINERALS-FERROUS GLUCONATE 9 MG IRON/15 ML ORAL LIQUID 15 ML: at 11:55

## 2021-12-31 RX ADMIN — METOPROLOL TARTRATE 25 MG: 25 TABLET, FILM COATED ORAL at 11:51

## 2021-12-31 RX ADMIN — INSULIN ASPART 3 UNITS: 100 INJECTION, SOLUTION INTRAVENOUS; SUBCUTANEOUS at 04:26

## 2021-12-31 RX ADMIN — POLYETHYLENE GLYCOL 3350 17 G: 17 POWDER, FOR SOLUTION ORAL at 11:59

## 2021-12-31 RX ADMIN — QUETIAPINE FUMARATE 100 MG: 100 TABLET ORAL at 20:49

## 2021-12-31 RX ADMIN — Medication 40 MG: at 11:52

## 2021-12-31 RX ADMIN — ACETAMINOPHEN 650 MG: 325 TABLET, FILM COATED ORAL at 04:26

## 2021-12-31 RX ADMIN — HALOPERIDOL LACTATE 2 MG: 5 INJECTION, SOLUTION INTRAMUSCULAR at 22:54

## 2021-12-31 RX ADMIN — FUROSEMIDE 40 MG: 10 INJECTION, SOLUTION INTRAVENOUS at 23:49

## 2021-12-31 RX ADMIN — IRBESARTAN 150 MG: 150 TABLET ORAL at 11:53

## 2021-12-31 RX ADMIN — LACOSAMIDE 200 MG: 50 TABLET, FILM COATED ORAL at 12:01

## 2021-12-31 ASSESSMENT — ACTIVITIES OF DAILY LIVING (ADL)
ADLS_ACUITY_SCORE: 21
ADLS_ACUITY_SCORE: 20
ADLS_ACUITY_SCORE: 20
ADLS_ACUITY_SCORE: 21
ADLS_ACUITY_SCORE: 20
ADLS_ACUITY_SCORE: 21
ADLS_ACUITY_SCORE: 21
ADLS_ACUITY_SCORE: 20
ADLS_ACUITY_SCORE: 21
ADLS_ACUITY_SCORE: 20
ADLS_ACUITY_SCORE: 21
ADLS_ACUITY_SCORE: 20
ADLS_ACUITY_SCORE: 17
ADLS_ACUITY_SCORE: 17
ADLS_ACUITY_SCORE: 21

## 2021-12-31 NOTE — PLAN OF CARE
[id number]  Status: Admitted w/ post-traumatic acute R SDH with L hemibody weakness. Admitted to ICU 12/20. OR 12/21 for evacuation. Extubated 12/22.  Vitals: VSS on 2 L NC. On continuous pulse ox.   Neuros: A&Ox2-4. During episodes of agitation/restlessness, patient d/o x3. Waxes & wanes. Garbled speech. L pupil 3 mm fixed & irregular. Slight L facial droop/L drift. L side 4/5, R side 5/5. PRN Seroquel given for episode of agitation. Patient was attempting to hit/kick staff.   IV: DL PICC HL. PIV SL.  Resp/trach: Very dry mouth w/ white lesions. Frequent oral cares  Diet: NPO. TF via NG @ goal of 60 mLhr. FWF 200mL q4hr. Per SLP ok to give ice chips.   Bowel status: LBM 12/30  : voiding incontinently.   Skin: BLE mayuri. Abdominal bruising. R crani incision with staples LEONID.  Air pocket/boggy area near incision, NSG notified. Lesion under tongue. Scabbing throughout BLE. Redness to periarea- barrier cream applied  Pain: Denies. Tylenol given for restlessness.   Activity: A2 w/ lift. Repo q 2 hours. BUE mitts on periodically throughout shift. 1:1 for agitation, impulsivity, and line pulling.   Plan: G-tube placement in IR Monday, 01/03. Contrast in med bin.  Updates this shift: FWF changed to 100 q 4 hours. RN noticed that NG tube was at 45 when landmark was originally at 60. RN advanced it back. XR requested by NSG.

## 2022-01-01 ENCOUNTER — TRANSITIONAL CARE UNIT VISIT (OUTPATIENT)
Dept: GERIATRICS | Facility: CLINIC | Age: 82
End: 2022-01-01

## 2022-01-01 ENCOUNTER — LAB REQUISITION (OUTPATIENT)
Dept: LAB | Facility: CLINIC | Age: 82
End: 2022-01-01
Payer: COMMERCIAL

## 2022-01-01 ENCOUNTER — LAB REQUISITION (OUTPATIENT)
Dept: LAB | Facility: CLINIC | Age: 82
End: 2022-01-01

## 2022-01-01 ENCOUNTER — APPOINTMENT (OUTPATIENT)
Dept: RADIOLOGY | Facility: CLINIC | Age: 82
DRG: 291 | End: 2022-01-01
Attending: EMERGENCY MEDICINE
Payer: COMMERCIAL

## 2022-01-01 ENCOUNTER — APPOINTMENT (OUTPATIENT)
Dept: ULTRASOUND IMAGING | Facility: CLINIC | Age: 82
DRG: 291 | End: 2022-01-01
Attending: EMERGENCY MEDICINE
Payer: COMMERCIAL

## 2022-01-01 ENCOUNTER — HOSPITAL ENCOUNTER (EMERGENCY)
Facility: CLINIC | Age: 82
Discharge: HOME | End: 2022-02-18
Attending: EMERGENCY MEDICINE | Admitting: EMERGENCY MEDICINE
Payer: COMMERCIAL

## 2022-01-01 ENCOUNTER — CARE COORDINATION (OUTPATIENT)
Dept: NEUROSURGERY | Facility: CLINIC | Age: 82
End: 2022-01-01

## 2022-01-01 ENCOUNTER — VIRTUAL VISIT (OUTPATIENT)
Dept: NEUROSURGERY | Facility: CLINIC | Age: 82
End: 2022-01-01
Payer: COMMERCIAL

## 2022-01-01 ENCOUNTER — HEALTH MAINTENANCE LETTER (OUTPATIENT)
Age: 82
End: 2022-01-01

## 2022-01-01 ENCOUNTER — TELEPHONE (OUTPATIENT)
Dept: GERIATRICS | Facility: CLINIC | Age: 82
End: 2022-01-01

## 2022-01-01 ENCOUNTER — TELEPHONE (OUTPATIENT)
Dept: NEUROSURGERY | Facility: CLINIC | Age: 82
End: 2022-01-01
Payer: COMMERCIAL

## 2022-01-01 ENCOUNTER — OFFICE VISIT (OUTPATIENT)
Dept: GASTROENTEROLOGY | Facility: CLINIC | Age: 82
End: 2022-01-01
Attending: INTERNAL MEDICINE
Payer: COMMERCIAL

## 2022-01-01 ENCOUNTER — LAB (OUTPATIENT)
Dept: FAMILY MEDICINE | Facility: CLINIC | Age: 82
End: 2022-01-01
Attending: FAMILY MEDICINE
Payer: COMMERCIAL

## 2022-01-01 ENCOUNTER — APPOINTMENT (OUTPATIENT)
Dept: OCCUPATIONAL THERAPY | Facility: CLINIC | Age: 82
DRG: 291 | End: 2022-01-01
Attending: FAMILY MEDICINE
Payer: COMMERCIAL

## 2022-01-01 ENCOUNTER — APPOINTMENT (OUTPATIENT)
Dept: SPEECH THERAPY | Facility: CLINIC | Age: 82
DRG: 025 | End: 2022-01-01
Payer: COMMERCIAL

## 2022-01-01 ENCOUNTER — TRANSCRIBE ORDERS (OUTPATIENT)
Dept: OTHER | Age: 82
End: 2022-01-01
Payer: COMMERCIAL

## 2022-01-01 ENCOUNTER — TRANSITIONAL CARE UNIT VISIT (OUTPATIENT)
Dept: GERIATRICS | Facility: CLINIC | Age: 82
End: 2022-01-01
Payer: COMMERCIAL

## 2022-01-01 ENCOUNTER — APPOINTMENT (OUTPATIENT)
Dept: GENERAL RADIOLOGY | Facility: CLINIC | Age: 82
DRG: 025 | End: 2022-01-01
Attending: STUDENT IN AN ORGANIZED HEALTH CARE EDUCATION/TRAINING PROGRAM
Payer: COMMERCIAL

## 2022-01-01 ENCOUNTER — PRE VISIT (OUTPATIENT)
Dept: GASTROENTEROLOGY | Facility: CLINIC | Age: 82
End: 2022-01-01
Payer: COMMERCIAL

## 2022-01-01 ENCOUNTER — OFFICE VISIT (OUTPATIENT)
Dept: PODIATRY | Facility: CLINIC | Age: 82
End: 2022-01-01
Payer: COMMERCIAL

## 2022-01-01 ENCOUNTER — PATIENT OUTREACH (OUTPATIENT)
Dept: CARE COORDINATION | Facility: CLINIC | Age: 82
End: 2022-01-01

## 2022-01-01 ENCOUNTER — APPOINTMENT (OUTPATIENT)
Dept: OCCUPATIONAL THERAPY | Facility: CLINIC | Age: 82
DRG: 291 | End: 2022-01-01
Payer: COMMERCIAL

## 2022-01-01 ENCOUNTER — LAB (OUTPATIENT)
Dept: LAB | Facility: CLINIC | Age: 82
End: 2022-01-01
Attending: INTERNAL MEDICINE
Payer: COMMERCIAL

## 2022-01-01 ENCOUNTER — ANCILLARY PROCEDURE (OUTPATIENT)
Dept: ULTRASOUND IMAGING | Facility: CLINIC | Age: 82
End: 2022-01-01
Attending: FAMILY MEDICINE
Payer: COMMERCIAL

## 2022-01-01 ENCOUNTER — TELEPHONE (OUTPATIENT)
Dept: GASTROENTEROLOGY | Facility: CLINIC | Age: 82
End: 2022-01-01
Payer: COMMERCIAL

## 2022-01-01 VITALS
OXYGEN SATURATION: 94 % | TEMPERATURE: 96.3 F | HEART RATE: 74 BPM | RESPIRATION RATE: 20 BRPM | WEIGHT: 225 LBS | BODY MASS INDEX: 31.5 KG/M2 | HEIGHT: 71 IN | SYSTOLIC BLOOD PRESSURE: 146 MMHG | DIASTOLIC BLOOD PRESSURE: 82 MMHG

## 2022-01-01 VITALS
RESPIRATION RATE: 16 BRPM | DIASTOLIC BLOOD PRESSURE: 52 MMHG | BODY MASS INDEX: 31.14 KG/M2 | SYSTOLIC BLOOD PRESSURE: 132 MMHG | TEMPERATURE: 97.7 F | HEART RATE: 83 BPM | HEIGHT: 73 IN | WEIGHT: 235 LBS | OXYGEN SATURATION: 97 %

## 2022-01-01 VITALS
OXYGEN SATURATION: 98 % | SYSTOLIC BLOOD PRESSURE: 97 MMHG | TEMPERATURE: 98.1 F | DIASTOLIC BLOOD PRESSURE: 51 MMHG | WEIGHT: 250 LBS | HEIGHT: 73 IN | BODY MASS INDEX: 33.13 KG/M2 | RESPIRATION RATE: 18 BRPM | HEART RATE: 69 BPM

## 2022-01-01 VITALS
SYSTOLIC BLOOD PRESSURE: 100 MMHG | DIASTOLIC BLOOD PRESSURE: 62 MMHG | WEIGHT: 240 LBS | HEART RATE: 62 BPM | BODY MASS INDEX: 33.47 KG/M2 | OXYGEN SATURATION: 97 %

## 2022-01-01 VITALS
OXYGEN SATURATION: 96 % | TEMPERATURE: 98.4 F | RESPIRATION RATE: 16 BRPM | DIASTOLIC BLOOD PRESSURE: 76 MMHG | HEIGHT: 71 IN | HEART RATE: 82 BPM | BODY MASS INDEX: 33.73 KG/M2 | WEIGHT: 240.9 LBS | SYSTOLIC BLOOD PRESSURE: 160 MMHG

## 2022-01-01 VITALS
DIASTOLIC BLOOD PRESSURE: 66 MMHG | HEIGHT: 71 IN | TEMPERATURE: 97.7 F | HEART RATE: 67 BPM | WEIGHT: 238.4 LBS | OXYGEN SATURATION: 100 % | SYSTOLIC BLOOD PRESSURE: 123 MMHG | RESPIRATION RATE: 18 BRPM | BODY MASS INDEX: 33.38 KG/M2

## 2022-01-01 VITALS
HEART RATE: 77 BPM | TEMPERATURE: 97.9 F | DIASTOLIC BLOOD PRESSURE: 65 MMHG | SYSTOLIC BLOOD PRESSURE: 126 MMHG | RESPIRATION RATE: 16 BRPM | OXYGEN SATURATION: 94 % | WEIGHT: 223 LBS | BODY MASS INDEX: 31.22 KG/M2 | HEIGHT: 71 IN

## 2022-01-01 VITALS — HEART RATE: 69 BPM | DIASTOLIC BLOOD PRESSURE: 62 MMHG | SYSTOLIC BLOOD PRESSURE: 120 MMHG

## 2022-01-01 DIAGNOSIS — E11.621 DIABETIC ULCER OF TOE OF LEFT FOOT ASSOCIATED WITH TYPE 2 DIABETES MELLITUS, UNSPECIFIED ULCER STAGE (H): Primary | ICD-10-CM

## 2022-01-01 DIAGNOSIS — I50.33 ACUTE ON CHRONIC DIASTOLIC (CONGESTIVE) HEART FAILURE (H): ICD-10-CM

## 2022-01-01 DIAGNOSIS — Z79.4 TYPE 2 DIABETES MELLITUS WITH OTHER SPECIFIED COMPLICATION, WITH LONG-TERM CURRENT USE OF INSULIN (H): Chronic | ICD-10-CM

## 2022-01-01 DIAGNOSIS — I10 HYPERTENSION, UNSPECIFIED TYPE: ICD-10-CM

## 2022-01-01 DIAGNOSIS — S06.5XAA SUBDURAL HEMATOMA (H): Primary | ICD-10-CM

## 2022-01-01 DIAGNOSIS — I13.0 HYPERTENSIVE HEART AND CHRONIC KIDNEY DISEASE WITH HEART FAILURE AND STAGE 1 THROUGH STAGE 4 CHRONIC KIDNEY DISEASE, OR UNSPECIFIED CHRONIC KIDNEY DISEASE (H): ICD-10-CM

## 2022-01-01 DIAGNOSIS — Z11.59 ENCOUNTER FOR SCREENING FOR OTHER VIRAL DISEASES: Primary | ICD-10-CM

## 2022-01-01 DIAGNOSIS — N18.31 STAGE 3A CHRONIC KIDNEY DISEASE (H): ICD-10-CM

## 2022-01-01 DIAGNOSIS — I50.42 CHRONIC COMBINED SYSTOLIC (CONGESTIVE) AND DIASTOLIC (CONGESTIVE) HEART FAILURE (H): ICD-10-CM

## 2022-01-01 DIAGNOSIS — Z01.818 ENCOUNTER FOR OTHER PREPROCEDURAL EXAMINATION: ICD-10-CM

## 2022-01-01 DIAGNOSIS — R52 PAIN MANAGEMENT: ICD-10-CM

## 2022-01-01 DIAGNOSIS — L57.0 KERATOMA: ICD-10-CM

## 2022-01-01 DIAGNOSIS — L74.4 ANHIDROSIS: Primary | ICD-10-CM

## 2022-01-01 DIAGNOSIS — G40.909 EPILEPSY, UNSPECIFIED, NOT INTRACTABLE, WITHOUT STATUS EPILEPTICUS (H): ICD-10-CM

## 2022-01-01 DIAGNOSIS — E11.65 TYPE 2 DIABETES MELLITUS WITH HYPERGLYCEMIA (H): ICD-10-CM

## 2022-01-01 DIAGNOSIS — R53.81 PHYSICAL DECONDITIONING: ICD-10-CM

## 2022-01-01 DIAGNOSIS — Z71.89 OTHER SPECIFIED COUNSELING: ICD-10-CM

## 2022-01-01 DIAGNOSIS — N18.4 CHRONIC KIDNEY DISEASE, STAGE 4 (SEVERE) (H): ICD-10-CM

## 2022-01-01 DIAGNOSIS — I73.9 PAD (PERIPHERAL ARTERY DISEASE) (H): ICD-10-CM

## 2022-01-01 DIAGNOSIS — R53.81 PHYSICAL DECONDITIONING: Primary | ICD-10-CM

## 2022-01-01 DIAGNOSIS — E11.621 DIABETIC ULCER OF TOE OF LEFT FOOT ASSOCIATED WITH TYPE 2 DIABETES MELLITUS, UNSPECIFIED ULCER STAGE (H): ICD-10-CM

## 2022-01-01 DIAGNOSIS — Z93.1 PEG (PERCUTANEOUS ENDOSCOPIC GASTROSTOMY) STATUS (H): ICD-10-CM

## 2022-01-01 DIAGNOSIS — J44.9 CHRONIC OBSTRUCTIVE PULMONARY DISEASE, UNSPECIFIED (H): ICD-10-CM

## 2022-01-01 DIAGNOSIS — M20.42 HAMMER TOES OF BOTH FEET: ICD-10-CM

## 2022-01-01 DIAGNOSIS — I95.9 HYPOTENSION, UNSPECIFIED HYPOTENSION TYPE: ICD-10-CM

## 2022-01-01 DIAGNOSIS — M20.41 HAMMER TOES OF BOTH FEET: ICD-10-CM

## 2022-01-01 DIAGNOSIS — D64.9 ANEMIA, UNSPECIFIED: ICD-10-CM

## 2022-01-01 DIAGNOSIS — L97.529 DIABETIC ULCER OF TOE OF LEFT FOOT ASSOCIATED WITH TYPE 2 DIABETES MELLITUS, UNSPECIFIED ULCER STAGE (H): ICD-10-CM

## 2022-01-01 DIAGNOSIS — D64.9 CHRONIC ANEMIA: ICD-10-CM

## 2022-01-01 DIAGNOSIS — M79.89 OTHER SPECIFIED SOFT TISSUE DISORDERS: ICD-10-CM

## 2022-01-01 DIAGNOSIS — L97.529 DIABETIC ULCER OF TOE OF LEFT FOOT ASSOCIATED WITH TYPE 2 DIABETES MELLITUS, UNSPECIFIED ULCER STAGE (H): Primary | ICD-10-CM

## 2022-01-01 DIAGNOSIS — I50.33 ACUTE ON CHRONIC DIASTOLIC CONGESTIVE HEART FAILURE (H): ICD-10-CM

## 2022-01-01 DIAGNOSIS — L97.519 DIABETIC ULCER OF TOE OF RIGHT FOOT ASSOCIATED WITH TYPE 2 DIABETES MELLITUS, UNSPECIFIED ULCER STAGE (H): ICD-10-CM

## 2022-01-01 DIAGNOSIS — E87.6 HYPOKALEMIA: ICD-10-CM

## 2022-01-01 DIAGNOSIS — I60.9 SAH (SUBARACHNOID HEMORRHAGE) (H): ICD-10-CM

## 2022-01-01 DIAGNOSIS — R93.5 ABNORMAL FINDINGS ON DIAGNOSTIC IMAGING OF OTHER ABDOMINAL REGIONS, INCLUDING RETROPERITONEUM: ICD-10-CM

## 2022-01-01 DIAGNOSIS — M19.041 PRIMARY OSTEOARTHRITIS, RIGHT HAND: ICD-10-CM

## 2022-01-01 DIAGNOSIS — R41.89 ALTERATION IN COGNITION: ICD-10-CM

## 2022-01-01 DIAGNOSIS — R63.5 WEIGHT GAIN: ICD-10-CM

## 2022-01-01 DIAGNOSIS — E11.69 TYPE 2 DIABETES MELLITUS WITH OTHER SPECIFIED COMPLICATION, WITH LONG-TERM CURRENT USE OF INSULIN (H): Chronic | ICD-10-CM

## 2022-01-01 DIAGNOSIS — E11.621 DIABETIC ULCER OF TOE OF RIGHT FOOT ASSOCIATED WITH TYPE 2 DIABETES MELLITUS, UNSPECIFIED ULCER STAGE (H): ICD-10-CM

## 2022-01-01 DIAGNOSIS — I50.9 ACUTE ON CHRONIC CONGESTIVE HEART FAILURE, UNSPECIFIED HEART FAILURE TYPE (H): ICD-10-CM

## 2022-01-01 DIAGNOSIS — Z11.59 ENCOUNTER FOR SCREENING FOR OTHER VIRAL DISEASES: ICD-10-CM

## 2022-01-01 DIAGNOSIS — K76.1 CHRONIC PASSIVE HEPATIC CONGESTION: Primary | ICD-10-CM

## 2022-01-01 DIAGNOSIS — K74.69 OTHER CIRRHOSIS OF LIVER (H): ICD-10-CM

## 2022-01-01 DIAGNOSIS — N18.30 CHRONIC KIDNEY DISEASE, STAGE 3 UNSPECIFIED (H): ICD-10-CM

## 2022-01-01 DIAGNOSIS — Z51.81 ENCOUNTER FOR THERAPEUTIC DRUG LEVEL MONITORING: ICD-10-CM

## 2022-01-01 LAB
AFP SERPL-MCNC: <1.5 UG/L (ref 0–8)
ALBUMIN SERPL BCG-MCNC: 3.7 G/DL (ref 3.5–5.2)
ALBUMIN SERPL BCG-MCNC: 4 G/DL (ref 3.5–5.2)
ALBUMIN SERPL-MCNC: 3.3 G/DL (ref 3.4–5)
ALBUMIN SERPL-MCNC: 3.3 G/DL (ref 3.5–5)
ALBUMIN SERPL-MCNC: 3.4 G/DL (ref 3.5–5)
ALP SERPL-CCNC: 133 U/L (ref 45–120)
ALP SERPL-CCNC: 138 U/L (ref 45–120)
ALP SERPL-CCNC: 150 U/L (ref 40–150)
ALP SERPL-CCNC: 157 U/L (ref 40–129)
ALP SERPL-CCNC: 162 U/L (ref 40–129)
ALT SERPL W P-5'-P-CCNC: 14 U/L (ref 10–50)
ALT SERPL W P-5'-P-CCNC: 16 U/L (ref 0–70)
ALT SERPL W P-5'-P-CCNC: 20 U/L (ref 10–50)
ALT SERPL W P-5'-P-CCNC: 9 U/L (ref 0–45)
ALT SERPL W P-5'-P-CCNC: <9 U/L (ref 0–45)
ANION GAP SERPL CALCULATED.3IONS-SCNC: 10 MMOL/L (ref 5–18)
ANION GAP SERPL CALCULATED.3IONS-SCNC: 10 MMOL/L (ref 5–18)
ANION GAP SERPL CALCULATED.3IONS-SCNC: 11 MMOL/L (ref 3–14)
ANION GAP SERPL CALCULATED.3IONS-SCNC: 11 MMOL/L (ref 5–18)
ANION GAP SERPL CALCULATED.3IONS-SCNC: 11 MMOL/L (ref 7–15)
ANION GAP SERPL CALCULATED.3IONS-SCNC: 13 MMOL/L (ref 5–18)
ANION GAP SERPL CALCULATED.3IONS-SCNC: 14 MMOL/L (ref 5–18)
ANION GAP SERPL CALCULATED.3IONS-SCNC: 14 MMOL/L (ref 7–15)
ANION GAP SERPL CALCULATED.3IONS-SCNC: 14 MMOL/L (ref 7–15)
ANION GAP SERPL CALCULATED.3IONS-SCNC: 15 MMOL/L (ref 7–15)
ANION GAP SERPL CALCULATED.3IONS-SCNC: 16 MMOL/L (ref 5–18)
ANION GAP SERPL CALCULATED.3IONS-SCNC: 20 MMOL/L (ref 7–15)
ANION GAP SERPL CALCULATED.3IONS-SCNC: 6 MMOL/L (ref 3–14)
AST SERPL W P-5'-P-CCNC: 15 U/L (ref 0–40)
AST SERPL W P-5'-P-CCNC: 17 U/L (ref 0–40)
AST SERPL W P-5'-P-CCNC: 18 U/L (ref 10–50)
AST SERPL W P-5'-P-CCNC: 19 U/L (ref 0–45)
AST SERPL W P-5'-P-CCNC: 26 U/L (ref 10–50)
ATRIAL RATE - MUSE: 340 BPM
ATRIAL RATE - MUSE: 53 BPM
ATRIAL RATE - MUSE: 84 BPM
BASOPHILS # BLD AUTO: 0 10E3/UL (ref 0–0.2)
BASOPHILS NFR BLD AUTO: 0 %
BASOPHILS NFR BLD AUTO: 0 %
BASOPHILS NFR BLD AUTO: 1 %
BASOPHILS NFR BLD AUTO: 1 %
BILIRUB DIRECT SERPL-MCNC: 0.5 MG/DL (ref 0–0.2)
BILIRUB DIRECT SERPL-MCNC: <0.2 MG/DL (ref 0–0.3)
BILIRUB SERPL-MCNC: 0.6 MG/DL
BILIRUB SERPL-MCNC: 1.1 MG/DL
BILIRUB SERPL-MCNC: 1.4 MG/DL (ref 0–1)
BILIRUB SERPL-MCNC: 2 MG/DL (ref 0.2–1.3)
BILIRUB SERPL-MCNC: 2 MG/DL (ref 0–1)
BNP SERPL-MCNC: 374 PG/ML (ref 0–84)
BNP SERPL-MCNC: 410 PG/ML (ref 0–84)
BNP SERPL-MCNC: 491 PG/ML (ref 0–84)
BNP SERPL-MCNC: 501 PG/ML (ref 0–84)
BNP SERPL-MCNC: 519 PG/ML (ref 0–84)
BUN SERPL-MCNC: 35.3 MG/DL (ref 8–23)
BUN SERPL-MCNC: 39 MG/DL (ref 7–30)
BUN SERPL-MCNC: 39.5 MG/DL (ref 8–23)
BUN SERPL-MCNC: 42 MG/DL (ref 8–28)
BUN SERPL-MCNC: 42 MG/DL (ref 8–28)
BUN SERPL-MCNC: 44 MG/DL (ref 8–28)
BUN SERPL-MCNC: 45 MG/DL (ref 8–28)
BUN SERPL-MCNC: 45 MG/DL (ref 8–28)
BUN SERPL-MCNC: 48 MG/DL (ref 8–28)
BUN SERPL-MCNC: 50 MG/DL (ref 7–30)
BUN SERPL-MCNC: 51 MG/DL (ref 8–28)
BUN SERPL-MCNC: 51.5 MG/DL (ref 8–23)
BUN SERPL-MCNC: 54 MG/DL (ref 8–28)
BUN SERPL-MCNC: 55 MG/DL (ref 8–28)
BUN SERPL-MCNC: 56 MG/DL (ref 8–28)
BUN SERPL-MCNC: 59 MG/DL (ref 8–28)
BUN SERPL-MCNC: 60 MG/DL (ref 8–28)
BUN SERPL-MCNC: 60.6 MG/DL (ref 8–23)
BUN SERPL-MCNC: 62 MG/DL (ref 8–28)
BUN SERPL-MCNC: 72.6 MG/DL (ref 8–23)
BUN SERPL-MCNC: 86 MG/DL (ref 8–28)
C REACTIVE PROTEIN LHE: 0.5 MG/DL (ref 0–0.8)
CALCIUM SERPL-MCNC: 8.4 MG/DL (ref 8.5–10.1)
CALCIUM SERPL-MCNC: 8.6 MG/DL (ref 8.5–10.5)
CALCIUM SERPL-MCNC: 8.7 MG/DL (ref 8.8–10.2)
CALCIUM SERPL-MCNC: 8.8 MG/DL (ref 8.5–10.5)
CALCIUM SERPL-MCNC: 8.8 MG/DL (ref 8.8–10.2)
CALCIUM SERPL-MCNC: 8.9 MG/DL (ref 8.5–10.1)
CALCIUM SERPL-MCNC: 8.9 MG/DL (ref 8.5–10.5)
CALCIUM SERPL-MCNC: 8.9 MG/DL (ref 8.5–10.5)
CALCIUM SERPL-MCNC: 8.9 MG/DL (ref 8.8–10.2)
CALCIUM SERPL-MCNC: 9 MG/DL (ref 8.5–10.5)
CALCIUM SERPL-MCNC: 9 MG/DL (ref 8.5–10.5)
CALCIUM SERPL-MCNC: 9 MG/DL (ref 8.8–10.2)
CALCIUM SERPL-MCNC: 9.1 MG/DL (ref 8.5–10.5)
CALCIUM SERPL-MCNC: 9.2 MG/DL (ref 8.5–10.5)
CALCIUM SERPL-MCNC: 9.2 MG/DL (ref 8.8–10.2)
CALCIUM SERPL-MCNC: 9.3 MG/DL (ref 8.5–10.5)
CALCIUM SERPL-MCNC: 9.3 MG/DL (ref 8.5–10.5)
CALCIUM SERPL-MCNC: 9.9 MG/DL (ref 8.5–10.5)
CHLORIDE BLD-SCNC: 100 MMOL/L (ref 98–107)
CHLORIDE BLD-SCNC: 102 MMOL/L (ref 98–107)
CHLORIDE BLD-SCNC: 103 MMOL/L (ref 98–107)
CHLORIDE BLD-SCNC: 103 MMOL/L (ref 98–107)
CHLORIDE BLD-SCNC: 105 MMOL/L (ref 98–107)
CHLORIDE BLD-SCNC: 112 MMOL/L (ref 94–109)
CHLORIDE BLD-SCNC: 91 MMOL/L (ref 98–107)
CHLORIDE BLD-SCNC: 92 MMOL/L (ref 98–107)
CHLORIDE BLD-SCNC: 96 MMOL/L (ref 98–107)
CHLORIDE BLD-SCNC: 96 MMOL/L (ref 98–107)
CHLORIDE BLD-SCNC: 98 MMOL/L (ref 94–109)
CHLORIDE BLD-SCNC: 98 MMOL/L (ref 98–107)
CHLORIDE BLD-SCNC: 99 MMOL/L (ref 98–107)
CHLORIDE BLD-SCNC: 99 MMOL/L (ref 98–107)
CHLORIDE SERPL-SCNC: 102 MMOL/L (ref 98–107)
CHLORIDE SERPL-SCNC: 93 MMOL/L (ref 98–107)
CHLORIDE SERPL-SCNC: 94 MMOL/L (ref 98–107)
CHLORIDE SERPL-SCNC: 98 MMOL/L (ref 98–107)
CHLORIDE SERPL-SCNC: 99 MMOL/L (ref 98–107)
CO2 SERPL-SCNC: 22 MMOL/L (ref 22–31)
CO2 SERPL-SCNC: 23 MMOL/L (ref 22–31)
CO2 SERPL-SCNC: 23 MMOL/L (ref 22–31)
CO2 SERPL-SCNC: 25 MMOL/L (ref 20–32)
CO2 SERPL-SCNC: 25 MMOL/L (ref 22–31)
CO2 SERPL-SCNC: 26 MMOL/L (ref 22–31)
CO2 SERPL-SCNC: 26 MMOL/L (ref 22–31)
CO2 SERPL-SCNC: 27 MMOL/L (ref 22–31)
CO2 SERPL-SCNC: 28 MMOL/L (ref 22–31)
CO2 SERPL-SCNC: 29 MMOL/L (ref 20–32)
CO2 SERPL-SCNC: 29 MMOL/L (ref 22–31)
CO2 SERPL-SCNC: 31 MMOL/L (ref 22–31)
CO2 SERPL-SCNC: 32 MMOL/L (ref 22–31)
CREAT SERPL-MCNC: 1.16 MG/DL (ref 0.66–1.25)
CREAT SERPL-MCNC: 1.3 MG/DL (ref 0.67–1.17)
CREAT SERPL-MCNC: 1.49 MG/DL (ref 0.67–1.17)
CREAT SERPL-MCNC: 1.5 MG/DL (ref 0.67–1.17)
CREAT SERPL-MCNC: 1.6 MG/DL (ref 0.7–1.3)
CREAT SERPL-MCNC: 1.65 MG/DL (ref 0.7–1.3)
CREAT SERPL-MCNC: 1.66 MG/DL (ref 0.7–1.3)
CREAT SERPL-MCNC: 1.69 MG/DL (ref 0.67–1.17)
CREAT SERPL-MCNC: 1.69 MG/DL (ref 0.7–1.3)
CREAT SERPL-MCNC: 1.71 MG/DL (ref 0.7–1.3)
CREAT SERPL-MCNC: 1.72 MG/DL (ref 0.7–1.3)
CREAT SERPL-MCNC: 1.73 MG/DL (ref 0.7–1.3)
CREAT SERPL-MCNC: 1.73 MG/DL (ref 0.7–1.3)
CREAT SERPL-MCNC: 1.78 MG/DL (ref 0.7–1.3)
CREAT SERPL-MCNC: 1.8 MG/DL (ref 0.66–1.25)
CREAT SERPL-MCNC: 1.8 MG/DL (ref 0.67–1.17)
CREAT SERPL-MCNC: 1.84 MG/DL (ref 0.7–1.3)
CREAT SERPL-MCNC: 1.87 MG/DL (ref 0.7–1.3)
CREAT SERPL-MCNC: 1.91 MG/DL (ref 0.7–1.3)
CREAT SERPL-MCNC: 1.94 MG/DL (ref 0.7–1.3)
CREAT SERPL-MCNC: 1.99 MG/DL (ref 0.7–1.3)
CREAT SERPL-MCNC: 2.1 MG/DL (ref 0.7–1.3)
CREAT SERPL-MCNC: 2.34 MG/DL (ref 0.7–1.3)
DEPRECATED HCO3 PLAS-SCNC: 24 MMOL/L (ref 22–29)
DEPRECATED HCO3 PLAS-SCNC: 26 MMOL/L (ref 22–29)
DEPRECATED HCO3 PLAS-SCNC: 26 MMOL/L (ref 22–29)
DEPRECATED HCO3 PLAS-SCNC: 31 MMOL/L (ref 22–29)
DEPRECATED HCO3 PLAS-SCNC: 32 MMOL/L (ref 22–29)
DIASTOLIC BLOOD PRESSURE - MUSE: 54 MMHG
DIASTOLIC BLOOD PRESSURE - MUSE: 67 MMHG
DIASTOLIC BLOOD PRESSURE - MUSE: NORMAL MMHG
EOSINOPHIL # BLD AUTO: 0.1 10E3/UL (ref 0–0.7)
EOSINOPHIL # BLD AUTO: 0.2 10E3/UL (ref 0–0.7)
EOSINOPHIL NFR BLD AUTO: 2 %
EOSINOPHIL NFR BLD AUTO: 3 %
EOSINOPHIL NFR BLD AUTO: 3 %
EOSINOPHIL NFR BLD AUTO: 4 %
ERYTHROCYTE [DISTWIDTH] IN BLOOD BY AUTOMATED COUNT: 17.1 % (ref 10–15)
ERYTHROCYTE [DISTWIDTH] IN BLOOD BY AUTOMATED COUNT: 19.9 % (ref 10–15)
ERYTHROCYTE [DISTWIDTH] IN BLOOD BY AUTOMATED COUNT: 19.9 % (ref 10–15)
ERYTHROCYTE [DISTWIDTH] IN BLOOD BY AUTOMATED COUNT: 20.2 % (ref 10–15)
ERYTHROCYTE [DISTWIDTH] IN BLOOD BY AUTOMATED COUNT: 20.2 % (ref 10–15)
ERYTHROCYTE [DISTWIDTH] IN BLOOD BY AUTOMATED COUNT: 21.5 % (ref 10–15)
ERYTHROCYTE [SEDIMENTATION RATE] IN BLOOD BY WESTERGREN METHOD: 35 MM/HR (ref 0–20)
FLUAV RNA SPEC QL NAA+PROBE: NEGATIVE
FLUBV RNA RESP QL NAA+PROBE: NEGATIVE
GFR SERPL CREATININE-BSD FRML MDRD: 28 ML/MIN/1.73M2
GFR SERPL CREATININE-BSD FRML MDRD: 31 ML/MIN/1.73M2
GFR SERPL CREATININE-BSD FRML MDRD: 34 ML/MIN/1.73M2
GFR SERPL CREATININE-BSD FRML MDRD: 35 ML/MIN/1.73M2
GFR SERPL CREATININE-BSD FRML MDRD: 35 ML/MIN/1.73M2
GFR SERPL CREATININE-BSD FRML MDRD: 36 ML/MIN/1.73M2
GFR SERPL CREATININE-BSD FRML MDRD: 37 ML/MIN/1.73M2
GFR SERPL CREATININE-BSD FRML MDRD: 38 ML/MIN/1.73M2
GFR SERPL CREATININE-BSD FRML MDRD: 40 ML/MIN/1.73M2
GFR SERPL CREATININE-BSD FRML MDRD: 41 ML/MIN/1.73M2
GFR SERPL CREATININE-BSD FRML MDRD: 41 ML/MIN/1.73M2
GFR SERPL CREATININE-BSD FRML MDRD: 42 ML/MIN/1.73M2
GFR SERPL CREATININE-BSD FRML MDRD: 42 ML/MIN/1.73M2
GFR SERPL CREATININE-BSD FRML MDRD: 44 ML/MIN/1.73M2
GFR SERPL CREATININE-BSD FRML MDRD: 47 ML/MIN/1.73M2
GFR SERPL CREATININE-BSD FRML MDRD: 47 ML/MIN/1.73M2
GFR SERPL CREATININE-BSD FRML MDRD: 56 ML/MIN/1.73M2
GFR SERPL CREATININE-BSD FRML MDRD: 64 ML/MIN/1.73M2
GLUCOSE BLD-MCNC: 107 MG/DL (ref 70–125)
GLUCOSE BLD-MCNC: 109 MG/DL (ref 70–125)
GLUCOSE BLD-MCNC: 113 MG/DL (ref 70–125)
GLUCOSE BLD-MCNC: 127 MG/DL (ref 70–125)
GLUCOSE BLD-MCNC: 137 MG/DL (ref 70–125)
GLUCOSE BLD-MCNC: 142 MG/DL (ref 70–125)
GLUCOSE BLD-MCNC: 168 MG/DL (ref 70–125)
GLUCOSE BLD-MCNC: 187 MG/DL (ref 70–125)
GLUCOSE BLD-MCNC: 188 MG/DL (ref 70–125)
GLUCOSE BLD-MCNC: 201 MG/DL (ref 70–125)
GLUCOSE BLD-MCNC: 204 MG/DL (ref 70–99)
GLUCOSE BLD-MCNC: 220 MG/DL (ref 70–99)
GLUCOSE BLD-MCNC: 231 MG/DL (ref 70–125)
GLUCOSE BLD-MCNC: 238 MG/DL (ref 70–125)
GLUCOSE BLD-MCNC: 270 MG/DL (ref 70–125)
GLUCOSE BLD-MCNC: 86 MG/DL (ref 70–125)
GLUCOSE BLD-MCNC: 90 MG/DL (ref 70–125)
GLUCOSE BLD-MCNC: 97 MG/DL (ref 70–125)
GLUCOSE BLDC GLUCOMTR-MCNC: 104 MG/DL (ref 70–99)
GLUCOSE BLDC GLUCOMTR-MCNC: 111 MG/DL (ref 70–99)
GLUCOSE BLDC GLUCOMTR-MCNC: 113 MG/DL (ref 70–99)
GLUCOSE BLDC GLUCOMTR-MCNC: 120 MG/DL (ref 70–99)
GLUCOSE BLDC GLUCOMTR-MCNC: 126 MG/DL (ref 70–99)
GLUCOSE BLDC GLUCOMTR-MCNC: 138 MG/DL (ref 70–99)
GLUCOSE BLDC GLUCOMTR-MCNC: 144 MG/DL (ref 70–99)
GLUCOSE BLDC GLUCOMTR-MCNC: 149 MG/DL (ref 70–99)
GLUCOSE BLDC GLUCOMTR-MCNC: 154 MG/DL (ref 70–99)
GLUCOSE BLDC GLUCOMTR-MCNC: 165 MG/DL (ref 70–99)
GLUCOSE BLDC GLUCOMTR-MCNC: 169 MG/DL (ref 70–99)
GLUCOSE BLDC GLUCOMTR-MCNC: 179 MG/DL (ref 70–99)
GLUCOSE BLDC GLUCOMTR-MCNC: 186 MG/DL (ref 70–99)
GLUCOSE BLDC GLUCOMTR-MCNC: 223 MG/DL (ref 70–99)
GLUCOSE BLDC GLUCOMTR-MCNC: 74 MG/DL (ref 70–99)
GLUCOSE BLDC GLUCOMTR-MCNC: 83 MG/DL (ref 70–99)
GLUCOSE SERPL-MCNC: 139 MG/DL (ref 70–99)
GLUCOSE SERPL-MCNC: 180 MG/DL (ref 70–99)
GLUCOSE SERPL-MCNC: 253 MG/DL (ref 70–99)
GLUCOSE SERPL-MCNC: 327 MG/DL (ref 70–99)
GLUCOSE SERPL-MCNC: 404 MG/DL (ref 70–99)
HCT VFR BLD AUTO: 27.9 % (ref 40–53)
HCT VFR BLD AUTO: 28.2 % (ref 40–53)
HCT VFR BLD AUTO: 29.9 % (ref 40–53)
HCT VFR BLD AUTO: 30.9 % (ref 40–53)
HCT VFR BLD AUTO: 34.5 % (ref 40–53)
HCT VFR BLD AUTO: 38.2 % (ref 40–53)
HGB BLD-MCNC: 11.7 G/DL (ref 13.3–17.7)
HGB BLD-MCNC: 7.8 G/DL (ref 13.3–17.7)
HGB BLD-MCNC: 7.8 G/DL (ref 13.3–17.7)
HGB BLD-MCNC: 8.4 G/DL (ref 13.3–17.7)
HGB BLD-MCNC: 8.5 G/DL (ref 13.3–17.7)
HGB BLD-MCNC: 9.8 G/DL (ref 13.3–17.7)
HOLD SPECIMEN: NORMAL
IMM GRANULOCYTES # BLD: 0 10E3/UL
IMM GRANULOCYTES # BLD: 0.1 10E3/UL
IMM GRANULOCYTES NFR BLD: 0 %
IMM GRANULOCYTES NFR BLD: 0 %
IMM GRANULOCYTES NFR BLD: 1 %
IMM GRANULOCYTES NFR BLD: 2 %
INR PPP: 1.31 (ref 0.85–1.15)
INR PPP: 1.32 (ref 0.85–1.15)
INTERPRETATION ECG - MUSE: NORMAL
LIPASE SERPL-CCNC: 94 U/L (ref 13–60)
LYMPHOCYTES # BLD AUTO: 0.7 10E3/UL (ref 0.8–5.3)
LYMPHOCYTES # BLD AUTO: 0.8 10E3/UL (ref 0.8–5.3)
LYMPHOCYTES # BLD AUTO: 1 10E3/UL (ref 0.8–5.3)
LYMPHOCYTES # BLD AUTO: 1 10E3/UL (ref 0.8–5.3)
LYMPHOCYTES NFR BLD AUTO: 10 %
LYMPHOCYTES NFR BLD AUTO: 11 %
LYMPHOCYTES NFR BLD AUTO: 14 %
LYMPHOCYTES NFR BLD AUTO: 15 %
MAGNESIUM SERPL-MCNC: 2.2 MG/DL (ref 1.8–2.6)
MAGNESIUM SERPL-MCNC: 2.4 MG/DL (ref 1.8–2.6)
MCH RBC QN AUTO: 18.4 PG (ref 26.5–33)
MCH RBC QN AUTO: 18.6 PG (ref 26.5–33)
MCH RBC QN AUTO: 18.8 PG (ref 26.5–33)
MCH RBC QN AUTO: 19.3 PG (ref 26.5–33)
MCH RBC QN AUTO: 19.7 PG (ref 26.5–33)
MCH RBC QN AUTO: 25.8 PG (ref 26.5–33)
MCHC RBC AUTO-ENTMCNC: 27.2 G/DL (ref 31.5–36.5)
MCHC RBC AUTO-ENTMCNC: 27.7 G/DL (ref 31.5–36.5)
MCHC RBC AUTO-ENTMCNC: 28 G/DL (ref 31.5–36.5)
MCHC RBC AUTO-ENTMCNC: 28.4 G/DL (ref 31.5–36.5)
MCHC RBC AUTO-ENTMCNC: 28.4 G/DL (ref 31.5–36.5)
MCHC RBC AUTO-ENTMCNC: 30.6 G/DL (ref 31.5–36.5)
MCV RBC AUTO: 67 FL (ref 78–100)
MCV RBC AUTO: 67 FL (ref 78–100)
MCV RBC AUTO: 68 FL (ref 78–100)
MCV RBC AUTO: 68 FL (ref 78–100)
MCV RBC AUTO: 69 FL (ref 78–100)
MCV RBC AUTO: 84 FL (ref 78–100)
MONOCYTES # BLD AUTO: 0.6 10E3/UL (ref 0–1.3)
MONOCYTES # BLD AUTO: 0.7 10E3/UL (ref 0–1.3)
MONOCYTES NFR BLD AUTO: 10 %
MONOCYTES NFR BLD AUTO: 11 %
MONOCYTES NFR BLD AUTO: 9 %
MONOCYTES NFR BLD AUTO: 9 %
NEUTROPHILS # BLD AUTO: 4.4 10E3/UL (ref 1.6–8.3)
NEUTROPHILS # BLD AUTO: 4.8 10E3/UL (ref 1.6–8.3)
NEUTROPHILS # BLD AUTO: 5.1 10E3/UL (ref 1.6–8.3)
NEUTROPHILS # BLD AUTO: 5.5 10E3/UL (ref 1.6–8.3)
NEUTROPHILS NFR BLD AUTO: 70 %
NEUTROPHILS NFR BLD AUTO: 73 %
NEUTROPHILS NFR BLD AUTO: 75 %
NEUTROPHILS NFR BLD AUTO: 76 %
NRBC # BLD AUTO: 0 10E3/UL
NRBC BLD AUTO-RTO: 0 /100
P AXIS - MUSE: NORMAL DEGREES
PLATELET # BLD AUTO: 139 10E3/UL (ref 150–450)
PLATELET # BLD AUTO: 191 10E3/UL (ref 150–450)
PLATELET # BLD AUTO: 239 10E3/UL (ref 150–450)
PLATELET # BLD AUTO: 252 10E3/UL (ref 150–450)
PLATELET # BLD AUTO: 269 10E3/UL (ref 150–450)
PLATELET # BLD AUTO: 322 10E3/UL (ref 150–450)
POTASSIUM BLD-SCNC: 2.9 MMOL/L (ref 3.5–5)
POTASSIUM BLD-SCNC: 3.3 MMOL/L (ref 3.5–5)
POTASSIUM BLD-SCNC: 3.3 MMOL/L (ref 3.5–5)
POTASSIUM BLD-SCNC: 3.4 MMOL/L (ref 3.5–5)
POTASSIUM BLD-SCNC: 3.5 MMOL/L (ref 3.4–5.3)
POTASSIUM BLD-SCNC: 3.5 MMOL/L (ref 3.5–5)
POTASSIUM BLD-SCNC: 3.7 MMOL/L (ref 3.4–5.3)
POTASSIUM BLD-SCNC: 3.7 MMOL/L (ref 3.5–5)
POTASSIUM BLD-SCNC: 3.7 MMOL/L (ref 3.5–5)
POTASSIUM BLD-SCNC: 3.8 MMOL/L (ref 3.5–5)
POTASSIUM BLD-SCNC: 3.8 MMOL/L (ref 3.5–5)
POTASSIUM BLD-SCNC: 3.9 MMOL/L (ref 3.5–5)
POTASSIUM BLD-SCNC: 4.2 MMOL/L (ref 3.5–5)
POTASSIUM BLD-SCNC: 4.3 MMOL/L (ref 3.5–5)
POTASSIUM BLD-SCNC: 4.4 MMOL/L (ref 3.5–5)
POTASSIUM BLD-SCNC: 4.6 MMOL/L (ref 3.5–5)
POTASSIUM SERPL-SCNC: 3.4 MMOL/L (ref 3.4–5.3)
POTASSIUM SERPL-SCNC: 3.7 MMOL/L (ref 3.4–5.3)
POTASSIUM SERPL-SCNC: 3.8 MMOL/L (ref 3.4–5.3)
POTASSIUM SERPL-SCNC: 4.1 MMOL/L (ref 3.4–5.3)
POTASSIUM SERPL-SCNC: 4.8 MMOL/L (ref 3.4–5.3)
PR INTERVAL - MUSE: NORMAL MS
PROCALCITONIN SERPL-MCNC: 0.08 NG/ML (ref 0–0.49)
PROT SERPL-MCNC: 5.9 G/DL (ref 6.4–8.3)
PROT SERPL-MCNC: 6.2 G/DL (ref 6–8)
PROT SERPL-MCNC: 6.3 G/DL (ref 6.4–8.3)
PROT SERPL-MCNC: 6.5 G/DL (ref 6–8)
PROT SERPL-MCNC: 6.6 G/DL (ref 6.8–8.8)
QRS DURATION - MUSE: 88 MS
QRS DURATION - MUSE: 96 MS
QRS DURATION - MUSE: 96 MS
QT - MUSE: 372 MS
QT - MUSE: 416 MS
QT - MUSE: 420 MS
QTC - MUSE: 445 MS
QTC - MUSE: 455 MS
QTC - MUSE: 462 MS
R AXIS - MUSE: 107 DEGREES
R AXIS - MUSE: 115 DEGREES
R AXIS - MUSE: 115 DEGREES
RBC # BLD AUTO: 4.14 10E6/UL (ref 4.4–5.9)
RBC # BLD AUTO: 4.19 10E6/UL (ref 4.4–5.9)
RBC # BLD AUTO: 4.4 10E6/UL (ref 4.4–5.9)
RBC # BLD AUTO: 4.54 10E6/UL (ref 4.4–5.9)
RBC # BLD AUTO: 4.57 10E6/UL (ref 4.4–5.9)
RBC # BLD AUTO: 4.98 10E6/UL (ref 4.4–5.9)
SARS-COV-2 RNA RESP QL NAA+PROBE: NEGATIVE
SODIUM SERPL-SCNC: 134 MMOL/L (ref 136–145)
SODIUM SERPL-SCNC: 135 MMOL/L (ref 136–145)
SODIUM SERPL-SCNC: 136 MMOL/L (ref 136–145)
SODIUM SERPL-SCNC: 137 MMOL/L (ref 136–145)
SODIUM SERPL-SCNC: 138 MMOL/L (ref 133–144)
SODIUM SERPL-SCNC: 138 MMOL/L (ref 136–145)
SODIUM SERPL-SCNC: 139 MMOL/L (ref 136–145)
SODIUM SERPL-SCNC: 140 MMOL/L (ref 136–145)
SODIUM SERPL-SCNC: 141 MMOL/L (ref 136–145)
SODIUM SERPL-SCNC: 142 MMOL/L (ref 136–145)
SODIUM SERPL-SCNC: 143 MMOL/L (ref 133–144)
SYSTOLIC BLOOD PRESSURE - MUSE: 126 MMHG
SYSTOLIC BLOOD PRESSURE - MUSE: 93 MMHG
SYSTOLIC BLOOD PRESSURE - MUSE: NORMAL MMHG
T AXIS - MUSE: -17 DEGREES
T AXIS - MUSE: -23 DEGREES
T AXIS - MUSE: 9 DEGREES
TROPONIN I SERPL-MCNC: 0.02 NG/ML (ref 0–0.29)
URATE SERPL-MCNC: 10 MG/DL (ref 3.4–7)
URATE SERPL-MCNC: 11.1 MG/DL (ref 3.4–7)
VALPROATE FREE MFR SERPL: ABNORMAL %
VALPROATE FREE SERPL-MCNC: <7 UG/ML
VALPROATE SERPL-MCNC: 30 UG/ML
VENTRICULAR RATE- MUSE: 72 BPM
VENTRICULAR RATE- MUSE: 73 BPM
VENTRICULAR RATE- MUSE: 86 BPM
WBC # BLD AUTO: 6.1 10E3/UL (ref 4–11)
WBC # BLD AUTO: 6.3 10E3/UL (ref 4–11)
WBC # BLD AUTO: 6.3 10E3/UL (ref 4–11)
WBC # BLD AUTO: 7 10E3/UL (ref 4–11)
WBC # BLD AUTO: 7.2 10E3/UL (ref 4–11)
WBC # BLD AUTO: 7.3 10E3/UL (ref 4–11)

## 2022-01-01 PROCEDURE — 80048 BASIC METABOLIC PNL TOTAL CA: CPT | Performed by: STUDENT IN AN ORGANIZED HEALTH CARE EDUCATION/TRAINING PROGRAM

## 2022-01-01 PROCEDURE — 250N000013 HC RX MED GY IP 250 OP 250 PS 637: Performed by: INTERNAL MEDICINE

## 2022-01-01 PROCEDURE — 36415 COLL VENOUS BLD VENIPUNCTURE: CPT | Mod: ORL | Performed by: FAMILY MEDICINE

## 2022-01-01 PROCEDURE — 74018 RADEX ABDOMEN 1 VIEW: CPT | Mod: 26 | Performed by: RADIOLOGY

## 2022-01-01 PROCEDURE — 93970 EXTREMITY STUDY: CPT

## 2022-01-01 PROCEDURE — 84550 ASSAY OF BLOOD/URIC ACID: CPT | Performed by: FAMILY MEDICINE

## 2022-01-01 PROCEDURE — 97535 SELF CARE MNGMENT TRAINING: CPT | Mod: GO

## 2022-01-01 PROCEDURE — 96374 THER/PROPH/DIAG INJ IV PUSH: CPT

## 2022-01-01 PROCEDURE — 250N000011 HC RX IP 250 OP 636: Performed by: FAMILY MEDICINE

## 2022-01-01 PROCEDURE — 250N000013 HC RX MED GY IP 250 OP 250 PS 637: Performed by: FAMILY MEDICINE

## 2022-01-01 PROCEDURE — 92526 ORAL FUNCTION THERAPY: CPT | Mod: GN

## 2022-01-01 PROCEDURE — 96376 TX/PRO/DX INJ SAME DRUG ADON: CPT

## 2022-01-01 PROCEDURE — 83880 ASSAY OF NATRIURETIC PEPTIDE: CPT | Performed by: INTERNAL MEDICINE

## 2022-01-01 PROCEDURE — 36415 COLL VENOUS BLD VENIPUNCTURE: CPT | Performed by: FAMILY MEDICINE

## 2022-01-01 PROCEDURE — 85025 COMPLETE CBC W/AUTO DIFF WBC: CPT | Performed by: INTERNAL MEDICINE

## 2022-01-01 PROCEDURE — 82310 ASSAY OF CALCIUM: CPT | Performed by: FAMILY MEDICINE

## 2022-01-01 PROCEDURE — 94640 AIRWAY INHALATION TREATMENT: CPT

## 2022-01-01 PROCEDURE — 80048 BASIC METABOLIC PNL TOTAL CA: CPT | Mod: ORL | Performed by: FAMILY MEDICINE

## 2022-01-01 PROCEDURE — 80053 COMPREHEN METABOLIC PANEL: CPT | Performed by: INTERNAL MEDICINE

## 2022-01-01 PROCEDURE — 80048 BASIC METABOLIC PNL TOTAL CA: CPT | Performed by: FAMILY MEDICINE

## 2022-01-01 PROCEDURE — 250N000012 HC RX MED GY IP 250 OP 636 PS 637: Performed by: FAMILY MEDICINE

## 2022-01-01 PROCEDURE — 258N000003 HC RX IP 258 OP 636: Performed by: EMERGENCY MEDICINE

## 2022-01-01 PROCEDURE — 80165 DIPROPYLACETIC ACID FREE: CPT | Performed by: FAMILY MEDICINE

## 2022-01-01 PROCEDURE — G0378 HOSPITAL OBSERVATION PER HR: HCPCS

## 2022-01-01 PROCEDURE — 83735 ASSAY OF MAGNESIUM: CPT | Performed by: EMERGENCY MEDICINE

## 2022-01-01 PROCEDURE — 250N000013 HC RX MED GY IP 250 OP 250 PS 637: Performed by: NEUROLOGICAL SURGERY

## 2022-01-01 PROCEDURE — 82248 BILIRUBIN DIRECT: CPT | Performed by: FAMILY MEDICINE

## 2022-01-01 PROCEDURE — U0003 INFECTIOUS AGENT DETECTION BY NUCLEIC ACID (DNA OR RNA); SEVERE ACUTE RESPIRATORY SYNDROME CORONAVIRUS 2 (SARS-COV-2) (CORONAVIRUS DISEASE [COVID-19]), AMPLIFIED PROBE TECHNIQUE, MAKING USE OF HIGH THROUGHPUT TECHNOLOGIES AS DESCRIBED BY CMS-2020-01-R: HCPCS

## 2022-01-01 PROCEDURE — 99232 SBSQ HOSP IP/OBS MODERATE 35: CPT | Performed by: INTERNAL MEDICINE

## 2022-01-01 PROCEDURE — 85610 PROTHROMBIN TIME: CPT | Performed by: EMERGENCY MEDICINE

## 2022-01-01 PROCEDURE — 85025 COMPLETE CBC W/AUTO DIFF WBC: CPT | Performed by: EMERGENCY MEDICINE

## 2022-01-01 PROCEDURE — P9604 ONE-WAY ALLOW PRORATED TRIP: HCPCS | Mod: ORL | Performed by: NURSE PRACTITIONER

## 2022-01-01 PROCEDURE — 83880 ASSAY OF NATRIURETIC PEPTIDE: CPT | Mod: ORL | Performed by: NURSE PRACTITIONER

## 2022-01-01 PROCEDURE — 250N000013 HC RX MED GY IP 250 OP 250 PS 637: Performed by: STUDENT IN AN ORGANIZED HEALTH CARE EDUCATION/TRAINING PROGRAM

## 2022-01-01 PROCEDURE — 250N000013 HC RX MED GY IP 250 OP 250 PS 637: Performed by: NURSE PRACTITIONER

## 2022-01-01 PROCEDURE — 99306 1ST NF CARE HIGH MDM 50: CPT | Performed by: FAMILY MEDICINE

## 2022-01-01 PROCEDURE — 36415 COLL VENOUS BLD VENIPUNCTURE: CPT | Mod: ORL | Performed by: NURSE PRACTITIONER

## 2022-01-01 PROCEDURE — 99232 SBSQ HOSP IP/OBS MODERATE 35: CPT | Performed by: FAMILY MEDICINE

## 2022-01-01 PROCEDURE — 96372 THER/PROPH/DIAG INJ SC/IM: CPT | Mod: 59

## 2022-01-01 PROCEDURE — 97166 OT EVAL MOD COMPLEX 45 MIN: CPT | Mod: GO

## 2022-01-01 PROCEDURE — 80053 COMPREHEN METABOLIC PANEL: CPT | Performed by: EMERGENCY MEDICINE

## 2022-01-01 PROCEDURE — 99309 SBSQ NF CARE MODERATE MDM 30: CPT | Performed by: NURSE PRACTITIONER

## 2022-01-01 PROCEDURE — 80048 BASIC METABOLIC PNL TOTAL CA: CPT | Mod: ORL | Performed by: NURSE PRACTITIONER

## 2022-01-01 PROCEDURE — 85652 RBC SED RATE AUTOMATED: CPT | Performed by: FAMILY MEDICINE

## 2022-01-01 PROCEDURE — 99239 HOSP IP/OBS DSCHRG MGMT >30: CPT | Performed by: FAMILY MEDICINE

## 2022-01-01 PROCEDURE — 85027 COMPLETE CBC AUTOMATED: CPT | Mod: ORL | Performed by: NURSE PRACTITIONER

## 2022-01-01 PROCEDURE — 71046 X-RAY EXAM CHEST 2 VIEWS: CPT

## 2022-01-01 PROCEDURE — 99315 NF DSCHRG MGMT 30 MIN/LESS: CPT | Performed by: NURSE PRACTITIONER

## 2022-01-01 PROCEDURE — 97110 THERAPEUTIC EXERCISES: CPT | Mod: GO

## 2022-01-01 PROCEDURE — 250N000011 HC RX IP 250 OP 636: Performed by: STUDENT IN AN ORGANIZED HEALTH CARE EDUCATION/TRAINING PROGRAM

## 2022-01-01 PROCEDURE — 999N000065 XR ABDOMEN PORT 1 VIEWS

## 2022-01-01 PROCEDURE — 82105 ALPHA-FETOPROTEIN SERUM: CPT | Mod: 90 | Performed by: PATHOLOGY

## 2022-01-01 PROCEDURE — 99207 PR NON-BILLABLE SERV PER CHARTING: CPT | Performed by: FAMILY MEDICINE

## 2022-01-01 PROCEDURE — 120N000002 HC R&B MED SURG/OB UMMC

## 2022-01-01 PROCEDURE — 36415 COLL VENOUS BLD VENIPUNCTURE: CPT | Performed by: INTERNAL MEDICINE

## 2022-01-01 PROCEDURE — P9604 ONE-WAY ALLOW PRORATED TRIP: HCPCS | Mod: ORL | Performed by: FAMILY MEDICINE

## 2022-01-01 PROCEDURE — 99205 OFFICE O/P NEW HI 60 MIN: CPT | Mod: GC | Performed by: INTERNAL MEDICINE

## 2022-01-01 PROCEDURE — 99310 SBSQ NF CARE HIGH MDM 45: CPT | Performed by: NURSE PRACTITIONER

## 2022-01-01 PROCEDURE — 74018 RADEX ABDOMEN 1 VIEW: CPT

## 2022-01-01 PROCEDURE — P9603 ONE-WAY ALLOW PRORATED MILES: HCPCS | Mod: ORL | Performed by: NURSE PRACTITIONER

## 2022-01-01 PROCEDURE — 36415 COLL VENOUS BLD VENIPUNCTURE: CPT | Performed by: EMERGENCY MEDICINE

## 2022-01-01 PROCEDURE — U0005 INFEC AGEN DETEC AMPLI PROBE: HCPCS

## 2022-01-01 PROCEDURE — 99285 EMERGENCY DEPT VISIT HI MDM: CPT | Mod: 25

## 2022-01-01 PROCEDURE — 85610 PROTHROMBIN TIME: CPT | Performed by: PATHOLOGY

## 2022-01-01 PROCEDURE — 82248 BILIRUBIN DIRECT: CPT | Performed by: PATHOLOGY

## 2022-01-01 PROCEDURE — 84484 ASSAY OF TROPONIN QUANT: CPT | Performed by: INTERNAL MEDICINE

## 2022-01-01 PROCEDURE — 99214 OFFICE O/P EST MOD 30 MIN: CPT | Mod: 95 | Performed by: NURSE PRACTITIONER

## 2022-01-01 PROCEDURE — 99203 OFFICE O/P NEW LOW 30 MIN: CPT | Performed by: PODIATRIST

## 2022-01-01 PROCEDURE — U0003 INFECTIOUS AGENT DETECTION BY NUCLEIC ACID (DNA OR RNA); SEVERE ACUTE RESPIRATORY SYNDROME CORONAVIRUS 2 (SARS-COV-2) (CORONAVIRUS DISEASE [COVID-19]), AMPLIFIED PROBE TECHNIQUE, MAKING USE OF HIGH THROUGHPUT TECHNOLOGIES AS DESCRIBED BY CMS-2020-01-R: HCPCS | Mod: ORL | Performed by: FAMILY MEDICINE

## 2022-01-01 PROCEDURE — 82040 ASSAY OF SERUM ALBUMIN: CPT | Performed by: EMERGENCY MEDICINE

## 2022-01-01 PROCEDURE — 99220 PR INITIAL OBSERVATION CARE,LEVEL III: CPT | Performed by: INTERNAL MEDICINE

## 2022-01-01 PROCEDURE — 93005 ELECTROCARDIOGRAM TRACING: CPT | Performed by: EMERGENCY MEDICINE

## 2022-01-01 PROCEDURE — 85027 COMPLETE CBC AUTOMATED: CPT | Performed by: PATHOLOGY

## 2022-01-01 PROCEDURE — 99213 OFFICE O/P EST LOW 20 MIN: CPT | Performed by: PHYSICIAN ASSISTANT

## 2022-01-01 PROCEDURE — 85025 COMPLETE CBC W/AUTO DIFF WBC: CPT | Performed by: FAMILY MEDICINE

## 2022-01-01 PROCEDURE — 93005 ELECTROCARDIOGRAM TRACING: CPT | Performed by: FAMILY MEDICINE

## 2022-01-01 PROCEDURE — 84145 PROCALCITONIN (PCT): CPT | Performed by: INTERNAL MEDICINE

## 2022-01-01 PROCEDURE — C9803 HOPD COVID-19 SPEC COLLECT: HCPCS

## 2022-01-01 PROCEDURE — 96360 HYDRATION IV INFUSION INIT: CPT

## 2022-01-01 PROCEDURE — 80053 COMPREHEN METABOLIC PANEL: CPT | Performed by: PATHOLOGY

## 2022-01-01 PROCEDURE — 87636 SARSCOV2 & INF A&B AMP PRB: CPT | Performed by: INTERNAL MEDICINE

## 2022-01-01 PROCEDURE — 36415 COLL VENOUS BLD VENIPUNCTURE: CPT | Performed by: PATHOLOGY

## 2022-01-01 PROCEDURE — 80053 COMPREHEN METABOLIC PANEL: CPT | Performed by: FAMILY MEDICINE

## 2022-01-01 PROCEDURE — 83690 ASSAY OF LIPASE: CPT | Performed by: FAMILY MEDICINE

## 2022-01-01 PROCEDURE — 250N000012 HC RX MED GY IP 250 OP 636 PS 637: Performed by: INTERNAL MEDICINE

## 2022-01-01 PROCEDURE — 36592 COLLECT BLOOD FROM PICC: CPT | Performed by: STUDENT IN AN ORGANIZED HEALTH CARE EDUCATION/TRAINING PROGRAM

## 2022-01-01 PROCEDURE — 250N000011 HC RX IP 250 OP 636: Performed by: EMERGENCY MEDICINE

## 2022-01-01 PROCEDURE — 99223 1ST HOSP IP/OBS HIGH 75: CPT | Performed by: INTERNAL MEDICINE

## 2022-01-01 PROCEDURE — 82962 GLUCOSE BLOOD TEST: CPT

## 2022-01-01 PROCEDURE — 210N000002 HC R&B HEART CARE

## 2022-01-01 PROCEDURE — 83735 ASSAY OF MAGNESIUM: CPT | Mod: ORL | Performed by: NURSE PRACTITIONER

## 2022-01-01 PROCEDURE — 99000 SPECIMEN HANDLING OFFICE-LAB: CPT | Performed by: PATHOLOGY

## 2022-01-01 PROCEDURE — 250N000011 HC RX IP 250 OP 636: Performed by: NURSE PRACTITIONER

## 2022-01-01 PROCEDURE — 83880 ASSAY OF NATRIURETIC PEPTIDE: CPT | Performed by: EMERGENCY MEDICINE

## 2022-01-01 RX ORDER — POTASSIUM CHLORIDE 750 MG/1
40 TABLET, EXTENDED RELEASE ORAL DAILY
Status: DISCONTINUED | OUTPATIENT
Start: 2022-01-01 | End: 2022-01-01

## 2022-01-01 RX ORDER — QUETIAPINE FUMARATE 100 MG/1
100 TABLET, FILM COATED ORAL AT BEDTIME
Status: DISCONTINUED | OUTPATIENT
Start: 2022-01-01 | End: 2022-01-02

## 2022-01-01 RX ORDER — QUETIAPINE FUMARATE 50 MG/1
50 TABLET, FILM COATED ORAL EVERY MORNING
Status: DISCONTINUED | OUTPATIENT
Start: 2022-01-01 | End: 2022-01-03

## 2022-01-01 RX ORDER — NICOTINE POLACRILEX 4 MG
15-30 LOZENGE BUCCAL
Status: DISCONTINUED | OUTPATIENT
Start: 2022-01-01 | End: 2022-01-01 | Stop reason: HOSPADM

## 2022-01-01 RX ORDER — MULTIPLE VITAMINS W/ MINERALS TAB 9MG-400MCG
1 TAB ORAL DAILY
Status: DISCONTINUED | OUTPATIENT
Start: 2022-01-01 | End: 2022-01-01 | Stop reason: HOSPADM

## 2022-01-01 RX ORDER — IRBESARTAN 300 MG/1
300 TABLET ORAL DAILY
COMMUNITY
End: 2022-01-01

## 2022-01-01 RX ORDER — TRAZODONE HYDROCHLORIDE 100 MG/1
100 TABLET ORAL
COMMUNITY

## 2022-01-01 RX ORDER — DEXTROSE MONOHYDRATE 25 G/50ML
25-50 INJECTION, SOLUTION INTRAVENOUS
Status: DISCONTINUED | OUTPATIENT
Start: 2022-01-01 | End: 2022-01-01 | Stop reason: HOSPADM

## 2022-01-01 RX ORDER — BUMETANIDE 0.25 MG/ML
2 INJECTION INTRAMUSCULAR; INTRAVENOUS EVERY 12 HOURS
Status: DISCONTINUED | OUTPATIENT
Start: 2022-01-01 | End: 2022-01-01

## 2022-01-01 RX ORDER — HALOPERIDOL 5 MG/ML
2 INJECTION INTRAMUSCULAR ONCE
Status: COMPLETED | OUTPATIENT
Start: 2022-01-01 | End: 2022-01-01

## 2022-01-01 RX ORDER — PRASUGREL 10 MG/1
10 TABLET, FILM COATED ORAL DAILY
Status: DISCONTINUED | OUTPATIENT
Start: 2022-01-01 | End: 2022-01-01

## 2022-01-01 RX ORDER — ROPINIROLE 1 MG/1
1 TABLET, FILM COATED ORAL AT BEDTIME
Status: DISCONTINUED | OUTPATIENT
Start: 2022-01-01 | End: 2022-01-01 | Stop reason: HOSPADM

## 2022-01-01 RX ORDER — ROPINIROLE 2 MG/1
2 TABLET, FILM COATED ORAL DAILY
Status: DISCONTINUED | OUTPATIENT
Start: 2022-01-01 | End: 2022-01-03

## 2022-01-01 RX ORDER — METOPROLOL TARTRATE 25 MG/1
25 TABLET, FILM COATED ORAL 2 TIMES DAILY WITH MEALS
Status: DISCONTINUED | OUTPATIENT
Start: 2022-01-01 | End: 2022-01-01 | Stop reason: HOSPADM

## 2022-01-01 RX ORDER — GABAPENTIN 300 MG/1
300 CAPSULE ORAL 2 TIMES DAILY
COMMUNITY

## 2022-01-01 RX ORDER — SPIRONOLACTONE 25 MG/1
25 TABLET ORAL DAILY
Status: DISCONTINUED | OUTPATIENT
Start: 2022-01-01 | End: 2022-01-01 | Stop reason: HOSPADM

## 2022-01-01 RX ORDER — AMMONIUM LACTATE 12 G/100G
CREAM TOPICAL 2 TIMES DAILY
Qty: 140 G | Refills: 3 | Status: SHIPPED | OUTPATIENT
Start: 2022-01-01 | End: 2022-01-01

## 2022-01-01 RX ORDER — POTASSIUM CHLORIDE 1.5 G/1.58G
40 POWDER, FOR SOLUTION ORAL DAILY
Status: DISCONTINUED | OUTPATIENT
Start: 2022-01-01 | End: 2022-01-17 | Stop reason: HOSPADM

## 2022-01-01 RX ORDER — METOLAZONE 2.5 MG/1
2.5 TABLET ORAL ONCE
Status: COMPLETED | OUTPATIENT
Start: 2022-01-01 | End: 2022-01-01

## 2022-01-01 RX ORDER — BUMETANIDE 1 MG/1
4 TABLET ORAL EVERY MORNING
Status: DISCONTINUED | OUTPATIENT
Start: 2022-01-01 | End: 2022-01-17 | Stop reason: HOSPADM

## 2022-01-01 RX ORDER — TORSEMIDE 20 MG/1
40 TABLET ORAL
Status: DISCONTINUED | OUTPATIENT
Start: 2022-01-01 | End: 2022-01-01 | Stop reason: HOSPADM

## 2022-01-01 RX ORDER — TRAZODONE HYDROCHLORIDE 100 MG/1
100 TABLET ORAL
Status: DISCONTINUED | OUTPATIENT
Start: 2022-01-01 | End: 2022-01-01 | Stop reason: HOSPADM

## 2022-01-01 RX ORDER — ASPIRIN 81 MG/1
81 TABLET ORAL DAILY
Status: DISCONTINUED | OUTPATIENT
Start: 2022-01-01 | End: 2022-01-01 | Stop reason: HOSPADM

## 2022-01-01 RX ORDER — ZINC GLUCONATE 50 MG
50 TABLET ORAL
Status: DISCONTINUED | OUTPATIENT
Start: 2022-01-01 | End: 2022-01-01 | Stop reason: HOSPADM

## 2022-01-01 RX ORDER — ACETAMINOPHEN 500 MG
500-1000 TABLET ORAL EVERY 6 HOURS PRN
Status: DISCONTINUED | OUTPATIENT
Start: 2022-01-01 | End: 2022-01-01 | Stop reason: HOSPADM

## 2022-01-01 RX ORDER — FERROUS GLUCONATE 324(38)MG
324 TABLET ORAL EVERY OTHER DAY
Status: DISCONTINUED | OUTPATIENT
Start: 2022-01-01 | End: 2022-01-01 | Stop reason: HOSPADM

## 2022-01-01 RX ORDER — ATORVASTATIN CALCIUM 40 MG/1
40 TABLET, FILM COATED ORAL EVERY EVENING
Status: DISCONTINUED | OUTPATIENT
Start: 2022-01-01 | End: 2022-01-01 | Stop reason: HOSPADM

## 2022-01-01 RX ORDER — BUMETANIDE 2 MG/1
2 TABLET ORAL 2 TIMES DAILY
COMMUNITY
Start: 2022-01-01

## 2022-01-01 RX ORDER — ACETAMINOPHEN 325 MG/1
650 TABLET ORAL EVERY 6 HOURS PRN
Status: DISCONTINUED | OUTPATIENT
Start: 2022-01-01 | End: 2022-01-01 | Stop reason: HOSPADM

## 2022-01-01 RX ORDER — OLANZAPINE 5 MG/1
5 TABLET ORAL EVERY 8 HOURS PRN
COMMUNITY
End: 2022-01-01

## 2022-01-01 RX ORDER — MULTIVIT-MIN/IRON/FOLIC ACID/K 18-600-40
500 CAPSULE ORAL DAILY PRN
COMMUNITY
End: 2022-01-01

## 2022-01-01 RX ORDER — SPIRONOLACTONE 25 MG/1
25 TABLET ORAL DAILY
Qty: 30 TABLET | Refills: 0 | Status: SHIPPED | OUTPATIENT
Start: 2022-01-01

## 2022-01-01 RX ORDER — PANTOPRAZOLE SODIUM 20 MG/1
40 TABLET, DELAYED RELEASE ORAL 2 TIMES DAILY
Status: DISCONTINUED | OUTPATIENT
Start: 2022-01-01 | End: 2022-01-01 | Stop reason: HOSPADM

## 2022-01-01 RX ORDER — ATORVASTATIN CALCIUM 40 MG/1
40 TABLET, FILM COATED ORAL
COMMUNITY
Start: 2022-01-01

## 2022-01-01 RX ORDER — ALBUTEROL SULFATE 0.83 MG/ML
2.5 SOLUTION RESPIRATORY (INHALATION) EVERY 6 HOURS PRN
COMMUNITY

## 2022-01-01 RX ORDER — ACETAMINOPHEN 500 MG
1000 TABLET ORAL EVERY 6 HOURS
COMMUNITY
Start: 2022-01-01

## 2022-01-01 RX ORDER — ASPIRIN 81 MG/1
81 TABLET ORAL DAILY
Status: DISCONTINUED | OUTPATIENT
Start: 2022-01-01 | End: 2022-01-17 | Stop reason: HOSPADM

## 2022-01-01 RX ORDER — POTASSIUM CHLORIDE 1500 MG/1
20 TABLET, EXTENDED RELEASE ORAL 2 TIMES DAILY
COMMUNITY

## 2022-01-01 RX ORDER — BUMETANIDE 2 MG/1
4 TABLET ORAL 2 TIMES DAILY
Status: ON HOLD | COMMUNITY
End: 2022-01-01

## 2022-01-01 RX ORDER — LACOSAMIDE 50 MG/1
200 TABLET ORAL 2 TIMES DAILY
Status: DISCONTINUED | OUTPATIENT
Start: 2022-01-01 | End: 2022-01-01 | Stop reason: HOSPADM

## 2022-01-01 RX ORDER — METOPROLOL TARTRATE 25 MG/1
25 TABLET, FILM COATED ORAL 2 TIMES DAILY
Status: DISCONTINUED | OUTPATIENT
Start: 2022-01-01 | End: 2022-01-17 | Stop reason: HOSPADM

## 2022-01-01 RX ORDER — METOLAZONE 2.5 MG/1
2.5 TABLET ORAL DAILY PRN
COMMUNITY

## 2022-01-01 RX ORDER — IRBESARTAN 150 MG/1
300 TABLET ORAL DAILY
Status: DISCONTINUED | OUTPATIENT
Start: 2022-01-01 | End: 2022-01-01 | Stop reason: HOSPADM

## 2022-01-01 RX ORDER — TADALAFIL 10 MG/1
20 TABLET ORAL EVERY 24 HOURS
Status: DISCONTINUED | OUTPATIENT
Start: 2022-01-01 | End: 2022-01-01 | Stop reason: HOSPADM

## 2022-01-01 RX ORDER — BUMETANIDE 0.25 MG/ML
2 INJECTION INTRAMUSCULAR; INTRAVENOUS ONCE
Status: COMPLETED | OUTPATIENT
Start: 2022-01-01 | End: 2022-01-01

## 2022-01-01 RX ORDER — ACETAMINOPHEN 650 MG/1
650 SUPPOSITORY RECTAL EVERY 6 HOURS PRN
Status: DISCONTINUED | OUTPATIENT
Start: 2022-01-01 | End: 2022-01-01 | Stop reason: HOSPADM

## 2022-01-01 RX ORDER — ALBUTEROL SULFATE 90 UG/1
2 AEROSOL, METERED RESPIRATORY (INHALATION) EVERY 6 HOURS PRN
Status: DISCONTINUED | OUTPATIENT
Start: 2022-01-01 | End: 2022-01-01 | Stop reason: HOSPADM

## 2022-01-01 RX ORDER — PRASUGREL 10 MG/1
10 TABLET, FILM COATED ORAL DAILY
Status: DISCONTINUED | OUTPATIENT
Start: 2022-01-01 | End: 2022-01-17 | Stop reason: HOSPADM

## 2022-01-01 RX ADMIN — DOCUSATE SODIUM 50 MG AND SENNOSIDES 8.6 MG 2 TABLET: 8.6; 5 TABLET, FILM COATED ORAL at 08:34

## 2022-01-01 RX ADMIN — IRBESARTAN 300 MG: 150 TABLET ORAL at 11:28

## 2022-01-01 RX ADMIN — LACOSAMIDE 200 MG: 50 TABLET, FILM COATED ORAL at 21:40

## 2022-01-01 RX ADMIN — IRBESARTAN 150 MG: 150 TABLET ORAL at 08:54

## 2022-01-01 RX ADMIN — PANTOPRAZOLE SODIUM 40 MG: 20 TABLET, DELAYED RELEASE ORAL at 11:29

## 2022-01-01 RX ADMIN — FERROUS GLUCONATE 324 MG: 324 TABLET ORAL at 11:28

## 2022-01-01 RX ADMIN — ATORVASTATIN CALCIUM 40 MG: 40 TABLET, FILM COATED ORAL at 21:34

## 2022-01-01 RX ADMIN — METOPROLOL TARTRATE 25 MG: 25 TABLET, FILM COATED ORAL at 18:27

## 2022-01-01 RX ADMIN — TADALAFIL 20 MG: 20 TABLET, FILM COATED ORAL at 08:54

## 2022-01-01 RX ADMIN — INSULIN GLARGINE 14 UNITS: 100 INJECTION, SOLUTION SUBCUTANEOUS at 20:33

## 2022-01-01 RX ADMIN — INSULIN ASPART 2 UNITS: 100 INJECTION, SOLUTION INTRAVENOUS; SUBCUTANEOUS at 16:26

## 2022-01-01 RX ADMIN — SPIRONOLACTONE 25 MG: 25 TABLET ORAL at 11:28

## 2022-01-01 RX ADMIN — INSULIN GLARGINE 8 UNITS: 100 INJECTION, SOLUTION SUBCUTANEOUS at 08:51

## 2022-01-01 RX ADMIN — FERROUS GLUCONATE 324 MG: 324 TABLET ORAL at 08:16

## 2022-01-01 RX ADMIN — ATORVASTATIN CALCIUM 40 MG: 40 TABLET, FILM COATED ORAL at 21:39

## 2022-01-01 RX ADMIN — METOPROLOL TARTRATE 25 MG: 25 TABLET, FILM COATED ORAL at 08:17

## 2022-01-01 RX ADMIN — LACOSAMIDE 200 MG: 50 TABLET, FILM COATED ORAL at 08:34

## 2022-01-01 RX ADMIN — ATORVASTATIN CALCIUM 80 MG: 40 TABLET, FILM COATED ORAL at 08:34

## 2022-01-01 RX ADMIN — Medication 50 MG: at 11:43

## 2022-01-01 RX ADMIN — METOPROLOL TARTRATE 25 MG: 25 TABLET, FILM COATED ORAL at 17:06

## 2022-01-01 RX ADMIN — LACOSAMIDE 200 MG: 50 TABLET, FILM COATED ORAL at 21:48

## 2022-01-01 RX ADMIN — Medication 1 TABLET: at 08:42

## 2022-01-01 RX ADMIN — ROPINIROLE HYDROCHLORIDE 2 MG: 2 TABLET, FILM COATED ORAL at 22:12

## 2022-01-01 RX ADMIN — QUETIAPINE FUMARATE 100 MG: 100 TABLET ORAL at 22:09

## 2022-01-01 RX ADMIN — Medication 10 MG: at 22:12

## 2022-01-01 RX ADMIN — METOPROLOL TARTRATE 25 MG: 25 TABLET, FILM COATED ORAL at 08:41

## 2022-01-01 RX ADMIN — ASPIRIN 81 MG: 81 TABLET, COATED ORAL at 08:16

## 2022-01-01 RX ADMIN — METOPROLOL TARTRATE 25 MG: 25 TABLET, FILM COATED ORAL at 11:29

## 2022-01-01 RX ADMIN — BUMETANIDE 2 MG: 0.25 INJECTION INTRAMUSCULAR; INTRAVENOUS at 09:09

## 2022-01-01 RX ADMIN — INSULIN ASPART 4 UNITS: 100 INJECTION, SOLUTION INTRAVENOUS; SUBCUTANEOUS at 12:01

## 2022-01-01 RX ADMIN — ROPINIROLE 1 MG: 1 TABLET, FILM COATED ORAL at 21:34

## 2022-01-01 RX ADMIN — METOLAZONE 2.5 MG: 2.5 TABLET ORAL at 11:28

## 2022-01-01 RX ADMIN — ASPIRIN 81 MG: 81 TABLET, COATED ORAL at 11:29

## 2022-01-01 RX ADMIN — INSULIN GLARGINE 14 UNITS: 100 INJECTION, SOLUTION SUBCUTANEOUS at 08:53

## 2022-01-01 RX ADMIN — Medication 1 TABLET: at 08:17

## 2022-01-01 RX ADMIN — FLUTICASONE FUROATE AND VILANTEROL TRIFENATATE 1 PUFF: 200; 25 POWDER RESPIRATORY (INHALATION) at 09:06

## 2022-01-01 RX ADMIN — LEVETIRACETAM 750 MG: 750 TABLET ORAL at 22:10

## 2022-01-01 RX ADMIN — ROPINIROLE 1 MG: 1 TABLET, FILM COATED ORAL at 21:40

## 2022-01-01 RX ADMIN — POLYETHYLENE GLYCOL 3350 17 G: 17 POWDER, FOR SOLUTION ORAL at 08:34

## 2022-01-01 RX ADMIN — PANTOPRAZOLE SODIUM 40 MG: 20 TABLET, DELAYED RELEASE ORAL at 21:34

## 2022-01-01 RX ADMIN — INSULIN ASPART 1 UNITS: 100 INJECTION, SOLUTION INTRAVENOUS; SUBCUTANEOUS at 08:43

## 2022-01-01 RX ADMIN — LEVETIRACETAM 750 MG: 750 TABLET ORAL at 08:34

## 2022-01-01 RX ADMIN — HEPARIN SODIUM 5000 UNITS: 10000 INJECTION, SOLUTION INTRAVENOUS; SUBCUTANEOUS at 20:34

## 2022-01-01 RX ADMIN — PANTOPRAZOLE SODIUM 40 MG: 20 TABLET, DELAYED RELEASE ORAL at 08:41

## 2022-01-01 RX ADMIN — INSULIN ASPART 2 UNITS: 100 INJECTION, SOLUTION INTRAVENOUS; SUBCUTANEOUS at 20:32

## 2022-01-01 RX ADMIN — INSULIN ASPART 1 UNITS: 100 INJECTION, SOLUTION INTRAVENOUS; SUBCUTANEOUS at 04:45

## 2022-01-01 RX ADMIN — PANTOPRAZOLE SODIUM 40 MG: 20 TABLET, DELAYED RELEASE ORAL at 08:17

## 2022-01-01 RX ADMIN — BUMETANIDE 2 MG: 0.25 INJECTION INTRAMUSCULAR; INTRAVENOUS at 08:25

## 2022-01-01 RX ADMIN — BUMETANIDE 2 MG: 0.25 INJECTION INTRAMUSCULAR; INTRAVENOUS at 08:56

## 2022-01-01 RX ADMIN — METOPROLOL TARTRATE 25 MG: 25 TABLET, FILM COATED ORAL at 22:09

## 2022-01-01 RX ADMIN — PANTOPRAZOLE SODIUM 40 MG: 20 TABLET, DELAYED RELEASE ORAL at 21:40

## 2022-01-01 RX ADMIN — INSULIN ASPART 2 UNITS: 100 INJECTION, SOLUTION INTRAVENOUS; SUBCUTANEOUS at 08:53

## 2022-01-01 RX ADMIN — Medication 5 ML: at 12:01

## 2022-01-01 RX ADMIN — INSULIN GLARGINE 8 UNITS: 100 INJECTION, SOLUTION SUBCUTANEOUS at 08:17

## 2022-01-01 RX ADMIN — Medication 50 MG: at 11:29

## 2022-01-01 RX ADMIN — HALOPERIDOL LACTATE 2 MG: 5 INJECTION, SOLUTION INTRAMUSCULAR at 01:13

## 2022-01-01 RX ADMIN — POTASSIUM CHLORIDE 40 MEQ: 1.5 POWDER, FOR SOLUTION ORAL at 22:08

## 2022-01-01 RX ADMIN — Medication 1 TABLET: at 11:28

## 2022-01-01 RX ADMIN — SODIUM CHLORIDE 500 ML: 9 INJECTION, SOLUTION INTRAVENOUS at 11:46

## 2022-01-01 RX ADMIN — HEPARIN SODIUM 5000 UNITS: 10000 INJECTION, SOLUTION INTRAVENOUS; SUBCUTANEOUS at 12:01

## 2022-01-01 RX ADMIN — Medication 5 ML: at 06:39

## 2022-01-01 RX ADMIN — HEPARIN SODIUM 5000 UNITS: 10000 INJECTION, SOLUTION INTRAVENOUS; SUBCUTANEOUS at 04:45

## 2022-01-01 RX ADMIN — QUETIAPINE FUMARATE 50 MG: 50 TABLET ORAL at 08:34

## 2022-01-01 RX ADMIN — IRBESARTAN 300 MG: 150 TABLET ORAL at 08:42

## 2022-01-01 RX ADMIN — ASPIRIN 81 MG: 81 TABLET, COATED ORAL at 08:41

## 2022-01-01 RX ADMIN — LACOSAMIDE 200 MG: 50 TABLET, FILM COATED ORAL at 09:40

## 2022-01-01 RX ADMIN — TADALAFIL 20 MG: 10 TABLET, FILM COATED ORAL at 11:28

## 2022-01-01 RX ADMIN — SPIRONOLACTONE 25 MG: 25 TABLET ORAL at 08:41

## 2022-01-01 RX ADMIN — LACOSAMIDE 200 MG: 50 TABLET, FILM COATED ORAL at 08:17

## 2022-01-01 RX ADMIN — LACOSAMIDE 200 MG: 50 TABLET, FILM COATED ORAL at 11:28

## 2022-01-01 RX ADMIN — BUMETANIDE 2 MG: 0.25 INJECTION INTRAMUSCULAR; INTRAVENOUS at 21:40

## 2022-01-01 RX ADMIN — IRBESARTAN 300 MG: 150 TABLET ORAL at 08:17

## 2022-01-01 RX ADMIN — LACOSAMIDE 200 MG: 50 TABLET, FILM COATED ORAL at 22:13

## 2022-01-01 RX ADMIN — BUMETANIDE 4 MG: 1 TABLET ORAL at 08:34

## 2022-01-01 RX ADMIN — Medication 40 MG: at 08:54

## 2022-01-01 RX ADMIN — BUMETANIDE 2 MG: 0.25 INJECTION INTRAMUSCULAR; INTRAVENOUS at 21:47

## 2022-01-01 RX ADMIN — MULTIVIT AND MINERALS-FERROUS GLUCONATE 9 MG IRON/15 ML ORAL LIQUID 15 ML: at 08:34

## 2022-01-01 RX ADMIN — SPIRONOLACTONE 25 MG: 25 TABLET ORAL at 08:16

## 2022-01-01 RX ADMIN — TADALAFIL 20 MG: 10 TABLET, FILM COATED ORAL at 11:43

## 2022-01-01 RX ADMIN — BUMETANIDE 2 MG: 0.25 INJECTION INTRAMUSCULAR; INTRAVENOUS at 02:28

## 2022-01-01 RX ADMIN — METOPROLOL TARTRATE 25 MG: 25 TABLET, FILM COATED ORAL at 08:34

## 2022-01-01 ASSESSMENT — ACTIVITIES OF DAILY LIVING (ADL)
ADLS_ACUITY_SCORE: 14
ADLS_ACUITY_SCORE: 19
FALL_HISTORY_WITHIN_LAST_SIX_MONTHS: YES
EQUIPMENT_CURRENTLY_USED_AT_HOME: CANE, STRAIGHT
ADLS_ACUITY_SCORE: 19
ADLS_ACUITY_SCORE: 14
ADLS_ACUITY_SCORE: 14
ADLS_ACUITY_SCORE: 10
ADLS_ACUITY_SCORE: 10
ADLS_ACUITY_SCORE: 21
ADLS_ACUITY_SCORE: 14
ADLS_ACUITY_SCORE: 21
ADLS_ACUITY_SCORE: 19
ADLS_ACUITY_SCORE: 10
ADLS_ACUITY_SCORE: 21
ADLS_ACUITY_SCORE: 10
TOILETING_ISSUES: NO
ADLS_ACUITY_SCORE: 19
ADLS_ACUITY_SCORE: 12
ADLS_ACUITY_SCORE: 21
DEPENDENT_IADLS:: CLEANING;COOKING;LAUNDRY;SHOPPING;MEAL PREPARATION;MEDICATION MANAGEMENT;MONEY MANAGEMENT;TRANSPORTATION
ADLS_ACUITY_SCORE: 14
ADLS_ACUITY_SCORE: 12
ADLS_ACUITY_SCORE: 14
ADLS_ACUITY_SCORE: 10
ADLS_ACUITY_SCORE: 19
ADLS_ACUITY_SCORE: 19
DIFFICULTY_COMMUNICATING: NO
ADLS_ACUITY_SCORE: 14
ADLS_ACUITY_SCORE: 14
ADLS_ACUITY_SCORE: 10
WALKING_OR_CLIMBING_STAIRS_DIFFICULTY: YES
ADLS_ACUITY_SCORE: 14
ADLS_ACUITY_SCORE: 14
WEAR_GLASSES_OR_BLIND: YES
ADLS_ACUITY_SCORE: 12
CONCENTRATING,_REMEMBERING_OR_MAKING_DECISIONS_DIFFICULTY: NO
ADLS_ACUITY_SCORE: 19
ADLS_ACUITY_SCORE: 21
DOING_ERRANDS_INDEPENDENTLY_DIFFICULTY: NO
ADLS_ACUITY_SCORE: 19
ADLS_ACUITY_SCORE: 14
DRESSING/BATHING_DIFFICULTY: NO
NUMBER_OF_TIMES_PATIENT_HAS_FALLEN_WITHIN_LAST_SIX_MONTHS: 1
ADLS_ACUITY_SCORE: 14
ADLS_ACUITY_SCORE: 14
ADLS_ACUITY_SCORE: 19
ADLS_ACUITY_SCORE: 12
ADLS_ACUITY_SCORE: 10
ADLS_ACUITY_SCORE: 14
ADLS_ACUITY_SCORE: 12
ADLS_ACUITY_SCORE: 19
ADLS_ACUITY_SCORE: 14
ADLS_ACUITY_SCORE: 19
ADLS_ACUITY_SCORE: 14
ADLS_ACUITY_SCORE: 14
ADLS_ACUITY_SCORE: 10
ADLS_ACUITY_SCORE: 21
ADLS_ACUITY_SCORE: 14
ADLS_ACUITY_SCORE: 10
ADLS_ACUITY_SCORE: 19
ADLS_ACUITY_SCORE: 14
ADLS_ACUITY_SCORE: 10
ADLS_ACUITY_SCORE: 14
ADLS_ACUITY_SCORE: 10
WALKING_OR_CLIMBING_STAIRS: AMBULATION DIFFICULTY, ASSISTANCE 1 PERSON
ADLS_ACUITY_SCORE: 14
DIFFICULTY_EATING/SWALLOWING: NO
ADLS_ACUITY_SCORE: 10
ADLS_ACUITY_SCORE: 21
ADLS_ACUITY_SCORE: 21
ADLS_ACUITY_SCORE: 12

## 2022-01-01 ASSESSMENT — PATIENT HEALTH QUESTIONNAIRE - PHQ9
10. IF YOU CHECKED OFF ANY PROBLEMS, HOW DIFFICULT HAVE THESE PROBLEMS MADE IT FOR YOU TO DO YOUR WORK, TAKE CARE OF THINGS AT HOME, OR GET ALONG WITH OTHER PEOPLE: SOMEWHAT DIFFICULT
SUM OF ALL RESPONSES TO PHQ QUESTIONS 1-9: 8

## 2022-01-01 ASSESSMENT — ENCOUNTER SYMPTOMS
NAUSEA: 0
ABDOMINAL PAIN: 0
FEVER: 0
VOMITING: 0
DIARRHEA: 0
UNEXPECTED WEIGHT CHANGE: 1
DIARRHEA: 0
DIZZINESS: 0
WEAKNESS: 0
SHORTNESS OF BREATH: 0
SHORTNESS OF BREATH: 1
HEMATURIA: 0
FEVER: 0
VOMITING: 0
DYSURIA: 0
COUGH: 0

## 2022-01-01 ASSESSMENT — MIFFLIN-ST. JEOR: SCORE: 1835.74

## 2022-01-01 ASSESSMENT — PAIN SCALES - GENERAL: PAINLEVEL: MODERATE PAIN (4)

## 2022-01-01 NOTE — PLAN OF CARE
Status: Admitted w/ post-traumatic acute R SDH with L hemibody weakness. Admitted to ICU 12/20. OR 12/21 for evacuation. Extubated 12/22.  Vitals: VSS on 2 L NC. On continuous pulse ox.   Neuros: A&Ox2-4. During episodes of agitation/restlessness, patient d/o x3. Waxes & wanes. Garbled speech. L pupil 3 mm fixed & irregular. Slight L facial droop/L drift. L side 4/5, R side 5/5. Seroquel now scheduled and 1700 Melatonin also given. Sitter at bedside throughout today, occasional attempt to get OOB, but overall fairly compliant. No mitt needed this shift.  IV: DL PICC HL. PIV SL.  Resp/trach: Very dry mouth w/ white lesions. Frequent oral cares  Diet: NPO. TF via NG @ goal of 60 mLhr. FWF q4hr. Per SLP ok to give ice chips.   Bowel status: LB 12/30  : voiding incontinently.   Skin: BLE mayuri. Abdominal bruising. R crani incision with staples LEONID.  Air pocket/boggy area near incision, NSG aware. Unchanged throughout the day. Scabbing throughout BLE. Redness to periarea- barrier cream applied  Pain: Denies. No signs of discomfort.  Activity: A2 w/ lift. Repo q 2 hours. BUE mitts on periodically throughout shift. 1:1 for agitation, impulsivity, and line pulling.   Plan: G-tube placement in IR Monday, 01/03. Contrast in med bin to be given Sunday evening. Continue with POC.  Updates this shift: Abd xray this am confirmed NG placement and ok to use. TF resumed around noon and am meds given at that time.

## 2022-01-01 NOTE — PROGRESS NOTES
[facility], [place]   01/01/2022  Neurosurgery Progress Note:    Assessment:  Red Sutherland is a 81-year-old male post-traumatic acute right subdural hematoma with left hemibody weakness, on xarelto and plavix. Admitted to the ICU on 12/20/21. Taken to OR 12/21/2021. Extubated 12/22/2021. Started on Aspirin and Prasugrel on 12/28/21. Failed SLP evaluation. Free water flushes stopped since hypernatremia resolved. Lasix and Bumex given for diuresis.    Plan:  - Keppra 1g BID, Vimpat 150 BID  - HOB > 30 degrees  - Daily Bumex for diuresis  - Seroquel 50 in AM, 100 QHS  - Held Aspirin and Prasugrel for G tube on 1/3    Jaylon Vallejo MD  Neurosurgery Resident, PGY-5    Please contact neurosurgery resident on call with questions.    Dial * * [phone], enter [phone] when prompted.   -----------------------------------    Interval History: Given 1 dose of haldol and 40mg of Lasix.    Objective:   Temp:  [95.3  F (35.2  C)-98.6  F (37  C)] 95.5  F (35.3  C)  Pulse:  [69-88] 79  Resp:  [18-20] 18  BP: (106-148)/(55-80) 118/56  SpO2:  [95 %-98 %] 95 %  I/O last 3 completed shifts:  In: 805 [I.V.:10; NG/GT:135]  Out: 2000 [Urine:2000]    Gen: Slightly agitated  Wound: clean, dry, intact,  Neurologic:  - Alert & Oriented to person, place, time, and situation  - Follows commands briskly x 4, antigravity  - Mild L facial droop  - Moving all extremities x 4.    LABS:  Recent Labs   Lab 12/31/21  2352 12/31/21 2003 12/31/21  1757 12/31/21  1606 12/31/21  1150 12/31/21  0559 12/30/21 2004 12/30/21  1726 12/30/21  0756 12/30/21  0624 12/29/21  0330 12/29/21  0328   NA  --   --  143  --   --  140  140  --  138   < > 140  140   < > 144   POTASSIUM  --   --   --   --   --  3.9  --   --   --  4.2  --  3.9   CHLORIDE  --   --   --   --   --  109  --   --   --  111*  --  114*   CO2  --   --   --   --   --  22  --   --   --  23  --  25   ANIONGAP  --   --   --   --   --  9  --   --   --  6  --  5   *  181*  --  187*   < > 204*   < >  --    < > 177*   < > 208*   BUN  --   --   --   --   --  37*  --   --   --  42*  --  43*   CR  --   --   --   --   --  1.16  --   --   --  1.21  --  1.20   ANNMARIE  --   --   --   --   --  8.5  --   --   --  8.3*  --  8.5    < > = values in this interval not displayed.       Recent Labs   Lab 12/31/21  0559   WBC 9.4   RBC 3.67*   HGB 7.7*   HCT 27.6*   MCV 75*   MCH 21.0*   MCHC 27.9*   RDW 20.6*   *       IMAGING:  Recent Results (from the past 24 hour(s))   XR Abdomen Port 1 View    Narrative    EXAM: XR ABDOMEN PORT 1 VIEWS  12/31/2021 9:44 AM     HISTORY:  check NG tube placement       TECHNIQUE: Single frontal radiograph of the chest and upper abdomen    COMPARISON:  X-ray abdomen portable 12/23/2021    FINDINGS:     Single frontal x-ray of the chest and upper abdomen.  Enteric tube  with distal tip and proximal sidehole projecting over the stomach.   Bilateral patchy hazy lung opacities similar to 12/30/2021. Right  upper extremity PICC overlying the SVC.  Probably trace left pleural  effusion.  No discernible pneumothorax.No portal venous gas.  Relatively unchanged cardiomediastinal silhouette      Impression    IMPRESSION: 1. Enteric tube with sidehole and tip overlying the  stomach.   2. Patchy bilateral pulmonary opacities likely represent infection  and/or pulmonary edema  3. Probable small left pleural effusion.      I have personally reviewed the examination and initial interpretation  and I agree with the findings.    DAVINA BLACKWELL MD         SYSTEM ID:  [id number]   XR Chest Port 1 View    Impression    RESIDENT PRELIMINARY INTERPRETATION  IMPRESSION: Increased right greater than left interstitial and  airspace opacities with small left pleural effusion.

## 2022-01-01 NOTE — PLAN OF CARE
Status: Here with post- traumatic acute R SDH with L hemibody weakness. OR 12/21 for evacuation.   Vitals: AVSS on 2 L NC. On continuous pulse ox.   Neuros: A&Ox3-4. During episodes of agitation/restlessness, patient d/o x3. Waxes & wanes. Garbled speech. L pupil 3 mm fixed & irregular. Slight L facial droop/L drift. L side 4/5, R side 5/5. Pt very agitated, hitting, kicking staff, restless (trying to get out of bed and pulling at lines) PRN seroquel given and not effective.  updated and ordered Halodol- given and effective (happened 2xs). Sitter at bedside IV: DL PICC HL. PIV SL.  Resp/trach: Very dry mouth w/ white lesions. Frequent oral cares. Pt c/o that his lungs hurt- LS clear throughout and breathing shallow. PRN albuterol and tylenol given and not helpful-  updated and ordered a STAT chest x-ray - after that ordered and gave IV Lasix, free water flushes decreased to 30ml/4 hours. Breathing appears a bit better. On 2L nasal cannula- SPO2 remains greater than 92%  Diet: NPO. TF via NG @ goal of 60 mLhr  Bowel status: LBM 12/30- will need senna this am  : voiding incontinently via primafit  Skin: BLE mayuri. Abdominal bruising. R crani incision with staples LEONID.  Air pocket/boggy area near incision, NSG aware. Unchanged throughout the night. Scabbing throughout BLE. Redness to periarea- barrier cream applied  Pain: Tylenol given x1 for pain with breathing and appeared to be effective  Activity: Ax2 with lift. Repo q 2 hours.   Plan: G-tube placement in IR Monday, 01/03. Contrast to be given Sunday evening at 2000.

## 2022-01-02 ENCOUNTER — APPOINTMENT (OUTPATIENT)
Dept: CT IMAGING | Facility: CLINIC | Age: 82
DRG: 025 | End: 2022-01-02
Payer: COMMERCIAL

## 2022-01-02 ENCOUNTER — APPOINTMENT (OUTPATIENT)
Dept: SPEECH THERAPY | Facility: CLINIC | Age: 82
DRG: 025 | End: 2022-01-02
Payer: COMMERCIAL

## 2022-01-02 ENCOUNTER — APPOINTMENT (OUTPATIENT)
Dept: GENERAL RADIOLOGY | Facility: CLINIC | Age: 82
DRG: 025 | End: 2022-01-02
Attending: STUDENT IN AN ORGANIZED HEALTH CARE EDUCATION/TRAINING PROGRAM
Payer: COMMERCIAL

## 2022-01-02 LAB
ANION GAP SERPL CALCULATED.3IONS-SCNC: 5 MMOL/L (ref 3–14)
BUN SERPL-MCNC: 39 MG/DL (ref 7–30)
CALCIUM SERPL-MCNC: 8.7 MG/DL (ref 8.5–10.1)
CHLORIDE BLD-SCNC: 117 MMOL/L (ref 94–109)
CO2 SERPL-SCNC: 26 MMOL/L (ref 20–32)
CREAT SERPL-MCNC: 1.09 MG/DL (ref 0.66–1.25)
GFR SERPL CREATININE-BSD FRML MDRD: 69 ML/MIN/1.73M2
GLUCOSE BLD-MCNC: 219 MG/DL (ref 70–99)
GLUCOSE BLDC GLUCOMTR-MCNC: 180 MG/DL (ref 70–99)
GLUCOSE BLDC GLUCOMTR-MCNC: 183 MG/DL (ref 70–99)
GLUCOSE BLDC GLUCOMTR-MCNC: 188 MG/DL (ref 70–99)
GLUCOSE BLDC GLUCOMTR-MCNC: 193 MG/DL (ref 70–99)
GLUCOSE BLDC GLUCOMTR-MCNC: 202 MG/DL (ref 70–99)
HOLD SPECIMEN: NORMAL
POTASSIUM BLD-SCNC: 4.2 MMOL/L (ref 3.4–5.3)
SODIUM SERPL-SCNC: 147 MMOL/L (ref 133–144)
SODIUM SERPL-SCNC: 148 MMOL/L (ref 133–144)
SODIUM SERPL-SCNC: 150 MMOL/L (ref 133–144)

## 2022-01-02 PROCEDURE — 71045 X-RAY EXAM CHEST 1 VIEW: CPT

## 2022-01-02 PROCEDURE — 250N000013 HC RX MED GY IP 250 OP 250 PS 637: Performed by: STUDENT IN AN ORGANIZED HEALTH CARE EDUCATION/TRAINING PROGRAM

## 2022-01-02 PROCEDURE — 36592 COLLECT BLOOD FROM PICC: CPT | Performed by: STUDENT IN AN ORGANIZED HEALTH CARE EDUCATION/TRAINING PROGRAM

## 2022-01-02 PROCEDURE — 82310 ASSAY OF CALCIUM: CPT | Performed by: STUDENT IN AN ORGANIZED HEALTH CARE EDUCATION/TRAINING PROGRAM

## 2022-01-02 PROCEDURE — 250N000011 HC RX IP 250 OP 636: Performed by: STUDENT IN AN ORGANIZED HEALTH CARE EDUCATION/TRAINING PROGRAM

## 2022-01-02 PROCEDURE — 250N000013 HC RX MED GY IP 250 OP 250 PS 637: Performed by: NURSE PRACTITIONER

## 2022-01-02 PROCEDURE — 36592 COLLECT BLOOD FROM PICC: CPT | Performed by: NEUROLOGICAL SURGERY

## 2022-01-02 PROCEDURE — 250N000012 HC RX MED GY IP 250 OP 636 PS 637: Performed by: NURSE PRACTITIONER

## 2022-01-02 PROCEDURE — 93010 ELECTROCARDIOGRAM REPORT: CPT | Performed by: INTERNAL MEDICINE

## 2022-01-02 PROCEDURE — 36592 COLLECT BLOOD FROM PICC: CPT

## 2022-01-02 PROCEDURE — 93005 ELECTROCARDIOGRAM TRACING: CPT

## 2022-01-02 PROCEDURE — 84295 ASSAY OF SERUM SODIUM: CPT | Performed by: NEUROLOGICAL SURGERY

## 2022-01-02 PROCEDURE — 70450 CT HEAD/BRAIN W/O DYE: CPT

## 2022-01-02 PROCEDURE — 70450 CT HEAD/BRAIN W/O DYE: CPT | Mod: 26 | Performed by: RADIOLOGY

## 2022-01-02 PROCEDURE — 71045 X-RAY EXAM CHEST 1 VIEW: CPT | Mod: 26 | Performed by: RADIOLOGY

## 2022-01-02 PROCEDURE — 250N000011 HC RX IP 250 OP 636: Performed by: NURSE PRACTITIONER

## 2022-01-02 PROCEDURE — 92526 ORAL FUNCTION THERAPY: CPT | Mod: GN

## 2022-01-02 PROCEDURE — 120N000002 HC R&B MED SURG/OB UMMC

## 2022-01-02 PROCEDURE — 84295 ASSAY OF SERUM SODIUM: CPT

## 2022-01-02 PROCEDURE — 250N000013 HC RX MED GY IP 250 OP 250 PS 637: Performed by: NEUROLOGICAL SURGERY

## 2022-01-02 RX ORDER — HALOPERIDOL 5 MG/ML
5 INJECTION INTRAMUSCULAR ONCE
Status: COMPLETED | OUTPATIENT
Start: 2022-01-02 | End: 2022-01-02

## 2022-01-02 RX ORDER — HALOPERIDOL 5 MG/ML
INJECTION INTRAMUSCULAR
Status: DISCONTINUED
Start: 2022-01-02 | End: 2022-01-02 | Stop reason: HOSPADM

## 2022-01-02 RX ADMIN — LEVETIRACETAM 750 MG: 750 TABLET ORAL at 20:01

## 2022-01-02 RX ADMIN — LACOSAMIDE 200 MG: 50 TABLET, FILM COATED ORAL at 19:55

## 2022-01-02 RX ADMIN — Medication 10 MG: at 20:00

## 2022-01-02 RX ADMIN — QUETIAPINE FUMARATE 50 MG: 50 TABLET ORAL at 09:54

## 2022-01-02 RX ADMIN — HEPARIN SODIUM 5000 UNITS: 10000 INJECTION, SOLUTION INTRAVENOUS; SUBCUTANEOUS at 14:50

## 2022-01-02 RX ADMIN — ROPINIROLE HYDROCHLORIDE 2 MG: 2 TABLET, FILM COATED ORAL at 20:00

## 2022-01-02 RX ADMIN — FERROUS GLUCONATE 324 MG: 324 TABLET ORAL at 09:53

## 2022-01-02 RX ADMIN — INSULIN GLARGINE 14 UNITS: 100 INJECTION, SOLUTION SUBCUTANEOUS at 09:50

## 2022-01-02 RX ADMIN — POTASSIUM CHLORIDE 40 MEQ: 1.5 POWDER, FOR SOLUTION ORAL at 09:52

## 2022-01-02 RX ADMIN — HALOPERIDOL LACTATE 5 MG: 5 INJECTION, SOLUTION INTRAMUSCULAR at 00:14

## 2022-01-02 RX ADMIN — METOPROLOL TARTRATE 25 MG: 25 TABLET, FILM COATED ORAL at 09:54

## 2022-01-02 RX ADMIN — METOPROLOL TARTRATE 25 MG: 25 TABLET, FILM COATED ORAL at 20:01

## 2022-01-02 RX ADMIN — LEVETIRACETAM 750 MG: 750 TABLET ORAL at 09:55

## 2022-01-02 RX ADMIN — TADALAFIL 20 MG: 20 TABLET, FILM COATED ORAL at 12:43

## 2022-01-02 RX ADMIN — HALOPERIDOL LACTATE 5 MG: 5 INJECTION, SOLUTION INTRAMUSCULAR at 21:34

## 2022-01-02 RX ADMIN — INSULIN ASPART 3 UNITS: 100 INJECTION, SOLUTION INTRAVENOUS; SUBCUTANEOUS at 12:44

## 2022-01-02 RX ADMIN — INSULIN ASPART 2 UNITS: 100 INJECTION, SOLUTION INTRAVENOUS; SUBCUTANEOUS at 17:05

## 2022-01-02 RX ADMIN — Medication 40 MG: at 09:51

## 2022-01-02 RX ADMIN — QUETIAPINE FUMARATE 150 MG: 100 TABLET ORAL at 20:00

## 2022-01-02 RX ADMIN — INSULIN GLARGINE 14 UNITS: 100 INJECTION, SOLUTION SUBCUTANEOUS at 20:00

## 2022-01-02 RX ADMIN — Medication 5 ML: at 21:35

## 2022-01-02 RX ADMIN — INSULIN ASPART 1 UNITS: 100 INJECTION, SOLUTION INTRAVENOUS; SUBCUTANEOUS at 00:46

## 2022-01-02 RX ADMIN — INSULIN ASPART 3 UNITS: 100 INJECTION, SOLUTION INTRAVENOUS; SUBCUTANEOUS at 04:25

## 2022-01-02 RX ADMIN — LACOSAMIDE 200 MG: 50 TABLET, FILM COATED ORAL at 09:53

## 2022-01-02 RX ADMIN — QUETIAPINE FUMARATE 25 MG: 25 TABLET ORAL at 02:14

## 2022-01-02 RX ADMIN — MULTIVIT AND MINERALS-FERROUS GLUCONATE 9 MG IRON/15 ML ORAL LIQUID 15 ML: at 09:51

## 2022-01-02 RX ADMIN — ATORVASTATIN CALCIUM 80 MG: 40 TABLET, FILM COATED ORAL at 09:54

## 2022-01-02 RX ADMIN — BUMETANIDE 4 MG: 1 TABLET ORAL at 09:50

## 2022-01-02 RX ADMIN — INSULIN ASPART 2 UNITS: 100 INJECTION, SOLUTION INTRAVENOUS; SUBCUTANEOUS at 19:59

## 2022-01-02 RX ADMIN — Medication 5 ML: at 12:44

## 2022-01-02 RX ADMIN — IRBESARTAN 150 MG: 150 TABLET ORAL at 09:53

## 2022-01-02 RX ADMIN — HEPARIN SODIUM 5000 UNITS: 10000 INJECTION, SOLUTION INTRAVENOUS; SUBCUTANEOUS at 20:01

## 2022-01-02 RX ADMIN — FLUTICASONE FUROATE AND VILANTEROL TRIFENATATE 1 PUFF: 200; 25 POWDER RESPIRATORY (INHALATION) at 09:51

## 2022-01-02 RX ADMIN — HEPARIN SODIUM 5000 UNITS: 10000 INJECTION, SOLUTION INTRAVENOUS; SUBCUTANEOUS at 05:32

## 2022-01-02 ASSESSMENT — ACTIVITIES OF DAILY LIVING (ADL)
ADLS_ACUITY_SCORE: 9
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
## 2022-01-02 NOTE — PROGRESS NOTES
[facility], [place]   01/02/2022  Neurosurgery Progress Note:    Assessment:  Red Sutherland is a 81-year-old male post-traumatic acute right subdural hematoma with left hemibody weakness, on xarelto and plavix. Admitted to the ICU on 12/20/21. Taken to OR 12/21/2021. Extubated 12/22/2021. Started on Aspirin and Prasugrel on 12/28/21. Failed SLP evaluation. Free water flushes stopped since hypernatremia resolved. Lasix and Bumex given for diuresis.    Plan:  - Keppra 1g BID, Vimpat 150 BID  - HOB > 30 degrees  - Daily Bumex for diuresis  - Seroquel 50 in AM, 150 QHS  - Held Aspirin and Prasugrel for G tube on 1/3  - NPO at midnight  - Give oral contrast in evening of 1/2     Norah Garvey MD  Neurosurgery Resident, PGY-2    Please contact neurosurgery resident on call with questions.    Dial * * *[phone], enter [phone] when prompted.   -----------------------------------    Interval History: Agitated overnight, bedtime seroquel increased. QTc remains normal. Plan for PEG tomorrow.     Objective:   Temp:  [95.6  F (35.3  C)-98.2  F (36.8  C)] 98.2  F (36.8  C)  Pulse:  [74-88] 88  Resp:  [18] 18  BP: (112-151)/(56-77) 151/69  SpO2:  [95 %-100 %] 98 %  I/O last 3 completed shifts:  In: 1480 [I.V.:10; NG/GT:150]  Out: 3050 [Urine:3050]    Gen: Slightly agitated  Wound: clean, dry, intact,  Neurologic:  - Alert & Oriented to person, place, time, and situation  - Follows commands briskly x 4  - PEERL, EOMI, Mild L facial droop  - LUE with  4+/5, rest 5/5    LABS:  Recent Labs   Lab 01/02/22  0400 01/01/22  2340 01/01/22 2011 01/01/22  0746 01/01/22  0641 12/31/21 2003 12/31/21  1757 12/31/21  1150 12/31/21  0559 12/30/21  0756 12/30/21  0624   NA  --   --   --   --  143  --  143  --  140  140   < > 140  140   POTASSIUM  --   --   --   --  3.7  --   --   --  3.9  --  4.2   CHLORIDE  --   --   --   --  112*  --   --   --  109  --  111*   CO2  --   --   --   --  25  --   --   --  22  --   23   ANIONGAP  --   --   --   --  6  --   --   --  9  --  6   * 154* 169*   < > 220*   < >  --    < > 204*   < > 177*   BUN  --   --   --   --  39*  --   --   --  37*  --  42*   CR  --   --   --   --  1.16  --   --   --  1.16  --  1.21   ANNMARIE  --   --   --   --  8.4*  --   --   --  8.5  --  8.3*    < > = values in this interval not displayed.       Recent Labs   Lab 12/31/21  0559   WBC 9.4   RBC 3.67*   HGB 7.7*   HCT 27.6*   MCV 75*   MCH 21.0*   MCHC 27.9*   RDW 20.6*   *       IMAGING:  Recent Results (from the past 24 hour(s))   XR Abdomen Port 1 View    Narrative    Exam: TEMPORARY, 1/1/2022 9:40 PM    Indication: Confirm NG tube placement    Comparison: Abdominal radiograph dated 12/31/2021    Findings:   Enteric tube is subdiaphragmatic with tip and sidehole projecting over  the gastric lumen. Nonobstructive bowel gas pattern. No definite  pneumatosis. No portal venous gas. No right pleural effusion. Stable  bibasilar airspace and interstitial pulmonary opacities. No acute  osseous abnormalities.      Impression    Impression:   1. Enteric tube is subdiaphragmatic with tip and sidehole projecting  over the gastric lumen.  2. Nonobstructive bowel gas pattern.    I have personally reviewed the examination and initial interpretation  and I agree with the findings.    YAEL RICHARD MD         SYSTEM ID:  [id number]

## 2022-01-02 NOTE — PLAN OF CARE
Status: Here with post- traumatic acute R SDH with L hemibody weakness. OR 12/21 for evacuation.   Vitals: AVSS on 2 L NC. On continuous pulse ox.   Neuros: A&Ox3-4. During episodes of agitation/restlessness, patient d/o x3. Waxes & wanes. Garbled speech. L pupil 3 mm fixed & irregular. Slight L facial droop/L drift. L side 4/5, R side 5/5. Pt very agitated, hitting, kicking staff, restless (trying to get out of bed and pulling at lines - see RNs previous note for details). Sitter at bedside. Restraints were able to be removed at 0300 after seroquel and pt finally stopped trying to get up out of bed; no further agitation- mitts still on though as pt was still trying to grab NG.  IV: DL PICC HL. PIV SL.  Resp/trach: Very dry mouth w/ white lesions. Frequent oral cares. LS clear. much less shortness of breath and shallow breathing than yesterday  Diet: NPO. TF via NG @ goal of 60 mLhr water flushes at 30ml q 4 hours  Bowel status: LBM 1/1  : voiding incontinently via primafit  Skin: BLE mayuri. Abdominal bruising. R crani incision with staples LEONID.  Air pocket/boggy area near incision, NSG aware. Unchanged throughout the night. Scabbing throughout BLE. Redness to periarea- barrier cream applied  Pain: denies  Activity: Ax2 with lift. Repo q 2 hours.   Plan: G-tube placement in IR Monday, 01/03. Contrast to be given Sunday evening at 2000.Seroquel increased for tonight to help with agitation.

## 2022-01-03 ENCOUNTER — APPOINTMENT (OUTPATIENT)
Dept: GENERAL RADIOLOGY | Facility: CLINIC | Age: 82
DRG: 025 | End: 2022-01-03
Attending: STUDENT IN AN ORGANIZED HEALTH CARE EDUCATION/TRAINING PROGRAM
Payer: COMMERCIAL

## 2022-01-03 ENCOUNTER — APPOINTMENT (OUTPATIENT)
Dept: INTERVENTIONAL RADIOLOGY/VASCULAR | Facility: CLINIC | Age: 82
DRG: 025 | End: 2022-01-03
Attending: NURSE PRACTITIONER
Payer: COMMERCIAL

## 2022-01-03 LAB
ANION GAP SERPL CALCULATED.3IONS-SCNC: 5 MMOL/L (ref 3–14)
ATRIAL RATE - MUSE: 70 BPM
ATRIAL RATE - MUSE: 77 BPM
BUN SERPL-MCNC: 41 MG/DL (ref 7–30)
CALCIUM SERPL-MCNC: 9 MG/DL (ref 8.5–10.1)
CHLORIDE BLD-SCNC: 117 MMOL/L (ref 94–109)
CO2 SERPL-SCNC: 28 MMOL/L (ref 20–32)
CREAT SERPL-MCNC: 1.07 MG/DL (ref 0.66–1.25)
DIASTOLIC BLOOD PRESSURE - MUSE: NORMAL MMHG
DIASTOLIC BLOOD PRESSURE - MUSE: NORMAL MMHG
ERYTHROCYTE [DISTWIDTH] IN BLOOD BY AUTOMATED COUNT: 20.7 % (ref 10–15)
GFR SERPL CREATININE-BSD FRML MDRD: 71 ML/MIN/1.73M2
GLUCOSE BLD-MCNC: 110 MG/DL (ref 70–99)
GLUCOSE BLDC GLUCOMTR-MCNC: 105 MG/DL (ref 70–99)
GLUCOSE BLDC GLUCOMTR-MCNC: 107 MG/DL (ref 70–99)
GLUCOSE BLDC GLUCOMTR-MCNC: 116 MG/DL (ref 70–99)
GLUCOSE BLDC GLUCOMTR-MCNC: 137 MG/DL (ref 70–99)
GLUCOSE BLDC GLUCOMTR-MCNC: 148 MG/DL (ref 70–99)
GLUCOSE BLDC GLUCOMTR-MCNC: 175 MG/DL (ref 70–99)
GLUCOSE BLDC GLUCOMTR-MCNC: 191 MG/DL (ref 70–99)
GLUCOSE BLDC GLUCOMTR-MCNC: 197 MG/DL (ref 70–99)
GLUCOSE BLDC GLUCOMTR-MCNC: 83 MG/DL (ref 70–99)
GLUCOSE BLDC GLUCOMTR-MCNC: 91 MG/DL (ref 70–99)
HCT VFR BLD AUTO: 29.3 % (ref 40–53)
HGB BLD-MCNC: 7.7 G/DL (ref 13.3–17.7)
INTERPRETATION ECG - MUSE: NORMAL
INTERPRETATION ECG - MUSE: NORMAL
MAGNESIUM SERPL-MCNC: 2.8 MG/DL (ref 1.6–2.3)
MCH RBC QN AUTO: 20.2 PG (ref 26.5–33)
MCHC RBC AUTO-ENTMCNC: 26.3 G/DL (ref 31.5–36.5)
MCV RBC AUTO: 77 FL (ref 78–100)
P AXIS - MUSE: NORMAL DEGREES
P AXIS - MUSE: NORMAL DEGREES
PHOSPHATE SERPL-MCNC: 3.5 MG/DL (ref 2.5–4.5)
PLATELET # BLD AUTO: 138 10E3/UL (ref 150–450)
POTASSIUM BLD-SCNC: 4.1 MMOL/L (ref 3.4–5.3)
PR INTERVAL - MUSE: NORMAL MS
PR INTERVAL - MUSE: NORMAL MS
QRS DURATION - MUSE: 90 MS
QRS DURATION - MUSE: 94 MS
QT - MUSE: 374 MS
QT - MUSE: 392 MS
QTC - MUSE: 435 MS
QTC - MUSE: 450 MS
R AXIS - MUSE: 106 DEGREES
R AXIS - MUSE: 113 DEGREES
RBC # BLD AUTO: 3.82 10E6/UL (ref 4.4–5.9)
SODIUM SERPL-SCNC: 148 MMOL/L (ref 133–144)
SODIUM SERPL-SCNC: 149 MMOL/L (ref 133–144)
SODIUM SERPL-SCNC: 149 MMOL/L (ref 133–144)
SODIUM SERPL-SCNC: 150 MMOL/L (ref 133–144)
SODIUM SERPL-SCNC: 150 MMOL/L (ref 133–144)
SYSTOLIC BLOOD PRESSURE - MUSE: NORMAL MMHG
SYSTOLIC BLOOD PRESSURE - MUSE: NORMAL MMHG
T AXIS - MUSE: 2 DEGREES
T AXIS - MUSE: 25 DEGREES
VENTRICULAR RATE- MUSE: 74 BPM
VENTRICULAR RATE- MUSE: 87 BPM
WBC # BLD AUTO: 6.2 10E3/UL (ref 4–11)

## 2022-01-03 PROCEDURE — 250N000011 HC RX IP 250 OP 636: Performed by: NURSE PRACTITIONER

## 2022-01-03 PROCEDURE — 250N000013 HC RX MED GY IP 250 OP 250 PS 637: Performed by: NURSE PRACTITIONER

## 2022-01-03 PROCEDURE — 71045 X-RAY EXAM CHEST 1 VIEW: CPT | Mod: 26 | Performed by: RADIOLOGY

## 2022-01-03 PROCEDURE — 272N000588 ZZ HC TUBE GASTRO CR5

## 2022-01-03 PROCEDURE — 250N000013 HC RX MED GY IP 250 OP 250 PS 637: Performed by: NEUROLOGICAL SURGERY

## 2022-01-03 PROCEDURE — 99207 PR CONSULT E&M CHANGED TO INITIAL LEVEL: CPT | Performed by: PSYCHIATRY & NEUROLOGY

## 2022-01-03 PROCEDURE — 120N000002 HC R&B MED SURG/OB UMMC

## 2022-01-03 PROCEDURE — 71045 X-RAY EXAM CHEST 1 VIEW: CPT

## 2022-01-03 PROCEDURE — 84295 ASSAY OF SERUM SODIUM: CPT | Performed by: STUDENT IN AN ORGANIZED HEALTH CARE EDUCATION/TRAINING PROGRAM

## 2022-01-03 PROCEDURE — 93010 ELECTROCARDIOGRAM REPORT: CPT | Performed by: INTERNAL MEDICINE

## 2022-01-03 PROCEDURE — 93005 ELECTROCARDIOGRAM TRACING: CPT

## 2022-01-03 PROCEDURE — 84295 ASSAY OF SERUM SODIUM: CPT | Performed by: NEUROLOGICAL SURGERY

## 2022-01-03 PROCEDURE — 272N000151 HC KIT CR11

## 2022-01-03 PROCEDURE — 84100 ASSAY OF PHOSPHORUS: CPT | Performed by: STUDENT IN AN ORGANIZED HEALTH CARE EDUCATION/TRAINING PROGRAM

## 2022-01-03 PROCEDURE — 83735 ASSAY OF MAGNESIUM: CPT | Performed by: STUDENT IN AN ORGANIZED HEALTH CARE EDUCATION/TRAINING PROGRAM

## 2022-01-03 PROCEDURE — P9041 ALBUMIN (HUMAN),5%, 50ML: HCPCS | Performed by: STUDENT IN AN ORGANIZED HEALTH CARE EDUCATION/TRAINING PROGRAM

## 2022-01-03 PROCEDURE — 49440 PLACE GASTROSTOMY TUBE PERC: CPT

## 2022-01-03 PROCEDURE — 49440 PLACE GASTROSTOMY TUBE PERC: CPT | Mod: GC | Performed by: RADIOLOGY

## 2022-01-03 PROCEDURE — 0DH63UZ INSERTION OF FEEDING DEVICE INTO STOMACH, PERCUTANEOUS APPROACH: ICD-10-PCS | Performed by: RADIOLOGY

## 2022-01-03 PROCEDURE — 250N000011 HC RX IP 250 OP 636: Performed by: STUDENT IN AN ORGANIZED HEALTH CARE EDUCATION/TRAINING PROGRAM

## 2022-01-03 PROCEDURE — 36592 COLLECT BLOOD FROM PICC: CPT | Performed by: STUDENT IN AN ORGANIZED HEALTH CARE EDUCATION/TRAINING PROGRAM

## 2022-01-03 PROCEDURE — 99152 MOD SED SAME PHYS/QHP 5/>YRS: CPT | Mod: GC | Performed by: RADIOLOGY

## 2022-01-03 PROCEDURE — 99152 MOD SED SAME PHYS/QHP 5/>YRS: CPT

## 2022-01-03 PROCEDURE — 85027 COMPLETE CBC AUTOMATED: CPT | Performed by: STUDENT IN AN ORGANIZED HEALTH CARE EDUCATION/TRAINING PROGRAM

## 2022-01-03 PROCEDURE — 250N000009 HC RX 250: Performed by: STUDENT IN AN ORGANIZED HEALTH CARE EDUCATION/TRAINING PROGRAM

## 2022-01-03 PROCEDURE — 250N000013 HC RX MED GY IP 250 OP 250 PS 637: Performed by: STUDENT IN AN ORGANIZED HEALTH CARE EDUCATION/TRAINING PROGRAM

## 2022-01-03 PROCEDURE — 36592 COLLECT BLOOD FROM PICC: CPT | Performed by: NEUROLOGICAL SURGERY

## 2022-01-03 PROCEDURE — C1769 GUIDE WIRE: HCPCS

## 2022-01-03 PROCEDURE — 99223 1ST HOSP IP/OBS HIGH 75: CPT | Mod: GC | Performed by: PSYCHIATRY & NEUROLOGY

## 2022-01-03 RX ORDER — ALBUMIN, HUMAN INJ 5% 5 %
12.5 SOLUTION INTRAVENOUS ONCE
Status: COMPLETED | OUTPATIENT
Start: 2022-01-03 | End: 2022-01-03

## 2022-01-03 RX ORDER — HYDROMORPHONE HCL IN WATER/PF 6 MG/30 ML
0.2 PATIENT CONTROLLED ANALGESIA SYRINGE INTRAVENOUS ONCE
Status: COMPLETED | OUTPATIENT
Start: 2022-01-03 | End: 2022-01-03

## 2022-01-03 RX ORDER — NALOXONE HYDROCHLORIDE 0.4 MG/ML
0.2 INJECTION, SOLUTION INTRAMUSCULAR; INTRAVENOUS; SUBCUTANEOUS
Status: DISCONTINUED | OUTPATIENT
Start: 2022-01-03 | End: 2022-01-17 | Stop reason: HOSPADM

## 2022-01-03 RX ORDER — HALOPERIDOL 5 MG/ML
5 INJECTION INTRAMUSCULAR ONCE
Status: COMPLETED | OUTPATIENT
Start: 2022-01-03 | End: 2022-01-03

## 2022-01-03 RX ORDER — OXYCODONE HYDROCHLORIDE 5 MG/1
5 TABLET ORAL EVERY 4 HOURS PRN
Status: COMPLETED | OUTPATIENT
Start: 2022-01-03 | End: 2022-01-04

## 2022-01-03 RX ORDER — NALOXONE HYDROCHLORIDE 0.4 MG/ML
0.4 INJECTION, SOLUTION INTRAMUSCULAR; INTRAVENOUS; SUBCUTANEOUS
Status: DISCONTINUED | OUTPATIENT
Start: 2022-01-03 | End: 2022-01-17 | Stop reason: HOSPADM

## 2022-01-03 RX ORDER — LIDOCAINE HYDROCHLORIDE 20 MG/ML
JELLY TOPICAL ONCE
Status: COMPLETED | OUTPATIENT
Start: 2022-01-03 | End: 2022-01-03

## 2022-01-03 RX ORDER — OLANZAPINE 2.5 MG/1
5 TABLET, FILM COATED ORAL 2 TIMES DAILY
Status: DISCONTINUED | OUTPATIENT
Start: 2022-01-03 | End: 2022-01-12

## 2022-01-03 RX ORDER — ROPINIROLE 1 MG/1
1 TABLET, FILM COATED ORAL DAILY
Status: DISCONTINUED | OUTPATIENT
Start: 2022-01-03 | End: 2022-01-17 | Stop reason: HOSPADM

## 2022-01-03 RX ORDER — GABAPENTIN 300 MG/1
300 CAPSULE ORAL AT BEDTIME
Status: DISCONTINUED | OUTPATIENT
Start: 2022-01-03 | End: 2022-01-08

## 2022-01-03 RX ORDER — NALOXONE HYDROCHLORIDE 0.4 MG/ML
0.2 INJECTION, SOLUTION INTRAMUSCULAR; INTRAVENOUS; SUBCUTANEOUS
Status: DISCONTINUED | OUTPATIENT
Start: 2022-01-03 | End: 2022-01-03 | Stop reason: HOSPADM

## 2022-01-03 RX ORDER — LIDOCAINE HYDROCHLORIDE 10 MG/ML
1-30 INJECTION, SOLUTION EPIDURAL; INFILTRATION; INTRACAUDAL; PERINEURAL
Status: COMPLETED | OUTPATIENT
Start: 2022-01-03 | End: 2022-01-03

## 2022-01-03 RX ORDER — FENTANYL CITRATE 50 UG/ML
25-50 INJECTION, SOLUTION INTRAMUSCULAR; INTRAVENOUS EVERY 5 MIN PRN
Status: DISCONTINUED | OUTPATIENT
Start: 2022-01-03 | End: 2022-01-03 | Stop reason: HOSPADM

## 2022-01-03 RX ORDER — NALOXONE HYDROCHLORIDE 0.4 MG/ML
0.4 INJECTION, SOLUTION INTRAMUSCULAR; INTRAVENOUS; SUBCUTANEOUS
Status: DISCONTINUED | OUTPATIENT
Start: 2022-01-03 | End: 2022-01-03 | Stop reason: HOSPADM

## 2022-01-03 RX ORDER — DIPHENHYDRAMINE HYDROCHLORIDE 50 MG/ML
25-50 INJECTION INTRAMUSCULAR; INTRAVENOUS
Status: COMPLETED | OUTPATIENT
Start: 2022-01-03 | End: 2022-01-03

## 2022-01-03 RX ORDER — FLUMAZENIL 0.1 MG/ML
0.2 INJECTION, SOLUTION INTRAVENOUS
Status: DISCONTINUED | OUTPATIENT
Start: 2022-01-03 | End: 2022-01-03 | Stop reason: HOSPADM

## 2022-01-03 RX ORDER — OLANZAPINE 2.5 MG/1
10 TABLET, FILM COATED ORAL AT BEDTIME
Status: DISCONTINUED | OUTPATIENT
Start: 2022-01-03 | End: 2022-01-08

## 2022-01-03 RX ORDER — CLINDAMYCIN PHOSPHATE 900 MG/50ML
900 INJECTION, SOLUTION INTRAVENOUS
Status: COMPLETED | OUTPATIENT
Start: 2022-01-03 | End: 2022-01-03

## 2022-01-03 RX ORDER — HALOPERIDOL 5 MG/ML
2 INJECTION INTRAMUSCULAR EVERY 6 HOURS PRN
Status: DISCONTINUED | OUTPATIENT
Start: 2022-01-03 | End: 2022-01-08

## 2022-01-03 RX ADMIN — LEVETIRACETAM 750 MG: 750 TABLET ORAL at 13:49

## 2022-01-03 RX ADMIN — Medication 40 MG: at 13:46

## 2022-01-03 RX ADMIN — OLANZAPINE 5 MG: 2.5 TABLET, FILM COATED ORAL at 17:04

## 2022-01-03 RX ADMIN — INSULIN GLARGINE 14 UNITS: 100 INJECTION, SOLUTION SUBCUTANEOUS at 20:33

## 2022-01-03 RX ADMIN — METOPROLOL TARTRATE 25 MG: 25 TABLET, FILM COATED ORAL at 13:48

## 2022-01-03 RX ADMIN — OXYCODONE HYDROCHLORIDE 5 MG: 5 TABLET ORAL at 22:07

## 2022-01-03 RX ADMIN — INSULIN ASPART 2 UNITS: 100 INJECTION, SOLUTION INTRAVENOUS; SUBCUTANEOUS at 10:27

## 2022-01-03 RX ADMIN — ATORVASTATIN CALCIUM 80 MG: 40 TABLET, FILM COATED ORAL at 13:48

## 2022-01-03 RX ADMIN — HYDROMORPHONE HYDROCHLORIDE 0.2 MG: 0.2 INJECTION, SOLUTION INTRAMUSCULAR; INTRAVENOUS; SUBCUTANEOUS at 11:50

## 2022-01-03 RX ADMIN — Medication 5 ML: at 17:28

## 2022-01-03 RX ADMIN — INSULIN ASPART 1 UNITS: 100 INJECTION, SOLUTION INTRAVENOUS; SUBCUTANEOUS at 17:03

## 2022-01-03 RX ADMIN — ROPINIROLE HYDROCHLORIDE 1 MG: 1 TABLET, FILM COATED ORAL at 20:33

## 2022-01-03 RX ADMIN — Medication 5 ML: at 13:49

## 2022-01-03 RX ADMIN — LACOSAMIDE 200 MG: 50 TABLET, FILM COATED ORAL at 20:33

## 2022-01-03 RX ADMIN — LIDOCAINE HYDROCHLORIDE: 20 JELLY TOPICAL at 09:00

## 2022-01-03 RX ADMIN — FENTANYL CITRATE 25 MCG: 50 INJECTION, SOLUTION INTRAMUSCULAR; INTRAVENOUS at 08:45

## 2022-01-03 RX ADMIN — LIDOCAINE HYDROCHLORIDE 10 ML: 10 INJECTION, SOLUTION EPIDURAL; INFILTRATION; INTRACAUDAL; PERINEURAL at 09:21

## 2022-01-03 RX ADMIN — INSULIN ASPART 3 UNITS: 100 INJECTION, SOLUTION INTRAVENOUS; SUBCUTANEOUS at 20:32

## 2022-01-03 RX ADMIN — MULTIVIT AND MINERALS-FERROUS GLUCONATE 9 MG IRON/15 ML ORAL LIQUID 15 ML: at 13:46

## 2022-01-03 RX ADMIN — ALBUMIN HUMAN 12.5 G: 0.05 INJECTION, SOLUTION INTRAVENOUS at 04:52

## 2022-01-03 RX ADMIN — Medication 10 MG: at 20:33

## 2022-01-03 RX ADMIN — GABAPENTIN 300 MG: 300 CAPSULE ORAL at 22:07

## 2022-01-03 RX ADMIN — OLANZAPINE 5 MG: 2.5 TABLET, FILM COATED ORAL at 13:48

## 2022-01-03 RX ADMIN — Medication 5 ML: at 02:13

## 2022-01-03 RX ADMIN — Medication 5 ML: at 06:45

## 2022-01-03 RX ADMIN — GLUCAGON HYDROCHLORIDE 1 MG: 1 INJECTION, POWDER, FOR SOLUTION INTRAMUSCULAR; INTRAVENOUS; SUBCUTANEOUS at 08:46

## 2022-01-03 RX ADMIN — METOPROLOL TARTRATE 25 MG: 25 TABLET, FILM COATED ORAL at 20:33

## 2022-01-03 RX ADMIN — OLANZAPINE 10 MG: 2.5 TABLET, FILM COATED ORAL at 22:08

## 2022-01-03 RX ADMIN — INSULIN ASPART 3 UNITS: 100 INJECTION, SOLUTION INTRAVENOUS; SUBCUTANEOUS at 00:50

## 2022-01-03 RX ADMIN — HALOPERIDOL LACTATE 5 MG: 5 INJECTION, SOLUTION INTRAMUSCULAR at 02:11

## 2022-01-03 RX ADMIN — CLINDAMYCIN IN 5 PERCENT DEXTROSE 900 MG: 18 INJECTION, SOLUTION INTRAVENOUS at 08:38

## 2022-01-03 RX ADMIN — LEVETIRACETAM 750 MG: 750 TABLET ORAL at 20:33

## 2022-01-03 RX ADMIN — LACOSAMIDE 200 MG: 50 TABLET, FILM COATED ORAL at 13:47

## 2022-01-03 RX ADMIN — DIPHENHYDRAMINE HYDROCHLORIDE 25 MG: 50 INJECTION, SOLUTION INTRAMUSCULAR; INTRAVENOUS at 08:36

## 2022-01-03 RX ADMIN — POTASSIUM CHLORIDE 40 MEQ: 1.5 POWDER, FOR SOLUTION ORAL at 13:47

## 2022-01-03 RX ADMIN — MIDAZOLAM 0.5 MG: 1 INJECTION INTRAMUSCULAR; INTRAVENOUS at 08:45

## 2022-01-03 ASSESSMENT — ACTIVITIES OF DAILY LIVING (ADL)
ADLS_ACUITY_SCORE: 13
ADLS_ACUITY_SCORE: 17
ADLS_ACUITY_SCORE: 15
ADLS_ACUITY_SCORE: 17
ADLS_ACUITY_SCORE: 15
ADLS_ACUITY_SCORE: 17
ADLS_ACUITY_SCORE: 15
ADLS_ACUITY_SCORE: 17
ADLS_ACUITY_SCORE: 15
ADLS_ACUITY_SCORE: 17
ADLS_ACUITY_SCORE: 15
ADLS_ACUITY_SCORE: 15
ADLS_ACUITY_SCORE: 13
ADLS_ACUITY_SCORE: 17
ADLS_ACUITY_SCORE: 9
ADLS_ACUITY_SCORE: 17
ADLS_ACUITY_SCORE: 15

## 2022-01-03 NOTE — PROCEDURES
[facility]    Procedure: IR Procedure Note    Date/Time: 1/3/2022 9:14 AM  Performed by: Rex Patel DO  Authorized by: Rex Patel DO   IR Fellow Physician: Jorge      UNIVERSAL PROTOCOL   Site Marked: NA  Prior Images Obtained and Reviewed:  Yes  Required items: Required blood products, implants, devices and special equipment available    Patient identity confirmed:  Verbally with patient, arm band, provided demographic data and hospital-assigned identification number  Patient was reevaluated immediately before administering moderate or deep sedation or anesthesia  Confirmation Checklist:  Patient's identity using two indicators, relevant allergies, procedure was appropriate and matched the consent or emergent situation and correct equipment/implants were available  Time out: Immediately prior to the procedure a time out was called    Universal Protocol: the Joint Commission Universal Protocol was followed    Preparation: Patient was prepped and draped in usual sterile fashion       ANESTHESIA    Anesthesia: Local infiltration  Local Anesthetic:  Lidocaine 1% without epinephrine      SEDATION  Patient Sedated: Yes    Sedation Type:  Moderate (conscious) sedation  Sedation:  Fentanyl and midazolam  Vital signs: Vital signs monitored during sedation    See dictated procedure note for full details.  Findings: Uncomplicated 18 Hebrew percutaneous gastrostomy tube placement.    Specimens: none    Complications: None    Condition: Stable    Plan: N.p.o. and nothing per gastrostomy tube for 4 hours.  If patient is having normal bowel sounds and no significant abdominal pain after the 4 hours, patient can have saline trial prior to tube feeds.      PROCEDURE    Patient Tolerance:  Patient tolerated the procedure well with no immediate complications  Length of time physician/provider present for 1:1 monitoring during sedation: 30
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
## 2022-01-03 NOTE — PLAN OF CARE
Status: Here with post- traumatic acute R SDH with L hemibody weakness. OR 12/21 for evacuation.   Vitals: AVSS on 2 L NC. On continuous pulse ox.   Neuros: A&Ox3-4. During episodes of agitation/restlessness, patient d/o x3. Waxes & wanes. Garbled speech. L pupil 3 mm fixed & irregular. Slight L facial droop/L drift. L side 4/5, R side 5/5. Pt can be agitated, hitting, kicking staff, restless (Sitter at bedside)  IV: DL PICC HL. PIV SL.  Resp/trach: Very dry mouth w/ white lesions. Frequent oral cares. Pt c/o that his lungs hurt- LS clear throughout and breathing shallow.  On 2L nasal cannula- SPO2 remains greater than 97%  Diet: NPO. TF via NG @ goal of 60 mLhr  Bowel status: LBM 1/2- liquid loose and large.  : voiding incontinently via primafit  Skin: BLE mayuri. Abdominal bruising. R crani incision with staples LEONID.  Air pocket/boggy area near incision.  Scabbing throughout BLE.   Pain: Denies  Activity: Ax2 with lift. Repo q 2 hours.   Plan: G-tube placement in IR Monday, 01/03. Contrast to be given Sunday evening at 2000. NPO after MN.

## 2022-01-03 NOTE — PROGRESS NOTES
Care Management Follow Up    Length of Stay (days): 14    Expected Discharge Date: 01/04/2022     Concerns to be Addressed:   Disposition planning    Patient plan of care discussed at interdisciplinary rounds: Yes    Anticipated Discharge Disposition:  Short term TCU placement     Anticipated Discharge Services:  Short term TCU placement  Anticipated Discharge DME:  Not applicable at this time    Patient/family educated on Medicare website which has current facility and service quality ratings:  yes  Education Provided on the Discharge Plan:  yes  Patient/Family in Agreement with the Plan:  yes    Referrals Placed by CM/SW:  None on this date  Private pay costs discussed: Not applicable at this time    Additional Information:  SW is following pt for discharge planning.  TCU placement is recommended.  Pt currently has a one to one sitter and mitts in place. Pt currently has a NG for tube feedings and pt is using 02.  Peg tube placement is anticipated today.   SW is delaying TCU referrals until mitts/sitter are successfully removed as both are a barrier to placement.       SW phoned pt's wife and left a message for wife to call with TCU preferences (top 10 choices).     SW will continue to follow for discharge planning.      DERIK Reddy  Social Work, 6A  Phone:  [phone]  Pager:  [phone]  1/3/2022                CHRISTAL Masterson
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
## 2022-01-03 NOTE — PROGRESS NOTES
[facility], [place]   01/03/2022  Neurosurgery Progress Note:    Assessment:  Red Sutherland is a 81-year-old male post-traumatic acute right subdural hematoma with left hemibody weakness, on xarelto and plavix. Admitted to the ICU on 12/20/21. Taken to OR 12/21/2021. Extubated 12/22/2021. Started on Aspirin and Prasugrel on 12/28/21. Failed SLP evaluation. Free water flushes stopped since hypernatremia resolved. Lasix and Bumex given for diuresis.    Plan:  - Keppra 1g BID, Vimpat 150 BID  - HOB > 30 degrees  - Daily Bumex for diuresis held this AM pending morning Na  - Seroquel 50 in AM, 150 QHS  - Held Aspirin and Prasugrel for G tube on 1/3  - NPO at midnight  - Follow sodium for hypernatremia, will restart FWF after PEG in place  - Discuss involving psychiatry for delirium optimization.    Asim Ann MD  Neurosurgery Resident, PGY-2    Please contact neurosurgery resident on call with questions.    Dial * * *[phone], enter [phone] when prompted.   -----------------------------------    Interval History: Agitated overnight requiring x 2 Haldol 5mg. Bumex held this am. 250 of albumin given.     Objective:   Temp:  [95.3  F (35.2  C)-98.4  F (36.9  C)] 95.7  F (35.4  C)  Pulse:  [] 82  Resp:  [16-18] 18  BP: (115-186)/(65-87) 161/78  SpO2:  [96 %-100 %] 100 %  I/O last 3 completed shifts:  In: 2410 [I.V.:270; NG/GT:1240]  Out: 1900 [Urine:1900]    Gen: Slightly agitated  Wound: clean, dry, intact,  Neurologic:  - Alert & Oriented to person, place, time, and situation  - Follows commands briskly x 4   - PEERL, EOMI, Mild L facial droop  - LUE with  4+/5, rest 5/5    LABS:  Recent Labs   Lab 01/03/22  0725 01/03/22  0646 01/03/22  0625 01/03/22  0325 01/03/22  0232 01/03/22  0016 01/02/22 2047 01/02/22  0811 01/02/22  0733 01/01/22  0746 01/01/22  0641   NA  --  150*  150*  --   --  149*  --  147*   < > 148*  --  143   POTASSIUM  --  4.1  --   --   --   --   --   --  4.2   --  3.7   CHLORIDE  --  117*  --   --   --   --   --   --  117*  --  112*   CO2  --  28  --   --   --   --   --   --  26  --  25   ANIONGAP  --  5  --   --   --   --   --   --  5  --  6   * 110* 107*   < >  --    < >  --    < > 219*   < > 220*   BUN  --  41*  --   --   --   --   --   --  39*  --  39*   CR  --  1.07  --   --   --   --   --   --  1.09  --  1.16   ANNMARIE  --  9.0  --   --   --   --   --   --  8.7  --  8.4*    < > = values in this interval not displayed.       Recent Labs   Lab 01/03/22  0646   WBC 6.2   RBC 3.82*   HGB 7.7*   HCT 29.3*   MCV 77*   MCH 20.2*   MCHC 26.3*   RDW 20.7*   *       IMAGING:  Recent Results (from the past 24 hour(s))   XR Chest Port 1 View    Narrative    EXAM: XR CHEST PORT 1 VIEW  1/3/2022 4:20 AM     HISTORY:  assess for pulmonary edema       COMPARISON:  1/2/2022    FINDINGS:   AP portable view of the chest. Right arm PICC tip projects in the low  SVC. Enteric tube courses below the diaphragm with tip outside the  field of view. Midline trachea. Stable cardiomegaly. Unchanged  perihilar and basilar predominant pulmonary opacities. No significant  pleural effusion, the right costophrenic angle is collimated outside  the field of view. Visualized upper abdomen is unremarkable.      Impression    IMPRESSION:   Stable cardiomegaly and unchanged perihilar and bibasilar predominant  mixed interstitial opacities, which may represent pulmonary edema.    I have personally reviewed the examination and initial interpretation  and I agree with the findings.    NAINA GARCIA MD         SYSTEM ID:  [id number]

## 2022-01-03 NOTE — CONSULTS
"          Initial Psychiatric Consult   Consult date: January 3, 2022         Reason for Consult, requesting source:    AMS, agitation   Requesting source: Parvez Quinones    Labs and imaging reviewed. Discussed with nursing and his wife was present to provide collateral information.   Head CT shows persistent R front subdural hematoma. QTc this morning was 435 ms.         HPI:   From neuro admit note:   \"Red Sutherland is a 81 year old male with PMH of atrial fibrillation on Eliquis, CHF, COPD, pulmonary hypertension, T2DM, HTN who was admitted on 12/20/2021 as a transfer from [facility] after he was found to have a right subdural hematoma.      Per patients wife, he had a fall about 2 weeks ago when he was at home when he hit his head. Shortly after patient was evaluated by PCP for neck pain. However yesterday patient was noted to be more \"sleepy\" and slurring his speech. He then started having episodes where he was smacking his lips and biting his tongue with retained consciousness. Upon arrival to [facility], he had stable vitals with a GCS 15. Noted to have slurring of his speech and no focal weakness. CT head was completed that showed a large hyperdense holohemispheric right-sided subdural hematoma which measures 12.9 mm in thickness over the right posterior right frontal convexity, 12.6 mm over the right parietal convexity and 11.9 mm over the right temporal convexity.\"    He had a craniotomy for R sided subdural hematoma evacuation on 12/21, extubated 12/22.   He is seen for psychiatric consultation due to ongoing altered mental status and intermittent agitation which is more severe in the evening and nighttime.  Last night he received 2 doses of 5 mg IV Haldol and I see that did calm him down but took about an hour to do so.  Seroquel has been increased to 150 mg at bedtime but according to his nurse does not seem to do much to help with sleep or agitation.  His wife mentions that he is probably a " bit depressed due to all of his medical problems that he is never been treated for depression or anxiety.  He was pleasant while talking to me, but was quite groggy and fell asleep within about 5 minutes of conversation. He denied being depressed.         Past Psychiatric History:   No psychiatric treatment in his psychiatric review of systems is negative        Substance Use and History:   No heavy use of alcohol in the past and he is a former smoker        Past Medical History:   PAST MEDICAL HISTORY:   Past Medical History:   Diagnosis Date     Atrial fibrillation (H)      CHF (congestive heart failure) (H)      COPD (chronic obstructive pulmonary disease) (H)      Coronary artery disease      Diabetes mellitus (H)      Essential hypertension      Hyperlipidemia      Pulmonary hypertension (H)     O2 at night     Pulmonary hypertension due to left ventricular diastolic dysfunction (H) 11/28/2017    Multifactorial per [place] with elevated LVEDP and PCW, COPD and NOVA. They put him on sildenafil as nitroprusside lowered systemic BP and with that the mean PA dropped from 54 to 49. Negative VQ at [place] Dec 1, 2017.     RLS (restless legs syndrome)      Sleep apnea        PAST SURGICAL HISTORY:   Past Surgical History:   Procedure Laterality Date     BACK SURGERY      lower back     CARDIAC CATHETERIZATION  12/13/2017    Right and left at [place], mean PA 58, PCW 24 with V wave of 35, LVEDP of 18, with Nipride systemic BP, PVR and mean PA all declined     CARDIOVERSION  03/15/2013    for afib     CATARACT EXTRACTION Bilateral      COLONOSCOPY N/A 10/31/2021    Procedure: COLONOSCOPY WITH POLYPECTOMY;  Surgeon: Sheng Sagastume MD;  Location: [facility] Main OR     CORONARY STENT PLACEMENT       CRANIOTOMY Right 12/21/2021    Procedure: CRANIOTOMY FOR Subdural HEMATOMA EVACUATION;  Surgeon: Parvez Quinones MD;  Location: [facility] OR     CV CORONARY ANGIOGRAM N/A 10/25/2021    Procedure: Coronary Angiogram;  Surgeon:  Otf Knowles MD;  Location: [facility] LAB CV     CV CORONARY LITHOTRIPSY PCI N/A 10/25/2021    Procedure: CV Coronary Lithotripsy PCI;  Surgeon: Otf Knowles MD;  Location: [facility] LAB CV     CV LEFT HEART CATH N/A 10/25/2021    Procedure: Left Heart Cath;  Surgeon: Otf Knowles MD;  Location: [facility] LAB CV     CV PCI N/A 10/25/2021    Procedure: Percutaneous Coronary Intervention;  Surgeon: Otf Knowles MD;  Location: [facility] LAB CV     ESOPHAGOSCOPY, GASTROSCOPY, DUODENOSCOPY (EGD), COMBINED N/A 10/30/2021    Procedure: ESOPHAGOGASTRODUODENOSCOPY (EGD);  Surgeon: Sheng Sagastume MD;  Location: [facility] OR     IR ABDOMINAL AORTOGRAM  11/16/2012     IR MISCELLANEOUS PROCEDURE  07/20/2001     IR MISCELLANEOUS PROCEDURE  11/16/2012     OTHER SURGICAL HISTORY      [place]     PICC DOUBLE LUMEN PLACEMENT Right 12/25/2021    48cm (2cm external), Basilic vein, SVC RA junction     SHOULDER SURGERY      reapir on right shoulder     TOTAL HIP ARTHROPLASTY Left      TOTAL KNEE ARTHROPLASTY Bilateral      WRIST SURGERY Bilateral      ZZHC COLONOSCOPY W/WO BRUSH/WASH N/A 01/14/2021    Procedure: COLONOSCOPY;  Surgeon: Mihai Harris MD;  Location: [facility];  Service: Gastroenterology             Family History:   FAMILY HISTORY:   Family History   Problem Relation Age of Onset     Hyperlipidemia Mother      Hypertension Mother      Heart Disease Mother      Hyperlipidemia Father      Hypertension Father      Coronary Artery Disease Father      Cerebrovascular Disease Brother      Depression Brother      No Known Problems Sister      Pulmonary Hypertension No family hx of      Congenital heart disease No family hx of            Social History:   And his wife of over 30 years live in a [place].  They each have children from previous marriages and they have 10 grandchildren.  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         Physical ROS:    The 10 point Review of Systems is negative other than noted in the HPI or here.           Medications:     Current Facility-Administered Medications   Medication     [Held by provider] aspirin EC tablet 81 mg     atorvastatin (LIPITOR) tablet 80 mg     [Held by provider] bumetanide (BUMEX) tablet 4 mg     glucose gel 15-30 g    Or     dextrose 50 % injection 25-50 mL    Or     glucagon injection 1 mg     [Held by provider] empagliflozin (JARDIANCE) tablet 10 mg     ferrous gluconate (FERGON) tablet 324 mg     fluticasone-vilanterol (BREO ELLIPTA) 200-25 MCG/INH inhaler 1 puff     heparin lock flush 10 UNIT/ML injection 5-20 mL     heparin lock flush 10 UNIT/ML injection 5-20 mL     insulin aspart (NovoLOG) injection (RAPID ACTING)     insulin glargine (LANTUS PEN) injection 14 Units     irbesartan (AVAPRO) tablet 150 mg     lacosamide (VIMPAT) tablet 200 mg     levETIRAcetam (KEPPRA) tablet 750 mg     melatonin tablet 10 mg     metoprolol tartrate (LOPRESSOR) tablet 25 mg     multivitamins w/minerals liquid 15 mL     ondansetron (ZOFRAN-ODT) ODT tab 4 mg     pantoprazole (PROTONIX) 2 mg/mL suspension 40 mg     polyethylene glycol (MIRALAX) Packet 17 g     potassium chloride (KLOR-CON) Packet 40 mEq     [Held by provider] prasugrel (EFFIENT) tablet 10 mg     QUEtiapine (SEROquel) tablet 150 mg     QUEtiapine (SEROquel) tablet 25 mg     QUEtiapine (SEROquel) tablet 50 mg     rOPINIRole (REQUIP) tablet 2 mg     senna-docusate (SENOKOT-S/PERICOLACE) 8.6-50 MG per tablet 2 tablet     sodium chloride (PF) 0.9% PF flush 10 mL     sodium chloride (PF) 0.9% PF flush 10 mL     sodium chloride (PF) 0.9% PF flush 3 mL     sodium chloride (PF) 0.9% PF flush 3 mL     tadalafil (ADCIRCA/CIALIS) tablet              Allergies:     Allergies   Allergen Reactions     Contrast Dye Nausea     Other reaction(s): GI intolerance, GI Upset     Furosemide Muscle Pain (Myalgia)     Previously tolerated.  Muscle cramps      Hydrochlorothiazide Unknown     Iodinated Contrast Media [Diagnostic X-Ray Materials] Nausea     Losartan Other (See Comments)     Other reaction(s): Stomatitis, Bloody nose dry mouth and lips     Losartan-Hydrochlorothiazide [Hyzaar]      Mouth sores     Metaxalone Nausea     Metolazone Other (See Comments)     Muscle cramps     Mometasone Other (See Comments)     Bloody nose     Rabeprazole Other (See Comments)     Mouth sores     Ramipril Other (See Comments)     Mouth sores     Shellfish Containing Products [Shellfish-Derived Products] Unknown     Other reaction(s): mouth sores, Other reaction(s): mouth sores     Sildenafil Muscle Pain (Myalgia)     Methocarbamol Rash     Penicillins Other (See Comments)     Immune-does not work for him          Labs:     Recent Results (from the past 48 hour(s))   Glucose by meter    Collection Time: 01/01/22 11:59 AM   Result Value Ref Range    GLUCOSE BY METER POCT 223 (H) 70 - 99 mg/dL   Glucose by meter    Collection Time: 01/01/22  3:59 PM   Result Value Ref Range    GLUCOSE BY METER POCT 165 (H) 70 - 99 mg/dL   Glucose by meter    Collection Time: 01/01/22  8:11 PM   Result Value Ref Range    GLUCOSE BY METER POCT 169 (H) 70 - 99 mg/dL   Glucose by meter    Collection Time: 01/01/22 11:40 PM   Result Value Ref Range    GLUCOSE BY METER POCT 154 (H) 70 - 99 mg/dL   Glucose by meter    Collection Time: 01/02/22  4:00 AM   Result Value Ref Range    GLUCOSE BY METER POCT 202 (H) 70 - 99 mg/dL   EKG 12-lead, complete    Collection Time: 01/02/22  6:39 AM   Result Value Ref Range    Systolic Blood Pressure  mmHg    Diastolic Blood Pressure  mmHg    Ventricular Rate 87 BPM    Atrial Rate 77 BPM    OH Interval  ms    QRS Duration 90 ms     ms    QTc 450 ms    P Axis  degrees    R AXIS 113 degrees    T Axis 25 degrees    Interpretation ECG       Atrial fibrillation  Right axis deviation  RSR' or QR pattern in V1 suggests right ventricular conduction delay  Anteroseptal  infarct , age undetermined  Abnormal ECG  When compared with ECG of 30-DEC-2021 10:27,  Atrial fibrillation has replaced Sinus rhythm  RSR' pattern in V1 is now Present  Anteroseptal infarct is now Present     Basic metabolic panel    Collection Time: 01/02/22  7:33 AM   Result Value Ref Range    Sodium 148 (H) 133 - 144 mmol/L    Potassium 4.2 3.4 - 5.3 mmol/L    Chloride 117 (H) 94 - 109 mmol/L    Carbon Dioxide (CO2) 26 20 - 32 mmol/L    Anion Gap 5 3 - 14 mmol/L    Urea Nitrogen 39 (H) 7 - 30 mg/dL    Creatinine 1.09 0.66 - 1.25 mg/dL    Calcium 8.7 8.5 - 10.1 mg/dL    Glucose 219 (H) 70 - 99 mg/dL    GFR Estimate 69 >60 mL/min/1.73m2   Extra Purple Top Tube    Collection Time: 01/02/22  7:33 AM   Result Value Ref Range    Hold Specimen JIC    Glucose by meter    Collection Time: 01/02/22  8:11 AM   Result Value Ref Range    GLUCOSE BY METER POCT 183 (H) 70 - 99 mg/dL   Glucose by meter    Collection Time: 01/02/22 12:13 PM   Result Value Ref Range    GLUCOSE BY METER POCT 193 (H) 70 - 99 mg/dL   Sodium    Collection Time: 01/02/22  3:42 PM   Result Value Ref Range    Sodium 150 (H) 133 - 144 mmol/L   Glucose by meter    Collection Time: 01/02/22  4:46 PM   Result Value Ref Range    GLUCOSE BY METER POCT 188 (H) 70 - 99 mg/dL   Glucose by meter    Collection Time: 01/02/22  7:53 PM   Result Value Ref Range    GLUCOSE BY METER POCT 180 (H) 70 - 99 mg/dL   Sodium    Collection Time: 01/02/22  8:47 PM   Result Value Ref Range    Sodium 147 (H) 133 - 144 mmol/L   Glucose by meter    Collection Time: 01/03/22 12:16 AM   Result Value Ref Range    GLUCOSE BY METER POCT 197 (H) 70 - 99 mg/dL   EKG 12-lead, complete    Collection Time: 01/03/22 12:56 AM   Result Value Ref Range    Systolic Blood Pressure  mmHg    Diastolic Blood Pressure  mmHg    Ventricular Rate 74 BPM    Atrial Rate 70 BPM    KS Interval  ms    QRS Duration 94 ms     ms    QTc 435 ms    P Axis  degrees    R AXIS 106 degrees    T Axis 2 degrees     Interpretation ECG       Atrial fibrillation  Rightward axis  Incomplete right bundle branch block  Nonspecific ST and T wave abnormality  Abnormal ECG  When compared with ECG of 02-JAN-2022 06:39, (unconfirmed)  Criteria for Anteroseptal infarct are no longer Present     Sodium    Collection Time: 01/03/22  2:32 AM   Result Value Ref Range    Sodium 149 (H) 133 - 144 mmol/L   Glucose by meter    Collection Time: 01/03/22  3:25 AM   Result Value Ref Range    GLUCOSE BY METER POCT 105 (H) 70 - 99 mg/dL   Glucose by meter    Collection Time: 01/03/22  4:31 AM   Result Value Ref Range    GLUCOSE BY METER POCT 91 70 - 99 mg/dL   Glucose by meter    Collection Time: 01/03/22  5:34 AM   Result Value Ref Range    GLUCOSE BY METER POCT 83 70 - 99 mg/dL   Glucose by meter    Collection Time: 01/03/22  6:25 AM   Result Value Ref Range    GLUCOSE BY METER POCT 107 (H) 70 - 99 mg/dL   CBC with platelets    Collection Time: 01/03/22  6:46 AM   Result Value Ref Range    WBC Count 6.2 4.0 - 11.0 10e3/uL    RBC Count 3.82 (L) 4.40 - 5.90 10e6/uL    Hemoglobin 7.7 (L) 13.3 - 17.7 g/dL    Hematocrit 29.3 (L) 40.0 - 53.0 %    MCV 77 (L) 78 - 100 fL    MCH 20.2 (L) 26.5 - 33.0 pg    MCHC 26.3 (L) 31.5 - 36.5 g/dL    RDW 20.7 (H) 10.0 - 15.0 %    Platelet Count 138 (L) 150 - 450 10e3/uL   Magnesium    Collection Time: 01/03/22  6:46 AM   Result Value Ref Range    Magnesium 2.8 (H) 1.6 - 2.3 mg/dL   Phosphorus    Collection Time: 01/03/22  6:46 AM   Result Value Ref Range    Phosphorus 3.5 2.5 - 4.5 mg/dL   Basic metabolic panel    Collection Time: 01/03/22  6:46 AM   Result Value Ref Range    Sodium 150 (H) 133 - 144 mmol/L    Potassium 4.1 3.4 - 5.3 mmol/L    Chloride 117 (H) 94 - 109 mmol/L    Carbon Dioxide (CO2) 28 20 - 32 mmol/L    Anion Gap 5 3 - 14 mmol/L    Urea Nitrogen 41 (H) 7 - 30 mg/dL    Creatinine 1.07 0.66 - 1.25 mg/dL    Calcium 9.0 8.5 - 10.1 mg/dL    Glucose 110 (H) 70 - 99 mg/dL    GFR Estimate 71 >60 mL/min/1.73m2  "  Sodium    Collection Time: 01/03/22  6:46 AM   Result Value Ref Range    Sodium 150 (H) 133 - 144 mmol/L   Glucose by meter    Collection Time: 01/03/22  7:25 AM   Result Value Ref Range    GLUCOSE BY METER POCT 116 (H) 70 - 99 mg/dL   Glucose by meter    Collection Time: 01/03/22  9:49 AM   Result Value Ref Range    GLUCOSE BY METER POCT 175 (H) 70 - 99 mg/dL          Physical and Psychiatric Examination:     /68 (BP Location: Left arm)   Pulse 81   Temp (!) 95.6  F (35.3  C) (Axillary)   Resp 16   Wt 89.8 kg (197 lb 15.6 oz)   SpO2 100%   BMI 27.61 kg/m    Weight is 197 lbs 15.57 oz  Body mass index is 27.61 kg/m .    Physical Exam:  I have reviewed the physical exam as documented by by the medical team and agree with findings and assessment and have no additional findings to add at this time.    Mental Status Exam:  Lying quietly in the hospital bed wearing mitts. Grooming a bit marginal. Says \"hello, nice to meet you\" when I introduced myself. Speech slightly dysarthric. Moves upper extremities without difficulty. Associations loose. Mood is \"OK.\"  Affect slightly restricted.. Thought process circumstantial. Thought content negative for suicidal thoughts or delusions. Oriented to person and place, didn't answer question about day and date.  Recent and remote memory, attention span and concentration are not formally assessed (he fell asleep). Fund of knowledge, use of language; unable to assess. Insight and judgment fair to poor.              DSM-5 Diagnosis:   Delirium   unspecified neurocognitive disorder           Assessment:   It appears that the Seroquel is not particularly helpful for sleep and agitation; he may need a stronger D2 antagonist.   Fortunately his QTc is ok so Haldol not a problem to use (although it takes awhile to start working).   There is remote chance that Requip is adding to agitation, but should not be abruptly discontinued.   He normally uses 5 mg of Ambien HS at home; we " "could be seeing some rebound insomnia from stopping this, but probably not a good idea to use this or benzodiazepines.           Summary of Recommendations:   Instead to Seroquel, try Zyprexa 10 to 15 mg at 2100, and 5 mg BID.  Haldol 2-5 mg IV q 4 hours for severe agitation.   Consider decreasing Requip to 1 mg and could add gabapentin 300-600 mg HS for RLS.   Page me or re-consult psychiatry as needed.     Tyshawn Cruz M.D.   [facility]   Contact information available via [facility] Paging/Directory.  If I am not available, then [facility] intake ([phone]) should know who   Is on call        \"This dictation was performed with voice recognition software and may contain errors,  omissions and inadvertent word substitution.\"           "

## 2022-01-03 NOTE — PROGRESS NOTES
Patient Name: Red Sutherland  Medical Record Number: [id number]  Today's Date: 1/3/2022    Procedure: Image guided percutaneous gastrostomy tube placement   Proceduralist: Dr. Patel and Dr. Patel  Pathology present: NA    Procedure Start: 0844  Procedure end: 0905  Sedation medications administered:    Fentanyl 25 mcg   Midazolam 0.5 mg     Sedation start time 0845   Sedation end time: 0905   Total sedation time: 20 minutes     Report given to: Eva DELEON  : SHUN    Other Notes: Pt arrived to IR room 1 from . Consent reviewed. Pt denies any questions or concerns regarding procedure. Pt positioned supine and monitored per protocol. Pt tolerated procedure without any noted complications. Pt transferred back to 6A.

## 2022-01-03 NOTE — PLAN OF CARE
Status: Here with post- traumatic acute R SDH with L hemibody weakness. OR 12/21 for evacuation. S/p PEG placement 1/3/22  Vitals: AVSS on 2 L NC. On continuous pulse ox.   Neuros: A&Ox3-4. During episodes of agitation/restlessness, patient d/o x3. Waxes & wanes. Garbled speech. L pupil 3 mm fixed & irregular. Slight L facial droop/L drift. L side 4/5, R side 5/5. Pt can be agitated, hitting, kicking staff, restless (Sitter at bedside)  IV: DL PICC HL. PIV SL.  Resp/trach: Very dry mouth w/ white lesions. Frequent oral cares. Pt c/o that his lungs hurt- LS clear throughout and breathing shallow.  On 2L nasal cannula- SPO2 remains greater than 97%  Diet: NPO. TF via NG @ goal of 60 mLhr via newly placed PEG  Bowel status: LBM 1/2- liquid loose and medium on  Bed pan.  : voiding incontinently via primafit  Skin: BLE mayuri. Abdominal bruising. R crani incision with staples D/C'd, incision LEONID.  Air pocket/boggy area near incision.  Scabbing throughout BLE.   Pain: new PEG site. Iv dilaudid x1  Activity: Ax2 pivot to chair.  Repo q 2 hours.   Plan: G-tube placement in IR this AM, 01/03. TF restarted.  last NA level 148.

## 2022-01-03 NOTE — SEDATION DOCUMENTATION
[facility] Procedure Note        Sedation:      Performed by: Rex Patel DO  Authorized by: Rex Patel DO    Pre-Procedure Assessment done at 0830.    Expected Level:  Moderate Sedation    Indication:  Sedation is required to allow for G tube placement    Consent obtained from patient after discussing the risks, benefits and alternatives.    PO Intake:  Appropriately NPO for procedure    ASA Class:  Class 2 - MILD SYSTEMIC DISEASE, NO ACUTE PROBLEMS, NO FUNCTIONAL LIMITATIONS.    Mallampati:  Grade 2:  Soft palate, base of uvula, tonsillar pillars, and portion of posterior pharyngeal wall visible    Lungs: Lungs Clear with good breath sounds bilaterally.     Heart: IRIR. 2/6 YIN    History and physical reviewed and no updates needed. I have reviewed the lab findings, diagnostic data, medications, and the plan for sedation. I have determined this patient to be an appropriate candidate for the planned sedation and procedure and have reassessed the patient IMMEDIATELY PRIOR to sedation and procedure.

## 2022-01-04 LAB
ANION GAP SERPL CALCULATED.3IONS-SCNC: 3 MMOL/L (ref 3–14)
BUN SERPL-MCNC: 44 MG/DL (ref 7–30)
CALCIUM SERPL-MCNC: 9.1 MG/DL (ref 8.5–10.1)
CHLORIDE BLD-SCNC: 118 MMOL/L (ref 94–109)
CO2 SERPL-SCNC: 29 MMOL/L (ref 20–32)
CREAT SERPL-MCNC: 1.12 MG/DL (ref 0.66–1.25)
GFR SERPL CREATININE-BSD FRML MDRD: 67 ML/MIN/1.73M2
GLUCOSE BLD-MCNC: 184 MG/DL (ref 70–99)
GLUCOSE BLDC GLUCOMTR-MCNC: 174 MG/DL (ref 70–99)
GLUCOSE BLDC GLUCOMTR-MCNC: 179 MG/DL (ref 70–99)
GLUCOSE BLDC GLUCOMTR-MCNC: 181 MG/DL (ref 70–99)
GLUCOSE BLDC GLUCOMTR-MCNC: 188 MG/DL (ref 70–99)
GLUCOSE BLDC GLUCOMTR-MCNC: 202 MG/DL (ref 70–99)
GLUCOSE BLDC GLUCOMTR-MCNC: 226 MG/DL (ref 70–99)
HOLD SPECIMEN: NORMAL
POTASSIUM BLD-SCNC: 4.7 MMOL/L (ref 3.4–5.3)
SODIUM SERPL-SCNC: 149 MMOL/L (ref 133–144)
SODIUM SERPL-SCNC: 149 MMOL/L (ref 133–144)
SODIUM SERPL-SCNC: 150 MMOL/L (ref 133–144)

## 2022-01-04 PROCEDURE — 250N000013 HC RX MED GY IP 250 OP 250 PS 637: Performed by: NURSE PRACTITIONER

## 2022-01-04 PROCEDURE — 250N000013 HC RX MED GY IP 250 OP 250 PS 637: Performed by: NEUROLOGICAL SURGERY

## 2022-01-04 PROCEDURE — 250N000013 HC RX MED GY IP 250 OP 250 PS 637: Performed by: STUDENT IN AN ORGANIZED HEALTH CARE EDUCATION/TRAINING PROGRAM

## 2022-01-04 PROCEDURE — 250N000011 HC RX IP 250 OP 636: Performed by: NURSE PRACTITIONER

## 2022-01-04 PROCEDURE — 84295 ASSAY OF SERUM SODIUM: CPT | Performed by: NEUROLOGICAL SURGERY

## 2022-01-04 PROCEDURE — 36592 COLLECT BLOOD FROM PICC: CPT | Performed by: STUDENT IN AN ORGANIZED HEALTH CARE EDUCATION/TRAINING PROGRAM

## 2022-01-04 PROCEDURE — 120N000002 HC R&B MED SURG/OB UMMC

## 2022-01-04 PROCEDURE — 36592 COLLECT BLOOD FROM PICC: CPT | Performed by: NEUROLOGICAL SURGERY

## 2022-01-04 PROCEDURE — 84295 ASSAY OF SERUM SODIUM: CPT | Performed by: STUDENT IN AN ORGANIZED HEALTH CARE EDUCATION/TRAINING PROGRAM

## 2022-01-04 RX ADMIN — METOPROLOL TARTRATE 25 MG: 25 TABLET, FILM COATED ORAL at 20:17

## 2022-01-04 RX ADMIN — Medication 5 ML: at 06:15

## 2022-01-04 RX ADMIN — LEVETIRACETAM 750 MG: 750 TABLET ORAL at 09:03

## 2022-01-04 RX ADMIN — INSULIN ASPART 2 UNITS: 100 INJECTION, SOLUTION INTRAVENOUS; SUBCUTANEOUS at 09:05

## 2022-01-04 RX ADMIN — ATORVASTATIN CALCIUM 80 MG: 40 TABLET, FILM COATED ORAL at 09:02

## 2022-01-04 RX ADMIN — GABAPENTIN 300 MG: 300 CAPSULE ORAL at 21:00

## 2022-01-04 RX ADMIN — IRBESARTAN 150 MG: 150 TABLET ORAL at 09:03

## 2022-01-04 RX ADMIN — POTASSIUM CHLORIDE 40 MEQ: 1.5 POWDER, FOR SOLUTION ORAL at 09:05

## 2022-01-04 RX ADMIN — OLANZAPINE 10 MG: 2.5 TABLET, FILM COATED ORAL at 21:01

## 2022-01-04 RX ADMIN — OLANZAPINE 5 MG: 2.5 TABLET, FILM COATED ORAL at 12:28

## 2022-01-04 RX ADMIN — BUMETANIDE 4 MG: 1 TABLET ORAL at 09:04

## 2022-01-04 RX ADMIN — Medication 10 MG: at 20:17

## 2022-01-04 RX ADMIN — INSULIN GLARGINE 14 UNITS: 100 INJECTION, SOLUTION SUBCUTANEOUS at 20:20

## 2022-01-04 RX ADMIN — INSULIN GLARGINE 14 UNITS: 100 INJECTION, SOLUTION SUBCUTANEOUS at 09:05

## 2022-01-04 RX ADMIN — OXYCODONE HYDROCHLORIDE 5 MG: 5 TABLET ORAL at 13:57

## 2022-01-04 RX ADMIN — TADALAFIL 20 MG: 20 TABLET, FILM COATED ORAL at 09:02

## 2022-01-04 RX ADMIN — Medication 5 ML: at 12:15

## 2022-01-04 RX ADMIN — INSULIN ASPART 2 UNITS: 100 INJECTION, SOLUTION INTRAVENOUS; SUBCUTANEOUS at 20:20

## 2022-01-04 RX ADMIN — DOCUSATE SODIUM 50 MG AND SENNOSIDES 8.6 MG 2 TABLET: 8.6; 5 TABLET, FILM COATED ORAL at 09:02

## 2022-01-04 RX ADMIN — METOPROLOL TARTRATE 25 MG: 25 TABLET, FILM COATED ORAL at 09:06

## 2022-01-04 RX ADMIN — OXYCODONE HYDROCHLORIDE 5 MG: 5 TABLET ORAL at 21:46

## 2022-01-04 RX ADMIN — HALOPERIDOL LACTATE 2 MG: 5 INJECTION, SOLUTION INTRAMUSCULAR at 01:56

## 2022-01-04 RX ADMIN — INSULIN ASPART 4 UNITS: 100 INJECTION, SOLUTION INTRAVENOUS; SUBCUTANEOUS at 00:02

## 2022-01-04 RX ADMIN — INSULIN ASPART 3 UNITS: 100 INJECTION, SOLUTION INTRAVENOUS; SUBCUTANEOUS at 15:50

## 2022-01-04 RX ADMIN — Medication 40 MG: at 09:04

## 2022-01-04 RX ADMIN — Medication 5 ML: at 17:52

## 2022-01-04 RX ADMIN — LACOSAMIDE 200 MG: 50 TABLET, FILM COATED ORAL at 09:03

## 2022-01-04 RX ADMIN — LACOSAMIDE 200 MG: 50 TABLET, FILM COATED ORAL at 20:16

## 2022-01-04 RX ADMIN — MULTIVIT AND MINERALS-FERROUS GLUCONATE 9 MG IRON/15 ML ORAL LIQUID 15 ML: at 09:05

## 2022-01-04 RX ADMIN — INSULIN ASPART 2 UNITS: 100 INJECTION, SOLUTION INTRAVENOUS; SUBCUTANEOUS at 04:09

## 2022-01-04 RX ADMIN — INSULIN ASPART 2 UNITS: 100 INJECTION, SOLUTION INTRAVENOUS; SUBCUTANEOUS at 12:31

## 2022-01-04 RX ADMIN — ROPINIROLE HYDROCHLORIDE 1 MG: 1 TABLET, FILM COATED ORAL at 20:17

## 2022-01-04 RX ADMIN — OLANZAPINE 5 MG: 2.5 TABLET, FILM COATED ORAL at 17:52

## 2022-01-04 RX ADMIN — FERROUS GLUCONATE 324 MG: 324 TABLET ORAL at 09:03

## 2022-01-04 ASSESSMENT — ACTIVITIES OF DAILY LIVING (ADL)
ADLS_ACUITY_SCORE: 21
ADLS_ACUITY_SCORE: 17
ADLS_ACUITY_SCORE: 21
ADLS_ACUITY_SCORE: 17
ADLS_ACUITY_SCORE: 21

## 2022-01-04 NOTE — PROGRESS NOTES
"SPIRITUAL HEALTH SERVICES  SPIRITUAL ASSESSMENT Progress Note  [facility] 6A     Referral Source: Follow-up visit     Pt Arron and his wife Kaley were seen initially on 12/30/21 for emotional support. Pt Arron and his wife Kaley were both present during the follow up visit today.   Pt expressed feeling cold when I entered the room. I provided patient with a warm blanket per his request.     Kaley shared that the breath work discussed last visit (see  note from 12/30) continues to be helpful in helping patient emotionally and spiritually.     Kaley also shared that it has been a difficult couple days. She discussed that the pt has \"had to be restrained and in mitts on and off\". She noted that, \"He [Arron} likes to know that I am here. I offered to spend the night here if that is ok with the staff. Just so he knows I am here\". I relayed this information to the charge nurse on 6A who stated she would follow up with the pt and his wife.      Arron expressed having \"a lot of pain\" in his \"stomach\". When asked if there was anything Spiritual Health could do to support Arron or his wife, Arron responded \"Hot chocolate\". He asked for hot chocolate at least one other time during the visit and he shared how much he likes hot chocolate. When asked if there were other things he enjoyed he stated \"my wife\".     Plan: Will follow for spiritual and emotional support needs as able. Spiritual health services remains available for any follow-up or requests.     [facility] remains available 24/7 for emergent requests/referrals, either by having the switchboard page the on-call  or by entering an ASAP/STAT consult in Epic (this will also page the on-call ).      __    Rabbi Jonnie Hassan  Chaplain Resident  Pager [phone]    "

## 2022-01-04 NOTE — PLAN OF CARE
Status: Pt here with post-traumatic acute R SDH with L hemibody weakness. OR for evac 12/21. PEG placement 1/3/22.   Vitals: VSS on RA when awake or 2-3L when sleeping.   Neuros: Lethargic this AM. Orientation waxes and wanes. D/o to place this AM. Garbled speech. Slight L facial droop. L drift. L side 4/5, R side 5/5. Pt intermittently agitated, trying to hit and kick staff. Pt attempted to bite bedside attendant once this shift.   IV: PIV SL. DL PICC HL.   Labs/Electrolytes: Sodium redraw 149 at 1302.  Resp/trach: LS clear. Denies SOB.   Diet: NPO with TF running at goal rate of 60 mL/hr via PEG. 200mL free flush q2h.   Bowel status: BS+. LBM in commode today.   : Voids spontaneously via urinal with intermittent incontinence.   Skin: Blanchable redness on coccyx. BLE mayuri. Abdominal bruising. Crani incision with staples LEONID, boggy near incision. Scabbing and dry skin throughout. Abdominal binder placed this shift.  Pain: Pt c/o of pain near PEG site. Abdominal binder removed for periods of time, hot pack placed, and PRN oxy given, seems to have been effective, pt resting comfortably.   Activity: Heavy assist of two, monica ONEILL. Pt up in chair for part of shift.   Social: Wife, aKley, at bedside.   Plan: Continue to monitor and follow plan of care.  Updates this shift: AM Zyprexa given late d/t lethargy this AM. Afternoon dose rescheduled.

## 2022-01-04 NOTE — PROGRESS NOTES
Care Management Follow Up     Length of Stay (days): 15     Expected Discharge Date: 01/07/2022     Concerns to be Addressed:   Disposition planning    Patient plan of care discussed at interdisciplinary rounds: Yes     Anticipated Discharge Disposition:  Short term TCU placement     Anticipated Discharge Services:  Short term TCU placement  Anticipated Discharge DME:  Not applicable at this time     Patient/family educated on Medicare website which has current facility and service quality ratings:  yes  Education Provided on the Discharge Plan:  yes  Patient/Family in Agreement with the Plan:  yes     Referrals Placed by CM/SW:  None on this date  Private pay costs discussed: Not applicable at this time     Additional Information:  SW is following pt for discharge planning.  TCU placement is recommended.  Pt continues to have a one to one sitter and mitts in place.   Pt had a  Peg tube placed on 1/3/2022.  Pt is using 02.   SW is delaying TCU referrals until mitts/sitter are successfully removed as both are a barrier to placement.       SW received a return call from pt's wife identifying the following facility preferences (listed in order of preference):  [facility], [facility], [facility], [facility], [facility] and [facility].  Again, SW will delay referrals until mitts and sitter are successfully discontinued.     SW brought a copy of pt's new insurance card to Admissions ([place]) on the [place] for scanning into [facility].     SW will continue to follow for discharge planning.      DERIK Reddy  Social Work, 6A  Phone:  [phone]  Pager:  [phone]  1/4/2022

## 2022-01-04 NOTE — PROGRESS NOTES
[facility], [place]   01/04/2022  Neurosurgery Progress Note:    Assessment:  Red Sutherland is a 81-year-old male post-traumatic acute right subdural hematoma with left hemibody weakness, on xarelto and plavix. Admitted to the ICU on 12/20/21. Taken to OR 12/21/2021. Extubated 12/22/2021. Started on Aspirin and Prasugrel on 12/28/21. Failed SLP evaluation. Free water flushes stopped since hypernatremia resolved. Lasix and Bumex given for diuresis. Received PEG on 1/3/2022.     Plan:  - Keppra 1g BID, Vimpat 150 BID  - HOB > 30 degrees  - Daily Bumex for diuresis held, will restart this AM  - Zyprexa 5mg BID, 10mg at night.   - Aspirin and Prasugrel restart today  - FWF at 200q2 for 3L free water deficit   - Appreciate psychiatry recommendations.    Asim Ann MD  Neurosurgery Resident, PGY-2    Please contact neurosurgery resident on call with questions.    Dial * * *[phone], enter [phone] when prompted.   -----------------------------------    Interval History: Agitated overnight requiring restraints but overall improved compared to day prior.     Objective:   Temp:  [95.4  F (35.2  C)-97.8  F (36.6  C)] 95.9  F (35.5  C)  Pulse:  [73-96] 77  Resp:  [9-19] 18  BP: (108-163)/(57-93) 126/57  SpO2:  [93 %-100 %] 100 %  I/O last 3 completed shifts:  In: 1200 [I.V.:30; NG/GT:150]  Out: 1025 [Urine:1025]    Gen: Slightly agitated  Wound: clean, dry, intact,  Neurologic:  - Alert & Oriented to person, place, time, and situation  - Follows commands briskly x 4   - PEERL, EOMI, Mild L facial droop  - LUE with  4+/5, rest 5/5    LABS:  Recent Labs   Lab 01/04/22  0615 01/04/22  0403 01/04/22  0123 01/04/22  0001 01/03/22  1955 01/03/22  1733 01/03/22  0725 01/03/22  0646 01/02/22  0811 01/02/22  0733   *  150*  --  149*  --   --  148*   < > 150*  150*   < > 148*   POTASSIUM 4.7  --   --   --   --   --   --  4.1  --  4.2   CHLORIDE 118*  --   --   --   --   --   --  117*  --  117*    CO2 29  --   --   --   --   --   --  28  --  26   ANIONGAP 3  --   --   --   --   --   --  5  --  5   * 181*  --  226*   < >  --    < > 110*   < > 219*   BUN 44*  --   --   --   --   --   --  41*  --  39*   CR 1.12  --   --   --   --   --   --  1.07  --  1.09   ANNMARIE 9.1  --   --   --   --   --   --  9.0  --  8.7    < > = values in this interval not displayed.       Recent Labs   Lab 01/03/22  0646   WBC 6.2   RBC 3.82*   HGB 7.7*   HCT 29.3*   MCV 77*   MCH 20.2*   MCHC 26.3*   RDW 20.7*   *       IMAGING:  Recent Results (from the past 24 hour(s))   IR Gastrostomy Tube Percutaneous Plcmnt    Narrative    PROCEDURES 1/3/2022:  1. Gastrostomy tube placement under fluoroscopic guidance    Clinical History: Subdural hematoma with need for direct enteric  feeding.    Comparisons: CT abdomen pelvis 11/30/2021    Staff Radiologist: Kalyani Patel M.D.  Fellow(s)/Resident(s): Rex Patel D.O.  Resident: Andrew Henry DO    Monitoring: Patient was placed on continuous monitoring with  intravenous conscious sedation administered by the IR nursing staff  and supervised by the IR attending. Patient remained stable throughout  the procedure.     Medications:  1. Versed IV: 0.5 mg  2. Fentanyl IV: 25 mcg    Sedation time: 20 minutes face-to-face.    Fluoroscopy time: 2.3 minutes.    PROCEDURE: The patient understood the limitations, alternatives, and  risks of the procedure and requested the procedure be performed. Both  written and oral consent were obtained.    Pre-procedural ultrasound performed to delineate the liver margins.    Nasogastric tube was already placed the patient arrived.    The left upper quadrant was prepped and draped in the usual sterile  fashion. 1% lidocaine was utilized for local anesthesia.    Glucagon administered. Stomach inflated with air through the  nasogastric tube. Under fluoroscopic guidance, gastropexy was made  with 2 Saf-T-Pexy T-fasteners.    Needle gastrostomy  made under fluoroscopic guidance. Needle removed  over guidewire. Track dilated to 22 Martiniquais over the guidewire. 22  Martiniquais peel-away sheath advanced into the stomach over the guidewire.  18 Martiniquais Halyard SHAHBAZ gastrostomy tube advanced over the guidewire  through the peel-away sheath into the stomach under fluoroscopic  guidance. Peel-away sheath and guidewire removed. Gastrostomy tube  balloon inflated and tube secured. Adequate placement in the stomach  documented with contrast. Tube flushed with saline. T-fasteners  secured. Sterile dressing applied. Gastrostomy tube placed to gravity  drainage. Nasogastric tube removed. No immediate complication.      Impression    IMPRESSION:   1. 18 Martiniquais SHAHBAZ gastrostomy tube placed under fluoroscopic guidance.  Catheter to gravity drainage.    PLAN:  Nothing by mouth for 4 hours. Patient is not having significant  abdominal pain and normal bowel sounds after that time can trail  contrast saline prior to tube feeds. Routine change in 9-12 months.

## 2022-01-04 NOTE — PLAN OF CARE
6A PT: Hold; pt has been consistently agitated and combative towards staff, requiring haldol overnight. Unable to participate in therapies recently 2/2 mental status and agitation. Will hold and continue to check in with RN to determine appropriate behavior and participation to re-initiate PT.

## 2022-01-04 NOTE — PROGRESS NOTES
[id number]  VSS on 2L NC. Neuros unchanged, A+O x4, garbled, L pupil fixed and irregular, bigger than R. L  Side 4, R side 5. DL PICC DL, PIV SL. NPO, PEG [;aced today, at goal of 60 ml/hr. 150 ml flushes q2 hours. Last Na 148, q6 draws. KBM 1/3. Voiding with primafit. A2 pivot. Pt complaining of pain at PEG site, oxycodone and heat packs used. Pt became very agitated, combative @ 2200, BUE restraints started.

## 2022-01-04 NOTE — PLAN OF CARE
"Status: Here with post- traumatic acute R SDH with L hemibody weakness. OR 12/21 for evacuation. S/p PEG placement 1/3/22  Vitals: VSS on 3L NC.   Neuros: A&Ox3-4. During episodes of agitation/restlessness, patient d/o x3. Waxes & wanes. Garbled speech. L pupil 3 mm fixed & irregular. Slight L facial droop/L drift. L side 4/5, R side 5/5. Pt can be agitated, hitting, kicking staff, restless (Sitter at bedside) 1x PRN dose of haldol given for pt being verbally and physically agressive towards staff. pt was yelling racial slurs at one staff member and was calling both female staff, \"hokelsey,\". He additionally told one staff member that he was going to give her a knife so she could go kill herself. He was also trying to swing at staff with his arms.   Lab: Sodium at 0123 was 149.   IV: DL PICC HL. PIV SL.  Resp/trach: Very dry mouth w/ white lesions. Frequent oral cares.   Diet: NPO. TF via NG @ goal of 60 mLhr via newly placed PEG  Bowel status: LBM 1/3, BS+  : Pt bladder scanned @ 0630 for 660, has no parameters or straight cath orders. Pt has previously been voiding adequately   Skin: BLE mayuri. Abdominal bruising. R crani incision with staples D/C'd, incision LEONID.  Air pocket/boggy area near incision.  Scabbing throughout BLE.   Pain: new PEG site.  Activity: Ax2 pivot to chair.  Repo Q2 hours.   Plan: G-tube placed yesterday. Continue to monitor and follow POC  "

## 2022-01-05 ENCOUNTER — APPOINTMENT (OUTPATIENT)
Dept: GENERAL RADIOLOGY | Facility: CLINIC | Age: 82
DRG: 025 | End: 2022-01-05
Attending: STUDENT IN AN ORGANIZED HEALTH CARE EDUCATION/TRAINING PROGRAM
Payer: COMMERCIAL

## 2022-01-05 ENCOUNTER — APPOINTMENT (OUTPATIENT)
Dept: SPEECH THERAPY | Facility: CLINIC | Age: 82
DRG: 025 | End: 2022-01-05
Payer: COMMERCIAL

## 2022-01-05 ENCOUNTER — APPOINTMENT (OUTPATIENT)
Dept: CT IMAGING | Facility: CLINIC | Age: 82
DRG: 025 | End: 2022-01-05
Attending: STUDENT IN AN ORGANIZED HEALTH CARE EDUCATION/TRAINING PROGRAM
Payer: COMMERCIAL

## 2022-01-05 ENCOUNTER — APPOINTMENT (OUTPATIENT)
Dept: CT IMAGING | Facility: CLINIC | Age: 82
DRG: 025 | End: 2022-01-05
Attending: NURSE PRACTITIONER
Payer: COMMERCIAL

## 2022-01-05 ENCOUNTER — APPOINTMENT (OUTPATIENT)
Dept: NEUROLOGY | Facility: CLINIC | Age: 82
DRG: 025 | End: 2022-01-05
Attending: STUDENT IN AN ORGANIZED HEALTH CARE EDUCATION/TRAINING PROGRAM
Payer: COMMERCIAL

## 2022-01-05 LAB
ANION GAP SERPL CALCULATED.3IONS-SCNC: 4 MMOL/L (ref 3–14)
BUN SERPL-MCNC: 43 MG/DL (ref 7–30)
CALCIUM SERPL-MCNC: 9.1 MG/DL (ref 8.5–10.1)
CHLORIDE BLD-SCNC: 117 MMOL/L (ref 94–109)
CO2 SERPL-SCNC: 30 MMOL/L (ref 20–32)
CREAT SERPL-MCNC: 1.11 MG/DL (ref 0.66–1.25)
ERYTHROCYTE [DISTWIDTH] IN BLOOD BY AUTOMATED COUNT: 20.3 % (ref 10–15)
GFR SERPL CREATININE-BSD FRML MDRD: 68 ML/MIN/1.73M2
GLUCOSE BLD-MCNC: 151 MG/DL (ref 70–99)
GLUCOSE BLDC GLUCOMTR-MCNC: 158 MG/DL (ref 70–99)
GLUCOSE BLDC GLUCOMTR-MCNC: 161 MG/DL (ref 70–99)
GLUCOSE BLDC GLUCOMTR-MCNC: 164 MG/DL (ref 70–99)
GLUCOSE BLDC GLUCOMTR-MCNC: 178 MG/DL (ref 70–99)
GLUCOSE BLDC GLUCOMTR-MCNC: 182 MG/DL (ref 70–99)
GLUCOSE BLDC GLUCOMTR-MCNC: 182 MG/DL (ref 70–99)
GLUCOSE BLDC GLUCOMTR-MCNC: 189 MG/DL (ref 70–99)
HCT VFR BLD AUTO: 29.4 % (ref 40–53)
HGB BLD-MCNC: 7.7 G/DL (ref 13.3–17.7)
MAGNESIUM SERPL-MCNC: 2.6 MG/DL (ref 1.6–2.3)
MCH RBC QN AUTO: 19.9 PG (ref 26.5–33)
MCHC RBC AUTO-ENTMCNC: 26.2 G/DL (ref 31.5–36.5)
MCV RBC AUTO: 76 FL (ref 78–100)
PHOSPHATE SERPL-MCNC: 3.4 MG/DL (ref 2.5–4.5)
PLATELET # BLD AUTO: 162 10E3/UL (ref 150–450)
POTASSIUM BLD-SCNC: 4.1 MMOL/L (ref 3.4–5.3)
RBC # BLD AUTO: 3.86 10E6/UL (ref 4.4–5.9)
SODIUM SERPL-SCNC: 148 MMOL/L (ref 133–144)
SODIUM SERPL-SCNC: 148 MMOL/L (ref 133–144)
SODIUM SERPL-SCNC: 149 MMOL/L (ref 133–144)
SODIUM SERPL-SCNC: 151 MMOL/L (ref 133–144)
SODIUM SERPL-SCNC: 151 MMOL/L (ref 133–144)
WBC # BLD AUTO: 5.8 10E3/UL (ref 4–11)

## 2022-01-05 PROCEDURE — 250N000011 HC RX IP 250 OP 636: Performed by: NURSE PRACTITIONER

## 2022-01-05 PROCEDURE — 250N000013 HC RX MED GY IP 250 OP 250 PS 637: Performed by: NURSE PRACTITIONER

## 2022-01-05 PROCEDURE — 70450 CT HEAD/BRAIN W/O DYE: CPT | Mod: 26 | Performed by: RADIOLOGY

## 2022-01-05 PROCEDURE — 36592 COLLECT BLOOD FROM PICC: CPT | Performed by: STUDENT IN AN ORGANIZED HEALTH CARE EDUCATION/TRAINING PROGRAM

## 2022-01-05 PROCEDURE — 83735 ASSAY OF MAGNESIUM: CPT | Performed by: STUDENT IN AN ORGANIZED HEALTH CARE EDUCATION/TRAINING PROGRAM

## 2022-01-05 PROCEDURE — 250N000013 HC RX MED GY IP 250 OP 250 PS 637: Performed by: NEUROLOGICAL SURGERY

## 2022-01-05 PROCEDURE — 120N000002 HC R&B MED SURG/OB UMMC

## 2022-01-05 PROCEDURE — 95718 EEG PHYS/QHP 2-12 HR W/VEEG: CPT | Performed by: PSYCHIATRY & NEUROLOGY

## 2022-01-05 PROCEDURE — 82310 ASSAY OF CALCIUM: CPT | Performed by: STUDENT IN AN ORGANIZED HEALTH CARE EDUCATION/TRAINING PROGRAM

## 2022-01-05 PROCEDURE — 36592 COLLECT BLOOD FROM PICC: CPT | Performed by: NEUROLOGICAL SURGERY

## 2022-01-05 PROCEDURE — 84295 ASSAY OF SERUM SODIUM: CPT | Performed by: NEUROLOGICAL SURGERY

## 2022-01-05 PROCEDURE — 84100 ASSAY OF PHOSPHORUS: CPT | Performed by: STUDENT IN AN ORGANIZED HEALTH CARE EDUCATION/TRAINING PROGRAM

## 2022-01-05 PROCEDURE — 999N000007 HC SITE CHECK

## 2022-01-05 PROCEDURE — 74018 RADEX ABDOMEN 1 VIEW: CPT

## 2022-01-05 PROCEDURE — 74018 RADEX ABDOMEN 1 VIEW: CPT | Mod: 26 | Performed by: RADIOLOGY

## 2022-01-05 PROCEDURE — 99222 1ST HOSP IP/OBS MODERATE 55: CPT | Performed by: PSYCHIATRY & NEUROLOGY

## 2022-01-05 PROCEDURE — 74176 CT ABD & PELVIS W/O CONTRAST: CPT

## 2022-01-05 PROCEDURE — 70450 CT HEAD/BRAIN W/O DYE: CPT

## 2022-01-05 PROCEDURE — 92526 ORAL FUNCTION THERAPY: CPT | Mod: GN

## 2022-01-05 PROCEDURE — 74176 CT ABD & PELVIS W/O CONTRAST: CPT | Mod: 26 | Performed by: RADIOLOGY

## 2022-01-05 PROCEDURE — 95711 VEEG 2-12 HR UNMONITORED: CPT

## 2022-01-05 PROCEDURE — 250N000013 HC RX MED GY IP 250 OP 250 PS 637: Performed by: STUDENT IN AN ORGANIZED HEALTH CARE EDUCATION/TRAINING PROGRAM

## 2022-01-05 PROCEDURE — 85027 COMPLETE CBC AUTOMATED: CPT | Performed by: STUDENT IN AN ORGANIZED HEALTH CARE EDUCATION/TRAINING PROGRAM

## 2022-01-05 RX ORDER — DOXAZOSIN 1 MG/1
1 TABLET ORAL DAILY
Status: DISCONTINUED | OUTPATIENT
Start: 2022-01-05 | End: 2022-01-17 | Stop reason: HOSPADM

## 2022-01-05 RX ORDER — MAGNESIUM CARB/ALUMINUM HYDROX 105-160MG
296 TABLET,CHEWABLE ORAL ONCE
Status: DISCONTINUED | OUTPATIENT
Start: 2022-01-05 | End: 2022-01-17 | Stop reason: HOSPADM

## 2022-01-05 RX ADMIN — ATORVASTATIN CALCIUM 80 MG: 40 TABLET, FILM COATED ORAL at 09:54

## 2022-01-05 RX ADMIN — INSULIN GLARGINE 14 UNITS: 100 INJECTION, SOLUTION SUBCUTANEOUS at 09:55

## 2022-01-05 RX ADMIN — Medication 5 ML: at 00:16

## 2022-01-05 RX ADMIN — POTASSIUM CHLORIDE 40 MEQ: 1.5 POWDER, FOR SOLUTION ORAL at 09:53

## 2022-01-05 RX ADMIN — Medication 5 ML: at 11:54

## 2022-01-05 RX ADMIN — INSULIN GLARGINE 14 UNITS: 100 INJECTION, SOLUTION SUBCUTANEOUS at 19:46

## 2022-01-05 RX ADMIN — GABAPENTIN 300 MG: 300 CAPSULE ORAL at 21:09

## 2022-01-05 RX ADMIN — METOPROLOL TARTRATE 25 MG: 25 TABLET, FILM COATED ORAL at 09:54

## 2022-01-05 RX ADMIN — LACOSAMIDE 200 MG: 50 TABLET, FILM COATED ORAL at 19:37

## 2022-01-05 RX ADMIN — DOXAZOSIN 1 MG: 1 TABLET ORAL at 09:54

## 2022-01-05 RX ADMIN — OLANZAPINE 5 MG: 2.5 TABLET, FILM COATED ORAL at 16:27

## 2022-01-05 RX ADMIN — INSULIN ASPART 1 UNITS: 100 INJECTION, SOLUTION INTRAVENOUS; SUBCUTANEOUS at 23:47

## 2022-01-05 RX ADMIN — PRASUGREL 10 MG: 10 TABLET, FILM COATED ORAL at 09:54

## 2022-01-05 RX ADMIN — INSULIN ASPART 1 UNITS: 100 INJECTION, SOLUTION INTRAVENOUS; SUBCUTANEOUS at 04:46

## 2022-01-05 RX ADMIN — DOCUSATE SODIUM 50 MG AND SENNOSIDES 8.6 MG 2 TABLET: 8.6; 5 TABLET, FILM COATED ORAL at 19:36

## 2022-01-05 RX ADMIN — DOCUSATE SODIUM 50 MG AND SENNOSIDES 8.6 MG 2 TABLET: 8.6; 5 TABLET, FILM COATED ORAL at 09:54

## 2022-01-05 RX ADMIN — FLUTICASONE FUROATE AND VILANTEROL TRIFENATATE 1 PUFF: 200; 25 POWDER RESPIRATORY (INHALATION) at 13:07

## 2022-01-05 RX ADMIN — Medication 40 MG: at 09:54

## 2022-01-05 RX ADMIN — MULTIVIT AND MINERALS-FERROUS GLUCONATE 9 MG IRON/15 ML ORAL LIQUID 15 ML: at 09:54

## 2022-01-05 RX ADMIN — TADALAFIL 20 MG: 20 TABLET, FILM COATED ORAL at 09:54

## 2022-01-05 RX ADMIN — HALOPERIDOL LACTATE 2 MG: 5 INJECTION, SOLUTION INTRAMUSCULAR at 03:19

## 2022-01-05 RX ADMIN — IRBESARTAN 150 MG: 150 TABLET ORAL at 09:54

## 2022-01-05 RX ADMIN — LACOSAMIDE 200 MG: 50 TABLET, FILM COATED ORAL at 09:53

## 2022-01-05 RX ADMIN — ROPINIROLE HYDROCHLORIDE 1 MG: 1 TABLET, FILM COATED ORAL at 19:37

## 2022-01-05 RX ADMIN — INSULIN ASPART 2 UNITS: 100 INJECTION, SOLUTION INTRAVENOUS; SUBCUTANEOUS at 00:09

## 2022-01-05 RX ADMIN — Medication 5 ML: at 17:34

## 2022-01-05 RX ADMIN — INSULIN ASPART 1 UNITS: 100 INJECTION, SOLUTION INTRAVENOUS; SUBCUTANEOUS at 09:54

## 2022-01-05 RX ADMIN — METOPROLOL TARTRATE 25 MG: 25 TABLET, FILM COATED ORAL at 19:37

## 2022-01-05 RX ADMIN — INSULIN ASPART 2 UNITS: 100 INJECTION, SOLUTION INTRAVENOUS; SUBCUTANEOUS at 16:31

## 2022-01-05 RX ADMIN — HALOPERIDOL LACTATE 2 MG: 5 INJECTION, SOLUTION INTRAMUSCULAR at 16:05

## 2022-01-05 RX ADMIN — ASPIRIN 81 MG: 81 TABLET, COATED ORAL at 09:54

## 2022-01-05 RX ADMIN — INSULIN ASPART 2 UNITS: 100 INJECTION, SOLUTION INTRAVENOUS; SUBCUTANEOUS at 19:46

## 2022-01-05 RX ADMIN — BUMETANIDE 4 MG: 1 TABLET ORAL at 09:53

## 2022-01-05 RX ADMIN — Medication 10 MG: at 19:37

## 2022-01-05 RX ADMIN — INSULIN ASPART 2 UNITS: 100 INJECTION, SOLUTION INTRAVENOUS; SUBCUTANEOUS at 13:07

## 2022-01-05 RX ADMIN — OLANZAPINE 5 MG: 2.5 TABLET, FILM COATED ORAL at 09:54

## 2022-01-05 RX ADMIN — OLANZAPINE 10 MG: 2.5 TABLET, FILM COATED ORAL at 19:56

## 2022-01-05 RX ADMIN — Medication 5 ML: at 13:07

## 2022-01-05 ASSESSMENT — ACTIVITIES OF DAILY LIVING (ADL)
ADLS_ACUITY_SCORE: 19
ADLS_ACUITY_SCORE: 21
ADLS_ACUITY_SCORE: 19
ADLS_ACUITY_SCORE: 21
ADLS_ACUITY_SCORE: 21
ADLS_ACUITY_SCORE: 19
ADLS_ACUITY_SCORE: 21
ADLS_ACUITY_SCORE: 19
ADLS_ACUITY_SCORE: 21
ADLS_ACUITY_SCORE: 19
ADLS_ACUITY_SCORE: 21
ADLS_ACUITY_SCORE: 19
ADLS_ACUITY_SCORE: 21

## 2022-01-05 NOTE — PLAN OF CARE
"Status: post-traumatic acute R SDH with L hemibody weakness. OR for evac 12/21. PEG placement 1/3/22.   Vitals: VSS on 2-3L while sleeping  Neuros: AOx4 at midnight, became confused and did not follow commands at 0400. Garbled speech, slight L droop. L drift. L side 4/5, R side 5/5.  IV: DL PICC HL and PIV SL  Labs/Electrolytes: Na 149  Resp/trach: LS clear  Diet: NPO, continuous TF at goal 60mL/hr  q2h via PEG. TF stopped from 7681-0531 while abdominal imaging was pending.    Bowel status: LBM 1/4 AM. LLQ gaurding, abdominal xray and CT completed this shift.   : Incontinent   Skin: Blanchable redness on coccyx. BLE mayuri. Abdominal bruising. Crani incision with staples LEONID, boggy near incision. Scabbing and dry skin throughout.  Pain: LLQ guarding. Verbally denies pain.   Activity: A2 GB pivot, not oob this shift.  Social: Wife authorized to stay overnight.   Plan: Continue to monitor and follow POC.   Updates this shift: Restraints started at 0320 d/t agitation and line pulling, prn haldol given. Pt continued to yell \"help\", \"let me go\". Re-oriented pt and explained that staff is here to help.     "

## 2022-01-05 NOTE — PROGRESS NOTES
[facility], [place]   01/05/2022  Neurosurgery Progress Note:    Assessment:  Red Sutherland is a 81-year-old male post-traumatic acute right subdural hematoma with left hemibody weakness, on xarelto and plavix. Admitted to the ICU on 12/20/21. Taken to OR 12/21/2021. Extubated 12/22/2021. Started on Aspirin and Prasugrel on 12/28/21. Failed SLP evaluation. Free water flushes stopped since hypernatremia resolved. Lasix and Bumex given for diuresis. Received PEG on 1/3/2022. LLQ tenderness near PEG site. CT AP done 1/5 showing no obvious pathology.     Plan:  - Keppra 1g BID stopped, Vimpat 200 BID  - HOB > 30 degrees  - Daily Bumex 4mg AM  - Zyprexa 5mg BID, 10mg at night.   - Aspirin and Prasugrel  - FWF at 200q2 for 3L free water deficit   - Appreciate psychiatry recommendations.    Asim Ann MD  Neurosurgery Resident, PGY-2    Please contact neurosurgery resident on call with questions.    Dial * * *[phone], enter [phone] when prompted.   -----------------------------------    Interval History: Again agitated requiring restraints. Wife will be in today overnight to help orient patient.     Objective:   Temp:  [96  F (35.6  C)-97.5  F (36.4  C)] 97.5  F (36.4  C)  Pulse:  [71-87] 79  Resp:  [14-20] 20  BP: (105-157)/(47-83) 138/73  SpO2:  [94 %-100 %] 96 %  I/O last 3 completed shifts:  In: 3060 [NG/GT:1800]  Out: 1250 [Urine:1250]    Gen: Slightly agitated  Wound: clean, dry, intact,  Neurologic:  - Alert & Oriented to person, place, time, and situation  - Follows commands briskly x 4   - PEERL, EOMI, Mild L facial droop  - LUE with  4+/5, rest 5/5    LABS:  Recent Labs   Lab 01/05/22  0558 01/05/22  0446 01/05/22  0013 01/05/22  0008 01/04/22  2005 01/04/22  1756 01/04/22  0851 01/04/22  0615 01/03/22  0725 01/03/22  0646   *  151*  --  149*  --   --  150*   < > 150*  150*   < > 150*  150*   POTASSIUM 4.1  --   --   --   --   --   --  4.7  --  4.1   CHLORIDE 117*  --    --   --   --   --   --  118*  --  117*   CO2 30  --   --   --   --   --   --  29  --  28   ANIONGAP 4  --   --   --   --   --   --  3  --  5   * 158*  --  182*   < >  --    < > 184*   < > 110*   BUN 43*  --   --   --   --   --   --  44*  --  41*   CR 1.11  --   --   --   --   --   --  1.12  --  1.07   ANNMARIE 9.1  --   --   --   --   --   --  9.1  --  9.0    < > = values in this interval not displayed.       Recent Labs   Lab 01/05/22  0558   WBC 5.8   RBC 3.86*   HGB 7.7*   HCT 29.4*   MCV 76*   MCH 19.9*   MCHC 26.2*   RDW 20.3*          IMAGING:  Recent Results (from the past 24 hour(s))   XR Abdomen Port 1 View    Narrative    Exam: XR ABDOMEN PORT 1 VIEWS, 1/5/2022 2:35 AM    Indication: LUQ tenderness    Comparison: Abdominal radiograph 1/1/2022 and abdominal CT 11/30/2021    Findings:   AP portable supine views the abdomen. Percutaneous gastrostomy tube  balloon and fixation tacks project over the stomach. Cholecystectomy  clips in the right upper quadrant. Endoscopy clips in the right lower  quadrant. Nonobstructive bowel gas pattern, there is residual contrast  in the colon extending to the rectum. No pneumatosis or portal venous  gas. Degenerative changes of the right hip and spine. Left hip  hemiarthroplasty. Patchy and nodular opacities at the lung bases.      Impression    Impression:   1. Nonobstructive bowel gas pattern.  2. Percutaneous gastrostomy tube balloon and fixation tacks project  over the stomach.    I have personally reviewed the examination and initial interpretation  and I agree with the findings.    ASHLEY CARRILLO DO         SYSTEM ID:  [id number]   CT Head w/o Contrast    Narrative    CT HEAD W/O CONTRAST 1/5/2022 4:35 AM    History: SAH     Comparison: Head CT 1/2/2022    Technique: Using multidetector thin collimation helical acquisition  technique, axial, coronal and sagittal CT images from the skull base  to the vertex were obtained without intravenous contrast.    (topogram) image(s) also obtained and reviewed.    Findings:  Postoperative changes of right hemicraniotomy for subdural hematoma  evaluation. Similar size of the mixed density extra-axial fluid  collection in the right frontal convexity with a maximum thickness of  approximately 1.3 cm and adjacent mass effect on the left frontal  lobe. Stable 3 mm of leftward midline shift. No new intracranial  hemorrhage.    No acute loss of gray-white matter differentiation. Moderate  generalized volume loss and findings suggestive of chronic small  vessel ischemic disease. Basal cisterns are clear. Atherosclerotic  calcifications of the intracranial vessels.    Similar appearance of the scalp hematoma/swelling overlying the right  cerebral convexity. The visualized portions of the paranasal sinuses  and mastoid air cells are clear.      Impression    Impression: Overall findings are unchanged compared to prior head CT  1/2/2022 with stable size of the mixed density extra-axial fluid  collection overlying the right frontal convexity. Stable 3 mm leftward  midline shift. No new intracranial hemorrhage or infarct.    I have personally reviewed the examination and initial interpretation  and I agree with the findings.    MITESH MORALES MD         SYSTEM ID:  [id number]   CT Abdomen Pelvis w/o Contrast    Narrative    EXAMINATION: CT ABDOMEN PELVIS W/O CONTRAST, 1/5/2022 4:44 AM    TECHNIQUE:  Helical CT images from the lung bases through the pubic  symphysis were obtained  without IV contrast.     COMPARISON: Same day radiograph and abdominal CT 11/30/2021    HISTORY: LUQ tenderness; POD2 from PEG    FINDINGS:  Examination is mildly limited due to motion artifact.    Abdomen and pelvis:   Mildly nodular hepatic contour. Subcentimeter hypodensity in the  hepatic dome, too small to accurately characterize but favored to  represent a cyst. Cholecystectomy. No intra or extrahepatic biliary  ductal dilation. Mild splenomegaly  measuring 15.3 cm in AP dimension,  stable. Coarse calcification throughout the pancreas consistent with  likely chronic pancreatitis. Adrenal glands are unremarkable.  Bilateral renal cysts. No hydronephrosis and no urinary tract stones.  Mild diffuse mesenteric stranding. Percutaneous gastrostomy tube with  balloon in the stomach. The small and large bowel is normal in caliber  without evidence of bowel obstruction. Colonic diverticulosis without  adjacent inflammatory change. Endoscopy clip in the ascending colon.  Normal appendix. Residual oral contrast is visualized throughout the  colon and rectum. Advanced aortobiiliac atherosclerotic calcification.  No suspicious lymphadenopathy in the abdomen or pelvis. Trace fluid in  the paracolic gutters. No intra-abdominal free air. Pelvis is  partially obscured due to left hip arthroplasty.     Lung bases:  Cardiomegaly. Coronary artery calcifications and trace  pericardial effusion. Small right greater than left pleural effusions.  Bilateral interlobular septal thickening. Bibasilar fibroatelectasis  and emphysematous changes. Stable lobular partially calcified  pulmonary nodule in the right middle lobe measuring approximately 2.8  x 1.5 cm. 6 mm nodule in the right middle lobe is new from comparison  examinations (series 7 image 26).    Bones and soft tissues: Left total hip arthroplasty and degenerative  changes in the spine and right hip. Stable grade 1 anterolisthesis of  L4 on L5. No acute or aggressive appearing osseous abnormality.      Impression    IMPRESSION:   1. Percutaneous gastrostomy tube appears in good position.  2. Sequela of chronic pancreatitis.  3. Trace ascites along the paracolic gutters.  4. Mildly nodular hepatic contour suggests cirrhosis.  5. Stable splenomegaly, may be related to portal hypertension.  6. Colonic diverticulosis without acute diverticulitis.  7. Small right greater than left pleural effusions with findings of  pulmonary  edema.  8. Newly identified 6 mm nodule in the right middle lobe. A larger  lobulated partially calcified nodule in the right middle lobe is  unchanged. Recommend follow-up CT at 6-12 months per Fleischner  criteria.    I have personally reviewed the examination and initial interpretation  and I agree with the findings.    ASHLEY CARRILLO DO         SYSTEM ID:  [id number]

## 2022-01-05 NOTE — PLAN OF CARE
"Status: Pt here with post-traumatic acute R SDH with L hemibody weakness. OR for evac 12/21. PEG placement 1/3/22  Vitals: VSS on RA when awake, 2-3L NC when asleep  Neuros: Waxes/wanes. D/o place this evening. Garbled speech, slight L droop. L drift. L side 4/5, R side 5/5. Pt agitated and aggressive with staff when awake. Attempting to hit, kick, and bite staff while being verbally abusive.   IV: PIV SL. DL PICC HL  Labs/Electrolytes:   Resp/trach: LS clear, denies SOB  Diet: NPO with TF running at goal rate of 60 mL/hr via PEG. 200mL free flush q2h  Bowel status: LBM this AM  : Incontinent large amounts this shift.   Skin: Blanchable redness on coccyx. BLE mayuri. Abdominal bruising. Crani incision with staples LEONID, boggy near incision. Scabbing and dry skin throughout. Abdominal binder refused this shift  Pain: Pt c/o of pain during repositioning and seemed to protect the L side of abdomen. When asked if pain was in the center of abdomen, near PEG, pt shook his head \"no.\" Nodded \"yes\" when asked if pain was in LLQ while palpating that area. MD notified, abdominal X-ray ordered. PRN oxycodone given  Activity: A2, GB, pivot. Not OOB this shift  Social: Wife, Kaley, at bedside this evening. Was unaware she was approved to stay the night, stated she would stay tomorrow night  Plan: Educate pt on appropriate/nonappropriate behaviors toward staff and safety. Continue to monitor and follow POC.  Updates this shift: Pt placed in 4-pt restraints this evening for attempting to get OOB, pulling at PEG, and attempting to hit/kick/bite staff. Pt was incontinent prior to restraint placement, but refused brief/linen change. Change completed about 30 minutes later when pt agreed to safe behavior; restraints discontinued at the same time.   "

## 2022-01-05 NOTE — PLAN OF CARE
Status: Pt here with post-traumatic acute R SDH with L hemibody weakness. OR for evac 12/21. PEG placement 1/3/22.   Vitals: VSS on RA when awake or 2-3L when sleeping.   Neuros: Lethargic this AM. Orientation waxes and wanes. Oriented to self only this AM. D/o to time at 1200 assessment. Garbled speech. Slight L facial droop. L drift. L side 4/5, R side 5/5.   IV: PIV SL. DL PICC HL. Gene wipes completed.   Labs/Electrolytes: Sodium redraw 149 at 1302.  Resp/trach: LS clear. Denies SOB.   Diet: NPO with TF running at goal rate of 60 mL/hr via PEG. 200mL free flush q2h.   Bowel status: BS+. LBM in commode today.   : Voids spontaneously via urinal with intermittent incontinence.   Skin: Blanchable redness on coccyx. BLE mayuri. Abdominal bruising. Crani incision with staples LEONID, boggy near incision. Scabbing and dry skin throughout. Abdominal binder.  Pain: Denies. Pt appears to be more comfortable this shift.  Activity: Heavy assist of two, GBmonica. Up to bedside commode.  Social: Wife, Kaley, approved to stay overnight, will be here after dinner tonight.  Plan: Continue to monitor and follow plan of care.  Updates this shift: No restraints needed this shift, pt appropriate.

## 2022-01-06 ENCOUNTER — APPOINTMENT (OUTPATIENT)
Dept: SPEECH THERAPY | Facility: CLINIC | Age: 82
DRG: 025 | End: 2022-01-06
Payer: COMMERCIAL

## 2022-01-06 ENCOUNTER — APPOINTMENT (OUTPATIENT)
Dept: NEUROLOGY | Facility: CLINIC | Age: 82
DRG: 025 | End: 2022-01-06
Attending: STUDENT IN AN ORGANIZED HEALTH CARE EDUCATION/TRAINING PROGRAM
Payer: COMMERCIAL

## 2022-01-06 LAB
ANION GAP SERPL CALCULATED.3IONS-SCNC: 2 MMOL/L (ref 3–14)
ANION GAP SERPL CALCULATED.3IONS-SCNC: 6 MMOL/L (ref 3–14)
BUN SERPL-MCNC: 42 MG/DL (ref 7–30)
BUN SERPL-MCNC: 46 MG/DL (ref 7–30)
CALCIUM SERPL-MCNC: 8.7 MG/DL (ref 8.5–10.1)
CALCIUM SERPL-MCNC: 9.3 MG/DL (ref 8.5–10.1)
CHLORIDE BLD-SCNC: 110 MMOL/L (ref 94–109)
CHLORIDE BLD-SCNC: 114 MMOL/L (ref 94–109)
CO2 SERPL-SCNC: 31 MMOL/L (ref 20–32)
CO2 SERPL-SCNC: 32 MMOL/L (ref 20–32)
CREAT SERPL-MCNC: 0.27 MG/DL (ref 0.66–1.25)
CREAT SERPL-MCNC: 1.21 MG/DL (ref 0.66–1.25)
GFR SERPL CREATININE-BSD FRML MDRD: 61 ML/MIN/1.73M2
GFR SERPL CREATININE-BSD FRML MDRD: >90 ML/MIN/1.73M2
GLUCOSE BLD-MCNC: 214 MG/DL (ref 70–99)
GLUCOSE BLD-MCNC: 225 MG/DL (ref 70–99)
GLUCOSE BLDC GLUCOMTR-MCNC: 158 MG/DL (ref 70–99)
GLUCOSE BLDC GLUCOMTR-MCNC: 172 MG/DL (ref 70–99)
GLUCOSE BLDC GLUCOMTR-MCNC: 192 MG/DL (ref 70–99)
GLUCOSE BLDC GLUCOMTR-MCNC: 195 MG/DL (ref 70–99)
GLUCOSE BLDC GLUCOMTR-MCNC: 204 MG/DL (ref 70–99)
HOLD SPECIMEN: NORMAL
LACTATE SERPL-SCNC: 1 MMOL/L (ref 0.7–2)
MAGNESIUM SERPL-MCNC: 2.5 MG/DL (ref 1.6–2.3)
NT-PROBNP SERPL-MCNC: 1112 PG/ML (ref 0–1800)
PHOSPHATE SERPL-MCNC: 3.3 MG/DL (ref 2.5–4.5)
POTASSIUM BLD-SCNC: 4 MMOL/L (ref 3.4–5.3)
POTASSIUM BLD-SCNC: 4.8 MMOL/L (ref 3.4–5.3)
SODIUM SERPL-SCNC: 146 MMOL/L (ref 133–144)
SODIUM SERPL-SCNC: 147 MMOL/L (ref 133–144)
SODIUM SERPL-SCNC: 147 MMOL/L (ref 133–144)
SODIUM SERPL-SCNC: 148 MMOL/L (ref 133–144)
SODIUM SERPL-SCNC: 149 MMOL/L (ref 133–144)
T4 FREE SERPL-MCNC: 0.82 NG/DL (ref 0.76–1.46)
TROPONIN I SERPL HS-MCNC: 30 NG/L
TSH SERPL DL<=0.005 MIU/L-ACNC: 0.49 MU/L (ref 0.4–4)
TSH SERPL DL<=0.005 MIU/L-ACNC: 0.5 MU/L (ref 0.4–4)

## 2022-01-06 PROCEDURE — 83735 ASSAY OF MAGNESIUM: CPT

## 2022-01-06 PROCEDURE — 250N000013 HC RX MED GY IP 250 OP 250 PS 637: Performed by: STUDENT IN AN ORGANIZED HEALTH CARE EDUCATION/TRAINING PROGRAM

## 2022-01-06 PROCEDURE — 93010 ELECTROCARDIOGRAM REPORT: CPT | Mod: 76 | Performed by: INTERNAL MEDICINE

## 2022-01-06 PROCEDURE — 84443 ASSAY THYROID STIM HORMONE: CPT | Performed by: NURSE PRACTITIONER

## 2022-01-06 PROCEDURE — 36592 COLLECT BLOOD FROM PICC: CPT | Performed by: NEUROLOGICAL SURGERY

## 2022-01-06 PROCEDURE — 36592 COLLECT BLOOD FROM PICC: CPT | Performed by: STUDENT IN AN ORGANIZED HEALTH CARE EDUCATION/TRAINING PROGRAM

## 2022-01-06 PROCEDURE — 250N000011 HC RX IP 250 OP 636: Performed by: PHYSICIAN ASSISTANT

## 2022-01-06 PROCEDURE — 250N000013 HC RX MED GY IP 250 OP 250 PS 637: Performed by: NEUROLOGICAL SURGERY

## 2022-01-06 PROCEDURE — 250N000011 HC RX IP 250 OP 636: Performed by: STUDENT IN AN ORGANIZED HEALTH CARE EDUCATION/TRAINING PROGRAM

## 2022-01-06 PROCEDURE — 82310 ASSAY OF CALCIUM: CPT | Performed by: STUDENT IN AN ORGANIZED HEALTH CARE EDUCATION/TRAINING PROGRAM

## 2022-01-06 PROCEDURE — 84100 ASSAY OF PHOSPHORUS: CPT

## 2022-01-06 PROCEDURE — 84443 ASSAY THYROID STIM HORMONE: CPT

## 2022-01-06 PROCEDURE — 99207 PR APP CREDIT; MD BILLING SHARED VISIT: CPT | Performed by: NURSE PRACTITIONER

## 2022-01-06 PROCEDURE — 250N000012 HC RX MED GY IP 250 OP 636 PS 637: Performed by: STUDENT IN AN ORGANIZED HEALTH CARE EDUCATION/TRAINING PROGRAM

## 2022-01-06 PROCEDURE — 250N000013 HC RX MED GY IP 250 OP 250 PS 637: Performed by: NURSE PRACTITIONER

## 2022-01-06 PROCEDURE — 95720 EEG PHY/QHP EA INCR W/VEEG: CPT | Performed by: PSYCHIATRY & NEUROLOGY

## 2022-01-06 PROCEDURE — 250N000011 HC RX IP 250 OP 636: Performed by: NURSE PRACTITIONER

## 2022-01-06 PROCEDURE — 95714 VEEG EA 12-26 HR UNMNTR: CPT

## 2022-01-06 PROCEDURE — 92526 ORAL FUNCTION THERAPY: CPT | Mod: GN

## 2022-01-06 PROCEDURE — 84439 ASSAY OF FREE THYROXINE: CPT

## 2022-01-06 PROCEDURE — 80048 BASIC METABOLIC PNL TOTAL CA: CPT | Performed by: STUDENT IN AN ORGANIZED HEALTH CARE EDUCATION/TRAINING PROGRAM

## 2022-01-06 PROCEDURE — 84295 ASSAY OF SERUM SODIUM: CPT | Performed by: NEUROLOGICAL SURGERY

## 2022-01-06 PROCEDURE — 93005 ELECTROCARDIOGRAM TRACING: CPT

## 2022-01-06 PROCEDURE — 83880 ASSAY OF NATRIURETIC PEPTIDE: CPT | Performed by: STUDENT IN AN ORGANIZED HEALTH CARE EDUCATION/TRAINING PROGRAM

## 2022-01-06 PROCEDURE — 999N000007 HC SITE CHECK

## 2022-01-06 PROCEDURE — 258N000003 HC RX IP 258 OP 636

## 2022-01-06 PROCEDURE — 99232 SBSQ HOSP IP/OBS MODERATE 35: CPT | Performed by: PSYCHIATRY & NEUROLOGY

## 2022-01-06 PROCEDURE — 120N000002 HC R&B MED SURG/OB UMMC

## 2022-01-06 PROCEDURE — 83605 ASSAY OF LACTIC ACID: CPT | Performed by: STUDENT IN AN ORGANIZED HEALTH CARE EDUCATION/TRAINING PROGRAM

## 2022-01-06 PROCEDURE — 99207 PR CONSULT E&M CHANGED TO INITIAL LEVEL: CPT | Performed by: STUDENT IN AN ORGANIZED HEALTH CARE EDUCATION/TRAINING PROGRAM

## 2022-01-06 PROCEDURE — 84484 ASSAY OF TROPONIN QUANT: CPT | Performed by: STUDENT IN AN ORGANIZED HEALTH CARE EDUCATION/TRAINING PROGRAM

## 2022-01-06 PROCEDURE — 99222 1ST HOSP IP/OBS MODERATE 55: CPT | Performed by: STUDENT IN AN ORGANIZED HEALTH CARE EDUCATION/TRAINING PROGRAM

## 2022-01-06 RX ORDER — HEPARIN SODIUM 5000 [USP'U]/.5ML
5000 INJECTION, SOLUTION INTRAVENOUS; SUBCUTANEOUS EVERY 8 HOURS
Status: DISPENSED | OUTPATIENT
Start: 2022-01-06 | End: 2022-01-13

## 2022-01-06 RX ADMIN — FLUTICASONE FUROATE AND VILANTEROL TRIFENATATE 1 PUFF: 200; 25 POWDER RESPIRATORY (INHALATION) at 10:23

## 2022-01-06 RX ADMIN — METOPROLOL TARTRATE 25 MG: 25 TABLET, FILM COATED ORAL at 10:11

## 2022-01-06 RX ADMIN — FERROUS GLUCONATE 324 MG: 324 TABLET ORAL at 10:11

## 2022-01-06 RX ADMIN — Medication 10 ML: at 04:19

## 2022-01-06 RX ADMIN — Medication 10 MG: at 19:44

## 2022-01-06 RX ADMIN — ROPINIROLE HYDROCHLORIDE 1 MG: 1 TABLET, FILM COATED ORAL at 19:44

## 2022-01-06 RX ADMIN — OLANZAPINE 5 MG: 2.5 TABLET, FILM COATED ORAL at 10:11

## 2022-01-06 RX ADMIN — SODIUM CHLORIDE 500 ML: 9 INJECTION, SOLUTION INTRAVENOUS at 12:18

## 2022-01-06 RX ADMIN — Medication 10 ML: at 11:53

## 2022-01-06 RX ADMIN — ATORVASTATIN CALCIUM 80 MG: 40 TABLET, FILM COATED ORAL at 10:10

## 2022-01-06 RX ADMIN — ASPIRIN 81 MG: 81 TABLET, COATED ORAL at 10:11

## 2022-01-06 RX ADMIN — INSULIN ASPART 3 UNITS: 100 INJECTION, SOLUTION INTRAVENOUS; SUBCUTANEOUS at 04:19

## 2022-01-06 RX ADMIN — MULTIVIT AND MINERALS-FERROUS GLUCONATE 9 MG IRON/15 ML ORAL LIQUID 15 ML: at 10:10

## 2022-01-06 RX ADMIN — POTASSIUM CHLORIDE 40 MEQ: 1.5 POWDER, FOR SOLUTION ORAL at 10:10

## 2022-01-06 RX ADMIN — HEPARIN SODIUM 5000 UNITS: 5000 INJECTION, SOLUTION INTRAVENOUS; SUBCUTANEOUS at 13:31

## 2022-01-06 RX ADMIN — HEPARIN SODIUM 5000 UNITS: 5000 INJECTION, SOLUTION INTRAVENOUS; SUBCUTANEOUS at 21:17

## 2022-01-06 RX ADMIN — DOXAZOSIN 1 MG: 1 TABLET ORAL at 10:11

## 2022-01-06 RX ADMIN — INSULIN ASPART 3 UNITS: 100 INJECTION, SOLUTION INTRAVENOUS; SUBCUTANEOUS at 10:24

## 2022-01-06 RX ADMIN — INSULIN ASPART 1 UNITS: 100 INJECTION, SOLUTION INTRAVENOUS; SUBCUTANEOUS at 19:48

## 2022-01-06 RX ADMIN — LACOSAMIDE 200 MG: 50 TABLET, FILM COATED ORAL at 10:11

## 2022-01-06 RX ADMIN — IRBESARTAN 150 MG: 150 TABLET ORAL at 10:10

## 2022-01-06 RX ADMIN — LACOSAMIDE 200 MG: 50 TABLET, FILM COATED ORAL at 19:43

## 2022-01-06 RX ADMIN — Medication 5 ML: at 12:01

## 2022-01-06 RX ADMIN — INSULIN GLARGINE 14 UNITS: 100 INJECTION, SOLUTION SUBCUTANEOUS at 11:52

## 2022-01-06 RX ADMIN — GABAPENTIN 300 MG: 300 CAPSULE ORAL at 21:56

## 2022-01-06 RX ADMIN — INSULIN ASPART 2 UNITS: 100 INJECTION, SOLUTION INTRAVENOUS; SUBCUTANEOUS at 16:02

## 2022-01-06 RX ADMIN — PRASUGREL 10 MG: 10 TABLET, FILM COATED ORAL at 10:11

## 2022-01-06 RX ADMIN — ALTEPLASE 2 MG: 2.2 INJECTION, POWDER, LYOPHILIZED, FOR SOLUTION INTRAVENOUS at 21:17

## 2022-01-06 RX ADMIN — OLANZAPINE 5 MG: 2.5 TABLET, FILM COATED ORAL at 15:49

## 2022-01-06 RX ADMIN — Medication 40 MG: at 10:10

## 2022-01-06 RX ADMIN — TADALAFIL 20 MG: 20 TABLET, FILM COATED ORAL at 10:10

## 2022-01-06 RX ADMIN — BUMETANIDE 4 MG: 1 TABLET ORAL at 10:11

## 2022-01-06 RX ADMIN — OLANZAPINE 10 MG: 2.5 TABLET, FILM COATED ORAL at 21:56

## 2022-01-06 RX ADMIN — INSULIN ASPART 3 UNITS: 100 INJECTION, SOLUTION INTRAVENOUS; SUBCUTANEOUS at 13:40

## 2022-01-06 RX ADMIN — INSULIN GLARGINE 14 UNITS: 100 INJECTION, SOLUTION SUBCUTANEOUS at 19:43

## 2022-01-06 ASSESSMENT — ACTIVITIES OF DAILY LIVING (ADL)
ADLS_ACUITY_SCORE: 19
ADLS_ACUITY_SCORE: 19
ADLS_ACUITY_SCORE: 21
ADLS_ACUITY_SCORE: 19
ADLS_ACUITY_SCORE: 19
ADLS_ACUITY_SCORE: 17
ADLS_ACUITY_SCORE: 19
ADLS_ACUITY_SCORE: 17
ADLS_ACUITY_SCORE: 19
ADLS_ACUITY_SCORE: 19
ADLS_ACUITY_SCORE: 17
ADLS_ACUITY_SCORE: 17
ADLS_ACUITY_SCORE: 19
ADLS_ACUITY_SCORE: 17
ADLS_ACUITY_SCORE: 19
ADLS_ACUITY_SCORE: 17
ADLS_ACUITY_SCORE: 17

## 2022-01-06 NOTE — PROVIDER NOTIFICATION
"The following page was sent to the team at 1208: \"0320 Koko, W: CORDELLI- patient's BP 74/45. Pt repositioned and rechecked, BP 82/48. Pt sleepy, otherwise asymptomatic. Would you like to bolus? Thanks! Viv [id number]\"    EKG and 500 mL bolus ordered. Will continue to monitor.   "

## 2022-01-06 NOTE — PROGRESS NOTES
CLINICAL NUTRITION SERVICES - REASSESSMENT NOTE     Nutrition Prescription    RECOMMENDATIONS FOR MDs/PROVIDERS TO ORDER:  - Total daily fluids/adjustments per MD     Malnutrition Status:    - Moderate malnutrition in the context of acute on chronic illness      Recommendations already ordered by Registered Dietitian (RD):  - Order wt  - Continue TF as ordered    Future/Additional Recommendations:  - Continue to monitor TF tolerance/adequacy of intake  - Monitor wt trends  - Monitor for po diet appropriateness, pending SLP recommendations.     EVALUATION OF THE PROGRESS TOWARD GOALS   Diet: Remains NPO for oral intakes per SLP recommendations yesterday      Nutrition Support: Continues on TF via G tube with Pivot 1.5 Chaim @ goal rate of 60 ml/hr.  - H20 flushes of 200 ml every 2 hrs (per Provider order on 1/4)    Intake: Ave TF intakes received x past 7 days = 1054 ml which provides 1581 kcals (19 kcals/kg) and 99 g PRO (1.2 g PRO/kg)      NEW FINDINGS   Weight: 89.8 kg on 1/3 - last recorded wt), 95 kg (12/23); Over past 2 weeks, pt has lost a total of 5.2 kg (~ 5.5 % loss), question d/t volume depletion given rapid loss in a short periods of time. Pt with h/o 18%,weight loss over ~ 4 months   - Noted H20 flush increased to 200 ml every 2 hrs on 1/4 (previously receiving 150 ml every 2 hrs on 1/2)    Labs:  NA++ 147 (high) today, but has been trending downward from high of 151 on 1/5.   BUN 42 (high): remains elevated over the past week, question d/t pt on Bumex vs vol depletion  K+ WNL  Mg++ 2.6 (high) - yesterday  PO4 3.4 (WNL) - yesterday  BS - 214 (high) today: remains on sliding scale (high resistance every 4 hrs), in addition to Lantus BID (14 units)     MALNUTRITION  % Intake: Decreased intake does not meet criteria, but falls short of meeting RD's goal for adequate TF intakes.  % Weight Loss: > 10% in 6 months (severe), based on h/o > 18% in 4 months   Subcutaneous Fat Loss: Facial region: mild buccal area  (per last RD note d/t pt sleeping at time of visit today)  Muscle Loss: Facial & jaw region: mild, Thoracic region (clavicle, acromium bone, deltoid, trapezius, pectoral), Upper arm (bicep, tricep), Lower arm  (forearm), Patellar region and Posterior calf: all moderate (per last RD note d/t pt sleeping at time of visit today)  Fluid Accumulation/Edema: Unable to assess  Malnutrition Diagnosis: Moderate malnutrition in the context of acute on chronic illness    Previous Goals   Total avg nutritional intake to meet a minimum of 25 kcal/kg and 1.5 g PRO/kg daily (per dosing wt 82 kg).    Evaluation: Not met    Previous Nutrition Diagnosis  Inadequate protein-energy intake related to remains dependent on TF d/t inability to consume oral intakes, but goal infusion vol not met as evidenced by pt remains NPO for oral intakes, Ave TF intakes received x 7 days meeting only 20 kcals/kg and 1.3 g PRO/kg and wt loss h/o 18%,weight loss over ~ 4 months with additional loss of 4.6 kg (4.8% loss) x past 5 days.     Evaluation: Declining    CURRENT NUTRITION DIAGNOSIS  Inadequate protein-energy intake related to goal TF infusion vol not consistently met as evidenced by Ave TF intakes received x past 7 days meeting only 19 kcals/kg and 1.2 g PRO/kg and h/o 18%,weight loss over ~ 4 months with additional loss of 5.2 kg (~5.5 % loss) over past 2 weeks.       INTERVENTIONS  Implementation  Collaboration with RN regarding pt's TF tolerance/intakes.    Goals  Total avg nutritional intake to meet a minimum of 25 kcal/kg and 1.5 g PRO/kg daily (per dosing wt 82 kg).    Monitoring/Evaluation  Progress toward goals will be monitored and evaluated per protocol.      Zahida Peña RD,LD  6A pager [phone]

## 2022-01-06 NOTE — PROGRESS NOTES
[facility], [place]   Neurology Daily Note  Red Sutherland  [id number]  01/06/2022    Subjective:  No acute events overnight. Patient did seem to sleep better last night.     Objective   Physical Examination   Vitals: BP (!) 148/72 (BP Location: Left arm)   Pulse 92   Temp (!) 96.4  F (35.8  C) (Axillary)   Resp 16   Wt 89.8 kg (197 lb 15.6 oz)   SpO2 95%   BMI 27.61 kg/m    General: Patient lying in bed, NAD  HEENT: NC/AT, no icterus, moist mucous membranes  Cardiac: RRR  Chest: non-labored on RA  Abdomen: S/NT/ND  Extremities: Warm, no edema  Skin: No rash or lesion   Psych: Affect appropriate for situation   Neuro:  Mental status: Awake to voice, able to state his name, his wifes name, the year, cannot state month or hospital. Comprehension of some very basic commands at times.   Cranial nerves: PERRL, conjugate gaze, EOMI, face symmetric.  Motor: Normal muscle bulk and tone. Asterixis on exam. Purposeful antigravity movement in all ext but does not participate in formal testing.   Reflexes: 2+ reflexic and symmetric biceps, brachioradialis, patellae, and achilles. No clonus.  Coordination: FNF unable to perform  Gait: Deferred        Investigations    Preliminary EEG report with expected changes after SDH but no seizures. Final report pending.     Assessment and Plan   Red Sutherland is a 81-year-old male who was admitted following a traumatic subdural hematoma resulting in left-sided weakness s/p hematoma evacuation that has been complicated by focal seizures.     The neurology service was asked to evaluate the patient for agitation and need to continue antiseizure medications.     With regard to agitation, this may be delirium induced.  It may also be associated with levetiracetam, which was recently discontinued.  Other considerations include seizures/subclinical seizures resulting in postictal agitation. EEG was done to evaluate which on prelim read has not shown any  seizure activity.      #Traumatic right subdural hematoma s/p evacuation  #Focal seizures  #Agitation  -prelim EEG report with no seizure activity despite stopping keppra  -Continue lacosamide 200mg twice daily  -Delirum precautions  -Agitation medication management per primary team and psychiatry     Patient was seen and discussed with Dr. Rosales Mayorga DO  Neurology PGY-4   [phone]

## 2022-01-06 NOTE — CONSULTS
[facility]  Consult Note - Hospitalist Service, Gold Night      Date of Admission:  12/20/2021  Consult Requested by: Neurosurgery   Reason for Consult: Hypotension, medical co-management     Assessment & Plan   Red Sutherland is a 81 year old male with past medical history significant for CAD s/p PCI to LCX (2/2021 and repeat in 10/2021), HFpEF (60-65%) atrial fib on AC w/ Eliquis, HTN, HLD, COPD, pulmonary HTN, chronic bronchitis, type 2 DM, liver cirrhosis, stage 4 CKD, hx GIB, GERD and RLS transferred to [facility] from [facility] ED on 12/20 for right sided subdural hematoma with mass effect and midline shift.  He was admitted to Neurosurgery service and underwent evacuation on 12/21.  Hospital course c/b seizures, altered mentation and agitation, and failed swallow test requiring PEG tube placement (1/3).  Internal medicine is consulted 1/6 for hypotension and medical co-management.         # Hypotension - Multifactorial etiology, possibly 2/2 medication side effects vs cardiac vs metabolic causes vs volume.  Chart reviewed, newly started on scheduled Zyprexa on 1/3 and also received PRN Haldol 2mg x 2 on 1/5, in setting of concomitant scheduled antihypertensives (metoprolol, irbesartan).  Also newly started on Doxazosin on 1/5 which can also cause hypotension d/t alpha-1 blockade (on tamsulosin PTA, but had not received any BPH meds since admission).  In setting of hypothermia, could also be related to hypothyroidism - no recent TSH on file.  In setting of acute illness and recent hematoma evacuation, would consider adrenal insufficiency.  From cardiac standpoint, denies palpitations, chest pain, and dizziness, EKG today with undetermined rhythm but on my review, no P waves and HR 85, QTc 447.  Na consistently elevated since 1/1, c/f dehydration.  Mag, Phos wnl.  No apparent bleeding issues, Hgb stable in mid 7's.    - Remote tele overnight if able (would prefer to  keep patient on 6A)   - Check AM cortisol   - Check TSH and free T4   - Check orthostatic VS   - Use lowest possible dose Zyprexa to control symptoms of agitation, confusion   - Consider stopping/reducing Haldol   - Check TTE in AM   - Consider LR over NS for fluid resuscitation in setting of hypernatremia, hyperchloremia     # R subdural hematoma s/p evacuation   # Post op seizures   Per chart review, had fall at home 2 weeks PTA followed by persistent HA and neck pain and  presented w/ slurred speech.  Transferred from [facility] ED.  Found to have Large hyperdense holohemispheric right-sided subdural hematoma 12.9 mm in thickness over the posterior right frontal convexity, 12.6 mm over the right parietal convexity and 11.9 mm over the right temporal convexity. Currently denies HA/dizziness.   - Management per Neurosurgery primary   - Neurology following   - Seizure precautions   - Vimpat 200mg BID for seizure ppx     # CAD s/p PCI to LCX (2/2021 and repeat in 10/2021)  # HTN   # HFpEF   # Pulmonary HTN   # RV dysfunction   Cardiology consulted early in hospital course, signed off on 12/26.  BNP checked today in setting of hypotension, elevated at 1,112 (most recently 369).  Per chart review, dry weight 212.  Recently admitted to [facility] for CHF exacerbation 11/30-12/6.  At that time, discharged on Bumex 4mg BID and Metolazone 2.5mg q MWF.  Last echocardiogram 10/23/21 with normal EF 60-65%, mild valvular aortic stenosis, RV mild to severe dilated, flattened septum c/w elevated RV pressure/hypervolemia, PASP 75mmHg.          - Continue ASA 81mg and Prasugrel 10mg daily   - Continue Bumex 4mg daily, watch Cr closely   - Continue Irbesartan 150mg daily, consider adding hold parameters   - Continue tadalafil 20mg daily   - Repeat BNP in AM.    - Daily weights   - I/Os   - Repeat TTE ordered for AM, as above     # Permanent atrial fib - Rate controlled on metoprolol 25mg BID.  Previously referred for Watchman device  placement d/t hx bleeding issues.  On Eliquis 5mg BID PTA, stopped on admission d/t subdural hematoma as above.    - Remote telemetry overnight - would like to keep patient on 6A   - Continue Metoprolol 25mg BID     # COPD - Uses 4L NC at home.  Currently stable on 2L NC.  Has intermittent cough that is not new.   - Continue PTA Breo Ellipta   - Continue PTA Albuterol PRN   - IS per nursing     # Type 2 DM - Last Hgb A1c 7.5%.  On Lantus 18 units BID and Invokana 100mg every evening PTA.  Currently on Lantus 14 units BID and HSSI.  Last 24 BGs as below:   Recent Labs   Lab 01/06/22  1551 01/06/22  1334 01/06/22  1326 01/06/22  1012 01/06/22  0637 01/06/22  0404   * 204* 225* 195* 214* 192*     - Agree with continuing Lantus and HSSI   - May need to adjust regimen if advancing diet, changes to TFs   - Hypoglycemia protocol     # Stage IV CKD - BL Cr 1.2-1.4.  Currently Cr 1.21, BUN 46.  GFR ~ 60.   - Following I/Os as above  - Renally dose meds  - Avoid/minimize nephrotoxic agents     # AMS, delirium, intermittent agitation - In setting of subdural hematoma, s/p craniotomy, prolonged hospital stay.  Seen by Psychiatry.  Interactive and appropriate this evening.  Appears to be improving.  Lytes wnl except for hypernatremia.    - Agree w/ Zyprexa, would use lowest effective dose for symptoms   - Continue to monitor     # Liver cirrhosis - Incidentally noted on CT AP on 11/30.  Hx neg for alcohol use disorder, IV drug use, viral hepatitis, or family history of liver disease.  Last LFTs 12/27/21 wnl.  No ascites noted on exam.    - GI follow up outpatient     # Anemia - Hx iron deficiency.  Likely c/b CKD.  Hgb stable mid 7's.   - Agree w/ ferrous gluconate   - Transfuse for Hgb < 7     # RLS - Agree w/ Requip and Gabapentin at HS.     # GERD   # Hx past GIB   - Agree w/ pantoprazole 40mg daily     Medicine will continue to follow along.  Recommendations relayed to primary team via this progress note.  Thank you  for the opportunity to be involved in this patient's care.       The patient's care was discussed with the Attending Physician, Dr. Maverick Giles.    Park Brown, [facility]  Securely message with the Vocera Web Console (learn more here)  Text page via [facility] Paging/Directory    Please see sign in/sign out for up to date coverage information    Clinically Significant Risk Factors Present on Admission                    ______________________________________________________________________    Chief Complaint   Consult request per primary team for hypotension, multiple medical issues     History is obtained from the patient and chart review.      History of Present Illness   Red Sutherland is a 81 year old male with past medical history significant for CAD s/p PCI to LCX (2/2021 and repeat in 10/2021), HFpEF (60-65%) atrial fib on AC w/ Eliquis, HTN, HLD, COPD, pulmonary HTN, chronic bronchitis, type 2 DM, liver cirrhosis, stage 4 CKD, hx GIB, GERD and RLS transferred to [facility] from [facility] ED on 12/20 for right sided subdural hematoma with mass effect and midline shift.  He was admitted to Neurosurgery service and underwent evacuation on 12/21.  Hospital course c/b seizures, altered mentation and agitation, and failed swallow test requiring PEG tube placement (1/3).  Internal medicine is consulted 1/6 for hypotension and medical co-management.         Arron is resting in bed.  His wife Kaley is at the bedside.  He feels okay this evening, having some dry mouth and using sponges since he is currently NPO.  He has not noticed any new chest pain or heart palpitations today.  He was able to briefly stand up with assistance x 2 to get to the bedside commode but did not experience any dizziness or lightheadedness.  He feels that his breathing is at baseline.  Denies issues with fevers or chills and no problems with temperature regulation.  No diarrhea or abdominal  pain.  No nausea or vomiting.  Has not noticed any new swelling.      Review of Systems   The 10 point Review of Systems is negative other than noted in the HPI or here.    Past Medical History    I have reviewed this patient's medical history and updated it with pertinent information if needed.   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 They put him on sildenafil as nitroprusside lowered systemic BP and with that the mean PA dropped from 54 to 49. Negative VQ at [facility] Dec 1, 2017.     RLS (restless legs syndrome)      Sleep apnea        Past Surgical History   I have reviewed this patient's surgical history and updated it with pertinent information if needed.  Past Surgical History:   Procedure Laterality Date     BACK SURGERY      lower back     CARDIAC CATHETERIZATION  12/13/2017    Right and left at [facility], mean PA 58, PCW 24 with V wave of 35, LVEDP of 18, with Nipride systemic BP, PVR and mean PA all declined     CARDIOVERSION  03/15/2013    for afib     CATARACT EXTRACTION Bilateral      COLONOSCOPY N/A 10/31/2021    Procedure: COLONOSCOPY WITH POLYPECTOMY;  Surgeon: Sheng Sagastume MD;  Location: [facility] Main OR     CORONARY STENT PLACEMENT       CRANIOTOMY Right 12/21/2021    Procedure: CRANIOTOMY FOR Subdural HEMATOMA EVACUATION;  Surgeon: Parvez Quinones MD;  Location: [facility] OR     CV CORONARY ANGIOGRAM N/A 10/25/2021    Procedure: Coronary Angiogram;  Surgeon: Otf Knowles MD;  Location: [facility] CATH LAB CV     CV CORONARY LITHOTRIPSY PCI N/A 10/25/2021    Procedure:  CV Coronary Lithotripsy PCI;  Surgeon: Otf Knowles MD;  Location: [facility] CV     CV LEFT HEART CATH N/A 10/25/2021    Procedure: Left Heart Cath;  Surgeon: Otf Knowles MD;  Location: [facility] LAB CV     CV PCI N/A 10/25/2021    Procedure: Percutaneous Coronary Intervention;  Surgeon: Otf Knowles MD;  Location: [facility] CV     ESOPHAGOSCOPY, GASTROSCOPY, DUODENOSCOPY (EGD), COMBINED N/A 10/30/2021    Procedure: ESOPHAGOGASTRODUODENOSCOPY (EGD);  Surgeon: Sheng Sagastume MD;  Location: [facility] OR     IR ABDOMINAL AORTOGRAM  2012     IR MISCELLANEOUS PROCEDURE  2001     IR MISCELLANEOUS PROCEDURE  2012     OTHER SURGICAL HISTORY      [place]     PICC DOUBLE LUMEN PLACEMENT Right 2021    48cm (2cm external), Basilic vein, SVC RA junction     SHOULDER SURGERY      reapir on right shoulder     TOTAL HIP ARTHROPLASTY Left      TOTAL KNEE ARTHROPLASTY Bilateral      WRIST SURGERY Bilateral      ZZHC COLONOSCOPY W/WO BRUSH/WASH N/A 2021    Procedure: COLONOSCOPY;  Surgeon: Mihai Harris MD;  Location: [facility];  Service: Gastroenterology       Social History   I have reviewed this patient's social history and updated it with pertinent information if needed.  Social History     Tobacco Use     Smoking status: Former Smoker     Packs/day: 1.50     Years: 44.00     Pack years: 66.00     Types: Cigarettes     Quit date: 1996     Years since quittin.7     Smokeless tobacco: Never Used   Substance Use Topics     Alcohol use: Yes     Alcohol/week: 1.0 standard drink     Comment: Alcoholic Drinks/day: 1 per month     Drug use: No       Family History   I have reviewed this patient's family history and updated it with pertinent information if needed.  Family History   Problem Relation Age of Onset     Hyperlipidemia Mother      Hypertension Mother      Heart Disease Mother      Hyperlipidemia Father      Hypertension Father       Coronary Artery Disease Father      Cerebrovascular Disease Brother      Depression Brother      No Known Problems Sister      Pulmonary Hypertension No family hx of      Congenital heart disease No family hx of        Medications   Current Facility-Administered Medications   Medication     alteplase (CATHFLO ACTIVASE) injection 2 mg     aspirin EC tablet 81 mg     atorvastatin (LIPITOR) tablet 80 mg     bumetanide (BUMEX) tablet 4 mg     glucose gel 15-30 g    Or     dextrose 50 % injection 25-50 mL    Or     glucagon injection 1 mg     doxazosin (CARDURA) tablet 1 mg     [Held by provider] empagliflozin (JARDIANCE) tablet 10 mg     ferrous gluconate (FERGON) tablet 324 mg     fluticasone-vilanterol (BREO ELLIPTA) 200-25 MCG/INH inhaler 1 puff     gabapentin (NEURONTIN) capsule 300 mg     haloperidol lactate (HALDOL) injection 2 mg     heparin ANTICOAGULANT injection 5,000 Units     heparin lock flush 10 UNIT/ML injection 5-20 mL     heparin lock flush 10 UNIT/ML injection 5-20 mL     insulin aspart (NovoLOG) injection (RAPID ACTING)     insulin glargine (LANTUS PEN) injection 14 Units     irbesartan (AVAPRO) tablet 150 mg     lacosamide (VIMPAT) tablet 200 mg     magnesium citrate solution 296 mL     melatonin tablet 10 mg     metoprolol tartrate (LOPRESSOR) tablet 25 mg     multivitamins w/minerals liquid 15 mL     naloxone (NARCAN) injection 0.2 mg    Or     naloxone (NARCAN) injection 0.4 mg    Or     naloxone (NARCAN) injection 0.2 mg    Or     naloxone (NARCAN) injection 0.4 mg     OLANZapine (zyPREXA) tablet 10 mg     OLANZapine (zyPREXA) tablet 5 mg     ondansetron (ZOFRAN-ODT) ODT tab 4 mg     pantoprazole (PROTONIX) 2 mg/mL suspension 40 mg     polyethylene glycol (MIRALAX) Packet 17 g     potassium chloride (KLOR-CON) Packet 40 mEq     prasugrel (EFFIENT) tablet 10 mg     rOPINIRole (REQUIP) tablet 1 mg     senna-docusate (SENOKOT-S/PERICOLACE) 8.6-50 MG per tablet 2 tablet     sodium chloride (PF) 0.9%  PF flush 10 mL     sodium chloride (PF) 0.9% PF flush 10 mL     sodium chloride (PF) 0.9% PF flush 3 mL     sodium chloride (PF) 0.9% PF flush 3 mL     tadalafil (ADCIRCA/CIALIS) tablet       Allergies   Allergies   Allergen Reactions     Contrast Dye Nausea     Other reaction(s): GI intolerance, GI Upset     Furosemide Muscle Pain (Myalgia)     Previously tolerated.  Muscle cramps     Hydrochlorothiazide Unknown     Iodinated Contrast Media [Diagnostic X-Ray Materials] Nausea     Losartan Other (See Comments)     Other reaction(s): Stomatitis, Bloody nose dry mouth and lips     Losartan-Hydrochlorothiazide [Hyzaar]      Mouth sores     Metaxalone Nausea     Metolazone Other (See Comments)     Muscle cramps     Mometasone Other (See Comments)     Bloody nose     Rabeprazole Other (See Comments)     Mouth sores     Ramipril Other (See Comments)     Mouth sores     Shellfish Containing Products [Shellfish-Derived Products] Unknown     Other reaction(s): mouth sores, Other reaction(s): mouth sores     Sildenafil Muscle Pain (Myalgia)     Methocarbamol Rash     Penicillins Other (See Comments)     Immune-does not work for him       Physical Exam   Vital Signs: Temp: (!) 95.1  F (35.1  C) Temp src: Axillary BP: 94/55 Pulse: 77   Resp: 16 SpO2: 99 % O2 Device: Nasal cannula Oxygen Delivery: 2 LPM  Weight: 197 lbs 15.57 oz    GENERAL: Alert and oriented x 3. Well nourished, well developed.  Thin body habitus.  No acute distress.    HEENT: Normocephalic, atraumatic. Anicteric sclera. Mucous membranes moist.   CV: Irregular. S1, S2. Slight murmur.   RESPIRATORY: Effort normal on 2L NC. Lungs CTAB with no wheezing, rales, or rhonchi.   GI: Abdomen soft and non distended, bowel sounds present x all 4 quadrants. No tenderness, rebound, or guarding. PEG tube in place, appears c/d/i.    NEUROLOGICAL: No focal deficits. Follows commands.  Strength 2/5 in upper extremities.   MUSCULOSKELETAL: No joint swelling or tenderness. Moves  all extremities.   EXTREMITIES: No gross deformities. No peripheral edema.   SKIN: Grossly warm, dry, and intact. No jaundice. No rashes.       Data   I personally reviewed EKG from today.    Results for orders placed or performed during the hospital encounter of 21 (from the past 24 hour(s))   Glucose by meter   Result Value Ref Range    GLUCOSE BY METER POCT 182 (H) 70 - 99 mg/dL   Sodium   Result Value Ref Range    Sodium 148 (H) 133 - 144 mmol/L   Glucose by meter   Result Value Ref Range    GLUCOSE BY METER POCT 178 (H) 70 - 99 mg/dL   Glucose by meter   Result Value Ref Range    GLUCOSE BY METER POCT 161 (H) 70 - 99 mg/dL   EEG Video 2-12 HRS Ummonitored    Narrative    EEG Video 2-12 HRS Ummonitored Result    VIDEO EEG DATE: 2022  VIDEO EEG [id number]  VIDEO EEG DAY#: 1  VIDEO EEG SOURCE FILE DURATION: 10 hours and 14 minutes    PATIENT INFORMATION: Red Sutherland is a 81 year old year old male who   presents with right sided subdural hematoma with left-sided weakness.  EEG   is being done to evaluate for seizures.    MEDICATIONS:  These medications and doses were derived from the medical   record at the time of this procedure.  Neurontin 300mg hs   Vimpat 200mg BID   Zyprexa 5/5/10 mg   Melatonin 10 mg daily   Haldol 2mg at 1605     TECHNICAL SUMMARY: EEG was recorded from 25 scalp electrodes placed   according to the 10-20 international system. Additional electrodes were   used for referencing, EKG, and to record from other cerebral regions as   appropriate. Video was continuously recorded and was reviewed for clinical   correlation. Electrodes were attached and both video and EEG were   monitored and annotated by qualified EEG technologists. Video-EEG was   reviewed and report generated by qualified physician.    BACKGROUND ACTIVITY: Background activity consisted of diffuse generalized   delta theta slowing with higher amplitude electrocerebral potentials noted   in the right hemisphere.   There is a slow parieto-occipital rhythm of 6 to   7 Hz.  There is persistent generalized slowing with maximum slowing noted   in the right temporal region.  Well-formed sleep architecture is not   noted.  EEG is reactive.      ACTIVATION PROCEDURE: None.    INTERICTAL EPILEPTIFORM DISCHARGES: None    ICTAL: No clinical events or electrographic seizures were recorded. Video   was reviewed intermittently by EEG technologist and physician for clinical   seizures.     IMPRESSION OF VIDEO EEG DAY # 1: This video electroencephalogram is   abnormal due to the presences of diffuse generalized delta theta slowing   with maximum slowing noted in the right temporal region and increased   amplitude of electrocerebral activity in the right hemisphere.  These   findings are suggestive of moderate encephalopathy with cortical   dysfunction in the right temporal region and superimposed breach effect in   the right hemisphere secondary to skull defect.  No electrographic   seizures or epileptiform discharges were recorded. Clinical correlation is   advised.       Mercy Patel MD  EPILEPSY STAFF      Sodium   Result Value Ref Range    Sodium 149 (H) 133 - 144 mmol/L   Glucose by meter   Result Value Ref Range    GLUCOSE BY METER POCT 192 (H) 70 - 99 mg/dL   Basic metabolic panel   Result Value Ref Range    Sodium 147 (H) 133 - 144 mmol/L    Potassium 4.0 3.4 - 5.3 mmol/L    Chloride 110 (H) 94 - 109 mmol/L    Carbon Dioxide (CO2) 31 20 - 32 mmol/L    Anion Gap 6 3 - 14 mmol/L    Urea Nitrogen 42 (H) 7 - 30 mg/dL    Creatinine 0.27 (L) 0.66 - 1.25 mg/dL    Calcium 9.3 8.5 - 10.1 mg/dL    Glucose 214 (H) 70 - 99 mg/dL    GFR Estimate >90 >60 mL/min/1.73m2   Extra Tube ([place] Draw)    Narrative    The following orders were created for panel order Extra Tube ([place]azey Draw).  Procedure                               Abnormality         Status                     ---------                               -----------         ------                      Extra Purple Top Tube[[id number]]                            Final result                 Please view results for these tests on the individual orders.   Extra Purple Top Tube   Result Value Ref Range    Hold Specimen JIC    Glucose by meter   Result Value Ref Range    GLUCOSE BY METER POCT 195 (H) 70 - 99 mg/dL   Sodium   Result Value Ref Range    Sodium 146 (H) 133 - 144 mmol/L   EKG 12-lead, complete   Result Value Ref Range    Systolic Blood Pressure  mmHg    Diastolic Blood Pressure  mmHg    Ventricular Rate 75 BPM    Atrial Rate 0 BPM    OR Interval  ms    QRS Duration 90 ms     ms    QTc 428 ms    P Axis  degrees    R AXIS 107 degrees    T Axis -6 degrees    Interpretation ECG       Undetermined rhythm  Possible Right ventricular hypertrophy  Nonspecific ST and T wave abnormality  Abnormal ECG  When compared with ECG of 03-JAN-2022 00:56,  Current undetermined rhythm precludes rhythm comparison, needs review     Troponin I   Result Value Ref Range    Troponin I High Sensitivity 30 <79 ng/L   Nt probnp inpatient   Result Value Ref Range    N terminal Pro BNP Inpatient 1,112 0-1,800 pg/mL   Lactic acid whole blood   Result Value Ref Range    Lactic Acid 1.0 0.7 - 2.0 mmol/L   Basic metabolic panel   Result Value Ref Range    Sodium 148 (H) 133 - 144 mmol/L    Potassium 4.8 3.4 - 5.3 mmol/L    Chloride 114 (H) 94 - 109 mmol/L    Carbon Dioxide (CO2) 32 20 - 32 mmol/L    Anion Gap 2 (L) 3 - 14 mmol/L    Urea Nitrogen 46 (H) 7 - 30 mg/dL    Creatinine 1.21 0.66 - 1.25 mg/dL    Calcium 8.7 8.5 - 10.1 mg/dL    Glucose 225 (H) 70 - 99 mg/dL    GFR Estimate 61 >60 mL/min/1.73m2   Magnesium   Result Value Ref Range    Magnesium 2.5 (H) 1.6 - 2.3 mg/dL   Phosphorus   Result Value Ref Range    Phosphorus 3.3 2.5 - 4.5 mg/dL   Glucose by meter   Result Value Ref Range    GLUCOSE BY METER POCT 204 (H) 70 - 99 mg/dL     Most Recent 3 CBC's:Recent Labs   Lab Test 01/05/22  0558 01/03/22  0646 12/31/21  0559    WBC 5.8 6.2 9.4   HGB 7.7* 7.7* 7.7*   MCV 76* 77* 75*    138* 116*     Most Recent 3 BMP's:Recent Labs   Lab Test 01/06/22  1334 01/06/22  1326 01/06/22  1202 01/06/22  1012 01/06/22  0637 01/05/22  0952 01/05/22  0558   NA  --  148* 146*  --  147*   < > 151*  151*   POTASSIUM  --  4.8  --   --  4.0  --  4.1   CHLORIDE  --  114*  --   --  110*  --  117*   CO2  --  32  --   --  31  --  30   BUN  --  46*  --   --  42*  --  43*   CR  --  1.21  --   --  0.27*  --  1.11   ANIONGAP  --  2*  --   --  6  --  4   ANNMARIE  --  8.7  --   --  9.3  --  9.1   * 225*  --  195* 214*   < > 151*    < > = values in this interval not displayed.     Most Recent 2 LFT's:Recent Labs   Lab Test 12/27/21  1115 12/20/21  1350   AST 28 22   ALT 7 17   ALKPHOS 123 133   BILITOTAL 0.8 1.5*     Most Recent 3 INR's:Recent Labs   Lab Test 12/20/21  1350 12/20/21  1046 11/30/21  1609   INR 1.32* 1.20* 1.30*     Anticoagulation Dose History     Recent Dosing and Labs Latest Ref Rng & Units 8/22/2021 8/24/2021 10/12/2021 10/29/2021 11/30/2021 12/20/2021 12/20/2021    INR 0.85 - 1.15 1.57(H) 1.51(H) 1.63(H) 1.39(H) 1.30(H) 1.20(H) 1.32(H)          Most Recent 3 Creatinines:Recent Labs   Lab Test 01/06/22  1326 01/06/22  0637 01/05/22  0558   CR 1.21 0.27* 1.11     Most Recent 3 Hemoglobins:Recent Labs   Lab Test 01/05/22  0558 01/03/22  0646 12/31/21  0559   HGB 7.7* 7.7* 7.7*     Most Recent 3 Troponin's:No lab results found.  Most Recent 3 BNP's:Recent Labs   Lab Test 01/06/22  1326   NTBNPI 1,112     Most Recent D-dimer:Recent Labs   Lab Test 08/24/21  1455   DD 3.83*     Most Recent Cholesterol Panel:Recent Labs   Lab Test 10/25/21  1529   CHOL 98   LDL 53   HDL 35*   TRIG 49     Most Recent 6 Bacteria Isolates From Any Culture (See EPIC Reports for Culture Details):No lab results found.  Most Recent TSH and T4:Recent Labs   Lab Test 05/31/19  1507   TSH 2.55     Most Recent Hemoglobin A1c:Recent Labs   Lab Test 12/21/21  0438   A1C  7.5*     Most Recent 6 glucoses:Recent Labs   Lab Test 01/06/22  1334 01/06/22  1326 01/06/22  1012 01/06/22  0637 01/06/22  0404 01/05/22  2346   * 225* 195* 214* 192* 161*     Most Recent Urinalysis:Recent Labs   Lab Test 12/26/21  1605 02/19/21  1202   COLOR Light Yellow Red*   APPEARANCE Clear Cloudy*   URINEGLC >=1000* 1000 mg/dL*   URINEBILI Negative Negative   URINEKETONE Negative Trace*   SG 1.022 1.015   UBLD Negative Large*   URINEPH 8.5* 6.5   PROTEIN 70 * 100 mg/dL*   UROBILINOGEN  --  <2.0 E.U./dL   NITRITE Negative Negative   LEUKEST Trace* Large*   RBCU 1 >100*   WBCU 3 10-25*     Most Recent ABG:Recent Labs   Lab Test 12/22/21  0824   PH 7.44   PO2 162*   PCO2 36   HCO3 24   LAZARUS 0.3     Most Recent ESR & CRP:Recent Labs   Lab Test 08/22/21  1533   CRP 1.3*     Most Recent Anemia Panel:Recent Labs   Lab Test 01/05/22  0558 12/29/21  0328 12/28/21  0418 04/01/20  1250 05/31/19  1507   WBC 5.8   < > 8.6   < >  --    HGB 7.7*   < > 7.4*   < >  --    HCT 29.4*   < > 27.1*   < >  --    MCV 76*   < > 75*   < >  --       < > 139*   < >  --    IRON  --   --  19*   < >  --    IRONSAT  --   --  7*  --   --    FEB  --   --  284  --   --    CHASE  --   --  26   < >  --    B12  --   --   --   --  552    < > = values in this interval not displayed.     Most Recent CPK:Recent Labs   Lab Test 08/23/21  0144 08/22/21  1533    184

## 2022-01-06 NOTE — PLAN OF CARE
Status: post-traumatic acute R SDH with L hemibody weakness. OR for evac 12/21. PEG placement 1/3/22. On EEG monitoring.   Vitals: VSS on 2L   Neuros: AOx4. Garbled speech, slight L droop. L drift. L side 4/5, R side 5/5. L pupil fixed and irregular shape. Denies N/T.   IV: PIV SL. DL PICC HL, blood return not noted in red lumen, TPA requested.   Labs/Electrolytes: Last Na 149   Resp/trach: LS clear  Diet: NPO, continuous TF at goal 60mL/hr  q2h via PEG.    Bowel status: LBM 1/5 AM. : Incontinent   Skin: Blanchable redness on coccyx. BLE mayuri. Abdominal bruising. Crani incision with staples LEONID, boggy near incision. Scabbing and dry skin throughout.  Pain: Verbally denies pain. Nonverbal signs absent.   Activity: A2 GB pivot, not oob this shift.  Social: Wife authorized to stay overnight. Very helpful with reor  Plan: Continue to monitor and follow POC.   Updates this shift: Pt redirected at 0400, slept most of night. No restraints needed.

## 2022-01-06 NOTE — PLAN OF CARE
Status: Pt here with post-traumatic acute R SDH with L hemibody weakness. OR for evac 12/21. PEG placement 1/3/22.   Vitals: VSS on RA when awake or 2-3L when sleeping.   Neuros: Lethargic this AM. Orientation waxes and wanes, but oriented x4 this shift. More alert as shift progressed. Garbled speech. Slight L facial droop. No L drift noted. L side 4/5, R side 5/5.   IV: PIV SL. DL PICC one lumen HL, other TKO. Gene wipes completed.   Labs/Electrolytes: Sodium redraw 148 at 1326.  Resp/trach: LS clear. Denies SOB.   Diet: NPO with TF running at goal rate of 60 mL/hr via PEG. 200mL free flush q2h. Speech therapy following.  Bowel status: BS+. LBM in commode yesterday.   : Voids spontaneously via urinal with intermittent incontinence.   Skin: Blanchable redness on coccyx. BLE mayuri. Abdominal bruising. Crani incision with staples LEONID, boggy near incision. Scabbing and dry skin throughout. Abdominal binder off. EEG leads in place.  Pain: Denies. Resting comfortably.  Activity: Heavy assist of two, GB, monica. Up to bedside commode.  Social: Wife, Kaley, approved to stay overnight, currently at bedside. Helpful and supportive of pt.  Plan: Continue to monitor and follow plan of care.  Updates this shift: No restraints needed this shift, pt appropriate.

## 2022-01-06 NOTE — PROGRESS NOTES
[facility], [place]   01/06/2022  Neurosurgery Progress Note:    Assessment:  Red Sutherland is a 81-year-old male post-traumatic acute right subdural hematoma with left hemibody weakness, on xarelto and plavix. Admitted to the ICU on 12/20/21. Taken to OR 12/21/2021. Extubated 12/22/2021. Started on Aspirin and Prasugrel on 12/28/21. Failed SLP evaluation. Free water flushes stopped since hypernatremia resolved. Lasix and Bumex given for diuresis. Received PEG on 1/3/2022. LLQ tenderness near PEG site. CT AP done 1/5 showing no obvious pathology.     Plan:  - Keppra 1g BID stopped, Vimpat 200 BID  - HOB > 30 degrees  - Daily Bumex 4mg AM  - Zyprexa 5mg BID, 10mg at night.   - Aspirin and Prasugrel  - FWF at 200q2 for 3L free water deficit   - Appreciate psychiatry recommendations.    Asim Ann MD  Neurosurgery Resident, PGY-2    Please contact neurosurgery resident on call with questions.    Dial * * [phone], enter [phone] when prompted.   -----------------------------------    Interval History: Slept well overnight without requirnig restraints.     Objective:   Temp:  [96.2  F (35.7  C)-96.9  F (36.1  C)] 96.4  F (35.8  C)  Pulse:  [75-92] 92  Resp:  [16-18] 16  BP: (110-148)/(59-72) 148/72  SpO2:  [95 %-100 %] 95 %  I/O last 3 completed shifts:  In: 1980 [I.V.:20; NG/GT:1000]  Out: 1075 [Urine:1075]    Gen: Slightly agitated  Wound: clean, dry, intact,  Neurologic:  - Alert & Oriented to person, place, time, and situation  - Follows commands briskly x 4   - PEERL, EOMI, Mild L facial droop  - LUE with  4+/5, rest 5/5    LABS:  Recent Labs   Lab 01/06/22  0637 01/06/22  0404 01/05/22  2359 01/05/22  2346 01/05/22  1930 01/05/22  1743 01/05/22  0952 01/05/22  0558 01/04/22  0851 01/04/22  0615   *  --  149*  --   --  148*   < > 151*  151*   < > 150*  150*   POTASSIUM 4.0  --   --   --   --   --   --  4.1  --  4.7   CHLORIDE 110*  --   --   --   --   --   --  117*  --   118*   CO2 31  --   --   --   --   --   --  30  --  29   ANIONGAP 6  --   --   --   --   --   --  4  --  3   * 192*  --  161*   < >  --    < > 151*   < > 184*   BUN 42*  --   --   --   --   --   --  43*  --  44*   CR 0.27*  --   --   --   --   --   --  1.11  --  1.12   ANNMARIE 9.3  --   --   --   --   --   --  9.1  --  9.1    < > = values in this interval not displayed.       Recent Labs   Lab 01/05/22  0558   WBC 5.8   RBC 3.86*   HGB 7.7*   HCT 29.4*   MCV 76*   MCH 19.9*   MCHC 26.2*   RDW 20.3*          IMAGING:  No results found for this or any previous visit (from the past 24 hour(s)).

## 2022-01-06 NOTE — PLAN OF CARE
Status: Pt here with post-traumatic acute R SDH with L hemibody weakness. OR for evac 12/21. PEG placement 1/3/22  Vitals: VSS on 2L NC  Neuros: Orientation waxes and wanes. Garbled speech. L pupil fixed and irregular. Slight L facial droop. L drift. L side 4/5, R side 5/5  IV: PIV SL. DL PICC   Labs/Electrolytes: Sodium 148  Resp/trach: WNL  Diet: NPO with TF running at goal of 60mL/hr via PEG  Bowel status: LBM today  : Voids spontaneously via urinal with intermittent incontinence. Bladder scan Q shift  Skin: Blanchable redness on coccyx. BLE mayuri. Crani incision with staples ELONID  Pain: Denies  Activity: Heavy assist of two, GB, pivot to commode  Social: Wife is here and is approved to stay overnight. 1:1 sitter in place  Plan: Continue to monitor and follow POC  Updates this shift: No restraints needed this shift. PRN Haldol given x1 at start of shift due to impulsivity and agitation, not redirectable with staff, and trying to get out of bed unassisted

## 2022-01-06 NOTE — CONSULTS
[facility]  Neurology Consultation    Patient Name:  Red Sutherland  MRN:  [id number]    :  1940  Date of Service:  2022  Primary care provider:  Meg Roth      The neurology consultation service was asked to see Red Sutherland by Dr. Quinones to evaluate for agitation and seizure management.     History of Present Illness   Red Sutherland is a 81 year old male with PMH of afib on AC, CHF, COPD, and pHTN who was admitted with a traumatic right subdural hematoma with left sided weakness. He went the OR with NSG for subdural hematoma evacuation on . He was followed by the NCC service while in the ICU. On , he was found to have a clinical focal seizure of L facial twitching. He was started on AEDs and had more seizures, ultimately requiring levetiracetam 1.5g twice daily and lacosamide 200mg twice daily.     The neurology service was asked to evaluate the patient for agitation and need for AED adjustments.    The patient is altered and unable to provide history, therefore it was obtained through discussion with the primary inpt team and through chart review. Since he was in the ICU, he was having increasing agitation. The agitation has continued and he has required a  in the room. Psychiatry has been consulted and has made medication recommendations for agitation. Levetiracetam was also stopped -- his last dose was on the morning of .     In discussion with the  in the patient's room, he has been calm today. He is currently sleeping, but has not been combative. He has not noticed any seizure activity or facial twitching.      ROS  A 10-point ROS performed and negative unless documented in HPI.    PMH  Past Medical History:   Diagnosis Date     Atrial fibrillation (H)      CHF (congestive heart failure) (H)      COPD (chronic obstructive pulmonary disease) (H)      Coronary artery disease      Diabetes  mellitus (H)      Essential hypertension      Hyperlipidemia      Pulmonary hypertension (H)     O2 at night     Pulmonary hypertension due to left ventricular diastolic dysfunction (H) 11/28/2017    Multifactorial per [facility] with elevated LVEDP and PCW, COPD and NOVA. They put him on sildenafil as nitroprusside lowered systemic BP and with that the mean PA dropped from 54 to 49. Negative VQ at [facility] [facility] Dec 1, 2017.     RLS (restless legs syndrome)      Sleep apnea      Past Surgical History:   Procedure Laterality Date     BACK SURGERY      lower back     CARDIAC CATHETERIZATION  12/13/2017    Right and left at [facility], mean PA 58, PCW 24 with V wave of 35, LVEDP of 18, with Nipride systemic BP, PVR and mean PA all declined     CARDIOVERSION  03/15/2013    for afib     CATARACT EXTRACTION Bilateral      COLONOSCOPY N/A 10/31/2021    Procedure: COLONOSCOPY WITH POLYPECTOMY;  Surgeon: Sheng Sagastume MD;  Location: [facility] Main OR     CORONARY STENT PLACEMENT       CRANIOTOMY Right 12/21/2021    Procedure: CRANIOTOMY FOR Subdural HEMATOMA EVACUATION;  Surgeon: Parvez Quinones MD;  Location: [facility] OR     CV CORONARY ANGIOGRAM N/A 10/25/2021    Procedure: Coronary Angiogram;  Surgeon: Otf Knowles MD;  Location: [facility] CATH LAB CV     CV CORONARY LITHOTRIPSY PCI N/A 10/25/2021    Procedure: CV Coronary Lithotripsy PCI;  Surgeon: Otf Knowles MD;  Location: [facility] CATH LAB CV     CV LEFT HEART CATH N/A 10/25/2021    Procedure: Left Heart Cath;  Surgeon: Otf Knowles MD;  Location: [facility] CATH LAB CV     CV PCI N/A 10/25/2021    Procedure: Percutaneous Coronary Intervention;  Surgeon: Otf Knowles MD;  Location: [facility] LAB CV     ESOPHAGOSCOPY, GASTROSCOPY, DUODENOSCOPY (EGD), COMBINED N/A 10/30/2021    Procedure: ESOPHAGOGASTRODUODENOSCOPY (EGD);  Surgeon: Sheng Sagastume MD;  Location: [facility]     IR ABDOMINAL AORTOGRAM  11/16/2012     IR MISCELLANEOUS PROCEDURE   "07/20/2001     IR MISCELLANEOUS PROCEDURE  11/16/2012     OTHER SURGICAL HISTORY      mynor     PICC DOUBLE LUMEN PLACEMENT Right 12/25/2021    48cm (2cm external), Basilic vein, SVC RA junction     SHOULDER SURGERY      reapir on right shoulder     TOTAL HIP ARTHROPLASTY Left      TOTAL KNEE ARTHROPLASTY Bilateral      WRIST SURGERY Bilateral      ZZHC COLONOSCOPY W/WO BRUSH/WASH N/A 01/14/2021    Procedure: COLONOSCOPY;  Surgeon: Mihai Harris MD;  Location: [facility] Main OR;  Service: Gastroenterology       Medications   Medications Prior to Admission   Medication Sig Dispense Refill Last Dose     ACCU-CHEK JOSE PLUS TEST STRP strips [ACCU-CHEK JOSE PLUS TEST STRP STRIPS] see administration instructions.   12/19/2021 at Unknown time     acetaminophen (TYLENOL) 500 MG tablet [ACETAMINOPHEN (TYLENOL) 500 MG TABLET] Take 1,000 mg by mouth every 6 (six) hours as needed. First line of pain management. Do Not take more than 4,000 mg in 24 hours.   12/19/2021 at Unknown time     albuterol (PROVENTIL HFA;VENTOLIN HFA) 90 mcg/actuation inhaler Inhale 2 puffs into the lungs every 6 hours as needed    12/19/2021 at Unknown time     apixaban ANTICOAGULANT (ELIQUIS) 5 MG tablet Take 5 mg by mouth 2 times daily    12/19/2021 at Unknown time     atorvastatin (LIPITOR) 80 MG tablet Take 1 tablet (80 mg) by mouth daily 30 tablet 0 12/20/2021 at Unknown time     BD ULTRA-FINE MANISHA PEN NEEDLES 32 gauge x 5/32\" Ndle [BD ULTRA-FINE MANISHA PEN NEEDLES 32 GAUGE X 5/32\" NDLE]   4 12/19/2021 at Unknown time     bumetanide (BUMEX) 2 MG tablet Take 2 tablets (4 mg) by mouth every morning And 1 1/2 tablets in the evening   12/19/2021 at Unknown time     canagliflozin (INVOKANA) 100 mg Tab Take 100 mg by mouth every morning    12/19/2021 at Unknown time     clopidogrel (PLAVIX) 75 MG tablet Take 75 mg by mouth daily    12/19/2021 at Unknown time     Ferrous Gluconate 324 (37.5 Fe) MG TABS Take 1 tablet by mouth every other day   12/19/2021 " at Unknown time     fluticasone-vilanterol (BREO ELLIPTA) 200-25 mcg/dose DsDv inhaler Inhale 1 puff into the lungs daily    12/19/2021 at Unknown time     Garlic 1000 MG CAPS Take 1 capsule by mouth daily   12/19/2021 at Unknown time     HYDROcodone-acetaminophen (NORCO) 5-325 MG tablet Take 1 tablet by mouth every 6 hours as needed for severe pain   Unknown at Unknown time     insulin glargine (LANTUS PEN) 100 UNIT/ML pen Inject 18 Units Subcutaneous 2 times daily   12/19/2021 at Unknown time     ipratropium - albuterol 0.5 mg/2.5 mg/3 mL (DUONEB) 0.5-2.5 (3) MG/3ML neb solution Take 1 vial by nebulization 4 times daily as needed for shortness of breath / dyspnea    12/19/2021 at Unknown time     irbesartan (AVAPRO) 300 MG tablet Take 0.5 tablets (150 mg) by mouth daily   12/19/2021 at Unknown time     lidocaine (LIDODERM) 5 % Place 1 patch onto the skin daily as needed    12/19/2021 at Unknown time     metoprolol tartrate (LOPRESSOR) 25 MG tablet Take 1 tablet (25 mg) by mouth 2 times daily (with meals) 60 tablet 3 12/19/2021 at Unknown time     multivitamin w/minerals (THERA-VIT-M) tablet Take 1 tablet by mouth daily   12/19/2021 at Unknown time     omeprazole (PRILOSEC) 20 MG DR capsule Take 20 mg by mouth daily   12/19/2021 at Unknown time     OXYGEN-AIR DELIVERY SYSTEMS MISC [OXYGEN-AIR DELIVERY SYSTEMS MISC] Use As Directed.   12/19/2021 at Unknown time     potassium chloride (K-DUR,KLOR-CON) 20 MEQ tablet Take 40 mEq by mouth daily    12/19/2021 at Unknown time     rOPINIRole (REQUIP) 2 MG tablet [ROPINIROLE (REQUIP) 2 MG TABLET] Take 2 mg by mouth at bedtime.    12/19/2021 at Unknown time     tadalafil (CIALIS) 20 MG tablet Take 20 mg by mouth daily    12/19/2021 at Unknown time     vitamin C (ASCORBIC ACID) 1000 MG TABS Take 1,000 mg by mouth daily   12/19/2021 at Unknown time     zinc gluconate 50 MG tablet Take 50 mg by mouth daily   12/19/2021 at Unknown time     zolpidem (AMBIEN) 10 mg tablet Take 5  mg by mouth nightly as needed for sleep    12/19/2021 at Unknown time       Allergies  Allergies   Allergen Reactions     Contrast Dye Nausea     Other reaction(s): GI intolerance, GI Upset     Furosemide Muscle Pain (Myalgia)     Previously tolerated.  Muscle cramps     Hydrochlorothiazide Unknown     Iodinated Contrast Media [Diagnostic X-Ray Materials] Nausea     Losartan Other (See Comments)     Other reaction(s): Stomatitis, Bloody nose dry mouth and lips     Losartan-Hydrochlorothiazide [Hyzaar]      Mouth sores     Metaxalone Nausea     Metolazone Other (See Comments)     Muscle cramps     Mometasone Other (See Comments)     Bloody nose     Rabeprazole Other (See Comments)     Mouth sores     Ramipril Other (See Comments)     Mouth sores     Shellfish Containing Products [Shellfish-Derived Products] Unknown     Other reaction(s): mouth sores, Other reaction(s): mouth sores     Sildenafil Muscle Pain (Myalgia)     Methocarbamol Rash     Penicillins Other (See Comments)     Immune-does not work for him       Social History  Unable to obtain due to altered mental status.    Family History    Unable to obtain due to altered mental status.     Physical Examination     Vitals:   /59 (BP Location: Left arm)   Pulse 84   Temp (!) 96.2  F (35.7  C) (Oral)   Resp 16   Wt 89.8 kg (197 lb 15.6 oz)   SpO2 100%   BMI 27.61 kg/m      General: No acute distress.  HEENT: Normocephalic, atraumatic. Sclera anicteric. Moist mucus membranes.  Cardiac: Extremities appear well-perfused.  Chest: Non-labored, no accessory muscle use.  Abdomen: Soft, non-distended, non-tender.  Extremities: Warm, pedal pulses palpable.  Skin: Warm, dry. No jaundice.     Neuro:  Mental status: Eyes are closed, he does not open to voice or stimulius. He does not follow commands.   Cranial nerves: Visual fields intact. Right pupil is pinpoint and appears to be reactive - L pupil is irregular. Conjugate gaze. Face is symmetric at rest.  Motor:  Normal bulk and tone. No abnormal movements. He does not follow the formal motor examination, but he does move all extremities purposefully and intermittently resists movements with the examiner.  Reflexes: 1+ biceps and brachioradialis reflexes. 2+ patellar reflexes. Toes equivocal.  Sensory: Moving all extremities to noxious stimuli.  Coordination: Does not follow exam.  Gait: Deferred.     Investigations     Labs  BMP  Recent Labs   Lab 01/05/22  1743 01/05/22  1158 01/05/22  0558 01/05/22  0013 01/04/22  1302 01/04/22  0615 01/03/22  1155 01/03/22  0646 01/02/22  1542 01/02/22  0733   * 148* 151*  151* 149*   < > 150*  150*   < > 150*  150*   < > 148*   POTASSIUM  --   --  4.1  --   --  4.7  --  4.1  --  4.2   CHLORIDE  --   --  117*  --   --  118*  --  117*  --  117*   CO2  --   --  30  --   --  29  --  28  --  26   BUN  --   --  43*  --   --  44*  --  41*  --  39*   CR  --   --  1.11  --   --  1.12  --  1.07  --  1.09   ANNMARIE  --   --  9.1  --   --  9.1  --  9.0  --  8.7    < > = values in this interval not displayed.       CBC  Recent Labs   Lab 01/05/22  0558 01/03/22  0646 12/31/21  0559 12/30/21  0624   WBC 5.8 6.2 9.4 8.8   HGB 7.7* 7.7* 7.7* 7.8*    138* 116* 123*     Imaging  Personally reviewed.     Impression & Recommendations   Red Sutherland is a 81-year-old male who was admitted following a traumatic subdural hematoma resulting in left-sided weakness s/p hematoma evacuation that has been complicated by focal seizures.    The neurology service was asked to evaluate the patient for agitation and need to continue antiseizure medications.    With regard to agitation, this may be delirium induced.  It may also be associated with levetiracetam, which was recently discontinued.  Other considerations include seizures/subclinical seizures resulting in postictal agitation.  For this reason, recommend obtaining overnight EEG to evaluate for ictal/interictal activity.    Since there have been  no witnessed clinical seizures, it would be reasonable to stay on lacosamide 200 mg twice daily.  Because he had a subdural hematoma and also had a focal seizure, recommend continuing AEDs.  We will consider adjustments pending EEG results.    #Traumatic right subdural hematoma s/p evacuation  #Focal seizures  #Agitation  -Overnight EEG  -Continue lacosamide 200mg twice daily  -Delirum precautions  -Agitation medication management per primary team and psychiatry   -Will follow tomorrow    Thank you for allowing the neurology service to participate in the care of Red Sutherland.  Please contact us with questions.      The patient was seen and discussed with the attending neurologist, Dr. Caba.    Cristiane Manzano MD  Neurology PGY-3  01/05/2022 8:19 PM

## 2022-01-06 NOTE — PROGRESS NOTES
Preliminary Video EEG impression on January 5, 2022  1st hour      This EEG is abnormal due to the presences of continuous generalized polymorphic theta (at times delta slowing) with maximum slowing in the right temporal region and increase amplitude. There is a  6-7hz symmetric parieto-occipital rhythm. This EEG is consistent with a moderate encephalopathy with cortical dysfunction on the right and breech effect on right (skull defect). No clear epileptiform discharges or electrographic seizures were seen in this record. If events of interest are noted, please press the event button and complete behavioral testing (ROAR testing) if possible. Clinical correlation is advised.     Mercy Patel MD  Staff Epilepsy Neurologist   [phone]

## 2022-01-07 ENCOUNTER — APPOINTMENT (OUTPATIENT)
Dept: SPEECH THERAPY | Facility: CLINIC | Age: 82
DRG: 025 | End: 2022-01-07
Payer: COMMERCIAL

## 2022-01-07 ENCOUNTER — APPOINTMENT (OUTPATIENT)
Dept: CARDIOLOGY | Facility: CLINIC | Age: 82
DRG: 025 | End: 2022-01-07
Attending: NURSE PRACTITIONER
Payer: COMMERCIAL

## 2022-01-07 LAB
AMMONIA PLAS-SCNC: 20 UMOL/L (ref 10–50)
ANION GAP SERPL CALCULATED.3IONS-SCNC: 6 MMOL/L (ref 3–14)
ATRIAL RATE - MUSE: 0 BPM
ATRIAL RATE - MUSE: 267 BPM
ATRIAL RATE - MUSE: 86 BPM
BUN SERPL-MCNC: 41 MG/DL (ref 7–30)
CALCIUM SERPL-MCNC: 8.6 MG/DL (ref 8.5–10.1)
CHLORIDE BLD-SCNC: 110 MMOL/L (ref 94–109)
CO2 SERPL-SCNC: 30 MMOL/L (ref 20–32)
CORTIS SERPL-MCNC: 13.6 UG/DL (ref 4–22)
CREAT SERPL-MCNC: 1.05 MG/DL (ref 0.66–1.25)
DIASTOLIC BLOOD PRESSURE - MUSE: NORMAL MMHG
ERYTHROCYTE [DISTWIDTH] IN BLOOD BY AUTOMATED COUNT: 20.1 % (ref 10–15)
GFR SERPL CREATININE-BSD FRML MDRD: 72 ML/MIN/1.73M2
GLUCOSE BLD-MCNC: 188 MG/DL (ref 70–99)
GLUCOSE BLDC GLUCOMTR-MCNC: 140 MG/DL (ref 70–99)
GLUCOSE BLDC GLUCOMTR-MCNC: 154 MG/DL (ref 70–99)
GLUCOSE BLDC GLUCOMTR-MCNC: 180 MG/DL (ref 70–99)
GLUCOSE BLDC GLUCOMTR-MCNC: 181 MG/DL (ref 70–99)
GLUCOSE BLDC GLUCOMTR-MCNC: 183 MG/DL (ref 70–99)
GLUCOSE BLDC GLUCOMTR-MCNC: 232 MG/DL (ref 70–99)
HCT VFR BLD AUTO: 29.8 % (ref 40–53)
HGB BLD-MCNC: 8.2 G/DL (ref 13.3–17.7)
INTERPRETATION ECG - MUSE: NORMAL
LACTATE SERPL-SCNC: 1.1 MMOL/L (ref 0.7–2)
LVEF ECHO: NORMAL
MAGNESIUM SERPL-MCNC: 2.5 MG/DL (ref 1.6–2.3)
MCH RBC QN AUTO: 20.1 PG (ref 26.5–33)
MCHC RBC AUTO-ENTMCNC: 27.5 G/DL (ref 31.5–36.5)
MCV RBC AUTO: 73 FL (ref 78–100)
NT-PROBNP SERPL-MCNC: 818 PG/ML (ref 0–1800)
P AXIS - MUSE: NORMAL DEGREES
PHOSPHATE SERPL-MCNC: 3 MG/DL (ref 2.5–4.5)
PLATELET # BLD AUTO: 200 10E3/UL (ref 150–450)
POTASSIUM BLD-SCNC: 4 MMOL/L (ref 3.4–5.3)
PR INTERVAL - MUSE: NORMAL MS
QRS DURATION - MUSE: 82 MS
QRS DURATION - MUSE: 88 MS
QRS DURATION - MUSE: 90 MS
QT - MUSE: 376 MS
QT - MUSE: 384 MS
QT - MUSE: 394 MS
QTC - MUSE: 425 MS
QTC - MUSE: 428 MS
QTC - MUSE: 447 MS
R AXIS - MUSE: 107 DEGREES
R AXIS - MUSE: 115 DEGREES
R AXIS - MUSE: 91 DEGREES
RBC # BLD AUTO: 4.07 10E6/UL (ref 4.4–5.9)
SODIUM SERPL-SCNC: 142 MMOL/L (ref 133–144)
SODIUM SERPL-SCNC: 145 MMOL/L (ref 133–144)
SODIUM SERPL-SCNC: 146 MMOL/L (ref 133–144)
SODIUM SERPL-SCNC: 148 MMOL/L (ref 133–144)
SYSTOLIC BLOOD PRESSURE - MUSE: NORMAL MMHG
T AXIS - MUSE: -10 DEGREES
T AXIS - MUSE: -33 DEGREES
T AXIS - MUSE: -6 DEGREES
TROPONIN I SERPL HS-MCNC: 29 NG/L
VENTRICULAR RATE- MUSE: 70 BPM
VENTRICULAR RATE- MUSE: 75 BPM
VENTRICULAR RATE- MUSE: 85 BPM
WBC # BLD AUTO: 5.6 10E3/UL (ref 4–11)

## 2022-01-07 PROCEDURE — 93321 DOPPLER ECHO F-UP/LMTD STD: CPT | Mod: 26 | Performed by: INTERNAL MEDICINE

## 2022-01-07 PROCEDURE — 250N000013 HC RX MED GY IP 250 OP 250 PS 637: Performed by: STUDENT IN AN ORGANIZED HEALTH CARE EDUCATION/TRAINING PROGRAM

## 2022-01-07 PROCEDURE — 250N000011 HC RX IP 250 OP 636: Performed by: PHYSICIAN ASSISTANT

## 2022-01-07 PROCEDURE — 93308 TTE F-UP OR LMTD: CPT

## 2022-01-07 PROCEDURE — 250N000013 HC RX MED GY IP 250 OP 250 PS 637: Performed by: NURSE PRACTITIONER

## 2022-01-07 PROCEDURE — 83880 ASSAY OF NATRIURETIC PEPTIDE: CPT | Performed by: STUDENT IN AN ORGANIZED HEALTH CARE EDUCATION/TRAINING PROGRAM

## 2022-01-07 PROCEDURE — 120N000002 HC R&B MED SURG/OB UMMC

## 2022-01-07 PROCEDURE — 84295 ASSAY OF SERUM SODIUM: CPT | Performed by: NEUROLOGICAL SURGERY

## 2022-01-07 PROCEDURE — 36592 COLLECT BLOOD FROM PICC: CPT | Performed by: NEUROLOGICAL SURGERY

## 2022-01-07 PROCEDURE — 83735 ASSAY OF MAGNESIUM: CPT | Performed by: STUDENT IN AN ORGANIZED HEALTH CARE EDUCATION/TRAINING PROGRAM

## 2022-01-07 PROCEDURE — 36592 COLLECT BLOOD FROM PICC: CPT | Performed by: STUDENT IN AN ORGANIZED HEALTH CARE EDUCATION/TRAINING PROGRAM

## 2022-01-07 PROCEDURE — 85027 COMPLETE CBC AUTOMATED: CPT | Performed by: STUDENT IN AN ORGANIZED HEALTH CARE EDUCATION/TRAINING PROGRAM

## 2022-01-07 PROCEDURE — 93325 DOPPLER ECHO COLOR FLOW MAPG: CPT

## 2022-01-07 PROCEDURE — 250N000011 HC RX IP 250 OP 636: Performed by: NURSE PRACTITIONER

## 2022-01-07 PROCEDURE — 93005 ELECTROCARDIOGRAM TRACING: CPT

## 2022-01-07 PROCEDURE — 93010 ELECTROCARDIOGRAM REPORT: CPT | Performed by: INTERNAL MEDICINE

## 2022-01-07 PROCEDURE — 93325 DOPPLER ECHO COLOR FLOW MAPG: CPT | Mod: 26 | Performed by: INTERNAL MEDICINE

## 2022-01-07 PROCEDURE — 93308 TTE F-UP OR LMTD: CPT | Mod: 26 | Performed by: INTERNAL MEDICINE

## 2022-01-07 PROCEDURE — 250N000013 HC RX MED GY IP 250 OP 250 PS 637: Performed by: INTERNAL MEDICINE

## 2022-01-07 PROCEDURE — 92526 ORAL FUNCTION THERAPY: CPT | Mod: GN

## 2022-01-07 PROCEDURE — 82310 ASSAY OF CALCIUM: CPT | Performed by: STUDENT IN AN ORGANIZED HEALTH CARE EDUCATION/TRAINING PROGRAM

## 2022-01-07 PROCEDURE — 258N000003 HC RX IP 258 OP 636: Performed by: STUDENT IN AN ORGANIZED HEALTH CARE EDUCATION/TRAINING PROGRAM

## 2022-01-07 PROCEDURE — 82140 ASSAY OF AMMONIA: CPT | Performed by: COUNSELOR

## 2022-01-07 PROCEDURE — 82533 TOTAL CORTISOL: CPT | Performed by: STUDENT IN AN ORGANIZED HEALTH CARE EDUCATION/TRAINING PROGRAM

## 2022-01-07 PROCEDURE — 84100 ASSAY OF PHOSPHORUS: CPT | Performed by: STUDENT IN AN ORGANIZED HEALTH CARE EDUCATION/TRAINING PROGRAM

## 2022-01-07 PROCEDURE — 99233 SBSQ HOSP IP/OBS HIGH 50: CPT | Performed by: INTERNAL MEDICINE

## 2022-01-07 PROCEDURE — 83605 ASSAY OF LACTIC ACID: CPT | Performed by: NEUROLOGICAL SURGERY

## 2022-01-07 PROCEDURE — 250N000013 HC RX MED GY IP 250 OP 250 PS 637: Performed by: NEUROLOGICAL SURGERY

## 2022-01-07 PROCEDURE — 84484 ASSAY OF TROPONIN QUANT: CPT | Performed by: STUDENT IN AN ORGANIZED HEALTH CARE EDUCATION/TRAINING PROGRAM

## 2022-01-07 RX ORDER — IRBESARTAN 75 MG/1
75 TABLET ORAL DAILY
Status: DISCONTINUED | OUTPATIENT
Start: 2022-01-08 | End: 2022-01-17 | Stop reason: HOSPADM

## 2022-01-07 RX ADMIN — INSULIN ASPART 1 UNITS: 100 INJECTION, SOLUTION INTRAVENOUS; SUBCUTANEOUS at 12:49

## 2022-01-07 RX ADMIN — MULTIVIT AND MINERALS-FERROUS GLUCONATE 9 MG IRON/15 ML ORAL LIQUID 15 ML: at 08:17

## 2022-01-07 RX ADMIN — INSULIN ASPART 4 UNITS: 100 INJECTION, SOLUTION INTRAVENOUS; SUBCUTANEOUS at 20:35

## 2022-01-07 RX ADMIN — Medication 5 ML: at 12:49

## 2022-01-07 RX ADMIN — INSULIN ASPART 2 UNITS: 100 INJECTION, SOLUTION INTRAVENOUS; SUBCUTANEOUS at 04:30

## 2022-01-07 RX ADMIN — INSULIN GLARGINE 14 UNITS: 100 INJECTION, SOLUTION SUBCUTANEOUS at 20:35

## 2022-01-07 RX ADMIN — INSULIN ASPART 2 UNITS: 100 INJECTION, SOLUTION INTRAVENOUS; SUBCUTANEOUS at 08:30

## 2022-01-07 RX ADMIN — GABAPENTIN 300 MG: 300 CAPSULE ORAL at 22:35

## 2022-01-07 RX ADMIN — DOCUSATE SODIUM 50 MG AND SENNOSIDES 8.6 MG 2 TABLET: 8.6; 5 TABLET, FILM COATED ORAL at 20:36

## 2022-01-07 RX ADMIN — ASPIRIN 81 MG: 81 TABLET, COATED ORAL at 08:19

## 2022-01-07 RX ADMIN — INSULIN ASPART 2 UNITS: 100 INJECTION, SOLUTION INTRAVENOUS; SUBCUTANEOUS at 16:03

## 2022-01-07 RX ADMIN — METOPROLOL TARTRATE 25 MG: 25 TABLET, FILM COATED ORAL at 20:36

## 2022-01-07 RX ADMIN — INSULIN GLARGINE 14 UNITS: 100 INJECTION, SOLUTION SUBCUTANEOUS at 08:39

## 2022-01-07 RX ADMIN — Medication 10 MG: at 20:36

## 2022-01-07 RX ADMIN — ROPINIROLE HYDROCHLORIDE 1 MG: 1 TABLET, FILM COATED ORAL at 20:36

## 2022-01-07 RX ADMIN — HEPARIN SODIUM 5000 UNITS: 5000 INJECTION, SOLUTION INTRAVENOUS; SUBCUTANEOUS at 22:36

## 2022-01-07 RX ADMIN — SODIUM CHLORIDE 500 ML: 9 INJECTION, SOLUTION INTRAVENOUS at 11:35

## 2022-01-07 RX ADMIN — DOXAZOSIN 1 MG: 1 TABLET ORAL at 08:19

## 2022-01-07 RX ADMIN — FLUTICASONE FUROATE AND VILANTEROL TRIFENATATE 1 PUFF: 200; 25 POWDER RESPIRATORY (INHALATION) at 08:17

## 2022-01-07 RX ADMIN — METOPROLOL TARTRATE 25 MG: 25 TABLET, FILM COATED ORAL at 08:19

## 2022-01-07 RX ADMIN — LACOSAMIDE 200 MG: 50 TABLET, FILM COATED ORAL at 20:35

## 2022-01-07 RX ADMIN — INSULIN ASPART 1 UNITS: 100 INJECTION, SOLUTION INTRAVENOUS; SUBCUTANEOUS at 00:45

## 2022-01-07 RX ADMIN — HALOPERIDOL LACTATE 2 MG: 5 INJECTION, SOLUTION INTRAMUSCULAR at 22:35

## 2022-01-07 RX ADMIN — OLANZAPINE 5 MG: 2.5 TABLET, FILM COATED ORAL at 16:02

## 2022-01-07 RX ADMIN — Medication 5 ML: at 18:35

## 2022-01-07 RX ADMIN — POTASSIUM CHLORIDE 40 MEQ: 1.5 POWDER, FOR SOLUTION ORAL at 08:18

## 2022-01-07 RX ADMIN — DOCUSATE SODIUM 50 MG AND SENNOSIDES 8.6 MG 2 TABLET: 8.6; 5 TABLET, FILM COATED ORAL at 08:18

## 2022-01-07 RX ADMIN — OLANZAPINE 5 MG: 2.5 TABLET, FILM COATED ORAL at 08:18

## 2022-01-07 RX ADMIN — PRASUGREL 10 MG: 10 TABLET, FILM COATED ORAL at 08:18

## 2022-01-07 RX ADMIN — OLANZAPINE 10 MG: 2.5 TABLET, FILM COATED ORAL at 22:35

## 2022-01-07 RX ADMIN — Medication 40 MG: at 08:17

## 2022-01-07 RX ADMIN — HEPARIN SODIUM 5000 UNITS: 5000 INJECTION, SOLUTION INTRAVENOUS; SUBCUTANEOUS at 12:49

## 2022-01-07 RX ADMIN — BUMETANIDE 4 MG: 1 TABLET ORAL at 08:18

## 2022-01-07 RX ADMIN — IRBESARTAN 150 MG: 150 TABLET ORAL at 08:18

## 2022-01-07 RX ADMIN — ATORVASTATIN CALCIUM 80 MG: 40 TABLET, FILM COATED ORAL at 08:19

## 2022-01-07 RX ADMIN — TADALAFIL 20 MG: 20 TABLET, FILM COATED ORAL at 08:19

## 2022-01-07 RX ADMIN — INSULIN ASPART 1 UNITS: 100 INJECTION, SOLUTION INTRAVENOUS; SUBCUTANEOUS at 23:57

## 2022-01-07 RX ADMIN — HEPARIN SODIUM 5000 UNITS: 5000 INJECTION, SOLUTION INTRAVENOUS; SUBCUTANEOUS at 06:58

## 2022-01-07 RX ADMIN — LACOSAMIDE 200 MG: 50 TABLET, FILM COATED ORAL at 08:19

## 2022-01-07 ASSESSMENT — ACTIVITIES OF DAILY LIVING (ADL)
ADLS_ACUITY_SCORE: 20
ADLS_ACUITY_SCORE: 23
ADLS_ACUITY_SCORE: 20
ADLS_ACUITY_SCORE: 16
ADLS_ACUITY_SCORE: 20
ADLS_ACUITY_SCORE: 21
ADLS_ACUITY_SCORE: 20
ADLS_ACUITY_SCORE: 23
ADLS_ACUITY_SCORE: 20
ADLS_ACUITY_SCORE: 20
ADLS_ACUITY_SCORE: 23
ADLS_ACUITY_SCORE: 23
ADLS_ACUITY_SCORE: 20
ADLS_ACUITY_SCORE: 21
ADLS_ACUITY_SCORE: 23
ADLS_ACUITY_SCORE: 16
ADLS_ACUITY_SCORE: 23
ADLS_ACUITY_SCORE: 16
ADLS_ACUITY_SCORE: 16
ADLS_ACUITY_SCORE: 21
ADLS_ACUITY_SCORE: 20
ADLS_ACUITY_SCORE: 23

## 2022-01-07 NOTE — PROGRESS NOTES
[facility]    Medicine Progress Note - Hospitalist Service, Gold 3       Date of Admission:  12/20/2021    Assessment & Plan      Red Sutherland is a 81 year old male with past medical history significant for CAD s/p PCI to LCX (2/2021 and repeat in 10/2021), HFpEF (60-65%) atrial fib on AC w/ Eliquis, HTN, HLD, COPD, pulmonary HTN, chronic bronchitis, type 2 DM, liver cirrhosis, stage 4 CKD, hx GIB, GERD and RLS transferred to [facility] from [facility] ED on 12/20 for right sided subdural hematoma with mass effect and midline shift.  He was admitted to Neurosurgery service and underwent evacuation on 12/21.  Hospital course c/b seizures, altered mentation and agitation, and failed swallow test requiring PEG tube placement (1/3).  Internal medicine is consulted 1/6 for hypotension and medical co-management.         Changes Today:  - Transferred to medicine primary service, neurosurgery to continue to follow   - Decreased Irbesartan from 150mg qday -> 75mg qday for intermittent low BPs  - Bumex on hold until can re-assess weights, will consider resuming 1/8  - Haldol discontinued, has not received in 48 hours  - Continue delirium precautions     # Hypotension - improving   Developed 1/6, BP as low as 82/48 with concurrent hypothermia of 95.1. Medicine consulted, felt that multifactorial etiology, possibly 2/2 medication side effects vs sepsis vs. Cardiac vs. Endocrinologic.  Chart reviewed, newly started on scheduled Zyprexa on 1/3 and also received PRN Haldol 2mg x 2 on 1/5, in setting of concomitant scheduled antihypertensives (metoprolol, irbesartan).  Also newly started on Doxazosin on 1/5 which can also cause hypotension d/t alpha-1 blockade (on tamsulosin PTA, but had not received any BPH meds since admission).  In setting of hypothermia, could also be related to hypothyroidism - no recent TSH on file.  In setting of acute illness and recent hematoma evacuation, would  also consider adrenal insufficiency.  From cardiac standpoint, denies palpitations, chest pain, and dizziness, EKG 1/6 unremarkable. Also no evidence of sepsis, afebrile, no leukocytosis, on RA, no localizing infectious sources. On my exam 1/7 does not appear hypovolemic.   - BP improved throughout they evening 1/6 and day 1/7  - 1/7 TSH, am cortisol wnl   - Use lowest possible dose Zyprexa to control symptoms of agitation, confusion   - 1/7 has not used Haldol in >48 hours, will discontinue     # R subdural hematoma s/p evacuation   # Post op seizures   Per chart review, had fall at home 2 weeks PTA followed by persistent HA and neck pain and  presented w/ slurred speech.  Transferred from [facility] ED.  Found to have Large hyperdense holohemispheric right-sided subdural hematoma 12.9 mm in thickness over the posterior right frontal convexity, 12.6 mm over the right parietal convexity and 11.9 mm over the right temporal convexity. Currently denies HA/dizziness. S/p hematoma evacuation that has been complicated by focal seizures.  - Management per Neurosurgery primary   - EEG with slowing, likely secondary to SDH but without seizure activity  - Seizure precautions   - Vimpat 200mg BID for seizure ppx   - Neurology signed off 1/7 -> recommend follow up in 4 months after discharge     # CAD s/p PCI to LCX (2/2021 and repeat in 10/2021)  # HTN   # HFpEF   # Pulmonary HTN   # RV dysfunction   Cardiology consulted early in hospital course, signed off on 12/26.  BNP checked today in setting of hypotension, elevated at 1,112 (most recently 369).  Per chart review, dry weight 212, as of 1/3 dry weight 197, has not been rechecked.  Recently admitted to [facility] for CHF exacerbation 11/30-12/6.  At that time, discharged on Bumex 4mg BID and Metolazone 2.5mg q MWF.  Last echocardiogram 10/23/21 with normal EF 60-65%, mild valvular aortic stenosis, RV mild to severe dilated, flattened septum c/w elevated RV  pressure/hypervolemia, PASP 75mmHg.          - Continue ASA 81mg and Prasugrel 10mg daily   - Bumex 4mg daily put on hold on 1/7 per neurosurgery, will assess UOP, BP, weights and consider resuming 1/8   - Need to start obtaining weights, strict Is/Os on 1/8  - Continue tadalafil 20mg daily   - Decrease Irbesartan to 75mg qday on 1/8 and assess BP response    # Permanent atrial fib   Rate controlled on metoprolol 25mg BID.  Previously referred for Watchman device placement d/t hx bleeding issues.  On Eliquis 5mg BID PTA, stopped on admission d/t subdural hematoma as above.    - Continue Metoprolol 25mg BID, likely not contributing to intermittent low BPs      # COPD  Uses 4L NC at home.  Currently stable on 0-2L NC.  Has intermittent cough that is not new.   - Continue PTA Breo Ellipta   - Continue PTA Albuterol PRN   - IS per nursing      # Type 2 DM - Last Hgb A1c 7.5%.  On Lantus 18 units BID and Invokana 100mg every evening PTA.  Currently on Lantus 14 units BID and HSSI.  Last 24 BGs as below:            Recent Labs   Lab 01/06/22  1551 01/06/22  1334 01/06/22  1326 01/06/22  1012 01/06/22  0637 01/06/22  0404   * 204* 225* 195* 214* 192*      - Agree with continuing Lantus and HSSI  - May need to adjust regimen if advancing diet, changes to TFs   - Hypoglycemia protocol   - PTA Jardiance on hold      # Stage IV CKD   BL Cr 1.2-1.4.  Currently Cr 1.21 on 1/6; BUN 46.  GFR ~ 60.   - Following I/Os as above  - Renally dose meds  - Avoid/minimize nephrotoxic agents      # AMS, delirium, intermittent agitation   In setting of subdural hematoma, s/p craniotomy, prolonged hospital stay. Per neurology may also be exacerbated by discontinuing Keppra. Seen by Psychiatry.  Interactive and appropriate this evening.  Appears to be improving.  Lytes wnl except for hypernatremia.    - Agree w/ Zyprexa, would use lowest effective dose for symptoms   - Discontinued Haldol 1/7  - Delirium precautions  - Continue to  monitor      # Liver cirrhosis   Incidentally noted on CT AP on 11/30.  Hx neg for alcohol use disorder, IV drug use, viral hepatitis, or family history of liver disease.  Last LFTs 12/27/21 wnl.  No ascites noted on exam.    - Trend CMP 1/8 for re-assessment given ongoing delirium   - GI follow up outpatient      # Anemia  Hx iron deficiency.  Likely c/b CKD.  Hgb stable mid 7's.   - Agree w/ ferrous gluconate   - Transfuse for Hgb < 7      # RLS - Agree w/ Requip and Gabapentin at HS.      # GERD   # Hx past GIB   - Agree w/ pantoprazole 40mg daily     # Moderate Malnutrition in the context of acute on chronic illness:    % Intake: Decreased intake does not meet criteria, but falls short of meeting RD's goal for adequate TF intakes.  % Weight Loss: > 10% in 6 months (severe), based on h/o > 18% in 4 months   Subcutaneous Fat Loss: Facial region: mild buccal area (per last RD note d/t pt sleeping at time of visit today)  Muscle Loss: Facial & jaw region: mild, Thoracic region (clavicle, acromium bone, deltoid, trapezius, pectoral), Upper arm (bicep, tricep), Lower arm  (forearm), Patellar region and Posterior calf: all moderate (per last RD note d/t pt sleeping at time of visit today)  Fluid Accumulation/Edema: Unable to assess  Malnutrition Diagnosis: Moderate malnutrition in the context of acute on chronic illness  - Continue TFs         Diet: Adult Formula Drip Feeding: Continuous Pivot 1.5; Gastrostomy; Goal Rate: 60; mL/hr; Medication - Feeding Tube Flush Frequency: At least 15-30 mL water before and after medication administration and with tube clogging    DVT Prophylaxis:None given recent DSH s/p evacuation   Dixon Catheter: Not present  Central Lines: PRESENT  PICC Double Lumen 12/25/21 Right Basilic-Site Assessment: WDL  Code Status: Full Code      Disposition Plan   Expected Discharge:    Anticipated discharge location:  Awaiting care coordination huddle  Delays:     1:1 Sitter            The patient's  care was discussed with the Patient and Neurosurgery Consultant.    Shannan King MD  Hospitalist Service, [address]  Securely message with the Vocera Web Console (learn more here)  Text page via [facility] Paging/Directory    Please see sign in/sign out for up to date coverage information    _________________________________________________________________    Interval History   Pt is rousable to voice, is able to state his name, knows that it is 2022, but does not know where he is. Is able to follow basic commands (close eyes, squeeze hands, wiggle toes). Does not report any pain, shortness of breath, or other symptoms to me.     Data reviewed today: I reviewed all medications, new labs and imaging results over the last 24 hours.     Physical Exam   Vital Signs: Temp: 97.6  F (36.4  C) Temp src: Oral BP: 92/57 Pulse: 78   Resp: 22 SpO2: 100 % O2 Device: None (Room air) Oxygen Delivery: 2 LPM  Weight: 197 lbs 15.57 oz     GENERAL: Rousable, oriented to person, year only, following basic commands.  No acute distress.    HEENT: Normocephalic, atraumatic. Anicteric sclera. Mucous membranes moist.   CV: Irregularly irregular. S1, S2. Slight murmur.   RESPIRATORY: Effort normal on RA. Lungs CTAB with no wheezing, rales, or rhonchi.   GI: Abdomen soft and non distended, bowel sounds present x all 4 quadrants. No tenderness, rebound, or guarding. PEG tube in place, appears c/d/i.    NEUROLOGICAL: No focal deficits. Follows commands.   MUSCULOSKELETAL: No joint swelling or tenderness. Moves all extremities.   EXTREMITIES: No gross deformities. No peripheral edema.   SKIN: Grossly warm, dry, and intact. No jaundice. No rashes.     Data   Recent Labs   Lab 01/07/22  1540 01/07/22  1206 01/07/22  1136 01/07/22  0824 01/07/22  0741 01/07/22  0424 01/07/22  0026 01/06/22  1334 01/06/22  1326 01/06/22  1012 01/06/22  0637 01/05/22  0952 01/05/22  0558 01/03/22  0725  22  0646   WBC  --   --   --   --  5.6  --   --   --   --   --   --   --  5.8  --  6.2   HGB  --   --   --   --  8.2*  --   --   --   --   --   --   --  7.7*  --  7.7*   MCV  --   --   --   --  73*  --   --   --   --   --   --   --  76*  --  77*   PLT  --   --   --   --  200  --   --   --   --   --   --   --  162  --  138*   NA  --   --  145*  --  146*  --  148*   < > 148*   < > 147*   < > 151*  151*   < > 150*  150*   POTASSIUM  --   --   --   --  4.0  --   --   --  4.8  --  4.0  --  4.1   < > 4.1   CHLORIDE  --   --   --   --  110*  --   --   --  114*  --  110*  --  117*   < > 117*   CO2  --   --   --   --  30  --   --   --  32  --  31  --  30   < > 28   BUN  --   --   --   --  41*  --   --   --  46*  --  42*  --  43*   < > 41*   CR  --   --   --   --  1.05  --   --   --  1.21  --  0.27*  --  1.11   < > 1.07   ANIONGAP  --   --   --   --  6  --   --   --  2*  --  6  --  4   < > 5   ANNMARIE  --   --   --   --  8.6  --   --   --  8.7  --  9.3  --  9.1   < > 9.0   * 154*  --  183* 188*   < >  --    < > 225*   < > 214*   < > 151*   < > 110*    < > = values in this interval not displayed.     Recent Results (from the past 24 hour(s))   Echo Limited   Result Value    LVEF  60-65%    [facility]    [id number]  [id number]  [id number]  [id number]^CHAN^ELIAS^JULIO CÉSAR NASH     [facility],[place]  Echocardiography Laboratory  [address] [place] [id number]     Name: MARCOS VIDAL  MRN: [id number]  : 1940  Study Date: 2022 10:19 AM  Age: 81 yrs  Gender: Male  Patient Location: [facility]  Reason For Study: Heart Failure, Unspecified  Ordering Physician: ELIAS GILES  Referring Physician: YONG GARCIA  Performed By: Sierra Briggs RDCS     BSA: 2.1 m2  Height: 71 in  Weight: 197 lb  HR: 74  BP: 136/78 mmHg  ______________________________________________________________________________  Procedure  Limited Portable Echo  Adult.  ______________________________________________________________________________  Interpretation Summary  Global and regional left ventricular function is normal with an EF of 60-65%.  Mild right ventricular dilation is present. Global right ventricular function  is mildly reduced.  Mild to moderate tricuspid insufficiency is present.  Pulmonary hypertension is present. Estimated pulmonary artery systolic  pressure is 64 mmHg based on a mean right atrial pressure of 15 mmHg.  Dilation of the inferior vena cava is present with abnormal respiratory  variation in diameter.  No pericardial effusion is present.  ______________________________________________________________________________  Left Ventricle  Left ventricular size is normal. Global and regional left ventricular function  is normal with an EF of 60-65%.     Right Ventricle  Mild right ventricular dilation is present. Global right ventricular function  is mildly reduced.     Atria  Moderate to severe biatrial enlargement is present.     Tricuspid Valve  Mild to moderate tricuspid insufficiency is present. Pulmonary hypertension is  present. Estimated pulmonary artery systolic pressure is 49 mmHg plus right  atrial pressure.     Vessels  Dilation of the inferior vena cava is present with abnormal respiratory  variation in diameter. IVC diameter >2.1 cm collapsing <50% with sniff  suggests a high RA pressure estimated at 15 mmHg or greater.     Pericardium  No pericardial effusion is present.  ______________________________________________________________________________  MMode/2D Measurements & Calculations  IVSd: 1.0 cm  LVIDd: 5.4 cm  LVIDs: 2.7 cm  LVPWd: 1.3 cm  FS: 49.9 %  LV mass(C)d: 245.7 grams  LV mass(C)dI: 117.3 grams/m2  RWT: 0.47     Doppler Measurements & Calculations  TR max eligio: 349.0 cm/sec  TR max P.7 mmHg     ______________________________________________________________________________  Report approved by: Dean Means  01/07/2022 11:14 AM           Medications       aspirin  81 mg Oral Daily     atorvastatin  80 mg Oral or Feeding Tube Daily     [Held by provider] bumetanide  4 mg Oral QAM     [Held by provider] doxazosin  1 mg Oral Daily     [Held by provider] empagliflozin  10 mg Oral or Feeding Tube Daily     ferrous gluconate  324 mg Oral or Feeding Tube Every Other Day     fluticasone-vilanterol  1 puff Inhalation Daily     gabapentin  300 mg Oral At Bedtime     heparin ANTICOAGULANT  5,000 Units Subcutaneous Q8H     heparin lock flush  5-20 mL Intracatheter Q24H     insulin aspart  1-12 Units Subcutaneous Q4H     insulin glargine  14 Units Subcutaneous BID     [START ON 1/8/2022] irbesartan  75 mg Oral or Feeding Tube Daily     lacosamide  200 mg Oral or Feeding Tube BID     magnesium citrate  296 mL Oral Once     melatonin  10 mg Oral Daily     metoprolol tartrate  25 mg Oral BID     multivitamins w/minerals  15 mL Per Feeding Tube Daily     OLANZapine  10 mg Oral At Bedtime     OLANZapine  5 mg Oral BID     pantoprazole  40 mg Oral or Feeding Tube QAM AC     polyethylene glycol  17 g Oral or Feeding Tube Daily     potassium chloride  40 mEq Oral Daily     prasugrel  10 mg Oral or Feeding Tube Daily     rOPINIRole  1 mg Oral Daily     senna-docusate  2 tablet Oral or Feeding Tube BID     sodium chloride (PF)  10 mL Intracatheter Q8H     sodium chloride (PF)  10 mL Intracatheter Q8H     sodium chloride (PF)  3 mL Intracatheter Q8H     tadalafil  20 mg Oral Q24H

## 2022-01-07 NOTE — PROGRESS NOTES
[facility], [place]   01/07/2022  Neurosurgery Progress Note:    Assessment:  Red Sutherland is a 81-year-old male post-traumatic acute right subdural hematoma with left hemibody weakness, on xarelto and plavix. Admitted to the ICU on 12/20/21. Taken to OR 12/21/2021. Extubated 12/22/2021. Started on Aspirin and Prasugrel on 12/28/21. Failed SLP evaluation. Free water flushes stopped since hypernatremia resolved. Lasix and Bumex given for diuresis. Received PEG on 1/3/2022. LLQ tenderness near PEG site. CT AP done 1/5 showing no obvious pathology. Medicine consulted 1/6/2022 for hypotension management.       Plan:  - Keppra 1g BID stopped, Vimpat 200 BID  - EEG discontinued  - Followup TTE this AM, AM cortisol  - Will try to wean sitter  - HOB > 30 degrees  - Daily Bumex 4mg AM  - Zyprexa 5mg BID, 10mg at night.   - Aspirin and Prasugrel  - FWF at 200q2 for 3L free water deficit   - Appreciate psychiatry recommendations.    Asim Ann MD  Neurosurgery Resident, PGY-2    Please contact neurosurgery resident on call with questions.    Dial * * [phone], enter [phone] when prompted.   -----------------------------------    Interval History: Improving delirium overnight.     Objective:   Temp:  [95.1  F (35.1  C)-98.2  F (36.8  C)] 98  F (36.7  C)  Pulse:  [74-88] 88  Resp:  [16-18] 18  BP: ()/(44-86) 136/78  SpO2:  [95 %-100 %] 98 %  I/O last 3 completed shifts:  In: 2360 [NG/GT:1400]  Out: 1425 [Urine:1425]    Gen: Slightly agitated  Wound: clean, dry, intact,  Neurologic:  - Alert & Oriented to person, place, time, and situation  - Follows commands briskly x 4   - PEERL, EOMI, Mild L facial droop  - LUE with  4+/5, rest 5/5    LABS:  Recent Labs   Lab 01/07/22  0824 01/07/22  0741 01/07/22  0424 01/07/22  0026 01/06/22  1947 01/06/22  1759 01/06/22  1334 01/06/22  1326 01/06/22  1012 01/06/22  0637   NA  --  146*  --  148*  --  147*  --  148*   < > 147*   POTASSIUM  --  4.0   --   --   --   --   --  4.8  --  4.0   CHLORIDE  --  110*  --   --   --   --   --  114*  --  110*   CO2  --  30  --   --   --   --   --  32  --  31   ANIONGAP  --  6  --   --   --   --   --  2*  --  6   * 188* 180*  --    < >  --    < > 225*   < > 214*   BUN  --  41*  --   --   --   --   --  46*  --  42*   CR  --  1.05  --   --   --   --   --  1.21  --  0.27*   ANNMARIE  --  8.6  --   --   --   --   --  8.7  --  9.3    < > = values in this interval not displayed.       Recent Labs   Lab 01/07/22  0741   WBC 5.6   RBC 4.07*   HGB 8.2*   HCT 29.8*   MCV 73*   MCH 20.1*   MCHC 27.5*   RDW 20.1*          IMAGING:  No results found for this or any previous visit (from the past 24 hour(s)).

## 2022-01-07 NOTE — PLAN OF CARE
Status: Pt here with post-traumatic acute R SDH with L hemibody weakness. OR for evac 12/21. PEG placement 1/3/22.   Vitals: VSS on 2L NC; SBP  this shift. Monitor BP. [facility].  Neuros: Patient refused midnight assessment, participated in 0400. MD notified about refusal of assessment, still wants q4hr neuros. A&Ox4, Garbled speech. Slight L facial droop. No L drift noted. L side 4/5, R side 5/5.   IV: PIV SL. DL PICC red lumen HL, Purple lumen TKO.   Labs/Electrolytes: reviewed.  Resp/trach: LS clear   Diet: NPO with TF running at goal rate of 60 mL/hr via PEG with 200mL FWF q2h. Speech therapy following.  Bowel status: BS+, no BM this shift. LBM 1/7 incontinent.    : Voids spont via urinal; incontinent at times.   Skin: Blanchable redness on coccyx. BLE mayuri. Abdominal bruising. Crani incision with staples LEONID, boggy near incision. Scabbing and dry skin throughout. Tongue is very dry, oral cares done frequently and moisturizer applied to tongue.   Pain: denied  Activity: Heavy assist of two, GB, pivot. Needs a lift at times. Patient tried to get out of bed multiple times overnight.  Social: Wife, Kaley, approved to stay overnight, currently at bedside. Helpful and supportive in cares.   Plan: Continue to monitor and follow plan of care.

## 2022-01-07 NOTE — PLAN OF CARE
Status: Pt here with post-traumatic acute R SDH with L hemibody weakness. OR for evac 12/21. PEG placement 1/3/22.   Vitals: VSS on RA 2l Via NC; BP low on days, SBP in the 100's this shift; metoprolol held. CCM started this shift  Neuros: A&Ox4  Garbled speech. Slight L facial droop. No L drift noted. L side 4/5, R side 5/5.   IV: PIV SL. DL PICC red lumen opened up after alteplase.2nd L TKO. Gene wipes completed on days.  Labs/Electrolytes: Sodium redraw 147  Resp/trach: LS clear   Diet: NPO with TF running at goal rate of 60 mL/hr via PEG. [id number] flush q2h. Speech therapy following.  Bowel status: BS+. Small, loose stool; incontinent.    : Voids spont via urinal; incontinent at times.   Skin: Blanchable redness on coccyx. BLE mayuri. Abdominal bruising. Crani incision with staples LEONID, boggy near incision. Scabbing and dry skin throughout. Tongue is very dry, oral cares done frequently and moisturizer applied to tongue.   Pain: No c/o pain  Activity: Heavy assist of two, GB, pivot. Up to bedside commode. Needs a lift at times.   Social: Wife, Kaley, approved to stay overnight, currently at bedside. Helpful and supportive in cares.   Plan: Continue to monitor and follow plan of care.  Updates this shift: EEG discontinued; CCM started. Pt appropriate this shift.

## 2022-01-07 NOTE — PROGRESS NOTES
[facility], [place]   Neurology Daily Note  Red Sutherland  [id number]  01/07/2022    Subjective:  EEG with out seizures. Slowing over the right frontal/temporal region.     Objective   Physical Examination   Vitals: BP 92/57 (BP Location: Left arm)   Pulse 78   Temp 97.6  F (36.4  C)   Resp 22   Wt 89.8 kg (197 lb 15.6 oz)   SpO2 100%   BMI 27.61 kg/m    General: Patient lying in bed, NAD  HEENT: NC/AT, no icterus, moist mucous membranes  Cardiac: RRR  Chest: non-labored on RA  Abdomen: S/NT/ND  Extremities: Warm, no edema  Skin: No rash or lesion   Psych: Affect appropriate for situation   Neuro:  Mental status: Awake to voice, able to state his name, year, cannot state month or hospital. Comprehension of some very basic commands at times like wiggle toes and squeeze hands.   Cranial nerves: PERRL, conjugate gaze, EOMI, face symmetric.  Motor: Normal muscle bulk and tone. Asterixis on exam. Purposeful antigravity movement in all ext but does not participate in formal testing.   Reflexes: 2+ reflexic and symmetric biceps, brachioradialis, patellae, and achilles. No clonus.  Coordination: FNF unable to perform  Gait: Deferred        Investigations    Preliminary EEG report with expected changes after SDH but no seizures. Final report pending.     Assessment and Plan   Red Sutherland is a 81-year-old male who was admitted following a traumatic subdural hematoma resulting in left-sided weakness s/p hematoma evacuation that has been complicated by focal seizures.     The neurology service was asked to evaluate the patient for agitation and management of antiseizure medications.     With regard to agitation, this may be delirium induced.  It may also be associated with levetiracetam, which was recently discontinued.  Other considerations include seizures/subclinical seizures resulting in postictal agitation. EEG was done to evaluate which did not show any seizure activity after  keppra was stopped for almost 24 hours.      #Traumatic right subdural hematoma s/p evacuation  #Focal seizures  #Agitation  -EEG with slowing, likely secondary to SDH but no clear seizure activity.   -Continue lacosamide 200mg twice daily  -Delirum precautions  -Agitation medication management per primary team and psychiatry   -follow up in neurology clinic on discharge in 4 months after discharge     Neurology will sign off at this time, please contact the team if any further questions/concerns arise.     Patient was seen and discussed with Dr. Rosales Mayorga,   Neurology PGY-4   [phone]

## 2022-01-07 NOTE — PLAN OF CARE
Status: Pt here with post-traumatic acute R SDH with L hemibody weakness. OR for evac 12/21. PEG placement 1/3/22.   Vitals: CCM. BP's 70's/50's this afternoon, orthostatic BP's also done.   Neuros: A/Ox4. Slight L facial droop.   IV: PIV SL. DL PICC red lumen HL, Purple lumen TKO. 500 ml NS Bolus x1 for hypotension   Labs/Electrolytes: triggered sepsis, Lactic acid 1.1  Diet: TF running at goal rate of 60 mL/hr via PEG with 200mL FWF q2h. Full liquid diet, mildly thick liquids   Bowel status: BM x1  : Voids spont via urinal; incontinent at times.   Skin: Blanchable redness on coccyx. BLE mayuri. Abdominal bruising. Crani incision with staples LEONID, boggy near incision. Scabbing and dry skin throughout.   Pain: denied  Activity: Heavy assist of two, GB, pivot. 1:1 d/t impuslsivity, pt not impulsive or pulling at lines today  Social: Wife, Kaley, at bedside all day   Plan: Continue to follow plan of care

## 2022-01-08 LAB
ALBUMIN SERPL-MCNC: 2.8 G/DL (ref 3.4–5)
ALP SERPL-CCNC: 151 U/L (ref 40–150)
ALT SERPL W P-5'-P-CCNC: 15 U/L (ref 0–70)
ANION GAP SERPL CALCULATED.3IONS-SCNC: 7 MMOL/L (ref 3–14)
AST SERPL W P-5'-P-CCNC: 20 U/L (ref 0–45)
BILIRUB SERPL-MCNC: 0.9 MG/DL (ref 0.2–1.3)
BUN SERPL-MCNC: 40 MG/DL (ref 7–30)
CALCIUM SERPL-MCNC: 9 MG/DL (ref 8.5–10.1)
CHLORIDE BLD-SCNC: 106 MMOL/L (ref 94–109)
CO2 SERPL-SCNC: 28 MMOL/L (ref 20–32)
CREAT SERPL-MCNC: 1.04 MG/DL (ref 0.66–1.25)
GFR SERPL CREATININE-BSD FRML MDRD: 73 ML/MIN/1.73M2
GLUCOSE BLD-MCNC: 163 MG/DL (ref 70–99)
GLUCOSE BLDC GLUCOMTR-MCNC: 147 MG/DL (ref 70–99)
GLUCOSE BLDC GLUCOMTR-MCNC: 148 MG/DL (ref 70–99)
GLUCOSE BLDC GLUCOMTR-MCNC: 192 MG/DL (ref 70–99)
GLUCOSE BLDC GLUCOMTR-MCNC: 199 MG/DL (ref 70–99)
GLUCOSE BLDC GLUCOMTR-MCNC: 209 MG/DL (ref 70–99)
GLUCOSE BLDC GLUCOMTR-MCNC: 238 MG/DL (ref 70–99)
HOLD SPECIMEN: NORMAL
POTASSIUM BLD-SCNC: 4 MMOL/L (ref 3.4–5.3)
PROT SERPL-MCNC: 6.7 G/DL (ref 6.8–8.8)
SODIUM SERPL-SCNC: 141 MMOL/L (ref 133–144)
SODIUM SERPL-SCNC: 141 MMOL/L (ref 133–144)
SODIUM SERPL-SCNC: 142 MMOL/L (ref 133–144)

## 2022-01-08 PROCEDURE — 36592 COLLECT BLOOD FROM PICC: CPT | Performed by: NEUROLOGICAL SURGERY

## 2022-01-08 PROCEDURE — 250N000013 HC RX MED GY IP 250 OP 250 PS 637: Performed by: NURSE PRACTITIONER

## 2022-01-08 PROCEDURE — 250N000011 HC RX IP 250 OP 636: Performed by: INTERNAL MEDICINE

## 2022-01-08 PROCEDURE — 120N000002 HC R&B MED SURG/OB UMMC

## 2022-01-08 PROCEDURE — 250N000013 HC RX MED GY IP 250 OP 250 PS 637: Performed by: INTERNAL MEDICINE

## 2022-01-08 PROCEDURE — 250N000011 HC RX IP 250 OP 636: Performed by: PHYSICIAN ASSISTANT

## 2022-01-08 PROCEDURE — 80053 COMPREHEN METABOLIC PANEL: CPT | Performed by: NEUROLOGICAL SURGERY

## 2022-01-08 PROCEDURE — 84295 ASSAY OF SERUM SODIUM: CPT | Performed by: NEUROLOGICAL SURGERY

## 2022-01-08 PROCEDURE — 250N000013 HC RX MED GY IP 250 OP 250 PS 637: Performed by: STUDENT IN AN ORGANIZED HEALTH CARE EDUCATION/TRAINING PROGRAM

## 2022-01-08 PROCEDURE — 93005 ELECTROCARDIOGRAM TRACING: CPT

## 2022-01-08 PROCEDURE — 99233 SBSQ HOSP IP/OBS HIGH 50: CPT | Performed by: INTERNAL MEDICINE

## 2022-01-08 PROCEDURE — 250N000011 HC RX IP 250 OP 636: Performed by: STUDENT IN AN ORGANIZED HEALTH CARE EDUCATION/TRAINING PROGRAM

## 2022-01-08 PROCEDURE — 84295 ASSAY OF SERUM SODIUM: CPT | Performed by: INTERNAL MEDICINE

## 2022-01-08 PROCEDURE — 93010 ELECTROCARDIOGRAM REPORT: CPT | Performed by: INTERNAL MEDICINE

## 2022-01-08 PROCEDURE — 250N000013 HC RX MED GY IP 250 OP 250 PS 637: Performed by: NEUROLOGICAL SURGERY

## 2022-01-08 PROCEDURE — 36592 COLLECT BLOOD FROM PICC: CPT | Performed by: INTERNAL MEDICINE

## 2022-01-08 RX ORDER — HALOPERIDOL 5 MG/ML
5 INJECTION INTRAMUSCULAR ONCE
Status: COMPLETED | OUTPATIENT
Start: 2022-01-08 | End: 2022-01-08

## 2022-01-08 RX ORDER — OLANZAPINE 15 MG/1
15 TABLET ORAL AT BEDTIME
Status: DISCONTINUED | OUTPATIENT
Start: 2022-01-08 | End: 2022-01-10

## 2022-01-08 RX ORDER — HALOPERIDOL 5 MG/ML
2 INJECTION INTRAMUSCULAR EVERY 4 HOURS PRN
Status: DISCONTINUED | OUTPATIENT
Start: 2022-01-08 | End: 2022-01-09

## 2022-01-08 RX ADMIN — ATORVASTATIN CALCIUM 80 MG: 40 TABLET, FILM COATED ORAL at 08:52

## 2022-01-08 RX ADMIN — INSULIN ASPART 4 UNITS: 100 INJECTION, SOLUTION INTRAVENOUS; SUBCUTANEOUS at 20:12

## 2022-01-08 RX ADMIN — IRBESARTAN 75 MG: 75 TABLET, FILM COATED ORAL at 08:51

## 2022-01-08 RX ADMIN — INSULIN ASPART 3 UNITS: 100 INJECTION, SOLUTION INTRAVENOUS; SUBCUTANEOUS at 13:09

## 2022-01-08 RX ADMIN — ASPIRIN 81 MG: 81 TABLET, COATED ORAL at 08:51

## 2022-01-08 RX ADMIN — METOPROLOL TARTRATE 25 MG: 25 TABLET, FILM COATED ORAL at 08:52

## 2022-01-08 RX ADMIN — HEPARIN SODIUM 5000 UNITS: 5000 INJECTION, SOLUTION INTRAVENOUS; SUBCUTANEOUS at 21:19

## 2022-01-08 RX ADMIN — HEPARIN SODIUM 5000 UNITS: 5000 INJECTION, SOLUTION INTRAVENOUS; SUBCUTANEOUS at 13:09

## 2022-01-08 RX ADMIN — LACOSAMIDE 200 MG: 50 TABLET, FILM COATED ORAL at 21:23

## 2022-01-08 RX ADMIN — INSULIN ASPART 1 UNITS: 100 INJECTION, SOLUTION INTRAVENOUS; SUBCUTANEOUS at 04:53

## 2022-01-08 RX ADMIN — POTASSIUM CHLORIDE 40 MEQ: 1.5 POWDER, FOR SOLUTION ORAL at 08:51

## 2022-01-08 RX ADMIN — Medication 10 MG: at 21:23

## 2022-01-08 RX ADMIN — HALOPERIDOL LACTATE 2 MG: 5 INJECTION, SOLUTION INTRAMUSCULAR at 22:38

## 2022-01-08 RX ADMIN — LACOSAMIDE 200 MG: 50 TABLET, FILM COATED ORAL at 08:51

## 2022-01-08 RX ADMIN — HALOPERIDOL LACTATE 5 MG: 5 INJECTION, SOLUTION INTRAMUSCULAR at 04:43

## 2022-01-08 RX ADMIN — OLANZAPINE 5 MG: 2.5 TABLET, FILM COATED ORAL at 08:52

## 2022-01-08 RX ADMIN — OLANZAPINE 5 MG: 2.5 TABLET, FILM COATED ORAL at 15:42

## 2022-01-08 RX ADMIN — FLUTICASONE FUROATE AND VILANTEROL TRIFENATATE 1 PUFF: 200; 25 POWDER RESPIRATORY (INHALATION) at 08:51

## 2022-01-08 RX ADMIN — TADALAFIL 20 MG: 20 TABLET, FILM COATED ORAL at 08:52

## 2022-01-08 RX ADMIN — Medication 40 MG: at 08:52

## 2022-01-08 RX ADMIN — METOPROLOL TARTRATE 25 MG: 25 TABLET, FILM COATED ORAL at 21:24

## 2022-01-08 RX ADMIN — ROPINIROLE HYDROCHLORIDE 1 MG: 1 TABLET, FILM COATED ORAL at 21:23

## 2022-01-08 RX ADMIN — MULTIVIT AND MINERALS-FERROUS GLUCONATE 9 MG IRON/15 ML ORAL LIQUID 15 ML: at 08:52

## 2022-01-08 RX ADMIN — FERROUS GLUCONATE 324 MG: 324 TABLET ORAL at 08:52

## 2022-01-08 RX ADMIN — INSULIN ASPART 3 UNITS: 100 INJECTION, SOLUTION INTRAVENOUS; SUBCUTANEOUS at 16:20

## 2022-01-08 RX ADMIN — DOCUSATE SODIUM 50 MG AND SENNOSIDES 8.6 MG 2 TABLET: 8.6; 5 TABLET, FILM COATED ORAL at 21:22

## 2022-01-08 RX ADMIN — DOCUSATE SODIUM 50 MG AND SENNOSIDES 8.6 MG 2 TABLET: 8.6; 5 TABLET, FILM COATED ORAL at 08:51

## 2022-01-08 RX ADMIN — INSULIN GLARGINE 14 UNITS: 100 INJECTION, SOLUTION SUBCUTANEOUS at 08:52

## 2022-01-08 RX ADMIN — INSULIN ASPART 3 UNITS: 100 INJECTION, SOLUTION INTRAVENOUS; SUBCUTANEOUS at 08:55

## 2022-01-08 RX ADMIN — HEPARIN SODIUM 5000 UNITS: 5000 INJECTION, SOLUTION INTRAVENOUS; SUBCUTANEOUS at 04:44

## 2022-01-08 RX ADMIN — PRASUGREL 10 MG: 10 TABLET, FILM COATED ORAL at 08:51

## 2022-01-08 RX ADMIN — OLANZAPINE 15 MG: 15 TABLET, FILM COATED ORAL at 21:23

## 2022-01-08 ASSESSMENT — ACTIVITIES OF DAILY LIVING (ADL)
ADLS_ACUITY_SCORE: 22
ADLS_ACUITY_SCORE: 18
ADLS_ACUITY_SCORE: 22
ADLS_ACUITY_SCORE: 22
ADLS_ACUITY_SCORE: 18
ADLS_ACUITY_SCORE: 18
ADLS_ACUITY_SCORE: 22
ADLS_ACUITY_SCORE: 18
ADLS_ACUITY_SCORE: 22
ADLS_ACUITY_SCORE: 18
ADLS_ACUITY_SCORE: 22
ADLS_ACUITY_SCORE: 18
ADLS_ACUITY_SCORE: 20
ADLS_ACUITY_SCORE: 18
ADLS_ACUITY_SCORE: 20
ADLS_ACUITY_SCORE: 18
ADLS_ACUITY_SCORE: 22
ADLS_ACUITY_SCORE: 20

## 2022-01-08 NOTE — PROGRESS NOTES
Updated additional diagnoses:    - Moderate malnutrition    Asim Ann MD  Neurosurgery Resident, PGY-2

## 2022-01-08 NOTE — PROGRESS NOTES
[facility]    Medicine Progress Note - Hospitalist Service, Gold 3       Date of Admission:  12/20/2021    Assessment & Plan      Red Sutherland is a 81 year old male with past medical history significant for CAD s/p PCI to LCX (2/2021 and repeat in 10/2021), HFpEF (60-65%) atrial fib on AC w/ Eliquis, HTN, HLD, COPD, pulmonary HTN, chronic bronchitis, type 2 DM, liver cirrhosis, stage 4 CKD, hx GIB, GERD and RLS transferred to [facility] from [facility] ED on 12/20 for right sided subdural hematoma with mass effect and midline shift.  He was admitted to Neurosurgery service and underwent evacuation on 12/21.  Hospital course c/b seizures, altered mentation and agitation, and failed swallow test requiring PEG tube placement (1/3).  Internal medicine is consulted 1/6 for hypotension and medical co-management.         Changes Today:  - Discontinued PICC line, has reliable PIV access and no central access needs  - Discontinue cardiac monitoring   - Continue delirium precautions  - Increase at bedtime Zyprexa to 15mg at bedtime -> checked EKG and Qtc 448 on 1/8  - Discontinue Gabapentin given no clear indication and patient's delirium   - Euvolemic on exam with Bumex on hold, will resume daily weights and volume exams to determine when to resume  - Lantus increased to 18 units BID (c/w PTA regimen, though now on TFs)  - Resume PTA Empagliflozin (has benefit for patients known cardiac dysfunction  - Pt has not seen PT since 1/4, will contact to resume services when pt's mentation improves  - SLP has not seen patient since 12/22, will re-consult on 1/9 or 1/10 pending mentation        # Hypotension - resolved   Developed 1/6, BP as low as 82/48 with concurrent hypothermia of 95.1. Medicine consulted, felt that multifactorial etiology, possibly 2/2 medication side effects vs sepsis vs. Cardiac vs. Endocrinologic.  Chart reviewed, newly started on scheduled Zyprexa on 1/3 and also  received PRN Haldol 2mg x 2 on 1/5, in setting of concomitant scheduled antihypertensives (metoprolol, irbesartan).  Also newly started on Doxazosin on 1/5 which can also cause hypotension d/t alpha-1 blockade (on tamsulosin PTA, but had not received any BPH meds since admission). Other etiologies also considered but workup unremarkable, ultimately suspect d/t medications.   - 1/7 TSH, am cortisol wnl    - 1/7 Irbesartan decreased by half, Bumex and Doxazosin on hold, will assess need daily     # R subdural hematoma s/p evacuation   # Post op seizures   Per chart review, had fall at home 2 weeks PTA followed by persistent HA and neck pain and  presented w/ slurred speech.  Transferred from [facility] ED.  Found to have Large hyperdense holohemispheric right-sided subdural hematoma 12.9 mm in thickness over the posterior right frontal convexity, 12.6 mm over the right parietal convexity and 11.9 mm over the right temporal convexity. Currently denies HA/dizziness. S/p hematoma evacuation that has been complicated by focal seizures, now resolved.   - Management per Neurosurgery   - EEG with slowing, likely secondary to SDH but without seizure activity  - Seizure precautions   - Vimpat 200mg BID for seizure ppx   - Neurology signed off 1/7 -> recommend follow up in 4 months after discharge     # CAD s/p PCI to LCX (2/2021 and repeat in 10/2021)  # HTN   # HFpEF   # Pulmonary HTN   # RV dysfunction   Cardiology consulted early in hospital course, signed off on 12/26.  Per chart review, dry weight 212, as of 1/3 dry weight 197, has not been rechecked.  Recently admitted to [facility] for CHF exacerbation 11/30-12/6.  At that time, discharged on Bumex 4mg BID and Metolazone 2.5mg q MWF.  Last echocardiogram 10/23/21 with normal EF 60-65%, mild valvular aortic stenosis, RV mild to severe dilated, flattened septum c/w elevated RV pressure/hypervolemia, PASP 75mmHg.          - Continue ASA 81mg and Prasugrel 10mg daily   - 1/8  Euvolemic on exam with Bumex on hold, will resume daily weights and volume exams to determine when to resume  - Need to start obtaining weights, strict Is/Os on 1/8  - Continue tadalafil 20mg daily   - Decrease Irbesartan to 75mg qday and assess BP response    # Permanent atrial fib   Rate controlled on metoprolol 25mg BID.  Previously referred for Watchman device placement d/t hx bleeding issues.  On Eliquis 5mg BID PTA, stopped on admission d/t subdural hematoma as above.    - Continue Metoprolol 25mg BID, likely not contributing to intermittent low BPs      # COPD  Uses 4L NC at home.  Currently stable on 0-2L NC.  Has intermittent cough that is not new.   - Continue PTA Breo Ellipta   - Continue PTA Albuterol PRN   - IS per nursing      # Type 2 DM - Last Hgb A1c 7.5%.  On Lantus 18 units BID and Invokana 100mg every evening PTA.  Currently on Lantus 14 units BID and HSSI.  Last 24 BGs as below:            Recent Labs   Lab 01/06/22  1551 01/06/22  1334 01/06/22  1326 01/06/22  1012 01/06/22  0637 01/06/22  0404   * 204* 225* 195* 214* 192*      - Agree with continuing Lantus and HSSI, Lantus increased to 18 units BID 1/8  - May need to adjust regimen if advancing diet, changes to TFs   - Hypoglycemia protocol   - PTA Jardiance on hold -> reseume 1/8, also has cardiac benefit      # Stage IV CKD   BL Cr 1.2-1.4.  Currently Cr 1.21 on 1/6; BUN 46.  GFR ~ 60.   - Following I/Os as above  - Renally dose meds  - Avoid/minimize nephrotoxic agents      # AMS, delirium, intermittent agitation   In setting of subdural hematoma, s/p craniotomy, prolonged hospital stay. Per neurology may also be exacerbated by discontinuing Keppra. Seen by Psychiatry.  Lytes wnl except for hypernatremia.  Continues to wax and wane, is not sleeping well overnight. Suspect primarily d/t ongoing delirium and requires optimization of sleep/wake cycle, untethering, frequent reorientation.   - Continues to be delirious, required Haldol  overnight 1/7, changes made 1/8:   = Discontinued PICC line, has reliable PIV access and no central access needs   = Discontinue cardiac monitoring    = Continue delirium precautions   =  Increase at bedtime Zyprexa to 15mg at bedtime -> checked EKG and Qtc 448 on 1/8   = Discontinue Gabapentin given no clear indication and patient's delirium     # Liver cirrhosis   Incidentally noted on CT AP on 11/30.  Hx neg for alcohol use disorder, IV drug use, viral hepatitis, or family history of liver disease.  Last LFTs 12/27/21 wnl.  No ascites noted on exam.    - Trend CMP 1/8 for re-assessment given ongoing delirium, ammonia on 1/7 within normal limits  - GI follow up outpatient      # Anemia  Hx iron deficiency.  Likely c/b CKD.  Hgb stable mid 7's.   - Agree w/ ferrous gluconate   - Transfuse for Hgb < 7      # RLS  - Requip dose decreased to 1mg at bedtime (PTA 2mg)  - PTA Gabapentin on hold for delirium      # GERD   # Hx past GIB   - Agree w/ pantoprazole 40mg daily  (PTA Omeprazole 20mg)     # Moderate Malnutrition in the context of acute on chronic illness:    % Intake: Decreased intake does not meet criteria, but falls short of meeting RD's goal for adequate TF intakes.  % Weight Loss: > 10% in 6 months (severe), based on h/o > 18% in 4 months   Subcutaneous Fat Loss: Facial region: mild buccal area (per last RD note d/t pt sleeping at time of visit today)  Muscle Loss: Facial & jaw region: mild, Thoracic region (clavicle, acromium bone, deltoid, trapezius, pectoral), Upper arm (bicep, tricep), Lower arm  (forearm), Patellar region and Posterior calf: all moderate (per last RD note d/t pt sleeping at time of visit today)  Fluid Accumulation/Edema: Unable to assess  Malnutrition Diagnosis: Moderate malnutrition in the context of acute on chronic illness  - Continue TFs  - Need SLP re-assessment, if mentation improving will place 1/9 or 1/10          Diet: Adult Formula Drip Feeding: Continuous Pivot 1.5;  Gastrostomy; Goal Rate: 60; mL/hr; Medication - Feeding Tube Flush Frequency: At least 15-30 mL water before and after medication administration and with tube clogging  Full Liquid Diet Mildly Thick (level 2)    DVT Prophylaxis:None given recent DSH s/p evacuation   Dixon Catheter: Not present  Central Lines: PRESENT  PICC Double Lumen 12/25/21 Right Basilic-Site Assessment: WDL  Code Status: Full Code      Disposition Plan   Expected Discharge: 01/14/2022   Anticipated discharge location:  Awaiting care coordination huddle  Delays:     1:1 Sitter            The patient's care was discussed with the Patient and Neurosurgery Consultant.    Shannan King MD  Hospitalist Service, [address]  Securely message with the Vocera Web Console (learn more here)  Text page via [employer] Paging/Directory    Please see sign in/sign out for up to date coverage information    _________________________________________________________________    Interval History   Pt is rousable to voice, is able to state his name. Had a difficult night with agitation, required IV Haldol 5mg x1. During the day this morning is very sleepy, but does kevin to voice, can follow basic commands. Per nursing has barely slept overnight 1/7.     Data reviewed today: I reviewed all medications, new labs and imaging results over the last 24 hours.     Physical Exam   Vital Signs: Temp: 98.5  F (36.9  C) Temp src: Oral BP: 127/80 Pulse: 82   Resp: 16 SpO2: 100 % O2 Device: None (Room air)    Weight: 197 lbs 15.57 oz     GENERAL: Rousable, oriented to person, year only, following basic commands.  No acute distress.    HEENT: Normocephalic, atraumatic. Anicteric sclera. Mucous membranes moist.   CV: Irregularly irregular. S1, S2. Slight murmur.   RESPIRATORY: Effort normal on RA. Lungs CTAB with no wheezing, rales, or rhonchi.   GI: Abdomen soft and non distended, bowel sounds present x all 4 quadrants. No  tenderness, rebound, or guarding. PEG tube in place, appears c/d/i.    NEUROLOGICAL: No focal deficits. Follows commands.   MUSCULOSKELETAL: No joint swelling or tenderness. Moves all extremities.   EXTREMITIES: No gross deformities. No peripheral edema.   SKIN: Grossly warm, dry, and intact. No jaundice. No rashes.     Data   Recent Labs   Lab 01/08/22  0634 01/08/22  0451 01/08/22  0040 01/07/22  2355 01/07/22 2012 01/07/22  1837 01/07/22  0824 01/07/22  0741 01/06/22  1334 01/06/22  1326 01/05/22  0952 01/05/22  0558 01/03/22  0725 01/03/22  0646   WBC  --   --   --   --   --   --   --  5.6  --   --   --  5.8  --  6.2   HGB  --   --   --   --   --   --   --  8.2*  --   --   --  7.7*  --  7.7*   MCV  --   --   --   --   --   --   --  73*  --   --   --  76*  --  77*   PLT  --   --   --   --   --   --   --  200  --   --   --  162  --  138*     141  --  142  --   --  142   < > 146*   < > 148*   < > 151*  151*   < > 150*  150*   POTASSIUM 4.0  --   --   --   --   --   --  4.0  --  4.8   < > 4.1   < > 4.1   CHLORIDE 106  --   --   --   --   --   --  110*  --  114*   < > 117*   < > 117*   CO2 28  --   --   --   --   --   --  30  --  32   < > 30   < > 28   BUN 40*  --   --   --   --   --   --  41*  --  46*   < > 43*   < > 41*   CR 1.04  --   --   --   --   --   --  1.05  --  1.21   < > 1.11   < > 1.07   ANIONGAP 7  --   --   --   --   --   --  6  --  2*   < > 4   < > 5   ANNMARIE 9.0  --   --   --   --   --   --  8.6  --  8.7   < > 9.1   < > 9.0   * 147*  --  148*   < >  --    < > 188*   < > 225*   < > 151*   < > 110*   ALBUMIN 2.8*  --   --   --   --   --   --   --   --   --   --   --   --   --    PROTTOTAL 6.7*  --   --   --   --   --   --   --   --   --   --   --   --   --    BILITOTAL 0.9  --   --   --   --   --   --   --   --   --   --   --   --   --    ALKPHOS 151*  --   --   --   --   --   --   --   --   --   --   --   --   --    ALT 15  --   --   --   --   --   --   --   --   --   --   --   --   --     AST 20  --   --   --   --   --   --   --   --   --   --   --   --   --     < > = values in this interval not displayed.     Recent Results (from the past 24 hour(s))   Echo Limited   Result Value    LVEF  60-65%    [facility]    [id number]  [id number]  [id number]  [id number]^CHAN^ELIAS^JULIO CÉSAR NASH     [facility],[place]  Echocardiography Laboratory  [address] [place] [address]     Name: MARCOS VIDAL  MRN: [id number]  : 1940  Study Date: 2022 10:19 AM  Age: 81 yrs  Gender: Male  Patient Location: [facility]  Reason For Study: Heart Failure, Unspecified  Ordering Physician: ELIAS GILES  Referring Physician: YONG GARCIA  Performed By: Sierra Briggs RDCS     BSA: 2.1 m2  Height: 71 in  Weight: 197 lb  HR: 74  BP: 136/78 mmHg  ______________________________________________________________________________  Procedure  Limited Portable Echo Adult.  ______________________________________________________________________________  Interpretation Summary  Global and regional left ventricular function is normal with an EF of 60-65%.  Mild right ventricular dilation is present. Global right ventricular function  is mildly reduced.  Mild to moderate tricuspid insufficiency is present.  Pulmonary hypertension is present. Estimated pulmonary artery systolic  pressure is 64 mmHg based on a mean right atrial pressure of 15 mmHg.  Dilation of the inferior vena cava is present with abnormal respiratory  variation in diameter.  No pericardial effusion is present.  ______________________________________________________________________________  Left Ventricle  Left ventricular size is normal. Global and regional left ventricular function  is normal with an EF of 60-65%.     Right Ventricle  Mild right ventricular dilation is present. Global right ventricular function  is mildly reduced.     Atria  Moderate to severe biatrial enlargement is present.     Tricuspid  Valve  Mild to moderate tricuspid insufficiency is present. Pulmonary hypertension is  present. Estimated pulmonary artery systolic pressure is 49 mmHg plus right  atrial pressure.     Vessels  Dilation of the inferior vena cava is present with abnormal respiratory  variation in diameter. IVC diameter >2.1 cm collapsing <50% with sniff  suggests a high RA pressure estimated at 15 mmHg or greater.     Pericardium  No pericardial effusion is present.  ______________________________________________________________________________  MMode/2D Measurements & Calculations  IVSd: 1.0 cm  LVIDd: 5.4 cm  LVIDs: 2.7 cm  LVPWd: 1.3 cm  FS: 49.9 %  LV mass(C)d: 245.7 grams  LV mass(C)dI: 117.3 grams/m2  RWT: 0.47     Doppler Measurements & Calculations  TR max eligio: 349.0 cm/sec  TR max P.7 mmHg     ______________________________________________________________________________  Report approved by: Dean Means 2022 11:14 AM           Medications       aspirin  81 mg Oral Daily     atorvastatin  80 mg Oral or Feeding Tube Daily     [Held by provider] bumetanide  4 mg Oral QAM     [Held by provider] doxazosin  1 mg Oral Daily     [Held by provider] empagliflozin  10 mg Oral or Feeding Tube Daily     ferrous gluconate  324 mg Oral or Feeding Tube Every Other Day     fluticasone-vilanterol  1 puff Inhalation Daily     gabapentin  300 mg Oral At Bedtime     heparin ANTICOAGULANT  5,000 Units Subcutaneous Q8H     heparin lock flush  5-20 mL Intracatheter Q24H     insulin aspart  1-12 Units Subcutaneous Q4H     insulin glargine  14 Units Subcutaneous BID     irbesartan  75 mg Oral or Feeding Tube Daily     lacosamide  200 mg Oral or Feeding Tube BID     magnesium citrate  296 mL Oral Once     melatonin  10 mg Oral Daily     metoprolol tartrate  25 mg Oral BID     multivitamins w/minerals  15 mL Per Feeding Tube Daily     OLANZapine  10 mg Oral At Bedtime     OLANZapine  5 mg Oral BID     pantoprazole  40 mg Oral or  Feeding Tube QAM AC     polyethylene glycol  17 g Oral or Feeding Tube Daily     potassium chloride  40 mEq Oral Daily     prasugrel  10 mg Oral or Feeding Tube Daily     rOPINIRole  1 mg Oral Daily     senna-docusate  2 tablet Oral or Feeding Tube BID     sodium chloride (PF)  10 mL Intracatheter Q8H     sodium chloride (PF)  10 mL Intracatheter Q8H     sodium chloride (PF)  3 mL Intracatheter Q8H     tadalafil  20 mg Oral Q24H

## 2022-01-08 NOTE — PLAN OF CARE
Status: Pt here with post-traumatic acute R SDH with L hemibody weakness. OR for evac 12/21. PEG placement 1/3/22.   Vitals: VSS  Neuros: A/Ox4. Slight L facial droop.   IV: PIV SL  Diet: TF running at goal rate of 60 mL/hr via PEG with 200mL FWF q2h. Full liquid diet, mildly thick liquids   Bowel status: no BM   : Voids spont via urinal; incontinent at times.   Skin: Blanchable redness on coccyx. BLE mayuri. Abdominal bruising. Crani incision with staples LEONID, boggy near incision. Scabbing and dry skin throughout.   Pain: denied  Activity: Heavy assist of two, monica ONEILL.  Social: Wife, Kaley, at bedside during afternoon  Plan: Continue to follow plan of care

## 2022-01-08 NOTE — PROGRESS NOTES
[facility], [place]   01/08/2022  Neurosurgery Progress Note:    Assessment:  Red Sutherland is a 81-year-old male post-traumatic acute right subdural hematoma with left hemibody weakness, on xarelto and plavix. Admitted to the ICU on 12/20/21. Taken to OR 12/21/2021. Extubated 12/22/2021. Started on Aspirin and Prasugrel on 12/28/21. Failed SLP evaluation. Free water flushes stopped since hypernatremia resolved. Lasix and Bumex given for diuresis. Received PEG on 1/3/2022. LLQ tenderness near PEG site. CT AP done 1/5 showing no obvious pathology. Medicine consulted 1/6/2022 for hypotension management.       Plan:  - Keppra 1g BID stopped, Vimpat 200 BID  - HOB > 30 degrees  - Aspirin and Prasugrel  - Remainder of cares per  Primary team/Medicine    Asim Ann MD  Neurosurgery Resident, PGY-2    Please contact neurosurgery resident on call with questions.    Dial * * [phone], enter [phone] when prompted.   -----------------------------------    Interval History: Exam stable.    Objective:   Temp:  [97.5  F (36.4  C)-98.5  F (36.9  C)] 98.5  F (36.9  C)  Pulse:  [67-84] 84  Resp:  [16-22] 16  BP: ()/(48-87) 131/87  SpO2:  [98 %-100 %] 98 %  I/O last 3 completed shifts:  In: 1550 [P.O.:720; I.V.:50; NG/GT:60]  Out: 1225 [Urine:1225]    Gen: Slightly agitated  Wound: clean, dry, intact,  Neurologic:  - Alert & Oriented to person, place, time, and situation  - Follows commands briskly x 4   - PEERL, EOMI, Mild L facial droop  - LUE with  4+/5, rest 5/5    LABS:  Recent Labs   Lab 01/08/22  0855 01/08/22  0634 01/08/22  0451 01/08/22  0040 01/07/22 2012 01/07/22  1837 01/07/22  0824 01/07/22  0741 01/06/22  1334 01/06/22  1326   NA  --  141  141  --  142  --  142   < > 146*   < > 148*   POTASSIUM  --  4.0  --   --   --   --   --  4.0  --  4.8   CHLORIDE  --  106  --   --   --   --   --  110*  --  114*   CO2  --  28  --   --   --   --   --  30  --  32   ANIONGAP  --  7  --   --    --   --   --  6  --  2*   * 163* 147*  --    < >  --    < > 188*   < > 225*   BUN  --  40*  --   --   --   --   --  41*  --  46*   CR  --  1.04  --   --   --   --   --  1.05  --  1.21   ANNMARIE  --  9.0  --   --   --   --   --  8.6  --  8.7    < > = values in this interval not displayed.       Recent Labs   Lab 01/07/22  0741   WBC 5.6   RBC 4.07*   HGB 8.2*   HCT 29.8*   MCV 73*   MCH 20.1*   MCHC 27.5*   RDW 20.1*          IMAGING:  No results found for this or any previous visit (from the past 24 hour(s)).    I have reviewed the history above and agree with the resident's assessment and plan.  MARY ELLEN Martínez MD

## 2022-01-08 NOTE — PLAN OF CARE
[id number]  Status: Pt here with post-traumatic acute R SDH with L hemibody weakness. OR for evac 12/21. PEG placement 1/3/22. Sitter pulled overnight, patient restless many times overnight.  Vitals: VSS on RA. CCM.   Neuros: A/Ox4. Slight L facial droop. L pupil fixed and irregular shape. Much more participatory overnight. Up confused overnight and restless.  IV: PIV SL. DL PICC red lumen HL, Purple lumen TKO.   Labs/Electrolytes: WNL. Sodium @ 142 at 0040.  Diet: TF running at goal rate of 60 mL/hr via PEG with 200mL FWF q2h. Full liquid diet, mildly thick liquids.  Bowel status: BS+, no BM this shift.  : Voids spont via urinal; incontinent at times. Bladder scanned for 551mL @ 0630. Encourage voiding.  Skin: Blanchable redness on coccyx. BLE mayuri. Abdominal bruising. Crani incision with staples LEONID, boggy near incision. Scabbing and dry skin throughout.   Pain: denied  Activity: Heavy assist of two, GB, pivot.No sitter overnight. Patient restless overnight, scheduled haldol given x1. Patient woke up again doctor paged for medication for restlessness. One time dose of 5 mg of haldol given and PRN haldol orders changed to q4hr.  Social: Patient intermittently restless and sleeping.  Plan: Continue to follow plan of care.

## 2022-01-09 ENCOUNTER — APPOINTMENT (OUTPATIENT)
Dept: SPEECH THERAPY | Facility: CLINIC | Age: 82
DRG: 025 | End: 2022-01-09
Payer: COMMERCIAL

## 2022-01-09 ENCOUNTER — APPOINTMENT (OUTPATIENT)
Dept: PHYSICAL THERAPY | Facility: CLINIC | Age: 82
DRG: 025 | End: 2022-01-09
Payer: COMMERCIAL

## 2022-01-09 LAB
ANION GAP SERPL CALCULATED.3IONS-SCNC: 6 MMOL/L (ref 3–14)
BUN SERPL-MCNC: 36 MG/DL (ref 7–30)
CALCIUM SERPL-MCNC: 9 MG/DL (ref 8.5–10.1)
CHLORIDE BLD-SCNC: 106 MMOL/L (ref 94–109)
CO2 SERPL-SCNC: 26 MMOL/L (ref 20–32)
CREAT SERPL-MCNC: 0.99 MG/DL (ref 0.66–1.25)
GFR SERPL CREATININE-BSD FRML MDRD: 77 ML/MIN/1.73M2
GLUCOSE BLD-MCNC: 158 MG/DL (ref 70–99)
GLUCOSE BLDC GLUCOMTR-MCNC: 122 MG/DL (ref 70–99)
GLUCOSE BLDC GLUCOMTR-MCNC: 143 MG/DL (ref 70–99)
GLUCOSE BLDC GLUCOMTR-MCNC: 155 MG/DL (ref 70–99)
GLUCOSE BLDC GLUCOMTR-MCNC: 159 MG/DL (ref 70–99)
GLUCOSE BLDC GLUCOMTR-MCNC: 235 MG/DL (ref 70–99)
GLUCOSE BLDC GLUCOMTR-MCNC: 277 MG/DL (ref 70–99)
HOLD SPECIMEN: NORMAL
POTASSIUM BLD-SCNC: 4.3 MMOL/L (ref 3.4–5.3)
SODIUM SERPL-SCNC: 138 MMOL/L (ref 133–144)

## 2022-01-09 PROCEDURE — 250N000011 HC RX IP 250 OP 636: Performed by: PHYSICIAN ASSISTANT

## 2022-01-09 PROCEDURE — 97116 GAIT TRAINING THERAPY: CPT | Mod: GP | Performed by: REHABILITATION PRACTITIONER

## 2022-01-09 PROCEDURE — 250N000013 HC RX MED GY IP 250 OP 250 PS 637: Performed by: NURSE PRACTITIONER

## 2022-01-09 PROCEDURE — 250N000011 HC RX IP 250 OP 636: Performed by: INTERNAL MEDICINE

## 2022-01-09 PROCEDURE — 36415 COLL VENOUS BLD VENIPUNCTURE: CPT | Performed by: INTERNAL MEDICINE

## 2022-01-09 PROCEDURE — 99233 SBSQ HOSP IP/OBS HIGH 50: CPT | Performed by: INTERNAL MEDICINE

## 2022-01-09 PROCEDURE — 97530 THERAPEUTIC ACTIVITIES: CPT | Mod: GP | Performed by: REHABILITATION PRACTITIONER

## 2022-01-09 PROCEDURE — 82310 ASSAY OF CALCIUM: CPT | Performed by: INTERNAL MEDICINE

## 2022-01-09 PROCEDURE — 92526 ORAL FUNCTION THERAPY: CPT | Mod: GN

## 2022-01-09 PROCEDURE — 250N000013 HC RX MED GY IP 250 OP 250 PS 637: Performed by: NEUROLOGICAL SURGERY

## 2022-01-09 PROCEDURE — 250N000013 HC RX MED GY IP 250 OP 250 PS 637: Performed by: STUDENT IN AN ORGANIZED HEALTH CARE EDUCATION/TRAINING PROGRAM

## 2022-01-09 PROCEDURE — 250N000013 HC RX MED GY IP 250 OP 250 PS 637: Performed by: INTERNAL MEDICINE

## 2022-01-09 PROCEDURE — 120N000002 HC R&B MED SURG/OB UMMC

## 2022-01-09 RX ORDER — HYDROXYZINE HYDROCHLORIDE 25 MG/1
25 TABLET, FILM COATED ORAL EVERY 6 HOURS PRN
Status: DISCONTINUED | OUTPATIENT
Start: 2022-01-09 | End: 2022-01-10

## 2022-01-09 RX ORDER — HYDROXYZINE HYDROCHLORIDE 25 MG/1
50 TABLET, FILM COATED ORAL EVERY 6 HOURS PRN
Status: DISCONTINUED | OUTPATIENT
Start: 2022-01-09 | End: 2022-01-10

## 2022-01-09 RX ADMIN — LACOSAMIDE 200 MG: 50 TABLET, FILM COATED ORAL at 19:58

## 2022-01-09 RX ADMIN — HYDROXYZINE HYDROCHLORIDE 50 MG: 25 TABLET ORAL at 22:14

## 2022-01-09 RX ADMIN — POTASSIUM CHLORIDE 40 MEQ: 1.5 POWDER, FOR SOLUTION ORAL at 08:31

## 2022-01-09 RX ADMIN — HEPARIN SODIUM 5000 UNITS: 5000 INJECTION, SOLUTION INTRAVENOUS; SUBCUTANEOUS at 14:46

## 2022-01-09 RX ADMIN — TADALAFIL 20 MG: 20 TABLET, FILM COATED ORAL at 08:31

## 2022-01-09 RX ADMIN — ASPIRIN 81 MG: 81 TABLET, COATED ORAL at 08:31

## 2022-01-09 RX ADMIN — HALOPERIDOL LACTATE 2 MG: 5 INJECTION, SOLUTION INTRAMUSCULAR at 06:28

## 2022-01-09 RX ADMIN — EMPAGLIFLOZIN 10 MG: 10 TABLET, FILM COATED ORAL at 08:31

## 2022-01-09 RX ADMIN — INSULIN ASPART 1 UNITS: 100 INJECTION, SOLUTION INTRAVENOUS; SUBCUTANEOUS at 16:57

## 2022-01-09 RX ADMIN — FLUTICASONE FUROATE AND VILANTEROL TRIFENATATE 1 PUFF: 200; 25 POWDER RESPIRATORY (INHALATION) at 08:30

## 2022-01-09 RX ADMIN — DOCUSATE SODIUM 50 MG AND SENNOSIDES 8.6 MG 2 TABLET: 8.6; 5 TABLET, FILM COATED ORAL at 08:31

## 2022-01-09 RX ADMIN — OLANZAPINE 5 MG: 2.5 TABLET, FILM COATED ORAL at 16:56

## 2022-01-09 RX ADMIN — Medication 10 MG: at 19:57

## 2022-01-09 RX ADMIN — ROPINIROLE HYDROCHLORIDE 1 MG: 1 TABLET, FILM COATED ORAL at 19:58

## 2022-01-09 RX ADMIN — MULTIVIT AND MINERALS-FERROUS GLUCONATE 9 MG IRON/15 ML ORAL LIQUID 15 ML: at 08:31

## 2022-01-09 RX ADMIN — OLANZAPINE 15 MG: 15 TABLET, FILM COATED ORAL at 19:58

## 2022-01-09 RX ADMIN — HEPARIN SODIUM 5000 UNITS: 5000 INJECTION, SOLUTION INTRAVENOUS; SUBCUTANEOUS at 22:14

## 2022-01-09 RX ADMIN — Medication 40 MG: at 08:31

## 2022-01-09 RX ADMIN — INSULIN ASPART 1 UNITS: 100 INJECTION, SOLUTION INTRAVENOUS; SUBCUTANEOUS at 08:33

## 2022-01-09 RX ADMIN — INSULIN ASPART 1 UNITS: 100 INJECTION, SOLUTION INTRAVENOUS; SUBCUTANEOUS at 23:35

## 2022-01-09 RX ADMIN — HEPARIN SODIUM 5000 UNITS: 5000 INJECTION, SOLUTION INTRAVENOUS; SUBCUTANEOUS at 05:45

## 2022-01-09 RX ADMIN — ATORVASTATIN CALCIUM 80 MG: 40 TABLET, FILM COATED ORAL at 08:31

## 2022-01-09 RX ADMIN — OLANZAPINE 5 MG: 2.5 TABLET, FILM COATED ORAL at 08:31

## 2022-01-09 RX ADMIN — METOPROLOL TARTRATE 25 MG: 25 TABLET, FILM COATED ORAL at 19:58

## 2022-01-09 RX ADMIN — INSULIN ASPART 4 UNITS: 100 INJECTION, SOLUTION INTRAVENOUS; SUBCUTANEOUS at 19:59

## 2022-01-09 RX ADMIN — LACOSAMIDE 200 MG: 50 TABLET, FILM COATED ORAL at 08:31

## 2022-01-09 RX ADMIN — INSULIN ASPART 6 UNITS: 100 INJECTION, SOLUTION INTRAVENOUS; SUBCUTANEOUS at 12:27

## 2022-01-09 RX ADMIN — INSULIN ASPART 1 UNITS: 100 INJECTION, SOLUTION INTRAVENOUS; SUBCUTANEOUS at 00:41

## 2022-01-09 RX ADMIN — IRBESARTAN 75 MG: 75 TABLET, FILM COATED ORAL at 08:31

## 2022-01-09 RX ADMIN — PRASUGREL 10 MG: 10 TABLET, FILM COATED ORAL at 08:31

## 2022-01-09 RX ADMIN — METOPROLOL TARTRATE 25 MG: 25 TABLET, FILM COATED ORAL at 08:31

## 2022-01-09 ASSESSMENT — ACTIVITIES OF DAILY LIVING (ADL)
ADLS_ACUITY_SCORE: 16
ADLS_ACUITY_SCORE: 13
ADLS_ACUITY_SCORE: 13
ADLS_ACUITY_SCORE: 18
ADLS_ACUITY_SCORE: 16
ADLS_ACUITY_SCORE: 16
ADLS_ACUITY_SCORE: 13
ADLS_ACUITY_SCORE: 16
ADLS_ACUITY_SCORE: 13
ADLS_ACUITY_SCORE: 18
ADLS_ACUITY_SCORE: 13
ADLS_ACUITY_SCORE: 16
ADLS_ACUITY_SCORE: 13
ADLS_ACUITY_SCORE: 13
ADLS_ACUITY_SCORE: 18
ADLS_ACUITY_SCORE: 13
ADLS_ACUITY_SCORE: 13
ADLS_ACUITY_SCORE: 16
ADLS_ACUITY_SCORE: 13

## 2022-01-09 NOTE — PROGRESS NOTES
[facility], [place]   01/09/2022  Neurosurgery Progress Note:    Assessment:  Red Sutherland is a 81-year-old man with post-traumatic acute right subdural hematoma causing left hemibody weakness, on xarelto and plavix. Admitted to the ICU on 12/20/21. Taken to OR 12/21/2021. Extubated 12/22/2021. Started on Aspirin and Prasugrel on 12/28/21. Failed SLP evaluation. Free water flushes stopped since hypernatremia resolved. Lasix and Bumex given for diuresis. Received PEG on 1/3/2022. LLQ tenderness near PEG site. CT AP done 1/5 showing no obvious pathology. Medicine consulted 1/6/2022 for hypotension management, transferred to medicine as primary due to ongoing concerns for hypotension in the setting of extensive cardiac history.       Plan:  - Vimpat 200 BID  - HOB > 30 degrees  - Aspirin and Prasugrel  - Remainder of cares per  Primary team/Medicine    Norah Garvey MD  Neurosurgery Resident, PGY-2    Please contact neurosurgery resident on call with questions.    Dial * * [phone], enter [phone] when prompted.   -----------------------------------    Interval History: Exam stable. Off sitter since 2 days ago, improved PO intake yesterday.     Objective:   Temp:  [97.8  F (36.6  C)-98.6  F (37  C)] 98  F (36.7  C)  Pulse:  [64-81] 81  Resp:  [16] 16  BP: (105-137)/(57-84) 105/79  SpO2:  [96 %-100 %] 98 %  I/O last 3 completed shifts:  In: 1520 [P.O.:800; NG/GT:60]  Out: 1200 [Urine:1200]    Gen: Slightly agitated  Wound: clean, dry, intact,  Neurologic:  - Alert & Oriented to person, place, time, and situation  - Follows commands briskly x 4   - PEERL, EOMI,symmetrical facial expression  - LUE with  4+/5, rest 5/5    LABS:  Recent Labs   Lab 01/09/22  0622 01/09/22  0353 01/09/22  0039 01/08/22  0855 01/08/22  0634 01/08/22  0451 01/08/22  0040 01/07/22  0824 01/07/22  0741     --   --   --  141  141  --  142   < > 146*   POTASSIUM 4.3  --   --   --  4.0  --   --   --  4.0    CHLORIDE 106  --   --   --  106  --   --   --  110*   CO2 26  --   --   --  28  --   --   --  30   ANIONGAP 6  --   --   --  7  --   --   --  6   * 122* 143*   < > 163*   < >  --    < > 188*   BUN 36*  --   --   --  40*  --   --   --  41*   CR 0.99  --   --   --  1.04  --   --   --  1.05   ANNMARIE 9.0  --   --   --  9.0  --   --   --  8.6    < > = values in this interval not displayed.       Recent Labs   Lab 01/07/22  0741   WBC 5.6   RBC 4.07*   HGB 8.2*   HCT 29.8*   MCV 73*   MCH 20.1*   MCHC 27.5*   RDW 20.1*          IMAGING:  No results found for this or any previous visit (from the past 24 hour(s)).    I have reviewed the history. I have seen and examined the patient myself and agree with the assessment and plan above.  MARY ELLEN Martínez MD

## 2022-01-09 NOTE — PLAN OF CARE
[id number]  Status: Pt here with post-traumatic acute R SDH with L hemibody weakness. OR for evac 12/21. PEG placement 1/3/22.   Vitals: VSS on RA. Taken off CCM.  Neuros: Patient orders to limit distractions @ night. Assessment done at 0000. A/Ox4. Slight L facial droop. Much more participatory overnight. Up confused overnight and restless, haldol given x1.  IV: PIV SL. PICC pulled yesterday.  Labs/Electrolytes: WNL.  Diet: TF running at goal rate of 60 mL/hr via PEG with 200mL FWF q2h. Tubing changed overnight. Full liquid diet, mildly thick liquids.  Bowel status: BS+, BMx1 this shift, loose and mucus consistency. LBM 1/9.  : Voids spont via urinal; incontinent at times.   Skin: Blanchable redness on coccyx. BLE mayuri. Abdominal bruising. Crani incision with staples LEONID, boggy near incision. Scabbing and dry skin throughout.   Pain: denied  Activity: Heavy assist of two, GB, pivot. No sitter overnight. Patient restless overnight, scheduled haldol given x1.   Social: Patient intermittently restless and sleeping.  Plan: Continue to follow plan of care.

## 2022-01-09 NOTE — PROGRESS NOTES
[facility]    Medicine Progress Note - Hospitalist Service, Gold 3       Date of Admission:  12/20/2021    Assessment & Plan      Red Sutherland is a 81 year old male with past medical history significant for CAD s/p PCI to LCX (2/2021 and repeat in 10/2021), HFpEF (60-65%) atrial fib on AC w/ Eliquis, HTN, HLD, COPD, pulmonary HTN, chronic bronchitis, type 2 DM, liver cirrhosis, stage 4 CKD, hx GIB, GERD and RLS transferred to [facility] from [facility] ED on 12/20 for right sided subdural hematoma with mass effect and midline shift.  He was admitted to Neurosurgery service and underwent evacuation on 12/21.  Hospital course c/b seizures, altered mentation and agitation, and failed swallow test requiring PEG tube placement (1/3).  Internal medicine is consulted 1/6 for hypotension and medical co-management.         Changes Today:  - SLP advanced diet to regular diet with thin liquids, calorie counts ordered to start 1/10  - PT worked with patient today and was able to participate, was up in a chair much of the day  - Exam euvolemic, standing weight obtained at 197 lbs, will continue to hold Bumex and assess daily  - Discontinue overnight VS, adjust vitals to q8h  - Discontinue IV Haldol prn, increase at bedtime Zyprexa dose to 20mg at bedtime, recheck EKG in am  - Overall delirium significantly improved, continues to have some disorientation and agitation overnight only     # Hypotension - resolved   Developed 1/6, BP as low as 82/48 with concurrent hypothermia of 95.1. Medicine consulted, felt that multifactorial etiology, possibly 2/2 medication side effects vs sepsis vs. Cardiac vs. Endocrinologic.  Chart reviewed, newly started on scheduled Zyprexa on 1/3 and also received PRN Haldol 2mg x 2 on 1/5, in setting of concomitant scheduled antihypertensives (metoprolol, irbesartan).  Also newly started on Doxazosin on 1/5 which can also cause hypotension d/t alpha-1  blockade (on tamsulosin PTA, but had not received any BPH meds since admission). Other etiologies also considered but workup unremarkable, ultimately suspect d/t medications.   - 1/7 TSH, am cortisol wnl    - 1/7 Irbesartan decreased by half, Bumex and Doxazosin on hold, will assess need daily and resume when appropriate    # R subdural hematoma s/p evacuation   # Post op seizures - resolved  Per chart review, had fall at home 2 weeks PTA followed by persistent HA and neck pain and  presented w/ slurred speech.  Transferred from [facility] ED.  Found to have Large hyperdense holohemispheric right-sided subdural hematoma 12.9 mm in thickness over the posterior right frontal convexity, 12.6 mm over the right parietal convexity and 11.9 mm over the right temporal convexity. Currently denies HA/dizziness. S/p hematoma evacuation that has been complicated by focal seizures, now resolved.   - Management per Neurosurgery   - EEG with slowing, likely secondary to SDH but without seizure activity, discontinued   - Seizure precautions   - Vimpat 200mg BID for seizure ppx   - Neurology signed off 1/7 -> recommend follow up in 4 months after discharge  - Neurosurgery following, appreciate recs      # CAD s/p PCI to LCX (2/2021 and repeat in 10/2021)  # HTN   # HFpEF   # Pulmonary HTN   # RV dysfunction   Cardiology consulted early in hospital course, signed off on 12/26.  Per chart review, dry weight 212, as of 1/3 dry weight 197, recheck 1/9 also 197, is euvolemic. Recently admitted to [facility] for CHF exacerbation 11/30-12/6.  At that time, discharged on Bumex 4mg BID and Metolazone 2.5mg q MWF.  Last echocardiogram 10/23/21 with normal EF 60-65%, mild valvular aortic stenosis, RV mild to severe dilated, flattened septum c/w elevated RV pressure/hypervolemia, PASP 75mmHg.          - Continue ASA 81mg and Prasugrel 10mg daily   - 1/9 remains euvolemic, dry weight at this exam 197lbs, would consider this his new dry weight;  have been holding Bumex, will continue to hold and resume if appropriate  - Continue daily weights   - Continue tadalafil 20mg daily   - Decrease Irbesartan to 75mg qday and assess BP response, will resume other meds before titrating back up     # Permanent atrial fib   Rate controlled on metoprolol 25mg BID.  Previously referred for Watchman device placement d/t hx bleeding issues.  On Eliquis 5mg BID PTA, stopped on admission d/t subdural hematoma as above.    - Continue Metoprolol 25mg BID  - Will discuss with Neurosurgery when this can be resumed, is on DAPT currently so likely should not be resumed while on his DAPT regimen      # COPD  Uses 4L NC at home.  Currently stable on RA.  Has intermittent cough that is not new.   - Continue PTA Breo Ellipta   - Continue PTA Albuterol PRN   - IS per nursing      # Type 2 DM - Last Hgb A1c 7.5%.  On Lantus 18 units BID and Invokana 100mg every evening PTA.  Currently on Lantus 14 units BID and HSSI.  Last 24 BGs as below:            Recent Labs   Lab 01/06/22  1551 01/06/22  1334 01/06/22  1326 01/06/22  1012 01/06/22  0637 01/06/22  0404   * 204* 225* 195* 214* 192*      - Agree with continuing Lantus and HSSI, Lantus increased to 18 units BID 1/8, will monitor  - May need to adjust regimen if advancing diet, changes to TFs   - Hypoglycemia protocol   - PTA Jardiance on hold -> reseume 1/8, also has cardiac benefit      # Stage IV CKD   BL Cr 1.2-1.4.  Currently Cr 1.21 on 1/6; BUN 46, Cr on 1/9 is 0.99  - Following I/Os as above  - Renally dose meds  - Avoid/minimize nephrotoxic agents      # AMS, delirium, intermittent agitation - improving    In setting of subdural hematoma, s/p craniotomy, prolonged hospital stay. Per neurology may also be exacerbated by discontinuing Keppra. Seen by Psychiatry.  Itzel wnl except for hypernatremia that has now resolved. Continues to wax and wane, is not sleeping well overnight. Suspect primarily d/t ongoing delirium and  requires optimization of sleep/wake cycle, untethering, frequent reorientation.   - 1/9 pt mentation is significantly improved, was able to get out of bed, converse normally, participate in all therapies  - Changes made to optimize delirium   = Discontinued PICC line, has reliable PIV access and no central access needs   = Discontinue cardiac monitoring    = Continue delirium precautions   =  Increase at bedtime Zyprexa to 20mg at bedtime -> checked EKG and Qtc 448 on 1/8, will recheck 1/10    = Discontinue Gabapentin given no clear indication and patient's delirium    = 1/9: Discontinue IV Haldol prn    # Liver cirrhosis   Incidentally noted on CT AP on 11/30.  Hx neg for alcohol use disorder, IV drug use, viral hepatitis, or family history of liver disease.  Last LFTs 12/27/21 wnl.  No ascites noted on exam.    - Trend CMP 1/8 for re-assessment given ongoing delirium, ammonia on 1/7 within normal limits  - GI follow up outpatient      # Anemia  Hx iron deficiency.  Likely c/b CKD.  Hgb stable mid 7's.   - Agree w/ ferrous gluconate   - Transfuse for Hgb < 7      # RLS  - Requip dose decreased to 1mg at bedtime (PTA 2mg)  - PTA Gabapentin on hold for delirium      # GERD   # Hx past GIB   - Agree w/ pantoprazole 40mg daily  (PTA Omeprazole 20mg)     # Moderate Malnutrition in the context of acute on chronic illness:    % Intake: Decreased intake does not meet criteria, but falls short of meeting RD's goal for adequate TF intakes.  % Weight Loss: > 10% in 6 months (severe), based on h/o > 18% in 4 months   Subcutaneous Fat Loss: Facial region: mild buccal area (per last RD note d/t pt sleeping at time of visit today)  Muscle Loss: Facial & jaw region: mild, Thoracic region (clavicle, acromium bone, deltoid, trapezius, pectoral), Upper arm (bicep, tricep), Lower arm  (forearm), Patellar region and Posterior calf: all moderate (per last RD note d/t pt sleeping at time of visit today)  Fluid Accumulation/Edema: Unable  to assess  Malnutrition Diagnosis: Moderate malnutrition in the context of acute on chronic illness  - Continue TFs  - SLP advanced diet to regular diet with thin liquids on 1/9, calorie counts ordered to start 1/10         Diet: Adult Formula Drip Feeding: Continuous Pivot 1.5; Gastrostomy; Goal Rate: 60; mL/hr; Medication - Feeding Tube Flush Frequency: At least 15-30 mL water before and after medication administration and with tube clogging  Full Liquid Diet Mildly Thick (level 2)    DVT Prophylaxis:None given recent DSH s/p evacuation   Dixon Catheter: Not present  Central Lines: None  Code Status: Full Code      Disposition Plan   Expected Discharge: 01/14/2022   Anticipated discharge location:  Awaiting care coordination huddle  Delays:     1:1 Sitter            The patient's care was discussed with the Patient and Neurosurgery Consultant, wife Kaley at bedside, bedside RN.     Shannan King MD  Hospitalist Service, [address]  Securely message with the Vocera Web Console (learn more here)  Text page via [employer] Paging/Directory    Please see sign in/sign out for up to date coverage information    _________________________________________________________________    Interval History   Awake, alert, sitting in chair. Asking questions appropriately, following all commands, participated with SLP and PT without difficulty. Does still get disoriented and requires redirection (ex: stated he hadn't eaten all day but had had breakfast and lunch with wife). Continuing to get confused and agitated when he wakes up overnight. No other new concerns today.     Data reviewed today: I reviewed all medications, new labs and imaging results over the last 24 hours.     Physical Exam   Vital Signs: Temp: 98  F (36.7  C) Temp src: Axillary BP: 105/79 Pulse: 81   Resp: 16 SpO2: 98 % O2 Device: None (Room air)    Weight: 197 lbs 15.57 oz     GENERAL: Awake, alert, sitting up  in chair, following all commands, conversing easily   HEENT: Normocephalic, atraumatic. Anicteric sclera. Mucous membranes moist.  Surgical site c/d/i   CV: Irregularly irregular. S1, S2. Slight murmur.   RESPIRATORY: Effort normal on RA. Lungs CTAB with no wheezing, rales, or rhonchi.   GI: Abdomen soft and non distended, bowel sounds present x all 4 quadrants. No tenderness, rebound, or guarding. PEG tube in place, appears c/d/i.    NEUROLOGICAL: No focal deficits. Follows commands.   MUSCULOSKELETAL: No joint swelling or tenderness. Moves all extremities.   EXTREMITIES: No gross deformities. No peripheral edema.   SKIN: Grossly warm, dry, and intact. No jaundice. No rashes.     Data   Recent Labs   Lab 01/09/22  0622 01/09/22  0353 01/09/22  0039 01/08/22  0855 01/08/22  0634 01/08/22  0451 01/08/22  0040 01/07/22  0824 01/07/22  0741 01/05/22  0952 01/05/22  0558 01/03/22  0725 01/03/22  0646   WBC  --   --   --   --   --   --   --   --  5.6  --  5.8  --  6.2   HGB  --   --   --   --   --   --   --   --  8.2*  --  7.7*  --  7.7*   MCV  --   --   --   --   --   --   --   --  73*  --  76*  --  77*   PLT  --   --   --   --   --   --   --   --  200  --  162  --  138*     --   --   --  141  141  --  142   < > 146*   < > 151*  151*   < > 150*  150*   POTASSIUM 4.3  --   --   --  4.0  --   --   --  4.0   < > 4.1   < > 4.1   CHLORIDE 106  --   --   --  106  --   --   --  110*   < > 117*   < > 117*   CO2 26  --   --   --  28  --   --   --  30   < > 30   < > 28   BUN 36*  --   --   --  40*  --   --   --  41*   < > 43*   < > 41*   CR 0.99  --   --   --  1.04  --   --   --  1.05   < > 1.11   < > 1.07   ANIONGAP 6  --   --   --  7  --   --   --  6   < > 4   < > 5   ANNMARIE 9.0  --   --   --  9.0  --   --   --  8.6   < > 9.1   < > 9.0   * 122* 143*   < > 163*   < >  --    < > 188*   < > 151*   < > 110*   ALBUMIN  --   --   --   --  2.8*  --   --   --   --   --   --   --   --    PROTTOTAL  --   --   --   --  6.7*   --   --   --   --   --   --   --   --    BILITOTAL  --   --   --   --  0.9  --   --   --   --   --   --   --   --    ALKPHOS  --   --   --   --  151*  --   --   --   --   --   --   --   --    ALT  --   --   --   --  15  --   --   --   --   --   --   --   --    AST  --   --   --   --  20  --   --   --   --   --   --   --   --     < > = values in this interval not displayed.     No results found for this or any previous visit (from the past 24 hour(s)).  Medications       aspirin  81 mg Oral Daily     atorvastatin  80 mg Oral or Feeding Tube Daily     [Held by provider] bumetanide  4 mg Oral QAM     [Held by provider] doxazosin  1 mg Oral Daily     empagliflozin  10 mg Oral or Feeding Tube Daily     ferrous gluconate  324 mg Oral or Feeding Tube Every Other Day     fluticasone-vilanterol  1 puff Inhalation Daily     heparin ANTICOAGULANT  5,000 Units Subcutaneous Q8H     heparin lock flush  5-20 mL Intracatheter Q24H     insulin aspart  1-12 Units Subcutaneous Q4H     insulin glargine  18 Units Subcutaneous BID     irbesartan  75 mg Oral or Feeding Tube Daily     lacosamide  200 mg Oral or Feeding Tube BID     magnesium citrate  296 mL Oral Once     melatonin  10 mg Oral Daily     metoprolol tartrate  25 mg Oral BID     multivitamins w/minerals  15 mL Per Feeding Tube Daily     OLANZapine  15 mg Oral At Bedtime     OLANZapine  5 mg Oral BID     pantoprazole  40 mg Oral or Feeding Tube QAM AC     polyethylene glycol  17 g Oral or Feeding Tube Daily     potassium chloride  40 mEq Oral Daily     prasugrel  10 mg Oral or Feeding Tube Daily     rOPINIRole  1 mg Oral Daily     senna-docusate  2 tablet Oral or Feeding Tube BID     sodium chloride (PF)  10 mL Intracatheter Q8H     sodium chloride (PF)  10 mL Intracatheter Q8H     sodium chloride (PF)  3 mL Intracatheter Q8H     tadalafil  20 mg Oral Q24H

## 2022-01-09 NOTE — PLAN OF CARE
Status: Pt here with post-traumatic acute R SDH with L hemibody weakness. OR for evac 12/21. PEG placement 1/3/22.   Vitals: VSS  Neuros: A/Ox4. Slight L facial droop.   IV: PIV SL  Diet: TF running at goal rate of 60 mL/hr via PEG with 200mL FWF q2h. Regular diet, gian counts to start tomorrow  Bowel status: bm x1 loose  : Voids spont via urinal; incontinent at times.   Skin: Blanchable redness on coccyx. BLE mayuri. Abdominal bruising. Crani incision with staples LEONID. Scabbing and dry skin throughout.   Pain: denies  Activity: Heavy assist of two, GB, walker  Social: Wife, Kaley, at bedside during afternoon  Plan: PRN atarax added for bedtime if pt begins to get restless. Continue to follow plan of care

## 2022-01-10 ENCOUNTER — APPOINTMENT (OUTPATIENT)
Dept: SPEECH THERAPY | Facility: CLINIC | Age: 82
DRG: 025 | End: 2022-01-10
Payer: COMMERCIAL

## 2022-01-10 ENCOUNTER — APPOINTMENT (OUTPATIENT)
Dept: PHYSICAL THERAPY | Facility: CLINIC | Age: 82
DRG: 025 | End: 2022-01-10
Payer: COMMERCIAL

## 2022-01-10 LAB
ALBUMIN SERPL-MCNC: 2.9 G/DL (ref 3.4–5)
ALP SERPL-CCNC: 157 U/L (ref 40–150)
ALT SERPL W P-5'-P-CCNC: 16 U/L (ref 0–70)
ANION GAP SERPL CALCULATED.3IONS-SCNC: 7 MMOL/L (ref 3–14)
AST SERPL W P-5'-P-CCNC: 21 U/L (ref 0–45)
ATRIAL RATE - MUSE: 79 BPM
BASOPHILS # BLD AUTO: 0 10E3/UL (ref 0–0.2)
BASOPHILS NFR BLD AUTO: 1 %
BILIRUB SERPL-MCNC: 0.8 MG/DL (ref 0.2–1.3)
BUN SERPL-MCNC: 36 MG/DL (ref 7–30)
CALCIUM SERPL-MCNC: 9.1 MG/DL (ref 8.5–10.1)
CHLORIDE BLD-SCNC: 106 MMOL/L (ref 94–109)
CO2 SERPL-SCNC: 25 MMOL/L (ref 20–32)
CREAT SERPL-MCNC: 1.04 MG/DL (ref 0.66–1.25)
DIASTOLIC BLOOD PRESSURE - MUSE: NORMAL MMHG
EOSINOPHIL # BLD AUTO: 0.3 10E3/UL (ref 0–0.7)
EOSINOPHIL NFR BLD AUTO: 5 %
ERYTHROCYTE [DISTWIDTH] IN BLOOD BY AUTOMATED COUNT: 19.7 % (ref 10–15)
GFR SERPL CREATININE-BSD FRML MDRD: 73 ML/MIN/1.73M2
GLUCOSE BLD-MCNC: 212 MG/DL (ref 70–99)
GLUCOSE BLDC GLUCOMTR-MCNC: 147 MG/DL (ref 70–99)
GLUCOSE BLDC GLUCOMTR-MCNC: 167 MG/DL (ref 70–99)
GLUCOSE BLDC GLUCOMTR-MCNC: 169 MG/DL (ref 70–99)
GLUCOSE BLDC GLUCOMTR-MCNC: 190 MG/DL (ref 70–99)
GLUCOSE BLDC GLUCOMTR-MCNC: 197 MG/DL (ref 70–99)
HCT VFR BLD AUTO: 29.6 % (ref 40–53)
HGB BLD-MCNC: 8.2 G/DL (ref 13.3–17.7)
IMM GRANULOCYTES # BLD: 0 10E3/UL
IMM GRANULOCYTES NFR BLD: 0 %
INTERPRETATION ECG - MUSE: NORMAL
LYMPHOCYTES # BLD AUTO: 0.6 10E3/UL (ref 0.8–5.3)
LYMPHOCYTES NFR BLD AUTO: 12 %
MAGNESIUM SERPL-MCNC: 2.3 MG/DL (ref 1.6–2.3)
MCH RBC QN AUTO: 20.2 PG (ref 26.5–33)
MCHC RBC AUTO-ENTMCNC: 27.7 G/DL (ref 31.5–36.5)
MCV RBC AUTO: 73 FL (ref 78–100)
MONOCYTES # BLD AUTO: 0.4 10E3/UL (ref 0–1.3)
MONOCYTES NFR BLD AUTO: 8 %
NEUTROPHILS # BLD AUTO: 3.7 10E3/UL (ref 1.6–8.3)
NEUTROPHILS NFR BLD AUTO: 74 %
NRBC # BLD AUTO: 0 10E3/UL
NRBC BLD AUTO-RTO: 0 /100
P AXIS - MUSE: NORMAL DEGREES
PHOSPHATE SERPL-MCNC: 3.5 MG/DL (ref 2.5–4.5)
PLATELET # BLD AUTO: 163 10E3/UL (ref 150–450)
POTASSIUM BLD-SCNC: 4.3 MMOL/L (ref 3.4–5.3)
PR INTERVAL - MUSE: NORMAL MS
PROT SERPL-MCNC: 6.8 G/DL (ref 6.8–8.8)
QRS DURATION - MUSE: 88 MS
QT - MUSE: 398 MS
QTC - MUSE: 447 MS
R AXIS - MUSE: 106 DEGREES
RBC # BLD AUTO: 4.05 10E6/UL (ref 4.4–5.9)
SODIUM SERPL-SCNC: 138 MMOL/L (ref 133–144)
SYSTOLIC BLOOD PRESSURE - MUSE: NORMAL MMHG
T AXIS - MUSE: -10 DEGREES
VENTRICULAR RATE- MUSE: 76 BPM
WBC # BLD AUTO: 4.9 10E3/UL (ref 4–11)

## 2022-01-10 PROCEDURE — 250N000011 HC RX IP 250 OP 636: Performed by: INTERNAL MEDICINE

## 2022-01-10 PROCEDURE — 80053 COMPREHEN METABOLIC PANEL: CPT | Performed by: INTERNAL MEDICINE

## 2022-01-10 PROCEDURE — 250N000013 HC RX MED GY IP 250 OP 250 PS 637: Performed by: NURSE PRACTITIONER

## 2022-01-10 PROCEDURE — G0463 HOSPITAL OUTPT CLINIC VISIT: HCPCS

## 2022-01-10 PROCEDURE — 120N000002 HC R&B MED SURG/OB UMMC

## 2022-01-10 PROCEDURE — 84100 ASSAY OF PHOSPHORUS: CPT | Performed by: STUDENT IN AN ORGANIZED HEALTH CARE EDUCATION/TRAINING PROGRAM

## 2022-01-10 PROCEDURE — 97110 THERAPEUTIC EXERCISES: CPT | Mod: GP

## 2022-01-10 PROCEDURE — 99233 SBSQ HOSP IP/OBS HIGH 50: CPT | Performed by: INTERNAL MEDICINE

## 2022-01-10 PROCEDURE — 97530 THERAPEUTIC ACTIVITIES: CPT | Mod: GP

## 2022-01-10 PROCEDURE — 85025 COMPLETE CBC W/AUTO DIFF WBC: CPT | Performed by: INTERNAL MEDICINE

## 2022-01-10 PROCEDURE — 250N000011 HC RX IP 250 OP 636: Performed by: PHYSICIAN ASSISTANT

## 2022-01-10 PROCEDURE — 83735 ASSAY OF MAGNESIUM: CPT | Performed by: STUDENT IN AN ORGANIZED HEALTH CARE EDUCATION/TRAINING PROGRAM

## 2022-01-10 PROCEDURE — 250N000013 HC RX MED GY IP 250 OP 250 PS 637: Performed by: NEUROLOGICAL SURGERY

## 2022-01-10 PROCEDURE — 92526 ORAL FUNCTION THERAPY: CPT | Mod: GN

## 2022-01-10 PROCEDURE — 82040 ASSAY OF SERUM ALBUMIN: CPT | Performed by: INTERNAL MEDICINE

## 2022-01-10 PROCEDURE — 36415 COLL VENOUS BLD VENIPUNCTURE: CPT | Performed by: INTERNAL MEDICINE

## 2022-01-10 PROCEDURE — 97116 GAIT TRAINING THERAPY: CPT | Mod: GP

## 2022-01-10 PROCEDURE — 250N000013 HC RX MED GY IP 250 OP 250 PS 637: Performed by: INTERNAL MEDICINE

## 2022-01-10 PROCEDURE — 250N000013 HC RX MED GY IP 250 OP 250 PS 637: Performed by: STUDENT IN AN ORGANIZED HEALTH CARE EDUCATION/TRAINING PROGRAM

## 2022-01-10 RX ORDER — HALOPERIDOL 5 MG/1
5 TABLET ORAL AT BEDTIME
Status: DISCONTINUED | OUTPATIENT
Start: 2022-01-10 | End: 2022-01-10

## 2022-01-10 RX ORDER — HALOPERIDOL 5 MG/ML
2 INJECTION INTRAMUSCULAR ONCE
Status: COMPLETED | OUTPATIENT
Start: 2022-01-10 | End: 2022-01-10

## 2022-01-10 RX ORDER — HYDROXYZINE HYDROCHLORIDE 25 MG/1
25 TABLET, FILM COATED ORAL EVERY 6 HOURS PRN
Status: DISCONTINUED | OUTPATIENT
Start: 2022-01-10 | End: 2022-01-17 | Stop reason: HOSPADM

## 2022-01-10 RX ORDER — HALOPERIDOL 5 MG/1
5 TABLET ORAL AT BEDTIME
Status: DISCONTINUED | OUTPATIENT
Start: 2022-01-10 | End: 2022-01-17 | Stop reason: HOSPADM

## 2022-01-10 RX ORDER — HALOPERIDOL 5 MG/ML
2 INJECTION INTRAMUSCULAR EVERY 6 HOURS PRN
Status: DISCONTINUED | OUTPATIENT
Start: 2022-01-10 | End: 2022-01-14

## 2022-01-10 RX ORDER — HYDROXYZINE HYDROCHLORIDE 25 MG/1
50 TABLET, FILM COATED ORAL EVERY 6 HOURS PRN
Status: DISCONTINUED | OUTPATIENT
Start: 2022-01-10 | End: 2022-01-17 | Stop reason: HOSPADM

## 2022-01-10 RX ADMIN — METOPROLOL TARTRATE 25 MG: 25 TABLET, FILM COATED ORAL at 10:16

## 2022-01-10 RX ADMIN — INSULIN ASPART 2 UNITS: 100 INJECTION, SOLUTION INTRAVENOUS; SUBCUTANEOUS at 09:37

## 2022-01-10 RX ADMIN — MULTIVIT AND MINERALS-FERROUS GLUCONATE 9 MG IRON/15 ML ORAL LIQUID 15 ML: at 10:05

## 2022-01-10 RX ADMIN — EMPAGLIFLOZIN 10 MG: 10 TABLET, FILM COATED ORAL at 10:10

## 2022-01-10 RX ADMIN — ASPIRIN 81 MG: 81 TABLET, COATED ORAL at 10:06

## 2022-01-10 RX ADMIN — INSULIN ASPART 3 UNITS: 100 INJECTION, SOLUTION INTRAVENOUS; SUBCUTANEOUS at 15:48

## 2022-01-10 RX ADMIN — Medication 10 MG: at 20:20

## 2022-01-10 RX ADMIN — LACOSAMIDE 200 MG: 50 TABLET, FILM COATED ORAL at 10:20

## 2022-01-10 RX ADMIN — FERROUS GLUCONATE 324 MG: 324 TABLET ORAL at 10:07

## 2022-01-10 RX ADMIN — OLANZAPINE 5 MG: 2.5 TABLET, FILM COATED ORAL at 10:08

## 2022-01-10 RX ADMIN — HALOPERIDOL LACTATE 2 MG: 5 INJECTION, SOLUTION INTRAMUSCULAR at 06:00

## 2022-01-10 RX ADMIN — HEPARIN SODIUM 5000 UNITS: 5000 INJECTION, SOLUTION INTRAVENOUS; SUBCUTANEOUS at 13:32

## 2022-01-10 RX ADMIN — INSULIN ASPART 1 UNITS: 100 INJECTION, SOLUTION INTRAVENOUS; SUBCUTANEOUS at 04:57

## 2022-01-10 RX ADMIN — DOCUSATE SODIUM 50 MG AND SENNOSIDES 8.6 MG 2 TABLET: 8.6; 5 TABLET, FILM COATED ORAL at 10:06

## 2022-01-10 RX ADMIN — PRASUGREL 10 MG: 10 TABLET, FILM COATED ORAL at 10:09

## 2022-01-10 RX ADMIN — TADALAFIL 20 MG: 20 TABLET, FILM COATED ORAL at 10:20

## 2022-01-10 RX ADMIN — HEPARIN SODIUM 5000 UNITS: 5000 INJECTION, SOLUTION INTRAVENOUS; SUBCUTANEOUS at 21:07

## 2022-01-10 RX ADMIN — ATORVASTATIN CALCIUM 80 MG: 40 TABLET, FILM COATED ORAL at 10:06

## 2022-01-10 RX ADMIN — FLUTICASONE FUROATE AND VILANTEROL TRIFENATATE 1 PUFF: 200; 25 POWDER RESPIRATORY (INHALATION) at 10:06

## 2022-01-10 RX ADMIN — OLANZAPINE 5 MG: 2.5 TABLET, FILM COATED ORAL at 15:49

## 2022-01-10 RX ADMIN — METOPROLOL TARTRATE 25 MG: 25 TABLET, FILM COATED ORAL at 20:19

## 2022-01-10 RX ADMIN — INSULIN GLARGINE 20 UNITS: 100 INJECTION, SOLUTION SUBCUTANEOUS at 21:08

## 2022-01-10 RX ADMIN — ROPINIROLE HYDROCHLORIDE 1 MG: 1 TABLET, FILM COATED ORAL at 20:20

## 2022-01-10 RX ADMIN — LACOSAMIDE 200 MG: 50 TABLET, FILM COATED ORAL at 20:19

## 2022-01-10 RX ADMIN — Medication 40 MG: at 10:08

## 2022-01-10 RX ADMIN — HEPARIN SODIUM 5000 UNITS: 5000 INJECTION, SOLUTION INTRAVENOUS; SUBCUTANEOUS at 06:04

## 2022-01-10 RX ADMIN — INSULIN ASPART 2 UNITS: 100 INJECTION, SOLUTION INTRAVENOUS; SUBCUTANEOUS at 12:04

## 2022-01-10 RX ADMIN — INSULIN ASPART 3 UNITS: 100 INJECTION, SOLUTION INTRAVENOUS; SUBCUTANEOUS at 21:08

## 2022-01-10 RX ADMIN — POTASSIUM CHLORIDE 40 MEQ: 1.5 POWDER, FOR SOLUTION ORAL at 10:05

## 2022-01-10 RX ADMIN — HALOPERIDOL LACTATE 2 MG: 5 INJECTION, SOLUTION INTRAMUSCULAR at 21:42

## 2022-01-10 RX ADMIN — HALOPERIDOL 5 MG: 5 TABLET ORAL at 20:18

## 2022-01-10 ASSESSMENT — ACTIVITIES OF DAILY LIVING (ADL)
ADLS_ACUITY_SCORE: 15
ADLS_ACUITY_SCORE: 13
ADLS_ACUITY_SCORE: 15
ADLS_ACUITY_SCORE: 13
ADLS_ACUITY_SCORE: 15
ADLS_ACUITY_SCORE: 13
ADLS_ACUITY_SCORE: 15
ADLS_ACUITY_SCORE: 13
ADLS_ACUITY_SCORE: 15

## 2022-01-10 NOTE — PLAN OF CARE
Status: Pt here with post-traumatic acute R SDH with L hemibody weakness. OR for evac 12/21. PEG placement 1/3/22.   Vitals: VSS on RA  Neuros: A/Ox4. Slight L facial droop. Restless overnight but cooperative. Given PRN atarax x1 to help with sleep.   IV: PIV SL  Diet: TF running at goal rate of 60 mL/hr via PEG with 200 mL FWF q2h. Regular diet, Calorie counts starting today.   Bowel status: No BM overnight, LBM 1/9 - loose.   : Voids spont via urinal; incontinent at times.   Skin: Blanchable redness on coccyx. BLE mayuri. Abdominal bruising. Crani incision with staples LEONID. Scabbing and dry skin throughout. Open cut found in L groin area overnight, bandage applied.   Pain: Denies pain, no s/s.   Activity: Heavy assist of two, GB, walker  Plan: Continue to monitor and follow POC. PT recommending ARU. Plan for EKG today     Update: At 0550 pt set his bed alarm off, by the time staff arrived to his room he was almost sliding out of his bed. Two RNs attempted to help pt back into bed but pt began getting agitated and started kicking his legs and swinging his elbows at the two RNs. More staff came into the room to help prevent the pt from falling. Pt was able to get back into bed with the help of staff, but he continued kicking and would not allow staff to help him get changed as he had voided on his linens and brief. Charge contacted team to order one time dose of haldol. 2 mg Haldol was given at 0600. (0630) Pt has since calmed down, but has still been attempting to get out of his bed.

## 2022-01-10 NOTE — PLAN OF CARE
Status: Pt here with post-traumatic acute R SDH with L hemibody weakness. OR for evac 12/21. PEG placement 1/3/22.  Vitals: VSS  Neuros: A&Ox4.  Slight L facial droop  IV: PIV SL  Labs/Electrolytes: WNL.  K+, Phos, Mag lab redraw ordered for am.    Resp/trach: WNL  Diet: TF @ 60mL/hr, via PEG with 200mL FWF Q2hrs. Regular diet, on gian counts.  Bowel status: LBM today, 1/10.    : voiding spont, with urinal, and incontinent.  Wears pull-up brief pad  Skin: blanchable redness to bottom.  BLE mayuri.  Abdomen bruising.  Crani incision with staples, LEONID.  Scabbing and dry skin throughout.  Left groin redness (open wound), linda and critic care applied, as per WOC order.  Pain: denies  Activity: A1-2, GB/walker  Social: Wife, Kaley, updated on phone.    Plan: pending to TCU  Updates this shift: spoke with provider, they are considering placing an order for haldol at bedtime, since patient seems to sleep after Haldol, to help with insomnia.

## 2022-01-10 NOTE — CONSULTS
[facility] Nurse Inpatient Wound Assessment   Reason for consultation: Evaluate and treat  L) groin wounds    Assessment   L) groin wounds due to Moisture Associated Skin Damage (MASD)  Status: initial assessment    Treatment Plan  BL groin wounds: BID and PRN     Cleanse the area with Rosi cleanse and protect, very gently with soft cloth.    Apply thin layer of critic aid paste.    With repeat application, do not scrub the paste, only remove soiled paste and reapply.    If complete removal of paste is necessary use baby oil/mineral oil (#[id number]) and soft wash cloth.    Ensure pt has Alan-cushion (#[id number]) while sitting up in the chair.    Use only one Covidien pad in between mattress and pt. No brief while in bed.      Orders Written  Recommended provider order: None, at this time  WO Nurse follow-up plan:weekly  Nursing to notify the Provider(s) and re-consult the WO Nurse if wound(s) deteriorates or new skin concern.    Patient History  According to provider note(s):  Red Sutherland is a 81-year-old male post-traumatic acute right subdural hematoma with left hemibody weakness, on xarelto and plavix. Admitted to the ICU on 12/20/21. Taken to OR 12/21/2021. Extubated 12/22/2021. Started on Aspirin and Prasugrel on 12/28/21. Failed SLP evaluation. Free water flushes stopped since hypernatremia resolved. Lasix and Bumex given for diuresis. Received PEG on 1/3/2022. LLQ tenderness near PEG site. CT AP done 1/5 showing no obvious pathology. Medicine consulted 1/6/2022 for hypotension management, transferred to medicine as primary due to ongoing concerns for hypotension in the setting of extensive cardiac history.     Objective Data  Containment of urine/stool: Brief    Active Diet Order  Orders Placed This Encounter      Regular Diet Adult Thin Liquids (level 0)      Output:   I/O last 3 completed shifts:  In: 2040 [P.O.:600; NG/GT:60]  Out: 525 [Urine:525]    Risk Assessment:   Sensory Perception: 4-->no  impairment  Moisture: 4-->rarely moist  Activity: 2-->chairfast  Mobility: 3-->slightly limited  Nutrition: 3-->adequate  Friction and Shear: 3-->no apparent problem  Vikash Score: 19                          Labs: Recent Labs   Lab 01/10/22  1008 01/08/22  0634   ALBUMIN  --  2.8*   HGB 8.2*  --    WBC 4.9  --        Physical Exam  Areas of skin assessed: focused coccyx, sacrum and BL buttocks    Wound Location:  L) groin    Date of last photo did not save  Wound History: Incontinence of B&B    Wound Base: 100 % dermis     Palpation of the wound bed: normal -      Drainage: small     Description of drainage: serosanguinous     Measurements (length x width x depth, in cm) 4.5x0.2x0.1cm     Undermining N/A  Periwound skin: intact- but moist high risk for breakdown      Color: pink-       Temperature: normal   Odor: none  Pain: moderate, aching with assessment  Pain intervention prior to dressing change:     Interventions  Visual inspection and assessment completed   Wound Care Rationale Protect periwound skin, Improve absorptive capacity and Provide protection   Wound Care: done per plan of care  Supplies: floor stock and discussed with RN  Current off-loading measures: Pillows  Current support surface: Standard  Atmos Air mattress  Education provided to: importance of repositioning, plan of care and wound progress  Discussed plan of care with Patient, Nurse and Physician    Perla Cleary RN

## 2022-01-10 NOTE — PROGRESS NOTES
Care Management Follow Up    Length of Stay (days): 21    Expected Discharge Date: 01/15/2022     Concerns to be Addressed:       Patient plan of care discussed at interdisciplinary rounds: Yes    Anticipated Discharge Disposition:       Anticipated Discharge Services:    Anticipated Discharge DME:      Patient/family educated on Medicare website which has current facility and service quality ratings:    Education Provided on the Discharge Plan:    Patient/Family in Agreement with the Plan:      Referrals Placed by CM/SW:    Private pay costs discussed: Not applicable    Additional Information:  SW talked with [facility] about considering admission to their facility when Pt is ready to discharge per PT/OT recs.     [facility] will review and get back to .       Florinda Schofield, FLORINW, LSW  Acute Care Float   PH#: [phone]  Pager#: [phone]  [facility]

## 2022-01-10 NOTE — PLAN OF CARE
Speech Language Therapy Discharge Summary    Reason for therapy discharge:    All goals and outcomes met, no further needs identified.    Progress towards therapy goal(s). See goals on Care Plan in [facility] electronic health record for goal details.  Goals met    Therapy recommendation(s):    No further therapy is recommended. Recommend regular diet/thin liquids.

## 2022-01-10 NOTE — PROGRESS NOTES
[facility], [place]   01/10/2022  Neurosurgery Progress Note:    Assessment:  Red Sutherland is a 81-year-old male post-traumatic acute right subdural hematoma with left hemibody weakness, on xarelto and plavix. Admitted to the ICU on 12/20/21. Taken to OR 12/21/2021. Extubated 12/22/2021. Started on Aspirin and Prasugrel on 12/28/21. Failed SLP evaluation. Free water flushes stopped since hypernatremia resolved. Lasix and Bumex given for diuresis. Received PEG on 1/3/2022. LLQ tenderness near PEG site. CT AP done 1/5 showing no obvious pathology. Medicine consulted 1/6/2022 for hypotension management, transferred to medicine as primary due to ongoing concerns for hypotension in the setting of extensive cardiac history.       Plan:  - Vimpat 200 BID  - HOB > 30 degrees  - Aspirin and Prasugrel  - Remainder of cares per  Primary team/Medicine    Asim Ann MD  Neurosurgery Resident, PGY-2    Please contact neurosurgery resident on call with questions.    Dial * * *[phone], enter [phone] when prompted.   -----------------------------------    Interval History: Exam stable. Significant improvement in dysarthria. Steadily improving delirium.    Objective:   Temp:  [97.5  F (36.4  C)-98.8  F (37.1  C)] 97.7  F (36.5  C)  Pulse:  [75-85] 82  Resp:  [16] 16  BP: (110-140)/(58-80) 114/59  SpO2:  [98 %-100 %] 98 %  I/O last 3 completed shifts:  In: 2040 [P.O.:600; NG/GT:60]  Out: 525 [Urine:525]    Gen: Slightly agitated  Wound: clean, dry, intact,  Neurologic:  - Alert & Oriented to person, place, time, and situation  - Follows commands briskly x 4   - PEERL, EOMI,symmetrical facial expression  - LUE with  4+/5, rest 5/5    LABS:  Recent Labs   Lab 01/10/22  0455 01/09/22  2335 01/09/22  1956 01/09/22  1205 01/09/22  0622 01/08/22  0855 01/08/22  0634 01/08/22  0451 01/08/22  0040 01/07/22  0824 01/07/22  0741   NA  --   --   --   --  138  --  141  141  --  142   < > 146*   POTASSIUM  --    --   --   --  4.3  --  4.0  --   --   --  4.0   CHLORIDE  --   --   --   --  106  --  106  --   --   --  110*   CO2  --   --   --   --  26  --  28  --   --   --  30   ANIONGAP  --   --   --   --  6  --  7  --   --   --  6   * 155* 235*   < > 158*   < > 163*   < >  --    < > 188*   BUN  --   --   --   --  36*  --  40*  --   --   --  41*   CR  --   --   --   --  0.99  --  1.04  --   --   --  1.05   ANNMARIE  --   --   --   --  9.0  --  9.0  --   --   --  8.6    < > = values in this interval not displayed.       Recent Labs   Lab 01/07/22  0741   WBC 5.6   RBC 4.07*   HGB 8.2*   HCT 29.8*   MCV 73*   MCH 20.1*   MCHC 27.5*   RDW 20.1*          IMAGING:  No results found for this or any previous visit (from the past 24 hour(s)).

## 2022-01-10 NOTE — PROGRESS NOTES
[facility]    Medicine Progress Note - Hospitalist Service, Gold 3       Date of Admission:  12/20/2021    Assessment & Plan      Red Sutherland is a 81 year old male with past medical history significant for CAD s/p PCI to LCX (2/2021 and repeat in 10/2021), HFpEF (60-65%) atrial fib on AC w/ Eliquis, HTN, HLD, COPD, pulmonary HTN, chronic bronchitis, type 2 DM, liver cirrhosis, stage 4 CKD, hx GIB, GERD and RLS transferred to [facility] from [facility] ED on 12/20 for right sided subdural hematoma with mass effect and midline shift.  He was admitted to Neurosurgery service and underwent evacuation on 12/21.  Hospital course c/b seizures, altered mentation and agitation, and failed swallow test requiring PEG tube placement (1/3).  Internal medicine is consulted 1/6 for hypotension and medical co-management, transferred to medicine formally 1/7.     Changes Today:  - Continued poor sleep overnight -> woke up disoriented and very agitated at 6am, required IV Haldol x1  - Overall delirium improving, but continues to struggle with sleep/wake cycle   - Patient care order -> do not disturb 10pm to 6am  - at bedtime Zyprexa ineffective, responds well to Haldol -> transitioned to 5mg PO Haldol at bedtime  - PO Melatonin at bedtime -> to be given about an hour before patient gets ready for sleep   - Resume IV Haldol 2mg q6h prn in case patient wakes with severe agitation, please avoid use during daytime  - Repeat EKG in 1/11 am to monitor Qtc   - Calorie cts started 1/10, hopeful to be able to discontinue TFs prior to discharge  - Exam euvolemic, will continue to hold Bumex and assess daily  - Persistent hyperglycemia, Lantus increased to 20 units BID     # Hypotension - resolved   Developed 1/6, BP as low as 82/48 with concurrent hypothermia of 95.1. Medicine consulted, ultimately felt d/t medication and oversedation.  Chart reviewed, newly started on scheduled Zyprexa on 1/3 and  also received PRN Haldol 2mg x 2 on 1/5, in setting of concomitant scheduled antihypertensives (metoprolol, irbesartan).  Also newly started on Doxazosin on 1/5 which can also cause hypotension d/t alpha-1 blockade. Other etiologies also considered but workup unremarkable, ultimately suspect d/t medications.   - 1/7 TSH, am cortisol wnl    - 1/7 Irbesartan decreased by half (150 -> 75mg qday) and BP has remained stable  - Bumex and Doxazosin on hold, assess need daily and resume if/when appropriate    # R subdural hematoma s/p evacuation   # Post op seizures - resolved  Fall at home 2 weeks PTA followed by persistent HA and neck pain and  presented w/ slurred speech.  Transferred from [facility] ED.  Found to have Large hyperdense holohemispheric right-sided subdural hematoma.  S/p hematoma evacuation that has been complicated by focal seizures, now resolved.   - Initially had seizures, vEEG at time of transfer to medicine demonstrated slowing, likely secondary to SDH but without seizure activity, discontinued   - Vimpat 200mg BID for seizure ppx (had been on Keppra that was discontinued)   - Neurology signed off 1/7 -> recommend follow up in 4 months after discharge  - Neurosurgery following, appreciate recs   - Unclear if on AC prior to admission (was on DAPT), but for now given remains on DAPT and SDH with high risk falls, continuing to hold AC      # CAD s/p PCI to LCX (2/2021 and repeat in 10/2021)  # HTN   # HFpEF   # Pulmonary HTN   # RV dysfunction   Cardiology consulted early in hospital course, signed off on 12/26.  Per chart review, dry weight 212 prior to admission based on recent admission to [facility] for CHF exacerbation 11/30-12/6.  At that time, discharged on Bumex 4mg BID and Metolazone 2.5mg q MWF.  Last echocardiogram 10/23/21 with normal EF 60-65%, mild valvular aortic stenosis, RV mild to severe dilated, flattened septum c/w elevated RV pressure/hypervolemia, PASP 75mmHg.   - Continue ASA 81mg  and Prasugrel 10mg daily   - 1/9 remains euvolemic, dry weight at this exam 197lbs, would consider this his new dry weight; have been holding PTA Bumex and Metolazone, will continue to hold and resume if appropriate  - Continue daily weights   - Continue tadalafil 20mg daily   - Decreased Irbesartan to 75mg qday and assess BP response, will resume other meds before titrating back up  - Doxazosin also currently on hold (is replacement for PTA Tamsulosin, but also has BP effect)      # Permanent atrial fib   Rate controlled on metoprolol 25mg BID.  Previously referred for Watchman device placement d/t hx bleeding issues.  On Eliquis 5mg BID PTA, stopped on admission d/t subdural hematoma as above.    - Continue Metoprolol 25mg BID  - Given SDH and current DAPT currently should hold AC given indication of AFib for now until neurosurgery and cardiology follow up      # COPD  Uses 4L NC at home, though currently stable on RA   - Continue PTA Breo Ellipta   - Continue PTA Albuterol PRN   - IS per nursing      # Type 2 DM - Last Hgb A1c 7.5%.  On Lantus 18 units BID and Invokana 100mg every evening PTA.  Currently on Lantus 14 units BID and HSSI.  Last 24 BGs as below:            Recent Labs   Lab 01/06/22  1551 01/06/22  1334 01/06/22  1326 01/06/22  1012 01/06/22  0637 01/06/22  0404   * 204* 225* 195* 214* 192*      - Agree with continuing Lantus and HSSI, Lantus increased to 18 units BID 1/8, to 20 units on 1/10   - May need to adjust regimen if advancing diet, changes to TFs   - Hypoglycemia protocol   - PTA Jardiance on hold -> resumed 1/8, also has cardiac benefit      # Stage IV CKD   BL Cr 1.2-1.4.  Currently Cr 1.21 on 1/6; BUN 46, Cr on 1/9 is 0.99  - Following I/Os as above  - Renally dose meds  - Avoid/minimize nephrotoxic agents      # AMS, delirium, intermittent agitation - improving    In setting of subdural hematoma, s/p craniotomy, prolonged hospital stay. Per neurology may also be exacerbated by  discontinuing Keppra. Seen by Psychiatry. Lytes wnl except for hypernatremia that has now resolved. Continues to wax and wane, is not sleeping well overnight. Suspect primarily d/t ongoing delirium and requires optimization of sleep/wake cycle, untethering, frequent reorientation.   - 1/9 pt mentation is significantly improved, was able to get out of bed, converse normally, participate in all therapies  - Changes made to optimize delirium   = Discontinued PICC line, has reliable PIV access and no central access needs   = Discontinued cardiac monitoring    = Discontinued Gabapentin given patient's delirium    = Continue delirium precautions   = Patient care order -> do not disturb 10pm to 6am   = at bedtime Zyprexa ineffective, responds well to Haldol -> transitioned to 5mg PO Haldol at bedtime (1/10)    = 1/10: PO Melatonin at bedtime -> to be given about an hour before patient gets ready for sleep    = 1/10: Resume IV Haldol 2mg q6h prn in case patient wakes with severe agitation, avoid use during daytime    # Liver cirrhosis   Incidentally noted on imaging, CT AP on 11/30.  Hx neg for alcohol use disorder, IV drug use, viral hepatitis, or family history of liver disease.  Last LFTs 12/27/21 wnl.  No ascites noted on exam. Ammonia on 1/7 within normal limits. No evidence of synthetic liver dysfunction.   - GI follow up outpatient      # Anemia  Hx iron deficiency.  Likely c/b CKD.  Hgb stable mid 7's. May also be c/b underlying liver disease.  - Agree w/ ferrous gluconate   - Transfuse for Hgb < 7      # RLS  - Requip dose decreased to 1mg at bedtime (PTA 2mg)  - PTA Gabapentin on hold for delirium      # GERD   # Hx past GIB   - Agree w/ pantoprazole 40mg daily  (PTA Omeprazole 20mg)     # Moderate Malnutrition in the context of acute on chronic illness:    % Intake: Decreased intake does not meet criteria, but falls short of meeting RD's goal for adequate TF intakes.  % Weight Loss: > 10% in 6 months (severe),  based on h/o > 18% in 4 months   Subcutaneous Fat Loss: Facial region: mild buccal area (per last RD note d/t pt sleeping at time of visit today)  Muscle Loss: Facial & jaw region: mild, Thoracic region (clavicle, acromium bone, deltoid, trapezius, pectoral), Upper arm (bicep, tricep), Lower arm  (forearm), Patellar region and Posterior calf: all moderate (per last RD note d/t pt sleeping at time of visit today)  Fluid Accumulation/Edema: Unable to assess  Malnutrition Diagnosis: Moderate malnutrition in the context of acute on chronic illness  - Continue TFs  - SLP advanced diet to regular diet with thin liquids on 1/9, calorie counts ordered to start 1/10       Diet: Adult Formula Drip Feeding: Continuous Pivot 1.5; Gastrostomy; Goal Rate: 60; mL/hr; Medication - Feeding Tube Flush Frequency: At least 15-30 mL water before and after medication administration and with tube clogging  Regular Diet Adult Thin Liquids (level 0)  Calorie Counts    DVT Prophylaxis:None given recent DSH s/p evacuation   Dixon Catheter: Not present  Central Lines: None  Code Status: Full Code      Disposition Plan   Expected Discharge: 01/15/2022   Anticipated discharge location:  Awaiting care coordination huddle  Delays:     1:1 Sitter            The patient's care was discussed with the Patient and Neurosurgery Consultant, wife Kaley at bedside, bedside RN.     Shannan King MD  Hospitalist Service, [address]  Securely message with the Vocera Web Console (learn more here)  Text page via [employer] Paging/Directory    Please see sign in/sign out for up to date coverage information    _________________________________________________________________    Interval History   Awake, alert, laying in bed. Is able to follow conversation but does get intermittently confused, keeps asking me if he will have to go back to the hospital. Did have a difficult night, did not sleep much, awoke at  6am and was extremely agitated, trying to get out of bed, swinging arms and legs and yelling, required IV haldol for sedation. Other than confusion patient has no concerns today. Per RN ate good breakfast and lunch, has no pain, SOB, concerns. Wife at bedside much of the day.     Data reviewed today: I reviewed all medications, new labs and imaging results over the last 24 hours.     Physical Exam   Vital Signs: Temp: 97.7  F (36.5  C) Temp src: Oral BP: 114/59 Pulse: 82   Resp: 16 SpO2: 98 % O2 Device: None (Room air)    Weight: 197 lbs 0 oz     GENERAL: Awake, alerrt but confused, easily redirectable, following all commands, conversing easily   HEENT: Normocephalic, atraumatic. Anicteric sclera. Mucous membranes moist.  Surgical site c/d/i   CV: Irregularly irregular. S1, S2. Slight murmur.   RESPIRATORY: Effort normal on RA. Lungs CTAB with no wheezing, rales, or rhonchi.   GI: Abdomen soft and non distended, bowel sounds present x all 4 quadrants. No tenderness, rebound, or guarding. PEG tube in place, appears c/d/i.    NEUROLOGICAL: No focal deficits. Follows commands.   MUSCULOSKELETAL: No joint swelling or tenderness. Moves all extremities.   EXTREMITIES: No gross deformities. No peripheral edema.   SKIN: Grossly warm, dry, and intact. No jaundice. No rashes.     Data   Recent Labs   Lab 01/10/22  0455 01/09/22  2335 01/09/22  1956 01/09/22  1205 01/09/22  0622 01/08/22  0855 01/08/22  0634 01/08/22  0451 01/08/22  0040 01/07/22  0824 01/07/22  0741 01/05/22  0952 01/05/22  0558   WBC  --   --   --   --   --   --   --   --   --   --  5.6  --  5.8   HGB  --   --   --   --   --   --   --   --   --   --  8.2*  --  7.7*   MCV  --   --   --   --   --   --   --   --   --   --  73*  --  76*   PLT  --   --   --   --   --   --   --   --   --   --  200  --  162   NA  --   --   --   --  138  --  141  141  --  142   < > 146*   < > 151*  151*   POTASSIUM  --   --   --   --  4.3  --  4.0  --   --   --  4.0   < > 4.1    CHLORIDE  --   --   --   --  106  --  106  --   --   --  110*   < > 117*   CO2  --   --   --   --  26  --  28  --   --   --  30   < > 30   BUN  --   --   --   --  36*  --  40*  --   --   --  41*   < > 43*   CR  --   --   --   --  0.99  --  1.04  --   --   --  1.05   < > 1.11   ANIONGAP  --   --   --   --  6  --  7  --   --   --  6   < > 4   ANNMARIE  --   --   --   --  9.0  --  9.0  --   --   --  8.6   < > 9.1   * 155* 235*   < > 158*   < > 163*   < >  --    < > 188*   < > 151*   ALBUMIN  --   --   --   --   --   --  2.8*  --   --   --   --   --   --    PROTTOTAL  --   --   --   --   --   --  6.7*  --   --   --   --   --   --    BILITOTAL  --   --   --   --   --   --  0.9  --   --   --   --   --   --    ALKPHOS  --   --   --   --   --   --  151*  --   --   --   --   --   --    ALT  --   --   --   --   --   --  15  --   --   --   --   --   --    AST  --   --   --   --   --   --  20  --   --   --   --   --   --     < > = values in this interval not displayed.     No results found for this or any previous visit (from the past 24 hour(s)).  Medications     - MEDICATION INSTRUCTIONS -         aspirin  81 mg Oral Daily     atorvastatin  80 mg Oral or Feeding Tube Daily     [Held by provider] bumetanide  4 mg Oral QAM     [Held by provider] doxazosin  1 mg Oral Daily     empagliflozin  10 mg Oral or Feeding Tube Daily     ferrous gluconate  324 mg Oral or Feeding Tube Every Other Day     fluticasone-vilanterol  1 puff Inhalation Daily     heparin ANTICOAGULANT  5,000 Units Subcutaneous Q8H     insulin aspart  1-12 Units Subcutaneous Q4H     insulin glargine  18 Units Subcutaneous BID     irbesartan  75 mg Oral or Feeding Tube Daily     lacosamide  200 mg Oral or Feeding Tube BID     magnesium citrate  296 mL Oral Once     melatonin  10 mg Oral Daily     metoprolol tartrate  25 mg Oral BID     multivitamins w/minerals  15 mL Per Feeding Tube Daily     OLANZapine  15 mg Oral At Bedtime     OLANZapine  5 mg Oral BID      pantoprazole  40 mg Oral or Feeding Tube QAM AC     polyethylene glycol  17 g Oral or Feeding Tube Daily     potassium chloride  40 mEq Oral Daily     prasugrel  10 mg Oral or Feeding Tube Daily     rOPINIRole  1 mg Oral Daily     senna-docusate  2 tablet Oral or Feeding Tube BID     sodium chloride (PF)  3 mL Intracatheter Q8H     tadalafil  20 mg Oral Q24H

## 2022-01-10 NOTE — PROVIDER NOTIFICATION
Per Noc shift report, patient has a wound, Left groin, open cut.  Charge nurse has been notified, to notify the MD, for a WOC consult order.

## 2022-01-11 ENCOUNTER — APPOINTMENT (OUTPATIENT)
Dept: OCCUPATIONAL THERAPY | Facility: CLINIC | Age: 82
DRG: 025 | End: 2022-01-11
Attending: NURSE PRACTITIONER
Payer: COMMERCIAL

## 2022-01-11 ENCOUNTER — APPOINTMENT (OUTPATIENT)
Dept: PHYSICAL THERAPY | Facility: CLINIC | Age: 82
DRG: 025 | End: 2022-01-11
Payer: COMMERCIAL

## 2022-01-11 LAB
GLUCOSE BLDC GLUCOMTR-MCNC: 116 MG/DL (ref 70–99)
GLUCOSE BLDC GLUCOMTR-MCNC: 147 MG/DL (ref 70–99)
GLUCOSE BLDC GLUCOMTR-MCNC: 162 MG/DL (ref 70–99)
GLUCOSE BLDC GLUCOMTR-MCNC: 184 MG/DL (ref 70–99)
GLUCOSE BLDC GLUCOMTR-MCNC: 186 MG/DL (ref 70–99)
GLUCOSE BLDC GLUCOMTR-MCNC: 203 MG/DL (ref 70–99)
GLUCOSE BLDC GLUCOMTR-MCNC: 86 MG/DL (ref 70–99)
HOLD SPECIMEN: NORMAL
MAGNESIUM SERPL-MCNC: 2.6 MG/DL (ref 1.6–2.3)
PHOSPHATE SERPL-MCNC: 3.9 MG/DL (ref 2.5–4.5)
POTASSIUM BLD-SCNC: 4.1 MMOL/L (ref 3.4–5.3)

## 2022-01-11 PROCEDURE — 93010 ELECTROCARDIOGRAM REPORT: CPT | Performed by: INTERNAL MEDICINE

## 2022-01-11 PROCEDURE — 250N000013 HC RX MED GY IP 250 OP 250 PS 637: Performed by: NEUROLOGICAL SURGERY

## 2022-01-11 PROCEDURE — 99233 SBSQ HOSP IP/OBS HIGH 50: CPT | Performed by: INTERNAL MEDICINE

## 2022-01-11 PROCEDURE — 97530 THERAPEUTIC ACTIVITIES: CPT | Mod: GP

## 2022-01-11 PROCEDURE — 97535 SELF CARE MNGMENT TRAINING: CPT | Mod: GO

## 2022-01-11 PROCEDURE — 250N000013 HC RX MED GY IP 250 OP 250 PS 637: Performed by: NURSE PRACTITIONER

## 2022-01-11 PROCEDURE — 120N000002 HC R&B MED SURG/OB UMMC

## 2022-01-11 PROCEDURE — 93005 ELECTROCARDIOGRAM TRACING: CPT

## 2022-01-11 PROCEDURE — 84132 ASSAY OF SERUM POTASSIUM: CPT | Performed by: NURSE PRACTITIONER

## 2022-01-11 PROCEDURE — 97116 GAIT TRAINING THERAPY: CPT | Mod: GP

## 2022-01-11 PROCEDURE — 83735 ASSAY OF MAGNESIUM: CPT | Performed by: NURSE PRACTITIONER

## 2022-01-11 PROCEDURE — 250N000013 HC RX MED GY IP 250 OP 250 PS 637: Performed by: INTERNAL MEDICINE

## 2022-01-11 PROCEDURE — 84100 ASSAY OF PHOSPHORUS: CPT | Performed by: NURSE PRACTITIONER

## 2022-01-11 PROCEDURE — 97165 OT EVAL LOW COMPLEX 30 MIN: CPT | Mod: GO

## 2022-01-11 PROCEDURE — 36415 COLL VENOUS BLD VENIPUNCTURE: CPT | Performed by: NURSE PRACTITIONER

## 2022-01-11 PROCEDURE — 250N000013 HC RX MED GY IP 250 OP 250 PS 637: Performed by: STUDENT IN AN ORGANIZED HEALTH CARE EDUCATION/TRAINING PROGRAM

## 2022-01-11 PROCEDURE — 250N000011 HC RX IP 250 OP 636: Performed by: PHYSICIAN ASSISTANT

## 2022-01-11 RX ADMIN — METOPROLOL TARTRATE 25 MG: 25 TABLET, FILM COATED ORAL at 09:15

## 2022-01-11 RX ADMIN — INSULIN ASPART 1 UNITS: 100 INJECTION, SOLUTION INTRAVENOUS; SUBCUTANEOUS at 23:39

## 2022-01-11 RX ADMIN — INSULIN GLARGINE 20 UNITS: 100 INJECTION, SOLUTION SUBCUTANEOUS at 09:18

## 2022-01-11 RX ADMIN — Medication 10 MG: at 21:18

## 2022-01-11 RX ADMIN — MULTIVIT AND MINERALS-FERROUS GLUCONATE 9 MG IRON/15 ML ORAL LIQUID 15 ML: at 09:16

## 2022-01-11 RX ADMIN — OLANZAPINE 5 MG: 2.5 TABLET, FILM COATED ORAL at 09:15

## 2022-01-11 RX ADMIN — METOPROLOL TARTRATE 25 MG: 25 TABLET, FILM COATED ORAL at 20:31

## 2022-01-11 RX ADMIN — EMPAGLIFLOZIN 10 MG: 10 TABLET, FILM COATED ORAL at 09:16

## 2022-01-11 RX ADMIN — INSULIN ASPART 2 UNITS: 100 INJECTION, SOLUTION INTRAVENOUS; SUBCUTANEOUS at 00:16

## 2022-01-11 RX ADMIN — DOCUSATE SODIUM 50 MG AND SENNOSIDES 8.6 MG 2 TABLET: 8.6; 5 TABLET, FILM COATED ORAL at 09:15

## 2022-01-11 RX ADMIN — LACOSAMIDE 200 MG: 50 TABLET, FILM COATED ORAL at 09:28

## 2022-01-11 RX ADMIN — ROPINIROLE HYDROCHLORIDE 1 MG: 1 TABLET, FILM COATED ORAL at 20:32

## 2022-01-11 RX ADMIN — HYDROXYZINE HYDROCHLORIDE 50 MG: 25 TABLET ORAL at 00:12

## 2022-01-11 RX ADMIN — Medication 40 MG: at 09:15

## 2022-01-11 RX ADMIN — FLUTICASONE FUROATE AND VILANTEROL TRIFENATATE 1 PUFF: 200; 25 POWDER RESPIRATORY (INHALATION) at 09:23

## 2022-01-11 RX ADMIN — HEPARIN SODIUM 5000 UNITS: 5000 INJECTION, SOLUTION INTRAVENOUS; SUBCUTANEOUS at 22:13

## 2022-01-11 RX ADMIN — POTASSIUM CHLORIDE 40 MEQ: 1.5 POWDER, FOR SOLUTION ORAL at 09:20

## 2022-01-11 RX ADMIN — ATORVASTATIN CALCIUM 80 MG: 40 TABLET, FILM COATED ORAL at 09:14

## 2022-01-11 RX ADMIN — TADALAFIL 20 MG: 20 TABLET, FILM COATED ORAL at 09:28

## 2022-01-11 RX ADMIN — INSULIN ASPART 3 UNITS: 100 INJECTION, SOLUTION INTRAVENOUS; SUBCUTANEOUS at 12:42

## 2022-01-11 RX ADMIN — LACOSAMIDE 200 MG: 50 TABLET, FILM COATED ORAL at 20:31

## 2022-01-11 RX ADMIN — INSULIN ASPART 2 UNITS: 100 INJECTION, SOLUTION INTRAVENOUS; SUBCUTANEOUS at 20:35

## 2022-01-11 RX ADMIN — PRASUGREL 10 MG: 10 TABLET, FILM COATED ORAL at 09:14

## 2022-01-11 RX ADMIN — HALOPERIDOL 5 MG: 5 TABLET ORAL at 20:31

## 2022-01-11 RX ADMIN — ASPIRIN 81 MG: 81 TABLET, COATED ORAL at 09:15

## 2022-01-11 RX ADMIN — DOCUSATE SODIUM 50 MG AND SENNOSIDES 8.6 MG 2 TABLET: 8.6; 5 TABLET, FILM COATED ORAL at 20:31

## 2022-01-11 RX ADMIN — INSULIN GLARGINE 20 UNITS: 100 INJECTION, SOLUTION SUBCUTANEOUS at 20:32

## 2022-01-11 RX ADMIN — HEPARIN SODIUM 5000 UNITS: 5000 INJECTION, SOLUTION INTRAVENOUS; SUBCUTANEOUS at 14:34

## 2022-01-11 RX ADMIN — HEPARIN SODIUM 5000 UNITS: 5000 INJECTION, SOLUTION INTRAVENOUS; SUBCUTANEOUS at 06:56

## 2022-01-11 RX ADMIN — INSULIN ASPART 1 UNITS: 100 INJECTION, SOLUTION INTRAVENOUS; SUBCUTANEOUS at 05:43

## 2022-01-11 RX ADMIN — OLANZAPINE 5 MG: 2.5 TABLET, FILM COATED ORAL at 16:45

## 2022-01-11 ASSESSMENT — ACTIVITIES OF DAILY LIVING (ADL)
ADLS_ACUITY_SCORE: 15
ADLS_ACUITY_SCORE: 15
IADL_COMMENTS: PT AND SPOUSE SHARE IADL RESPONSIBILITIES
ADLS_ACUITY_SCORE: 15
ADLS_ACUITY_SCORE: 13
ADLS_ACUITY_SCORE: 15
ADLS_ACUITY_SCORE: 13
ADLS_ACUITY_SCORE: 15
PREVIOUS_RESPONSIBILITIES: MEAL PREP;HOUSEKEEPING;LAUNDRY;SHOPPING;YARDWORK;MEDICATION MANAGEMENT;FINANCES;DRIVING
ADLS_ACUITY_SCORE: 15
ADLS_ACUITY_SCORE: 13
ADLS_ACUITY_SCORE: 15
ADLS_ACUITY_SCORE: 13

## 2022-01-11 NOTE — PROGRESS NOTES
[facility], [place]   01/11/2022  Neurosurgery Progress Note:    Assessment:  Red Sutherland is a 81-year-old male post-traumatic acute right subdural hematoma with left hemibody weakness, on xarelto and plavix. Admitted to the ICU on 12/20/21. Taken to OR 12/21/2021. Extubated 12/22/2021. Started on Aspirin and Prasugrel on 12/28/21. Failed SLP evaluation. Free water flushes stopped since hypernatremia resolved. Lasix and Bumex given for diuresis. Received PEG on 1/3/2022. LLQ tenderness near PEG site. CT AP done 1/5 showing no obvious pathology. Medicine consulted 1/6/2022 for hypotension management, transferred to medicine as primary due to ongoing concerns for hypotension in the setting of extensive cardiac history.       Plan:  - Vimpat 200 BID  - HOB > 30 degrees  - Aspirin and Prasugrel  - Remainder of cares per  Primary team/Medicine    Asim Ann MD  Neurosurgery Resident, PGY-2    Please contact neurosurgery resident on call with questions.    Dial * * [phone], enter [phone] when prompted.   -----------------------------------    Interval History: Stable exam. Dysarthria improving.      Objective:   Temp:  [97.3  F (36.3  C)-97.7  F (36.5  C)] 97.6  F (36.4  C)  Pulse:  [63-77] 77  Resp:  [16-18] 16  BP: (100-150)/(53-80) 150/75  SpO2:  [97 %-99 %] 99 %  I/O last 3 completed shifts:  In: 2810 [P.O.:650; NG/GT:900]  Out: 300 [Urine:300]    Gen: Slightly agitated  Wound: clean, dry, intact,  Neurologic:  - Alert & Oriented to person, place, time, and situation  - Follows commands briskly x 4   - PEERL, EOMI,symmetrical facial expression  - LUE with  4+/5, rest 5/5    LABS:  Recent Labs   Lab 01/11/22  0912 01/11/22  0646 01/11/22  0543 01/11/22  0015 01/10/22  1154 01/10/22  1008 01/09/22  1205 01/09/22  0622 01/08/22  0855 01/08/22  0634   NA  --   --   --   --   --  138  --  138  --  141  141   POTASSIUM  --  4.1  --   --   --  4.3  --  4.3  --  4.0   CHLORIDE  --   --    --   --   --  106  --  106  --  106   CO2  --   --   --   --   --  25  --  26  --  28   ANIONGAP  --   --   --   --   --  7  --  6  --  7   *  --  147* 186*   < > 212*   < > 158*   < > 163*   BUN  --   --   --   --   --  36*  --  36*  --  40*   CR  --   --   --   --   --  1.04  --  0.99  --  1.04   ANNMARIE  --   --   --   --   --  9.1  --  9.0  --  9.0    < > = values in this interval not displayed.       Recent Labs   Lab 01/10/22  1008   WBC 4.9   RBC 4.05*   HGB 8.2*   HCT 29.6*   MCV 73*   MCH 20.2*   MCHC 27.7*   RDW 19.7*          IMAGING:  No results found for this or any previous visit (from the past 24 hour(s)).

## 2022-01-11 NOTE — PROGRESS NOTES
[facility]    Medicine Progress Note - Hospitalist Service, Gold 3       Date of Admission:  12/20/2021    Assessment & Plan         Red Sutherland is a 81 year old male with past medical history significant for CAD s/p PCI to LCX (2/2021 and repeat in 10/2021), HFpEF (60-65%) atrial fib on AC w/ Eliquis, HTN, HLD, COPD, pulmonary HTN, chronic bronchitis, type 2 DM, liver cirrhosis, stage 4 CKD, hx GIB, GERD and RLS transferred to [facility] from [facility] ED on 12/20 for right sided subdural hematoma with mass effect and midline shift.  He was admitted to Neurosurgery service and underwent evacuation on 12/21.  Hospital course c/b seizures, altered mentation and agitation, and failed swallow test requiring PEG tube placement (1/3).  Internal medicine is consulted 1/6 for hypotension and medical co-management, transferred to medicine formally 1/7.      Changes Today:  - Continues to sun down with night time agitation.  Required IV haldol and hydroxyzine.  Will try hydroxyzine first prior to haldol.   - Stop free water flushes, encourage PO intake, will trend Na.   - Reinforce day time activities to try to correct normal sleep wake cycle.  Mental status this morning seems improved.     # AMS, delirium, intermittent agitation - improving    In setting of subdural hematoma, s/p craniotomy, prolonged hospital stay. Per neurology may also be exacerbated by discontinuing Keppra. Seen by Psychiatry. Lytes wnl except for hypernatremia that has now resolved. Continues to wax and wane, is not sleeping well overnight. Suspect primarily d/t ongoing delirium and requires optimization of sleep/wake cycle, untethering, frequent reorientation.   - 1/9 pt mentation is significantly improved, was able to get out of bed, converse normally, participate in all therapies  - Changes made to optimize delirium                 = Discontinued PICC line, has reliable PIV access and no central access  needs                 = Discontinued cardiac monitoring                  = Discontinued Gabapentin given patient's delirium                  = Continue delirium precautions                 = Patient care order -> do not disturb 10pm to 6am                 = at bedtime Zyprexa ineffective, responds well to Haldol -> transitioned to 5mg PO Haldol at bedtime (1/10), IV haldol prn for breakthrough agitation that does not respond to redirection or atarax.                  = 1/10: PO Melatonin at bedtime -> to be given about an hour before patient gets ready for sleep                  = 1/10:  IV Haldol 2mg q6h prn in case patient wakes with severe agitation, avoid use during daytime if needed    # Hypotension - resolved   Developed 1/6, BP as low as 82/48 with concurrent hypothermia of 95.1. Medicine consulted, ultimately felt d/t medication and oversedation.  Chart reviewed, newly started on scheduled Zyprexa on 1/3 and also received PRN Haldol 2mg x 2 on 1/5, in setting of concomitant scheduled antihypertensives (metoprolol, irbesartan).  Also newly started on Doxazosin on 1/5 which can also cause hypotension d/t alpha-1 blockade. Other etiologies also considered but workup unremarkable, ultimately suspect d/t medications.   - 1/7 TSH, am cortisol wnl    - 1/7 Irbesartan decreased by half (150 -> 75mg qday) and BP has remained stable  - Bumex and Doxazosin on hold, assess need daily and resume if/when appropriate     # R subdural hematoma s/p evacuation   # Post op seizures - resolved  Fall at home 2 weeks PTA followed by persistent HA and neck pain and  presented w/ slurred speech.  Transferred from [facility] ED.  Found to have Large hyperdense holohemispheric right-sided subdural hematoma.  S/p hematoma evacuation that has been complicated by focal seizures, now resolved.   - Initially had seizures, vEEG at time of transfer to medicine demonstrated slowing, likely secondary to SDH but without seizure activity,  discontinued   - Vimpat 200mg BID for seizure ppx (had been on Keppra that was discontinued)   - Neurology signed off 1/7 -> recommend follow up in 4 months after discharge  - Neurosurgery following, appreciate recs   - Unclear if on AC prior to admission (was on DAPT), but for now given remains on DAPT and SDH with high risk falls, continuing to hold AC      # CAD s/p PCI to LCX (2/2021 and repeat in 10/2021)  # HTN   # HFpEF   # Pulmonary HTN   # RV dysfunction   Cardiology consulted early in hospital course, signed off on 12/26.  Per chart review, dry weight 212 prior to admission based on recent admission to [facility] for CHF exacerbation 11/30-12/6.  At that time, discharged on Bumex 4mg BID and Metolazone 2.5mg q MWF.  Last echocardiogram 10/23/21 with normal EF 60-65%, mild valvular aortic stenosis, RV mild to severe dilated, flattened septum c/w elevated RV pressure/hypervolemia, PASP 75mmHg.   - Continue ASA 81mg and Prasugrel 10mg daily   - 1/9 remains euvolemic, dry weight at this exam 197lbs, would consider this his new dry weight; have been holding PTA Bumex and Metolazone, will continue to hold and resume if appropriate  - Continue daily weights   - Continue tadalafil 20mg daily   - Decreased Irbesartan to 75mg qday and assess BP response, will resume other meds before titrating back up  - Doxazosin also currently on hold (is replacement for PTA Tamsulosin, but also has BP effect)       # Permanent atrial fib   Rate controlled on metoprolol 25mg BID.  Previously referred for Watchman device placement d/t hx bleeding issues.  On Eliquis 5mg BID PTA, stopped on admission d/t subdural hematoma as above.    - Continue Metoprolol 25mg BID  - Given SDH and current DAPT currently should hold AC given indication of AFib for now until neurosurgery and cardiology follow up      # COPD  Uses 4L NC at home, though currently stable on RA   - Continue PTA Breo Ellipta   - Continue PTA Albuterol PRN   - IS per  nursing      # Type 2 DM - Last Hgb A1c 7.5%.  On Lantus 18 units BID and Invokana 100mg every evening PTA.  Currently on Lantus 14 units BID and HSSI.  Last 24 BGs as below:     Recent Labs   Lab 01/11/22  1240 01/11/22  0912 01/11/22  0543 01/11/22  0015 01/10/22  2103 01/10/22  1536   * 116* 147* 186* 197* 190*      - Agree with continuing Lantus and HSSI, Lantus increased to 18 units BID 1/8, to 20 units on 1/10 if trends higher will consider slight increase tomorrow.   - May need to adjust regimen if advancing diet, changes to TFs   - Hypoglycemia protocol   - PTA Jardiance on hold -> resumed 1/8, also has cardiac benefit      # Stage IV CKD   BL Cr 1.2-1.4.  Currently Cr 1.21 on 1/6; BUN 46, Cr on 1/9 is 0.99  - Following I/Os as above  - Renally dose meds  - Avoid/minimize nephrotoxic agents         # Liver cirrhosis   Incidentally noted on imaging, CT AP on 11/30.  Hx neg for alcohol use disorder, IV drug use, viral hepatitis, or family history of liver disease.  Last LFTs 12/27/21 wnl.  No ascites noted on exam. Ammonia on 1/7 within normal limits. No evidence of synthetic liver dysfunction.   - GI follow up outpatient      # Anemia  Hx iron deficiency.  Likely c/b CKD.  Hgb stable mid 7's. May also be c/b underlying liver disease.  - Agree w/ ferrous gluconate   - Transfuse for Hgb < 7      # RLS  - Requip dose decreased to 1mg at bedtime (PTA 2mg)  - PTA Gabapentin on hold for delirium      # GERD   # Hx past GIB   - Agree w/ pantoprazole 40mg daily  (PTA Omeprazole 20mg)      # Moderate Malnutrition in the context of acute on chronic illness:    % Intake: Decreased intake does not meet criteria, but falls short of meeting RD's goal for adequate TF intakes.  % Weight Loss: > 10% in 6 months (severe), based on h/o > 18% in 4 months   Subcutaneous Fat Loss: Facial region: mild buccal area (per last RD note d/t pt sleeping at time of visit today)  Muscle Loss: Facial & jaw region: mild, Thoracic  region (clavicle, acromium bone, deltoid, trapezius, pectoral), Upper arm (bicep, tricep), Lower arm  (forearm), Patellar region and Posterior calf: all moderate (per last RD note d/t pt sleeping at time of visit today)  Fluid Accumulation/Edema: Unable to assess  Malnutrition Diagnosis: Moderate malnutrition in the context of acute on chronic illness  - Continue TFs  - SLP advanced diet to regular diet with thin liquids on 1/9, calorie counts ordered to start 1/10         Diet: Adult Formula Drip Feeding: Continuous Pivot 1.5; Gastrostomy; Goal Rate: 60; mL/hr; Medication - Feeding Tube Flush Frequency: At least 15-30 mL water before and after medication administration and with tube clogging  Regular Diet Adult Thin Liquids (level 0)  Calorie Counts    DVT Prophylaxis: Pneumatic Compression Devices  Dixon Catheter: Not present  Central Lines: None  Code Status: Full Code      Disposition Plan   Expected Discharge: 01/12/2022     Anticipated discharge location:  Awaiting care coordination huddle  Delays:     Placement - LTAC/ARU            The patient's care was discussed with the Bedside Nurse and Patient.    Rufino Wild MD  Hospitalist Service, [address]  Securely message with the Vocera Web Console (learn more here)  Text page via [employer] Paging/Directory    Please see sign in/sign out for up to date coverage information    Clinically Significant Risk Factors Present on Admission                    ______________________________________________________________________    Interval History   Patient seen and examined.  Overnight events reviewed.  This morning is slightly confused regarding what happened overnight, but overall improved.  Denies HA, dizziness, CP, N/V, abdominal pain or other symptoms currently.     Data reviewed today: I reviewed all medications, new labs and imaging results over the last 24 hours. I personally reviewed no images or  EKG's today.    Physical Exam   Vital Signs: Temp: 97.4  F (36.3  C) Temp src: Oral BP: 117/58 Pulse: 69   Resp: 16 SpO2: 100 % O2 Device: None (Room air)    Weight: 197 lbs 0 oz  General Appearance: NAD  Respiratory: CTA b/l  Cardiovascular: RRR S1/S2, no m,r,g  GI: soft, NT, ND, +BS  Skin: no rashes  Other: Trace edema.     Data   Recent Labs   Lab 01/11/22  1240 01/11/22  0912 01/11/22  0646 01/11/22  0543 01/10/22  1154 01/10/22  1008 01/09/22  1205 01/09/22  0622 01/08/22  0855 01/08/22  0634 01/07/22  0824 01/07/22  0741 01/05/22  0952 01/05/22  0558   WBC  --   --   --   --   --  4.9  --   --   --   --   --  5.6  --  5.8   HGB  --   --   --   --   --  8.2*  --   --   --   --   --  8.2*  --  7.7*   MCV  --   --   --   --   --  73*  --   --   --   --   --  73*  --  76*   PLT  --   --   --   --   --  163  --   --   --   --   --  200  --  162   NA  --   --   --   --   --  138  --  138  --  141  141   < > 146*   < > 151*  151*   POTASSIUM  --   --  4.1  --   --  4.3  --  4.3  --  4.0  --  4.0   < > 4.1   CHLORIDE  --   --   --   --   --  106  --  106  --  106  --  110*   < > 117*   CO2  --   --   --   --   --  25  --  26  --  28  --  30   < > 30   BUN  --   --   --   --   --  36*  --  36*  --  40*  --  41*   < > 43*   CR  --   --   --   --   --  1.04  --  0.99  --  1.04  --  1.05   < > 1.11   ANIONGAP  --   --   --   --   --  7  --  6  --  7  --  6   < > 4   ANNMARIE  --   --   --   --   --  9.1  --  9.0  --  9.0  --  8.6   < > 9.1   * 116*  --  147*   < > 212*   < > 158*   < > 163*   < > 188*   < > 151*   ALBUMIN  --   --   --   --   --  2.9*  --   --   --  2.8*  --   --   --   --    PROTTOTAL  --   --   --   --   --  6.8  --   --   --  6.7*  --   --   --   --    BILITOTAL  --   --   --   --   --  0.8  --   --   --  0.9  --   --   --   --    ALKPHOS  --   --   --   --   --  157*  --   --   --  151*  --   --   --   --    ALT  --   --   --   --   --  16  --   --   --  15  --   --   --   --    AST  --   --   --    --   --  21  --   --   --  20  --   --   --   --     < > = values in this interval not displayed.     No results found for this or any previous visit (from the past 24 hour(s)).  Medications     - MEDICATION INSTRUCTIONS -         aspirin  81 mg Oral Daily     atorvastatin  80 mg Oral or Feeding Tube Daily     [Held by provider] bumetanide  4 mg Oral QAM     [Held by provider] doxazosin  1 mg Oral Daily     empagliflozin  10 mg Oral or Feeding Tube Daily     ferrous gluconate  324 mg Oral or Feeding Tube Every Other Day     fluticasone-vilanterol  1 puff Inhalation Daily     haloperidol  5 mg Oral At Bedtime     heparin ANTICOAGULANT  5,000 Units Subcutaneous Q8H     insulin aspart  1-12 Units Subcutaneous Q4H     insulin glargine  20 Units Subcutaneous BID     irbesartan  75 mg Oral or Feeding Tube Daily     lacosamide  200 mg Oral or Feeding Tube BID     magnesium citrate  296 mL Oral Once     melatonin  10 mg Oral At Bedtime     melatonin  10 mg Oral Daily     metoprolol tartrate  25 mg Oral BID     multivitamins w/minerals  15 mL Per Feeding Tube Daily     OLANZapine  5 mg Oral BID     pantoprazole  40 mg Oral or Feeding Tube QAM AC     polyethylene glycol  17 g Oral or Feeding Tube Daily     potassium chloride  40 mEq Oral Daily     prasugrel  10 mg Oral or Feeding Tube Daily     rOPINIRole  1 mg Oral Daily     senna-docusate  2 tablet Oral or Feeding Tube BID     sodium chloride (PF)  3 mL Intracatheter Q8H     tadalafil  20 mg Oral Q24H

## 2022-01-11 NOTE — PROGRESS NOTES
Calorie Count  Intake recorded for: 1/10  Total Kcals: 784 Total Protein: 26g  Kcals from Hospital Food: 784  Protein: 26g  Kcals from Outside Food (average):0 Protein: 0g  # Meals Ordered from Kitchen: 3 meals  # Meals Recorded: 2 meals (First - 100% scrambled eggs, oatmeal w/ whole milk)      (Second - 100% apple juice, crystal food cake, 50% mashed potatoes)  # Supplements Recorded: 0

## 2022-01-11 NOTE — PLAN OF CARE
Status: Pt here with post-traumatic acute R SDH with L hemibody weakness. OR for evac 12/21. PEG placement 1/3/22.  Vitals: VSS, RA.  Neuros: Answers orientation questions correctly but is confused regarding time, place, and situation at times during conversation. Sun downs. L droop.  IV: PIV SL.  Resp/trach: LS CTA.  Diet: Regular diet with calorie counts.TF also running through PEG at goal rate of 60 mL/hr.   Bowel status: BS+, passing flatus.  : Voiding spontaneously with intermittent incontinence.  Skin: blanchable redness to bottom.  BLE mayuri.  Abdomen bruising.  Crani incision with staples, LEONID.  Scabbing and dry skin throughout.  Left groin redness (open wound), linda and critic care applied, as per WOC order.  Pain: Denies.  Activity: Up with 2, GB, walker.  Social: Wife at bedside this evening.   Plan: Plan for TCU when ready.  Updates this shift: Bedtime haldol given to help with sleep. Patient continues to be restless and uncooperative.Tries to get up, will not let staff touch him. PRN haldol given. Patient continues to be restless and Team paged, no new PRNs ordered.

## 2022-01-11 NOTE — PLAN OF CARE
Status: Pt here with post-traumatic acute R SDH with L hemibody weakness. OR for evac 12/21. PEG placement 1/3/22.   Vitals: Pt DND overnight (10 PM - 6 AM), previous VSS on RA.   Neuros: DND overnight. A/Ox4. Restless at the beginning of the shift but cooperative. Given PRN atarax x1 to help with sleep.   IV: PIV SL  Diet: TF running at goal rate of 60 mL/hr via PEG with 200 mL FWF q2h. Regular diet, Calorie counts.   Bowel status: No BM overnight, LBM 1/10  : Voids spont via urinal; incontinent at times.   Skin: Blanchable redness on coccyx. BLE mayuri. Abdominal bruising. Crani incision with staples LEONID. Scabbing and dry skin throughout. Open cut found in L groin area - wound orders qshift  Pain: Denies pain   Activity: Assist of two, GB, walker. Stood at bedside this shift. Walked to chair x1  Plan: Continue to monitor and follow POC.

## 2022-01-11 NOTE — PROGRESS NOTES
Care Management Follow Up    Length of Stay (days): 22    Expected Discharge Date: 01/12/2022     Patient plan of care discussed at interdisciplinary rounds: Yes    Additional Information:  In 6A Discharge Rounds nursing reported pt had IV Haldol last evening. Has tube feeding at 60cc/hr via PEG. Regular diet. Up with 1 assist & a walker. Impulsive.   Today PT & OT recommending ARU.       Gillian Andrew RN Care Coordinator  Unit 6A, [facility]

## 2022-01-11 NOTE — PLAN OF CARE
BP (!) 150/75 (BP Location: Left arm)   Pulse 77   Temp 97.6  F (36.4  C) (Oral)   Resp 16   Wt 89.4 kg (197 lb)   SpO2 99%   BMI 27.48 kg/m        0730 - 1530    Status:  post-traumatic acute R SDH with L hemibody weakness. OR for evac 12/21. PEG placement 1/3/22.   Vitals: V/S.  Neuros: Alert and oriented, but still impulsive. Bed and chair alarm in use at all time.  IV: PIV SL.  Diet: TF @ 60 ml/hr)goal rate).  GI/: BMX1/ Voiding adequate amount.  Skin: No new issue.  Pain: Denies pain   Activity: Assist of one with a walker and gait belt.   Plan: Continue to monitor and follow POC.

## 2022-01-11 NOTE — PROGRESS NOTES
01/11/22 0900   Quick Adds   Type of Visit Initial Occupational Therapy Evaluation       Present no   Language english   Living Environment   People in home spouse   Current Living Arrangements house  (townhouse)   Home Accessibility stairs to enter home   Number of Stairs, Main Entrance 3   Transportation Anticipated car, drives self   Living Environment [place]. All needs are met on the main level.    Self-Care   Usual Activity Tolerance good   Current Activity Tolerance moderate   Regular Exercise No   Equipment Currently Used at Home walker, rolling;walker, standard   Activity/Exercise/Self-Care Comment Pt was previously independent with ADL completion. Pt has a FWW at home, however does not typically use   Instrumental Activities of Daily Living (IADL)   Previous Responsibilities meal prep;housekeeping;laundry;shopping;yardwork;medication management;finances;driving   IADL Comments Pt and spouse share IADL responsibilities   Disability/Function   Hearing Difficulty or Deaf no   Patient's preferred means of communication verbal   Describe hearing loss bilateral hearing loss   Use of hearing assistive devices none   Wear Glasses or Blind no   Concentrating, Remembering or Making Decisions Difficulty no   Difficulty Communicating no   Difficulty Eating/Swallowing no   Walking or Climbing Stairs Difficulty no   Dressing/Bathing Difficulty no   Toileting issues no   Doing Errands Independently Difficulty (such as shopping) no   Fall history within last six months yes   Number of times patient has fallen within last six months 1   Change in Functional Status Since Onset of Current Illness/Injury yes   General Information   Onset of Illness/Injury or Date of Surgery 12/20/21   Referring Physician Mirna Millan APRN CNP   Patient/Family Therapy Goal Statement (OT) To return home   Additional Occupational Profile Info/Pertinent History of  Current Problem Red Sutherland is a 81-year-old male post-traumatic acute right subdural hematoma with left hemibody weakness, on xarelto and plavix. Admitted to the ICU on 12/20/21. Taken to OR 12/21/2021. Extubated 12/22/2021. Started on Aspirin and Prasugrel on 12/28/21. Failed SLP evaluation. Free water flushes stopped since hypernatremia resolved. Lasix and Bumex given for diuresis. Received PEG on 1/3/2022. LLQ tenderness near PEG site. CT AP done 1/5 showing no obvious pathology. Medicine consulted 1/6/2022 for hypotension management, transferred to medicine as primary due to ongoing concerns for hypotension in the setting of extensive cardiac history.    Existing Precautions/Restrictions other (see comments)  (crani)   Limitations/Impairments safety/cognitive   Left Upper Extremity (Weight-bearing Status) other (see comments)  (10 lbs)   Right Upper Extremity (Weight-bearing Status) other (see comments)  (10 lbs)   Left Lower Extremity (Weight-bearing Status) full weight-bearing (FWB)   Right Lower Extremity (Weight-bearing Status) full weight-bearing (FWB)   General Observations and Info Activity: up with assist   Cognitive Status Examination   Orientation Status person;place   Affect/Mental Status (Cognitive) confused   Follows Commands follows one-step commands   Cognitive Status Comments Pt is alert and oriented to person and place. Provided re-orientation to date. Pt is able to follow simple one step commands, however presents with definite confusion throughout session.    Visual Perception   Visual Impairment/Limitations WFL   Sensory   Sensory Quick Adds No deficits were identified   Integumentary/Edema   Integumentary/Edema no deficits were identifed   Range of Motion Comprehensive   General Range of Motion no range of motion deficits identified   Comment, General Range of Motion BUE ROM WFL   Strength Comprehensive (MMT)   Comment, General Manual Muscle Testing (MMT) Assessment Not formally  assessed due to post surgical precautions   Transfers   Transfers sit-stand transfer;toilet transfer   Sit-Stand Transfer   Sit-Stand [place] (Transfers) minimum assist (75% patient effort)   Assistive Device (Sit-Stand Transfers) walker, standard   Toilet Transfer   Type (Toilet Transfer) sit-stand   [place] Level (Toilet Transfer) minimum assist (75% patient effort)   Assistive Device (Toilet Transfer) walker, standard   Activities of Daily Living   BADL Assessment bathing;upper body dressing;lower body dressing;grooming;toileting   Bathing Assessment   [place] Level (Bathing) moderate assist (50% patient effort)   Upper Body Dressing Assessment   [place] Level (Upper Body Dressing) moderate assist (50% patient effort)   Lower Body Dressing Assessment   [place] Level (Lower Body Dressing) moderate assist (50% patient effort)   Grooming Assessment   [place] Level (Grooming) minimum assist (75% patient effort)   Toileting   [place] Level (Toileting) moderate assist (50% patient effort)   Clinical Impression   Criteria for Skilled Therapeutic Interventions Met (OT) yes;meets criteria;skilled treatment is necessary   OT Diagnosis decreased activity tolerance and independence with ADL completion   OT Problem List-Impairments impacting ADL problems related to;activity tolerance impaired;cognition;range of motion (ROM);strength;post-surgical precautions   Assessment of Occupational Performance 5 or more Performance Deficits   Identified Performance Deficits g/h, toileting, bathing, dressing, functional mobility and cognition   Planned Therapy Interventions (OT) ADL retraining;IADL retraining;cognition;ROM;strengthening;progressive activity/exercise   Clinical Decision Making Complexity (OT) low complexity   Therapy Frequency (OT) 6x/week   Predicted Duration of Therapy 1 week   Anticipated Equipment Needs Upon Discharge (OT) other (see comments)  (TBD)   Risk & Benefits of therapy have  been explained evaluation/treatment results reviewed;care plan/treatment goals reviewed;risks/benefits reviewed;participants voiced agreement with care plan;current/potential barriers reviewed;participants included;patient   OT Discharge Planning    OT Discharge Recommendation (DC Rec) [facility]-Motivated patient will benefit from intensive, interdisciplinary therapy.  Anticipate will be able to tolerate 3 hours of therapy per day;home with home care occupational therapy;Home with assist   OT Rationale for DC Rec Pt is below baseline and would benefit from continued skilled therapy to increase activity tolerance and independence with ADL completion. Pt is motivated, has a goos support system and is making good progress with therapy. If pt were to discharge to home, pt would require 24/7 assist, FWW, wc, shower chair, other ADL AE   OT Brief overview of current status  Ax1 + FWW   Total Evaluation Time (Minutes)   Total Evaluation Time (Minutes) 6

## 2022-01-12 ENCOUNTER — APPOINTMENT (OUTPATIENT)
Dept: PHYSICAL THERAPY | Facility: CLINIC | Age: 82
DRG: 025 | End: 2022-01-12
Payer: COMMERCIAL

## 2022-01-12 LAB
ALBUMIN SERPL-MCNC: 2.9 G/DL (ref 3.4–5)
ALP SERPL-CCNC: 169 U/L (ref 40–150)
ALT SERPL W P-5'-P-CCNC: 19 U/L (ref 0–70)
ANION GAP SERPL CALCULATED.3IONS-SCNC: 7 MMOL/L (ref 3–14)
AST SERPL W P-5'-P-CCNC: 21 U/L (ref 0–45)
ATRIAL RATE - MUSE: 75 BPM
BILIRUB SERPL-MCNC: 0.8 MG/DL (ref 0.2–1.3)
BUN SERPL-MCNC: 41 MG/DL (ref 7–30)
CALCIUM SERPL-MCNC: 9.4 MG/DL (ref 8.5–10.1)
CHLORIDE BLD-SCNC: 109 MMOL/L (ref 94–109)
CO2 SERPL-SCNC: 22 MMOL/L (ref 20–32)
CREAT SERPL-MCNC: 1.2 MG/DL (ref 0.66–1.25)
DIASTOLIC BLOOD PRESSURE - MUSE: NORMAL MMHG
ERYTHROCYTE [DISTWIDTH] IN BLOOD BY AUTOMATED COUNT: 20.6 % (ref 10–15)
GFR SERPL CREATININE-BSD FRML MDRD: 62 ML/MIN/1.73M2
GLUCOSE BLD-MCNC: 174 MG/DL (ref 70–99)
GLUCOSE BLDC GLUCOMTR-MCNC: 121 MG/DL (ref 70–99)
GLUCOSE BLDC GLUCOMTR-MCNC: 144 MG/DL (ref 70–99)
GLUCOSE BLDC GLUCOMTR-MCNC: 153 MG/DL (ref 70–99)
GLUCOSE BLDC GLUCOMTR-MCNC: 153 MG/DL (ref 70–99)
GLUCOSE BLDC GLUCOMTR-MCNC: 203 MG/DL (ref 70–99)
GLUCOSE BLDC GLUCOMTR-MCNC: 208 MG/DL (ref 70–99)
GLUCOSE BLDC GLUCOMTR-MCNC: 228 MG/DL (ref 70–99)
HCT VFR BLD AUTO: 30.5 % (ref 40–53)
HGB BLD-MCNC: 8.4 G/DL (ref 13.3–17.7)
INTERPRETATION ECG - MUSE: NORMAL
MAGNESIUM SERPL-MCNC: 2.5 MG/DL (ref 1.6–2.3)
MCH RBC QN AUTO: 20.1 PG (ref 26.5–33)
MCHC RBC AUTO-ENTMCNC: 27.5 G/DL (ref 31.5–36.5)
MCV RBC AUTO: 73 FL (ref 78–100)
P AXIS - MUSE: NORMAL DEGREES
PHOSPHATE SERPL-MCNC: 4.1 MG/DL (ref 2.5–4.5)
PLATELET # BLD AUTO: 199 10E3/UL (ref 150–450)
POTASSIUM BLD-SCNC: 4.4 MMOL/L (ref 3.4–5.3)
PR INTERVAL - MUSE: NORMAL MS
PROT SERPL-MCNC: 6.9 G/DL (ref 6.8–8.8)
QRS DURATION - MUSE: 92 MS
QT - MUSE: 402 MS
QTC - MUSE: 448 MS
R AXIS - MUSE: 98 DEGREES
RBC # BLD AUTO: 4.17 10E6/UL (ref 4.4–5.9)
SODIUM SERPL-SCNC: 138 MMOL/L (ref 133–144)
SYSTOLIC BLOOD PRESSURE - MUSE: NORMAL MMHG
T AXIS - MUSE: -12 DEGREES
VENTRICULAR RATE- MUSE: 75 BPM
WBC # BLD AUTO: 7.1 10E3/UL (ref 4–11)

## 2022-01-12 PROCEDURE — 250N000013 HC RX MED GY IP 250 OP 250 PS 637: Performed by: NURSE PRACTITIONER

## 2022-01-12 PROCEDURE — 250N000013 HC RX MED GY IP 250 OP 250 PS 637: Performed by: STUDENT IN AN ORGANIZED HEALTH CARE EDUCATION/TRAINING PROGRAM

## 2022-01-12 PROCEDURE — 250N000011 HC RX IP 250 OP 636: Performed by: INTERNAL MEDICINE

## 2022-01-12 PROCEDURE — 120N000002 HC R&B MED SURG/OB UMMC

## 2022-01-12 PROCEDURE — 250N000011 HC RX IP 250 OP 636: Performed by: PHYSICIAN ASSISTANT

## 2022-01-12 PROCEDURE — 99233 SBSQ HOSP IP/OBS HIGH 50: CPT | Performed by: INTERNAL MEDICINE

## 2022-01-12 PROCEDURE — 36415 COLL VENOUS BLD VENIPUNCTURE: CPT | Performed by: INTERNAL MEDICINE

## 2022-01-12 PROCEDURE — 97530 THERAPEUTIC ACTIVITIES: CPT | Mod: GP | Performed by: PHYSICAL THERAPIST

## 2022-01-12 PROCEDURE — 80053 COMPREHEN METABOLIC PANEL: CPT | Performed by: INTERNAL MEDICINE

## 2022-01-12 PROCEDURE — 84100 ASSAY OF PHOSPHORUS: CPT | Performed by: STUDENT IN AN ORGANIZED HEALTH CARE EDUCATION/TRAINING PROGRAM

## 2022-01-12 PROCEDURE — 97116 GAIT TRAINING THERAPY: CPT | Mod: GP | Performed by: PHYSICAL THERAPIST

## 2022-01-12 PROCEDURE — 250N000013 HC RX MED GY IP 250 OP 250 PS 637: Performed by: NEUROLOGICAL SURGERY

## 2022-01-12 PROCEDURE — 85027 COMPLETE CBC AUTOMATED: CPT | Performed by: INTERNAL MEDICINE

## 2022-01-12 PROCEDURE — 83735 ASSAY OF MAGNESIUM: CPT | Performed by: STUDENT IN AN ORGANIZED HEALTH CARE EDUCATION/TRAINING PROGRAM

## 2022-01-12 RX ADMIN — ATORVASTATIN CALCIUM 80 MG: 40 TABLET, FILM COATED ORAL at 08:31

## 2022-01-12 RX ADMIN — ROPINIROLE HYDROCHLORIDE 1 MG: 1 TABLET, FILM COATED ORAL at 19:51

## 2022-01-12 RX ADMIN — PRASUGREL 10 MG: 10 TABLET, FILM COATED ORAL at 08:28

## 2022-01-12 RX ADMIN — ASPIRIN 81 MG: 81 TABLET, COATED ORAL at 08:38

## 2022-01-12 RX ADMIN — LACOSAMIDE 200 MG: 50 TABLET, FILM COATED ORAL at 08:40

## 2022-01-12 RX ADMIN — INSULIN ASPART 1 UNITS: 100 INJECTION, SOLUTION INTRAVENOUS; SUBCUTANEOUS at 04:36

## 2022-01-12 RX ADMIN — HEPARIN SODIUM 5000 UNITS: 5000 INJECTION, SOLUTION INTRAVENOUS; SUBCUTANEOUS at 22:57

## 2022-01-12 RX ADMIN — INSULIN ASPART 1 UNITS: 100 INJECTION, SOLUTION INTRAVENOUS; SUBCUTANEOUS at 13:36

## 2022-01-12 RX ADMIN — FERROUS GLUCONATE 324 MG: 324 TABLET ORAL at 08:31

## 2022-01-12 RX ADMIN — OLANZAPINE 5 MG: 2.5 TABLET, FILM COATED ORAL at 15:23

## 2022-01-12 RX ADMIN — Medication 40 MG: at 08:32

## 2022-01-12 RX ADMIN — INSULIN ASPART 1 UNITS: 100 INJECTION, SOLUTION INTRAVENOUS; SUBCUTANEOUS at 08:33

## 2022-01-12 RX ADMIN — HEPARIN SODIUM 5000 UNITS: 5000 INJECTION, SOLUTION INTRAVENOUS; SUBCUTANEOUS at 15:23

## 2022-01-12 RX ADMIN — INSULIN GLARGINE 20 UNITS: 100 INJECTION, SOLUTION SUBCUTANEOUS at 19:52

## 2022-01-12 RX ADMIN — INSULIN ASPART 3 UNITS: 100 INJECTION, SOLUTION INTRAVENOUS; SUBCUTANEOUS at 17:43

## 2022-01-12 RX ADMIN — LACOSAMIDE 200 MG: 50 TABLET, FILM COATED ORAL at 19:51

## 2022-01-12 RX ADMIN — METOPROLOL TARTRATE 25 MG: 25 TABLET, FILM COATED ORAL at 08:32

## 2022-01-12 RX ADMIN — HALOPERIDOL 5 MG: 5 TABLET ORAL at 19:51

## 2022-01-12 RX ADMIN — POTASSIUM CHLORIDE 40 MEQ: 1.5 POWDER, FOR SOLUTION ORAL at 08:30

## 2022-01-12 RX ADMIN — TADALAFIL 20 MG: 20 TABLET, FILM COATED ORAL at 08:40

## 2022-01-12 RX ADMIN — METOPROLOL TARTRATE 25 MG: 25 TABLET, FILM COATED ORAL at 23:00

## 2022-01-12 RX ADMIN — INSULIN GLARGINE 20 UNITS: 100 INJECTION, SOLUTION SUBCUTANEOUS at 08:33

## 2022-01-12 RX ADMIN — MULTIVIT AND MINERALS-FERROUS GLUCONATE 9 MG IRON/15 ML ORAL LIQUID 15 ML: at 08:32

## 2022-01-12 RX ADMIN — EMPAGLIFLOZIN 10 MG: 10 TABLET, FILM COATED ORAL at 08:28

## 2022-01-12 RX ADMIN — HALOPERIDOL LACTATE 2 MG: 5 INJECTION, SOLUTION INTRAMUSCULAR at 23:31

## 2022-01-12 RX ADMIN — INSULIN ASPART 4 UNITS: 100 INJECTION, SOLUTION INTRAVENOUS; SUBCUTANEOUS at 19:52

## 2022-01-12 RX ADMIN — FLUTICASONE FUROATE AND VILANTEROL TRIFENATATE 1 PUFF: 200; 25 POWDER RESPIRATORY (INHALATION) at 08:32

## 2022-01-12 RX ADMIN — OLANZAPINE 5 MG: 2.5 TABLET, FILM COATED ORAL at 08:31

## 2022-01-12 ASSESSMENT — ACTIVITIES OF DAILY LIVING (ADL)
ADLS_ACUITY_SCORE: 15

## 2022-01-12 ASSESSMENT — VISUAL ACUITY
OU: BLURRED VISION

## 2022-01-12 NOTE — PLAN OF CARE
Status: Traumatic acute R SDH OR for evac 12/21. PEG placement 1/3/22.  Vitals: VSS, RA.  Neuros: A/o x4 with confusion w/w. L pupil irregular and fixed. L side weaker than R. L droop.  IV: PIV SL.  Resp/trach: LS clear, RA, O2 sats stable  Diet: Regular diet with calorie counts.TF also running through PEG at goal rate of 60 mL/hr.   Bowel status: BS+,BM 1/11 Intermittent incontinence  : Voiding w/ intermittent incontinence.  Skin: blanchable redness to bottom.  BLE mayuri.  Abdomen bruising.  Crani incision with staples, LEONID.  Scabbing and dry skin throughout.  Left groin redness (open wound)  Pain: Denies.  Activity: Up with 1, GB, walker.  Plan: Plan for TCU when ready.

## 2022-01-12 NOTE — PLAN OF CARE
[id number]  Status: Traumatic acute R SDH OR for evac 12/21. PEG placement 1/3/22.  Vitals: patient DND overnight last vital were: VSS RA ex soft BP of 93/51 @ 2100.  Neuros: Patient DND overnight, last assessment @ 2030 included: A/o x4 with confusion w/w. L pupil irregular and fixed. L side weaker than R. L droop.  IV: PIV SL.  Resp/trach: WNL  Diet: Regular diet with calorie counts. TF also running through PEG at goal rate of 60 mL/hr.   Bowel status: BS+, no BM this shift. BM 1/11. Intermittent incontinence  : Voiding w/ intermittent incontinence.  Skin: blanchable redness to bottom.  BLE mayuri.  Abdomen bruising.  Crani incision with staples, LEONID.  Scabbing and dry skin throughout.  Left groin redness (open wound)  Pain: Denies.  Activity: Up with 1, GB, walker.  Plan: Continue to monitor and follow POC. Plan for TCU when ready. Heparin held @ 0600 d/t patient continually bleeding even when primapore applied. MD paged about bleeding and told to hold 0600. primapores in place.

## 2022-01-12 NOTE — PROGRESS NOTES
[facility]    Medicine Progress Note - Hospitalist Service, Gold 3       Date of Admission:  12/20/2021    Assessment & Plan                Red Sutherland is a 81 year old male with past medical history significant for CAD s/p PCI to LCX (2/2021 and repeat in 10/2021), HFpEF (60-65%) atrial fib on AC w/ Eliquis, HTN, HLD, COPD, pulmonary HTN, chronic bronchitis, type 2 DM, liver cirrhosis, stage 4 CKD, hx GIB, GERD and RLS transferred to [facility] from [facility] ED on 12/20 for right sided subdural hematoma with mass effect and midline shift.  He was admitted to Neurosurgery service and underwent evacuation on 12/21.  Hospital course c/b seizures, altered mentation and agitation, and failed swallow test requiring PEG tube placement (1/3).  Internal medicine is consulted 1/6 for hypotension and medical co-management, transferred to medicine formally 1/7.      Changes Today:  - Continues to sun down with night time agitation.  Avoid IV haldol if possible.  If does okay tonight likely will be okay to discharge.   - Stop free water flushes, encourage PO intake, will trend Na.   - Stop zyprexia in favor of haldol.   - Reinforce day time activities to try to correct normal sleep wake cycle.  Mental status this morning seems improved.     # AMS, delirium, intermittent agitation - improving    In setting of subdural hematoma, s/p craniotomy, prolonged hospital stay. Per neurology may also be exacerbated by discontinuing Keppra. Seen by Psychiatry. Lytes wnl except for hypernatremia that has now resolved. Continues to wax and wane, is not sleeping well overnight. Suspect primarily d/t ongoing delirium and requires optimization of sleep/wake cycle, untethering, frequent reorientation.   - 1/9 pt mentation is significantly improved, was able to get out of bed, converse normally, participate in all therapies  - Changes made to optimize delirium                 = Discontinued PICC  line, has reliable PIV access and no central access needs                 = Discontinued cardiac monitoring                  = Discontinued Gabapentin given patient's delirium                  = Continue delirium precautions                 = Patient care order -> do not disturb 10pm to 6am                 = at bedtime Zyprexa ineffective, responds well to Haldol -> transitioned to 5mg PO Haldol at bedtime (1/10), IV haldol prn for breakthrough agitation that does not respond to redirection or atarax.                  = 1/10: PO Melatonin at bedtime -> to be given about an hour before patient gets ready for sleep                  = 1/10:  IV Haldol 2mg q6h prn in case patient wakes with severe agitation, avoid use during daytime if needed    # Hypotension - resolved   Developed 1/6, BP as low as 82/48 with concurrent hypothermia of 95.1. Medicine consulted, ultimately felt d/t medication and oversedation.  Chart reviewed, newly started on scheduled Zyprexa on 1/3 and also received PRN Haldol 2mg x 2 on 1/5, in setting of concomitant scheduled antihypertensives (metoprolol, irbesartan).  Also newly started on Doxazosin on 1/5 which can also cause hypotension d/t alpha-1 blockade. Other etiologies also considered but workup unremarkable, ultimately suspect d/t medications.   - 1/7 TSH, am cortisol wnl    - 1/7 Irbesartan decreased by half (150 -> 75mg qday) and BP has remained stable  - Bumex and Doxazosin on hold, assess need daily and resume if/when appropriate     # R subdural hematoma s/p evacuation   # Post op seizures - resolved  Fall at home 2 weeks PTA followed by persistent HA and neck pain and  presented w/ slurred speech.  Transferred from [facility] ED.  Found to have Large hyperdense holohemispheric right-sided subdural hematoma.  S/p hematoma evacuation that has been complicated by focal seizures, now resolved.   - Initially had seizures, vEEG at time of transfer to medicine demonstrated slowing, likely  secondary to SDH but without seizure activity, discontinued   - Vimpat 200mg BID for seizure ppx (had been on Keppra that was discontinued)   - Neurology signed off 1/7 -> recommend follow up in 4 months after discharge  - Neurosurgery following, appreciate recs   - Unclear if on AC prior to admission (was on DAPT), but for now given remains on DAPT and SDH with high risk falls, continuing to hold AC      # CAD s/p PCI to LCX (2/2021 and repeat in 10/2021)  # HTN   # HFpEF   # Pulmonary HTN   # RV dysfunction   Cardiology consulted early in hospital course, signed off on 12/26.  Per chart review, dry weight 212 prior to admission based on recent admission to [facility] for CHF exacerbation 11/30-12/6.  At that time, discharged on Bumex 4mg BID and Metolazone 2.5mg q MWF.  Last echocardiogram 10/23/21 with normal EF 60-65%, mild valvular aortic stenosis, RV mild to severe dilated, flattened septum c/w elevated RV pressure/hypervolemia, PASP 75mmHg.   - Continue ASA 81mg and Prasugrel 10mg daily   - 1/9 remains euvolemic, dry weight at this exam 197lbs, would consider this his new dry weight; have been holding PTA Bumex and Metolazone, will continue to hold and resume if appropriate  - Continue daily weights   - Continue tadalafil 20mg daily   - Decreased Irbesartan to 75mg qday and assess BP response, will resume other meds before titrating back up  - Doxazosin also currently on hold (is replacement for PTA Tamsulosin, but also has BP effect)       # Permanent atrial fib   Rate controlled on metoprolol 25mg BID.  Previously referred for Watchman device placement d/t hx bleeding issues.  On Eliquis 5mg BID PTA, stopped on admission d/t subdural hematoma as above.    - Continue Metoprolol 25mg BID  - Given SDH and current DAPT currently should hold AC given indication of AFib for now until neurosurgery and cardiology follow up      # COPD  Uses 4L NC at home, though currently stable on RA   - Continue PTA Breo Ellipta    - Continue PTA Albuterol PRN   - IS per nursing      # Type 2 DM - Last Hgb A1c 7.5%.  On Lantus 18 units BID and Invokana 100mg every evening PTA.  Currently on Lantus 14 units BID and HSSI.  Last 24 BGs as below:     Recent Labs   Lab 01/11/22  1240 01/11/22  0912 01/11/22  0543 01/11/22  0015 01/10/22  2103 01/10/22  1536   * 116* 147* 186* 197* 190*      - Agree with continuing Lantus and HSSI, Lantus increased to 18 units BID 1/8, to 20 units on 1/10 if trends higher will consider slight increase tomorrow.   - May need to adjust regimen if advancing diet, changes to TFs   - Hypoglycemia protocol   - PTA Jardiance on hold -> resumed 1/8, also has cardiac benefit      # Stage IV CKD   BL Cr 1.2-1.4.  Currently Cr 1.21 on 1/6; BUN 46, Cr on 1/9 is 0.99  - Following I/Os as above  - Renally dose meds  - Avoid/minimize nephrotoxic agents      # Liver cirrhosis   Incidentally noted on imaging, CT AP on 11/30.  Hx neg for alcohol use disorder, IV drug use, viral hepatitis, or family history of liver disease.  Last LFTs 12/27/21 wnl.  No ascites noted on exam. Ammonia on 1/7 within normal limits. No evidence of synthetic liver dysfunction.   - GI follow up outpatient      # Anemia  Hx iron deficiency.  Likely c/b CKD.  Hgb stable mid 7's. May also be c/b underlying liver disease.  - Agree w/ ferrous gluconate   - Transfuse for Hgb < 7      # RLS  - Requip dose decreased to 1mg at bedtime (PTA 2mg)  - PTA Gabapentin on hold for delirium      # GERD   # Hx past GIB   - Agree w/ pantoprazole 40mg daily  (PTA Omeprazole 20mg)      # Moderate Malnutrition in the context of acute on chronic illness:    % Intake: Decreased intake does not meet criteria, but falls short of meeting RD's goal for adequate TF intakes.  % Weight Loss: > 10% in 6 months (severe), based on h/o > 18% in 4 months   Subcutaneous Fat Loss: Facial region: mild buccal area (per last RD note d/t pt sleeping at time of visit today)  Muscle  Loss: Facial & jaw region: mild, Thoracic region (clavicle, acromium bone, deltoid, trapezius, pectoral), Upper arm (bicep, tricep), Lower arm  (forearm), Patellar region and Posterior calf: all moderate (per last RD note d/t pt sleeping at time of visit today)  Fluid Accumulation/Edema: Unable to assess  Malnutrition Diagnosis: Moderate malnutrition in the context of acute on chronic illness  - Continue TFs  - SLP advanced diet to regular diet with thin liquids on 1/9, calorie counts ordered to start 1/10           Diet: Adult Formula Drip Feeding: Continuous Pivot 1.5; Gastrostomy; Goal Rate: 60; mL/hr; Medication - Feeding Tube Flush Frequency: At least 15-30 mL water before and after medication administration and with tube clogging  Regular Diet Adult Thin Liquids (level 0)  Calorie Counts    DVT Prophylaxis: Heparin SQ  Dixon Catheter: Not present  Central Lines: None  Code Status: Full Code      Disposition Plan   Expected Discharge: 01/13/2022     Anticipated discharge location:  Awaiting care coordination huddle  Delays:     Placement - LTAC/ARU            The patient's care was discussed with the Bedside Nurse, Patient and Patient's Family.    Rufino Wild MD  Hospitalist Service, [address]  Securely message with the Vocera Web Console (learn more here)  Text page via Spunk[employer] Paging/Directory    Please see sign in/sign out for up to date coverage information    Clinically Significant Risk Factors Present on Admission                    ______________________________________________________________________    Interval History   Patient seen and examined.  More alert and oriented today.  Slept poorly but was not agitated.  Denies HA,dizziness, CP, N/V, abdominal pain or other symptoms currently.     Data reviewed today: I reviewed all medications, new labs and imaging results over the last 24 hours. I personally reviewed no images or EKG's  today.    Physical Exam   Vital Signs: Temp: 97.7  F (36.5  C) Temp src: Oral BP: 138/67 Pulse: 83   Resp: 16 SpO2: 97 % O2 Device: None (Room air)    Weight: 197 lbs 0 oz  General Appearance: NAD  Respiratory: CTA b/l  Cardiovascular: RRR S1/S2, no m,r,g  GI: soft, NT, ND, +BS  Skin: no rashes, excoriations  Other: No edema     Data   Recent Labs   Lab 01/12/22  1335 01/12/22  1148 01/12/22  0813 01/12/22  0720 01/11/22  0912 01/11/22  0646 01/10/22  1154 01/10/22  1008 01/09/22  1205 01/09/22  0622 01/07/22  0824 01/07/22  0741   WBC  --   --   --  7.1  --   --   --  4.9  --   --   --  5.6   HGB  --   --   --  8.4*  --   --   --  8.2*  --   --   --  8.2*   MCV  --   --   --  73*  --   --   --  73*  --   --   --  73*   PLT  --   --   --  199  --   --   --  163  --   --   --  200   NA  --   --   --  138  --   --   --  138  --  138   < > 146*   POTASSIUM  --   --   --  4.4  --  4.1  --  4.3  --  4.3   < > 4.0   CHLORIDE  --   --   --  109  --   --   --  106  --  106   < > 110*   CO2  --   --   --  22  --   --   --  25  --  26   < > 30   BUN  --   --   --  41*  --   --   --  36*  --  36*   < > 41*   CR  --   --   --  1.20  --   --   --  1.04  --  0.99   < > 1.05   ANIONGAP  --   --   --  7  --   --   --  7  --  6   < > 6   ANNMARIE  --   --   --  9.4  --   --   --  9.1  --  9.0   < > 8.6   * 203* 144* 174*   < >  --    < > 212*   < > 158*   < > 188*   ALBUMIN  --   --   --  2.9*  --   --   --  2.9*  --   --    < >  --    PROTTOTAL  --   --   --  6.9  --   --   --  6.8  --   --    < >  --    BILITOTAL  --   --   --  0.8  --   --   --  0.8  --   --    < >  --    ALKPHOS  --   --   --  169*  --   --   --  157*  --   --    < >  --    ALT  --   --   --  19  --   --   --  16  --   --    < >  --    AST  --   --   --  21  --   --   --  21  --   --    < >  --     < > = values in this interval not displayed.     No results found for this or any previous visit (from the past 24 hour(s)).  Medications     - MEDICATION  INSTRUCTIONS -         aspirin  81 mg Oral Daily     atorvastatin  80 mg Oral or Feeding Tube Daily     [Held by provider] bumetanide  4 mg Oral QAM     [Held by provider] doxazosin  1 mg Oral Daily     empagliflozin  10 mg Oral or Feeding Tube Daily     ferrous gluconate  324 mg Oral or Feeding Tube Every Other Day     fluticasone-vilanterol  1 puff Inhalation Daily     haloperidol  5 mg Oral At Bedtime     heparin ANTICOAGULANT  5,000 Units Subcutaneous Q8H     insulin aspart  1-12 Units Subcutaneous Q4H     insulin glargine  20 Units Subcutaneous BID     irbesartan  75 mg Oral or Feeding Tube Daily     lacosamide  200 mg Oral or Feeding Tube BID     magnesium citrate  296 mL Oral Once     melatonin  10 mg Oral At Bedtime     metoprolol tartrate  25 mg Oral BID     multivitamins w/minerals  15 mL Per Feeding Tube Daily     OLANZapine  5 mg Oral BID     pantoprazole  40 mg Oral or Feeding Tube QAM AC     polyethylene glycol  17 g Oral or Feeding Tube Daily     potassium chloride  40 mEq Oral Daily     prasugrel  10 mg Oral or Feeding Tube Daily     rOPINIRole  1 mg Oral Daily     senna-docusate  2 tablet Oral or Feeding Tube BID     sodium chloride (PF)  3 mL Intracatheter Q8H     tadalafil  20 mg Oral Q24H

## 2022-01-12 NOTE — PROGRESS NOTES
Calorie Count  Intake recorded for: 1/11  Total Kcals: 746 Total Protein: 29g  Kcals from Hospital Food: 746  Protein: 29g  Kcals from Outside Food (average):0 Protein: 0g  # Meals Ordered from Kitchen: 2 meals   # Meals Recorded: 1 meal (First - 100% oatmeal w/ brown sugar, applesauce, pudding, 2 milks, 50% English muffin w/ jelly)  # Supplements Recorded: 0

## 2022-01-12 NOTE — PROGRESS NOTES
[facility], [place]   01/12/2022  Neurosurgery Progress Note:    Assessment:  Red Sutherland is a 81-year-old male post-traumatic acute right subdural hematoma with left hemibody weakness, on xarelto and plavix. Admitted to the ICU on 12/20/21. Taken to OR 12/21/2021. Extubated 12/22/2021. Started on Aspirin and Prasugrel on 12/28/21. Failed SLP evaluation. Free water flushes stopped since hypernatremia resolved. Lasix and Bumex given for diuresis. Received PEG on 1/3/2022. LLQ tenderness near PEG site. CT AP done 1/5 showing no obvious pathology. Medicine consulted 1/6/2022 for hypotension management, transferred to medicine as primary due to ongoing concerns for hypotension in the setting of extensive cardiac history.       Plan:  - Vimpat 200 BID  - HOB > 30 degrees  - Aspirin and Prasugrel  - Remainder of cares per  Primary team/Medicine    Asim Ann MD  Neurosurgery Resident, PGY-2    Please contact neurosurgery resident on call with questions.    Dial * * [phone], enter [phone] when prompted.   -----------------------------------    Interval History: NAEO overnight.     Objective:   Temp:  [97.4  F (36.3  C)-98.7  F (37.1  C)] 98.7  F (37.1  C)  Pulse:  [69-72] 70  Resp:  [16] 16  BP: ()/(51-62) 125/62  SpO2:  [93 %-100 %] 99 %  I/O last 3 completed shifts:  In: 1290 [NG/GT:210]  Out: 620 [Urine:620]    Gen: Slightly agitated  Wound: clean, dry, intact,  Neurologic:  - Alert & Oriented to person, place, time, and situation  - Follows commands briskly x 4   - PEERL, EOMI,symmetrical facial expression  - LUE with  4+/5, rest 5/5    LABS:  Recent Labs   Lab 01/12/22  0813 01/12/22  0720 01/12/22  0423 01/11/22  0912 01/11/22  0646 01/10/22  1154 01/10/22  1008 01/09/22  1205 01/09/22  0622   NA  --  138  --   --   --   --  138  --  138   POTASSIUM  --  4.4  --   --  4.1  --  4.3  --  4.3   CHLORIDE  --  109  --   --   --   --  106  --  106   CO2  --  22  --   --   --    --  25  --  26   ANIONGAP  --  7  --   --   --   --  7  --  6   * 174* 153*   < >  --    < > 212*   < > 158*   BUN  --  41*  --   --   --   --  36*  --  36*   CR  --  1.20  --   --   --   --  1.04  --  0.99   ANNMARIE  --  9.4  --   --   --   --  9.1  --  9.0    < > = values in this interval not displayed.       Recent Labs   Lab 01/12/22  0720   WBC 7.1   RBC 4.17*   HGB 8.4*   HCT 30.5*   MCV 73*   MCH 20.1*   MCHC 27.5*   RDW 20.6*          IMAGING:  No results found for this or any previous visit (from the past 24 hour(s)).

## 2022-01-13 LAB
GLUCOSE BLDC GLUCOMTR-MCNC: 136 MG/DL (ref 70–99)
GLUCOSE BLDC GLUCOMTR-MCNC: 155 MG/DL (ref 70–99)
GLUCOSE BLDC GLUCOMTR-MCNC: 162 MG/DL (ref 70–99)
GLUCOSE BLDC GLUCOMTR-MCNC: 173 MG/DL (ref 70–99)
GLUCOSE BLDC GLUCOMTR-MCNC: 199 MG/DL (ref 70–99)
HOLD SPECIMEN: NORMAL
MAGNESIUM SERPL-MCNC: 2.6 MG/DL (ref 1.6–2.3)
PHOSPHATE SERPL-MCNC: 3.8 MG/DL (ref 2.5–4.5)
POTASSIUM BLD-SCNC: 5 MMOL/L (ref 3.4–5.3)

## 2022-01-13 PROCEDURE — 250N000013 HC RX MED GY IP 250 OP 250 PS 637: Performed by: NURSE PRACTITIONER

## 2022-01-13 PROCEDURE — 84132 ASSAY OF SERUM POTASSIUM: CPT | Performed by: INTERNAL MEDICINE

## 2022-01-13 PROCEDURE — 250N000013 HC RX MED GY IP 250 OP 250 PS 637: Performed by: NEUROLOGICAL SURGERY

## 2022-01-13 PROCEDURE — 99233 SBSQ HOSP IP/OBS HIGH 50: CPT | Performed by: INTERNAL MEDICINE

## 2022-01-13 PROCEDURE — 84100 ASSAY OF PHOSPHORUS: CPT | Performed by: INTERNAL MEDICINE

## 2022-01-13 PROCEDURE — 250N000013 HC RX MED GY IP 250 OP 250 PS 637: Performed by: INTERNAL MEDICINE

## 2022-01-13 PROCEDURE — 250N000011 HC RX IP 250 OP 636: Performed by: PHYSICIAN ASSISTANT

## 2022-01-13 PROCEDURE — 250N000013 HC RX MED GY IP 250 OP 250 PS 637: Performed by: STUDENT IN AN ORGANIZED HEALTH CARE EDUCATION/TRAINING PROGRAM

## 2022-01-13 PROCEDURE — 120N000002 HC R&B MED SURG/OB UMMC

## 2022-01-13 PROCEDURE — 83735 ASSAY OF MAGNESIUM: CPT | Performed by: INTERNAL MEDICINE

## 2022-01-13 PROCEDURE — 36415 COLL VENOUS BLD VENIPUNCTURE: CPT | Performed by: INTERNAL MEDICINE

## 2022-01-13 RX ORDER — CALCIUM CARBONATE 500 MG/1
500 TABLET, CHEWABLE ORAL DAILY PRN
Status: DISCONTINUED | OUTPATIENT
Start: 2022-01-13 | End: 2022-01-17 | Stop reason: HOSPADM

## 2022-01-13 RX ORDER — HYDROXYZINE HYDROCHLORIDE 25 MG/1
25 TABLET, FILM COATED ORAL AT BEDTIME
Status: DISCONTINUED | OUTPATIENT
Start: 2022-01-13 | End: 2022-01-17 | Stop reason: HOSPADM

## 2022-01-13 RX ADMIN — INSULIN ASPART 2 UNITS: 100 INJECTION, SOLUTION INTRAVENOUS; SUBCUTANEOUS at 09:35

## 2022-01-13 RX ADMIN — METOPROLOL TARTRATE 25 MG: 25 TABLET, FILM COATED ORAL at 20:57

## 2022-01-13 RX ADMIN — HYDROXYZINE HYDROCHLORIDE 50 MG: 25 TABLET ORAL at 00:39

## 2022-01-13 RX ADMIN — ATORVASTATIN CALCIUM 80 MG: 40 TABLET, FILM COATED ORAL at 09:22

## 2022-01-13 RX ADMIN — Medication 40 MG: at 09:21

## 2022-01-13 RX ADMIN — HEPARIN SODIUM 5000 UNITS: 5000 INJECTION, SOLUTION INTRAVENOUS; SUBCUTANEOUS at 05:30

## 2022-01-13 RX ADMIN — HALOPERIDOL 5 MG: 5 TABLET ORAL at 20:57

## 2022-01-13 RX ADMIN — Medication 10 MG: at 20:57

## 2022-01-13 RX ADMIN — METOPROLOL TARTRATE 25 MG: 25 TABLET, FILM COATED ORAL at 09:21

## 2022-01-13 RX ADMIN — FLUTICASONE FUROATE AND VILANTEROL TRIFENATATE 1 PUFF: 200; 25 POWDER RESPIRATORY (INHALATION) at 09:22

## 2022-01-13 RX ADMIN — MULTIVIT AND MINERALS-FERROUS GLUCONATE 9 MG IRON/15 ML ORAL LIQUID 15 ML: at 09:21

## 2022-01-13 RX ADMIN — INSULIN ASPART 1 UNITS: 100 INJECTION, SOLUTION INTRAVENOUS; SUBCUTANEOUS at 13:25

## 2022-01-13 RX ADMIN — ROPINIROLE HYDROCHLORIDE 1 MG: 1 TABLET, FILM COATED ORAL at 20:57

## 2022-01-13 RX ADMIN — HYDROXYZINE HYDROCHLORIDE 25 MG: 25 TABLET, FILM COATED ORAL at 20:56

## 2022-01-13 RX ADMIN — INSULIN GLARGINE 20 UNITS: 100 INJECTION, SOLUTION SUBCUTANEOUS at 20:59

## 2022-01-13 RX ADMIN — INSULIN GLARGINE 20 UNITS: 100 INJECTION, SOLUTION SUBCUTANEOUS at 09:36

## 2022-01-13 RX ADMIN — PRASUGREL 10 MG: 10 TABLET, FILM COATED ORAL at 09:21

## 2022-01-13 RX ADMIN — EMPAGLIFLOZIN 10 MG: 10 TABLET, FILM COATED ORAL at 09:22

## 2022-01-13 RX ADMIN — INSULIN ASPART 3 UNITS: 100 INJECTION, SOLUTION INTRAVENOUS; SUBCUTANEOUS at 20:59

## 2022-01-13 RX ADMIN — Medication 10 MG: at 00:39

## 2022-01-13 RX ADMIN — LACOSAMIDE 200 MG: 50 TABLET, FILM COATED ORAL at 20:56

## 2022-01-13 RX ADMIN — POTASSIUM CHLORIDE 40 MEQ: 1.5 POWDER, FOR SOLUTION ORAL at 09:21

## 2022-01-13 RX ADMIN — TADALAFIL 20 MG: 20 TABLET, FILM COATED ORAL at 09:20

## 2022-01-13 RX ADMIN — LACOSAMIDE 200 MG: 50 TABLET, FILM COATED ORAL at 09:20

## 2022-01-13 ASSESSMENT — VISUAL ACUITY
OU: BLURRED VISION

## 2022-01-13 ASSESSMENT — ACTIVITIES OF DAILY LIVING (ADL)
ADLS_ACUITY_SCORE: 14
ADLS_ACUITY_SCORE: 15
ADLS_ACUITY_SCORE: 14
ADLS_ACUITY_SCORE: 15
ADLS_ACUITY_SCORE: 14

## 2022-01-13 NOTE — PLAN OF CARE
"[id number]  Status: Pt admitted for traumatic acute R SDH OR for evac 12/21. PEG placement 1/3/22.  Vitals: VSS on RA. Soft SBPs, no intervention required.   Neuros: AO4, L pupil irregular with blurred vision- pt utilizing eye patch. L slightly weaker than R. L droop. Generalized weakness throughout. Pt w/w throughout night. Patient confused and was attempting to get to his home at 0000. PRN haldol given for agitation, yelling, and attempts to hit staff. Melatonin and PRN atarax given later after IV haldol as this was not enough to redirect behaviors.   IV: PIV SL  Labs/Electrolytes: WDL  Diet: Regular diet, taking adequate PO per days report. On calorie counts, day 3 today. TF via PEG at goal of 60 mL/hr. Patient during agitated episode stated his stomach hurt, when RN attempted to get more information patient said, \"shut the hell up and get out of my room.\" TF stopped at 0100 and MD paged for PRN tums. Patient since has been sleeping and no further complaints of stomach ache.   Bowel status: LBM 1/12. Loose stools per report.   : Voiding spontaneously, intermittent incontinence  Skin: L groin wound, blanchable coccyx, crani incision  approximated. New RUE skin tear occurring overnight while pt was restless.   Pain: Denies   Activity: Up with assist of 1, GB, walker  Social: Intermittently agitated overnight. On DND.   Plan: Awaiting ARU placement   "

## 2022-01-13 NOTE — PROGRESS NOTES
[facility]    Medicine Progress Note - Hospitalist Service, Gold 3       Date of Admission:  12/20/2021    Assessment & Plan         Red Sutherland is a 81 year old male with past medical history significant for CAD s/p PCI to LCX (2/2021 and repeat in 10/2021), HFpEF (60-65%) atrial fib on AC w/ Eliquis, HTN, HLD, COPD, pulmonary HTN, chronic bronchitis, type 2 DM, liver cirrhosis, stage 4 CKD, hx GIB, GERD and RLS transferred to [facility] from [facility] ED on 12/20 for right sided subdural hematoma with mass effect and midline shift.  He was admitted to Neurosurgery service and underwent evacuation on 12/21.  Hospital course c/b seizures, altered mentation and agitation, and failed swallow test requiring PEG tube placement (1/3).  Internal medicine is consulted 1/6 for hypotension and medical co-management, transferred to medicine formally 1/7.      Changes Today:  - Continues to sun down with night time agitation.  Avoid IV haldol if possible.  Had one good night 1/11, was increasingly agitated on 1/12 night requiring IV haldol.    - Will schedule hydroxazine at 10 pm to support sleep.  Will titrate haldol tomorrow if agitation reoccurs.  Discharge limited by the need for IV antipsychotics.     # AMS, delirium, intermittent agitation - improving    In setting of subdural hematoma, s/p craniotomy, prolonged hospital stay. Per neurology may also be exacerbated by discontinuing Keppra. Seen by Psychiatry. Lytes wnl except for hypernatremia that has now resolved. Continues to wax and wane, is not sleeping well overnight. Suspect primarily d/t ongoing delirium and requires optimization of sleep/wake cycle, untethering, frequent reorientation.   - 1/9 pt mentation is significantly improved, was able to get out of bed, converse normally, participate in all therapies  - Changes made to optimize delirium                 = Discontinued PICC line, has reliable PIV access and no  central access needs                 = Discontinued cardiac monitoring                  = Discontinued Gabapentin given patient's delirium                  = Continue delirium precautions                 = Patient care order -> do not disturb 10pm to 6am                 = at bedtime Zyprexa ineffective, responds well to Haldol -> transitioned to 5mg PO Haldol at bedtime (1/10), IV haldol prn for breakthrough agitation that does not respond to redirection or atarax.                  = 1/10: PO Melatonin at bedtime -> to be given about an hour before patient gets ready for sleep                  = 1/10:  IV Haldol 2mg q6h prn in case patient wakes with severe agitation, avoid use during daytime if needed    # Hypotension - resolved   Developed 1/6, BP as low as 82/48 with concurrent hypothermia of 95.1. Medicine consulted, ultimately felt d/t medication and oversedation.  Chart reviewed, newly started on scheduled Zyprexa on 1/3 and also received PRN Haldol 2mg x 2 on 1/5, in setting of concomitant scheduled antihypertensives (metoprolol, irbesartan).  Also newly started on Doxazosin on 1/5 which can also cause hypotension d/t alpha-1 blockade. Other etiologies also considered but workup unremarkable, ultimately suspect d/t medications.   - 1/7 TSH, am cortisol wnl    - 1/7 Irbesartan decreased by half (150 -> 75mg qday) and BP has remained stable  - Bumex and Doxazosin on hold, assess need daily and resume if/when appropriate     # R subdural hematoma s/p evacuation   # Post op seizures - resolved  PCI to LCX (2/2021 and repeat in 10/2021), HFpEF (60-65%) atrial fib on AC w/ Eliquis, HTN, HLD, COPD, pulmonary HTN, chronic bronchitis, type 2 DM, liver cirrhosis, stage 4 CKD, hx GIB, GERD and RLS transferred to [facility] from [facility] ED on 12/20 for right sided subdural hematoma with mass effect and midline shift.  He was admitted to Neurosurgery service and underwent evacuation on 12/21.  Hospital course c/b seizures,  altered mentation and agitation, and failed swallow test requiring PEG tube placement (1/3).  Internal medicine is consulted 1/6 for hypotension and medical co-management, transferred to medicine formally 1/7.      # Pulmonary HTN   # RV dysfunction   Cardiology consulted early in hospital course, signed off on 12/26.  Per chart review, dry weight 212 prior to admission based on recent admission to [facility] for CHF exacerbation 11/30-12/6.  At that time, discharged on Bumex 4mg BID and Metolazone 2.5mg q MWF.  Last echocardiogram 10/23/21 with normal EF 60-65%, mild valvular aortic stenosis, RV mild to severe dilated, flattened septum c/w elevated RV pressure/hypervolemia, PASP 75mmHg.   - Continue ASA 81mg and Prasugrel 10mg daily   - 1/9 remains euvolemic, dry weight at this exam 197lbs, would consider this his new dry weight; have been holding PTA Bumex and Metolazone, will continue to hold and resume if appropriate  - Continue daily weights   - Continue tadalafil 20mg daily   - Decreased Irbesartan to 75mg qday and assess BP response, will resume other meds before titrating back up  - Doxazosin also currently on hold (is replacement for PTA Tamsulosin, but also has BP effect)      # Permanent atrial fib   Rate controlled on metoprolol 25mg BID.  Previously referred for Watchman device placement d/t hx bleeding issues.  On Eliquis 5mg BID PTA, stopped on admission d/t subdural hematoma as above.    - Continue Metoprolol 25mg BID  - Given SDH and current DAPT currently should hold AC given indication of AFib for now until neurosurgery and cardiology follow up     # COPD  Uses 4L NC at home, though currently stable on RA   - Continue PTA Breo Ellipta   - Continue PTA Albuterol PRN   - IS per nursing      # Type 2 DM - Last Hgb A1c 7.5%.  On Lantus 18 units BID and Invokana 100mg every evening PTA.  Currently on Lantus 14 units BID and HSSI.      # Stage IV CKD   BL Cr 1.2-1.4.  Currently Cr 1.21 on 1/6; BUN 46, Cr  on 1/9 is 0.99  - Following I/Os as above  - Renally dose meds  - Avoid/minimize nephrotoxic agents      # Liver cirrhosis   Incidentally noted on imaging, CT AP on 11/30.  Hx neg for alcohol use disorder, IV drug use, viral hepatitis, or family history of liver disease.  Last LFTs 12/27/21 wnl.  No ascites noted on exam. Ammonia on 1/7 within normal limits. No evidence of synthetic liver dysfunction.   - GI follow up outpatient      # Anemia  Hx iron deficiency.  Likely c/b CKD.  Hgb stable mid 7's. May also be c/b underlying liver disease.  - Agree w/ ferrous gluconate   - Transfuse for Hgb < 7      # RLS  - Requip dose decreased to 1mg at bedtime (PTA 2mg)  - PTA Gabapentin on hold for delirium      # GERD   # Hx past GIB   - Agree w/ pantoprazole 40mg daily  (PTA Omeprazole 20mg)      # Moderate Malnutrition in the context of acute on chronic illness:    % Intake: Decreased intake does not meet criteria, but falls short of meeting RD's goal for adequate TF intakes.  % Weight Loss: > 10% in 6 months (severe), based on h/o > 18% in 4 months   Subcutaneous Fat Loss: Facial region: mild buccal area (per last RD note d/t pt sleeping at time of visit today)  Muscle Loss: Facial & jaw region: mild, Thoracic region (clavicle, acromium bone, deltoid, trapezius, pectoral), Upper arm (bicep, tricep), Lower arm  (forearm), Patellar region and Posterior calf: all moderate (per last RD note d/t pt sleeping at time of visit today)  Fluid Accumulation/Edema: Unable to assess  Malnutrition Diagnosis: Moderate malnutrition in the context of acute on chronic illness  - Continue TFs  - SLP advanced diet to regular diet with thin liquids on 1/9, calorie counts ordered to start 1/10       Diet: Adult Formula Drip Feeding: Continuous Pivot 1.5; Gastrostomy; Goal Rate: 60; mL/hr; Medication - Feeding Tube Flush Frequency: At least 15-30 mL water before and after medication administration and with tube clogging  Regular Diet Adult Thin  Liquids (level 0)    DVT Prophylaxis: Heparin SQ  Dixon Catheter: Not present  Central Lines: None  Code Status: Full Code      Disposition Plan   Expected Discharge: 01/14/2022     Anticipated discharge location:  Awaiting care coordination huddle  Delays:     Placement - LTAC/ARU  Administering IV medications            The patient's care was discussed with the Bedside Nurse and Patient.    Rufino Wild MD  Hospitalist Service, [address]  Securely message with the Vocera Web Console (learn more here)  Text page via [employer] Paging/Directory    Please see sign in/sign out for up to date coverage information    Clinically Significant Risk Factors Present on Admission                    ______________________________________________________________________    Interval History   Patient seen and examined.  Overnight events reviewed.  No HA, dizziness, CP, N/V, abdominal pain or other symptoms.     Data reviewed today: I reviewed all medications, new labs and imaging results over the last 24 hours. I personally reviewed no images or EKG's today.    Physical Exam   Vital Signs: Temp: 97.5  F (36.4  C) Temp src: Oral BP: 138/67 Pulse: 80   Resp: 16 SpO2: 98 % O2 Device: None (Room air)    Weight: 197 lbs 0 oz  General Appearance: NAD  Respiratory: CTA b/l  Cardiovascular: RRR S1/S2, no m,r,g  GI: soft, NT, ND, +BS  Skin: excoriations  Other: Trace edema    Data   Recent Labs   Lab 01/13/22  1324 01/13/22  1122 01/13/22  0935 01/13/22  0407 01/12/22  0813 01/12/22  0720 01/11/22  0912 01/11/22  0646 01/10/22  1154 01/10/22  1008 01/09/22  1205 01/09/22  0622 01/07/22  0824 01/07/22  0741   WBC  --   --   --   --   --  7.1  --   --   --  4.9  --   --   --  5.6   HGB  --   --   --   --   --  8.4*  --   --   --  8.2*  --   --   --  8.2*   MCV  --   --   --   --   --  73*  --   --   --  73*  --   --   --  73*   PLT  --   --   --   --   --  199  --   --   --  163   --   --   --  200   NA  --   --   --   --   --  138  --   --   --  138  --  138   < > 146*   POTASSIUM  --  5.0  --   --   --  4.4  --  4.1  --  4.3  --  4.3   < > 4.0   CHLORIDE  --   --   --   --   --  109  --   --   --  106  --  106   < > 110*   CO2  --   --   --   --   --  22  --   --   --  25  --  26   < > 30   BUN  --   --   --   --   --  41*  --   --   --  36*  --  36*   < > 41*   CR  --   --   --   --   --  1.20  --   --   --  1.04  --  0.99   < > 1.05   ANIONGAP  --   --   --   --   --  7  --   --   --  7  --  6   < > 6   ANNMARIE  --   --   --   --   --  9.4  --   --   --  9.1  --  9.0   < > 8.6   *  --  173* 162*   < > 174*   < >  --    < > 212*   < > 158*   < > 188*   ALBUMIN  --   --   --   --   --  2.9*  --   --   --  2.9*  --   --    < >  --    PROTTOTAL  --   --   --   --   --  6.9  --   --   --  6.8  --   --    < >  --    BILITOTAL  --   --   --   --   --  0.8  --   --   --  0.8  --   --    < >  --    ALKPHOS  --   --   --   --   --  169*  --   --   --  157*  --   --    < >  --    ALT  --   --   --   --   --  19  --   --   --  16  --   --    < >  --    AST  --   --   --   --   --  21  --   --   --  21  --   --    < >  --     < > = values in this interval not displayed.     No results found for this or any previous visit (from the past 24 hour(s)).  Medications     - MEDICATION INSTRUCTIONS -         aspirin  81 mg Oral Daily     atorvastatin  80 mg Oral or Feeding Tube Daily     [Held by provider] bumetanide  4 mg Oral QAM     [Held by provider] doxazosin  1 mg Oral Daily     empagliflozin  10 mg Oral or Feeding Tube Daily     ferrous gluconate  324 mg Oral or Feeding Tube Every Other Day     fluticasone-vilanterol  1 puff Inhalation Daily     haloperidol  5 mg Oral At Bedtime     insulin aspart  1-12 Units Subcutaneous Q4H     insulin glargine  20 Units Subcutaneous BID     irbesartan  75 mg Oral or Feeding Tube Daily     lacosamide  200 mg Oral or Feeding Tube BID     magnesium citrate  296 mL  Oral Once     melatonin  10 mg Oral At Bedtime     metoprolol tartrate  25 mg Oral BID     multivitamins w/minerals  15 mL Per Feeding Tube Daily     pantoprazole  40 mg Oral or Feeding Tube QAM AC     polyethylene glycol  17 g Oral or Feeding Tube Daily     potassium chloride  40 mEq Oral Daily     prasugrel  10 mg Oral or Feeding Tube Daily     rOPINIRole  1 mg Oral Daily     senna-docusate  2 tablet Oral or Feeding Tube BID     sodium chloride (PF)  3 mL Intracatheter Q8H     tadalafil  20 mg Oral Q24H

## 2022-01-13 NOTE — PLAN OF CARE
Status: Pt admitted for traumatic acute R SDH OR for evac 12/21. PEG placement 1/3/22.  Vitals: VSS on RA  Neuros: AO4, L pupil irregular with blurred vision. L slightly weaker than R. L droop. Generalized weakness throughout  IV: PIV SL  Labs/Electrolytes: WDL  Resp/trach: LS clear throughout  Diet: Regular diet, tolerating well. TF via PEG at goal of 60 mL/hr. Chaim counts  Bowel status: BM x 2 this shift, soft. Hold BM meds  : Voiding spontaneously, intermittent incontinence  Skin: L groin wound, blanchable coccyx, crani incision  approximated.   Pain: Denies   Activity: Up with assist of 1, GB, walker  Flu Shot Status (given or declined): Addressed  Social: Wife present throughout the day  Plan: Awaiting ARU placement   Updates this shift: Zyprexa discontinued this shift.

## 2022-01-13 NOTE — PROGRESS NOTES
CLINICAL NUTRITION SERVICES - REASSESSMENT NOTE     Nutrition Prescription    RECOMMENDATIONS FOR MDs/PROVIDERS TO ORDER:  - None at this time    Malnutrition Status:    - Moderate malnutrition in the context of acute on chronic illness      Recommendations already ordered by Registered Dietitian (RD):  - Modified TF orders to reflect cycling of TF overnight from 8 pm to 8 am @ 60 ml/hr. Regimen to provide 720 ml 1080 kcals (13 kcals/kg), 68 g PRO (0.8 g PRO/kg), 540 ml H20, 124 g CHO and 5 grams fiber.  - Order patency H20 flushes of 30 ml every 4 hrs with TF infusion  - Order wt  - Reordered calorie counts on 1/14 x 3 days    Future/Additional Recommendations:  - Monitor wt status and need to adjust dosing wt if wt loss persists  - Monitor po intakes per calorie counts and ability to stop TF     EVALUATION OF THE PROGRESS TOWARD GOALS   Nutrition Support: Continues on TF via G tube with Pivot 1.5 Chaim @ goal rate of 60 ml/hr.    Intake: Ave TF intakes received x past 7 days = 1063 ml which provides 1595 kcals (19 kcals/kg)  and 100 g PRO (1.2 g/kg)     NEW FINDINGS   Diet: adv from NPO to Full Liquid Diet Mildly Thick (on 1/7) to Regular Thin Liquids (on 1/9) per SLP recommendations    Intakes: Ave po intakes x 3 days (1/10 thru 1/12) = 960 calories and 46 g PRO. Per calorie count data yesterday, pt consuming betw 50 to 100% of meal items x 3 meals and 100% of protein shake.  - Per visit with pt this evening, pt unable to comment on the status of his appetite or his intakes at this time.    Total ave nutritional intakes from po intakes per calorie counts x 3 days and TF intakes x 7 days = 2006 kcals (24 kcals/kg) and 120 g PRO (1.5 g PRO/kg)    Weight: 89.4 kg (1/9 - last wt obtained), 89.8 kg (1/3), 95 kg (12/23); Wt fairly stable over the past week, but is down 5.6 kg (approximately 6% loss) x past 3 weeks.   Pt still with h/o 18%,weight loss over ~ 4 months PTA    Labs:  Reviewed    MALNUTRITION  % Intake:  Decreased intake does not meet criteria  % Weight Loss:.>10% in 6 months (severe), based on h/o > 18% in 4 months   Subcutaneous Fat Loss: Facial region: mild buccal area   Muscle Loss: Facial & jaw region: mild, Thoracic region (clavicle, acromium bone, deltoid, trapezius, pectoral), Upper arm (bicep, tricep), Lower arm  (forearm), Patellar region and Posterior calf: all moderate   Fluid Accumulation/Edema: None noted  Malnutrition Diagnosis: Moderate malnutrition in the context of acute on chronic illness    Previous Goals   Total avg nutritional intake to meet a minimum of 25 kcal/kg and 1.5 g PRO/kg daily (per dosing wt 82 kg).    Evaluation: Partially Met (Protein goal met)    Previous Nutrition Diagnosis  Inadequate protein-energy intake related to remains dependent on TF d/t inability to consume oral intakes, but goal infusion vol not met as evidenced by pt remains NPO for oral intakes, Ave TF intakes received x 7 days meeting only 20 kcals/kg  and 1.3 g PRO/kg and pt with wt loss h/o 18%,weight loss over ~ 4 months with additional loss of 4.6 kg (4.8% loss) x past 5 days.     Evaluation: No longer applicable, nutrition diagnosis changed below    CURRENT NUTRITION DIAGNOSIS  Inadequate energy intake related to remains dependent on TF, although po intakes gradually improving, but not yet sufficient to wean off TF completely d/t wt loss history as evidenced by Total ave nutritional intakes from po intakes and calorie counts meeting only 24 kcals/kg and h/o 18%,weight loss over ~ 4 months PTA with additional loss of 5.6 kg (approximately 6% loss) x past 3 weeks.     INTERVENTIONS  Implementation  Collaboration with RN regarding pt's po tolerance and intakes and continued need for TF  Enteral Nutrition - Modify schedule and volume  Feeding tube flush    Goals  1. Ave po intakes per calorie counts x 3 days to meet at least 75% of pt's estimated nutritional needs (1540 calories and 90 g PRO) to wean off PN.  2. Wt  not to decline < 89 kg    Monitoring/Evaluation  Progress toward goals will be monitored and evaluated per protocol.      Zahida Peña RD,LD  6A pager [phone]

## 2022-01-13 NOTE — PROGRESS NOTES
[facility], [place]   01/13/2022  Neurosurgery Progress Note:    Assessment:  Red Sutherland is a 81-year-old male post-traumatic acute right subdural hematoma with left hemibody weakness, on xarelto and plavix. Admitted to the ICU on 12/20/21. Taken to OR 12/21/2021. Extubated 12/22/2021. Started on Aspirin and Prasugrel on 12/28/21. Failed SLP evaluation. Free water flushes stopped since hypernatremia resolved. Lasix and Bumex given for diuresis. Received PEG on 1/3/2022. LLQ tenderness near PEG site. CT AP done 1/5 showing no obvious pathology. Medicine consulted 1/6/2022 for hypotension management, transferred to medicine as primary due to ongoing concerns for hypotension in the setting of extensive cardiac history.  Steadily improving and patient delirium treated with delirium precautions, and Haldol.  Stable from neurosurgical perspective.    Plan:  - Vimpat 200 BID  - We will plan for CT before discharge  - HOB > 30 degrees  - Aspirin and Prasugrel  - Remainder of cares per  Primary team/Medicine    Asim Ann MD  Neurosurgery Resident, PGY-2    Please contact neurosurgery resident on call with questions.    Dial * * [phone], enter [phone] when prompted.   -----------------------------------    Interval History: NAEO overnight.     Objective:   Temp:  [97.5  F (36.4  C)-97.7  F (36.5  C)] 97.5  F (36.4  C)  Pulse:  [80-88] 80  Resp:  [16] 16  BP: (100-138)/(49-74) 138/67  SpO2:  [97 %-100 %] 98 %  I/O last 3 completed shifts:  In: 1200 [NG/GT:120]  Out: -   General: Calm conversant not appearing agitated  : Wound staples removed, incision appears she will no areas of erythema or skin breakdown  Neurologic awake eyes open conversant, and A&O x3  Following commands x4 briskly  Pupils equals equal round reactive to light 4 mm extraocular movements intact  Mild left-sided facial droop  Gen: Slightly agitated  LABS:  Recent Labs   Lab 01/13/22  1122 01/13/22  0935 01/13/22  0407  01/12/22 2257 01/12/22  0813 01/12/22  0720 01/11/22  0912 01/11/22  0646 01/10/22  1154 01/10/22  1008 01/09/22  1205 01/09/22  0622   NA  --   --   --   --   --  138  --   --   --  138  --  138   POTASSIUM 5.0  --   --   --   --  4.4  --  4.1  --  4.3  --  4.3   CHLORIDE  --   --   --   --   --  109  --   --   --  106  --  106   CO2  --   --   --   --   --  22  --   --   --  25  --  26   ANIONGAP  --   --   --   --   --  7  --   --   --  7  --  6   GLC  --  173* 162* 121*   < > 174*   < >  --    < > 212*   < > 158*   BUN  --   --   --   --   --  41*  --   --   --  36*  --  36*   CR  --   --   --   --   --  1.20  --   --   --  1.04  --  0.99   ANNMARIE  --   --   --   --   --  9.4  --   --   --  9.1  --  9.0    < > = values in this interval not displayed.       Recent Labs   Lab 01/12/22  0720   WBC 7.1   RBC 4.17*   HGB 8.4*   HCT 30.5*   MCV 73*   MCH 20.1*   MCHC 27.5*   RDW 20.6*          IMAGING:  No results found for this or any previous visit (from the past 24 hour(s)).

## 2022-01-13 NOTE — PROGRESS NOTES
Care Management Follow Up    Length of Stay (days): 24    Expected Discharge Date: 01/14/2022     Concerns to be Addressed:   Disposition planning    Patient plan of care discussed at interdisciplinary rounds: Yes    Anticipated Discharge Disposition:  Rehab placement     Anticipated Discharge Services:  Rehab placement  Anticipated Discharge DME:  Not applicable at this time    Patient/family educated on Medicare website which has current facility and service quality ratings:  yes  Education Provided on the Discharge Plan:  yes  Patient/Family in Agreement with the Plan:  yes    Referrals Placed by CM/SW:  Post acute care facilities  Private pay costs discussed: not applicable at this time    Additional Information:  SW is following pt for discharge planning.  OT and Physical Therapy are recommending a ARU stay.  On 1/10/2022, pt was referred to  ARU.    SW attended unit rounds this am.  Per report, pt received IV Haldol last night due to behaviors.  Per rounds report, med adjustments are being made.      SW received a call from [facility] (Zahida).  Zahida states that she is following pt but adds that pt has not yet been approved for admit.  Zahida states that as pt received IV Haldol last night for behaviors - pt is not yet medically ready for discharge.    SW will continue to follow for discharge planning.    DERIK Reddy  Social Work, 6A  Phone:  [phone]  Pager:  [phone]  1/13/2022          CHRISTAL Masterson

## 2022-01-13 NOTE — PROGRESS NOTES
Calorie Count  Intake recorded for: 1/12  Total Kcals: 1350 Total Protein: 85g  Kcals from Hospital Food: 1200  Protein: 55g  Kcals from Outside Food (average):150 Protein: 30g  # Meals Ordered from Kitchen: 3 meals   # Meals Recorded: 3 meals (First - 100% 1.5 slices of toast w/ jelly, orange juice, coffee, 75% omelet w/ ham & onions, less than 25% izquierdo)       (Second - 100% grilled cheese, 50% tomato soup, banana, diet lemonade, milk)      (Third - 100% crystal food cake w/ strawberries & topping, milk, mashed potatoes w/ gravy, 50% chicken w/ gravy)  # Supplements Recorded: 100% 1 [employer] protein shake - from outside the hospital

## 2022-01-14 ENCOUNTER — APPOINTMENT (OUTPATIENT)
Dept: OCCUPATIONAL THERAPY | Facility: CLINIC | Age: 82
DRG: 025 | End: 2022-01-14
Payer: COMMERCIAL

## 2022-01-14 ENCOUNTER — APPOINTMENT (OUTPATIENT)
Dept: PHYSICAL THERAPY | Facility: CLINIC | Age: 82
DRG: 025 | End: 2022-01-14
Payer: COMMERCIAL

## 2022-01-14 ENCOUNTER — APPOINTMENT (OUTPATIENT)
Dept: CT IMAGING | Facility: CLINIC | Age: 82
DRG: 025 | End: 2022-01-14
Payer: COMMERCIAL

## 2022-01-14 LAB
ALBUMIN SERPL-MCNC: 2.8 G/DL (ref 3.4–5)
ALP SERPL-CCNC: 167 U/L (ref 40–150)
ALT SERPL W P-5'-P-CCNC: 18 U/L (ref 0–70)
ANION GAP SERPL CALCULATED.3IONS-SCNC: 8 MMOL/L (ref 3–14)
AST SERPL W P-5'-P-CCNC: 17 U/L (ref 0–45)
BASOPHILS # BLD AUTO: 0 10E3/UL (ref 0–0.2)
BASOPHILS NFR BLD AUTO: 1 %
BILIRUB SERPL-MCNC: 0.9 MG/DL (ref 0.2–1.3)
BUN SERPL-MCNC: 44 MG/DL (ref 7–30)
BURR CELLS BLD QL SMEAR: SLIGHT
CALCIUM SERPL-MCNC: 8.7 MG/DL (ref 8.5–10.1)
CHLORIDE BLD-SCNC: 109 MMOL/L (ref 94–109)
CO2 SERPL-SCNC: 22 MMOL/L (ref 20–32)
CREAT SERPL-MCNC: 1.1 MG/DL (ref 0.66–1.25)
ELLIPTOCYTES BLD QL SMEAR: SLIGHT
EOSINOPHIL # BLD AUTO: 0.3 10E3/UL (ref 0–0.7)
EOSINOPHIL NFR BLD AUTO: 4 %
ERYTHROCYTE [DISTWIDTH] IN BLOOD BY AUTOMATED COUNT: 20.7 % (ref 10–15)
GFR SERPL CREATININE-BSD FRML MDRD: 68 ML/MIN/1.73M2
GLUCOSE BLD-MCNC: 164 MG/DL (ref 70–99)
GLUCOSE BLDC GLUCOMTR-MCNC: 136 MG/DL (ref 70–99)
GLUCOSE BLDC GLUCOMTR-MCNC: 148 MG/DL (ref 70–99)
GLUCOSE BLDC GLUCOMTR-MCNC: 154 MG/DL (ref 70–99)
GLUCOSE BLDC GLUCOMTR-MCNC: 163 MG/DL (ref 70–99)
GLUCOSE BLDC GLUCOMTR-MCNC: 179 MG/DL (ref 70–99)
GLUCOSE BLDC GLUCOMTR-MCNC: 218 MG/DL (ref 70–99)
HCT VFR BLD AUTO: 28.6 % (ref 40–53)
HGB BLD-MCNC: 7.9 G/DL (ref 13.3–17.7)
IMM GRANULOCYTES # BLD: 0.1 10E3/UL
IMM GRANULOCYTES NFR BLD: 1 %
LYMPHOCYTES # BLD AUTO: 0.8 10E3/UL (ref 0.8–5.3)
LYMPHOCYTES NFR BLD AUTO: 12 %
MAGNESIUM SERPL-MCNC: 2.5 MG/DL (ref 1.6–2.3)
MCH RBC QN AUTO: 20.4 PG (ref 26.5–33)
MCHC RBC AUTO-ENTMCNC: 27.6 G/DL (ref 31.5–36.5)
MCV RBC AUTO: 74 FL (ref 78–100)
MONOCYTES # BLD AUTO: 0.7 10E3/UL (ref 0–1.3)
MONOCYTES NFR BLD AUTO: 10 %
NEUTROPHILS # BLD AUTO: 4.7 10E3/UL (ref 1.6–8.3)
NEUTROPHILS NFR BLD AUTO: 72 %
NRBC # BLD AUTO: 0 10E3/UL
NRBC BLD AUTO-RTO: 0 /100
PHOSPHATE SERPL-MCNC: 4.1 MG/DL (ref 2.5–4.5)
PLAT MORPH BLD: ABNORMAL
PLATELET # BLD AUTO: 199 10E3/UL (ref 150–450)
POLYCHROMASIA BLD QL SMEAR: SLIGHT
POTASSIUM BLD-SCNC: 4.4 MMOL/L (ref 3.4–5.3)
PROT SERPL-MCNC: 6.4 G/DL (ref 6.8–8.8)
RBC # BLD AUTO: 3.87 10E6/UL (ref 4.4–5.9)
RBC MORPH BLD: ABNORMAL
SODIUM SERPL-SCNC: 139 MMOL/L (ref 133–144)
WBC # BLD AUTO: 6.5 10E3/UL (ref 4–11)

## 2022-01-14 PROCEDURE — 36415 COLL VENOUS BLD VENIPUNCTURE: CPT | Performed by: INTERNAL MEDICINE

## 2022-01-14 PROCEDURE — 85025 COMPLETE CBC W/AUTO DIFF WBC: CPT | Performed by: INTERNAL MEDICINE

## 2022-01-14 PROCEDURE — 99233 SBSQ HOSP IP/OBS HIGH 50: CPT | Performed by: INTERNAL MEDICINE

## 2022-01-14 PROCEDURE — 250N000013 HC RX MED GY IP 250 OP 250 PS 637: Performed by: INTERNAL MEDICINE

## 2022-01-14 PROCEDURE — 250N000013 HC RX MED GY IP 250 OP 250 PS 637: Performed by: NURSE PRACTITIONER

## 2022-01-14 PROCEDURE — 250N000012 HC RX MED GY IP 250 OP 636 PS 637: Performed by: INTERNAL MEDICINE

## 2022-01-14 PROCEDURE — 70450 CT HEAD/BRAIN W/O DYE: CPT | Mod: 26 | Performed by: RADIOLOGY

## 2022-01-14 PROCEDURE — 97116 GAIT TRAINING THERAPY: CPT | Mod: GP | Performed by: PHYSICAL THERAPIST

## 2022-01-14 PROCEDURE — 120N000002 HC R&B MED SURG/OB UMMC

## 2022-01-14 PROCEDURE — 250N000013 HC RX MED GY IP 250 OP 250 PS 637: Performed by: STUDENT IN AN ORGANIZED HEALTH CARE EDUCATION/TRAINING PROGRAM

## 2022-01-14 PROCEDURE — 84100 ASSAY OF PHOSPHORUS: CPT | Performed by: STUDENT IN AN ORGANIZED HEALTH CARE EDUCATION/TRAINING PROGRAM

## 2022-01-14 PROCEDURE — 80053 COMPREHEN METABOLIC PANEL: CPT | Performed by: INTERNAL MEDICINE

## 2022-01-14 PROCEDURE — 83735 ASSAY OF MAGNESIUM: CPT | Performed by: STUDENT IN AN ORGANIZED HEALTH CARE EDUCATION/TRAINING PROGRAM

## 2022-01-14 PROCEDURE — 97535 SELF CARE MNGMENT TRAINING: CPT | Mod: GO

## 2022-01-14 PROCEDURE — 250N000013 HC RX MED GY IP 250 OP 250 PS 637: Performed by: NEUROLOGICAL SURGERY

## 2022-01-14 PROCEDURE — 70450 CT HEAD/BRAIN W/O DYE: CPT

## 2022-01-14 PROCEDURE — 82040 ASSAY OF SERUM ALBUMIN: CPT | Performed by: INTERNAL MEDICINE

## 2022-01-14 RX ORDER — ACETAMINOPHEN 325 MG/1
975 TABLET ORAL EVERY 8 HOURS PRN
Status: DISCONTINUED | OUTPATIENT
Start: 2022-01-14 | End: 2022-01-17 | Stop reason: HOSPADM

## 2022-01-14 RX ORDER — LIDOCAINE 4 G/G
1 PATCH TOPICAL
Status: DISCONTINUED | OUTPATIENT
Start: 2022-01-15 | End: 2022-01-17 | Stop reason: HOSPADM

## 2022-01-14 RX ADMIN — INSULIN ASPART 1 UNITS: 100 INJECTION, SOLUTION INTRAVENOUS; SUBCUTANEOUS at 11:26

## 2022-01-14 RX ADMIN — INSULIN GLARGINE 20 UNITS: 100 INJECTION, SOLUTION SUBCUTANEOUS at 10:17

## 2022-01-14 RX ADMIN — METOPROLOL TARTRATE 25 MG: 25 TABLET, FILM COATED ORAL at 20:38

## 2022-01-14 RX ADMIN — LACOSAMIDE 200 MG: 50 TABLET, FILM COATED ORAL at 20:38

## 2022-01-14 RX ADMIN — LACOSAMIDE 200 MG: 50 TABLET, FILM COATED ORAL at 09:11

## 2022-01-14 RX ADMIN — INSULIN ASPART 4 UNITS: 100 INJECTION, SOLUTION INTRAVENOUS; SUBCUTANEOUS at 20:37

## 2022-01-14 RX ADMIN — INSULIN GLARGINE 20 UNITS: 100 INJECTION, SOLUTION SUBCUTANEOUS at 20:37

## 2022-01-14 RX ADMIN — TADALAFIL 20 MG: 20 TABLET, FILM COATED ORAL at 09:11

## 2022-01-14 RX ADMIN — ATORVASTATIN CALCIUM 80 MG: 40 TABLET, FILM COATED ORAL at 09:10

## 2022-01-14 RX ADMIN — ASPIRIN 81 MG: 81 TABLET, COATED ORAL at 09:09

## 2022-01-14 RX ADMIN — POTASSIUM CHLORIDE 40 MEQ: 1.5 POWDER, FOR SOLUTION ORAL at 09:09

## 2022-01-14 RX ADMIN — METOPROLOL TARTRATE 25 MG: 25 TABLET, FILM COATED ORAL at 09:09

## 2022-01-14 RX ADMIN — HYDROXYZINE HYDROCHLORIDE 25 MG: 25 TABLET, FILM COATED ORAL at 22:07

## 2022-01-14 RX ADMIN — Medication 40 MG: at 09:12

## 2022-01-14 RX ADMIN — FERROUS GLUCONATE 324 MG: 324 TABLET ORAL at 09:10

## 2022-01-14 RX ADMIN — Medication 10 MG: at 22:07

## 2022-01-14 RX ADMIN — ROPINIROLE HYDROCHLORIDE 1 MG: 1 TABLET, FILM COATED ORAL at 20:38

## 2022-01-14 RX ADMIN — HALOPERIDOL 5 MG: 5 TABLET ORAL at 20:38

## 2022-01-14 RX ADMIN — PRASUGREL 10 MG: 10 TABLET, FILM COATED ORAL at 09:11

## 2022-01-14 RX ADMIN — MULTIVIT AND MINERALS-FERROUS GLUCONATE 9 MG IRON/15 ML ORAL LIQUID 15 ML: at 09:12

## 2022-01-14 RX ADMIN — INSULIN ASPART 1 UNITS: 100 INJECTION, SOLUTION INTRAVENOUS; SUBCUTANEOUS at 04:00

## 2022-01-14 RX ADMIN — EMPAGLIFLOZIN 10 MG: 10 TABLET, FILM COATED ORAL at 09:11

## 2022-01-14 RX ADMIN — INSULIN ASPART 1 UNITS: 100 INJECTION, SOLUTION INTRAVENOUS; SUBCUTANEOUS at 01:25

## 2022-01-14 RX ADMIN — FLUTICASONE FUROATE AND VILANTEROL TRIFENATATE 1 PUFF: 200; 25 POWDER RESPIRATORY (INHALATION) at 09:28

## 2022-01-14 RX ADMIN — INSULIN ASPART 2 UNITS: 100 INJECTION, SOLUTION INTRAVENOUS; SUBCUTANEOUS at 16:11

## 2022-01-14 ASSESSMENT — ACTIVITIES OF DAILY LIVING (ADL)
ADLS_ACUITY_SCORE: 14
ADLS_ACUITY_SCORE: 14
ADLS_ACUITY_SCORE: 10
ADLS_ACUITY_SCORE: 14
ADLS_ACUITY_SCORE: 10
ADLS_ACUITY_SCORE: 14
ADLS_ACUITY_SCORE: 12
ADLS_ACUITY_SCORE: 12
ADLS_ACUITY_SCORE: 14
ADLS_ACUITY_SCORE: 10
ADLS_ACUITY_SCORE: 14
ADLS_ACUITY_SCORE: 14
ADLS_ACUITY_SCORE: 12
ADLS_ACUITY_SCORE: 14

## 2022-01-14 NOTE — PLAN OF CARE
[id number]  Status: Pt admitted for traumatic acute R SDH OR for evac 12/21. PEG placement 1/3/22.  Vitals: VSS on RA.  Neuros: AO4, L pupil irregular with blurred vision- pt utilizing eye patch. L slightly weaker than R. L droop. Generalized weakness throughout. Patient awaking frequently throughout the night but redirectable.   IV: PIV SL  Labs/Electrolytes: WDL  Diet: Regular diet, taking adequate PO per days report. On calorie counts day 1 today. On cyclical TF from 8P-8A.   Bowel status: LBM 1/13.   : Voiding spontaneously, intermittent incontinence  Skin: L groin wound, blanchable coccyx, crani incision  approximated. Bruised abdomen and bandages from previous heparin sites.   Pain: Denies   Activity: Up with assist of NINO Diaz walker  Social: Pleasant and cooperative overnight. On DND.   Plan: Awaiting ARU placement

## 2022-01-14 NOTE — PROGRESS NOTES
Care Management Follow Up    Length of Stay (days): 25    Expected Discharge Date:   1/15/2022     Concerns to be Addressed:   Disposition planning    Patient plan of care discussed at interdisciplinary rounds: Yes    Anticipated Discharge Disposition:  Therapy's are currently recommending ARU placement     Anticipated Discharge Services:   ARU placement  Anticipated Discharge DME:  Not applicable at this time    Patient/family educated on Medicare website which has current facility and service quality ratings:  Yes  Education Provided on the Discharge Plan:  Yes  Patient/Family in Agreement with the Plan:  Yes    Referrals Placed by CM/SW:   Post Acute Care Facilities  Private pay costs discussed: Not applicable at this time    Additional Information:  BENJA is following pt for discharge planning.  OT and Physical Therapy are currently recommending ARU placement.  Pt's sitter has been off since 1/7/2022 and restraints (mitts) have been off since 1/5/2022.  Pt last required IV haldol on 1/13/2022 at 2057.  BENJA phoned Admissions (Zahida) [facility] and inquired as to whether or not they will consider pt for admit today.  Zahida asks the cause of pt's confusion.    Zahida states that pt needs to display consistent appropriate behavior before they will consider pt for admit (pt has not yet [facility]).  Zahida indicates that pt will need to be free of any form of restraint for 48 hours (this includes chemical restraints, IV haldol) consecutively before they will consider [facility].   BENJA spoke with Dr. Wild (Gold 3) who attributes pt's confusion to hospital acquired delirium that will resolve and has already improved.   Dr. Wild indicates that the IV haldol order will be discontinued.  Dr. Wild indicates that pt will be ready for discharge tomorrow if no behavioral issues over the next 24 hours.  BENJA phoned Zahida and updated in regards to this BENJA's conversation with .  Zahida  adds that  pt has an order for oral haldol and states that pt must also be off oral haldol if the oral haldol is given for behaviors.  BENJA updated Dr. Wild who states that the oral haldol is working well for pt.  Dr. Wild states that [facility] and [facility] with [facility]ida about the oral haldol  (BENJA provided contact information).      BENJA will continue to follow for discharge planning.      DERIK Reddy  Social Work, 6A  Phone:  [phone]  Pager:  [phone]  1/14/2022          CHRISTAL Masterson

## 2022-01-14 NOTE — PLAN OF CARE
Status: Admitted on 12/20 for traumatic acute right SDH, OR for evacuation on 12/21. PEG placed on 1/3/22.   Vitals: VSS on RA.   Neuros: A&Ox4, L pupil irregular. L facial droop. Blurry vision in left eye-wears eye patch at times. R ear [place] compared to left ear, per pt baseline. Denies N/T, LUE 3/5, RUE 4/5, LLE 4/5, RLE 5/5. Calm and cooperative at this time.   IV: R PIV SLd.   Labs/Electrolytes: WDL. Electrolyte rechecks scheduled.   Resp/trach: WDL. Sating mid-high 90s on RA, LS clear, equal bilaterally.   Diet: Regular. Tolerating good PO intake at this time. Chaim counts. Pt concerned about weight, encouraged to continue oral intake-notified dietician. Pt agreeable. Cyclical tube feeds, [id number].    Bowel status: BS+, LBM this shift 1/14/22.   : Voiding spontaneously without difficulty.   Skin: L groin wound cares completed, blanchable redness to coccyx/sacrum. Crani incision CDI, approximated. Bruised abdomen, r/t previous heparin injection sites.    Pain: Denies.   Activity: Up with A1 GB Walker. Calling appropriately this shift. Up to chair for most of shift visiting with their wife.   Social: Pleasant this shift, wife Kaley at bedside, supportive of cares.   Plan: Per team, if pt continues to have no behavioral outbursts for another night requiring no IV haldol or other rescue behavioral meds, will be ready to discharge tomorrow. ARU awaiting placement at this time.  Updates this shift: Head CT completed. Walked with PT, worked with OT.

## 2022-01-14 NOTE — PROVIDER NOTIFICATION
Notified Rufino Wild MD regarding: FYI per NSG would like to be notified prior to discharge in order to obtain head CT.

## 2022-01-14 NOTE — PROGRESS NOTES
Brief Entry:   BENJA received a call from  [facility] Admissions (Zahida) who states that she was mistaken in regards to oral haldol.  Zahida indicates that oral haldol is not a barrier to placement at  ARU (per Zahida is it only a barrier to  TCU) and thus, Dr Wild does not need to call her to discuss the oral Haldol.  BENJA text paged Dr. Wild and updated.    DERIK Reddy  Social Work, 6A  Phone:  [phone]  Pager:  [phone]  1/14/2022

## 2022-01-14 NOTE — PROGRESS NOTES
[facility], [place]   01/14/2022  Neurosurgery Progress Note:    Assessment:  Red Sutherland is a 81-year-old male post-traumatic acute right subdural hematoma with left hemibody weakness, on xarelto and plavix. Admitted to the ICU on 12/20/21. Taken to OR 12/21/2021. Extubated 12/22/2021. Started on Aspirin and Prasugrel on 12/28/21. Failed SLP evaluation. Free water flushes stopped since hypernatremia resolved. Lasix and Bumex given for diuresis. Received PEG on 1/3/2022. LLQ tenderness near PEG site. CT AP done 1/5 showing no obvious pathology. Medicine consulted 1/6/2022 for hypotension management, transferred to medicine as primary due to ongoing concerns for hypotension in the setting of extensive cardiac history.  Steadily improving and patient delirium treated with delirium precautions, and Haldol.  Stable from neurosurgical perspective.    Plan:  - Will need CT before discharge-tentative plan for CT Monday if still inpatient.  - Continue Vimpat 200 twice daily for seizure history  - CT 2 weeks after discharge and will be seen in clinic with GEORGETTE  - Continue aspirin and prasugrel for recent PCI history  - Remainder of cares per primary team    Asim Ann MD  Neurosurgery Resident, PGY-2    Please contact neurosurgery resident on call with questions.    Dial * * *[phone], enter [phone] when prompted.   -----------------------------------    Interval History: Neurological exam remained stable.  Continues to harrison delirium overnight requiring IV Haldol.    Objective:   Temp:  [98.1  F (36.7  C)-98.6  F (37  C)] 98.6  F (37  C)  Pulse:  [84-88] 84  Resp:  [16-18] 18  BP: (105-132)/(74-85) 132/74  SpO2:  [96 %-98 %] 97 %  I/O last 3 completed shifts:  In: 1650 [NG/GT:210]  Out: 300 [Urine:300]    General awake alert, appearing stated age, nonacute distress, pleasant  Wound: Right-sided incision appears well-healed, no areas of redness or erythema, no areas of skin breakdown, edges  well approximated  Pulm: On room air  MSK: Normal  Neurologic:  -Awake, alert, oriented to person place and time  - Pupils equal round reactive to light 3 mm bilaterally, extraocular movements intact, visual fields intact grossly  - Mild left-sided facial droop, intact sensation in V1 V2, V3  - Trace 4+ out of 5 strength in deltoid, otherwise full strength everywhere else  - Full sensation to light touch in all extremities  LABS:  Recent Labs   Lab 01/14/22  0902 01/14/22  0649 01/14/22  0359 01/13/22  1324 01/13/22  1122 01/12/22  0813 01/12/22  0720 01/10/22  1154 01/10/22  1008   NA  --  139  --   --   --   --  138  --  138   POTASSIUM  --  4.4  --   --  5.0  --  4.4   < > 4.3   CHLORIDE  --  109  --   --   --   --  109  --  106   CO2  --  22  --   --   --   --  22  --  25   ANIONGAP  --  8  --   --   --   --  7  --  7   * 164* 148*   < >  --    < > 174*   < > 212*   BUN  --  44*  --   --   --   --  41*  --  36*   CR  --  1.10  --   --   --   --  1.20  --  1.04   ANNMARIE  --  8.7  --   --   --   --  9.4  --  9.1    < > = values in this interval not displayed.       Recent Labs   Lab 01/14/22  0649   WBC 6.5   RBC 3.87*   HGB 7.9*   HCT 28.6*   MCV 74*   MCH 20.4*   MCHC 27.6*   RDW 20.7*          IMAGING:  No results found for this or any previous visit (from the past 24 hour(s)).

## 2022-01-14 NOTE — PLAN OF CARE
Status: Pt admitted for traumatic acute R SDH OR for evac 12/21. PEG placement 1/3/22.  Vitals: VSS on RA  Neuros: AO4, L pupil irregular with blurred vision. L slightly weaker than R. L droop. Generalized weakness throughout. Pt unwilling to participate in care/assessment until 1000. After 1000, pt pleasant and cooperative   IV: PIV SL  Labs/Electrolytes: WDL  Resp/trach: LS clear throughout  Diet: Regular diet, tolerating well. TF via PEG at goal of 60 mL/hr. Chaim counts completed   Bowel status: BM 1/12  : Voiding spontaneously, intermittent incontinence  Skin: L groin wound, blanchable coccyx, crani incision  approximated.   Pain: Denies   Activity: Up with assist of 1, GB, walker  Flu Shot Status (given or declined): Addressed  Social: Daughter present throughout the day  Plan: Awaiting ARU placement   Updates this shift: TF orders changed to cyclic 2000 - 0800

## 2022-01-14 NOTE — INTERIM SUMMARY
[facility] Pre-Admission Screen    Referral Source:  [facility] [address]  Admit date to referring facility: 12/20/2021    Physical Medicine and Rehab Consult Completed: No    Rehab Diagnosis:    Brain Dysfunction 02.22 Traumatic, Closed Injury - SDH s/p evacuation    Justification for Acute Inpatient Rehabilitation  Red Sutherland is a 81-year-old male post-traumatic acute right subdural hematoma (last fall was 2-3 weeks ago)  with left hemibody weakness, on xarelto and plavix. Underwent evacuation of SDH on 12/21. Extubated 12/22/2021. Started on Aspirin and Prasugrel on 12/28/21. Failed SLP evaluation. Free water flushes stopped since hypernatremia resolved. Lasix and Bumex given for diuresis. Received PEG on 1/3/2022,  Medicine consulted 1/6/2022 for hypotension management, transferred to medicine as primary due to ongoing concerns for hypotension in the setting of extensive cardiac history.  Steadily improving and patient delirium treated with delirium precautions, and po Haldol. Pt is medically ready to admit to Acute Inpatient Rehab.     Patient requires an intensive inpatient rehab program to address the following acute impairments:impaired strength, impaired activity tolerance, impaired tone, impaired balance, impaired coordination, impaired cognition, impaired judgement and safety awareness and impulsiveness. These impairments are impacting his safely and functional independence with transfers, gait, stairs, ADL's and IADL's.     Current Active Medical Management Needs/Risks for Clinical Complications  The patient requires the high level of rehabilitation physician supervision that accompanies the provision of intensive rehabilitation therapy.  The patient needs the services of the rehabilitation physician to assess the patient medically and functionally and to modify the course of treatment as needed to maximize the patient's capacity to benefit from  the rehabilitation process.  Neuro - manage changes in neuro status in the setting of R subdural hematoma s/p evacuation and post op seizures. On Vimpat for seizure. Serial neuro checks. CT 2 weeks after discharge and will be seen in clinc with GEORGETTE. Will need ongoing assessment & education as pt at risk for future bleeds.   Cardiac - Cardiology consulted early in hospital course for pulm HTN and RV dysfunction, signed off on 12/26. Continue ASA and prasugrel for recent PCI histroy. Continue  Avapro and  tadalafil. Permanent A-FIb, rate controlled on metoprolol.  Pulmonary/COPD - Uses 4L NC at home, though currently stable on RA.  Continue PTA Breo Ellipta and Albuteral PRN. IS.   Endocrine/DM2 - - Last Hgb A1c 7.5%.  On Lantus 18 units BID and Invokana 100mg every evening PTA.  Currently on Lantus 14 units BID and HSSI.  Renal/CKD -  Baseline Cr 1.2-1.4. Follow I/Os. Renally dosing medications and avoid nephrotoxic agents.  Moderate Malnutrition in the context of acute on chronic illness - SLP advanced diet to regular diet with thin liquids on 1/9, calorie counts ordered to start 1/10. Dietician following for continued TF needs. PEG placed 1/3/22. Will need ongoing assessment.   AMS/Delirium -  intermittent agitation is improving with po haldol  And melatonin HS. Delerium precautions. Will need ongoing assessment.  Pulmonary HTN - In setting of RV dysfunction. Continue ASA 81mg and Prasugrel 10mg daily. 1/9 remains euvolemic, dry weight at this exam 197lbs, would consider this his new dry weight; have been holding PTA Bumex and Metolazone, will continue to hold and resume if appropriate. Continue daily weights. Continue tadalafil 20mg daily.  Doxazosin also currently on hold (is replacement for PTA Tamsulosin, but also has BP effect). Decreased Irbesartan to 75mg qday and assess BP response, will resume other meds before titrating back up. Will need ongoing assessment.     Permanent Atrial Fib - Continue Metoprolol  25mg BID. Given SDH and current DAPT currently should hold AC given indication of AFib for now until neurosurgery and cardiology follow up. Previously referred for Watchman device placement d/t hx bleeding issues. Will need ongoing assessment.   GERD - History of past GIB. Pantoprazole 40mg daily  (PTA Omeprazole 20mg)   Prophylaxis - Heparin subcutaneous. Will need ongoing assessment as patient at increased risk for future bleeds.      Patient will continue to need ongoing medical management as patient is at risk for falls with or without injury due to gait impairments, muscle weakness, impaired balance, and deconditioning. He is also at increased risk for nutritional deficits, visual deficits and joint contractures given the left sided facial droop, L pupil irregularity/blurry vision associated with his L eye and decreased mobility of L side given recent SDH. Patient with L eye patch given new blurry vision post SDH.  BMI 29.04.    Past Medical/Surgical History   Surgery in the past 100 days: Yes   Additional relevant past medical history: CAD s/p PCI to LCX (2/2021 and repeat in 10/2021), HFpEF (60-65%) atrial fib on AC w/ Eliquis, HTN, HLD, COPD, pulmonary HTN, chronic bronchitis, type 2 DM, liver cirrhosis, stage 4 CKD, hx GIB, GERD and RLS     Level of Functioning Prior to Admission:    LIVING ENVIRONMENT   People in home: spouse   Current Living Arrangements: house (townhouse)   Home Accessibility: stairs to enter home    Number of Stairs, Main Entrance: 3        Transportation Anticipated: car, drives self   Living [place] Comments[place] All needs are met on the main level.     SELF-CARE   Usual Activity Tolerance: good   Regular Exercise: No   Equipment Currently Used at Home: walker, rolling,walker, standard   Activity/Exercise/Self-Care Comment: Pt was previously independent with ADL completion. Pt has a FWW at home, however does not typically use. [place] at baseline (R  ear >L ear).     DISABILITY/FUNCTION   Concentrating, Remembering or Making Decisions Difficulty: no     Difficulty Communicating: no   Difficulty Eating/Swallowing: no   Walking or Climbing Stairs Difficulty: no   Dressing/Bathing Difficulty: no   Toileting issues: no   Doing Errands Independently Difficulty (such as shopping): no   Fall history within last six months: yes; Number of times patient has fallen within last six months: 1   Change in Functional Status Since Onset of Current Illness/Injury: yes  Additional Comments: N/A    Level of Function: GG Scale (Section GG Functional Ability and Goals; CMS's MADERA Version 3.0 Manual effective 10.1.2019):  PT Current Function Goals for Rehab   Bed Rolling 4 Supervision or touching assitance 6 Independent   Supine to Sit 4 Supervision or touching assitance 6 Independent   Sit to Stand 3 Partial/moderate assistance 6 Independent   Transfer 4 Supervision or touching assitance 6 Independent   Ambulation 4 Supervision or touching assitance 6 Independent   Stairs 3 Partial/moderate assistance 6 Independent     OT Current Function Goals for Rehab   Feeding 5 Setup or clean-up assistance 6 Independent   Grooming 4 Supervision or touching assitance 6 Independent   Bathing Not completed 4 Supervision or touching assitance   Upper Body Dressing 5 Setup or clean-up assistance 6 Independent   Lower Body Dressing 5 Setup or clean-up assistance 6 Independent   Toileting 1 Dependent 6 Independent   Toilet Transfer 3 Partial/moderate assistance 6 Independent   Tub/Shower Transfer Not completed 4 Supervision or touching assitance   Cognition Impaired Supervision     SLP Current Function Goals for Rehab   Swallow Not Impaired Not applicable   Communication Not Assessed Communicate basic needs effectively       Current Diet:  0-Thin, 7-Regular/easy to chew and Cycled tube feeds. Calorie Counts.     Summary Statement:  Patient receiving PT, OT and SLP services. He will benefit from a full  cognitive/linguistics evaluation upon admission given ongoing confusion. The patient is currently on a regular diet with thin liquids. He is able to ambulate up to 110' with CGA and FWW, however gait remains extremely slowed with shuffling steps and patient stopping to take frequent standing rest breaks due to fatigue and deconditioning. Shari is required for stair navigation of 3 stairs with patient using a step-to pattern. Use of railings for stair navigation remains unclear. Shari is required for transfers from standard height surfaces and SBA is required for bed mobility. Shari is also required for toilet transfers. Patient remains dependent in all pericares. Basic grooming and hygiene tasks can be completed standing at the sink with CGA. Formal balance testing and formal cognitive assessment yet to be completed. Patient would benefit from the completion of both on acute rehab.     Expected Therapies and Services Required During Inpatient Rehab Admission  Intensity of Therapy: Patient requires intensive therapies not available in a lesser level of care. Patient is motivated, making gains, and can tolerate 3 hours of therapy a day.  Physical Therapy: 60 minutes per day, 7 days a week for 14 days  Occupational Therapy: 60 minutes per day, 7 days a week for 14 days  Speech and Language Therapy: 60 minutes per day, 7 days a week for 14 days  Rehabilitation Nursing Needs: Patient requires 24 hour Rehab Nursing to manage vitals, medication education, positioning, carryover of new rehab techniques, care coordination, skin integrity, blood sugar management, assess neurologic status, pain management, stroke education, provide safe environment for patient at falls risk and monitor nutritional intake.    Precautions/restrictions/special needs:   Precautions: fall precautions   Restrictions: N/A   Special Needs: hearing impaired and visually impaired(L eye patch)   Designated Visitor: Wife (Kaley)    Expected Level of  "Improvement: Mod I with mobility, transfers, gait, stairs, ADL's and with improved cognition as well as communication/speech abilities. Patient will be at level of supervision with IADL's.   Expected Length of time to achieve: 14 days    Anticipated Discharge Needs:  Anticipated Discharge Destination: Home  Anticipated Discharge Support: Family member  24/7 support available : Unknown  Identified caregiver(s):  \"family\" and wife (Kaley)  Anticipated Discharge Needs: Home with homecare and Home with outpatient therapy; follow up with neurology and cardiology upon discharge.     Identified challenges/barriers:  L eye visual deficits and patient [place].     Rehab Admissions Liaison Signature/Date/Time:     Physician statement of review and agreement:  I have reviewed and am in agreement of the need for IRF stay to address above functional and medical needs. In addition to above statements address, Patient requires intensive active and ongoing therapeutic intervention and multiple therapies; Patient requires medical supervision; Expected to actively participate in the intensive rehab program; Sufficiently stable to actively participate; Expectation for measurable improvement in functional capacity or adaption to impairments.    Physician Signature/Date/Time:   "

## 2022-01-14 NOTE — PROGRESS NOTES
[facility]    Medicine Progress Note - Hospitalist Service, Gold 3       Date of Admission:  12/20/2021    Assessment & Plan        Red Sutherland is a 81 year old male with past medical history significant for CAD s/p PCI to LCX (2/2021 and repeat in 10/2021), HFpEF (60-65%) atrial fib on AC w/ Eliquis, HTN, HLD, COPD, pulmonary HTN, chronic bronchitis, type 2 DM, liver cirrhosis, stage 4 CKD, hx GIB, GERD and RLS transferred to [facility] from [facility] ED on 12/20 for right sided subdural hematoma with mass effect and midline shift.  He was admitted to Neurosurgery service and underwent evacuation on 12/21.  Hospital course c/b seizures, altered mentation and agitation, and failed swallow test requiring PEG tube placement (1/3).  Internal medicine is consulted 1/6 for hypotension and medical co-management, transferred to medicine formally 1/7.      Changes Today:  - Needs two nights without IV haldol.  Scheduled hydralazine seems to have helped.    - CT head today prior to discharge per Neurosurgery recs  - Cycle tube feeds  - If does not need IV haldol tonight, medically clear for discharge to ARU.     # AMS, delirium, intermittent agitation - improving    In setting of subdural hematoma, s/p craniotomy, prolonged hospital stay. Per neurology may also be exacerbated by discontinuing Keppra. Seen by Psychiatry. Lytes wnl except for hypernatremia that has now resolved. Continues to wax and wane, is not sleeping well overnight. Suspect primarily d/t ongoing delirium and requires optimization of sleep/wake cycle, untethering, frequent reorientation.   - 1/9 pt mentation is significantly improved, was able to get out of bed, converse normally, participate in all therapies  - Changes made to optimize delirium                 = Discontinued PICC line, has reliable PIV access and no central access needs                 = Discontinued cardiac monitoring                  =  Discontinued Gabapentin given patient's delirium                  = Continue delirium precautions                 = Patient care order -> do not disturb 10pm to 6am                 = at bedtime Zyprexa ineffective, responds well to Haldol -> transitioned to 5mg PO Haldol at bedtime (1/10), IV haldol prn for breakthrough agitation that does not respond to redirection or atarax.                  = 1/10: PO Melatonin at bedtime -> to be given about an hour before patient gets ready for sleep                  = 1/10:  IV Haldol 2mg q6h prn in case patient wakes with severe agitation, avoid use during daytime if needed    # Hypotension - resolved   Developed 1/6, BP as low as 82/48 with concurrent hypothermia of 95.1. Medicine consulted, ultimately felt d/t medication and oversedation.  Chart reviewed, newly started on scheduled Zyprexa on 1/3 and also received PRN Haldol 2mg x 2 on 1/5, in setting of concomitant scheduled antihypertensives (metoprolol, irbesartan).  Also newly started on Doxazosin on 1/5 which can also cause hypotension d/t alpha-1 blockade. Other etiologies also considered but workup unremarkable, ultimately suspect d/t medications.   - 1/7 TSH, am cortisol wnl    - 1/7 Irbesartan decreased by half (150 -> 75mg qday) and BP has remained stable  - Bumex and Doxazosin on hold, assess need daily and resume if/when appropriate     # R subdural hematoma s/p evacuation   # Post op seizures - resolved  PCI to LCX (2/2021 and repeat in 10/2021), HFpEF (60-65%) atrial fib on AC w/ Eliquis, HTN, HLD, COPD, pulmonary HTN, chronic bronchitis, type 2 DM, liver cirrhosis, stage 4 CKD, hx GIB, GERD and RLS transferred to [facility] from [facility] ED on 12/20 for right sided subdural hematoma with mass effect and midline shift.  He was admitted to Neurosurgery service and underwent evacuation on 12/21.  Hospital course c/b seizures, altered mentation and agitation, and failed swallow test requiring PEG tube placement (1/3).   Internal medicine is consulted 1/6 for hypotension and medical co-management, transferred to medicine formally 1/7.    - CT head today in preparation for discharge     # Pulmonary HTN   # RV dysfunction   Cardiology consulted early in hospital course, signed off on 12/26.  Per chart review, dry weight 212 prior to admission based on recent admission to [facility] for CHF exacerbation 11/30-12/6.  At that time, discharged on Bumex 4mg BID and Metolazone 2.5mg q MWF.  Last echocardiogram 10/23/21 with normal EF 60-65%, mild valvular aortic stenosis, RV mild to severe dilated, flattened septum c/w elevated RV pressure/hypervolemia, PASP 75mmHg.   - Continue ASA 81mg and Prasugrel 10mg daily   - 1/9 remains euvolemic, dry weight at this exam 197lbs, would consider this his new dry weight; have been holding PTA Bumex and Metolazone, will continue to hold and resume if appropriate  - Continue daily weights   - Continue tadalafil 20mg daily   - Decreased Irbesartan to 75mg qday and assess BP response, will resume other meds before titrating back up  - Doxazosin also currently on hold (is replacement for PTA Tamsulosin, but also has BP effect)      # Permanent atrial fib   Rate controlled on metoprolol 25mg BID.  Previously referred for Watchman device placement d/t hx bleeding issues.  On Eliquis 5mg BID PTA, stopped on admission d/t subdural hematoma as above.    - Continue Metoprolol 25mg BID  - Given SDH and current DAPT currently should hold AC given indication of AFib for now until neurosurgery and cardiology follow up     # COPD  Uses 4L NC at home, though currently stable on RA   - Continue PTA Breo Ellipta   - Continue PTA Albuterol PRN   - IS per nursing      # Type 2 DM - Last Hgb A1c 7.5%.  On Lantus 18 units BID and Invokana 100mg every evening PTA.  Currently on Lantus 14 units BID and HSSI.      # Stage IV CKD   BL Cr 1.2-1.4.  Currently Cr 1.21 on 1/6; BUN 46, Cr on 1/9 is 0.99  - Following I/Os as above  -  Renally dose meds  - Avoid/minimize nephrotoxic agents      # Liver cirrhosis   Incidentally noted on imaging, CT AP on 11/30.  Hx neg for alcohol use disorder, IV drug use, viral hepatitis, or family history of liver disease.  Last LFTs 12/27/21 wnl.  No ascites noted on exam. Ammonia on 1/7 within normal limits. No evidence of synthetic liver dysfunction.   - GI follow up outpatient      # Anemia  Hx iron deficiency.  Likely c/b CKD.  Hgb stable mid 7's. May also be c/b underlying liver disease.  - Agree w/ ferrous gluconate   - Transfuse for Hgb < 7      # RLS  - Requip dose decreased to 1mg at bedtime (PTA 2mg)  - PTA Gabapentin on hold for delirium      # GERD   # Hx past GIB   - Agree w/ pantoprazole 40mg daily  (PTA Omeprazole 20mg)      # Moderate Malnutrition in the context of acute on chronic illness:    % Intake: Decreased intake does not meet criteria, but falls short of meeting RD's goal for adequate TF intakes.  % Weight Loss: > 10% in 6 months (severe), based on h/o > 18% in 4 months   Subcutaneous Fat Loss: Facial region: mild buccal area (per last RD note d/t pt sleeping at time of visit today)  Muscle Loss: Facial & jaw region: mild, Thoracic region (clavicle, acromium bone, deltoid, trapezius, pectoral), Upper arm (bicep, tricep), Lower arm  (forearm), Patellar region and Posterior calf: all moderate (per last RD note d/t pt sleeping at time of visit today)  Fluid Accumulation/Edema: Unable to assess  Malnutrition Diagnosis: Moderate malnutrition in the context of acute on chronic illness  - Continue TFs  - SLP advanced diet to regular diet with thin liquids on 1/9, calorie counts ordered to start 1/10       Diet: Regular Diet Adult Thin Liquids (level 0)  Adult Formula Drip Feeding: Continuous Pivot 1.5; Gastrostomy; Goal Rate: 60; mL/hr; From: 8:00 PM; 8:00 AM; Medication - Feeding Tube Flush Frequency: At least 15-30 mL water before and after medication administration and with tube clogging;  RN; ...  Calorie Counts    DVT Prophylaxis: Heparin SQ  Dixon Catheter: Not present  Central Lines: None  Code Status: Full Code      Disposition Plan   Expected Discharge: 01/14/2022     Anticipated discharge location:  Awaiting care coordination huddle  Delays:     Placement - LTAC/ARU  Administering IV medications            The patient's care was discussed with the Bedside Nurse, Care Coordinator/, Patient and Patient's Family.    Rufino Wild MD  Hospitalist Service, [address]  Securely message with the Vocera Web Console (learn more here)  Text page via [employer] Paging/Directory    Please see sign in/sign out for up to date coverage information    Clinically Significant Risk Factors Present on Admission                    ______________________________________________________________________    Interval History   Patient seen and examined.  No acute overnight events.  Was able to sleep well.  Denies HA, dizziness, CP, N/V, abdominal pain or other symptoms currently.     Data reviewed today: I reviewed all medications, new labs and imaging results over the last 24 hours. I personally reviewed no images or EKG's today.    Physical Exam   Vital Signs: Temp: 98.6  F (37  C) Temp src: Oral BP: 132/74 Pulse: 84   Resp: 18 SpO2: 97 % O2 Device: None (Room air)    Weight: 208 lbs 3.2 oz  General Appearance: NAD  Respiratory: CTA b/l  Cardiovascular: RRR S1/S2, no m,r,g  GI: soft, NT, ND, +BS  Skin: no rashes  Other: No edema     Data   Recent Labs   Lab 01/14/22  1123 01/14/22  0902 01/14/22  0649 01/13/22  1324 01/13/22  1122 01/12/22  0813 01/12/22  0720 01/10/22  1154 01/10/22  1008   WBC  --   --  6.5  --   --   --  7.1  --  4.9   HGB  --   --  7.9*  --   --   --  8.4*  --  8.2*   MCV  --   --  74*  --   --   --  73*  --  73*   PLT  --   --  199  --   --   --  199  --  163   NA  --   --  139  --   --   --  138  --  138   POTASSIUM  --   --   4.4  --  5.0  --  4.4   < > 4.3   CHLORIDE  --   --  109  --   --   --  109  --  106   CO2  --   --  22  --   --   --  22  --  25   BUN  --   --  44*  --   --   --  41*  --  36*   CR  --   --  1.10  --   --   --  1.20  --  1.04   ANIONGAP  --   --  8  --   --   --  7  --  7   ANNMARIE  --   --  8.7  --   --   --  9.4  --  9.1   * 136* 164*   < >  --    < > 174*   < > 212*   ALBUMIN  --   --  2.8*  --   --   --  2.9*  --  2.9*   PROTTOTAL  --   --  6.4*  --   --   --  6.9  --  6.8   BILITOTAL  --   --  0.9  --   --   --  0.8  --  0.8   ALKPHOS  --   --  167*  --   --   --  169*  --  157*   ALT  --   --  18  --   --   --  19  --  16   AST  --   --  17  --   --   --  21  --  21    < > = values in this interval not displayed.     No results found for this or any previous visit (from the past 24 hour(s)).  Medications     - MEDICATION INSTRUCTIONS -         aspirin  81 mg Oral Daily     atorvastatin  80 mg Oral or Feeding Tube Daily     [Held by provider] bumetanide  4 mg Oral QAM     [Held by provider] doxazosin  1 mg Oral Daily     empagliflozin  10 mg Oral or Feeding Tube Daily     ferrous gluconate  324 mg Oral or Feeding Tube Every Other Day     fluticasone-vilanterol  1 puff Inhalation Daily     haloperidol  5 mg Oral At Bedtime     hydrOXYzine  25 mg Oral At Bedtime     insulin aspart  1-12 Units Subcutaneous Q4H     insulin glargine  20 Units Subcutaneous BID     irbesartan  75 mg Oral or Feeding Tube Daily     lacosamide  200 mg Oral or Feeding Tube BID     magnesium citrate  296 mL Oral Once     melatonin  10 mg Oral At Bedtime     metoprolol tartrate  25 mg Oral BID     multivitamins w/minerals  15 mL Per Feeding Tube Daily     pantoprazole  40 mg Oral or Feeding Tube QAM AC     polyethylene glycol  17 g Oral or Feeding Tube Daily     potassium chloride  40 mEq Oral Daily     prasugrel  10 mg Oral or Feeding Tube Daily     rOPINIRole  1 mg Oral Daily     senna-docusate  2 tablet Oral or Feeding Tube BID      sodium chloride (PF)  3 mL Intracatheter Q8H     tadalafil  20 mg Oral Q24H

## 2022-01-15 LAB
GLUCOSE BLDC GLUCOMTR-MCNC: 106 MG/DL (ref 70–99)
GLUCOSE BLDC GLUCOMTR-MCNC: 127 MG/DL (ref 70–99)
GLUCOSE BLDC GLUCOMTR-MCNC: 133 MG/DL (ref 70–99)
GLUCOSE BLDC GLUCOMTR-MCNC: 155 MG/DL (ref 70–99)
GLUCOSE BLDC GLUCOMTR-MCNC: 204 MG/DL (ref 70–99)
MAGNESIUM SERPL-MCNC: 2.6 MG/DL (ref 1.6–2.3)
PHOSPHATE SERPL-MCNC: 4 MG/DL (ref 2.5–4.5)
POTASSIUM BLD-SCNC: 4.6 MMOL/L (ref 3.4–5.3)

## 2022-01-15 PROCEDURE — 36415 COLL VENOUS BLD VENIPUNCTURE: CPT | Performed by: INTERNAL MEDICINE

## 2022-01-15 PROCEDURE — 84100 ASSAY OF PHOSPHORUS: CPT | Performed by: INTERNAL MEDICINE

## 2022-01-15 PROCEDURE — 250N000013 HC RX MED GY IP 250 OP 250 PS 637: Performed by: INTERNAL MEDICINE

## 2022-01-15 PROCEDURE — 250N000013 HC RX MED GY IP 250 OP 250 PS 637: Performed by: STUDENT IN AN ORGANIZED HEALTH CARE EDUCATION/TRAINING PROGRAM

## 2022-01-15 PROCEDURE — 99232 SBSQ HOSP IP/OBS MODERATE 35: CPT | Performed by: INTERNAL MEDICINE

## 2022-01-15 PROCEDURE — 250N000013 HC RX MED GY IP 250 OP 250 PS 637: Performed by: NURSE PRACTITIONER

## 2022-01-15 PROCEDURE — 120N000002 HC R&B MED SURG/OB UMMC

## 2022-01-15 PROCEDURE — 83735 ASSAY OF MAGNESIUM: CPT | Performed by: INTERNAL MEDICINE

## 2022-01-15 PROCEDURE — 84207 ASSAY OF VITAMIN B-6: CPT | Performed by: INTERNAL MEDICINE

## 2022-01-15 PROCEDURE — 250N000013 HC RX MED GY IP 250 OP 250 PS 637: Performed by: NEUROLOGICAL SURGERY

## 2022-01-15 PROCEDURE — 84132 ASSAY OF SERUM POTASSIUM: CPT | Performed by: INTERNAL MEDICINE

## 2022-01-15 RX ORDER — LIDOCAINE 4 G/G
1 PATCH TOPICAL EVERY 24 HOURS
Status: ON HOLD | DISCHARGE
Start: 2022-01-15 | End: 2022-01-25

## 2022-01-15 RX ORDER — HALOPERIDOL 5 MG/1
5 TABLET ORAL AT BEDTIME
Status: ON HOLD | DISCHARGE
Start: 2022-01-15 | End: 2022-01-25

## 2022-01-15 RX ORDER — HYDROXYZINE HYDROCHLORIDE 25 MG/1
25 TABLET, FILM COATED ORAL EVERY 6 HOURS PRN
Status: ON HOLD | DISCHARGE
Start: 2022-01-15 | End: 2022-01-25

## 2022-01-15 RX ORDER — TADALAFIL 20 MG/1
20 TABLET ORAL EVERY 24 HOURS
DISCHARGE
Start: 2022-01-15

## 2022-01-15 RX ORDER — HYDROXYZINE HYDROCHLORIDE 25 MG/1
25 TABLET, FILM COATED ORAL AT BEDTIME
Status: ON HOLD | DISCHARGE
Start: 2022-01-15 | End: 2022-01-25

## 2022-01-15 RX ORDER — ONDANSETRON 4 MG/1
4 TABLET, ORALLY DISINTEGRATING ORAL EVERY 6 HOURS PRN
Status: ON HOLD | DISCHARGE
Start: 2022-01-15 | End: 2022-01-25

## 2022-01-15 RX ORDER — PRASUGREL 10 MG/1
10 TABLET, FILM COATED ORAL DAILY
Status: ON HOLD | DISCHARGE
Start: 2022-01-15 | End: 2022-01-01

## 2022-01-15 RX ORDER — DOXAZOSIN 1 MG/1
1 TABLET ORAL DAILY
DISCHARGE
Start: 2022-01-15 | End: 2022-01-15

## 2022-01-15 RX ORDER — ROPINIROLE 1 MG/1
1 TABLET, FILM COATED ORAL DAILY
DISCHARGE
Start: 2022-01-15

## 2022-01-15 RX ORDER — LACOSAMIDE 200 MG/1
200 TABLET ORAL 2 TIMES DAILY
Status: ON HOLD | DISCHARGE
Start: 2022-01-15 | End: 2022-01-25

## 2022-01-15 RX ORDER — PHENOL 1.4 %
10 AEROSOL, SPRAY (ML) MUCOUS MEMBRANE AT BEDTIME
DISCHARGE
Start: 2022-01-15 | End: 2022-01-01

## 2022-01-15 RX ORDER — IRBESARTAN 75 MG/1
75 TABLET ORAL DAILY
DISCHARGE
Start: 2022-01-15 | End: 2022-01-01 | Stop reason: DRUGHIGH

## 2022-01-15 RX ORDER — CALCIUM CARBONATE 500 MG/1
1 TABLET, CHEWABLE ORAL DAILY PRN
DISCHARGE
Start: 2022-01-15 | End: 2022-01-01

## 2022-01-15 RX ORDER — ACETAMINOPHEN 325 MG/1
975 TABLET ORAL EVERY 8 HOURS PRN
DISCHARGE
Start: 2022-01-15 | End: 2022-01-01 | Stop reason: DRUGHIGH

## 2022-01-15 RX ADMIN — DOCUSATE SODIUM 50 MG AND SENNOSIDES 8.6 MG 2 TABLET: 8.6; 5 TABLET, FILM COATED ORAL at 09:35

## 2022-01-15 RX ADMIN — ATORVASTATIN CALCIUM 80 MG: 40 TABLET, FILM COATED ORAL at 09:30

## 2022-01-15 RX ADMIN — METOPROLOL TARTRATE 25 MG: 25 TABLET, FILM COATED ORAL at 20:01

## 2022-01-15 RX ADMIN — EMPAGLIFLOZIN 10 MG: 10 TABLET, FILM COATED ORAL at 09:31

## 2022-01-15 RX ADMIN — TADALAFIL 20 MG: 20 TABLET, FILM COATED ORAL at 09:29

## 2022-01-15 RX ADMIN — INSULIN ASPART 1 UNITS: 100 INJECTION, SOLUTION INTRAVENOUS; SUBCUTANEOUS at 16:18

## 2022-01-15 RX ADMIN — LIDOCAINE 1 PATCH: 246 PATCH TOPICAL at 20:00

## 2022-01-15 RX ADMIN — HYDROXYZINE HYDROCHLORIDE 25 MG: 25 TABLET, FILM COATED ORAL at 21:00

## 2022-01-15 RX ADMIN — INSULIN GLARGINE 20 UNITS: 100 INJECTION, SOLUTION SUBCUTANEOUS at 20:01

## 2022-01-15 RX ADMIN — METOPROLOL TARTRATE 25 MG: 25 TABLET, FILM COATED ORAL at 09:31

## 2022-01-15 RX ADMIN — MULTIVIT AND MINERALS-FERROUS GLUCONATE 9 MG IRON/15 ML ORAL LIQUID 15 ML: at 09:34

## 2022-01-15 RX ADMIN — Medication 40 MG: at 09:34

## 2022-01-15 RX ADMIN — FLUTICASONE FUROATE AND VILANTEROL TRIFENATATE 1 PUFF: 200; 25 POWDER RESPIRATORY (INHALATION) at 09:37

## 2022-01-15 RX ADMIN — HALOPERIDOL 5 MG: 5 TABLET ORAL at 20:01

## 2022-01-15 RX ADMIN — LACOSAMIDE 200 MG: 50 TABLET, FILM COATED ORAL at 09:29

## 2022-01-15 RX ADMIN — INSULIN ASPART 3 UNITS: 100 INJECTION, SOLUTION INTRAVENOUS; SUBCUTANEOUS at 20:01

## 2022-01-15 RX ADMIN — Medication 10 MG: at 21:00

## 2022-01-15 RX ADMIN — LACOSAMIDE 200 MG: 50 TABLET, FILM COATED ORAL at 20:01

## 2022-01-15 RX ADMIN — ASPIRIN 81 MG: 81 TABLET, COATED ORAL at 09:30

## 2022-01-15 RX ADMIN — ROPINIROLE HYDROCHLORIDE 1 MG: 1 TABLET, FILM COATED ORAL at 20:01

## 2022-01-15 RX ADMIN — PRASUGREL 10 MG: 10 TABLET, FILM COATED ORAL at 09:30

## 2022-01-15 RX ADMIN — INSULIN GLARGINE 20 UNITS: 100 INJECTION, SOLUTION SUBCUTANEOUS at 09:41

## 2022-01-15 RX ADMIN — POTASSIUM CHLORIDE 40 MEQ: 1.5 POWDER, FOR SOLUTION ORAL at 09:35

## 2022-01-15 ASSESSMENT — ACTIVITIES OF DAILY LIVING (ADL)
ADLS_ACUITY_SCORE: 12

## 2022-01-15 NOTE — PLAN OF CARE
Status: S/p R SDH evacuation 12/21 c/b seizures. PEG placed 1/3. DND overnight.   Vitals: VSS on RA. Pt sleeping at midnight vitals, DND.   Neuros: Waxes and wanes. At 2000 pt believed he was 20 years old, but knew the year. Later thought he was at home ad believed the nurse was a family member. Easily redirected and subsequently AOx4. L pupil irregular and fixed. L eye blurred vision intermittently wears eye patch. L facial droop. LUE 3/5, LLE 4/5, RUE 4/5, RLE 5/5. Denies N/T.   IV: PIV SL  Labs/Electrolytes: Mg 2.5, electrolytes AM redraw. Hgb 7.9- plan for interventions <7.  Resp/trach: LS clear  Diet: Regular with thin liquids. Cyclic TF from 1307-2757 at goal of 60mL/hr with FWF of 30mL q4h.   Bowel status: LBM 1/14 held bowel meds.   : Voiding intermittent incontinence.  Skin: L groin abrasion WOC orders BID, completed this shift. Bruising throughout BUE and abdomen. Abdominal primapore x2 from heparin injections, dressings CDI. Blanchable redness to buttocks. Crani incision CDI approximated.   Pain: Reported R neck and shoulder pain, prn pain meds ordered. Pt declined meds. Reported pain subsided at 0200.   Activity: A1 GB walker. Stood at bedside to use urinal. Up in chair twice overnight.   Social: Irritable at times, easily redirected.   Plan: Probable discharge today.   Updates this shift: Orders for no fingersticks overnight with DND.

## 2022-01-15 NOTE — PROGRESS NOTES
Calorie Count  Intake recorded for: 1/14  Total Kcals: 2779 Total Protein: 101g  Kcals from Hospital Food: 2779  Protein: 101g  Kcals from Outside Food (average):0 Protein: 0g  # Meals Ordered from Kitchen: 3 meals   # Meals Recorded: 100% 3 meals   # Supplements Recorded: 0    Contact Nutrition Associate for details if needed - pager: [phone]  Dia Alvarez

## 2022-01-15 NOTE — PROGRESS NOTES
[facility]    Medicine Progress Note - Hospitalist Service, Gold 3       Date of Admission:  12/20/2021    Assessment & Plan            Red Sutherland is a 81 year old male with past medical history significant for CAD s/p PCI to LCX (2/2021 and repeat in 10/2021), HFpEF (60-65%) atrial fib on AC w/ Eliquis, HTN, HLD, COPD, pulmonary HTN, chronic bronchitis, type 2 DM, liver cirrhosis, stage 4 CKD, hx GIB, GERD and RLS transferred to [facility] from [facility] ED on 12/20 for right sided subdural hematoma with mass effect and midline shift.  He was admitted to Neurosurgery service and underwent evacuation on 12/21.  Hospital course c/b seizures, altered mentation and agitation, and failed swallow test requiring PEG tube placement (1/3).  Internal medicine is consulted 1/6 for hypotension and medical co-management, transferred to medicine formally 1/7.      Changes Today:  - Two nights without IV haldol, scheduled hydralazine seems to have helped.    - Medically ready for discharge to ARU.     # AMS, delirium, intermittent agitation - improving    In setting of subdural hematoma, s/p craniotomy, prolonged hospital stay. Per neurology may also be exacerbated by discontinuing Keppra. Seen by Psychiatry. Lytes wnl except for hypernatremia that has now resolved. Continues to wax and wane, is not sleeping well overnight. Suspect primarily d/t ongoing delirium and requires optimization of sleep/wake cycle, untethering, frequent reorientation.   - 1/9 pt mentation is significantly improved, was able to get out of bed, converse normally, participate in all therapies  - Changes made to optimize delirium                 = Discontinued PICC line, has reliable PIV access and no central access needs                 = Discontinued cardiac monitoring                  = Discontinued Gabapentin given patient's delirium                  = Continue delirium precautions                 =  Patient care order -> do not disturb 10pm to 6am                 = at bedtime Zyprexa ineffective, responds well to Haldol -> transitioned to 5mg PO Haldol at bedtime (1/10), IV haldol prn for breakthrough agitation that does not respond to redirection or atarax.                  = 1/10: PO Melatonin at bedtime -> to be given about an hour before patient gets ready for sleep                  = 1/10:  IV Haldol 2mg q6h prn in case patient wakes with severe agitation, avoid use during daytime if needed    # Hypotension - resolved   Developed 1/6, BP as low as 82/48 with concurrent hypothermia of 95.1. Medicine consulted, ultimately felt d/t medication and oversedation.  Chart reviewed, newly started on scheduled Zyprexa on 1/3 and also received PRN Haldol 2mg x 2 on 1/5, in setting of concomitant scheduled antihypertensives (metoprolol, irbesartan).  Also newly started on Doxazosin on 1/5 which can also cause hypotension d/t alpha-1 blockade. Other etiologies also considered but workup unremarkable, ultimately suspect d/t medications.   - 1/7 TSH, am cortisol wnl    - 1/7 Irbesartan decreased by half (150 -> 75mg qday) and BP has remained stable  - Bumex and Doxazosin on hold, assess need daily and resume if/when appropriate, currently not needing.      # R subdural hematoma s/p evacuation   # Post op seizures - resolved  PCI to LCX (2/2021 and repeat in 10/2021), HFpEF (60-65%) atrial fib on AC w/ Eliquis, HTN, HLD, COPD, pulmonary HTN, chronic bronchitis, type 2 DM, liver cirrhosis, stage 4 CKD, hx GIB, GERD and RLS transferred to [facility] from [facility] ED on 12/20 for right sided subdural hematoma with mass effect and midline shift.  He was admitted to Neurosurgery service and underwent evacuation on 12/21.  Hospital course c/b seizures, altered mentation and agitation, and failed swallow test requiring PEG tube placement (1/3).  Internal medicine is consulted 1/6 for hypotension and medical co-management, transferred  to medicine formally 1/7.    - CT head today in preparation for discharge     # Pulmonary HTN   # RV dysfunction   Cardiology consulted early in hospital course, signed off on 12/26.  Per chart review, dry weight 212 prior to admission based on recent admission to [facility] for CHF exacerbation 11/30-12/6.  At that time, discharged on Bumex 4mg BID and Metolazone 2.5mg q MWF.  Last echocardiogram 10/23/21 with normal EF 60-65%, mild valvular aortic stenosis, RV mild to severe dilated, flattened septum c/w elevated RV pressure/hypervolemia, PASP 75mmHg.   - Continue ASA 81mg and Prasugrel 10mg daily   - 1/9 remains euvolemic, dry weight at this exam 197lbs, would consider this his new dry weight; have been holding PTA Bumex and Metolazone, will continue to hold and resume if appropriate  - Continue daily weights   - Continue tadalafil 20mg daily   - Decreased Irbesartan to 75mg qday and assess BP response, will resume other meds before titrating back up  - Doxazosin also currently on hold (is replacement for PTA Tamsulosin, but also has BP effect)      # Permanent atrial fib   Rate controlled on metoprolol 25mg BID.  Previously referred for Watchman device placement d/t hx bleeding issues.  On Eliquis 5mg BID PTA, stopped on admission d/t subdural hematoma as above.    - Continue Metoprolol 25mg BID  - Given SDH and current DAPT currently should hold AC given indication of AFib for now until neurosurgery and cardiology follow up     # COPD  Uses 4L NC at home, though currently stable on RA   - Continue PTA Breo Ellipta   - Continue PTA Albuterol PRN   - IS per nursing      # Type 2 DM - Last Hgb A1c 7.5%.  On Lantus 18 units BID and Invokana 100mg every evening PTA.  Currently on Lantus 14 units BID and HSSI.      # Stage IV CKD   BL Cr 1.2-1.4.  Currently Cr 1.21 on 1/6; BUN 46, Cr on 1/9 is 0.99  - Following I/Os as above  - Renally dose meds  - Avoid/minimize nephrotoxic agents      # Liver  cirrhosis   Incidentally noted on imaging, CT AP on 11/30.  Hx neg for alcohol use disorder, IV drug use, viral hepatitis, or family history of liver disease.  Last LFTs 12/27/21 wnl.  No ascites noted on exam. Ammonia on 1/7 within normal limits. No evidence of synthetic liver dysfunction.   - GI follow up outpatient      # Anemia  Hx iron deficiency.  Likely c/b CKD.  Hgb stable mid 7's. May also be c/b underlying liver disease.  - Agree w/ ferrous gluconate   - Transfuse for Hgb < 7      # RLS  - Requip dose decreased to 1mg at bedtime (PTA 2mg)  - PTA Gabapentin on hold for delirium      # GERD   # Hx past GIB   - Agree w/ pantoprazole 40mg daily  (PTA Omeprazole 20mg)      # Moderate Malnutrition in the context of acute on chronic illness:    % Intake: Decreased intake does not meet criteria, but falls short of meeting RD's goal for adequate TF intakes.  % Weight Loss: > 10% in 6 months (severe), based on h/o > 18% in 4 months   Subcutaneous Fat Loss: Facial region: mild buccal area (per last RD note d/t pt sleeping at time of visit today)  Muscle Loss: Facial & jaw region: mild, Thoracic region (clavicle, acromium bone, deltoid, trapezius, pectoral), Upper arm (bicep, tricep), Lower arm  (forearm), Patellar region and Posterior calf: all moderate (per last RD note d/t pt sleeping at time of visit today)  Fluid Accumulation/Edema: Unable to assess  Malnutrition Diagnosis: Moderate malnutrition in the context of acute on chronic illness  - Continue TFs  - SLP advanced diet to regular diet with thin liquids on 1/9, calorie counts ordered to start 1/10         Diet: Regular Diet Adult Thin Liquids (level 0)  Adult Formula Drip Feeding: Continuous Pivot 1.5; Gastrostomy; Goal Rate: 60; mL/hr; From: 8:00 PM; 8:00 AM; Medication - Feeding Tube Flush Frequency: At least 15-30 mL water before and after medication administration and with tube clogging; RN; ...  Calorie Counts  Diet    DVT Prophylaxis: Heparin SQ  Dixon  Catheter: Not present  Central Lines: None  Code Status: Full Code      Disposition Plan   Expected Discharge:  1/15/2022   Anticipated discharge location:  ARU  Delays:     Bed availability        The patient's care was discussed with the Bedside Nurse, Care Coordinator/ and Patient.    Rufino iWld MD  Hospitalist Service, [address]  Securely message with the Vocera Web Console (learn more here)  Text page via [employer] Paging/Directory    Please see sign in/sign out for up to date coverage information    Clinically Significant Risk Factors Present on Admission                    ______________________________________________________________________    Interval History   Patient seen and examined.  Was slightly anxious overnight but reoriented.  Denies, HA, dizziness, CP, N/V, abdominal pain or other symptoms currently. States his appetite is improving.     Data reviewed today: I reviewed all medications, new labs and imaging results over the last 24 hours. I personally reviewed no images or EKG's today.    Physical Exam   Vital Signs: Temp: 97.7  F (36.5  C) Temp src: Oral BP: 130/61 Pulse: 82   Resp: 18 SpO2: 100 % O2 Device: None (Room air)    Weight: 208 lbs 3.2 oz  General Appearance: NAD  Respiratory: CTA b/l  Cardiovascular: RRR S1/S2, no m,r,g  GI: soft, nT, ND, +BS  Skin: no rashes  Other: No edema     Data   Recent Labs   Lab 01/15/22  1536 01/15/22  1157 01/15/22  1113 01/15/22  0947 01/14/22  0902 01/14/22  0649 01/13/22  1324 01/13/22  1122 01/12/22  0813 01/12/22  0720 01/10/22  1154 01/10/22  1008   WBC  --   --   --   --   --  6.5  --   --   --  7.1  --  4.9   HGB  --   --   --   --   --  7.9*  --   --   --  8.4*  --  8.2*   MCV  --   --   --   --   --  74*  --   --   --  73*  --  73*   PLT  --   --   --   --   --  199  --   --   --  199  --  163   NA  --   --   --   --   --  139  --   --   --  138  --  138   POTASSIUM  --    --   --  4.6  --  4.4  --  5.0  --  4.4   < > 4.3   CHLORIDE  --   --   --   --   --  109  --   --   --  109  --  106   CO2  --   --   --   --   --  22  --   --   --  22  --  25   BUN  --   --   --   --   --  44*  --   --   --  41*  --  36*   CR  --   --   --   --   --  1.10  --   --   --  1.20  --  1.04   ANIONGAP  --   --   --   --   --  8  --   --   --  7  --  7   ANNMARIE  --   --   --   --   --  8.7  --   --   --  9.4  --  9.1   * 127* 106*  --    < > 164*   < >  --    < > 174*   < > 212*   ALBUMIN  --   --   --   --   --  2.8*  --   --   --  2.9*  --  2.9*   PROTTOTAL  --   --   --   --   --  6.4*  --   --   --  6.9  --  6.8   BILITOTAL  --   --   --   --   --  0.9  --   --   --  0.8  --  0.8   ALKPHOS  --   --   --   --   --  167*  --   --   --  169*  --  157*   ALT  --   --   --   --   --  18  --   --   --  19  --  16   AST  --   --   --   --   --  17  --   --   --  21  --  21    < > = values in this interval not displayed.     No results found for this or any previous visit (from the past 24 hour(s)).  Medications     - MEDICATION INSTRUCTIONS -         aspirin  81 mg Oral Daily     atorvastatin  80 mg Oral or Feeding Tube Daily     [Held by provider] bumetanide  4 mg Oral QAM     diclofenac  2 g Topical 4x Daily     [Held by provider] doxazosin  1 mg Oral Daily     empagliflozin  10 mg Oral or Feeding Tube Daily     ferrous gluconate  324 mg Oral or Feeding Tube Every Other Day     fluticasone-vilanterol  1 puff Inhalation Daily     haloperidol  5 mg Oral At Bedtime     hydrOXYzine  25 mg Oral At Bedtime     insulin aspart  1-12 Units Subcutaneous Q4H     insulin glargine  20 Units Subcutaneous BID     irbesartan  75 mg Oral or Feeding Tube Daily     lacosamide  200 mg Oral or Feeding Tube BID     lidocaine  1 patch Transdermal Q24h    And     lidocaine   Transdermal Q8H     magnesium citrate  296 mL Oral Once     melatonin  10 mg Oral At Bedtime     menthol   Transdermal Q8H     metoprolol tartrate  25  mg Oral BID     multivitamins w/minerals  15 mL Per Feeding Tube Daily     pantoprazole  40 mg Oral or Feeding Tube QAM AC     polyethylene glycol  17 g Oral or Feeding Tube Daily     potassium chloride  40 mEq Oral Daily     prasugrel  10 mg Oral or Feeding Tube Daily     rOPINIRole  1 mg Oral Daily     senna-docusate  2 tablet Oral or Feeding Tube BID     sodium chloride (PF)  3 mL Intracatheter Q8H     tadalafil  20 mg Oral Q24H

## 2022-01-15 NOTE — PROGRESS NOTES
[facility], [place]   01/15/2022  Neurosurgery Progress Note:    Assessment:  Red Sutherland is a 81-year-old male post-traumatic acute right subdural hematoma with left hemibody weakness, on xarelto and plavix. Admitted to the ICU on 12/20/21. Taken to OR 12/21/2021. Extubated 12/22/2021. Started on Aspirin and Prasugrel on 12/28/21. Failed SLP evaluation. Free water flushes stopped since hypernatremia resolved. Lasix and Bumex given for diuresis. Received PEG on 1/3/2022. LLQ tenderness near PEG site. CT AP done 1/5 showing no obvious pathology. Medicine consulted 1/6/2022 for hypotension management, transferred to medicine as primary due to ongoing concerns for hypotension in the setting of extensive cardiac history.  Steadily improving and patient delirium treated with delirium precautions, and Haldol.  Stable from neurosurgical perspective.  CT obtained on 1/14/2020 showing stability.  Currently pending placement    Plan:  - Continue Vimpat 200 twice daily for seizure history  - CT 2 weeks after discharge and will be seen in clinic with GEORGETTE  - Continue aspirin and prasugrel for recent PCI history.  - Currently pending placement  - Remainder of cares per primary team    Asim Ann MD  Neurosurgery Resident, PGY-2    Please contact neurosurgery resident on call with questions.    Dial * * *[phone], enter [phone] when prompted.   -----------------------------------    Interval History: This morning awoken from sleep.  Denies headache this morning.  Objective:   Temp:  [98.1  F (36.7  C)] 98.1  F (36.7  C)  Pulse:  [76-87] 81  Resp:  [18] 18  BP: (121-145)/(64-79) 145/78  SpO2:  [100 %] 100 %  I/O last 3 completed shifts:  In: 1370 [P.O.:480; NG/GT:170]  Out: 460 [Urine:460]    General awake alert, appearing stated age, nonacute distress, pleasant  Wound: Right-sided incision appears well-healed, no areas of redness or erythema, no areas of skin breakdown, edges well  approximated  Pulm: On room air  MSK: Normal  Neurologic:  -Awake, alert, oriented to person place and time  - Pupils equal round reactive to light 3 mm bilaterally, extraocular movements intact, visual fields intact grossly  - Mild left-sided facial droop, intact sensation in V1 V2, V3  - Trace 4+ out of 5 strength in deltoid, otherwise full strength everywhere else  - Full sensation to light touch in all extremities  LABS:  Recent Labs   Lab 01/15/22  1113 01/15/22  0947 01/15/22  0940 01/14/22  2037 01/14/22  0902 01/14/22  0649 01/13/22  1324 01/13/22  1122 01/12/22  0813 01/12/22  0720 01/10/22  1154 01/10/22  1008   NA  --   --   --   --   --  139  --   --   --  138  --  138   POTASSIUM  --  4.6  --   --   --  4.4  --  5.0  --  4.4   < > 4.3   CHLORIDE  --   --   --   --   --  109  --   --   --  109  --  106   CO2  --   --   --   --   --  22  --   --   --  22  --  25   ANIONGAP  --   --   --   --   --  8  --   --   --  7  --  7   *  --  133* 218*   < > 164*   < >  --    < > 174*   < > 212*   BUN  --   --   --   --   --  44*  --   --   --  41*  --  36*   CR  --   --   --   --   --  1.10  --   --   --  1.20  --  1.04   ANNMARIE  --   --   --   --   --  8.7  --   --   --  9.4  --  9.1    < > = values in this interval not displayed.       Recent Labs   Lab 01/14/22  0649   WBC 6.5   RBC 3.87*   HGB 7.9*   HCT 28.6*   MCV 74*   MCH 20.4*   MCHC 27.6*   RDW 20.7*          IMAGING:  Recent Results (from the past 24 hour(s))   CT Head w/o Contrast    Narrative    Exam: CT HEAD W/O CONTRAST  1/14/2022 12:11 PM    History: Intracranial hemorrhage.    Comparison:  CT 1/5/2022, 1/2/2022, and 12/20/22.    Technique: Using multidetector thin collimation helical acquisition  technique, axial, coronal and sagittal CT images from the skull base  to the vertex were obtained without intravenous contrast.     Findings:  Stable right hemicraniotomy. Underlying right convexity subdural  collection with maximum thickness of  7 mm, previously 9 mm when  measured similarly. Minimal residual hyperdensity within the  collection, unchanged. No new intracranial hemorrhage. Midline shift  has resolved. The gray-white differentiation of the cerebral  hemispheres is preserved. The ventricles are proportionate to the  cerebral sulci. Unchanged leukoaraiosis. Basal cisterns are patent.  Atherosclerotic changes of the distal vertebral arteries and carotid  siphons.    No new osseous abnormality. Paranasal sinuses and the mastoid air  cells are clear.      Impression    Impression:   1. Decompressive right hemicraniectomy with residual underlying  collection measuring up to 7 mm, previously 9 mm on 1/5/2022. Midline  shift has resolved. No new intracranial hemorrhage.  2. Chronic leukoaraiosis and intracranial atherosclerosis.    I have personally reviewed the examination and initial interpretation  and I agree with the findings.    RADHA HERNANDES MD         SYSTEM ID:  [id number]

## 2022-01-16 ENCOUNTER — APPOINTMENT (OUTPATIENT)
Dept: OCCUPATIONAL THERAPY | Facility: CLINIC | Age: 82
DRG: 025 | End: 2022-01-16
Payer: COMMERCIAL

## 2022-01-16 LAB
GLUCOSE BLDC GLUCOMTR-MCNC: 134 MG/DL (ref 70–99)
GLUCOSE BLDC GLUCOMTR-MCNC: 141 MG/DL (ref 70–99)
GLUCOSE BLDC GLUCOMTR-MCNC: 150 MG/DL (ref 70–99)
GLUCOSE BLDC GLUCOMTR-MCNC: 201 MG/DL (ref 70–99)
MAGNESIUM SERPL-MCNC: 2.6 MG/DL (ref 1.6–2.3)
PHOSPHATE SERPL-MCNC: 4 MG/DL (ref 2.5–4.5)
POTASSIUM BLD-SCNC: 4.7 MMOL/L (ref 3.4–5.3)

## 2022-01-16 PROCEDURE — 250N000013 HC RX MED GY IP 250 OP 250 PS 637: Performed by: INTERNAL MEDICINE

## 2022-01-16 PROCEDURE — 99232 SBSQ HOSP IP/OBS MODERATE 35: CPT | Performed by: INTERNAL MEDICINE

## 2022-01-16 PROCEDURE — 250N000013 HC RX MED GY IP 250 OP 250 PS 637: Performed by: NURSE PRACTITIONER

## 2022-01-16 PROCEDURE — 84100 ASSAY OF PHOSPHORUS: CPT | Performed by: INTERNAL MEDICINE

## 2022-01-16 PROCEDURE — 97110 THERAPEUTIC EXERCISES: CPT | Mod: GO

## 2022-01-16 PROCEDURE — 36415 COLL VENOUS BLD VENIPUNCTURE: CPT | Performed by: INTERNAL MEDICINE

## 2022-01-16 PROCEDURE — 84132 ASSAY OF SERUM POTASSIUM: CPT | Performed by: INTERNAL MEDICINE

## 2022-01-16 PROCEDURE — 97535 SELF CARE MNGMENT TRAINING: CPT | Mod: GO

## 2022-01-16 PROCEDURE — 250N000013 HC RX MED GY IP 250 OP 250 PS 637: Performed by: STUDENT IN AN ORGANIZED HEALTH CARE EDUCATION/TRAINING PROGRAM

## 2022-01-16 PROCEDURE — 83735 ASSAY OF MAGNESIUM: CPT | Performed by: INTERNAL MEDICINE

## 2022-01-16 PROCEDURE — 250N000013 HC RX MED GY IP 250 OP 250 PS 637: Performed by: NEUROLOGICAL SURGERY

## 2022-01-16 PROCEDURE — 120N000002 HC R&B MED SURG/OB UMMC

## 2022-01-16 RX ADMIN — HYDROXYZINE HYDROCHLORIDE 25 MG: 25 TABLET, FILM COATED ORAL at 21:51

## 2022-01-16 RX ADMIN — FLUTICASONE FUROATE AND VILANTEROL TRIFENATATE 1 PUFF: 200; 25 POWDER RESPIRATORY (INHALATION) at 07:51

## 2022-01-16 RX ADMIN — LACOSAMIDE 200 MG: 50 TABLET, FILM COATED ORAL at 19:57

## 2022-01-16 RX ADMIN — INSULIN ASPART 3 UNITS: 100 INJECTION, SOLUTION INTRAVENOUS; SUBCUTANEOUS at 11:49

## 2022-01-16 RX ADMIN — METOPROLOL TARTRATE 25 MG: 25 TABLET, FILM COATED ORAL at 19:58

## 2022-01-16 RX ADMIN — DICLOFENAC SODIUM 2 G: 10 GEL TOPICAL at 11:46

## 2022-01-16 RX ADMIN — DICLOFENAC SODIUM 2 G: 10 GEL TOPICAL at 23:06

## 2022-01-16 RX ADMIN — Medication 10 MG: at 21:51

## 2022-01-16 RX ADMIN — INSULIN ASPART 1 UNITS: 100 INJECTION, SOLUTION INTRAVENOUS; SUBCUTANEOUS at 19:58

## 2022-01-16 RX ADMIN — ASPIRIN 81 MG: 81 TABLET, COATED ORAL at 07:47

## 2022-01-16 RX ADMIN — LIDOCAINE 1 PATCH: 246 PATCH TOPICAL at 23:05

## 2022-01-16 RX ADMIN — PRASUGREL 10 MG: 10 TABLET, FILM COATED ORAL at 07:47

## 2022-01-16 RX ADMIN — DICLOFENAC SODIUM 2 G: 10 GEL TOPICAL at 15:24

## 2022-01-16 RX ADMIN — ROPINIROLE HYDROCHLORIDE 1 MG: 1 TABLET, FILM COATED ORAL at 19:58

## 2022-01-16 RX ADMIN — POTASSIUM CHLORIDE 40 MEQ: 1.5 POWDER, FOR SOLUTION ORAL at 07:47

## 2022-01-16 RX ADMIN — EMPAGLIFLOZIN 10 MG: 10 TABLET, FILM COATED ORAL at 07:47

## 2022-01-16 RX ADMIN — ATORVASTATIN CALCIUM 80 MG: 40 TABLET, FILM COATED ORAL at 07:51

## 2022-01-16 RX ADMIN — FERROUS GLUCONATE 324 MG: 324 TABLET ORAL at 08:04

## 2022-01-16 RX ADMIN — LACOSAMIDE 200 MG: 50 TABLET, FILM COATED ORAL at 07:49

## 2022-01-16 RX ADMIN — Medication 40 MG: at 09:27

## 2022-01-16 RX ADMIN — HALOPERIDOL 5 MG: 5 TABLET ORAL at 19:57

## 2022-01-16 RX ADMIN — INSULIN GLARGINE 20 UNITS: 100 INJECTION, SOLUTION SUBCUTANEOUS at 19:58

## 2022-01-16 RX ADMIN — MULTIVIT AND MINERALS-FERROUS GLUCONATE 9 MG IRON/15 ML ORAL LIQUID 15 ML: at 07:51

## 2022-01-16 RX ADMIN — TADALAFIL 20 MG: 20 TABLET, FILM COATED ORAL at 07:49

## 2022-01-16 RX ADMIN — INSULIN GLARGINE 20 UNITS: 100 INJECTION, SOLUTION SUBCUTANEOUS at 07:49

## 2022-01-16 RX ADMIN — METOPROLOL TARTRATE 25 MG: 25 TABLET, FILM COATED ORAL at 07:47

## 2022-01-16 RX ADMIN — INSULIN ASPART 1 UNITS: 100 INJECTION, SOLUTION INTRAVENOUS; SUBCUTANEOUS at 07:49

## 2022-01-16 ASSESSMENT — ACTIVITIES OF DAILY LIVING (ADL)
ADLS_ACUITY_SCORE: 12

## 2022-01-16 NOTE — PROGRESS NOTES
Calorie Count  Intake recorded for: 1/15  Total Kcals: 1690 Total Protein: 81g  Kcals from Hospital Food: 1565  Protein: 79g  Kcals from Outside Food (average):125 Protein: 2g  # Meals Ordered from Kitchen: 2 meals + snack from outside the hospital   # Meals Recorded: 2 meals (First - 100% milk, 75% omelet w/ ham, mushrooms & cheese)    (Second - 100% crystal food cake, deli sandwich w/ ham, turkey, roast beef & cheese, milk, hot cocoa, diet thi, 3 chocolate chip cookies)    (Snack - 50% Snickers bar from outside the hospital)  # Supplements Recorded: 0

## 2022-01-16 NOTE — PROGRESS NOTES
Care Management Discharge Note    Discharge Date: 01/17/22     Discharge Disposition:  [facility]    Discharge Services:  ARU    Discharge DME:  TBD    Discharge Transportation: [facility] Wheelchair Ride at 10:00am     Private pay costs discussed: Not applicable    PAS Confirmation Code:  [id number]  Patient/family educated on Medicare website which has current facility and service quality ratings:  Yes    Education Provided on the Discharge Plan:  Yes  Persons Notified of Discharge Plans: Bedside RN- Tyshawn, Dr. Wild, Charge RN, patient and patient's spouse, Kaley.   Patient/Family in Agreement with the Plan:  Yes    Handoff Referral Completed: Yes    Additional Information:   was contacted by iMrna from  ARU admissions stating that they tentatively had a bed availble for pt at 2:00pm today, dependent upon staffing. [facility]45[facility]. [facility] bedside Tyshawn perez and Dr. Wild. [facility] was then contacted by [facility] at 1:15pm that due to staffing today they were unable to accept pt today. [facility] stated that they will be able to accept pt tomorrow morning and requested that [facility] ride to tomorrow morning at 10:00am. [facility] changed ride to Monday, January 17th at 10:00am. [facility] updated bedside rn, Dr. Wild, patient and patients wife, Kaley.     Social work will continue following for discharge planning.     DEMETRIA Medina, [facility]  Adult Casual   Pager # [phone]  [email or web address]

## 2022-01-16 NOTE — PLAN OF CARE
Status: Admitted on 12/20 for traumatic acute right SDH, OR for evacuation on 12/21. PEG placed on 1/3/22.   Vitals: VSS on RA.   Neuros: Unchanged-A&Ox4, L pupil irregular. L facial droop. Blurry vision in left eye-wears eye patch at times. R ear [place] compared to left ear, per pt baseline. Denies N/T, LUE 3/5, RUE 4/5, LLE 4/5, RLE 5/5. Calm and cooperative at this time.  IV: R PIV SLd.   Labs/Electrolytes: WDL. Electrolyte rechecks scheduled.   Resp/trach: WDL. Sating mid-high 90s on RA, LS clear, equal bilaterally.   Diet: Regular. Tolerating good PO intake at this time. Chaim counts. Cyclical tube feeds, 6675-4385.    Bowel status: BS+, LBM this shift 1/15/22.   : Voiding spontaneously without difficulty. No incontinence noted.  Skin: L groin wound cares completed, blanchable redness to coccyx/sacrum. Crani incision CDI, approximated. Bruised abdomen, r/t previous heparin injection sites.    Pain: Denies.   Activity: Up with A1 GB Walker. Calling appropriately this shift. Up to chair for most of shift visiting with their wife. Walked unit x1 this shift.   Social: Pleasant this shift, wife Kaley at bedside, supportive of cares.   Plan: Pt to go to ARU tomorrow if bed available. ARU assessment complete, see note.

## 2022-01-16 NOTE — PLAN OF CARE
Status: S/p R SDH evacuation 12/21 c/b seizures. PEG placed 1/3. DND overnight.   Vitals: VSS on RA. Pt sleeping at midnight vitals, DND.   Neuros: AOx4 throughout the night. Waxes and wanes. L pupil irregular and fixed. L eye blurred vision intermittently wears eye patch. L facial droop. LUE 3/5, LLE 4/5, RUE 4/5, RLE 5/5. Denies N/T.   IV: PIV SL  Labs/Electrolytes: Mg 2.6, electrolytes redrawn this AM.   Resp/trach: LS clear  Diet: Regular with thin liquids. Cyclic TF from 5100-3740 at goal of 60mL/hr with FWF of 30mL q4h.   Bowel status: LBM 1/15 held bowel meds.   : Voiding intermittent incontinence.  Skin: L groin abrasion- wound cares completed this shift. Bruising throughout BUE and abdomen. Abdominal primapore x2 from heparin injections, dressings CDI. Blanchable redness to buttocks. Crani incision CDI approximated.   Pain: Reported R neck and shoulder pain, prn pain meds ordered. Lidocaine patch on R shoulder    Activity: A1 GB walker.    Social: Called wife last evening. Pleasant and appropriate with staff.    Plan: Medically ready for discharge, awaiting ARU placement.   Updates this shift: pt awake most of night, up in chair x3, felt uncomfortable but declined pain meds

## 2022-01-16 NOTE — PROGRESS NOTES
[facility]    Medicine Progress Note - Hospitalist Service, Gold 3       Date of Admission:  12/20/2021    Assessment & Plan        Red Sutherland is a 81 year old male with past medical history significant for CAD s/p PCI to LCX (2/2021 and repeat in 10/2021), HFpEF (60-65%) atrial fib on AC w/ Eliquis, HTN, HLD, COPD, pulmonary HTN, chronic bronchitis, type 2 DM, liver cirrhosis, stage 4 CKD, hx GIB, GERD and RLS transferred to [facility] from [facility] ED on 12/20 for right sided subdural hematoma with mass effect and midline shift.  He was admitted to Neurosurgery service and underwent evacuation on 12/21.  Hospital course c/b seizures, altered mentation and agitation, and failed swallow test requiring PEG tube placement (1/3).  Internal medicine is consulted 1/6 for hypotension and medical co-management, transferred to medicine formally 1/7.      Changes Today:  - Three nights without IV haldol, scheduled hydralazine seems to have helped.    - Medically ready for discharge to ARU, tomorrow at 10:00 AM.     # AMS, delirium, intermittent agitation - improving    In setting of subdural hematoma, s/p craniotomy, prolonged hospital stay. Per neurology may also be exacerbated by discontinuing Keppra. Seen by Psychiatry. Itzel wnl except for hypernatremia that has now resolved. Continues to wax and wane, is not sleeping well overnight. Suspect primarily d/t ongoing delirium and requires optimization of sleep/wake cycle, untethering, frequent reorientation.   - 1/9 pt mentation is significantly improved, was able to get out of bed, converse normally, participate in all therapies  - Changes made to optimize delirium                 = Discontinued PICC line, has reliable PIV access and no central access needs                 = Discontinued cardiac monitoring                  = Discontinued Gabapentin given patient's delirium                  = Continue delirium precautions                  = Patient care order -> do not disturb 10pm to 6am                 = at bedtime Zyprexa ineffective, responds well to Haldol -> transitioned to 5mg PO Haldol at bedtime (1/10),                  = 1/10: PO Melatonin at bedtime -> to be given about an hour before patient gets ready for sleep                  = 1/10:  IV Haldol 2mg q6h prn in case patient wakes with severe agitation, avoid use during daytime if needed    # Hypotension - resolved   Developed 1/6, BP as low as 82/48 with concurrent hypothermia of 95.1. Medicine consulted, ultimately felt d/t medication and oversedation.  Chart reviewed, newly started on scheduled Zyprexa on 1/3 and also received PRN Haldol 2mg x 2 on 1/5, in setting of concomitant scheduled antihypertensives (metoprolol, irbesartan).  Also newly started on Doxazosin on 1/5 which can also cause hypotension d/t alpha-1 blockade. Other etiologies also considered but workup unremarkable, ultimately suspect d/t medications.   - 1/7 TSH, am cortisol wnl    - 1/7 Irbesartan decreased by half (150 -> 75mg qday) and BP has remained stable  - Bumex and Doxazosin on hold, assess need daily and resume if/when appropriate, currently not needing.      # R subdural hematoma s/p evacuation   # Post op seizures - resolved  PCI to LCX (2/2021 and repeat in 10/2021), HFpEF (60-65%) atrial fib on AC w/ Eliquis, HTN, HLD, COPD, pulmonary HTN, chronic bronchitis, type 2 DM, liver cirrhosis, stage 4 CKD, hx GIB, GERD and RLS transferred to [facility] from [facility] ED on 12/20 for right sided subdural hematoma with mass effect and midline shift.  He was admitted to Neurosurgery service and underwent evacuation on 12/21.  Hospital course c/b seizures, altered mentation and agitation, and failed swallow test requiring PEG tube placement (1/3).  Internal medicine is consulted 1/6 for hypotension and medical co-management, transferred to medicine formally 1/7.    - CT head today in preparation for  discharge     # Pulmonary HTN   # RV dysfunction   Cardiology consulted early in hospital course, signed off on 12/26.  Per chart review, dry weight 212 prior to admission based on recent admission to [facility] for CHF exacerbation 11/30-12/6.  At that time, discharged on Bumex 4mg BID and Metolazone 2.5mg q MWF.  Last echocardiogram 10/23/21 with normal EF 60-65%, mild valvular aortic stenosis, RV mild to severe dilated, flattened septum c/w elevated RV pressure/hypervolemia, PASP 75mmHg.   - Continue ASA 81mg and Prasugrel 10mg daily   - 1/9 remains euvolemic, dry weight at this exam 197lbs, would consider this his new dry weight; have been holding PTA Bumex and Metolazone, will continue to hold and resume if appropriate  - Continue daily weights   - Continue tadalafil 20mg daily   - Decreased Irbesartan to 75mg qday and assess BP response, will resume other meds before titrating back up  - Doxazosin also currently on hold (is replacement for PTA Tamsulosin, but also has BP effect)      # Permanent atrial fib   Rate controlled on metoprolol 25mg BID.  Previously referred for Watchman device placement d/t hx bleeding issues.  On Eliquis 5mg BID PTA, stopped on admission d/t subdural hematoma as above.    - Continue Metoprolol 25mg BID  - Given SDH and current DAPT currently should hold AC given indication of AFib for now until neurosurgery and cardiology follow up     # COPD  Uses 4L NC at home, though currently stable on RA   - Continue PTA Breo Ellipta   - Continue PTA Albuterol PRN   - IS per nursing      # Type 2 DM - Last Hgb A1c 7.5%.  On Lantus 18 units BID and Invokana 100mg every evening PTA.  Currently on Lantus 14 units BID and HSSI.      # Stage IV CKD   BL Cr 1.2-1.4.  Currently Cr 1.21 on 1/6; BUN 46, Cr on 1/9 is 0.99  - Following I/Os as above  - Renally dose meds  - Avoid/minimize nephrotoxic agents      # Liver cirrhosis   Incidentally noted on imaging, CT AP on 11/30.  Hx neg for alcohol use  disorder, IV drug use, viral hepatitis, or family history of liver disease.  Last LFTs 12/27/21 wnl.  No ascites noted on exam. Ammonia on 1/7 within normal limits. No evidence of synthetic liver dysfunction.   - GI follow up outpatient      # Anemia  Hx iron deficiency.  Likely c/b CKD.  Hgb stable mid 7's. May also be c/b underlying liver disease.  - Agree w/ ferrous gluconate   - Transfuse for Hgb < 7      # RLS  - Requip dose decreased to 1mg at bedtime (PTA 2mg)  - PTA Gabapentin on hold for delirium      # GERD   # Hx past GIB   - Agree w/ pantoprazole 40mg daily  (PTA Omeprazole 20mg)      # Moderate Malnutrition in the context of acute on chronic illness:    % Intake: Decreased intake does not meet criteria, but falls short of meeting RD's goal for adequate TF intakes.  % Weight Loss: > 10% in 6 months (severe), based on h/o > 18% in 4 months   Subcutaneous Fat Loss: Facial region: mild buccal area (per last RD note d/t pt sleeping at time of visit today)  Muscle Loss: Facial & jaw region: mild, Thoracic region (clavicle, acromium bone, deltoid, trapezius, pectoral), Upper arm (bicep, tricep), Lower arm  (forearm), Patellar region and Posterior calf: all moderate (per last RD note d/t pt sleeping at time of visit today)  Fluid Accumulation/Edema: Unable to assess  Malnutrition Diagnosis: Moderate malnutrition in the context of acute on chronic illness  - Continue TFs  - SLP advanced diet to regular diet with thin liquids on 1/9, calorie counts ordered to start 1/10       Diet: Regular Diet Adult Thin Liquids (level 0)  Adult Formula Drip Feeding: Continuous Pivot 1.5; Gastrostomy; Goal Rate: 60; mL/hr; From: 8:00 PM; 8:00 AM; Medication - Feeding Tube Flush Frequency: At least 15-30 mL water before and after medication administration and with tube clogging; RN; ...  Calorie Counts  Diet    DVT Prophylaxis: Heparin SQ  Dixon Catheter: Not present  Central Lines: None  Code Status: Full Code      Disposition  Plan   Expected Discharge:  1/17/2022   Anticipated discharge location:  Awaiting care coordination huddle  Delays:     Placement - LTAC/ARU  Administering IV medications            The patient's care was discussed with the Bedside Nurse, Care Coordinator/, Patient and Patient's Family.    Rufino Wild MD  Hospitalist Service, [address]  Securely message with the Vocera Web Console (learn more here)  Text page via [employer] Paging/Directory    Please see sign in/sign out for up to date coverage information    Clinically Significant Risk Factors Present on Admission                    ______________________________________________________________________    Interval History   Patient seen and examined.  Noted he felt somewhat anxious overnight and had some difficulty sleeping over transitioning out of the hospital.  Otherwise denies HA, dizziness, CP, N/V, abdominal pain or other symptoms currently.     Data reviewed today: I reviewed all medications, new labs and imaging results over the last 24 hours. I personally reviewed no images or EKG's today.    Physical Exam   Vital Signs: Temp: 97.8  F (36.6  C) Temp src: Oral BP: (!) 143/73 Pulse: 81   Resp: 18 SpO2: 97 % O2 Device: None (Room air)    Weight: 213 lbs 0 oz  General Appearance: NAD  Respiratory: CTA b/l  Cardiovascular: RRR S1/S2, no m,r,g  GI: soft, NT, ND, +BS  Skin: no rashes  Other: No edema     Data   Recent Labs   Lab 01/16/22  1148 01/16/22  0901 01/16/22  0739 01/15/22  2000 01/15/22  1113 01/15/22  0947 01/14/22  0902 01/14/22  0649 01/12/22  0813 01/12/22  0720 01/10/22  1154 01/10/22  1008   WBC  --   --   --   --   --   --   --  6.5  --  7.1  --  4.9   HGB  --   --   --   --   --   --   --  7.9*  --  8.4*  --  8.2*   MCV  --   --   --   --   --   --   --  74*  --  73*  --  73*   PLT  --   --   --   --   --   --   --  199  --  199  --  163   NA  --   --   --   --   --   --    --  139  --  138  --  138   POTASSIUM  --  4.7  --   --   --  4.6  --  4.4   < > 4.4   < > 4.3   CHLORIDE  --   --   --   --   --   --   --  109  --  109  --  106   CO2  --   --   --   --   --   --   --  22  --  22  --  25   BUN  --   --   --   --   --   --   --  44*  --  41*  --  36*   CR  --   --   --   --   --   --   --  1.10  --  1.20  --  1.04   ANIONGAP  --   --   --   --   --   --   --  8  --  7  --  7   ANNMARIE  --   --   --   --   --   --   --  8.7  --  9.4  --  9.1   *  --  141* 204*   < >  --    < > 164*   < > 174*   < > 212*   ALBUMIN  --   --   --   --   --   --   --  2.8*  --  2.9*  --  2.9*   PROTTOTAL  --   --   --   --   --   --   --  6.4*  --  6.9  --  6.8   BILITOTAL  --   --   --   --   --   --   --  0.9  --  0.8  --  0.8   ALKPHOS  --   --   --   --   --   --   --  167*  --  169*  --  157*   ALT  --   --   --   --   --   --   --  18  --  19  --  16   AST  --   --   --   --   --   --   --  17  --  21  --  21    < > = values in this interval not displayed.     No results found for this or any previous visit (from the past 24 hour(s)).  Medications     - MEDICATION INSTRUCTIONS -         aspirin  81 mg Oral Daily     atorvastatin  80 mg Oral or Feeding Tube Daily     [Held by provider] bumetanide  4 mg Oral QAM     diclofenac  2 g Topical 4x Daily     [Held by provider] doxazosin  1 mg Oral Daily     empagliflozin  10 mg Oral or Feeding Tube Daily     ferrous gluconate  324 mg Oral or Feeding Tube Every Other Day     fluticasone-vilanterol  1 puff Inhalation Daily     haloperidol  5 mg Oral At Bedtime     hydrOXYzine  25 mg Oral At Bedtime     insulin aspart  1-12 Units Subcutaneous Q4H     insulin glargine  20 Units Subcutaneous BID     irbesartan  75 mg Oral or Feeding Tube Daily     lacosamide  200 mg Oral or Feeding Tube BID     lidocaine  1 patch Transdermal Q24h    And     lidocaine   Transdermal Q8H     magnesium citrate  296 mL Oral Once     melatonin  10 mg Oral At Bedtime     menthol    Transdermal Q8H     metoprolol tartrate  25 mg Oral BID     multivitamins w/minerals  15 mL Per Feeding Tube Daily     pantoprazole  40 mg Oral or Feeding Tube QAM AC     polyethylene glycol  17 g Oral or Feeding Tube Daily     potassium chloride  40 mEq Oral Daily     prasugrel  10 mg Oral or Feeding Tube Daily     rOPINIRole  1 mg Oral Daily     senna-docusate  2 tablet Oral or Feeding Tube BID     sodium chloride (PF)  3 mL Intracatheter Q8H     tadalafil  20 mg Oral Q24H

## 2022-01-16 NOTE — PROGRESS NOTES
[facility], [place], [place]   01/16/2022  Neurosurgery Progress Note:    Assessment:  Red Sutherland is a 81-year-old male post-traumatic acute right subdural hematoma with left hemibody weakness, on xarelto and plavix. Admitted to the ICU on 12/20/21. Taken to OR 12/21/2021. Extubated 12/22/2021. Started on Aspirin and Prasugrel on 12/28/21. Failed SLP evaluation. Free water flushes stopped since hypernatremia resolved. Lasix and Bumex given for diuresis. Received PEG on 1/3/2022. LLQ tenderness near PEG site. CT AP done 1/5 showing no obvious pathology. Medicine consulted 1/6/2022 for hypotension management, transferred to medicine as primary due to ongoing concerns for hypotension in the setting of extensive cardiac history.  Steadily improving and patient delirium treated with delirium precautions, and Haldol.  Stable from neurosurgical perspective.  CT obtained on 1/14/2020 showing stability.  Currently pending placement    Plan:  - CT 2 weeks after discharge and will be seen in clinic with GEORGETTE  - Continue aspirin and prasugrel for recent PCI history.  - Currently pending placement  - Remainder of cares per primary team    Suzanne Heaton MD  Neurosurgery Resident, PGY-3    Please contact neurosurgery resident on call with questions.    Dial * * [phone], enter [phone] when prompted.   -----------------------------------    Interval History: No acute events, awaiting discharge    Objective:   Temp:  [97.7  F (36.5  C)-98.1  F (36.7  C)] 97.7  F (36.5  C)  Pulse:  [81-89] 89  Resp:  [18] 18  BP: (127-145)/(61-78) 127/65  SpO2:  [100 %] 100 %  I/O last 3 completed shifts:  In: 1700 [P.O.:840; NG/GT:200]  Out: 460 [Urine:460]    General awake alert, appearing stated age, nonacute distress, pleasant  Wound: Right-sided incision appears well-healed, no areas of redness or erythema, no areas of skin breakdown, edges well approximated  Pulm: On room air  MSK: Normal  Neurologic:  -Awake, alert,  oriented to person place and time  - Pupils equal round reactive to light 3 mm bilaterally, extraocular movements intact, visual fields intact grossly  - Mild left-sided facial droop, intact sensation in V1 V2, V3  - 5 out of 5 strength throughout  - Full sensation to light touch in all extremities  LABS:  Recent Labs   Lab 01/15/22  2000 01/15/22  1536 01/15/22  1157 01/15/22  1113 01/15/22  0947 01/14/22  0902 01/14/22  0649 01/13/22  1324 01/13/22  1122 01/12/22  0813 01/12/22  0720 01/10/22  1154 01/10/22  1008   NA  --   --   --   --   --   --  139  --   --   --  138  --  138   POTASSIUM  --   --   --   --  4.6  --  4.4  --  5.0  --  4.4   < > 4.3   CHLORIDE  --   --   --   --   --   --  109  --   --   --  109  --  106   CO2  --   --   --   --   --   --  22  --   --   --  22  --  25   ANIONGAP  --   --   --   --   --   --  8  --   --   --  7  --  7   * 155* 127*   < >  --    < > 164*   < >  --    < > 174*   < > 212*   BUN  --   --   --   --   --   --  44*  --   --   --  41*  --  36*   CR  --   --   --   --   --   --  1.10  --   --   --  1.20  --  1.04   ANNMARIE  --   --   --   --   --   --  8.7  --   --   --  9.4  --  9.1    < > = values in this interval not displayed.       Recent Labs   Lab 01/14/22  0649   WBC 6.5   RBC 3.87*   HGB 7.9*   HCT 28.6*   MCV 74*   MCH 20.4*   MCHC 27.6*   RDW 20.7*          IMAGING:  No results found for this or any previous visit (from the past 24 hour(s)).

## 2022-01-17 ENCOUNTER — APPOINTMENT (OUTPATIENT)
Dept: PHYSICAL THERAPY | Facility: CLINIC | Age: 82
DRG: 949 | End: 2022-01-17
Attending: PHYSICAL MEDICINE & REHABILITATION
Payer: COMMERCIAL

## 2022-01-17 ENCOUNTER — HOSPITAL ENCOUNTER (INPATIENT)
Facility: CLINIC | Age: 82
LOS: 9 days | Discharge: HOME HEALTH | DRG: 949 | End: 2022-01-26
Attending: PHYSICAL MEDICINE & REHABILITATION | Admitting: PHYSICAL MEDICINE & REHABILITATION
Payer: COMMERCIAL

## 2022-01-17 VITALS
BODY MASS INDEX: 30.35 KG/M2 | TEMPERATURE: 97.6 F | OXYGEN SATURATION: 100 % | RESPIRATION RATE: 16 BRPM | DIASTOLIC BLOOD PRESSURE: 79 MMHG | WEIGHT: 217.6 LBS | SYSTOLIC BLOOD PRESSURE: 148 MMHG | HEART RATE: 88 BPM

## 2022-01-17 DIAGNOSIS — Z79.4 TYPE 2 DIABETES MELLITUS WITH OTHER SPECIFIED COMPLICATION, WITH LONG-TERM CURRENT USE OF INSULIN (H): Chronic | ICD-10-CM

## 2022-01-17 DIAGNOSIS — E78.00 HYPERCHOLESTEREMIA: Primary | Chronic | ICD-10-CM

## 2022-01-17 DIAGNOSIS — R56.9 SEIZURES (H): ICD-10-CM

## 2022-01-17 DIAGNOSIS — E11.69 TYPE 2 DIABETES MELLITUS WITH OTHER SPECIFIED COMPLICATION, WITH LONG-TERM CURRENT USE OF INSULIN (H): Chronic | ICD-10-CM

## 2022-01-17 DIAGNOSIS — S06.5XAA SUBDURAL HEMATOMA (H): ICD-10-CM

## 2022-01-17 DIAGNOSIS — R18.8 OTHER ASCITES: ICD-10-CM

## 2022-01-17 LAB
GLUCOSE BLDC GLUCOMTR-MCNC: 145 MG/DL (ref 70–99)
GLUCOSE BLDC GLUCOMTR-MCNC: 155 MG/DL (ref 70–99)
GLUCOSE BLDC GLUCOMTR-MCNC: 202 MG/DL (ref 70–99)
HOLD SPECIMEN: NORMAL
MAGNESIUM SERPL-MCNC: 2.5 MG/DL (ref 1.6–2.3)
PHOSPHATE SERPL-MCNC: 4.2 MG/DL (ref 2.5–4.5)

## 2022-01-17 PROCEDURE — 128N000003 HC R&B REHAB

## 2022-01-17 PROCEDURE — 250N000013 HC RX MED GY IP 250 OP 250 PS 637: Performed by: STUDENT IN AN ORGANIZED HEALTH CARE EDUCATION/TRAINING PROGRAM

## 2022-01-17 PROCEDURE — 99223 1ST HOSP IP/OBS HIGH 75: CPT | Mod: 24 | Performed by: PHYSICAL MEDICINE & REHABILITATION

## 2022-01-17 PROCEDURE — 250N000012 HC RX MED GY IP 250 OP 636 PS 637: Performed by: PHYSICAL MEDICINE & REHABILITATION

## 2022-01-17 PROCEDURE — 83735 ASSAY OF MAGNESIUM: CPT | Performed by: STUDENT IN AN ORGANIZED HEALTH CARE EDUCATION/TRAINING PROGRAM

## 2022-01-17 PROCEDURE — 36415 COLL VENOUS BLD VENIPUNCTURE: CPT | Performed by: STUDENT IN AN ORGANIZED HEALTH CARE EDUCATION/TRAINING PROGRAM

## 2022-01-17 PROCEDURE — 99239 HOSP IP/OBS DSCHRG MGMT >30: CPT | Performed by: INTERNAL MEDICINE

## 2022-01-17 PROCEDURE — 250N000013 HC RX MED GY IP 250 OP 250 PS 637: Performed by: NURSE PRACTITIONER

## 2022-01-17 PROCEDURE — 250N000013 HC RX MED GY IP 250 OP 250 PS 637: Performed by: PHYSICAL MEDICINE & REHABILITATION

## 2022-01-17 PROCEDURE — 250N000013 HC RX MED GY IP 250 OP 250 PS 637: Performed by: INTERNAL MEDICINE

## 2022-01-17 PROCEDURE — 97163 PT EVAL HIGH COMPLEX 45 MIN: CPT | Mod: GP

## 2022-01-17 PROCEDURE — 99207 PR CDG-CODE INCORRECT PER BILLING BASED ON TIME: CPT | Performed by: INTERNAL MEDICINE

## 2022-01-17 PROCEDURE — 250N000012 HC RX MED GY IP 250 OP 636 PS 637: Performed by: PHYSICIAN ASSISTANT

## 2022-01-17 PROCEDURE — 250N000013 HC RX MED GY IP 250 OP 250 PS 637: Performed by: NEUROLOGICAL SURGERY

## 2022-01-17 PROCEDURE — 84100 ASSAY OF PHOSPHORUS: CPT | Performed by: STUDENT IN AN ORGANIZED HEALTH CARE EDUCATION/TRAINING PROGRAM

## 2022-01-17 PROCEDURE — 250N000013 HC RX MED GY IP 250 OP 250 PS 637: Performed by: PHYSICIAN ASSISTANT

## 2022-01-17 RX ORDER — PRASUGREL 5 MG/1
10 TABLET, FILM COATED ORAL DAILY
Status: DISCONTINUED | OUTPATIENT
Start: 2022-01-18 | End: 2022-01-26 | Stop reason: HOSPADM

## 2022-01-17 RX ORDER — MULTIPLE VITAMINS W/ MINERALS TAB 9MG-400MCG
1 TAB ORAL DAILY
Status: DISCONTINUED | OUTPATIENT
Start: 2022-01-18 | End: 2022-01-26 | Stop reason: HOSPADM

## 2022-01-17 RX ORDER — HYDROXYZINE HYDROCHLORIDE 25 MG/1
25 TABLET, FILM COATED ORAL AT BEDTIME
Status: DISCONTINUED | OUTPATIENT
Start: 2022-01-17 | End: 2022-01-26 | Stop reason: HOSPADM

## 2022-01-17 RX ORDER — NICOTINE POLACRILEX 4 MG
15-30 LOZENGE BUCCAL
Status: DISCONTINUED | OUTPATIENT
Start: 2022-01-17 | End: 2022-01-26 | Stop reason: HOSPADM

## 2022-01-17 RX ORDER — ONDANSETRON 4 MG/1
4 TABLET, ORALLY DISINTEGRATING ORAL EVERY 6 HOURS PRN
Status: DISCONTINUED | OUTPATIENT
Start: 2022-01-17 | End: 2022-01-26 | Stop reason: HOSPADM

## 2022-01-17 RX ORDER — ACETAMINOPHEN 325 MG/1
975 TABLET ORAL EVERY 8 HOURS PRN
Status: DISCONTINUED | OUTPATIENT
Start: 2022-01-17 | End: 2022-01-26 | Stop reason: HOSPADM

## 2022-01-17 RX ORDER — ATORVASTATIN CALCIUM 40 MG/1
40 TABLET, FILM COATED ORAL EVERY EVENING
Status: DISCONTINUED | OUTPATIENT
Start: 2022-01-18 | End: 2022-01-26 | Stop reason: HOSPADM

## 2022-01-17 RX ORDER — HALOPERIDOL 5 MG/1
5 TABLET ORAL
Status: DISCONTINUED | OUTPATIENT
Start: 2022-01-17 | End: 2022-01-18

## 2022-01-17 RX ORDER — AMOXICILLIN 250 MG
1-4 CAPSULE ORAL 2 TIMES DAILY
Status: DISCONTINUED | OUTPATIENT
Start: 2022-01-17 | End: 2022-01-26 | Stop reason: HOSPADM

## 2022-01-17 RX ORDER — DEXTROSE MONOHYDRATE 25 G/50ML
25-50 INJECTION, SOLUTION INTRAVENOUS
Status: DISCONTINUED | OUTPATIENT
Start: 2022-01-17 | End: 2022-01-17

## 2022-01-17 RX ORDER — FERROUS GLUCONATE 324(38)MG
324 TABLET ORAL EVERY OTHER DAY
Status: DISCONTINUED | OUTPATIENT
Start: 2022-01-18 | End: 2022-01-26 | Stop reason: HOSPADM

## 2022-01-17 RX ORDER — HALOPERIDOL 5 MG/1
5 TABLET ORAL AT BEDTIME
Status: DISCONTINUED | OUTPATIENT
Start: 2022-01-18 | End: 2022-01-17

## 2022-01-17 RX ORDER — IRBESARTAN 75 MG/1
75 TABLET ORAL DAILY
Status: DISCONTINUED | OUTPATIENT
Start: 2022-01-18 | End: 2022-01-26 | Stop reason: HOSPADM

## 2022-01-17 RX ORDER — TADALAFIL 10 MG/1
20 TABLET ORAL EVERY 24 HOURS
Status: DISCONTINUED | OUTPATIENT
Start: 2022-01-18 | End: 2022-01-26 | Stop reason: HOSPADM

## 2022-01-17 RX ORDER — ALBUTEROL SULFATE 90 UG/1
2 AEROSOL, METERED RESPIRATORY (INHALATION) EVERY 6 HOURS PRN
Status: DISCONTINUED | OUTPATIENT
Start: 2022-01-17 | End: 2022-01-26 | Stop reason: HOSPADM

## 2022-01-17 RX ORDER — LACOSAMIDE 200 MG/1
200 TABLET ORAL 2 TIMES DAILY
Status: DISCONTINUED | OUTPATIENT
Start: 2022-01-17 | End: 2022-01-26 | Stop reason: HOSPADM

## 2022-01-17 RX ORDER — BISACODYL 10 MG
10 SUPPOSITORY, RECTAL RECTAL DAILY PRN
Status: DISCONTINUED | OUTPATIENT
Start: 2022-01-17 | End: 2022-01-26 | Stop reason: HOSPADM

## 2022-01-17 RX ORDER — PANTOPRAZOLE SODIUM 40 MG/1
40 TABLET, DELAYED RELEASE ORAL
Status: DISCONTINUED | OUTPATIENT
Start: 2022-01-18 | End: 2022-01-26 | Stop reason: HOSPADM

## 2022-01-17 RX ORDER — DEXTROSE MONOHYDRATE 25 G/50ML
25-50 INJECTION, SOLUTION INTRAVENOUS
Status: DISCONTINUED | OUTPATIENT
Start: 2022-01-17 | End: 2022-01-26 | Stop reason: HOSPADM

## 2022-01-17 RX ORDER — POLYETHYLENE GLYCOL 3350 17 G/17G
17 POWDER, FOR SOLUTION ORAL DAILY PRN
Status: DISCONTINUED | OUTPATIENT
Start: 2022-01-17 | End: 2022-01-26 | Stop reason: HOSPADM

## 2022-01-17 RX ORDER — METOPROLOL TARTRATE 25 MG/1
25 TABLET, FILM COATED ORAL 2 TIMES DAILY
Status: DISCONTINUED | OUTPATIENT
Start: 2022-01-17 | End: 2022-01-26 | Stop reason: HOSPADM

## 2022-01-17 RX ORDER — LORAZEPAM 0.5 MG/1
0.25 TABLET ORAL AT BEDTIME
Status: DISCONTINUED | OUTPATIENT
Start: 2022-01-18 | End: 2022-01-18

## 2022-01-17 RX ORDER — ROPINIROLE 0.25 MG/1
1 TABLET, FILM COATED ORAL EVERY EVENING
Status: DISCONTINUED | OUTPATIENT
Start: 2022-01-17 | End: 2022-01-26 | Stop reason: HOSPADM

## 2022-01-17 RX ORDER — ASPIRIN 81 MG/1
81 TABLET ORAL DAILY
Status: DISCONTINUED | OUTPATIENT
Start: 2022-01-18 | End: 2022-01-26 | Stop reason: HOSPADM

## 2022-01-17 RX ORDER — ZINC SULFATE 50(220)MG
220 CAPSULE ORAL DAILY
Status: DISCONTINUED | OUTPATIENT
Start: 2022-01-18 | End: 2022-01-26 | Stop reason: HOSPADM

## 2022-01-17 RX ORDER — CALCIUM CARBONATE 500 MG/1
500 TABLET, CHEWABLE ORAL DAILY PRN
Status: DISCONTINUED | OUTPATIENT
Start: 2022-01-17 | End: 2022-01-26 | Stop reason: HOSPADM

## 2022-01-17 RX ORDER — NICOTINE POLACRILEX 4 MG
15-30 LOZENGE BUCCAL
Status: DISCONTINUED | OUTPATIENT
Start: 2022-01-17 | End: 2022-01-17

## 2022-01-17 RX ORDER — HYDROXYZINE HYDROCHLORIDE 25 MG/1
25 TABLET, FILM COATED ORAL EVERY 6 HOURS PRN
Status: DISCONTINUED | OUTPATIENT
Start: 2022-01-17 | End: 2022-01-18

## 2022-01-17 RX ADMIN — DOCUSATE SODIUM AND SENNOSIDES 1 TABLET: 8.6; 5 TABLET ORAL at 20:59

## 2022-01-17 RX ADMIN — ASPIRIN 81 MG: 81 TABLET, COATED ORAL at 08:58

## 2022-01-17 RX ADMIN — HYDROXYZINE HYDROCHLORIDE 25 MG: 25 TABLET ORAL at 22:40

## 2022-01-17 RX ADMIN — LACOSAMIDE 200 MG: 50 TABLET, FILM COATED ORAL at 08:58

## 2022-01-17 RX ADMIN — METOPROLOL TARTRATE 25 MG: 25 TABLET, FILM COATED ORAL at 21:00

## 2022-01-17 RX ADMIN — FLUTICASONE FUROATE AND VILANTEROL TRIFENATATE 1 PUFF: 200; 25 POWDER RESPIRATORY (INHALATION) at 08:56

## 2022-01-17 RX ADMIN — HYDROXYZINE HYDROCHLORIDE 25 MG: 25 TABLET ORAL at 21:00

## 2022-01-17 RX ADMIN — DICLOFENAC SODIUM 2 G: 10 GEL TOPICAL at 08:59

## 2022-01-17 RX ADMIN — INSULIN ASPART 1 UNITS: 100 INJECTION, SOLUTION INTRAVENOUS; SUBCUTANEOUS at 09:03

## 2022-01-17 RX ADMIN — POTASSIUM CHLORIDE 40 MEQ: 1.5 POWDER, FOR SOLUTION ORAL at 08:53

## 2022-01-17 RX ADMIN — INSULIN ASPART 1 UNITS: 100 INJECTION, SOLUTION INTRAVENOUS; SUBCUTANEOUS at 17:56

## 2022-01-17 RX ADMIN — Medication 40 MG: at 08:52

## 2022-01-17 RX ADMIN — INSULIN GLARGINE 20 UNITS: 100 INJECTION, SOLUTION SUBCUTANEOUS at 21:10

## 2022-01-17 RX ADMIN — METOPROLOL TARTRATE 25 MG: 25 TABLET, FILM COATED ORAL at 08:53

## 2022-01-17 RX ADMIN — ROPINIROLE HYDROCHLORIDE 1 MG: 0.25 TABLET, FILM COATED ORAL at 20:59

## 2022-01-17 RX ADMIN — LACOSAMIDE 200 MG: 200 TABLET, FILM COATED ORAL at 20:59

## 2022-01-17 RX ADMIN — TADALAFIL 20 MG: 20 TABLET, FILM COATED ORAL at 08:58

## 2022-01-17 RX ADMIN — PRASUGREL 10 MG: 10 TABLET, FILM COATED ORAL at 08:58

## 2022-01-17 RX ADMIN — IRBESARTAN 75 MG: 75 TABLET, FILM COATED ORAL at 08:53

## 2022-01-17 RX ADMIN — Medication 10 MG: at 23:54

## 2022-01-17 RX ADMIN — EMPAGLIFLOZIN 10 MG: 10 TABLET, FILM COATED ORAL at 08:55

## 2022-01-17 RX ADMIN — MULTIVIT AND MINERALS-FERROUS GLUCONATE 9 MG IRON/15 ML ORAL LIQUID 15 ML: at 08:53

## 2022-01-17 RX ADMIN — INSULIN GLARGINE 20 UNITS: 100 INJECTION, SOLUTION SUBCUTANEOUS at 09:03

## 2022-01-17 RX ADMIN — ATORVASTATIN CALCIUM 80 MG: 40 TABLET, FILM COATED ORAL at 08:53

## 2022-01-17 RX ADMIN — ACETAMINOPHEN 975 MG: 325 TABLET, FILM COATED ORAL at 23:54

## 2022-01-17 ASSESSMENT — ACTIVITIES OF DAILY LIVING (ADL)
ADLS_ACUITY_SCORE: 5
ADLS_ACUITY_SCORE: 12
ADLS_ACUITY_SCORE: 5
ADLS_ACUITY_SCORE: 12
ADLS_ACUITY_SCORE: 12
ADLS_ACUITY_SCORE: 5
ADLS_ACUITY_SCORE: 5
ADLS_ACUITY_SCORE: 12
ADLS_ACUITY_SCORE: 16
ADLS_ACUITY_SCORE: 12
ADLS_ACUITY_SCORE: 5
ADLS_ACUITY_SCORE: 12
ADLS_ACUITY_SCORE: 12
ADLS_ACUITY_SCORE: 5
ADLS_ACUITY_SCORE: 12
ADLS_ACUITY_SCORE: 5
ADLS_ACUITY_SCORE: 5
ADLS_ACUITY_SCORE: 12

## 2022-01-17 ASSESSMENT — MIFFLIN-ST. JEOR: SCORE: 1731.87

## 2022-01-17 NOTE — PLAN OF CARE
Patient admitted to unit at approximately 1200. Arrived to unit via [facility] transport in a  and was assisted into bed with Ax1 SPT. Room orientation done and admission process started. Will pass on to next nurse to continue.   Patient's wife is at bedside now.

## 2022-01-17 NOTE — PROGRESS NOTES
[facility] transport contacted at 1030 to inquire about ride as they had not arrived yet. [facility] transport informed charge RN that ride was set for 1445. Charge stated per SW note ride was expected at 1000. [facility] adjusted ride- expected at 1130 now.

## 2022-01-17 NOTE — PLAN OF CARE
Status: R SDH evacuation 12/21 c/b seizures. PEG placed 1/3. DND overnight.   Vitals: VSS on RA. Pt sleeping at midnight vitals, DND.   Neuros: AOx4 throughout the night. Waxes and wanes. L pupil irregular and fixed. L eye blurred vision intermittently wears eye patch. L facial droop. LUE 3/5, LLE 4/5, RUE 4/5, RLE 5/5. Denies N/T.   IV: PIV SL  Labs/Electrolytes: Mg 2.6  Resp/trach: LS clear  Diet: Regular with thin liquids. Cyclic TF from 6302-7846 at goal of 60mL/hr with FWF of 30mL q4h.   Bowel status: LBM 1/16 held bowel meds.   : Voiding intermittent incontinence.  Skin: L groin abrasion- wound cares completed this shift. Bruising throughout BUE and abdomen. Abdominal primapore x2 from heparin injections, dressings CDI. Blanchable redness to buttocks. Crani incision CDI approximated.   Pain: Mid back and bilateral shoulder pain. Lidocaine patch to mid back. Voltaren gel applied to shoulders bilaterally. Declined tylenol.   Activity: A1 GB walker.    Social: Irritable at times wanting to sit on the edge of bed. Redirected to sit in recliner or repositioned in bed.   Plan: ARU discharge at 10am.    Updates this shift: slept more overnight, awake for shorter periods of time.

## 2022-01-17 NOTE — PLAN OF CARE
Patient discharged to [facility] today at 1130. Patient is stable and medically ready for discharge. Patient was sent with all belongings. Face sheet given to transport staff and nurse to nurse report given before patient discharged.      Pt VSS. AOx4 throughout the night. Waxes and wanes. L pupil irregular and fixed. L eye blurred vision intermittently wears eye patch. L facial droop. LUE 3/5, LLE 4/5, RUE 4/5, RLE 5/5. Denies N/T. Cyclic TF from 1833-4255. Regular diet, good PO intake. Last BM 1/16. Intermittent incontinence of both bowel and bladder. L groin abrasion- wound cares completed this shift. Bruising throughout BUE and abdomen. Abdominal primapore x2 from heparin injections, dressings CDI. Blanchable redness to buttocks. Crani incision CDI approximated.P Plan to discharge to ARU today.

## 2022-01-17 NOTE — PLAN OF CARE
Physical Therapy Discharge Summary    Reason for therapy discharge:    Discharged to acute rehabilitation facility.    Progress towards therapy goal(s). See goals on Care Plan in [facility] electronic health record for goal details.  Goals partially met.  Barriers to achieving goals:   discharge from facility.    Therapy recommendation(s):    Continued therapy is recommended.  Rationale/Recommendations:  ARC to maximize functional independence.

## 2022-01-17 NOTE — PLAN OF CARE
Status: Admitted on 12/20 for traumatic acute right SDH, OR for evacuation on 12/21. PEG placed on 1/3/22.   Vitals: VSS on RA.   Neuros: Unchanged-A&Ox4, L pupil irregular. L facial droop. Blurry vision in left eye-wears eye patch at times. R ear [place] compared to left ear, per pt baseline. Denies N/T, LUE 3/5, RUE 4/5, LLE 4/5, RLE 5/5. Calm and cooperative at this time. No confusion noted.   IV: R PIV SLd.   Labs/Electrolytes: WDL. Electrolyte rechecks scheduled.   Resp/trach: WDL. Sating mid-high 90s on RA, LS clear, equal bilaterally.   Diet: Regular. Tolerating good PO intake at this time. Chaim counts. Cyclical tube feeds, 8070-4602.    Bowel status: BS+, LBM this shift 1/16/22.   : Voiding spontaneously without difficulty. No incontinence noted.  Skin: L groin wound cares completed, blanchable redness to coccyx/sacrum. Crani incision CDI, approximated. Bruised abdomen, r/t previous heparin injection sites.    Pain: Denies.   Activity: Up with A1 GB Walker. Calling appropriately this shift. Up to chair for most of shift visiting with their wife. Walked unit x1 this shift.   Social: Pleasant this shift, wife Kaley at bedside, supportive of cares.   Plan: Pt to go to ARU tomorrow at 10am, team aware.

## 2022-01-17 NOTE — PLAN OF CARE
Discharge Planner Post-Acute Rehab PT:     Discharge Plan: Home with spouse, OP PT    Precautions: Fall, alarm, crani, L neglect/field cut?    Current Status:  Bed Mobility: Not assessed  Transfer: CGA FWW  Gait: SBA '  Stairs: Not assessed  Balance: Sits IND, UE support in standing    Assessment:  Seen briefly at hospitals. Okay to walk with nursing FWW overnight    Other Barriers to Discharge (DME, Family Training, etc):   TBD

## 2022-01-17 NOTE — PLAN OF CARE
Occupational Therapy Discharge Summary    Reason for therapy discharge:    Discharged to acute rehabilitation facility.    Progress towards therapy goal(s). See goals on Care Plan in [facility] electronic health record for goal details.  Goals not met.  Barriers to achieving goals:   discharge from facility.    Therapy recommendation(s):    Continued therapy is recommended.  Rationale/Recommendations:  To promote increased ADL/IADL independence, safety and cognition.

## 2022-01-17 NOTE — PROGRESS NOTES
[facility], [place]   01/17/2022  Neurosurgery Progress Note:    Assessment:  Red Sutherland is a 81-year-old male post-traumatic acute right subdural hematoma with left hemibody weakness, on xarelto and plavix. Admitted to the ICU on 12/20/21. Taken to OR 12/21/2021. Extubated 12/22/2021. Started on Aspirin and Prasugrel on 12/28/21. Failed SLP evaluation. Free water flushes stopped since hypernatremia resolved. Lasix and Bumex given for diuresis. Received PEG on 1/3/2022. LLQ tenderness near PEG site. CT AP done 1/5 showing no obvious pathology. Medicine consulted 1/6/2022 for hypotension management, transferred to medicine as primary due to ongoing concerns for hypotension in the setting of extensive cardiac history.  Steadily improving and patient delirium treated with delirium precautions, and Haldol.  Stable from neurosurgical perspective.  CT obtained on 1/14/2020 showing stability.  Potential discharge to ARU today.    Plan:  - Plan for discharge to ARU today  - CT 2 weeks after discharge and will be seen in clinic with GEORGETTE  - Continue Vimpat  - Continue aspirin and prasugrel for recent PCI history.  - Currently pending placement  - Remainder of cares per primary team    Asim Ann MD  Neurosurgery Resident, PGY-2    Please contact neurosurgery resident on call with questions.    Dial * * [phone], enter [phone] when prompted.   -----------------------------------    Interval History: no acute events overnight.  Remains on cyclic tube feeds.  Slowly improving delirium.    Objective:   Temp:  [97.6  F (36.4  C)-98.7  F (37.1  C)] 97.6  F (36.4  C)  Pulse:  [59-75] 59  Resp:  [16-18] 16  BP: (110-144)/(55-79) 144/79  SpO2:  [99 %-100 %] 100 %  I/O last 3 completed shifts:  In: 1455 [P.O.:540; NG/GT:195]  Out: -     General awake alert, appearing stated age, nonacute distress, pleasant and conversant  Wound: Right-sided incision appears well-healed, no areas of redness or  erythema, no areas of skin breakdown, edges well approximated  Pulm: On room air  MSK: Normal  Neurologic:  -Awake, alert, oriented to person place and time, curious about when he is going to be leaving.  - Pupils equal round reactive to light 3 mm bilaterally, extraocular movements intact, visual fields intact grossly  - Mild left-sided facial droop, intact sensation in V1 V2, V3, improving dysarthria  - 5 out of 5 strength throughout  - Full sensation to light touch in all extremities  LABS:  Recent Labs   Lab 01/16/22  1956 01/16/22  1523 01/16/22  1148 01/16/22  0901 01/15/22  1113 01/15/22  0947 01/14/22  0902 01/14/22  0649 01/12/22  0813 01/12/22  0720 01/10/22  1154 01/10/22  1008   NA  --   --   --   --   --   --   --  139  --  138  --  138   POTASSIUM  --   --   --  4.7  --  4.6  --  4.4   < > 4.4   < > 4.3   CHLORIDE  --   --   --   --   --   --   --  109  --  109  --  106   CO2  --   --   --   --   --   --   --  22  --  22  --  25   ANIONGAP  --   --   --   --   --   --   --  8  --  7  --  7   * 134* 201*  --    < >  --    < > 164*   < > 174*   < > 212*   BUN  --   --   --   --   --   --   --  44*  --  41*  --  36*   CR  --   --   --   --   --   --   --  1.10  --  1.20  --  1.04   ANNMARIE  --   --   --   --   --   --   --  8.7  --  9.4  --  9.1    < > = values in this interval not displayed.       Recent Labs   Lab 01/14/22  0649   WBC 6.5   RBC 3.87*   HGB 7.9*   HCT 28.6*   MCV 74*   MCH 20.4*   MCHC 27.6*   RDW 20.7*          IMAGING:  No results found for this or any previous visit (from the past 24 hour(s)).

## 2022-01-17 NOTE — CONSULTS
"Social Work: Initial Assessment with Discharge Plan    Patient Name: Red Sutherland  : 1940  Age: 81 year old  MRN: [id number]  Completed assessment with: Chart review and interview with patient   Admitted to ARU: 2022    Presenting Information   Date of SW assessment: 2022  Health Care Directive: Copy in Chart  Primary Health Care Agent: Patient/self  Secondary Health Care Agent: Spouse Kaley   Living Situation: Live in a town house in [place] with spouse. 1-2 OSVALDO. All needs met on main level. 2-story. No pets in the home.   Previous Functional Status: Indep PTA. Was driving PTA but wife does most of the driving. Retired. 1 fall which was result of hospitalization. Pt stated that he manages his medications and finances and stated, \"but if I don't my wife helps me\".   DME available: walker, cane, shower chair, power wheelchair, CPAP and oxygen (2-4 L). PEG placed 2022.   Patient and family understanding of hospitalization: Appropriate. 15/15 BIMs.   Cultural/Language/Spiritual Considerations: 80 y/o  male, english-speaking,  and Sikhism-mimi.    Physical Health  Reason for admission: Brain Dysfunction  Traumatic, Closed Injury - SDH s/p evacuation    Red Sutherland is a 81-year-old male post-traumatic acute right subdural hematoma (last fall was 2-3 weeks ago)  with left hemibody weakness, on xarelto and plavix. Underwent evacuation of SDH on . Extubated 2021. Started on Aspirin and Prasugrel on 21. Failed SLP evaluation. Free water flushes stopped since hypernatremia resolved. Lasix and Bumex given for diuresis. Received PEG on 1/3/2022,  Medicine consulted 2022 for hypotension management, transferred to medicine as primary due to ongoing concerns for hypotension in the setting of extensive cardiac history.  Steadily improving and patient delirium treated with delirium precautions, and po Haldol. Pt is medically ready to admit to " "Acute Inpatient Rehab.     Provider Information   Primary Care Physician:Meg Roth   : Rebeca Ware [facility]  Clinic Care Coordinator / [facility]   PH: [phone]    Mental Health/Chemical Dependency:   Diagnosis: None reported. Although hospital delirium improving, being monitored and watched closely. Pt denied.   Alcohol/Tobacco/Narcotis: No concerns reported. Pt denied.   Support/Services in Place: Not discussed  Services Needed/Recommended: Supportive services available by consult (Health Psychology and [place] services)   Sexuality/Intimacy: Not discussed     Support System  Marital Status: . Wife supportive and able to A at discharge. Wife given grief caregiver support information while in the hospital.   Family support: \"6 between the both of us and 10 grandchildren\". Pt stated all adult kids live within 30 miles away.   Other support available: Not discussed.     Community Resources  Current in home services: None reported   Previous services: None reported     Financial/Employment/Education  Employment Status: Retired    Income Source: SSI  Education: Not discussed  Financial Concerns:  None reported   Insurance: UCARE Medicare \"UCARE Complete\"    Discharge Plan   Patient and family discharge goal: Home with supervision/assistance and OP.   Provided Education on discharge plan: Yes  Patient agreeable to discharge plan:  Yes  Provided education and attained signature for Medicare IM and IRF Patient Rights and Privacy Information provided to patient : YES  Provided patient with [facility] Resources: YES  Barriers to discharge: None identified at this time.     Discharge Recommendations   Disposition: See above   Transportation Needs: Family assistance   Name of Transportation Company and Phone: N/A     Additional comments   Discharge TBD, ELOS 7-14 days. No immediate needs, questions or concerns. SW will remain available and continue to " follow as needs arise.     Please invite to Care Conference:  N/A     JANIE Cobb, REI-IL  [facility]   Phone: [phone]  I   Pager: [phone]

## 2022-01-17 NOTE — PROGRESS NOTES
Calorie Count  Intake recorded for: 1/16  Total Kcals: 1065 Total Protein: 28g  Kcals from Hospital Food: 1065   Protein: 28g  Kcals from Outside Food (average):0 Protein: 0g  # Meals Ordered from Kitchen: 3 meals  # Meals Recorded: 2 meals (First - 100% oatmeal w/ brown sugar, banana, applesauce, sherbet, 1 hot cocoa)      (Second - 100% 1 cookie,1 hot cocoa)  # Supplements Recorded: 0

## 2022-01-17 NOTE — H&P
[facility]   Acute Rehabilitation Unit  Admission History and Physical  Patient Name: Red Sutherland : 1940 Medical Record: [id number]    CHIEF COMPLAINT     Subdural hematoma, s/p R craniotomy.    HISTORY OF PRESENT ILLNESS  Red Sutherland is a 81 year old R hand dominant male with afib (previously on eliquis), pulmonary HTN, essential HTN, CAD s/p CABG, COPD, CHF with EF at 60-65% on 10/23/2021. He also has stage 4 CKD with ascites, PVD, T2DM and a previous hx of a GI bleed.     He presented to [facility] ED with a L side weakness, headache and slurred speech after a fall roughly around 2021. Wife noted that he also started having episodes of lip smacking and tongue biting since then. Imaging showed a large hyperdense holohemispheric right-sided subdural hematoma which measures 12.9 mm in thickness over the posterior right frontal convexity, 12.6 mm over the right parietal convexity and 11.9 mm over the right temporal convexity. He was transferred to [facility] for further care.    Bill was taken to the OR for a R craniotomy and hematoma evacuation on 2021, but continued to have seizures requiring both Keppra and Vimpat for control. He was also diagnosed with moderate malnutrition in the context of acute on chronic illness, and had a PEG tube placed on 1/3.    During the course of his hospitalization, he began having increased agitation, which was thought to be induced by delirium or sundowning. His agitation worsened to the point that he required Haldol and Atarax. He improved with medication in addition to optimization of sleep and reorientation.     During this acute hospitalization, patient was seen and evaluated by PT, OT, SLP and PM&R consult service.  All specialties collectively recommended that patient would benefit from ongoing therapies in the acute inpatient rehabilitation setting.      In review of the therapy notes, he has been on a  regular diet and thin liquids, but would benefit from a cognitive evaluation due to his recent delirium. He is able to ambulate up to 110 ft with contact guard and a FWW, but requires frequent rest breaks. He requires a min assist with toilet transfers and is dependent for maine cares.    Today, he reports feeling well, and denies chest pain, difficulty breathing, or abdominal pain. He notes that he has had worsening vision in his L eye, but has had L eye problems before his craniotomy. He is interested in increasing his reading, as it is something he used to enjoy before his eye surgeries and before this craniotomy.     Currently, the patient is medically appropriate and is assessed to have needs and will benefit from an inpatient acute rehabilitation comprehensive program working with PT, OT and SLP, and will also benefit from supervision and management of Rehab Nursing and Rehab MD.     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 They put him on sildenafil as nitroprusside lowered systemic BP and with that the mean PA dropped from 54 to 49. Negative VQ at [facility] Dec 1, 2017.     RLS (restless legs syndrome)      Sleep apnea        SURGICAL HISTORY  Past Surgical History:   Procedure Laterality Date     BACK SURGERY      lower back     CARDIAC CATHETERIZATION  12/13/2017    Right and left at [facility], mean PA 58, PCW 24 with V wave of 35, LVEDP of 18, with Nipride systemic BP, PVR and mean PA all declined     CARDIOVERSION  03/15/2013    for afib     CATARACT EXTRACTION Bilateral       COLONOSCOPY N/A 10/31/2021    Procedure: COLONOSCOPY WITH POLYPECTOMY;  Surgeon: Sheng Sagastume MD;  [facility]     CORONARY STENT PLACEMENT       CRANIOTOMY Right 12/21/2021    Procedure: CRANIOTOMY FOR Subdural HEMATOMA EVACUATION;  Surgeon: Parvez Quinones MD;  Location: [facility] OR     CV CORONARY ANGIOGRAM N/A 10/25/2021    Procedure: Coronary Angiogram;  Surgeon: Otf Knowles MD;  Location: [facility] CATH LAB CV     CV CORONARY LITHOTRIPSY PCI N/A 10/25/2021    Procedure: CV Coronary Lithotripsy PCI;  Surgeon: Otf Knowles MD;  Location: [facility] CATH LAB CV     CV LEFT HEART CATH N/A 10/25/2021    Procedure: Left Heart Cath;  Surgeon: Otf Knowles MD;  Location: [facility] CATH LAB CV     CV PCI N/A 10/25/2021    Procedure: Percutaneous Coronary Intervention;  Surgeon: Otf Knowles MD;  Location: [facility] LAB CV     ESOPHAGOSCOPY, GASTROSCOPY, DUODENOSCOPY (EGD), COMBINED N/A 10/30/2021    Procedure: ESOPHAGOGASTRODUODENOSCOPY (EGD);  Surgeon: Sheng Sagastume MD;  Location: [facility] Main OR     IR ABDOMINAL AORTOGRAM  11/16/2012     IR MISCELLANEOUS PROCEDURE  07/20/2001     IR MISCELLANEOUS PROCEDURE  11/16/2012     OTHER SURGICAL HISTORY      mynor     PICC DOUBLE LUMEN PLACEMENT Right 12/25/2021    48cm (2cm external), Basilic vein, SVC RA junction     SHOULDER SURGERY      reapir on right shoulder     TOTAL HIP ARTHROPLASTY Left      TOTAL KNEE ARTHROPLASTY Bilateral      WRIST SURGERY Bilateral      ZZHC COLONOSCOPY W/WO BRUSH/WASH N/A 01/14/2021    Procedure: COLONOSCOPY;  Surgeon: Mihai Harris MD;  [facility];  Service: Gastroenterology       PRIOR FUNCTIONAL HISTORY   Pt was independent with all ADLs/IADLs, transfers, mobility and gait. He did use a 3 wheel walker previously to ambulate in the community.       SOCIAL HISTORY  Marital Status:  to Kaley, over 30 years  Living situation: Lives with wife in town home, 2  stories, 3 stairs to enter, roughly 15 stairs from 1st to 2nd floor. Shower, bedroom, and kitchen on main level  Family support: 6 children within 30 mile radius, wife lives with him  Vocational History: former , formerly in the [employer]  Tobacco abuse: former smoker, quit over 10 years ago  EtOH abuse: denies, will drink 1 beer per month when out to dinner  Illicit drug use: denies    FAMILY HISTORY  Family History   Problem Relation Age of Onset     Hyperlipidemia Mother      Hypertension Mother      Heart Disease Mother      Hyperlipidemia Father      Hypertension Father      Coronary Artery Disease Father      Cerebrovascular Disease Brother      Depression Brother      No Known Problems Sister      Pulmonary Hypertension No family hx of      Congenital heart disease No family hx of         MEDICATIONS  Scheduled meds    [START ON 1/18/2022] aspirin  81 mg Oral Daily     [START ON 1/18/2022] atorvastatin  40 mg Oral QPM     [START ON 1/18/2022] empagliflozin  10 mg Oral or Feeding Tube Daily     Ferrous Gluconate  1 tablet Oral Every Other Day     fluticasone-vilanterol  1 puff Inhalation Daily     hydrOXYzine  25 mg Oral At Bedtime     insulin aspart  1-12 Units Subcutaneous Q4H     insulin glargine  20 Units Subcutaneous BID     irbesartan  75 mg Oral or Feeding Tube Daily     lacosamide  200 mg Oral or Feeding Tube BID     metoprolol tartrate  25 mg Oral BID w/meals     multivitamin w/minerals  1 tablet Oral Daily     [START ON 1/18/2022] pantoprazole  40 mg Oral or Feeding Tube QAM AC     prasugrel  10 mg Oral or Feeding Tube Daily     rOPINIRole  1 mg Oral Daily     senna-docusate  1-4 tablet Oral BID     sodium chloride (PF)  3 mL Intracatheter Q8H     tadalafil  20 mg Oral Q24H     zinc gluconate  50 mg Oral Daily       PRN meds:  acetaminophen, albuterol, bisacodyl, calcium carbonate, hydrOXYzine, ondansetron, - MEDICATION INSTRUCTIONS -, polyethylene glycol    ALLERGIES  Allergies  "  Allergen Reactions     Contrast Dye Nausea     Other reaction(s): GI intolerance, GI Upset     Furosemide Muscle Pain (Myalgia)     Previously tolerated.  Muscle cramps     Hydrochlorothiazide Unknown     Iodinated Contrast Media [Diagnostic X-Ray Materials] Nausea     Losartan Other (See Comments)     Other reaction(s): Stomatitis, Bloody nose dry mouth and lips     Losartan-Hydrochlorothiazide [Hyzaar]      Mouth sores     Metaxalone Nausea     Metolazone Other (See Comments)     Muscle cramps     Mometasone Other (See Comments)     Bloody nose     Rabeprazole Other (See Comments)     Mouth sores     Ramipril Other (See Comments)     Mouth sores     Shellfish Containing Products [Shellfish-Derived Products] Unknown     Other reaction(s): mouth sores, Other reaction(s): mouth sores     Sildenafil Muscle Pain (Myalgia)     Methocarbamol Rash     Penicillins Other (See Comments)     Immune-does not work for him        REVIEW OF SYSTEMS  Constitutional: denies any fevers, chills.  Eyes: denies vision changes   Ears, Nose, Throat: denies any difficulty swallowing  Endocrine: denies appetite changes  Cardiovascular: denies any  chest pain  Respiratory: denies difficulty breathing  Gastrointestinal: denies any nausea, vomiting  Genitourinary: denies any dysuria  Musculoskeletal: denies any muscle pain, joint pain, neck pain or back pain  Integumentary: denies pruritis  Allergy/Immunologic: denies difficulty breathing with exposure to food  Neurologic: denies any headache, disturbance of motor or sensory function, no loss of balance or vertigo  Psychiatric: denies mood changes  Hematology/lymphatic: denies any easy bruising or bleeding    PHYSICAL EXAM  VITAL SIGNS:  /58 (BP Location: Left arm, Patient Position: Sitting)   Pulse 65   Temp 97.3  F (36.3  C) (Oral)   Resp 20   Ht 1.803 m (5' 11\")   Wt 99.5 kg (219 lb 4.8 oz)   SpO2 99%   BMI 30.59 kg/m    BMI:  Estimated body mass index is 30.59 kg/m  as " "calculated from the following:    Height as of this encounter: 1.803 m (5' 11\").    Weight as of this encounter: 99.5 kg (219 lb 4.8 oz).     General: NAD, pleasant and cooperative, able to get up from edge of bed to stand and talk  HEENT: oropharynx clear with MMM, no LAP, EOMI, somewhat hard of hearing  Pulmonary: lungs clear to auscultation bilaterally, on room air at rest  Cardiovascular: RRR, no murmur   Abdominal: soft, non-tender, non-distended, PEG tube in place  Extremities: warm, well perfused, no edema in BLE   MSK/neuro:   Mental Status:  alert and oriented x3   Cranial Nerves: grossly normal   1. 2nd CN: Pupils equal, round  2. 3rd,4th,6th CN:  EOMI, appropriate pupillary responses  3. 5th CN: facial sensation intact   4. 7th CN: face symmetrical   5. 8th CN: functional hearing bilaterally  6. 9th, 10th CN: palate elevates symmetrically   7. 11th CN: sternocleidomastoids and trapezii strong   8. 12th CN: tongue midline and without fasciculations     Sensory: Normal to light touch in bilateral upper and lower extremities   Strength:  4/5 L side, 5/5 R side   SF  EF  EE   HF   DF   PF   R  5/5 5/5 5/5 5/5 5/5 5/5  L  5/5 4/5  4/5 5/5 5/5 5/5     Abnormal movements: None    Coordination: No dysmetria on finger to nose.    Speech:   Cognition:  Skin: no redness, warmth, or swelling      LABS  Lab Results   Component Value Date    WBC 6.5 01/14/2022    HGB 7.9 (L) 01/14/2022    HCT 28.6 (L) 01/14/2022    MCV 74 (L) 01/14/2022     01/14/2022     Lab Results   Component Value Date     01/14/2022    POTASSIUM 4.7 01/16/2022    CHLORIDE 109 01/14/2022    CO2 22 01/14/2022     (H) 01/17/2022     Lab Results   Component Value Date    GFRESTIMATED 68 01/14/2022    GFRESTBLACK 41 (L) 05/25/2021     Lab Results   Component Value Date    AST 17 01/14/2022    ALT 18 01/14/2022    ALKPHOS 167 (H) 01/14/2022    BILITOTAL 0.9 01/14/2022    ROSETTA 20 01/07/2022     Lab Results   Component Value Date "    INR 1.32 (H) 12/20/2021     Lab Results   Component Value Date    BUN 44 (H) 01/14/2022    CR 1.10 01/14/2022     Imaging    CT Head Without Contrast 12/20/2021    Impression  1. Mixed density right subdural hematoma with acute component causing  adjacent sulcal effacement of the right convexity and the right  parafalcine sulci. Attention is recommended on follow-up.  2. Right to left midline shift of 6 mm.  3. Focal area of  gray-white differentiation loss of the right frontal  lobe suspicious for acute infarct.    ASSESSMENT/PLAN:  Red Sutherland is a 81 year old R hand dominant male presented to the ARU for SDH s/p R craniotomy and hematoma evacuation. His medical problems include recent post op seizures, delirium, afib, HTN, pulmonary HTN, CHF, T2DM, Stage IV CKD, and malnutrition. He would benefit from appropriate evaluation and treatment of multiple deficits in function.      Admission to acute inpatient rehab on 1/17/2022.    Impairment group code: Brain Dysfunction 02.22 Traumatic, Closed Injury - SDH s/p evacuation      1. PT, OT and SLP 60 minutes of each on a daily basis, in addition to rehab nursing and close management of physiatrist.      2. Impairment of ADL's:  OT for 60 min daily to work on upper and lower body self care, dressing, toileting, bathing, energy conservation techniques with use of ADs as needed.     3. Impairment of mobility:   PT for 60 min daily to work on gait exercises, strengthening, endurance buildup, transfers with use of walker as needed.     4. Impairment of cognition/language/swallow:   SLP for 60 min daily for cognitive evaluation and treatment strategies for higher level cognitive deficits and memory impairment.     5. Rehab RN to administer medication, patient education on medication taking, VS monitoring, bowel regimen, glucose monitoring and wound care/surgical wound dressing changes and monitoring.       1. Medical Conditions    Neuro  R Subdural Hematoma  -on  aspirin 81mg and Prasugrel 10mg  -PT, OT, SLP evals pending    Post Op Seizures  -continue Vimpat 200mg BID  -monitor for face twitching and tongue biting    Delirium  -previously on Haldol, Atarax, and melatonin due to agitation in the evenings  -put Haldol as PRN   -consider weaning off Atarax as he progresses    Restless Leg Syndrome  -ropinirole 1mg at bed    CV  Afib  -Eliquis held due to SDH  -rate controlled with metoprolol 25mg BID    Pulmonary HTN  -on tadalafil 20mg daily    Hx of Essential HTN  RV Dysfunction  -CHF with 60-65% ejection fraction based on echo on 10/23/2021  -irbesartan 75mg daily  -monitor for hypotension given recent episodes  -on aspirin 81mg and Prasugrel 10mg    Pulmonary  COPD  -resting comfortably on room air  -consider O2 as needed for activity  -continue Breo daily and albuterol PRN    Renal  Stage IV CKD  -renally dose medications  -monitor Cr and BUN  -avoid nephrotoxic agents    Endo  T2DM  -20mg Lantus BIS  -SSI as needed  -empagliflozin 10mg daily    Nutrition  -nutrition consult  -previously getting tube feedings   -Continuous Pivot 1.5, 60ml/hr goal rate, 5524-1241  -hold tube feeds for tonight  -recent calorie count at 1065 Kcals  -regular diet and thin liquids    GI  GERD  -Protonix 40mg    Liver Cirrhosis  -incidental on imaging  -GI f/u outpatient      2. Adjustment to disability:  Clinical psychology to eval and treat  3. FEN: monitor BMPs twice weekly, encourage [place] fluids  4. Bowel: senna BID, PRN Miralax and bisacodyl suppository  5. Bladder: able to urinate on own  6. DVT Prophylaxis: aspirin and prasugrel  7. GI Prophylaxis: Protonix 40mg  8. Code: Full  9. Disposition: Modified independent to home  10. ELOS:  7 days   11. Rehab prognosis:  fair  12. Follow up Appointments on Discharge: neurosurgery and CT 2 weeks after discharge, neurology 4 months after discharge, GI for incidental liver cirrhosis       Discharge Goals:  1. Estimated Length of Stay: 7  days  2. Prognosis: Good  3. Decision Maker: self  4. Code Status: full  5. Anticipated D/C Destination and functional outcome:   6. Follow up Appointments on Discharge:  neurosurgery and CT 2 weeks after discharge, neurology 4 months after discharge, GI for incidental liver cirrhosis    Disha Nichols DO  PGY-1  PM&R    Seen and discussed with Dr. Rivera, PM&R staff physician

## 2022-01-18 ENCOUNTER — APPOINTMENT (OUTPATIENT)
Dept: SPEECH THERAPY | Facility: CLINIC | Age: 82
DRG: 949 | End: 2022-01-18
Attending: PHYSICAL MEDICINE & REHABILITATION
Payer: COMMERCIAL

## 2022-01-18 ENCOUNTER — APPOINTMENT (OUTPATIENT)
Dept: PHYSICAL THERAPY | Facility: CLINIC | Age: 82
DRG: 949 | End: 2022-01-18
Attending: PHYSICAL MEDICINE & REHABILITATION
Payer: COMMERCIAL

## 2022-01-18 ENCOUNTER — PATIENT OUTREACH (OUTPATIENT)
Dept: CARE COORDINATION | Facility: CLINIC | Age: 82
End: 2022-01-18
Payer: COMMERCIAL

## 2022-01-18 ENCOUNTER — APPOINTMENT (OUTPATIENT)
Dept: OCCUPATIONAL THERAPY | Facility: CLINIC | Age: 82
DRG: 949 | End: 2022-01-18
Attending: PHYSICAL MEDICINE & REHABILITATION
Payer: COMMERCIAL

## 2022-01-18 LAB
ANION GAP SERPL CALCULATED.3IONS-SCNC: 6 MMOL/L (ref 3–14)
BASOPHILS # BLD AUTO: 0.1 10E3/UL (ref 0–0.2)
BASOPHILS NFR BLD AUTO: 1 %
BUN SERPL-MCNC: 38 MG/DL (ref 7–30)
CALCIUM SERPL-MCNC: 8.8 MG/DL (ref 8.5–10.1)
CHLORIDE BLD-SCNC: 108 MMOL/L (ref 94–109)
CO2 SERPL-SCNC: 22 MMOL/L (ref 20–32)
CREAT SERPL-MCNC: 1.15 MG/DL (ref 0.66–1.25)
EOSINOPHIL # BLD AUTO: 0.4 10E3/UL (ref 0–0.7)
EOSINOPHIL NFR BLD AUTO: 6 %
ERYTHROCYTE [DISTWIDTH] IN BLOOD BY AUTOMATED COUNT: 20.5 % (ref 10–15)
GFR SERPL CREATININE-BSD FRML MDRD: 65 ML/MIN/1.73M2
GLUCOSE BLD-MCNC: 95 MG/DL (ref 70–99)
GLUCOSE BLDC GLUCOMTR-MCNC: 125 MG/DL (ref 70–99)
GLUCOSE BLDC GLUCOMTR-MCNC: 128 MG/DL (ref 70–99)
GLUCOSE BLDC GLUCOMTR-MCNC: 149 MG/DL (ref 70–99)
GLUCOSE BLDC GLUCOMTR-MCNC: 88 MG/DL (ref 70–99)
HCT VFR BLD AUTO: 28.7 % (ref 40–53)
HGB BLD-MCNC: 8.1 G/DL (ref 13.3–17.7)
IMM GRANULOCYTES # BLD: 0.1 10E3/UL
IMM GRANULOCYTES NFR BLD: 1 %
LYMPHOCYTES # BLD AUTO: 1 10E3/UL (ref 0.8–5.3)
LYMPHOCYTES NFR BLD AUTO: 14 %
MCH RBC QN AUTO: 20.4 PG (ref 26.5–33)
MCHC RBC AUTO-ENTMCNC: 28.2 G/DL (ref 31.5–36.5)
MCV RBC AUTO: 72 FL (ref 78–100)
MONOCYTES # BLD AUTO: 0.8 10E3/UL (ref 0–1.3)
MONOCYTES NFR BLD AUTO: 10 %
NEUTROPHILS # BLD AUTO: 5.1 10E3/UL (ref 1.6–8.3)
NEUTROPHILS NFR BLD AUTO: 68 %
NRBC # BLD AUTO: 0 10E3/UL
NRBC BLD AUTO-RTO: 0 /100
PLATELET # BLD AUTO: 190 10E3/UL (ref 150–450)
POTASSIUM BLD-SCNC: 4.4 MMOL/L (ref 3.4–5.3)
RBC # BLD AUTO: 3.97 10E6/UL (ref 4.4–5.9)
SODIUM SERPL-SCNC: 136 MMOL/L (ref 133–144)
WBC # BLD AUTO: 7.4 10E3/UL (ref 4–11)

## 2022-01-18 PROCEDURE — 97112 NEUROMUSCULAR REEDUCATION: CPT | Mod: GP

## 2022-01-18 PROCEDURE — 99232 SBSQ HOSP IP/OBS MODERATE 35: CPT | Mod: 24 | Performed by: PHYSICAL MEDICINE & REHABILITATION

## 2022-01-18 PROCEDURE — 97535 SELF CARE MNGMENT TRAINING: CPT | Mod: GO | Performed by: STUDENT IN AN ORGANIZED HEALTH CARE EDUCATION/TRAINING PROGRAM

## 2022-01-18 PROCEDURE — 250N000013 HC RX MED GY IP 250 OP 250 PS 637: Performed by: PHYSICAL MEDICINE & REHABILITATION

## 2022-01-18 PROCEDURE — 97163 PT EVAL HIGH COMPLEX 45 MIN: CPT | Mod: GP

## 2022-01-18 PROCEDURE — 36415 COLL VENOUS BLD VENIPUNCTURE: CPT | Performed by: PHYSICAL MEDICINE & REHABILITATION

## 2022-01-18 PROCEDURE — 82310 ASSAY OF CALCIUM: CPT | Performed by: PHYSICAL MEDICINE & REHABILITATION

## 2022-01-18 PROCEDURE — 250N000013 HC RX MED GY IP 250 OP 250 PS 637: Performed by: PHYSICIAN ASSISTANT

## 2022-01-18 PROCEDURE — 128N000003 HC R&B REHAB

## 2022-01-18 PROCEDURE — 96125 COGNITIVE TEST BY HC PRO: CPT | Mod: GN

## 2022-01-18 PROCEDURE — 85025 COMPLETE CBC W/AUTO DIFF WBC: CPT | Performed by: PHYSICAL MEDICINE & REHABILITATION

## 2022-01-18 PROCEDURE — 97530 THERAPEUTIC ACTIVITIES: CPT | Mod: GP

## 2022-01-18 PROCEDURE — 97166 OT EVAL MOD COMPLEX 45 MIN: CPT | Mod: GO | Performed by: STUDENT IN AN ORGANIZED HEALTH CARE EDUCATION/TRAINING PROGRAM

## 2022-01-18 PROCEDURE — 97116 GAIT TRAINING THERAPY: CPT | Mod: GP

## 2022-01-18 RX ORDER — TRAZODONE HYDROCHLORIDE 50 MG/1
50 TABLET, FILM COATED ORAL AT BEDTIME
Status: DISCONTINUED | OUTPATIENT
Start: 2022-01-18 | End: 2022-01-20

## 2022-01-18 RX ORDER — QUETIAPINE FUMARATE 25 MG/1
25-50 TABLET, FILM COATED ORAL
Status: DISCONTINUED | OUTPATIENT
Start: 2022-01-18 | End: 2022-01-26 | Stop reason: HOSPADM

## 2022-01-18 RX ORDER — QUETIAPINE FUMARATE 25 MG/1
25 TABLET, FILM COATED ORAL
Status: DISCONTINUED | OUTPATIENT
Start: 2022-01-18 | End: 2022-01-18

## 2022-01-18 RX ORDER — LIDOCAINE/PRILOCAINE 2.5 %-2.5%
CREAM (GRAM) TOPICAL
Status: DISCONTINUED | OUTPATIENT
Start: 2022-01-18 | End: 2022-01-26 | Stop reason: HOSPADM

## 2022-01-18 RX ADMIN — INSULIN GLARGINE 20 UNITS: 100 INJECTION, SOLUTION SUBCUTANEOUS at 21:16

## 2022-01-18 RX ADMIN — ATORVASTATIN CALCIUM 40 MG: 40 TABLET, FILM COATED ORAL at 19:19

## 2022-01-18 RX ADMIN — TRAZODONE HYDROCHLORIDE 50 MG: 50 TABLET ORAL at 19:20

## 2022-01-18 RX ADMIN — ROPINIROLE HYDROCHLORIDE 1 MG: 0.25 TABLET, FILM COATED ORAL at 19:20

## 2022-01-18 RX ADMIN — ASPIRIN 81 MG: 81 TABLET, COATED ORAL at 07:50

## 2022-01-18 RX ADMIN — LACOSAMIDE 200 MG: 200 TABLET, FILM COATED ORAL at 07:58

## 2022-01-18 RX ADMIN — LACOSAMIDE 200 MG: 200 TABLET, FILM COATED ORAL at 19:19

## 2022-01-18 RX ADMIN — HALOPERIDOL 5 MG: 5 TABLET ORAL at 03:39

## 2022-01-18 RX ADMIN — INSULIN GLARGINE 20 UNITS: 100 INJECTION, SOLUTION SUBCUTANEOUS at 07:55

## 2022-01-18 RX ADMIN — Medication 0.25 MG: at 00:32

## 2022-01-18 RX ADMIN — Medication 10 MG: at 19:19

## 2022-01-18 RX ADMIN — DOCUSATE SODIUM AND SENNOSIDES 1 TABLET: 8.6; 5 TABLET ORAL at 08:00

## 2022-01-18 RX ADMIN — DOCUSATE SODIUM AND SENNOSIDES 1 TABLET: 8.6; 5 TABLET ORAL at 19:19

## 2022-01-18 RX ADMIN — MULTIPLE VITAMINS W/ MINERALS TAB 1 TABLET: TAB at 07:52

## 2022-01-18 RX ADMIN — QUETIAPINE FUMARATE 50 MG: 25 TABLET ORAL at 22:36

## 2022-01-18 RX ADMIN — METOPROLOL TARTRATE 25 MG: 25 TABLET, FILM COATED ORAL at 19:19

## 2022-01-18 RX ADMIN — FLUTICASONE FUROATE AND VILANTEROL TRIFENATATE 1 PUFF: 200; 25 POWDER RESPIRATORY (INHALATION) at 07:54

## 2022-01-18 RX ADMIN — TADALAFIL 20 MG: 10 TABLET, FILM COATED ORAL at 08:01

## 2022-01-18 RX ADMIN — ZINC SULFATE 220 MG (50 MG) CAPSULE 220 MG: CAPSULE at 07:52

## 2022-01-18 RX ADMIN — METOPROLOL TARTRATE 25 MG: 25 TABLET, FILM COATED ORAL at 08:00

## 2022-01-18 RX ADMIN — HYDROXYZINE HYDROCHLORIDE 25 MG: 25 TABLET ORAL at 19:20

## 2022-01-18 RX ADMIN — FERROUS GLUCONATE 324 MG: 324 TABLET ORAL at 08:00

## 2022-01-18 RX ADMIN — EMPAGLIFLOZIN 10 MG: 10 TABLET, FILM COATED ORAL at 07:53

## 2022-01-18 RX ADMIN — PANTOPRAZOLE SODIUM 40 MG: 40 TABLET, DELAYED RELEASE ORAL at 07:50

## 2022-01-18 RX ADMIN — IRBESARTAN 75 MG: 75 TABLET ORAL at 08:33

## 2022-01-18 RX ADMIN — PRASUGREL 10 MG: 5 TABLET, FILM COATED ORAL at 07:50

## 2022-01-18 ASSESSMENT — ACTIVITIES OF DAILY LIVING (ADL)
ADLS_ACUITY_SCORE: 9
ADLS_ACUITY_SCORE: 11
ADLS_ACUITY_SCORE: 11
ADLS_ACUITY_SCORE: 16
ADLS_ACUITY_SCORE: 16
ADLS_ACUITY_SCORE: 9
ADLS_ACUITY_SCORE: 16
ADLS_ACUITY_SCORE: 15
ADLS_ACUITY_SCORE: 16
ADLS_ACUITY_SCORE: 15
ADLS_ACUITY_SCORE: 16
ADLS_ACUITY_SCORE: 16
ADLS_ACUITY_SCORE: 9
ADLS_ACUITY_SCORE: 9
ADLS_ACUITY_SCORE: 15
ADLS_ACUITY_SCORE: 16
ADLS_ACUITY_SCORE: 15

## 2022-01-18 NOTE — PROGRESS NOTES
Clinic Care Coordination Contact  Care Team Conversations    Patient identified for care management outreach, however patient is not on a value based contract so cannot complete outreach. Will escalate to clinic staff if specific needs or resources are indicated.    DERIK Barber  Social Work Care Coordinator - [facility]  Care Coordination  [email or web address]  Cell Phone: [phone]  Gender pronouns: she/her  Employed by [employer]

## 2022-01-18 NOTE — PLAN OF CARE
"Discharge Planner Post-Acute Rehab PT:     Discharge Plan: home mod I with FWW, intermittent assist from wife for IADLs. OP PT.    Precautions: falls    Current Status:  Bed Mobility: SBA  Transfer: STS, FWW, SBA  Gait: amb, FWW, 150+ ft, CGA  Stairs: 12x6\" steps, B rails, Uri  Balance: requires flat standing surface, limited plantar foot sensation.    Assessment:  Arron will benefit from PT to progress his L sided strength, standing dynamic balance, endurance to improve his independence with functional mobility to be mod I with FWW amb home distances and navigating 3 OSVALDO to access home. Limited most by poor sensation in B plantar feet, significant balance deficits standing on compromised/uneven surface.    Other Barriers to Discharge (DME, Family Training, etc):   DME - owns FWW, 3WW and 2x SEC. Plan for mod I with FWW at discharge.    Family training - to be scheduled with pt's wife, specifically to review floor transfers. Hoping to schedule for Saturday 1/22 or Sunday 1/23.    "

## 2022-01-18 NOTE — PROGRESS NOTES
"   01/18/22 0643   Quick Adds   Type of Visit Initial Occupational Therapy Evaluation   Living Environment   People in home spouse   Current Living Arrangements condominium   Transportation Anticipated family or friend will provide   Living Environment Comments [facility] with 3 OSVALDO and  15 to upper level, bedroom, kitchen and living area on main level, walk in shower with GB and shower chair, high toilet   Self-Care   Usual Activity Tolerance good   Current Activity Tolerance fair   Equipment Currently Used at Home grab bar, toilet;grab bar, tub/shower;raised toilet seat;shower chair   Activity/Exercise/Self-Care Comment PLOF was IND, pt drives and pt likes to read buyt after cataract surgery he has not been able to. Pt helps with cooking and cleaning . Pt's wife manages finances and his meds, pt reports his weife made him stop driving several months ago and he is not sure why   Disability/Function   Hearing Difficulty or Deaf yes   Wear Glasses or Blind yes   Vision Management pt has cheaters and regular glasses   Concentrating, Remembering or Making Decisions Difficulty no   Difficulty Communicating no   Difficulty Eating/Swallowing no   Walking or Climbing Stairs Difficulty no   Dressing/Bathing Difficulty no   Toileting issues no   Doing Errands Independently Difficulty (such as shopping) no   Fall history within last six months yes   Number of times patient has fallen within last six months 2   Change in Functional Status Since Onset of Current Illness/Injury yes   General Information   Onset of Illness/Injury or Date of Surgery 12/20/21   Referring Physician Disha Nichols MD   Patient/Family Therapy Goal Statement (OT) to be able to get back to reading and driving   Additional Occupational Profile Info/Pertinent History of Current Problem per chart \"81 year old R hand dominant male presented to the ARU for SDH s/p R craniotomy and hematoma evacuation. His medical problems include recent post op seizures, " "delirium, afib, HTN, pulmonary HTN, CHF, T2DM, Stage IV CKD, and malnutrition\"   Existing Precautions/Restrictions fall;seizures  (craniotomy, HOB 30)   General Observations and Info Nurse asked whether pt could be MOD I since he was up all night and getting up on his own. As a result pt was very tired and nodding off during session.   Cognitive Status Examination   Orientation Status orientation to person, place and time   Safety Deficit minimal deficit   Cognitive Status Comments Cognitive deficits noted in H&P. pt very sleepy, often nodding off during session since he did not get a good sleep. Pt has not demonstrated that  he can follow directions for not getting out of bed and thus will need a sitter if he continues with this behaviour   Visual Perception   Impact of Vision Impairment on Function (Vision) Left eye deficits at baseline due to castaracts and surgeries that failed. Pt reports that left e   Sensory   Sensory Comments continue to assess   Pain Assessment   Patient Currently in Pain Yes, see Vital Sign flowsheet  (when he uses the right arm, right shoulder)   Range of Motion Comprehensive   Comment, General Range of Motion right shoulder with baseline deficits and onoly able to lift to 80 degrees. Pt usdes left side more functionally. No ROM deficit in LUE   Strength Comprehensive (MMT)   Comment, General Manual Muscle Testing (MMT) Assessment LUE 4/5, did not test R UE due to shoulder pain. Strength is functional   Coordination   Coordination Comments mild coordination deficit noted in L hand, will continue to asess   ARC Assessment Only   Acute Rehab Functional Assessment See IP Rehab Daily Documentation Flowsheet for Functional Mobility/ADL Assessment   Balance   Balance Comments OB with sit to stand from bed but not needing assist. Some swaying noted with standing unsupported nbut pt very tired as well and nodding off.    Clinical Impression   Criteria for Skilled Therapeutic Interventions Met " (OT) yes   OT Diagnosis ADL and functional mobility deficit   OT Problem List-Impairments impacting ADL problems related to;activity tolerance impaired;balance;cognition;coordination;mobility;range of motion (ROM);strength;pain;post-surgical precautions;vision   ADL comments/analysis ADL and IADL deficits   Assessment of Occupational Performance 5 or more Performance Deficits   Identified Performance Deficits deficits impact independence with ADL and IADL and mobility   Planned Therapy Interventions (OT) ADL retraining;IADL retraining;balance training;cognition;strengthening;transfer training;visual perception;progressive activity/exercise   Clinical Decision Making Complexity (OT) moderate complexity   Therapy Frequency (OT) Daily   Predicted Duration of Therapy 7 days   Anticipated Equipment Needs Upon Discharge (OT)   (TBD)   Risk & Benefits of therapy have been explained evaluation/treatment results reviewed;care plan/treatment goals reviewed   Comment-Clinical Impression Pt presents with deficits in LUE, balance, cognition impacting independence and safety with ADL, IADL and mobility. Pt will benefit from skilled OT for improvement in function.    Total Evaluation Time (Minutes)   Total Evaluation Time (Minutes) 15

## 2022-01-18 NOTE — PLAN OF CARE
FOCUS/GOAL  Bowel management, Bladder management, Pain management, Mobility, Skin integrity, and Safety management    ASSESSMENT, INTERVENTIONS AND CONTINUING PLAN FOR GOAL:  A/O x4, VSS on RA. Can be forgetful.  Up w/ assist one w/ gait belt and walker to the bathroom.  Was incont of urine x1 this shift due to urgency.  PVR's done this shift and negative.  Last BM today per pt report prior to arriving here.  No c/o pain.  Regular thin diet, takes his pills well whole with water. Bg's were 145 and , covered with sliding scale. Calorie and carb counts.  PEG tube wnl, flushed per orders and tube feeding being held tonight.  Groin reddened, cleaned and barrier applied per orders.  Cranial incision intact/approximated open to air.  Open blister to back of left ankle, cleaned w/ wound cleanser and band aid place over it.  Prn atarax given at HS due to restlessness and unable to sleep.  Pt declined melatonin, reports it doesn't do anything and he has tried it at home before.  Continue with POC.

## 2022-01-18 NOTE — PLAN OF CARE
Discharge Planner Post-Acute Rehab OT:     Discharge Plan: Home with assist for IADL and OP OT    Precautions: fall, craniotomy    Current Status:  ADLs:  Mobility: CGA/SBA for transfer and amb with walker  Grooming: close SBA at the sink  Dressing: TBD, but pt unable to don shoes properly and wife assists with this at baseline  Bathing: TBD  Toileting: toilet transfer was MIN A   IADLs: not tested. Pt reports that he has not driven in several months and his wife manages his meds and finances.   Vision/Cognition: vision impairment in L eye at baseline but pt reports it is more blurry now. Cognition is to be assessed further,pt reports impairments and has been non compliant with alarms. Pt was very fatigued during session due to lack of sleep and frequently dozing off even when standing.    Assessment: Eval completed. Pt very fatigued from lack of sleep last night and frequently dozing off even when standing at sink. Charge nurse asked if pt could be MOD I in the room and at this time I am not able to approve this due to a variety of reasons. Pt too drowsy to get accurate picture of his mobility and safety awareness, pt needing assist for toilet transfer and for shoes. Charge nurse is aware of this and if pt continues to get up without staff then he will need a sitter. Also need to address sleep since his lack of it is affecting him functionally. ELOS is 7 days as he is moving around pretty well and has good support at home.     Other Barriers to Discharge (DME, Family Training, etc): safety awareness, family training

## 2022-01-18 NOTE — PROGRESS NOTES
01/18/22 0933   General Information   Onset of Illness/Injury or Date of Surgery 12/05/21   Referring Physician Renny Rivera   Patient/Family Therapy Goal Statement (SLP) The pt would like to improve his thinking.   Pertinent History of Current Problem The pt is a 81 year old R hand dominant male with afib (previously on eliquis), pulmonary HTN, essential HTN, CAD s/p CABG, COPD, CHF with EF at 60-65% on 10/23/2021. He also has stage 4 CKD with ascites, PVD, T2DM and a previous hx of a GI bleed. He presented to [facility] ED with a L side weakness, headache and slurred speech after a fall roughly around 12/5/2021. Wife noted that he also started having episodes of lip smacking and tongue biting since then. Imaging showed a large hyperdense holohemispheric right-sided subdural hematoma which measures 12.9 mm in thickness over the posterior right frontal convexity, 12.6 mm over the right parietal convexity and 11.9 mm over the right temporal convexity.Bill was taken to the OR for a R craniotomy and hematoma evacuation on 12/21/2021, but continued to have seizures requiring both Keppra and Vimpat for control. He was also diagnosed with moderate malnutrition in the context of acute on chronic illness, and had a PEG tube placed on 1/3. During the course of his hospitalization, he began having increased agitation, which was thought to be induced by delirium or sundowning. Cognitive-linguistic evaluation ordered per MD d/t delirium.    General Observations The pt is fatigued and endorses left leg pain, however he is pleasant and motivated. The pt lives with his wife in [place]. He was independent with iADLs PTA. He reports that his wife endorses he had short-term memory difficulties. He enjoys reading. He does have hx of left-sided eye injury and endorses increased blurry vision post CVA. He wears hearing aids but does not have them present at the hospital. He is a retired .   Pain Assessment   Patient  Currently in Pain Yes, see Vital Sign flowsheet   Type of Evaluation   Type of Evaluation Speech, Language, Cognition   Auditory Comprehension   Yes/No Questions (Auditory Comprehension) simple/factual questions;biographical/personal questions   Simple/Factual Questions (Auditory Comprehension) intact   Biographical/Personal Questions (Auditory Comprehension) intact   Verbal Expression   Conversational Speech (Verbal Expression) WNL   Cognition   Cognitive Status At least moderate cognitive-linguistic deficits   Cognitive Function attention deficit;executive function deficit;memory deficit   Cognitive Status Exam Comments 2 subtests not yet completed.   Attention Deficit (Cognition) moderate deficit;focused/sustained attention;selective attention   Executive Function Deficit (Cognition) moderate deficit;information processing;organization/sequencing;planning/decision-making;problem-solving/reasoning;self-monitoring/self-correction   Memory Deficit (Cognition) moderate deficit;short-term memory;episodic memory;working memory   General Therapy Interventions   Planned Therapy Interventions Cognitive Treatment   Cognitive treatment Internal memory strategy training;External memory strategy training;Progressive attention training   SLP Therapy Assessment/Plan   Criteria for Skilled Therapeutic Interventions Met (SLP Eval) yes;treatment indicated   SLP Diagnosis At least moderate cognitive-linguistic deficits   Rehab Potential (SLP Eval) good, to achieve stated therapy goals   Therapy Frequency (SLP Eval) daily   Predicted Duration of Therapy Intervention (SLP Eval) 1-2 weeks   Comment, Therapy Assessment/Plan (SLP) Cognitive-linguistic evaluation completed per MD order. Pt presents with moderate cognitive-lingistic deficits. Cognitive Linguistic Quick Test administered this date with 2 subtests remaining. Pt's word-finding and language skills are WFL per assessment and pt denies concerns. Moderate difficulties noted in  areas of attention, memory, planning, and reasoning. Pt with good insight to deficits and notes slowed information processing, however reduced safety awareness noted as session progressed with pt attempting to impulsively standing up. Pt with baseline visual deficits in left side, however increased left neglect noted in visuospatial tasks. Pt would benefit from SLP services to target cognitive-linguistic skills. Likely recommend ongoing services at discharge given severity of impairment with ongoing delirum.   Therapy Plan Review/Discharge Plan (SLP)   Therapy Plan Review (SLP) evaluation/treatment results reviewed;current/potential barriers reviewed;participants voiced agreement with care plan;participants included;patient   SLP Discharge Planning    SLP Discharge Recommendation (DC Rec) home with home care speech therapy    Total Evaluation Time   Total Evaluation Time (Minutes) 60  (cognition)

## 2022-01-18 NOTE — PLAN OF CARE
FOCUS/GOAL  Medical management    ASSESSMENT, INTERVENTIONS AND CONTINUING PLAN FOR GOAL:  Patient is impulsive, he is freely moving in his room  with an unsteady balance regardless redirection. He knowns how to turn off the alarm per NOC shift. He is  a HIGH risk for fall. CN informed to consider a sitter. Patient now on 1:1 after setting off alarm several times this shift.  Patient had an early breakfast. Writer was not able to get BG this morning.  Room service order updated. Meal tray to be delivered at the nursing station.  Denied pain. Voided using the toilet while in therapy. No BM reported this shift. Painful small lumps noted at injection sites on the abdomen. Warm blanket applied, provider informed. Patient started on Lidocaine cream  Will continue with POC.

## 2022-01-18 NOTE — PLAN OF CARE
FOCUS/GOAL  Bowel management, Bladder management, and Pain management    ASSESSMENT, INTERVENTIONS AND CONTINUING PLAN FOR GOAL:    Pt  unable to sleep during the night shift, PRN melatonin 10 mg, ativan 0.25 mg and Tylenol 975 mg were ineffective. Pt restless and agitated and frequently setting bed alarm on. Pt is requesting Ambien and stated that is what he was taking at home for sleep. Assist of one with gait belt and walker. Continent of bladder and uses toilet. Continue with plan of care.

## 2022-01-18 NOTE — PHARMACY-MEDICATION REGIMEN REVIEW
Pharmacy Medication Regimen Review  Red Sutherland is a 81 year old male who is currently in the Acute Rehab Unit.    Assessment: All medications have an appropriate indications, durations and no unnecessary use was found    Plan:   Bumex 4 mg daily and Doxazosin 1 mg daily was held in the hospital, please assess need and resume if/when appropriate.     Attending provider will be sent this note for review.  If there are any emergent issues noted above, pharmacist will contact provider directly by phone.      Pharmacy will periodically review the resident's medication regimen for any PRN medications not administered in > 72 hours and discontinue them. The pharmacist will discuss gradual dose reductions of psychopharmacologic medications with interdisciplinary team on a regular basis.    Please contact pharmacy if the above does not answer specific medication questions/concerns.    Background:  A pharmacist has reviewed all medications and pertinent medical history today.  Medications were reviewed for appropriate use and any irregularities found are listed with recommendations.      Current Facility-Administered Medications:      acetaminophen (TYLENOL) tablet 975 mg, 975 mg, Oral, Q8H PRN, Renny Rivera MD, 975 mg at 01/17/22 2354     albuterol (PROVENTIL HFA/VENTOLIN HFA) inhaler, 2 puff, Inhalation, Q6H PRN, Renny Rivera MD     aspirin EC tablet 81 mg, 81 mg, Oral, Daily, Renny Rivera MD, 81 mg at 01/18/22 0750     atorvastatin (LIPITOR) tablet 40 mg, 40 mg, Oral, QPM, Renny Rivera MD     bisacodyl (DULCOLAX) Suppository 10 mg, 10 mg, Rectal, Daily PRN, Renny Rivera MD     calcium carbonate (TUMS) chewable tablet 500 mg, 500 mg, Oral, Daily PRN, Renny Rivera MD     glucose gel 15-30 g, 15-30 g, Oral, Q15 Min PRN **OR** dextrose 50 % injection 25-50 mL, 25-50 mL, Intravenous, Q15 Min PRN **OR** glucagon injection 1 mg, 1 mg, Subcutaneous, Q15 Min PRN, Miguel  Renny Acevedo MD     empagliflozin (JARDIANCE) tablet 10 mg, 10 mg, Oral or Feeding Tube, Daily, Renny Rivera MD, 10 mg at 01/18/22 0753     ferrous gluconate (FERGON) tablet 324 mg, 324 mg, Oral, Every Other Day, Renny Rivera MD, 324 mg at 01/18/22 0800     fluticasone-vilanterol (BREO ELLIPTA) 200-25 MCG/INH inhaler 1 puff, 1 puff, Inhalation, Daily, Renny Rivera MD, 1 puff at 01/18/22 0754     haloperidol (HALDOL) tablet 5 mg, 5 mg, Oral, At Bedtime PRN, Renny Rivera MD, 5 mg at 01/18/22 0339     hydrOXYzine (ATARAX) tablet 25 mg, 25 mg, Oral, Q6H PRN, Renny Rivera MD, 25 mg at 01/17/22 2240     hydrOXYzine (ATARAX) tablet 25 mg, 25 mg, Oral, At Bedtime, Renny Rivera MD, 25 mg at 01/17/22 2100     insulin aspart (NovoLOG) injection (RAPID ACTING), 1-7 Units, Subcutaneous, TID AC, Park Morton PA, 1 Units at 01/17/22 1756     insulin aspart (NovoLOG) injection (RAPID ACTING), 1-5 Units, Subcutaneous, At Bedtime, Park Morton, PA, 1 Units at 01/17/22 2110     insulin glargine (LANTUS PEN) injection 20 Units, 20 Units, Subcutaneous, BID, Renny Rivera MD, 20 Units at 01/18/22 0755     irbesartan (AVAPRO) tablet 75 mg, 75 mg, Oral or Feeding Tube, Daily, Renny Rivera MD, 75 mg at 01/18/22 0833     lacosamide (VIMPAT) tablet 200 mg, 200 mg, Oral or Feeding Tube, BID, Renny Rivera MD, 200 mg at 01/18/22 0758     melatonin tablet 10 mg, 10 mg, Oral, At Bedtime PRN, Renny Rivera MD, 10 mg at 01/17/22 5824     metoprolol tartrate (LOPRESSOR) tablet 25 mg, 25 mg, Oral, BID, Park Morton PA, 25 mg at 01/18/22 0800     multivitamin w/minerals (THERA-VIT-M) tablet 1 tablet, 1 tablet, Oral, Daily, Renny Rivera MD, 1 tablet at 01/18/22 0752     ondansetron (ZOFRAN-ODT) ODT tab 4 mg, 4 mg, Oral, Q6H PRN, Renny Rivera MD     pantoprazole (PROTONIX) EC tablet 40 mg, 40 mg, Oral, QAM AC, Renny Rivera  MD Curtis, 40 mg at 01/18/22 0750     Patient is already receiving anticoagulation with heparin, enoxaparin (LOVENOX), warfarin (COUMADIN)  or other anticoagulant medication, , Does not apply, Continuous PRN, Renny Rivera MD     polyethylene glycol (MIRALAX) Packet 17 g, 17 g, Oral, Daily PRN, Renny Rivera MD     prasugrel (EFFIENT) tablet 10 mg, 10 mg, Oral or Feeding Tube, Daily, Renny Rivera MD, 10 mg at 01/18/22 0750     rOPINIRole (REQUIP) tablet 1 mg, 1 mg, Oral, QPM, Renny Rivera MD, 1 mg at 01/17/22 2059     senna-docusate (SENOKOT-S/PERICOLACE) 8.6-50 MG per tablet 1-4 tablet, 1-4 tablet, Oral, BID, Renny Rivera MD, 1 tablet at 01/18/22 0800     tadalafil (CIALIS/ADCIRCA) tablet, 20 mg, Oral, Q24H, Renny Rivera MD, 20 mg at 01/18/22 0801     zinc sulfate (ZINCATE) capsule 220 mg, 220 mg, Oral, Daily, Renny Rivera MD, 220 mg at 01/18/22 0752  No current outpatient prescriptions on file.   PMH:   pulmonary HTN, essential HTN, CAD s/p CABG, COPD, CHF with EF at 60-65%    Elizabeth Kennedy, RickiD, BCPS

## 2022-01-18 NOTE — PROGRESS NOTES
01/18/22 0800   Quick Adds   Type of Visit Initial PT Evaluation   Living Environment   People in home spouse   Current Living Arrangements condominium   Home Accessibility stairs to enter home;stairs within home   Number of Stairs, Main Entrance 3   Stair Railings, Main Entrance railing on left side (ascending)   Number of Stairs, Within Home, Primary greater than 10 stairs   Transportation Anticipated family or friend will provide   Living Environment Comments PT: main level living. Bathroom - walk in shower with GB, raised toilet with GB. Living room - uses recliner, electric, can assist to stand. Bedroom - queen bed, exits on L.   Self-Care   Usual Activity Tolerance good   Current Activity Tolerance fair   Regular Exercise No   Equipment Currently Used at Home grab bar, toilet;grab bar, tub/shower;cane, straight;walker, rolling;other (see comments)  (owns SEC x2, FWW and 3WW)   Activity/Exercise/Self-Care Comment PT: Enjoys reading, cooking. Wife manages finances.   Disability/Function   Hearing Difficulty or Deaf yes   Patient's preferred means of communication verbal   Describe hearing loss bilateral hearing loss   Wear Glasses or Blind yes   Vision Management PT: cheaters and prescription glasses   Concentrating, Remembering or Making Decisions Difficulty no   Difficulty Communicating no   Difficulty Eating/Swallowing no   Walking or Climbing Stairs Difficulty no   Dressing/Bathing Difficulty no   Toileting issues no   Doing Errands Independently Difficulty (such as shopping) no   Fall history within last six months yes   Number of times patient has fallen within last six months 2   Change in Functional Status Since Onset of Current Illness/Injury yes   General Information   Onset of Illness/Injury or Date of Surgery 12/21/21  (date of craniotomy)   Referring Physician Miguel   Patient/Family Therapy Goals Statement (PT) To return home safely, drive again, and read the paper.   Pertinent History of Current  Problem (include personal factors and/or comorbidities that impact the POC) CMH: R craniotomy 2/2 R post frontal, parietal, and temporal SDH; A-fib, pulm and essential HTN, DM 2, CAD s/p CABG, PVD, COPD, HFpEF (60-65%), Stage 4 CKD   Existing Precautions/Restrictions fall   Heart Disease Risk Factors Diabetes;High blood pressure;Lack of physical activity;Overweight;Medical history;Gender;Age   Cognition   Affect/Mental Status (Cognition) WFL   Follows Commands (Cognition) WFL   Pain Assessment   Patient Currently in Pain No   Integumentary/Edema   Integumentary/Edema other (describe)   Integumentary/Edema Comments PT: 4+ pitting edema in BLE   Posture    Posture Forward head position;Protracted shoulders;Kyphosis   Posture Comments PT: seated kyphotic posture   Strength   Manual Muscle Testing Quick Adds MMT: Shoulder;MMT: Elbow/Forearm;MMT: Wrist;MMT: Hand (General);MMT: Hip;MMT: Knee;MMT: Ankle   Strength Comments PT: limited ROM in R>L shld   MMT: Shoulder   Shoulder Flexion - Left Side (4/5) good, left   Shoulder ABduction - Left Side (4/5) good, left   Shoulder Flexion - Right Side (2+/5) poor plus, right   Shoulder ABduction - Right Side (2+/5) poor plus, right   MMT: Elbow/Forearm, Rehab Eval   Elbow Flexion - Left Side (4/5) good, left   Elbow Extension - Left Side (4/5) good, left   Elbow Flexion - Right Side (4/5) good, right   Elbow Extension - Right Side (4/5) good, right   MMT: Hand   Hand Muscle Testing Results : R 4-/5, L 2+/5   MMT: Wrist   Wrist Muscle Testing Results AROM intact   MMT: Hip, Rehab Eval   Hip Flexion - Left Side (4/5) good, left   Hip ABduction - Left Side (4/5) good, left   Hip Flexion - Right Side (4/5) good, right   Hip ABduction - Right Side (4/5) good, right   MMT: Knee, Rehab Eval   Knee Flexion - Left Side (4/5) good, left   Knee Extension - Left Side (4/5) good, left   Knee Flexion - Right Side (4/5) good, right   Knee Extension - Right Side (4/5) good, right   MMT:  Ankle, Rehab Eval   Ankle Dorsiflexion - Left Side (4/5) good, left   Ankle Plantarflexion - Left Side (4-/5) good minus, left   Ankle Dorsiflexion - Right Side (4/5) good, right   Ankle Plantarflexion - Right Side (4-/5) good minus, right   ARC Assessment Only   Acute Rehab Functional Assessment See IP Rehab Daily Documentation Flowsheet for Functional Mobility/ADL Assessment   Balance   Balance other (describe)   Sitting Balance: Static fair balance  (no sway seated, flexed rigid trunk)   Sitting Balance: Dynamic fair balance  (minimal pelvic/trunk dissociation)   Sit-to-Stand Balance fair balance  (requires BUE pushing off armrests to stand)   Standing Balance: Static poor balance  (able to stand without UE support, no sway, rigid trunk)   Standing Balance: Dynamic poor balance  (fair weightshifting ability L<>R without UE support)   Systems Impairment Contributing to Balance Disturbance somatosensory;neuromuscular;musculoskeletal   Identified Impairments Contributing to Balance Disturbance impaired postural control;decreased ROM;impaired sensory feedback;decreased sensation;decreased strength   Balance Quick Add Sitting balance: Static;Sitting balance: dynamic;Sit to stand balance;Standing balance: static;Standing balance: dynamic;Systems impairment contributing to balance disturbance;Identified impairments contributing to balance disturbance   Sensory Examination   Sensory Perception other (describe)   Sensory Perception Comments PT: lacking protective sensation on B plantar feet.    Sensory Perception Quick Adds Proprioception   Sensation Light Touch   LUE w/i normal limits   LLE moderate impairment  (impaired L dorsal foot light touch sensation)   RUE w/i normal limits   RLE moderate impairment   Proprioception    LUE w/i normal limits   LLE mild impairment  (impaired great toe, intact ankle)   RUE w/i normal limits   RLE mild impairment  (impaired great toe, intact ankle)   Coordination   Coordination other  (see comments)   Coordination Comments PT: intact FTN. Slowed BILLIE but equilateral bilaterally.   Muscle Tone   Muscle Tone no deficits were identified   Clinical Impression   Criteria for Skilled Therapeutic Intervention yes, treatment indicated   PT Diagnosis (PT) Mild L hemiparesis (UE~LE), endurance deficits, balance deficits 2/2 SDH, and sensory deficits primarily d/t neuropathy   Influenced by the following impairments Hemiparesis, balance, sensation   Functional limitations due to impairments Bed mob, transfers, gait, stairs   Clinical Presentation Unstable/Unpredictable   Clinical Presentation Rationale Multiple cardiac, musculoskeletal, and neuromotor impairments   Clinical Decision Making (Complexity) high complexity   Therapy Frequency (PT) Daily   Predicted Duration of Therapy Intervention (days/wks) 7 days   Planned Therapy Interventions (PT) balance training;bed mobility training;gait training;home exercise program;neuromuscular re-education;patient/family education;postural re-education;ROM (range of motion);stair training;strengthening;stretching;transfer training;wheelchair management/propulsion training;progressive activity/exercise;risk factor education;home program guidelines   Anticipated Equipment Needs at Discharge (PT) other (see comments)  (owns FWW, 3WW, and 2x SECs)   Risk & Benefits of therapy have been explained evaluation/treatment results reviewed;care plan/treatment goals reviewed;risks/benefits reviewed;current/potential barriers reviewed;participants voiced agreement with care plan;participants included;patient   Clinical Impression Comments Bill will benefit from PT to progress his L sided strength, standing dynamic balance, endurance to improve his independence with functional mobility to be mod I with FWW amb home distances and navigating 3 OSVALDO to access home. Limited most by poor sensation in B plantar feet, significant balance deficits standing on compromised/uneven surface.    PT Discharge Planning    PT Discharge Recommendation (DC Rec) home with outpatient physical therapy   Total Evaluation Time   Total Evaluation Time (Minutes) 30

## 2022-01-18 NOTE — PROGRESS NOTES
"  INTERVAL HISTORY  Nursing notes reviewed, Arron was unable to sleep last night despite all PRN medications, including Haldol. Per nursing, pt requested Ambien last night. Today, Arron is sitting in his chair and struggling to stay awake. He continues to request to nap because he feels so tired.    Continent of bladder, continent of bowel. Denies chest pain abdominal pain, shortness of breath. No further concerns.    Functionally, he was a min assist with toilet transferring, and CGA for transfers. Unable to assess dressing yet, but pt does not don shoes properly, and wife has assisted in the past. He was able to ambulate 100 ft with a FWW and stand by assist.      MEDICATIONS  Scheduled meds    aspirin  81 mg Oral Daily     atorvastatin  40 mg Oral QPM     empagliflozin  10 mg Oral or Feeding Tube Daily     ferrous gluconate  324 mg Oral Every Other Day     fluticasone-vilanterol  1 puff Inhalation Daily     hydrOXYzine  25 mg Oral At Bedtime     insulin aspart  1-7 Units Subcutaneous TID AC     insulin aspart  1-5 Units Subcutaneous At Bedtime     insulin glargine  20 Units Subcutaneous BID     irbesartan  75 mg Oral or Feeding Tube Daily     lacosamide  200 mg Oral or Feeding Tube BID     LORazepam  0.25 mg Oral At Bedtime     metoprolol tartrate  25 mg Oral BID     multivitamin w/minerals  1 tablet Oral Daily     pantoprazole  40 mg Oral QAM AC     prasugrel  10 mg Oral or Feeding Tube Daily     rOPINIRole  1 mg Oral QPM     senna-docusate  1-4 tablet Oral BID     tadalafil  20 mg Oral Q24H     zinc sulfate  220 mg Oral Daily       PRN meds:  acetaminophen, albuterol, bisacodyl, calcium carbonate, glucose **OR** dextrose **OR** glucagon, haloperidol, hydrOXYzine, melatonin, ondansetron, - MEDICATION INSTRUCTIONS -, polyethylene glycol      PHYSICAL EXAM  /53 (BP Location: Left arm, Patient Position: Supine)   Pulse 88   Temp (!) 96  F (35.6  C) (Oral)   Resp 18   Ht 1.803 m (5' 11\")   Wt 99.5 kg (219 " lb 4.8 oz)   SpO2 98%   BMI 30.59 kg/m    Gen: sleepy, slouched in chair  HEENT: moist mucus membranes, on room air  Cardio: RRR, no murumur  Pulm: lungs CTA bilaterally, on room air, no increased work of breathing  Abd: soft, nontender  Ext: skin clear, dry  Neuro/MSK:  3/5 on L side, EE and EF 4/5 L side, HF 5/5 bilaterally    LABS  Results for orders placed or performed during the hospital encounter of 01/17/22 (from the past 24 hour(s))   Glucose by meter   Result Value Ref Range    GLUCOSE BY METER POCT 145 (H) 70 - 99 mg/dL   Glucose by meter   Result Value Ref Range    GLUCOSE BY METER POCT 202 (H) 70 - 99 mg/dL   Glucose by meter   Result Value Ref Range    GLUCOSE BY METER POCT 88 70 - 99 mg/dL   CBC with platelets differential    Narrative    The following orders were created for panel order CBC with platelets differential.  Procedure                               Abnormality         Status                     ---------                               -----------         ------                     CBC with platelets and d...[[id number]]  Abnormal            Final result                 Please view results for these tests on the individual orders.   Basic metabolic panel   Result Value Ref Range    Sodium 136 133 - 144 mmol/L    Potassium 4.4 3.4 - 5.3 mmol/L    Chloride 108 94 - 109 mmol/L    Carbon Dioxide (CO2) 22 20 - 32 mmol/L    Anion Gap 6 3 - 14 mmol/L    Urea Nitrogen 38 (H) 7 - 30 mg/dL    Creatinine 1.15 0.66 - 1.25 mg/dL    Calcium 8.8 8.5 - 10.1 mg/dL    Glucose 95 70 - 99 mg/dL    GFR Estimate 65 >60 mL/min/1.73m2   CBC with platelets and differential   Result Value Ref Range    WBC Count 7.4 4.0 - 11.0 10e3/uL    RBC Count 3.97 (L) 4.40 - 5.90 10e6/uL    Hemoglobin 8.1 (L) 13.3 - 17.7 g/dL    Hematocrit 28.7 (L) 40.0 - 53.0 %    MCV 72 (L) 78 - 100 fL    MCH 20.4 (L) 26.5 - 33.0 pg    MCHC 28.2 (L) 31.5 - 36.5 g/dL    RDW 20.5 (H) 10.0 - 15.0 %    Platelet Count 190 150 - 450 10e3/uL    %  Neutrophils 68 %    % Lymphocytes 14 %    % Monocytes 10 %    % Eosinophils 6 %    % Basophils 1 %    % Immature Granulocytes 1 %    NRBCs per 100 WBC 0 <1 /100    Absolute Neutrophils 5.1 1.6 - 8.3 10e3/uL    Absolute Lymphocytes 1.0 0.8 - 5.3 10e3/uL    Absolute Monocytes 0.8 0.0 - 1.3 10e3/uL    Absolute Eosinophils 0.4 0.0 - 0.7 10e3/uL    Absolute Basophils 0.1 0.0 - 0.2 10e3/uL    Absolute Immature Granulocytes 0.1 <=0.4 10e3/uL    Absolute NRBCs 0.0 10e3/uL   Glucose by meter   Result Value Ref Range    GLUCOSE BY METER POCT 125 (H) 70 - 99 mg/dL       ASSESSMENT AND PLAN    Red Sutherland is a 81 year old R hand dominant male presented to the ARU for SDH s/p R craniotomy and hematoma evacuation. His medical problems include recent post op seizures, delirium, afib, HTN, pulmonary HTN, CHF, T2DM, Stage IV CKD, and malnutrition. He would benefit from appropriate evaluation and treatment of multiple deficits in function.        Admission to acute inpatient rehab on 1/17/2022.    Impairment group code: Brain Dysfunction 02.22 Traumatic, Closed Injury - SDH s/p evacuation        1. PT, OT and SLP 60 minutes of each on a daily basis, in addition to rehab nursing and close management of physiatrist.       2. Impairment of ADL's:  OT for 60 min daily to work on upper and lower body self care, dressing, toileting, bathing, energy conservation techniques with use of ADs as needed.      3. Impairment of mobility:   PT for 60 min daily to work on gait exercises, strengthening, endurance buildup, transfers with use of walker as needed.      4. Impairment of cognition/language/swallow:   SLP for 60 min daily for cognitive evaluation and treatment strategies for higher level cognitive deficits and memory impairment.      5. Rehab RN to administer medication, patient education on medication taking, VS monitoring, bowel regimen, glucose monitoring and wound care/surgical wound dressing changes and monitoring.     1.   Medical Conditions     Neuro  R Subdural Hematoma  -on aspirin 81mg and Prasugrel 10mg  -PT, OT, SLP evals pending     Post Op Seizures  -continue Vimpat 200mg BID  -monitor for face twitching and tongue biting     Delirium  -previously on Haldol, Atarax, and melatonin due to agitation in the evenings  -switch to trazodone 50mg scheduled at night  -melatonin 10mg scheduled at night  -Seorquel 25mg PRN for sleep and agitation  -currently has 1:1 sitter     Restless Leg Syndrome  -ropinirole 1mg at bed     CV  Afib  -Eliquis held due to SDH  -rate controlled with metoprolol 25mg BID     Pulmonary HTN  -on tadalafil 20mg daily     Hx of Essential HTN  RV Dysfunction  -CHF with 60-65% ejection fraction based on echo on 10/23/2021  -irbesartan 75mg daily  -monitor for hypotension given recent episodes  -on aspirin 81mg and Prasugrel 10mg     Pulmonary  COPD  -resting comfortably on room air  -consider O2 as needed for activity  -continue Breo daily and albuterol PRN     Renal  Stage IV CKD  -renally dose medications  -monitor Cr and BUN  -avoid nephrotoxic agents     Endo  T2DM  -20mg Lantus BIS  -SSI as needed  -empagliflozin 10mg daily  -monitor POC glucoses, adjust meds as needed     Nutrition  -nutrition consult  -previously getting tube feedings              -Continuous Pivot 1.5, 60ml/hr goal rate, [id number]  -hold tube feeds for tonight  -recent calorie count at 1065 Kcals  -regular diet and thin liquids     GI  GERD  -Protonix 40mg     Liver Cirrhosis  -incidental on imaging  -GI f/u outpatient        2. Adjustment to disability:  Clinical psychology to eval and treat  3. FEN: monitor BMPs twice weekly, encourage GA fluids  4. Bowel: senna BID, PRN Miralax and bisacodyl suppository  5. Bladder: able to urinate on own  6. DVT Prophylaxis: aspirin and prasugrel  7. GI Prophylaxis: Protonix 40mg  8. Code: Full  9. Disposition: Modified independent to home  10. ELOS:  7 days              11. Rehab prognosis:   fair  12. Follow up Appointments on Discharge: neurosurgery and CT 2 weeks after discharge, neurology 4 months after discharge, GI for incidental liver cirrhosis                    Discharge Goals:  1. Estimated Length of Stay: 7 days  2. Prognosis: Good  3. Decision Maker: self  4. Code Status: full  5. Anticipated D/C Destination and functional outcome:   6. Follow up Appointments on Discharge:  neurosurgery and CT 2 weeks after discharge, neurology 4 months after discharge, GI for incidental liver cirrhosis        Disha Nichols,   PGY-1  [employer]

## 2022-01-18 NOTE — PROGRESS NOTES
CLINICAL NUTRITION SERVICES - ASSESSMENT NOTE     Nutrition Prescription    RECOMMENDATIONS FOR MDs/PROVIDERS TO ORDER:  None today     Malnutrition Status:    Patient does not likely meet two of the established criteria necessary for diagnosing malnutrition (no NFPA completed today)    Recommendations already ordered by Registered Dietitian (RD):  Ordered 60 ml water flushes BID for patency upon admit on 1/17  Calorie count x3 days     Future/Additional Recommendations:  Monitor for adequacy of po intakes via calorie counts  Monitor weight/lab trends      REASON FOR ASSESSMENT  Red Sutherland is a/an 81 year old male assessed by the dietitian for Provider Order - Registered Dietitian to Assess and Order TF per Medical Nutrition Therapy Protocol - ? Calorie counts and hold TF    NUTRITION/MEDICAL HISTORY  Per chart review: Pt admits to ARU for rehabilitation in the setting of SDH s/p right craniotomy and hematoma evacuation. Other past medical history noted for A-fib, pulmonary HTN, essential HTN, CAD s/p CABG, COPD, CHF, stage 4 CKD with ascites, PVD, T2DM, and GI bleed.     Upon pt's admit to ARU on 1/17, it is noted Provider suggested considering holding TF and monitoring po intakes via calorie counts, RD noted hospital calorie counts below and pt's cycled TF regimen prior to ARU admission, in light of below calorie counts, RD agreeing with Provider suggestion and will not order TF, just patency water flushes as above.     As of today (1/18), pt requires 1:1 staffing at present and has moderate cognitive-linguistic deficits per SLP notes, so RD will defer face to face visit today.     CURRENT NUTRITION ORDERS  Diet: Regular  Intake: 100% per flow sheets thus far    Nutrition Support: Pt has a G-tube, but nothing ordered upon admit to ARU     3-Day calorie count assessments per hospital recordings:  1/14: 2779 kcal and 101 g protein  1/15: 1690 kcal and 81 g protein   1/16: 1065 kcal and 28 g protein   3 day  "average PO intake = 1844 kcal (88% minimum energy needs) and 70 g protein (87% minimum protein needs)    LABS  Labs reviewed    MEDICATIONS  Jardiance, medium resistance correction scale insulin, Lantus, Lipitor, Avapro, Seroquel, Ferrous gluconate, Senna-docusate, Zincate, MVI, Protonix    ANTHROPOMETRICS  Height: 180.3 cm (5' 11\")  Most Recent Weight: 99.5 kg (219 lb 4.8 oz)    IBW: 78.2 kg  BMI: Obesity Grade I BMI 30-34.9  Weight History:   Wt Readings from Last 20 Encounters:   01/17/22 99.5 kg (219 lb 4.8 oz)   12/17/21 96.4 kg (212 lb 9.6 oz)   10/12/21 104.3 kg (230 lb)   07/13/21 105.7 kg (233 lb)   01/14/21 104.3 kg (230 lb)   06/22/20 102.5 kg (226 lb)     Weight assessment: 7 lb wt gain in 1 month, non-significant 4.7% weight loss in 3 months, non-significant 6% weight loss in 6 months, non-significant 4.7% weight loss in 1 year.     Dosing Weight: 83.6 kg - adjusted BW     ASSESSED NUTRITION NEEDS  Estimated Energy Needs: 1049-6632 kcals/day (25-30 kcals/kg)  Justification: Maintenance  Estimated Protein Needs:  grams protein/day (1 - 1.2 grams of pro/kg)  Justification: Maintenance  Estimated Fluid Needs: 2090 mL/day (25 mL/kg)   Justification: Maintenance    MALNUTRITION  % Intake: No decreased intake noted  % Weight Loss: Weight loss does not meet criteria  Subcutaneous Fat Loss: Not assessed today   Muscle Loss: Not assessed today   Fluid Accumulation/Edema: None noted  Malnutrition Diagnosis: Patient does not likely meet two of the established criteria necessary for diagnosing malnutrition (no NFPA completed today)    NUTRITION DIAGNOSIS  Predicted inadequate nutrient intake related to medical diagnosis deficits      INTERVENTIONS  Implementation  Calorie count x3 days   Feeding tube flush - patency flushes ordered as above     Goals  Patient to consume % of nutritionally adequate meal trays TID, or the equivalent with supplements/snacks.     Monitoring/Evaluation  Progress toward goals " will be monitored and evaluated per protocol.    Arianne Boswell RD, CNSC, LD  ARU RD pager: [phone]

## 2022-01-18 NOTE — PLAN OF CARE
Discharge Planner Post-Acute Rehab SLP:     Discharge Plan: Home with HC?    Precautions: Fall    Current Status:  Communication: WFL.  Cognition: Moderate cognitive-linguistic deficits in areas of attention, memory, visuospatial skills, executive function, and processing speed. On 1:1  Swallow: Pt reporting that he tolerates a regular diet with thin liquids without concern.    Assessment: Cognitive-linguistic evaluation completed per MD order. Pt presents with moderate cognitive-lingistic deficits. Cognitive Linguistic Quick Test administered this date with 2 subtests remaining. Pt's word-finding and language skills are WFL per assessment and pt denies concerns. Moderate difficulties noted in areas of attention, memory, planning, and reasoning. Pt with good insight to deficits and notes slowed information processing, however reduced safety awareness noted as session progressed with pt attempting to impulsively standing up. Pt with baseline visual deficits in left side, however increased left neglect noted in visuospatial tasks. Pt would benefit from SLP services to target cognitive-linguistic skills. Likely recommend ongoing services at discharge given severity of impairment with ongoing delirum.    Will report CLQT results when fully completed.    Other Barriers to Discharge (Family Training, etc): TBD

## 2022-01-19 ENCOUNTER — APPOINTMENT (OUTPATIENT)
Dept: PHYSICAL THERAPY | Facility: CLINIC | Age: 82
DRG: 949 | End: 2022-01-19
Attending: PHYSICAL MEDICINE & REHABILITATION
Payer: COMMERCIAL

## 2022-01-19 ENCOUNTER — APPOINTMENT (OUTPATIENT)
Dept: OCCUPATIONAL THERAPY | Facility: CLINIC | Age: 82
DRG: 949 | End: 2022-01-19
Attending: PHYSICAL MEDICINE & REHABILITATION
Payer: COMMERCIAL

## 2022-01-19 LAB
GLUCOSE BLDC GLUCOMTR-MCNC: 112 MG/DL (ref 70–99)
GLUCOSE BLDC GLUCOMTR-MCNC: 118 MG/DL (ref 70–99)
GLUCOSE BLDC GLUCOMTR-MCNC: 132 MG/DL (ref 70–99)
GLUCOSE BLDC GLUCOMTR-MCNC: 151 MG/DL (ref 70–99)
GLUCOSE BLDC GLUCOMTR-MCNC: 86 MG/DL (ref 70–99)

## 2022-01-19 PROCEDURE — 97535 SELF CARE MNGMENT TRAINING: CPT | Mod: GO

## 2022-01-19 PROCEDURE — 97530 THERAPEUTIC ACTIVITIES: CPT | Mod: GP | Performed by: PHYSICAL THERAPIST

## 2022-01-19 PROCEDURE — 128N000003 HC R&B REHAB

## 2022-01-19 PROCEDURE — 250N000013 HC RX MED GY IP 250 OP 250 PS 637: Performed by: PHYSICIAN ASSISTANT

## 2022-01-19 PROCEDURE — 250N000013 HC RX MED GY IP 250 OP 250 PS 637: Performed by: PHYSICAL MEDICINE & REHABILITATION

## 2022-01-19 PROCEDURE — 97110 THERAPEUTIC EXERCISES: CPT | Mod: GO | Performed by: OCCUPATIONAL THERAPIST

## 2022-01-19 PROCEDURE — 97110 THERAPEUTIC EXERCISES: CPT | Mod: GP | Performed by: PHYSICAL THERAPIST

## 2022-01-19 PROCEDURE — 99232 SBSQ HOSP IP/OBS MODERATE 35: CPT | Mod: 24 | Performed by: PHYSICAL MEDICINE & REHABILITATION

## 2022-01-19 RX ADMIN — LACOSAMIDE 200 MG: 200 TABLET, FILM COATED ORAL at 20:11

## 2022-01-19 RX ADMIN — ZINC SULFATE 220 MG (50 MG) CAPSULE 220 MG: CAPSULE at 10:05

## 2022-01-19 RX ADMIN — ATORVASTATIN CALCIUM 40 MG: 40 TABLET, FILM COATED ORAL at 20:11

## 2022-01-19 RX ADMIN — METOPROLOL TARTRATE 25 MG: 25 TABLET, FILM COATED ORAL at 20:11

## 2022-01-19 RX ADMIN — MULTIPLE VITAMINS W/ MINERALS TAB 1 TABLET: TAB at 10:04

## 2022-01-19 RX ADMIN — DOCUSATE SODIUM AND SENNOSIDES 1 TABLET: 8.6; 5 TABLET ORAL at 20:11

## 2022-01-19 RX ADMIN — TADALAFIL 20 MG: 10 TABLET, FILM COATED ORAL at 13:12

## 2022-01-19 RX ADMIN — TRAZODONE HYDROCHLORIDE 50 MG: 50 TABLET ORAL at 20:11

## 2022-01-19 RX ADMIN — INSULIN GLARGINE 20 UNITS: 100 INJECTION, SOLUTION SUBCUTANEOUS at 10:09

## 2022-01-19 RX ADMIN — ASPIRIN 81 MG: 81 TABLET, COATED ORAL at 10:04

## 2022-01-19 RX ADMIN — PRASUGREL 10 MG: 5 TABLET, FILM COATED ORAL at 10:03

## 2022-01-19 RX ADMIN — Medication 5 MG: at 20:11

## 2022-01-19 RX ADMIN — METOPROLOL TARTRATE 25 MG: 25 TABLET, FILM COATED ORAL at 10:04

## 2022-01-19 RX ADMIN — EMPAGLIFLOZIN 10 MG: 10 TABLET, FILM COATED ORAL at 10:03

## 2022-01-19 RX ADMIN — INSULIN GLARGINE 20 UNITS: 100 INJECTION, SOLUTION SUBCUTANEOUS at 20:14

## 2022-01-19 RX ADMIN — PANTOPRAZOLE SODIUM 40 MG: 40 TABLET, DELAYED RELEASE ORAL at 10:05

## 2022-01-19 RX ADMIN — QUETIAPINE FUMARATE 50 MG: 25 TABLET ORAL at 23:18

## 2022-01-19 RX ADMIN — LACOSAMIDE 200 MG: 200 TABLET, FILM COATED ORAL at 10:04

## 2022-01-19 RX ADMIN — DOCUSATE SODIUM AND SENNOSIDES 1 TABLET: 8.6; 5 TABLET ORAL at 10:04

## 2022-01-19 RX ADMIN — ROPINIROLE HYDROCHLORIDE 1 MG: 0.25 TABLET, FILM COATED ORAL at 20:11

## 2022-01-19 RX ADMIN — FLUTICASONE FUROATE AND VILANTEROL TRIFENATATE 1 PUFF: 200; 25 POWDER RESPIRATORY (INHALATION) at 10:10

## 2022-01-19 RX ADMIN — HYDROXYZINE HYDROCHLORIDE 25 MG: 25 TABLET ORAL at 20:11

## 2022-01-19 RX ADMIN — IRBESARTAN 75 MG: 75 TABLET ORAL at 10:03

## 2022-01-19 ASSESSMENT — ACTIVITIES OF DAILY LIVING (ADL)
ADLS_ACUITY_SCORE: 9

## 2022-01-19 NOTE — PLAN OF CARE
"Discharge Planner Post-Acute Rehab PT:     Discharge Plan: home mod I with FWW, intermittent assist from wife for IADLs. OP PT.    Precautions: falls    Current Status:  Bed Mobility: SBA  Transfer: STS, FWW, SBA  Gait: amb, FWW, 150+ ft, CGA  Stairs: 12x6\" steps, B rails, Uri  Balance: requires flat standing surface, limited plantar foot sensation.    Assessment:  Pt missed morning session due to sleepiness, unable to wake enough to participate.  In pm, pt sleepy initially but able to wake up and participate with gait training, stretching,strengthening activities. Pt needs frequent seated rest breaks today, but vitals WNL with activity.  COnt to work towards goals.  This writer and NA educated pt about working on being pu and awake during the day and back in bed at night to get sleep cycle more normal.  Pt without response, but staff will continue to promote alertness/ shades open during day and OOB as able.          Other Barriers to Discharge (DME, Family Training, etc):   DME - owns FWW, 3WW and 2x SEC. Plan for mod I with FWW at discharge.    Family training - to be scheduled with pt's wife, specifically to review floor transfers. Hoping to schedule for Saturday 1/22 or Sunday 1/23.    "

## 2022-01-19 NOTE — PROGRESS NOTES
"PM&R Progress Note    INTERVAL HISTORY  Arron is seen bedside eating breakfast this morning.  He reports being able to sleep last night and subsequently feels \"much better\" this morning.  However per nursing notes, he was awake and restless during the night.  He has no concerns or complaints today and denies chest pain or shortness of breath.  Functionally, CGA to A for mobility, ambulating 150+ ft CGA with FWW.      MEDICATIONS  Scheduled meds    aspirin  81 mg Oral Daily     atorvastatin  40 mg Oral QPM     empagliflozin  10 mg Oral or Feeding Tube Daily     ferrous gluconate  324 mg Oral Every Other Day     fluticasone-vilanterol  1 puff Inhalation Daily     hydrOXYzine  25 mg Oral At Bedtime     insulin aspart  1-7 Units Subcutaneous TID AC     insulin aspart  1-5 Units Subcutaneous At Bedtime     insulin glargine  20 Units Subcutaneous BID     irbesartan  75 mg Oral or Feeding Tube Daily     lacosamide  200 mg Oral or Feeding Tube BID     melatonin  5 mg Oral QPM     metoprolol tartrate  25 mg Oral BID     multivitamin w/minerals  1 tablet Oral Daily     pantoprazole  40 mg Oral QAM AC     prasugrel  10 mg Oral or Feeding Tube Daily     rOPINIRole  1 mg Oral QPM     senna-docusate  1-4 tablet Oral BID     tadalafil  20 mg Oral Q24H     traZODone  50 mg Oral At Bedtime     zinc sulfate  220 mg Oral Daily       PRN meds:  acetaminophen, albuterol, bisacodyl, calcium carbonate, glucose **OR** dextrose **OR** glucagon, lidocaine-prilocaine, ondansetron, - MEDICATION INSTRUCTIONS -, polyethylene glycol, QUEtiapine      PHYSICAL EXAM  /67 (BP Location: Left arm)   Pulse 88   Temp 96.8  F (36  C) (Oral)   Resp 16   Ht 1.803 m (5' 11\")   Wt 99.5 kg (219 lb 4.8 oz)   SpO2 95%   BMI 30.59 kg/m    Gen: Awake and alert, sitting at edge of bed  HEENT: moist mucus membranes, on room air  Cardio: RRR, S1+S2, soft systolic murmur  Pulm: lungs CTA bilaterally, on room air, no increased work of breathing  Abd: " soft, nontender  Ext: Trace edema, chronic venous stasis changes present  Neuro/MSK: Answers appropriately, follows commands    LABS  Results for orders placed or performed during the hospital encounter of 01/17/22 (from the past 24 hour(s))   Glucose by meter   Result Value Ref Range    GLUCOSE BY METER POCT 128 (H) 70 - 99 mg/dL   Glucose by meter   Result Value Ref Range    GLUCOSE BY METER POCT 149 (H) 70 - 99 mg/dL   Glucose by meter   Result Value Ref Range    GLUCOSE BY METER POCT 151 (H) 70 - 99 mg/dL   Glucose by meter   Result Value Ref Range    GLUCOSE BY METER POCT 86 70 - 99 mg/dL       ASSESSMENT AND PLAN    Red Sutherland is a 81 year old R hand dominant male presented to the ARU for SDH s/p R craniotomy and hematoma evacuation. His medical problems include recent post op seizures, delirium, afib, HTN, pulmonary HTN, CHF, T2DM, Stage IV CKD, and malnutrition. He would benefit from appropriate evaluation and treatment of multiple deficits in function.        Admission to acute inpatient rehab on 1/17/2022.    Impairment group code: Brain Dysfunction 02.22 Traumatic, Closed Injury - SDH s/p evacuation        1. PT, OT and SLP 60 minutes of each on a daily basis, in addition to rehab nursing and close management of physiatrist.       2. Impairment of ADL's:  OT for 60 min daily to work on upper and lower body self care, dressing, toileting, bathing, energy conservation techniques with use of ADs as needed.      3. Impairment of mobility:   PT for 60 min daily to work on gait exercises, strengthening, endurance buildup, transfers with use of walker as needed.      4. Impairment of cognition/language/swallow:   SLP for 60 min daily for cognitive evaluation and treatment strategies for higher level cognitive deficits and memory impairment.      5. Rehab RN to administer medication, patient education on medication taking, VS monitoring, bowel regimen, glucose monitoring and wound care/surgical wound  dressing changes and monitoring.     1.  Medical Conditions     Neuro  R Subdural Hematoma  -on aspirin 81mg and Prasugrel 10mg  -PT, OT, SLP     Post Op Seizures  -continue Vimpat 200mg BID  -monitor for face twitching and tongue biting     Delirium  -previously on Haldol, Atarax, and melatonin due to agitation in the evenings  -switch to trazodone 50mg scheduled at night  -melatonin 10mg scheduled at night  -Seorquel 25-50 mg PRN for sleep and agitation.  Discontinued Haldol due to stronger dopaminergic blockade and potential for interfering with tabby recovery.  -currently has 1:1 sitter     Restless Leg Syndrome  -ropinirole 1mg at bed     CV  Afib  -Eliquis held due to SDH  -rate controlled with metoprolol 25mg BID     Pulmonary HTN  -on tadalafil 20mg daily     Hx of Essential HTN  RV Dysfunction  -CHF with 60-65% ejection fraction based on echo on 10/23/2021  -irbesartan 75mg daily  -monitor for hypotension given recent episodes  -on aspirin 81mg and Prasugrel 10mg     Pulmonary  COPD  -resting comfortably on room air  -consider O2 as needed for activity  -continue Breo daily and albuterol PRN     Renal  Stage IV CKD  -renally dose medications  -monitor Cr and BUN  -avoid nephrotoxic agents     Endo  T2DM  -20mg Lantus BIS  -SSI as needed  -empagliflozin 10mg daily  -monitor POC glucoses, adjust meds as needed     Nutrition  -nutrition consult  -previously getting tube feedings              -Continuous Pivot 1.5, 60ml/hr goal rate, [id number]  -Currently holding tube feeds and monitoring calorie counts  -regular diet and thin liquids     GI  GERD  -Protonix 40mg     Liver Cirrhosis  -incidental on imaging  -GI f/u outpatient        2. Adjustment to disability:  Clinical psychology to eval and treat  3. FEN: monitor BMPs twice weekly, encourage [place] fluids  4. Bowel: senna BID, PRN Miralax and bisacodyl suppository  5. Bladder: able to urinate on own  6. DVT Prophylaxis: Ambulation, aspirin and prasugrel  7. GI  Prophylaxis: Protonix 40mg  8. Code: Full  9. Disposition: Modified independent to home  10. ELOS:  7 days              11. Rehab prognosis:  fair  12. Follow up Appointments on Discharge: neurosurgery and CT 2 weeks after discharge, neurology 4 months after discharge, GI for incidental liver cirrhosis       Curtis Rivera MD  Department of Rehabilitation Medicine    Time Spent on this Encounter   I, Curtis Rivera, spent a total of 25 minutes face-to-face or managing the care of Red Sutherland. Over 50% of my time on the unit was spent counseling the patient and coordinating care. See note for details.

## 2022-01-19 NOTE — PLAN OF CARE
FOCUS/GOAL  Medical management    ASSESSMENT, INTERVENTIONS AND CONTINUING PLAN FOR GOAL:  Patient needed extra time to sleep after 0700 therapy.  All morning medications rescheduled for 1000 AM. Therapy informed  to  start his sessions after 0800 if possible to allow better follow of this day. Received only long acting  Insulin. BG below 140 before both meals. Patient was sleepy and hard to keep awake  During this shift. Sitter at bedside, no safety incident noted this shift.  Attempt to clean around PEG insertion site was unsuccessful. Patient would not allow this care to be done due to pain. Areas assessed. T boutons still in place, provider informed. Serosanguinous drainage noted, the areas is tender to touch.  Provider informed, resident get a look, will f/u later.   Voided and had a BM using bathroom. Will continue with POC.

## 2022-01-19 NOTE — PLAN OF CARE
Individualized Overall Plan Of Care (IOPOC)      Rehab diagnosis/Impairment Group Code: Brain dysfunction 02.22 traumatic, closed injury - sdh s/p evacuation  Subdural hematoma (h)       Expected functional outcome: Mod I for ambulation, mobility, and ADLs    Clinical Impression Comments: Delirium has been a barrier    Mobility: Arron will benefit from PT to progress his L sided strength, standing dynamic balance, endurance to improve his independence with functional mobility to be mod I with FWW amb home distances and navigating 3 OSVALDO to access home. Limited most by poor sensation in B plantar feet, significant balance deficits standing on compromised/uneven surface.    ADL: Pt presents with deficits in LUE, balance, cognition impacting independence and safety with ADL, IADL and mobility. Pt will benefit from skilled OT for improvement in function.     Communication/Cognition/Swallow: Cognitive-linguistic evaluation completed per MD order. Pt presents with moderate cognitive-lingistic deficits. Cognitive Linguistic Quick Test administered this date with 2 subtests remaining. Pt's word-finding and language skills are WFL per assessment and pt denies concerns. Moderate difficulties noted in areas of attention, memory, planning, and reasoning. Pt with good insight to deficits and notes slowed information processing, however reduced safety awareness noted as session progressed with pt attempting to impulsively standing up. Pt with baseline visual deficits in left side, however increased left neglect noted in visuospatial tasks. Pt would benefit from SLP services to target cognitive-linguistic skills. Likely recommend ongoing services at discharge given severity of impairment with ongoing delirum.     Intensity of therapy:   PT 60 minutes, Daily, for 7 days  OT 60 minutes, Daily, for 7 days  SLP 60 minutes, daily, for 1 week    Orthotics None  Education TBI  Neuropsychology Testing: No  Other:  None      Medical  Prognosis: Good      Physician summary statement: Ongoing delirium and restlessness requiring a sitter until can progress to Mod I.  Currently working on sleep/wake cycle and med adjustments.  Continue PT/OT/SLP    Discharge destination: prior home  Discharge rehabilitation needs: outpatient, PT, OT and SLP      Estimated length of stay: 7 days      Rehabilitation Physician Renny Rivera MD

## 2022-01-19 NOTE — PROGRESS NOTES
Calorie Count:  1/18: 1012 kcal and 27 g protein (2 meals - lunch and dinner, breakfast ordered but no slip saved, nothing specific recorded in flow sheets or nursing notes, 0 supplements)    Arianne Boswell RD, CNSC, LD  [facility] RD pager: [phone]

## 2022-01-19 NOTE — PLAN OF CARE
FOCUS/GOAL  Bowel management, Bladder management, Pain management, Mobility, Skin integrity, and Safety management    ASSESSMENT, INTERVENTIONS AND CONTINUING PLAN FOR GOAL:  A/O x4 but can be forgetful.  Patient took a 30 min nap before dinner this shift and when he woke up he was confused but he quickly reoriented himself once he had been up for for a couple of minuets.  [place], no hearing aids in.  Able to make needs known.  Up w/ CG gait belt and walker to the bathroom.  Continent of bowel and bladder, last BM this shift.  Has urgency with urination.  Bilateral groin cleaned and barrier cream applied per orders.  G-tube wnl,flushed per orders.  Regular, thin liquid diet takes his pills well whole.  Bgs were 128 and .  No c/o pain.  Patient is still impulsive and restless at times, one to one continues this shift.  Wife reported she will not be able to visit tomorrow or Thursday and that patient sometmes gets anxious and restless when she isnt able to visit.  Also wife reported that at home patient only sleeps about an hour and a half at a time before he wakes up throughout the night and will have snack or walk around.  Patient very restless moving from bed, to chair, to edge of bed from 2100 until 2330.  PRN Seroquel given at 2236.  Cont w/ POC.

## 2022-01-19 NOTE — PROGRESS NOTES
PT, Aleksey Correa and this writer called pt wife Kaley to discuss recommendations and discharge. Discussed working on sleep/wake cycle, therapy progress, physical A and supervision at home, DME, family training, and discharge date. Targeting Mon 01/24. Pt wife expressed understanding and agreement with plan. Pt wife confirmed that she is able to provide level of care recommended for pt at discharge. SW discussed [facility]. Information added to pt AVS, pt wife notified. SW offered to completed referral, pt wife agreeable. Referral completed and SW made plan to leave information in pt room for wife to . Denied additional needs, questions, or concerns.     Will come in for family training Saturday from 2-4. [facility] is already scheduled for Saturday from 1-2. Will plan for OP therapy, therapy setting up. Targeting discharge Monday 01/24.     JANIE Cobb   [facility]   Direct Phone: [phone]  I   Pager: [phone]  I  Fax: [phone]

## 2022-01-19 NOTE — PLAN OF CARE
A/O x4, able to make needs known. Continues 1:1 although calm and cooperative through shift; shows no impulsivity or attempts to self-transfer, spends much time talking on the phone with family and friends and watching the news. SBA using gait belt and ww. Continent of bowel and bladder, reports LBM 1/19, barrier cream applied per orders. PEG WNL, flushed per orders. Consistent Carb Regular Diet/thin liquids, takes medications whole, good appetite, continue calorie counts.  and 132, no sliding scale required. Lies down for bed around 2100, restless, moving/turning constantly, sits up to EOB, lies down again. Bedside 1:1, alarms active, continue POC.

## 2022-01-19 NOTE — PLAN OF CARE
Discharge Planner Post-Acute Rehab OT:     Discharge Plan: Home with assist for IADL and OP OT, ELOS is 7 days    Precautions: fall, craniotomy, sitter 1:1, hard of hearing in R ear    Current Status:  ADLs:    Mobility: CGA/SBA for transfer and amb with walker    Grooming: close SBA at the sink    Dressing: TBD, but pt unable to don shoes properly and wife assists with this at baseline    Bathing: TBD    Toileting: toilet transfer was MIN A   IADLs: not tested. Pt reports that he has not driven in several months and his wife manages his meds and finances.   Vision/Cognition: vision impairment in L eye at baseline but pt reports it is more blurry now. Cognition is to be assessed further,pt reports impairments and has been non compliant with alarms. Pt was very fatigued during session due to lack of sleep and frequently dozing off even when standing.    Assessment: Pt seen in AM session. Pt is a 1:1 sitter. Per sitter, pt had approximately 5 hours of sleep last night. Pt was still very drowsy and was awaken from his sleep. Declined scheduled shower.   Required extended time at EOB sitting as pt had difficulty sustaining full alertness; yawning and falling asleep at EOB.   Pt required multiple cues/repeated instructions a pt drowsy/ hard of hearing in R ear before initiating bathroom ADLs. Noted increasing SOB and fatigue w/ pt leaning on counter for support during ADLs. Pt responded to being tired from standing ADLs and declined further intervention. Will schedule later therapy session to see if pt's FREDRICK is better during the day for participation. Pt can continue to benefit from improving sleep/wake cycle, increasing activity tolerance, strength and I w/ ADLs.     Continue to assess if pt able to be Mod I due to fatigue and pt being too drowsy this date, pt was not appropriate. Hand off of pt's performance given to 1:1 sitter upon exit. Continue to address needs for lack of sleep if it continues to interfere w/  patient's participation in therapies during the day.    -20 mins drowsy/fatigue    Other Barriers to Discharge (DME, Family Training, etc): safety awareness, family training, good support at home.

## 2022-01-19 NOTE — PLAN OF CARE
FOCUS/GOAL  Bowel management, Bladder management and Pain management    ASSESSMENT, INTERVENTIONS AND CONTINUING PLAN FOR GOAL:    Pt very anxious and restless and was awake during the overnight, 1:1 attendant at bedside for safety. He continent of bladder and uses the toilet. Assist of one with walker. Continue with plan of care.

## 2022-01-20 ENCOUNTER — APPOINTMENT (OUTPATIENT)
Dept: OCCUPATIONAL THERAPY | Facility: CLINIC | Age: 82
DRG: 949 | End: 2022-01-20
Attending: PHYSICAL MEDICINE & REHABILITATION
Payer: COMMERCIAL

## 2022-01-20 ENCOUNTER — APPOINTMENT (OUTPATIENT)
Dept: SPEECH THERAPY | Facility: CLINIC | Age: 82
DRG: 949 | End: 2022-01-20
Attending: PHYSICAL MEDICINE & REHABILITATION
Payer: COMMERCIAL

## 2022-01-20 ENCOUNTER — APPOINTMENT (OUTPATIENT)
Dept: PHYSICAL THERAPY | Facility: CLINIC | Age: 82
DRG: 949 | End: 2022-01-20
Attending: PHYSICAL MEDICINE & REHABILITATION
Payer: COMMERCIAL

## 2022-01-20 LAB
GLUCOSE BLDC GLUCOMTR-MCNC: 154 MG/DL (ref 70–99)
GLUCOSE BLDC GLUCOMTR-MCNC: 73 MG/DL (ref 70–99)
GLUCOSE BLDC GLUCOMTR-MCNC: 77 MG/DL (ref 70–99)
GLUCOSE BLDC GLUCOMTR-MCNC: 80 MG/DL (ref 70–99)
GLUCOSE BLDC GLUCOMTR-MCNC: 99 MG/DL (ref 70–99)
PYRIDOXAL PHOS SERPL-SCNC: 54.7 NMOL/L

## 2022-01-20 PROCEDURE — 97112 NEUROMUSCULAR REEDUCATION: CPT | Mod: GP

## 2022-01-20 PROCEDURE — 97129 THER IVNTJ 1ST 15 MIN: CPT | Mod: GN | Performed by: SPEECH-LANGUAGE PATHOLOGIST

## 2022-01-20 PROCEDURE — 97530 THERAPEUTIC ACTIVITIES: CPT | Mod: GP

## 2022-01-20 PROCEDURE — 99232 SBSQ HOSP IP/OBS MODERATE 35: CPT | Mod: 24 | Performed by: PHYSICAL MEDICINE & REHABILITATION

## 2022-01-20 PROCEDURE — 97750 PHYSICAL PERFORMANCE TEST: CPT | Mod: GP

## 2022-01-20 PROCEDURE — 97130 THER IVNTJ EA ADDL 15 MIN: CPT | Mod: GN | Performed by: SPEECH-LANGUAGE PATHOLOGIST

## 2022-01-20 PROCEDURE — 97110 THERAPEUTIC EXERCISES: CPT | Mod: GP

## 2022-01-20 PROCEDURE — 97535 SELF CARE MNGMENT TRAINING: CPT | Mod: GO

## 2022-01-20 PROCEDURE — 250N000013 HC RX MED GY IP 250 OP 250 PS 637: Performed by: PHYSICIAN ASSISTANT

## 2022-01-20 PROCEDURE — 128N000003 HC R&B REHAB

## 2022-01-20 PROCEDURE — 250N000013 HC RX MED GY IP 250 OP 250 PS 637: Performed by: PHYSICAL MEDICINE & REHABILITATION

## 2022-01-20 RX ORDER — SIMETHICONE 80 MG
80 TABLET,CHEWABLE ORAL EVERY 6 HOURS PRN
Status: DISCONTINUED | OUTPATIENT
Start: 2022-01-20 | End: 2022-01-26 | Stop reason: HOSPADM

## 2022-01-20 RX ORDER — TRAZODONE HYDROCHLORIDE 50 MG/1
100 TABLET, FILM COATED ORAL AT BEDTIME
Status: DISCONTINUED | OUTPATIENT
Start: 2022-01-20 | End: 2022-01-26 | Stop reason: HOSPADM

## 2022-01-20 RX ADMIN — ROPINIROLE HYDROCHLORIDE 1 MG: 0.25 TABLET, FILM COATED ORAL at 20:04

## 2022-01-20 RX ADMIN — MULTIPLE VITAMINS W/ MINERALS TAB 1 TABLET: TAB at 09:22

## 2022-01-20 RX ADMIN — EMPAGLIFLOZIN 10 MG: 10 TABLET, FILM COATED ORAL at 09:21

## 2022-01-20 RX ADMIN — ATORVASTATIN CALCIUM 40 MG: 40 TABLET, FILM COATED ORAL at 20:04

## 2022-01-20 RX ADMIN — PRASUGREL 10 MG: 5 TABLET, FILM COATED ORAL at 09:22

## 2022-01-20 RX ADMIN — ASPIRIN 81 MG: 81 TABLET, COATED ORAL at 09:22

## 2022-01-20 RX ADMIN — HYDROXYZINE HYDROCHLORIDE 25 MG: 25 TABLET ORAL at 20:04

## 2022-01-20 RX ADMIN — INSULIN GLARGINE 20 UNITS: 100 INJECTION, SOLUTION SUBCUTANEOUS at 21:02

## 2022-01-20 RX ADMIN — METOPROLOL TARTRATE 25 MG: 25 TABLET, FILM COATED ORAL at 20:08

## 2022-01-20 RX ADMIN — DOCUSATE SODIUM AND SENNOSIDES 1 TABLET: 8.6; 5 TABLET ORAL at 09:23

## 2022-01-20 RX ADMIN — PANTOPRAZOLE SODIUM 40 MG: 40 TABLET, DELAYED RELEASE ORAL at 09:22

## 2022-01-20 RX ADMIN — FERROUS GLUCONATE 324 MG: 324 TABLET ORAL at 09:22

## 2022-01-20 RX ADMIN — ZINC SULFATE 220 MG (50 MG) CAPSULE 220 MG: CAPSULE at 09:21

## 2022-01-20 RX ADMIN — DOCUSATE SODIUM AND SENNOSIDES 1 TABLET: 8.6; 5 TABLET ORAL at 20:04

## 2022-01-20 RX ADMIN — Medication 5 MG: at 20:04

## 2022-01-20 RX ADMIN — TADALAFIL 20 MG: 10 TABLET, FILM COATED ORAL at 09:23

## 2022-01-20 RX ADMIN — LACOSAMIDE 200 MG: 200 TABLET, FILM COATED ORAL at 09:22

## 2022-01-20 RX ADMIN — METOPROLOL TARTRATE 25 MG: 25 TABLET, FILM COATED ORAL at 09:21

## 2022-01-20 RX ADMIN — INSULIN GLARGINE 20 UNITS: 100 INJECTION, SOLUTION SUBCUTANEOUS at 09:23

## 2022-01-20 RX ADMIN — IRBESARTAN 75 MG: 75 TABLET ORAL at 09:22

## 2022-01-20 RX ADMIN — TRAZODONE HYDROCHLORIDE 100 MG: 50 TABLET ORAL at 20:04

## 2022-01-20 RX ADMIN — LACOSAMIDE 200 MG: 200 TABLET, FILM COATED ORAL at 20:04

## 2022-01-20 ASSESSMENT — ACTIVITIES OF DAILY LIVING (ADL)
ADLS_ACUITY_SCORE: 9
ADLS_ACUITY_SCORE: 11
ADLS_ACUITY_SCORE: 9
ADLS_ACUITY_SCORE: 9
ADLS_ACUITY_SCORE: 11
ADLS_ACUITY_SCORE: 9

## 2022-01-20 NOTE — PLAN OF CARE
Discharge Planner Post-Acute Rehab SLP:     Discharge Plan: Home with HC?    Precautions: Fall, 1:1 sitter    Current Status:  Communication: WFL.  Cognition: Moderate cognitive-linguistic deficits in areas of attention, memory, visuospatial skills, executive function, and processing speed. On 1:1  Swallow: Pt reporting that he tolerates a regular diet with thin liquids without concern.      Assessment: Patient overall demonstrates mild cognitive impairment. Deficits noted with attention, short-term recall, moderate level problem solving, and safety awareness. Increasing insight to deficits and improved functional safety awareness and problem solving. Patient still on 1:1, though outside of the room this evening. Continue basic problem solving to improve safety for current stay and quick transition back to home setting.      Other Barriers to Discharge (Family Training, etc): TBD

## 2022-01-20 NOTE — PROGRESS NOTES
Calorie Count:  1/19: 1830 kcal and 83 g protein (3 meals)    Arianne Boswell RD, CNSC, LD  [facility] RD pager: [phone]

## 2022-01-20 NOTE — PLAN OF CARE
"Discharge Planner Post-Acute Rehab PT:     Discharge Plan: home mod I with FWW, intermittent assist from wife for IADLs. OP PT.    Precautions: falls    Current Status:  Bed Mobility: SBA  Transfer: STS, FWW, SBA  Gait: amb, FWW, 150+ ft, SBA  Stairs: 12x6\" steps, B rails, Uri  Balance: requires flat standing surface, limited plantar foot sensation.    Cunningham/20 - 40/56   - **    Assessment: improving balance, limited during Cunningham with higher level challenges. Recommend 1:1 sitter sitting outside room, hoping to wean pt off 1:1 by Saturday.      Other Barriers to Discharge (DME, Family Training, etc):   DME - owns FWW, 3WW and 2x SEC. Plan for mod I with FWW at discharge.    Family training - scheduled for  at 2093-2055  "

## 2022-01-20 NOTE — PLAN OF CARE
Output: Continent this shift. Up to toilet    Last BM: 1/19/21   Activity: Stand by Assist with gait belt and walker   Skin: Redness and soreness at PEG tube site. Incision on right head open to air.    Pain: Denied pain this shift   Neuro/CMS: A/Ox4    Diet: Regular diet, thin liquid, takes pills whole.    LDA: PEG tube   Plan: Discharge 1/24/22   Additional Info: BG 80 at 0200. Seizure precautions. Restless through out night and did not sleep very much. Continue plan of care. Had 1:1 over night.

## 2022-01-20 NOTE — PLAN OF CARE
Discharge Planner Post-Acute Rehab OT:     Discharge Plan: Home with assist for IADL and OP OT, ELOS is 7 days    Precautions: fall, craniotomy, sitter 1:1, hard of hearing in R ear    Current Status:  ADLs:    Mobility: CGA/SBA for transfer and amb with walker    Grooming: close SBA at the sink    Dressing: Set up w/ UB dressing, LB dressing min A pull over feet.    Bathing: SBA all body parts using extended tub bench.     Toileting: toilet transfer was MIN A   IADLs: not tested. Pt reports that he has not driven in several months and his wife manages his meds and finances.   Vision/Cognition: vision impairment in L eye at baseline but pt reports it is more blurry now. Cognition is to be assessed further,pt reports impairments and has been non compliant with alarms. Pt was very fatigued during session due to lack of sleep and frequently dozing off even when standing.    Assessment: Pt seen in later AM session. Facilitated shower/TB ADLs. Pt declined for further activity. Noted SOB and seated rest break during ADLs.  Pt was more alert, pleasant and appropriate w/ responses this date. Needed cue for balance safety, pt attempted to ambulate w/o RW demo'd unsteadiness on his feet and required cues to use RW for safety. Cont. OT POC.    Continue to assess if pt able to be Mod I due to fatigue and pt being too drowsy this date, pt was not appropriate. Hand off of pt's performance given to 1:1 sitter upon exit. Continue to address needs for lack of sleep if it continues to interfere w/ patient's participation in therapies during the day.    -20 fatigue    Other Barriers to Discharge (DME, Family Training, etc): safety awareness, family training, good support at home.

## 2022-01-20 NOTE — PROGRESS NOTES
"PM&R Progress Note    INTERVAL HISTORY  Arron is sitting up in his chair this morning. Per sitter, he refused help while going to the bathroom. He feeling tired again today and not sleeping much. Nursing notes also report this, but pt's wife had mentioned that he tends to be up at night as his baseline. He has no concerns or complaints today and denies chest pain or shortness of breath. He does report abdominal pain, particularly focused around his G-tube site.      He says he was up all night because he kept hearing the 1:1 sitter working on their computer all night. He states that the computer noise woke him up.     Functionally, he's able to ambulate over 150ft with a FWW, and is a min assist with 12x6\" stairs. Yesterday he was drowsy again with OT, and was unable to be assessed for modified independence.       MEDICATIONS  Scheduled meds    aspirin  81 mg Oral Daily     atorvastatin  40 mg Oral QPM     empagliflozin  10 mg Oral or Feeding Tube Daily     ferrous gluconate  324 mg Oral Every Other Day     fluticasone-vilanterol  1 puff Inhalation Daily     hydrOXYzine  25 mg Oral At Bedtime     insulin aspart  1-7 Units Subcutaneous TID AC     insulin aspart  1-5 Units Subcutaneous At Bedtime     insulin glargine  20 Units Subcutaneous BID     irbesartan  75 mg Oral or Feeding Tube Daily     lacosamide  200 mg Oral or Feeding Tube BID     melatonin  5 mg Oral QPM     metoprolol tartrate  25 mg Oral BID     multivitamin w/minerals  1 tablet Oral Daily     pantoprazole  40 mg Oral QAM AC     prasugrel  10 mg Oral or Feeding Tube Daily     rOPINIRole  1 mg Oral QPM     senna-docusate  1-4 tablet Oral BID     tadalafil  20 mg Oral Q24H     traZODone  100 mg Oral At Bedtime     zinc sulfate  220 mg Oral Daily       PRN meds:  acetaminophen, albuterol, bisacodyl, calcium carbonate, glucose **OR** dextrose **OR** glucagon, lidocaine-prilocaine, ondansetron, - MEDICATION INSTRUCTIONS -, polyethylene glycol, QUEtiapine, " "simethicone      PHYSICAL EXAM  BP (!) 143/68   Pulse 100   Temp 97.2  F (36.2  C) (Oral)   Resp 16   Ht 1.803 m (5' 11\")   Wt 99.5 kg (219 lb 4.8 oz)   SpO2 97%   BMI 30.59 kg/m    Gen: Awake and alert, sitting in chair  HEENT: moist mucus membranes, on room air  Cardio: RRR, S1+S2, soft systolic murmur  Pulm: lungs CTA bilaterally, on room air, no increased work of breathing  Abd: soft, nontender. G tube bumper tight to skin, button loose. Mild distension due to gas  Ext: Trace edema, chronic venous stasis changes present  Neuro/MSK: Answers appropriately, follows commands    LABS  Results for orders placed or performed during the hospital encounter of 01/17/22 (from the past 24 hour(s))   Glucose by meter   Result Value Ref Range    GLUCOSE BY METER POCT 112 (H) 70 - 99 mg/dL   Glucose by meter   Result Value Ref Range    GLUCOSE BY METER POCT 118 (H) 70 - 99 mg/dL   Glucose by meter   Result Value Ref Range    GLUCOSE BY METER POCT 132 (H) 70 - 99 mg/dL   Glucose by meter   Result Value Ref Range    GLUCOSE BY METER POCT 80 70 - 99 mg/dL   Glucose by meter   Result Value Ref Range    GLUCOSE BY METER POCT 77 70 - 99 mg/dL       ASSESSMENT AND PLAN    Red Sutherland is a 81 year old R hand dominant male presented to the ARU for SDH s/p R craniotomy and hematoma evacuation. His medical problems include recent post op seizures, delirium, afib, HTN, pulmonary HTN, CHF, T2DM, Stage IV CKD, and malnutrition. He has a button for his G-tube removed on 1/20, and he continued to be restless at night. It is unclear if this is close to his baseline or if it's due to his SDH.        Admission to acute inpatient rehab on 1/17/2022.    Impairment group code: Brain Dysfunction 02.22 Traumatic, Closed Injury - SDH s/p evacuation        1. PT, OT and SLP 60 minutes of each on a daily basis, in addition to rehab nursing and close management of physiatrist.       2. Impairment of ADL's:  OT for 60 min daily to work on " upper and lower body self care, dressing, toileting, bathing, energy conservation techniques with use of ADs as needed.      3. Impairment of mobility:   PT for 60 min daily to work on gait exercises, strengthening, endurance buildup, transfers with use of walker as needed.      4. Impairment of cognition/language/swallow:   SLP for 60 min daily for cognitive evaluation and treatment strategies for higher level cognitive deficits and memory impairment.      5. Rehab RN to administer medication, patient education on medication taking, VS monitoring, bowel regimen, glucose monitoring and wound care/surgical wound dressing changes and monitoring.     1.  Medical Conditions     Neuro  R Subdural Hematoma  -on aspirin 81mg and Prasugrel 10mg  -PT, OT, SLP     Post Op Seizures  -continue Vimpat 200mg BID  -monitor for face twitching and tongue biting     Delirium  -previously on Haldol, Atarax, and melatonin due to agitation in the evenings  -melatonin 10mg scheduled at night  -increase trazodone to 100mg at night  -Seorquel 25-50 mg PRN for sleep and agitation.  Discontinued Haldol due to stronger dopaminergic blockade and potential for interfering with tabby recovery.  -currently has 1:1 sitter, can place sitter outside room at night to help facilitate sleep     Restless Leg Syndrome  -ropinirole 1mg at bed     CV  Afib  -Eliquis held due to SDH  -rate controlled with metoprolol 25mg BID     Pulmonary HTN  -on tadalafil 20mg daily     Hx of Essential HTN  RV Dysfunction  -CHF with 60-65% ejection fraction based on echo on 10/23/2021  -irbesartan 75mg daily  -monitor for hypotension given recent episodes  -on aspirin 81mg and Prasugrel 10mg     Pulmonary  COPD  -resting comfortably on room air  -consider O2 as needed for activity  -continue Breo daily and albuterol PRN     Renal  Stage IV CKD  -renally dose medications  -monitor Cr and BUN  -avoid nephrotoxic agents     Endo  T2DM  -20mg Lantus BIS  -SSI as  needed  -empagliflozin 10mg daily  -monitor POC glucoses, adjust meds as needed     Nutrition  -nutrition consult  -previously getting tube feedings              -Continuous Pivot 1.5, 60ml/hr goal rate, 3308-9260  -Currently holding tube feeds and monitoring calorie counts  -regular diet and thin liquids  -G-tube button removed  -bumper loosened slightly to allow for full turning   -will add simethicone for pt's gas  -continue to monitor G-tube site closely     GI  GERD  -Protonix 40mg     Liver Cirrhosis  -incidental on imaging  -GI f/u outpatient        2. Adjustment to disability:  Clinical psychology to eval and treat  3. FEN: monitor BMPs twice weekly, encourage PA fluids  4. Bowel: senna BID, PRN Miralax and bisacodyl suppository  5. Bladder: able to urinate on own  6. DVT Prophylaxis: Ambulation, aspirin and prasugrel  7. GI Prophylaxis: Protonix 40mg  8. Code: Full  9. Disposition: Modified independent to home  10. ELOS:  7 days              11. Rehab prognosis:  fair  12. Follow up Appointments on Discharge: neurosurgery and CT 2 weeks after discharge, neurology 4 months after discharge, GI for incidental liver cirrhosis     Disha Nichols  PGY-1  PM&R

## 2022-01-20 NOTE — PROGRESS NOTES
"   01/20/22 1300   Signing Clinician's Name / Credentials   Signing clinician's name / credentials Aleksey Correa DPT   Kelley Balance Scale (KELLEY HENRY, MARIO S, ALIZA MEMBRENO, ZACKARY CRUMP: MEASURING BALANCE IN THE ELDERLY: VALIDATION OF AN INSTRUMENT. CAN. J. PUB. HEALTH, JULY/AUGUST SUPPLEMENT 2:S7-11, 1992.)   Sit To Stand 3   Standing Unsupported 3  (mild sway, no LOB)   Sitting Unsupported 4   Stand to Sit 4   Transfers 4   Standing with Eyes Closed 3  (moderate sway, able to self correct)   Standing Unsupported, Feet Together 3   Reach Forward With Outstretched Arm 1  (SBA, 3\")   Retrieve Object From Floor 4   Turning to Look Behind 3  (better to R than L)   Turn 360 Degrees 2  (6 sec CW, 7 sec CCW)   Placing Alternate Foot on Stool (4-6 inches) 2  (38 sec)   Unsupported Tandem Stand (Demonstrate to Subject) 3   One Leg Stand 1   Total Score (A score of 45 or less has been correlated with an increased risk of falls)   Total Score (out of 56) 40     Kelley Balance Scale (BBS) Cutoff Scores for CVA Population:  Patient Score: 40/56    The BBS is a measure of static and dynamic standing balance that has been validated in community dwelling elderly individuals and individuals who have Parkinson's Disease, MS, and those who are s/p CVA and TBI. The test is administered without an assistive device. Scores from the Kelley are used to determine the probability of falling based on the patient's previous history of falls and their test performance.     0-20 High risk for falling- Corresponded with w/c bound status  21-40 Medium risk for falling- Able to walk with assistance  41-56 Low risk for falling- Able to walk independently  According to [facility].  Available at http://www.strokecenter.org/.  Accessibility verified April 10, 2013.  Minimal Detectable Change = 6.5 according to Jarrod & Ozzieey 2008    Assessment (rationale for performing, application to patient s function & care plan): recommend continued " use of FWW for mobility in room. Hoping to progress to amb with SEC and assist in the next few days.    Minutes billed as physical performance test: 45, extra time needed for rest breaks and to ed pt on results post test.

## 2022-01-20 NOTE — PROGRESS NOTES
Cognitive-Linguistic Quick Test results    Cognitive Domain  Severity Rating  Attention        Moderate  Memory        Mild  Executive Functions       Mild  Language        WNL  Visuospatial Skills       Moderate    Composite Severity Rating      Mild    Clock Drawing Severity Rating    Mild

## 2022-01-20 NOTE — PLAN OF CARE
FOCUS/GOAL  Mobility, Psychosocial needs, and Safety management    ASSESSMENT, INTERVENTIONS AND CONTINUING PLAN FOR GOAL:  Pt very agitated this AM upon being awoken, swearing and yelling at staff. He was very upset about lack of sleep with 1:1 sitter in room @ HS. Pt calmed quickly however and was able to participate in therapies and took morning medications. Was pleasant the remainder of the day. A/O, following commands. Denied pain/SOB/dizziness. Up with SBA/walker/gaitbelt. BG 77 & 99, no sliding scale needed.

## 2022-01-20 NOTE — PROGRESS NOTES
PCP Clinic Care Coordinator Rebeca Ware PH: [phone] updated on pt discharge plans via Epic Messenger. Information added to AVS for family reference. See SW note from yesterday. Will meet with pt and pt wife at discharge and provide IMM. Planning for OP. No other SW needs at this time.     JANIE Cobb   [facility]   Direct Phone: [phone]  I   Pager: [phone]  I  Fax: [phone]

## 2022-01-21 ENCOUNTER — APPOINTMENT (OUTPATIENT)
Dept: PHYSICAL THERAPY | Facility: CLINIC | Age: 82
DRG: 949 | End: 2022-01-21
Attending: PHYSICAL MEDICINE & REHABILITATION
Payer: COMMERCIAL

## 2022-01-21 ENCOUNTER — APPOINTMENT (OUTPATIENT)
Dept: OCCUPATIONAL THERAPY | Facility: CLINIC | Age: 82
DRG: 949 | End: 2022-01-21
Attending: PHYSICAL MEDICINE & REHABILITATION
Payer: COMMERCIAL

## 2022-01-21 ENCOUNTER — APPOINTMENT (OUTPATIENT)
Dept: CT IMAGING | Facility: CLINIC | Age: 82
DRG: 949 | End: 2022-01-21
Attending: PHYSICAL MEDICINE & REHABILITATION
Payer: COMMERCIAL

## 2022-01-21 ENCOUNTER — APPOINTMENT (OUTPATIENT)
Dept: ULTRASOUND IMAGING | Facility: CLINIC | Age: 82
DRG: 949 | End: 2022-01-21
Attending: PHYSICAL MEDICINE & REHABILITATION
Payer: COMMERCIAL

## 2022-01-21 ENCOUNTER — APPOINTMENT (OUTPATIENT)
Dept: SPEECH THERAPY | Facility: CLINIC | Age: 82
DRG: 949 | End: 2022-01-21
Attending: PHYSICAL MEDICINE & REHABILITATION
Payer: COMMERCIAL

## 2022-01-21 ENCOUNTER — DOCUMENTATION ONLY (OUTPATIENT)
Dept: REHABILITATION | Facility: CLINIC | Age: 82
End: 2022-01-21

## 2022-01-21 LAB
ANION GAP SERPL CALCULATED.3IONS-SCNC: 6 MMOL/L (ref 3–14)
BASOPHILS # BLD AUTO: 0 10E3/UL (ref 0–0.2)
BASOPHILS NFR BLD AUTO: 0 %
BUN SERPL-MCNC: 30 MG/DL (ref 7–30)
CALCIUM SERPL-MCNC: 8.8 MG/DL (ref 8.5–10.1)
CHLORIDE BLD-SCNC: 108 MMOL/L (ref 94–109)
CO2 SERPL-SCNC: 24 MMOL/L (ref 20–32)
CREAT SERPL-MCNC: 1.22 MG/DL (ref 0.66–1.25)
EOSINOPHIL # BLD AUTO: 0.2 10E3/UL (ref 0–0.7)
EOSINOPHIL NFR BLD AUTO: 3 %
ERYTHROCYTE [DISTWIDTH] IN BLOOD BY AUTOMATED COUNT: 20.5 % (ref 10–15)
GFR SERPL CREATININE-BSD FRML MDRD: 60 ML/MIN/1.73M2
GLUCOSE BLD-MCNC: 176 MG/DL (ref 70–99)
GLUCOSE BLDC GLUCOMTR-MCNC: 138 MG/DL (ref 70–99)
GLUCOSE BLDC GLUCOMTR-MCNC: 142 MG/DL (ref 70–99)
GLUCOSE BLDC GLUCOMTR-MCNC: 171 MG/DL (ref 70–99)
GLUCOSE BLDC GLUCOMTR-MCNC: 73 MG/DL (ref 70–99)
GLUCOSE BLDC GLUCOMTR-MCNC: 89 MG/DL (ref 70–99)
GLUCOSE BLDC GLUCOMTR-MCNC: 97 MG/DL (ref 70–99)
HCT VFR BLD AUTO: 31.1 % (ref 40–53)
HGB BLD-MCNC: 8.7 G/DL (ref 13.3–17.7)
IMM GRANULOCYTES # BLD: 0 10E3/UL
IMM GRANULOCYTES NFR BLD: 1 %
LYMPHOCYTES # BLD AUTO: 0.8 10E3/UL (ref 0.8–5.3)
LYMPHOCYTES NFR BLD AUTO: 11 %
MAGNESIUM SERPL-MCNC: 2.4 MG/DL (ref 1.6–2.3)
MCH RBC QN AUTO: 19.7 PG (ref 26.5–33)
MCHC RBC AUTO-ENTMCNC: 28 G/DL (ref 31.5–36.5)
MCV RBC AUTO: 70 FL (ref 78–100)
MONOCYTES # BLD AUTO: 0.5 10E3/UL (ref 0–1.3)
MONOCYTES NFR BLD AUTO: 7 %
NEUTROPHILS # BLD AUTO: 5.8 10E3/UL (ref 1.6–8.3)
NEUTROPHILS NFR BLD AUTO: 78 %
NRBC # BLD AUTO: 0 10E3/UL
NRBC BLD AUTO-RTO: 0 /100
NT-PROBNP SERPL-MCNC: 2086 PG/ML (ref 0–1800)
PHOSPHATE SERPL-MCNC: 4.3 MG/DL (ref 2.5–4.5)
PLATELET # BLD AUTO: 219 10E3/UL (ref 150–450)
POTASSIUM BLD-SCNC: 4.2 MMOL/L (ref 3.4–5.3)
RBC # BLD AUTO: 4.42 10E6/UL (ref 4.4–5.9)
SODIUM SERPL-SCNC: 138 MMOL/L (ref 133–144)
TROPONIN I SERPL HS-MCNC: 14 NG/L
WBC # BLD AUTO: 7.4 10E3/UL (ref 4–11)

## 2022-01-21 PROCEDURE — 999N000040 HC STATISTIC CONSULT NO CHARGE VASC ACCESS

## 2022-01-21 PROCEDURE — 85025 COMPLETE CBC W/AUTO DIFF WBC: CPT | Performed by: PHYSICAL MEDICINE & REHABILITATION

## 2022-01-21 PROCEDURE — 71275 CT ANGIOGRAPHY CHEST: CPT

## 2022-01-21 PROCEDURE — 83880 ASSAY OF NATRIURETIC PEPTIDE: CPT | Performed by: INTERNAL MEDICINE

## 2022-01-21 PROCEDURE — 84100 ASSAY OF PHOSPHORUS: CPT | Performed by: PHYSICAL MEDICINE & REHABILITATION

## 2022-01-21 PROCEDURE — 97110 THERAPEUTIC EXERCISES: CPT | Mod: GP

## 2022-01-21 PROCEDURE — 83735 ASSAY OF MAGNESIUM: CPT | Performed by: PHYSICAL MEDICINE & REHABILITATION

## 2022-01-21 PROCEDURE — 97129 THER IVNTJ 1ST 15 MIN: CPT | Mod: GN | Performed by: REHABILITATION PRACTITIONER

## 2022-01-21 PROCEDURE — 97535 SELF CARE MNGMENT TRAINING: CPT | Mod: GO | Performed by: STUDENT IN AN ORGANIZED HEALTH CARE EDUCATION/TRAINING PROGRAM

## 2022-01-21 PROCEDURE — 84484 ASSAY OF TROPONIN QUANT: CPT | Performed by: PHYSICAL MEDICINE & REHABILITATION

## 2022-01-21 PROCEDURE — 250N000013 HC RX MED GY IP 250 OP 250 PS 637: Performed by: PHYSICIAN ASSISTANT

## 2022-01-21 PROCEDURE — 71275 CT ANGIOGRAPHY CHEST: CPT | Mod: 26 | Performed by: RADIOLOGY

## 2022-01-21 PROCEDURE — 93005 ELECTROCARDIOGRAM TRACING: CPT

## 2022-01-21 PROCEDURE — 36415 COLL VENOUS BLD VENIPUNCTURE: CPT | Performed by: PHYSICAL MEDICINE & REHABILITATION

## 2022-01-21 PROCEDURE — 97530 THERAPEUTIC ACTIVITIES: CPT | Mod: GO | Performed by: STUDENT IN AN ORGANIZED HEALTH CARE EDUCATION/TRAINING PROGRAM

## 2022-01-21 PROCEDURE — 93010 ELECTROCARDIOGRAM REPORT: CPT | Performed by: INTERNAL MEDICINE

## 2022-01-21 PROCEDURE — 999N000127 HC STATISTIC PERIPHERAL IV START W US GUIDANCE

## 2022-01-21 PROCEDURE — 93970 EXTREMITY STUDY: CPT | Mod: 26 | Performed by: RADIOLOGY

## 2022-01-21 PROCEDURE — 99233 SBSQ HOSP IP/OBS HIGH 50: CPT | Mod: 24 | Performed by: PHYSICAL MEDICINE & REHABILITATION

## 2022-01-21 PROCEDURE — 97130 THER IVNTJ EA ADDL 15 MIN: CPT | Mod: GN | Performed by: REHABILITATION PRACTITIONER

## 2022-01-21 PROCEDURE — 250N000011 HC RX IP 250 OP 636: Performed by: PHYSICAL MEDICINE & REHABILITATION

## 2022-01-21 PROCEDURE — 80048 BASIC METABOLIC PNL TOTAL CA: CPT | Performed by: PHYSICAL MEDICINE & REHABILITATION

## 2022-01-21 PROCEDURE — 250N000011 HC RX IP 250 OP 636: Performed by: INTERNAL MEDICINE

## 2022-01-21 PROCEDURE — 99222 1ST HOSP IP/OBS MODERATE 55: CPT | Performed by: INTERNAL MEDICINE

## 2022-01-21 PROCEDURE — 250N000009 HC RX 250: Performed by: PHYSICAL MEDICINE & REHABILITATION

## 2022-01-21 PROCEDURE — 93970 EXTREMITY STUDY: CPT

## 2022-01-21 PROCEDURE — 128N000003 HC R&B REHAB

## 2022-01-21 PROCEDURE — 250N000013 HC RX MED GY IP 250 OP 250 PS 637: Performed by: PHYSICAL MEDICINE & REHABILITATION

## 2022-01-21 RX ORDER — IPRATROPIUM BROMIDE AND ALBUTEROL SULFATE 2.5; .5 MG/3ML; MG/3ML
3 SOLUTION RESPIRATORY (INHALATION) EVERY 4 HOURS PRN
Status: DISCONTINUED | OUTPATIENT
Start: 2022-01-21 | End: 2022-01-26 | Stop reason: HOSPADM

## 2022-01-21 RX ORDER — BUMETANIDE 1 MG/1
1 TABLET ORAL DAILY
Status: DISCONTINUED | OUTPATIENT
Start: 2022-01-22 | End: 2022-01-25

## 2022-01-21 RX ORDER — IOPAMIDOL 755 MG/ML
100 INJECTION, SOLUTION INTRAVASCULAR ONCE
Status: COMPLETED | OUTPATIENT
Start: 2022-01-21 | End: 2022-01-21

## 2022-01-21 RX ORDER — BUMETANIDE 0.25 MG/ML
1 INJECTION INTRAMUSCULAR; INTRAVENOUS ONCE
Status: COMPLETED | OUTPATIENT
Start: 2022-01-21 | End: 2022-01-21

## 2022-01-21 RX ADMIN — ATORVASTATIN CALCIUM 40 MG: 40 TABLET, FILM COATED ORAL at 20:27

## 2022-01-21 RX ADMIN — IRBESARTAN 75 MG: 75 TABLET ORAL at 09:05

## 2022-01-21 RX ADMIN — PANTOPRAZOLE SODIUM 40 MG: 40 TABLET, DELAYED RELEASE ORAL at 09:05

## 2022-01-21 RX ADMIN — TADALAFIL 20 MG: 10 TABLET, FILM COATED ORAL at 09:04

## 2022-01-21 RX ADMIN — ZINC SULFATE 220 MG (50 MG) CAPSULE 220 MG: CAPSULE at 09:04

## 2022-01-21 RX ADMIN — METOPROLOL TARTRATE 25 MG: 25 TABLET, FILM COATED ORAL at 20:28

## 2022-01-21 RX ADMIN — BUMETANIDE 1 MG: 0.25 INJECTION, SOLUTION INTRAMUSCULAR; INTRAVENOUS at 16:09

## 2022-01-21 RX ADMIN — IOPAMIDOL 72 ML: 755 INJECTION, SOLUTION INTRAVENOUS at 12:24

## 2022-01-21 RX ADMIN — INSULIN GLARGINE 20 UNITS: 100 INJECTION, SOLUTION SUBCUTANEOUS at 09:07

## 2022-01-21 RX ADMIN — MULTIPLE VITAMINS W/ MINERALS TAB 1 TABLET: TAB at 09:05

## 2022-01-21 RX ADMIN — DOCUSATE SODIUM AND SENNOSIDES 1 TABLET: 8.6; 5 TABLET ORAL at 09:05

## 2022-01-21 RX ADMIN — METOPROLOL TARTRATE 25 MG: 25 TABLET, FILM COATED ORAL at 09:04

## 2022-01-21 RX ADMIN — LACOSAMIDE 200 MG: 200 TABLET, FILM COATED ORAL at 20:28

## 2022-01-21 RX ADMIN — LACOSAMIDE 200 MG: 200 TABLET, FILM COATED ORAL at 09:05

## 2022-01-21 RX ADMIN — HYDROXYZINE HYDROCHLORIDE 25 MG: 25 TABLET ORAL at 20:27

## 2022-01-21 RX ADMIN — SODIUM CHLORIDE 90 ML: 9 INJECTION, SOLUTION INTRAVENOUS at 12:24

## 2022-01-21 RX ADMIN — ROPINIROLE HYDROCHLORIDE 1 MG: 0.25 TABLET, FILM COATED ORAL at 20:26

## 2022-01-21 RX ADMIN — INSULIN ASPART 1 UNITS: 100 INJECTION, SOLUTION INTRAVENOUS; SUBCUTANEOUS at 12:51

## 2022-01-21 RX ADMIN — Medication 5 MG: at 20:28

## 2022-01-21 RX ADMIN — ASPIRIN 81 MG: 81 TABLET, COATED ORAL at 09:04

## 2022-01-21 RX ADMIN — EMPAGLIFLOZIN 10 MG: 10 TABLET, FILM COATED ORAL at 09:04

## 2022-01-21 RX ADMIN — PRASUGREL 10 MG: 5 TABLET, FILM COATED ORAL at 09:05

## 2022-01-21 RX ADMIN — TRAZODONE HYDROCHLORIDE 100 MG: 50 TABLET ORAL at 20:27

## 2022-01-21 ASSESSMENT — ACTIVITIES OF DAILY LIVING (ADL)
ADLS_ACUITY_SCORE: 13
ADLS_ACUITY_SCORE: 11
ADLS_ACUITY_SCORE: 11
ADLS_ACUITY_SCORE: 13
ADLS_ACUITY_SCORE: 11
ADLS_ACUITY_SCORE: 11
ADLS_ACUITY_SCORE: 13
ADLS_ACUITY_SCORE: 11
ADLS_ACUITY_SCORE: 13
ADLS_ACUITY_SCORE: 11
ADLS_ACUITY_SCORE: 11
ADLS_ACUITY_SCORE: 13
ADLS_ACUITY_SCORE: 11
ADLS_ACUITY_SCORE: 13
ADLS_ACUITY_SCORE: 11
ADLS_ACUITY_SCORE: 13
ADLS_ACUITY_SCORE: 13
ADLS_ACUITY_SCORE: 11

## 2022-01-21 NOTE — PROGRESS NOTES
Prior Authorization **APPROVED**    Authorization Effective Date: 12/22/2021  Authorization Expiration Date: 1/21/2023  Medication: Tadalafil *PAH* 20mg tabs **APPROVED**  Approved Dose/Quantity: 1 tablet daily  Reference #: CoverMyMeds Key: [id number] - [place] Case ID: [id number], [employer] Case ID: [id number]   Insurance Company: [employer] - Phone [phone] Fax [phone]  Expected CoPay: $512.49 (price d/t high coinsurance for this drug tier)     CoPay Card Available: No    Foundation Assistance Needed: n/a  Which Pharmacy is filling the prescription (Not needed for infusion/clinic administered): n/a - Patient has not yet been discharged, and there are no outpatient orders for this medication yet  Comments:  Proactive Prior Authorization        Josie Casillas CPhT  [facility] Discharge Pharmacy Liaison  Pronouns: She/Her/Hers    [facility]  [address] [place] [address]  [address] [place] [address] [address] [address]   [email or web address]  [email or web address]   Phone: [phone]  Pager: [phone]  Fax: [phone]

## 2022-01-21 NOTE — PLAN OF CARE
FOCUS/GOAL  Safety management    ASSESSMENT, INTERVENTIONS AND CONTINUING PLAN FOR GOAL:  After breakfast, pt felt dizzy, short of breath and weak. EKG, labs, CT, US and medicine consult placed. Pt denied further symptoms in the afternoon. Up in chair with wife, BG 73, 142. 1:1 outside of room for impulsivity. Peg tube flushed with 60 ml. Site reddened and sore, cleansed and dressing reinforced. Up in chair.

## 2022-01-21 NOTE — PROGRESS NOTES
Financial resources available below if patient is low income and cannot pay medication copayments.      Patient Assistance Program for [employer]:  [employer] offers a free medication program for uninsured and underinsured patients who meet eligibility requirements.    Call ASSIST at [phone] to find out if patient qualifies for financial support.        Waitlisted Funds:    The Assistance Fund:  Copay programs provide financial assistance for out-of-pocket costs for prescribed FDA-approved treatment, such as copays, deductibles, and coinsurance.  https://enroll.[employer].org/TAF_ProgramInformation?Id=[email or web address]      Currently Closed Funds:    Good Days:  [employer] provides financial support for patients who cannot afford the treatment they urgently need. Good Days has streamlined the enrollment process so patients can receive immediate determination of eligibility for financial assistance.  https://www.mygooddays.org/patients/diseases-covered/pulmonary-arterial-hypertension    Patient Advocate Foundation:  Copay Relief Program  https://copays.org/funds/pulmonary-hypertension/    PAN Foundation:  We help underinsured people with life-threatening, chronic, and rare diseases get the medications and treatments they need by assisting with their out-of-pocket costs and advocating for improved access and affordability.  https://www.panfoundation.org/disease-funds/Pulmonary-Hypertension      Patient, provider, ,  or myself can sign patient up for any open programs with consent. Household size, household income, and proof of income needed to complete most applications.      Josie Casillas CPhT  [facility] Discharge Pharmacy Liaison  Pronouns: She/Her/Hers    [facility]  [address]  [address] [address]   [email or web address]  [email or web address]   Phone:  [phone]  Pager: [phone]  Fax: [phone]

## 2022-01-21 NOTE — PLAN OF CARE
"Discharge Planner Post-Acute Rehab PT:     Discharge Plan: home mod I with FWW, intermittent assist from wife for IADLs. OP PT.    Precautions: falls    Current Status:  Bed Mobility: SBA  Transfer: STS, FWW, SBA  Gait: amb, FWW, 150+ ft, SBA  Stairs: 12x6\" steps, B rails, Uri  Balance: requires flat standing surface, limited plantar foot sensation.    Cunningham/20 - 40/56   - **    Assessment: amb from room > therapy gym, FWW, SBA. Trial of SEC with single HR for ascend/descend 4 - 6\" stairs. Pt became unsteady and displayed retro LOB requiring min A to prevent fall. Use of B HR to ascend/descend 4 - 6\" stairs, CGA with increased steadiness. Pt displayed increased fatigue, SOB and reported dizziness and \"not feeling right\". Vital WNL. Returned to room in w/c and Dr updated. Dr to assess.      Other Barriers to Discharge (DME, Family Training, etc):   DME - owns FWW, 3WW and 2x SEC. Plan for mod I with FWW at discharge.    Family training - scheduled for  at 2013-8911  "

## 2022-01-21 NOTE — PLAN OF CARE
FOCUS/GOAL  Mobility, Skin integrity, and Safety management    ASSESSMENT, INTERVENTIONS AND CONTINUING PLAN FOR GOAL:  1/20/22 2091-9248  Pt is A/O, pleasant, able to use call light at times but sometimes forgets, makes concerns known and staff anticipates needs. Denies pain, SOB, chest pain, n/v nor dizziness. SBA walker with transfers and ambulation. On regular diet, thin liquids, takes medicines whole, on calorie counting. Cont of BL, no BM this shift. LBM-1/19/22. BG monitored. PEG tube flushed, dressing changed. Call light within reach, chair and bed alarms on, sitter outside of room since start of shift. Ambulated by himself in room x1, reminded to use call light for assistance.     [id number]  Walked in the hallway thrice then came back to room. Slept on the bed and woke up around 0130 to pee. Sat on the recliner and went back to sleep at 0300 until this time, back supported with pillows. Cont of BL, no BM this shift. BG-97. Still on seizure precautions. Will continue with current POC.

## 2022-01-21 NOTE — PROGRESS NOTES
3-Day Calorie Count Assessment:  1/18: 1012 kcal and 27 g protein (2 meals)  1/19: 1830 kcal and 83 g protein (3 meals)  1/20: 1490 kcal and 64 g protein (3 meals)  3 day average PO intake = 1444 kcal (69% minimum energy needs) and 58 g protein (72% minimum protein needs)    PO intakes likely adequate, missing information on 1/18 noted and so intakes likely close to 75% of lower end estimated needs.     Plan/Recommendations:  Calorie count completed and no longer need. Continue diet as ordered. Continue patency water flushes via G-tube as ordered (tube cannot be removed until after 2/14/22). RD will continue to monitor per policy.    Arianne Boswell RD, CNSC, LD  [facility] RD pager: [phone]

## 2022-01-21 NOTE — PLAN OF CARE
Discharge Planner Post-Acute Rehab OT:     Discharge Plan: Home with assist for IADL and OP OT, ELOS is 7 days    Precautions: fall, craniotomy, sitter 1:1, hard of hearing in R ear    Current Status:  ADLs:    Mobility: CGA/SBA for transfer and amb with walker    Grooming: close SBA at the sink    Dressing: Set up w/ UB dressing, LB dressing min A pull over feet.    Bathing: SBA all body parts using extended tub bench.     Toileting: toilet transfer was MIN A   IADLs: not tested. Pt reports that he has not driven in several months and his wife manages his meds and finances.   Vision/Cognition: vision impairment in L eye at baseline but pt reports it is more blurry now. Cognition is to be assessed further,pt reports impairments and has been non compliant with alarms. Pt was very fatigued during session due to lack of sleep and frequently dozing off even when standing.    Assessment: Pt reports that he does not feel well and had some weakness and dizziness with PT earlier.Session was cut short due to MD visit, nursing, IV, EKG and Labs for further work up of earlier episode. -30 min     Wife present for PM session, educated her on OT related assist and reviewed DME they have at home. Walked briefly in hallway with WC follow and pt became  SOB, encouraged pt to sit and take a break and then we used the WC to bring him back to the room. MD aware and consulting regarding his testing results.     Other Barriers to Discharge (DME, Family Training, etc): safety awareness, family training, good support at home.

## 2022-01-21 NOTE — PROGRESS NOTES
"PM&R Progress Note    INTERVAL HISTORY  Arron had an episode of shortness of breath, weakness, and nausea during a therapy session this morning. He reports that it started suddenly, and he has never had anything like this before. He denies chest pain or abdominal pain. He states that his legs felt like jelly during therapy.    Functionally, he was a SBA with bathing, and BLE min assist with dressing. SLP noted a moderate cognitive deficit with executive function, attention, and memory. With PT, he was able to ambulate from his room to the therapy gym with a FWW, but began to experience difficulty breathing with during the session.     MEDICATIONS  Scheduled meds    aspirin  81 mg Oral Daily     atorvastatin  40 mg Oral QPM     empagliflozin  10 mg Oral or Feeding Tube Daily     ferrous gluconate  324 mg Oral Every Other Day     fluticasone-vilanterol  1 puff Inhalation Daily     hydrOXYzine  25 mg Oral At Bedtime     insulin aspart  1-7 Units Subcutaneous TID AC     insulin aspart  1-5 Units Subcutaneous At Bedtime     insulin glargine  20 Units Subcutaneous BID     irbesartan  75 mg Oral or Feeding Tube Daily     lacosamide  200 mg Oral or Feeding Tube BID     melatonin  5 mg Oral QPM     metoprolol tartrate  25 mg Oral BID     multivitamin w/minerals  1 tablet Oral Daily     pantoprazole  40 mg Oral QAM AC     prasugrel  10 mg Oral or Feeding Tube Daily     rOPINIRole  1 mg Oral QPM     senna-docusate  1-4 tablet Oral BID     tadalafil  20 mg Oral Q24H     traZODone  100 mg Oral At Bedtime     zinc sulfate  220 mg Oral Daily       PRN meds:  acetaminophen, albuterol, bisacodyl, calcium carbonate, glucose **OR** dextrose **OR** glucagon, lidocaine-prilocaine, ondansetron, - MEDICATION INSTRUCTIONS -, polyethylene glycol, QUEtiapine, simethicone      PHYSICAL EXAM  /67   Pulse 86   Temp (!) 96  F (35.6  C) (Oral)   Resp 16   Ht 1.803 m (5' 11\")   Wt 99.5 kg (219 lb 4.8 oz)   SpO2 100%   BMI 30.59 kg/m  "   Gen: Awake and alert, sitting in chair. Clothes damp from diaphoresis   HEENT: moist mucus membranes, on room air  Cardio: RRR, S1+S2, soft systolic murmur  Pulm: lungs CTA bilaterally, on room air, no increased work of breathing  Abd: soft, nontender. G tube bumper tight to skin, button loose. Mild distension due to gas  Ext: +2 pitting edema, chronic venous stasis changes present  Neuro/MSK: Answers appropriately, follows commands    LABS  Results for orders placed or performed during the hospital encounter of 01/17/22 (from the past 24 hour(s))   Glucose by meter   Result Value Ref Range    GLUCOSE BY METER POCT 77 70 - 99 mg/dL   Glucose by meter   Result Value Ref Range    GLUCOSE BY METER POCT 99 70 - 99 mg/dL   Glucose by meter   Result Value Ref Range    GLUCOSE BY METER POCT 73 70 - 99 mg/dL   Glucose by meter   Result Value Ref Range    GLUCOSE BY METER POCT 154 (H) 70 - 99 mg/dL   Glucose by meter   Result Value Ref Range    GLUCOSE BY METER POCT 97 70 - 99 mg/dL       ASSESSMENT AND PLAN    Red Sutherland is a 81 year old R hand dominant male presented to the ARU for SDH s/p R craniotomy and hematoma evacuation. His medical problems include recent post op seizures, delirium, afib, HTN, pulmonary HTN, CHF, T2DM, Stage IV CKD, and malnutrition. He had a T-tack for his G-tube removed on 1/20, and he continued to be restless at night. It is unclear if this is close to his baseline or if it's due to his SDH. On 1/21, he began to experience new shortness of breath during therapy. An EKG showed Afib, and further workup was done to evaluate for PE or DVT.         Admission to acute inpatient rehab on 1/17/2022.    Impairment group code: Brain Dysfunction 02.22 Traumatic, Closed Injury - SDH s/p evacuation        1. PT, OT and SLP 60 minutes of each on a daily basis, in addition to rehab nursing and close management of physiatrist.       2. Impairment of ADL's:  OT for 60 min daily to work on upper and  lower body self care, dressing, toileting, bathing, energy conservation techniques with use of ADs as needed.      3. Impairment of mobility:   PT for 60 min daily to work on gait exercises, strengthening, endurance buildup, transfers with use of walker as needed.      4. Impairment of cognition/language/swallow:   SLP for 60 min daily for cognitive evaluation and treatment strategies for higher level cognitive deficits and memory impairment.      5. Rehab RN to administer medication, patient education on medication taking, VS monitoring, bowel regimen, glucose monitoring and wound care/surgical wound dressing changes and monitoring.     1.  Medical Conditions     New Shortness of Breath  -differential includes MI, PE, COPD exacerbation, CHF exacerbation  -lungs clear on exam, less likely to be CHF or COPD exacerbation  -EKG shows afib  -will draw CBC, BMP, mag, phos  - CTA chest and BLE US pending to evaluate for PE or DVT  -will consult medicine, appreciate recs    Neuro  R Subdural Hematoma  -on aspirin 81mg and Prasugrel 10mg  -PT, OT, SLP     Post Op Seizures  -continue Vimpat 200mg BID  -monitor for face twitching and tongue biting     Delirium  -previously on Haldol, Atarax, and melatonin due to agitation in the evenings  -melatonin 10mg scheduled at night  -increase trazodone to 100mg at night  -Seorquel 25-50 mg PRN for sleep and agitation.  Discontinued Haldol due to stronger dopaminergic blockade and potential for interfering with tabby recovery.  -currently has 1:1 sitter, can place sitter outside room at night to help facilitate sleep     Restless Leg Syndrome  -ropinirole 1mg at bed     CV  Afib  -Eliquis held due to SDH  -rate controlled with metoprolol 25mg BID  -EKG on 1/21 shows afib  -will obtain CTA chest and BLE US to evaluate for PE or DVT in the setting of new shortness of breath  -consult medicine, appreciate recs     Pulmonary HTN  -on tadalafil 20mg daily     Hx of Essential HTN  RV  Dysfunction  -CHF with 60-65% ejection fraction based on echo on 10/23/2021  -irbesartan 75mg daily  -monitor for hypotension given recent episodes  -on aspirin 81mg and Prasugrel 10mg     Pulmonary    COPD  -resting comfortably on room air  -consider O2 as needed for activity  -continue Breo daily and albuterol PRN     Renal  Stage IV CKD  -renally dose medications  -monitor Cr and BUN  -avoid nephrotoxic agents     Endo  T2DM  -20mg Lantus BIS  -SSI as needed  -empagliflozin 10mg daily  -monitor POC glucoses, adjust meds as needed     Nutrition  -nutrition consult  -previously getting tube feedings              -Continuous Pivot 1.5, 60ml/hr goal rate, [id number]  -Currently holding tube feeds and monitoring calorie counts  -regular diet and thin liquids  -G-tube button removed  -bumper loosened slightly to allow for full turning   -will add simethicone for pt's gas  -continue to monitor G-tube site closely     GI  GERD  -Protonix 40mg     Liver Cirrhosis  -incidental on imaging  -GI f/u outpatient        2. Adjustment to disability:  Clinical psychology to eval and treat  3. FEN: monitor BMPs twice weekly, encourage WA fluids  4. Bowel: senna BID, PRN Miralax and bisacodyl suppository  5. Bladder: able to urinate on own  6. DVT Prophylaxis: Ambulation, aspirin and prasugrel  7. GI Prophylaxis: Protonix 40mg  8. Code: Full  9. Disposition: Modified independent to home  10. ELOS:  7 days              11. Rehab prognosis:  fair  12. Follow up Appointments on Discharge: neurosurgery and CT 2 weeks after discharge, neurology 4 months after discharge, GI for incidental liver cirrhosis     Disha Nichols  PGY-1  PM&R

## 2022-01-21 NOTE — PLAN OF CARE
Discharge Planner Post-Acute Rehab SLP:      Discharge Plan: Home with HC?     Precautions: Fall, 1:1 sitter     Current Status:  Communication: WFL.  Cognition: Moderate cognitive-linguistic deficits in areas of attention, memory, visuospatial skills, executive function, and processing speed. On 1:1  Swallow: Pt reporting that he tolerates a regular diet with thin liquids without concern.        Assessment: Intervention targeted moderate level planning/problem solving task. Pt required to follow written instructions to plan out a garden plot system. Pt demonstrated inconsistent attention to written details and decreased error awareness. Increased time required to process more complex information. Approach to solving problem was generally organized, though increased time was required to process more complex information. Pt required min A assist to achieve 100% accuracy. Pt demonstrates reduced insight into physical and cognitive deficits with some impulsivity noted. -15 minutes due to pt eating breakfast.        Other Barriers to Discharge (Family Training, etc): TBD

## 2022-01-21 NOTE — CONSULTS
[facility]  Consult Note - Hospitalist Service  Date of Admission:  1/17/2022  Consult Requested by: Disha Nichols MD   Reason for Consult: atrial fibrillation , shortness of breath      Assessment & Plan   Red Sutherland is a 81 year old male admitted on 1/17/2022. He complex medical history including  CAD with stent and in-stent resternosis   atrial fibrillation had been on plavix and xarelto, COPD pulmonary hypertension  , HFpEF T2DM ,  was transferred form [facility] ED 12/20/21 with subdural hematoma with mass effect, status post  Evacuation hospital course was complicated with delirium, hypernatremia . He failed swallowing and had PEG placed 1/3/22     Patient  Was transferred to inpatient acute rehab. 1/21 he developed worsening shortness of breath  And EKG showed atrial fibrillation  And we were consulted to help with medical management .    Permanent Atrial fibrillation   -There is mention of history atrial fibrillation, had been on Eliquis in past but currently  On hold due to subdural hematoma    -watchman device has been considered and patient  Had seen Dr Chi but has not been preformed, post procedure per Dr Chi patient   Needs to be on transient AC  His aspirin and effient was changed to clopidrogrel with combo of Eiquis   - had been on digoxin in past was recently stopped due to elevated creatinine    -patient  Was seen by cardiology  To assist  with anticoagulation and antiplatelet therapy in light of subdural hematoma and now on aspirin 81 mg daily , prasugrel 10 mg   -heart rate controlled on metoprolol , previously had been on digoxin but was held when creatinine kevin in December     shortness of breath   -differential includes pulmonary embolism, ischemia , heart failure COPD exacerbation  - check troponin ( came back negative ), no acute ischemic changes on EKG, check BNP ( came back high )  , CTA  ( called radiology and prelim no pulmonary  embolism  but awaiting final report  ) No  Wheezing, US negative for DVT  , if CT + for pulmonary embolism  Will need to check with neurosurgery regarding anticoagulation.   - BNP up, has increased led edea and has been off diuretics so my suspicion for heart failure , started bumex  and monitor   -would monitor strict I/os and daily weights    history of heart failure  , HFpEF 60-65%    -History been hospitalized in December 2021 with  pulmonary edema, hx biventricular failure   - history ascites felt to be due to right sided heart failure   -l his diuretics were place don hold with hypernatremia, but now stared back 1/21 with shortness of breath  , monitor closely, has had cramping form lasix so on bumex .     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  focal seizure  -Status post  Evacuation  - continue lacosamide 200 mg bid     - CT 2 weeks after discharge    -follow up  With neurology 4 months post discharge        CAD   -history non STEMI 2/2021 status post  rotational atherectomy distal Cx at [place] 2/2021 ,  HUNTER PCI 10/25/2021 for in-stent restenosis  Status post  Shockwave balloon lithotripsy  - back on aspirin and prasugrel , not on anticoagulation          Pulmonary hypertension  , right sided heart failure    -secondary  To underlying lung disease  - on tadalafil , continue   - diurese as above     COPD   - no evidence of exacerbation , PTA  Duonebs, inhalers continue with these  -chronic bronchitis   -per notes patient  Is on O2 4 liters NC at home     NOVA  -uses CPAP     Renal  - CKD  baseline creatinine 1.2-1.8 , had recent NATHALY with aggressive diuresis  ( was on bumex and zaroxylin) but were stopped with worsening renal function  -now creatinine 1.22, received iv dye 1/21 and now on diuretics so monitor renal function closely and avoid further nephrotoxic agents     Liver cirrhosis  -noted on CT 11/30, was recomende  dto follow up  With GI as out patient  , LFTs normal      dyslipidemia  -continue stains     Abdominal pain, LLQ   - now pain at PEG site  But area with mild redness but no sighs of infecton  -CT       T2DM  -continue Lantus and adjust as needed, continue insulin sliding scale   -continue jadriance       History GI bleed  - no current issue, monitor       anemia of chronic disease   - Hg stable in 8 range     RLS  -requip was decreased to 1 mg from 2   -PTA was on gabapentin but was held due to delirium       Dysphasia   Has PEG, continue speech   Moderate malnutrition       Hyperlipidemia    The patient's care was discussed with PM&R  .Dr Alex Bynum MD  [facility]  Securely message with the Vocera Web Console (learn more here)  Text page via [facility] Paging/Directory       Hospitalist Service    Clinically Significant Risk Factors Present on Admission             # Obesity: last Body mass index is 30.59 kg/m .      _____________________________________________________________  Chief Complaint   Decreased  Mental status  shortness of breath      History is obtained from the patient, discussing with acute rehab  And reviewing records     History of Present Illness   Red Sutherland is a 81 year old male admitted on 1/17/2022. He complex medical history including  CAD with stent and in-stent resternosis   atrial fibrillation had been on plavix and xarelto, COPD pulmonary hypertension  , HFpEF T2DM ,  was transferred form [facility] ED 12/20/21 with subdural hematoma with mass effect, status post  Evacuation hospital course was complicated with delirium, hypernatremia . He failed swallowing and had PEG placed 1/3/22      Patient  Was transferred to inpatient acute rehab. 1/21 he developed worsening shortness of breath  And EKG showed atrial fibrillation  And we were consulted to help with medical management .  Patient  Complains of  shortness of breath  On  exertion, bit worse from usual , but he has long standing exertional shortness of breath , denies any chest pain  No palpitations. his leg swelling os more nadia usual   Denies any nausea vomiting no diarrhea       Review of Systems   The 10 point Review of Systems is negative other than noted in the HPI or here.     Past Medical History    I have reviewed this patient's medical history and updated it with pertinent information if needed.   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 They put him on sildenafil as nitroprusside lowered systemic BP and with that the mean PA dropped from 54 to 49. Negative VQ at [facility] Dec 1, 2017.     RLS (restless legs syndrome)      Sleep apnea        Past Surgical History   I have reviewed this patient's surgical history and updated it with pertinent information if needed.  Past Surgical History:   Procedure Laterality Date     BACK SURGERY      lower back     CARDIAC CATHETERIZATION  12/13/2017    Right and left at [facility], mean PA 58, PCW 24 with V wave of 35, LVEDP of 18, with Nipride systemic BP, PVR and mean PA all declined     CARDIOVERSION  03/15/2013    for afib     CATARACT EXTRACTION Bilateral      COLONOSCOPY N/A 10/31/2021    Procedure: COLONOSCOPY WITH POLYPECTOMY;  Surgeon: Sheng Sagastume MD;  Location: [facility] Main OR     CORONARY STENT PLACEMENT       CRANIOTOMY Right 12/21/2021    Procedure: CRANIOTOMY FOR Subdural HEMATOMA EVACUATION;  Surgeon: Parvez Quinones MD;  Location: [facility] OR     CV CORONARY ANGIOGRAM N/A 10/25/2021    Procedure:  Coronary Angiogram;  Surgeon: Otf Knowles MD;  Location: [facility] LAB CV     CV CORONARY LITHOTRIPSY PCI N/A 10/25/2021    Procedure: CV Coronary Lithotripsy PCI;  Surgeon: Otf Knowles MD;  Location: [facility] LAB CV     CV LEFT HEART CATH N/A 10/25/2021    Procedure: Left Heart Cath;  Surgeon: Otf Knowles MD;  Location: [facility] LAB CV     CV PCI N/A 10/25/2021    Procedure: Percutaneous Coronary Intervention;  Surgeon: Otf Knowles MD;  Location: [facility] LAB CV     ESOPHAGOSCOPY, GASTROSCOPY, DUODENOSCOPY (EGD), COMBINED N/A 10/30/2021    Procedure: ESOPHAGOGASTRODUODENOSCOPY (EGD);  Surgeon: Sheng Sagastume MD;  Location: [facility]     IR ABDOMINAL AORTOGRAM  2012     IR MISCELLANEOUS PROCEDURE  2001     IR MISCELLANEOUS PROCEDURE  2012     OTHER SURGICAL HISTORY      m[place]     PICC DOUBLE LUMEN PLACEMENT Right 2021    48cm (2cm external), Basilic vein, SVC RA junction     SHOULDER SURGERY      reapir on right shoulder     TOTAL HIP ARTHROPLASTY Left      TOTAL KNEE ARTHROPLASTY Bilateral      WRIST SURGERY Bilateral      ZZHC COLONOSCOPY W/WO BRUSH/WASH N/A 2021    Procedure: COLONOSCOPY;  Surgeon: Mihai Harris MD;  Location: [facility];  Service: Gastroenterology       Social History   I have reviewed this patient's social history and updated it with pertinent information if needed.  Social History     Tobacco Use     Smoking status: Former Smoker     Packs/day: 1.50     Years: 44.00     Pack years: 66.00     Types: Cigarettes     Quit date: 1996     Years since quittin.7     Smokeless tobacco: Never Used   Substance Use Topics     Alcohol use: Yes     Alcohol/week: 1.0 standard drink     Comment: Alcoholic Drinks/day: 1 per month     Drug use: No       Family History   I have reviewed this patient's family history and updated it with pertinent information if needed.  Family History   Problem Relation Age  of Onset     Hyperlipidemia Mother      Hypertension Mother      Heart Disease Mother      Hyperlipidemia Father      Hypertension Father      Coronary Artery Disease Father      Cerebrovascular Disease Brother      Depression Brother      No Known Problems Sister      Pulmonary Hypertension No family hx of      Congenital heart disease No family hx of        Medications   I have reviewed this patient's current medications    Allergies   Allergies   Allergen Reactions     Contrast Dye Nausea     Other reaction(s): GI intolerance, GI Upset     Furosemide Muscle Pain (Myalgia)     Previously tolerated.  Muscle cramps     Hydrochlorothiazide Unknown     Iodinated Contrast Media [Diagnostic X-Ray Materials] Nausea     Losartan Other (See Comments)     Other reaction(s): Stomatitis, Bloody nose dry mouth and lips     Losartan-Hydrochlorothiazide [Hyzaar]      Mouth sores     Metaxalone Nausea     Metolazone Other (See Comments)     Muscle cramps     Mometasone Other (See Comments)     Bloody nose     Rabeprazole Other (See Comments)     Mouth sores     Ramipril Other (See Comments)     Mouth sores     Shellfish Containing Products [Shellfish-Derived Products] Unknown     Other reaction(s): mouth sores, Other reaction(s): mouth sores     Sildenafil Muscle Pain (Myalgia)     Methocarbamol Rash     Penicillins Other (See Comments)     Immune-does not work for him       Physical Exam   Vital Signs: Temp: 97.1  F (36.2  C) Temp src: Oral BP: 138/80 Pulse: 73   Resp: 16 SpO2: 98 % O2 Device: None (Room air)    Weight: 219 lbs 4.8 oz     General appearence: awake alert   no apparent distress    HEENT: EOMI, PEARLA, sclera nonicteric,  moist mucus membranes,   NECK : supple  RESPIRATORY: lungs clear to auscultation bilateral, no wheezing or crackles   CARDIOVASCULAR:S1 S2 irreg soft systolic murmur   GASTROINTESTINAL:soft, non-distended , tenderness at PEG site PEG site with mild erythema but perez snot look infected and no  drainage , + bowel sounds, no masses felt   SKIN: warm and dry, no mottling noted   NEUROLOGIC; awake alert and oriented, no focal deficits found  EXTREMITIES: no clubbing, cyanosis , + tense bilateral lower extremity edema  Also with bilateral venous stasis changes    MUSCULOSKELETAL: without deformity        Data   I personally reviewed the EKG tracing showing a fib , no acute st t wave changes .  Results for orders placed or performed during the hospital encounter of 01/17/22 (from the past 24 hour(s))   Glucose by meter   Result Value Ref Range    GLUCOSE BY METER POCT 73 70 - 99 mg/dL   Glucose by meter   Result Value Ref Range    GLUCOSE BY METER POCT 154 (H) 70 - 99 mg/dL   Glucose by meter   Result Value Ref Range    GLUCOSE BY METER POCT 97 70 - 99 mg/dL   Glucose by meter   Result Value Ref Range    GLUCOSE BY METER POCT 73 70 - 99 mg/dL   Glucose by meter   Result Value Ref Range    GLUCOSE BY METER POCT 171 (H) 70 - 99 mg/dL   EKG 12-lead, complete   Result Value Ref Range    Systolic Blood Pressure  mmHg    Diastolic Blood Pressure  mmHg    Ventricular Rate 77 BPM    Atrial Rate 77 BPM    RI Interval  ms    QRS Duration 86 ms     ms    QTc 441 ms    P Axis  degrees    R AXIS 112 degrees    T Axis -37 degrees    Interpretation ECG       Atrial fibrillation with a competing junctional pacemaker  Right axis deviation  Possible Right ventricular hypertrophy  Nonspecific ST and T wave abnormality  Abnormal ECG  When compared with ECG of 11-JAN-2022 10:10,  Previous ECG has undetermined rhythm, needs review     Basic metabolic panel   Result Value Ref Range    Sodium 138 133 - 144 mmol/L    Potassium 4.2 3.4 - 5.3 mmol/L    Chloride 108 94 - 109 mmol/L    Carbon Dioxide (CO2) 24 20 - 32 mmol/L    Anion Gap 6 3 - 14 mmol/L    Urea Nitrogen 30 7 - 30 mg/dL    Creatinine 1.22 0.66 - 1.25 mg/dL    Calcium 8.8 8.5 - 10.1 mg/dL    Glucose 176 (H) 70 - 99 mg/dL    GFR Estimate 60 (L) >60 mL/min/1.73m2   CBC  with platelets differential    Narrative    The following orders were created for panel order CBC with platelets differential.  Procedure                               Abnormality         Status                     ---------                               -----------         ------                     CBC with platelets and d...[[id number]]  Abnormal            Final result                 Please view results for these tests on the individual orders.   Troponin I   Result Value Ref Range    Troponin I High Sensitivity 14 <79 ng/L   Magnesium   Result Value Ref Range    Magnesium 2.4 (H) 1.6 - 2.3 mg/dL   Phosphorus   Result Value Ref Range    Phosphorus 4.3 2.5 - 4.5 mg/dL   CBC with platelets and differential   Result Value Ref Range    WBC Count 7.4 4.0 - 11.0 10e3/uL    RBC Count 4.42 4.40 - 5.90 10e6/uL    Hemoglobin 8.7 (L) 13.3 - 17.7 g/dL    Hematocrit 31.1 (L) 40.0 - 53.0 %    MCV 70 (L) 78 - 100 fL    MCH 19.7 (L) 26.5 - 33.0 pg    MCHC 28.0 (L) 31.5 - 36.5 g/dL    RDW 20.5 (H) 10.0 - 15.0 %    Platelet Count 219 150 - 450 10e3/uL    % Neutrophils 78 %    % Lymphocytes 11 %    % Monocytes 7 %    % Eosinophils 3 %    % Basophils 0 %    % Immature Granulocytes 1 %    NRBCs per 100 WBC 0 <1 /100    Absolute Neutrophils 5.8 1.6 - 8.3 10e3/uL    Absolute Lymphocytes 0.8 0.8 - 5.3 10e3/uL    Absolute Monocytes 0.5 0.0 - 1.3 10e3/uL    Absolute Eosinophils 0.2 0.0 - 0.7 10e3/uL    Absolute Basophils 0.0 0.0 - 0.2 10e3/uL    Absolute Immature Granulocytes 0.0 <=0.4 10e3/uL    Absolute NRBCs 0.0 10e3/uL   Nt probnp inpatient   Result Value Ref Range    N terminal Pro BNP Inpatient 2,086 (H) 0-1,800 pg/mL   US Lower Extremity Venous Duplex Bilateral    Narrative    Exam: Ultrasound of the deep venous system of bilateral legs dated  1/21/2022 12:05 PM    Clinical information: Bilateral lower extremity swelling and shortness  of breath. Rule out DVT.    Comparison: None    Ordering provider: Disha Nichols    Technique:  "Gray-scale evaluation with compression and Doppler  assessment of deep venous system for spontaneous and phasic flow, as  well as the presence of distal augmentation. Color flow images  obtained as needed. Gray-scale images with compression of the great  saphenous vein obtained as needed.    Findings:    Right leg:    CFV: Thrombus: No, Phasic: Yes  Femoral vein, proximal: Thrombus: No, Phasic: Yes  Femoral vein, mid: Thrombus: No, Phasic: Yes  Femoral vein, distal: Thrombus: No, Phasic: Yes  Popliteal vein: Thrombus: No, Phasic: Yes  PTV: Thrombus: No  Peroneal vein: Thrombus: No  Great saphenous vein varicosity visualized.    Left leg:    CFV: Thrombus: No, Phasic: Yes  Femoral vein, proximal: Thrombus: No, Phasic: Yes  Femoral vein, mid: Thrombus: No, Phasic: Yes  Duplicated femoral vein, mid: Thrombus: No, Phasic: Yes  Femoral vein, distal: Thrombus: No, Phasic: Yes  Popliteal vein: Thrombus: No, Phasic: Yes  PTV: Thrombus: Thrombus: No  Peroneal vein: Thrombus: No    Incidentally noted atherosclerotic arterial calcifications.      Impression    Impression:    Right leg: No deep venous thrombosis.     Left leg: No deep venous thrombosis.     Reference: \"Duplex Ultrasound in the Diagnosis of Lower-Extremity Deep  Venous Thrombosis\"- Kaylah Szymanski MD, S; Abe Funes MD  (Circulation. 2014;129:917-921. http://circ.ahajournals.org )    I have personally reviewed the examination and initial interpretation  and I agree with the findings.    MONE VIDAL         SYSTEM ID:  [id number]   Glucose by meter   Result Value Ref Range    GLUCOSE BY METER POCT 142 (H) 70 - 99 mg/dL     "

## 2022-01-22 ENCOUNTER — APPOINTMENT (OUTPATIENT)
Dept: SPEECH THERAPY | Facility: CLINIC | Age: 82
DRG: 949 | End: 2022-01-22
Attending: PHYSICAL MEDICINE & REHABILITATION
Payer: COMMERCIAL

## 2022-01-22 ENCOUNTER — APPOINTMENT (OUTPATIENT)
Dept: PHYSICAL THERAPY | Facility: CLINIC | Age: 82
DRG: 949 | End: 2022-01-22
Attending: PHYSICAL MEDICINE & REHABILITATION
Payer: COMMERCIAL

## 2022-01-22 ENCOUNTER — APPOINTMENT (OUTPATIENT)
Dept: OCCUPATIONAL THERAPY | Facility: CLINIC | Age: 82
DRG: 949 | End: 2022-01-22
Attending: PHYSICAL MEDICINE & REHABILITATION
Payer: COMMERCIAL

## 2022-01-22 ENCOUNTER — APPOINTMENT (OUTPATIENT)
Dept: EDUCATION SERVICES | Facility: CLINIC | Age: 82
DRG: 949 | End: 2022-01-22
Attending: PHYSICAL MEDICINE & REHABILITATION
Payer: COMMERCIAL

## 2022-01-22 LAB
ANION GAP SERPL CALCULATED.3IONS-SCNC: 9 MMOL/L (ref 3–14)
BUN SERPL-MCNC: 30 MG/DL (ref 7–30)
CALCIUM SERPL-MCNC: 8.7 MG/DL (ref 8.5–10.1)
CHLORIDE BLD-SCNC: 106 MMOL/L (ref 94–109)
CO2 SERPL-SCNC: 21 MMOL/L (ref 20–32)
CREAT SERPL-MCNC: 1.22 MG/DL (ref 0.66–1.25)
GFR SERPL CREATININE-BSD FRML MDRD: 60 ML/MIN/1.73M2
GLUCOSE BLD-MCNC: 162 MG/DL (ref 70–99)
GLUCOSE BLDC GLUCOMTR-MCNC: 101 MG/DL (ref 70–99)
GLUCOSE BLDC GLUCOMTR-MCNC: 114 MG/DL (ref 70–99)
GLUCOSE BLDC GLUCOMTR-MCNC: 123 MG/DL (ref 70–99)
GLUCOSE BLDC GLUCOMTR-MCNC: 142 MG/DL (ref 70–99)
GLUCOSE BLDC GLUCOMTR-MCNC: 144 MG/DL (ref 70–99)
GLUCOSE BLDC GLUCOMTR-MCNC: 159 MG/DL (ref 70–99)
GLUCOSE BLDC GLUCOMTR-MCNC: 59 MG/DL (ref 70–99)
GLUCOSE BLDC GLUCOMTR-MCNC: 66 MG/DL (ref 70–99)
GLUCOSE BLDC GLUCOMTR-MCNC: 86 MG/DL (ref 70–99)
POTASSIUM BLD-SCNC: 4.4 MMOL/L (ref 3.4–5.3)
SODIUM SERPL-SCNC: 136 MMOL/L (ref 133–144)

## 2022-01-22 PROCEDURE — 250N000013 HC RX MED GY IP 250 OP 250 PS 637: Performed by: PHYSICIAN ASSISTANT

## 2022-01-22 PROCEDURE — 97116 GAIT TRAINING THERAPY: CPT | Mod: GP

## 2022-01-22 PROCEDURE — 97535 SELF CARE MNGMENT TRAINING: CPT | Mod: GO | Performed by: STUDENT IN AN ORGANIZED HEALTH CARE EDUCATION/TRAINING PROGRAM

## 2022-01-22 PROCEDURE — 82310 ASSAY OF CALCIUM: CPT | Performed by: PHYSICAL MEDICINE & REHABILITATION

## 2022-01-22 PROCEDURE — 250N000013 HC RX MED GY IP 250 OP 250 PS 637: Performed by: INTERNAL MEDICINE

## 2022-01-22 PROCEDURE — 97110 THERAPEUTIC EXERCISES: CPT | Mod: GP | Performed by: PHYSICAL THERAPIST

## 2022-01-22 PROCEDURE — 99233 SBSQ HOSP IP/OBS HIGH 50: CPT | Performed by: INTERNAL MEDICINE

## 2022-01-22 PROCEDURE — 250N000013 HC RX MED GY IP 250 OP 250 PS 637: Performed by: PHYSICAL MEDICINE & REHABILITATION

## 2022-01-22 PROCEDURE — 97530 THERAPEUTIC ACTIVITIES: CPT | Mod: GP | Performed by: PHYSICAL THERAPIST

## 2022-01-22 PROCEDURE — 97129 THER IVNTJ 1ST 15 MIN: CPT | Mod: GN | Performed by: SPEECH-LANGUAGE PATHOLOGIST

## 2022-01-22 PROCEDURE — 99233 SBSQ HOSP IP/OBS HIGH 50: CPT | Mod: 24 | Performed by: PHYSICAL MEDICINE & REHABILITATION

## 2022-01-22 PROCEDURE — 97130 THER IVNTJ EA ADDL 15 MIN: CPT | Mod: GN | Performed by: SPEECH-LANGUAGE PATHOLOGIST

## 2022-01-22 PROCEDURE — 128N000003 HC R&B REHAB

## 2022-01-22 PROCEDURE — 36415 COLL VENOUS BLD VENIPUNCTURE: CPT | Performed by: PHYSICAL MEDICINE & REHABILITATION

## 2022-01-22 RX ADMIN — INSULIN ASPART 1 UNITS: 100 INJECTION, SOLUTION INTRAVENOUS; SUBCUTANEOUS at 14:07

## 2022-01-22 RX ADMIN — ZINC SULFATE 220 MG (50 MG) CAPSULE 220 MG: CAPSULE at 09:11

## 2022-01-22 RX ADMIN — IRBESARTAN 75 MG: 75 TABLET ORAL at 09:09

## 2022-01-22 RX ADMIN — METOPROLOL TARTRATE 25 MG: 25 TABLET, FILM COATED ORAL at 20:55

## 2022-01-22 RX ADMIN — DOCUSATE SODIUM AND SENNOSIDES 2 TABLET: 8.6; 5 TABLET ORAL at 09:08

## 2022-01-22 RX ADMIN — MULTIPLE VITAMINS W/ MINERALS TAB 1 TABLET: TAB at 09:09

## 2022-01-22 RX ADMIN — HYDROXYZINE HYDROCHLORIDE 25 MG: 25 TABLET ORAL at 21:56

## 2022-01-22 RX ADMIN — TRAZODONE HYDROCHLORIDE 100 MG: 50 TABLET ORAL at 21:56

## 2022-01-22 RX ADMIN — EMPAGLIFLOZIN 10 MG: 10 TABLET, FILM COATED ORAL at 09:09

## 2022-01-22 RX ADMIN — LACOSAMIDE 200 MG: 200 TABLET, FILM COATED ORAL at 20:55

## 2022-01-22 RX ADMIN — ASPIRIN 81 MG: 81 TABLET, COATED ORAL at 09:09

## 2022-01-22 RX ADMIN — BUMETANIDE 1 MG: 1 TABLET ORAL at 09:09

## 2022-01-22 RX ADMIN — FERROUS GLUCONATE 324 MG: 324 TABLET ORAL at 09:10

## 2022-01-22 RX ADMIN — Medication 5 MG: at 21:56

## 2022-01-22 RX ADMIN — TADALAFIL 20 MG: 10 TABLET, FILM COATED ORAL at 09:08

## 2022-01-22 RX ADMIN — ROPINIROLE HYDROCHLORIDE 1 MG: 0.25 TABLET, FILM COATED ORAL at 20:55

## 2022-01-22 RX ADMIN — FLUTICASONE FUROATE AND VILANTEROL TRIFENATATE 1 PUFF: 200; 25 POWDER RESPIRATORY (INHALATION) at 09:20

## 2022-01-22 RX ADMIN — INSULIN ASPART 1 UNITS: 100 INJECTION, SOLUTION INTRAVENOUS; SUBCUTANEOUS at 16:33

## 2022-01-22 RX ADMIN — ATORVASTATIN CALCIUM 40 MG: 40 TABLET, FILM COATED ORAL at 20:55

## 2022-01-22 RX ADMIN — METOPROLOL TARTRATE 25 MG: 25 TABLET, FILM COATED ORAL at 09:10

## 2022-01-22 RX ADMIN — LACOSAMIDE 200 MG: 200 TABLET, FILM COATED ORAL at 09:09

## 2022-01-22 RX ADMIN — PRASUGREL 10 MG: 5 TABLET, FILM COATED ORAL at 09:09

## 2022-01-22 RX ADMIN — PANTOPRAZOLE SODIUM 40 MG: 40 TABLET, DELAYED RELEASE ORAL at 09:10

## 2022-01-22 ASSESSMENT — ACTIVITIES OF DAILY LIVING (ADL)
ADLS_ACUITY_SCORE: 11

## 2022-01-22 ASSESSMENT — MIFFLIN-ST. JEOR: SCORE: 1751.83

## 2022-01-22 NOTE — PROGRESS NOTES
PM&R Progress Note    INTERVAL HISTORY  Arron was seen this am. He is doing well. . Reports he is drinking a lot of OJ and will cut down!    He denies chest pain or abdominal pain. He states that his legs felt like jelly during therapy.    Functionally:  ADLs:    Mobility: CGA/SBA for transfer and amb with walker    Grooming: close SBA at the sink    Dressing: Set up w/ UB dressing, LB dressing min A pull over feet.    Bathing: SBA all body parts using extended tub bench.     Toileting: toilet transfer was MIN A   IADLs: not tested. Pt reports that he has not driven in several months and his wife manages his meds and finances.   Vision/Cognition: vision impairment in L eye at baseline but pt reports it is more blurry now. Cognition is to be assessed further,pt reports impairments and has been non compliant with alarms. Pt was very fatigued during session due to lack of sleep and frequently dozing off even when standing.      MEDICATIONS  Scheduled meds    aspirin  81 mg Oral Daily     atorvastatin  40 mg Oral QPM     bumetanide  1 mg Oral Daily     empagliflozin  10 mg Oral or Feeding Tube Daily     ferrous gluconate  324 mg Oral Every Other Day     fluticasone-vilanterol  1 puff Inhalation Daily     hydrOXYzine  25 mg Oral At Bedtime     insulin aspart  1-7 Units Subcutaneous TID AC     insulin aspart  1-5 Units Subcutaneous At Bedtime     insulin glargine  13 Units Subcutaneous BID     irbesartan  75 mg Oral or Feeding Tube Daily     lacosamide  200 mg Oral or Feeding Tube BID     melatonin  5 mg Oral QPM     metoprolol tartrate  25 mg Oral BID     multivitamin w/minerals  1 tablet Oral Daily     pantoprazole  40 mg Oral QAM AC     prasugrel  10 mg Oral or Feeding Tube Daily     rOPINIRole  1 mg Oral QPM     senna-docusate  1-4 tablet Oral BID     sodium chloride (PF)  3 mL Intracatheter Q8H     tadalafil  20 mg Oral Q24H     traZODone  100 mg Oral At Bedtime     zinc sulfate  220 mg Oral Daily       PRN  "meds:  acetaminophen, albuterol, bisacodyl, calcium carbonate, glucose **OR** dextrose **OR** glucagon, ipratropium - albuterol 0.5 mg/2.5 mg/3 mL, lidocaine-prilocaine, ondansetron, - MEDICATION INSTRUCTIONS -, polyethylene glycol, QUEtiapine, simethicone      PHYSICAL EXAM  /49 (BP Location: Right arm, Patient Position: Sitting)   Pulse 80   Temp 97  F (36.1  C) (Oral)   Resp 16   Ht 1.803 m (5' 11\")   Wt 101.5 kg (223 lb 11.2 oz)   SpO2 94%   BMI 31.20 kg/m    Gen: Awake and alert, sitting in chair.   HEENT: moist mucus membranes, on room air  Cardio: RRR, S1+S2, soft systolic murmur  Pulm: lungs CTA bilaterally, on room air, no increased work of breathing  Abd: soft, nontender. G tube bumper tight to skin, button loose. Mild distension due to gas  Ext: +2 pitting edema, chronic venous stasis changes present  Neuro/MSK: Answers appropriately, follows commands    LABS  Results for orders placed or performed during the hospital encounter of 01/17/22 (from the past 24 hour(s))   Glucose by meter   Result Value Ref Range    GLUCOSE BY METER POCT 89 70 - 99 mg/dL   Glucose by meter   Result Value Ref Range    GLUCOSE BY METER POCT 138 (H) 70 - 99 mg/dL   Glucose by meter   Result Value Ref Range    GLUCOSE BY METER POCT 59 (L) 70 - 99 mg/dL   Glucose by meter   Result Value Ref Range    GLUCOSE BY METER POCT 66 (L) 70 - 99 mg/dL   Glucose by meter   Result Value Ref Range    GLUCOSE BY METER POCT 86 70 - 99 mg/dL   Glucose by meter   Result Value Ref Range    GLUCOSE BY METER POCT 114 (H) 70 - 99 mg/dL   Glucose by meter   Result Value Ref Range    GLUCOSE BY METER POCT 123 (H) 70 - 99 mg/dL   Basic metabolic panel   Result Value Ref Range    Sodium 136 133 - 144 mmol/L    Potassium 4.4 3.4 - 5.3 mmol/L    Chloride 106 94 - 109 mmol/L    Carbon Dioxide (CO2) 21 20 - 32 mmol/L    Anion Gap 9 3 - 14 mmol/L    Urea Nitrogen 30 7 - 30 mg/dL    Creatinine 1.22 0.66 - 1.25 mg/dL    Calcium 8.7 8.5 - 10.1 mg/dL "    Glucose 162 (H) 70 - 99 mg/dL    GFR Estimate 60 (L) >60 mL/min/1.73m2   Glucose by meter   Result Value Ref Range    GLUCOSE BY METER POCT 159 (H) 70 - 99 mg/dL   Glucose by meter   Result Value Ref Range    GLUCOSE BY METER POCT 142 (H) 70 - 99 mg/dL       ASSESSMENT AND PLAN    Red Sutherland is a 81 year old R hand dominant male presented to the ARU for SDH s/p R craniotomy and hematoma evacuation. His medical problems include recent post op seizures, delirium, afib, HTN, pulmonary HTN, CHF, T2DM, Stage IV CKD, and malnutrition. He had a T-tack for his G-tube removed on 1/20, and he continued to be restless at night. It is unclear if this is close to his baseline or if it's due to his SDH. On 1/21, he began to experience new shortness of breath during therapy. An EKG showed Afib, and further workup was done to evaluate for PE or DVT.         Admission to acute inpatient rehab on 1/17/2022.    Impairment group code: Brain Dysfunction 02.22 Traumatic, Closed Injury - SDH s/p evacuation        1. PT, OT and SLP 60 minutes of each on a daily basis, in addition to rehab nursing and close management of physiatrist.       2. Impairment of ADL's:  OT for 60 min daily to work on upper and lower body self care, dressing, toileting, bathing, energy conservation techniques with use of ADs as needed.      3. Impairment of mobility:   PT for 60 min daily to work on gait exercises, strengthening, endurance buildup, transfers with use of walker as needed.      4. Impairment of cognition/language/swallow:   SLP for 60 min daily for cognitive evaluation and treatment strategies for higher level cognitive deficits and memory impairment.      5. Rehab RN to administer medication, patient education on medication taking, VS monitoring, bowel regimen, glucose monitoring and wound care/surgical wound dressing changes and monitoring.     1.  Medical Conditions     Shortness of Breath  - due to HFpEF , differential  includes pulmonary embolism, ischemia , heart failure COPD exacerbation  - troponin negative, no acute ischemic changes on EKG,  BNP high , no pulmonary embolism  On CTA 1/21 ,No  Wheezing, US negative for DVT    Appreciate IM follow up. On Bumex, better.  Neuro  R Subdural Hematoma  -on aspirin 81mg and Prasugrel 10mg  -PT, OT, SLP     Post Op Seizures  -continue Vimpat 200mg BID  -monitor for face twitching and tongue biting     Delirium  -previously on Haldol, Atarax, and melatonin due to agitation in the evenings  -melatonin 10mg scheduled at night  -increase trazodone to 100mg at night  -Seorquel 25-50 mg PRN for sleep and agitation.  Discontinued Haldol due to stronger dopaminergic blockade and potential for interfering with tabby recovery.  -currently has 1:1 sitter, can place sitter outside room at night to help facilitate sleep     Restless Leg Syndrome  -ropinirole 1mg at bed     CV  Afib  -Eliquis held due to SDH  -rate controlled with metoprolol 25mg BID  -EKG on 1/21 shows afib     Pulmonary HTN  -on tadalafil 20mg daily     Hx of Essential HTN  RV Dysfunction  -CHF with 60-65% ejection fraction based on echo on 10/23/2021  -irbesartan 75mg daily  -monitor for hypotension given recent episodes  -on aspirin 81mg and Prasugrel 10mg     Pulmonary    COPD  -resting comfortably on room air  -consider O2 as needed for activity  -continue Breo daily and albuterol PRN     Renal  Stage IV CKD  -renally dose medications  -monitor Cr and BUN  -avoid nephrotoxic agents     Endo  T2DM  -20mg Lantus BIS  -SSI as needed  -empagliflozin 10mg daily  -monitor POC glucoses, adjust meds as needed     Nutrition  -nutrition consult  -previously getting tube feedings              -Continuous Pivot 1.5, 60ml/hr goal rate, [id number]  -Currently holding tube feeds and monitoring calorie counts  -regular diet and thin liquids  -G-tube button removed  -bumper loosened slightly to allow for full turning   -will add simethicone for  pt's gas  -continue to monitor G-tube site closely     GI  GERD  -Protonix 40mg     Liver Cirrhosis  -incidental on imaging  -GI f/u outpatient        2. Adjustment to disability:  Clinical psychology to eval and treat  3. FEN: monitor BMPs twice weekly, encourage IL fluids  4. Bowel: senna BID, PRN Miralax and bisacodyl suppository  5. Bladder: able to urinate on own  6. DVT Prophylaxis: Ambulation, aspirin and prasugrel  7. GI Prophylaxis: Protonix 40mg  8. Code: Full  9. Disposition: Modified independent to home  10. ELOS:  7 days              11. Rehab prognosis:  fair  12. Follow up Appointments on Discharge: neurosurgery and CT 2 weeks after discharge, neurology 4 months after discharge, GI for incidental liver cirrhosis     Doing well. Continue cares and plans outlined.     Quinton Abreu MD

## 2022-01-22 NOTE — PLAN OF CARE
"FOCUS/GOAL  Medical management    ASSESSMENT, INTERVENTIONS AND CONTINUING PLAN FOR GOAL:  Pt is alert and oriented. Seemed to be a bit confused with environmental noises when first waking. No complaints of pain. Assist of 1 with walker. Pt set off bed alarm x1 at 0130. Educated pt to utilize call light prior to attempting to get up by himself. Continent of bladder using urinal at bedside. When pt set off bed alarm around 0130, offered to check his overnight BG at that time instead of 0200 in order to promote sleep. Pt irritated that it was being checked at all and refused to have his BG checked at all this shift. Offered seroquel to which pt refused. While pt was awake, inquired about when a good time would be to check his weight in the morning and he was irritated by this as well. He stated \"don't wake me up for anything, I need my sleep\". Pt agreed to have his weight checked late morning. Denies any dizziness or SOB. Pt set off bed alarm again at 0150 to sit at EOB. He was there at EOB until about 0240 when he tried to stand up and walk out of his room. 1:1 redirected him and asked pt if GB and walker could be used to which he agreed. RN walked halls with Pt until about 0300 when pt wanted to sit at the chairs by the elevators. Pt was falling asleep in the chair around 0315. RN prompted pt to go back to room and get into bed. This took a lot of encouragement, but pt eventually went back into bed. Again instructed pt to use call light and wait when he needs something. Pt slept for maybe a total of 3 hours this shift, however when asked he states he slept \"like a log\". Lab came to draw blood, but pt refused as soon as he saw them. RN asked lab to come back after 9am to reapproach.     "

## 2022-01-22 NOTE — CONSULTS
Stroke Education Note    The following information has been reviewed with the patient and family:    1. Warning signs of stroke    2. Calling 911 if having warning signs of stroke    3. All modifiable risk factors: hypertension, CAD, atrial fib, diabetes, hypercholesterolemia, smoking, substance abuse, diet, physical inactivity, obesity, sleep apnea.    4. Patient's risk factors for stroke which include: hypertension, CAD, a-fib, diabetes, hypercholesterolemia, sleep apnea    5. Follow-up plan for after discharge    6. Discharge medications which include: metoprolol, atorvastatin, bumex    In addition, the above information was given to the patient and family in writing as a part of the [facility] Stroke Class Handout.    Learner's response to risk factors / lifestyle modification education: Taking steps     Essence Siddiqui RN

## 2022-01-22 NOTE — PLAN OF CARE
Pt has been alert and oriented x 4 but has poor safety awareness. He had a sitter watching him from the hallway. See bed side attendant for details. His blood sugar was 59 this morning, he was treated for hypoglycemia per protocol. His L antus was adjusted based on the low blood sugar in am. He needs bed time snacks to prevent that happening again. Wife was here for stoke class and family training.We will continue to monitor safety and blood sugar while assisting with activity of daily livings.

## 2022-01-22 NOTE — PLAN OF CARE
Discharge Planner Post-Acute Rehab OT:     Discharge Plan: Home with assist for IADL and OP OT, ELOS is 7 days    Precautions: fall, craniotomy, sitter 1:1, hard of hearing in R ear    Current Status:  ADLs:    Mobility: CGA/SBA for transfer and amb with walker    Grooming: close SBA at the sink    Dressing: Set up w/ UB dressing, LB dressing min A pull over feet.    Bathing: SBA all body parts using extended tub bench.     Toileting: toilet transfer was MIN A   IADLs: not tested. Pt reports that he has not driven in several months and his wife manages his meds and finances.   Vision/Cognition: vision impairment in L eye at baseline but pt reports it is more blurry now. Cognition is to be assessed further,pt reports impairments and has been non compliant with alarms. Pt was very fatigued during session due to lack of sleep and frequently dozing off even when standing.    Assessment: Pt with good participation this AM. Issued a 4WW to use in the hallway in case he needs to sit. Simple kitchent ask completed with cues. Pt with decreased insight at times. Family training later today.     PM session family training again. Recommend that the wife sleep in the same room as the pt at home since he is very active at night and she is agreeable. Pt may need to explore other sleep management strategies. On track for discharge as long as he is medically stable.     Other Barriers to Discharge (DME, Family Training, etc): safety awareness, family training, good support at home.

## 2022-01-22 NOTE — PLAN OF CARE
"Orientation: A/O x4, forgetful  Bowel: Continent; LBM 1/22/22  Bladder: Continent  Pain: No complaints  Ambulation/Transfers: SBA with cane in room and walker in hallway.   Diet/ Liquids/ Pills: Regular, thin. Whole.   Tubes/ Lines/ Drains: PEG, flush only. L forearm PIV.  Skin: R head incision, left leg blister    Sitter in hallway. Patient ordered his own dinner and breakfast for 1/23/22. Plan is to discharge 1/24/22.     Wife visited until 1715. Refused chair alarm after wife left stating, \"I'll call ya. I won't get up.\" Refused shower stating, \"I'll have one tomorrow with therapy.\"    Patient stands often without using call light for assistance and explains, \"I just need to stretch my legs.\"      at 2200. Two packets of stanislav crackers and peanut butter with a carton of milk given as a snack to increase BG overnight.   " Itraconazole Counseling:  I discussed with the patient the risks of itraconazole including but not limited to liver damage, nausea/vomiting, neuropathy, and severe allergy.  The patient understands that this medication is best absorbed when taken with acidic beverages such as non-diet cola or ginger ale.  The patient understands that monitoring is required including baseline LFTs and repeat LFTs at intervals.  The patient understands that they are to contact us or the primary physician if concerning signs are noted.

## 2022-01-22 NOTE — PLAN OF CARE
"Discharge Planner Post-Acute Rehab PT:     Discharge Plan: home mod I with FWW, intermittent assist from wife for IADLs. OP PT.    Precautions: falls    Current Status:  Bed Mobility: SBA  Transfer: STS, FWW, SBA  Gait: amb, FWW, 150+ ft, SBA  Stairs: 12x6\" steps, B rails, Uri  Balance: requires flat standing surface, limited plantar foot sensation.    Cunningham/20 - 40/56   - **    Assessment: unable to complete family training today. Will repeat attempt tomorrow PM.      Other Barriers to Discharge (DME, Family Training, etc):   DME - owns FWW, 3WW and 2x SEC. Plan for mod I with FWW at discharge.    Family training - scheduled for  at 1430  "

## 2022-01-22 NOTE — PROGRESS NOTES
[facility]    Medicine Progress Note - Hospitalist Service    Date of Admission:  1/17/2022    Assessment & Plan        Red Sutherland is a 81 year old male admitted on 1/17/2022. He complex medical history including  CAD with stent and in-stent resternosis   atrial fibrillation had been on plavix and xarelto, COPD pulmonary hypertension  , HFpEF T2DM ,  was transferred form [facility] ED 12/20/21 with subdural hematoma with mass effect, status post  Evacuation hospital course was complicated with delirium, hypernatremia . He failed swallowing and had PEG placed 1/3/22     Patient  Was transferred to inpatient acute rehab. 1/21 he developed worsening shortness of breath  And EKG showed atrial fibrillation  And we were consulted to help with medical management .     Permanent Atrial fibrillation   -history atrial fibrillation, had been on Eliquis in past but currently  On hold due to subdural hematoma    -watchman device has been considered and patient  Had seen Dr Chi but has not been preformed, post procedure per Dr Chi patient   Needs to be on transient AC  His aspirin and effient was changed to clopidrogrel with combo of Eiquis   - had been on digoxin in past was recently stopped due to elevated creatinine    -patient  Was seen by cardiology  To assist  with anticoagulation and antiplatelet therapy in light of subdural hematoma and now on aspirin 81 mg daily , prasugrel 10 mg   -heart rate controlled on metoprolol , previously had been on digoxin but was held when creatinine kevin in December      shortness of breath   - due to HFpEF , differential includes pulmonary embolism, ischemia , heart failure COPD exacerbation  - troponin negative, no acute ischemic changes on EKG,  BNP high , no pulmonary embolism  On CTA 1/21 ,No  Wheezing, US negative for DVT   - BNP up, has increased led edea and has been off diuretics so my suspicion for heart failure , started  bumex  and monitor   -would monitor strict I/os and daily weights     history of heart failure  , HFpEF 60-65%    -- decompensated , continue diuresis, monitor for need for IV diuretics but for now being careful  Due to  his renal  Functions   -History been hospitalized in December 2021 with  pulmonary edema, hx biventricular failure   - history ascites felt to be due to right sided heart failure   - his diuretics were placed on hold with hypernatremia, but now stared back 1/21 with shortness of breath  , monitor closely, has had cramping form lasix so on bumex .     - not on eplerenone      Hypertension   - continue metoprolol         Post-traumatic right Subdural hematoma with left sided weakness  focal seizure  -Status post  Evacuation  - continue lacosamide 200 mg bid     - CT 2 weeks after discharge    -follow up  With neurology 4 months post discharge          CAD   -history non STEMI 2/2021 status post  rotational atherectomy distal Cx at [facility] 2/2021 ,  HUNTER PCI 10/25/2021 for in-stent restenosis  Status post  Shockwave balloon lithotripsy  - back on aspirin and prasugrel , not on anticoagulation        Lung nodule   - noted on CT of abdomen 1/4   - CTA 1/21 mention multiple lung nodules that were unchaged form 7/2011       Pulmonary hypertension  , right sided heart failure    -secondary  To underlying lung disease  - on tadalafil , continue   - diurese as above      COPD   - no evidence of exacerbation , PTA  Duonebs, inhalers continue with these  -chronic bronchitis   -per notes patient  Is on O2 4 liters NC at home      NOVA  -uses CPAP      Renal  - CKD  baseline creatinine 1.2-1.8 , had recent NATHALY with aggressive diuresis  ( was on bumex and zaroxylin) but were stopped with worsening renal function  -creatinine remains at  1.22, received iv dye 1/21 and now on diuretics so monitor renal function closely and avoid further nephrotoxic agents      Liver cirrhosis  - noted on CT 11/30, was recomende dto  follow up  With GI as out patient  , LFTs normal       dyslipidemia  - continue stains      Abdominal pain, LLQ   - now pain at PEG site  But area with mild redness but no sighs of infecton  - CT abdomen 1/5 with sequale of chronic pancreatitis          T2DM  --continue jadriance but consider holding if ha sisues with hypoglycemia    -now off tube feeding and tolerating diet, had hypoglycemia 1/22 , so need to adjust insulin, PTA on jardiance and lantus 18 units bid    - ,  reduced Lantus form 16-13 units ,adjust as needed         History GI bleed  - no current issue, monitor         anemia of chronic disease   - Hg stable in 8 range      Cognitive deficits  - needs assistance , lives with his wife         RLS  -requip was decreased to 1 mg from 2   -PTA was on gabapentin but was held due to delirium         Dysphasia   Has PEG tube, cannot be removed till 2/14, now tolerating diet    Moderate malnutrition         Hyperlipidemia  - continue lipitor          Diet: Regular Diet Adult  Room Service    DVT Prophylaxis: ambulate  On aspirin   Dixon Catheter: Not present  Central Lines: None  Cardiac Monitoring: None  Code Status: Full Code      Disposition Plan   Expected Discharge: 01/24/2022           Ethel Bynum MD  Hospitalist Service  [facility]  Securely message with the Vocera Web Console (learn more here)  Text page via [employer] Paging/Directory         Clinically Significant Risk Factors Present on Admission                    ______________________________________________________________________    Interval History      saw while was at therapy, answers not consistent, first said that breathing is not better on exertion but later states his breathing is better on exertion, he looks comfortable. On room air       Data reviewed today: I reviewed all medications, new labs and imaging results over the last 24 hours.    Physical Exam   Vital Signs: Temp: (!) 96.3   F (35.7  C) Temp src: Oral BP: (!) 140/67 Pulse: 88   Resp: 16 SpO2: 100 % O2 Device: None (Room air)    Weight: 223 lbs 11.2 oz   General appearence: awake alert no apparent distress, speaking in full sentences   HEENT: EOMI, PEARLA, sclera nonicteric,  moist mucus membranes,   NECK : supple  RESPIRATORY: lungs clear to auscultation bilateral,  no wheezing or crackles   CARDIOVASCULAR:S1 S2  irreg    GASTROINTESTINAL:soft, non-distended , non-tender    SKIN: warm and dry, no mottling noted   NEUROLOGIC; awake alert and oriented, no focal deficits found  EXTREMITIES: no clubbing, cyanosis  Tense bilateral  Lower extremities edema, venous stasis changes    MUSCULOSKELETAL: without deformity       Data   Recent Labs   Lab 01/22/22  1104 01/22/22  0926 01/22/22  0850 01/21/22  1244 01/21/22  1118 01/18/22  1148 01/18/22  0600   WBC  --   --   --   --  7.4  --  7.4   HGB  --   --   --   --  8.7*  --  8.1*   MCV  --   --   --   --  70*  --  72*   PLT  --   --   --   --  219  --  190   NA  --  136  --   --  138  --  136   POTASSIUM  --  4.4  --   --  4.2  --  4.4   CHLORIDE  --  106  --   --  108  --  108   CO2  --  21  --   --  24  --  22   BUN  --  30  --   --  30  --  38*   CR  --  1.22  --   --  1.22  --  1.15   ANIONGAP  --  9  --   --  6  --  6   ANNMARIE  --  8.7  --   --  8.8  --  8.8   * 162* 123*   < > 176*   < > 95    < > = values in this interval not displayed.     Recent Results (from the past 24 hour(s))   US Lower Extremity Venous Duplex Bilateral    Narrative    Exam: Ultrasound of the deep venous system of bilateral legs dated  1/21/2022 12:05 PM    Clinical information: Bilateral lower extremity swelling and shortness  of breath. Rule out DVT.    Comparison: None    Ordering provider: Dsiha Nichols    Technique: Gray-scale evaluation with compression and Doppler  assessment of deep venous system for spontaneous and phasic flow, as  well as the presence of distal augmentation. Color flow images  obtained  "as needed. Gray-scale images with compression of the great  saphenous vein obtained as needed.    Findings:    Right leg:    CFV: Thrombus: No, Phasic: Yes  Femoral vein, proximal: Thrombus: No, Phasic: Yes  Femoral vein, mid: Thrombus: No, Phasic: Yes  Femoral vein, distal: Thrombus: No, Phasic: Yes  Popliteal vein: Thrombus: No, Phasic: Yes  PTV: Thrombus: No  Peroneal vein: Thrombus: No  Great saphenous vein varicosity visualized.    Left leg:    CFV: Thrombus: No, Phasic: Yes  Femoral vein, proximal: Thrombus: No, Phasic: Yes  Femoral vein, mid: Thrombus: No, Phasic: Yes  Duplicated femoral vein, mid: Thrombus: No, Phasic: Yes  Femoral vein, distal: Thrombus: No, Phasic: Yes  Popliteal vein: Thrombus: No, Phasic: Yes  PTV: Thrombus: Thrombus: No  Peroneal vein: Thrombus: No    Incidentally noted atherosclerotic arterial calcifications.      Impression    Impression:    Right leg: No deep venous thrombosis.     Left leg: No deep venous thrombosis.     Reference: \"Duplex Ultrasound in the Diagnosis of Lower-Extremity Deep  Venous Thrombosis\"- Kaylah Szymanski MD, S; Abe Funes MD  (Circulation. 2014;129:917-921. http://circ.ahajournals.org )    I have personally reviewed the examination and initial interpretation  and I agree with the findings.    MONE VIDAL         SYSTEM ID:  [id number]   CTA Chest with Contrast    Narrative    Examination:  CTA CHEST pulmonary angiogram WITH CONTRAST 1/21/2022  12:30 PM     Comparison: 7/11/2021.    History: PE suspected, high prob    TECHNIQUE: Volumetric helical acquisition of CT images of the chest  from the lung apices to the kidneys were acquired in arterial phase  after the administration of IV contrast. Three-dimensional (3D)  post-processed angiographic images were reconstructed, archived to  PACS and used in the interpretation of this study. Contrast dose: 72ml  isovue 370    FINDINGS:  Bolus is adequate. No acute pulmonary embolus. There is some " contrast  mixing artifact in the lower lobe vasculature. Cardiomegaly with  marked right atrial enlargement and reflux of contrast into the  hepatic veins.    No adenopathy in the chest. Main pulmonary artery measures up to 3.9  cm in diameter. Esophagus is normal.    Trachea and central airways are clear. Small bilateral pleural  effusions, right greater than left. Interlobular septal thickening  with superimposed hazy groundglass attenuation, predominantly in the  lung bases. Centrilobular pulmonary emphysema. Few areas of mosaic  attenuation. Multiple pulmonary nodules, largest of which measures 1.4  cm in the right middle lobe, with areas of calcification, likely  representing a benign granuloma. Multiple additional smaller pulmonary  nodules scattered throughout both lungs are unchanged, for example a 9  mm nodule in the lingula (series 6, image 126), and a 10 mm nodule in  the right lower lobe (image 182).    Upper abdomen:  Multiple pancreatic parenchymal calcifications, likely sequela of  chronic pancreatitis. Spleen measures up to 16.6 cm. Small volume  ascites.    Bones and soft tissues:  Multilevel degenerative changes of the thoracic spine unchanged from  prior exams. No acute or suspicious osseous lesion. Marked  degenerative changes of the right shoulder.      Impression    IMPRESSION:   1. Negative for acute pulmonary embolus.  2. Cardiomegaly with fluid overload including pulmonary edema, pleural  effusions, ascites.  3. Multiple pulmonary nodules are unchanged since 7/11/2021.  4. Splenomegaly.    I have personally reviewed the examination and initial interpretation  and I agree with the findings.    EPIFANIO BURRELL MD         SYSTEM ID:  [id number]

## 2022-01-23 ENCOUNTER — APPOINTMENT (OUTPATIENT)
Dept: PHYSICAL THERAPY | Facility: CLINIC | Age: 82
DRG: 949 | End: 2022-01-23
Attending: PHYSICAL MEDICINE & REHABILITATION
Payer: COMMERCIAL

## 2022-01-23 ENCOUNTER — APPOINTMENT (OUTPATIENT)
Dept: OCCUPATIONAL THERAPY | Facility: CLINIC | Age: 82
DRG: 949 | End: 2022-01-23
Attending: PHYSICAL MEDICINE & REHABILITATION
Payer: COMMERCIAL

## 2022-01-23 ENCOUNTER — APPOINTMENT (OUTPATIENT)
Dept: SPEECH THERAPY | Facility: CLINIC | Age: 82
DRG: 949 | End: 2022-01-23
Attending: PHYSICAL MEDICINE & REHABILITATION
Payer: COMMERCIAL

## 2022-01-23 LAB
ANION GAP SERPL CALCULATED.3IONS-SCNC: 8 MMOL/L (ref 3–14)
BUN SERPL-MCNC: 32 MG/DL (ref 7–30)
CALCIUM SERPL-MCNC: 8.4 MG/DL (ref 8.5–10.1)
CHLORIDE BLD-SCNC: 109 MMOL/L (ref 94–109)
CO2 SERPL-SCNC: 21 MMOL/L (ref 20–32)
CREAT SERPL-MCNC: 1.28 MG/DL (ref 0.66–1.25)
GFR SERPL CREATININE-BSD FRML MDRD: 57 ML/MIN/1.73M2
GLUCOSE BLD-MCNC: 110 MG/DL (ref 70–99)
GLUCOSE BLDC GLUCOMTR-MCNC: 112 MG/DL (ref 70–99)
GLUCOSE BLDC GLUCOMTR-MCNC: 114 MG/DL (ref 70–99)
GLUCOSE BLDC GLUCOMTR-MCNC: 115 MG/DL (ref 70–99)
GLUCOSE BLDC GLUCOMTR-MCNC: 122 MG/DL (ref 70–99)
POTASSIUM BLD-SCNC: 4.3 MMOL/L (ref 3.4–5.3)
SODIUM SERPL-SCNC: 138 MMOL/L (ref 133–144)

## 2022-01-23 PROCEDURE — 36415 COLL VENOUS BLD VENIPUNCTURE: CPT | Performed by: PHYSICAL MEDICINE & REHABILITATION

## 2022-01-23 PROCEDURE — 250N000013 HC RX MED GY IP 250 OP 250 PS 637: Performed by: PHYSICAL MEDICINE & REHABILITATION

## 2022-01-23 PROCEDURE — 97110 THERAPEUTIC EXERCISES: CPT | Mod: GP

## 2022-01-23 PROCEDURE — 99233 SBSQ HOSP IP/OBS HIGH 50: CPT | Mod: 24 | Performed by: PHYSICAL MEDICINE & REHABILITATION

## 2022-01-23 PROCEDURE — 97130 THER IVNTJ EA ADDL 15 MIN: CPT | Mod: GN | Performed by: SPEECH-LANGUAGE PATHOLOGIST

## 2022-01-23 PROCEDURE — 97129 THER IVNTJ 1ST 15 MIN: CPT | Mod: GN | Performed by: SPEECH-LANGUAGE PATHOLOGIST

## 2022-01-23 PROCEDURE — 250N000013 HC RX MED GY IP 250 OP 250 PS 637: Performed by: PHYSICIAN ASSISTANT

## 2022-01-23 PROCEDURE — 97530 THERAPEUTIC ACTIVITIES: CPT | Mod: GP

## 2022-01-23 PROCEDURE — 250N000011 HC RX IP 250 OP 636: Performed by: INTERNAL MEDICINE

## 2022-01-23 PROCEDURE — 99233 SBSQ HOSP IP/OBS HIGH 50: CPT | Performed by: INTERNAL MEDICINE

## 2022-01-23 PROCEDURE — 97535 SELF CARE MNGMENT TRAINING: CPT | Mod: GO | Performed by: STUDENT IN AN ORGANIZED HEALTH CARE EDUCATION/TRAINING PROGRAM

## 2022-01-23 PROCEDURE — 250N000013 HC RX MED GY IP 250 OP 250 PS 637: Performed by: INTERNAL MEDICINE

## 2022-01-23 PROCEDURE — 128N000003 HC R&B REHAB

## 2022-01-23 PROCEDURE — 82310 ASSAY OF CALCIUM: CPT | Performed by: PHYSICAL MEDICINE & REHABILITATION

## 2022-01-23 RX ORDER — BUMETANIDE 0.25 MG/ML
1 INJECTION INTRAMUSCULAR; INTRAVENOUS ONCE
Status: COMPLETED | OUTPATIENT
Start: 2022-01-23 | End: 2022-01-23

## 2022-01-23 RX ADMIN — MULTIPLE VITAMINS W/ MINERALS TAB 1 TABLET: TAB at 08:47

## 2022-01-23 RX ADMIN — TADALAFIL 20 MG: 10 TABLET, FILM COATED ORAL at 08:47

## 2022-01-23 RX ADMIN — DOCUSATE SODIUM AND SENNOSIDES 2 TABLET: 8.6; 5 TABLET ORAL at 08:46

## 2022-01-23 RX ADMIN — ROPINIROLE HYDROCHLORIDE 1 MG: 0.25 TABLET, FILM COATED ORAL at 21:14

## 2022-01-23 RX ADMIN — EMPAGLIFLOZIN 10 MG: 10 TABLET, FILM COATED ORAL at 08:46

## 2022-01-23 RX ADMIN — TRAZODONE HYDROCHLORIDE 100 MG: 50 TABLET ORAL at 21:15

## 2022-01-23 RX ADMIN — IRBESARTAN 75 MG: 75 TABLET ORAL at 08:47

## 2022-01-23 RX ADMIN — METOPROLOL TARTRATE 25 MG: 25 TABLET, FILM COATED ORAL at 21:15

## 2022-01-23 RX ADMIN — ASPIRIN 81 MG: 81 TABLET, COATED ORAL at 08:47

## 2022-01-23 RX ADMIN — Medication 5 MG: at 21:15

## 2022-01-23 RX ADMIN — LACOSAMIDE 200 MG: 200 TABLET, FILM COATED ORAL at 21:15

## 2022-01-23 RX ADMIN — BUMETANIDE 1 MG: 1 TABLET ORAL at 08:47

## 2022-01-23 RX ADMIN — DOCUSATE SODIUM AND SENNOSIDES 2 TABLET: 8.6; 5 TABLET ORAL at 21:15

## 2022-01-23 RX ADMIN — PANTOPRAZOLE SODIUM 40 MG: 40 TABLET, DELAYED RELEASE ORAL at 08:47

## 2022-01-23 RX ADMIN — BUMETANIDE 1 MG: 0.25 INJECTION, SOLUTION INTRAMUSCULAR; INTRAVENOUS at 11:26

## 2022-01-23 RX ADMIN — PRASUGREL 10 MG: 5 TABLET, FILM COATED ORAL at 08:47

## 2022-01-23 RX ADMIN — ZINC SULFATE 220 MG (50 MG) CAPSULE 220 MG: CAPSULE at 08:47

## 2022-01-23 RX ADMIN — METOPROLOL TARTRATE 25 MG: 25 TABLET, FILM COATED ORAL at 08:47

## 2022-01-23 RX ADMIN — LACOSAMIDE 200 MG: 200 TABLET, FILM COATED ORAL at 08:46

## 2022-01-23 RX ADMIN — FLUTICASONE FUROATE AND VILANTEROL TRIFENATATE 1 PUFF: 200; 25 POWDER RESPIRATORY (INHALATION) at 08:48

## 2022-01-23 RX ADMIN — HYDROXYZINE HYDROCHLORIDE 25 MG: 25 TABLET ORAL at 21:15

## 2022-01-23 RX ADMIN — ATORVASTATIN CALCIUM 40 MG: 40 TABLET, FILM COATED ORAL at 21:15

## 2022-01-23 ASSESSMENT — ACTIVITIES OF DAILY LIVING (ADL)
ADLS_ACUITY_SCORE: 11

## 2022-01-23 ASSESSMENT — MIFFLIN-ST. JEOR: SCORE: 1764.53

## 2022-01-23 NOTE — PROGRESS NOTES
PM&R Progress Note    INTERVAL HISTORY  Arron was seen this am. He is doing well. . Has poor endurance. Edema +. Light headed ion therapies. Extend discharge to 25th.    He denies chest pain or abdominal pain.    Functionally:    RN:  He has poor safety awareness, he has 1.1 attendant watching from the door, despite explaining and encouraging to use the call light, he did not demonstrate it yet.  ADLs:    Mobility: CGA/SBA for transfer and amb with walker    Grooming: close SBA at the sink    Dressing: Set up w/ UB dressing, LB dressing min A pull over feet.    Bathing: SBA all body parts using extended tub bench.     Toileting: toilet transfer was MIN A   IADLs: not tested. Pt reports that he has not driven in several months and his wife manages his meds and finances.   Vision/Cognition: vision impairment in L eye at baseline but pt reports it is more blurry now. Cognition is to be assessed further,pt reports impairments and has been non compliant with alarms. Pt was very fatigued during session due to lack of sleep and frequently dozing off even when standing.      MEDICATIONS  Scheduled meds    aspirin  81 mg Oral Daily     atorvastatin  40 mg Oral QPM     bumetanide  1 mg Oral Daily     empagliflozin  10 mg Oral or Feeding Tube Daily     ferrous gluconate  324 mg Oral Every Other Day     fluticasone-vilanterol  1 puff Inhalation Daily     hydrOXYzine  25 mg Oral At Bedtime     insulin aspart  1-7 Units Subcutaneous TID AC     insulin aspart  1-5 Units Subcutaneous At Bedtime     insulin glargine  10 Units Subcutaneous BID     irbesartan  75 mg Oral or Feeding Tube Daily     lacosamide  200 mg Oral or Feeding Tube BID     melatonin  5 mg Oral QPM     metoprolol tartrate  25 mg Oral BID     multivitamin w/minerals  1 tablet Oral Daily     pantoprazole  40 mg Oral QAM AC     prasugrel  10 mg Oral or Feeding Tube Daily     rOPINIRole  1 mg Oral QPM     senna-docusate  1-4 tablet Oral BID     sodium chloride  "(PF)  3 mL Intracatheter Q8H     tadalafil  20 mg Oral Q24H     traZODone  100 mg Oral At Bedtime     zinc sulfate  220 mg Oral Daily       PRN meds:  acetaminophen, albuterol, bisacodyl, calcium carbonate, glucose **OR** dextrose **OR** glucagon, ipratropium - albuterol 0.5 mg/2.5 mg/3 mL, lidocaine-prilocaine, ondansetron, - MEDICATION INSTRUCTIONS -, polyethylene glycol, QUEtiapine, simethicone      PHYSICAL EXAM  /65 (BP Location: Right arm)   Pulse 79   Temp (!) 96  F (35.6  C) (Oral)   Resp 18   Ht 1.803 m (5' 11\")   Wt 102.7 kg (226 lb 8 oz)   SpO2 100%   BMI 31.59 kg/m    Gen: Awake and alert, sitting in chair.   HEENT: moist mucus membranes, on room air  Cardio: RRR, S1+S2, soft systolic murmur  Pulm: lungs CTA bilaterally, on room air, no increased work of breathing  Abd: soft, nontender.  Ext: +2 pitting edema, chronic venous stasis changes present  Neuro/MSK: Answers appropriately, follows commands    LABS  Results for orders placed or performed during the hospital encounter of 01/17/22 (from the past 24 hour(s))   Glucose by meter   Result Value Ref Range    GLUCOSE BY METER POCT 101 (H) 70 - 99 mg/dL   Basic metabolic panel   Result Value Ref Range    Sodium 138 133 - 144 mmol/L    Potassium 4.3 3.4 - 5.3 mmol/L    Chloride 109 94 - 109 mmol/L    Carbon Dioxide (CO2) 21 20 - 32 mmol/L    Anion Gap 8 3 - 14 mmol/L    Urea Nitrogen 32 (H) 7 - 30 mg/dL    Creatinine 1.28 (H) 0.66 - 1.25 mg/dL    Calcium 8.4 (L) 8.5 - 10.1 mg/dL    Glucose 110 (H) 70 - 99 mg/dL    GFR Estimate 57 (L) >60 mL/min/1.73m2   Glucose by meter   Result Value Ref Range    GLUCOSE BY METER POCT 112 (H) 70 - 99 mg/dL   Glucose by meter   Result Value Ref Range    GLUCOSE BY METER POCT 114 (H) 70 - 99 mg/dL   Glucose by meter   Result Value Ref Range    GLUCOSE BY METER POCT 115 (H) 70 - 99 mg/dL       ASSESSMENT AND PLAN    Red Sutherland is a 81 year old R hand dominant male presented to the ARU for SDH s/p R " craniotomy and hematoma evacuation. His medical problems include recent post op seizures, delirium, afib, HTN, pulmonary HTN, CHF, T2DM, Stage IV CKD, and malnutrition. He had a T-tack for his G-tube removed on 1/20, and he continued to be restless at night. It is unclear if this is close to his baseline or if it's due to his SDH. On 1/21, he began to experience new shortness of breath during therapy. An EKG showed Afib, and further workup was done to evaluate for PE or DVT.         Admission to acute inpatient rehab on 1/17/2022.    Impairment group code: Brain Dysfunction 02.22 Traumatic, Closed Injury - SDH s/p evacuation        1. PT, OT and SLP 60 minutes of each on a daily basis, in addition to rehab nursing and close management of physiatrist.       2. Impairment of ADL's:  OT for 60 min daily to work on upper and lower body self care, dressing, toileting, bathing, energy conservation techniques with use of ADs as needed.      3. Impairment of mobility:   PT for 60 min daily to work on gait exercises, strengthening, endurance buildup, transfers with use of walker as needed.      4. Impairment of cognition/language/swallow:   SLP for 60 min daily for cognitive evaluation and treatment strategies for higher level cognitive deficits and memory impairment.      5. Rehab RN to administer medication, patient education on medication taking, VS monitoring, bowel regimen, glucose monitoring and wound care/surgical wound dressing changes and monitoring.     1.  Medical Conditions     Shortness of Breath  - due to HFpEF , differential includes pulmonary embolism, ischemia , heart failure COPD exacerbation  - troponin negative, no acute ischemic changes on EKG,  BNP high , no pulmonary embolism  On CTA 1/21 ,No  Wheezing, US negative for DVT    Appreciate IM follow up. On Bumex, better.  Neuro  R Subdural Hematoma  -on aspirin 81mg and Prasugrel 10mg  -PT, OT, SLP     Post Op Seizures  -continue Vimpat 200mg  BID  -monitor for face twitching and tongue biting     Delirium  -previously on Haldol, Atarax, and melatonin due to agitation in the evenings  -melatonin 10mg scheduled at night  -increase trazodone to 100mg at night  -Seorquel 25-50 mg PRN for sleep and agitation.  Discontinued Haldol due to stronger dopaminergic blockade and potential for interfering with tabby recovery.  -currently has 1:1 sitter, can place sitter outside room at night to help facilitate sleep     Restless Leg Syndrome  -ropinirole 1mg at bed     CV  Afib  -Eliquis held due to SDH  -rate controlled with metoprolol 25mg BID  -EKG on 1/21 shows afib     Pulmonary HTN  -on tadalafil 20mg daily     Hx of Essential HTN  RV Dysfunction  -CHF with 60-65% ejection fraction based on echo on 10/23/2021  -irbesartan 75mg daily  -monitor for hypotension given recent episodes  -on aspirin 81mg and Prasugrel 10mg     Pulmonary    COPD  -resting comfortably on room air  -consider O2 as needed for activity  -continue Breo daily and albuterol PRN     Renal  Stage IV CKD  -renally dose medications  -monitor Cr and BUN  -avoid nephrotoxic agents     Endo  T2DM  -20mg Lantus BIS  -SSI as needed  -empagliflozin 10mg daily  -monitor POC glucoses, adjust meds as needed     Nutrition  -nutrition consult  -previously getting tube feedings              -Continuous Pivot 1.5, 60ml/hr goal rate, [id number]  -Currently holding tube feeds and monitoring calorie counts  -regular diet and thin liquids  -G-tube button removed  -bumper loosened slightly to allow for full turning   -will add simethicone for pt's gas  -continue to monitor G-tube site closely     GI  GERD  -Protonix 40mg     Liver Cirrhosis  -incidental on imaging  -GI f/u outpatient        2. Adjustment to disability:  Clinical psychology to eval and treat  3. FEN: monitor BMPs twice weekly, encourage AL fluids  4. Bowel: senna BID, PRN Miralax and bisacodyl suppository  5. Bladder: able to urinate on own  6. DVT  Prophylaxis: Ambulation, aspirin and prasugrel  7. GI Prophylaxis: Protonix 40mg  8. Code: Full  9. Disposition: Modified independent to home  10. ELOS:  7 days              11. Rehab prognosis:  fair  12. Follow up Appointments on Discharge: neurosurgery and CT 2 weeks after discharge, neurology 4 months after discharge, GI for incidental liver cirrhosis     Reassess 1/24. May need an additional day.  Continue cares and plans outlined.     Quinton Abreu MD

## 2022-01-23 NOTE — PLAN OF CARE
Patient is Aox4, but has poor safety awareness regarding ambulation. He was asleep at the start of the shift, but then intermittently woke up to sit up in the bed and then lay back down.; he was complient with the gait belt and walker throughout shift.  Denies chest pain and SOB.  Continent of bowel and bladder. LBM was 1/22. Has not used call light as educated.  Trace edema present in bilateral lower extremities. Pulses palpable in all extremities.  Patient expected to discharge on Monday.

## 2022-01-23 NOTE — PLAN OF CARE
Pt has been alert but confused. He has poor safety awareness, he has 1.1 attendant watching from the door, despite explaining and encouraging to use the call light, he did not demonstrate it yet. See bed side attendant for details. His declined blood sugar check this morning and it was 114 before lunch. His Lantus was adjusted again based on the his blood sugar. He needs bed time snacks to prevent that happening again. Wife was here for visiting, writer taught her how to flush the GT and how to change the dressing around to the tube site. We will continue to monitor for safety and blood sugar while assisting with activity of daily livings.

## 2022-01-23 NOTE — PLAN OF CARE
Occupational Therapy Discharge Summary    Reason for therapy discharge:    Discharged to home with outpatient therapy.    Progress towards therapy goal(s). See goals on Care Plan in [facility] electronic health record for goal details.  Goals partially met.  Barriers to achieving goals:   discharge from facility.    Therapy recommendation(s):    Continued therapy is recommended.  Rationale/Recommendations:  Pt is discharging at a 24/7 supervision level for mobility and  ADL. Pt also does require min A for LB  dressing which is consistent with his baseline. Wife completed family training education regarding recommendations. Also discussed sleeping in same room since he is active at night and a fall risk during those times. Pt has all DME and  will continue with  OP at [facility]. .

## 2022-01-23 NOTE — PLAN OF CARE
Speech Language Therapy Discharge Summary    Reason for therapy discharge:    Discharged to home with family    Progress towards therapy goal(s). See goals on Care Plan in [facility] electronic health record for goal details.  Goals partially met.  Barriers to achieving goals:   discharge from facility.    Therapy recommendation(s):    No further therapy is recommended. Patient has good support from family in the home environment and strategies to improve overall safety awareness.

## 2022-01-23 NOTE — PLAN OF CARE
"Discharge Planner Post-Acute Rehab PT:     Discharge Plan: home mod I with FWW, intermittent assist from wife for IADLs. OP PT.    Precautions: falls    Current Status:  Bed Mobility: SBA  Transfer: STS, FWW, SBA  Gait: amb, FWW, 150+ ft, SBA  Stairs: 12x6\" steps, B rails, Uri  Balance: requires flat standing surface, limited plantar foot sensation.    Cunningham/20 -     Assessment: discussed pt's current function compared to PLF with pt and wife present. To summarize, pt's wife reports he seems worse than he was PTA. She also reports that this has happened 2x before, and he was admitted to [facility] and to [place], respectively. She reports when he is fluid overloaded, he tends to become much more SOB, and when he was discharge from [facility] and [facility] back in December, he ultimately had to return to hospital d/t continued SOB and fluid status. Per wife, \"If he went home tomorrow, I think we'd be back in the hospital by the end of the week with the way he looks right now.\"    For comparison, on admit , pt able to amb 175' with FWW to gym; on , amb 100'x2 and performed Nustep 10 min.Today , pt only able to amb ~125' with 4WW before needing to sit d/t SOB, unable to perform any further activity.    Discussed current functional status with resident MD. Based on past experiences with fluid overload and current increase in SOB, seems that pt should stay longer than tomorrow to allow medical treatment to remove more fluid before returning home so that he does not return home and then rather quickly return to hospital as has happened 2 previous times.      Other Barriers to Discharge (DME, Family Training, etc):   DME - owns FWW, 3WW and 2x SEC. Plan for mod I with FWW at discharge.    Family training - scheduled for  at 1430  "

## 2022-01-23 NOTE — PROGRESS NOTES
[facility]    Medicine Progress Note - Hospitalist Service    Date of Admission:  1/17/2022    Assessment & Plan        Red Sutherland is a 81 year old male admitted on 1/17/2022. He complex medical history including  CAD with stent and in-stent resternosis   atrial fibrillation had been on plavix and xarelto, COPD pulmonary hypertension  , HFpEF T2DM ,  was transferred form [facility] ED 12/20/21 with subdural hematoma with mass effect, status post  Evacuation hospital course was complicated with delirium, hypernatremia . He failed swallowing and had PEG placed 1/3/22     Patient  Was transferred to inpatient acute rehab. 1/21 he developed worsening shortness of breath  And EKG showed atrial fibrillation  And we were consulted to help with medical management .     Cognitive deficits  - needs assistance , lives with his wife, he does give varying answerers, also perez snot have good recollection of things being done and when they were done     Permanent Atrial fibrillation   -history atrial fibrillation, had been on Eliquis in past but currently  On hold due to subdural hematoma    -watchman device has been considered and patient  Had seen Dr Chi but has not been preformed, post procedure per Dr Chi patient   Needs to be on transient AC  His aspirin and effient was changed to clopidrogrel with combo of Eiquis   - had been on digoxin in past was recently stopped due to elevated creatinine    -patient  Was seen by cardiology  To assist  with anticoagulation and antiplatelet therapy in light of subdural hematoma and now on aspirin 81 mg daily , prasugrel 10 mg   -heart rate controlled on metoprolol , previously had been on digoxin but was held when creatinine kevin in December      shortness of breath   - due to HFpEF , differential includes pulmonary embolism, ischemia , heart failure COPD exacerbation  - troponin negative, no acute ischemic changes on EKG,  BNP high ,  no pulmonary embolism  On CTA 1/21 ,No  Wheezing, US negative for DVT   - would monitor strict I/os and daily weights, weight up  -give IV bumex again 1/23 and monitor jewell; functions, might need zaroxolyn with oral bumex but had issues with renal failure on these      history of heart failure  , HFpEF 60-65%    -- decompensated , continue diuresis, monitor for need for IV diuretics but for now being careful  Due to  his renal  Functions   -History been hospitalized in December 2021 with  pulmonary edema, hx biventricular failure   - history ascites felt to be due to right sided heart failure   - his diuretics were placed on hold with hypernatremia, but now stared back 1/21 with shortness of breath  , monitor closely, has had cramping form lasix so on bumex .     - not on eplerenone      Hypertension   - continue metoprolol         Post-traumatic right Subdural hematoma with left sided weakness  focal seizure  -Status post  Evacuation  - continue lacosamide 200 mg bid     - CT 2 weeks after discharge    -follow up  With neurology 4 months post discharge          CAD   -history non STEMI 2/2021 status post  rotational atherectomy distal Cx at [place] 2/2021 ,  HUNTER PCI 10/25/2021 for in-stent restenosis  Status post  Shockwave balloon lithotripsy  - back on aspirin and prasugrel , not on anticoagulation        Lung nodule   - noted on CT of abdomen 1/4   - CTA 1/21 mention multiple lung nodules that were unchaged form 7/2011       Pulmonary hypertension  , right sided heart failure    -secondary  To underlying lung disease  - on tadalafil , continue   - diurese as above      COPD   - no evidence of exacerbation , PTA  Duonebs, inhalers continue with these  -chronic bronchitis   -per notes patient  Is on O2 4 liters NC at home      NOVA  -uses CPAP      Renal  - CKD  baseline creatinine 1.2-1.8 , had recent NATHALY with aggressive diuresis  ( was on bumex and zaroxylin) but were stopped with worsening renal  function  -creatinine remains at  1.22, received iv dye 1/21 and now on diuretics so monitor renal function closely and avoid further nephrotoxic agents      Liver cirrhosis  - noted on CT 11/30, was recomende dto follow up  With GI as out patient  , LFTs normal       dyslipidemia  - continue stains      Abdominal pain, LLQ   - now pain at PEG site  But area with mild redness but no sighs of infecton  - CT abdomen 1/5 with sequale of chronic pancreatitis          T2DM  --continue jadriance but consider holding if ha sisues with hypoglycemia    -now off tube feeding and tolerating diet, had hypoglycemia 1/22 , so need to adjust insulin, PTA on jardiance and lantus 18 units bid    - 1/22 ,  reduced Lantus form 16-13 units ,adjust as needed , however he did not allow accu-check AM 1/23 and blood sugar was 112 after he ate so suspect was lower again this AM, encourage accu-checks, reduce Lantus to 10 to avoid hypotension, I rather have him on higher  side than low         History GI bleed  - no current issue, monitor         anemia of chronic disease   - Hg stable in 8 range        RLS  -requip was decreased to 1 mg from 2   -PTA was on gabapentin but was held due to delirium         Dysphasia   Has PEG tube, cannot be removed till 2/14, now tolerating diet    Moderate malnutrition         Hyperlipidemia  - continue lipitor          Diet: Regular Diet Adult  Room Service    DVT Prophylaxis: ambulate  On aspirin   Dixon Catheter: Not present  Central Lines: None  Cardiac Monitoring: None  Code Status: Full Code      Disposition Plan   Expected Discharge: 01/24/2022           Ethel Bynum MD  Hospitalist Service  [facility]  Securely message with the Vocera Web Console (learn more here)  Text page via [employer] Paging/Directory         Clinically Significant Risk Factors Present on Admission                     _____________________________________________________________  Interval History      shortness of breath  Seems to be about the same, hard to get good answer , will also need to see how he does with therapies. He did not allow blood sugar to be checked this  AM and has received his insulin an date already when I saw him       Data reviewed today: I reviewed all medications, new labs and imaging results over the last 24 hours.    Physical Exam   Vital Signs: Temp: 97  F (36.1  C) Temp src: Oral BP: 128/68 Pulse: 83   Resp: 16 SpO2: 94 % O2 Device: None (Room air)    Weight: 223 lbs 11.2 oz   General appearence: awake alert no apparent distress, speaking in full sentences   HEENT: EOMI, PEARLA, sclera nonicteric,  moist mucus membranes,   NECK : supple  RESPIRATORY: lungs clear to auscultation bilateral,  no wheezing or crackles   CARDIOVASCULAR:S1 S2  irreg    GASTROINTESTINAL:soft, non-distended , non-tender    SKIN: warm and dry, no mottling noted   NEUROLOGIC; awake alert and oriented, no focal deficits found  EXTREMITIES: no clubbing, cyanosis  Tense bilateral  Lower extremities edema, venous stasis changes    MUSCULOSKELETAL: without deformity       Data   Recent Labs   Lab 01/23/22  0630 01/22/22  2200 01/22/22  1632 01/22/22  1104 01/22/22  0926 01/21/22  1244 01/21/22  1118 01/18/22  1148 01/18/22  0600   WBC  --   --   --   --   --   --  7.4  --  7.4   HGB  --   --   --   --   --   --  8.7*  --  8.1*   MCV  --   --   --   --   --   --  70*  --  72*   PLT  --   --   --   --   --   --  219  --  190     --   --   --  136  --  138  --  136   POTASSIUM 4.3  --   --   --  4.4  --  4.2  --  4.4   CHLORIDE 109  --   --   --  106  --  108  --  108   CO2 21  --   --   --  21  --  24  --  22   BUN 32*  --   --   --  30  --  30  --  38*   CR 1.28*  --   --   --  1.22  --  1.22  --  1.15   ANIONGAP 8  --   --   --  9  --  6  --  6   ANNMARIE 8.4*  --   --   --  8.7  --  8.8  --  8.8   * 101* 144*   < > 162*    < > 176*   < > 95    < > = values in this interval not displayed.     No results found for this or any previous visit (from the past 24 hour(s)).

## 2022-01-23 NOTE — PLAN OF CARE
Discharge Planner Post-Acute Rehab SLP:      Discharge Plan: Home with HC?     Precautions: Fall, 1:1 sitter     Current Status:  Communication: WFL.  Cognition: Moderate cognitive-linguistic deficits in areas of attention, memory, visuospatial skills, executive function, and processing speed. On 1:1 in hallway  Swallow: Pt reporting that he tolerates a regular diet with thin liquids without concern.        Assessment: Patient demonstrates good verbal problem solving, however limited ability to transition from thoughts to action and carryover. Continue with increasing awareness of safety strategies, but requires consistent education and cues to implement. Improving short-term recall for functional task completion. Patient was supposed to discharge 1/24, however medically may stay for longer.       Other Barriers to Discharge (Family Training, etc): TBD

## 2022-01-24 LAB
ANION GAP SERPL CALCULATED.3IONS-SCNC: 6 MMOL/L (ref 3–14)
ATRIAL RATE - MUSE: 77 BPM
BUN SERPL-MCNC: 34 MG/DL (ref 7–30)
CALCIUM SERPL-MCNC: 8.5 MG/DL (ref 8.5–10.1)
CHLORIDE BLD-SCNC: 105 MMOL/L (ref 94–109)
CO2 SERPL-SCNC: 26 MMOL/L (ref 20–32)
CREAT SERPL-MCNC: 1.31 MG/DL (ref 0.66–1.25)
DIASTOLIC BLOOD PRESSURE - MUSE: NORMAL MMHG
GFR SERPL CREATININE-BSD FRML MDRD: 55 ML/MIN/1.73M2
GLUCOSE BLD-MCNC: 186 MG/DL (ref 70–99)
GLUCOSE BLDC GLUCOMTR-MCNC: 114 MG/DL (ref 70–99)
GLUCOSE BLDC GLUCOMTR-MCNC: 201 MG/DL (ref 70–99)
GLUCOSE BLDC GLUCOMTR-MCNC: 75 MG/DL (ref 70–99)
INTERPRETATION ECG - MUSE: NORMAL
P AXIS - MUSE: NORMAL DEGREES
POTASSIUM BLD-SCNC: 4.4 MMOL/L (ref 3.4–5.3)
PR INTERVAL - MUSE: NORMAL MS
QRS DURATION - MUSE: 86 MS
QT - MUSE: 390 MS
QTC - MUSE: 441 MS
R AXIS - MUSE: 112 DEGREES
SODIUM SERPL-SCNC: 137 MMOL/L (ref 133–144)
SYSTOLIC BLOOD PRESSURE - MUSE: NORMAL MMHG
T AXIS - MUSE: -37 DEGREES
VENTRICULAR RATE- MUSE: 77 BPM

## 2022-01-24 PROCEDURE — 250N000013 HC RX MED GY IP 250 OP 250 PS 637: Performed by: PHYSICIAN ASSISTANT

## 2022-01-24 PROCEDURE — 128N000003 HC R&B REHAB

## 2022-01-24 PROCEDURE — 80048 BASIC METABOLIC PNL TOTAL CA: CPT | Performed by: PHYSICAL MEDICINE & REHABILITATION

## 2022-01-24 PROCEDURE — 36415 COLL VENOUS BLD VENIPUNCTURE: CPT | Performed by: PHYSICAL MEDICINE & REHABILITATION

## 2022-01-24 PROCEDURE — 99233 SBSQ HOSP IP/OBS HIGH 50: CPT | Performed by: INTERNAL MEDICINE

## 2022-01-24 PROCEDURE — 99233 SBSQ HOSP IP/OBS HIGH 50: CPT | Mod: 24 | Performed by: PHYSICAL MEDICINE & REHABILITATION

## 2022-01-24 PROCEDURE — 250N000013 HC RX MED GY IP 250 OP 250 PS 637: Performed by: INTERNAL MEDICINE

## 2022-01-24 PROCEDURE — 250N000011 HC RX IP 250 OP 636

## 2022-01-24 PROCEDURE — 250N000013 HC RX MED GY IP 250 OP 250 PS 637: Performed by: PHYSICAL MEDICINE & REHABILITATION

## 2022-01-24 RX ORDER — BUMETANIDE 0.25 MG/ML
1 INJECTION INTRAMUSCULAR; INTRAVENOUS ONCE
Status: COMPLETED | OUTPATIENT
Start: 2022-01-24 | End: 2022-01-24

## 2022-01-24 RX ADMIN — MULTIPLE VITAMINS W/ MINERALS TAB 1 TABLET: TAB at 09:01

## 2022-01-24 RX ADMIN — HYDROXYZINE HYDROCHLORIDE 25 MG: 25 TABLET ORAL at 20:57

## 2022-01-24 RX ADMIN — TRAZODONE HYDROCHLORIDE 100 MG: 50 TABLET ORAL at 20:57

## 2022-01-24 RX ADMIN — ZINC SULFATE 220 MG (50 MG) CAPSULE 220 MG: CAPSULE at 09:01

## 2022-01-24 RX ADMIN — BUMETANIDE 1 MG: 0.25 INJECTION, SOLUTION INTRAMUSCULAR; INTRAVENOUS at 15:22

## 2022-01-24 RX ADMIN — BUMETANIDE 1 MG: 0.25 INJECTION, SOLUTION INTRAMUSCULAR; INTRAVENOUS at 10:36

## 2022-01-24 RX ADMIN — ASPIRIN 81 MG: 81 TABLET, COATED ORAL at 09:00

## 2022-01-24 RX ADMIN — LACOSAMIDE 200 MG: 200 TABLET, FILM COATED ORAL at 20:57

## 2022-01-24 RX ADMIN — EMPAGLIFLOZIN 10 MG: 10 TABLET, FILM COATED ORAL at 09:00

## 2022-01-24 RX ADMIN — ATORVASTATIN CALCIUM 40 MG: 40 TABLET, FILM COATED ORAL at 20:56

## 2022-01-24 RX ADMIN — Medication 5 MG: at 20:56

## 2022-01-24 RX ADMIN — DOCUSATE SODIUM AND SENNOSIDES 2 TABLET: 8.6; 5 TABLET ORAL at 09:01

## 2022-01-24 RX ADMIN — FLUTICASONE FUROATE AND VILANTEROL TRIFENATATE 1 PUFF: 200; 25 POWDER RESPIRATORY (INHALATION) at 09:02

## 2022-01-24 RX ADMIN — ROPINIROLE HYDROCHLORIDE 1 MG: 0.25 TABLET, FILM COATED ORAL at 20:56

## 2022-01-24 RX ADMIN — METOPROLOL TARTRATE 25 MG: 25 TABLET, FILM COATED ORAL at 09:00

## 2022-01-24 RX ADMIN — PRASUGREL 10 MG: 5 TABLET, FILM COATED ORAL at 09:01

## 2022-01-24 RX ADMIN — LACOSAMIDE 200 MG: 200 TABLET, FILM COATED ORAL at 09:00

## 2022-01-24 RX ADMIN — BUMETANIDE 1 MG: 1 TABLET ORAL at 09:00

## 2022-01-24 RX ADMIN — TADALAFIL 20 MG: 10 TABLET, FILM COATED ORAL at 09:01

## 2022-01-24 RX ADMIN — METOPROLOL TARTRATE 25 MG: 25 TABLET, FILM COATED ORAL at 20:56

## 2022-01-24 RX ADMIN — IRBESARTAN 75 MG: 75 TABLET ORAL at 09:00

## 2022-01-24 RX ADMIN — FERROUS GLUCONATE 324 MG: 324 TABLET ORAL at 09:01

## 2022-01-24 RX ADMIN — PANTOPRAZOLE SODIUM 40 MG: 40 TABLET, DELAYED RELEASE ORAL at 09:01

## 2022-01-24 ASSESSMENT — ACTIVITIES OF DAILY LIVING (ADL)
ADLS_ACUITY_SCORE: 11

## 2022-01-24 ASSESSMENT — MIFFLIN-ST. JEOR: SCORE: 1755.46

## 2022-01-24 NOTE — CARE CONFERENCE
"Acute Rehab Care Conference/Team Rounds      Type: Team Rounds    Present: Dr. Renny Rivera, Park Morton PA, Aleksey Correa PT, Sharyn Fontanez OT, Amira Jaramillo SLP, Elizabeth Damico SW, Arianne Boswell RD, Vita Bryan RN and Patient Red \"Bill\" Koko.        Discharge Barriers/Treatment/Education    Rehab Diagnosis: TBI with SDH s/p craniotomy    Active Medical Co-morbidities/Prognosis: Seizures, delirium/agitation, RLS, a. Fib, pulmonary HTN, HTN, CHF, COPD, CKD, DM II, GERD, liver cirrhosis, hypoglycemia, pulmonary edema    Safety: On 1:1 for safety due to impulsiveness and decreased insight    Pain: Denies    Medications, Skin, Tubes/Lines: PEG tube    Swallowing/Nutrition: regular, thin. No SLP concern    Bowel/Bladder: Continent    Psychosocial: , lives at home with spouse. Indep PTA, falls reported. No mental health, substance abuse or financial concerns reported.     ADLs/IADLs: Pt continues to require SBA for ADL and mobility and MIN A for LB dressing as per baseline. Pt unable to progress to MOD I due to decreased safety awareness. Pt's wife has been educated and trained in how to support pt including sleeping in same room at night for safety due to the pt's nocturnal activity. Pt has all recommended DME and will require assist with all IADL. Wife is aware and able. Recommend HH OT.      Mobility: Pt having slowly, progressively worsening SOB and fatigue limiting function during therapy sessions. For example, last week able to amb 175' with FWW to gym, perform 10 min on Nustep, and walk back to room without significant deficits; this week, unable to walk longer than 120' before needing to sit for 5+ min for recovery. In room, amb with SEC SBA. Navigated 4x6\" steps 2 days ago, but limited by significant lightheadedness. Hoping that diuresing pt will improve his breathing and endurance to be able to discharge home in the next 2-3 days. Recommend HH PT now d/t decrease in function as " opposed to prior recommendation of OP PT. Pt owns necessary DME for mobility at home.    Cognition/Language: Moderate cognitive-linguistic deficits in areas of attention, memory, visuospatial skills, executive function, and processing speed. Good verbal problem solving, however limited ability to transition from thoughts to action and carryover. Continue with increasing awareness of safety strategies, but requires consistent education and cues to implement. Improving short-term recall for functional task completion.     Community Re-Entry: requires assist from family w/c based for community distances    Transportation: requires assist from wife    Plan of Care and goals reviewed and updated.    Discharge Plan/Recommendations    Fall Precautions: continue    Overall plan for the patient:   Pt not medically cleared for discharge due to ongoing dyspnea due to pulmonary edema.  Needs further diuresing and monitoring of kidney function.  Functionally has been limited by the dyspnea over the past few days.  Ambulating 120 ft, but needed a 5-7 minute rest break during this.  SBA for most ADLs, but needs min A for LB tasks which he also required at baseline.  Given need for ongoing medical management, have delayed discharge, now anticipated date of 1/26/22.      Utilization Review and Continued Stay Justification    Medical Necessity Criteria:    For any criteria that is not met, please document reason and plan for discharge, transfer, or modification of plan of care to address.    Requires intensive rehabilitation program to treat functional deficits?: Yes    Requires 3x per week or greater involvement of rehabilitation physician to oversee rehabilitation program?: Yes    Requires rehabilitation nursing interventions?: Yes    Patient is making functional progress?: Yes    There is a potential for additional functional progress? Yes    Patient is participating in therapy 3 hours per day a minimum of 5 days per week or 15  hours per week in 7 day period?:Yes    Has discharge needs that require coordinated discharge planning approach?:Yes      Barriers/Concerns related to meeting medical necessity criteria:  Pulmonary edema    Team Plan to Address Concern:  Ongoing medical assistance and diuresis      Final Physician Sign off    Statement of Approval: I have reviewed and agree with the recommendations and documentation in this care conference note.       Patient Goals  Social Work Goals:Confirm discharge recommendations with therapy, coordinate safe discharge plan and remain available to support and assist as needed.       OT Frequency: Daily 60-75 min  OT goal: hygiene/grooming: modified independent  OT goal: upper body dressing: Modified independent  OT goal: lower body dressing: Modified independent  OT goal: upper body bathing: Modified independent  OT goal: lower body bathing: Modified independent  OT Goal: transfer: Supervision/stand-by assist (walk in shower)  OT goal: toilet transfer/toileting: Modified independent,toilet transfer,cleaning and garment management  OT goal: meal preparation: Modified independent,with simple meal preparation  OT goal: cognitive: Patient/caregiver will verbalize understanding of cognitive assessment results/recommendations as needed for safe discharge planning  OT goal 1: Pt will be IND with UE HEP to improve strength and coordination in LUE for ADL and IADL                   PT Frequency: Daily, 60 min  PT goal: bed mobility: Independent,Supine to/from sit,Rolling (met)  PT goal: transfers: Modified independent,Sit to/from stand,Bed to/from chair,Assistive device (FWW)  PT goal: gait: Modified independent,Greater than 200 feet,Rolling walker  PT goal: stairs: Supervision/stand-by assist,3 stairs,Rail on left  PT goal: perform aerobic activity with stable cardiovascular response: continuous activity,5 minutes,treadmill  PT goal 1: HEP - independent with handout for standing quasi-static and dynamic  balance  PT Goal 2: Car transfer, FWW, SBA  PT Goal 3: Floor transfer, furniture assist, SBA        SLP Frequency: daily  SLP goal 1: Pt will complete selective attention tasks with 80% accuracy with min cues using attention strategies.  SLP goal 2: Pt will create a plan for a mod-complex task (Goal, plan, do, review) and complete taks with mild cues .          Goal: Mobility: Patient will use walker and/or cane independently with all transfers.  Goal: Skin Integrity: patient will be be free of  any new skin breakdown throughout his stay at [facility] by demonstrating proper weight shift and good hygiene.                    Goal: Safety Management: Patient will remain free of any falls throughout his stay at [facility] by using the call light properly and waiting for staff assistance.

## 2022-01-24 NOTE — PROGRESS NOTES
Discharge delayed. Per Resident, earliest discharge Wed 01/26. PT and Resident requesting HC at discharge. Referral sent to [facility] this morning for RN, PT, OT, SLP. [facility] accepted. Information added to pt AVS. Called pt wife and left vm with update. SW provided wife with SW direct number if questions or concerns.     Home Health Care:   [facility] PH: [phone] (previously known as: [facility])  Nurse, physical therapy, occupational therapy, and speech therapy   -You will get a call after you have discharged from [facility]hab [facility] to schedule the first home care visit.     Clinic Care Coordinator / [facility]  DERIK Barber  PH: [phone]    JANIE Cobb   [facility]   Direct Phone: [phone]  I   Pager: [phone]  I  Fax: [phone]

## 2022-01-24 NOTE — PLAN OF CARE
Alert and oriented X 4. Forgetful at times, decreased safety awareness. Has not used call light. Set bed alarm off X 3. Denies pain. Head incision CDI, open to air. PEG patent, clamped, dressing  CDI. PIV patent, saline locked. Up to BR with SBA  and cane. Denies headache, dizziness, lightheadedness, chest pain or SOB. Voiding  without difficulty. Last BM on 1/23. Sleeping for short intervals, sitting on edge of bed off and on during  night. Sitter at delatorre doorway for safety. Bed alarm on. Continue with plan of care.

## 2022-01-24 NOTE — PLAN OF CARE
"  VS: BP (!) 150/79 (BP Location: Right arm)   Pulse 76   Temp 97  F (36.1  C) (Oral)   Resp 18   Ht 1.803 m (5' 11\")   Wt 101.8 kg (224 lb 8 oz)   SpO2 97%   BMI 31.31 kg/m     O2: 97% on RA, denies SOB or respiratory distress at rest or walking to the bathroom    Output: Voiding adequate amounts with use of toilet    Last BM: 1/23   Activity: Needs SBA with cane for short distances and walker for long distances    Up for meals? Up in chair for meals    Skin: Edema to BLE, scattered ecchymotic areas to BUE   Pain: Denies pain or discomfort    CMS: Alert & oriented, forgetful. Denies numbness or tingling    Dressing: None    Diet: Regular with thin liquids, takes whole meds with water    LDA: Left arm PIV, saline locked    Equipment: Walker, cane, and personal belongings    Plan: Discharge home delayed until Wednesday   Additional Info: Bedside attendant at all times       "

## 2022-01-24 NOTE — PROGRESS NOTES
"PM&R Progress Note    INTERVAL HISTORY  Arron initially declined labs this morning, but after explaining that we are concerning about diuresing him due to the risk of kidney injury, he expressed understanding and gave permission for this morning's lab draw.    Denies chest pain but reports shortness of breath with exertion.    Functionally, he continues to require cues for for safety strategies and has moderate cognitive-linguistic deficits in attention, memory, and executive function. He will require 24/7 supervision for mobility and ADLs per OT.    MEDICATIONS  Scheduled meds    aspirin  81 mg Oral Daily     atorvastatin  40 mg Oral QPM     bumetanide  1 mg Oral Daily     empagliflozin  10 mg Oral or Feeding Tube Daily     ferrous gluconate  324 mg Oral Every Other Day     fluticasone-vilanterol  1 puff Inhalation Daily     hydrOXYzine  25 mg Oral At Bedtime     insulin aspart  1-7 Units Subcutaneous TID AC     insulin aspart  1-5 Units Subcutaneous At Bedtime     insulin glargine  8 Units Subcutaneous BID     irbesartan  75 mg Oral or Feeding Tube Daily     lacosamide  200 mg Oral or Feeding Tube BID     melatonin  5 mg Oral QPM     metoprolol tartrate  25 mg Oral BID     multivitamin w/minerals  1 tablet Oral Daily     pantoprazole  40 mg Oral QAM AC     prasugrel  10 mg Oral or Feeding Tube Daily     rOPINIRole  1 mg Oral QPM     senna-docusate  1-4 tablet Oral BID     sodium chloride (PF)  3 mL Intracatheter Q8H     tadalafil  20 mg Oral Q24H     traZODone  100 mg Oral At Bedtime     zinc sulfate  220 mg Oral Daily       PRN meds:  acetaminophen, albuterol, bisacodyl, calcium carbonate, glucose **OR** dextrose **OR** glucagon, ipratropium - albuterol 0.5 mg/2.5 mg/3 mL, lidocaine-prilocaine, ondansetron, - MEDICATION INSTRUCTIONS -, polyethylene glycol, QUEtiapine, simethicone      PHYSICAL EXAM  BP (!) 150/79 (BP Location: Right arm)   Pulse 76   Temp 97  F (36.1  C) (Oral)   Resp 18   Ht 1.803 m (5' 11\")  "  Wt 101.8 kg (224 lb 8 oz)   SpO2 97%   BMI 31.31 kg/m    Gen: Awake and alert, sitting in chair.    HEENT: moist mucus membranes, on room air  Cardio: RRR, S1+S2, soft systolic murmur  Pulm: lungs CTA bilaterally, on room air, no increased work of breathing  Abd: soft, nontender.   Ext: +2 pitting edema, chronic venous stasis changes present  Neuro/MSK: Answers appropriately, follows commands. Moves all extremities independently    LABS  Results for orders placed or performed during the hospital encounter of 01/17/22 (from the past 24 hour(s))   Glucose by meter   Result Value Ref Range    GLUCOSE BY METER POCT 115 (H) 70 - 99 mg/dL   Glucose by meter   Result Value Ref Range    GLUCOSE BY METER POCT 122 (H) 70 - 99 mg/dL   Glucose by meter   Result Value Ref Range    GLUCOSE BY METER POCT 75 70 - 99 mg/dL   Basic metabolic panel   Result Value Ref Range    Sodium 137 133 - 144 mmol/L    Potassium 4.4 3.4 - 5.3 mmol/L    Chloride 105 94 - 109 mmol/L    Carbon Dioxide (CO2) 26 20 - 32 mmol/L    Anion Gap 6 3 - 14 mmol/L    Urea Nitrogen 34 (H) 7 - 30 mg/dL    Creatinine 1.31 (H) 0.66 - 1.25 mg/dL    Calcium 8.5 8.5 - 10.1 mg/dL    Glucose 186 (H) 70 - 99 mg/dL    GFR Estimate 55 (L) >60 mL/min/1.73m2       ASSESSMENT AND PLAN    Red Sutherland is a 81 year old R hand dominant male presented to the ARU for SDH s/p R craniotomy and hematoma evacuation. His medical problems include recent post op seizures, delirium, afib, HTN, pulmonary HTN, CHF, T2DM, Stage IV CKD, and malnutrition. He had a T-tack for his G-tube removed on 1/20, and he continued to be restless at night. It is unclear if this is close to his baseline or if it's due to his SDH. On 1/21, he began to experience new shortness of breath during therapy. An EKG showed Afib, and further workup was done to evaluate for PE or DVT. Imaging did not show evidence of DVT or PE, but did show pleural effusion consistent with CHF exacerbation. Medicine was  consulted, and pt was kept past initial date of discharge in order to effectively diurese Bill to avoid readmission.         Admission to acute inpatient rehab on 1/17/2022.    Impairment group code: Brain Dysfunction 02.22 Traumatic, Closed Injury - SDH s/p evacuation        1. PT, OT and SLP 60 minutes of each on a daily basis, in addition to rehab nursing and close management of physiatrist.       2. Impairment of ADL's:  OT for 60 min daily to work on upper and lower body self care, dressing, toileting, bathing, energy conservation techniques with use of ADs as needed.      3. Impairment of mobility:   PT for 60 min daily to work on gait exercises, strengthening, endurance buildup, transfers with use of walker as needed.      4. Impairment of cognition/language/swallow:   SLP for 60 min daily for cognitive evaluation and treatment strategies for higher level cognitive deficits and memory impairment.      5. Rehab RN to administer medication, patient education on medication taking, VS monitoring, bowel regimen, glucose monitoring and wound care/surgical wound dressing changes and monitoring.     1.  Medical Conditions     New Shortness of Breath, secondary to CHF exacerbation  -likely secondary to CHF exacerbation  -EKG shows afib  -CTA chest and BLE US did not show evidence of DVT or IRENE  -will consult medicine, appreciate recs  -diuresing per medicine, will monitor BMP today and tomorrow    Neuro  R Subdural Hematoma  -on aspirin 81mg and Prasugrel 10mg  -PT, OT, SLP     Post Op Seizures  -continue Vimpat 200mg BID  -monitor for face twitching and tongue biting     Delirium  -previously on Haldol, Atarax, and melatonin due to agitation in the evenings  -melatonin 10mg scheduled at night  -increase trazodone to 100mg at night  -Seorquel 25-50 mg PRN for sleep and agitation.  Discontinued Haldol due to stronger dopaminergic blockade and potential for interfering with tabby recovery.  -currently has 1:1 sitter,  can place sitter outside room at night to help facilitate sleep     Restless Leg Syndrome  -ropinirole 1mg at bed     CV  Afib  -Eliquis held due to SDH  -rate controlled with metoprolol 25mg BID  -EKG on 1/21 shows afib  -CTA and Doppler US did not show evidence of PE or DVT  -consult medicine, appreciate recs     Pulmonary HTN  -on tadalafil 20mg daily     Hx of Essential HTN  RV Dysfunction  -CHF with 60-65% ejection fraction based on echo on 10/23/2021  -irbesartan 75mg daily  -monitor for hypotension given recent episodes  -on aspirin 81mg and Prasugrel 10mg  -shortness of breath first noted on 1/21 likely secondary to CHF exacerbation, see above     Pulmonary  COPD  -resting comfortably on room air  -consider O2 as needed for activity  -continue Breo daily and albuterol PRN     Renal  Stage IV CKD  -renally dose medications  -monitor Cr and BUN  -avoid nephrotoxic agents     Endo  T2DM  -20mg Lantus BIS  -SSI as needed  -empagliflozin 10mg daily  -monitor POC glucoses, adjust meds as needed     Nutrition  -nutrition consult  -previously getting tube feedings              -Continuous Pivot 1.5, 60ml/hr goal rate, [id number]  -Currently holding tube feeds and monitoring calorie counts  -regular diet and thin liquids  -G-tube button removed  -bumper loosened slightly to allow for full turning   -will add simethicone for pt's gas  -continue to monitor G-tube site closely     GI  GERD  -Protonix 40mg     Liver Cirrhosis  -incidental on imaging  -GI f/u outpatient        2. Adjustment to disability:  Clinical psychology to eval and treat  3. FEN: monitor BMPs twice weekly, encourage MD fluids  4. Bowel: senna BID, PRN Miralax and bisacodyl suppository  5. Bladder: able to urinate on own  6. DVT Prophylaxis: Ambulation, aspirin and prasugrel  7. GI Prophylaxis: Protonix 40mg  8. Code: Full  9. Disposition: Modified independent to home  10. ELOS:  7 days              11. Rehab prognosis:  fair  12. Follow up  Appointments on Discharge: neurosurgery and CT 2 weeks after discharge, neurology 4 months after discharge, GI for incidental liver cirrhosis, home nursing for biweekly BMPs     Disha Nichols  PGY-1  PM&R

## 2022-01-24 NOTE — PLAN OF CARE
FOCUS/GOAL  Bowel management, Bladder management, Mobility, and Safety management    ASSESSMENT, INTERVENTIONS AND CONTINUING PLAN FOR GOAL:  Pt is alert and oriented. Has impulsiveness and decreased safety awareness. Regular diet and thin liquids; can take pills whole. Continent of B&B. LBM 1/23. Ax1 with cane in room and walker in the hallway. Denied pain. Hallway attendant present for safety concerns. Reminded Pt to call prior to transferring. PIV is WDL and flushed. BG were 115 and 122. Call light within reach and bed alarms on. Will continue with POC.

## 2022-01-24 NOTE — PROGRESS NOTES
[facility]    Medicine Progress Note - Hospitalist Service    Date of Admission:  1/17/2022    Assessment & Plan        Red Sutherland is a 82 yo man with PMHx CAD with PCI (2/2021 and repeat 10/2021), chronic Afib previously on Plavix and Xarelto, HFpEF, COPD, pulmonary HTN, Type 2 DM, CKD, GERD, and RLS who was admitted to [facility] 12/20/21-1/17/22 for a traumatic right-sided subdural hematoma with mass effect and midline shift s/p evacuation 12/21. This hospital course was c/b seizures, altered mentation and agitation, failed swallow test requiring PEG placement. He was discharged from [facility] on 1/17 to ARU for further rehabilitation. TCU stay has been c/b SOB thought to be related to HF, trialing IV diuresis while closely monitoring renal function given CKD history.     Changes Today:   - decreased Lantus 10 units BID -> 8 units BID given FS 75  - gave IV Bumex 1 mg, considering additional PM dose pending renal function     Shortness of Breath  HFpEF, decompensated  Recent admission 11/30-12/6 for HF exacerbation, at that time discharged with Bumex 4 mg BID and metolazone 2.5 mg MWF. EF 60-65% 1/6/22; mildly reduced right ventricular function, mild-to-moderate tricuspid insufficiency, pHTN, and noted to have dilated IVC at this time. Previously had ascites thought to be 2/2 right-sided HF. Over the past few days, pt noted to have acute on chronic worsening SOB that was limiting his activities in therapy. Wife felt he was below his baseline. Thought to be related to HFpEF (diuretics were on hold in hospital, worsening LE edema, BNP significantly elevated relative to inpatient). Also had considered PE, ischemia, COPD exacerbation but w/u with EKG, troponin, CTA, LE US, and exam without wheezing not suggestive of these etiologies. Responded well to IV diuresis with good UO and decrease in weight. Pending renal function, will get more aggressive with diuresis prior to  discharge.   - IV Bumex 1 mg 1/24 AM, may repeat 1/24 PM pending renal function/response  - also may consider adding metolazone but cautious in setting of renal disease  - continue daily PO bumetanide   - strict I/Os  - when discharges from ARU, HH RN should be checking twice weekly BMPs in light of tenuous renal function and changes in diuretic regimen     Permanent Afib  Previously on Eliquis PTA, but this was held upon admission to the hospital d/t subdural hematoma. Previously referred to Watchman device placement d/t history of bleeding issues (has not been done). Also previously was on digoxin but this was held in December in light of worsening renal function.   - metoprolol tartrate 25 mg BID  - AC on hold given SDH    Right subdural hematoma  C/b seizures   Had fall 12/5 then had progressive worsening neuro symptoms until presented to OSH ED on 12/20 found to have SDH. Ultimately underwent evacuation per Neurosurgery on 12/21/2021 but continued to have seizures and was initially on Keppra, which was ultimately switched to Vimpat d/t agitation and delirium.   - repeat CT scan 2 weeks from discharge (~1/31/2022)  - f/u Neurosurgery clinic with GEORGETTE     Type 2 DM  Last A1c 7.5% on 12/21/2021. PTA on Lantus 18 units BID and invokana 100 mg every evening. Blood glucoses have been in 110s recently, and was as low as 75 overnight so will continue to decrease Lantus.   - Lantus 10 units BID -> 8 units BID  - Jardiance 10 mg daily   - medium sliding scale insulin     CKD, Stage IV  Baseline Cr 1.2-1.8. Recent worsening in setting of diuresis.    - I/Os  - renally dose medications  - avoid/minimize nephrotoxic agents as able     Pulmonary HTN   - continue tadalafil, irbesartan     CAD s/p PCI x 2  HTN  HLD  - ASA 81 mg; prasugrel 10 mg daily  - atorvastatin 40 mg   - metoprolol tartrate 25 mg BID    COPD  Pt reports using 4 L NC at home, although currently stable on RA.  - PTA breo ellipta, albuterol PRN    Liver  cirrhosis  Incidentally noted on CTAP 11/30. History negative for AUD, IVDU, viral hepatitis, family history of liver disease. LFTs WNL. Previously noted to have ascites related to R heart disease. Ammonia 1/7 WNL. No e/o synthetic liver dysfunction.   - GI f/u outpatient     Anemia  History GIB  GERD  Baseline Hgb in 8s, currently at baseline. History of iron deficiency. Last colonoscopy 10/31/2021 with one small AVM s/p APC, three sessile polyps in transverse, ascending colon, and rectum s/p polypectomy, internal hemorrhoids. History of GIB.  - ferrous gluconate 324 mg every other day   - pantoprazole     Sleep concerns  - melatonin, hydroxyzine, trazodone at bedtime     RLS  - ropinirole 1 mg     Moderate malnutrition  PEG placement 1/3/2022  - off TFs and tolerating diet, but PEG cannot be removed until 2/14  - continue diet as needed       Diet: Regular Diet Adult  Room Service    DVT Prophylaxis: Ambulate every shift  Dixon Catheter: Not present  Central Lines: None  Cardiac Monitoring: None  Code Status: Full Code      Disposition Plan   Expected Discharge: 01/26/2022     Anticipated discharge location:  Awaiting care coordination huddle       The patient's care was discussed with the Attending Physician, Dr. Bynum and the PM&R team.    Monse Phoenix MD  PGY-1  Internal Medicine  [facility]  Securely message with the Vocera Web Console (learn more here)  Text page via [facility] Paging/Directory       ______________________________________________________________________    Interval History   NAEON. Patient refusing glucose checks, ultimately had one with BG 75. This AM, reports that his SOB is chronic and he does not have any worsening currently. C/o tenderness in bilateral LE but thinks they may be less edematous. Reports he wants to go home but understands we need to get diuresis figured out.     Data reviewed today: I reviewed all medications, new labs and  imaging results over the last 24 hours.     Physical Exam   Vital Signs: Temp: 97  F (36.1  C) Temp src: Oral BP: (!) 150/79 Pulse: 76   Resp: 18 SpO2: 97 % O2 Device: None (Room air)    Weight: 224 lbs 8 oz  General: NAD, sitting up at edge of bed   HEENT: NCAT, EOMI  CV: Irregular rhythm, no m/r/g  Pulm: CTAB, no crackles appreciated, no wheezes  Abdominal: PEG tube in place.   Extremities: Venous stasis changes on legs, tender to palpation. Still tense with edema but improved relative to yesterday.   Neuro: Alert, repeats some questions and has varying answers. Moving all 4 limbs spontaneously.     Data   Pertinent labs/data incorporated into the A&P.

## 2022-01-25 ENCOUNTER — APPOINTMENT (OUTPATIENT)
Dept: OCCUPATIONAL THERAPY | Facility: CLINIC | Age: 82
DRG: 949 | End: 2022-01-25
Attending: PHYSICAL MEDICINE & REHABILITATION
Payer: COMMERCIAL

## 2022-01-25 ENCOUNTER — APPOINTMENT (OUTPATIENT)
Dept: SPEECH THERAPY | Facility: CLINIC | Age: 82
DRG: 949 | End: 2022-01-25
Attending: PHYSICAL MEDICINE & REHABILITATION
Payer: COMMERCIAL

## 2022-01-25 ENCOUNTER — APPOINTMENT (OUTPATIENT)
Dept: PHYSICAL THERAPY | Facility: CLINIC | Age: 82
DRG: 949 | End: 2022-01-25
Attending: PHYSICAL MEDICINE & REHABILITATION
Payer: COMMERCIAL

## 2022-01-25 LAB
ANION GAP SERPL CALCULATED.3IONS-SCNC: 5 MMOL/L (ref 3–14)
BUN SERPL-MCNC: 30 MG/DL (ref 7–30)
CALCIUM SERPL-MCNC: 8.7 MG/DL (ref 8.5–10.1)
CHLORIDE BLD-SCNC: 107 MMOL/L (ref 94–109)
CO2 SERPL-SCNC: 27 MMOL/L (ref 20–32)
CREAT SERPL-MCNC: 1.38 MG/DL (ref 0.66–1.25)
GFR SERPL CREATININE-BSD FRML MDRD: 52 ML/MIN/1.73M2
GLUCOSE BLD-MCNC: 89 MG/DL (ref 70–99)
GLUCOSE BLDC GLUCOMTR-MCNC: 108 MG/DL (ref 70–99)
GLUCOSE BLDC GLUCOMTR-MCNC: 190 MG/DL (ref 70–99)
GLUCOSE BLDC GLUCOMTR-MCNC: 209 MG/DL (ref 70–99)
GLUCOSE BLDC GLUCOMTR-MCNC: 73 MG/DL (ref 70–99)
GLUCOSE BLDC GLUCOMTR-MCNC: 76 MG/DL (ref 70–99)
GLUCOSE BLDC GLUCOMTR-MCNC: 94 MG/DL (ref 70–99)
POTASSIUM BLD-SCNC: 3.8 MMOL/L (ref 3.4–5.3)
SODIUM SERPL-SCNC: 139 MMOL/L (ref 133–144)

## 2022-01-25 PROCEDURE — 97130 THER IVNTJ EA ADDL 15 MIN: CPT | Mod: GN | Performed by: SPEECH-LANGUAGE PATHOLOGIST

## 2022-01-25 PROCEDURE — 97116 GAIT TRAINING THERAPY: CPT | Mod: GP

## 2022-01-25 PROCEDURE — 99233 SBSQ HOSP IP/OBS HIGH 50: CPT | Performed by: INTERNAL MEDICINE

## 2022-01-25 PROCEDURE — 36415 COLL VENOUS BLD VENIPUNCTURE: CPT | Performed by: PHYSICAL MEDICINE & REHABILITATION

## 2022-01-25 PROCEDURE — 82310 ASSAY OF CALCIUM: CPT | Performed by: PHYSICAL MEDICINE & REHABILITATION

## 2022-01-25 PROCEDURE — 99232 SBSQ HOSP IP/OBS MODERATE 35: CPT | Mod: 24 | Performed by: PHYSICAL MEDICINE & REHABILITATION

## 2022-01-25 PROCEDURE — 250N000013 HC RX MED GY IP 250 OP 250 PS 637: Performed by: INTERNAL MEDICINE

## 2022-01-25 PROCEDURE — 97535 SELF CARE MNGMENT TRAINING: CPT | Mod: GO | Performed by: STUDENT IN AN ORGANIZED HEALTH CARE EDUCATION/TRAINING PROGRAM

## 2022-01-25 PROCEDURE — 97129 THER IVNTJ 1ST 15 MIN: CPT | Mod: GN

## 2022-01-25 PROCEDURE — 128N000003 HC R&B REHAB

## 2022-01-25 PROCEDURE — 97129 THER IVNTJ 1ST 15 MIN: CPT | Mod: GN | Performed by: SPEECH-LANGUAGE PATHOLOGIST

## 2022-01-25 PROCEDURE — 250N000013 HC RX MED GY IP 250 OP 250 PS 637: Performed by: PHYSICIAN ASSISTANT

## 2022-01-25 PROCEDURE — 97530 THERAPEUTIC ACTIVITIES: CPT | Mod: GP

## 2022-01-25 PROCEDURE — 250N000013 HC RX MED GY IP 250 OP 250 PS 637: Performed by: PHYSICAL MEDICINE & REHABILITATION

## 2022-01-25 RX ORDER — ATORVASTATIN CALCIUM 40 MG/1
40 TABLET, FILM COATED ORAL EVERY EVENING
Qty: 30 TABLET | Refills: 0 | Status: SHIPPED | OUTPATIENT
Start: 2022-01-25 | End: 2022-01-01

## 2022-01-25 RX ORDER — BUMETANIDE 2 MG/1
2 TABLET ORAL
Qty: 60 TABLET | Refills: 0 | Status: SHIPPED | OUTPATIENT
Start: 2022-01-26 | End: 2022-01-01 | Stop reason: DRUGHIGH

## 2022-01-25 RX ORDER — BUMETANIDE 1 MG/1
3 TABLET ORAL ONCE
Status: COMPLETED | OUTPATIENT
Start: 2022-01-25 | End: 2022-01-25

## 2022-01-25 RX ORDER — BUMETANIDE 1 MG/1
2 TABLET ORAL
Status: DISCONTINUED | OUTPATIENT
Start: 2022-01-26 | End: 2022-01-26 | Stop reason: HOSPADM

## 2022-01-25 RX ORDER — LACOSAMIDE 200 MG/1
200 TABLET ORAL 2 TIMES DAILY
Qty: 60 TABLET | Refills: 0 | Status: SHIPPED | OUTPATIENT
Start: 2022-01-25 | End: 2022-01-25

## 2022-01-25 RX ORDER — LACOSAMIDE 200 MG/1
200 TABLET ORAL 2 TIMES DAILY
Qty: 60 TABLET | Refills: 0 | Status: SHIPPED | OUTPATIENT
Start: 2022-01-25

## 2022-01-25 RX ORDER — TRAZODONE HYDROCHLORIDE 100 MG/1
100 TABLET ORAL
Qty: 30 TABLET | Refills: 0 | Status: SHIPPED | OUTPATIENT
Start: 2022-01-25 | End: 2022-01-01

## 2022-01-25 RX ADMIN — BUMETANIDE 1 MG: 1 TABLET ORAL at 08:49

## 2022-01-25 RX ADMIN — TRAZODONE HYDROCHLORIDE 100 MG: 50 TABLET ORAL at 21:06

## 2022-01-25 RX ADMIN — ZINC SULFATE 220 MG (50 MG) CAPSULE 220 MG: CAPSULE at 08:49

## 2022-01-25 RX ADMIN — Medication 5 MG: at 21:07

## 2022-01-25 RX ADMIN — PRASUGREL 10 MG: 5 TABLET, FILM COATED ORAL at 08:49

## 2022-01-25 RX ADMIN — PANTOPRAZOLE SODIUM 40 MG: 40 TABLET, DELAYED RELEASE ORAL at 08:49

## 2022-01-25 RX ADMIN — ASPIRIN 81 MG: 81 TABLET, COATED ORAL at 08:49

## 2022-01-25 RX ADMIN — EMPAGLIFLOZIN 10 MG: 10 TABLET, FILM COATED ORAL at 08:49

## 2022-01-25 RX ADMIN — ATORVASTATIN CALCIUM 40 MG: 40 TABLET, FILM COATED ORAL at 21:06

## 2022-01-25 RX ADMIN — ROPINIROLE HYDROCHLORIDE 1 MG: 0.25 TABLET, FILM COATED ORAL at 21:08

## 2022-01-25 RX ADMIN — HYDROXYZINE HYDROCHLORIDE 25 MG: 25 TABLET ORAL at 21:06

## 2022-01-25 RX ADMIN — LACOSAMIDE 200 MG: 200 TABLET, FILM COATED ORAL at 08:49

## 2022-01-25 RX ADMIN — INSULIN ASPART 2 UNITS: 100 INJECTION, SOLUTION INTRAVENOUS; SUBCUTANEOUS at 12:02

## 2022-01-25 RX ADMIN — MULTIPLE VITAMINS W/ MINERALS TAB 1 TABLET: TAB at 08:49

## 2022-01-25 RX ADMIN — FLUTICASONE FUROATE AND VILANTEROL TRIFENATATE 1 PUFF: 200; 25 POWDER RESPIRATORY (INHALATION) at 08:48

## 2022-01-25 RX ADMIN — LACOSAMIDE 200 MG: 200 TABLET, FILM COATED ORAL at 21:06

## 2022-01-25 RX ADMIN — BUMETANIDE 3 MG: 1 TABLET ORAL at 12:03

## 2022-01-25 RX ADMIN — TADALAFIL 20 MG: 10 TABLET, FILM COATED ORAL at 08:49

## 2022-01-25 RX ADMIN — IRBESARTAN 75 MG: 75 TABLET ORAL at 08:49

## 2022-01-25 RX ADMIN — METOPROLOL TARTRATE 25 MG: 25 TABLET, FILM COATED ORAL at 08:49

## 2022-01-25 ASSESSMENT — ACTIVITIES OF DAILY LIVING (ADL)
ADLS_ACUITY_SCORE: 11

## 2022-01-25 NOTE — PLAN OF CARE
Orientation: A/O x4 but forgetful. Does not use call light to make needs known and continues to have poor safety awareness.  Bowel: No BM this shift  Bladder: Continent of bladder using toilet in bathroom  Pain: Denies pain  Ambulation/Transfers: SBA with cane for transfers and ambulation  Blood sugars: See results tab for details  Diet/ Liquids: Tolerating diet well with good appetite  Tubes/ Lines/ Drains: PEG patent with scheduled water flush given without difficulty  Misc: Nursing to go over PEG cares and flushing with patient's wife before discharge on 1/26. Also, please verify with patient's wife that she understands DM management including BG checks and insulin administration. D/t patient's cognition, he will not be able to manage DM independently. Per report, wife was helping to manage this prior. Patient's wife not coming to the unit today, 1/25.  Bed alarm on and 1:1 sitter remains within the doorway of patient room for safety. Call light within reach. Continue with POC.

## 2022-01-25 NOTE — PLAN OF CARE
Speech Language Therapy Discharge Summary    Reason for therapy discharge:    Discharged to home with home therapy.    Progress towards therapy goal(s). See goals on Care Plan in [facility] electronic health record for goal details.  Goals partially met.  Barriers to achieving goals:   discharge from facility.    Therapy recommendation(s):    Continued therapy is recommended.  Rationale/Recommendations:  Continue maximizing cognitive function.    Patient presenting with cognitive impairments limiting level of independence and would benefit from oversight with all moderate to complex IADL tasks.  Patient does have some recognition that he needs assistance in some of these areas but does lack some overall insight into his impairments.  No dysphagia related goals with patient on regular texture diet and thin liquids.

## 2022-01-25 NOTE — PROGRESS NOTES
[facility]    Medicine Progress Note - Hospitalist Service    Date of Admission:  1/17/2022    Assessment & Plan                   Red Sutherland is a 80 yo man with PMHx CAD with PCI (2/2021 and repeat 10/2021), chronic Afib previously on Plavix and Xarelto, HFpEF, COPD, pulmonary HTN, Type 2 DM, CKD, GERD, and RLS who was admitted to [facility] 12/20/21-1/17/22 for a traumatic right-sided subdural hematoma with mass effect and midline shift s/p evacuation 12/21. This hospital course was c/b seizures, altered mentation and agitation, failed swallow test requiring PEG placement. He was discharged from [facility] on 1/17 to ARU for further rehabilitation.      1/25  Patient home dose Bumex 4 mg BID   Will plan on discharging on 4 mg daily and close follow up with PCP   Also reduce pm lantus dose as BG on lowers side   BMP tomorrow and Friday       Shortness of Breath  HFpEF, decompensated  Recent admission 11/30-12/6 for HF exacerbation, at that time discharged with Bumex 4 mg BID and metolazone 2.5 mg MWF. EF 60-65% 1/6/22; mildly reduced right ventricular function, mild-to-moderate tricuspid insufficiency, pHTN, and noted to have dilated IVC at this time. Previously had ascites thought to be 2/2 right-sided HF. Over the past few days, pt noted to have acute on chronic worsening SOB that was limiting his activities in therapy. Wife felt he was below his baseline. Thought to be related to HFpEF (diuretics were on hold in hospital, worsening LE edema, BNP significantly elevated relative to inpatient). Also had considered PE, ischemia, COPD exacerbation but w/u with EKG, troponin, CTA, LE US, and exam without wheezing not suggestive of these etiologies. Responded well to IV diuresis with good UO and decrease in weight. Pending renal function, will get more aggressive with diuresis prior to discharge.   - IV Bumex    Permanent Afib  Previously on Eliquis PTA, but this was held upon  admission to the hospital d/t subdural hematoma. Previously referred to Watchman device placement d/t history of bleeding issues (has not been done). Also previously was on digoxin but this was held in December in light of worsening renal function.   - metoprolol tartrate 25 mg BID  - AC on hold given SDH    Right subdural hematoma  C/b seizures   Had fall 12/5 then had progressive worsening neuro symptoms until presented to OSH ED on 12/20 found to have SDH. Ultimately underwent evacuation per Neurosurgery on 12/21/2021 but continued to have seizures and was initially on Keppra, which was ultimately switched to Vimpat d/t agitation and delirium.   - repeat CT scan 2 weeks from discharge (~1/31/2022)  - f/u Neurosurgery clinic with GEORGETTE     Type 2 DM  Last A1c 7.5% on 12/21/2021. PTA on Lantus 18 units BID and invokana 100 mg every evening. .   - Lantus 10 units BID -> 8 units BID  - Jardiance 10 mg daily   - medium sliding scale insulin     CKD, Stage IV  Baseline Cr 1.2-1.8.   -    Pulmonary HTN   - continue tadalafil, irbesartan     CAD s/p PCI x 2  HTN  HLD  - ASA 81 mg; prasugrel 10 mg daily  - atorvastatin 40 mg   - metoprolol tartrate 25 mg BID    COPD  Pt reports using 4 L NC at home, although currently stable on RA.  - PTA breo ellipta, albuterol PRN    Liver cirrhosis  Incidentally noted on CTAP 11/30. History negative for AUD, IVDU, viral hepatitis, family history of liver disease. LFTs WNL. Previously noted to have ascites related to R heart disease. Ammonia 1/7 WNL. No e/o synthetic liver dysfunction.   - GI f/u outpatient     Anemia  History GIB  GERD  Baseline Hgb in 8s, currently at baseline. History of iron deficiency. Last colonoscopy 10/31/2021 with one small AVM s/p APC, three sessile polyps in transverse, ascending colon, and rectum s/p polypectomy, internal hemorrhoids. History of GIB.  - ferrous gluconate 324 mg every other day   - pantoprazole     Sleep concerns  - melatonin, hydroxyzine,  trazodone at bedtime     RLS  - ropinirole 1 mg     Moderate malnutrition  PEG placement 1/3/2022  - off TFs and tolerating diet, but PEG cannot be removed until 2/14  - continue diet as needed         Diet: Regular Diet Adult  Room Service    DVT Prophylaxis: Defer to primary service  Dixon Catheter: Not present  Central Lines: None  Cardiac Monitoring: None  Code Status: Full Code      Disposition Plan   Expected Discharge: 01/26/2022     Anticipated discharge location:  Awaiting care coordination huddle  Delays:            The patient's care was discussed with the Patient and Primary team.    Meredith Jones MD  Hospitalist Service  [facility]  Securely message with the Vocera Web Console (learn more here)  Text page via [facility] Paging/Directory         Clinically Significant Risk Factors Present on Admission                    ______________________________________________________________________    Interval History   Care reviewed with Dr Bynum and Dr Davis  He feels better   Denies any sob or chest pain or abdominal discomfort   No nausea   No fever   Not requiring 02       Data reviewed today: I reviewed all medications, new labs and imaging results over the last 24 hours.  Physical Exam   Vital Signs: Temp: (!) 95.6  F (35.3  C) Temp src: Oral BP: (!) 142/75 Pulse: 75   Resp: 18 SpO2: 100 % O2 Device: None (Room air)    Weight: 224 lbs 8 oz  Constitutional: awake, alert, cooperative, no apparent distress, and appears stated age . Seen standing with Ot and shaving   Hematologic / Lymphatic: no cervical lymphadenopathy  Respiratory: No increased work of breathing, good air exchange, clear to auscultation bilaterally, no crackles or wheezing  Cardiovascular: Normal apical impulse, regular rate and rhythm, normal S1 and S2, , and no murmur noted  GI:peg tube, normal bowel sounds, soft, non-distended, non-tender, no masses palpated,  Neuropsychiatric: General:  normal, calm and normal eye contact\edema     Data   Recent Labs   Lab 01/25/22  0856 01/25/22  0831 01/25/22  0812 01/25/22  0750 01/24/22  1700 01/24/22  1044 01/23/22  0903 01/23/22  0630 01/21/22  1244 01/21/22  1118   WBC  --   --   --   --   --   --   --   --   --  7.4   HGB  --   --   --   --   --   --   --   --   --  8.7*   MCV  --   --   --   --   --   --   --   --   --  70*   PLT  --   --   --   --   --   --   --   --   --  219   NA  --   --   --  139  --  137  --  138   < > 138   POTASSIUM  --   --   --  3.8  --  4.4  --  4.3   < > 4.2   CHLORIDE  --   --   --  107  --  105  --  109   < > 108   CO2  --   --   --  27  --  26  --  21   < > 24   BUN  --   --   --  30  --  34*  --  32*   < > 30   CR  --   --   --  1.38*  --  1.31*  --  1.28*   < > 1.22   ANIONGAP  --   --   --  5  --  6  --  8   < > 6   ANNMARIE  --   --   --  8.7  --  8.5  --  8.4*   < > 8.8   * 76 73 89   < > 186*   < > 110*   < > 176*    < > = values in this interval not displayed.

## 2022-01-25 NOTE — PLAN OF CARE
"Discharge Planner Post-Acute Rehab SLP:      Discharge Plan: Home with HC?     Precautions: Fall, 1:1 sitter     Current Status:  Communication: WFL.  Cognition: Moderate cognitive-linguistic deficits in areas of attention, memory, visuospatial skills, executive function, and processing speed. On 1:1 in hallway  Swallow: Pt reporting that he tolerates a regular diet with thin liquids without concern.        Assessment: Patient demonstrates good verbal problem solving, however limited ability to transition from thoughts to action and carryover. Continue with increasing awareness of safety strategies, but requires consistent education and cues to implement. Improving short-term recall for functional task completion. Patient was supposed to discharge 1/24, however medically may stay for longer.    Note concrete repsonses at times (e.g. \"kitchen shelves\" exercise patient stated that's not where he keeps things at home). Patient recalled details from picture scene with 73% accuracy (immediate/no delay or distractor). Patient used verbal rehearsal and visual association strategies. Patient later recalled details from same picture scene with 91% accuracy after delay with distractor.        Other Barriers to Discharge (Family Training, etc): TBD      "

## 2022-01-25 NOTE — PLAN OF CARE
"  VS: /54   Pulse 85   Temp (!) 95.9  F (35.5  C) (Oral)   Resp 16   Ht 1.803 m (5' 11\")   Wt 101.8 kg (224 lb 8 oz)   SpO2 98%   BMI 31.31 kg/m       O2: Stable on room air   Output: Continent of both bowel and bladder; uses urinal    Last BM: 1/25   Activity: SBA with cane in room, 4WW for longer distances   Skin: Scalp surgical incision LEONID, PEG, trace edema in BLE, groin wound- UTV pt refused.    Pain: denies   CMS: Intact, denies any numbness or tingling   Dressing: PEG dressing CDI. Groin wound care not done, pt refused.    Diet: Regular, thin liquids, takes pills whole   LDA: PEG, L PIV saline locked   Equipment: Personal belongings.    Plan: A&Ox4, intermittently forgetful. Bedside sitter- gets up without calling. Bed alarm.    Additional Info: Plan is to discharge to home tomorrow with home therapy.        "

## 2022-01-25 NOTE — DISCHARGE SUMMARY
[facility]   Acute Rehabilitation Unit  Discharge summary     Date of Admission: 1/17/2022  Date of Discharge: 1/26/22  Disposition: Home with home health  Primary Care Physician: Meg Roth  Attending physician: Renny Rivera MD  Other significant physician provider(s): Disha Nichols, PGY-1      discharge diagnosis    - Impairment group code: Brain Dysfunction 02.22 Traumatic, Closed Injury - SDH s/p evacuation    -Rehab Diagnoses and Functional Impairments  1. Impaired functional mobility  2. Impaired strength  3. Impaired endurance  4. Impaired ADLs  5. Impaired cognition     - Other medical concerns:   R Subdural Hematoma  Post Op Seizures  Afib  CHF with Acute Exacerbation  CKD, Stage IV  T2DM  COPD  Pulmonary HTN  Essential HTN  Delirium      brief summary per hpi  Red Sutherland is a 81 year old R hand dominant male with afib (previously on eliquis), pulmonary HTN, essential HTN, CAD s/p CABG, COPD, CHF with EF at 60-65% on 10/23/2021. He also has stage 4 CKD with ascites, PVD, T2DM and a previous hx of a GI bleed.      He presented to [facility] ED with a L side weakness, headache and slurred speech after a fall roughly around 12/5/2021. Wife noted that he also started having episodes of lip smacking and tongue biting since then. Imaging showed a large hyperdense holohemispheric right-sided subdural hematoma which measures 12.9 mm in thickness over the posterior right frontal convexity, 12.6 mm over the right parietal convexity and 11.9 mm over the right temporal convexity. He was transferred to [facility] for further care.     Arron was taken to the OR for a R craniotomy and hematoma evacuation on 12/21/2021, but continued to have seizures requiring both Keppra and Vimpat for control. He was also diagnosed with moderate malnutrition in the context of acute on chronic illness, and had a PEG tube placed on 1/3.     During the course of his  "hospitalization, he began having increased agitation, which was thought to be induced by delirium or sundowning. His agitation worsened to the point that he required Haldol and Atarax. He improved with medication in addition to optimization of sleep and reorientation.       rehabilitATIon course  Patient was admitted to the acute inpatient rehabilitation unit on 1/17/2022, where he participated in physical, occupational, and speech language therapies for 60 minutes of each on a daily basis.     At the time of admission, his functionality was:    PT: stand by assist with FWW ambulating 100 ft, CGA with transfers    OT: Toilet transfer min assist, pt very fatigued which made it difficult to assess safely    SLP: Moderate cognitive deficits, including attention, memory, executive function, and processing speed    By the time of discharge, his functionality improved to:    PT:   Continued therapy is recommended.  Rationale/Recommendations:  continue HH PT to progress strength, endurance, and balance amb with SEC in home.     Summary:  Bed mob - independent  Transfers - STS, SEC or FWW or 4WW, SBA  Gait - amb with SEC in room SBA, amb with 4WW longer distances 100-200 ft SBA with intermittent need for seated rest break d/t fatigue and SOB.  Stairs - up/down 4x6\" steps, B rails, SBA.    OT:   Continued therapy is recommended.  Rationale/Recommendations:  Pt is discharging at a 24/7 supervision level for mobility and  ADL. Pt also does require min A for LB  dressing which is consistent with his baseline. Wife completed family training education regarding recommendations. Also discussed sleeping in same room since he is active at night and a fall risk during those times. Pt has all DME and  will continue with HH OT.    SLP:   Continued therapy is recommended.  Rationale/Recommendations:  Continue maximizing cognitive function.     Patient presenting with cognitive impairments limiting level of independence and would benefit " from oversight with all moderate to complex IADL tasks.  Patient does have some recognition that he needs assistance in some of these areas but does lack some overall insight into his impairments.  No dysphagia related goals with patient on regular texture diet and thin liquids      mEDICAL COURSE      Red Sutherland is a 81 year old R hand dominant male presented to the ARU for SDH s/p R craniotomy and hematoma evacuation. His medical problems include recent post op seizures, delirium, afib, HTN, pulmonary HTN, CHF, T2DM, Stage IV CKD, and malnutrition. He had a T-tack for his G-tube removed on 1/20, and he continued to be restless at night. It is unclear if this is close to his baseline or if it's due to his SDH.     On 1/21, he began to experience new shortness of breath during therapy. An EKG showed Afib, and further workup was done to evaluate for PE or DVT. Imaging did not show evidence of DVT or PE, but did show pleural effusion consistent with CHF exacerbation. Medicine was consulted, and pt was kept past initial date of discharge in order to effectively diurese Bill to avoid readmission. He did well with increases in his Bumex dosing, and we began him on a half dose of his original home Bumex dosing so he will be discharged with 2 mg BID.  Recommend re-checking BMP at the end of the week or early next week with home nursing, and close follow up with his PCP and cardiology.      dISCHARGE MEDICATIONS  Current Discharge Medication List      START taking these medications    Details   bumetanide (BUMEX) 2 MG tablet Take 1 tablet (2 mg) by mouth 2 times daily  Qty: 60 tablet, Refills: 0    Associated Diagnoses: Other ascites      traZODone (DESYREL) 100 MG tablet Take 1 tablet (100 mg) by mouth nightly as needed for sleep  Qty: 30 tablet, Refills: 0    Comments: Please discuss with your primary care doctor if you continue to have problems sleeping  Associated Diagnoses: Subdural hematoma (H)        "  CONTINUE these medications which have CHANGED    Details   atorvastatin (LIPITOR) 40 MG tablet Take 1 tablet (40 mg) by mouth every evening  Qty: 30 tablet, Refills: 0    Associated Diagnoses: Hypercholesteremia      insulin aspart (NOVOLOG PEN) 100 UNIT/ML pen Inject 1-7 Units Subcutaneous 3 times daily (before meals)  Qty: 6.3 mL, Refills: 0    Associated Diagnoses: Type 2 diabetes mellitus with other specified complication, with long-term current use of insulin (H)      insulin glargine (LANTUS PEN) 100 UNIT/ML pen Inject 8 Units Subcutaneous every morning (before breakfast)  Qty: 2.4 mL, Refills: 0    Comments: If Lantus is not covered by insurance, may substitute Basaglar or Semglee or other insulin glargine product per insurance preference at same dose and frequency.    Associated Diagnoses: Type 2 diabetes mellitus with other specified complication, with long-term current use of insulin (H)      lacosamide (VIMPAT) 200 MG TABS tablet 1 tablet (200 mg) by Oral or Feeding Tube route 2 times daily  Qty: 60 tablet, Refills: 0    Associated Diagnoses: Seizures (H)         CONTINUE these medications which have NOT CHANGED    Details   ACCU-CHEK JOSE PLUS TEST STRP strips [ACCU-CHEK JOSE PLUS TEST STRP STRIPS] see administration instructions.      acetaminophen (TYLENOL) 325 MG tablet Take 3 tablets (975 mg) by mouth every 8 hours as needed for mild pain or fever    Associated Diagnoses: SDH (subdural hematoma) (H)      albuterol (PROVENTIL HFA;VENTOLIN HFA) 90 mcg/actuation inhaler Inhale 2 puffs into the lungs every 6 hours as needed       aspirin (ASA) 81 MG EC tablet Take 1 tablet (81 mg) by mouth daily    Associated Diagnoses: NSTEMI (non-ST elevated myocardial infarction) (H)      BD ULTRA-FINE MANISHA PEN NEEDLES 32 gauge x 5/32\" Ndle [BD ULTRA-FINE MANISHA PEN NEEDLES 32 GAUGE X 5/32\" NDLE]   Refills: 4      calcium carbonate (TUMS) 500 MG chewable tablet Take 1 tablet (500 mg) by mouth daily as needed for " heartburn    Associated Diagnoses: Gastroesophageal reflux disease without esophagitis      empagliflozin (JARDIANCE) 10 MG TABS tablet 1 tablet (10 mg) by Oral or Feeding Tube route daily    Associated Diagnoses: Type 2 diabetes mellitus with other specified complication, with long-term current use of insulin (H)      Ferrous Gluconate 324 (37.5 Fe) MG TABS Take 1 tablet by mouth every other day      fluticasone-vilanterol (BREO ELLIPTA) 200-25 mcg/dose DsDv inhaler Inhale 1 puff into the lungs daily       Garlic 1000 MG CAPS Take 1 capsule by mouth daily      irbesartan (AVAPRO) 75 MG tablet 1 tablet (75 mg) by Oral or Feeding Tube route daily    Associated Diagnoses: Coronary artery disease involving native coronary artery of native heart without angina pectoris      metoprolol tartrate (LOPRESSOR) 25 MG tablet Take 1 tablet (25 mg) by mouth 2 times daily (with meals)  Qty: 60 tablet, Refills: 3    Associated Diagnoses: Permanent atrial fibrillation (H)      multivitamin w/minerals (THERA-VIT-M) tablet Take 1 tablet by mouth daily      OXYGEN-AIR DELIVERY SYSTEMS MISC [OXYGEN-AIR DELIVERY SYSTEMS MISC] Use As Directed.      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 (H)      zinc gluconate 50 MG tablet Take 50 mg by mouth daily      melatonin 10 MG TABS tablet Take 1 tablet (10 mg) by mouth At Bedtime    Associated Diagnoses: Delirium      rOPINIRole (REQUIP) 1 MG  tablet Take 1 tablet (1 mg) by mouth daily    Associated Diagnoses: Restless leg syndrome         STOP taking these medications       haloperidol (HALDOL) 5 MG tablet Comments:   Reason for Stopping:         hydrOXYzine (ATARAX) 25 MG tablet Comments:   Reason for Stopping:         hydrOXYzine (ATARAX) 25 MG tablet Comments:   Reason for Stopping:         Lidocaine (LIDOCARE) 4 % Patch Comments:   Reason for Stopping:         ondansetron (ZOFRAN-ODT) 4 MG ODT tab Comments:   Reason for Stopping:                 DISCHARGE INSTRUCTIONS AND FOLLOW UP  Discharge Procedure Orders   Home Care PT Referral for Hospital Discharge   Referral Priority: Routine: Next available opening Referral Type: Home Health Therapies & Aides   Number of Visits Requested: 1     Home Care OT Referral for Hospital Discharge   Referral Priority: Routine: Next available opening Referral Type: Consultation   Number of Visits Requested: 1     Home Care SLP Referral for Hospital Discharge   Referral Priority: Routine: Next available opening Referral Type: Consultation   Number of Visits Requested: 1     Home care nursing referral   Referral Priority: Routine: Next available opening Referral Type: Home Health Therapies & Aides   Number of Visits Requested: 1     Adult [facility] Follow-up and recommended labs and tests   Order Comments: Follow up with primary care provider, Meg Roth, within 7 days for hospital follow- up.  The following labs/tests are recommended: BMP to monitor creatinine/kidney function.      Appointments on [place] and/or [facility] (with [facility] or [facility] provider or service). Call [phone] if you haven't heard regarding these appointments within 7 days of discharge.     Activity   Order Comments: Your activity upon discharge: ambulate in house, and as discussed with therapy teams     Order Specific Question Answer Comments   Is discharge order? Yes      MD face to face encounter   Order Comments: Documentation of  Face to Face and Certification for Home Health Services    I certify that patient: Red Sutherland is under my care and that I, or a nurse practitioner or physician's assistant working with me, had a face-to-face encounter that meets the physician face-to-face encounter requirements with this patient on: 1/26/2022.    This encounter with the patient was in whole, or in part, for the following medical condition, which is the primary reason for home health care: subdural hemorrhage.    I certify that, based on my findings, the following services are medically necessary home health services: Nursing, Occupational Therapy, Physical Therapy, and Speech Language Therapy.    My clinical findings support the need for the above services because: Nurse is needed: twice weekly BMP draws.    Further, I certify that my clinical findings support that this patient is homebound (i.e. absences from home require considerable and taxing effort and are for medical reasons or [employer] services or infrequently or of short duration when for other reasons) because: Leaving home is medically contraindicated for the following reason(s): difficulty ambulating in community without increasing risk of fall or other injury.    Based on the above findings. I certify that this patient is confined to the home and needs intermittent skilled nursing care, physical therapy and/or speech therapy.  The patient is under my care, and I have initiated the establishment of the plan of care.  This patient will be followed by a physician who will periodically review the plan of care.  Physician/Provider to provide follow up care: Meg Roth    Attending hospital physician (the Medicare certified PEC provider): Renny Rivera MD  Physician Signature: See electronic signature associated with these discharge orders.  Date: 1/25/2022     Reason for your hospital stay   Order Comments: You were admitted to the [facility] for therapy after  "your subdural hemorrhage     Diet   Order Comments: Follow this diet upon discharge: Orders Placed This Encounter      Calorie Counts      Room Service      Regular Diet Adult     Order Specific Question Answer Comments   Is discharge order? Yes           physical examination  VITAL SIGNS:  BP (!) 142/75 (BP Location: Right arm)   Pulse 75   Temp (!) 95.6  F (35.3  C) (Oral)   Resp 18   Ht 1.803 m (5' 11\")   Wt 101.8 kg (224 lb 8 oz)   SpO2 100%   BMI 31.31 kg/m    BMI:  Estimated body mass index is 31.31 kg/m  as calculated from the following:    Height as of this encounter: 1.803 m (5' 11\").    Weight as of this encounter: 101.8 kg (224 lb 8 oz).     General: NAD, pleasant and cooperative  HEENT: ATNC, EOMI, PERRL, no discharge from nares or ears    Pulmonary: clear breath sounds b/l, no rales/wheezing    Cardiovascular: Irregularly irregular, +systolic murmur  Abdominal: soft, non-tender to palpation, bowel sounds present   Extremities: warm, well perfused, no edema in bilateral lower extremities, no tenderness in calves.  MMT 4/5 to left EF, EE, and , otherwise 5/5 throughout.       Discharge summary was forwarded to Meg Roth (PCP) at the time of discharge, so as to bridge from hospital to outpatient care.     It was our pleasure to care for Red Sutherland during this hospitalization. Please do not hesitate to contact me should there be questions regarding the hospital course or discharge plan.      Discharge summary was started by Disha Nichols, PGY1, and completed on the day of discharge including physical exam by me.      Curtis Rivera MD  Department of Rehabilitation Medicine    35 minutes were spent on discharge date activities.   "

## 2022-01-25 NOTE — PLAN OF CARE
FOCUS/GOAL  Medical management and Safety management    ASSESSMENT, INTERVENTIONS AND CONTINUING PLAN FOR GOAL:    Pt is alert and oriented x4. Bedside attendant d/t poor safety awareness and impulsiveness. Denied pain, sob, N/V or any new weakness. Sleeping on and off tonight. Sleep pattern is baseline per wife's report.

## 2022-01-25 NOTE — PLAN OF CARE
"Physical Therapy Discharge Summary    Reason for therapy discharge:    Discharged to home with home therapy.    Progress towards therapy goal(s). [facility] for goal details.  Goals partially met.  Barriers to achieving goals:   limited tolerance for therapy.    Therapy recommendation(s):    Continued therapy is recommended.  Rationale/Recommendations:  continue HH PT to progress strength, endurance, and balance amb with SEC in home.    Summary:  Bed mob - independent  Transfers - STS, SEC or FWW or 4WW, SBA  Gait - amb with SEC in room SBA, amb with 4WW longer distances 100-200 ft SBA with intermittent need for seated rest break d/t fatigue and SOB.  Stairs - up/down 4x6\" steps, B rails, SBA.    Pt initially planning to discharge this past Monday but deferred to increasing SOB and fatigue with mobility d/t fluid overload. Appeared closer to prior function on ARU today and safe to discharge home tomorrow.    DME - owns SEC, FWW, and 3WW    "

## 2022-01-25 NOTE — PLAN OF CARE
FOCUS/GOAL  Medical management, Cognition/Memory/Judgment/Problem solving, Safety management, and Prevention of secondary complications    ASSESSMENT, INTERVENTIONS AND CONTINUING PLAN FOR GOAL:    Assumed care of patient at [phone].    A&O x 4. Forgetful. 1:1 sitter due to impulsiveness. Denies chest pain, nausea/vomiting and SOB. Transfers with A1 and cane in room, requires FWW for longer distances. Regular diet, thin liquids, takes meds whole. PIV to LUE CDI.. Continent of bowel and bladder, last BM 1/23. Call light in reach and sitter in hallway outside of room. Continue with plan of care.    Judie Hussein RN

## 2022-01-25 NOTE — PLAN OF CARE
Occupational Therapy Discharge Summary     Reason for therapy discharge:    Discharged to home with outpatient therapy.     Progress towards therapy goal(s). See goals on Care Plan in [facility] electronic health record for goal details.  Goals partially met.  Barriers to achieving goals:   discharge from facility.     Therapy recommendation(s):    Continued therapy is recommended.  Rationale/Recommendations:  Pt is discharging at a 24/7 supervision level for mobility and  ADL. Pt also does require min A for LB  dressing which is consistent with his baseline. Wife completed family training education regarding recommendations. Also discussed sleeping in same room since he is active at night and a fall risk during those times. Pt has all DME and  will continue with HH OT.

## 2022-01-25 NOTE — PROGRESS NOTES
"PM&R Progress Note    INTERVAL HISTORY  Arron was seen in OT today, and he reports his shortness of breath as improved during his OT session. He feels \"good\" overall, and is interested in going home    Functionally, he continues to exhibit moderate cognitive deficits in attention, memory, executive function, and processing speed. He is a stand my assist for ADLs and mobility in room, and min assist for BLE, which is his baseline. His ambulation has been limited due to shortness of breath, but we are hoping to improve that with further diuresis.      MEDICATIONS  Scheduled meds    aspirin  81 mg Oral Daily     atorvastatin  40 mg Oral QPM     [START ON 1/26/2022] bumetanide  2 mg Oral BID     bumetanide  3 mg Oral Once     empagliflozin  10 mg Oral or Feeding Tube Daily     ferrous gluconate  324 mg Oral Every Other Day     fluticasone-vilanterol  1 puff Inhalation Daily     hydrOXYzine  25 mg Oral At Bedtime     insulin aspart  1-7 Units Subcutaneous TID AC     insulin aspart  1-5 Units Subcutaneous At Bedtime     insulin glargine  8 Units Subcutaneous BID     irbesartan  75 mg Oral or Feeding Tube Daily     lacosamide  200 mg Oral or Feeding Tube BID     melatonin  5 mg Oral QPM     metoprolol tartrate  25 mg Oral BID     multivitamin w/minerals  1 tablet Oral Daily     pantoprazole  40 mg Oral QAM AC     prasugrel  10 mg Oral or Feeding Tube Daily     rOPINIRole  1 mg Oral QPM     senna-docusate  1-4 tablet Oral BID     sodium chloride (PF)  3 mL Intracatheter Q8H     tadalafil  20 mg Oral Q24H     traZODone  100 mg Oral At Bedtime     zinc sulfate  220 mg Oral Daily       PRN meds:  acetaminophen, albuterol, bisacodyl, calcium carbonate, glucose **OR** dextrose **OR** glucagon, ipratropium - albuterol 0.5 mg/2.5 mg/3 mL, lidocaine-prilocaine, ondansetron, - MEDICATION INSTRUCTIONS -, polyethylene glycol, QUEtiapine, simethicone      PHYSICAL EXAM  BP (!) 142/75 (BP Location: Right arm)   Pulse 75   Temp (!) 95.6 " " F (35.3  C) (Oral)   Resp 18   Ht 1.803 m (5' 11\")   Wt 101.8 kg (224 lb 8 oz)   SpO2 100%   BMI 31.31 kg/m    Gen: Awake and alert, sitting in chair in OT gym.  HEENT: moist mucus membranes, on room air  Cardio: RRR, S1+S2, soft systolic murmur  Pulm: lungs CTA bilaterally, on room air, no increased work of breathing  Abd: soft, nontender.   Ext: +1 pitting edema, chronic venous stasis changes present  Neuro/MSK: Answers appropriately, follows commands. Moves all extremities independently.  5/5 bilaterally, EF and EE 5/5 bilaterally    LABS  Results for orders placed or performed during the hospital encounter of 01/17/22 (from the past 24 hour(s))   Basic metabolic panel   Result Value Ref Range    Sodium 137 133 - 144 mmol/L    Potassium 4.4 3.4 - 5.3 mmol/L    Chloride 105 94 - 109 mmol/L    Carbon Dioxide (CO2) 26 20 - 32 mmol/L    Anion Gap 6 3 - 14 mmol/L    Urea Nitrogen 34 (H) 7 - 30 mg/dL    Creatinine 1.31 (H) 0.66 - 1.25 mg/dL    Calcium 8.5 8.5 - 10.1 mg/dL    Glucose 186 (H) 70 - 99 mg/dL    GFR Estimate 55 (L) >60 mL/min/1.73m2   Glucose by meter   Result Value Ref Range    GLUCOSE BY METER POCT 114 (H) 70 - 99 mg/dL   Glucose by meter   Result Value Ref Range    GLUCOSE BY METER POCT 201 (H) 70 - 99 mg/dL   Basic metabolic panel   Result Value Ref Range    Sodium 139 133 - 144 mmol/L    Potassium 3.8 3.4 - 5.3 mmol/L    Chloride 107 94 - 109 mmol/L    Carbon Dioxide (CO2) 27 20 - 32 mmol/L    Anion Gap 5 3 - 14 mmol/L    Urea Nitrogen 30 7 - 30 mg/dL    Creatinine 1.38 (H) 0.66 - 1.25 mg/dL    Calcium 8.7 8.5 - 10.1 mg/dL    Glucose 89 70 - 99 mg/dL    GFR Estimate 52 (L) >60 mL/min/1.73m2   Glucose by meter   Result Value Ref Range    GLUCOSE BY METER POCT 73 70 - 99 mg/dL   Glucose by meter   Result Value Ref Range    GLUCOSE BY METER POCT 76 70 - 99 mg/dL   Glucose by meter   Result Value Ref Range    GLUCOSE BY METER POCT 108 (H) 70 - 99 mg/dL       ASSESSMENT AND PLAN    Red LEACH" Koko is a 81 year old R hand dominant male presented to the ARU for SDH s/p R craniotomy and hematoma evacuation. His medical problems include recent post op seizures, delirium, afib, HTN, pulmonary HTN, CHF, T2DM, Stage IV CKD, and malnutrition. He had a T-tack for his G-tube removed on 1/20, and he continued to be restless at night. It is unclear if this is close to his baseline or if it's due to his SDH. On 1/21, he began to experience new shortness of breath during therapy. An EKG showed Afib, and further workup was done to evaluate for PE or DVT. Imaging did not show evidence of DVT or PE, but did show pleural effusion consistent with CHF exacerbation. Medicine was consulted, and pt was kept past initial date of discharge in order to effectively diurese Bill to avoid readmission. He did well with increases in his Bumex dosing, and we anticipate discharge on 1/26.         Admission to acute inpatient rehab on 1/17/2022.    Impairment group code: Brain Dysfunction 02.22 Traumatic, Closed Injury - SDH s/p evacuation        1. PT, OT and SLP 60 minutes of each on a daily basis, in addition to rehab nursing and close management of physiatrist.       2. Impairment of ADL's:  OT for 60 min daily to work on upper and lower body self care, dressing, toileting, bathing, energy conservation techniques with use of ADs as needed.      3. Impairment of mobility:   PT for 60 min daily to work on gait exercises, strengthening, endurance buildup, transfers with use of walker as needed.      4. Impairment of cognition/language/swallow:   SLP for 60 min daily for cognitive evaluation and treatment strategies for higher level cognitive deficits and memory impairment.      5. Rehab RN to administer medication, patient education on medication taking, VS monitoring, bowel regimen, glucose monitoring and wound care/surgical wound dressing changes and monitoring.     1.  Medical Conditions     New Shortness of Breath, secondary to  CHF exacerbation  -likely secondary to CHF exacerbation  -EKG shows afib  -CTA chest and BLE US did not show evidence of DVT or IRENE  -will consult medicine, appreciate recs  -diuresing per medicine   -will start diuretic dosing based on discharge medications today   - 2mg Bumex BID (down from 4mg BID at initial presentation in December)  -will monitor Cr with BMP tomorrow, will need home health to draw twice weekly BMPs after discharge     Neuro  R Subdural Hematoma  -on aspirin 81mg and Prasugrel 10mg  -PT, OT, SLP     Post Op Seizures  -continue Vimpat 200mg BID  -monitor for face twitching and tongue biting     Delirium  -previously on Haldol, Atarax, and melatonin due to agitation in the evenings  -melatonin 10mg scheduled at night  -increase trazodone to 100mg at night  -Seorquel 25-50 mg PRN for sleep and agitation.  Discontinued Haldol due to stronger dopaminergic blockade and potential for interfering with tabby recovery.  -currently has 1:1 sitter, can place sitter outside room at night to help facilitate sleep     Restless Leg Syndrome  -ropinirole 1mg at bed     CV  Afib  -Eliquis held due to SDH  -rate controlled with metoprolol 25mg BID  -EKG on 1/21 shows afib  -CTA and Doppler US did not show evidence of PE or DVT  -consult medicine, appreciate recs     Pulmonary HTN  -on tadalafil 20mg daily     Hx of Essential HTN  RV Dysfunction  -CHF with 60-65% ejection fraction based on echo on 10/23/2021  -irbesartan 75mg daily  -monitor for hypotension given recent episodes  -on aspirin 81mg and Prasugrel 10mg  -shortness of breath first noted on 1/21 likely secondary to CHF exacerbation, see above     Pulmonary  COPD  -resting comfortably on room air  -consider O2 as needed for activity  -continue Breo daily and albuterol PRN     Renal  Stage IV CKD  -renally dose medications  -monitor Cr and BUN  -avoid nephrotoxic agents     Endo  T2DM  -20mg Lantus BIS  -SSI as needed  -empagliflozin 10mg daily  -monitor  POC glucoses, adjust meds as needed     Nutrition  -nutrition consult  -previously getting tube feedings              -Continuous Pivot 1.5, 60ml/hr goal rate, [id number]  -Currently holding tube feeds and monitoring calorie counts  -regular diet and thin liquids  -G-tube button removed  -bumper loosened slightly to allow for full turning   -will add simethicone for pt's gas  -continue to monitor G-tube site closely     GI  GERD  -Protonix 40mg     Liver Cirrhosis  -incidental on imaging  -GI f/u outpatient        2. Adjustment to disability:  Clinical psychology to eval and treat  3. FEN: monitor BMPs twice weekly, encourage CO fluids  4. Bowel: senna BID, PRN Miralax and bisacodyl suppository  5. Bladder: able to urinate on own  6. DVT Prophylaxis: Ambulation, aspirin and prasugrel  7. GI Prophylaxis: Protonix 40mg  8. Code: Full  9. Disposition: Modified independent to home  10. ELOS:  7 days              11. Rehab prognosis:  fair  12. Follow up Appointments on Discharge: neurosurgery and CT 2 weeks after discharge, neurology 4 months after discharge, GI for incidental liver cirrhosis, home nursing for twice weekly BMPs     Disha Nichols  PGY-1  PM&R

## 2022-01-26 VITALS
BODY MASS INDEX: 31.43 KG/M2 | WEIGHT: 224.5 LBS | DIASTOLIC BLOOD PRESSURE: 74 MMHG | HEIGHT: 71 IN | RESPIRATION RATE: 16 BRPM | HEART RATE: 79 BPM | SYSTOLIC BLOOD PRESSURE: 141 MMHG | OXYGEN SATURATION: 97 % | TEMPERATURE: 95.6 F

## 2022-01-26 LAB
ANION GAP SERPL CALCULATED.3IONS-SCNC: 6 MMOL/L (ref 3–14)
BUN SERPL-MCNC: 30 MG/DL (ref 7–30)
CALCIUM SERPL-MCNC: 8.3 MG/DL (ref 8.5–10.1)
CHLORIDE BLD-SCNC: 107 MMOL/L (ref 94–109)
CO2 SERPL-SCNC: 27 MMOL/L (ref 20–32)
CREAT SERPL-MCNC: 1.42 MG/DL (ref 0.66–1.25)
GFR SERPL CREATININE-BSD FRML MDRD: 50 ML/MIN/1.73M2
GLUCOSE BLD-MCNC: 99 MG/DL (ref 70–99)
GLUCOSE BLDC GLUCOMTR-MCNC: 94 MG/DL (ref 70–99)
POTASSIUM BLD-SCNC: 3.6 MMOL/L (ref 3.4–5.3)
SODIUM SERPL-SCNC: 140 MMOL/L (ref 133–144)

## 2022-01-26 PROCEDURE — 80048 BASIC METABOLIC PNL TOTAL CA: CPT | Performed by: PHYSICAL MEDICINE & REHABILITATION

## 2022-01-26 PROCEDURE — 250N000013 HC RX MED GY IP 250 OP 250 PS 637: Performed by: INTERNAL MEDICINE

## 2022-01-26 PROCEDURE — 250N000013 HC RX MED GY IP 250 OP 250 PS 637: Performed by: PHYSICAL MEDICINE & REHABILITATION

## 2022-01-26 PROCEDURE — 99239 HOSP IP/OBS DSCHRG MGMT >30: CPT | Mod: 24 | Performed by: PHYSICAL MEDICINE & REHABILITATION

## 2022-01-26 PROCEDURE — 250N000013 HC RX MED GY IP 250 OP 250 PS 637: Performed by: PHYSICIAN ASSISTANT

## 2022-01-26 PROCEDURE — 99232 SBSQ HOSP IP/OBS MODERATE 35: CPT | Performed by: INTERNAL MEDICINE

## 2022-01-26 PROCEDURE — 36415 COLL VENOUS BLD VENIPUNCTURE: CPT | Performed by: PHYSICAL MEDICINE & REHABILITATION

## 2022-01-26 RX ADMIN — EMPAGLIFLOZIN 10 MG: 10 TABLET, FILM COATED ORAL at 07:42

## 2022-01-26 RX ADMIN — FERROUS GLUCONATE 324 MG: 324 TABLET ORAL at 09:37

## 2022-01-26 RX ADMIN — QUETIAPINE FUMARATE 50 MG: 25 TABLET ORAL at 00:10

## 2022-01-26 RX ADMIN — IRBESARTAN 75 MG: 75 TABLET ORAL at 07:42

## 2022-01-26 RX ADMIN — MULTIPLE VITAMINS W/ MINERALS TAB 1 TABLET: TAB at 07:42

## 2022-01-26 RX ADMIN — METOPROLOL TARTRATE 25 MG: 25 TABLET, FILM COATED ORAL at 07:42

## 2022-01-26 RX ADMIN — ASPIRIN 81 MG: 81 TABLET, COATED ORAL at 07:42

## 2022-01-26 RX ADMIN — BUMETANIDE 2 MG: 1 TABLET ORAL at 07:41

## 2022-01-26 RX ADMIN — PANTOPRAZOLE SODIUM 40 MG: 40 TABLET, DELAYED RELEASE ORAL at 07:42

## 2022-01-26 RX ADMIN — ZINC SULFATE 220 MG (50 MG) CAPSULE 220 MG: CAPSULE at 07:41

## 2022-01-26 RX ADMIN — PRASUGREL 10 MG: 5 TABLET, FILM COATED ORAL at 07:41

## 2022-01-26 RX ADMIN — LACOSAMIDE 200 MG: 200 TABLET, FILM COATED ORAL at 07:42

## 2022-01-26 RX ADMIN — TADALAFIL 20 MG: 10 TABLET, FILM COATED ORAL at 09:37

## 2022-01-26 ASSESSMENT — ACTIVITIES OF DAILY LIVING (ADL)
ADLS_ACUITY_SCORE: 12
ADLS_ACUITY_SCORE: 11
ADLS_ACUITY_SCORE: 12
ADLS_ACUITY_SCORE: 12
ADLS_ACUITY_SCORE: 11
ADLS_ACUITY_SCORE: 12

## 2022-01-26 NOTE — PLAN OF CARE
FOCUS/GOAL  Mobility, Skin integrity, and Safety management    ASSESSMENT, INTERVENTIONS AND CONTINUING PLAN FOR GOAL:  Slept most of the night after PRN seroquel was given, although with episodes of wakefulness at times. A/O, denies pain, SOB, chest pain, N/V nor dizziness. Makes needs known, not using call light, reminded, staff anticipates needs. SBA cane with transfers. Cont of BL, no BM this shift, LBM-1/25/22. PEG tube and left arm PIV in place, dressings CDI. Refused PIV flush, PIV removed d/t infiltration. No attempts of self-transferring observed. For discharge today.

## 2022-01-26 NOTE — PROGRESS NOTES
[facility]    Medicine Progress Note - Hospitalist Service    Date of Admission:  1/17/2022    Assessment & Plan                              Red Sutherland is a 82 yo man with PMHx CAD with PCI (2/2021 and repeat 10/2021), chronic Afib previously on Plavix and Xarelto, HFpEF, COPD, pulmonary HTN, Type 2 DM, CKD, GERD, and RLS who was admitted to [facility] 12/20/21-1/17/22 for a traumatic right-sided subdural hematoma with mass effect and midline shift s/p evacuation 12/21. This hospital course was c/b seizures, altered mentation and agitation, failed swallow test requiring PEG placement. He was discharged from [facility] on 1/17 to ARU for further rehabilitation.      1/26  Patient home dose Bumex 4 mg BID   Will plan on discharging on 4 mg daily and close follow up with PCP . BMP on Friday 1/28      Shortness of Breath  ; improved   HFpEF, decompensated  Recent admission 11/30-12/6 for HF exacerbation, at that time discharged with Bumex 4 mg BID and metolazone 2.5 mg MWF. EF 60-65% 1/6/22; mildly reduced right ventricular function, mild-to-moderate tricuspid insufficiency, pHTN, and noted to have dilated IVC at this time. Previously had ascites thought to be 2/2 right-sided HF.     Permanent Afib  Previously on Eliquis PTA, but this was held upon admission to the hospital d/t subdural hematoma. Previously referred to Watchman device placement d/t history of bleeding issues (has not been done). Also previously was on digoxin but this was held in December in light of worsening renal function.   - metoprolol tartrate 25 mg BID  - AC on hold given SDH    Right subdural hematoma  C/b seizures   Had fall 12/5 then had progressive worsening neuro symptoms until presented to OSH ED on 12/20 found to have SDH. Ultimately underwent evacuation per Neurosurgery on 12/21/2021 but continued to have seizures and was initially on Keppra, which was ultimately switched to Vimpat d/t agitation  and delirium.   - repeat CT scan 2 weeks from discharge (~1/31/2022)  - f/u Neurosurgery clinic with GEORGETTE     Type 2 DM  Last A1c 7.5% on 12/21/2021. PTA on Lantus 18 units BID and invokana 100 mg every evening. .   - Lantus 10 units BID -> 8 units BID then 8 international unit(s) on discharge  - Jardiance 10 mg daily   - medium sliding scale insulin     CKD, Stage IV  Baseline Cr 1.2-1.8.   Creatinine 1.42 on discharge  -    Pulmonary HTN   - continue tadalafil, irbesartan     CAD s/p PCI x 2  HTN  HLD  - ASA 81 mg; prasugrel 10 mg daily  - atorvastatin 40 mg   - metoprolol tartrate 25 mg BID    COPD  Pt reports using 4 L NC at home, although currently stable on RA.  - PTA breo ellipta, albuterol PRN    Liver cirrhosis  Incidentally noted on CTAP 11/30. History negative for AUD, IVDU, viral hepatitis, family history of liver disease. LFTs WNL. Previously noted to have ascites related to R heart disease. Ammonia 1/7 WNL. No e/o synthetic liver dysfunction.   - GI f/u outpatient     Anemia  History GIB  GERD  Baseline Hgb in 8s, currently at baseline. History of iron deficiency. Last colonoscopy 10/31/2021 with one small AVM s/p APC, three sessile polyps in transverse, ascending colon, and rectum s/p polypectomy, internal hemorrhoids. History of GIB.  - ferrous gluconate 324 mg every other day   - pantoprazole     Sleep concerns  - melatonin, hydroxyzine, trazodone at bedtime     RLS  - ropinirole 1 mg     Moderate malnutrition  PEG placement 1/3/2022  - off TFs and tolerating diet, but PEG cannot be removed until 2/14  - continue diet as needed           Diet: Regular Diet Adult  Room Service  Diet    DVT Prophylaxis: VTE Prophylaxis contraindicated due to ICH  Dixon Catheter: Not present  Central Lines: None  Cardiac Monitoring: None  Code Status: Full Code      Disposition Plan   Expected Discharge: 01/26/2022     Anticipated discharge location:  Awaiting care coordination huddle  Delays:            The patient's  care was discussed with the Primary team.    Meredith Jones MD  Hospitalist Service  [facility]  Securely message with the [employer] Web Console (learn more here)  Text page via [employer] Paging/Directory         Clinically Significant Risk Factors Present on Admission                    ______________________________________________________________________    Interval History    Excited to go home  No new symptoms reported per nursing staff .  Last night notes reviewed .  No chest pain or Shortness of breath reported.  No vomiting   No difficulty with voiding   Passing gas .    4 system ROS reviewed .    Data reviewed today: I reviewed all medications, new labs and imaging results over the last 24 hours.     Physical Exam   Vital Signs: Temp: (!) 95.6  F (35.3  C) Temp src: Oral BP: (!) 141/74 Pulse: 79   Resp: 16 SpO2: 97 % O2 Device: None (Room air)    Weight: 224 lbs 8 oz  Constitutional: awake, alert, cooperative, no apparent distress, and appears stated age  Eyes: Lids and lashes normal, pupils equal, round and reactive to light, extra ocular muscles intact, sclera clear, conjunctiva normal  Hematologic / Lymphatic: no cervical lymphadenopathy  Respiratory: No increased work of breathing, good air exchange, clear to auscultation bilaterally, no crackles or wheezing  Cardiovascular: Normal apical impulse, regular rate and rhythm, normal S1 and S2,  and no murmur noted  GI: No scars, normal bowel sounds, soft, non-distended, non-tender, no masses palpated, no hepatosplenomegally  Skin: no bruising or bleeding. Edema BL  Neuropsychiatric: General: normal, calm and normal eye contact    Data   Recent Labs   Lab 01/26/22  0725 01/26/22  0631 01/25/22  2116 01/25/22  0812 01/25/22  0750 01/24/22  1700 01/24/22  1044 01/21/22  1244 01/21/22  1118   WBC  --   --   --   --   --   --   --   --  7.4   HGB  --   --   --   --   --   --   --   --  8.7*   MCV  --   --   --   --   --    --   --   --  70*   PLT  --   --   --   --   --   --   --   --  219   NA  --  140  --   --  139  --  137   < > 138   POTASSIUM  --  3.6  --   --  3.8  --  4.4   < > 4.2   CHLORIDE  --  107  --   --  107  --  105   < > 108   CO2  --  27  --   --  27  --  26   < > 24   BUN  --  30  --   --  30  --  34*   < > 30   CR  --  1.42*  --   --  1.38*  --  1.31*   < > 1.22   ANIONGAP  --  6  --   --  5  --  6   < > 6   ANNMARIE  --  8.3*  --   --  8.7  --  8.5   < > 8.8   GLC 94 99 209*   < > 89   < > 186*   < > 176*    < > = values in this interval not displayed.

## 2022-01-26 NOTE — PROGRESS NOTES
Met with pt and pt wife at bedside. Pt given and signed IMM. Pt and pt wife given [facility] flyer with contact information. Pt and pt wife given information on medication assistance programs and information for senior linkage line. Pt and pt wife appreciative of information and denied additional needs at this time. No additional needs. Home today, wife will transport home.     Home Health Care:   [facility] PH: [phone] (previously known as: [facility])  Nurse, physical therapy, occupational therapy, and speech therapy      Clinic Care Coordinator / [facility]  DERIK Barber  PH: [phone]    JANIE Cobb   [facility]   Direct Phone: [phone]  I   Pager: [phone]  I  Fax: [phone]

## 2022-01-26 NOTE — DISCHARGE INSTRUCTIONS
Follow-up Appointments    - Imaging  You are scheduled for a CT on Friday, February 4th at 1:40 PM.    Address [facility] Imaging & Lab    [facility] [address] [address]  Phone  [phone]    - Neurosurgery  You are scheduled to see JOSE ANTONIO Viera CNP on Friday, February 4th at 2:30 PM.    Address [facility] Neurosurgery Clinic    [facility]    [address] [place] [id number]  Phone  [phone]    - GI for incidental liver cirrhosis  You are scheduled on Thursday, February 17th for labs at 9:30 AM, followed by an appointment with Dr. Mancini at 10:30 AM.    Address [facility] Imaging & Lab    [facility] [address] [address]  Phone  [phone]    - Neurology (in 4 months)  Please call [phone] to schedule an appointment with neurology in 4 months.    Address [facility] - Neurology    [facility] [address] [address]  Phone  [phone]      ------------------------------------------------------------------------------------------------------------------------------      Home Health Care:   [facility] PH: [phone] (previously known as: [facility])  Nurse, physical therapy, occupational therapy, and speech therapy   -You will get a call after you have discharged from [facility] to schedule the first home care visit.     Clinic Care Coordinator / [facility]  DERIK Barber  PH: [phone]    [place] [facility] [place] [facility]  Additional resources and contact information online   Www.[email or web address] & [email or web address]  Types of services that Stroke/Brain Injury Resource Facilitation offers include:  Emotional support in coping with a  new normal   Finding mental health support and counselors who  understand brain injury and stroke  Finding a support group  Finding or re-connecting to rehabilitation services  Finding where and how to get a brain injury assessed  Finding or re-connecting with a primary care physician  Supporting caregivers by connecting them with resources  Education about diagnosis and symptoms -  Is this normal   Helping educate family and loved ones of the survivor s  invisible  symptoms  Figuring out the logistics of returning to work, vocational rehab and understanding ADA rights  If not going back to work, support in finding meaningful ways to structure your day and exploring volunteer options  Coaching on navigation the federal, state and [facility] health and disability service system with additional support and conference calls as needed  Help finding appropriate legal support for appealing and navigating Social Security Disability, providing advocacy on the individual s behalf as needed and connecting with cost effective alternatives to  as needed  Supporting parents/students with how to discuss return to school and sports with educators and connecting to a TBI specialist or parent advocate group if needed  Connecting to addiction (alcohol/drug/gambling/smoking) support and treatment services  Support with creative problem solving to life barriers.  *These are just a few examples of common things that Resource Facilitation can help with. They are not limited to what is listed here so if you are curious if they can help, just ask.   Call us at [phone] or [phone].

## 2022-01-26 NOTE — PLAN OF CARE
"  VS: BP (!) 141/74 (BP Location: Left arm, Patient Position: Sitting)   Pulse 79   Temp (!) 95.6  F (35.3  C) (Oral)   Resp 16   Ht 1.803 m (5' 11\")   Wt 101.8 kg (224 lb 8 oz)   SpO2 97%   BMI 31.31 kg/m       O2: 97% on RA, denies SOB    Output: Voiding without difficulty using urinal at bedside and up to bathroom    Last BM: 1/25/2022 per pt report    Activity: Assist of 1 with cane while in the room; four wheeled walker when outside the room with gait belt.    Skin: Intact; head incision LEONID and edema in bilateral lower extremities +1 to +2    Pain: Denies pain    CMS: A&Ox4, but forgetful; denies NV; no numbness or tingling present    Dressing: None    Diet: Regular diet, whole pills, thin liquids. BG check before meals; AM BG 94, no coverage given. Lantus dose changed from twice per day to once in the AM.    LDA: None; IV removed yesterday.    Equipment: Cane; four wheeled walker; gait belt; personal belongings at bedside    Plan: Continue per plan of care, discharge today before 11 AM    Additional Info: Sitter at the bedside      Natacha Candelario RN   "

## 2022-01-26 NOTE — PLAN OF CARE
Pt. discharged at 11 AM via wheelchair to lobby and car to home. Pt. was accompanied by wife, and left with personal belongings. Prior to discharge, PIV was removed in AM. Pt. received complete discharge paperwork and  medications as filled by discharge pharmacy. Pt. was given times of last dose for all discharge medications in writing on discharge medication sheets.  Discharge teaching included medication, pain management, activity restrictions, and signs and symptoms of infection, COPD and diabetes management. Pt. to follow up with provider in February 4 for head CT. Pt. had no further questions at the time of discharge and no unmet needs were identified.    Natacha Candelario RN

## 2022-01-27 DIAGNOSIS — Z71.89 OTHER SPECIFIED COUNSELING: ICD-10-CM

## 2022-01-28 ENCOUNTER — PATIENT OUTREACH (OUTPATIENT)
Dept: CARE COORDINATION | Facility: CLINIC | Age: 82
End: 2022-01-28
Payer: COMMERCIAL

## 2022-01-28 NOTE — PROGRESS NOTES
Clinic Care Coordination Contact  Care Team Conversations    Patient identified for care management outreach, however Virgie RN staff has already followed up with patient to ensure they are following up with PCP and have needs and resources met. Clinic RN will refer back to BENJA CAPUTO if needed/appropriate.    DERIK Barber  Social Work Care Coordinator - [facility]  Care Coordination  [email or web address]  Cell Phone: [phone]  Gender pronouns: she/her  Employed by [employer]

## 2022-02-01 ENCOUNTER — LAB REQUISITION (OUTPATIENT)
Dept: LAB | Facility: CLINIC | Age: 82
End: 2022-02-01

## 2022-02-01 ENCOUNTER — TRANSFERRED RECORDS (OUTPATIENT)
Dept: HEALTH INFORMATION MANAGEMENT | Facility: CLINIC | Age: 82
End: 2022-02-01
Payer: COMMERCIAL

## 2022-02-01 DIAGNOSIS — I50.33 ACUTE ON CHRONIC DIASTOLIC (CONGESTIVE) HEART FAILURE (H): ICD-10-CM

## 2022-02-01 LAB
ANION GAP SERPL CALCULATED.3IONS-SCNC: 10 MMOL/L (ref 5–18)
BUN SERPL-MCNC: 40 MG/DL (ref 8–28)
CALCIUM SERPL-MCNC: 9 MG/DL (ref 8.5–10.5)
CHLORIDE BLD-SCNC: 102 MMOL/L (ref 98–107)
CO2 SERPL-SCNC: 29 MMOL/L (ref 22–31)
CREAT SERPL-MCNC: 1.53 MG/DL (ref 0.7–1.3)
GFR SERPL CREATININE-BSD FRML MDRD: 46 ML/MIN/1.73M2
GLUCOSE BLD-MCNC: 132 MG/DL (ref 70–125)
HBA1C MFR BLD: 6.9 % (ref 4.2–6.1)
POTASSIUM BLD-SCNC: 4.3 MMOL/L (ref 3.5–5)
SODIUM SERPL-SCNC: 141 MMOL/L (ref 136–145)

## 2022-02-01 PROCEDURE — 80048 BASIC METABOLIC PNL TOTAL CA: CPT | Performed by: FAMILY MEDICINE

## 2022-02-01 NOTE — TELEPHONE ENCOUNTER
RECORDS RECEIVED FROM: Internal   Appt Date: 2.17.22   NOTES STATUS DETAILS   OFFICE NOTE from referring provider N/A    OFFICE NOTES from other specialists N/A    DISCHARGE SUMMARY from hospital Internal 1.17.22-1.26.22 [facility]   MEDICATION LIST Internal    LIVER BIOSPY (IF APPLICABLE)      PATHOLOGY REPORTS  N/A    IMAGING     ENDOSCOPY (IF AVAILABLE) N/A    COLONOSCOPY (IF AVAILABLE) N/A    ULTRASOUND LIVER Internal 10.23.21   CT OF ABDOMEN Internal 12.20.21 ab pelvis  7.11.21 chest ab pelvis   MRI OF LIVER N/A    FIBROSCAN, US ELASTOGRAPHY, FIBROSIS SCAN, MR ELASTOGRAPHY N/A    LABS     HEPATIC PANEL (LIVER PANEL) N/A    BASIC METABOLIC PANEL Internal 1.26.22   COMPLETE METABOLIC PANEL N/A    COMPLETE BLOOD COUNT (CBC) N/A    INTERNATIONAL NORMALIZED RATIO (INR) N/A    HEPATITIS C ANTIBODY N/A    HEPATITIS C VIRAL LOAD/PCR N/A    HEPATITIS C GENOTYPE N/A    HEPATITIS B SURFACE ANTIGEN N/A    HEPATITIS B SURFACE ANTIBODY N/A    HEPATITIS B DNA QUANT LEVEL N/A    HEPATITIS B CORE ANTIBODY N/A

## 2022-02-03 ENCOUNTER — LAB REQUISITION (OUTPATIENT)
Dept: LAB | Facility: CLINIC | Age: 82
End: 2022-02-03
Payer: COMMERCIAL

## 2022-02-03 DIAGNOSIS — N18.4 CHRONIC KIDNEY DISEASE, STAGE 4 (SEVERE) (H): ICD-10-CM

## 2022-02-03 LAB
ANION GAP SERPL CALCULATED.3IONS-SCNC: 8 MMOL/L (ref 5–18)
BUN SERPL-MCNC: 39 MG/DL (ref 8–28)
CALCIUM SERPL-MCNC: 9 MG/DL (ref 8.5–10.5)
CHLORIDE BLD-SCNC: 104 MMOL/L (ref 98–107)
CO2 SERPL-SCNC: 29 MMOL/L (ref 22–31)
CREAT SERPL-MCNC: 1.5 MG/DL (ref 0.7–1.3)
GFR SERPL CREATININE-BSD FRML MDRD: 47 ML/MIN/1.73M2
GLUCOSE BLD-MCNC: 116 MG/DL (ref 70–125)
POTASSIUM BLD-SCNC: 4.2 MMOL/L (ref 3.5–5)
SODIUM SERPL-SCNC: 141 MMOL/L (ref 136–145)

## 2022-02-03 PROCEDURE — 80048 BASIC METABOLIC PNL TOTAL CA: CPT | Mod: ORL | Performed by: FAMILY MEDICINE

## 2022-02-04 ENCOUNTER — OFFICE VISIT (OUTPATIENT)
Dept: NEUROSURGERY | Facility: CLINIC | Age: 82
End: 2022-02-04
Payer: COMMERCIAL

## 2022-02-04 ENCOUNTER — ANCILLARY PROCEDURE (OUTPATIENT)
Dept: CT IMAGING | Facility: CLINIC | Age: 82
End: 2022-02-04
Payer: COMMERCIAL

## 2022-02-04 VITALS
RESPIRATION RATE: 16 BRPM | SYSTOLIC BLOOD PRESSURE: 110 MMHG | HEART RATE: 72 BPM | OXYGEN SATURATION: 98 % | DIASTOLIC BLOOD PRESSURE: 70 MMHG

## 2022-02-04 DIAGNOSIS — R56.1 SEIZURE AFTER HEAD INJURY (H): Primary | ICD-10-CM

## 2022-02-04 DIAGNOSIS — S06.5XAA SDH (SUBDURAL HEMATOMA) (H): ICD-10-CM

## 2022-02-04 PROCEDURE — 70450 CT HEAD/BRAIN W/O DYE: CPT | Performed by: RADIOLOGY

## 2022-02-04 PROCEDURE — 99024 POSTOP FOLLOW-UP VISIT: CPT | Performed by: NURSE PRACTITIONER

## 2022-02-04 RX ORDER — IPRATROPIUM BROMIDE AND ALBUTEROL SULFATE 2.5; .5 MG/3ML; MG/3ML
1 SOLUTION RESPIRATORY (INHALATION) EVERY 6 HOURS PRN
COMMUNITY
End: 2022-01-01

## 2022-02-04 RX ORDER — NICOTINE POLACRILEX 4 MG/1
20 GUM, CHEWING ORAL 2 TIMES DAILY
COMMUNITY

## 2022-02-04 RX ORDER — METFORMIN HCL 500 MG
500 TABLET, EXTENDED RELEASE 24 HR ORAL 2 TIMES DAILY WITH MEALS
COMMUNITY
End: 2022-01-01

## 2022-02-04 RX ORDER — AZITHROMYCIN 250 MG/1
1000 TABLET, FILM COATED ORAL DAILY PRN
COMMUNITY

## 2022-02-04 RX ORDER — HYDROCODONE BITARTRATE AND ACETAMINOPHEN 5; 325 MG/1; MG/1
TABLET ORAL
COMMUNITY
Start: 2021-12-20 | End: 2022-01-01

## 2022-02-04 ASSESSMENT — PAIN SCALES - GENERAL: PAINLEVEL: NO PAIN (0)

## 2022-02-04 NOTE — PATIENT INSTRUCTIONS
1. Follow-up head CT in 1 week.     2. Follow-up in clinic after head CT.     2. [facility] referral for anti seizure medication management.

## 2022-02-04 NOTE — LETTER
"2022       RE: Red Sutherland  [address] [facility] [address]     Dear Colleague,    Thank you for referring your patient, Red Sutherland, to the [facility] NEUROSURGERY CLINIC [place] at [facility]. Please see a copy of my visit note below.    [facility]  Department of Neurosurgery      Name: Red Sutherland  MRN: [id number]  Age: 81 year old  : 1940  Referring provider: Parvez Quinones  2022      Chief Complaint:   Right sided SDH on 2021  S/p R craniotomy and hematoma evacuation on 2021    History of Present Illness:   Red Sutherland is a 81 year old right handed male with a history of  afib (previously on eliquis), pulmonary HTN, essential HTN, CAD s/p CABG, COPD, CHF, stage 4 CKD with ascites, PVD, T2DM and a previous hx of a GI bleed who presented to [facility] ED with a L side weakness, headache and slurred speech after a fall roughly around 2021. He was on ASA and Plavix PTA. Imaging showed a large hyperdense holohemispheric right-sided subdural hematoma. He was transferred to [facility] for further care. Patient underwent Right craniotomy and hematoma evacuation on 2021. Postoperative course was complicated by seizures, initially started on Keppra but then switched to Vimpat due to agitation and delirium.    He was transferred to ARU on  and was discharged to home on 22. He is here today for a post discharge follow up. Patient is accompanied by his wife.     Per patient and his wife, he has been doing well. He continues to have some weakness in his left arm and leg. He uses a cane for ambulation. He has home PT and OT twice a week. His speech is back to baseline. He eats regular food and no longer uses the G tube for feeding and \"wants it out\". He continues to be on Vimpat 200 mg twice daily for seizure control. No episodes concerning for seizures since " hospital discharge. He is back on ASA and Prasugrel.     He had a follow up CT scan prior to this visit. Please see results below. Staples were removed during his hospitalization. Incision CDI.     Review of Systems:   Pertinent items are noted in HPI or as in patient entered ROS below, remainder of complete ROS is negative.   No flowsheet data found.     Physical Exam:   /70   Pulse 72   Resp 16   SpO2 98%    General: No acute distress.  [facility]   Neuro: The patient is fully oriented. Speech is normal. Extraocular movements are intact without nystagmus. Facial sensation is intact in V1, V2, V3 distributions. Facial nerve function is normal. Right shoulder full strength, slightly weak on the left. There is no pronator drift. Full strength in right upper and lower extremities. Left upper and lower extremities slightly weak at 4+/5. Sensation intact throughout. No dysmetria with finger-nose-finger testing.   Incision: Staples removed prior to hospital discharge. Site CDI.     Imagin2022 CT Head:  Impression:  1. New subgaleal collection overlying the right frontal craniotomy.  2. No significant change in right frontal mixed subdural hematoma.     Assessment:  Right sided SDH on 2021  S/p R craniotomy and hematoma evacuation on 2021      Plan:  Image findings were discussed with inpatient Neurosurgery team. We will plan for close follow up with a Head CT in 1 week and clinic visit after. Meanwhile if there is any neurological symptoms, patient to present to the ER. Patient agrees with this plan.     For anti seizure medication management, Neurology referral ordered.      Jovita Arias CNP  Department of Neurosurgery      I spent 35 minutes on patient care activities related to this encounter on the date of service, including time spent reviewing the chart, obtaining history and examination and in counseling the patient, and in documentation in the electronic medical record.        Again,  thank you for allowing me to participate in the care of your patient.      Sincerely,    JOSE ANTONIO Viera CNP

## 2022-02-04 NOTE — PROGRESS NOTES
"[facility]  Department of Neurosurgery      Name: Red Sutherland  MRN: [id number]  Age: 81 year old  : 1940  Referring provider: Parvez Quinones  2022      Chief Complaint:   Right sided SDH on 2021  S/p R craniotomy and hematoma evacuation on 2021    History of Present Illness:   Red Sutherland is a 81 year old right handed male with a history of  afib (previously on eliquis), pulmonary HTN, essential HTN, CAD s/p CABG, COPD, CHF, stage 4 CKD with ascites, PVD, T2DM and a previous hx of a GI bleed who presented to [facility] ED with a L side weakness, headache and slurred speech after a fall roughly around 2021. He was on ASA and Plavix PTA. Imaging showed a large hyperdense holohemispheric right-sided subdural hematoma. He was transferred to [facility] for further care. Patient underwent Right craniotomy and hematoma evacuation on 2021. Postoperative course was complicated by seizures, initially started on Keppra but then switched to Vimpat due to agitation and delirium.    He was transferred to ARU on  and was discharged to home on 22. He is here today for a post discharge follow up. Patient is accompanied by his wife.     Per patient and his wife, he has been doing well. He continues to have some weakness in his left arm and leg. He uses a cane for ambulation. He has home PT and OT twice a week. His speech is back to baseline. He eats regular food and no longer uses the G tube for feeding and \"wants it out\". He continues to be on Vimpat 200 mg twice daily for seizure control. No episodes concerning for seizures since hospital discharge. He is back on ASA and Prasugrel.     He had a follow up CT scan prior to this visit. Please see results below. Staples were removed during his hospitalization. Incision CDI.     Review of Systems:   Pertinent items are noted in HPI or as in patient entered ROS below, remainder of complete ROS is " negative.   No flowsheet data found.     Physical Exam:   /70   Pulse 72   Resp 16   SpO2 98%    General: No acute distress.  [facility]   Neuro: The patient is fully oriented. Speech is normal. Extraocular movements are intact without nystagmus. Facial sensation is intact in V1, V2, V3 distributions. Facial nerve function is normal. Right shoulder full strength, slightly weak on the left. There is no pronator drift. Full strength in right upper and lower extremities. Left upper and lower extremities slightly weak at 4+/5. Sensation intact throughout. No dysmetria with finger-nose-finger testing.   Incision: Staples removed prior to hospital discharge. Site CDI.     Imagin2022 CT Head:  Impression:  1. New subgaleal collection overlying the right frontal craniotomy.  2. No significant change in right frontal mixed subdural hematoma.     Assessment:  Right sided SDH on 2021  S/p R craniotomy and hematoma evacuation on 2021      Plan:  Image findings were discussed with inpatient Neurosurgery team. We will plan for close follow up with a Head CT in 1 week and clinic visit after. Meanwhile if there is any neurological symptoms, patient to present to the ER. Patient agrees with this plan.     For anti seizure medication management, Neurology referral ordered.      Jovita Arias CNP  Department of Neurosurgery      I spent 35 minutes on patient care activities related to this encounter on the date of service, including time spent reviewing the chart, obtaining history and examination and in counseling the patient, and in documentation in the electronic medical record.

## 2022-02-07 ENCOUNTER — MEDICAL CORRESPONDENCE (OUTPATIENT)
Dept: HEALTH INFORMATION MANAGEMENT | Facility: CLINIC | Age: 82
End: 2022-02-07
Payer: COMMERCIAL

## 2022-02-07 ENCOUNTER — LAB REQUISITION (OUTPATIENT)
Dept: LAB | Facility: CLINIC | Age: 82
End: 2022-02-07

## 2022-02-07 ENCOUNTER — TELEPHONE (OUTPATIENT)
Dept: NEUROSURGERY | Facility: CLINIC | Age: 82
End: 2022-02-07
Payer: COMMERCIAL

## 2022-02-07 DIAGNOSIS — D50.9 IRON DEFICIENCY ANEMIA, UNSPECIFIED: ICD-10-CM

## 2022-02-07 DIAGNOSIS — I50.33 ACUTE ON CHRONIC DIASTOLIC (CONGESTIVE) HEART FAILURE (H): ICD-10-CM

## 2022-02-07 LAB
ANION GAP SERPL CALCULATED.3IONS-SCNC: 9 MMOL/L (ref 5–18)
BUN SERPL-MCNC: 39 MG/DL (ref 8–28)
CALCIUM SERPL-MCNC: 8.7 MG/DL (ref 8.5–10.5)
CHLORIDE BLD-SCNC: 103 MMOL/L (ref 98–107)
CO2 SERPL-SCNC: 29 MMOL/L (ref 22–31)
CREAT SERPL-MCNC: 1.53 MG/DL (ref 0.7–1.3)
GFR SERPL CREATININE-BSD FRML MDRD: 46 ML/MIN/1.73M2
GLUCOSE BLD-MCNC: 245 MG/DL (ref 70–125)
IRON SATN MFR SERPL: 21 % (ref 20–50)
IRON SERPL-MCNC: 82 UG/DL (ref 42–175)
POTASSIUM BLD-SCNC: 4.2 MMOL/L (ref 3.5–5)
SODIUM SERPL-SCNC: 141 MMOL/L (ref 136–145)
TIBC SERPL-MCNC: 385 UG/DL (ref 313–563)
TRANSFERRIN SERPL-MCNC: 308 MG/DL (ref 212–360)

## 2022-02-07 PROCEDURE — 84466 ASSAY OF TRANSFERRIN: CPT | Performed by: FAMILY MEDICINE

## 2022-02-07 PROCEDURE — 82310 ASSAY OF CALCIUM: CPT | Performed by: FAMILY MEDICINE

## 2022-02-08 DIAGNOSIS — K74.69 OTHER CIRRHOSIS OF LIVER (H): Primary | ICD-10-CM

## 2022-02-11 ENCOUNTER — OFFICE VISIT (OUTPATIENT)
Dept: NEUROSURGERY | Facility: CLINIC | Age: 82
End: 2022-02-11
Payer: COMMERCIAL

## 2022-02-11 ENCOUNTER — ANCILLARY PROCEDURE (OUTPATIENT)
Dept: CT IMAGING | Facility: CLINIC | Age: 82
End: 2022-02-11
Attending: NURSE PRACTITIONER
Payer: COMMERCIAL

## 2022-02-11 VITALS
RESPIRATION RATE: 16 BRPM | DIASTOLIC BLOOD PRESSURE: 73 MMHG | OXYGEN SATURATION: 98 % | SYSTOLIC BLOOD PRESSURE: 116 MMHG | HEART RATE: 67 BPM

## 2022-02-11 DIAGNOSIS — S06.5XAA SDH (SUBDURAL HEMATOMA) (H): ICD-10-CM

## 2022-02-11 DIAGNOSIS — S06.5XAA SUBDURAL HEMATOMA (H): Primary | ICD-10-CM

## 2022-02-11 PROCEDURE — 99024 POSTOP FOLLOW-UP VISIT: CPT | Performed by: NURSE PRACTITIONER

## 2022-02-11 PROCEDURE — 70450 CT HEAD/BRAIN W/O DYE: CPT | Mod: GC | Performed by: RADIOLOGY

## 2022-02-11 ASSESSMENT — PAIN SCALES - GENERAL: PAINLEVEL: NO PAIN (0)

## 2022-02-11 NOTE — PATIENT INSTRUCTIONS
1. No medication changes today.    2. Follow-up with me in 3 months.    3. Call [facility] at [phone] to schedule appointment.

## 2022-02-11 NOTE — LETTER
2022       RE: Red Sutherland  [address] [facility] [address]     Dear Colleague,    Thank you for referring your patient, Red Sutherland, to the [facility] NEUROSURGERY CLINIC [place] at [facility]. Please see a copy of my visit note below.    [facility]  Department of Neurosurgery      Name: Red Sutherland  MRN: [id number]  Age: 81 year old  : 1940  Referring provider: Jovita Arias  2022      Chief Complaint:   Right sided SDH on 2021  Chronic anticoagulation with ASA and Plavix  S/p R craniotomy and hematoma evacuation on 2021  Routine follow up    History of Present Illness:   Please see office notes on  for full details.  In brief, patient is a 81 year right handed male with multiple medical comorbidities, on chronic anticoagulation with Plavix and ASA, presented to [facility] on  after a presumed traumatic SDH. He underwent  Right craniotomy and hematoma evacuation on  by Dr. Quinones. He was discharged from ARU on .     Most recently seen in the clinic on . His head CT showed new subgaleal collection overlying the right frontal craniotomy that day. Clinically he was doing fine at that time. One week follow-up CT was recommended at that time and patient is here today after head CT.     He is accompanied by his wife. Overall he has been doing well. No major concerns. He continues to have some weakness in his left upper and lower extremities. He has home PT and OT twice a week. Reports that he is able to ambulate without a cane in the morning right after he wakes up, but as the day progresses, he feels more balance issues and then will use a cane. He has a h/o CHF and currently on O2 2 L. Follows up with Cardiology at [facility]. He was on Plavix and Aspirin prior to his recent hospital admission. Currently on AA and Prasugrel. He continues to be on Vimpat 200 mg twice  daily for seizure control. No episodes concerning for seizures since hospital discharge.       Review of Systems:   Pertinent items are noted in HPI or as in patient entered ROS below, remainder of complete ROS is negative.   No flowsheet data found.     Physical Exam:   /73   Pulse 67   Resp 16   SpO2 98%    General: No acute distress.  [facility]   Neuro: The patient is fully oriented. Speech is normal. Extraocular movements are intact without nystagmus. Facial sensation is intact in V1, V2, V3 distributions. Facial nerve function is normal. Right shoulder full strength, slightly weak on the left. There is no pronator drift. Full strength in right upper and lower extremities. Left upper and lower extremities slightly weak at 4+/5. Sensation intact throughout. No dysmetria with finger-nose-finger testing.   Incision: Right Craniotomy incision healed well.    Imagin2022 Head CT:    Impression:  1. Stable right frontal subdural hematoma.  2. Unchanged subgaleal collection overlying right frontal craniotomy     Assessment:  Right sided SDH on 2021, on chronic anticoagulation with ASA and Plavix  S/p R craniotomy and hematoma evacuation on 2021  Routine follow up      Plan: Discussed with Dr. De Los Santos.   Overall patient is improving very well after his recent SDH. Today's head CT shows decreased size of subgaleal fluid collection and stable right subdural hematoma.. Patient to follow up with me in 3 months.     For seizure medication management, patient to establish care with [facility]. Referral was ordered during his last visit. I also gave them the phone number for the clinic.        Jovita Arias CNP  Department of Neurosurgery      I spent 20 minutes on patient care activities related to this encounter on the date of service, including time spent reviewing the chart, obtaining history and examination and in counseling the patient, and in documentation in the electronic medical  record.        Again, thank you for allowing me to participate in the care of your patient.      Sincerely,    JOSE ANTONIO Viera CNP

## 2022-02-11 NOTE — PROGRESS NOTES
[facility]  Department of Neurosurgery      Name: Red Sutherland  MRN: [id number]  Age: 81 year old  : 1940  Referring provider: Jovita Arias  2022      Chief Complaint:   Right sided SDH on 2021  Chronic anticoagulation with ASA and Plavix  S/p R craniotomy and hematoma evacuation on 2021  Routine follow up    History of Present Illness:   Please see office notes on  for full details.  In brief, patient is a 81 year right handed male with multiple medical comorbidities, on chronic anticoagulation with Plavix and ASA, presented to [facility] on  after a presumed traumatic SDH. He underwent  Right craniotomy and hematoma evacuation on  by Dr. Quinones. He was discharged from ARU on .     Most recently seen in the clinic on . His head CT showed new subgaleal collection overlying the right frontal craniotomy that day. Clinically he was doing fine at that time. One week follow-up CT was recommended at that time and patient is here today after head CT.     He is accompanied by his wife. Overall he has been doing well. No major concerns. He continues to have some weakness in his left upper and lower extremities. He has home PT and OT twice a week. Reports that he is able to ambulate without a cane in the morning right after he wakes up, but as the day progresses, he feels more balance issues and then will use a cane. He has a h/o CHF and currently on O2 2 L. Follows up with Cardiology at [facility]. He was on Plavix and Aspirin prior to his recent hospital admission. Currently on AA and Prasugrel. He continues to be on Vimpat 200 mg twice daily for seizure control. No episodes concerning for seizures since hospital discharge.       Review of Systems:   Pertinent items are noted in HPI or as in patient entered ROS below, remainder of complete ROS is negative.   No flowsheet data found.     Physical Exam:   /73   Pulse 67   Resp 16   SpO2 98%     General: No acute distress.  [facility]   Neuro: The patient is fully oriented. Speech is normal. Extraocular movements are intact without nystagmus. Facial sensation is intact in V1, V2, V3 distributions. Facial nerve function is normal. Right shoulder full strength, slightly weak on the left. There is no pronator drift. Full strength in right upper and lower extremities. Left upper and lower extremities slightly weak at 4+/5. Sensation intact throughout. No dysmetria with finger-nose-finger testing.   Incision: Right Craniotomy incision healed well.    Imagin2022 Head CT:    Impression:  1. Stable right frontal subdural hematoma.  2. Unchanged subgaleal collection overlying right frontal craniotomy     Assessment:  Right sided SDH on 2021, on chronic anticoagulation with ASA and Plavix  S/p R craniotomy and hematoma evacuation on 2021  Routine follow up      Plan: Discussed with Dr. De Los Santos.   Overall patient is improving very well after his recent SDH. Today's head CT shows decreased size of subgaleal fluid collection and stable right subdural hematoma.. Patient to follow up with me in 3 months.     For seizure medication management, [facility] Referral was ordered during his last visit. I also gave them the phone number for the clinic.        Jovita Arias CNP  Department of Neurosurgery      I spent 20 minutes on patient care activities related to this encounter on the date of service, including time spent reviewing the chart, obtaining history and examination and in counseling the patient, and in documentation in the electronic medical record.

## 2022-02-13 ENCOUNTER — HOSPITAL ENCOUNTER (INPATIENT)
Facility: CLINIC | Age: 82
LOS: 2 days | Discharge: HOME HEALTH | DRG: 291 | End: 2022-02-16
Attending: EMERGENCY MEDICINE | Admitting: INTERNAL MEDICINE
Payer: COMMERCIAL

## 2022-02-13 DIAGNOSIS — I48.21 PERMANENT ATRIAL FIBRILLATION (H): Primary | Chronic | ICD-10-CM

## 2022-02-13 DIAGNOSIS — E11.69 TYPE 2 DIABETES MELLITUS WITH OTHER SPECIFIED COMPLICATION, WITH LONG-TERM CURRENT USE OF INSULIN (H): Chronic | ICD-10-CM

## 2022-02-13 DIAGNOSIS — S06.5XAA SDH (SUBDURAL HEMATOMA) (H): ICD-10-CM

## 2022-02-13 DIAGNOSIS — D64.9 CHRONIC ANEMIA: ICD-10-CM

## 2022-02-13 DIAGNOSIS — Z79.4 TYPE 2 DIABETES MELLITUS WITH OTHER SPECIFIED COMPLICATION, WITH LONG-TERM CURRENT USE OF INSULIN (H): Chronic | ICD-10-CM

## 2022-02-13 DIAGNOSIS — I48.20 CHRONIC ATRIAL FIBRILLATION (H): ICD-10-CM

## 2022-02-13 DIAGNOSIS — I10 HYPERTENSION, UNSPECIFIED TYPE: ICD-10-CM

## 2022-02-13 DIAGNOSIS — I50.9 ACUTE ON CHRONIC CONGESTIVE HEART FAILURE, UNSPECIFIED HEART FAILURE TYPE (H): ICD-10-CM

## 2022-02-13 DIAGNOSIS — J44.9 CHRONIC OBSTRUCTIVE PULMONARY DISEASE, UNSPECIFIED COPD TYPE (H): ICD-10-CM

## 2022-02-13 DIAGNOSIS — K74.69 OTHER CIRRHOSIS OF LIVER (H): ICD-10-CM

## 2022-02-13 DIAGNOSIS — E11.9 TYPE 2 DIABETES MELLITUS WITHOUT COMPLICATION, WITHOUT LONG-TERM CURRENT USE OF INSULIN (H): ICD-10-CM

## 2022-02-13 LAB
ALBUMIN SERPL-MCNC: 3.3 G/DL (ref 3.5–5)
ALP SERPL-CCNC: 139 U/L (ref 45–120)
ALT SERPL W P-5'-P-CCNC: 11 U/L (ref 0–45)
ANION GAP SERPL CALCULATED.3IONS-SCNC: 13 MMOL/L (ref 5–18)
AST SERPL W P-5'-P-CCNC: 33 U/L (ref 0–40)
BILIRUB SERPL-MCNC: 1.5 MG/DL (ref 0–1)
BUN SERPL-MCNC: 46 MG/DL (ref 8–28)
CALCIUM SERPL-MCNC: 8.4 MG/DL (ref 8.5–10.5)
CHLORIDE BLD-SCNC: 105 MMOL/L (ref 98–107)
CO2 SERPL-SCNC: 22 MMOL/L (ref 22–31)
CREAT SERPL-MCNC: 1.81 MG/DL (ref 0.7–1.3)
GFR SERPL CREATININE-BSD FRML MDRD: 38 ML/MIN/1.73M2
GLUCOSE BLD-MCNC: 198 MG/DL (ref 70–125)
POTASSIUM BLD-SCNC: 4.5 MMOL/L (ref 3.5–5)
PROT SERPL-MCNC: 6.5 G/DL (ref 6–8)
SODIUM SERPL-SCNC: 140 MMOL/L (ref 136–145)
TROPONIN I SERPL-MCNC: 0.03 NG/ML (ref 0–0.29)

## 2022-02-13 PROCEDURE — 84484 ASSAY OF TROPONIN QUANT: CPT | Performed by: EMERGENCY MEDICINE

## 2022-02-13 PROCEDURE — 86140 C-REACTIVE PROTEIN: CPT | Performed by: EMERGENCY MEDICINE

## 2022-02-13 PROCEDURE — 83880 ASSAY OF NATRIURETIC PEPTIDE: CPT | Performed by: EMERGENCY MEDICINE

## 2022-02-13 PROCEDURE — 80053 COMPREHEN METABOLIC PANEL: CPT | Performed by: EMERGENCY MEDICINE

## 2022-02-13 PROCEDURE — 85025 COMPLETE CBC W/AUTO DIFF WBC: CPT | Performed by: EMERGENCY MEDICINE

## 2022-02-13 PROCEDURE — 93005 ELECTROCARDIOGRAM TRACING: CPT | Performed by: EMERGENCY MEDICINE

## 2022-02-13 PROCEDURE — 36415 COLL VENOUS BLD VENIPUNCTURE: CPT | Performed by: EMERGENCY MEDICINE

## 2022-02-13 PROCEDURE — 210N000002 HC R&B HEART CARE

## 2022-02-13 PROCEDURE — 99285 EMERGENCY DEPT VISIT HI MDM: CPT

## 2022-02-13 ASSESSMENT — MIFFLIN-ST. JEOR: SCORE: 1825.76

## 2022-02-14 PROBLEM — D64.9 CHRONIC ANEMIA: Status: ACTIVE | Noted: 2022-01-01

## 2022-02-14 PROBLEM — E11.9 TYPE 2 DIABETES MELLITUS (H): Chronic | Status: ACTIVE | Noted: 2021-10-24

## 2022-02-14 PROBLEM — I10 HYPERTENSION, UNSPECIFIED TYPE: Status: ACTIVE | Noted: 2022-01-01

## 2022-02-14 PROBLEM — I50.9 ACUTE EXACERBATION OF CHF (CONGESTIVE HEART FAILURE) (H): Status: ACTIVE | Noted: 2021-12-06

## 2022-02-14 NOTE — UTILIZATION REVIEW
Admission Status; Secondary Review Determination       Under the authority of the Utilization Management Committee, the utilization review process indicated a secondary review on the above patient. The review outcome is based on review of the medical records, discussions with staff, and applying clinical experience noted on the date of the review.     (x) Inpatient Status Appropriate - This patient's medical care is consistent with medical management for inpatient care and reasonable inpatient medical practice.     RATIONALE FOR DETERMINATION     Mr. Sutherland is a 80 yo male pt with a PMH of a fib, CAD, pulm HTN (home oxygen) and recent subdural hematoma who presents to the ED with worsening SOB and acute weight gain.  He was noted to have acute kidney disease in the setting of volume overload clinical status.  Cardiology consult requested; he remains on IV bumex bid today (third dose scheduled).  Creat is improving with iv diuresis but is not yet at his baseline.  He is requiring ongoing hospital treatment and monitoring.     At the time of admission with the information available to the attending physician more than 2 nights Hospital complex care was anticipated, based on patient risk of adverse outcome if treated as outpatient and complex care required. Inpatient admission is appropriate based on the Medicare guidelines.     The information on this document is developed by the utilization review team in order for the business office to ensure compliance. This only denotes the appropriateness of proper admission status and does not reflect the quality of care rendered.   The definitions of Inpatient Status and Observation Status used in making the determination above are those provided in the CMS Coverage Manual, Chapter 1 and Chapter 6, section 70.4.         Sincerely,     Lizeth Shaw, DO  Utilization Review  Physician Advisor  [facility].

## 2022-02-14 NOTE — ED NOTES
Patient finished with breakfast. Patient assisted up to bathroom and then back to chair. Patient using urinal as needed. Call light within reach.

## 2022-02-14 NOTE — ED PROVIDER NOTES
EMERGENCY DEPARTMENT ENCOUNTER      NAME: Red Sutherland  AGE: 81 year old male  YOB: 1940  MRN: [id number]  EVALUATION DATE & TIME: 2022 11:20 PM    PCP: Meg Roth    ED PROVIDER: Bonnie Carrillo DO      Chief Complaint   Patient presents with     Shortness of Breath         FINAL IMPRESSION:  1. Acute on chronic congestive heart failure, unspecified heart failure type (H)    2. Chronic obstructive pulmonary disease, unspecified COPD type (H)    3. Chronic atrial fibrillation (H)    4. Type 2 diabetes mellitus without complication, without long-term current use of insulin (H)    5. Chronic anemia    6. Hypertension, unspecified type          ED COURSE & MEDICAL DECISION MAKIN-year-old male with a history of coronary artery disease, congestive heart failure, hypertension, pulmonary hypertension, COPD, atrial fibrillation, diabetes, and sleep apnea who is normally on 3 L of oxygen via nasal cannula at home presented to the ED for evaluation of worsening shortness of breath that started yesterday. The patient stated that the shortness of breath was worsened with exertion and when lying flat. He also reported increased lower extremity edema and a 4 pound weight gain from yesterday. Patient denied any associated chest pain or chest pressure, however. Here in the ED the patient's O2 sats were 96% on the 3 L of oxygen via nasal cannula. However, he was slightly tachypneic. The patient was otherwise hemodynamically stable. The patient was noted to be slightly tachypneic but he was not in any acute respiratory distress. On exam the patient was noted to have bibasilar crackles as well as mild lower extremity edema bilaterally. The left leg was also slightly larger than the right. However, the patient states that this is his normal baseline.    An EKG was obtained which revealed atrial fibrillation without any new or concerning ST or T wave changes. The CBC and BMP were unremarkable or  unchanged from baseline. CRP and troponin were both reassuring. Patient's BNP was 519 which is higher than the previous and BNP is going back for last few months. Chest x-ray shows borderline cardiomegaly, normal vasculature, and atelectasis versus infiltrate in the bilateral lung bases. Ultrasound of the bilateral lower extremities was negative for DVT.    The patient was given a dose of IV Bumex here in the ED to treat the possibility of acute on chronic heart failure.      The patient was admitted to the hospitalist service for further evaluation and treatment.    Pertinent Labs & Imaging studies reviewed. (See chart for details)  11:49 PM I met with the patient to gather history and to perform my initial exam. We discussed plans for the ED course, including diagnostic testing and treatment.   2:26 AM I discussed the case with hospitalist, Dr. Catherine.       At the conclusion of the encounter I discussed the results of all of the tests and the disposition. The questions were answered. The patient or family acknowledged understanding and was agreeable with the care plan.       PPE worn: n95 mask, goggles    MEDICATIONS GIVEN IN THE EMERGENCY:  Medications   melatonin tablet 1 mg (has no administration in time range)   acetaminophen (TYLENOL) tablet 650 mg (has no administration in time range)     Or   acetaminophen (TYLENOL) Suppository 650 mg (has no administration in time range)   prochlorperazine (COMPAZINE) injection 5 mg (has no administration in time range)   bumetanide (BUMEX) injection 2 mg (2 mg Intravenous Given 2/14/22 0228)       NEW PRESCRIPTIONS STARTED AT TODAY'S ER VISIT  New Prescriptions    No medications on file          =================================================================    HPI    Patient information was obtained from: Patient     Use of : N/A         Red Sutherland is a 81 year old male with a pertinent history of hypertension, hyperlipidemia, CAD, CHF, atrial  fibrillation, COPD, prior COVID-19 infection (positive test 9/16/2021), and T2DM who presents to this ED by walk in for evaluation of shortness of breath.    Patient reports that he has had increased shortness of breath since last night. His shortness of breath is worsened with exertion and with laying down flat. He is on 3 L oxygen at home at baseline, but he states that this does not make a difference. The patient also reports increased leg swelling as well as a 4 pound weight gain in the last 24 hours. He denies fever, cough, chest pain or pressure, abdominal pain, nausea, vomiting, diarrhea, or additional symptoms or complaints at this time. Denies recent medication changes.      Per chart review,   12/20/2021-1/17/2022: Patient was admitted at [facility] with acute right subdural hematoma. Underwent craniotomy for subdural hematoma evacuation on 12/21/2021, but continued to have seizures requiring both Keppra and Vimpat for control. Started on Aspirin and Prasugrel on 12/28/21. He was also diagnosed with moderate malnutrition in the context of acute on chronic illness, and had a PEG tube placed on 1/3. Patient was discharged to acute rehab facility where he stayed from 1/17/2022-1/26/2022 and participated in physical, occupational, and speech language therapies daily. On 1/21, he began to experience new shortness of breath during therapy. EKG showed Afib. Imaging did not show evidence of DVT or PE, but did show pleural effusion consistent with CHF exacerbation. He did well with increases in his Bumex dosing; discharged on 2 mg BID.      REVIEW OF SYSTEMS   Review of Systems   Constitutional: Positive for unexpected weight change. Negative for fever.   Respiratory: Positive for shortness of breath. Negative for cough.    Cardiovascular: Positive for leg swelling. Negative for chest pain.   Gastrointestinal: Negative for abdominal pain, diarrhea, nausea and vomiting.   All other systems reviewed and are negative.        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 They put him on sildenafil as nitroprusside lowered systemic BP and with that the mean PA dropped from 54 to 49. Negative VQ at [facility] Dec 1, 2017.     RLS (restless legs syndrome)      Sleep apnea        PAST SURGICAL HISTORY:  Past Surgical History:   Procedure Laterality Date     BACK SURGERY      lower back     CARDIAC CATHETERIZATION  12/13/2017    Right and left at [facility], mean PA 58, PCW 24 with V wave of 35, LVEDP of 18, with Nipride systemic BP, PVR and mean PA all declined     CARDIOVERSION  03/15/2013    for afib     CATARACT EXTRACTION Bilateral      COLONOSCOPY N/A 10/31/2021    Procedure: COLONOSCOPY WITH POLYPECTOMY;  Surgeon: Sheng Sagastume MD;  Location: [facility] Main OR     CORONARY STENT PLACEMENT       CRANIOTOMY Right 12/21/2021    Procedure: CRANIOTOMY FOR Subdural HEMATOMA EVACUATION;  Surgeon: Parvez Quinones MD;  Location: [facility] OR     CV CORONARY ANGIOGRAM N/A 10/25/2021    Procedure: Coronary Angiogram;  Surgeon: Otf Knowles MD;  Location: [facility] CV     CV CORONARY LITHOTRIPSY PCI N/A 10/25/2021    Procedure: CV Coronary Lithotripsy PCI;  Surgeon: Otf Knowles MD;  Location: [facility] LAB CV     CV LEFT HEART CATH N/A 10/25/2021    Procedure: Left Heart Cath;  Surgeon: Otf Knowles MD;  Location: [facility] CV     CV PCI N/A 10/25/2021    Procedure: Percutaneous Coronary Intervention;  Surgeon: Otf Knowles MD;  Location: [facility] CV      "ESOPHAGOSCOPY, GASTROSCOPY, DUODENOSCOPY (EGD), COMBINED N/A 10/30/2021    Procedure: ESOPHAGOGASTRODUODENOSCOPY (EGD);  Surgeon: Sheng Sagastume MD;  Location: [facility] Main OR     IR ABDOMINAL AORTOGRAM  11/16/2012     IR GASTROSTOMY TUBE PERCUTANEOUS PLCMNT  1/3/2022     IR MISCELLANEOUS PROCEDURE  07/20/2001     IR MISCELLANEOUS PROCEDURE  11/16/2012     OTHER SURGICAL HISTORY      mynor     PICC DOUBLE LUMEN PLACEMENT Right 12/25/2021    48cm (2cm external), Basilic vein, SVC RA junction     SHOULDER SURGERY      reapir on right shoulder     TOTAL HIP ARTHROPLASTY Left      TOTAL KNEE ARTHROPLASTY Bilateral      WRIST SURGERY Bilateral      ZZHC COLONOSCOPY W/WO BRUSH/WASH N/A 01/14/2021    Procedure: COLONOSCOPY;  Surgeon: Mihai Harris MD;  Location: [facility] Main OR;  Service: Gastroenterology           CURRENT MEDICATIONS:    ACCU-CHEK JOSE PLUS TEST STRP strips  acetaminophen (TYLENOL) 325 MG tablet  albuterol (PROVENTIL HFA;VENTOLIN HFA) 90 mcg/actuation inhaler  aspirin (ASA) 81 MG EC tablet  atorvastatin (LIPITOR) 40 MG tablet  azithromycin (ZITHROMAX) 250 MG tablet  BD ULTRA-FINE MANISHA PEN NEEDLES 32 gauge x 5/32\" Ndle  bumetanide (BUMEX) 2 MG tablet  calcium carbonate (TUMS) 500 MG chewable tablet  empagliflozin (JARDIANCE) 10 MG TABS tablet  Ferrous Gluconate 324 (37.5 Fe) MG TABS  fluticasone-vilanterol (BREO ELLIPTA) 200-25 mcg/dose DsDv inhaler  Garlic 1000 MG CAPS  HYDROcodone-acetaminophen (NORCO) 5-325 MG tablet  insulin glargine (LANTUS PEN) 100 UNIT/ML pen  ipratropium - albuterol 0.5 mg/2.5 mg/3 mL (DUONEB) 0.5-2.5 (3) MG/3ML neb solution  irbesartan (AVAPRO) 75 MG tablet  lacosamide (VIMPAT) 200 MG TABS tablet  melatonin 10 MG TABS tablet  metFORMIN (GLUCOPHAGE-XR) 500 MG 24 hr tablet  metoprolol tartrate (LOPRESSOR) 25 MG tablet  multivitamin w/minerals (THERA-VIT-M) tablet  omeprazole 20 MG tablet  OXYGEN-AIR DELIVERY SYSTEMS MISC  pantoprazole (PROTONIX) 2 mg/mL SUSP " suspension  prasugrel (EFFIENT) 10 MG TABS tablet  rOPINIRole (REQUIP) 1 MG tablet  tadalafil (CIALIS) 20 MG tablet  traZODone (DESYREL) 100 MG tablet  zinc gluconate 50 MG tablet        ALLERGIES:  Allergies   Allergen Reactions     Ace Inhibitors      Bloody nose, dry mouth, cracked lips     Contrast Dye Nausea     Other reaction(s): GI intolerance, GI Upset     Furosemide Muscle Pain (Myalgia)     Previously tolerated.  Muscle cramps     Hydrochlorothiazide Unknown     Iodinated Contrast Media [Diagnostic X-Ray Materials] Nausea     Losartan Other (See Comments)     Other reaction(s): Stomatitis, Bloody nose dry mouth and lips     Losartan-Hydrochlorothiazide [Hyzaar]      Mouth sores     Metaxalone Nausea     Metolazone Other (See Comments)     Muscle cramps     Mometasone Other (See Comments)     Bloody nose     Proton Pump Inhibitors      Bloody nose, mouth sores     Rabeprazole Other (See Comments)     Mouth sores     Ramipril Other (See Comments)     Mouth sores     Shellfish Containing Products [Shellfish-Derived Products] Unknown     Other reaction(s): mouth sores, Other reaction(s): mouth sores     Sildenafil Muscle Pain (Myalgia)     Methocarbamol Rash     Penicillins Other (See Comments)     Immune-does not work for him       FAMILY HISTORY:  Family History   Problem Relation Age of Onset     Hyperlipidemia Mother      Hypertension Mother      Heart Disease Mother      Hyperlipidemia Father      Hypertension Father      Coronary Artery Disease Father      Cerebrovascular Disease Brother      Depression Brother      No Known Problems Sister      Pulmonary Hypertension No family hx of      Congenital heart disease No family hx of        SOCIAL HISTORY:   Social History     Socioeconomic History     Marital status:      Spouse name: None     Number of children: 4     Years of education: None     Highest education level: None   Occupational History     None   Tobacco Use     Smoking status: Former  "Smoker     Packs/day: 1.50     Years: 44.00     Pack years: 66.00     Types: Cigarettes     Quit date: 1996     Years since quittin.8     Smokeless tobacco: Never Used   Substance and Sexual Activity     Alcohol use: Yes     Alcohol/week: 1.0 standard drink     Comment: Alcoholic Drinks/day: 1 per month     Drug use: No     Sexual activity: Not Currently     Partners: Female   Other Topics Concern     None   Social History Narrative     None     Social Determinants of Health     Financial Resource Strain: Not on file   Food Insecurity: Not on file   Transportation Needs: Not on file   Physical Activity: Not on file   Stress: Not on file   Social Connections: Not on file   Intimate Partner Violence: Not on file   Housing Stability: Not on file       VITALS:  BP (!) 153/74   Pulse 80   Temp 97.2  F (36.2  C) (Temporal)   Resp 20   Ht 1.803 m (5' 11\")   Wt 108.9 kg (240 lb)   SpO2 100%   BMI 33.47 kg/m      PHYSICAL EXAM    General presentation: Alert, Vital signs reviewed. NAD. Fatigued appearing.    HENT: ENT inspection is normal. Oropharynx is moist and clear.   Eye: Pupils are equal and reactive to light. EOMI  Neck: The neck is supple, with full ROM, with no evidence of meningismus.  Pulmonary: Currently in no acute respiratory distress. Tachypneic, slightly diminished breath sounds bilaterally, bibasilar crackles.   Circulatory: Regular rate and rhythm. Peripheral pulses are strong and equal. No murmurs, rubs, or gallops.   Abdominal: The abdomen is soft. Nontender. No rigidity, guarding, or rebound. Bowel sounds normal.   Neurologic: Alert, oriented to person, place, and time. No motor deficit. No sensory deficit. Cranial nerves II through XII are intact.  Musculoskeletal: No extremity tenderness. Full range of motion in all extremities. 1+ pitting edema to the knees bilaterally, the left leg is more swollen than the right (patient states that this is normal).    Skin: Skin color is normal. No " rash. Warm. Dry to touch.      LAB:  All pertinent labs reviewed and interpreted.  Results for orders placed or performed during the hospital encounter of 02/13/22   Chest XR,  PA & LAT    Impression    IMPRESSION: Borderline cardiomegaly. Pulmonary vascularity normal. Subsegmental subsegmental atelectasis or infiltrates both lung bases. Upper lung fields are clear. No definite effusions. Degenerative changes right shoulder.   Comprehensive metabolic panel   Result Value Ref Range    Sodium 140 136 - 145 mmol/L    Potassium 4.5 3.5 - 5.0 mmol/L    Chloride 105 98 - 107 mmol/L    Carbon Dioxide (CO2) 22 22 - 31 mmol/L    Anion Gap 13 5 - 18 mmol/L    Urea Nitrogen 46 (H) 8 - 28 mg/dL    Creatinine 1.81 (H) 0.70 - 1.30 mg/dL    Calcium 8.4 (L) 8.5 - 10.5 mg/dL    Glucose 198 (H) 70 - 125 mg/dL    Alkaline Phosphatase 139 (H) 45 - 120 U/L    AST 33 0 - 40 U/L    ALT 11 0 - 45 U/L    Protein Total 6.5 6.0 - 8.0 g/dL    Albumin 3.3 (L) 3.5 - 5.0 g/dL    Bilirubin Total 1.5 (H) 0.0 - 1.0 mg/dL    GFR Estimate 38 (L) >60 mL/min/1.73m2   Troponin I (now)   Result Value Ref Range    Troponin I 0.03 0.00 - 0.29 ng/mL   CBC with platelets and differential   Result Value Ref Range    WBC Count 7.0 4.0 - 11.0 10e3/uL    RBC Count 4.14 (L) 4.40 - 5.90 10e6/uL    Hemoglobin 7.8 (L) 13.3 - 17.7 g/dL    Hematocrit 27.9 (L) 40.0 - 53.0 %    MCV 67 (L) 78 - 100 fL    MCH 18.8 (L) 26.5 - 33.0 pg    MCHC 28.0 (L) 31.5 - 36.5 g/dL    RDW 19.9 (H) 10.0 - 15.0 %    Platelet Count 252 150 - 450 10e3/uL    % Neutrophils 73 %    % Lymphocytes 14 %    % Monocytes 10 %    % Eosinophils 3 %    % Basophils 0 %    % Immature Granulocytes 0 %    NRBCs per 100 WBC 0 <1 /100    Absolute Neutrophils 5.1 1.6 - 8.3 10e3/uL    Absolute Lymphocytes 1.0 0.8 - 5.3 10e3/uL    Absolute Monocytes 0.7 0.0 - 1.3 10e3/uL    Absolute Eosinophils 0.2 0.0 - 0.7 10e3/uL    Absolute Basophils 0.0 0.0 - 0.2 10e3/uL    Absolute Immature Granulocytes 0.0 <=0.4 10e3/uL     Absolute NRBCs 0.0 10e3/uL   Extra Blue Top Tube   Result Value Ref Range    Hold Specimen JIC    Extra Red Top Tube   Result Value Ref Range    Hold Specimen JIC    B-Type Natriuretic Peptide ( East Only)   Result Value Ref Range     (H) 0 - 84 pg/mL   CRP inflammation   Result Value Ref Range    CRP 0.5 0.0-<0.8 mg/dL   Symptomatic; Unknown Influenza A/B & SARS-CoV2 (COVID-19) Virus PCR Multiplex Nasopharyngeal    Specimen: Nasopharyngeal; Swab   Result Value Ref Range    Influenza A PCR Negative Negative    Influenza B PCR Negative Negative    SARS CoV2 PCR Negative Negative   ECG 12-LEAD WITH MUSE ([facility])   Result Value Ref Range    Systolic Blood Pressure 126 mmHg    Diastolic Blood Pressure 67 mmHg    Ventricular Rate 86 BPM    Atrial Rate 84 BPM    TX Interval  ms    QRS Duration 88 ms     ms    QTc 445 ms    P Axis  degrees    R AXIS 115 degrees    T Axis -23 degrees    Interpretation ECG       Undetermined rhythm  Right axis deviation  Possible Right ventricular hypertrophy  Nonspecific T wave abnormality  Abnormal ECG  When compared with ECG of 21-JAN-2022 10:46,  Current undetermined rhythm precludes rhythm comparison, needs review  Confirmed by SEE ED PROVIDER NOTE FOR, ECG INTERPRETATION ([id number]),  BOB BARAKAT ([id number]) on 2/14/2022 12:10:07 AM         RADIOLOGY:  Reviewed all pertinent imaging. Please see official radiology report.  Chest XR,  PA & LAT   Final Result   IMPRESSION: Borderline cardiomegaly. Pulmonary vascularity normal. Subsegmental subsegmental atelectasis or infiltrates both lung bases. Upper lung fields are clear. No definite effusions. Degenerative changes right shoulder.      US Lower Extremity Venous Duplex Bilateral    (Results Pending)       EKG:    Atrial fibrillation. Rate of 86. Normal QRS. Normal QT. Nonspecific T wave changes noted. Compared EKG on 1/21/2022 no significant changes are noted.    I have independently reviewed and interpreted the EKG(s)  documented above.        I, Annamaria Amos, am serving as a scribe to document services personally performed by Bonnie Carrillo DO based on my observation and the provider's statements to me. I, Bonnie Carrillo, attest that Annamaria Amos is acting in a scribe capacity, has observed my performance of the services and has documented them in accordance with my direction.    Bonnie Carrillo DO  Emergency Medicine  [facility] EMERGENCY ROOM  [address] [facility] [address] [phone]     Bonnie Carrillo DO  02/14/22 0341       Bonnie Carrillo DO  02/14/22 0342

## 2022-02-14 NOTE — ED TRIAGE NOTES
Increasing shortness of breath, wears 3L NC at baseline. In triage, sats are 97% on 3L NC, respirations are 32.

## 2022-02-14 NOTE — H&P
[facility] MEDICINE ADMISSION HISTORY AND PHYSICAL       Assessment & Plan      1. SOB -- looked comfortable at rest. On O2 at 3L with sat at 100%     - Could be CHF exacerbation - was given Bumex in ED. He takes 2 mgs of Bumex BID at home    - No evidence of PNA given no fever and no significant WBC - close monitoring. Check CBC and procal    - Has leg swelling, might need evaluation for PE - will start with leg US. Currently, he is not tachy and his sat is 100%    - He has history of CAD -- will do serial trops. Tele    - He has history of COPD - not wheezy     2. Has DM2 with CKD stage III - on Bumex  - sliding scale insulin/FS     3. Has history of COPD, with mod TR and pulmonary HTN  - might need meds to improve right sided heart function     4. Had a recent SDH requiring evauation and craniotomy - . Sees neurosurgery as out patient   - stable       VTE prophylaxis: SCDs if no DVT , if stayin more than 24 hrs, assess if safe for heparin   IV fluids: Per order set   Diet:  DM  GI prophylaxis: Not indicated at this point, re-assess if needed.   Pain, sleep, and vomiting medications: Per order set  Code Status: Full   COVID test result:  Pending   COVID vaccination: completed   Barriers to discharge: admitting clinical condition  Discharge Disposition and goals:  Unable to determine at this point, pending clinical progress and response to treatment. Patient may need transfer to SNF or ACR if unsafe to go home and needed treatment inappropriate at home setting OR may need home health care evaluation if care can be delivered at home settings. Consider referral to care manager/    PPE - I was wearing PPE when I met the patient - N95 mask, Surgical mask, Isolation gown, Gloves, Safety glasses.      Care plan was created based on available information provided, including patient's condition at the time of encounter.   This plan was discussed with patient and/or family members  using layman's terms and have agreed to proceed.   At the end of night shift (9PM - 730A), this case will be presented to the AM Hospitalist.    It is recommended to revise care plan if there is change in condition and/or new clinical information that are not available during my encounter.     All or some of home medication/s were not resumed on admission due to safety reasons or contraindications. Dosing and frequency may also have been modified. Please resume/review them during your visit.     70 minutes of total visit duration and greater than 50% was spent in direct evaluation of patient and coordination of care including discussion of diagnostic test results and recommended treatment. .      Nilson Catherine MD, MPH, FACP, [facility]  Internal Medicine - Hospitalist        Chief Complaint SOB      HISTORY     - He is here for SOB. He takes Bumex 2 mgs 2x a day. He said, its not helping. He has leg swelling, felt abdomen is distended. He reported 4lbs weight gain. He is chronically SOB but felt it was worse last night. No CP or fever or cougn.    -  He has CKD stage IIII-IV. His ECHO recently showed EF of 60% but has evidence of pulmonary HTN and mod TR as of Jan 2022, ECHO .     - He has history of CAD and CG. He also has history of COPD    - He had a recent prolonged hospitalization related to subdural hematoma (due to a fall) requiring evacuation and craniotomy 12/21/2021 He has been home since rehab discharged him 3 weeks ago. -- mentally he looked and sounded sharp, denies focal deficits     - He has history of Afib and at some point in eliquis. He is SR now.     - In the ED, chest XR no evidence of PNA or edema. BNP was over 500, WBC was normal  He was given IV bumex     - He was laying almost flat and looked comfortable. He is on O2 at 3L    - ROS --- No headache. No dizziness. No weakness. No palpitations. No abdominal pain. No nausea or vomiting. No urinary symptoms. No bleeding symptoms. No weight loss. Rest  of 12 point ROS was reviewed and negative.           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 They put him on sildenafil as nitroprusside lowered systemic BP and with that the mean PA dropped from 54 to 49. Negative VQ at [facility] Dec 1, 2017.     RLS (restless legs syndrome)      Sleep apnea          Surgical History     Past Surgical History:   Procedure Laterality Date     BACK SURGERY      lower back     CARDIAC CATHETERIZATION  12/13/2017    Right and left at [facility], mean PA 58, PCW 24 with V wave of 35, LVEDP of 18, with Nipride systemic BP, PVR and mean PA all declined     CARDIOVERSION  03/15/2013    for afib     CATARACT EXTRACTION Bilateral      COLONOSCOPY N/A 10/31/2021    Procedure: COLONOSCOPY WITH POLYPECTOMY;  Surgeon: Sheng Sagastume MD;  Location: [facility] Main OR     CORONARY STENT PLACEMENT       CRANIOTOMY Right 12/21/2021    Procedure: CRANIOTOMY FOR Subdural HEMATOMA EVACUATION;  Surgeon: Parvez Quinones MD;  Location: [facility] OR     CV CORONARY ANGIOGRAM N/A 10/25/2021    Procedure: Coronary Angiogram;  Surgeon: Otf Knowles MD;  Location: [facility] CATH LAB CV     CV CORONARY LITHOTRIPSY PCI N/A 10/25/2021    Procedure: CV Coronary Lithotripsy PCI;  Surgeon: Otf Knowles MD;  Location: [facility] CATH LAB CV     CV LEFT HEART CATH N/A 10/25/2021    Procedure: Left Heart Cath;  Surgeon: Otf Knowles MD;  Location: [facility] CATH LAB CV     CV PCI N/A 10/25/2021    Procedure: Percutaneous Coronary Intervention;  Surgeon:  Otf Knowles MD;  Location: [facility] LAB CV     ESOPHAGOSCOPY, GASTROSCOPY, DUODENOSCOPY (EGD), COMBINED N/A 10/30/2021    Procedure: ESOPHAGOGASTRODUODENOSCOPY (EGD);  Surgeon: Sheng Sagastume MD;  Location: [facility] Main OR     IR ABDOMINAL AORTOGRAM  2012     IR GASTROSTOMY TUBE PERCUTANEOUS PLCMNT  1/3/2022     IR MISCELLANEOUS PROCEDURE  2001     IR MISCELLANEOUS PROCEDURE  2012     OTHER SURGICAL HISTORY      m[place]     PICC DOUBLE LUMEN PLACEMENT Right 2021    48cm (2cm external), Basilic vein, SVC RA junction     SHOULDER SURGERY      reapir on right shoulder     TOTAL HIP ARTHROPLASTY Left      TOTAL KNEE ARTHROPLASTY Bilateral      WRIST SURGERY Bilateral      ZZHC COLONOSCOPY W/WO BRUSH/WASH N/A 2021    Procedure: COLONOSCOPY;  Surgeon: Mihai Harris MD;  Location: [facility] Main OR;  Service: Gastroenterology        Family History      Family History   Problem Relation Age of Onset     Hyperlipidemia Mother      Hypertension Mother      Heart Disease Mother      Hyperlipidemia Father      Hypertension Father      Coronary Artery Disease Father      Cerebrovascular Disease Brother      Depression Brother      No Known Problems Sister      Pulmonary Hypertension No family hx of      Congenital heart disease No family hx of          Social History      .  Social History     Socioeconomic History     Marital status:      Spouse name: Not on file     Number of children: 4     Years of education: Not on file     Highest education level: Not on file   Occupational History     Not on file   Tobacco Use     Smoking status: Former Smoker     Packs/day: 1.50     Years: 44.00     Pack years: 66.00     Types: Cigarettes     Quit date: 1996     Years since quittin.8     Smokeless tobacco: Never Used   Substance and Sexual Activity     Alcohol use: Yes     Alcohol/week: 1.0 standard drink     Comment: Alcoholic Drinks/day: 1 per month     Drug use: No      Sexual activity: Not Currently     Partners: Female   Other Topics Concern     Not on file   Social History Narrative     Not on file     Social Determinants of Health     Financial Resource Strain: Not on file   Food Insecurity: Not on file   Transportation Needs: Not on file   Physical Activity: Not on file   Stress: Not on file   Social Connections: Not on file   Intimate Partner Violence: Not on file   Housing Stability: Not on file          Allergies        Allergies   Allergen Reactions     Ace Inhibitors      Bloody nose, dry mouth, cracked lips     Contrast Dye Nausea     Other reaction(s): GI intolerance, GI Upset     Furosemide Muscle Pain (Myalgia)     Previously tolerated.  Muscle cramps     Hydrochlorothiazide Unknown     Iodinated Contrast Media [Diagnostic X-Ray Materials] Nausea     Losartan Other (See Comments)     Other reaction(s): Stomatitis, Bloody nose dry mouth and lips     Losartan-Hydrochlorothiazide [Hyzaar]      Mouth sores     Metaxalone Nausea     Metolazone Other (See Comments)     Muscle cramps     Mometasone Other (See Comments)     Bloody nose     Proton Pump Inhibitors      Bloody nose, mouth sores     Rabeprazole Other (See Comments)     Mouth sores     Ramipril Other (See Comments)     Mouth sores     Shellfish Containing Products [Shellfish-Derived Products] Unknown     Other reaction(s): mouth sores, Other reaction(s): mouth sores     Sildenafil Muscle Pain (Myalgia)     Methocarbamol Rash     Penicillins Other (See Comments)     Immune-does not work for him         Prior to Admission Medications      No current facility-administered medications on file prior to encounter.  ACCU-CHEK JOSE PLUS TEST STRP strips, [ACCU-CHEK JOSE PLUS TEST STRP STRIPS] see administration instructions.  acetaminophen (TYLENOL) 325 MG tablet, Take 3 tablets (975 mg) by mouth every 8 hours as needed for mild pain or fever  albuterol (PROVENTIL HFA;VENTOLIN HFA) 90 mcg/actuation inhaler, Inhale 2  "puffs into the lungs every 6 hours as needed   aspirin (ASA) 81 MG EC tablet, Take 1 tablet (81 mg) by mouth daily (Patient not taking: Reported on 2/4/2022)  atorvastatin (LIPITOR) 40 MG tablet, Take 1 tablet (40 mg) by mouth every evening  azithromycin (ZITHROMAX) 250 MG tablet, Take by mouth daily 4 tablets before dental  BD ULTRA-FINE MANISHA PEN NEEDLES 32 gauge x 5/32\" Ndle, [BD ULTRA-FINE MANISHA PEN NEEDLES 32 GAUGE X 5/32\" NDLE]   bumetanide (BUMEX) 2 MG tablet, Take 1 tablet (2 mg) by mouth 2 times daily  calcium carbonate (TUMS) 500 MG chewable tablet, Take 1 tablet (500 mg) by mouth daily as needed for heartburn (Patient not taking: Reported on 2/4/2022)  empagliflozin (JARDIANCE) 10 MG TABS tablet, 1 tablet (10 mg) by Oral or Feeding Tube route daily (Patient not taking: Reported on 2/4/2022)  Ferrous Gluconate 324 (37.5 Fe) MG TABS, Take 1 tablet by mouth every other day  fluticasone-vilanterol (BREO ELLIPTA) 200-25 mcg/dose DsDv inhaler, Inhale 1 puff into the lungs daily   Garlic 1000 MG CAPS, Take 1 capsule by mouth daily  HYDROcodone-acetaminophen (NORCO) 5-325 MG tablet, TAKE 1 TABLET BY MOUTH EVERY 6 HOURS AS NEEDED FOR PAIN  insulin glargine (LANTUS PEN) 100 UNIT/ML pen, Inject 8 Units Subcutaneous every morning (before breakfast)  ipratropium - albuterol 0.5 mg/2.5 mg/3 mL (DUONEB) 0.5-2.5 (3) MG/3ML neb solution, Take 1 vial by nebulization every 6 hours as needed for shortness of breath / dyspnea or wheezing  irbesartan (AVAPRO) 75 MG tablet, 1 tablet (75 mg) by Oral or Feeding Tube route daily  lacosamide (VIMPAT) 200 MG TABS tablet, 1 tablet (200 mg) by Oral or Feeding Tube route 2 times daily (Patient not taking: Reported on 2/4/2022)  melatonin 10 MG TABS tablet, Take 1 tablet (10 mg) by mouth At Bedtime (Patient not taking: Reported on 2/4/2022)  metFORMIN (GLUCOPHAGE-XR) 500 MG 24 hr tablet, Take 500 mg by mouth 2 times daily (with meals)  metoprolol tartrate (LOPRESSOR) 25 MG tablet, Take 1 " "tablet (25 mg) by mouth 2 times daily (with meals)  multivitamin w/minerals (THERA-VIT-M) tablet, Take 1 tablet by mouth daily  omeprazole 20 MG tablet, Take 20 mg by mouth 2 times daily  OXYGEN-AIR DELIVERY SYSTEMS MISC, [OXYGEN-AIR DELIVERY SYSTEMS MISC] Use As Directed. (Patient not taking: Reported on 2/4/2022)  pantoprazole (PROTONIX) 2 mg/mL SUSP suspension, 20 mLs (40 mg) by Oral or Feeding Tube route every morning (before breakfast) (Patient not taking: Reported on 2/4/2022)  prasugrel (EFFIENT) 10 MG TABS tablet, 1 tablet (10 mg) by Oral or Feeding Tube route daily (Patient not taking: Reported on 2/4/2022)  rOPINIRole (REQUIP) 1 MG tablet, Take 1 tablet (1 mg) by mouth daily  tadalafil (CIALIS) 20 MG tablet, Take 1 tablet (20 mg) by mouth every 24 hours  traZODone (DESYREL) 100 MG tablet, Take 1 tablet (100 mg) by mouth nightly as needed for sleep  zinc gluconate 50 MG tablet, Take 50 mg by mouth daily            Review of Systems     A 12 point comprehensive review of systems was negative except as noted above in HPI.    PHYSICAL EXAMINATION       Vitals      Vitals: BP (!) 153/74   Pulse 80   Temp 97.2  F (36.2  C) (Temporal)   Resp 20   Ht 1.803 m (5' 11\")   Wt 108.9 kg (240 lb)   SpO2 100%   BMI 33.47 kg/m    BMI= Body mass index is 33.47 kg/m .      Examination     General Appearance:  Alert, cooperative, no distress  Head:    Normocephalic, without obvious abnormality, atraumatic  EENT:  PERRL, conjunctiva/corneas clear, EOM's intact.   Neck:   Supple, symmetrical, trachea midline, no adenopathy; no NVE  Back:  Symmetric, no curvature, no CVA tenderness  Chest/Lungs: decreased air entry,  respirations unlabored, No tenderness or deformity. No abdominal breathing or use of accessory muscles.   Heart:    Regular rate and rhythm, S1 and S2 normal, no murmur, rub   or gallop  Abdomen: Soft, non-tender, bowel sounds active all four quadrants, not peritoneal on palpation. Not distended  Extremities: "  Bilateral leg swelling   Skin:  Skin color, texture, turgor normal, no rashes or lesion  Neurologic:  Awake and alert,  Moving all extremities        Pertinent Lab     Results for orders placed or performed during the hospital encounter of 02/13/22   Chest XR,  PA & LAT    Impression    IMPRESSION: Borderline cardiomegaly. Pulmonary vascularity normal. Subsegmental subsegmental atelectasis or infiltrates both lung bases. Upper lung fields are clear. No definite effusions. Degenerative changes right shoulder.   Comprehensive metabolic panel   Result Value Ref Range    Sodium 140 136 - 145 mmol/L    Potassium 4.5 3.5 - 5.0 mmol/L    Chloride 105 98 - 107 mmol/L    Carbon Dioxide (CO2) 22 22 - 31 mmol/L    Anion Gap 13 5 - 18 mmol/L    Urea Nitrogen 46 (H) 8 - 28 mg/dL    Creatinine 1.81 (H) 0.70 - 1.30 mg/dL    Calcium 8.4 (L) 8.5 - 10.5 mg/dL    Glucose 198 (H) 70 - 125 mg/dL    Alkaline Phosphatase 139 (H) 45 - 120 U/L    AST 33 0 - 40 U/L    ALT 11 0 - 45 U/L    Protein Total 6.5 6.0 - 8.0 g/dL    Albumin 3.3 (L) 3.5 - 5.0 g/dL    Bilirubin Total 1.5 (H) 0.0 - 1.0 mg/dL    GFR Estimate 38 (L) >60 mL/min/1.73m2   Troponin I (now)   Result Value Ref Range    Troponin I 0.03 0.00 - 0.29 ng/mL   CBC with platelets and differential   Result Value Ref Range    WBC Count 7.0 4.0 - 11.0 10e3/uL    RBC Count 4.14 (L) 4.40 - 5.90 10e6/uL    Hemoglobin 7.8 (L) 13.3 - 17.7 g/dL    Hematocrit 27.9 (L) 40.0 - 53.0 %    MCV 67 (L) 78 - 100 fL    MCH 18.8 (L) 26.5 - 33.0 pg    MCHC 28.0 (L) 31.5 - 36.5 g/dL    RDW 19.9 (H) 10.0 - 15.0 %    Platelet Count 252 150 - 450 10e3/uL    % Neutrophils 73 %    % Lymphocytes 14 %    % Monocytes 10 %    % Eosinophils 3 %    % Basophils 0 %    % Immature Granulocytes 0 %    NRBCs per 100 WBC 0 <1 /100    Absolute Neutrophils 5.1 1.6 - 8.3 10e3/uL    Absolute Lymphocytes 1.0 0.8 - 5.3 10e3/uL    Absolute Monocytes 0.7 0.0 - 1.3 10e3/uL    Absolute Eosinophils 0.2 0.0 - 0.7 10e3/uL    Absolute  Basophils 0.0 0.0 - 0.2 10e3/uL    Absolute Immature Granulocytes 0.0 <=0.4 10e3/uL    Absolute NRBCs 0.0 10e3/uL   Extra Blue Top Tube   Result Value Ref Range    Hold Specimen JIC    Extra Red Top Tube   Result Value Ref Range    Hold Specimen JIC    B-Type Natriuretic Peptide ([facility] Only)   Result Value Ref Range     (H) 0 - 84 pg/mL   CRP inflammation   Result Value Ref Range    CRP 0.5 0.0-<0.8 mg/dL   ECG 12-LEAD WITH MUSE ([facility])   Result Value Ref Range    Systolic Blood Pressure 126 mmHg    Diastolic Blood Pressure 67 mmHg    Ventricular Rate 86 BPM    Atrial Rate 84 BPM    TN Interval  ms    QRS Duration 88 ms     ms    QTc 445 ms    P Axis  degrees    R AXIS 115 degrees    T Axis -23 degrees    Interpretation ECG       Undetermined rhythm  Right axis deviation  Possible Right ventricular hypertrophy  Nonspecific T wave abnormality  Abnormal ECG  When compared with ECG of 21-JAN-2022 10:46,  Current undetermined rhythm precludes rhythm comparison, needs review  Confirmed by SEE ED PROVIDER NOTE FOR, ECG INTERPRETATION ([id number]),  BOB BARAKAT ([id number]) on 2/14/2022 12:10:07 AM             Pertinent Radiology

## 2022-02-14 NOTE — CONSULTS
Care Management Initial Consult    General Information  Assessment completed with: Patient,Spouse or significant other, Met with patient in his ED room and spoke with his wife on the phone.  Type of CM/SW Visit: Initial Assessment    Primary Care Provider verified and updated as needed: No   Readmission within the last 30 days: current reason for admission unrelated to previous admission         Advance Care Planning: Advance Care Planning Reviewed: present on chart          Communication Assessment  Patient's communication style: spoken language (English or Bilingual)    Hearing Difficulty or Deaf: no   Wear Glasses or Blind: no    Cognitive  Cognitive/Neuro/Behavioral: WDL                      Living Environment:   People in home: spouse  Wife Kaley  Current living Arrangements: house (2story. Independent with stairs going very slowly)      Able to return to prior arrangements: other (see comments) (To be determined.)       Family/Social Support:  Care provided by: spouse/significant other,homecare agency  Provides care for: no one, unable/limited ability to care for self  Marital Status:   Wife,Children  Kaley       Description of Support System: Supportive,Involved         Current Resources:   Patient receiving home care services: Yes  Skilled Home Care Services: Physicial Therapy,Speech Therapy,Occupational Therapy ([facility]. Wife notified home care.)  Community Resources: Home Care  Equipment currently used at home: cane, straight,shower chair,walker, rolling,wheelchair, power,other (see comments) (CPAP, O2)  Supplies currently used at home: Oxygen Tubing/Supplies    Employment/Financial:  Employment Status: retired        Financial Concerns: No concerns identified   Referral to Financial Counselor: No       Lifestyle & Psychosocial Needs:  Social Determinants of Health     Tobacco Use: Medium Risk     Smoking Tobacco Use: Former Smoker     Smokeless Tobacco Use: Never Used   Alcohol  Use: Not on file   Financial Resource Strain: Not on file   Food Insecurity: Not on file   Transportation Needs: Not on file   Physical Activity: Not on file   Stress: Not on file   Social Connections: Not on file   Intimate Partner Violence: Not on file   Depression: Not at risk     PHQ-2 Score: 0   Housing Stability: Not on file       Functional Status:  Prior to admission patient needed assistance:   Dependent ADLs:: Ambulation-walker,Bathing  Dependent IADLs:: Cleaning,Cooking,Laundry,Shopping,Meal Preparation,Medication Management,Money Management,Transportation  Assesssment of Functional Status: Not at  functional baseline    Mental Health Status:          Chemical Dependency Status:                Values/Beliefs:  Spiritual, Cultural Beliefs, Nondenominational Practices, Values that affect care:                 Additional Information:  Assessed in ED. Writer met with patient to review role of care management services, goals of care and assess needs or services at discharge.      Recent admissions: 12/20-1/17/22 for subdural hematoma, then transferred to Acute Rehab on 1/17-1/26/22, then discharged home with his wife and with [facility] for PT, OT, ST.  His wife notified them of his admission.    Today he is admitting observation for SOB, CHF, COPD.  He does have home oxygen and CPAP at home.    Will need to follow progress and needs at discharge.  If going home then to resume [facility] and may be beneficial to get skilled nursing as well.            WANDY RIVERS, RN

## 2022-02-14 NOTE — PROGRESS NOTES
02/14/22 1330   Quick Adds   Type of Visit Initial Occupational Therapy Evaluation   Living Environment   People in home spouse   Current Living Arrangements condominium  (Town home)   Living Environment Comments Recently discharged from [facility]-recieving home PT/OT/nursing-Pt stays on main level-RTS, grab bars on both sides of toilet, walk in shower/grab bars in shower, shower chair. Wife assists with shoes, assists w/showering,    Self-Care   Equipment Currently Used at Home cane, straight   Instrumental Activities of Daily Living (IADL)   IADL Comments Wife manages medications   General Information   Onset of Illness/Injury or Date of Surgery 02/13/22   Referring Physician Shun Kathleen   Patient/Family Therapy Goal Statement (OT) I am going to go home tomorrow   Existing Precautions/Restrictions   (oxygen 3liter)   Cognitive Status Examination   Orientation Status orientation to person, place and time   Affect/Mental Status (Cognitive) WFL   Follows Commands follows two-step commands   Cognitive Status Comments Pt appeared to demonstrate appropriate cognitive functioning for current situation   Pain Assessment   Patient Currently in Pain No   Strength Comprehensive (MMT)   Comment, General Manual Muscle Testing (MMT) Assessment Grossly intact   Coordination   Coordination Comments WFL limites   Bed Mobility   Comment (Bed Mobility) Pt SBA for sit to supine/supine to sit   Transfers   Transfers transfer safety analysis   Transfer Comments Pt sba for toilet, bed and chair.    Activities of Daily Living   BADL Assessment other (see comments)   Bathing Assessment   Comment (Bathing) Pt I'ly washed hands standing at sink   Clinical Impression   Criteria for Skilled Therapeutic Interventions Met (OT) yes   OT Diagnosis Decreased ADL independence   OT Problem List-Impairments impacting ADL activity tolerance impaired   Assessment of Occupational Performance 1-3 Performance Deficits   Identified Performance Deficits  dressing, toileting   Planned Therapy Interventions (OT) ADL retraining   Clinical Decision Making Complexity (OT) moderate complexity   Therapy Frequency (OT) Daily   Predicted Duration of Therapy 4   Risk & Benefits of therapy have been explained evaluation/treatment results reviewed   OT Discharge Planning    OT Discharge Recommendation (DC Rec) home;Home with assist;home with home care occupational therapy   OT Rationale for DC Rec Pt benefit from resumed cares at home, including home OT. Pt has decreased activity tolerance w/ADLs.   Total Evaluation Time (Minutes)   Total Evaluation Time (Minutes) 15

## 2022-02-14 NOTE — PHARMACY-ADMISSION MEDICATION HISTORY
Pharmacy Note - Admission Medication History    Pertinent Provider Information: discussed meds w/ pt's wife Kaley.  Patient was previously taking invokana (then appears was changed to Jardiance) but currently not taking either medication.       ______________________________________________________________________    Prior To Admission (PTA) med list completed and updated in EMR.       PTA Med List   Medication Sig Last Dose     acetaminophen (TYLENOL) 500 MG tablet Take 500-1,000 mg by mouth every 6 hours as needed for mild pain PRN     albuterol (PROVENTIL HFA;VENTOLIN HFA) 90 mcg/actuation inhaler Inhale 2 puffs into the lungs every 6 hours as needed  PRN     albuterol (PROVENTIL) (2.5 MG/3ML) 0.083% neb solution Take 2.5 mg by nebulization every 6 hours as needed for shortness of breath / dyspnea or wheezing PRN     Ascorbic Acid (VITAMIN C) 500 MG CAPS Take 500 mg by mouth daily as needed PRN     aspirin (ASA) 81 MG EC tablet Take 1 tablet (81 mg) by mouth daily 2/13/2022 at Unknown time     atorvastatin (LIPITOR) 40 MG tablet Take 1 tablet (40 mg) by mouth every evening 2/13/2022 at Unknown time     azithromycin (ZITHROMAX) 250 MG tablet Take 1,000 mg by mouth daily as needed 4 tablets before dental  More than a month at Unknown time     bumetanide (BUMEX) 2 MG tablet Take 4 mg by mouth 2 times daily 2/13/2022 at pm     calcium carbonate (TUMS) 500 MG chewable tablet Take 1 tablet (500 mg) by mouth daily as needed for heartburn PRN     Ferrous Gluconate 324 (37.5 Fe) MG TABS Take 1 tablet by mouth every other day 2/12/2022     fluticasone-vilanterol (BREO ELLIPTA) 200-25 mcg/dose DsDv inhaler Inhale 1 puff into the lungs daily  2/13/2022 at can bring     Garlic 1000 MG CAPS Take 1 capsule by mouth daily (before lunch)  2/13/2022 at Unknown time     insulin glargine (LANTUS PEN) 100 UNIT/ML pen Inject 8 Units Subcutaneous every morning (before breakfast) 2/13/2022 at am     ipratropium - albuterol 0.5 mg/2.5  mg/3 mL (DUONEB) 0.5-2.5 (3) MG/3ML neb solution Take 1 vial by nebulization every 6 hours as needed for shortness of breath / dyspnea or wheezing PRN     irbesartan (AVAPRO) 300 MG tablet Take 300 mg by mouth daily 2/13/2022 at Unknown time     lacosamide (VIMPAT) 200 MG TABS tablet 1 tablet (200 mg) by Oral or Feeding Tube route 2 times daily 2/13/2022 at Unknown time     metoprolol tartrate (LOPRESSOR) 25 MG tablet Take 1 tablet (25 mg) by mouth 2 times daily (with meals) 2/13/2022 at Unknown time     multivitamin w/minerals (THERA-VIT-M) tablet Take 1 tablet by mouth daily 2/13/2022 at Unknown time     omeprazole 20 MG tablet Take 20 mg by mouth 2 times daily 2/13/2022 at Unknown time     prasugrel (EFFIENT) 10 MG TABS tablet 1 tablet (10 mg) by Oral or Feeding Tube route daily 2/13/2022 at am     rOPINIRole (REQUIP) 1 MG tablet Take 1 tablet (1 mg) by mouth daily 2/13/2022 at HS     tadalafil (CIALIS) 20 MG tablet Take 1 tablet (20 mg) by mouth every 24 hours 2/13/2022 at am     traZODone (DESYREL) 100 MG tablet Take 1 tablet (100 mg) by mouth nightly as needed for sleep PRN     zinc gluconate 50 MG tablet Take 50 mg by mouth daily (before lunch)  2/13/2022 at Unknown time       Information source(s): Family member, Hospital records and [facility]  Method of interview communication: phone    Summary of Changes to PTA Med List  New: vitamin C  Discontinued: jardiance, norco, melatonin, metformin, protonix  Changed: bumex dosing, irbesartan dose    Patient was asked about OTC/herbal products specifically.  PTA med list reflects this.    In the past week, patient estimated taking medication this percent of the time:  greater than 90%.    Allergies were reviewed, assessed, and updated with the patient.      Medications currently not available for use during hospital stay. Family/Patient representative states [place] [facility]    The information provided in this note is only  as accurate as the sources available at the time of the update(s).    Thank you for the opportunity to participate in the care of this patient.    Violette Saxena [facility]  2/14/2022 9:56 AM

## 2022-02-14 NOTE — PROGRESS NOTES
[facility] MEDICINE  PROGRESS NOTE     Code Status: Full Code       Identification/Summary:   Red Sutherland is a 81 year old male with a PMH of A. fib, CHFpEF, CAD, HUNTER, HTN, CKD4, NOVA, pulmonary hypertension on home oxygen 3 L, cirrhosis, DM 2, RLS and subdural hematoma, status post craniotomy 12/21/2021. TCU discharge 1/26/2022.  2/13 presented to ED with shortness of breath and 4 pound weight gain. Elevated BNP. Had some improvement after Bumex. Admitted for heart failure. Cardiology consulted. IV Bumex continue.     Assessment and Plan:  SOB  Acute on chronic heart failure with preserved ejection fraction  Chronic atrial fibrillation  Pulmonary hypertension  Chronic hypoxemic respiratory failure 3 L  . Troponins negative x2.  WBC, CRP and procalcitonin negative.  CXR borderline cardiomegaly with normal pulmonary vasculature but some segmental atelectasis or infiltrates at the bases bilaterally.  Echo 1/7/2022 shows EF 60 to 65%. Pulmonary hypertension evident.  At home takes Bumex 4 mg twice daily.  In the ED given Bumex 2 mg IV x1. Feels his breathing is doing better.  Clinically looks comfortable.  Ordered Bumex 2 mg IV every 12 hours to continue.  Ordered cardiology consultation.  Ordered his home Cialis 20 mg daily.  Follow strict I's and O's and daily standing scale weights.  Chronic lower extremity edema  Peripheral vascular disease  Bilateral lower extremity ultrasound negative for DVT.  Patient states legs look at baseline to him.  Coronary artery disease. HUNTER  Essential hypertension  A little hypertensive this morning.  Ordered his home Avapro 300 mg daily and metoprolol 25 twice daily with hold parameters.  Ordered home Effient 10 mg daily and baby aspirin.  Ordered Lipitor 40 mg nightly.  Obstructive sleep apnea  COPD  Does not use CPAP. Instead is on 3 L of nasal cannula oxygen.  No clinical wheezing.  Ordered home Brio Ellipta inhaler and as needed  albuterol.  -Acute on chronic kidney disease stage IV  Baseline creatinine appears to be around 1.5.  Admission creatinine 1.81--> 1.73.  Follow daily BMP.  -Insulin-dependent type 2 diabetes  Ordered home Lantus 8 units every morning.  Follow blood sugars 4 times a day. Insulin sliding scale standard range ordered.  -Cirrhosis  -GERD  Ordered omeprazole 20 mg twice daily.  Follow ins and outs and weights closely.  -History of subdural hematoma  -Status post craniotomy 12/21/2021  -Seizure disorder  CT head 2/11/2022 shows stable right frontal subdural hematoma unchanged findings of right frontal craniotomy.  Order home lacosamide 200 mg twice daily.  Follow clinically.  Chronic anemia  Admission hemoglobin 7.8. Very similar to his baseline.  Order home iron replacement.  Insomnia  Ordered home Requip and as needed trazodone.    COVID-19 PCR negative from 2/14/2022  Noted. Standard precautions.  Anticoagulation   Continue patient's home aspirin and Effient.  Fluids: Saline lock  Pain meds: Tylenol as needed  Therapy: na  Dixon:Not present  Current Diet  Orders Placed This Encounter      Combination Diet No Caffeine Diet, Low Saturated Fat Na <2400mg Diet    Supplements  None    Barriers to Discharge: Shortness of breath, IV diuresis    Disposition: Hopeful discharge 1 to 2 days    Interval History/Subjective:  After patient's Bumex dose he has not appreciated much urine output but notes that his breathing does feel better. No chest pain. No nausea or vomiting. Was hoping to leave soon. Questions answered to verbalized satisfaction.    Physical Exam/Objective:  Temp:  [97.2  F (36.2  C)-97.8  F (36.6  C)] 97.8  F (36.6  C)  Pulse:  [75-89] 81  Resp:  [17-32] 18  BP: (127-161)/(65-78) 161/77  SpO2:  [96 %-100 %] 100 %  Wt Readings from Last 4 Encounters:   02/13/22 108.9 kg (240 lb)   01/24/22 101.8 kg (224 lb 8 oz)   01/17/22 98.7 kg (217 lb 9.6 oz)   12/20/21 103.9 kg (229 lb)     Body mass index is 33.47  kg/m .    Constitutional: awake, alert, cooperative, no apparent distress, and appears stated age  ENT: Normocephalic, without obvious abnormality, atraumatic, external ears without lesions, oral pharynx with moist mucous membranes, tonsils without erythema or exudates, gums normal and good dentition.  Respiratory: No increased work of breathing, good air exchange, clear to auscultation bilaterally, no crackles or wheezing and decreased air movement but no rhonchi rales or wheezing.  Cardiovascular: Irregularly irregular rhythm but normal rate  GI: No scars, normal bowel sounds, soft, non-distended, non-tender, no masses palpated, no hepatosplenomegally  Skin: normal skin color, texture, turgor, no redness, warmth, or swelling, and no rashes  Musculoskeletal: There is no redness, warmth, or swelling of the joints.  Motor strength is 5 out of 5 all extremities bilaterally.  Tone is normal. Venous stasis changes to lower extremities bilaterally  Neurologic: Cranial nerves II-XII are grossly intact. Sensory:  Sensory intact  Neuropsychiatric: General: normal, calm and normal eye contact Level of consciousness: alert / normal Affect: Blunted orientation: oriented to self, place, time and situation Memory and insight: normal, memory for past and recent events intact and thought process normal      Medications:   Personally Reviewed.  Medications     - MEDICATION INSTRUCTIONS -         bumetanide  2 mg Intravenous Q12H     insulin aspart  1-7 Units Subcutaneous TID AC       Data reviewed today: I personally reviewed all new medications, labs, imaging/diagnostics reports over the past 24 hours. Pertinent findings include:    Imaging:   Recent Results (from the past 24 hour(s))   Chest XR,  PA & LAT    Narrative    EXAM: XR CHEST 2 VW  LOCATION: [facility]  DATE/TIME: 2/14/2022 1:12 AM    INDICATION: shortness of breath  COMPARISON: 01/03/2022      Impression    IMPRESSION: Borderline cardiomegaly.  Pulmonary vascularity normal. Subsegmental subsegmental atelectasis or infiltrates both lung bases. Upper lung fields are clear. No definite effusions. Degenerative changes right shoulder.   US Lower Extremity Venous Duplex Bilateral    Narrative    EXAM: US LOWER EXTREMITY VENOUS DUPLEX BILATERAL  LOCATION: [facility]  DATE/TIME: 2/14/2022 2:48 AM    INDICATION: bilateral leg swelling, left greater than right  COMPARISON: None.  TECHNIQUE: Venous Duplex ultrasound of bilateral lower extremities with and without compression, augmentation and duplex. Color flow and spectral Doppler with waveform analysis performed.    FINDINGS: Exam includes the common femoral, femoral, popliteal veins as well as segmentally visualized deep calf veins and greater saphenous vein.     RIGHT: No deep vein thrombosis. No superficial thrombophlebitis. No popliteal cyst.    LEFT: No deep vein thrombosis. No superficial thrombophlebitis. No popliteal cyst.      Impression    IMPRESSION:  1.  No deep venous thrombosis in the bilateral lower extremities.       Labs:  US Lower Extremity Venous Duplex Bilateral   Final Result   IMPRESSION:   1.  No deep venous thrombosis in the bilateral lower extremities.      Chest XR,  PA & LAT   Final Result   IMPRESSION: Borderline cardiomegaly. Pulmonary vascularity normal. Subsegmental subsegmental atelectasis or infiltrates both lung bases. Upper lung fields are clear. No definite effusions. Degenerative changes right shoulder.        Recent Results (from the past 24 hour(s))   Comprehensive metabolic panel    Collection Time: 02/13/22 11:05 PM   Result Value Ref Range    Sodium 140 136 - 145 mmol/L    Potassium 4.5 3.5 - 5.0 mmol/L    Chloride 105 98 - 107 mmol/L    Carbon Dioxide (CO2) 22 22 - 31 mmol/L    Anion Gap 13 5 - 18 mmol/L    Urea Nitrogen 46 (H) 8 - 28 mg/dL    Creatinine 1.81 (H) 0.70 - 1.30 mg/dL    Calcium 8.4 (L) 8.5 - 10.5 mg/dL    Glucose 198 (H) 70 - 125  mg/dL    Alkaline Phosphatase 139 (H) 45 - 120 U/L    AST 33 0 - 40 U/L    ALT 11 0 - 45 U/L    Protein Total 6.5 6.0 - 8.0 g/dL    Albumin 3.3 (L) 3.5 - 5.0 g/dL    Bilirubin Total 1.5 (H) 0.0 - 1.0 mg/dL    GFR Estimate 38 (L) >60 mL/min/1.73m2   Troponin I (now)    Collection Time: 02/13/22 11:05 PM   Result Value Ref Range    Troponin I 0.03 0.00 - 0.29 ng/mL   Extra Blue Top Tube    Collection Time: 02/13/22 11:05 PM   Result Value Ref Range    Hold Specimen JIC    Extra Red Top Tube    Collection Time: 02/13/22 11:05 PM   Result Value Ref Range    Hold Specimen JIC    CRP inflammation    Collection Time: 02/13/22 11:05 PM   Result Value Ref Range    CRP 0.5 0.0-<0.8 mg/dL   ECG 12-LEAD WITH MUSE (LHE)    Collection Time: 02/13/22 11:30 PM   Result Value Ref Range    Systolic Blood Pressure 126 mmHg    Diastolic Blood Pressure 67 mmHg    Ventricular Rate 86 BPM    Atrial Rate 84 BPM    WY Interval  ms    QRS Duration 88 ms     ms    QTc 445 ms    P Axis  degrees    R AXIS 115 degrees    T Axis -23 degrees    Interpretation ECG       Undetermined rhythm  Right axis deviation  Possible Right ventricular hypertrophy  Nonspecific T wave abnormality  Abnormal ECG  When compared with ECG of 21-JAN-2022 10:46,  Current undetermined rhythm precludes rhythm comparison, needs review  Confirmed by SEE ED PROVIDER NOTE FOR, ECG INTERPRETATION ([id number]),  BOB BARAKAT ([id number]) on 2/14/2022 12:10:07 AM     CBC with platelets and differential    Collection Time: 02/13/22 11:54 PM   Result Value Ref Range    WBC Count 7.0 4.0 - 11.0 10e3/uL    RBC Count 4.14 (L) 4.40 - 5.90 10e6/uL    Hemoglobin 7.8 (L) 13.3 - 17.7 g/dL    Hematocrit 27.9 (L) 40.0 - 53.0 %    MCV 67 (L) 78 - 100 fL    MCH 18.8 (L) 26.5 - 33.0 pg    MCHC 28.0 (L) 31.5 - 36.5 g/dL    RDW 19.9 (H) 10.0 - 15.0 %    Platelet Count 252 150 - 450 10e3/uL    % Neutrophils 73 %    % Lymphocytes 14 %    % Monocytes 10 %    % Eosinophils 3 %    % Basophils 0 %     % Immature Granulocytes 0 %    NRBCs per 100 WBC 0 <1 /100    Absolute Neutrophils 5.1 1.6 - 8.3 10e3/uL    Absolute Lymphocytes 1.0 0.8 - 5.3 10e3/uL    Absolute Monocytes 0.7 0.0 - 1.3 10e3/uL    Absolute Eosinophils 0.2 0.0 - 0.7 10e3/uL    Absolute Basophils 0.0 0.0 - 0.2 10e3/uL    Absolute Immature Granulocytes 0.0 <=0.4 10e3/uL    Absolute NRBCs 0.0 10e3/uL   B-Type Natriuretic Peptide ([facility] Only)    Collection Time: 02/13/22 11:54 PM   Result Value Ref Range     (H) 0 - 84 pg/mL   Symptomatic; Unknown Influenza A/B & SARS-CoV2 (COVID-19) Virus PCR Multiplex Nasopharyngeal    Collection Time: 02/14/22  2:31 AM    Specimen: Nasopharyngeal; Swab   Result Value Ref Range    Influenza A PCR Negative Negative    Influenza B PCR Negative Negative    SARS CoV2 PCR Negative Negative   Comprehensive metabolic panel    Collection Time: 02/14/22  5:40 AM   Result Value Ref Range    Sodium 142 136 - 145 mmol/L    Potassium 3.9 3.5 - 5.0 mmol/L    Chloride 105 98 - 107 mmol/L    Carbon Dioxide (CO2) 26 22 - 31 mmol/L    Anion Gap 11 5 - 18 mmol/L    Urea Nitrogen 45 (H) 8 - 28 mg/dL    Creatinine 1.73 (H) 0.70 - 1.30 mg/dL    Calcium 8.6 8.5 - 10.5 mg/dL    Glucose 142 (H) 70 - 125 mg/dL    Alkaline Phosphatase 133 (H) 45 - 120 U/L    AST 15 0 - 40 U/L    ALT 9 0 - 45 U/L    Protein Total 6.2 6.0 - 8.0 g/dL    Albumin 3.3 (L) 3.5 - 5.0 g/dL    Bilirubin Total 1.4 (H) 0.0 - 1.0 mg/dL    GFR Estimate 40 (L) >60 mL/min/1.73m2   Procalcitonin    Collection Time: 02/14/22  5:40 AM   Result Value Ref Range    Procalcitonin 0.08 0.00 - 0.49 ng/mL   Troponin I    Collection Time: 02/14/22  5:40 AM   Result Value Ref Range    Troponin I 0.02 0.00 - 0.29 ng/mL   CBC with platelets and differential    Collection Time: 02/14/22  5:40 AM   Result Value Ref Range    WBC Count 6.3 4.0 - 11.0 10e3/uL    RBC Count 4.19 (L) 4.40 - 5.90 10e6/uL    Hemoglobin 7.8 (L) 13.3 - 17.7 g/dL    Hematocrit 28.2 (L) 40.0 - 53.0 %    MCV  67 (L) 78 - 100 fL    MCH 18.6 (L) 26.5 - 33.0 pg    MCHC 27.7 (L) 31.5 - 36.5 g/dL    RDW 20.2 (H) 10.0 - 15.0 %    Platelet Count 239 150 - 450 10e3/uL    % Neutrophils 70 %    % Lymphocytes 15 %    % Monocytes 9 %    % Eosinophils 4 %    % Basophils 1 %    % Immature Granulocytes 1 %    NRBCs per 100 WBC 0 <1 /100    Absolute Neutrophils 4.4 1.6 - 8.3 10e3/uL    Absolute Lymphocytes 1.0 0.8 - 5.3 10e3/uL    Absolute Monocytes 0.6 0.0 - 1.3 10e3/uL    Absolute Eosinophils 0.2 0.0 - 0.7 10e3/uL    Absolute Basophils 0.0 0.0 - 0.2 10e3/uL    Absolute Immature Granulocytes 0.0 <=0.4 10e3/uL    Absolute NRBCs 0.0 10e3/uL   B-Type Natriuretic Peptide ([facility] Only)    Collection Time: 22  5:40 AM   Result Value Ref Range     (H) 0 - 84 pg/mL   Glucose by meter    Collection Time: 22  7:53 AM   Result Value Ref Range    GLUCOSE BY METER POCT 111 (H) 70 - 99 mg/dL       Pending Labs:  Unresulted Labs Ordered in the Past 30 Days of this Admission     No orders found for last 31 day(s).        Echo Limited  Order: [id number]   Status: Edited Result - FINAL     Visible to patient: Yes (seen)     Next appt: 2022 at 09:30 AM in Lab (Lab)     0 Result Notes    Details    Reading Physician Reading Date Result Priority   Isabel Delacruz MD  [phone] 2022      Narrative & Impression  [id number]  [id number]  [id number]  [id number]^CHAN^ELIAS^JULIO CÉSAR NASH     [facility],[place]  Echocardiography Laboratory  [address] [place] [id number]     Name: MARCOS VIDAL  MRN: [id number]  : 1940  Study Date: 2022 10:19 AM  Age: 81 yrs  Gender: Male  Patient Location: [facility]  Reason For Study: Heart Failure, Unspecified  Ordering Physician: ELIAS GILES  Referring Physician: YONG AGRCIA  Performed By: Sierra Briggs RDCS     BSA: 2.1 m2  Height: 71 in  Weight: 197 lb  HR: 74  BP: 136/78  mmHg  ______________________________________________________________________________  Procedure  Limited Portable Echo Adult.  ______________________________________________________________________________  Interpretation Summary  Global and regional left ventricular function is normal with an EF of 60-65%.  Mild right ventricular dilation is present. Global right ventricular function  is mildly reduced.  Mild to moderate tricuspid insufficiency is present.  Pulmonary hypertension is present. Estimated pulmonary artery systolic  pressure is 64 mmHg based on a mean right atrial pressure of 15 mmHg.  Dilation of the inferior vena cava is present with abnormal respiratory  variation in diameter.  No pericardial effusion is present.         Shun Kathleen MD  [facility]  Phone: #[phone]

## 2022-02-14 NOTE — CONSULTS
Thank you,  No ref. provider found, for asking the [facility] Care team to see Red Sutherland to evaluate.      Assessment/Recommendations   Assessment:    Dyspnea, multifactorial, suspect significant component of pulmonary hypertension/RV failure  Acute on chronic heart failure preserved ejection fraction, predominance of right-sided symptoms  Pulmonary hypertension, moderate to severe, secondary to chronic lung disease and diastolic heart failure  Coronary artery disease with history of left circumflex intervention at [facility] in February 2021, no angina, negative troponins  COPD on chronic O2  Permanent atrial fibrillation with controlled ventricular response, off anticoagulation  Recent traumatic subdural hematoma status post craniotomy  Hypertension  Diabetes mellitus  Peripheral arterial disease    Plan:  IV Bumex  Add spironolactone and metolazone to augment IV loop diuretic effect and preserve potassium  Watch electrolytes and creatinine closely    Apixaban was not resumed after subdural hematoma.  He continues to be at the risk of falls and resumption of apixaban is not recommended at this point.  He was considered for watchman in the past.  Depending on his clinical course that should be reconsidered.      Resume antihypertensive medications    He does not need to be on Effient being 6 months past elective intervention to circumflex.    Discharge when symptomatically improved.    Should follow with cardiology/pulmonary services at [facility]            History of Present Illness/Subjective    Mr. Red Sutherland is a 81 year old male who came to the hospital with complaints of progressive shortness of breath.  He has a history of chronic respiratory failure with chronic O2 treatment.  He has severe COPD and also right-sided heart failure and pulmonary hypertension.  He has been seen by many cardiac specialists at [facility] and more recently at [facility].  Earlier this year he  "suffered from traumatic subdural hematoma and was treated [facility].  He states that diuretics dose has not changed after discharge.  He is not sure if he has gained any weight.  He has chronic swelling of lower extremities.  He denies PND and orthopnea.  He is unaware of irregular heart rhythm.    He is not taking any specific drugs for pulmonary hypertension although he was on those medications in the past at [facility].    Most recently he has been following up with cardiology and pulmonary service at [facility].  He states the doctors at [place] was not very helpful to him.  Likewise he did not want to follow-up with Dr. French at [facility] anymore.  Doctors at [facility] were considering watchman device prior to fall and subdural hematoma.    ECG: Personally reviewed.  A. fib with controlled ventricular response    ECHO (personnaly Reviewed): January 2022 at [facility]  Global and regional left ventricular function is normal with an EF of 60-65%.  Mild right ventricular dilation is present. Global right ventricular function  is mildly reduced.  Mild to moderate tricuspid insufficiency is present.  Pulmonary hypertension is present. Estimated pulmonary artery systolic  pressure is 64 mmHg based on a mean right atrial pressure of 15 mmHg.  Dilation of the inferior vena cava is present with abnormal respiratory  variation in diameter.  No pericardial effusion is present.    Chest X-Ray: No acute findings     Physical Examination Review of Systems   BP (!) 161/77 (BP Location: Right arm, Patient Position: Semi-Mccormick's)   Pulse 81   Temp 97.8  F (36.6  C) (Oral)   Resp 18   Ht 1.803 m (5' 11\")   Wt 108.9 kg (240 lb)   SpO2 100%   BMI 33.47 kg/m    Body mass index is 33.47 kg/m .  Wt Readings from Last 3 Encounters:   02/13/22 108.9 kg (240 lb)   01/24/22 101.8 kg (224 lb 8 oz)   01/17/22 98.7 kg (217 lb 9.6 oz)       Intake/Output Summary (Last 24 hours) at 2/14/2022 " 1004  Last data filed at 2/14/2022 0839  Gross per 24 hour   Intake --   Output 1000 ml   Net -1000 ml     General Appearance:   Alert, cooperative, no distress, appears stated age   Head/ENT: Normocephalic, without obvious abnormality. Membranes moist.      EYES:  No scleral icterus   Neck: Supple, symmetrical, trachea midline, no adenopathy, thyroid: not enlarged, symmetric, no carotid bruit.  Elevated JVD   Chest/Lungs:    Decreased breath sounds bilaterally   Cardiovascular:   irregular rhythm, S1, S2 normal, no murmur, rub or gallop.   Abdomen:  Soft, non-tender, bowel sounds active all four quadrants,  no masses, no organomegaly   Extremities: no cyanosis or clubbing.  3+ edema   Skin: Skin color, texture, turgor normal, no rashes or lesions.    Neurologic: Alert and oriented x 3, moving all four extremities.    Psychiatric: Normal affect.      10 system review of systems completed see history of present illness and inpatient H&P (reviewed) for further details.          Lab Results    Chemistry/lipid CBC Cardiac Enzymes/BNP/TSH/INR   Recent Labs   Lab Test 10/25/21  1529   CHOL 98   HDL 35*   LDL 53   TRIG 49     Recent Labs   Lab Test 10/25/21  1529 05/25/21  1359 09/02/20  1307   LDL 53 <5 50     Recent Labs   Lab Test 02/14/22  0753 02/14/22  0540   NA  --  142   POTASSIUM  --  3.9   CHLORIDE  --  105   CO2  --  26   * 142*   BUN  --  45*   CR  --  1.73*   GFRESTIMATED  --  40*   ANNMARIE  --  8.6     Recent Labs   Lab Test 02/14/22  0540 02/13/22  2305 02/07/22  1239   CR 1.73* 1.81* 1.53*     Recent Labs   Lab Test 12/21/21  0438 10/23/21  1824 07/11/21  1031   A1C 7.5* 6.3* 9.3*          Recent Labs   Lab Test 02/14/22  0540   WBC 6.3   HGB 7.8*   HCT 28.2*   MCV 67*        Recent Labs   Lab Test 02/14/22  0540 02/13/22  2354 01/21/22  1118   HGB 7.8* 7.8* 8.7*    Recent Labs   Lab Test 02/14/22  0540 02/13/22  2305 11/30/21  1609   TROPONINI 0.02 0.03 0.03     Recent Labs   Lab Test  02/14/22  0540 02/13/22  2354 01/21/22  1118 01/07/22  0928 01/06/22  1326 11/30/21  1609   * 519*  --   --   --  369*   NTBNPI  --   --  2,086* 818 1,112  --      Recent Labs   Lab Test 01/06/22  1326   TSH 0.50  0.49     Recent Labs   Lab Test 12/20/21  1350 12/20/21  1046 11/30/21  1609   INR 1.32* 1.20* 1.30*        Medical History  Surgical History Family History Social History   Past Medical History:   Diagnosis Date     Atrial fibrillation (H)      CHF (congestive heart failure) (H)      COPD (chronic obstructive pulmonary disease) (H)      Coronary artery disease      Diabetes mellitus (H)      Essential hypertension      Hyperlipidemia      Pulmonary hypertension (H)     O2 at night     Pulmonary hypertension due to left ventricular diastolic dysfunction (H) 11/28/2017    Multifactorial per [facility] with elevated LVEDP and PCW, COPD and NOVA. They put him on sildenafil as nitroprusside lowered systemic BP and with that the mean PA dropped from 54 to 49. Negative VQ at [facility] Dec 1, 2017.     RLS (restless legs syndrome)      Sleep apnea      Past Surgical History:   Procedure Laterality Date     BACK SURGERY      lower back     CARDIAC CATHETERIZATION  12/13/2017    Right and left at [facility], mean PA 58, PCW 24 with V wave of 35, LVEDP of 18, with Nipride systemic BP, PVR and mean PA all declined     CARDIOVERSION  03/15/2013    for afib     CATARACT EXTRACTION Bilateral      COLONOSCOPY N/A 10/31/2021    Procedure: COLONOSCOPY WITH POLYPECTOMY;  Surgeon: Sheng Sagastume MD;  Location: [facility] Main OR     CORONARY STENT PLACEMENT       CRANIOTOMY Right 12/21/2021    Procedure: CRANIOTOMY FOR Subdural HEMATOMA EVACUATION;  Surgeon: Parvez Quinones MD;  Location: [facility] OR     CV CORONARY ANGIOGRAM N/A 10/25/2021    Procedure: Coronary Angiogram;  Surgeon: Otf Knowles MD;  Location: [facility] CATH LAB CV     CV CORONARY LITHOTRIPSY PCI N/A 10/25/2021    Procedure: CV Coronary Lithotripsy PCI;   Surgeon: Otf Knowles MD;  Location: [facility] LAB CV     CV LEFT HEART CATH N/A 10/25/2021    Procedure: Left Heart Cath;  Surgeon: Otf Knowles MD;  Location: [facility] CATH LAB CV     CV PCI N/A 10/25/2021    Procedure: Percutaneous Coronary Intervention;  Surgeon: Otf Knowles MD;  Location: [facility] CATH LAB CV     ESOPHAGOSCOPY, GASTROSCOPY, DUODENOSCOPY (EGD), COMBINED N/A 10/30/2021    Procedure: ESOPHAGOGASTRODUODENOSCOPY (EGD);  Surgeon: Sheng Sagastume MD;  Location: [facility] Main OR     IR ABDOMINAL AORTOGRAM  2012     IR GASTROSTOMY TUBE PERCUTANEOUS PLCMNT  1/3/2022     IR MISCELLANEOUS PROCEDURE  2001     IR MISCELLANEOUS PROCEDURE  2012     OTHER SURGICAL HISTORY      [place]     PICC DOUBLE LUMEN PLACEMENT Right 2021    48cm (2cm external), Basilic vein, SVC RA junction     SHOULDER SURGERY      reapir on right shoulder     TOTAL HIP ARTHROPLASTY Left      TOTAL KNEE ARTHROPLASTY Bilateral      WRIST SURGERY Bilateral      ZZHC COLONOSCOPY W/WO BRUSH/WASH N/A 2021    Procedure: COLONOSCOPY;  Surgeon: Mihai Harris MD;  Location: [facility] Main OR;  Service: Gastroenterology     No premature CAD, SCD,cardiomyopathy   Social History     Socioeconomic History     Marital status:      Spouse name: Not on file     Number of children: 4     Years of education: Not on file     Highest education level: Not on file   Occupational History     Not on file   Tobacco Use     Smoking status: Former Smoker     Packs/day: 1.50     Years: 44.00     Pack years: 66.00     Types: Cigarettes     Quit date: 1996     Years since quittin.8     Smokeless tobacco: Never Used   Substance and Sexual Activity     Alcohol use: Yes     Alcohol/week: 1.0 standard drink     Comment: Alcoholic Drinks/day: 1 per month     Drug use: No     Sexual activity: Not Currently     Partners: Female   Other Topics Concern     Not on file   Social History Narrative     Not  on file     Social Determinants of Health     Financial Resource Strain: Not on file   Food Insecurity: Not on file   Transportation Needs: Not on file   Physical Activity: Not on file   Stress: Not on file   Social Connections: Not on file   Intimate Partner Violence: Not on file   Housing Stability: Not on file         Medications  Allergies   Current Facility-Administered Medications Ordered in Epic   Medication Dose Route Frequency Provider Last Rate Last Admin     - MEDICATION INSTRUCTIONS -   Does not apply DOES NOT GO TO Shun Sánchez MD         acetaminophen (TYLENOL) tablet 650 mg  650 mg Oral Q6H PRN Moon Catherine MD        Or     acetaminophen (TYLENOL) Suppository 650 mg  650 mg Rectal Q6H PRN Moon Catherine MD         bumetanide (BUMEX) injection 2 mg  2 mg Intravenous Q12H Shun Kathleen MD   2 mg at 02/14/22 0909     glucose gel 15-30 g  15-30 g Oral Q15 Min PRN Moon Catherine MD        Or     dextrose 50 % injection 25-50 mL  25-50 mL Intravenous Q15 Min PRN Moon Catherine MD        Or     glucagon injection 1 mg  1 mg Subcutaneous Q15 Min PRN Moon Catherine MD         HOLD: nitroGLYcerin IF   Does not apply HOLD Shun Kathleen MD         insulin aspart (NovoLOG) injection (RAPID ACTING)  1-7 Units Subcutaneous TID AC Moon Catherine MD         melatonin tablet 1 mg  1 mg Oral At Bedtime PRN Moon Catherine MD         metolazone (ZAROXOLYN) tablet 2.5 mg  2.5 mg Oral Once Prem San MD         prochlorperazine (COMPAZINE) injection 5 mg  5 mg Intravenous Q6H PRN Moon Catherine MD         spironolactone (ALDACTONE) tablet 25 mg  25 mg Oral Daily Prem San MD         Current Outpatient Medications Ordered in Epic   Medication Sig Dispense Refill     acetaminophen (TYLENOL) 500 MG tablet Take 500-1,000 mg by mouth every 6 hours as needed for mild pain       albuterol (PROVENTIL HFA;VENTOLIN HFA) 90 mcg/actuation inhaler Inhale 2 puffs into the lungs  every 6 hours as needed        albuterol (PROVENTIL) (2.5 MG/3ML) 0.083% neb solution Take 2.5 mg by nebulization every 6 hours as needed for shortness of breath / dyspnea or wheezing       Ascorbic Acid (VITAMIN C) 500 MG CAPS Take 500 mg by mouth daily as needed       aspirin (ASA) 81 MG EC tablet Take 1 tablet (81 mg) by mouth daily       atorvastatin (LIPITOR) 40 MG tablet Take 1 tablet (40 mg) by mouth every evening 30 tablet 0     azithromycin (ZITHROMAX) 250 MG tablet Take 1,000 mg by mouth daily as needed 4 tablets before dental        bumetanide (BUMEX) 2 MG tablet Take 4 mg by mouth 2 times daily       calcium carbonate (TUMS) 500 MG chewable tablet Take 1 tablet (500 mg) by mouth daily as needed for heartburn       Ferrous Gluconate 324 (37.5 Fe) MG TABS Take 1 tablet by mouth every other day       fluticasone-vilanterol (BREO ELLIPTA) 200-25 mcg/dose DsDv inhaler Inhale 1 puff into the lungs daily        Garlic 1000 MG CAPS Take 1 capsule by mouth daily (before lunch)        insulin glargine (LANTUS PEN) 100 UNIT/ML pen Inject 8 Units Subcutaneous every morning (before breakfast) 2.4 mL 0     ipratropium - albuterol 0.5 mg/2.5 mg/3 mL (DUONEB) 0.5-2.5 (3) MG/3ML neb solution Take 1 vial by nebulization every 6 hours as needed for shortness of breath / dyspnea or wheezing       irbesartan (AVAPRO) 300 MG tablet Take 300 mg by mouth daily       lacosamide (VIMPAT) 200 MG TABS tablet 1 tablet (200 mg) by Oral or Feeding Tube route 2 times daily 60 tablet 0     metoprolol tartrate (LOPRESSOR) 25 MG tablet Take 1 tablet (25 mg) by mouth 2 times daily (with meals) 60 tablet 3     multivitamin w/minerals (THERA-VIT-M) tablet Take 1 tablet by mouth daily       omeprazole 20 MG tablet Take 20 mg by mouth 2 times daily       prasugrel (EFFIENT) 10 MG TABS tablet 1 tablet (10 mg) by Oral or Feeding Tube route daily       rOPINIRole (REQUIP) 1 MG tablet Take 1 tablet (1 mg) by mouth daily       tadalafil (CIALIS) 20  "MG tablet Take 1 tablet (20 mg) by mouth every 24 hours       traZODone (DESYREL) 100 MG tablet Take 1 tablet (100 mg) by mouth nightly as needed for sleep 30 tablet 0     zinc gluconate 50 MG tablet Take 50 mg by mouth daily (before lunch)        ACCU-CHEK JOSE PLUS TEST STRP strips [ACCU-CHEK JOSE PLUS TEST STRP STRIPS] see administration instructions.       BD ULTRA-FINE MANISHA PEN NEEDLES 32 gauge x 5/32\" Ndle [BD ULTRA-FINE MANISHA PEN NEEDLES 32 GAUGE X 5/32\" NDLE]   4     OXYGEN-AIR DELIVERY SYSTEMS MISC [OXYGEN-AIR DELIVERY SYSTEMS MISC] Use As Directed. (Patient not taking: Reported on 2/4/2022)         Allergies   Allergen Reactions     Ace Inhibitors      Bloody nose, dry mouth, cracked lips     Contrast Dye Nausea     Other reaction(s): GI intolerance, GI Upset     Furosemide Muscle Pain (Myalgia)     Previously tolerated.  Muscle cramps     Hydrochlorothiazide Unknown     Iodinated Contrast Media [Diagnostic X-Ray Materials] Nausea     Losartan Other (See Comments)     Other reaction(s): Stomatitis, Bloody nose dry mouth and lips     Losartan-Hydrochlorothiazide [Hyzaar]      Mouth sores     Metaxalone Nausea     Metolazone Other (See Comments)     Muscle cramps     Mometasone Other (See Comments)     Bloody nose     Proton Pump Inhibitors      Bloody nose, mouth sores     Rabeprazole Other (See Comments)     Mouth sores     Ramipril Other (See Comments)     Mouth sores     Shellfish Containing Products [Shellfish-Derived Products] Unknown     Other reaction(s): mouth sores, Other reaction(s): mouth sores     Sildenafil Muscle Pain (Myalgia)     Methocarbamol Rash     Penicillins Other (See Comments)     Immune-does not work for him                                               "

## 2022-02-15 NOTE — PROGRESS NOTES
[facility] MEDICINE  PROGRESS NOTE     Code Status: Full Code       Identification/Summary:   81 year old male with a PMH of A. fib, CHFpEF, CAD, HUNTER, HTN, CKD4, NOVA, pulmonary hypertension on home oxygen 3 L, cirrhosis, DM 2, RLS and subdural hematoma, status post craniotomy 12/21/2021. TCU discharge 1/26/2022.  2/13 presented to ED with shortness of breath and 4 pound weight gain. Elevated BNP. Had some improvement after Bumex. Admitted for heart failure. Cardiology consulted. IV Bumex continue.  Hopeful discharge 2/16.     Assessment and Plan:  SOB  Acute on chronic heart failure with preserved ejection fraction  Chronic atrial fibrillation  Pulmonary hypertension  Chronic hypoxemic respiratory failure 3 L  . Troponins negative x2.  WBC, CRP and procalcitonin negative.  CXR borderline cardiomegaly with normal pulmonary vasculature but some segmental atelectasis or infiltrates at the bases bilaterally.  Echo 1/7/2022 shows EF 60 to 65%. Pulmonary hypertension evident.  At home takes Bumex 4 mg twice daily.  In the ED given Bumex 2 mg IV x1. Feels his breathing is doing better.  Appreciate cardiology consultation.  Continue Bumex 2 mg IV every 12 hours to continue.  Continue his home Cialis 20 mg daily.  Follow strict I's and O's and daily standing scale weights.  Chronic lower extremity edema  Peripheral vascular disease  Bilateral lower extremity ultrasound negative for DVT.  Patient states legs look at baseline to him.  Coronary artery disease. HUNTER  Essential hypertension  Continue his home Avapro 300 mg daily and metoprolol 25 twice daily with hold parameters.  Continue home Effient 10 mg daily and baby aspirin.  Continue Lipitor 40 mg nightly.  Obstructive sleep apnea  COPD  Does not use CPAP. Instead is on 3 L of nasal cannula oxygen.  No clinical wheezing.  Continue home Brio Ellipta inhaler and as needed albuterol.  -Acute on chronic kidney disease stage  IV  Baseline creatinine appears to be around 1.5.  Admission creatinine 1.81--> 1.73--> 1.65.  Follow daily BMP.  -Insulin-dependent type 2 diabetes  Continue home Lantus 8 units every morning.  Reasonable glucose control.  Follow blood sugars 4 times a day. Insulin sliding scale standard range.  -Cirrhosis  -GERD  Continue omeprazole 20 mg twice daily.  Follow ins and outs and weights closely.  -History of subdural hematoma  -Status post craniotomy 12/21/2021  -Seizure disorder  CT head 2/11/2022 shows stable right frontal subdural hematoma unchanged findings of right frontal craniotomy.  Continue home lacosamide 200 mg twice daily.  Follow clinically.  Chronic anemia  Admission hemoglobin 7.8. Very similar to his baseline.  Continue home iron replacement.  Insomnia  Continue home Requip and as needed trazodone.     COVID-19 PCR negative from 2/14/2022  Noted. Standard precautions.  Anticoagulation   Continue patient's home aspirin and Effient.  Fluids: Saline lock  Pain meds: Tylenol as needed  Therapy: na   Dixon:Not present  Current Diet  Orders Placed This Encounter      Combination Diet Moderate Consistent Carb (60 g CHO per Meal) Diet; Low Saturated Fat Na <2400mg Diet    Supplements  None    Barriers to Discharge: IV diuresis, cardiology clearance    Disposition: Hopeful discharge 2/16    Interval History/Subjective:  Patient feels like his breathing is doing okay.  Feels close to baseline for him.  No nausea or vomiting.  Hoping to discharge home tomorrow.  Questions answered to verbalized satisfaction.    Physical Exam/Objective:  Temp:  [97.7  F (36.5  C)-98  F (36.7  C)] 97.7  F (36.5  C)  Pulse:  [] 72  Resp:  [19-23] 19  BP: (124-148)/(58-71) 129/63  SpO2:  [91 %-100 %] 100 %  Wt Readings from Last 4 Encounters:   02/15/22 109.9 kg (242 lb 3.2 oz)   01/24/22 101.8 kg (224 lb 8 oz)   01/17/22 98.7 kg (217 lb 9.6 oz)   12/20/21 103.9 kg (229 lb)     Body mass index is 33.78  kg/m .    Constitutional: awake, alert, cooperative, no apparent distress, and appears stated age  ENT: Normocephalic, without obvious abnormality, atraumatic, external ears without lesions, oral pharynx with moist mucous membranes, tonsils without erythema or exudates, gums normal and good dentition.  Respiratory: Decreased airflow.  Some crackles right greater than left side.  Cardiovascular: Normal apical impulse, regular rate and rhythm, normal S1 and S2, no S3 or S4, and no murmur noted  GI: No scars, normal bowel sounds, soft, non-distended, non-tender, no masses palpated, no hepatosplenomegally  Skin: normal skin color, texture, turgor, no redness, warmth, or swelling, and no rashes  Musculoskeletal: There is no redness, warmth, or swelling of the joints.  Motor strength is 5 out of 5 all extremities bilaterally.  Tone is normal. no lower extremity pitting edema present  Neurologic: Cranial nerves II-XII are grossly intact. Sensory:  Sensory intact  Neuropsychiatric: General: normal, calm and normal eye contact Level of consciousness: alert / normal Affect: normal Orientation: oriented to self, place, time and situation Memory and insight: normal, memory for past and recent events intact and thought process normal      Medications:   Personally Reviewed.  Medications     - MEDICATION INSTRUCTIONS -         aspirin  81 mg Oral Daily     atorvastatin  40 mg Oral QPM     bumetanide  2 mg Intravenous Q12H     ferrous gluconate  324 mg Oral Every Other Day     fluticasone-vilanterol  1 puff Inhalation Daily     insulin aspart  1-7 Units Subcutaneous TID AC     insulin glargine  8 Units Subcutaneous QAM AC     irbesartan  300 mg Oral Daily     lacosamide  200 mg Oral or Feeding Tube BID     metoprolol tartrate  25 mg Oral BID w/meals     multivitamin w/minerals  1 tablet Oral Daily     pantoprazole  40 mg Oral BID     rOPINIRole  1 mg Oral At Bedtime     spironolactone  25 mg Oral Daily     tadalafil  20 mg Oral  Q24H     zinc gluconate  50 mg Oral Daily before lunch       Data reviewed today: I personally reviewed all new medications, labs, imaging/diagnostics reports over the past 24 hours. Pertinent findings include:    Imaging:   No results found for this or any previous visit (from the past 24 hour(s)).    Labs:  US Lower Extremity Venous Duplex Bilateral   Final Result   IMPRESSION:   1.  No deep venous thrombosis in the bilateral lower extremities.      Chest XR,  PA & LAT   Final Result   IMPRESSION: Borderline cardiomegaly. Pulmonary vascularity normal. Subsegmental subsegmental atelectasis or infiltrates both lung bases. Upper lung fields are clear. No definite effusions. Degenerative changes right shoulder.        Recent Results (from the past 24 hour(s))   Glucose by meter    Collection Time: 02/14/22  5:15 PM   Result Value Ref Range    GLUCOSE BY METER POCT 83 70 - 99 mg/dL   Glucose by meter    Collection Time: 02/14/22 10:04 PM   Result Value Ref Range    GLUCOSE BY METER POCT 138 (H) 70 - 99 mg/dL   Basic metabolic panel    Collection Time: 02/15/22  6:49 AM   Result Value Ref Range    Sodium 138 136 - 145 mmol/L    Potassium 3.4 (L) 3.5 - 5.0 mmol/L    Chloride 100 98 - 107 mmol/L    Carbon Dioxide (CO2) 28 22 - 31 mmol/L    Anion Gap 10 5 - 18 mmol/L    Urea Nitrogen 42 (H) 8 - 28 mg/dL    Creatinine 1.65 (H) 0.70 - 1.30 mg/dL    Calcium 9.0 8.5 - 10.5 mg/dL    Glucose 113 70 - 125 mg/dL    GFR Estimate 42 (L) >60 mL/min/1.73m2   Glucose by meter    Collection Time: 02/15/22  8:38 AM   Result Value Ref Range    GLUCOSE BY METER POCT 144 (H) 70 - 99 mg/dL   Glucose by meter    Collection Time: 02/15/22 11:42 AM   Result Value Ref Range    GLUCOSE BY METER POCT 126 (H) 70 - 99 mg/dL       Pending Labs:  Unresulted Labs Ordered in the Past 30 Days of this Admission     No orders found from 1/14/2022 to 2/14/2022.            Shun Kathleen MD  [facility]  [facility]  Phone: #[phone]

## 2022-02-15 NOTE — PLAN OF CARE
"  Problem: Adult Inpatient Plan of Care  Goal: Optimal Comfort and Wellbeing  Outcome: Improving     Problem: Dysrhythmia (Heart Failure)  Goal: Stable Heart Rate and Rhythm  Outcome: Improving     Problem: Respiratory Compromise (Heart Failure)  Goal: Effective Oxygenation and Ventilation  Outcome: Improving     A&O gentleman, flat affect and labile initially, but cooperative and compliant. Uses urinal at the bedside, stands with SBA. Remains on 3L O2 NC, desats to mid 80s if on RA. GJ tube in place, flushed on evening shift per RN, otherwise is clamped, dressing c/d/i. Pt states he will leave AMA \"if I don't get discharged by 3pm.\" Pt was educated on the need for IV diuresis and the purpose for his admission, continue to monitor and re-educate as needed.  "

## 2022-02-15 NOTE — PROGRESS NOTES
"    Cardiology Progress Note    Assessment:  Dyspnea, multifactorial, suspect significant component of pulmonary hypertension/RV failure  Acute on chronic heart failure preserved ejection fraction, predominance of right-sided symptoms, symptomatically improved with diuresis  Pulmonary hypertension, moderate to severe, secondary to chronic lung disease and diastolic heart failure  Coronary artery disease with history of left circumflex intervention at [facility] in February 2021, no angina, negative troponins  COPD on chronic O2  Permanent atrial fibrillation with controlled ventricular response, off anticoagulation  Recent traumatic subdural hematoma status post craniotomy  Hypertension  Diabetes mellitus  Peripheral arterial disease  Chronic kidney disease, stable creatinine with diuresis    Plan:  Continue IV Lasix and p.o. metolazone and spironolactone for 24 more hours then discharge home    Subjective:   Less short of breath, denies chest pain    Objective:   /58 (BP Location: Right arm, Patient Position: Sitting)   Pulse 81   Temp 98  F (36.7  C) (Oral)   Resp 23   Ht 1.803 m (5' 11\")   Wt 108.9 kg (240 lb)   SpO2 94%   BMI 33.47 kg/m      Intake/Output Summary (Last 24 hours) at 2/15/2022 0930  Last data filed at 2/15/2022 0000  Gross per 24 hour   Intake 910 ml   Output 3125 ml   Net -2215 ml         Physical Exam:  GENERAL: no distress  NECK: Elevated JVD  LUNGS: Clear to auscultation.  CARDIAC: irregular  rhythm, S1 & S2 normal.  No heaves, thrills, gallops or murmurs.  ABDOMEN: flat, negative hepatosplenomegaly, soft and non-tender.  EXTREMITIES: No evidence of cyanosis, clubbing 2+ edema.    Current Facility-Administered Medications Ordered in Epic   Medication Dose Route Frequency Provider Last Rate Last Admin     - MEDICATION INSTRUCTIONS -   Does not apply DOES NOT GO TO Shun Sánchez MD         acetaminophen (TYLENOL) tablet 650 mg  650 mg Oral Q6H PRN Moon Catherine MD     "    Or     acetaminophen (TYLENOL) Suppository 650 mg  650 mg Rectal Q6H PRN Moon Catherine MD         acetaminophen (TYLENOL) tablet 500-1,000 mg  500-1,000 mg Oral Q6H PRN Shun Kathleen MD         albuterol (PROVENTIL HFA/VENTOLIN HFA) inhaler  2 puff Inhalation Q6H PRN Shun Kathleen MD         aspirin EC tablet 81 mg  81 mg Oral Daily Shun Kathleen MD   81 mg at 02/15/22 0841     atorvastatin (LIPITOR) tablet 40 mg  40 mg Oral QPM Shun Kathleen MD   40 mg at 02/14/22 2134     bumetanide (BUMEX) injection 2 mg  2 mg Intravenous Q12H Shun Kathleen MD   2 mg at 02/15/22 0856     glucose gel 15-30 g  15-30 g Oral Q15 Min PRN Moon Catherine MD        Or     dextrose 50 % injection 25-50 mL  25-50 mL Intravenous Q15 Min PRN Moon Catherine MD        Or     glucagon injection 1 mg  1 mg Subcutaneous Q15 Min PRN Moon Catherine MD         ferrous gluconate (FERGON) tablet 324 mg  324 mg Oral Every Other Day Shun Kathleen MD   324 mg at 02/14/22 1128     fluticasone-vilanterol (BREO ELLIPTA) 200-25 MCG/INH inhaler 1 puff  1 puff Inhalation Daily Shun Kathleen MD   1 puff at 02/15/22 0841     HOLD: nitroGLYcerin IF   Does not apply HOLD Shun Kathleen MD         insulin aspart (NovoLOG) injection (RAPID ACTING)  1-7 Units Subcutaneous TID AC Moon Catherine MD   1 Units at 02/15/22 0843     insulin glargine (LANTUS PEN) injection 8 Units  8 Units Subcutaneous QAM AC Shun Kathleen MD   8 Units at 02/15/22 0851     irbesartan (AVAPRO) tablet 300 mg  300 mg Oral Daily Shun Kathleen MD   300 mg at 02/15/22 0842     lacosamide (VIMPAT) tablet 200 mg  200 mg Oral or Feeding Tube BID Shun Kathleen MD   200 mg at 02/14/22 2148     melatonin tablet 1 mg  1 mg Oral At Bedtime PRN Moon Catherine MD         metoprolol tartrate (LOPRESSOR) tablet 25 mg  25 mg Oral BID w/meals Shun Kathleen MD   25 mg at 02/15/22 0841     multivitamin w/minerals (THERA-VIT-M) tablet 1  tablet  1 tablet Oral Daily Shun Kathleen MD   1 tablet at 02/15/22 0842     pantoprazole (PROTONIX) EC tablet 40 mg  40 mg Oral BID Shun Kathleen MD   40 mg at 02/15/22 0841     prochlorperazine (COMPAZINE) injection 5 mg  5 mg Intravenous Q6H PRN Moon Catherine MD         rOPINIRole (REQUIP) tablet 1 mg  1 mg Oral At Bedtime Shun Kathleen MD   1 mg at 02/14/22 2134     spironolactone (ALDACTONE) tablet 25 mg  25 mg Oral Daily Prem San MD   25 mg at 02/15/22 0841     tadalafil (Cialis/Ascirca) 10 mg tablet  20 mg Oral Q24H Shun Kathleen MD   20 mg at 02/14/22 1128     traZODone (DESYREL) tablet 100 mg  100 mg Oral At Bedtime PRN Shun Kathleen MD         zinc gluconate tablet 50 mg  50 mg Oral Daily before lunch Shun Kathleen MD   50 mg at 02/14/22 1129     Current Outpatient Medications Ordered in Epic   Medication Sig Dispense Refill     acetaminophen (TYLENOL) 500 MG tablet Take 500-1,000 mg by mouth every 6 hours as needed for mild pain       albuterol (PROVENTIL HFA;VENTOLIN HFA) 90 mcg/actuation inhaler Inhale 2 puffs into the lungs every 6 hours as needed        albuterol (PROVENTIL) (2.5 MG/3ML) 0.083% neb solution Take 2.5 mg by nebulization every 6 hours as needed for shortness of breath / dyspnea or wheezing       Ascorbic Acid (VITAMIN C) 500 MG CAPS Take 500 mg by mouth daily as needed       aspirin (ASA) 81 MG EC tablet Take 1 tablet (81 mg) by mouth daily       atorvastatin (LIPITOR) 40 MG tablet Take 1 tablet (40 mg) by mouth every evening 30 tablet 0     azithromycin (ZITHROMAX) 250 MG tablet Take 1,000 mg by mouth daily as needed 4 tablets before dental        bumetanide (BUMEX) 2 MG tablet Take 4 mg by mouth 2 times daily       calcium carbonate (TUMS) 500 MG chewable tablet Take 1 tablet (500 mg) by mouth daily as needed for heartburn       Ferrous Gluconate 324 (37.5 Fe) MG TABS Take 1 tablet by mouth every other day       fluticasone-vilanterol (BREO ELLIPTA)  "200-25 mcg/dose DsDv inhaler Inhale 1 puff into the lungs daily        Garlic 1000 MG CAPS Take 1 capsule by mouth daily (before lunch)        insulin glargine (LANTUS PEN) 100 UNIT/ML pen Inject 8 Units Subcutaneous every morning (before breakfast) 2.4 mL 0     ipratropium - albuterol 0.5 mg/2.5 mg/3 mL (DUONEB) 0.5-2.5 (3) MG/3ML neb solution Take 1 vial by nebulization every 6 hours as needed for shortness of breath / dyspnea or wheezing       irbesartan (AVAPRO) 300 MG tablet Take 300 mg by mouth daily       lacosamide (VIMPAT) 200 MG TABS tablet 1 tablet (200 mg) by Oral or Feeding Tube route 2 times daily 60 tablet 0     metoprolol tartrate (LOPRESSOR) 25 MG tablet Take 1 tablet (25 mg) by mouth 2 times daily (with meals) 60 tablet 3     multivitamin w/minerals (THERA-VIT-M) tablet Take 1 tablet by mouth daily       omeprazole 20 MG tablet Take 20 mg by mouth 2 times daily       prasugrel (EFFIENT) 10 MG TABS tablet 1 tablet (10 mg) by Oral or Feeding Tube route daily       rOPINIRole (REQUIP) 1 MG tablet Take 1 tablet (1 mg) by mouth daily       tadalafil (CIALIS) 20 MG tablet Take 1 tablet (20 mg) by mouth every 24 hours       traZODone (DESYREL) 100 MG tablet Take 1 tablet (100 mg) by mouth nightly as needed for sleep 30 tablet 0     zinc gluconate 50 MG tablet Take 50 mg by mouth daily (before lunch)        ACCU-CHEK JOSE PLUS TEST STRP strips [ACCU-CHEK JOSE PLUS TEST STRP STRIPS] see administration instructions.       BD ULTRA-FINE MANISHA PEN NEEDLES 32 gauge x 5/32\" Ndle [BD ULTRA-FINE MANISHA PEN NEEDLES 32 GAUGE X 5/32\" NDLE]   4     OXYGEN-AIR DELIVERY SYSTEMS MISC [OXYGEN-AIR DELIVERY SYSTEMS MISC] Use As Directed. (Patient not taking: Reported on 2/4/2022)         Cardiographics:    Telemetry: SUZE guzman    ECHO (personnaly Reviewed): January 2022 at [facility]  Global and regional left ventricular function is normal with an EF of 60-65%.  Mild right ventricular dilation is present. " Global right ventricular function  is mildly reduced.  Mild to moderate tricuspid insufficiency is present.  Pulmonary hypertension is present. Estimated pulmonary artery systolic  pressure is 64 mmHg based on a mean right atrial pressure of 15 mmHg.  Dilation of the inferior vena cava is present with abnormal respiratory  variation in diameter.  No pericardial effusion is present.   Lab Results    Chemistry/lipid CBC Cardiac Enzymes/BNP/TSH/INR   Recent Labs   Lab Test 10/25/21  1529   CHOL 98   HDL 35*   LDL 53   TRIG 49     Recent Labs   Lab Test 10/25/21  1529 05/25/21  1359 09/02/20  1307   LDL 53 <5 50     Recent Labs   Lab Test 02/15/22  0838 02/15/22  0649   NA  --  138   POTASSIUM  --  3.4*   CHLORIDE  --  100   CO2  --  28   * 113   BUN  --  42*   CR  --  1.65*   GFRESTIMATED  --  42*   ANNMARIE  --  9.0     Recent Labs   Lab Test 02/15/22  0649 02/14/22  0540 02/13/22  2305   CR 1.65* 1.73* 1.81*     Recent Labs   Lab Test 12/21/21  0438 10/23/21  1824 07/11/21  1031   A1C 7.5* 6.3* 9.3*          Recent Labs   Lab Test 02/14/22  0540   WBC 6.3   HGB 7.8*   HCT 28.2*   MCV 67*        Recent Labs   Lab Test 02/14/22  0540 02/13/22  2354 01/21/22  1118   HGB 7.8* 7.8* 8.7*    Recent Labs   Lab Test 02/14/22  0540 02/13/22  2305 11/30/21  1609   TROPONINI 0.02 0.03 0.03     Recent Labs   Lab Test 02/14/22  0540 02/13/22  2354 01/21/22  1118 01/07/22  0928 01/06/22  1326 11/30/21  1609   * 519*  --   --   --  369*   NTBNPI  --   --  2,086* 818 1,112  --      Recent Labs   Lab Test 01/06/22  1326   TSH 0.50  0.49     Recent Labs   Lab Test 12/20/21  1350 12/20/21  1046 11/30/21  1609   INR 1.32* 1.20* 1.30*

## 2022-02-15 NOTE — PROGRESS NOTES
Call received from Edith, Care Coordinator for [facility], who is patient s PCP ([phone]).  She has been in contact with patient and wife due to many hospital visits in past six months. She suggested we add a nutrition assessment to help reinforce need of following low sodium diet. Patient struggles with recommended parameters. Updated Edith that patient will receive IV Lasix for next 24 hours.This writer spoke with patient s attending and order was placed for nutrition consult.     This writer met with patient who receives [facility] RN, PT/OT services and would like to continue them on discharge. Patient feels frustrated that  it s obvious that the medication the have me on isn t working because losing two pounds is not a lot .  Patient feels that  if the medication was working then I wouldn t have to worry about diet so much .  Talked with patient at length about need for both medication and diet combination to achieve best fluid balance in his system. Patient was receptive to conversation and asked questions but returned to wanting medication to resolve problem without him having restrictions. Edith would like update so she can follow-up on discharge.    Berna Cruz, RN/CM

## 2022-02-15 NOTE — PLAN OF CARE
Pt is alert and oriented and able to make all needs known.  Up with one assist.  Using urinal for voiding.  Call light and all belongings within reach.  VSS.  On/off 02 at 3L.  Pt states he doesn't continuously wear it at home.  Pulse ox in place.  Afib, rate-controlled.  Pain controlled.  IV Bumex given per orders.  Output recorded.  IV saline locked.  Tolerating diet.  BS checks AC/HS.  Pt hopeful to be discharged tomorrow.

## 2022-02-16 NOTE — PROGRESS NOTES
Care Management Discharge Note    Discharge Date: 02/16/2022       Discharge Disposition: Home Care    Discharge Services: None    Discharge DME:      Discharge Transportation:      Private pay costs discussed: Not applicable    PAS Confirmation Code:    Patient/family educated on Medicare website which has current facility and service quality ratings: other (see comments) (Pt had [facility] prior to hospitalization)    Education Provided on the Discharge Plan:    Persons Notified of Discharge Plans: Pt and intake with [facility]  Patient/Family in Agreement with the Plan: yes    Handoff Referral Completed: No    Additional Information:  Pt to discharge home today with resumption of home care through [facility].  Notified Arabella with intake at [facility] of discharge plan.        DERIK Valdez

## 2022-02-16 NOTE — PLAN OF CARE
Occupational Therapy Discharge Summary    Reason for therapy discharge:    Discharged to transitional care facility.    Progress towards therapy goal(s). See goals on Care Plan in [facility] electronic health record for goal details.  Goals partially met.  Barriers to achieving goals:   discharge from facility.    Therapy recommendation(s):    Continued therapy is recommended.  Rationale/Recommendations:  to progress to independence.    Goal Outcome Evaluation:

## 2022-02-16 NOTE — PLAN OF CARE
Arron is doing well this morning and will be discharging home this afternoon.     On 2-3L nasal cannula. Tele: Afib. Up independent in his room.     All discharge instructions were reviewed with the patient and his wife. Both indicated understanding and all questions were addressed and answered.        .

## 2022-02-16 NOTE — DISCHARGE SUMMARY
[facility] MEDICINE  DISCHARGE SUMMARY     Primary Care Physician: Meg Roth  Admission Date: 2/13/2022   Discharge Provider: Shun Kathleen MD Discharge Date: 2/16/2022   Diet:   Active Diet and Nourishment Order   Procedures     Combination Diet Moderate Consistent Carb (60 g CHO per Meal) Diet; Low Saturated Fat Na <2400mg Diet     Diet     Code Status: Full Code   Activity: DCACTIVITY: Activity as tolerated  Fall precautions.      Condition at Discharge: Stable     REASON FOR PRESENTATION(See Admission Note for Details)   Shortness of breath and weight gain    PRINCIPAL & ACTIVE DISCHARGE DIAGNOSES     Shortness of breath  Acute on chronic heart failure with preserved ejection fraction  Persistent Atrial Fibrillation  Pulmonary hypertension  Chronic hypoxemic respiratory failure 3 L  Chronic obstructive pulmonary disease (H)  Obstructive sleep apnea  Chronic lower extremity edema  Peripheral vascular disease  Coronary artery disease  Essential hypertension  Acute on chronic kidney disease stage IV  Insulin-dependent type 2 diabetes mellitus (H)  Cirrhosis  GERD  History of subdural hematoma/craniotomy December 2021  Seizure disorder  Chronic anemia  Insomnia    PENDING LABS     Unresulted Labs Ordered in the Past 30 Days of this Admission     No orders found from 1/14/2022 to 2/14/2022.        PROCEDURES ( this hospitalization only)      None    RECOMMENDATIONS TO OUTPATIENT PROVIDER FOR F/U VISIT   Follow-up Appointments     Add follow up information to the AVS prior to printing      Follow Up      Follow-up with [facility] cardiology.  Give [place] office phone   number to patient.         Follow-up and recommended labs and tests      Follow-up with primary care provider in 3 to 5 days.  Recommend recheck   basic metabolic panel.             DISPOSITION     Home with home care    SUMMARY OF HOSPITAL COURSE:      81 year old male with a PMH of SUZE guzman  CHFpEF, CAD, HUNTER, HTN, CKD4, NOVA, pulmonary hypertension on home oxygen 3 L, cirrhosis, DM 2, RLS and subdural hematoma, status post craniotomy 12/21/2021. TCU discharge 1/26/2022.  2/13 presented to ED with shortness of breath and 4 pound weight gain. Elevated BNP.  CXR showed cardiomegaly with some segmental atelectasis and infiltrates.  Had some improvement after Bumex. Admitted for heart failure.     1.  Cardiac.  Cardiology consulted. IV Bumex continued.  Patient had spironolactone and metolazone added.  Had good response and her breathing improved.  Will be discharging home on spironolactone and torsemide.  Should be following weights daily and close follow-up with cardiology.      2.  Anticoagulation.  As patient is 6 months out from stenting his Effient was stopped per cardiology.  Will be discharging on daily aspirin.    3.  Renal.  Had some mild NATHALY on top of his CKD 4 at time of admission creatinine 1.81.  At time of discharge creatinine improved to 1.72.  Recommend recheck BMP and follow-up with primary.  Should also follow-up with nephrology.    Patient's other chronic medical issues were stable.  Will be discharging home with home care RN PT and OT.      Discharge Medications with Med changes:     Current Discharge Medication List      START taking these medications    Details   spironolactone (ALDACTONE) 25 MG tablet Take 1 tablet (25 mg) by mouth daily  Qty: 30 tablet, Refills: 0    Associated Diagnoses: Acute on chronic congestive heart failure, unspecified heart failure type (H)      torsemide 40 MG TABS Take 40 mg by mouth 2 times daily  Qty: 60 tablet, Refills: 0    Associated Diagnoses: Acute on chronic congestive heart failure, unspecified heart failure type (H)         CONTINUE these medications which have NOT CHANGED    Details   acetaminophen (TYLENOL) 500 MG tablet Take 500-1,000 mg by mouth every 6 hours as needed for mild pain      albuterol (PROVENTIL HFA;VENTOLIN HFA) 90 mcg/actuation  inhaler Inhale 2 puffs into the lungs every 6 hours as needed       albuterol (PROVENTIL) (2.5 MG/3ML) 0.083% neb solution Take 2.5 mg by nebulization every 6 hours as needed for shortness of breath / dyspnea or wheezing      Ascorbic Acid (VITAMIN C) 500 MG CAPS Take 500 mg by mouth daily as needed      aspirin (ASA) 81 MG EC tablet Take 1 tablet (81 mg) by mouth daily    Associated Diagnoses: NSTEMI (non-ST elevated myocardial infarction) (H)      atorvastatin (LIPITOR) 40 MG tablet Take 1 tablet (40 mg) by mouth every evening  Qty: 30 tablet, Refills: 0    Associated Diagnoses: Hypercholesteremia      azithromycin (ZITHROMAX) 250 MG tablet Take 1,000 mg by mouth daily as needed 4 tablets before dental       calcium carbonate (TUMS) 500 MG chewable tablet Take 1 tablet (500 mg) by mouth daily as needed for heartburn    Associated Diagnoses: Gastroesophageal reflux disease without esophagitis      Ferrous Gluconate 324 (37.5 Fe) MG TABS Take 1 tablet by mouth every other day      fluticasone-vilanterol (BREO ELLIPTA) 200-25 mcg/dose DsDv inhaler Inhale 1 puff into the lungs daily       Garlic 1000 MG CAPS Take 1 capsule by mouth daily (before lunch)       insulin glargine (LANTUS PEN) 100 UNIT/ML pen Inject 8 Units Subcutaneous every morning (before breakfast)  Qty: 2.4 mL, Refills: 0    Comments: If Lantus is not covered by insurance, may substitute Basaglar or Semglee or other insulin glargine product per insurance preference at same dose and frequency.    Associated Diagnoses: Type 2 diabetes mellitus with other specified complication, with long-term current use of insulin (H)      ipratropium - albuterol 0.5 mg/2.5 mg/3 mL (DUONEB) 0.5-2.5 (3) MG/3ML neb solution Take 1 vial by nebulization every 6 hours as needed for shortness of breath / dyspnea or wheezing      irbesartan (AVAPRO) 300 MG tablet Take 300 mg by mouth daily      lacosamide (VIMPAT) 200 MG TABS tablet 1 tablet (200 mg) by Oral or Feeding Tube  "route 2 times daily  Qty: 60 tablet, Refills: 0    Associated Diagnoses: Seizures (H)      metoprolol tartrate (LOPRESSOR) 25 MG tablet Take 1 tablet (25 mg) by mouth 2 times daily (with meals)  Qty: 60 tablet, Refills: 3    Associated Diagnoses: Permanent atrial fibrillation (H)      multivitamin w/minerals (THERA-VIT-M) tablet Take 1 tablet by mouth daily      omeprazole 20 MG tablet Take 20 mg by mouth 2 times daily      rOPINIRole (REQUIP) 1 MG tablet Take 1 tablet (1 mg) by mouth daily    Associated Diagnoses: Restless leg syndrome      tadalafil (CIALIS) 20 MG tablet Take 1 tablet (20 mg) by mouth every 24 hours    Associated Diagnoses: Pulmonary hypertension due to left ventricular diastolic dysfunction (H)      traZODone (DESYREL) 100 MG tablet Take 1 tablet (100 mg) by mouth nightly as needed for sleep  Qty: 30 tablet, Refills: 0    Comments: Please discuss with your primary care doctor if you continue to have problems sleeping  Associated Diagnoses: Subdural hematoma (H)      zinc gluconate 50 MG tablet Take 50 mg by mouth daily (before lunch)       ACCU-CHEK JOSE PLUS TEST STRP strips [ACCU-CHEK JOSE PLUS TEST STRP STRIPS] see administration instructions.      BD ULTRA-FINE MANISHA PEN NEEDLES 32 gauge x 5/32\" Ndle [BD ULTRA-FINE MANISHA PEN NEEDLES 32 GAUGE X 5/32\" NDLE]   Refills: 4      OXYGEN-AIR DELIVERY SYSTEMS MISC [OXYGEN-AIR DELIVERY SYSTEMS MISC] Use As Directed.         STOP taking these medications       bumetanide (BUMEX) 2 MG tablet Comments:   Reason for Stopping:         prasugrel (EFFIENT) 10 MG TABS tablet Comments:   Reason for Stopping:                 Rationale for medication changes:      Diuretic adjustments per cardiology.  Effient recommended to be stopped per cardiology.      Consults     CORE CLINIC EVALUATION IP CONSULT  OCCUPATIONAL THERAPY ADULT IP CONSULT  NUTRITION SERVICES ADULT IP CONSULT  CARE MANAGEMENT / SOCIAL WORK IP CONSULT  CARDIOLOGY IP CONSULT  SOCIAL WORK IP " CONSULT  NUTRITION SERVICES ADULT IP CONSULT      Immunizations given this encounter     Most Recent Immunizations   Administered Date(s) Administered     COVID-19,PF,Bipin 03/06/2021     COVID-19,PF,Pfizer (12+ Yrs) 12/08/2021     FLU 6-35 months 10/26/2012     Flu 65+ Years 09/29/2020     Flu, Unspecified 10/12/2017     HepA-Adult 06/20/2014     HepA-ped 2 Dose 06/20/2014     Influenza (H1N1) 12/11/2009     Influenza (High Dose) 3 valent vaccine 10/12/2017     Influenza (IIV3) PF 10/07/2014     Influenza Vaccine IM > 6 months Valent IIV4 (Alfuria,Fluzone) 10/26/2012     Influenza, Quad, High Dose, Pf, 65yr+ (Fluzone HD) 10/22/2021     Pneumo Conj 13-V (2010&after) 09/18/2015     Pneumococcal 23 valent 09/16/2009     TD (ADULT, 7+) 06/20/2014     Tdap (Adacel,Boostrix) 06/06/2007           Anticoagulation Information      Recent INR results: No results for input(s): INR in the last 168 hours.  Warfarin doses (if applicable) or name of other anticoagulant: Aspirin 81      SIGNIFICANT IMAGING FINDINGS     Results for orders placed or performed during the hospital encounter of 02/13/22   Chest XR,  PA & LAT    Impression    IMPRESSION: Borderline cardiomegaly. Pulmonary vascularity normal. Subsegmental subsegmental atelectasis or infiltrates both lung bases. Upper lung fields are clear. No definite effusions. Degenerative changes right shoulder.   US Lower Extremity Venous Duplex Bilateral    Impression    IMPRESSION:  1.  No deep venous thrombosis in the bilateral lower extremities.       SIGNIFICANT LABORATORY FINDINGS     Most Recent 3 CBC's:Recent Labs   Lab Test 02/14/22  0540 02/13/22  2354 01/21/22  1118   WBC 6.3 7.0 7.4   HGB 7.8* 7.8* 8.7*   MCV 67* 67* 70*    252 219     Most Recent 3 BMP's:Recent Labs   Lab Test 02/16/22  0753 02/16/22  0526 02/15/22  1936 02/15/22  0838 02/15/22  0649 02/14/22  0753 02/14/22  0540   NA  --  135*  --   --  138  --  142   POTASSIUM  --  3.4*  --   --  3.4*  --  3.9    CHLORIDE  --  96*  --   --  100  --  105   CO2  --  29  --   --  28  --  26   BUN  --  45*  --   --  42*  --  45*   CR  --  1.72*  --   --  1.65*  --  1.73*   ANIONGAP  --  10  --   --  10  --  11   ANNMARIE  --  9.1  --   --  9.0  --  8.6   * 107 120*   < > 113   < > 142*    < > = values in this interval not displayed.     Most Recent 2 LFT's:Recent Labs   Lab Test 02/14/22  0540 02/13/22  2305   AST 15 33   ALT 9 11   ALKPHOS 133* 139*   BILITOTAL 1.4* 1.5*     Most Recent 3 INR's:Recent Labs   Lab Test 12/20/21  1350 12/20/21  1046 11/30/21  1609   INR 1.32* 1.20* 1.30*     Most Recent 3 BNP's:Recent Labs   Lab Test 01/21/22  1118 01/07/22  0928 01/06/22  1326   NTBNPI 2,086* 818 1,112     Most Recent TSH and T4:Recent Labs   Lab Test 01/06/22  1326   TSH 0.50  0.49   T4 0.82     Most Recent Hemoglobin A1c:Recent Labs   Lab Test 12/21/21  0438   A1C 7.5*     Most Recent ESR & CRP:Recent Labs   Lab Test 02/13/22  2305   CRP 0.5         Discharge Orders        Follow-Up with Cardiology      Home care nursing referral      Reason for your hospital stay    Heart failure exacerbation     Follow-up and recommended labs and tests    Follow-up with primary care provider in 3 to 5 days.  Recommend recheck basic metabolic panel.     Activity    Your activity upon discharge: activity as tolerated     Monitor and record    Weigh yourself every morning     When to contact your care team    Contact your care team If symptoms get worse, If increased shortness of breath, If waking up at night with difficulty breathing, If unable to lie down for sleep due to symptoms, If weight gain of 2 pounds a day for 2 days in a row OR 5 pounds in 1 week., Increased swelling in your ankles or legs, and Dizziness or lightheadedness     Discharge Follow Up - with Primary Care clinic within 3-5 days (RN to schedule prior to d/c for [facility] primary care     Add follow up information to the AVS prior to printing     Monitor and record     Check blood sugars 4 times a day.  Bring to follow-up appointment with primary.     Brief Discharge Instructions    Continue home oxygen 3 L     Follow Up    Follow-up with [facility] cardiology.  Give [place] office phone number to patient.     Diet    Follow this diet upon discharge: Orders Placed This Encounter      Combination Diet Moderate Consistent Carb (60 g CHO per Meal) Diet; Low Saturated Fat Na <2400mg Diet       Examination   Physical Exam   Temp:  [97.6  F (36.4  C)-98.4  F (36.9  C)] 98.4  F (36.9  C)  Pulse:  [69-82] 82  Resp:  [16-19] 16  BP: (122-160)/(59-76) 160/76  SpO2:  [96 %-100 %] 96 %  Wt Readings from Last 4 Encounters:   02/16/22 109.3 kg (240 lb 14.4 oz)   01/24/22 101.8 kg (224 lb 8 oz)   01/17/22 98.7 kg (217 lb 9.6 oz)   12/20/21 103.9 kg (229 lb)       Constitutional: awake, alert, cooperative, no apparent distress, and appears stated age  Eyes: Lids and lashes normal, pupils equal, round and reactive to light, extra ocular muscles intact, sclera clear, conjunctiva normal  ENT: Normocephalic, without obvious abnormality, atraumatic, sinuses nontender on palpation, external ears without lesions, oral pharynx with moist mucous membranes, tonsils without erythema or exudates, gums normal and good dentition.  Respiratory: No increased work of breathing, good air exchange, clear to auscultation bilaterally, no crackles or wheezing  Cardiovascular: Normal apical impulse, regular rate and rhythm, normal S1 and S2, no S3 or S4, and no murmur noted  GI: No scars, normal bowel sounds, soft, non-distended, non-tender, no masses palpated, no hepatosplenomegally  Skin: normal skin color, texture, turgor, no redness, warmth, or swelling, and no rashes  Musculoskeletal: There is no redness, warmth, or swelling of the joints.  Full range of motion noted.  Motor strength is 5 out of 5 all extremities bilaterally.  Tone is normal. no lower extremity pitting edema present  Neurologic: Cranial  nerves II-XII are grossly intact. Sensory:  Sensory intact  Neuropsychiatric: General: normal, calm and normal eye contact Level of consciousness: alert / normal Affect: normal Orientation: oriented to self, place, time and situation Memory and insight: normal, memory for past and recent events intact and thought process normal      Please see EMR for more detailed significant labs, imaging, consultant notes etc.    IShun MD, personally saw the patient today and spent greater than 30 minutes discharging this patient.    Shun Kathleen MD  [facility]    CC:Meg Roth

## 2022-02-16 NOTE — PROGRESS NOTES
Date of Service: 2/17/2022     Referring Provider: Meg Roth MD    Subjective:            Red Sutherland is a 81 year old male presenting for evaluation of liver disease      History of Present Illness   Red Sutherland is a 81 year old male with past medical history of coronary disease s/p PCI to LCx in February 2021 and repeat in October 2021, heart failure preserved ejection fraction 60 to 65%, atrial fibrillation on anticoagulation with Eliquis, hypertension, hyperlipidemia, COPD, pulmonary hypertension, chronic bronchitis, type 2 diabetes mellitus, CKD stage IV, GERD, right-sided subdural hematoma with mass-effect s/p evacuation on 12/21, complicated by seizures, agitation, failed swallow test requiring PEG tube placement on 1/3, who came to the clinic for the concerns of possibility of cirrhosis upon imaging.    Patient stated that he was not known to have liver disease until recently in December 2021.  He stated that his medical condition started going downhill since July 2021 when he started having swelling and development of varicose veins up on his lower extremities.  He was having CHF exacerbation as well as fluid retention which was nonstop since August 2021 and he was in and out of the hospital many times.  He was admitted to the hospital in October 2021 when he underwent repeat PCI with restenting.  He also had some concerns for GI bleed and underwent endoscopy which was negative for any esophageal varices or portal hypertensive gastropathy.  Subsequently also underwent colonoscopy which showed small AVM.  During this admission ultrasound abdomen was done which showed normal liver contours.    Patient was readmitted to the hospital end of November/December when he was having some abdominal distention along with CHF exacerbation.  He underwent paracentesis which showed a SAAG of 1.1 with total protein in the fluid was 3.3, consistent with cardiac ascites  He underwent a CT abdomen during  this admission showing nodular contours of the liver concerning for cirrhosis.    Subsequently patient had a fall at home on December 8 followed by severe headaches for which she was brought into the hospital again and found her to have a subdural hematoma s/p evacuation on 12/21, course was complicated by development of seizures, he also failed swallow study during that admission and required a PEG tube placement on 1/3.  Patient states that the PEG tube is causing him to have discomfort and he wants it to be taken off.    He was discharged from hospital yesterday for CHF exacerbation, his prasugrel was discontinued as well as switched to torsemide from Bumex. No melena or hematochezia no abdominal distention.  Patient denied any further paracentesis since the initial episode    No smoking or alcohol intake.  Lives with his wife.      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 They put him on sildenafil as nitroprusside lowered systemic BP and with that the mean PA dropped from 54 to 49. Negative VQ at [facility] Dec 1, 2017.     RLS (restless legs syndrome)      Sleep apnea        Surgical History:  Past Surgical History:   Procedure Laterality Date     BACK SURGERY      lower back     CARDIAC CATHETERIZATION  12/13/2017    Right and left at [facility], mean PA 58, PCW 24 with V wave of 35, LVEDP of 18, with Nipride systemic BP, PVR and mean PA all declined     CARDIOVERSION  03/15/2013    for afib     CATARACT EXTRACTION Bilateral      COLONOSCOPY N/A 10/31/2021    Procedure:  COLONOSCOPY WITH POLYPECTOMY;  Surgeon: Sheng Sagastume MD;  Location: [facility] OR     CORONARY STENT PLACEMENT       CRANIOTOMY Right 12/21/2021    Procedure: CRANIOTOMY FOR Subdural HEMATOMA EVACUATION;  Surgeon: Parvez Quinones MD;  Location: [facility] OR     CV CORONARY ANGIOGRAM N/A 10/25/2021    Procedure: Coronary Angiogram;  Surgeon: Otf Knowles MD;  Location: [facility] CATH LAB CV     CV CORONARY LITHOTRIPSY PCI N/A 10/25/2021    Procedure: CV Coronary Lithotripsy PCI;  Surgeon: Otf Knowles MD;  Location: [facility] LAB CV     CV LEFT HEART CATH N/A 10/25/2021    Procedure: Left Heart Cath;  Surgeon: Otf Knowles MD;  Location: [facility] CATH LAB CV     CV PCI N/A 10/25/2021    Procedure: Percutaneous Coronary Intervention;  Surgeon: Otf Knowles MD;  Location: [facility] LAB CV     ESOPHAGOSCOPY, GASTROSCOPY, DUODENOSCOPY (EGD), COMBINED N/A 10/30/2021    Procedure: ESOPHAGOGASTRODUODENOSCOPY (EGD);  Surgeon: Sheng Sagastume MD;  Location: [facility] Main OR     IR ABDOMINAL AORTOGRAM  11/16/2012     IR GASTROSTOMY TUBE PERCUTANEOUS PLCMNT  01/03/2022     IR MISCELLANEOUS PROCEDURE  07/20/2001     IR MISCELLANEOUS PROCEDURE  11/16/2012     OTHER SURGICAL HISTORY      [place]     PEG TUBE PLACEMENT  01/03/2022    Malnutrition while hosp for subdural hematoma- [facility]     PICC DOUBLE LUMEN PLACEMENT Right 12/25/2021    48cm (2cm external), Basilic vein, SVC RA junction     SHOULDER SURGERY      reapir on right shoulder     TOTAL HIP ARTHROPLASTY Left      TOTAL KNEE ARTHROPLASTY Bilateral      WRIST SURGERY Bilateral      ZZHC COLONOSCOPY W/WO BRUSH/WASH N/A 01/14/2021    Procedure: COLONOSCOPY;  Surgeon: Mihai Harris MD;  Location: [facility] OR;  Service: Gastroenterology       Social History:  Social History     Tobacco Use     Smoking status: Former Smoker     Packs/day: 1.50     Years: 44.00     Pack years: 66.00     Types: Cigarettes     Quit date: 4/29/1996  "    Years since quittin.8     Smokeless tobacco: Never Used   Substance Use Topics     Alcohol use: Yes     Alcohol/week: 1.0 standard drink     Comment: Alcoholic Drinks/day: 1 per month     Drug use: No       Family History:  Family History   Problem Relation Age of Onset     Hyperlipidemia Mother      Hypertension Mother      Heart Disease Mother      Hyperlipidemia Father      Hypertension Father      Coronary Artery Disease Father      Cerebrovascular Disease Brother      Depression Brother      No Known Problems Sister      Pulmonary Hypertension No family hx of      Congenital heart disease No family hx of        Medications:  Current Outpatient Medications   Medication     ACCU-CHEK JOSE PLUS TEST STRP strips     acetaminophen (TYLENOL) 500 MG tablet     albuterol (PROVENTIL HFA;VENTOLIN HFA) 90 mcg/actuation inhaler     albuterol (PROVENTIL) (2.5 MG/3ML) 0.083% neb solution     Ascorbic Acid (VITAMIN C) 500 MG CAPS     aspirin (ASA) 81 MG EC tablet     atorvastatin (LIPITOR) 40 MG tablet     azithromycin (ZITHROMAX) 250 MG tablet     BD ULTRA-FINE MANISHA PEN NEEDLES 32 gauge x \" Ndle     calcium carbonate (TUMS) 500 MG chewable tablet     Ferrous Gluconate 324 (37.5 Fe) MG TABS     fluticasone-vilanterol (BREO ELLIPTA) 200-25 mcg/dose DsDv inhaler     Garlic 1000 MG CAPS     insulin glargine (LANTUS PEN) 100 UNIT/ML pen     ipratropium - albuterol 0.5 mg/2.5 mg/3 mL (DUONEB) 0.5-2.5 (3) MG/3ML neb solution     irbesartan (AVAPRO) 300 MG tablet     lacosamide (VIMPAT) 200 MG TABS tablet     metoprolol tartrate (LOPRESSOR) 25 MG tablet     multivitamin w/minerals (THERA-VIT-M) tablet     omeprazole 20 MG tablet     OXYGEN-AIR DELIVERY SYSTEMS MISC     rOPINIRole (REQUIP) 1 MG tablet     [START ON 2022] spironolactone (ALDACTONE) 25 MG tablet     tadalafil (CIALIS) 20 MG tablet     torsemide 40 MG TABS     traZODone (DESYREL) 100 MG tablet     zinc gluconate 50 MG tablet     No current " facility-administered medications for this visit.       Review of Systems  A complete 10 point review of systems was asked and answered in the negative unless specifically commented upon in the HPI    Objective:         Vitals:    02/17/22 1027   BP: 100/62   Pulse: 62   SpO2: 97%   Weight: 108.9 kg (240 lb)     Body mass index is 33.47 kg/m .     Physical Exam  Constitutional: in wheel chair, upon 3 L O2  HEENT: Normocephalic. no scleral icterus. Moist oral mucosa.   Neck/Lymph: Normal ROM, supple.   Cardiac:  S1, S2 heard  Respiratory: Faint crackles on bases  GI:  Abdomen soft, minimal distended, non-tender, PEG tube in place. BS present. minimal shifting dullness.     Skin:  Skin is warm and dry. No rash noted.  No jaundice.   Peripheral Vascular: No lower extremity edema. 2+ pulses in all extremities  Musculoskeletal:  ROM intact.  Psychiatric: Normal mood and affect. Behavior is normal.  Neuro:  No asterixis.    Labs and Diagnostic tests:  Lab Results   Component Value Date     02/16/2022    POTASSIUM 3.4 02/16/2022    CHLORIDE 96 02/16/2022    CO2 29 02/16/2022    BUN 45 02/16/2022    CR 1.72 02/16/2022     Lab Results   Component Value Date    BILITOTAL 1.4 02/14/2022    ALT 9 02/14/2022    AST 15 02/14/2022    ALKPHOS 133 02/14/2022     Lab Results   Component Value Date    ALBUMIN 3.3 02/14/2022    PROTTOTAL 6.2 02/14/2022      Lab Results   Component Value Date    WBC 6.3 02/14/2022    HGB 7.8 02/14/2022    MCV 67 02/14/2022     02/14/2022     Lab Results   Component Value Date    INR 1.32 12/20/2021       MELD-Na score: 14 at 12/22/2021  4:10 AM  MELD score: 14 at 12/22/2021  4:10 AM  Calculated from:  Serum Creatinine: 1.37 mg/dL at 12/22/2021  4:10 AM  Serum Sodium: 146 mmol/L (Using max of 137 mmol/L) at 12/22/2021  4:10 AM  Total Bilirubin: 1.5 mg/dL at 12/20/2021  1:50 PM  INR(ratio): 1.32 at 12/20/2021  1:50 PM  Age: 81 years    Imaging:  CT ABD 1/5/22:  IMPRESSION:   1. Percutaneous  gastrostomy tube appears in good position.  2. Sequela of chronic pancreatitis.  3. Trace ascites along the paracolic gutters.  4. Mildly nodular hepatic contour suggests cirrhosis.  5. Stable splenomegaly, may be related to portal hypertension.  6. Colonic diverticulosis without acute diverticulitis.  7. Small right greater than left pleural effusions with findings of  pulmonary edema.  8. Newly identified 6 mm nodule in the right middle lobe. A larger  lobulated partially calcified nodule in the right middle lobe is  unchanged. Recommend follow-up CT at 6-12 months per Fleischner  Criteria.    Procedures:    EGD 10/30/21:   Impression:          - Normal esophagus.                        - Normal stomach.                        - Normal examined duodenum.                        - No specimens collected.   Recommendation:      - Return patient to hospital epps for ongoing care.                        - Perform a colonoscopy tomorrow.   Colonoscopy 10/31/21:   Impression:          One small AVM, treated with APC                        Patient had significant bleeding with small polypectomy                        sites. Even small lesions may bleed given triple                        anticoagulation (aspirin, effient, eliquis).                        - Two 10 to 13 mm polyps in the transverse colon and in                        the ascending colon, removed with a cold snare. Resected                        and retrieved. Clips were placed.                        - One 5 mm polyp in the rectum, removed with a cold                        snare. Resected and retrieved. Clips were placed.                        - A single non-bleeding colonic angioectasia. Treated                        with argon beam coagulation.                        - Diverticulosis in the sigmoid colon.                        - Internal hemorrhoids.     Assessment and Plan:  81 year old male with past medical history of coronary disease s/p PCI to  LCx in February 2021 and repeat in October 2021, heart failure preserved ejection fraction 60 to 65%, atrial fibrillation on anticoagulation with Eliquis, hypertension, hyperlipidemia, COPD, pulmonary hypertension, chronic bronchitis, type 2 diabetes mellitus, CKD stage IV, GERD, right-sided subdural hematoma with mass-effect s/p evacuation on 12/21, complicated by seizures, agitation, failed swallow test requiring PEG tube placement on 1/3, who came to the clinic for the concerns of possibility of cirrhosis upon imaging.      Congestive hepatopathy and CKD stage IV  Patient had normal liver upon imaging back in October 2021, CT abdomen in December 2021 showed nodular contours along with some perihepatic ascites which was tapped with SAAG 1.1 and high-protein 3.3, consistent with cardiac ascites and not consistent with ascites secondary to portal hypertension. His ascites is likely due to cardiac dysfunction along with renal dysfunction combining together giving him ascitic fluid.  His liver enzymes are normal with slight elevation in his bilirubin which is due to congestive hepatopathy.  Platelet count had a slight downtrend in the last week of December 2021 but otherwise normal before and after it.  He had an endoscopy in October 2021 which was negative for esophageal varices or portal hypertensive gastropathy.  His presentation is not consistent with cirrhosis of the liver.  His ascites can be managed with optimization of his diuretics per cardiology and optimization of his renal function. His bilirubin and INR will have improvement upon cardiac function improvement.  --Management of diuretics as per cardiology    Patient wanted to get the PEG tube removed which was placed on 1/3 by interventional radiology.  He was provided the number to call interventional radiology office to see if it would be safe to remove the PEG tube and where can it be done.    Pt was seen and discussed with Dr. Juarez.  Thank you very much  for the opportunity to participate in the care of this patient.  If you have any further questions, please don't hesitate to contact our office.    Pedro Mancini M.D.  Advanced & Transplant Hepatology Fellow  [facility]

## 2022-02-16 NOTE — PLAN OF CARE
Pt A&Ox4, VSS. 2-3LNC. A-fib 60-80. Diuresed well. Pt did not sleep well overnight. Possible discharge today. Will continue to monitor and carry out plan of care.Eunice Adame RN        Problem: Adult Inpatient Plan of Care  Goal: Plan of Care Review  Outcome: Ongoing, Progressing  Goal: Patient-Specific Goal (Individualized)  Outcome: Ongoing, Progressing  Goal: Absence of Hospital-Acquired Illness or Injury  Outcome: Ongoing, Progressing  Intervention: Identify and Manage Fall Risk  Recent Flowsheet Documentation  Taken 2/16/2022 0400 by Eunice Adame RN  Safety Promotion/Fall Prevention:   clutter free environment maintained   lighting adjusted  Taken 2/16/2022 0000 by Eunice Adame RN  Safety Promotion/Fall Prevention:   clutter free environment maintained   lighting adjusted  Taken 2/15/2022 2000 by Eunice Adame RN  Safety Promotion/Fall Prevention:   clutter free environment maintained   lighting adjusted  Intervention: Prevent Skin Injury  Recent Flowsheet Documentation  Taken 2/16/2022 0400 by Eunice Adame RN  Body Position: position changed independently  Taken 2/16/2022 0000 by Eunice Adame RN  Body Position: position changed independently  Taken 2/15/2022 2350 by Eunice Adame RN  Body Position: (dangling at bedside) other (see comments)  Taken 2/15/2022 2000 by Eunice Adame RN  Body Position: position changed independently  Intervention: Prevent and Manage VTE (Venous Thromboembolism) Risk  Recent Flowsheet Documentation  Taken 2/16/2022 0400 by Eunice Adame RN  VTE Prevention/Management: ambulation promoted  Activity Management: activity adjusted per tolerance  Taken 2/16/2022 0000 by Eunice Adame RN  VTE Prevention/Management: ambulation promoted  Activity Management: activity adjusted per tolerance  Taken 2/15/2022 2000 by Eunice Adame RN  VTE Prevention/Management: ambulation promoted  Activity Management: activity adjusted per  tolerance  Goal: Optimal Comfort and Wellbeing  Outcome: Ongoing, Progressing  Goal: Readiness for Transition of Care  Outcome: Ongoing, Progressing  Intervention: Mutually Develop Transition Plan  Recent Flowsheet Documentation  Taken 2/15/2022 2200 by Eunice Adame, RN  Equipment Currently Used at Home: cane, straight   Goal Outcome Evaluation:

## 2022-02-16 NOTE — PROGRESS NOTES
"    Cardiology Progress Note    Assessment:  Dyspnea, multifactorial, suspect significant component of pulmonary hypertension/RV failure  Acute on chronic heart failure preserved ejection fraction, predominance of right-sided symptoms, symptomatically improved with diuresis  Pulmonary hypertension, moderate to severe, secondary to chronic lung disease and diastolic heart failure  Coronary artery disease with history of left circumflex intervention at [facility] in February 2021, no angina, negative troponins  COPD on chronic O2  Permanent atrial fibrillation with controlled ventricular response, off anticoagulation  Recent traumatic subdural hematoma status post craniotomy  Hypertension  Diabetes mellitus  Peripheral arterial disease  Chronic kidney disease, stable creatinine with diuresis    Plan:  Switch to oral diuretics  Discharge home  Follow-up with CHF nurse practitioner in 2 weeks    Subjective:   Less short of breath, denies chest pain, wants to go home    Objective:   BP (!) 160/76 (BP Location: Right arm)   Pulse 82   Temp 98.4  F (36.9  C) (Oral)   Resp 16   Ht 1.803 m (5' 11\")   Wt 109.3 kg (240 lb 14.4 oz)   SpO2 96%   BMI 33.60 kg/m      Intake/Output Summary (Last 24 hours) at 2/15/2022 0930  Last data filed at 2/15/2022 0000  Gross per 24 hour   Intake 910 ml   Output 3125 ml   Net -2215 ml         Physical Exam:  GENERAL: no distress  NECK: Elevated JVD  LUNGS: Clear to auscultation.  CARDIAC: irregular  rhythm, S1 & S2 normal.  No heaves, thrills, gallops or murmurs.  ABDOMEN: flat, negative hepatosplenomegaly, soft and non-tender.  EXTREMITIES: No evidence of cyanosis, clubbing 2+ edema.    Current Facility-Administered Medications Ordered in Epic   Medication Dose Route Frequency Provider Last Rate Last Admin     - MEDICATION INSTRUCTIONS -   Does not apply DOES NOT GO TO Shun Sánchez MD         acetaminophen (TYLENOL) tablet 650 mg  650 mg Oral Q6H PRN Moon Catherine MD     "    Or     acetaminophen (TYLENOL) Suppository 650 mg  650 mg Rectal Q6H PRN Moon Catherine MD         acetaminophen (TYLENOL) tablet 500-1,000 mg  500-1,000 mg Oral Q6H PRN Shun Kathleen MD         albuterol (PROVENTIL HFA/VENTOLIN HFA) inhaler  2 puff Inhalation Q6H PRN Shun Kathleen MD         aspirin EC tablet 81 mg  81 mg Oral Daily Shun Kathleen MD   81 mg at 02/16/22 0816     atorvastatin (LIPITOR) tablet 40 mg  40 mg Oral QPM Shun Kathleen MD   40 mg at 02/15/22 2139     bumetanide (BUMEX) injection 2 mg  2 mg Intravenous Q12H Shun Kathleen MD   2 mg at 02/16/22 0825     glucose gel 15-30 g  15-30 g Oral Q15 Min PRN Moon Catherine MD        Or     dextrose 50 % injection 25-50 mL  25-50 mL Intravenous Q15 Min PRN Moon Catherine MD        Or     glucagon injection 1 mg  1 mg Subcutaneous Q15 Min PRN Moon Catherine MD         ferrous gluconate (FERGON) tablet 324 mg  324 mg Oral Every Other Day hSun Kathleen MD   324 mg at 02/16/22 0816     fluticasone-vilanterol (BREO ELLIPTA) 200-25 MCG/INH inhaler 1 puff  1 puff Inhalation Daily Shun Kathleen MD   1 puff at 02/16/22 0817     insulin aspart (NovoLOG) injection (RAPID ACTING)  1-7 Units Subcutaneous TID AC Moon Catherine MD   1 Units at 02/15/22 0843     insulin glargine (LANTUS PEN) injection 8 Units  8 Units Subcutaneous QAM  Shun Kathleen MD   8 Units at 02/16/22 0817     irbesartan (AVAPRO) tablet 300 mg  300 mg Oral Daily Shun Kathleen MD   300 mg at 02/16/22 0817     lacosamide (VIMPAT) tablet 200 mg  200 mg Oral or Feeding Tube BID Shun Kathleen MD   200 mg at 02/16/22 0817     melatonin tablet 1 mg  1 mg Oral At Bedtime PRN Moon Catherine MD         metoprolol tartrate (LOPRESSOR) tablet 25 mg  25 mg Oral BID w/meals Shun Kathleen MD   25 mg at 02/16/22 0817     multivitamin w/minerals (THERA-VIT-M) tablet 1 tablet  1 tablet Oral Daily Shun Kathleen MD   1 tablet at 02/16/22 0817      pantoprazole (PROTONIX) EC tablet 40 mg  40 mg Oral BID Shun Kathleen MD   40 mg at 02/16/22 0817     prochlorperazine (COMPAZINE) injection 5 mg  5 mg Intravenous Q6H PRN Moon Catherine MD         rOPINIRole (REQUIP) tablet 1 mg  1 mg Oral At Bedtime Shun Kathleen MD   1 mg at 02/15/22 2140     spironolactone (ALDACTONE) tablet 25 mg  25 mg Oral Daily Prem San MD   25 mg at 02/16/22 0816     tadalafil (Cialis/Ascirca) 10 mg tablet  20 mg Oral Q24H Shun Kathleen MD   20 mg at 02/15/22 1143     traZODone (DESYREL) tablet 100 mg  100 mg Oral At Bedtime PRN Shun Kathleen MD         zinc gluconate tablet 50 mg  50 mg Oral Daily before lunch Shun Kathleen MD   50 mg at 02/15/22 1143     No current Epic-ordered outpatient medications on file.       Cardiographics:    Telemetry: A. thomas    ECHO (personnaly Reviewed): January 2022 at [facility]  Global and regional left ventricular function is normal with an EF of 60-65%.  Mild right ventricular dilation is present. Global right ventricular function  is mildly reduced.  Mild to moderate tricuspid insufficiency is present.  Pulmonary hypertension is present. Estimated pulmonary artery systolic  pressure is 64 mmHg based on a mean right atrial pressure of 15 mmHg.  Dilation of the inferior vena cava is present with abnormal respiratory  variation in diameter.  No pericardial effusion is present.   Lab Results    Chemistry/lipid CBC Cardiac Enzymes/BNP/TSH/INR   Recent Labs   Lab Test 10/25/21  1529   CHOL 98   HDL 35*   LDL 53   TRIG 49     Recent Labs   Lab Test 10/25/21  1529 05/25/21  1359 09/02/20  1307   LDL 53 <5 50     Recent Labs   Lab Test 02/15/22  0838 02/15/22  0649   NA  --  138   POTASSIUM  --  3.4*   CHLORIDE  --  100   CO2  --  28   * 113   BUN  --  42*   CR  --  1.65*   GFRESTIMATED  --  42*   ANNMARIE  --  9.0     Recent Labs   Lab Test 02/15/22  0649 02/14/22  0540 02/13/22  2305   CR 1.65* 1.73*  1.81*     Recent Labs   Lab Test 12/21/21  0438 10/23/21  1824 07/11/21  1031   A1C 7.5* 6.3* 9.3*          Recent Labs   Lab Test 02/14/22  0540   WBC 6.3   HGB 7.8*   HCT 28.2*   MCV 67*        Recent Labs   Lab Test 02/14/22  0540 02/13/22  2354 01/21/22  1118   HGB 7.8* 7.8* 8.7*    Recent Labs   Lab Test 02/14/22  0540 02/13/22  2305 11/30/21  1609   TROPONINI 0.02 0.03 0.03     Recent Labs   Lab Test 02/14/22  0540 02/13/22  2354 01/21/22  1118 01/07/22  0928 01/06/22  1326 11/30/21  1609   * 519*  --   --   --  369*   NTBNPI  --   --  2,086* 818 1,112  --      Recent Labs   Lab Test 01/06/22  1326   TSH 0.50  0.49     Recent Labs   Lab Test 12/20/21  1350 12/20/21  1046 11/30/21  1609   INR 1.32* 1.20* 1.30*

## 2022-02-17 NOTE — LETTER
2/17/2022     RE: Red Sutherland  [address]  [facility] [address]    Dear Colleague,    Thank you for referring your patient, Red Sutherland, to the [facility]. Please see a copy of my visit note below.    Date of Service: 2/17/2022     Referring Provider: Meg Roth MD    Subjective:            Red Sutherland is a 81 year old male presenting for evaluation of liver disease      History of Present Illness   Red Sutherland is a 81 year old male with past medical history of coronary disease s/p PCI to LCx in February 2021 and repeat in October 2021, heart failure preserved ejection fraction 60 to 65%, atrial fibrillation on anticoagulation with Eliquis, hypertension, hyperlipidemia, COPD, pulmonary hypertension, chronic bronchitis, type 2 diabetes mellitus, CKD stage IV, GERD, right-sided subdural hematoma with mass-effect s/p evacuation on 12/21, complicated by seizures, agitation, failed swallow test requiring PEG tube placement on 1/3, who came to the clinic for the concerns of possibility of cirrhosis upon imaging.    Patient stated that he was not known to have liver disease until recently in December 2021.  He stated that his medical condition started going downhill since July 2021 when he started having swelling and development of varicose veins up on his lower extremities.  He was having CHF exacerbation as well as fluid retention which was nonstop since August 2021 and he was in and out of the hospital many times.  He was admitted to the hospital in October 2021 when he underwent repeat PCI with restenting.  He also had some concerns for GI bleed and underwent endoscopy which was negative for any esophageal varices or portal hypertensive gastropathy.  Subsequently also underwent colonoscopy which showed small AVM.  During this admission ultrasound abdomen was done which showed normal liver contours.    Patient was readmitted to the hospital  end of November/December when he was having some abdominal distention along with CHF exacerbation.  He underwent paracentesis which showed a SAAG of 1.1 with total protein in the fluid was 3.3, consistent with cardiac ascites  He underwent a CT abdomen during this admission showing nodular contours of the liver concerning for cirrhosis.    Subsequently patient had a fall at home on December 8 followed by severe headaches for which she was brought into the hospital again and found her to have a subdural hematoma s/p evacuation on 12/21, course was complicated by development of seizures, he also failed swallow study during that admission and required a PEG tube placement on 1/3.  Patient states that the PEG tube is causing him to have discomfort and he wants it to be taken off.    He was discharged from hospital yesterday for CHF exacerbation, his prasugrel was discontinued as well as switched to torsemide from [employer]. No melena or hematochezia no abdominal distention.  Patient denied any further paracentesis since the initial episode    No smoking or alcohol intake.  Lives with his wife.      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 They put him on sildenafil as nitroprusside lowered systemic BP and with that the mean PA dropped from 54 to 49. Negative VQ at [facility] Dec 1, 2017.     RLS (restless legs syndrome)      Sleep apnea        Surgical History:  Past Surgical History:   Procedure Laterality Date     BACK SURGERY      lower back     CARDIAC CATHETERIZATION  12/13/2017     Right and left at [place], mean PA 58, PCW 24 with V wave of 35, LVEDP of 18, with Nipride systemic BP, PVR and mean PA all declined     CARDIOVERSION  03/15/2013    for afib     CATARACT EXTRACTION Bilateral      COLONOSCOPY N/A 10/31/2021    Procedure: COLONOSCOPY WITH POLYPECTOMY;  Surgeon: Sheng Sagastume MD;  Location: [facility] OR     CORONARY STENT PLACEMENT       CRANIOTOMY Right 12/21/2021    Procedure: CRANIOTOMY FOR Subdural HEMATOMA EVACUATION;  Surgeon: Parvez Quinones MD;  Location: [facility] OR     CV CORONARY ANGIOGRAM N/A 10/25/2021    Procedure: Coronary Angiogram;  Surgeon: Otf Knowles MD;  Location: [facility] CATH LAB CV     CV CORONARY LITHOTRIPSY PCI N/A 10/25/2021    Procedure: CV Coronary Lithotripsy PCI;  Surgeon: Otf Knowles MD;  Location: [facility] LAB CV     CV LEFT HEART CATH N/A 10/25/2021    Procedure: Left Heart Cath;  Surgeon: Otf Knowles MD;  Location: [facility] CATH LAB CV     CV PCI N/A 10/25/2021    Procedure: Percutaneous Coronary Intervention;  Surgeon: Otf Knowles MD;  Location: [facility] CATH LAB CV     ESOPHAGOSCOPY, GASTROSCOPY, DUODENOSCOPY (EGD), COMBINED N/A 10/30/2021    Procedure: ESOPHAGOGASTRODUODENOSCOPY (EGD);  Surgeon: Sheng Sagastume MD;  Location: [facility] OR     IR ABDOMINAL AORTOGRAM  11/16/2012     IR GASTROSTOMY TUBE PERCUTANEOUS PLCMNT  01/03/2022     IR MISCELLANEOUS PROCEDURE  07/20/2001     IR MISCELLANEOUS PROCEDURE  11/16/2012     OTHER SURGICAL HISTORY      [place]     PEG TUBE PLACEMENT  01/03/2022    Malnutrition while hosp for subdural hematoma- [facility]     PICC DOUBLE LUMEN PLACEMENT Right 12/25/2021    48cm (2cm external), Basilic vein, SVC RA junction     SHOULDER SURGERY      reapir on right shoulder     TOTAL HIP ARTHROPLASTY Left      TOTAL KNEE ARTHROPLASTY Bilateral      WRIST SURGERY Bilateral      ZZHC COLONOSCOPY W/WO BRUSH/WASH N/A 01/14/2021    Procedure: COLONOSCOPY;  Surgeon: Mihai Harris  "MD;  Location: [facility] Main OR;  Service: Gastroenterology       Social History:  Social History     Tobacco Use     Smoking status: Former Smoker     Packs/day: 1.50     Years: 44.00     Pack years: 66.00     Types: Cigarettes     Quit date: 1996     Years since quittin.8     Smokeless tobacco: Never Used   Substance Use Topics     Alcohol use: Yes     Alcohol/week: 1.0 standard drink     Comment: Alcoholic Drinks/day: 1 per month     Drug use: No       Family History:  Family History   Problem Relation Age of Onset     Hyperlipidemia Mother      Hypertension Mother      Heart Disease Mother      Hyperlipidemia Father      Hypertension Father      Coronary Artery Disease Father      Cerebrovascular Disease Brother      Depression Brother      No Known Problems Sister      Pulmonary Hypertension No family hx of      Congenital heart disease No family hx of        Medications:  Current Outpatient Medications   Medication     ACCU-CHEK JOSE PLUS TEST STRP strips     acetaminophen (TYLENOL) 500 MG tablet     albuterol (PROVENTIL HFA;VENTOLIN HFA) 90 mcg/actuation inhaler     albuterol (PROVENTIL) (2.5 MG/3ML) 0.083% neb solution     Ascorbic Acid (VITAMIN C) 500 MG CAPS     aspirin (ASA) 81 MG EC tablet     atorvastatin (LIPITOR) 40 MG tablet     azithromycin (ZITHROMAX) 250 MG tablet     BD ULTRA-FINE MANISHA PEN NEEDLES 32 gauge x \" Ndle     calcium carbonate (TUMS) 500 MG chewable tablet     Ferrous Gluconate 324 (37.5 Fe) MG TABS     fluticasone-vilanterol (BREO ELLIPTA) 200-25 mcg/dose DsDv inhaler     Garlic 1000 MG CAPS     insulin glargine (LANTUS PEN) 100 UNIT/ML pen     ipratropium - albuterol 0.5 mg/2.5 mg/3 mL (DUONEB) 0.5-2.5 (3) MG/3ML neb solution     irbesartan (AVAPRO) 300 MG tablet     lacosamide (VIMPAT) 200 MG TABS tablet     metoprolol tartrate (LOPRESSOR) 25 MG tablet     multivitamin w/minerals (THERA-VIT-M) tablet     omeprazole 20 MG tablet     OXYGEN-AIR DELIVERY SYSTEMS MISC     " rOPINIRole (REQUIP) 1 MG tablet     [START ON 2/17/2022] spironolactone (ALDACTONE) 25 MG tablet     tadalafil (CIALIS) 20 MG tablet     torsemide 40 MG TABS     traZODone (DESYREL) 100 MG tablet     zinc gluconate 50 MG tablet     No current facility-administered medications for this visit.       Review of Systems  A complete 10 point review of systems was asked and answered in the negative unless specifically commented upon in the HPI    Objective:         Vitals:    02/17/22 1027   BP: 100/62   Pulse: 62   SpO2: 97%   Weight: 108.9 kg (240 lb)     Body mass index is 33.47 kg/m .     Physical Exam  Constitutional: in wheel chair, upon 3 L O2  HEENT: Normocephalic. no scleral icterus. Moist oral mucosa.   Neck/Lymph: Normal ROM, supple.   Cardiac:  S1, S2 heard  Respiratory: Faint crackles on bases  GI:  Abdomen soft, minimal distended, non-tender, PEG tube in place. BS present. minimal shifting dullness.     Skin:  Skin is warm and dry. No rash noted.  No jaundice.   Peripheral Vascular: No lower extremity edema. 2+ pulses in all extremities  Musculoskeletal:  ROM intact.  Psychiatric: Normal mood and affect. Behavior is normal.  Neuro:  No asterixis.    Labs and Diagnostic tests:  Lab Results   Component Value Date     02/16/2022    POTASSIUM 3.4 02/16/2022    CHLORIDE 96 02/16/2022    CO2 29 02/16/2022    BUN 45 02/16/2022    CR 1.72 02/16/2022     Lab Results   Component Value Date    BILITOTAL 1.4 02/14/2022    ALT 9 02/14/2022    AST 15 02/14/2022    ALKPHOS 133 02/14/2022     Lab Results   Component Value Date    ALBUMIN 3.3 02/14/2022    PROTTOTAL 6.2 02/14/2022      Lab Results   Component Value Date    WBC 6.3 02/14/2022    HGB 7.8 02/14/2022    MCV 67 02/14/2022     02/14/2022     Lab Results   Component Value Date    INR 1.32 12/20/2021       MELD-Na score: 14 at 12/22/2021  4:10 AM  MELD score: 14 at 12/22/2021  4:10 AM  Calculated from:  Serum Creatinine: 1.37 mg/dL at 12/22/2021  4:10  AM  Serum Sodium: 146 mmol/L (Using max of 137 mmol/L) at 12/22/2021  4:10 AM  Total Bilirubin: 1.5 mg/dL at 12/20/2021  1:50 PM  INR(ratio): 1.32 at 12/20/2021  1:50 PM  Age: 81 years    Imaging:  CT ABD 1/5/22:  IMPRESSION:   1. Percutaneous gastrostomy tube appears in good position.  2. Sequela of chronic pancreatitis.  3. Trace ascites along the paracolic gutters.  4. Mildly nodular hepatic contour suggests cirrhosis.  5. Stable splenomegaly, may be related to portal hypertension.  6. Colonic diverticulosis without acute diverticulitis.  7. Small right greater than left pleural effusions with findings of  pulmonary edema.  8. Newly identified 6 mm nodule in the right middle lobe. A larger  lobulated partially calcified nodule in the right middle lobe is  unchanged. Recommend follow-up CT at 6-12 months per Fleischner  Criteria.    Procedures:    EGD 10/30/21:   Impression:          - Normal esophagus.                        - Normal stomach.                        - Normal examined duodenum.                        - No specimens collected.   Recommendation:      - Return patient to hospital epps for ongoing care.                        - Perform a colonoscopy tomorrow.   Colonoscopy 10/31/21:   Impression:          One small AVM, treated with APC                        Patient had significant bleeding with small polypectomy                        sites. Even small lesions may bleed given triple                        anticoagulation (aspirin, effient, eliquis).                        - Two 10 to 13 mm polyps in the transverse colon and in                        the ascending colon, removed with a cold snare. Resected                        and retrieved. Clips were placed.                        - One 5 mm polyp in the rectum, removed with a cold                        snare. Resected and retrieved. Clips were placed.                        - A single non-bleeding colonic angioectasia. Treated                         with argon beam coagulation.                        - Diverticulosis in the sigmoid colon.                        - Internal hemorrhoids.     Assessment and Plan:  81 year old male with past medical history of coronary disease s/p PCI to LCx in February 2021 and repeat in October 2021, heart failure preserved ejection fraction 60 to 65%, atrial fibrillation on anticoagulation with Eliquis, hypertension, hyperlipidemia, COPD, pulmonary hypertension, chronic bronchitis, type 2 diabetes mellitus, CKD stage IV, GERD, right-sided subdural hematoma with mass-effect s/p evacuation on 12/21, complicated by seizures, agitation, failed swallow test requiring PEG tube placement on 1/3, who came to the clinic for the concerns of possibility of cirrhosis upon imaging.      Congestive hepatopathy and CKD stage IV  Patient had normal liver upon imaging back in October 2021, CT abdomen in December 2021 showed nodular contours along with some perihepatic ascites which was tapped with SAAG 1.1 and high-protein 3.3, consistent with cardiac ascites and not consistent with ascites secondary to portal hypertension. His ascites is likely due to cardiac dysfunction along with renal dysfunction combining together giving him ascitic fluid.  His liver enzymes are normal with slight elevation in his bilirubin which is due to congestive hepatopathy.  Platelet count had a slight downtrend in the last week of December 2021 but otherwise normal before and after it.  He had an endoscopy in October 2021 which was negative for esophageal varices or portal hypertensive gastropathy.  His presentation is not consistent with cirrhosis of the liver.  His ascites can be managed with optimization of his diuretics per cardiology and optimization of his renal function. His bilirubin and INR will have improvement upon cardiac function improvement.  --Management of diuretics as per cardiology    Patient wanted to get the PEG tube removed which was placed  on 1/3 by interventional radiology.  He was provided the number to call interventional radiology office to see if it would be safe to remove the PEG tube and where can it be done.    Pt was seen and discussed with Dr. Juarez.  Thank you very much for the opportunity to participate in the care of this patient.  If you have any further questions, please don't hesitate to contact our office.    Pedro Mancini M.D.  Advanced & Transplant Hepatology Fellow  [facility]

## 2022-02-17 NOTE — PROGRESS NOTES
Clinic Care Coordination Contact  Care Team Conversations    Patient identified for care management outreach, however [facility] RN staff has already followed up with patient to ensure they are following up with PCP and have needs and resources met. Clinic RN will refer back to BENJA CAPUTO if needed/appropriate.    DERIK Barber  Social Work Care Coordinator - [facility] [place]  Care Coordination  [email or web address]  Cell Phone: [phone]  Gender pronouns: she/her  Employed by [facility] [facility] [employer]

## 2022-02-17 NOTE — NURSING NOTE
Chief Complaint   Patient presents with     Consult     initial visit, liver cirrhosis       Blood pressure 100/62, pulse 62, weight 108.9 kg (240 lb), SpO2 97 %.      Amarilys Myers CMA

## 2022-02-18 NOTE — ED TRIAGE NOTES
Arrives to ED with c/o hypotension. Home health nurse noted SPB 88. Reports feeling fatigued beginning this morning. Denies CP. Denies SOB. Denies weakness. Pt is on chronic O2 at 3L.

## 2022-02-18 NOTE — ED PROVIDER NOTES
EMERGENCY DEPARTMENT ENCOUNTER      NAME: Red Sutherland  AGE: 81 year old male  YOB: 1940  MRN: [id number]  EVALUATION DATE & TIME: 2/18/2022 10:51 AM    PCP: Meg Roth    ED PROVIDER: Mandi De Luna DO      Chief Complaint   Patient presents with     Hypotension         FINAL IMPRESSION:  1. Hypotension, unspecified hypotension type    2. Acute on chronic congestive heart failure, unspecified heart failure type (H)          ED COURSE & MEDICAL DECISION MAKING:    Pertinent Labs & Imaging studies reviewed. (See chart for details)  11:06 AM I met with patient for initial interview and encounter. PPE worn includes surgical mask.  12:20 PM I rechecked patient. Patient still feeling fine. Blood pressure now 118/60.  12:58 PM I spoke with Dr. San on the phone.  He recommends decreasing his torsemide to 20 mg twice daily.  1:07 PM I rechecked patient who is agreeable to discharge.    81 year old male presents to the Emergency Department for evaluation of hypotension.  This was noted by a home health care nurse.  Patient himself is asymptomatic.  No dizziness, chest pain.  Of note, patient recently admitted for an heart failure exacerbation, his Bumex was stopped and he was started on torsemide and Aldactone.  Patient has been urinating appropriately.  He is not appear fluid overloaded.  He had several pressures that were in the high 80s to low 90s systolic, I see he was normotensive on the hospital.  He was given a small bolus of fluid and blood pressure remained in the 1 teens to 120 systolic.  Labs were obtained.  Kidney function slightly worse.  Otherwise unremarkable.  I did speak with cardiology, Dr. San who did see the patient in consult.  Agrees patient may be over diuresed.  He recommends decreasing torsemide to 20 mg twice daily and follow-up as scheduled.  Discussed with patient who is agreeable.  Encourage return if any acute worsening symptoms, dizziness, syncope or any  other concerns.    At the conclusion of the encounter I discussed the results of all of the tests and the disposition. The questions were answered. The patient or family acknowledged understanding and was agreeable with the care plan.       MEDICATIONS GIVEN IN THE EMERGENCY:  Medications   0.9% sodium chloride BOLUS (0 mLs Intravenous Stopped 2/18/22 1310)       NEW PRESCRIPTIONS STARTED AT [facility]'S ER VISIT  Discharge Medication List as of 2/18/2022  1:10 PM             =================================================================    HPI    Patient information was obtained from: Patient     Use of : N/A         Red Sutherland is a 81 year old male with a pertinent history of chronic bronchitis, COPD, Afib, CAD, CHF, DM type II, pulmonary HTN, who presents to this ED via walk-in for evaluation of hypotension.    Per chart review, patient was admitted to this hospital form 2/13-2/16 for shortness of breath and 4 pound weight gain. Workup in the ED included Elevated BNP, CXR showing cardiomegaly with some segmental atelectasis and infiltrates. Patient did have some improvement after Bumex before being admitted for heart failure. Patient was intrusted to stop his bumetanide and prasugrel, was started on spironolactone and torsemide and was discharged in stable condition.    Patient presents to this ED after his home health nurse noted a systolic blood pressure of 88 earlier this morning. In the ED, patient states he currently feels normal and denies any new dizziness, weakness, diarrhea, vomiting, shortness of breath, urinary symptoms, or any other complaints. States he has been feeling well since his discharge 2 days ago.      REVIEW OF SYSTEMS   Review of Systems   Constitutional: Negative for fever.   Respiratory: Negative for shortness of breath.    Cardiovascular: Negative for chest pain and leg swelling.        Positive for low blood pressure   Gastrointestinal: Negative for diarrhea and  vomiting.   Genitourinary: Negative for dysuria and hematuria.   Neurological: Negative for dizziness and weakness.   All other systems reviewed and are negative.      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 They put him on sildenafil as nitroprusside lowered systemic BP and with that the mean PA dropped from 54 to 49. Negative VQ at [facility] Dec 1, 2017.     RLS (restless legs syndrome)      Sleep apnea        PAST SURGICAL HISTORY:  Past Surgical History:   Procedure Laterality Date     BACK SURGERY      lower back     CARDIAC CATHETERIZATION  12/13/2017    Right and left at [facility], mean PA 58, PCW 24 with V wave of 35, LVEDP of 18, with Nipride systemic BP, PVR and mean PA all declined     CARDIOVERSION  03/15/2013    for afib     CATARACT EXTRACTION Bilateral      COLONOSCOPY N/A 10/31/2021    Procedure: COLONOSCOPY WITH POLYPECTOMY;  Surgeon: Sheng Sagastume MD;  Location: [facility] Main OR     CORONARY STENT PLACEMENT       CRANIOTOMY Right 12/21/2021    Procedure: CRANIOTOMY FOR Subdural HEMATOMA EVACUATION;  Surgeon: Parvez Quinones MD;  Location: [facility] OR     CV CORONARY ANGIOGRAM N/A 10/25/2021    Procedure: Coronary Angiogram;  Surgeon: Otf Knowles MD;  Location: [facility] CATH LAB CV     CV CORONARY LITHOTRIPSY PCI N/A 10/25/2021    Procedure: CV Coronary Lithotripsy PCI;  Surgeon: Otf Knowles MD;  Location: [facility] CATH LAB CV     CV LEFT HEART CATH N/A 10/25/2021    Procedure: Left Heart Cath;  Surgeon: Otf Knowles MD;  Location:   "[facility] CATH LAB CV     CV PCI N/A 10/25/2021    Procedure: Percutaneous Coronary Intervention;  Surgeon: Otf Knowles MD;  Location: [facility] CV     ESOPHAGOSCOPY, GASTROSCOPY, DUODENOSCOPY (EGD), COMBINED N/A 10/30/2021    Procedure: ESOPHAGOGASTRODUODENOSCOPY (EGD);  Surgeon: Sheng Sagastume MD;  Location: [facility] OR     IR ABDOMINAL AORTOGRAM  11/16/2012     IR GASTROSTOMY TUBE PERCUTANEOUS PLCMNT  01/03/2022     IR MISCELLANEOUS PROCEDURE  07/20/2001     IR MISCELLANEOUS PROCEDURE  11/16/2012     OTHER SURGICAL HISTORY      [place]     PEG TUBE PLACEMENT  01/03/2022    Malnutrition while hosp for subdural hematoma- [facility]     PICC DOUBLE LUMEN PLACEMENT Right 12/25/2021    48cm (2cm external), Basilic vein, SVC RA junction     SHOULDER SURGERY      reapir on right shoulder     TOTAL HIP ARTHROPLASTY Left      TOTAL KNEE ARTHROPLASTY Bilateral      WRIST SURGERY Bilateral      ZZHC COLONOSCOPY W/WO BRUSH/WASH N/A 01/14/2021    Procedure: COLONOSCOPY;  Surgeon: Mihai Harris MD;  Location: [facility] Main OR;  Service: Gastroenterology           CURRENT MEDICATIONS:    Torsemide 40 MG TABS  ACCU-CHEK JOSE PLUS TEST STRP strips  acetaminophen (TYLENOL) 500 MG tablet  albuterol (PROVENTIL HFA;VENTOLIN HFA) 90 mcg/actuation inhaler  albuterol (PROVENTIL) (2.5 MG/3ML) 0.083% neb solution  Ascorbic Acid (VITAMIN C) 500 MG CAPS  aspirin (ASA) 81 MG EC tablet  atorvastatin (LIPITOR) 40 MG tablet  azithromycin (ZITHROMAX) 250 MG tablet  BD ULTRA-FINE MANISHA PEN NEEDLES 32 gauge x 5/32\" Ndle  calcium carbonate (TUMS) 500 MG chewable tablet  Ferrous Gluconate 324 (37.5 Fe) MG TABS  fluticasone-vilanterol (BREO ELLIPTA) 200-25 mcg/dose DsDv inhaler  Garlic 1000 MG CAPS  insulin glargine (LANTUS PEN) 100 UNIT/ML pen  ipratropium - albuterol 0.5 mg/2.5 mg/3 mL (DUONEB) 0.5-2.5 (3) MG/3ML neb solution  irbesartan (AVAPRO) 300 MG tablet  lacosamide (VIMPAT) 200 MG TABS tablet  metoprolol tartrate (LOPRESSOR) " 25 MG tablet  multivitamin w/minerals (THERA-VIT-M) tablet  omeprazole 20 MG tablet  OXYGEN-AIR DELIVERY SYSTEMS MISC  rOPINIRole (REQUIP) 1 MG tablet  spironolactone (ALDACTONE) 25 MG tablet  tadalafil (CIALIS) 20 MG tablet  traZODone (DESYREL) 100 MG tablet  zinc gluconate 50 MG tablet         ALLERGIES:  Allergies   Allergen Reactions     Ace Inhibitors      Bloody nose, dry mouth, cracked lips     Contrast Dye Nausea     Other reaction(s): GI intolerance, GI Upset     Furosemide Muscle Pain (Myalgia)     Previously tolerated.  Muscle cramps     Hydrochlorothiazide Unknown     Iodinated Contrast Media [Diagnostic X-Ray Materials] Nausea     Losartan Other (See Comments)     Other reaction(s): Stomatitis, Bloody nose dry mouth and lips     Losartan-Hydrochlorothiazide [Hyzaar]      Mouth sores     Metaxalone Nausea     Metolazone Other (See Comments)     Muscle cramps     Mometasone Other (See Comments)     Bloody nose     Proton Pump Inhibitors      Bloody nose, mouth sores     Rabeprazole Other (See Comments)     Mouth sores     Ramipril Other (See Comments)     Mouth sores     Shellfish Containing Products [Shellfish-Derived Products] Unknown     Other reaction(s): mouth sores, Other reaction(s): mouth sores     Sildenafil Muscle Pain (Myalgia)     Methocarbamol Rash     Penicillins Other (See Comments)     Immune-does not work for him       FAMILY HISTORY:  Family History   Problem Relation Age of Onset     Hyperlipidemia Mother      Hypertension Mother      Heart Disease Mother      Hyperlipidemia Father      Hypertension Father      Coronary Artery Disease Father      Cerebrovascular Disease Brother      Depression Brother      No Known Problems Sister      Pulmonary Hypertension No family hx of      Congenital heart disease No family hx of        SOCIAL HISTORY:   Social History     Socioeconomic History     Marital status:      Spouse name: Not on file     Number of children: 4     Years of  "education: Not on file     Highest education level: Not on file   Occupational History     Not on file   Tobacco Use     Smoking status: Former Smoker     Packs/day: 1.50     Years: 44.00     Pack years: 66.00     Types: Cigarettes     Quit date: 1996     Years since quittin.8     Smokeless tobacco: Never Used   Substance and Sexual Activity     Alcohol use: Yes     Alcohol/week: 1.0 standard drink     Comment: Alcoholic Drinks/day: 1 per month     Drug use: No     Sexual activity: Not Currently     Partners: Female   Other Topics Concern     Not on file   Social History Narrative     Not on file     Social Determinants of Health     Financial Resource Strain: Not on file   Food Insecurity: Not on file   Transportation Needs: Not on file   Physical Activity: Not on file   Stress: Not on file   Social Connections: Not on file   Intimate Partner Violence: Not on file   Housing Stability: Not on file       VITALS:  /66   Pulse 67   Temp 97.7  F (36.5  C) (Oral)   Resp 18   Ht 1.803 m (5' 11\")   Wt 108.1 kg (238 lb 6.4 oz)   SpO2 100%   BMI 33.25 kg/m      PHYSICAL EXAM    Physical Exam  Constitutional:       General: He is not in acute distress.     Interventions: Nasal cannula in place.   HENT:      Head: Normocephalic and atraumatic.      Mouth/Throat:      Pharynx: Oropharynx is clear.   Eyes:      Pupils: Pupils are equal, round, and reactive to light.   Cardiovascular:      Rate and Rhythm: Normal rate and regular rhythm.      Pulses: Normal pulses.      Heart sounds: Normal heart sounds.   Pulmonary:      Effort: Pulmonary effort is normal.      Breath sounds: Normal breath sounds.   Abdominal:      General: Abdomen is flat. Bowel sounds are normal.      Palpations: Abdomen is soft.      Tenderness: There is no abdominal tenderness.   Musculoskeletal:         General: Normal range of motion.   Skin:     General: Skin is warm and dry.      Capillary Refill: Capillary refill takes less than 2 " seconds.   Neurological:      General: No focal deficit present.      Mental Status: He is alert and oriented to person, place, and time.           LAB:  All pertinent labs reviewed and interpreted.  Results for orders placed or performed during the hospital encounter of 02/18/22   Extra Blue Top Tube   Result Value Ref Range    Hold Specimen JIC    Extra Red Top Tube   Result Value Ref Range    Hold Specimen JIC    Extra Green Top (Lithium Heparin) Tube   Result Value Ref Range    Hold Specimen     Extra Purple Top Tube   Result Value Ref Range    Hold Specimen     Extra Green Top (Lithium Heparin) ON ICE   Result Value Ref Range    Hold Specimen JIC    Comprehensive metabolic panel   Result Value Ref Range    Sodium 140 136 - 145 mmol/L    Potassium 3.7 3.5 - 5.0 mmol/L    Chloride 100 98 - 107 mmol/L    Carbon Dioxide (CO2) 27 22 - 31 mmol/L    Anion Gap 13 5 - 18 mmol/L    Urea Nitrogen 55 (H) 8 - 28 mg/dL    Creatinine 1.99 (H) 0.70 - 1.30 mg/dL    Calcium 8.9 8.5 - 10.5 mg/dL    Glucose 201 (H) 70 - 125 mg/dL    Alkaline Phosphatase 138 (H) 45 - 120 U/L    AST 17 0 - 40 U/L    ALT <9 0 - 45 U/L    Protein Total 6.5 6.0 - 8.0 g/dL    Albumin 3.4 (L) 3.5 - 5.0 g/dL    Bilirubin Total 2.0 (H) 0.0 - 1.0 mg/dL    GFR Estimate 34 (L) >60 mL/min/1.73m2   Result Value Ref Range    Magnesium 2.4 1.8 - 2.6 mg/dL   Result Value Ref Range    INR 1.32 (H) 0.85 - 1.15   CBC with platelets and differential   Result Value Ref Range    WBC Count 6.3 4.0 - 11.0 10e3/uL    RBC Count 4.40 4.40 - 5.90 10e6/uL    Hemoglobin 8.5 (L) 13.3 - 17.7 g/dL    Hematocrit 29.9 (L) 40.0 - 53.0 %    MCV 68 (L) 78 - 100 fL    MCH 19.3 (L) 26.5 - 33.0 pg    MCHC 28.4 (L) 31.5 - 36.5 g/dL    RDW 20.2 (H) 10.0 - 15.0 %    Platelet Count 269 150 - 450 10e3/uL    % Neutrophils 76 %    % Lymphocytes 10 %    % Monocytes 11 %    % Eosinophils 2 %    % Basophils 1 %    % Immature Granulocytes 0 %    NRBCs per 100 WBC 0 <1 /100    Absolute Neutrophils  4.8 1.6 - 8.3 10e3/uL    Absolute Lymphocytes 0.7 (L) 0.8 - 5.3 10e3/uL    Absolute Monocytes 0.7 0.0 - 1.3 10e3/uL    Absolute Eosinophils 0.1 0.0 - 0.7 10e3/uL    Absolute Basophils 0.0 0.0 - 0.2 10e3/uL    Absolute Immature Granulocytes 0.0 <=0.4 10e3/uL    Absolute NRBCs 0.0 10e3/uL   B-Type Natriuretic Peptide ([facility] Only)   Result Value Ref Range     (H) 0 - 84 pg/mL       RADIOLOGY:  Reviewed all pertinent imaging. Please see official radiology report.  No orders to display       EKG:    Performed at: 1120    Impression:   Atrial fibrillation  Incomplete right bundle branch block  Right ventricular hypertrophy with repolarization activity    Rate: 72  Rhythm: Atrial fibrillation  Axis: 107  NJ Interval: *  QRS Interval: 96  QTc Interval: 455  ST Changes: None  Comparison: When compared to ECG of 2/14/2022: Unchanged    I have independently reviewed and interpreted the EKG(s) documented above.      I, Castro Garibay, am serving as a scribe to document services personally performed by Dr. Mandi De Luna based on my observation and the provider's statements to me. I, Mandi De Luna, DO attest that Castro Garibay is acting in a scribe capacity, has observed my performance of the services and has documented them in accordance with my direction.    Mandi De Luna DO  Emergency Medicine  [facility] EMERGENCY ROOM  [address] [id number] [phone]  Dept: [phone]     Mandi De Luna DO  02/19/22 0723

## 2022-02-18 NOTE — DISCHARGE INSTRUCTIONS
Decrease your torsemide to 20 mg twice daily (cut pills in half)  Follow up closely with your physician  Return if any acute worsening of symptoms, dizziness, passing out episodes or any other concerns

## 2022-02-21 NOTE — PROGRESS NOTES
COVID-19 PCR test completed. Patient handout For Patients Who Have Been Tested for Covid-19 (Coronavirus) was given to the patient, which includes test result notification process.     Patient no longer under provider care. PCP outside of [facility]. Unable to complete prescription refill per RN Medication Refill Policy.................... Joseline Cam RN ....................  12/13/2021   8:34 AM

## 2022-02-24 NOTE — PROGRESS NOTES
Red Sutherland  [id number]    Completed removal of percutaneous gastrostomy tube. Dx: gastrostomy status; no longer needed. Marcial.

## 2022-02-28 NOTE — DISCHARGE SUMMARY
[facility]  Hospitalist Discharge Summary      Date of Admission:  12/20/2021  Date of Discharge:  1/17/2022 11:00 AM  Discharging Provider: Rufino Wild MD  Discharge Service: Hospitalist Service, GOLD TEAM 3    Discharge Diagnoses   Right Subdural hematoma s/p evacuation  Seizures  Dysphagia with aspiration s/p PEG tube  HTN  Afib on anticoagulation  Pulmonary HTN  COPD  HTN  HLD  Type 2 DM  Liver cirrhosis  Stage 4 CKD  Delirium with intermittent agitation  Moderate malnutrition in the setting of acute on chronic illness    Follow-ups Needed After Discharge   Follow-up Appointments     Follow Up and recommended labs and tests      Follow up with snf physician.  The following labs/tests are   recommended: CT scan in 2 weeks, follow up with neurosurgery and neurology   in 1-2 weeks.  Repeat labs (CBC and BMP) 1 times a week, particularly to   monitor potassium levels which will likely need titration.             Unresulted Labs Ordered in the Past 30 Days of this Admission     No orders found from 11/20/2021 to 12/21/2021.      These results will be followed up by     Discharge Disposition   Discharged to rehabilitation facility  Condition at discharge: Stable      Hospital Course    Red Sutherland is a 81 year old male with past medical history significant for CAD s/p PCI to LCX (2/2021 and repeat in 10/2021), HFpEF (60-65%) atrial fib on AC w/ Eliquis, HTN, HLD, COPD, pulmonary HTN, chronic bronchitis, type 2 DM, liver cirrhosis, stage 4 CKD, hx GIB, GERD and RLS transferred to [facility] from [facility] ED on 12/20 for right sided subdural hematoma with mass effect and midline shift.  He was admitted to Neurosurgery service and underwent evacuation on 12/21.  Hospital course c/b seizures, altered mentation and agitation, and failed swallow test requiring PEG tube placement (1/3).  Internal medicine is consulted 1/6 for hypotension and medical  co-management, transferred to medicine formally 1/7.      # R subdural hematoma s/p evacuation   # Post op seizures - resolved  PCI to LCX (2/2021 and repeat in 10/2021), HFpEF (60-65%) atrial fib on AC w/ Eliquis, HTN, HLD, COPD, pulmonary HTN, chronic bronchitis, type 2 DM, liver cirrhosis, stage 4 CKD, hx GIB, GERD and RLS transferred to [facility] from [facility] ED on 12/20 for right sided subdural hematoma with mass effect and midline shift.  He was admitted to Neurosurgery service and underwent evacuation on 12/21.  Hospital course c/b seizures, altered mentation and agitation, and failed swallow test requiring PEG tube placement (1/3). CT head was completed the day prior to admission for preparation of discharge which was stable.    # AMS, delirium, intermittent agitation   In setting of subdural hematoma, s/p craniotomy, prolonged hospital stay. Per neurology may also be exacerbated by discontinuing Keppra. Seen by Psychiatry. Lytes wnl except for hypernatremia that has now resolved. Continues to wax and wane, is not sleeping well overnight. Suspect primarily d/t ongoing delirium and requires optimization of sleep/wake cycle, untethering, frequent reorientation.   - 1/9 pt mentation is significantly improved, was able to get out of bed, converse normally, participate in all therapies  - Changes made to optimize delirium                 = Discontinued PICC line, has reliable PIV access and no central access needs                 = Discontinued cardiac monitoring                  = Discontinued Gabapentin given patient's delirium                  = Continue delirium precautions                 = Patient care order -> do not disturb 10pm to 6am                 = at bedtime Zyprexa ineffective, transitioned to 5mg PO Haldol at bedtime (1/10),                  = 1/10: PO Melatonin at bedtime -> to be given about an hour before patient gets ready for sleep with                 = anxiety disorder likely plays a role  here, he was given hydroxacine scheduled which was associated with significant improvement.         # Hypotension - resolved   Developed 1/6, BP as low as 82/48 with concurrent hypothermia of 95.1. Medicine consulted, ultimately felt d/t medication and oversedation.  Chart reviewed, newly started on scheduled Zyprexa on 1/3 and also received PRN Haldol 2mg x 2 on 1/5, in setting of concomitant scheduled antihypertensives (metoprolol, irbesartan).  Also newly started on Doxazosin on 1/5 which can also cause hypotension d/t alpha-1 blockade. Other etiologies also considered but workup unremarkable, ultimately suspect d/t medications.   - 1/7 TSH, am cortisol wnl    - 1/7 Irbesartan decreased by half (150 -> 75mg qday) and BP has remained stable  - Bumex and Doxazosin on hold, assess need daily and resume if/when appropriate, currently not needing.       # Pulmonary HTN   # RV dysfunction   Cardiology consulted early in hospital course, signed off on 12/26.  Per chart review, dry weight 212 prior to admission based on recent admission to [facility] for CHF exacerbation 11/30-12/6.  At that time, discharged on Bumex 4mg BID and Metolazone 2.5mg q MWF.  Last echocardiogram 10/23/21 with normal EF 60-65%, mild valvular aortic stenosis, RV mild to severe dilated, flattened septum c/w elevated RV pressure/hypervolemia, PASP 75mmHg.   - Continue ASA 81mg and Prasugrel 10mg daily   - 1/9 remains euvolemic, dry weight at this exam 197lbs, would consider this his new dry weight; have been holding PTA Bumex and Metolazone, will continue to hold and resume if appropriate  - Continue daily weights   - Continue tadalafil 20mg daily   - Decreased Irbesartan to 75mg qday and assess BP response, will resume other meds before titrating back up  - Doxazosin also currently on hold (is replacement for PTA Tamsulosin, but also has BP effect)       # Permanent atrial fib   Rate controlled on metoprolol 25mg BID.  Previously referred for  Watchman device placement d/t hx bleeding issues.  On Eliquis 5mg BID PTA, stopped on admission d/t subdural hematoma as above.    - Continue Metoprolol 25mg BID  - Given SDH and current DAPT currently should hold AC given indication of AFib for now until neurosurgery and cardiology follow up      # COPD  Uses 4L NC at home, though currently stable on RA   - Continue PTA Breo Ellipta   - Continue PTA Albuterol PRN   - IS per nursing      # Type 2 DM - Last Hgb A1c 7.5%.  On Lantus 18 units BID and Invokana 100mg every evening PTA.  Currently on Lantus 14 units BID and HSSI.       # Stage IV CKD   BL Cr 1.2-1.4.  Currently Cr 1.21 on 1/6; BUN 46, Cr on 1/9 is 0.99  - Following I/Os as above  - Renally dose meds  - Avoid/minimize nephrotoxic agents      # Liver cirrhosis   Incidentally noted on imaging, CT AP on 11/30.  Hx neg for alcohol use disorder, IV drug use, viral hepatitis, or family history of liver disease.  Last LFTs 12/27/21 wnl.  No ascites noted on exam. Ammonia on 1/7 within normal limits. No evidence of synthetic liver dysfunction.   - GI follow up outpatient      # Anemia  Hx iron deficiency.  Likely c/b CKD.  Hgb stable mid 7's. May also be c/b underlying liver disease.  - Agree w/ ferrous gluconate   - Transfuse for Hgb < 7      # RLS  - Requip dose decreased to 1mg at bedtime (PTA 2mg)  - PTA Gabapentin on hold for delirium      # GERD   # Hx past GIB   - Agree w/ pantoprazole 40mg daily  (PTA Omeprazole 20mg)      # Moderate Malnutrition in the context of acute on chronic illness:    % Intake: Decreased intake does not meet criteria, but falls short of meeting RD's goal for adequate TF intakes.  % Weight Loss: > 10% in 6 months (severe), based on h/o > 18% in 4 months   Subcutaneous Fat Loss: Facial region: mild buccal area (per last RD note d/t pt sleeping at time of visit today)  Muscle Loss: Facial & jaw region: mild, Thoracic region (clavicle, acromium bone, deltoid, trapezius, pectoral),  Upper arm (bicep, tricep), Lower arm  (forearm), Patellar region and Posterior calf: all moderate (per last RD note d/t pt sleeping at time of visit today)  Fluid Accumulation/Edema: Unable to assess  Malnutrition Diagnosis: Moderate malnutrition in the context of acute on chronic illness  - Continue TFs  - SLP advanced diet to regular diet with thin liquids on 1/9, calorie counts ordered to start 1/10    Consultations This Hospital Stay   INTERNAL MEDICINE ADULT IP CONSULT FOR [place]  PHYSICAL THERAPY ADULT IP CONSULT  OCCUPATIONAL THERAPY ADULT IP CONSULT  SPEECH LANGUAGE PATH ADULT IP CONSULT  CARDIOLOGY GENERAL ADULT IP CONSULT  ENDOCRINE DIABETES ADULT IP CONSULT  CARE MANAGEMENT / SOCIAL WORK IP CONSULT  VASCULAR ACCESS CARE ADULT IP CONSULT  NUTRITION SERVICES ADULT IP CONSULT  PHARMACY IP CONSULT  VASCULAR ACCESS CARE ADULT IP CONSULT  VASCULAR ACCESS FOR PICC PLACEMENT ADULT IP CONSULT  PHARMACY IP CONSULT  PHARMACY IP CONSULT  NEPHROLOGY GENERAL ADULT IP CONSULT  INTERVENTIONAL RADIOLOGY ADULT/PEDS IP CONSULT  SPIRITUAL HEALTH SERVICES IP CONSULT  CARE MANAGEMENT / SOCIAL WORK IP CONSULT  PSYCHIATRY IP CONSULT  CARE MANAGEMENT / SOCIAL WORK IP CONSULT  NEUROLOGY GENERAL ADULT IP CONSULT  INTERNAL MEDICINE ADULT IP CONSULT FOR [place]  WOUND OSTOMY CONTINENCE NURSE  IP CONSULT  NUTRITION SERVICES ADULT IP CONSULT  PHYSICAL THERAPY ADULT IP CONSULT  OCCUPATIONAL THERAPY ADULT IP CONSULT  SPEECH LANGUAGE PATH ADULT IP CONSULT  PATIENT LEARNING CENTER IP CONSULT    Code Status   Prior    Time Spent on this Encounter   I, Rufino Wild MD, personally saw the patient today and spent greater than 30 minutes discharging this patient.       Rufino Wild MD  [facility] [address] [address]  Phone: [phone]  ______________________________________________________________________    Physical Exam   Vital Signs:                   Weight: 217 lbs  9.6 oz   Vitals reviewed, afebrile and stable  General Appearance: Alert and oriented  Respiratory: coarse throughout, normal work of breathing  Cardiovascular: RRR, S1/S1, no mrg  GI: soft, NT, ND, +BS  Skin: no rashes  Other: No edema        Primary Care Physician   Meg Roth    Discharge Orders      CT Head w/o contrast*     Neurology Adult Referral      Care Coordination Referral      Discharge Instructions    If questions or problems arise regarding tube function (e.g. leaking, dislodges, etc.) Contact Interventional Radiology department 24 hours a day.    For procedures that were done at the [facility] sites,   8:00-4:30 PM Monday through Friday    Contact:[phone].    For afterhours and weekends call the [facility] main phone line [phone] and ask for the [facility] IR on call physician number.    If DIRECTED by the RADIOLOGIST, related to specific problems with the tube functioning,  go to the Emergency Department.     Discharge Instructions    Patient to make a follow up appointment in IR clinic in 10-14 days for the removal of the retention sutures at the G or GJ tube site. Phone number is [phone] if needed.     General info for SNF    Length of Stay Estimate: Short Term Care: Estimated # of Days <30  Condition at Discharge: Improving  Level of care:skilled   Rehabilitation Potential: Excellent  Admission H&P remains valid and up-to-date: Yes  Recent Chemotherapy: N/A  Use Nursing Home Standing Orders: Yes     Follow Up and recommended labs and tests    Follow up with [facility] physician.  The following labs/tests are recommended: CT scan in 2 weeks, follow up with neurosurgery and neurology in 1-2 weeks.  Repeat labs (CBC and BMP) 1 times a week, particularly to monitor potassium levels which will likely need titration.     Glucose monitor nursing POCT    Before meals and at bedtime     Daily weights    Call Provider for weight gain of more than 2 pounds per day or 5  pounds per week.     Activity - Up with nursing assistance     Reason for your hospital stay    You were admitted following a fall with a subdural hematoma requiring surgery.  You tolerated surgery well and clinically improved.  During your stay you had issues with low blood pressure and delirium which was caused by your prolonged hospital stay.  Your diuretics were titrated and you were placed on medications to assist with sleep, anxiety and confusion.  Additionally, during your stay you had a feeding tube placed to assist with malnutrition while you recovered.  You improved to the point that you were stable to discharge to a rehab facility to assist with your transition back to home.     Additional Discharge Instructions    During your stay, your diuretics were held due to low blood pressure.  If you begin to experience shortness of breath or worsening swelling you would need to have them restarted.  Additionally, if your diuretics are restarted, you will need potassium to be monitored closely.     Nutrition Services Adult IP Consult    Reason:  Will need TF titration as his caloric intake improves.     Physical Therapy Adult Consult    Evaluate and treat as clinically indicated.    Reason:  Deconditioning, generalized weakness, falls     Occupational Therapy Adult Consult    Evaluate and treat as clinically indicated.    Reason:  Deconditioning, generalized weakness, falls     Fall precautions     Seizure precautions     Diet    Follow this diet upon discharge: Orders Placed This Encounter      Calorie Counts      Adult Formula Drip Feeding: Continuous Pivot 1.5; Gastrostomy; Goal Rate: 60; mL/hr; From: 8:00 PM; 8:00 AM; Medication - Feeding Tube Flush Frequency: At least 15-30 mL water before and after medication administration and with tube clogging; RN; ...      Calorie Counts      Regular Diet Adult Thin Liquids (level 0)       Significant Results and Procedures   Most Recent 3 CBC's:Recent Labs   Lab Test  02/18/22  1104 02/17/22  0925 02/14/22  0540   WBC 6.3 7.2 6.3   HGB 8.5* 8.4* 7.8*   MCV 68* 68* 67*    322 239     Most Recent 3 BMP's:Recent Labs   Lab Test 02/22/22  0943 02/18/22  1104 02/17/22  0925    140 138   POTASSIUM 4.4 3.7 3.5   CHLORIDE 103 100 98   CO2 26 27 29   BUN 42* 55* 50*   CR 1.71* 1.99* 1.80*   ANIONGAP 11 13 11   ANNMARIE 8.8 8.9 8.9   * 201* 204*     Most Recent 2 LFT's:Recent Labs   Lab Test 02/18/22  1104 02/17/22  0925   AST 17 19   ALT <9 16   ALKPHOS 138* 150   BILITOTAL 2.0* 2.0*   ,   Results for orders placed or performed during the hospital encounter of 12/20/21   CT Head w/o Contrast     Value    Radiologist flags Right subdural hematoma w/ midline shift, question (Urgent)    Narrative    CT HEAD W/O CONTRAST 12/20/2021 6:24 PM    History: Right subdural hematoma     Comparison: None    Technique: Using multidetector thin collimation helical acquisition  technique, axial, coronal and sagittal CT images from the skull base  to the vertex were obtained without intravenous contrast.   (topogram) image(s) also obtained and reviewed.    Findings: Images are moderately degraded secondary to patient motion.    There is a mixed density right subdural hematoma with acute component  measuring up to 19 mm with sulcal effacement of the right convexity  and the right parafalcine sulci. This causes a right to left midline  shift of 7 mm. There is a focal area of gray-white differentiation  loss at the right frontal lobe suspicious for acute infarction (series  3, image 21). Ventricles are proportionate to the cerebral sulci. The  basal cisterns are clear. Heavily calcified proximal basilar artery.    The bony calvaria and the bones of the skull base are normal. The  visualized portions of the paranasal sinuses and mastoid air cells are  clear.       Impression    Impression:    1. Mixed density right subdural hematoma with acute component causing  adjacent sulcal effacement  of the right convexity and the right  parafalcine sulci. Attention is recommended on follow-up.  2. Right to left midline shift of 6 mm.  3. Focal area of  gray-white differentiation loss of the right frontal  lobe suspicious for acute infarct.      [Urgent Result: Right subdural hematoma w/ midline shift, question  right frontal lobe infarct.]    Finding was identified on 12/20/2021 6:39 PM.     Dr. Reese was contacted by Dr. Trerell at 12/20/2021 6:57 PM and  verbalized understanding of the urgent finding.      I have personally reviewed the examination and initial interpretation  and I agree with the findings.    ARCHANA PULIDO MD         SYSTEM ID:  [id number]   MR Brain w/o Contrast     Value    Radiologist flags Tiny wedge-shaped infarct within the right frontal (Urgent)    Narrative    MRI brain without contrast    Provided History:  Right subdural hematoma and possible right frontal  lobe infarct.    Comparison:  Head CT 12/20/2021      Technique:   Axial and sagittal diffusion-weighted (with ADC map), axial FLAIR, and  axial susceptibility-weighted images of the brain were obtained  without the administration of intravenous contrast.    Findings:   Redemonstration of subdural hematoma along the right cerebral  convexity, measuring 1.9 cm in greatest depth. Mass effect with  right-sided effacement of sulci and 8 mm of right to left midline  shift (previously 7 mm). There is partial effacement of the right  lateral ventricle. No hydrocephalus. Patchy periventricular and  subcortical white matter T2 hyperintensities, nonspecific, but likely  related to chronic small vessel ischemic disease given the patient's  age. The flow voids are patent. The basilar cisterns are patent.  Diffusion weighted images demonstrate a small wedge-shaped area with  restricted diffusion of the lateral right frontal lobe (series 3,  image 81), likely small infarct.      Impression    Impression:  1. Small area of restricted  diffusion in the lateral right frontal  lobe, likely infarct.  2. Re-demonstration of subdural hemorrhage along the right cerebral  convexity measuring 1.9 cm. There is stable 8 mm of right-to-left  midline shift.     [Urgent Result: Tiny wedge-shaped infarct within the right frontal  lobe laterally.]    Finding was identified on 12/20/2021 9:29 PM.     Neurosurgery resident, Dr. Asim Ann was contacted by Dr. Lindsay Hartley at 12/20/2021 10:34 PM and verbalized understanding of the  urgent finding.     I have personally reviewed the examination and initial interpretation  and I agree with the findings.    ARCHANA PULIDO MD         SYSTEM ID:  [id number]   CT Head w/o Contrast    Narrative    CT HEAD W/O CONTRAST 12/21/2021 4:37 AM    Provided History: Right subdural hematoma    Comparison: 12/20/2021.    Technique: Using multidetector thin collimation helical acquisition  technique, axial, coronal and sagittal CT images from the skull base  to the vertex were obtained without intravenous contrast.     Findings:    No significant change in the mixed density right subdural hematoma  measuring up to 19 mm with sulcal effacement. This causes a right to  left midline shift of approximately 7 mm, unchanged. Focal area of  hypodensity with  Normal gray-white matter differentiation loss in the right frontal  operculum, matching the area of diffusion restriction on previous MR,  compatible with infarct. Ventricles are proportionate to the cerebral  sulci. The basal cisterns are clear. Heavily calcified proximal  basilar artery. The basilar cisterns are patent.    The bony calvaria and the bones of the skull base are normal. The  visualized portions of the paranasal sinuses and mastoid air cells are  clear.       Impression    Impression:  1. No significant change in right subdural hematoma with right  cerebral sulcal effacement and leftward midline shift of approximately  7 mm.  2. Focal hypodensity of the right frontal  lobe compatible with infarct  characterized on recent MRI.    I have personally reviewed the examination and initial interpretation  and I agree with the findings.    RENEE GUY MD         SYSTEM ID:  [id number]   XR Abdomen Port 1 View    Narrative    EXAM: XR ABDOMEN PORT 1 VIEWS  12/21/2021 9:12 AM      HISTORY: verify NG placement    COMPARISON: None    FINDINGS: Portable abdominal radiograph. Nasogastric tube terminating  over the stomach, sidehole approximately 3 cm from the  gastroesophageal junction . Nonobstructive bowel gas pattern. No  pneumatosis. No portal venous gas. Visualized portions of the lung  demonstrate patchy perihilar opacities. Degenerative changes of the  lumbar spine.      Impression    IMPRESSION:   1. Gastric tube tip and sidehole terminating over the proximal  stomach.  2. Nonobstructive bowel gas pattern.  3. Patchy perihilar opacities in the visualized lungs, atelectasis  versus infection.    I have personally reviewed the examination and initial interpretation  and I agree with the findings.    ASHLEY CARRILLO DO         SYSTEM ID:  [id number]   CT Head w/o Contrast     Value    Radiologist flags (Urgent)     New small subdural hematoma along the left parietal    Narrative    CT HEAD W/O CONTRAST 12/21/2021 6:02 PM    History: Postop SDH     Comparison: Head CT 12/21/2021, brain MRI 12/20/2021, head CT  12/20/2021    Technique: Using multidetector thin collimation helical acquisition  technique, axial, coronal and sagittal CT images from the skull base  to the vertex were obtained without intravenous contrast.   (topogram) image(s) also obtained and reviewed.    Findings: New postsurgical of right frontotemporoparietal craniotomy  and evacuation of right subdural hematoma. Right posterior approach  subdural drain in place along the right frontal convexity. Right  subgaleal drain. Mild postoperative pneumocephalus along the  craniotomy site. Small mixed density extra-axial  fluid collection  along the right cerebral hemisphere, measuring 4 mm in greatest depth,  substantially decreased compared to the preoperative examination. Mass  effect with partial effacement of sulci on the right. Decreased  effacement of the right lateral ventricle and resolution of leftward  midline shift.    New small hyperdense subdural hematoma along the left parietal  convexity measuring 3 mm in thickness (series 6, image 52).  Redemonstration of small region hypodensity in the periphery of the  right lateral frontal lobe, consistent with infarct demonstrated on  prior MRI from 12/20/2021. No evidence for new area of infarct.  Ventricles are proportionate to the cerebral sulci. The basal cisterns  are clear. Atherosclerotic calcifications of the intracranial  arteries.    Mild mucosal thickening of the maxillary sinuses and ethmoid air  cells. The mastoid air cells are clear. Partially visualized NG tube.  Bilateral pseudophakia.      Impression    Impression:  1. New post surgical changes of right craniotomy with subdural  hematoma evacuation. Small residual mixed density extra-axial fluid  collection along the craniotomy site, substantially decreased. Also  decreased associated mass effect and resolution of midline shift.  2. New small subdural hematoma along the left parietal convexity.  3. 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      [Urgent Result: New small subdural hematoma along the left parietal  convexity]    Finding was identified on 12/21/2021 6:04 PM.     Dr. Quintana was contacted by Dr. Lindsay Hartley at 12/21/2021 6:17 PM and  verbalized understanding of the urgent finding.     I have personally reviewed the examination and initial interpretation  and I agree with the findings.    ALYSA MENDOZA MD         SYSTEM ID:  [id number]   CT Head w/o Contrast    Narrative    CT HEAD W/O CONTRAST 12/22/2021 4:44 AM    Provided History: Follow-up  subdural hemorrhage  ICD-10:    Comparison: Yesterday.    Technique: Using multidetector thin collimation helical acquisition  technique, axial, coronal and sagittal CT images from the skull base  to the vertex were obtained without intravenous contrast.     Findings:    Postoperative changes of right frontotemporoparietal craniotomy and  evacuation of right subdural hematoma. Stable right posterior approach  subdural drain in place along the right frontal convexity. Stable  right subgaleal drain. Mild postoperative pneumocephalus along the  craniotomy site unchanged. Stable small mixed density extra-axial  fluid collection along the right cerebral hemisphere, measuring 4 mm  in greatest depth. Mass effect with partial effacement of sulci on the  right. No midline shift.    Evolution of small subdural hematoma along the left parietal convexity  measuring 3 mm in thickness. No evidence for new area of infarct.  Ventricles are proportionate to the cerebral sulci. The basal cisterns  are clear. Advanced Atherosclerotic calcifications of the intracranial  arteries.    Mild mucosal thickening of the maxillary sinuses and ethmoid air  cells. The mastoid air cells are clear. Bilateral pseudophakia.       Impression    Impression:  1. Post surgical changes of right craniotomy with subdural hematoma  evacuation. No significant change in the mixed density extra-axial  collection along the craniotomy site.  2. Evolution of trace subdural hematoma along the left parietal  convexity.  3. No acute intracranial findings since most recent CT.    I have personally reviewed the examination and initial interpretation  and I agree with the findings.    FLAVIO CARDENAS MD         SYSTEM ID:  [id number]   XR Abdomen Port 1 View     Value    Radiologist flags Bronchial placement of NG tube (Urgent)    Narrative    Exam: TEMPORARY, 12/23/2021 3:43 AM    Indication: Status post NG tube replacement    Comparison: 12/21/2021    Findings:    Orogastric tube tip and sidehole projecting over the left hemithorax.  Nonobstructive bowel gas pattern. Left basilar opacities. No acute  skeletal abnormalities.      Impression    Impression: Orogastric tube projecting over the left hemithorax,  likely bronchial placement.     [Urgent Result: Bronchial placement of NG tube]    Finding was identified on 12/23/2021 3:43 AM.     SICU personnel was contacted by Dr. Velasquez at 12/23/2021 3:45 AM  and verbalized understanding of the urgent finding.     I have personally reviewed the examination and initial interpretation  and I agree with the findings.    OSKAR MUIR MD         SYSTEM ID:  [id number]   XR Chest Port 1 View    Narrative    EXAM: XR CHEST PORT 1 VIEW  12/23/2021 4:28 AM     HISTORY:  f/u NGT into L lung s/p removal       COMPARISON: 12/23/21, 7/11/2021, 12/20/2001    FINDINGS:   The trachea is midline. The cardiomediastinal silhouette is partially  obscured. Increased left basilar opacities. New silhouetting of the  left hemidiaphragm. Unchanged bilateral  mixed opacities. No  pneumothorax.      Impression    IMPRESSION:   1. New left basilar opacities and silhouetting of the left  hemidiaphragm is suggestive of small left pleural effusion and  associated atelectasis.  2. Unchanged bilateral mixed opacities, may represent edema or  infection.  3. No pneumothorax status post removal of gastric tube.    I have personally reviewed the examination and initial interpretation  and I agree with the findings.    OSKAR MUIR MD         SYSTEM ID:  [id number]   XR Abdomen Port 1 View    Narrative    EXAM: XR ABDOMEN PORT 1 VIEWS  12/23/2021 10:29 AM     HISTORY:  NGT placement       TECHNIQUE: Single frontal radiograph of the abdomen    COMPARISON: EXAM: X-ray abdomen portable one view 12/23/2021 at 3:36  AM    FINDINGS:     Enteric tube tip and sidehole are projecting over the stomach.  Nonobstructive bowel gas pattern. Cholecystectomy clips in the  right  upper quadrant. Bibasilar pulmonary opacities, better seen on same  date chest x-ray.      Impression    IMPRESSION: Enteric tube tip and sidehole projects over the stomach.    I have personally reviewed the examination and initial interpretation  and I agree with the findings.    DAVINA BLACKWELL MD         SYSTEM ID:  [id number]   XR Chest Port 1 View    Narrative    EXAM: XR CHEST PORT 1 VIEW  12/24/2021 7:57 AM     HISTORY:  f/u atelectasis       COMPARISON:  Chest radiograph 12/23/2021, 12/20/2021, 11/30/2021    TECHNIQUE: Single frontal radiograph of the chest    FINDINGS:   Portable AP view of the chest performed at 50 degrees. Enteric tube  courses outside of the field of view.    Patient is rotated to the left. No tracheal deviation. Ill-defined  cardiac silhouette. Atherosclerotic calcifications of the aorta.  Basilar and perihilar predominant airspace and interstitial opacities,  mildly improved. No definite pneumothorax..      Impression    IMPRESSION: Improved pulmonary edema and/or atelectasis.     I have personally reviewed the examination and initial interpretation  and I agree with the findings.    EPIFANIO BURRELL MD         SYSTEM ID:  [id number]   CT Head w/o Contrast    Narrative    CT HEAD W/O CONTRAST 12/24/2021 10:42 AM    History: s/p removal of subdural drain   ICD-10:    Comparison: Head CT, 12/22/2021.    Technique: Using multidetector thin collimation helical acquisition  technique, axial, coronal and sagittal CT images from the skull base  to the vertex were obtained without intravenous contrast.   (topogram) image(s) also obtained and reviewed.    Findings: Stable postsurgical changes of right frontotemporal parietal  craniotomy and evacuation of the right subdural hematoma. Interval  removal of the subdural drain. Stable position of the subgaleal drain.  Grossly unchanged mixed density subdural hematoma along the right  cerebral convexity with small foci of pneumocephalus.  Small subdural  hematoma along the left parietal convexity appears similar to prior.  Unchanged local mass effect without midline shift. No acute loss of  gray-white matter differentiation. Mild cerebral volume loss.  Ventricles are proportionate to the cerebral sulci. Unchanged  periventricular white matter hypoattenuation. The basal cisterns are  clear. Atherosclerotic calcification of the intracranial arteries.    Right craniotomy. Increased fluid collection/mucosal thickening of the  left maxillary sinus and stable mild mucosal thickening in the ethmoid  air cells. The mastoid air cells are clear. Bilateral pseudophakia.      Impression    Impression:  1. No change in subdural hematoma along the right cerebral convexity  following removal of subdural drain.  2. Unchanged minimal subdural hematoma along the left parietal  convexity.    I have personally reviewed the examination and initial interpretation  and I agree with the findings.    ALYSA MENDOZA MD         SYSTEM ID:  [id number]   XR Chest Port 1 View    Narrative    Exam: XR CHEST PORT 1 VIEW, 12/25/2021 11:08 AM    Indication: post PICC    Comparison: 12/24/2021    Findings:     Portable semiupright AP view of the chest. Enteric tube courses below  the left hemidiaphragm collimated out of view. Right arm PICC tip  projects over the cavoatrial junction. Ill-defined cardiac silhouette.  Aortic arch after scar calcifications. No significant change in the  perihilar predominant mixed opacities. No appreciable pneumothorax.  Probable small left pleural effusion. Left retrocardiac consolidation  versus atelectasis.      Impression    Impression:     1. Increased left retrocardiac opacities, representing atelectasis or  consolidation, compared to chest x-ray 12/24/2021  2. Right arm PICC tip projects near the superior cavoatrial junction.     I have personally reviewed the examination and initial interpretation  and I agree with the findings.    DAVINA HAY  MD ROSALVA         SYSTEM ID:  [id number]   CT Head w/o Contrast    Narrative    EXAM: CT HEAD W/O CONTRAST  LOCATION: [facility]  DATE/TIME: 12/25/2021 3:32 PM    INDICATION: Post KEILY drain removal, subdural hematoma.  COMPARISON: 12/24/2021.  TECHNIQUE: Routine CT Head without IV contrast. Multiplanar reformats. Dose reduction techniques were used.    FINDINGS:  INTRACRANIAL CONTENTS: Limited by motion. Thin right convexity subdural hematoma with scattered pneumocephalus again noted. It is unchanged in overall size compared to the prior study apart from slight possible decrease along the anterior-superior   margin. Pneumocephalus has also diminished. Trace left convexity subdural is stable. No new mass effect. No gross CT evidence of acute infarct. Age-related changes are stable.    VISUALIZED ORBITS/SINUSES/MASTOIDS: No intraorbital abnormality. Mild scattered paranasal sinus mucosal disease. No middle ear or mastoid effusion.    BONES/SOFT TISSUES: No acute abnormality. Previous right-sided craniotomy.       Impression    IMPRESSION:  1.  Limited by motion. Thin right convexity subdural hematoma is overall stable apart from perhaps subtly decrease along its anterior-superior margin. Previously seen pneumocephalus has also mildly decreased.    2.  Trace subdural hematoma along the left parietal convexity is grossly stable.    3.  No new hemorrhage or mass effect.   CT Head w/o Contrast    Narrative    CT HEAD W/O CONTRAST 12/27/2021 6:21 AM    History: anticoagulation s/p sdh evacuation     Comparison: CT of the head dated 12/25/2021    Technique: Using multidetector thin collimation helical acquisition  technique, axial, coronal and sagittal CT images from the skull base  to the vertex were obtained without intravenous contrast.   (topogram) image(s) also obtained and reviewed.    Findings: Postsurgical changes of right frontoparietal craniectomy.  Stable thin  subdural hematoma underlying the right hemicraniectomy  site without evidence of interval bleeding. There is new subdural  hematoma adjacent to the right anterior falx, which may represent  redistribution versus interval bleed. No midline shift.. Gray/white  matter differentiation in both cerebral hemispheres is preserved.  Ventricles are proportionate to the cerebral sulci. The basal cisterns  are clear. Left frontoparietal scalp hematoma.    Mucosal thickening of the left maxillary sinus.. Otherwise the mastoid  air cells and paranasal sinuses are clear.      Impression    Impression:  1. Postsurgical changes of right frontoparietal craniectomy with  overlying scalp hematoma.  2. Stable thin subdural hematoma underlying the right craniectomy  site.  3. Trace subdural hematoma of the right anterior falx.    I have personally reviewed the examination and initial interpretation  and I agree with the findings.    MITESH MORALES MD         SYSTEM ID:  [id number]   CT Head w/o Contrast    Narrative    CT HEAD W/O CONTRAST 12/28/2021 6:56 AM    History: Follow up SDH after aspirin start     Comparison: CT of the head dated 12/27/2021    Technique: Using multidetector thin collimation helical acquisition  technique, axial, coronal and sagittal CT images from the skull base  to the vertex were obtained without intravenous contrast.   (topogram) image(s) also obtained and reviewed.    Findings: Postsurgical changes of right frontoparietal craniectomy.  Stable then mixed density subdural hematoma overlying the right  cerebral convexity. Subdural hematoma adjacent to the right anterior  falx is decreased in size. No hyperdensities to suggest interval  bleeding. No new intracranial hemorrhage. No new loss of gray-white  matter differentiation.. Ventricles are proportionate to the cerebral  sulcal.. The basal cisterns are clear. Left frontoparietal scalp  hematoma.     The visualized portions of the paranasal sinuses and  mastoid air  cells are clear.      Impression    Impression:  1. Postsurgical changes of right frontoparietal craniectomy with  overlying scalp hematoma  2. Stable appearance of the residual subdural hematoma overlying the  right cerebral convexity without evidence of interval bleeding.    I have personally reviewed the examination and initial interpretation  and I agree with the findings.    MITESH MORALES MD         SYSTEM ID:  [id number]   CT Head w/o Contrast    Narrative    CT HEAD W/O CONTRAST 12/30/2021 10:20 AM    History: s/p SDH Evac   ICD-10:    Comparison: CT head, 12/28/2021.    Technique: Using multidetector thin collimation helical acquisition  technique, axial, coronal and sagittal CT images from the skull base  to the vertex were obtained without intravenous contrast.   (topogram) image(s) also obtained and reviewed.    Findings: Grossly unchanged mixed density subdural fluid collection  along the right cerebral convexity with unchanged local mass effect on  the right frontal lobe. No midline shift. No evidence of interval  bleeding. Stable postsurgical changes of right frontoparietal  craniectomy and evacuation subdural hematoma. No acute loss of  gray-white matter differentiation. Ventricles are proportionate to the  cerebral sulci. The basal cisterns are clear.    Unchanged postsurgical subgaleal hematoma. The bony calvaria and the  bones of the skull base are otherwise normal. The visualized portions  of the paranasal sinuses and mastoid air cells are clear. Bilateral  pseudophakia. Atherosclerotic calcification of the intracranial  arteries.      Impression    Impression:    1. Stable postsurgical changes of right frontoparietal craniectomy and  associated postsurgical subgaleal hematoma.  2. Stable mixed density subdural fluid collection along the right  cerebral convexity. No evidence of new intracranial bleeding.    I have personally reviewed the examination and initial  interpretation  and I agree with the findings.    RADHA HERNANDES MD         SYSTEM ID:  [id number]   XR Chest Port 1 View    Narrative    XR CHEST PORT 1 VIEW  12/30/2021 7:34 AM      HISTORY: desaturations. evaluate lung field.    COMPARISON: 12/25/2021    FINDINGS:   Lines/Tubes/Devices: Right arm PICC tip now overlying the mid SVC.  Enteric tube courses beyond the field-of-view.  Mediastinum: Stable  Pleural Space: No pneumothorax. No significant right pleural effusion.  Unchanged probable trace left pleural effusion.  Lung Parenchyma: Increased right perihilar and unchanged left  perihilar and left basilar interstitial and airspace opacities.      Impression    IMPRESSION:   1. Right arm PICC tip now overlying the mid SVC.   2. Increased right and unchanged left pulmonary opacities.  Consolidation from pneumonia would have to be the prime consideration.  Atelectasis could have a some similar appearance.  3. Stable trace left pleural effusion.    I have personally reviewed the examination and initial interpretation  and I agree with the findings.    ARIELLE WU MD         SYSTEM ID:  [id number]   XR Abdomen Port 1 View    Narrative    EXAM: XR ABDOMEN PORT 1 VIEWS  12/31/2021 9:44 AM     HISTORY:  check NG tube placement       TECHNIQUE: Single frontal radiograph of the chest and upper abdomen    COMPARISON:  X-ray abdomen portable 12/23/2021    FINDINGS:     Single frontal x-ray of the chest and upper abdomen.  Enteric tube  with distal tip and proximal sidehole projecting over the stomach.   Bilateral patchy hazy lung opacities similar to 12/30/2021. Right  upper extremity PICC overlying the SVC.  Probably trace left pleural  effusion.  No discernible pneumothorax.No portal venous gas.  Relatively unchanged cardiomediastinal silhouette      Impression    IMPRESSION: 1. Enteric tube with sidehole and tip overlying the  stomach.   2. Patchy bilateral pulmonary opacities likely represent infection  and/or pulmonary  edema  3. Probable small left pleural effusion.      I have personally reviewed the examination and initial interpretation  and I agree with the findings.    DAVINA BLACKWELL MD         SYSTEM ID:  [id number]   XR Chest Port 1 View    Narrative    EXAM: XR Chest 1 view 12/31/2021 11:32 PM      HISTORY: evaluate lung fields.    COMPARISON: Previous day.     TECHNIQUE: Frontal view of the chest.    FINDINGS: Right-sided PICC with tip in the mid SVC. Enteric tube is  subdiaphragmatic with tip and sidehole projecting over the gastric  lumen.  Trachea is midline. Cardiac mediastinal silhouette is stable.  Increased right greater than left interstitial and airspace opacities.  Small left pleural effusion. No right pleural effusion. No  pneumothoraces. No acute osseous abnormalities.       Impression    IMPRESSION: Increased right greater than left interstitial and  airspace opacities with small left pleural effusion.    I have personally reviewed the examination and initial interpretation  and I agree with the findings.    YAEL RICHARD MD         SYSTEM ID:  [id number]   XR Abdomen Port 1 View    Narrative    Exam: TEMPORARY, 1/1/2022 9:40 PM    Indication: Confirm NG tube placement    Comparison: Abdominal radiograph dated 12/31/2021    Findings:   Enteric tube is subdiaphragmatic with tip and sidehole projecting over  the gastric lumen. Nonobstructive bowel gas pattern. No definite  pneumatosis. No portal venous gas. No right pleural effusion. Stable  bibasilar airspace and interstitial pulmonary opacities. No acute  osseous abnormalities.      Impression    Impression:   1. Enteric tube is subdiaphragmatic with tip and sidehole projecting  over the gastric lumen.  2. Nonobstructive bowel gas pattern.    I have personally reviewed the examination and initial interpretation  and I agree with the findings.    YAEL RICHARD MD         SYSTEM ID:  [id number]   XR Chest Port 1 View    Narrative    EXAM: XR CHEST PORT  1 VIEW  1/2/2022 8:26 AM      HISTORY: Follow up pulmonary edema    COMPARISON: 12/31/2021    FINDINGS: Portable upright view of the chest. Right arm PICC tip  projects over the low SVC. Enteric tube tip projects over the stomach.  Slightly decreased mixed interstitial and airspace opacities  bilaterally. No new focal pulmonary opacity. No pneumothorax. Probable  small left pleural effusion.      Impression    IMPRESSION:  Slightly decreased mixed opacities bilaterally may represent decreased  pulmonary edema. Stable small left pleural effusion.    I have personally reviewed the examination and initial interpretation  and I agree with the findings.    HERNAN LOZANO MD         SYSTEM ID:  [id number]   CT Head w/o Contrast    Narrative    Head CT without contrast1/2/2022 8:53 AM    History: Subdural hematoma status post evacuation follow-up..  Comparison: Head CT 12/30/2021, 12/28/2020 12/27/2021.    Technique: Axial images through the brain obtained without intravenous  contrast, reviewed in bone, brain, and subdural windows.    Findings: No significant change since prior head CT from 12/30/2021.  Postoperative changes of right hemicraniotomy for subdural hematoma  evacuation with stable recurrent right frontal convexity subdural  fluid collection with a maximum thickness of approximately 1.2 cm with  2 mm leftward midline shift. There are a few internal areas of  hyperdensity seen extending posteriorly along the right cerebral  convexity within this subdural hematoma representing blood products of  varying duration. This continues to exert mass effect over the  underlying frontal lobe with effacement of the sulci and medial  displacement of the gyri. Although unchanged from prior head CT this  has slowly  increased in size from 12/25/2021.    Soft tissue scalp swelling over the right pterional region with  underlying fluid filled cavity this likely represents extracranial   seepage of subdural fluid across craniotomy  site. This appears to have  progressively been increasing since 12/27/2021.  Moderate generalized cerebral atrophy. There are multiple scattered  foci of hypoattenuation in the bilateral periventricular and  subcortical cerebral white matter, the head of right caudate nucleus,  brainstem in the cerebellum representing sequela of chronic small  vessel ischemic disease.   The ventricles do not appear enlarged out of proportion to the  cerebral sulci.  Atherosclerotic calcification seen within the intracranial arteries.  The visualized portions of the paranasal sinuses and mastoid air cells  are unremarkable on bone windows.      Impression    Impression:    Slowly progressing recurrent right cerebral convexity mixed density  subdural fluid collection, since 12/25/2021. Continues to exert mass  effect over the underlying right frontal lobe with a 2 mm leftward  midline shift. There is progressive scalp swelling with underlying  fluid collection over the right temporal region.    I have personally reviewed the examination and initial interpretation  and I agree with the findings.    RADHA HERNANDES MD         SYSTEM ID:  [id number]   XR Chest Port 1 View    Narrative    EXAM: XR CHEST PORT 1 VIEW  1/3/2022 4:20 AM     HISTORY:  assess for pulmonary edema       COMPARISON:  1/2/2022    FINDINGS:   AP portable view of the chest. Right arm PICC tip projects in the low  SVC. Enteric tube courses below the diaphragm with tip outside the  field of view. Midline trachea. Stable cardiomegaly. Unchanged  perihilar and basilar predominant pulmonary opacities. No significant  pleural effusion, the right costophrenic angle is collimated outside  the field of view. Visualized upper abdomen is unremarkable.      Impression    IMPRESSION:   Stable cardiomegaly and unchanged perihilar and bibasilar predominant  mixed interstitial opacities, which may represent pulmonary edema.    I have personally reviewed the examination and initial  interpretation  and I agree with the findings.    NAINA GARCIA MD         SYSTEM ID:  [id number]   IR Gastrostomy Tube Percutaneous Plcmnt    Narrative    PROCEDURES 1/3/2022:  1. Gastrostomy tube placement under fluoroscopic guidance    Clinical History: Subdural hematoma with need for direct enteric  feeding.    Comparisons: CT abdomen pelvis 11/30/2021    Staff Radiologist: Dwight Patel M.D. I, DWIGHT PATEL MD,  attest that I was present in the procedure room for the entire  procedure.  Fellow(s)/Resident(s): Rex Patel D.O.  Resident: Andrew Henry DO    Monitoring: Patient was placed on continuous monitoring with  intravenous conscious sedation administered by the IR nursing staff  and supervised by the IR attending. Patient remained stable throughout  the procedure.     Medications:  1. Versed IV: 0.5 mg  2. Fentanyl IV: 25 mcg    Sedation time: 20 minutes face-to-face. Tenting face-to-face time 20  minutes.    Fluoroscopy time: 2.3 minutes.    PROCEDURE: The patient understood the limitations, alternatives, and  risks of the procedure and requested the procedure be performed. Both  written and oral consent were obtained.    Pre-procedural ultrasound performed to delineate the liver margins.    Nasogastric tube was already placed the patient arrived.    The left upper quadrant was prepped and draped in the usual sterile  fashion. Liver margins marked with ultrasound. 1% lidocaine was  utilized for local anesthesia.    Glucagon administered intravenously. Stomach inflated with air through  the nasogastric tube. Under fluoroscopic guidance, gastropexy was made  with 2 Saf-T-Pexy T-fasteners.    Needle gastrostomy made under fluoroscopic guidance. Needle removed  over guidewire. Track dilated to 22 [place] over the guidewire. 22  [place] peel-away sheath advanced into the stomach over the guidewire.  18 [place] SHAHBAZ gastrostomy tube advanced over the guidewire  through the peel-away  sheath into the stomach under fluoroscopic  guidance. Peel-away sheath and guidewire removed. Gastrostomy tube  balloon inflated and tube secured. Adequate placement in the stomach  documented with contrast. Tube flushed with saline. T-fasteners  secured. Sterile dressing applied. Gastrostomy tube placed to gravity  drainage. Nasogastric tube removed. No immediate complication.      Impression    IMPRESSION:   1. 18 [place] SHAHBAZ gastrostomy tube placed under fluoroscopic guidance.  Catheter to gravity drainage.    PLAN:  Nothing by mouth for 4 hours. Patient is not having significant  abdominal pain and normal bowel sounds after that time tube can be  used. Routine change in 9-12 months.    I have personally reviewed the examination and initial interpretation  and I agree with the findings.    DWIGHT DAVIS MD         SYSTEM ID:  [id number]   CT Head w/o Contrast    Narrative    CT HEAD W/O CONTRAST 1/5/2022 4:35 AM    History: SAH     Comparison: Head CT 1/2/2022    Technique: Using multidetector thin collimation helical acquisition  technique, axial, coronal and sagittal CT images from the skull base  to the vertex were obtained without intravenous contrast.   (topogram) image(s) also obtained and reviewed.    Findings:  Postoperative changes of right hemicraniotomy for subdural hematoma  evaluation. Similar size of the mixed density extra-axial fluid  collection in the right frontal convexity with a maximum thickness of  approximately 1.3 cm and adjacent mass effect on the left frontal  lobe. Stable 3 mm of leftward midline shift. No new intracranial  hemorrhage.    No acute loss of gray-white matter differentiation. Moderate  generalized volume loss and findings suggestive of chronic small  vessel ischemic disease. Basal cisterns are clear. Atherosclerotic  calcifications of the intracranial vessels.    Similar appearance of the scalp hematoma/swelling overlying the right  cerebral convexity. The visualized  portions of the paranasal sinuses  and mastoid air cells are clear.      Impression    Impression: Overall findings are unchanged compared to prior head CT  1/2/2022 with stable size of the mixed density extra-axial fluid  collection overlying the right frontal convexity. Stable 3 mm leftward  midline shift. No new intracranial hemorrhage or infarct.    I have personally reviewed the examination and initial interpretation  and I agree with the findings.    MITESH MORALES MD         SYSTEM ID:  [id number]   XR Abdomen Port 1 View    Narrative    Exam: XR ABDOMEN PORT 1 VIEWS, 1/5/2022 2:35 AM    Indication: LUQ tenderness    Comparison: Abdominal radiograph 1/1/2022 and abdominal CT 11/30/2021    Findings:   AP portable supine views the abdomen. Percutaneous gastrostomy tube  balloon and fixation tacks project over the stomach. Cholecystectomy  clips in the right upper quadrant. Endoscopy clips in the right lower  quadrant. Nonobstructive bowel gas pattern, there is residual contrast  in the colon extending to the rectum. No pneumatosis or portal venous  gas. Degenerative changes of the right hip and spine. Left hip  hemiarthroplasty. Patchy and nodular opacities at the lung bases.      Impression    Impression:   1. Nonobstructive bowel gas pattern.  2. Percutaneous gastrostomy tube balloon and fixation tacks project  over the stomach.    I have personally reviewed the examination and initial interpretation  and I agree with the findings.    ASHLEY CARRILLO DO         SYSTEM ID:  [id number]   CT Abdomen Pelvis w/o Contrast    Narrative    EXAMINATION: CT ABDOMEN PELVIS W/O CONTRAST, 1/5/2022 4:44 AM    TECHNIQUE:  Helical CT images from the lung bases through the pubic  symphysis were obtained  without IV contrast.     COMPARISON: Same day radiograph and abdominal CT 11/30/2021    HISTORY: LUQ tenderness; POD2 from PEG    FINDINGS:  Examination is mildly limited due to motion artifact.    Abdomen and pelvis:   Mildly  nodular hepatic contour. Subcentimeter hypodensity in the  hepatic dome, too small to accurately characterize but favored to  represent a cyst. Cholecystectomy. No intra or extrahepatic biliary  ductal dilation. Mild splenomegaly measuring 15.3 cm in AP dimension,  stable. Coarse calcification throughout the pancreas consistent with  likely chronic pancreatitis. Adrenal glands are unremarkable.  Bilateral renal cysts. No hydronephrosis and no urinary tract stones.  Mild diffuse mesenteric stranding. Percutaneous gastrostomy tube with  balloon in the stomach. The small and large bowel is normal in caliber  without evidence of bowel obstruction. Colonic diverticulosis without  adjacent inflammatory change. Endoscopy clip in the ascending colon.  Normal appendix. Residual oral contrast is visualized throughout the  colon and rectum. Advanced aortobiiliac atherosclerotic calcification.  No suspicious lymphadenopathy in the abdomen or pelvis. Trace fluid in  the paracolic gutters. No intra-abdominal free air. Pelvis is  partially obscured due to left hip arthroplasty.     Lung bases:  Cardiomegaly. Coronary artery calcifications and trace  pericardial effusion. Small right greater than left pleural effusions.  Bilateral interlobular septal thickening. Bibasilar fibroatelectasis  and emphysematous changes. Stable lobular partially calcified  pulmonary nodule in the right middle lobe measuring approximately 2.8  x 1.5 cm. 6 mm nodule in the right middle lobe is new from comparison  examinations (series 7 image 26).    Bones and soft tissues: Left total hip arthroplasty and degenerative  changes in the spine and right hip. Stable grade 1 anterolisthesis of  L4 on L5. No acute or aggressive appearing osseous abnormality.      Impression    IMPRESSION:   1. Percutaneous gastrostomy tube appears in good position.  2. Sequela of chronic pancreatitis.  3. Trace ascites along the paracolic gutters.  4. Mildly nodular hepatic  contour suggests cirrhosis.  5. Stable splenomegaly, may be related to portal hypertension.  6. Colonic diverticulosis without acute diverticulitis.  7. Small right greater than left pleural effusions with findings of  pulmonary edema.  8. Newly identified 6 mm nodule in the right middle lobe. A larger  lobulated partially calcified nodule in the right middle lobe is  unchanged. Recommend follow-up CT at 6-12 months per Fleischner  criteria.    I have personally reviewed the examination and initial interpretation  and I agree with the findings.    ASHLEY CARRILLO DO         SYSTEM ID:  [id number]   CT Head w/o Contrast    Narrative    Exam: CT HEAD W/O CONTRAST  1/14/2022 12:11 PM    History: Intracranial hemorrhage.    Comparison:  CT 1/5/2022, 1/2/2022, and 12/20/22.    Technique: Using multidetector thin collimation helical acquisition  technique, axial, coronal and sagittal CT images from the skull base  to the vertex were obtained without intravenous contrast.     Findings:  Stable right hemicraniotomy. Underlying right convexity subdural  collection with maximum thickness of 7 mm, previously 9 mm when  measured similarly. Minimal residual hyperdensity within the  collection, unchanged. No new intracranial hemorrhage. Midline shift  has resolved. The gray-white differentiation of the cerebral  hemispheres is preserved. The ventricles are proportionate to the  cerebral sulci. Unchanged leukoaraiosis. Basal cisterns are patent.  Atherosclerotic changes of the distal vertebral arteries and carotid  siphons.    No new osseous abnormality. Paranasal sinuses and the mastoid air  cells are clear.      Impression    Impression:   1. Decompressive right hemicraniectomy with residual underlying  collection measuring up to 7 mm, previously 9 mm on 1/5/2022. Midline  shift has resolved. No new intracranial hemorrhage.  2. Chronic leukoaraiosis and intracranial atherosclerosis.    I have personally reviewed the examination  and initial interpretation  and I agree with the findings.    RADHA HERNANDES MD         SYSTEM ID:  [id number]   Echo Limited     Value    LVEF  60-65%    Narrative    [id number]  [id number]  [id number]  [id number]^CHAN^ELIAS^JULIO CÉSAR NASH     [facility]  Echocardiography Laboratory  [address] [place] [id number]     Name: MARCOS VIDAL  MRN: [id number]  : 1940  Study Date: 2022 10:19 AM  Age: 81 yrs  Gender: Male  Patient Location: [facility]  Reason For Study: Heart Failure, Unspecified  Ordering Physician: ELIAS GILES  Referring Physician: YONG GARCIA  Performed By: Sierra Briggs RDCS     BSA: 2.1 m2  Height: 71 in  Weight: 197 lb  HR: 74  BP: 136/78 mmHg  ______________________________________________________________________________  Procedure  Limited Portable Echo Adult.  ______________________________________________________________________________  Interpretation Summary  Global and regional left ventricular function is normal with an EF of 60-65%.  Mild right ventricular dilation is present. Global right ventricular function  is mildly reduced.  Mild to moderate tricuspid insufficiency is present.  Pulmonary hypertension is present. Estimated pulmonary artery systolic  pressure is 64 mmHg based on a mean right atrial pressure of 15 mmHg.  Dilation of the inferior vena cava is present with abnormal respiratory  variation in diameter.  No pericardial effusion is present.  ______________________________________________________________________________  Left Ventricle  Left ventricular size is normal. Global and regional left ventricular function  is normal with an EF of 60-65%.     Right Ventricle  Mild right ventricular dilation is present. Global right ventricular function  is mildly reduced.     Atria  Moderate to severe biatrial enlargement is present.     Tricuspid Valve  Mild to moderate tricuspid insufficiency is present.  Pulmonary hypertension is  present. Estimated pulmonary artery systolic pressure is 49 mmHg plus right  atrial pressure.     Vessels  Dilation of the inferior vena cava is present with abnormal respiratory  variation in diameter. IVC diameter >2.1 cm collapsing <50% with sniff  suggests a high RA pressure estimated at 15 mmHg or greater.     Pericardium  No pericardial effusion is present.  ______________________________________________________________________________  MMode/2D Measurements & Calculations  IVSd: 1.0 cm  LVIDd: 5.4 cm  LVIDs: 2.7 cm  LVPWd: 1.3 cm  FS: 49.9 %  LV mass(C)d: 245.7 grams  LV mass(C)dI: 117.3 grams/m2  RWT: 0.47     Doppler Measurements & Calculations  TR max eligio: 349.0 cm/sec  TR max P.7 mmHg     ______________________________________________________________________________  Report approved by: Dean Means 2022 11:14 AM               Discharge Medications   Discharge Medication List as of 2022 11:23 AM      START taking these medications    Details   aspirin (ASA) 81 MG EC tablet Take 1 tablet (81 mg) by mouth daily, Transitional      calcium carbonate (TUMS) 500 MG chewable tablet Take 1 tablet (500 mg) by mouth daily as needed for heartburn, Transitional      empagliflozin (JARDIANCE) 10 MG TABS tablet 1 tablet (10 mg) by Oral or Feeding Tube route daily, Transitional      haloperidol (HALDOL) 5 MG tablet Take 1 tablet (5 mg) by mouth At Bedtime, Transitional      !! hydrOXYzine (ATARAX) 25 MG tablet Take 1 tablet (25 mg) by mouth every 6 hours as needed for anxiety or other (adjuvant pain), Transitional      !! hydrOXYzine (ATARAX) 25 MG tablet Take 1 tablet (25 mg) by mouth At Bedtime, Transitional      insulin aspart (NOVOLOG PEN) 100 UNIT/ML pen Inject 1-12 Units Subcutaneous every 4 hours, Disp-15 mL, Transitional      lacosamide (VIMPAT) 200 MG TABS tablet 1 tablet (200 mg) by Oral or Feeding Tube route 2 times daily, Transitional      Lidocaine (LIDOCARE)  "4 % Patch Place 1 patch onto the skin every 24 hours To prevent lidocaine toxicity, patient should be patch free for 12 hrs daily.Transitional      ondansetron (ZOFRAN-ODT) 4 MG ODT tab Take 1 tablet (4 mg) by mouth every 6 hours as needed for nausea or vomiting, Transitional      pantoprazole (PROTONIX) 2 mg/mL SUSP suspension 20 mLs (40 mg) by Oral or Feeding Tube route every morning (before breakfast), Transitional      prasugrel (EFFIENT) 10 MG TABS tablet 1 tablet (10 mg) by Oral or Feeding Tube route daily, Transitional       !! - Potential duplicate medications found. Please discuss with provider.      CONTINUE these medications which have CHANGED    Details   rOPINIRole (REQUIP) 1 MG tablet Take 1 tablet (1 mg) by mouth daily, Transitional      tadalafil (CIALIS) 20 MG tablet Take 1 tablet (20 mg) by mouth every 24 hours, Transitional      acetaminophen (TYLENOL) 325 MG tablet Take 3 tablets (975 mg) by mouth every 8 hours as needed for mild pain or fever, Transitional      insulin glargine (LANTUS PEN) 100 UNIT/ML pen Inject 20 Units Subcutaneous 2 times daily, Disp-15 mL, TransitionalIf Lantus is not covered by insurance, may substitute Basaglar or Semglee or other insulin glargine product per insurance preference at same dose and frequency.        irbesartan (AVAPRO) 75 MG tablet 1 tablet (75 mg) by Oral or Feeding Tube route daily, Transitional         CONTINUE these medications which have NOT CHANGED    Details   ACCU-CHEK JOSE PLUS TEST STRP strips [ACCU-CHEK JOSE PLUS TEST STRP STRIPS] see administration instructions., ELVA, Historical      albuterol (PROVENTIL HFA;VENTOLIN HFA) 90 mcg/actuation inhaler Inhale 2 puffs into the lungs every 6 hours as needed , Historical      BD ULTRA-FINE MANISHA PEN NEEDLES 32 gauge x 5/32\" Ndle [BD ULTRA-FINE MANISHA PEN NEEDLES 32 GAUGE X 5/32\" NDLE] R-4, DAWHistorical      Ferrous Gluconate 324 (37.5 Fe) MG TABS Take 1 tablet by mouth every other day, Historical    "   fluticasone-vilanterol (BREO ELLIPTA) 200-25 mcg/dose DsDv inhaler Inhale 1 puff into the lungs daily , Historical      Garlic 1000 MG CAPS Take 1 capsule by mouth daily, Historical      metoprolol tartrate (LOPRESSOR) 25 MG tablet Take 1 tablet (25 mg) by mouth 2 times daily (with meals), Disp-60 tablet, R-3, E-Prescribe      multivitamin w/minerals (THERA-VIT-M) tablet Take 1 tablet by mouth daily, Historical      OXYGEN-AIR DELIVERY SYSTEMS MISC [OXYGEN-AIR DELIVERY SYSTEMS MISC] Use As Directed.Historical      zinc gluconate 50 MG tablet Take 50 mg by mouth daily, Historical      atorvastatin (LIPITOR) 80 MG tablet Take 1 tablet (80 mg) by mouth daily, Disp-30 tablet, R-0, E-PrescribeRefill of regular prescription, no changes      melatonin 10 MG TABS tablet Take 1 tablet (10 mg) by mouth At Bedtime, Transitional         STOP taking these medications       apixaban ANTICOAGULANT (ELIQUIS) 5 MG tablet Comments:   Reason for Stopping:         bumetanide (BUMEX) 2 MG tablet Comments:   Reason for Stopping:         canagliflozin (INVOKANA) 100 mg Tab Comments:   Reason for Stopping:         clopidogrel (PLAVIX) 75 MG tablet Comments:   Reason for Stopping:         doxazosin (CARDURA) 1 MG tablet Comments:   Reason for Stopping:         HYDROcodone-acetaminophen (NORCO) 5-325 MG tablet Comments:   Reason for Stopping:         ipratropium - albuterol 0.5 mg/2.5 mg/3 mL (DUONEB) 0.5-2.5 (3) MG/3ML neb solution Comments:   Reason for Stopping:         lidocaine (LIDODERM) 5 % Comments:   Reason for Stopping:         omeprazole (PRILOSEC) 20 MG DR capsule Comments:   Reason for Stopping:         potassium chloride (K-DUR,KLOR-CON) 20 MEQ tablet Comments:   Reason for Stopping:         vitamin C (ASCORBIC ACID) 1000 MG TABS Comments:   Reason for Stopping:         zolpidem (AMBIEN) 10 mg tablet Comments:   Reason for Stopping:             Allergies   Allergies   Allergen Reactions     Ace Inhibitors      Bloody nose,  dry mouth, cracked lips     Contrast Dye Nausea     Other reaction(s): GI intolerance, GI Upset     Furosemide Muscle Pain (Myalgia)     Previously tolerated.  Muscle cramps     Hydrochlorothiazide Unknown     Iodinated Contrast Media [Diagnostic X-Ray Materials] Nausea     Losartan Other (See Comments)     Other reaction(s): Stomatitis, Bloody nose dry mouth and lips     Losartan-Hydrochlorothiazide [Hyzaar]      Mouth sores     Metaxalone Nausea     Metolazone Other (See Comments)     Muscle cramps     Mometasone Other (See Comments)     Bloody nose     Proton Pump Inhibitors      Bloody nose, mouth sores     Rabeprazole Other (See Comments)     Mouth sores     Ramipril Other (See Comments)     Mouth sores     Shellfish Containing Products [Shellfish-Derived Products] Unknown     Other reaction(s): mouth sores, Other reaction(s): mouth sores     Sildenafil Muscle Pain (Myalgia)     Methocarbamol Rash     Penicillins Other (See Comments)     Immune-does not work for him

## 2022-03-14 NOTE — TELEPHONE ENCOUNTER
I called and spoke with Ayala (RN from [facility]). Per Ayala, patient will be started on Eliquis and Plavix soon. They are wondering whether any follow up head CT is recommended as patient has a recent h/o SDH. Will discuss this with Dr. De Los Santos and will follow-up with her.

## 2022-03-14 NOTE — TELEPHONE ENCOUNTER
Writer routed call to Jovita Arias NP   *Call back requested from Ayala at [place] regarding patient RX.    Haydee Kingsley LPN  Neurosurgery

## 2022-03-14 NOTE — TELEPHONE ENCOUNTER
[facility] Call Center    Phone Message    May a detailed message be left on voicemail: yes     Reason for Call: Other: Ayala/  is requesting a call back to discuss patient being put back on Eliquis blood thinner medication for Watchman procedure, please call Ayala at [phone] to advise.    Action Taken: Message routed to:  [facility]    Travel Screening: Not Applicable

## 2022-03-28 NOTE — TELEPHONE ENCOUNTER
Discussed with Dr. May who recommended the following:    - CT Head soon. If stable, okay to start Eliquis.     - Follow-up head CT in 3- 4 weeks after starting anticoagulant    I called and spoke with patient and his wife. Per their understanding, his cardiologist do not want to start a new anticoagulant. I called and left a voice message for PANCHO Cai at [facility] requesting a call back.

## 2022-03-28 NOTE — TELEPHONE ENCOUNTER
I received a call from PANCHO Díaz. Per her, Cardiologist is planning to restart anticoagulation once he is cleared from [facility]. We will complete a baseline CT soon. I called the patient and his wife. Updated them of this plan. Message sent to  to schedule head CT.

## 2022-03-30 NOTE — TELEPHONE ENCOUNTER
Call back to PANCHO Rothman with [facility]. Left message stating reply from Luisana Rowe PA-C as stated below.    Erica RAYMUNDO LPN  [facility]ology [facility]    ---------------  Luisana Rowe PA-C [place], Shannon, LPN  Caller: Unspecified (Today, 10:22 AM)  Patient has ascites due to cardiac disease.     He can certainly have a paracentesis if it's needed and not managed by diuretics. Overall, recommend that his cardiologist or nephrologist place PRN paracentesis orders to whatever preferred location. The provider might need guidance on how to do that.     Any further questions should go to Dr. Juarez/Dulce.     Thanks, Luisana

## 2022-03-30 NOTE — TELEPHONE ENCOUNTER
[facility] Call Center    Phone Message    May a detailed message be left on voicemail: yes     Reason for Call: Other: Provider from [facility] wanted to know if pt could be scheduled for an outpatient paracentesis. Please reach out. Thank you.     Action Taken: Message routed to:  [facility]    Travel Screening: Not Applicable

## 2022-04-26 PROBLEM — L97.529 DIABETIC ULCER OF TOE OF LEFT FOOT ASSOCIATED WITH TYPE 2 DIABETES MELLITUS, UNSPECIFIED ULCER STAGE (H): Status: ACTIVE | Noted: 2022-01-01

## 2022-04-26 PROBLEM — E11.621 DIABETIC ULCER OF TOE OF LEFT FOOT ASSOCIATED WITH TYPE 2 DIABETES MELLITUS, UNSPECIFIED ULCER STAGE (H): Status: ACTIVE | Noted: 2022-01-01

## 2022-04-26 PROBLEM — L97.519 DIABETIC ULCER OF TOE OF RIGHT FOOT ASSOCIATED WITH TYPE 2 DIABETES MELLITUS, UNSPECIFIED ULCER STAGE (H): Status: ACTIVE | Noted: 2022-01-01

## 2022-04-26 PROBLEM — E11.621 DIABETIC ULCER OF TOE OF RIGHT FOOT ASSOCIATED WITH TYPE 2 DIABETES MELLITUS, UNSPECIFIED ULCER STAGE (H): Status: ACTIVE | Noted: 2022-01-01

## 2022-04-26 NOTE — PROGRESS NOTES
FOOT AND ANKLE SURGERY/PODIATRY CONSULT NOTE         ASSESSMENT:   Grade 0 Ulceration bilateral hallux  DM2  PAD  Hammertoe  Verruca plantaris       TREATMENT:  -I discussed with the patient and his wife today that he has blood staining along the plantar hallux with callus tissue, which is concerning for underlying ulcers.     -I was unable to palpate pedal pulses and have referred him for updated BASIA's with TBI's. Will defer sharp debridement until BASIA study is available.     -There is dry skin on both feet. I will start him on LacHydrin.     -Possible warts on the plantar right foot. I will plan for sharp debridement if BASIA report allows for this procedure.     -Referred for diabetic shoes and inserts.     -Patient's questions invited and answered. Patient to return to clinic after BASIA's have been completed. He was encouraged to call my office with any further questions or concerns.     Rusyt Skinner DPM  [facility] Podiatry/Foot & Ankle Surgery      HPI: I was asked to see Red Sutherland today for bilateral foot pain. The patient's wife states she inspects her 's feet daily and has noticed discoloration along the plantar hallux in recent weeks. He denies prior treatment for these concerns.       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 They put him on sildenafil as nitroprusside lowered systemic BP and with that the mean PA dropped from 54 to 49. Negative VQ at [place] Dec 1, 2017.     RLS (restless legs syndrome)      Sleep apnea          Social History      Socioeconomic History     Marital status:      Spouse name: Not on file     Number of children: 4     Years of education: Not on file     Highest education level: Not on file   Occupational History     Not on file   Tobacco Use     Smoking status: Former Smoker     Packs/day: 1.50     Years: 44.00     Pack years: 66.00     Types: Cigarettes     Quit date: 1996     Years since quittin.0     Smokeless tobacco: Never Used   Substance and Sexual Activity     Alcohol use: Yes     Alcohol/week: 1.0 standard drink     Comment: Alcoholic Drinks/day: 1 per month     Drug use: No     Sexual activity: Not Currently     Partners: Female   Other Topics Concern     Not on file   Social History Narrative     Not on file     Social Determinants of Health     Financial Resource Strain: Not on file   Food Insecurity: Not on file   Transportation Needs: Not on file   Physical Activity: Not on file   Stress: Not on file   Social Connections: Not on file   Intimate Partner Violence: Not on file   Housing Stability: Not on file            Allergies   Allergen Reactions     Ace Inhibitors      Bloody nose, dry mouth, cracked lips     Contrast Dye Nausea     Other reaction(s): GI intolerance, GI Upset     Furosemide Muscle Pain (Myalgia)     Previously tolerated.  Muscle cramps     Hydrochlorothiazide Unknown     Iodinated Contrast Media [Diagnostic X-Ray Materials] Nausea     Losartan Other (See Comments)     Other reaction(s): Stomatitis, Bloody nose dry mouth and lips     Losartan-Hydrochlorothiazide [Hyzaar]      Mouth sores     Metaxalone Nausea     Metolazone Other (See Comments)     Muscle cramps     Mometasone Other (See Comments)     Bloody nose     Proton Pump Inhibitors      Bloody nose, mouth sores     Rabeprazole Other (See Comments)     Mouth sores     Ramipril Other (See Comments)     Mouth sores     Shellfish Containing Products [Shellfish-Derived Products] Unknown     Other reaction(s): mouth sores,  "Other reaction(s): mouth sores     Sildenafil Muscle Pain (Myalgia)     Methocarbamol Rash     Penicillins Other (See Comments)     Immune-does not work for him         MEDICATIONS:   Current Outpatient Medications   Medication     ACCU-CHEK JOSE PLUS TEST STRP strips     acetaminophen (TYLENOL) 500 MG tablet     albuterol (PROVENTIL HFA;VENTOLIN HFA) 90 mcg/actuation inhaler     albuterol (PROVENTIL) (2.5 MG/3ML) 0.083% neb solution     ammonium lactate (AMLACTIN) 12 % external cream     Ascorbic Acid (VITAMIN C) 500 MG CAPS     aspirin (ASA) 81 MG EC tablet     atorvastatin (LIPITOR) 40 MG tablet     azithromycin (ZITHROMAX) 250 MG tablet     BD ULTRA-FINE MANISHA PEN NEEDLES 32 gauge x 5/32\" Ndle     bumetanide (BUMEX) 2 MG tablet     calcium carbonate (TUMS) 500 MG chewable tablet     Ferrous Gluconate 324 (37.5 Fe) MG TABS     fluticasone-vilanterol (BREO ELLIPTA) 200-25 mcg/dose DsDv inhaler     gabapentin (NEURONTIN) 300 MG capsule     Garlic 1000 MG CAPS     insulin glargine (LANTUS PEN) 100 UNIT/ML pen     ipratropium - albuterol 0.5 mg/2.5 mg/3 mL (DUONEB) 0.5-2.5 (3) MG/3ML neb solution     irbesartan (AVAPRO) 300 MG tablet     lacosamide (VIMPAT) 200 MG TABS tablet     metolazone (ZAROXOLYN) 2.5 MG tablet     metoprolol tartrate (LOPRESSOR) 25 MG tablet     multivitamin w/minerals (THERA-VIT-M) tablet     omeprazole 20 MG tablet     OXYGEN-AIR DELIVERY SYSTEMS MISC     rOPINIRole (REQUIP) 1 MG tablet     spironolactone (ALDACTONE) 25 MG tablet     tadalafil (CIALIS) 20 MG tablet     traZODone (DESYREL) 100 MG tablet     zinc gluconate 50 MG tablet     atorvastatin (LIPITOR) 40 MG tablet     insulin glargine (LANTUS PEN) 100 UNIT/ML pen     traZODone (DESYREL) 100 MG tablet     No current facility-administered medications for this visit.        Family History   Problem Relation Age of Onset     Hyperlipidemia Mother      Hypertension Mother      Heart Disease Mother      Hyperlipidemia Father      " Hypertension Father      Coronary Artery Disease Father      Cerebrovascular Disease Brother      Depression Brother      No Known Problems Sister      Pulmonary Hypertension No family hx of      Congenital heart disease No family hx of           Review of Systems - 10 point Review of Systems is negative except for foot pain which is noted in HPI.    OBJECTIVE:  Appearance: alert, well appearing, and in no distress.    VITAL SIGNS: /62   Pulse 69       General appearance: Patient is alert and fully cooperative with history & exam.  No sign of distress is noted during the visit.     Psychiatric: Affect is pleasant & appropriate.  Patient appears motivated to improve health.     Respiratory: Breathing is regular & unlabored while sitting.     HEENT: Hearing is intact to spoken word.  Speech is clear.  No gross evidence of visual impairment that would impact ambulation.      Vascular: Dorsalis pedis and posterior tibial pulses are non-palpable.   Dermatologic: Ecchymosis plantar bilateral hallux with hyperkeratotic tissue. No erythema bilateral. 2-3 clusters along the plantar right foot with disruption of normal skin lines. Dry, flaky skin bilateral feet.   Neurologic: Diminished to light touch bilateral.   Musculoskeletal: Contracted 5th digit bilateral.

## 2022-04-26 NOTE — LETTER
4/26/2022         RE: Red Sutherland  [address] [place] [facility] [address]        Dear Colleague,    Thank you for referring your patient, Red Sutherland, to the [facility]. Please see a copy of my visit note below.          FOOT AND ANKLE SURGERY/PODIATRY CONSULT NOTE         ASSESSMENT:   Grade 0 Ulceration bilateral hallux  DM2  PAD  Hammertoe  Verruca plantaris       TREATMENT:  -I discussed with the patient and his wife today that he has blood staining along the plantar hallux with callus tissue, which is concerning for underlying ulcers.     -I was unable to palpate pedal pulses and have referred him for updated BASIA's with TBI's. Will defer sharp debridement until BASIA study is available.     -There is dry skin on both feet. I will start him on LacHydrin.     -Possible warts on the plantar right foot. I will plan for sharp debridement if BASIA report allows for this procedure.     -Referred for diabetic shoes and inserts.     -Patient's questions invited and answered. Patient to return to clinic after BASIA's have been completed. He was encouraged to call my office with any further questions or concerns.     Rusty Skinner DPM  [facility] Podiatry/Foot & Ankle Surgery      HPI: I was asked to see Red Sutherland today for bilateral foot pain. The patient's wife states she inspects her 's feet daily and has noticed discoloration along the plantar hallux in recent weeks. He denies prior treatment for these concerns.       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 They put him on sildenafil as nitroprusside lowered systemic BP and with that the mean PA dropped from 54 to 49. Negative VQ at [place] Dec 1, 2017.     RLS (restless legs syndrome)      Sleep apnea          Social History     Socioeconomic History     Marital status:      Spouse name: Not on file     Number of children: 4     Years of education: Not on file     Highest education level: Not on file   Occupational History     Not on file   Tobacco Use     Smoking status: Former Smoker     Packs/day: 1.50     Years: 44.00     Pack years: 66.00     Types: Cigarettes     Quit date: 1996     Years since quittin.0     Smokeless tobacco: Never Used   Substance and Sexual Activity     Alcohol use: Yes     Alcohol/week: 1.0 standard drink     Comment: Alcoholic Drinks/day: 1 per month     Drug use: No     Sexual activity: Not Currently     Partners: Female   Other Topics Concern     Not on file   Social History Narrative     Not on file     Social Determinants of Health     Financial Resource Strain: Not on file   Food Insecurity: Not on file   Transportation Needs: Not on file   Physical Activity: Not on file   Stress: Not on file   Social Connections: Not on file   Intimate Partner Violence: Not on file   Housing Stability: Not on file            Allergies   Allergen Reactions     Ace Inhibitors      Bloody nose, dry mouth, cracked lips     Contrast Dye Nausea     Other reaction(s): GI intolerance, GI Upset     Furosemide Muscle Pain (Myalgia)     Previously tolerated.  Muscle cramps     Hydrochlorothiazide Unknown     Iodinated Contrast Media [Diagnostic X-Ray Materials] Nausea     Losartan Other (See Comments)     Other reaction(s): Stomatitis, Bloody nose dry mouth and lips     Losartan-Hydrochlorothiazide [Hyzaar]      Mouth sores     Metaxalone Nausea     Metolazone Other (See Comments)     Muscle cramps     Mometasone Other (See Comments)     Bloody nose     Proton  "Pump Inhibitors      Bloody nose, mouth sores     Rabeprazole Other (See Comments)     Mouth sores     Ramipril Other (See Comments)     Mouth sores     Shellfish Containing Products [Shellfish-Derived Products] Unknown     Other reaction(s): mouth sores, Other reaction(s): mouth sores     Sildenafil Muscle Pain (Myalgia)     Methocarbamol Rash     Penicillins Other (See Comments)     Immune-does not work for him         MEDICATIONS:   Current Outpatient Medications   Medication     ACCU-CHEK JOSE PLUS TEST STRP strips     acetaminophen (TYLENOL) 500 MG tablet     albuterol (PROVENTIL HFA;VENTOLIN HFA) 90 mcg/actuation inhaler     albuterol (PROVENTIL) (2.5 MG/3ML) 0.083% neb solution     ammonium lactate (AMLACTIN) 12 % external cream     Ascorbic Acid (VITAMIN C) 500 MG CAPS     aspirin (ASA) 81 MG EC tablet     atorvastatin (LIPITOR) 40 MG tablet     azithromycin (ZITHROMAX) 250 MG tablet     BD ULTRA-FINE MANISHA PEN NEEDLES 32 gauge x 5/32\" Ndle     bumetanide (BUMEX) 2 MG tablet     calcium carbonate (TUMS) 500 MG chewable tablet     Ferrous Gluconate 324 (37.5 Fe) MG TABS     fluticasone-vilanterol (BREO ELLIPTA) 200-25 mcg/dose DsDv inhaler     gabapentin (NEURONTIN) 300 MG capsule     Garlic 1000 MG CAPS     insulin glargine (LANTUS PEN) 100 UNIT/ML pen     ipratropium - albuterol 0.5 mg/2.5 mg/3 mL (DUONEB) 0.5-2.5 (3) MG/3ML neb solution     irbesartan (AVAPRO) 300 MG tablet     lacosamide (VIMPAT) 200 MG TABS tablet     metolazone (ZAROXOLYN) 2.5 MG tablet     metoprolol tartrate (LOPRESSOR) 25 MG tablet     multivitamin w/minerals (THERA-VIT-M) tablet     omeprazole 20 MG tablet     OXYGEN-AIR DELIVERY SYSTEMS MISC     rOPINIRole (REQUIP) 1 MG tablet     spironolactone (ALDACTONE) 25 MG tablet     tadalafil (CIALIS) 20 MG tablet     traZODone (DESYREL) 100 MG tablet     zinc gluconate 50 MG tablet     atorvastatin (LIPITOR) 40 MG tablet     insulin glargine (LANTUS PEN) 100 UNIT/ML pen     traZODone " (DESYREL) 100 MG tablet     No current facility-administered medications for this visit.        Family History   Problem Relation Age of Onset     Hyperlipidemia Mother      Hypertension Mother      Heart Disease Mother      Hyperlipidemia Father      Hypertension Father      Coronary Artery Disease Father      Cerebrovascular Disease Brother      Depression Brother      No Known Problems Sister      Pulmonary Hypertension No family hx of      Congenital heart disease No family hx of           Review of Systems - 10 point Review of Systems is negative except for foot pain which is noted in HPI.    OBJECTIVE:  Appearance: alert, well appearing, and in no distress.    VITAL SIGNS: /62   Pulse 69       General appearance: Patient is alert and fully cooperative with history & exam.  No sign of distress is noted during the visit.     Psychiatric: Affect is pleasant & appropriate.  Patient appears motivated to improve health.     Respiratory: Breathing is regular & unlabored while sitting.     HEENT: Hearing is intact to spoken word.  Speech is clear.  No gross evidence of visual impairment that would impact ambulation.      Vascular: Dorsalis pedis and posterior tibial pulses are non-palpable.   Dermatologic: Ecchymosis plantar bilateral hallux with hyperkeratotic tissue. No erythema bilateral. 2-3 clusters along the plantar right foot with disruption of normal skin lines. Dry, flaky skin bilateral feet.   Neurologic: Diminished to light touch bilateral.   Musculoskeletal: Contracted 5th digit bilateral.             Again, thank you for allowing me to participate in the care of your patient.        Sincerely,        Rusty Skinner DPM

## 2022-05-10 NOTE — LETTER
5/10/2022       RE: Red Sutherland  [address]  [facility] [address]     Dear Colleague,    Thank you for referring your patient, Red Sutherland, to the [facility] NEUROSURGERY CLINIC [place] [facility]. Please see a copy of my visit note below.    [facility]  Department of Neurosurgery      Name: Red Sutherland  MRN: [id number]  Age: 81 year old  : 1940  Referring provider: Jovita Arias  05/10/2022      Chief Complaint:   Right sided SDH on 2021  Chronic anticoagulation with ASA and Plavix  S/p R craniotomy and hematoma evacuation on 2021  Routine follow up    History of Present Illness:   Red Sutherland is a 81 year old male with multiple medical co morbidities, on chronic anticoagulation with Plavix and ASA (on hold since SDH), presented to [facility] on  after a presumed traumatic SDH. He underwent  Right craniotomy and hematoma evacuation on  by Dr. Quinones. He was discharged from ARU on . Previously seen in clinic on  and most recently on . CT head that day showed stable right frontal subdural hematoma and unchanged subgaleal collection overlying right frontal craniotomy. On 3/14, we received a call from patient's cardiology office regarding restarting Eliquis. This was discussed with Dr. May who recommended a head CT soon and if stable ok to restart anticoagulation. This was ordered, but patient never follow through this recommendation.     Patient had a few hospitalizations since his recent visit here. He was admitted to [facility] on  with confusion, tremors and unexplainable fevers. Head CT was done. Please see the report below. We do not have the images. Today, I saw the patient via video visit. His wife, Kaley was present during the visit. He reports fatigue and slow speech. Otherwise, no new headaches, vision changes, n/w/t in extremities.     Per  wife, he remains off of anticoagulation. They have an upcoming appt with Cardiology in [facility] on  to discuss anticoagulation.       Review of Systems:   Pertinent items are noted in HPI or as in patient entered ROS below, remainder of complete ROS is negative.   No flowsheet data found.     Physical Exam:   There were no vitals taken for this visit.   General: No acute distress.    Neuro: The patient is fully oriented x 4. Speech is normal.   Psych: Normal mood and affect. Behavior is normal.        Imagin2022 Head CT:  1.  Mixed density subdural hematoma overlying the right cerebral convexity measuring 4 mm in maximum diameter, not significant the changed in thickness compared to prior CT 2022.   2.  No other evidence of acute intracranial hemorrhage or mass effect.   3.  Moderate nonspecific white matter changes.   4.  Moderate brain parenchymal volume loss.    Assessment:  Right sided SDH on 2021  Chronic anticoagulation with ASA and Plavix  S/p R craniotomy and hematoma evacuation on 2021  Routine follow up    Plan:  Today, we discussed his most recent CT report. Images are requested to be sent to PACS. We will plan for a follow up head CT this week. Per patient and his wife, they would like to transfer his Neurosurgical care to [facility] for care coordination (patient's cardiologist.pulmonologist etc are in [facility] system). This is reasonable. I talked to PANCHO Díaz (Cardiology) at  [phone]. She will place Neurosurgery referral for the patient. I will follow-up with the patient when I have CT results. I called and spoke with Kaley and updated her of this plan. They will call the clinic if hey have any additional questions.        I spent 35 minutes on patient care activities related to this encounter on the date of service, including time spent reviewing the chart, obtaining history and examination and in counseling the patient, and in documentation in the electronic medical  record.      Jovita BRADY, CNP  Department of Neurosurgery

## 2022-05-10 NOTE — PROGRESS NOTES
[facility]  Department of Neurosurgery      Name: Red Sutherland  MRN: [id number]  Age: 81 year old  : 1940  Referring provider: Jovita Arias  05/10/2022      Chief Complaint:   Right sided SDH on 2021  Chronic anticoagulation with ASA and Plavix  S/p R craniotomy and hematoma evacuation on 2021  Routine follow up    History of Present Illness:   Red Sutherland is a 81 year old male with multiple medical co morbidities, on chronic anticoagulation with Plavix and ASA (on hold since SDH), presented to [facility] on  after a presumed traumatic SDH. He underwent  Right craniotomy and hematoma evacuation on  by Dr. Quinones. He was discharged from ARU on . Previously seen in clinic on  and most recently on . CT head that day showed stable right frontal subdural hematoma and unchanged subgaleal collection overlying right frontal craniotomy. On 3/14, we received a call from patient's cardiology office regarding restarting Eliquis. This was discussed with Dr. May who recommended a head CT soon and if stable ok to restart anticoagulation. This was ordered, but patient never follow through this recommendation.     Patient had a few hospitalizations since his recent visit here. He was admitted to [facility] on  with confusion, tremors and unexplainable fevers. Head CT was done. Please see the report below. We do not have the images. Today, I saw the patient via video visit. His wife, Kaley was present during the visit. He reports fatigue and slow speech. Otherwise, no new headaches, vision changes, n/w/t in extremities.     Per wife, he remains off of anticoagulation. They have an upcoming appt with Cardiology in [facility] on  to discuss anticoagulation.       Review of Systems:   Pertinent items are noted in HPI or as in patient entered ROS below, remainder of complete ROS is negative.   No flowsheet data found.     Physical Exam:   There were no  vitals taken for this visit.   General: No acute distress.    Neuro: The patient is fully oriented x 4. Speech is normal.   Psych: Normal mood and affect. Behavior is normal.        Imagin2022 Head CT:  1.  Mixed density subdural hematoma overlying the right cerebral convexity measuring 4 mm in maximum diameter, not significant the changed in thickness compared to prior CT 2022.   2.  No other evidence of acute intracranial hemorrhage or mass effect.   3.  Moderate nonspecific white matter changes.   4.  Moderate brain parenchymal volume loss.    Assessment:  Right sided SDH on 2021  Chronic anticoagulation with ASA and Plavix  S/p R craniotomy and hematoma evacuation on 2021  Routine follow up    Plan:  Today, we discussed his most recent CT report. Images are requested to be sent to PACS. We will plan for a follow up head CT this week. Per patient and his wife, they would like to transfer his Neurosurgical care to [facility] for care coordination (patient's cardiologist.pulmonologist etc are in [facility] system). This is reasonable. I talked to PANCHO Díaz (Cardiology) at  [phone]. She will place Neurosurgery referral for the patient. I will follow-up with the patient when I have CT results. I called and spoke with Kaley and updated her of this plan. They will call the clinic if hey have any additional questions.        I spent 35 minutes on patient care activities related to this encounter on the date of service, including time spent reviewing the chart, obtaining history and examination and in counseling the patient, and in documentation in the electronic medical record.      Jovita BRADY CNP  Department of Neurosurgery'

## 2022-05-10 NOTE — PROGRESS NOTES
/Writer had the follow imaging pushed to Pacs/Resolved to MRN;   Reports verified in Care Everywhere.     [facility]   CT Head/Brain   04/04/2022 04/29/2022 05/01/2022    CT Cervical Spine   04/29/2022     Haydee Kingsley LPN  Neurosurgery

## 2022-05-10 NOTE — PATIENT INSTRUCTIONS
Head CT this week.     2. Follow-up with cardiology in [facility] as scheduled.

## 2022-06-13 NOTE — LETTER
6/13/2022        RE: Red Sutherland  [address] [facility] [id number]        [facility] GERIATRIC SERVICES  Chief Complaint   Patient presents with     [facility] F/U     [facility] Medical Record Number:  [id number]  Place of Service where encounter took place:  [facility] ([facility]) [[id number]]  Code Status:  DNR    HISTORY:      HPI:  Red Sutherland  is 81 year old (1940) undergoing physical and occupational therapy. He is  with a history of DM2 on Lantus, HTN, pulmHTN, COPD, stage IV CKD, PAD, and AFib but off anticoagulation due to relatively recent SDH who was admitted to the neuro ICU 6/8 for management of an acute, traumatic subarachnoid hemorrhage.     He presented to the ED, where head CT revealed a small right frontal subarachnoid hemorrhage. Neurologically appears to be at baseline. was admitted to the neuro ICU for overnight monitoring. Serial CTs show stable hemorrhage.     Today he was seen at the bedside to review vital signs, labs, follow-up subarachnoid hemorrhage and to establish care.  He denied chest pain shortness of breath cough congestion constipation or diarrhea.  Fingersticks were reviewed and have been between 175 and 323 however he does not have another fingersticks on file to properly assess.  He was noted to have decreased range of motion upper extremities and pain with his left arm when lifting.  He tells writer this is not new.  Vital signs are stable.    ALLERGIES:Ace inhibitors, Contrast dye, Furosemide, Hydrochlorothiazide, Iodinated contrast media [diagnostic x-ray materials], Losartan, Losartan-hydrochlorothiazide [hyzaar], Metaxalone, Metolazone, Mometasone, Proton pump inhibitors, Rabeprazole, Ramipril, Shellfish containing products [shellfish-derived products], Sildenafil, Methocarbamol, and Penicillins    PAST MEDICAL HISTORY:   Past Medical History:   Diagnosis Date     Atrial fibrillation (H)      CHF (congestive heart failure) (H)      COPD (chronic  obstructive pulmonary disease) (H)      Coronary artery disease      Diabetes mellitus (H)      Essential hypertension      Hyperlipidemia      Pulmonary hypertension (H)     O2 at night     Pulmonary hypertension due to left ventricular diastolic dysfunction (H) 11/28/2017    Multifactorial per [facility] with elevated LVEDP and PCW, COPD and NOVA. They put him on sildenafil as nitroprusside lowered systemic BP and with that the mean PA dropped from 54 to 49. Negative VQ at [facility] Dec 1, 2017.     RLS (restless legs syndrome)      Sleep apnea        PAST SURGICAL HISTORY:   has a past surgical history that includes IR Miscellaneous Procedure (07/20/2001); IR Abdominal Aortogram (11/16/2012); IR Miscellaneous Procedure (11/16/2012); Cardiac catheterization (12/13/2017); Coronary Stent Placement; other surgical history; shoulder surgery; Wrist surgery (Bilateral); Cataract Extraction (Bilateral); back surgery; Cardioversion (03/15/2013); Total Hip Arthroplasty (Left); Total Knee Arthroplasty (Bilateral); Colonoscopy w/wo Brush **Performed** (N/A, 01/14/2021); Coronary Angiogram (N/A, 10/25/2021); Percutaneous Coronary Intervention (N/A, 10/25/2021); Percutaneous Coronary Intervention - Lithotripsy (N/A, 10/25/2021); Left Heart Catheterization (N/A, 10/25/2021); Colonoscopy (N/A, 10/31/2021); Esophagoscopy, gastroscopy, duodenoscopy (EGD), combined (N/A, 10/30/2021); Craniotomy (Right, 12/21/2021); PICC/Midline Placement (Right, 12/25/2021); IR Gastrostomy Tube Percutaneous Plcmnt (01/03/2022); and Peg Tube Placement (01/03/2022).    FAMILY HISTORY: family history includes Cerebrovascular Disease in his brother; Coronary Artery Disease in his father; Depression in his brother; Heart Disease in his mother; Hyperlipidemia in his father and mother; Hypertension in his father and mother; No Known Problems in his sister.    SOCIAL HISTORY:  reports that he quit smoking about 26 years ago. His smoking use included cigarettes. He has  "a 66.00 pack-year smoking history. He has never used smokeless tobacco. He reports current alcohol use of about 1.0 standard drink of alcohol per week. He reports that he does not use drugs.    ROS:  Constitutional: Negative for activity change, appetite change, fatigue and fever.   HENT: Negative for congestion.    Respiratory: Negative for cough, shortness of breath and wheezing.    Cardiovascular: Negative for chest pain and leg swelling.   Gastrointestinal: Negative for abdominal distention, abdominal pain, constipation, diarrhea and nausea.   Genitourinary: Negative for dysuria.   Musculoskeletal: Negative for arthralgia. Negative for back pain.   Skin: Negative for color change and wound.   Neurological: Negative for dizziness.   Psychiatric/Behavioral: Negative for agitation, behavioral problems and confusion.     Physical Exam:  Constitutional:       Appearance: Patient is well-developed.   HENT:      Head: Normocephalic.   Eyes:      Conjunctiva/sclera: Conjunctivae normal.   Neck:      Musculoskeletal: Normal range of motion.   Cardiovascular:      Rate and Rhythm: Normal rate and regular rhythm.      Heart sounds: Normal heart sounds. No murmur.   Pulmonary:      Effort: No respiratory distress.      Breath sounds: Normal breath sounds. No wheezing or rales.   Abdominal:      General: Bowel sounds are normal. There is no distension.      Palpations: Abdomen is soft.      Tenderness: There is no abdominal tenderness.   Musculoskeletal:       Normal range of motion.     Decreased range of motion upper extremities  Skin:General:        Skin is warm.   Neurological:         Mental Status: Patient is alert and oriented to person, place, and time.   Psychiatric:         Behavior: Behavior normal.     Vitals:/65   Pulse 77   Temp 97.9  F (36.6  C)   Resp 16   Ht 1.803 m (5' 11\")   Wt 101.2 kg (223 lb)   SpO2 94%   BMI 31.10 kg/m   and Body mass index is 31.1 kg/m .    Lab/Diagnostic data:   Recent " Results (from the past 240 hour(s))   COVID-19 VIRUS (CORONAVIRUS) BY PCR (EXTERNAL RESULT)    Collection Time: 06/08/22  3:15 PM   Result Value Ref Range    COVID-19 Virus by PCR (External Result) Negative Negative   COVID-19 VIRUS (CORONAVIRUS) BY PCR (EXTERNAL RESULT)    Collection Time: 06/10/22  5:34 PM   Result Value Ref Range    COVID-19 Virus by PCR (External Result) Negative Negative       MEDICATIONS:     Review of your medicines          Accurate as of June 13, 2022  2:04 PM. If you have any questions, ask your nurse or doctor.            CONTINUE these medicines which may have CHANGED, or have new prescriptions. If we are uncertain of the size of tablets/capsules you have at home, strength may be listed as something that might have changed.      Dose / Directions   atorvastatin 40 MG tablet  Commonly known as: LIPITOR  This may have changed: Another medication with the same name was removed. Continue taking this medication, and follow the directions you see here.  Changed by: Doris Hoffmann CNP      Dose: 40 mg  Take 40 mg by mouth  Refills: 0     insulin glargine 100 UNIT/ML pen  Commonly known as: LANTUS PEN  This may have changed: Another medication with the same name was removed. Continue taking this medication, and follow the directions you see here.  Changed by: Doris Hoffmann CNP      Dose: 8 Units  Inject 8 Units Subcutaneous  Refills: 0     traZODone 100 MG tablet  Commonly known as: DESYREL  This may have changed: Another medication with the same name was removed. Continue taking this medication, and follow the directions you see here.  Changed by: Doris Hoffmann CNP      Dose: 100 mg  Take 100 mg by mouth  Refills: 0        CONTINUE these medicines which have NOT CHANGED      Dose / Directions   Accu-Chek Jose Plus test strip  Generic drug: blood glucose      [ACCU-CHEK JOSE PLUS TEST STRP STRIPS] see administration instructions.  Refills: 0     acetaminophen 500 MG tablet  Commonly known as:  "TYLENOL      Dose: 1,000 mg  Take 1,000 mg by mouth every 6 hours as needed for mild pain  Refills: 0     * albuterol 108 (90 Base) MCG/ACT inhaler  Commonly known as: PROAIR HFA/PROVENTIL HFA/VENTOLIN HFA      Dose: 2 puff  Inhale 2 puffs into the lungs every 6 hours as needed  Refills: 0     * albuterol (2.5 MG/3ML) 0.083% neb solution  Commonly known as: PROVENTIL      Dose: 2.5 mg  Take 2.5 mg by nebulization every 6 hours as needed for shortness of breath / dyspnea or wheezing  Refills: 0     ammonium lactate 12 % external cream  Commonly known as: AMLACTIN  Used for: Anhidrosis, Keratoma, Hammer toes of both feet, Type 2 diabetes mellitus with other specified complication, with long-term current use of insulin (H), PAD (peripheral artery disease) (H)      Apply topically 2 times daily  Quantity: 140 g  Refills: 3     amoxicillin-clavulanate 875-125 MG tablet  Commonly known as: AUGMENTIN      Dose: 1 tablet  Take 1 tablet by mouth 2 times daily  Refills: 0     aspirin 81 MG EC tablet  Commonly known as: ASA  Used for: NSTEMI (non-ST elevated myocardial infarction) (H)      Dose: 81 mg  Take 1 tablet (81 mg) by mouth daily  Refills: 0     azithromycin 250 MG tablet  Commonly known as: ZITHROMAX      Dose: 1,000 mg  Take 1,000 mg by mouth daily as needed 4 tablets before dental  Refills: 0     BD MANISHA U/F 32G X 4 MM miscellaneous  Generic drug: insulin pen needle      [BD ULTRA-FINE MANISHA PEN NEEDLES 32 GAUGE X 5/32\" NDLE]  Refills: 4     bumetanide 2 MG tablet  Commonly known as: BUMEX      2 tabs (4mg)  Refills: 0     Ferrous Gluconate 324 (37.5 Fe) MG Tabs      Dose: 1 tablet  Take 1 tablet by mouth every other day  Refills: 0     fluticasone-vilanterol 200-25 MCG/INH inhaler  Commonly known as: BREO ELLIPTA      Dose: 1 puff  Inhale 1 puff into the lungs daily  Refills: 0     gabapentin 300 MG capsule  Commonly known as: NEURONTIN      Dose: 300 mg  Take 300 mg by mouth 2 times daily  Refills: 0     insulin " aspart 100 UNIT/ML cartridge  Commonly known as: NOVOPEN ECHO      Inject Subcutaneous 3 times daily (with meals) L Inject  as per sliding scale: if 70 - 149 = 0 unit; 150 - 199 = 1  units; 200 - 249 = 2 units; 250 - 299 = 3 units; 300 -  349 = 4 units; 350 - 399 = 5 units; 400 - 900 = 6 units  give 6 units and CALL md  Refills: 0     irbesartan 300 MG tablet  Commonly known as: AVAPRO      Dose: 300 mg  Take 300 mg by mouth daily  Refills: 0     lacosamide 200 MG Tabs tablet  Commonly known as: VIMPAT  Used for: Seizures (H)      Dose: 200 mg  1 tablet (200 mg) by Oral or Feeding Tube route 2 times daily  Quantity: 60 tablet  Refills: 0     metolazone 2.5 MG tablet  Commonly known as: ZAROXOLYN      Dose: 2.5 mg  Take 2.5 mg by mouth daily as needed  Refills: 0     metoprolol tartrate 25 MG tablet  Commonly known as: LOPRESSOR  Used for: Permanent atrial fibrillation (H)      Dose: 25 mg  Take 1 tablet (25 mg) by mouth 2 times daily (with meals)  Quantity: 60 tablet  Refills: 3     multivitamin w/minerals tablet      Dose: 1 tablet  Take 1 tablet by mouth daily  Refills: 0     OLANZapine 5 MG tablet  Commonly known as: zyPREXA      Dose: 5 mg  Take 5 mg by mouth every 8 hours as needed  Refills: 0     omeprazole 20 MG tablet      Dose: 20 mg  Take 20 mg by mouth 2 times daily  Refills: 0     order for DME      Refills: 0     potassium chloride ER 20 MEQ CR tablet  Commonly known as: KLOR-CON M      Dose: 20 mEq  Take 20 mEq by mouth 2 times daily  Refills: 0     rOPINIRole 1 MG tablet  Commonly known as: REQUIP  Used for: Restless leg syndrome      Dose: 1 mg  Take 1 tablet (1 mg) by mouth daily  Refills: 0     spironolactone 25 MG tablet  Commonly known as: ALDACTONE  Used for: Acute on chronic congestive heart failure, unspecified heart failure type (H)      Dose: 25 mg  Take 1 tablet (25 mg) by mouth daily  Quantity: 30 tablet  Refills: 0     tadalafil 20 MG tablet  Commonly known as: CIALIS  Used for: Pulmonary  hypertension due to left ventricular diastolic dysfunction; WHO Group 2      Dose: 20 mg  Take 1 tablet (20 mg) by mouth every 24 hours  Refills: 0     zinc gluconate 50 MG tablet      Dose: 50 mg  Take 50 mg by mouth daily (before lunch)  Refills: 0         * This list has 2 medication(s) that are the same as other medications prescribed for you. Read the directions carefully, and ask your doctor or other care provider to review them with you.            STOP taking    calcium carbonate 500 MG chewable tablet  Commonly known as: TUMS  Stopped by: Doris Hoffmann CNP        Garlic 1000 MG Caps  Stopped by: Doris Hoffmann CNP        ipratropium - albuterol 0.5 mg/2.5 mg/3 mL 0.5-2.5 (3) MG/3ML neb solution  Commonly known as: DUONEB  Stopped by: Doris Hoffmann CNP        Vitamin C 500 MG Caps  Stopped by: Doris Hoffmann CNP               ASSESSMENT/PLAN  Encounter Diagnosis   Name Primary?     Physical deconditioning Yes     Physical deconditioning PT OT    Insomnia continue trazodone 100 mg at bedtime as needed, olanzapine 5 mg every 8 hours for sleep or agitation    Congestive heart failure Daily weights, spironolactone 12.5 mg daily, Bumex 1 mg twice daily,     Hypertension continue irbesartan 150 mg daily, Metroprolol tartrate 25 mg twice daily    Type 2 diabetes glargine 8 units subcu at bedtime, sliding scale, A1c on 5/2/2022 was 7.0    GERD on omeprazole    Restless leg syndrome continue ropinirole 2 mg at bedtime    Hypertension on tadalafil 20 mg daily    COPD continue albuterol inhaler and nebulizers as needed, Breo Ellipta 1 puff daily    Cellulitis continue Augmentin until 6/15/2022    HDL on atorvastatin      Electronically signed by: Doris Hoffmann CNP        Sincerely,        Doris Hoffmann CNP

## 2022-06-13 NOTE — PROGRESS NOTES
[facility] GERIATRIC SERVICES       Patient Red Sutherland  MRN: [id number]        Reason for Visit     Chief Complaint   Patient presents with     RECHECK     INITIAL       Code Status     DNR / DNI    Assessment     Acute subarachnoid hemorrhage felt to be traumatic  Stage IV chronic kidney disease  Type 2 diabetes  Peripheral vascular disease with chronic cellulitis of hisRT  foot with ulcerations.  Persistent atrial fibrillation  Pulmonary hypertension  Stage IV chronic kidney disease  Generalized weakness    Plan     Pt is admitted to TCU for strengthening and rehab.  Patient was examined in the presence of his wife who supplemented his history  Medication review also done with both of them at the request  Patient was admitted with history of subarachnoid hemorrhage felt to be traumatic in nature.  Evaluated by neurosurgery and conservative treatment.  ASA held for 2 weeks  He is on asa and med stopped for 2 weeks  Follow-up nsgy for repeat imaging  Recently was in 6 months ago with a subdural hemorrhage.  Both falls are related to gait instability.  Not felt to be due to worsening of his Parkinson's.  There is increasing concern for both of bladder control and incontinence issues.  This will be monitored in the TCU for any retention concerns  Also seen by vascular surgery because of chronic cellulitis especially of his right foot.  Augmentin given.  He will need an outpatient evaluation of of his foot calluses by vascular which was not done because of inability to give anticoagulation as he would need heparin for the procedure.  Care concerns were reviewed with his wife who is especially concerned about losing from that site.  Concerned about cognition with impaired cognition with hallucinations and agitation noted.  Monitor mood and behaviors  Prior to discharge Requip was increased.  Trazodone was increased.    Zyprexa given as needed for management of mood and behaviors  He continues to have significant  issues with short-term recall.  Nursing also reports compliance with his medication is limited and occasionally he will refuse meds.  Medication review done with him and his wife.  Discontinue Zyprexa.  Give trazadone only if needed  Wants K crushed and given  Continue to monitor mood and behaviors closely  Recheck labs reviewed in the chart.  Potassium is low so we will give him an additional potassium 40 mEq today.  He is not very keen to take potassium supplementation and encouraged to do so.  Dietary advised to advise him of potassium rich diet as he is on multiple doses of diuretics  His other labs appear to be stable  His BUN is now improved to 48 and his creatinine has improved to 1.6  Recheck anemia shows chronic microcytosis  This is not new and his MCV has been hovering less than 70 for the last few checks.  He is on iron supplementation and needs to follow-up with his primary care physician for further work-up  Continue with PT/OT-patient feels he is doing quite well and wants to discharge home goals for discharge to home were reviewed both with  and wife including working with him for gait stability so he does not fall again.  All patient and wife admit that he has had some difficulty with concentration and remembering things and his short-term memory is not as good.  Wife however plans to be involved.  Time spent is 45 minutes with 28 minutes spent face-to-face with the patient by reviewing medications and behavioral concerns short-term memory issues and discharge planning.    History     Patient is a very pleasant 81 year old male who is admitted to TCU  Patient admitted post fall with increasing dizziness.  Imaging revealed a small right frontal subarachnoid hemorrhage  He was monitored and neurology was consulted  Prior to this he was admitted about 6 months ago with a subdural hematoma  But there is also concern about gait instability which is felt to be secondary to his SDH  Seen by vascular  surgery because of concerns about cellulitis of his right foot.  Unfortunately comprehensive evaluation could not be done as he would need heparin.  He was discharged on Augmentin  Patient also exhibited significant cognitive changes no agitation noted however  He has been complaining of more retention with urinary incontinence issues that will require some monitoring      Past Medical & Surgical History     PAST MEDICAL HISTORY:   Past Medical History:   Diagnosis Date     Atrial fibrillation (H)      CHF (congestive heart failure) (H)      COPD (chronic obstructive pulmonary disease) (H)      Coronary artery disease      Diabetes mellitus (H)      Essential hypertension      Hyperlipidemia      Pulmonary hypertension (H)     O2 at night     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 They put him on sildenafil as nitroprusside lowered systemic BP and with that the mean PA dropped from 54 to 49. Negative VQ at [facility] Dec 1, 2017.     RLS (restless legs syndrome)      Sleep apnea       PAST SURGICAL HISTORY:   has a past surgical history that includes IR Miscellaneous Procedure (07/20/2001); IR Abdominal Aortogram (11/16/2012); IR Miscellaneous Procedure (11/16/2012); Cardiac catheterization (12/13/2017); Coronary Stent Placement; other surgical history; shoulder surgery; Wrist surgery (Bilateral); Cataract Extraction (Bilateral); back surgery; Cardioversion (03/15/2013); Total Hip Arthroplasty (Left); Total Knee Arthroplasty (Bilateral); Colonoscopy w/wo Brush **Performed** (N/A, 01/14/2021); Coronary Angiogram (N/A, 10/25/2021); Percutaneous Coronary Intervention (N/A, 10/25/2021); Percutaneous Coronary Intervention - Lithotripsy (N/A, 10/25/2021); Left Heart Catheterization (N/A, 10/25/2021); Colonoscopy (N/A, 10/31/2021); Esophagoscopy, gastroscopy, duodenoscopy (EGD), combined (N/A, 10/30/2021);  "Craniotomy (Right, 12/21/2021); PICC/Midline Placement (Right, 12/25/2021); IR Gastrostomy Tube Percutaneous Plcmnt (01/03/2022); and Peg Tube Placement (01/03/2022).      Past Social History     Reviewed,  reports that he quit smoking about 26 years ago. His smoking use included cigarettes. He has a 66.00 pack-year smoking history. He has never used smokeless tobacco. He reports current alcohol use of about 1.0 standard drink of alcohol per week. He reports that he does not use drugs.    Family History     Reviewed, and family history includes Cerebrovascular Disease in his brother; Coronary Artery Disease in his father; Depression in his brother; Heart Disease in his mother; Hyperlipidemia in his father and mother; Hypertension in his father and mother; No Known Problems in his sister.    Medication List   Post Discharge Medication Reconciliation Status: Post Discharge Medication Reconciliation Status: discharge medications reconciled and changed, per note/orders.  Current Outpatient Medications   Medication     ACCU-CHEK JOSE PLUS TEST STRP strips     acetaminophen (TYLENOL) 500 MG tablet     albuterol (PROVENTIL HFA;VENTOLIN HFA) 90 mcg/actuation inhaler     albuterol (PROVENTIL) (2.5 MG/3ML) 0.083% neb solution     ammonium lactate (AMLACTIN) 12 % external cream     amoxicillin-clavulanate (AUGMENTIN) 875-125 MG tablet     aspirin (ASA) 81 MG EC tablet     atorvastatin (LIPITOR) 40 MG tablet     azithromycin (ZITHROMAX) 250 MG tablet     BD ULTRA-FINE MANISHA PEN NEEDLES 32 gauge x 5/32\" Ndle     bumetanide (BUMEX) 2 MG tablet     Ferrous Gluconate 324 (37.5 Fe) MG TABS     fluticasone-vilanterol (BREO ELLIPTA) 200-25 mcg/dose DsDv inhaler     gabapentin (NEURONTIN) 300 MG capsule     insulin aspart (NOVOPEN ECHO) 100 UNIT/ML cartridge     insulin glargine (LANTUS PEN) 100 UNIT/ML pen     irbesartan (AVAPRO) 300 MG tablet     lacosamide (VIMPAT) 200 MG TABS tablet     metolazone (ZAROXOLYN) 2.5 MG tablet     " metoprolol tartrate (LOPRESSOR) 25 MG tablet     multivitamin w/minerals (THERA-VIT-M) tablet     OLANZapine (ZYPREXA) 5 MG tablet     omeprazole 20 MG tablet     OXYGEN-AIR DELIVERY SYSTEMS MISC     potassium chloride ER (KLOR-CON M) 20 MEQ CR tablet     rOPINIRole (REQUIP) 1 MG tablet     spironolactone (ALDACTONE) 25 MG tablet     tadalafil (CIALIS) 20 MG tablet     traZODone (DESYREL) 100 MG tablet     zinc gluconate 50 MG tablet     No current facility-administered medications for this visit.          Allergies     Allergies   Allergen Reactions     Ace Inhibitors      Bloody nose, dry mouth, cracked lips     Contrast Dye Nausea     Other reaction(s): GI intolerance, GI Upset     Furosemide Muscle Pain (Myalgia)     Previously tolerated.  Muscle cramps     Hydrochlorothiazide Unknown     Iodinated Contrast Media [Diagnostic X-Ray Materials] Nausea     Losartan Other (See Comments)     Other reaction(s): Stomatitis, Bloody nose dry mouth and lips     Losartan-Hydrochlorothiazide [Hyzaar]      Mouth sores     Metaxalone Nausea     Metolazone Other (See Comments)     Muscle cramps     Mometasone Other (See Comments)     Bloody nose     Proton Pump Inhibitors      Bloody nose, mouth sores     Rabeprazole Other (See Comments)     Mouth sores     Ramipril Other (See Comments)     Mouth sores     Shellfish Containing Products [Shellfish-Derived Products] Unknown     Other reaction(s): mouth sores, Other reaction(s): mouth sores     Sildenafil Muscle Pain (Myalgia)     Methocarbamol Rash     Penicillins Other (See Comments)     Immune-does not work for him       Review of Systems   A comprehensive review of 14 systems was done. Pertinent findings noted here and in history of present illness. All the rest negative.  Constitutional: Negative.  Negative for fever, chills, he has  activity change, appetite change and fatigue.   HENT: Negative for congestion and facial swelling.    Eyes: Negative for photophobia, redness  "and visual disturbance.   Respiratory: Negative for cough and chest tightness.    Cardiovascular: Negative for chest pain, palpitations and leg swelling.   Gastrointestinal: Negative for nausea, diarrhea, constipation, blood in stool and abdominal distention.   Genitourinary: Negative.    Musculoskeletal: Negative.  Noted to be walking quite well  Skin: Negative.    Neurological: Negative for dizziness, tremors, syncope, weakness, light-headedness and has had 3 headaches.  Reports that he has difficulty concentrating  Hematological: Does not bruise/bleed easily.   Psychiatric/Behavioral: Short-term recall is impaired.  He does have occasional behaviors and has been refusing meds intermittently      Physical Exam   BP (!) 146/82   Pulse 74   Temp (!) 96.3  F (35.7  C)   Resp 20   Ht 1.803 m (5' 11\")   Wt 102.1 kg (225 lb)   SpO2 94%   BMI 31.38 kg/m       Constitutional: Oriented to person, place, and time and appears well-developed.   HEENT:  Normocephalic and atraumatic.  Eyes: Conjunctivae and EOM are normal. Pupils are equal, round, and reactive to light. No discharge.  No scleral icterus. Nose normal. Mouth/Throat: Oropharynx is clear and moist. No oropharyngeal exudate.    NECK: Normal range of motion. Neck supple. No JVD present. No tracheal deviation present. No thyromegaly present.   CARDIOVASCULAR: Normal rate, regular rhythm and intact distal pulses.  Exam reveals no gallop and no friction rub.  Systolic murmur present.  PULMONARY: Effort normal and breath sounds normal. No respiratory distress.No Wheezing or rales.  ABDOMEN: Soft. Bowel sounds are normal. No distension and no mass.  There is no tenderness. There is no rebound and no guarding. No HSM.  MUSCULOSKELETAL: Normal range of motion and no tenderness. Mild kyphosis, no tenderness.  LYMPH NODES: Has no cervical, supraclavicular, axillary and groin adenopathy.   NEUROLOGICAL: Alert and oriented to person, place, and time. No cranial nerve " deficit.  Normal muscle tone. Coordination normal.   GENITOURINARY: Deferred exam.  SKIN: Skin is warm and dry. No rash noted. No erythema. No pallor.   Scalp hematoma and fading.  There is a extensive rash with an open area noted on his right lower extremity.  There is some minimal oozing noted is open to air no secondary concern for cellulitis  EXTREMITIES: No cyanosis, no clubbing, right lower extremity edema. No Deformity.  PSYCHIATRIC: Normal mood, affect and behavior.  Some recall issues are impaired      Lab Results     Recent Results (from the past 240 hour(s))   COVID-19 VIRUS (CORONAVIRUS) BY PCR (EXTERNAL RESULT)    Collection Time: 06/08/22  3:15 PM   Result Value Ref Range    COVID-19 Virus by PCR (External Result) Negative Negative   COVID-19 VIRUS (CORONAVIRUS) BY PCR (EXTERNAL RESULT)    Collection Time: 06/10/22  5:34 PM   Result Value Ref Range    COVID-19 Virus by PCR (External Result) Negative Negative           Electronically signed by    Belem Camp MD

## 2022-06-13 NOTE — PROGRESS NOTES
[facility] GERIATRIC SERVICES  Chief Complaint   Patient presents with     [facility] F/U     [place] Medical Record Number:  [id number]  Place of Service where encounter took place:  [facility] ([facility]) [[id number]]  Code Status:  DNR    HISTORY:      HPI:  Red Sutherland  is 81 year old (1940) undergoing physical and occupational therapy. He is  with a history of DM2 on Lantus, HTN, pulmHTN, COPD, stage IV CKD, PAD, and AFib but off anticoagulation due to relatively recent SDH who was admitted to the neuro ICU 6/8 for management of an acute, traumatic subarachnoid hemorrhage.     He presented to the ED, where head CT revealed a small right frontal subarachnoid hemorrhage. Neurologically appears to be at baseline. was admitted to the neuro ICU for overnight monitoring. Serial CTs show stable hemorrhage.     Today he was seen at the bedside to review vital signs, labs, follow-up subarachnoid hemorrhage and to establish care.  He denied chest pain shortness of breath cough congestion constipation or diarrhea.  Fingersticks were reviewed and have been between 175 and 323 however he does not have another fingersticks on file to properly assess.  He was noted to have decreased range of motion upper extremities and pain with his left arm when lifting.  He tells writer this is not new.  Vital signs are stable.    ALLERGIES:Ace inhibitors, Contrast dye, Furosemide, Hydrochlorothiazide, Iodinated contrast media [diagnostic x-ray materials], Losartan, Losartan-hydrochlorothiazide [hyzaar], Metaxalone, Metolazone, Mometasone, Proton pump inhibitors, Rabeprazole, Ramipril, Shellfish containing products [shellfish-derived products], Sildenafil, Methocarbamol, and Penicillins    PAST MEDICAL HISTORY:   Past Medical History:   Diagnosis Date     Atrial fibrillation (H)      CHF (congestive heart failure) (H)      COPD (chronic obstructive pulmonary disease) (H)      Coronary artery disease      Diabetes mellitus (H)       Essential hypertension      Hyperlipidemia      Pulmonary hypertension (H)     O2 at night     Pulmonary hypertension due to left ventricular diastolic dysfunction (H) 11/28/2017    Multifactorial per [facility] with elevated LVEDP and PCW, COPD and NOVA. They put him on sildenafil as nitroprusside lowered systemic BP and with that the mean PA dropped from 54 to 49. Negative VQ at [facility] Dec 1, 2017.     RLS (restless legs syndrome)      Sleep apnea        PAST SURGICAL HISTORY:   has a past surgical history that includes IR Miscellaneous Procedure (07/20/2001); IR Abdominal Aortogram (11/16/2012); IR Miscellaneous Procedure (11/16/2012); Cardiac catheterization (12/13/2017); Coronary Stent Placement; other surgical history; shoulder surgery; Wrist surgery (Bilateral); Cataract Extraction (Bilateral); back surgery; Cardioversion (03/15/2013); Total Hip Arthroplasty (Left); Total Knee Arthroplasty (Bilateral); Colonoscopy w/wo Brush **Performed** (N/A, 01/14/2021); Coronary Angiogram (N/A, 10/25/2021); Percutaneous Coronary Intervention (N/A, 10/25/2021); Percutaneous Coronary Intervention - Lithotripsy (N/A, 10/25/2021); Left Heart Catheterization (N/A, 10/25/2021); Colonoscopy (N/A, 10/31/2021); Esophagoscopy, gastroscopy, duodenoscopy (EGD), combined (N/A, 10/30/2021); Craniotomy (Right, 12/21/2021); PICC/Midline Placement (Right, 12/25/2021); IR Gastrostomy Tube Percutaneous Plcmnt (01/03/2022); and Peg Tube Placement (01/03/2022).    FAMILY HISTORY: family history includes Cerebrovascular Disease in his brother; Coronary Artery Disease in his father; Depression in his brother; Heart Disease in his mother; Hyperlipidemia in his father and mother; Hypertension in his father and mother; No Known Problems in his sister.    SOCIAL HISTORY:  reports that he quit smoking about 26 years ago. His smoking use included cigarettes. He has a 66.00 pack-year smoking history. He has never used smokeless tobacco. He reports current  "alcohol use of about 1.0 standard drink of alcohol per week. He reports that he does not use drugs.    ROS:  Constitutional: Negative for activity change, appetite change, fatigue and fever.   HENT: Negative for congestion.    Respiratory: Negative for cough, shortness of breath and wheezing.    Cardiovascular: Negative for chest pain and leg swelling.   Gastrointestinal: Negative for abdominal distention, abdominal pain, constipation, diarrhea and nausea.   Genitourinary: Negative for dysuria.   Musculoskeletal: Negative for arthralgia. Negative for back pain.   Skin: Negative for color change and wound.   Neurological: Negative for dizziness.   Psychiatric/Behavioral: Negative for agitation, behavioral problems and confusion.     Physical Exam:  Constitutional:       Appearance: Patient is well-developed.   HENT:      Head: Normocephalic.   Eyes:      Conjunctiva/sclera: Conjunctivae normal.   Neck:      Musculoskeletal: Normal range of motion.   Cardiovascular:      Rate and Rhythm: Normal rate and regular rhythm.      Heart sounds: Normal heart sounds. No murmur.   Pulmonary:      Effort: No respiratory distress.      Breath sounds: Normal breath sounds. No wheezing or rales.   Abdominal:      General: Bowel sounds are normal. There is no distension.      Palpations: Abdomen is soft.      Tenderness: There is no abdominal tenderness.   Musculoskeletal:       Normal range of motion.     Decreased range of motion upper extremities  Skin:General:        Skin is warm.   Neurological:         Mental Status: Patient is alert and oriented to person, place, and time.   Psychiatric:         Behavior: Behavior normal.     Vitals:/65   Pulse 77   Temp 97.9  F (36.6  C)   Resp 16   Ht 1.803 m (5' 11\")   Wt 101.2 kg (223 lb)   SpO2 94%   BMI 31.10 kg/m   and Body mass index is 31.1 kg/m .    Lab/Diagnostic data:   Recent Results (from the past 240 hour(s))   COVID-19 VIRUS (CORONAVIRUS) BY PCR (EXTERNAL RESULT) "    Collection Time: 06/08/22  3:15 PM   Result Value Ref Range    COVID-19 Virus by PCR (External Result) Negative Negative   COVID-19 VIRUS (CORONAVIRUS) BY PCR (EXTERNAL RESULT)    Collection Time: 06/10/22  5:34 PM   Result Value Ref Range    COVID-19 Virus by PCR (External Result) Negative Negative       MEDICATIONS:     Review of your medicines          Accurate as of June 13, 2022  2:04 PM. If you have any questions, ask your nurse or doctor.            CONTINUE these medicines which may have CHANGED, or have new prescriptions. If we are uncertain of the size of tablets/capsules you have at home, strength may be listed as something that might have changed.      Dose / Directions   atorvastatin 40 MG tablet  Commonly known as: LIPITOR  This may have changed: Another medication with the same name was removed. Continue taking this medication, and follow the directions you see here.  Changed by: Doris Hoffmann CNP      Dose: 40 mg  Take 40 mg by mouth  Refills: 0     insulin glargine 100 UNIT/ML pen  Commonly known as: LANTUS PEN  This may have changed: Another medication with the same name was removed. Continue taking this medication, and follow the directions you see here.  Changed by: Doris Hoffmann CNP      Dose: 8 Units  Inject 8 Units Subcutaneous  Refills: 0     traZODone 100 MG tablet  Commonly known as: DESYREL  This may have changed: Another medication with the same name was removed. Continue taking this medication, and follow the directions you see here.  Changed by: Doris Hoffmann CNP      Dose: 100 mg  Take 100 mg by mouth  Refills: 0        CONTINUE these medicines which have NOT CHANGED      Dose / Directions   Accu-Chek Jose Plus test strip  Generic drug: blood glucose      [ACCU-CHEK JOSE PLUS TEST STRP STRIPS] see administration instructions.  Refills: 0     acetaminophen 500 MG tablet  Commonly known as: TYLENOL      Dose: 1,000 mg  Take 1,000 mg by mouth every 6 hours as needed for mild  "pain  Refills: 0     * albuterol 108 (90 Base) MCG/ACT inhaler  Commonly known as: PROAIR HFA/PROVENTIL HFA/VENTOLIN HFA      Dose: 2 puff  Inhale 2 puffs into the lungs every 6 hours as needed  Refills: 0     * albuterol (2.5 MG/3ML) 0.083% neb solution  Commonly known as: PROVENTIL      Dose: 2.5 mg  Take 2.5 mg by nebulization every 6 hours as needed for shortness of breath / dyspnea or wheezing  Refills: 0     ammonium lactate 12 % external cream  Commonly known as: AMLACTIN  Used for: Anhidrosis, Keratoma, Hammer toes of both feet, Type 2 diabetes mellitus with other specified complication, with long-term current use of insulin (H), PAD (peripheral artery disease) (H)      Apply topically 2 times daily  Quantity: 140 g  Refills: 3     amoxicillin-clavulanate 875-125 MG tablet  Commonly known as: AUGMENTIN      Dose: 1 tablet  Take 1 tablet by mouth 2 times daily  Refills: 0     aspirin 81 MG EC tablet  Commonly known as: ASA  Used for: NSTEMI (non-ST elevated myocardial infarction) (H)      Dose: 81 mg  Take 1 tablet (81 mg) by mouth daily  Refills: 0     azithromycin 250 MG tablet  Commonly known as: ZITHROMAX      Dose: 1,000 mg  Take 1,000 mg by mouth daily as needed 4 tablets before dental  Refills: 0     BD MANISHA U/F 32G X 4 MM miscellaneous  Generic drug: insulin pen needle      [BD ULTRA-FINE MANISHA PEN NEEDLES 32 GAUGE X 5/32\" NDLE]  Refills: 4     bumetanide 2 MG tablet  Commonly known as: BUMEX      2 tabs (4mg)  Refills: 0     Ferrous Gluconate 324 (37.5 Fe) MG Tabs      Dose: 1 tablet  Take 1 tablet by mouth every other day  Refills: 0     fluticasone-vilanterol 200-25 MCG/INH inhaler  Commonly known as: BREO ELLIPTA      Dose: 1 puff  Inhale 1 puff into the lungs daily  Refills: 0     gabapentin 300 MG capsule  Commonly known as: NEURONTIN      Dose: 300 mg  Take 300 mg by mouth 2 times daily  Refills: 0     insulin aspart 100 UNIT/ML cartridge  Commonly known as: NOVOPEN ECHO      Inject Subcutaneous " 3 times daily (with meals) L Inject  as per sliding scale: if 70 - 149 = 0 unit; 150 - 199 = 1  units; 200 - 249 = 2 units; 250 - 299 = 3 units; 300 -  349 = 4 units; 350 - 399 = 5 units; 400 - 900 = 6 units  give 6 units and CALL md  Refills: 0     irbesartan 300 MG tablet  Commonly known as: AVAPRO      Dose: 300 mg  Take 300 mg by mouth daily  Refills: 0     lacosamide 200 MG Tabs tablet  Commonly known as: VIMPAT  Used for: Seizures (H)      Dose: 200 mg  1 tablet (200 mg) by Oral or Feeding Tube route 2 times daily  Quantity: 60 tablet  Refills: 0     metolazone 2.5 MG tablet  Commonly known as: ZAROXOLYN      Dose: 2.5 mg  Take 2.5 mg by mouth daily as needed  Refills: 0     metoprolol tartrate 25 MG tablet  Commonly known as: LOPRESSOR  Used for: Permanent atrial fibrillation (H)      Dose: 25 mg  Take 1 tablet (25 mg) by mouth 2 times daily (with meals)  Quantity: 60 tablet  Refills: 3     multivitamin w/minerals tablet      Dose: 1 tablet  Take 1 tablet by mouth daily  Refills: 0     OLANZapine 5 MG tablet  Commonly known as: zyPREXA      Dose: 5 mg  Take 5 mg by mouth every 8 hours as needed  Refills: 0     omeprazole 20 MG tablet      Dose: 20 mg  Take 20 mg by mouth 2 times daily  Refills: 0     order for DME      Refills: 0     potassium chloride ER 20 MEQ CR tablet  Commonly known as: KLOR-CON M      Dose: 20 mEq  Take 20 mEq by mouth 2 times daily  Refills: 0     rOPINIRole 1 MG tablet  Commonly known as: REQUIP  Used for: Restless leg syndrome      Dose: 1 mg  Take 1 tablet (1 mg) by mouth daily  Refills: 0     spironolactone 25 MG tablet  Commonly known as: ALDACTONE  Used for: Acute on chronic congestive heart failure, unspecified heart failure type (H)      Dose: 25 mg  Take 1 tablet (25 mg) by mouth daily  Quantity: 30 tablet  Refills: 0     tadalafil 20 MG tablet  Commonly known as: CIALIS  Used for: 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 Read the directions carefully, and ask your doctor or other care provider to review them with you.            STOP taking    calcium carbonate 500 MG chewable tablet  Commonly known as: TUMS  Stopped by: Doris Hoffmann CNP        Garlic 1000 MG Caps  Stopped by: Doris Hoffmann CNP        ipratropium - albuterol 0.5 mg/2.5 mg/3 mL 0.5-2.5 (3) MG/3ML neb solution  Commonly known as: DUONEB  Stopped by: Doris Hoffmann CNP        Vitamin C 500 MG Caps  Stopped by: Doris Hoffmann CNP               ASSESSMENT/PLAN  Encounter Diagnosis   Name Primary?     Physical deconditioning Yes     Physical deconditioning PT OT    Insomnia continue trazodone 100 mg at bedtime as needed, olanzapine 5 mg every 8 hours for sleep or agitation    Congestive heart failure Daily weights, spironolactone 12.5 mg daily, Bumex 1 mg twice daily,     Hypertension continue irbesartan 150 mg daily, Metroprolol tartrate 25 mg twice daily    Type 2 diabetes glargine 8 units subcu at bedtime, sliding scale, A1c on 5/2/2022 was 7.0    GERD on omeprazole    Restless leg syndrome continue ropinirole 2 mg at bedtime    Hypertension on tadalafil 20 mg daily    COPD continue albuterol inhaler and nebulizers as needed, Breo Ellipta 1 puff daily    Cellulitis continue Augmentin until 6/15/2022    HDL on atorvastatin      Electronically signed by: Doris Hoffmann CNP

## 2022-06-14 NOTE — LETTER
6/14/2022        RE: Red Sutherland  [address]  [facility] [address]        [facility] GER[facility]ATRIC [facility]       Patient Red Sutherland  MRN: [id number]        Reason for Visit     Chief Complaint   Patient presents with     RECHECK     INITIAL       Code Status     DNR / DNI    Assessment     Acute subarachnoid hemorrhage felt to be traumatic  Stage IV chronic kidney disease  Type 2 diabetes  Peripheral vascular disease with chronic cellulitis of hisRT  foot with ulcerations.  Persistent atrial fibrillation  Pulmonary hypertension  Stage IV chronic kidney disease  Generalized weakness    Plan     Pt is admitted to TCU for strengthening and rehab.  Patient was examined in the presence of his wife who supplemented his history  Medication review also done with both of them at the request  Patient was admitted with history of subarachnoid hemorrhage felt to be traumatic in nature.  Evaluated by neurosurgery and conservative treatment.  ASA held for 2 weeks  He is on asa and med stopped for 2 weeks  Follow-up nsgy for repeat imaging  Recently was in 6 months ago with a subdural hemorrhage.  Both falls are related to gait instability.  Not felt to be due to worsening of his Parkinson's.  There is increasing concern for both of bladder control and incontinence issues.  This will be monitored in the TCU for any retention concerns  Also seen by vascular surgery because of chronic cellulitis especially of his right foot.  Augmentin given.  He will need an outpatient evaluation of of his foot calluses by vascular which was not done because of inability to give anticoagulation as he would need heparin for the procedure.  Care concerns were reviewed with his wife who is especially concerned about losing from that site.  Concerned about cognition with impaired cognition with hallucinations and agitation noted.  Monitor mood and behaviors  Prior to discharge Requip was increased.  Trazodone was increased.     Zyprexa given as needed for management of mood and behaviors  He continues to have significant issues with short-term recall.  Nursing also reports compliance with his medication is limited and occasionally he will refuse meds.  Medication review done with him and his wife.  Discontinue Zyprexa.  Give trazadone only if needed  Wants K crushed and given  Continue to monitor mood and behaviors closely  Recheck labs reviewed in the chart.  Potassium is low so we will give him an additional potassium 40 mEq today.  He is not very keen to take potassium supplementation and encouraged to do so.  Dietary advised to advise him of potassium rich diet as he is on multiple doses of diuretics  His other labs appear to be stable  His BUN is now improved to 48 and his creatinine has improved to 1.6  Recheck anemia shows chronic microcytosis  This is not new and his MCV has been hovering less than 70 for the last few checks.  He is on iron supplementation and needs to follow-up with his primary care physician for further work-up  Continue with PT/OT-patient feels he is doing quite well and wants to discharge home goals for discharge to home were reviewed both with  and wife including working with him for gait stability so he does not fall again.  All patient and wife admit that he has had some difficulty with concentration and remembering things and his short-term memory is not as good.  Wife however plans to be involved.  Time spent is 45 minutes with 28 minutes spent face-to-face with the patient by reviewing medications and behavioral concerns short-term memory issues and discharge planning.    History     Patient is a very pleasant 81 year old male who is admitted to TCU  Patient admitted post fall with increasing dizziness.  Imaging revealed a small right frontal subarachnoid hemorrhage  He was monitored and neurology was consulted  Prior to this he was admitted about 6 months ago with a subdural hematoma  But there is  also concern about gait instability which is felt to be secondary to his SDH  Seen by vascular surgery because of concerns about cellulitis of his right foot.  Unfortunately comprehensive evaluation could not be done as he would need heparin.  He was discharged on Augmentin  Patient also exhibited significant cognitive changes no agitation noted however  He has been complaining of more retention with urinary incontinence issues that will require some monitoring      Past Medical & Surgical History     PAST MEDICAL HISTORY:   Past Medical History:   Diagnosis Date     Atrial fibrillation (H)      CHF (congestive heart failure) (H)      COPD (chronic obstructive pulmonary disease) (H)      Coronary artery disease      Diabetes mellitus (H)      Essential hypertension      Hyperlipidemia      Pulmonary hypertension (H)     O2 at night     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 They put him on sildenafil as nitroprusside lowered systemic BP and with that the mean PA dropped from 54 to 49. Negative VQ at [facility] Dec 1, 2017.     RLS (restless legs syndrome)      Sleep apnea       PAST SURGICAL HISTORY:   has a past surgical history that includes IR Miscellaneous Procedure (07/20/2001); IR Abdominal Aortogram (11/16/2012); IR Miscellaneous Procedure (11/16/2012); Cardiac catheterization (12/13/2017); Coronary Stent Placement; other surgical history; shoulder surgery; Wrist surgery (Bilateral); Cataract Extraction (Bilateral); back surgery; Cardioversion (03/15/2013); Total Hip Arthroplasty (Left); Total Knee Arthroplasty (Bilateral); Colonoscopy w/wo Brush **Performed** (N/A, 01/14/2021); Coronary Angiogram (N/A, 10/25/2021); Percutaneous Coronary Intervention (N/A, 10/25/2021); Percutaneous Coronary Intervention - Lithotripsy (N/A, 10/25/2021); Left Heart Catheterization (N/A, 10/25/2021); Colonoscopy (N/A,  "10/31/2021); Esophagoscopy, gastroscopy, duodenoscopy (EGD), combined (N/A, 10/30/2021); Craniotomy (Right, 12/21/2021); PICC/Midline Placement (Right, 12/25/2021); IR Gastrostomy Tube Percutaneous Plcmnt (01/03/2022); and Peg Tube Placement (01/03/2022).      Past Social History     Reviewed,  reports that he quit smoking about 26 years ago. His smoking use included cigarettes. He has a 66.00 pack-year smoking history. He has never used smokeless tobacco. He reports current alcohol use of about 1.0 standard drink of alcohol per week. He reports that he does not use drugs.    Family History     Reviewed, and family history includes Cerebrovascular Disease in his brother; Coronary Artery Disease in his father; Depression in his brother; Heart Disease in his mother; Hyperlipidemia in his father and mother; Hypertension in his father and mother; No Known Problems in his sister.    Medication List   Post Discharge Medication Reconciliation Status: Post Discharge Medication Reconciliation Status: discharge medications reconciled and changed, per note/orders.  Current Outpatient Medications   Medication     ACCU-CHEK JOSE PLUS TEST STRP strips     acetaminophen (TYLENOL) 500 MG tablet     albuterol (PROVENTIL HFA;VENTOLIN HFA) 90 mcg/actuation inhaler     albuterol (PROVENTIL) (2.5 MG/3ML) 0.083% neb solution     ammonium lactate (AMLACTIN) 12 % external cream     amoxicillin-clavulanate (AUGMENTIN) 875-125 MG tablet     aspirin (ASA) 81 MG EC tablet     atorvastatin (LIPITOR) 40 MG tablet     azithromycin (ZITHROMAX) 250 MG tablet     BD ULTRA-FINE MANISHA PEN NEEDLES 32 gauge x 5/32\" Ndle     bumetanide (BUMEX) 2 MG tablet     Ferrous Gluconate 324 (37.5 Fe) MG TABS     fluticasone-vilanterol (BREO ELLIPTA) 200-25 mcg/dose DsDv inhaler     gabapentin (NEURONTIN) 300 MG capsule     insulin aspart (NOVOPEN ECHO) 100 UNIT/ML cartridge     insulin glargine (LANTUS PEN) 100 UNIT/ML pen     irbesartan (AVAPRO) 300 MG tablet     " lacosamide (VIMPAT) 200 MG TABS tablet     metolazone (ZAROXOLYN) 2.5 MG tablet     metoprolol tartrate (LOPRESSOR) 25 MG tablet     multivitamin w/minerals (THERA-VIT-M) tablet     OLANZapine (ZYPREXA) 5 MG tablet     omeprazole 20 MG tablet     OXYGEN-AIR DELIVERY SYSTEMS MISC     potassium chloride ER (KLOR-CON M) 20 MEQ CR tablet     rOPINIRole (REQUIP) 1 MG tablet     spironolactone (ALDACTONE) 25 MG tablet     tadalafil (CIALIS) 20 MG tablet     traZODone (DESYREL) 100 MG tablet     zinc gluconate 50 MG tablet     No current facility-administered medications for this visit.          Allergies     Allergies   Allergen Reactions     Ace Inhibitors      Bloody nose, dry mouth, cracked lips     Contrast Dye Nausea     Other reaction(s): GI intolerance, GI Upset     Furosemide Muscle Pain (Myalgia)     Previously tolerated.  Muscle cramps     Hydrochlorothiazide Unknown     Iodinated Contrast Media [Diagnostic X-Ray Materials] Nausea     Losartan Other (See Comments)     Other reaction(s): Stomatitis, Bloody nose dry mouth and lips     Losartan-Hydrochlorothiazide [Hyzaar]      Mouth sores     Metaxalone Nausea     Metolazone Other (See Comments)     Muscle cramps     Mometasone Other (See Comments)     Bloody nose     Proton Pump Inhibitors      Bloody nose, mouth sores     Rabeprazole Other (See Comments)     Mouth sores     Ramipril Other (See Comments)     Mouth sores     Shellfish Containing Products [Shellfish-Derived Products] Unknown     Other reaction(s): mouth sores, Other reaction(s): mouth sores     Sildenafil Muscle Pain (Myalgia)     Methocarbamol Rash     Penicillins Other (See Comments)     Immune-does not work for him       Review of Systems   A comprehensive review of 14 systems was done. Pertinent findings noted here and in history of present illness. All the rest negative.  Constitutional: Negative.  Negative for fever, chills, he has  activity change, appetite change and fatigue.   HENT:  "Negative for congestion and facial swelling.    Eyes: Negative for photophobia, redness and visual disturbance.   Respiratory: Negative for cough and chest tightness.    Cardiovascular: Negative for chest pain, palpitations and leg swelling.   Gastrointestinal: Negative for nausea, diarrhea, constipation, blood in stool and abdominal distention.   Genitourinary: Negative.    Musculoskeletal: Negative.  Noted to be walking quite well  Skin: Negative.    Neurological: Negative for dizziness, tremors, syncope, weakness, light-headedness and has had 3 headaches.  Reports that he has difficulty concentrating  Hematological: Does not bruise/bleed easily.   Psychiatric/Behavioral: Short-term recall is impaired.  He does have occasional behaviors and has been refusing meds intermittently      Physical Exam   BP (!) 146/82   Pulse 74   Temp (!) 96.3  F (35.7  C)   Resp 20   Ht 1.803 m (5' 11\")   Wt 102.1 kg (225 lb)   SpO2 94%   BMI 31.38 kg/m       Constitutional: Oriented to person, place, and time and appears well-developed.   HEENT:  Normocephalic and atraumatic.  Eyes: Conjunctivae and EOM are normal. Pupils are equal, round, and reactive to light. No discharge.  No scleral icterus. Nose normal. Mouth/Throat: Oropharynx is clear and moist. No oropharyngeal exudate.    NECK: Normal range of motion. Neck supple. No JVD present. No tracheal deviation present. No thyromegaly present.   CARDIOVASCULAR: Normal rate, regular rhythm and intact distal pulses.  Exam reveals no gallop and no friction rub.  Systolic murmur present.  PULMONARY: Effort normal and breath sounds normal. No respiratory distress.No Wheezing or rales.  ABDOMEN: Soft. Bowel sounds are normal. No distension and no mass.  There is no tenderness. There is no rebound and no guarding. No HSM.  MUSCULOSKELETAL: Normal range of motion and no tenderness. Mild kyphosis, no tenderness.  LYMPH NODES: Has no cervical, supraclavicular, axillary and groin " adenopathy.   NEUROLOGICAL: Alert and oriented to person, place, and time. No cranial nerve deficit.  Normal muscle tone. Coordination normal.   GENITOURINARY: Deferred exam.  SKIN: Skin is warm and dry. No rash noted. No erythema. No pallor.   Scalp hematoma and fading.  There is a extensive rash with an open area noted on his right lower extremity.  There is some minimal oozing noted is open to air no secondary concern for cellulitis  EXTREMITIES: No cyanosis, no clubbing, right lower extremity edema. No Deformity.  PSYCHIATRIC: Normal mood, affect and behavior.  Some recall issues are impaired      Lab Results     Recent Results (from the past 240 hour(s))   COVID-19 VIRUS (CORONAVIRUS) BY PCR (EXTERNAL RESULT)    Collection Time: 06/08/22  3:15 PM   Result Value Ref Range    COVID-19 Virus by PCR (External Result) Negative Negative   COVID-19 VIRUS (CORONAVIRUS) BY PCR (EXTERNAL RESULT)    Collection Time: 06/10/22  5:34 PM   Result Value Ref Range    COVID-19 Virus by PCR (External Result) Negative Negative           Electronically signed by    Belem Camp MD                             Sincerely,        JOSELINE Bhatt

## 2022-06-20 NOTE — LETTER
6/20/2022        RE: Red Sutherland  [address] [facility] [id number]        [facility] GERIATRIC SERVICES  Chief Complaint   Patient presents with     FILOMENA     [facility] Medical Record Number:  [id number]  Place of Service where encounter took place:  [facility] ([facility]) [[id number]]  Code Status:  DNR    HISTORY:      HPI:  Red Sutherland  is 81 year old (1940) undergoing physical and occupational therapy. He is  with a history of DM2 on Lantus, HTN, pulmHTN, COPD, stage IV CKD, PAD, and AFib but off anticoagulation due to relatively recent SDH who was admitted to the neuro ICU 6/8 for management of an acute, traumatic subarachnoid hemorrhage.     He presented to the ED, where head CT revealed a small right frontal subarachnoid hemorrhage. Neurologically appears to be at baseline. was admitted to the neuro ICU for overnight monitoring. Serial CTs show stable hemorrhage.     Today he was seen at the bedside to review vital signs, labs,  and for a routine visit.  He denied chest pain shortness of breath cough congestion constipation or diarrhea.  His weight was reviewed and he is up 10 pounds over the last week.  His lung sounds were clear and he did have +2-3 lower extremity edema his legs were tight however he told writer that he was told not to wear compression stockings from his cardiologist.  He was noted to have decreased range of motion upper extremities and pain with his left arm when lifting.  He tells writer this is not new.  Vital signs are stable.  He is also with chronic left groin pain.  He reports he has had an x-ray done in the past and nothing was found. He does get relief with Tylenol and this was scheduled. Bumex increased back to 2 mg BID  and staff to update provider if potassium still low.     ALLERGIES:Ace inhibitors, Contrast dye, Furosemide, Hydrochlorothiazide, Iodinated contrast media [diagnostic x-ray materials], Losartan, Losartan-hydrochlorothiazide [hyzaar],  Metaxalone, Metolazone, Mometasone, Proton pump inhibitors, Rabeprazole, Ramipril, Shellfish containing products [shellfish-derived products], Sildenafil, Methocarbamol, and Penicillins    PAST MEDICAL HISTORY:   Past Medical History:   Diagnosis Date     Atrial fibrillation (H)      CHF (congestive heart failure) (H)      COPD (chronic obstructive pulmonary disease) (H)      Coronary artery disease      Diabetes mellitus (H)      Essential hypertension      Hyperlipidemia      Pulmonary hypertension (H)     O2 at night     Pulmonary hypertension due to left ventricular diastolic dysfunction (H) 11/28/2017    Multifactorial per [place] with elevated LVEDP and PCW, COPD and NOVA. They put him on sildenafil as nitroprusside lowered systemic BP and with that the mean PA dropped from 54 to 49. Negative VQ at [place] Dec 1, 2017.     RLS (restless legs syndrome)      Sleep apnea        PAST SURGICAL HISTORY:   has a past surgical history that includes IR Miscellaneous Procedure (07/20/2001); IR Abdominal Aortogram (11/16/2012); IR Miscellaneous Procedure (11/16/2012); Cardiac catheterization (12/13/2017); Coronary Stent Placement; other surgical history; shoulder surgery; Wrist surgery (Bilateral); Cataract Extraction (Bilateral); back surgery; Cardioversion (03/15/2013); Total Hip Arthroplasty (Left); Total Knee Arthroplasty (Bilateral); Colonoscopy w/wo Brush **Performed** (N/A, 01/14/2021); Coronary Angiogram (N/A, 10/25/2021); Percutaneous Coronary Intervention (N/A, 10/25/2021); Percutaneous Coronary Intervention - Lithotripsy (N/A, 10/25/2021); Left Heart Catheterization (N/A, 10/25/2021); Colonoscopy (N/A, 10/31/2021); Esophagoscopy, gastroscopy, duodenoscopy (EGD), combined (N/A, 10/30/2021); Craniotomy (Right, 12/21/2021); PICC/Midline Placement (Right, 12/25/2021); IR Gastrostomy Tube Percutaneous Plcmnt (01/03/2022); and Peg Tube Placement (01/03/2022).    FAMILY HISTORY: family history includes Cerebrovascular  Disease in his brother; Coronary Artery Disease in his father; Depression in his brother; Heart Disease in his mother; Hyperlipidemia in his father and mother; Hypertension in his father and mother; No Known Problems in his sister.    SOCIAL HISTORY:  reports that he quit smoking about 26 years ago. His smoking use included cigarettes. He has a 66.00 pack-year smoking history. He has never used smokeless tobacco. He reports current alcohol use of about 1.0 standard drink of alcohol per week. He reports that he does not use drugs.    ROS:  Constitutional: Negative for activity change, appetite change, fatigue and fever.   HENT: Negative for congestion.    Respiratory: Negative for cough, shortness of breath and wheezing.    Cardiovascular: Negative for chest pain and leg swelling.   Gastrointestinal: Negative for abdominal distention, abdominal pain, constipation, diarrhea and nausea.   Genitourinary: Negative for dysuria.   Musculoskeletal: Negative for arthralgia. Negative for back pain.   Skin: Negative for color change and wound.   Neurological: Negative for dizziness.   Psychiatric/Behavioral: Negative for agitation, behavioral problems and confusion.     Physical Exam:  Constitutional:       Appearance: Patient is well-developed.   HENT:      Head: Normocephalic.   Eyes:      Conjunctiva/sclera: Conjunctivae normal.   Neck:      Musculoskeletal: Normal range of motion.   Cardiovascular:      Rate and Rhythm: Normal rate and regular rhythm.      Heart sounds: Normal heart sounds. No murmur.   Pulmonary:      Effort: No respiratory distress.      Breath sounds: Normal breath sounds. No wheezing or rales.   Abdominal:      General: Bowel sounds are normal. There is no distension.      Palpations: Abdomen is soft.      Tenderness: There is no abdominal tenderness.   Musculoskeletal:       Normal range of motion.     Decreased range of motion upper extremities,  chronic left groin pain   Skin:General:        Skin is  "warm.   Neurological:         Mental Status: Patient is alert and oriented to person, place, and time.   Psychiatric:         Behavior: Behavior normal.     Vitals:/52   Pulse 83   Temp 97.7  F (36.5  C)   Resp 16   Ht 1.854 m (6' 1\")   Wt 106.6 kg (235 lb)   SpO2 97%   BMI 31.00 kg/m   and Body mass index is 31 kg/m .    Lab/Diagnostic data:   Recent Results (from the past 240 hour(s))   COVID-19 VIRUS (CORONAVIRUS) BY PCR (EXTERNAL RESULT)    Collection Time: 06/10/22  5:34 PM   Result Value Ref Range    COVID-19 Virus by PCR (External Result) Negative Negative   Basic metabolic panel    Collection Time: 06/14/22  7:05 AM   Result Value Ref Range    Sodium 138 136 - 145 mmol/L    Potassium 3.3 (L) 3.5 - 5.0 mmol/L    Chloride 99 98 - 107 mmol/L    Carbon Dioxide (CO2) 28 22 - 31 mmol/L    Anion Gap 11 5 - 18 mmol/L    Urea Nitrogen 48 (H) 8 - 28 mg/dL    Creatinine 1.66 (H) 0.70 - 1.30 mg/dL    Calcium 9.1 8.5 - 10.5 mg/dL    Glucose 86 70 - 125 mg/dL    GFR Estimate 42 (L) >60 mL/min/1.73m2   CBC with platelets    Collection Time: 06/14/22  7:05 AM   Result Value Ref Range    WBC Count 6.1 4.0 - 11.0 10e3/uL    RBC Count 4.98 4.40 - 5.90 10e6/uL    Hemoglobin 9.8 (L) 13.3 - 17.7 g/dL    Hematocrit 34.5 (L) 40.0 - 53.0 %    MCV 69 (L) 78 - 100 fL    MCH 19.7 (L) 26.5 - 33.0 pg    MCHC 28.4 (L) 31.5 - 36.5 g/dL    RDW 21.5 (H) 10.0 - 15.0 %    Platelet Count 191 150 - 450 10e3/uL   Magnesium    Collection Time: 06/14/22  7:05 AM   Result Value Ref Range    Magnesium 2.2 1.8 - 2.6 mg/dL       MEDICATIONS:     Review of your medicines          Accurate as of June 20, 2022  2:02 PM. If you have any questions, ask your nurse or doctor.            CONTINUE these medicines which may have CHANGED, or have new prescriptions. If we are uncertain of the size of tablets/capsules you have at home, strength may be listed as something that might have changed.      Dose / Directions   spironolactone 25 MG " "tablet  Commonly known as: ALDACTONE  This may have changed: how much to take  Used for: Acute on chronic congestive heart failure, unspecified heart failure type (H)      Dose: 25 mg  Take 1 tablet (25 mg) by mouth daily  Quantity: 30 tablet  Refills: 0        CONTINUE these medicines which have NOT CHANGED      Dose / Directions   Accu-Chek Montse Plus test strip  Generic drug: blood glucose      [ACCU-CHEK MONTSE PLUS TEST STRP STRIPS] see administration instructions.  Refills: 0     acetaminophen 500 MG tablet  Commonly known as: TYLENOL      Dose: 1,000 mg  Take 1,000 mg by mouth every 6 hours  Refills: 0     * albuterol 108 (90 Base) MCG/ACT inhaler  Commonly known as: PROAIR HFA/PROVENTIL HFA/VENTOLIN HFA      Dose: 2 puff  Inhale 2 puffs into the lungs every 6 hours as needed  Refills: 0     * albuterol (2.5 MG/3ML) 0.083% neb solution  Commonly known as: PROVENTIL      Dose: 2.5 mg  Take 2.5 mg by nebulization every 6 hours as needed for shortness of breath / dyspnea or wheezing  Refills: 0     ammonium lactate 12 % external cream  Commonly known as: AMLACTIN  Used for: Anhidrosis, Keratoma, Hammer toes of both feet, Type 2 diabetes mellitus with other specified complication, with long-term current use of insulin (H), PAD (peripheral artery disease) (H)      Apply topically 2 times daily  Quantity: 140 g  Refills: 3     aspirin 81 MG EC tablet  Commonly known as: ASA  Used for: NSTEMI (non-ST elevated myocardial infarction) (H)      Dose: 81 mg  Take 1 tablet (81 mg) by mouth daily  Refills: 0     atorvastatin 40 MG tablet  Commonly known as: LIPITOR      Dose: 40 mg  Take 40 mg by mouth  Refills: 0     azithromycin 250 MG tablet  Commonly known as: ZITHROMAX      Dose: 1,000 mg  Take 1,000 mg by mouth daily as needed 4 tablets before dental  Refills: 0     BD MANISHA U/F 32G X 4 MM miscellaneous  Generic drug: insulin pen needle      [BD ULTRA-FINE MANISHA PEN NEEDLES 32 GAUGE X 5/32\" NDLE]  Refills: 4   "   bumetanide 2 MG tablet  Commonly known as: BUMEX      Dose: 2 mg  2 mg 2 times daily  Refills: 0     Ferrous Gluconate 324 (37.5 Fe) MG Tabs      Dose: 1 tablet  Take 1 tablet by mouth every other day  Refills: 0     fluticasone-vilanterol 200-25 MCG/INH inhaler  Commonly known as: BREO ELLIPTA      Dose: 1 puff  Inhale 1 puff into the lungs daily  Refills: 0     gabapentin 300 MG capsule  Commonly known as: NEURONTIN      Dose: 300 mg  Take 300 mg by mouth 2 times daily  Refills: 0     insulin aspart 100 UNIT/ML cartridge  Commonly known as: NOVOPEN ECHO      Inject Subcutaneous 3 times daily (with meals) L Inject  as per sliding scale: if 70 - 149 = 0 unit; 150 - 199 = 1  units; 200 - 249 = 2 units; 250 - 299 = 3 units; 300 -  349 = 4 units; 350 - 399 = 5 units; 400 - 900 = 6 units  give 6 units and CALL md  Refills: 0     insulin glargine 100 UNIT/ML pen  Commonly known as: LANTUS PEN      Dose: 8 Units  Inject 8 Units Subcutaneous  Refills: 0     irbesartan 300 MG tablet  Commonly known as: AVAPRO      Dose: 300 mg  Take 300 mg by mouth daily  Refills: 0     lacosamide 200 MG Tabs tablet  Commonly known as: VIMPAT  Used for: Seizures (H)      Dose: 200 mg  1 tablet (200 mg) by Oral or Feeding Tube route 2 times daily  Quantity: 60 tablet  Refills: 0     metolazone 2.5 MG tablet  Commonly known as: ZAROXOLYN      Dose: 2.5 mg  Take 2.5 mg by mouth daily as needed  Refills: 0     metoprolol tartrate 25 MG tablet  Commonly known as: LOPRESSOR  Used for: Permanent atrial fibrillation (H)      Dose: 25 mg  Take 1 tablet (25 mg) by mouth 2 times daily (with meals)  Quantity: 60 tablet  Refills: 3     multivitamin w/minerals tablet      Dose: 1 tablet  Take 1 tablet by mouth daily  Refills: 0     omeprazole 20 MG tablet      Dose: 20 mg  Take 20 mg by mouth 2 times daily  Refills: 0     order for DME      Refills: 0     potassium chloride ER 20 MEQ CR tablet  Commonly known as: KLOR-CON M      Dose: 20 mEq  Take 20  mEq by mouth 2 times daily  Refills: 0     rOPINIRole 1 MG tablet  Commonly known as: REQUIP  Used for: Restless leg syndrome      Dose: 1 mg  Take 1 tablet (1 mg) by mouth daily  Refills: 0     tadalafil 20 MG tablet  Commonly known as: CIALIS  Used for: 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 Read the directions carefully, and ask your doctor or other care provider to review them with you.            STOP taking    OLANZapine 5 MG tablet  Commonly known as: zyPREXA  Stopped by: Doris Hoffmann CNP               ASSESSMENT/PLAN  Encounter Diagnoses   Name Primary?     Diabetic ulcer of toe of left foot associated with type 2 diabetes mellitus, unspecified ulcer stage (H) Yes     Chronic anemia      Pain management      Physical deconditioning      Weight gain      Physical deconditioning PT OT    Insomnia continue trazodone 100 mg at bedtime as needed,     Congestive heart failure Daily weights, spironolactone 12.5 mg daily, Bumex 2 mg twice daily, metolozone 2.5 mg twice weekly     Hypertension continue irbesartan 150 mg daily, Metroprolol tartrate 25 mg twice daily    Type 2 diabetes glargine 8 units subcu at bedtime, sliding scale, A1c on 5/2/2022 was 7.0    GERD on omeprazole    hypokalemia On potasium 20meq  BID, lab pending   Restless leg syndrome continue ropinirole 2 mg at bedtime    Hypertension on tadalafil 20 mg daily    COPD continue albuterol inhaler and nebulizers as needed, Breo Ellipta 1 puff daily    Cellulitis completed Augmentin on 6/15/2022    HDL on atorvastatin,  ASA      Electronically signed by: Doris Hoffmann CNP        Sincerely,        Doris Hoffmann CNP

## 2022-06-20 NOTE — TELEPHONE ENCOUNTER
[facility] Lab Note     Provider: HELEN Lugo  Facility: [facility]  Facility Type:  TCU    Allergies   Allergen Reactions     Ace Inhibitors      Bloody nose, dry mouth, cracked lips     Contrast Dye Nausea     Other reaction(s): GI intolerance, GI Upset     Furosemide Muscle Pain (Myalgia)     Previously tolerated.  Muscle cramps     Hydrochlorothiazide Unknown     Iodinated Contrast Media [Diagnostic X-Ray Materials] Nausea     Losartan Other (See Comments)     Other reaction(s): Stomatitis, Bloody nose dry mouth and lips     Losartan-Hydrochlorothiazide [Hyzaar]      Mouth sores     Metaxalone Nausea     Metolazone Other (See Comments)     Muscle cramps     Mometasone Other (See Comments)     Bloody nose     Proton Pump Inhibitors      Bloody nose, mouth sores     Rabeprazole Other (See Comments)     Mouth sores     Ramipril Other (See Comments)     Mouth sores     Shellfish Containing Products [Shellfish-Derived Products] Unknown     Other reaction(s): mouth sores, Other reaction(s): mouth sores     Sildenafil Muscle Pain (Myalgia)     Methocarbamol Rash     Penicillins Other (See Comments)     Immune-does not work for him       Labs Reviewed by provider: BMP, BNP     Verbal Order/Direction given by Provider: Gently encourage fluids.  Check BMP on 6/23/22.      Provider giving Order:  HELEN Lugo    Verbal Order given to: Maria Luz Amos RN

## 2022-06-20 NOTE — PROGRESS NOTES
[facility] GERIATRIC SERVICES  Chief Complaint   Patient presents with     FILOMENA     [facility] Medical Record Number:  [id number]  Place of Service where encounter took place:  [facility] ([facility]) [[id number]]  Code Status:  DNR    HISTORY:      HPI:  Red Sutherland  is 81 year old (1940) undergoing physical and occupational therapy. He is  with a history of DM2 on Lantus, HTN, pulmHTN, COPD, stage IV CKD, PAD, and AFib but off anticoagulation due to relatively recent SDH who was admitted to the neuro ICU 6/8 for management of an acute, traumatic subarachnoid hemorrhage.     He presented to the ED, where head CT revealed a small right frontal subarachnoid hemorrhage. Neurologically appears to be at baseline. was admitted to the neuro ICU for overnight monitoring. Serial CTs show stable hemorrhage.     Today he was seen at the bedside to review vital signs, labs,  and for a routine visit.  He denied chest pain shortness of breath cough congestion constipation or diarrhea.  His weight was reviewed and he is up 10 pounds over the last week.  His lung sounds were clear and he did have +2-3 lower extremity edema his legs were tight however he told writer that he was told not to wear compression stockings from his cardiologist.  He was noted to have decreased range of motion upper extremities and pain with his left arm when lifting.  He tells writer this is not new.  Vital signs are stable.  He is also with chronic left groin pain.  He reports he has had an x-ray done in the past and nothing was found. He does get relief with Tylenol and this was scheduled. Bumex increased back to 2 mg BID  and staff to update provider if potassium still low.     ALLERGIES:Ace inhibitors, Contrast dye, Furosemide, Hydrochlorothiazide, Iodinated contrast media [diagnostic x-ray materials], Losartan, Losartan-hydrochlorothiazide [hyzaar], Metaxalone, Metolazone, Mometasone, Proton pump inhibitors, Rabeprazole, Ramipril,  Shellfish containing products [shellfish-derived products], Sildenafil, Methocarbamol, and Penicillins    PAST MEDICAL HISTORY:   Past Medical History:   Diagnosis Date     Atrial fibrillation (H)      CHF (congestive heart failure) (H)      COPD (chronic obstructive pulmonary disease) (H)      Coronary artery disease      Diabetes mellitus (H)      Essential hypertension      Hyperlipidemia      Pulmonary hypertension (H)     O2 at night     Pulmonary hypertension due to left ventricular diastolic dysfunction (H) 11/28/2017    Multifactorial per [facility] with elevated LVEDP and PCW, COPD and NOVA. They put him on sildenafil as nitroprusside lowered systemic BP and with that the mean PA dropped from 54 to 49. Negative VQ at [facility] Dec 1, 2017.     RLS (restless legs syndrome)      Sleep apnea        PAST SURGICAL HISTORY:   has a past surgical history that includes IR Miscellaneous Procedure (07/20/2001); IR Abdominal Aortogram (11/16/2012); IR Miscellaneous Procedure (11/16/2012); Cardiac catheterization (12/13/2017); Coronary Stent Placement; other surgical history; shoulder surgery; Wrist surgery (Bilateral); Cataract Extraction (Bilateral); back surgery; Cardioversion (03/15/2013); Total Hip Arthroplasty (Left); Total Knee Arthroplasty (Bilateral); Colonoscopy w/wo Brush **Performed** (N/A, 01/14/2021); Coronary Angiogram (N/A, 10/25/2021); Percutaneous Coronary Intervention (N/A, 10/25/2021); Percutaneous Coronary Intervention - Lithotripsy (N/A, 10/25/2021); Left Heart Catheterization (N/A, 10/25/2021); Colonoscopy (N/A, 10/31/2021); Esophagoscopy, gastroscopy, duodenoscopy (EGD), combined (N/A, 10/30/2021); Craniotomy (Right, 12/21/2021); PICC/Midline Placement (Right, 12/25/2021); IR Gastrostomy Tube Percutaneous Plcmnt (01/03/2022); and Peg Tube Placement (01/03/2022).    FAMILY HISTORY: family history includes Cerebrovascular Disease in his brother; Coronary Artery Disease in his father; Depression in his  brother; Heart Disease in his mother; Hyperlipidemia in his father and mother; Hypertension in his father and mother; No Known Problems in his sister.    SOCIAL HISTORY:  reports that he quit smoking about 26 years ago. His smoking use included cigarettes. He has a 66.00 pack-year smoking history. He has never used smokeless tobacco. He reports current alcohol use of about 1.0 standard drink of alcohol per week. He reports that he does not use drugs.    ROS:  Constitutional: Negative for activity change, appetite change, fatigue and fever.   HENT: Negative for congestion.    Respiratory: Negative for cough, shortness of breath and wheezing.    Cardiovascular: Negative for chest pain and leg swelling.   Gastrointestinal: Negative for abdominal distention, abdominal pain, constipation, diarrhea and nausea.   Genitourinary: Negative for dysuria.   Musculoskeletal: Negative for arthralgia. Negative for back pain.   Skin: Negative for color change and wound.   Neurological: Negative for dizziness.   Psychiatric/Behavioral: Negative for agitation, behavioral problems and confusion.     Physical Exam:  Constitutional:       Appearance: Patient is well-developed.   HENT:      Head: Normocephalic.   Eyes:      Conjunctiva/sclera: Conjunctivae normal.   Neck:      Musculoskeletal: Normal range of motion.   Cardiovascular:      Rate and Rhythm: Normal rate and regular rhythm.      Heart sounds: Normal heart sounds. No murmur.   Pulmonary:      Effort: No respiratory distress.      Breath sounds: Normal breath sounds. No wheezing or rales.   Abdominal:      General: Bowel sounds are normal. There is no distension.      Palpations: Abdomen is soft.      Tenderness: There is no abdominal tenderness.   Musculoskeletal:       Normal range of motion.     Decreased range of motion upper extremities,  chronic left groin pain   Skin:General:        Skin is warm.   Neurological:         Mental Status: Patient is alert and oriented to  "person, place, and time.   Psychiatric:         Behavior: Behavior normal.     Vitals:/52   Pulse 83   Temp 97.7  F (36.5  C)   Resp 16   Ht 1.854 m (6' 1\")   Wt 106.6 kg (235 lb)   SpO2 97%   BMI 31.00 kg/m   and Body mass index is 31 kg/m .    Lab/Diagnostic data:   Recent Results (from the past 240 hour(s))   COVID-19 VIRUS (CORONAVIRUS) BY PCR (EXTERNAL RESULT)    Collection Time: 06/10/22  5:34 PM   Result Value Ref Range    COVID-19 Virus by PCR (External Result) Negative Negative   Basic metabolic panel    Collection Time: 06/14/22  7:05 AM   Result Value Ref Range    Sodium 138 136 - 145 mmol/L    Potassium 3.3 (L) 3.5 - 5.0 mmol/L    Chloride 99 98 - 107 mmol/L    Carbon Dioxide (CO2) 28 22 - 31 mmol/L    Anion Gap 11 5 - 18 mmol/L    Urea Nitrogen 48 (H) 8 - 28 mg/dL    Creatinine 1.66 (H) 0.70 - 1.30 mg/dL    Calcium 9.1 8.5 - 10.5 mg/dL    Glucose 86 70 - 125 mg/dL    GFR Estimate 42 (L) >60 mL/min/1.73m2   CBC with platelets    Collection Time: 06/14/22  7:05 AM   Result Value Ref Range    WBC Count 6.1 4.0 - 11.0 10e3/uL    RBC Count 4.98 4.40 - 5.90 10e6/uL    Hemoglobin 9.8 (L) 13.3 - 17.7 g/dL    Hematocrit 34.5 (L) 40.0 - 53.0 %    MCV 69 (L) 78 - 100 fL    MCH 19.7 (L) 26.5 - 33.0 pg    MCHC 28.4 (L) 31.5 - 36.5 g/dL    RDW 21.5 (H) 10.0 - 15.0 %    Platelet Count 191 150 - 450 10e3/uL   Magnesium    Collection Time: 06/14/22  7:05 AM   Result Value Ref Range    Magnesium 2.2 1.8 - 2.6 mg/dL       MEDICATIONS:     Review of your medicines          Accurate as of June 20, 2022  2:02 PM. If you have any questions, ask your nurse or doctor.            CONTINUE these medicines which may have CHANGED, or have new prescriptions. If we are uncertain of the size of tablets/capsules you have at home, strength may be listed as something that might have changed.      Dose / Directions   spironolactone 25 MG tablet  Commonly known as: ALDACTONE  This may have changed: how much to take  Used for: " "Acute on chronic congestive heart failure, unspecified heart failure type (H)      Dose: 25 mg  Take 1 tablet (25 mg) by mouth daily  Quantity: 30 tablet  Refills: 0        CONTINUE these medicines which have NOT CHANGED      Dose / Directions   Accu-Chek Montse Plus test strip  Generic drug: blood glucose      [ACCU-CHEK MONTSE PLUS TEST STRP STRIPS] see administration instructions.  Refills: 0     acetaminophen 500 MG tablet  Commonly known as: TYLENOL      Dose: 1,000 mg  Take 1,000 mg by mouth every 6 hours  Refills: 0     * albuterol 108 (90 Base) MCG/ACT inhaler  Commonly known as: PROAIR HFA/PROVENTIL HFA/VENTOLIN HFA      Dose: 2 puff  Inhale 2 puffs into the lungs every 6 hours as needed  Refills: 0     * albuterol (2.5 MG/3ML) 0.083% neb solution  Commonly known as: PROVENTIL      Dose: 2.5 mg  Take 2.5 mg by nebulization every 6 hours as needed for shortness of breath / dyspnea or wheezing  Refills: 0     ammonium lactate 12 % external cream  Commonly known as: AMLACTIN  Used for: Anhidrosis, Keratoma, Hammer toes of both feet, Type 2 diabetes mellitus with other specified complication, with long-term current use of insulin (H), PAD (peripheral artery disease) (H)      Apply topically 2 times daily  Quantity: 140 g  Refills: 3     aspirin 81 MG EC tablet  Commonly known as: ASA  Used for: NSTEMI (non-ST elevated myocardial infarction) (H)      Dose: 81 mg  Take 1 tablet (81 mg) by mouth daily  Refills: 0     atorvastatin 40 MG tablet  Commonly known as: LIPITOR      Dose: 40 mg  Take 40 mg by mouth  Refills: 0     azithromycin 250 MG tablet  Commonly known as: ZITHROMAX      Dose: 1,000 mg  Take 1,000 mg by mouth daily as needed 4 tablets before dental  Refills: 0     BD MANISHA U/F 32G X 4 MM miscellaneous  Generic drug: insulin pen needle      [BD ULTRA-FINE MANISHA PEN NEEDLES 32 GAUGE X 5/32\" NDLE]  Refills: 4     bumetanide 2 MG tablet  Commonly known as: BUMEX      Dose: 2 mg  2 mg 2 times daily  Refills: " 0     Ferrous Gluconate 324 (37.5 Fe) MG Tabs      Dose: 1 tablet  Take 1 tablet by mouth every other day  Refills: 0     fluticasone-vilanterol 200-25 MCG/INH inhaler  Commonly known as: BREO ELLIPTA      Dose: 1 puff  Inhale 1 puff into the lungs daily  Refills: 0     gabapentin 300 MG capsule  Commonly known as: NEURONTIN      Dose: 300 mg  Take 300 mg by mouth 2 times daily  Refills: 0     insulin aspart 100 UNIT/ML cartridge  Commonly known as: NOVOPEN ECHO      Inject Subcutaneous 3 times daily (with meals) L Inject  as per sliding scale: if 70 - 149 = 0 unit; 150 - 199 = 1  units; 200 - 249 = 2 units; 250 - 299 = 3 units; 300 -  349 = 4 units; 350 - 399 = 5 units; 400 - 900 = 6 units  give 6 units and CALL md  Refills: 0     insulin glargine 100 UNIT/ML pen  Commonly known as: LANTUS PEN      Dose: 8 Units  Inject 8 Units Subcutaneous  Refills: 0     irbesartan 300 MG tablet  Commonly known as: AVAPRO      Dose: 300 mg  Take 300 mg by mouth daily  Refills: 0     lacosamide 200 MG Tabs tablet  Commonly known as: VIMPAT  Used for: Seizures (H)      Dose: 200 mg  1 tablet (200 mg) by Oral or Feeding Tube route 2 times daily  Quantity: 60 tablet  Refills: 0     metolazone 2.5 MG tablet  Commonly known as: ZAROXOLYN      Dose: 2.5 mg  Take 2.5 mg by mouth daily as needed  Refills: 0     metoprolol tartrate 25 MG tablet  Commonly known as: LOPRESSOR  Used for: Permanent atrial fibrillation (H)      Dose: 25 mg  Take 1 tablet (25 mg) by mouth 2 times daily (with meals)  Quantity: 60 tablet  Refills: 3     multivitamin w/minerals tablet      Dose: 1 tablet  Take 1 tablet by mouth daily  Refills: 0     omeprazole 20 MG tablet      Dose: 20 mg  Take 20 mg by mouth 2 times daily  Refills: 0     order for DME      Refills: 0     potassium chloride ER 20 MEQ CR tablet  Commonly known as: KLOR-CON M      Dose: 20 mEq  Take 20 mEq by mouth 2 times daily  Refills: 0     rOPINIRole 1 MG tablet  Commonly known as: REQUIP  Used  for: Restless leg syndrome      Dose: 1 mg  Take 1 tablet (1 mg) by mouth daily  Refills: 0     tadalafil 20 MG tablet  Commonly known as: CIALIS  Used for: 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 Read the directions carefully, and ask your doctor or other care provider to review them with you.            STOP taking    OLANZapine 5 MG tablet  Commonly known as: zyPREXA  Stopped by: Doris Hoffmann CNP               ASSESSMENT/PLAN  Encounter Diagnoses   Name Primary?     Diabetic ulcer of toe of left foot associated with type 2 diabetes mellitus, unspecified ulcer stage (H) Yes     Chronic anemia      Pain management      Physical deconditioning      Weight gain      Physical deconditioning PT OT    Insomnia continue trazodone 100 mg at bedtime as needed,     Congestive heart failure Daily weights, spironolactone 12.5 mg daily, Bumex 2 mg twice daily, metolozone 2.5 mg twice weekly     Hypertension continue irbesartan 150 mg daily, Metroprolol tartrate 25 mg twice daily    Type 2 diabetes glargine 8 units subcu at bedtime, sliding scale, A1c on 5/2/2022 was 7.0    GERD on omeprazole    hypokalemia On potasium 20meq  BID, lab pending   Restless leg syndrome continue ropinirole 2 mg at bedtime    Hypertension on tadalafil 20 mg daily    COPD continue albuterol inhaler and nebulizers as needed, Breo Ellipta 1 puff daily    Cellulitis completed Augmentin on 6/15/2022    HDL on atorvastatin, ASA      Electronically signed by: Doris Hoffmann CNP

## 2022-06-23 NOTE — TELEPHONE ENCOUNTER
[facility] Geriatrics Lab Note     Provider: HELEN Lugo  Facility: [facility]  Facility Type:  TCU    Allergies   Allergen Reactions     Ace Inhibitors      Bloody nose, dry mouth, cracked lips     Contrast Dye Nausea     Other reaction(s): GI intolerance, GI Upset     Furosemide Muscle Pain (Myalgia)     Previously tolerated.  Muscle cramps     Hydrochlorothiazide Unknown     Iodinated Contrast Media [Diagnostic X-Ray Materials] Nausea     Losartan Other (See Comments)     Other reaction(s): Stomatitis, Bloody nose dry mouth and lips     Losartan-Hydrochlorothiazide [Hyzaar]      Mouth sores     Metaxalone Nausea     Metolazone Other (See Comments)     Muscle cramps     Mometasone Other (See Comments)     Bloody nose     Proton Pump Inhibitors      Bloody nose, mouth sores     Rabeprazole Other (See Comments)     Mouth sores     Ramipril Other (See Comments)     Mouth sores     Shellfish Containing Products [Shellfish-Derived Products] Unknown     Other reaction(s): mouth sores, Other reaction(s): mouth sores     Sildenafil Muscle Pain (Myalgia)     Methocarbamol Rash     Penicillins Other (See Comments)     Immune-does not work for him       Labs Reviewed by provider: MAYELIN     Verbal Order/Direction given by Provider: Check BMP on 6/27/22.      Provider giving Order:  HELEN Lugo    Verbal Order given to: Maria Luz([phone])    Tao Amos RN

## 2022-06-27 NOTE — PROGRESS NOTES
[facility] GERIATRIC SERVICES  Chief Complaint   Patient presents with     Discharge Summary [facility] Medical Record Number:  [id number]  Place of Service where encounter took place:  [facility] ([facility]) [[id number]]  Code Status:  DNR    HISTORY:      HPI:  Red Sutherland  is 81 year old (1940) undergoing physical and occupational therapy. He is  with a history of DM2 on Lantus, HTN, pulmHTN, COPD, stage IV CKD, PAD, and AFib but off anticoagulation due to relatively recent SDH who was admitted to the neuro ICU 6/8 for management of an acute, traumatic subarachnoid hemorrhage.     He presented to the ED, where head CT revealed a small right frontal subarachnoid hemorrhage. Neurologically appears to be at baseline. was admitted to the neuro ICU for overnight monitoring. Serial CTs show stable hemorrhage.     Today he was seen at the bedside to review vital signs, labs,  And a face-to-face for discharge.  He will discharge to home on 6/28/2022 with current medications and treatments.  He will have [facility] PT RN.  He denied chest pain shortness of breath cough congestion constipation or diarrhea.  He did have a weight gain of 9 pounds over the last week.  His lung sounds were clear with crackles bilateral bases.  He did have +2 lower extremity edema however he tells writer he is unable to wear compression stockings per his cardiologist labs reviewed his potassium was a little low at 3.4 he will get an extra dose of 20 mEq of potassium with a recheck in the a.m.  He denies any increased shortness of breath however a BNP will be checked along with his potassium   vital signs are stable.  .     ALLERGIES:Ace inhibitors, Contrast dye, Furosemide, Hydrochlorothiazide, Iodinated contrast media [diagnostic x-ray materials], Losartan, Losartan-hydrochlorothiazide [hyzaar], Metaxalone, Metolazone, Mometasone, Proton pump inhibitors, Rabeprazole, Ramipril, Shellfish containing products  [shellfish-derived products], Sildenafil, Methocarbamol, and Penicillins    PAST MEDICAL HISTORY:   Past Medical History:   Diagnosis Date     Atrial fibrillation (H)      CHF (congestive heart failure) (H)      COPD (chronic obstructive pulmonary disease) (H)      Coronary artery disease      Diabetes mellitus (H)      Essential hypertension      Hyperlipidemia      Pulmonary hypertension (H)     O2 at night     Pulmonary hypertension due to left ventricular diastolic dysfunction (H) 11/28/2017    Multifactorial per [place] with elevated LVEDP and PCW, COPD and NOVA. They put him on sildenafil as nitroprusside lowered systemic BP and with that the mean PA dropped from 54 to 49. Negative VQ at [place] Dec 1, 2017.     RLS (restless legs syndrome)      Sleep apnea        PAST SURGICAL HISTORY:   has a past surgical history that includes IR Miscellaneous Procedure (07/20/2001); IR Abdominal Aortogram (11/16/2012); IR Miscellaneous Procedure (11/16/2012); Cardiac catheterization (12/13/2017); Coronary Stent Placement; other surgical history; shoulder surgery; Wrist surgery (Bilateral); Cataract Extraction (Bilateral); back surgery; Cardioversion (03/15/2013); Total Hip Arthroplasty (Left); Total Knee Arthroplasty (Bilateral); Colonoscopy w/wo Brush **Performed** (N/A, 01/14/2021); Coronary Angiogram (N/A, 10/25/2021); Percutaneous Coronary Intervention (N/A, 10/25/2021); Percutaneous Coronary Intervention - Lithotripsy (N/A, 10/25/2021); Left Heart Catheterization (N/A, 10/25/2021); Colonoscopy (N/A, 10/31/2021); Esophagoscopy, gastroscopy, duodenoscopy (EGD), combined (N/A, 10/30/2021); Craniotomy (Right, 12/21/2021); PICC/Midline Placement (Right, 12/25/2021); IR Gastrostomy Tube Percutaneous Plcmnt (01/03/2022); and Peg Tube Placement (01/03/2022).    FAMILY HISTORY: family history includes Cerebrovascular Disease in his brother; Coronary Artery Disease in his father; Depression in his brother; Heart Disease in his mother;  Hyperlipidemia in his father and mother; Hypertension in his father and mother; No Known Problems in his sister.    SOCIAL HISTORY:  reports that he quit smoking about 26 years ago. His smoking use included cigarettes. He has a 66.00 pack-year smoking history. He has never used smokeless tobacco. He reports current alcohol use of about 1.0 standard drink of alcohol per week. He reports that he does not use drugs.    ROS:  Constitutional: Negative for activity change, appetite change, fatigue and fever.   HENT: Negative for congestion.    Respiratory: Negative for cough, shortness of breath and wheezing.    Cardiovascular: Negative for chest pain and leg swelling.   Gastrointestinal: Negative for abdominal distention, abdominal pain, constipation, diarrhea and nausea.   Genitourinary: Negative for dysuria.   Musculoskeletal: Negative for arthralgia. Negative for back pain.   Skin: Negative for color change and wound.  Vascular changes lower extremities  Neurological: Negative for dizziness.   Psychiatric/Behavioral: Negative for agitation, behavioral problems and confusion.     Physical Exam:  Constitutional:       Appearance: Patient is well-developed.   HENT:      Head: Normocephalic.   Eyes:      Conjunctiva/sclera: Conjunctivae normal.   Neck:      Musculoskeletal: Normal range of motion.   Cardiovascular:      Rate and Rhythm: Normal rate and regular rhythm.      Heart sounds: Normal heart sounds. No murmur.   Pulmonary:      Effort: No respiratory distress.      Breath sounds: Normal breath sounds. No wheezing or rales.   Abdominal:      General: Bowel sounds are normal. There is no distension.      Palpations: Abdomen is soft.      Tenderness: There is no abdominal tenderness.   Musculoskeletal:       Normal range of motion.     Decreased range of motion upper extremities,  chronic left groin pain   Skin:General:        Skin is warm.   Neurological:         Mental Status: Patient is alert and oriented to  "person, place, and time.   Psychiatric:         Behavior: Behavior normal.     Vitals:BP 97/51   Pulse 69   Temp 98.1  F (36.7  C)   Resp 18   Ht 1.854 m (6' 1\")   Wt 113.4 kg (250 lb)   SpO2 98%   BMI 32.98 kg/m   and Body mass index is 32.98 kg/m .    Lab/Diagnostic data:   Recent Results (from the past 240 hour(s))   Basic metabolic panel    Collection Time: 06/20/22 11:10 AM   Result Value Ref Range    Sodium 137 136 - 145 mmol/L    Potassium 4.3 3.5 - 5.0 mmol/L    Chloride 98 98 - 107 mmol/L    Carbon Dioxide (CO2) 23 22 - 31 mmol/L    Anion Gap 16 5 - 18 mmol/L    Urea Nitrogen 51 (H) 8 - 28 mg/dL    Creatinine 1.78 (H) 0.70 - 1.30 mg/dL    Calcium 9.3 8.5 - 10.5 mg/dL    Glucose 109 70 - 125 mg/dL    GFR Estimate 38 (L) >60 mL/min/1.73m2   B-Type Natriuretic Peptide ([facility] Only)    Collection Time: 06/20/22 11:10 AM   Result Value Ref Range     (H) 0 - 84 pg/mL   Basic metabolic panel    Collection Time: 06/23/22  9:35 AM   Result Value Ref Range    Sodium 134 (L) 136 - 145 mmol/L    Potassium 3.7 3.5 - 5.0 mmol/L    Chloride 100 98 - 107 mmol/L    Carbon Dioxide (CO2) 23 22 - 31 mmol/L    Anion Gap 11 5 - 18 mmol/L    Urea Nitrogen 56 (H) 8 - 28 mg/dL    Creatinine 1.87 (H) 0.70 - 1.30 mg/dL    Calcium 8.9 8.5 - 10.5 mg/dL    Glucose 187 (H) 70 - 125 mg/dL    GFR Estimate 36 (L) >60 mL/min/1.73m2       MEDICATIONS:     Review of your medicines          Accurate as of June 27, 2022 11:34 AM. If you have any questions, ask your nurse or doctor.            CONTINUE these medicines which may have CHANGED, or have new prescriptions. If we are uncertain of the size of tablets/capsules you have at home, strength may be listed as something that might have changed.      Dose / Directions   spironolactone 25 MG tablet  Commonly known as: ALDACTONE  This may have changed: how much to take  Used for: Acute on chronic congestive heart failure, unspecified heart failure type (H)      Dose: 25 mg  Take 1 " "tablet (25 mg) by mouth daily  Quantity: 30 tablet  Refills: 0        CONTINUE these medicines which have NOT CHANGED      Dose / Directions   Accu-Chek Montse Plus test strip  Generic drug: blood glucose      [ACCU-CHEK MONTSE PLUS TEST STRP STRIPS] see administration instructions.  Refills: 0     acetaminophen 500 MG tablet  Commonly known as: TYLENOL      Dose: 1,000 mg  Take 1,000 mg by mouth every 6 hours And 1000 qid  Refills: 0     * albuterol 108 (90 Base) MCG/ACT inhaler  Commonly known as: PROAIR HFA/PROVENTIL HFA/VENTOLIN HFA      Dose: 2 puff  Inhale 2 puffs into the lungs every 6 hours as needed  Refills: 0     * albuterol (2.5 MG/3ML) 0.083% neb solution  Commonly known as: PROVENTIL      Dose: 2.5 mg  Take 2.5 mg by nebulization every 6 hours as needed for shortness of breath / dyspnea or wheezing  Refills: 0     aspirin 81 MG EC tablet  Commonly known as: ASA  Used for: NSTEMI (non-ST elevated myocardial infarction) (H)      Dose: 81 mg  Take 1 tablet (81 mg) by mouth daily  Refills: 0     atorvastatin 40 MG tablet  Commonly known as: LIPITOR      Dose: 40 mg  Take 40 mg by mouth  Refills: 0     azithromycin 250 MG tablet  Commonly known as: ZITHROMAX      Dose: 1,000 mg  Take 1,000 mg by mouth daily as needed 4 tablets before dental  Refills: 0     BD MANISHA U/F 32G X 4 MM miscellaneous  Generic drug: insulin pen needle      [BD ULTRA-FINE MANISHA PEN NEEDLES 32 GAUGE X 5/32\" NDLE]  Refills: 4     bumetanide 2 MG tablet  Commonly known as: BUMEX      Dose: 2 mg  2 mg 2 times daily  Refills: 0     Ferrous Gluconate 324 (37.5 Fe) MG Tabs      Dose: 1 tablet  Take 1 tablet by mouth every other day  Refills: 0     fluticasone-vilanterol 200-25 MCG/INH inhaler  Commonly known as: BREO ELLIPTA      Dose: 1 puff  Inhale 1 puff into the lungs daily  Refills: 0     gabapentin 300 MG capsule  Commonly known as: NEURONTIN      Dose: 300 mg  Take 300 mg by mouth 2 times daily  Refills: 0     insulin aspart 100 UNIT/ML " cartridge  Commonly known as: NOVOPEN ECHO      Inject Subcutaneous 3 times daily (with meals) L Inject  as per sliding scale: if 70 - 149 = 0 unit; 150 - 199 = 1  units; 200 - 249 = 2 units; 250 - 299 = 3 units; 300 -  349 = 4 units; 350 - 399 = 5 units; 400 - 900 = 6 units  give 6 units and CALL md  Refills: 0     insulin glargine 100 UNIT/ML pen  Commonly known as: LANTUS PEN      Dose: 8 Units  Inject 8 Units Subcutaneous  Refills: 0     lacosamide 200 MG Tabs tablet  Commonly known as: VIMPAT  Used for: Seizures (H)      Dose: 200 mg  1 tablet (200 mg) by Oral or Feeding Tube route 2 times daily  Quantity: 60 tablet  Refills: 0     metolazone 2.5 MG tablet  Commonly known as: ZAROXOLYN      Dose: 2.5 mg  Take 2.5 mg by mouth daily as needed  Refills: 0     metoprolol tartrate 25 MG tablet  Commonly known as: LOPRESSOR  Used for: Permanent atrial fibrillation (H)      Dose: 25 mg  Take 1 tablet (25 mg) by mouth 2 times daily (with meals)  Quantity: 60 tablet  Refills: 3     multivitamin w/minerals tablet      Dose: 1 tablet  Take 1 tablet by mouth daily  Refills: 0     omeprazole 20 MG tablet      Dose: 20 mg  Take 20 mg by mouth 2 times daily  Refills: 0     order for DME      Refills: 0     potassium chloride ER 20 MEQ CR tablet  Commonly known as: KLOR-CON M      Dose: 20 mEq  Take 20 mEq by mouth 2 times daily  Refills: 0     rOPINIRole 1 MG tablet  Commonly known as: REQUIP  Used for: Restless leg syndrome      Dose: 1 mg  Take 1 tablet (1 mg) by mouth daily  Refills: 0     tadalafil 20 MG tablet  Commonly known as: CIALIS  Used for: 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 Read the directions carefully, and ask your doctor or other care provider to review them with you.            STOP taking    ammonium lactate 12 % external cream  Commonly known as: AMLACTIN  Stopped by: Doris Hoffmann CNP        irbesartan 300 MG tablet  Commonly known as: AVAPRO  Stopped by: Doris Hoffmann CNP               ASSESSMENT/PLAN  Encounter Diagnoses   Name Primary?     Diabetic ulcer of toe of left foot associated with type 2 diabetes mellitus, unspecified ulcer stage (H) Yes     Pain management      Physical deconditioning      Physical deconditioning PT OT    Insomnia continue trazodone 100 mg at bedtime as needed,     Congestive heart failure Daily weights, spironolactone 12.5 mg daily, Bumex 2 mg twice daily, metolozone 2.5 mg twice weekly     Hypertension continue irbesartan 150 mg daily, Metroprolol tartrate 25 mg twice daily    Type 2 diabetes glargine 8 units subcu at bedtime, sliding scale, A1c on 5/2/2022 was 7.0    GERD on omeprazole    hypokalemia On potasium 20meq  BID, lab pending   Restless leg syndrome continue ropinirole 2 mg at bedtime    Hypertension on tadalafil 20 mg daily    COPD continue albuterol inhaler and nebulizers as needed, Breo Ellipta 1 puff daily    Cellulitis completed Augmentin on 6/15/2022    HDL on atorvastatin, ASA    DISCHARGE PLAN/FACE TO FACE:  I certify that services are/were furnished while this patient was under the care of a physician and that a physician or an allowed non-physician practitioner (NPP), had a face-to-face encounter that meets the physician face-to-face encounter requirements. The encounter was in whole, or in part, related to the primary reason for home health. The patient is confined to his/her home and needs intermittent skilled nursing, physical therapy, speech-language pathology, or the continued need for occupational therapy. A plan of care has been  established by a physician and is periodically reviewed by a physician.  Date of Face-to-Face Encounter: 6/27/2022    I certify that, based on my findings, the following services are medically necessary home health services: PT and RN    My clinical findings support the need for the above skilled services because physical therapy for continued strength and endurance, RN for vital signs, medication management and wound cares right hallux    This patient is homebound because he is deconditioned easily fatigued following recent CVA and congestive heart failure    The patient is, or has been, under my care and I have initiated the establishment of the plan of care. This patient will be followed by a physician who will periodically review the plan of care.    Schedule follow up visit with primary care provider within 7 days to reestablish care.    Electronically signed by: Doris Hoffmann CNP

## 2022-06-27 NOTE — LETTER
6/27/2022        RE: Red Sutherland  [address] [facility] [id number]        [facility]  Chief Complaint   Patient presents with     Discharge Summary Nursing [facility] Medical Record Number:  [id number]  Place of Service where encounter took place:  [facility] (SNF) [[id number]]  Code Status:  DNR    HISTORY:      HPI:  Red Sutherland  is 81 year old (1940) undergoing physical and occupational therapy. He is  with a history of DM2 on Lantus, HTN, pulmHTN, COPD, stage IV CKD, PAD, and AFib but off anticoagulation due to relatively recent SDH who was admitted to the neuro ICU 6/8 for management of an acute, traumatic subarachnoid hemorrhage.     He presented to the ED, where head CT revealed a small right frontal subarachnoid hemorrhage. Neurologically appears to be at baseline. was admitted to the neuro ICU for overnight monitoring. Serial CTs show stable hemorrhage.     Today he was seen at the bedside to review vital signs, labs,  And a face-to-face for discharge.  He will discharge to home on 6/28/2022 with current medications and treatments.  He will have [employer] PT RN.  He denied chest pain shortness of breath cough congestion constipation or diarrhea.  He did have a weight gain of 9 pounds over the last week.  His lung sounds were clear with crackles bilateral bases.  He did have +2 lower extremity edema however he tells writer he is unable to wear compression stockings per his cardiologist labs reviewed his potassium was a little low at 3.4 he will get an extra dose of 20 mEq of potassium with a recheck in the a.m.  He denies any increased shortness of breath however a BNP will be checked along with his potassium   vital signs are stable.  .     ALLERGIES:Ace inhibitors, Contrast dye, Furosemide, Hydrochlorothiazide, Iodinated contrast media [diagnostic x-ray materials], Losartan, Losartan-hydrochlorothiazide [hyzaar], Metaxalone, Metolazone,  Mometasone, Proton pump inhibitors, Rabeprazole, Ramipril, Shellfish containing products [shellfish-derived products], Sildenafil, Methocarbamol, and Penicillins    PAST MEDICAL HISTORY:   Past Medical History:   Diagnosis Date     Atrial fibrillation (H)      CHF (congestive heart failure) (H)      COPD (chronic obstructive pulmonary disease) (H)      Coronary artery disease      Diabetes mellitus (H)      Essential hypertension      Hyperlipidemia      Pulmonary hypertension (H)     O2 at night     Pulmonary hypertension due to left ventricular diastolic dysfunction (H) 11/28/2017    Multifactorial per [place] with elevated LVEDP and PCW, COPD and NOVA. They put him on sildenafil as nitroprusside lowered systemic BP and with that the mean PA dropped from 54 to 49. Negative VQ at [place] Dec 1, 2017.     RLS (restless legs syndrome)      Sleep apnea        PAST SURGICAL HISTORY:   has a past surgical history that includes IR Miscellaneous Procedure (07/20/2001); IR Abdominal Aortogram (11/16/2012); IR Miscellaneous Procedure (11/16/2012); Cardiac catheterization (12/13/2017); Coronary Stent Placement; other surgical history; shoulder surgery; Wrist surgery (Bilateral); Cataract Extraction (Bilateral); back surgery; Cardioversion (03/15/2013); Total Hip Arthroplasty (Left); Total Knee Arthroplasty (Bilateral); Colonoscopy w/wo Brush **Performed** (N/A, 01/14/2021); Coronary Angiogram (N/A, 10/25/2021); Percutaneous Coronary Intervention (N/A, 10/25/2021); Percutaneous Coronary Intervention - Lithotripsy (N/A, 10/25/2021); Left Heart Catheterization (N/A, 10/25/2021); Colonoscopy (N/A, 10/31/2021); Esophagoscopy, gastroscopy, duodenoscopy (EGD), combined (N/A, 10/30/2021); Craniotomy (Right, 12/21/2021); PICC/Midline Placement (Right, 12/25/2021); IR Gastrostomy Tube Percutaneous Plcmnt (01/03/2022); and Peg Tube Placement (01/03/2022).    FAMILY HISTORY: family history includes Cerebrovascular Disease in his brother;  Coronary Artery Disease in his father; Depression in his brother; Heart Disease in his mother; Hyperlipidemia in his father and mother; Hypertension in his father and mother; No Known Problems in his sister.    SOCIAL HISTORY:  reports that he quit smoking about 26 years ago. His smoking use included cigarettes. He has a 66.00 pack-year smoking history. He has never used smokeless tobacco. He reports current alcohol use of about 1.0 standard drink of alcohol per week. He reports that he does not use drugs.    ROS:  Constitutional: Negative for activity change, appetite change, fatigue and fever.   HENT: Negative for congestion.    Respiratory: Negative for cough, shortness of breath and wheezing.    Cardiovascular: Negative for chest pain and leg swelling.   Gastrointestinal: Negative for abdominal distention, abdominal pain, constipation, diarrhea and nausea.   Genitourinary: Negative for dysuria.   Musculoskeletal: Negative for arthralgia. Negative for back pain.   Skin: Negative for color change and wound.  Vascular changes lower extremities  Neurological: Negative for dizziness.   Psychiatric/Behavioral: Negative for agitation, behavioral problems and confusion.     Physical Exam:  Constitutional:       Appearance: Patient is well-developed.   HENT:      Head: Normocephalic.   Eyes:      Conjunctiva/sclera: Conjunctivae normal.   Neck:      Musculoskeletal: Normal range of motion.   Cardiovascular:      Rate and Rhythm: Normal rate and regular rhythm.      Heart sounds: Normal heart sounds. No murmur.   Pulmonary:      Effort: No respiratory distress.      Breath sounds: Normal breath sounds. No wheezing or rales.   Abdominal:      General: Bowel sounds are normal. There is no distension.      Palpations: Abdomen is soft.      Tenderness: There is no abdominal tenderness.   Musculoskeletal:       Normal range of motion.     Decreased range of motion upper extremities,  chronic left groin pain   Skin:General:      "   Skin is warm.   Neurological:         Mental Status: Patient is alert and oriented to person, place, and time.   Psychiatric:         Behavior: Behavior normal.     Vitals:BP 97/51   Pulse 69   Temp 98.1  F (36.7  C)   Resp 18   Ht 1.854 m (6' 1\")   Wt 113.4 kg (250 lb)   SpO2 98%   BMI 32.98 kg/m   and Body mass index is 32.98 kg/m .    Lab/Diagnostic data:   Recent Results (from the past 240 hour(s))   Basic metabolic panel    Collection Time: 06/20/22 11:10 AM   Result Value Ref Range    Sodium 137 136 - 145 mmol/L    Potassium 4.3 3.5 - 5.0 mmol/L    Chloride 98 98 - 107 mmol/L    Carbon Dioxide (CO2) 23 22 - 31 mmol/L    Anion Gap 16 5 - 18 mmol/L    Urea Nitrogen 51 (H) 8 - 28 mg/dL    Creatinine 1.78 (H) 0.70 - 1.30 mg/dL    Calcium 9.3 8.5 - 10.5 mg/dL    Glucose 109 70 - 125 mg/dL    GFR Estimate 38 (L) >60 mL/min/1.73m2   B-Type Natriuretic Peptide ([facility] Only)    Collection Time: 06/20/22 11:10 AM   Result Value Ref Range     (H) 0 - 84 pg/mL   Basic metabolic panel    Collection Time: 06/23/22  9:35 AM   Result Value Ref Range    Sodium 134 (L) 136 - 145 mmol/L    Potassium 3.7 3.5 - 5.0 mmol/L    Chloride 100 98 - 107 mmol/L    Carbon Dioxide (CO2) 23 22 - 31 mmol/L    Anion Gap 11 5 - 18 mmol/L    Urea Nitrogen 56 (H) 8 - 28 mg/dL    Creatinine 1.87 (H) 0.70 - 1.30 mg/dL    Calcium 8.9 8.5 - 10.5 mg/dL    Glucose 187 (H) 70 - 125 mg/dL    GFR Estimate 36 (L) >60 mL/min/1.73m2       MEDICATIONS:     Review of your medicines          Accurate as of June 27, 2022 11:34 AM. If you have any questions, ask your nurse or doctor.            CONTINUE these medicines which may have CHANGED, or have new prescriptions. If we are uncertain of the size of tablets/capsules you have at home, strength may be listed as something that might have changed.      Dose / Directions   spironolactone 25 MG tablet  Commonly known as: ALDACTONE  This may have changed: how much to take  Used for: Acute on " "chronic congestive heart failure, unspecified heart failure type (H)      Dose: 25 mg  Take 1 tablet (25 mg) by mouth daily  Quantity: 30 tablet  Refills: 0        CONTINUE these medicines which have NOT CHANGED      Dose / Directions   Accu-Chek Montse Plus test strip  Generic drug: blood glucose      [ACCU-CHEK MONTSE PLUS TEST STRP STRIPS] see administration instructions.  Refills: 0     acetaminophen 500 MG tablet  Commonly known as: TYLENOL      Dose: 1,000 mg  Take 1,000 mg by mouth every 6 hours And 1000 qid  Refills: 0     * albuterol 108 (90 Base) MCG/ACT inhaler  Commonly known as: PROAIR HFA/PROVENTIL HFA/VENTOLIN HFA      Dose: 2 puff  Inhale 2 puffs into the lungs every 6 hours as needed  Refills: 0     * albuterol (2.5 MG/3ML) 0.083% neb solution  Commonly known as: PROVENTIL      Dose: 2.5 mg  Take 2.5 mg by nebulization every 6 hours as needed for shortness of breath / dyspnea or wheezing  Refills: 0     aspirin 81 MG EC tablet  Commonly known as: ASA  Used for: NSTEMI (non-ST elevated myocardial infarction) (H)      Dose: 81 mg  Take 1 tablet (81 mg) by mouth daily  Refills: 0     atorvastatin 40 MG tablet  Commonly known as: LIPITOR      Dose: 40 mg  Take 40 mg by mouth  Refills: 0     azithromycin 250 MG tablet  Commonly known as: ZITHROMAX      Dose: 1,000 mg  Take 1,000 mg by mouth daily as needed 4 tablets before dental  Refills: 0     BD MANISHA U/F 32G X 4 MM miscellaneous  Generic drug: insulin pen needle      [BD ULTRA-FINE MANISHA PEN NEEDLES 32 GAUGE X 5/32\" NDLE]  Refills: 4     bumetanide 2 MG tablet  Commonly known as: BUMEX      Dose: 2 mg  2 mg 2 times daily  Refills: 0     Ferrous Gluconate 324 (37.5 Fe) MG Tabs      Dose: 1 tablet  Take 1 tablet by mouth every other day  Refills: 0     fluticasone-vilanterol 200-25 MCG/INH inhaler  Commonly known as: BREO ELLIPTA      Dose: 1 puff  Inhale 1 puff into the lungs daily  Refills: 0     gabapentin 300 MG capsule  Commonly known as: NEURONTIN   "    Dose: 300 mg  Take 300 mg by mouth 2 times daily  Refills: 0     insulin aspart 100 UNIT/ML cartridge  Commonly known as: NOVOPEN ECHO      Inject Subcutaneous 3 times daily (with meals) L Inject  as per sliding scale: if 70 - 149 = 0 unit; 150 - 199 = 1  units; 200 - 249 = 2 units; 250 - 299 = 3 units; 300 -  349 = 4 units; 350 - 399 = 5 units; 400 - 900 = 6 units  give 6 units and CALL md  Refills: 0     insulin glargine 100 UNIT/ML pen  Commonly known as: LANTUS PEN      Dose: 8 Units  Inject 8 Units Subcutaneous  Refills: 0     lacosamide 200 MG Tabs tablet  Commonly known as: VIMPAT  Used for: Seizures (H)      Dose: 200 mg  1 tablet (200 mg) by Oral or Feeding Tube route 2 times daily  Quantity: 60 tablet  Refills: 0     metolazone 2.5 MG tablet  Commonly known as: ZAROXOLYN      Dose: 2.5 mg  Take 2.5 mg by mouth daily as needed  Refills: 0     metoprolol tartrate 25 MG tablet  Commonly known as: LOPRESSOR  Used for: Permanent atrial fibrillation (H)      Dose: 25 mg  Take 1 tablet (25 mg) by mouth 2 times daily (with meals)  Quantity: 60 tablet  Refills: 3     multivitamin w/minerals tablet      Dose: 1 tablet  Take 1 tablet by mouth daily  Refills: 0     omeprazole 20 MG tablet      Dose: 20 mg  Take 20 mg by mouth 2 times daily  Refills: 0     order for DME      Refills: 0     potassium chloride ER 20 MEQ CR tablet  Commonly known as: KLOR-CON M      Dose: 20 mEq  Take 20 mEq by mouth 2 times daily  Refills: 0     rOPINIRole 1 MG tablet  Commonly known as: REQUIP  Used for: Restless leg syndrome      Dose: 1 mg  Take 1 tablet (1 mg) by mouth daily  Refills: 0     tadalafil 20 MG tablet  Commonly known as: CIALIS  Used for: 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 Read the directions carefully, and ask your doctor or other care provider to review them with you.            STOP taking    ammonium lactate 12 % external cream  Commonly known as: AMLACTIN  Stopped by: Doris Hoffmann CNP        irbesartan 300 MG tablet  Commonly known as: AVAPRO  Stopped by: Doris Hoffmann CNP               ASSESSMENT/PLAN  Encounter Diagnoses   Name Primary?     Diabetic ulcer of toe of left foot associated with type 2 diabetes mellitus, unspecified ulcer stage (H) Yes     Pain management      Physical deconditioning      Physical deconditioning PT OT    Insomnia continue trazodone 100 mg at bedtime as needed,     Congestive heart failure Daily weights, spironolactone 12.5 mg daily, Bumex 2 mg twice daily, metolozone 2.5 mg twice weekly     Hypertension continue irbesartan 150 mg daily, Metroprolol tartrate 25 mg twice daily    Type 2 diabetes glargine 8 units subcu at bedtime, sliding scale, A1c on 5/2/2022 was 7.0    GERD on omeprazole    hypokalemia On potasium 20meq  BID, lab pending   Restless leg syndrome continue ropinirole 2 mg at bedtime    Hypertension on tadalafil 20 mg daily    COPD continue albuterol inhaler and nebulizers as needed, Breo Ellipta 1 puff daily    Cellulitis completed Augmentin on 6/15/2022    HDL on atorvastatin, ASA    DISCHARGE PLAN/FACE TO FACE:  I certify that services are/were furnished while this patient was under the care of a physician and that a physician or an allowed non-physician practitioner (NPP), had a face-to-face encounter that meets the physician face-to-face encounter requirements. The encounter was in whole, or in part, related to the primary reason for home health. The patient is confined to his/her home and needs intermittent skilled nursing, physical therapy,  speech-language pathology, or the continued need for occupational therapy. A plan of care has been established by a physician and is periodically reviewed by a physician.  Date of Face-to-Face Encounter: 6/27/2022    I certify that, based on my findings, the following services are medically necessary home health services: PT and RN    My clinical findings support the need for the above skilled services because physical therapy for continued strength and endurance, RN for vital signs, medication management and wound cares right hallux    This patient is homebound because he is deconditioned easily fatigued following recent CVA and congestive heart failure    The patient is, or has been, under my care and I have initiated the establishment of the plan of care. This patient will be followed by a physician who will periodically review the plan of care.    Schedule follow up visit with primary care provider within 7 days to reestablish care.    Electronically signed by: Doris Hoffmann CNP            Sincerely,        Doris Hoffmann CNP

## 2023-01-01 ENCOUNTER — LAB REQUISITION (OUTPATIENT)
Dept: LAB | Facility: CLINIC | Age: 83
End: 2023-01-01

## 2023-01-01 ENCOUNTER — HEALTH MAINTENANCE LETTER (OUTPATIENT)
Age: 83
End: 2023-01-01

## 2023-01-01 ENCOUNTER — LAB REQUISITION (OUTPATIENT)
Dept: LAB | Facility: CLINIC | Age: 83
End: 2023-01-01
Payer: COMMERCIAL

## 2023-01-01 DIAGNOSIS — Z01.818 ENCOUNTER FOR OTHER PREPROCEDURAL EXAMINATION: ICD-10-CM

## 2023-01-01 DIAGNOSIS — I50.42 CHRONIC COMBINED SYSTOLIC (CONGESTIVE) AND DIASTOLIC (CONGESTIVE) HEART FAILURE (H): ICD-10-CM

## 2023-01-01 LAB
ANION GAP SERPL CALCULATED.3IONS-SCNC: 19 MMOL/L (ref 7–15)
ANION GAP SERPL CALCULATED.3IONS-SCNC: 25 MMOL/L (ref 7–15)
BASOPHILS # BLD AUTO: 0 10E3/UL (ref 0–0.2)
BASOPHILS NFR BLD AUTO: 1 %
BUN SERPL-MCNC: 60.5 MG/DL (ref 8–23)
BUN SERPL-MCNC: 67.7 MG/DL (ref 8–23)
CALCIUM SERPL-MCNC: 9.3 MG/DL (ref 8.8–10.2)
CALCIUM SERPL-MCNC: 9.3 MG/DL (ref 8.8–10.2)
CHLORIDE SERPL-SCNC: 89 MMOL/L (ref 98–107)
CHLORIDE SERPL-SCNC: 96 MMOL/L (ref 98–107)
CREAT SERPL-MCNC: 1.61 MG/DL (ref 0.67–1.17)
CREAT SERPL-MCNC: 1.73 MG/DL (ref 0.67–1.17)
DEPRECATED HCO3 PLAS-SCNC: 25 MMOL/L (ref 22–29)
DEPRECATED HCO3 PLAS-SCNC: 27 MMOL/L (ref 22–29)
EOSINOPHIL # BLD AUTO: 0.1 10E3/UL (ref 0–0.7)
EOSINOPHIL NFR BLD AUTO: 2 %
ERYTHROCYTE [DISTWIDTH] IN BLOOD BY AUTOMATED COUNT: 19.4 % (ref 10–15)
GFR SERPL CREATININE-BSD FRML MDRD: 39 ML/MIN/1.73M2
GFR SERPL CREATININE-BSD FRML MDRD: 43 ML/MIN/1.73M2
GLUCOSE SERPL-MCNC: 156 MG/DL (ref 70–99)
GLUCOSE SERPL-MCNC: 285 MG/DL (ref 70–99)
HCT VFR BLD AUTO: 49.3 % (ref 40–53)
HGB BLD-MCNC: 15.8 G/DL (ref 13.3–17.7)
IMM GRANULOCYTES # BLD: 0.1 10E3/UL
IMM GRANULOCYTES NFR BLD: 1 %
LYMPHOCYTES # BLD AUTO: 0.6 10E3/UL (ref 0.8–5.3)
LYMPHOCYTES NFR BLD AUTO: 6 %
MCH RBC QN AUTO: 28.5 PG (ref 26.5–33)
MCHC RBC AUTO-ENTMCNC: 32 G/DL (ref 31.5–36.5)
MCV RBC AUTO: 89 FL (ref 78–100)
MONOCYTES # BLD AUTO: 0.7 10E3/UL (ref 0–1.3)
MONOCYTES NFR BLD AUTO: 8 %
NEUTROPHILS # BLD AUTO: 7.3 10E3/UL (ref 1.6–8.3)
NEUTROPHILS NFR BLD AUTO: 82 %
NRBC # BLD AUTO: 0 10E3/UL
NRBC BLD AUTO-RTO: 0 /100
PLATELET # BLD AUTO: 218 10E3/UL (ref 150–450)
POTASSIUM SERPL-SCNC: 3.3 MMOL/L (ref 3.4–5.3)
POTASSIUM SERPL-SCNC: 4 MMOL/L (ref 3.4–5.3)
RBC # BLD AUTO: 5.54 10E6/UL (ref 4.4–5.9)
SARS-COV-2 RNA RESP QL NAA+PROBE: NEGATIVE
SODIUM SERPL-SCNC: 139 MMOL/L (ref 136–145)
SODIUM SERPL-SCNC: 142 MMOL/L (ref 136–145)
WBC # BLD AUTO: 8.9 10E3/UL (ref 4–11)

## 2023-01-01 PROCEDURE — 80048 BASIC METABOLIC PNL TOTAL CA: CPT | Performed by: FAMILY MEDICINE

## 2023-01-01 PROCEDURE — 85004 AUTOMATED DIFF WBC COUNT: CPT | Performed by: FAMILY MEDICINE

## 2023-01-01 PROCEDURE — U0003 INFECTIOUS AGENT DETECTION BY NUCLEIC ACID (DNA OR RNA); SEVERE ACUTE RESPIRATORY SYNDROME CORONAVIRUS 2 (SARS-COV-2) (CORONAVIRUS DISEASE [COVID-19]), AMPLIFIED PROBE TECHNIQUE, MAKING USE OF HIGH THROUGHPUT TECHNOLOGIES AS DESCRIBED BY CMS-2020-01-R: HCPCS | Mod: ORL | Performed by: FAMILY MEDICINE

## 2023-06-13 NOTE — TELEPHONE ENCOUNTER
"Received a voicemail from Arron over the weekend regarding his \"little issue\" that he wanted to speak to [facility] about. Writer called back this morning to inquire more. He states that he saw [facility] on 1/30/2020 and his Bumex dose was 2 mg three times a day. He states that when he was taking this dose, he did lose a significant amount of water weight- 15 lbs he states. He states that gradually it became painful to urinate and his stream was very weak. He stopped his Bumex totally for a day. He then resumed at 2 mg twice a day and this has helped. He did gain 5 lbs back. He denied fever, weakness, malaise or back pains associated with the painful urination and lessened stream. He says that he has shortness of breath which is chronic but he has also had a cough x1 month and coughs up blood-tinged sputum. He also has abdominal bloating and difficulty sleeping. He states he does not usually get to sleep until around 3-4 in the morning. He denies waking up feeling short of breath but rather states \"he just cannot sleeo\". Will forward to [facility].          Dr. French,  See above- Arron was on 2 mg three times a day and noted painful urination and weak stream after some time. He did lose 15 lbs but then stopped for a day and gained 5 lbs back. He was 240 lbs yesterday. He reports shortness of breath but states he always is; cough x1 month with blood-tinged sputum, abdominal bloating and difficulty sleeping. He self-dosed his Bumex down to 2 mg twice a day. Last BMP was 1/30/2020. Recommendations for Arron? He did request to speak directly with you regarding this issue, I did tell him you were rounding on the wards this week.  Thanks,  Mal   " Ketoconazole Counseling:   Patient counseled regarding improving absorption with orange juice.  Adverse effects include but are not limited to breast enlargement, headache, diarrhea, nausea, upset stomach, liver function test abnormalities, taste disturbance, and stomach pain.  There is a rare possibility of liver failure that can occur when taking ketoconazole. The patient understands that monitoring of LFTs may be required, especially at baseline. The patient verbalized understanding of the proper use and possible adverse effects of ketoconazole.  All of the patient's questions and concerns were addressed.

## 2024-04-18 NOTE — PROGRESS NOTES
Subject seen for quarterly visit POLLY OLE study (trial of quarterly canakinumab in prevention of recurrent CV events.)     Medications reviewed (including prohibited medications, anti-diabetics and CV meds) and any changes noted below.    AEs assessed [prompting for infections, malignancies, and CV events] and, if present, noted below.    Open label canakinumab [150 mg] dispensed.    Will see him again in 3 months.    Medication changes:    Breo-Ellipta 1 puff daily started 20 Feb 2018    Seeing [facility] for further management of persistent atrial fibrillation  
PERRL/EOMI/conjunctiva clear/normal

## 2024-08-05 NOTE — PROGRESS NOTES
[facility]    Progress Note       Date of Admission:  11/30/2021    Assessment & Plan             Red Sutherland is a 81 year old male w/ PMH of HFpEF, ischemic cardiomyopathy, Afib, CAD s/p HUNTER, NSTEMI, COPD on home O2 4L, NOVA on CPAP, pulmonary HTN, CKD3, cirrhosis, T2DM on insulin admitted on 11/30/2021 for dyspnea with presentation most consistent with CHF exacerbation.    Patient responding appropriately to IV Bumex.  CT equivocal findings concerning for cirrhosis with ascites.  Paracentesis supportive of cardiac etiology of ascites, no evidence of SBP.     Acute CHF exacerbation, improved  HFpEF in setting of ischemic cardiomyopathy  Anasarca, improved  Hydrocele, improved  Overall clinical improvement, weight has decreased from admission. Dry weight of 212 lb; weight upon admission of 242 lb. No significant change to BUN and Cr. Cardiology following. Potassium has been normal.   - Continuous pulse oximetry  - Oxygen support as needed, SpO2 goal >90%  - Diuresis with bumetanide IV 2mg q8hrs per cardiology  - Hold PTA bumex  - BMP to trend  - Strict I/Os, daily weights  - Hold PTA irbesartan in setting of NATHALY  - Continue PTA metoprolol tartrate  - Continue PTA spironolactone   - Continue PTA Digoxin  - Hold PTA KCl  - Potassium replacement protocol  - Mg  -Cardiology consulted, appreciate recommendations (See note):   -Continue Bumex 2 mg IV every 8.  Consider metolazone if need increased diuresis.   -following up upon discharge to heart failure clinic    -Recommend Plavix and Eliquis for anticoagulation.    Cirrhosis  Ascites, s/p paracentesis   CT equivocal findings concerning for cirrhosis.  LFTs normal; albumen mildly low at 3.3; INR 1.3. Viral panel is negative. Ferritin is normal. Paracentesis removing 0.5L of yellow fluid.  SAAG score of 1.2, suggesting cardiac etiology, no evidence of SBP.  Overall unclear picture of cirrhosis.  -Recommend outpatient GI  [School] : school referral     Atrial fibrillation, permanent  Rate controlled & on anticoagulation. Was refered for a watchman device due to recent GI bleed.  - Hold PTA apixaban as patient declined due to concern for GI bleed; recommended by both medicine team and cardiology, though patient declined     CAD s/p PCI w/ HUNTER of left circumflex  NSTEMI  HTN  HLD  PVD  Was discontinued off aspirin & Effient on 11/18/21. Patient is scheduled for Ct Cardiac Morphology on 12/1/21.  - Continue PTA atorvastatin  - Continue PTA Plavix post procedure      CPAP & home O2 of 4L  NOVA  Severe pulmonary hypertension   Secondary to underlying lung disease and left heart disease  - Continue PTA tadalafil 20mg PO daily  - Discontinue PTA nitroglycerin SL PRN due to potential severe hypotension with tadalafil  - Continue PTA albuterol PRN  - Continue PTA Breo Ellipta  - CPAP overnight  - Supplemental O2     NATHALY  CKD 3  Cr 1.62, eGFR 40, BUN 39 on admission, has been stable; baseline Cr 1.2-1.4. BUN/Cr ratio of ~25, so likely prerenal in setting of CHF exacerbation. Increase also likely secondary to increased diuretic.  - Avoid nephrotoxic as able    T2DM on insulin   Chronic pancreatitis  Obesity, BMI 33  Glucose has been only mildly elevated.   - Continue PTA invokana  - Continue PTA insulin glaragine at half of home dose  - Medium liding scale insulin  - Hold PTA metformin     Hx GI Bleed  Anemia, microcytic - stable  Iron deficiency anemia  Hgb 7.9 on admission; was 8.1 when discharged following GI bleed. Hgb has been stable, no signs of active bleeding.   - Continue PTA ferrous glaconate     Scrotal fungal rash  - Clotrimazole     Restless leg  - Continue PTA ropinirole     Insomnia  - Continue PTA zolpidem     GERD  - Continue PTA omeprazole      Diet: Combination Diet Low Saturated Fat Na <2400mg Diet, No Caffeine Diet    DVT Prophylaxis: DOAC and Pneumatic Compression Devices  Dixon Catheter: Not present  Fluids: PO  Central Lines: None  Code  Status: No CPR- Do NOT Intubate      Disposition Plan   Expected discharge: 12/04/2021   recommended to prior living arrangement once CHF exacerbation improved.     The patient's care was discussed with the Attending Physician, Dr. Plata.    Deirdre Valdovinos MD  [facility]      ______________________________________________________________________    Interval History   Afebrile, 3 L of oxygen supplementation, and VS. Per patient: Noticeable improvement to SOB, mild improvement in hydrocele, no noticed improvement of lower extremity edema.  Patient reports feeling better overall.  No chest pain. No abdominal pain. Discussed results and overall course.     Data reviewed today: I reviewed all medications, new labs and imaging results over the last 24 hours. I personally reviewed no images or EKG's today.    Physical Exam   Vital Signs: Temp: 98.4  F (36.9  C) Temp src: Oral BP: 139/64 Pulse: 77   Resp: 20 SpO2: 99 % O2 Device: None (Room air) Oxygen Delivery: 3 LPM  Weight: 232 lbs 9.6 oz   Daughter present and attentive in room  Physical Exam  Constitutional:       General: He is not in acute distress.     Appearance: Normal appearance. He is not ill-appearing or diaphoretic.   Eyes:      Extraocular Movements: Extraocular movements intact.      Conjunctiva/sclera: Conjunctivae normal.   Cardiovascular:      Rate and Rhythm: Normal rate and regular rhythm.      Comments: 3/6 decrescendo systolic murmur  Pulmonary:      Effort: Pulmonary effort is normal.      Comments: Clear to ascultation bilaterally, no crackles, good air movement. No wheezes  Abdominal:      General: There is distension.      Tenderness: There is no abdominal tenderness. There is no guarding or rebound.      Comments: Distension mildly improved   Musculoskeletal:      Cervical back: Normal range of motion.      Comments: 3+ pitting edema up to knee on L side, improved. 3+ to knee on R. L>R. Tenderness to palpation  [Development and Mental Health] : development and mental health [Nutrition and Physical Activity] : nutrition and physical activity bilaterally, R>L   Skin:     General: Skin is warm and dry.   Neurological:      General: No focal deficit present.      Mental Status: He is alert and oriented to person, place, and time.   Psychiatric:         Mood and Affect: Mood normal.         Behavior: Behavior normal.         Data   Recent Labs   Lab 12/03/21  1200 12/03/21  0625 12/03/21  0615 12/02/21  1159 12/02/21  1123 12/02/21  0632 12/02/21  0625 12/01/21  0148 11/30/21  1609   WBC  --   --  6.3  --  7.8  --   --   --  7.7   HGB  --   --  7.6*  --  8.1*  --   --   --  7.9*   MCV  --   --  72*  --  72*  --   --   --  74*   PLT  --   --  161  --  207  --   --   --  211   INR  --   --   --   --   --   --   --   --  1.30*   NA  --   --  141  --  140  --  138   < > 140   POTASSIUM  --   --  3.7  --  4.0  --  3.9  3.9   < > 4.4   CHLORIDE  --   --  103  --  103  --  104   < > 108*   CO2  --   --  25  --  26  --  25   < > 23   BUN  --   --  42*  --  40*  --  39*   < > 39*   CR  --   --  1.47*  --  1.55*  --  1.53*   < > 1.62*   ANIONGAP  --   --  13  --  11  --  9   < > 9   ANNMARIE  --   --  8.9  --  9.4  --  9.2   < > 8.9   * 125* 121   < > 126*   < > 120   < > 199*   ALBUMIN  --   --   --   --   --   --  3.4*  --  3.3*   PROTTOTAL  --   --   --   --   --   --   --   --  6.2   BILITOTAL  --   --   --   --   --   --   --   --  1.9*   ALKPHOS  --   --   --   --   --   --   --   --  117   ALT  --   --   --   --   --   --   --   --  13   AST  --   --   --   --   --   --   --   --  18    < > = values in this interval not displayed.     No results found for this or any previous visit (from the past 24 hour(s)).   [Oral Health] : oral health [Safety] : safety [Full Activity without restrictions including Physical Education & Athletics] : Full Activity without restrictions including Physical Education & Athletics [I have examined the above-named student and completed the preparticipation physical evaluation. The athlete does not present apparent clinical contraindications to practice and participate in sport(s) as outlined above. A copy of the physical exam is on r] : I have examined the above-named student and completed the preparticipation physical evaluation. The athlete does not present apparent clinical contraindications to practice and participate in sport(s) as outlined above. A copy of the physical exam is on record in my office and can be made available to the school at the request of the parents. If conditions arise after the athlete has been cleared for participation, the physician may rescind the clearance until the problem is resolved and the potential consequences are completely explained to the athlete (and parents/guardians). [FreeTextEntry1] :  - Elevated BMI - labs ordered. Discussed incorporating more veggies into diet - Vaccines: UTD - BP normal - Vision/Hearing normal - ADHD - stable on current dose of Adderall XR 25 mg daily including weekends and holidays - Well visit in 1 year, med check TTM in 3 months

## (undated) DEVICE — DRSG PRIMAPORE 03 1/8X6" 66000318

## (undated) DEVICE — SPONGE SURGIFOAM 01GM POWDER 1978

## (undated) DEVICE — DRSG PRIMAPORE 02X3" 7133

## (undated) DEVICE — STPL SKIN 35W APPOSE 8886803712

## (undated) DEVICE — ESU CORD BIPOLAR AND IRR TUBING AESCULAP US355

## (undated) DEVICE — SURGICEL HEMOSTAT 4X8" 1952

## (undated) DEVICE — SPONGE COTTONOID 1X3" 20-10S

## (undated) DEVICE — SPONGE COTTONOID 1/2X1/2" 20-04S

## (undated) DEVICE — DRAIN JACKSON PRATT 10MM FLAT 4/4 PERF SU130-1311

## (undated) DEVICE — SU NUROLON 4-0 TF CR 8X18" C584D

## (undated) DEVICE — Device

## (undated) DEVICE — CLIP HEMOSTATIC RESOLN DURACLIP DC0235

## (undated) DEVICE — SU ETHILON 2-0 FS 18" 664H

## (undated) DEVICE — DRSG GAUZE 4X4" TRAY 6939

## (undated) DEVICE — SPONGE SURGIFOAM 100 1974

## (undated) DEVICE — EXCHANGE WIRE .035 260 STAR/JFC/035/260/ M001491681

## (undated) DEVICE — MANIFOLD KIT ANGIO AUTOMATED 014613

## (undated) DEVICE — CLIP RANEY

## (undated) DEVICE — DEVICE INFLATION W/KIT & 6IN TUBING

## (undated) DEVICE — SUCTION MANIFOLD NEPTUNE 2 SYS 1 PORT 702-025-000

## (undated) DEVICE — TUBING SUCTION MEDI-VAC 1/4"X20' N620A - HE

## (undated) DEVICE — SYR 10ML FINGER CONTROL W/O NDL 309695

## (undated) DEVICE — DRAPE POUCH INSTRUMENT 1018

## (undated) DEVICE — ESU PENCIL SMOKE EVAC W/ROCKER SWITCH 0703-047-000

## (undated) DEVICE — CUSTOM PACK CORONARY SAN5BCRHEA

## (undated) DEVICE — 6 FR IMPULSE ANGIO DIAG CATH AL1  5 PACK

## (undated) DEVICE — PERFORATOR 14MM CODMAN

## (undated) DEVICE — CUP AND LID 2PK 2OZ STERILE  SSK9006A

## (undated) DEVICE — BUR STRK 1.5MM TAPERED ROUTER FA1 5407-FA1-015

## (undated) DEVICE — ESU FCP CODMAN 8"X1.0MM SLIM TIP 9008100SL

## (undated) DEVICE — CATH BALLOON NC EMERGE 3.50X12MM H7493926712350

## (undated) DEVICE — ESU ELEC BLADE 2.75" COATED/INSULATED E1455

## (undated) DEVICE — CATH BALLOON NC EMERGE 4.00X12MM H7493926712400

## (undated) DEVICE — SOL WATER IRRIG 1000ML BOTTLE 2F7114

## (undated) DEVICE — CATH CORONARY LITHOTRIPSY SHOCKWAVE 3.5X12MM C2IVL3512

## (undated) DEVICE — DRAIN JACKSON PRATT RESERVOIR 100ML SU130-1305

## (undated) DEVICE — SPONGE COTTONOID 1/2X1 1/2" 20-06S

## (undated) DEVICE — BUR STRK 2.3MM TAPERED ROUTER - FA2 5407-FA2-023

## (undated) DEVICE — DRAPE CRANIOTOMY W/POUCH 9450

## (undated) DEVICE — GUIDEWIRE FORTE FLOPPY J TOP 34949-05J

## (undated) DEVICE — SU VICRYL 2-0 CT-2 CR 8X18" J726D

## (undated) DEVICE — SHTH INTRO 0.021IN ID 6FR DIA

## (undated) DEVICE — FORCEP BIOPSY DISP 000386

## (undated) DEVICE — CATH BALLOON NC EMERGE 2.50X12MM H7493926712250

## (undated) DEVICE — SLEEVE TR BAND RADIAL COMPRESSION DEVICE 24CM TRB24-REG

## (undated) DEVICE — ESU CORD BIPOLAR GREEN 10-4000

## (undated) DEVICE — SU MONOCRYL 3-0 PS-1 27" Y936H

## (undated) DEVICE — PACK CRANIOTOMY

## (undated) DEVICE — ELECTRODE ADULT PACING MULTI P-211-M1

## (undated) DEVICE — VALVE HEMOSTASIS .096" COPILOT MECH 1003331

## (undated) DEVICE — SYR ANGIOGRAPHY MULTIUSE KIT ACIST 014612

## (undated) DEVICE — SYR BULB IRRIG 50ML LATEX FREE 0035280

## (undated) DEVICE — PREP CHLORAPREP CLEAR 3ML 930400

## (undated) DEVICE — KIT HAND CONTROL ACIST 014644 AR-P54

## (undated) DEVICE — DRSG KERLIX 4 1/2"X4YDS ROLL 6715

## (undated) DEVICE — SPONGE SURGIFOAM 12 1972

## (undated) DEVICE — CATH DIAGNOSTIC RADIAL 5FR TIG 4.0

## (undated) DEVICE — SYR BULB IRRIG DOVER 60 ML LATEX FREE 67000

## (undated) RX ORDER — LABETALOL HYDROCHLORIDE 5 MG/ML
INJECTION, SOLUTION INTRAVENOUS
Status: DISPENSED
Start: 2021-12-21

## (undated) RX ORDER — ASPIRIN 81 MG/1
TABLET, CHEWABLE ORAL
Status: DISPENSED
Start: 2021-10-25

## (undated) RX ORDER — DIPHENHYDRAMINE HYDROCHLORIDE 50 MG/ML
INJECTION INTRAMUSCULAR; INTRAVENOUS
Status: DISPENSED
Start: 2022-01-03

## (undated) RX ORDER — SODIUM CHLORIDE 9 MG/ML
INJECTION, SOLUTION INTRAVENOUS
Status: DISPENSED
Start: 2021-12-21

## (undated) RX ORDER — LIDOCAINE HYDROCHLORIDE 20 MG/ML
JELLY TOPICAL
Status: DISPENSED
Start: 2022-01-03

## (undated) RX ORDER — BUPIVACAINE HYDROCHLORIDE 2.5 MG/ML
INJECTION, SOLUTION EPIDURAL; INFILTRATION; INTRACAUDAL
Status: DISPENSED
Start: 2021-12-21

## (undated) RX ORDER — PRASUGREL 10 MG/1
TABLET, FILM COATED ORAL
Status: DISPENSED
Start: 2021-10-25

## (undated) RX ORDER — FENTANYL CITRATE 50 UG/ML
INJECTION, SOLUTION INTRAMUSCULAR; INTRAVENOUS
Status: DISPENSED
Start: 2022-01-03

## (undated) RX ORDER — DIPHENHYDRAMINE HYDROCHLORIDE 50 MG/ML
INJECTION INTRAMUSCULAR; INTRAVENOUS
Status: DISPENSED
Start: 2021-10-25

## (undated) RX ORDER — ESMOLOL HYDROCHLORIDE 10 MG/ML
INJECTION INTRAVENOUS
Status: DISPENSED
Start: 2021-12-21

## (undated) RX ORDER — FENTANYL CITRATE 50 UG/ML
INJECTION, SOLUTION INTRAMUSCULAR; INTRAVENOUS
Status: DISPENSED
Start: 2021-10-25

## (undated) RX ORDER — ROCURONIUM BROMIDE 50 MG/5 ML
SYRINGE (ML) INTRAVENOUS
Status: DISPENSED
Start: 2021-12-21

## (undated) RX ORDER — LIDOCAINE HYDROCHLORIDE AND EPINEPHRINE 10; 10 MG/ML; UG/ML
INJECTION, SOLUTION INFILTRATION; PERINEURAL
Status: DISPENSED
Start: 2021-12-21

## (undated) RX ORDER — FENTANYL CITRATE 50 UG/ML
INJECTION, SOLUTION INTRAMUSCULAR; INTRAVENOUS
Status: DISPENSED
Start: 2021-12-21

## (undated) RX ORDER — CLINDAMYCIN PHOSPHATE 900 MG/50ML
INJECTION, SOLUTION INTRAVENOUS
Status: DISPENSED
Start: 2022-01-03

## (undated) RX ORDER — LIDOCAINE HYDROCHLORIDE 10 MG/ML
INJECTION, SOLUTION EPIDURAL; INFILTRATION; INTRACAUDAL; PERINEURAL
Status: DISPENSED
Start: 2022-01-03

## (undated) RX ORDER — PROPOFOL 10 MG/ML
INJECTION, EMULSION INTRAVENOUS
Status: DISPENSED
Start: 2021-12-21

## (undated) RX ORDER — DIAZEPAM 5 MG
TABLET ORAL
Status: DISPENSED
Start: 2021-10-25